# Patient Record
Sex: FEMALE | Race: WHITE | Employment: OTHER | URBAN - METROPOLITAN AREA
[De-identification: names, ages, dates, MRNs, and addresses within clinical notes are randomized per-mention and may not be internally consistent; named-entity substitution may affect disease eponyms.]

---

## 2017-07-25 ENCOUNTER — TRANSFERRED RECORDS (OUTPATIENT)
Dept: HEALTH INFORMATION MANAGEMENT | Facility: CLINIC | Age: 72
End: 2017-07-25

## 2017-11-14 ENCOUNTER — ANESTHESIA EVENT (OUTPATIENT)
Dept: SURGERY | Facility: CLINIC | Age: 72
DRG: 467 | End: 2017-11-14
Payer: MEDICARE

## 2017-11-14 RX ORDER — DORZOLAMIDE HYDROCHLORIDE AND TIMOLOL MALEATE 20; 5 MG/ML; MG/ML
1 SOLUTION/ DROPS OPHTHALMIC 2 TIMES DAILY
COMMUNITY

## 2017-11-14 RX ORDER — MULTIPLE VITAMINS W/ MINERALS TAB 9MG-400MCG
1 TAB ORAL DAILY
COMMUNITY
End: 2020-01-01

## 2017-11-16 ENCOUNTER — HOSPITAL ENCOUNTER (INPATIENT)
Facility: CLINIC | Age: 72
LOS: 3 days | Discharge: HOME HEALTH | DRG: 467 | End: 2017-11-19
Attending: ORTHOPAEDIC SURGERY | Admitting: ORTHOPAEDIC SURGERY
Payer: MEDICARE

## 2017-11-16 ENCOUNTER — ANESTHESIA (OUTPATIENT)
Dept: SURGERY | Facility: CLINIC | Age: 72
DRG: 467 | End: 2017-11-16
Payer: MEDICARE

## 2017-11-16 ENCOUNTER — APPOINTMENT (OUTPATIENT)
Dept: GENERAL RADIOLOGY | Facility: CLINIC | Age: 72
DRG: 467 | End: 2017-11-16
Attending: ORTHOPAEDIC SURGERY
Payer: MEDICARE

## 2017-11-16 DIAGNOSIS — Z96.649 S/P REVISION OF TOTAL HIP: Primary | ICD-10-CM

## 2017-11-16 PROCEDURE — C1776 JOINT DEVICE (IMPLANTABLE): HCPCS | Performed by: ORTHOPAEDIC SURGERY

## 2017-11-16 PROCEDURE — 25000125 ZZHC RX 250: Performed by: NURSE ANESTHETIST, CERTIFIED REGISTERED

## 2017-11-16 PROCEDURE — 25000132 ZZH RX MED GY IP 250 OP 250 PS 637: Mod: GY | Performed by: ORTHOPAEDIC SURGERY

## 2017-11-16 PROCEDURE — 37000009 ZZH ANESTHESIA TECHNICAL FEE, EACH ADDTL 15 MIN: Performed by: ORTHOPAEDIC SURGERY

## 2017-11-16 PROCEDURE — 40000986 XR PELVIS PORT 1/2 VW

## 2017-11-16 PROCEDURE — 0SP90JZ REMOVAL OF SYNTHETIC SUBSTITUTE FROM RIGHT HIP JOINT, OPEN APPROACH: ICD-10-PCS | Performed by: ORTHOPAEDIC SURGERY

## 2017-11-16 PROCEDURE — 37000008 ZZH ANESTHESIA TECHNICAL FEE, 1ST 30 MIN: Performed by: ORTHOPAEDIC SURGERY

## 2017-11-16 PROCEDURE — 36000069 ZZH SURGERY LEVEL 5 EA 15 ADDTL MIN: Performed by: ORTHOPAEDIC SURGERY

## 2017-11-16 PROCEDURE — 25000128 H RX IP 250 OP 636: Performed by: ORTHOPAEDIC SURGERY

## 2017-11-16 PROCEDURE — 25000128 H RX IP 250 OP 636: Performed by: NURSE ANESTHETIST, CERTIFIED REGISTERED

## 2017-11-16 PROCEDURE — 0SR902A REPLACEMENT OF RIGHT HIP JOINT WITH METAL ON POLYETHYLENE SYNTHETIC SUBSTITUTE, UNCEMENTED, OPEN APPROACH: ICD-10-PCS | Performed by: ORTHOPAEDIC SURGERY

## 2017-11-16 PROCEDURE — A9270 NON-COVERED ITEM OR SERVICE: HCPCS | Mod: GY | Performed by: ORTHOPAEDIC SURGERY

## 2017-11-16 PROCEDURE — 27210794 ZZH OR GENERAL SUPPLY STERILE: Performed by: ORTHOPAEDIC SURGERY

## 2017-11-16 PROCEDURE — 71000016 ZZH RECOVERY PHASE 1 LEVEL 3 FIRST HR: Performed by: ORTHOPAEDIC SURGERY

## 2017-11-16 PROCEDURE — 12000007 ZZH R&B INTERMEDIATE

## 2017-11-16 PROCEDURE — 0SUA09Z SUPPLEMENT RIGHT HIP JOINT, ACETABULAR SURFACE WITH LINER, OPEN APPROACH: ICD-10-PCS | Performed by: ORTHOPAEDIC SURGERY

## 2017-11-16 PROCEDURE — 36000067 ZZH SURGERY LEVEL 5 1ST 30 MIN: Performed by: ORTHOPAEDIC SURGERY

## 2017-11-16 PROCEDURE — 25800025 ZZH RX 258: Performed by: ORTHOPAEDIC SURGERY

## 2017-11-16 PROCEDURE — 0SP909Z REMOVAL OF LINER FROM RIGHT HIP JOINT, OPEN APPROACH: ICD-10-PCS | Performed by: ORTHOPAEDIC SURGERY

## 2017-11-16 PROCEDURE — 40000305 ZZH STATISTIC PRE PROC ASSESS I: Performed by: ORTHOPAEDIC SURGERY

## 2017-11-16 PROCEDURE — 25000125 ZZHC RX 250: Performed by: PHYSICIAN ASSISTANT

## 2017-11-16 PROCEDURE — 71000017 ZZH RECOVERY PHASE 1 LEVEL 3 EA ADDTL HR: Performed by: ORTHOPAEDIC SURGERY

## 2017-11-16 PROCEDURE — 25000128 H RX IP 250 OP 636: Performed by: PHYSICIAN ASSISTANT

## 2017-11-16 RX ORDER — BUPIVACAINE HYDROCHLORIDE 7.5 MG/ML
INJECTION, SOLUTION INTRASPINAL PRN
Status: DISCONTINUED | OUTPATIENT
Start: 2017-11-16 | End: 2017-11-16

## 2017-11-16 RX ORDER — HYDROXYZINE HYDROCHLORIDE 25 MG/1
25 TABLET, FILM COATED ORAL
Status: DISCONTINUED | OUTPATIENT
Start: 2017-11-16 | End: 2017-11-16 | Stop reason: HOSPADM

## 2017-11-16 RX ORDER — SODIUM CHLORIDE, SODIUM LACTATE, POTASSIUM CHLORIDE, CALCIUM CHLORIDE 600; 310; 30; 20 MG/100ML; MG/100ML; MG/100ML; MG/100ML
INJECTION, SOLUTION INTRAVENOUS CONTINUOUS
Status: DISCONTINUED | OUTPATIENT
Start: 2017-11-16 | End: 2017-11-17

## 2017-11-16 RX ORDER — DEXAMETHASONE SODIUM PHOSPHATE 4 MG/ML
INJECTION, SOLUTION INTRA-ARTICULAR; INTRALESIONAL; INTRAMUSCULAR; INTRAVENOUS; SOFT TISSUE PRN
Status: DISCONTINUED | OUTPATIENT
Start: 2017-11-16 | End: 2017-11-16

## 2017-11-16 RX ORDER — DORZOLAMIDE HYDROCHLORIDE AND TIMOLOL MALEATE 20; 5 MG/ML; MG/ML
1 SOLUTION/ DROPS OPHTHALMIC 2 TIMES DAILY
Status: DISCONTINUED | OUTPATIENT
Start: 2017-11-16 | End: 2017-11-19 | Stop reason: HOSPADM

## 2017-11-16 RX ORDER — DIMENHYDRINATE 50 MG/ML
25 INJECTION, SOLUTION INTRAMUSCULAR; INTRAVENOUS
Status: DISCONTINUED | OUTPATIENT
Start: 2017-11-16 | End: 2017-11-16 | Stop reason: HOSPADM

## 2017-11-16 RX ORDER — NALOXONE HYDROCHLORIDE 0.4 MG/ML
.1-.4 INJECTION, SOLUTION INTRAMUSCULAR; INTRAVENOUS; SUBCUTANEOUS
Status: DISCONTINUED | OUTPATIENT
Start: 2017-11-16 | End: 2017-11-16

## 2017-11-16 RX ORDER — MEPERIDINE HYDROCHLORIDE 25 MG/ML
12.5 INJECTION INTRAMUSCULAR; INTRAVENOUS; SUBCUTANEOUS EVERY 5 MIN PRN
Status: DISCONTINUED | OUTPATIENT
Start: 2017-11-16 | End: 2017-11-16 | Stop reason: HOSPADM

## 2017-11-16 RX ORDER — KETOROLAC TROMETHAMINE 30 MG/ML
INJECTION, SOLUTION INTRAMUSCULAR; INTRAVENOUS PRN
Status: DISCONTINUED | OUTPATIENT
Start: 2017-11-16 | End: 2017-11-16

## 2017-11-16 RX ORDER — GLYCOPYRROLATE 0.2 MG/ML
INJECTION, SOLUTION INTRAMUSCULAR; INTRAVENOUS PRN
Status: DISCONTINUED | OUTPATIENT
Start: 2017-11-16 | End: 2017-11-16

## 2017-11-16 RX ORDER — AMOXICILLIN 250 MG
1-2 CAPSULE ORAL 2 TIMES DAILY
Status: DISCONTINUED | OUTPATIENT
Start: 2017-11-16 | End: 2017-11-19 | Stop reason: HOSPADM

## 2017-11-16 RX ORDER — CEFAZOLIN SODIUM 1 G/3ML
1 INJECTION, POWDER, FOR SOLUTION INTRAMUSCULAR; INTRAVENOUS SEE ADMIN INSTRUCTIONS
Status: DISCONTINUED | OUTPATIENT
Start: 2017-11-16 | End: 2017-11-16

## 2017-11-16 RX ORDER — FENTANYL CITRATE 50 UG/ML
INJECTION, SOLUTION INTRAMUSCULAR; INTRAVENOUS PRN
Status: DISCONTINUED | OUTPATIENT
Start: 2017-11-16 | End: 2017-11-16

## 2017-11-16 RX ORDER — HYDROMORPHONE HYDROCHLORIDE 1 MG/ML
0.5 INJECTION, SOLUTION INTRAMUSCULAR; INTRAVENOUS; SUBCUTANEOUS
Status: DISCONTINUED | OUTPATIENT
Start: 2017-11-16 | End: 2017-11-19 | Stop reason: HOSPADM

## 2017-11-16 RX ORDER — LIDOCAINE HYDROCHLORIDE 10 MG/ML
INJECTION, SOLUTION INFILTRATION; PERINEURAL PRN
Status: DISCONTINUED | OUTPATIENT
Start: 2017-11-16 | End: 2017-11-16

## 2017-11-16 RX ORDER — PROCHLORPERAZINE MALEATE 5 MG
5 TABLET ORAL EVERY 6 HOURS PRN
Status: DISCONTINUED | OUTPATIENT
Start: 2017-11-16 | End: 2017-11-19 | Stop reason: HOSPADM

## 2017-11-16 RX ORDER — HYDROMORPHONE HYDROCHLORIDE 1 MG/ML
.3-.5 INJECTION, SOLUTION INTRAMUSCULAR; INTRAVENOUS; SUBCUTANEOUS EVERY 5 MIN PRN
Status: DISCONTINUED | OUTPATIENT
Start: 2017-11-16 | End: 2017-11-16 | Stop reason: HOSPADM

## 2017-11-16 RX ORDER — OXYCODONE HYDROCHLORIDE 5 MG/1
5-10 TABLET ORAL EVERY 4 HOURS PRN
Status: DISCONTINUED | OUTPATIENT
Start: 2017-11-16 | End: 2017-11-17

## 2017-11-16 RX ORDER — LIDOCAINE 40 MG/G
CREAM TOPICAL
Status: DISCONTINUED | OUTPATIENT
Start: 2017-11-16 | End: 2017-11-17

## 2017-11-16 RX ORDER — EPINEPHRINE 1 MG/ML
INJECTION, SOLUTION, CONCENTRATE INTRAVENOUS PRN
Status: DISCONTINUED | OUTPATIENT
Start: 2017-11-16 | End: 2017-11-16

## 2017-11-16 RX ORDER — DEXAMETHASONE SODIUM PHOSPHATE 4 MG/ML
10 INJECTION, SOLUTION INTRA-ARTICULAR; INTRALESIONAL; INTRAMUSCULAR; INTRAVENOUS; SOFT TISSUE ONCE
Status: DISCONTINUED | OUTPATIENT
Start: 2017-11-16 | End: 2017-11-16

## 2017-11-16 RX ORDER — TRAMADOL HYDROCHLORIDE 50 MG/1
50 TABLET ORAL EVERY 6 HOURS PRN
Status: DISCONTINUED | OUTPATIENT
Start: 2017-11-16 | End: 2017-11-19 | Stop reason: HOSPADM

## 2017-11-16 RX ORDER — EPHEDRINE SULFATE 50 MG/ML
INJECTION, SOLUTION INTRAVENOUS PRN
Status: DISCONTINUED | OUTPATIENT
Start: 2017-11-16 | End: 2017-11-16

## 2017-11-16 RX ORDER — ONDANSETRON 2 MG/ML
4 INJECTION INTRAMUSCULAR; INTRAVENOUS EVERY 30 MIN PRN
Status: DISCONTINUED | OUTPATIENT
Start: 2017-11-16 | End: 2017-11-16 | Stop reason: HOSPADM

## 2017-11-16 RX ORDER — ONDANSETRON 2 MG/ML
INJECTION INTRAMUSCULAR; INTRAVENOUS PRN
Status: DISCONTINUED | OUTPATIENT
Start: 2017-11-16 | End: 2017-11-16

## 2017-11-16 RX ORDER — FENTANYL CITRATE 50 UG/ML
25-50 INJECTION, SOLUTION INTRAMUSCULAR; INTRAVENOUS
Status: DISCONTINUED | OUTPATIENT
Start: 2017-11-16 | End: 2017-11-16 | Stop reason: HOSPADM

## 2017-11-16 RX ORDER — ONDANSETRON 2 MG/ML
4 INJECTION INTRAMUSCULAR; INTRAVENOUS EVERY 6 HOURS PRN
Status: DISCONTINUED | OUTPATIENT
Start: 2017-11-16 | End: 2017-11-19 | Stop reason: HOSPADM

## 2017-11-16 RX ORDER — KETOROLAC TROMETHAMINE 15 MG/ML
15 INJECTION, SOLUTION INTRAMUSCULAR; INTRAVENOUS
Status: DISCONTINUED | OUTPATIENT
Start: 2017-11-16 | End: 2017-11-16 | Stop reason: HOSPADM

## 2017-11-16 RX ORDER — NALOXONE HYDROCHLORIDE 0.4 MG/ML
.1-.4 INJECTION, SOLUTION INTRAMUSCULAR; INTRAVENOUS; SUBCUTANEOUS
Status: DISCONTINUED | OUTPATIENT
Start: 2017-11-16 | End: 2017-11-19 | Stop reason: HOSPADM

## 2017-11-16 RX ORDER — DEXTROSE MONOHYDRATE, SODIUM CHLORIDE, AND POTASSIUM CHLORIDE 50; 1.49; 4.5 G/1000ML; G/1000ML; G/1000ML
INJECTION, SOLUTION INTRAVENOUS CONTINUOUS
Status: DISCONTINUED | OUTPATIENT
Start: 2017-11-16 | End: 2017-11-18

## 2017-11-16 RX ORDER — PHENYLEPHRINE HCL IN 0.9% NACL 1 MG/10 ML
100 SYRINGE (ML) INTRAVENOUS EVERY 5 MIN PRN
Status: DISCONTINUED | OUTPATIENT
Start: 2017-11-16 | End: 2017-11-17 | Stop reason: CLARIF

## 2017-11-16 RX ORDER — CEFAZOLIN SODIUM 2 G/100ML
2 INJECTION, SOLUTION INTRAVENOUS
Status: COMPLETED | OUTPATIENT
Start: 2017-11-16 | End: 2017-11-16

## 2017-11-16 RX ORDER — ONDANSETRON 4 MG/1
4 TABLET, ORALLY DISINTEGRATING ORAL EVERY 6 HOURS PRN
Status: DISCONTINUED | OUTPATIENT
Start: 2017-11-16 | End: 2017-11-19 | Stop reason: HOSPADM

## 2017-11-16 RX ORDER — ACETAMINOPHEN 325 MG/1
650 TABLET ORAL EVERY 4 HOURS PRN
Status: DISCONTINUED | OUTPATIENT
Start: 2017-11-19 | End: 2017-11-19 | Stop reason: HOSPADM

## 2017-11-16 RX ORDER — PROPOFOL 10 MG/ML
INJECTION, EMULSION INTRAVENOUS CONTINUOUS PRN
Status: DISCONTINUED | OUTPATIENT
Start: 2017-11-16 | End: 2017-11-16

## 2017-11-16 RX ORDER — ONDANSETRON 4 MG/1
4 TABLET, ORALLY DISINTEGRATING ORAL EVERY 30 MIN PRN
Status: DISCONTINUED | OUTPATIENT
Start: 2017-11-16 | End: 2017-11-16 | Stop reason: HOSPADM

## 2017-11-16 RX ORDER — LIDOCAINE 40 MG/G
CREAM TOPICAL
Status: DISCONTINUED | OUTPATIENT
Start: 2017-11-16 | End: 2017-11-19 | Stop reason: HOSPADM

## 2017-11-16 RX ORDER — DEXAMETHASONE SODIUM PHOSPHATE 4 MG/ML
10 INJECTION, SOLUTION INTRA-ARTICULAR; INTRALESIONAL; INTRAMUSCULAR; INTRAVENOUS; SOFT TISSUE ONCE
Status: COMPLETED | OUTPATIENT
Start: 2017-11-17 | End: 2017-11-17

## 2017-11-16 RX ORDER — ACETAMINOPHEN 325 MG/1
975 TABLET ORAL EVERY 8 HOURS
Status: DISCONTINUED | OUTPATIENT
Start: 2017-11-16 | End: 2017-11-17

## 2017-11-16 RX ADMIN — POTASSIUM CHLORIDE, DEXTROSE MONOHYDRATE AND SODIUM CHLORIDE: 150; 5; 450 INJECTION, SOLUTION INTRAVENOUS at 17:33

## 2017-11-16 RX ADMIN — EPHEDRINE SULFATE 7.5 MG: 50 INJECTION, SOLUTION INTRAVENOUS at 12:06

## 2017-11-16 RX ADMIN — PHENYLEPHRINE HYDROCHLORIDE 200 MCG: 10 INJECTION, SOLUTION INTRAMUSCULAR; INTRAVENOUS; SUBCUTANEOUS at 11:25

## 2017-11-16 RX ADMIN — PHENYLEPHRINE HYDROCHLORIDE 100 MCG: 10 INJECTION, SOLUTION INTRAMUSCULAR; INTRAVENOUS; SUBCUTANEOUS at 12:02

## 2017-11-16 RX ADMIN — EPHEDRINE SULFATE 10 MG: 50 INJECTION, SOLUTION INTRAVENOUS at 12:23

## 2017-11-16 RX ADMIN — PHENYLEPHRINE HYDROCHLORIDE 100 MCG: 10 INJECTION, SOLUTION INTRAMUSCULAR; INTRAVENOUS; SUBCUTANEOUS at 11:52

## 2017-11-16 RX ADMIN — SODIUM CHLORIDE, POTASSIUM CHLORIDE, SODIUM LACTATE AND CALCIUM CHLORIDE: 600; 310; 30; 20 INJECTION, SOLUTION INTRAVENOUS at 12:30

## 2017-11-16 RX ADMIN — EPHEDRINE SULFATE 7.5 MG: 50 INJECTION, SOLUTION INTRAVENOUS at 11:39

## 2017-11-16 RX ADMIN — EPINEPHRINE 0.2 MG: 1 INJECTION, SOLUTION INTRAMUSCULAR; SUBCUTANEOUS at 10:51

## 2017-11-16 RX ADMIN — LIDOCAINE HYDROCHLORIDE 1 ML: 10 INJECTION, SOLUTION EPIDURAL; INFILTRATION; INTRACAUDAL; PERINEURAL at 09:25

## 2017-11-16 RX ADMIN — PHENYLEPHRINE HYDROCHLORIDE 100 MCG: 10 INJECTION, SOLUTION INTRAMUSCULAR; INTRAVENOUS; SUBCUTANEOUS at 13:05

## 2017-11-16 RX ADMIN — PHENYLEPHRINE HYDROCHLORIDE 100 MCG: 10 INJECTION, SOLUTION INTRAMUSCULAR; INTRAVENOUS; SUBCUTANEOUS at 12:06

## 2017-11-16 RX ADMIN — PHENYLEPHRINE HYDROCHLORIDE 100 MCG: 10 INJECTION, SOLUTION INTRAMUSCULAR; INTRAVENOUS; SUBCUTANEOUS at 11:05

## 2017-11-16 RX ADMIN — SENNOSIDES AND DOCUSATE SODIUM 1 TABLET: 8.6; 5 TABLET ORAL at 19:31

## 2017-11-16 RX ADMIN — BUPIVACAINE HYDROCHLORIDE IN DEXTROSE 1.8 ML: 7.5 INJECTION, SOLUTION SUBARACHNOID at 10:51

## 2017-11-16 RX ADMIN — SODIUM CHLORIDE, POTASSIUM CHLORIDE, SODIUM LACTATE AND CALCIUM CHLORIDE: 600; 310; 30; 20 INJECTION, SOLUTION INTRAVENOUS at 09:25

## 2017-11-16 RX ADMIN — PHENYLEPHRINE HYDROCHLORIDE 100 MCG: 10 INJECTION, SOLUTION INTRAMUSCULAR; INTRAVENOUS; SUBCUTANEOUS at 12:40

## 2017-11-16 RX ADMIN — PROPOFOL 150 MCG/KG/MIN: 10 INJECTION, EMULSION INTRAVENOUS at 10:53

## 2017-11-16 RX ADMIN — ONDANSETRON 4 MG: 2 INJECTION INTRAMUSCULAR; INTRAVENOUS at 10:38

## 2017-11-16 RX ADMIN — PHENYLEPHRINE HYDROCHLORIDE 150 MCG: 10 INJECTION, SOLUTION INTRAMUSCULAR; INTRAVENOUS; SUBCUTANEOUS at 11:20

## 2017-11-16 RX ADMIN — PHENYLEPHRINE HYDROCHLORIDE 150 MCG: 10 INJECTION, SOLUTION INTRAMUSCULAR; INTRAVENOUS; SUBCUTANEOUS at 11:32

## 2017-11-16 RX ADMIN — EPHEDRINE SULFATE 7.5 MG: 50 INJECTION, SOLUTION INTRAVENOUS at 11:28

## 2017-11-16 RX ADMIN — FENTANYL CITRATE 100 MCG: 50 INJECTION, SOLUTION INTRAMUSCULAR; INTRAVENOUS at 10:42

## 2017-11-16 RX ADMIN — EPHEDRINE SULFATE 7.5 MG: 50 INJECTION, SOLUTION INTRAVENOUS at 11:47

## 2017-11-16 RX ADMIN — KETOROLAC TROMETHAMINE 30 MG: 30 INJECTION, SOLUTION INTRAMUSCULAR at 12:46

## 2017-11-16 RX ADMIN — MIDAZOLAM HYDROCHLORIDE 2 MG: 1 INJECTION, SOLUTION INTRAMUSCULAR; INTRAVENOUS at 10:34

## 2017-11-16 RX ADMIN — PHENYLEPHRINE HYDROCHLORIDE 150 MCG: 10 INJECTION, SOLUTION INTRAMUSCULAR; INTRAVENOUS; SUBCUTANEOUS at 11:13

## 2017-11-16 RX ADMIN — PHENYLEPHRINE HYDROCHLORIDE 100 MCG: 10 INJECTION, SOLUTION INTRAMUSCULAR; INTRAVENOUS; SUBCUTANEOUS at 10:58

## 2017-11-16 RX ADMIN — TRANEXAMIC ACID 1 G: 100 INJECTION, SOLUTION INTRAVENOUS at 10:59

## 2017-11-16 RX ADMIN — LIDOCAINE HYDROCHLORIDE 5 ML: 10 INJECTION, SOLUTION INFILTRATION; PERINEURAL at 10:43

## 2017-11-16 RX ADMIN — SODIUM CHLORIDE, POTASSIUM CHLORIDE, SODIUM LACTATE AND CALCIUM CHLORIDE: 600; 310; 30; 20 INJECTION, SOLUTION INTRAVENOUS at 11:25

## 2017-11-16 RX ADMIN — PHENYLEPHRINE HYDROCHLORIDE 150 MCG: 10 INJECTION, SOLUTION INTRAMUSCULAR; INTRAVENOUS; SUBCUTANEOUS at 12:16

## 2017-11-16 RX ADMIN — CEFAZOLIN SODIUM 2 G: 2 INJECTION, SOLUTION INTRAVENOUS at 10:34

## 2017-11-16 RX ADMIN — BIMATOPROST 1 DROP: 0.1 SOLUTION/ DROPS OPHTHALMIC at 21:51

## 2017-11-16 RX ADMIN — DEXAMETHASONE SODIUM PHOSPHATE 10 MG: 4 INJECTION, SOLUTION INTRA-ARTICULAR; INTRALESIONAL; INTRAMUSCULAR; INTRAVENOUS; SOFT TISSUE at 10:53

## 2017-11-16 RX ADMIN — PHENYLEPHRINE HYDROCHLORIDE 150 MCG: 10 INJECTION, SOLUTION INTRAMUSCULAR; INTRAVENOUS; SUBCUTANEOUS at 11:39

## 2017-11-16 RX ADMIN — PHENYLEPHRINE HYDROCHLORIDE 150 MCG: 10 INJECTION, SOLUTION INTRAMUSCULAR; INTRAVENOUS; SUBCUTANEOUS at 11:47

## 2017-11-16 RX ADMIN — ACETAMINOPHEN 975 MG: 325 TABLET, FILM COATED ORAL at 17:30

## 2017-11-16 RX ADMIN — PHENYLEPHRINE HYDROCHLORIDE 100 MCG: 10 INJECTION, SOLUTION INTRAMUSCULAR; INTRAVENOUS; SUBCUTANEOUS at 12:36

## 2017-11-16 RX ADMIN — PHENYLEPHRINE HYDROCHLORIDE 100 MCG: 10 INJECTION, SOLUTION INTRAMUSCULAR; INTRAVENOUS; SUBCUTANEOUS at 13:42

## 2017-11-16 RX ADMIN — EPHEDRINE SULFATE 10 MG: 50 INJECTION, SOLUTION INTRAVENOUS at 11:32

## 2017-11-16 RX ADMIN — PHENYLEPHRINE HYDROCHLORIDE 100 MCG: 10 INJECTION, SOLUTION INTRAMUSCULAR; INTRAVENOUS; SUBCUTANEOUS at 13:22

## 2017-11-16 RX ADMIN — LIDOCAINE HYDROCHLORIDE 5 ML: 10 INJECTION, SOLUTION INFILTRATION; PERINEURAL at 10:53

## 2017-11-16 RX ADMIN — DORZOLAMIDE HYDROCHLORIDE AND TIMOLOL MALEATE 1 DROP: 20; 5 SOLUTION/ DROPS OPHTHALMIC at 21:50

## 2017-11-16 RX ADMIN — GLYCOPYRROLATE 0.4 MG: 0.2 INJECTION, SOLUTION INTRAMUSCULAR; INTRAVENOUS at 12:29

## 2017-11-16 RX ADMIN — PHENYLEPHRINE HYDROCHLORIDE 100 MCG: 10 INJECTION, SOLUTION INTRAMUSCULAR; INTRAVENOUS; SUBCUTANEOUS at 13:00

## 2017-11-16 RX ADMIN — OXYCODONE HYDROCHLORIDE 5 MG: 5 TABLET ORAL at 19:31

## 2017-11-16 RX ADMIN — CEFAZOLIN SODIUM 1 G: 1 INJECTION, SOLUTION INTRAVENOUS at 17:31

## 2017-11-16 RX ADMIN — HYDROMORPHONE HYDROCHLORIDE 0.5 MG: 1 INJECTION, SOLUTION INTRAMUSCULAR; INTRAVENOUS; SUBCUTANEOUS at 21:51

## 2017-11-16 ASSESSMENT — ACTIVITIES OF DAILY LIVING (ADL)
EATING: 0 - INDEPENDENT
CHANGE_IN_FUNCTIONAL_STATUS_SINCE_ONSET_OF_CURRENT_ILLNESS/INJURY: NO
SWALLOWING: 0 - SWALLOWS FOODS/LIQUIDS WITHOUT DIFFICULTY
TOILETING: 2 - ASSISTIVE PERSON
RETIRED_COMMUNICATION: 0-->UNDERSTANDS/COMMUNICATES WITHOUT DIFFICULTY
DRESS: 0 - INDEPENDENT
TRANSFERRING: 0-->INDEPENDENT
TOILETING: 0-->INDEPENDENT
COGNITION: 0 - NO COGNITION ISSUES REPORTED
AMBULATION: 0-->INDEPENDENT
AMBULATION: 1 - ASSISTIVE EQUIPMENT
SWALLOWING: 0-->SWALLOWS FOODS/LIQUIDS WITHOUT DIFFICULTY
TRANSFERRING: 1 - ASSISTIVE EQUIPMENT
DRESS: 0-->INDEPENDENT
FALL_HISTORY_WITHIN_LAST_SIX_MONTHS: YES
COMMUNICATION: 0 - UNDERSTANDS/COMMUNICATES WITHOUT DIFFICULTY
BATHING: 0-->INDEPENDENT
BATHING: 0 - INDEPENDENT
RETIRED_EATING: 0-->INDEPENDENT

## 2017-11-16 NOTE — ANESTHESIA CARE TRANSFER NOTE
Patient: Marquise Jj    Procedure(s):  Right total hip arthroplasty revision. - Wound Class: I-Clean    Diagnosis: Failed right total hip arthroplasty  Diagnosis Additional Information: No value filed.    Anesthesia Type:   Spinal     Note:  Airway :Face Mask  Patient transferred to:PACU  Handoff Report: Identifed the Patient, Identified the Reponsible Provider, Reviewed the pertinent medical history, Discussed the surgical course, Reviewed Intra-OP anesthesia mangement and issues during anesthesia, Set expectations for post-procedure period and Allowed opportunity for questions and acknowledgement of understanding      Vitals: (Last set prior to Anesthesia Care Transfer)    CRNA VITALS  11/16/2017 1243 - 11/16/2017 1313      11/16/2017             NIBP: 97/65    NIBP Mean: 69                Electronically Signed By: STEPHAN Jimenes CRNA  November 16, 2017  1:13 PM

## 2017-11-16 NOTE — BRIEF OP NOTE
Orthopedic Brief Operative Note  [facility]    Pre-operative diagnosis: Right total hip arthroplasty instability   Post-operative diagnosis: Same   Procedure: Right DIAMOND revision   Surgeon: Jozef Garcia MD     Assistant(s): Ana Babb PAC   Anesthesia: Spinal Anesthesia   Estimated blood loss: 200 ml               Drains: None   Specimens: None       Findings: See full dictated operative note for details   Complications: None                 Implant Name Type Inv. Item Serial No.  Lot No. LRB No. Used   G7 acetabular screw 6.5mm x 20    [employer] [id number] Right 1   G7 acetabular screw 6.5mm x 25    [employer] [id number] Right 1   G7 acetabular scre 6.5mm x 35    [employer] [id number] Right 1   G7 OsseoTi acetabular shell, 62mm H    [employer] [id number] Right 1   Femoral head, 6 degree taper, 28mm diameter, medium plus, 10.5mm neck length    [employer]ORM [id number] Right 1   G7 acetabular system dual mobility acetabular liner, neutral    [employer] [id number] Right 1   Active Articulation hip system Dual Mobility Bearing        [employer] [id number] Right 1         Comments: See dictated operative report for full details     Condition: Stable   Weight bearing status: Weight bearing as tolerated   Activity: Activity as tolerated  Patient may move about with assist as indicated or with supervision   Anticoagulation plan:                 Lovenox inpatient and then  mg daily at discharge  for 42 days  Follow up plan                           Follow up in 10-14 day(s)

## 2017-11-16 NOTE — IP AVS SNAPSHOT
[facility]    [address] [address]    Phone:  [phone]    Fax:  [phone]                                       After Visit Summary   11/16/2017    Marquise Jj    MRN: [id number]           After Visit Summary Signature Page     I have received my discharge instructions, and my questions have been answered. I have discussed any challenges I see with this plan with the nurse or doctor.    ..........................................................................................................................................  Patient/Patient Representative Signature      ..........................................................................................................................................  Patient Representative Print Name and Relationship to Patient    ..................................................               ................................................  Date                                            Time    ..........................................................................................................................................  Reviewed by Signature/Title    ...................................................              ..............................................  Date                                                            Time

## 2017-11-16 NOTE — ANESTHESIA PREPROCEDURE EVALUATION
Anesthesia Evaluation     . Pt has had prior anesthetic. Type: General, MAC and Regional           ROS/MED HX    ENT/Pulmonary:  - neg pulmonary ROS     Neurologic:  - neg neurologic ROS     Cardiovascular:     (+) Dyslipidemia, hypertension----. : . . . :. .       METS/Exercise Tolerance:     Hematologic:         Musculoskeletal:   (+) arthritis, , , other musculoskeletal- Scoliosis/kyphoscoliosis      GI/Hepatic:     (+) GERD       Renal/Genitourinary:     (+) chronic renal disease (CKD stage 3),       Endo:  - neg endo ROS       Psychiatric:  - neg psychiatric ROS       Infectious Disease:  - neg infectious disease ROS       Malignancy:      - no malignancy   Other: Comment: Glaucoma                    Physical Exam  Normal systems: cardiovascular, pulmonary and dental    Airway   Mallampati: II  TM distance: >3 FB  Neck ROM: full    Dental     Cardiovascular       Pulmonary                     Anesthesia Plan      History & Physical Review  History and physical reviewed and following examination; no interval change.    ASA Status:  3 .    NPO Status:  > 6 hours    Plan for Spinal with Intravenous and Propofol induction.   PONV prophylaxis:  Ondansetron (or other 5HT-3) and Dexamethasone or Solumedrol  Additional equipment: Videolaryngoscope Scanned H&P reviewed      Postoperative Care  Postoperative pain management:  IV analgesics, Oral pain medications and Multi-modal analgesia.      Consents  Anesthetic plan, risks, benefits and alternatives discussed with:  Patient..                          .

## 2017-11-16 NOTE — INTERVAL H&P NOTE
I personally examined the patient and reviewed her history and it is all up to date without changes.

## 2017-11-16 NOTE — ANESTHESIA PROCEDURE NOTES
Peripheral nerve/Neuraxial procedure note : intrathecal  Pre-Procedure  Performed by  EUN SNOW   Location: OR      Pre-Anesthestic Checklist: patient identified, IV checked, risks and benefits discussed, informed consent, monitors and equipment checked and pre-op evaluation    Timeout  Correct Patient: Yes   Correct Procedure: Yes   Correct Site: Yes   Correct Laterality: N/A   Correct Position: Yes   Site Marked: N/A   .   Procedure Documentation  ASA 3  .    Procedure:    Intrathecal.  Insertion Site:L4-5  (left paramedian approach)      Patient Prep;mask, sterile gloves, povidone-iodine 7.5% surgical scrub, patient draped.  .  Needle: Cristina tip Spinal Needle (gauge): 22  Spinal/LP Needle Length (inches): 3.5 # of attempts: 2 (Pt has scoliosis/kyphoscoliosis and Lumbar back fusion) and  # of redirects:  3 No introducer used .       Assessment/Narrative  .  .  clear CSF fluid removed while sitting   . Time Injected: 10:51

## 2017-11-16 NOTE — ANESTHESIA POSTPROCEDURE EVALUATION
Patient: Marquise Jj    Procedure(s):  Right total hip arthroplasty revision. - Wound Class: I-Clean    Diagnosis:Failed right total hip arthroplasty  Diagnosis Additional Information: No value filed.    Anesthesia Type:  Spinal    Note:  Anesthesia Post Evaluation    Patient location during evaluation: PACU and Bedside  Patient participation: Able to fully participate in evaluation  Level of consciousness: awake and alert  Pain management: adequate  Airway patency: patent  Cardiovascular status: acceptable and stable  Respiratory status: acceptable and room air  Hydration status: acceptable  PONV: none     Anesthetic complications: None    Comments: Pt's BP low at times, communicating with PACU RN about interventions; pt responding to interventions on recheck.        Last vitals:  Vitals:    11/16/17 1440 11/16/17 1445 11/16/17 1450   BP: 93/56 107/65 101/66   Resp: 12 12 12   Temp:      SpO2: 100% 100% 100%         Electronically Signed By: STEPHAN Jimenes CRNA  November 16, 2017  3:10 PM

## 2017-11-16 NOTE — IP AVS SNAPSHOT
MRN:[id number]                      After Visit Summary   11/16/2017    Marquise Jj    MRN: [id number]           Thank you!     Thank you for choosing [facility] for your care. Our goal is always to provide you with excellent care. Hearing back from our patients is one way we can continue to improve our services. Please take a few minutes to complete the written survey that you may receive in the mail after you visit with us. Thank you!        Patient Information     Date Of Birth          1945        Designated Caregiver       Most Recent Value    Caregiver    Will someone help with your care after discharge? yes    Name of designated caregiver Reyes    Phone number of caregiver [phone]    Caregiver address [place]      About your hospital stay     You were admitted on:  November 16, 2017 You last received care in the:  [facility]    You were discharged on:  November 19, 2017        Reason for your hospital stay       Revision right total hip                  Who to Call     For medical emergencies, please call 911.  For non-urgent questions about your medical care, please call your primary care provider or clinic, [phone]  For questions related to your surgery, please call your surgery clinic        Attending Provider     Provider Specialty    Jozef Garcia MD Orthopedics       Primary Care Provider Office Phone # Fax #    Poonam Cast [phone]      After Care Instructions     Activity       Your activity upon discharge: activity as tolerated            Diet       Follow this diet upon discharge: Orders Placed This Encounter      Advance Diet as Tolerated: Regular Diet Adult            Wound care and dressings       Instructions to care for your wound at home: as directed.                  Follow-up Appointments     Follow-up and recommended labs and tests        With Dr. Garcia in 2 weeks                  Additional Services      HOME CARE NURSING REFERRAL       If you do not hear from Home Care and Hospice, or you would like to call to schedule, please call the referring place of service that your provider has listed below.  ______________________________________________________________________    Your provider has referred you to: [facility]: [facility] [phone]   [email or web address]    Extended Emergency Contact Information  Primary Emergency Contact: Reyes Jj   [facility]  Home Phone: [phone]  Relation: Spouse    Patient Anticipated Discharge Date: 11/18/107   RN, PT, HHA to begin 24 - 48 hours after discharge.  PLEASE EVALUATE AND TREAT (Evaluation timeline is 24 - 48 hrs. Please call if there is need for a variance to this timeline).    REASON FOR REFERRAL: Status post right hip revision, need for ongoing PT    ADDITIONAL SERVICES NEEDED: NA    OTHER PERTINENT INFORMATION: Patient was last seen by provider on 11/18/2017 for status post right hip revision.    Current Outpatient Prescriptions:  HYDROcodone-acetaminophen (NORCO) 5-325 MG per tablet, Take 1-2 tablets by mouth every 4 hours as needed for moderate to severe pain, Disp: 50 tablet, Rfl: 0  hydrOXYzine (ATARAX) 25 MG tablet, Take 1 tablet (25 mg) by mouth every 4 hours as needed for itching or anxiety, Disp: 50 tablet, Rfl: 1  senna-docusate (SENOKOT-S;PERICOLACE) 8.6-50 MG per tablet, Take 1-2 tablets by mouth 2 times daily, Disp: 100 tablet, Rfl:       Patient Active Problem List:     S/P revision of total hip      Documentation of Face to Face and Certification for Home Health Services    I certify that patient, Marquise Jj is under my care and that I, or a Nurse Practitioner or Physician's Assistant working with me, had a face-to-face encounter that meets the physician face-to-face encounter requirements with this patient on: 11/18/2017.    This encounter with the  patient was in whole, or in part, for the following medical condition, which is the primary reason for Home Health Care: ongoing physical therapy.    I certify that, based on my findings, the following services are medically necessary Home Health Services: Physical Therapy    My clinical findings support the need for the above services because: Physical Therapy Services are needed to assess and treat the following functional impairments: decreased mobility related to hip surgery.    Further, I certify that my clinical findings support that this patient is homebound (i.e. absences from home require considerable and taxing effort and are for medical reasons or Oriental orthodox services or infrequently or of short duration when for other reasons) because: Requires assistance of another person or specialized equipment to access medical services because patient:  Had hip surgery on 11/16/2017.    Based on the above findings, I certify that this patient is confined to the home and needs intermittent skilled nursing care, physical therapy and/or speech therapy.  The patient is under my care, and I have initiated the establishment of the plan of care.  This patient will be followed by a physician who will periodically review the plan of care.    Physician/Provider to provide follow up care: Poonam Cast certified Physician at time of discharge: Dr Jozef Garcia    Please be aware that coverage of these services is subject to the terms and limitations of your health insurance plan.  Call member services at your health plan with any benefit or coverage questions.            Home Care OT Referral for Hospital Discharge       OT to eval and treat    Your provider has ordered home care - occupational therapy. If you have not been contacted within 2 days of your discharge please call the department phone number listed on the top of this document.            Home Care PT Referral for Hospital Discharge       PT  "to eval and treat    Your provider has ordered home care - physical therapy. If you have not been contacted within 2 days of your discharge please call the department phone number listed on the top of this document.                  Pending Results     No orders found from 2017 to 2017.            Statement of Approval     Ordered          17 0951  I have reviewed and agree with all the recommendations and orders detailed in this document.  EFFECTIVE NOW     Approved and electronically signed by:  Jozef Garcia MD             Admission Information     Date & Time Provider Department Dept. Phone    2017 Jozef Garcia MD [facility] [phone]      Your Vitals Were     Blood Pressure Pulse Temperature Respirations Height Weight    109/55 (BP Location: Left arm) 74 98  F (36.7  C) (Oral) 16 1.727 m (5' 8\") 76.7 kg (169 lb)    Pulse Oximetry BMI (Body Mass Index)                96% 25.7 kg/m2          MyChart Information     [facility] lets you send messages to your doctor, view your test results, renew your prescriptions, schedule appointments and more. To sign up, go to [email or web address] . Click on \"Log in\" on the left side of the screen, which will take you to the Welcome page. Then click on \"Sign up Now\" on the right side of the page.     You will be asked to enter the access code listed below, as well as some personal information. Please follow the directions to create your username and password.     Your access code is: [email or web address]  Expires: 2018  9:52 AM     Your access code will  in 90 days. If you need help or a new code, please call your [place] clinic or [phone].        Care EveryWhere ID     This is your Care EveryWhere ID. This could be used by other organizations to access your [place] medical records  [id number]        Equal Access to Services     RADHA LLANES AH: mel Bautista qaybta " ke paez mirzafabian cobbgagandeep crenshawaafabian ah. [facility] [phone].    ATENCIÓN: Si romeo escoto, tiene a farr disposición servicios gratuitos de asistencia lingüística. Farzad al [phone].    We comply with applicable federal civil rights laws and [place] laws. We do not discriminate on the basis of race, color, national origin, age, disability, sex, sexual orientation, or gender identity.               Review of your medicines      START taking        Dose / Directions    aspirin  MG EC tablet        Dose:  325 mg   Take 1 tablet (325 mg) by mouth daily   Quantity:  40 tablet   Refills:  0       HYDROcodone-acetaminophen 5-325 MG per tablet   Commonly known as:  NORCO        Dose:  1-2 tablet   Take 1-2 tablets by mouth every 4 hours as needed for moderate to severe pain   Quantity:  50 tablet   Refills:  0       hydrOXYzine 25 MG tablet   Commonly known as:  ATARAX        Dose:  25 mg   Take 1 tablet (25 mg) by mouth every 4 hours as needed for itching or anxiety   Quantity:  50 tablet   Refills:  1       senna-docusate 8.6-50 MG per tablet   Commonly known as:  SENOKOT-S;PERICOLACE        Dose:  1-2 tablet   Take 1-2 tablets by mouth 2 times daily   Quantity:  100 tablet   Refills:  0         CONTINUE these medicines which have NOT CHANGED        Dose / Directions    bimatoprost 0.01 % Soln   Commonly known as:  LUMIGAN        Dose:  1 drop   1 drop At Bedtime   Refills:  0       COSOPT 2-0.5 % ophthalmic solution   Generic drug:  dorzolamide-timolol        Dose:  1 drop   1 drop 2 times daily   Refills:  0       FISH OIL PO        Dose:  1 capsule   Take 1 capsule by mouth daily   Refills:  0       LOSARTAN POTASSIUM PO        Dose:  50 mg   Take 50 mg by mouth daily   Refills:  0       Multi-vitamin Tabs tablet        Dose:  1 tablet   Take 1 tablet by mouth daily   Refills:  0       OMEPRAZOLE PO        Dose:  20 mg   Take 20 mg by mouth daily   Refills:  0       TYLENOL PO         Dose:  500 mg   Take 500 mg by mouth   Refills:  0            Where to get your medicines      These medications were sent to [facility] [place], [place] - [address]  [address] [address]     Phone:  [phone]     aspirin  MG EC tablet    hydrOXYzine 25 MG tablet         Some of these will need a paper prescription and others can be bought over the counter. Ask your nurse if you have questions.     Bring a paper prescription for each of these medications     HYDROcodone-acetaminophen 5-325 MG per tablet       You don't need a prescription for these medications     senna-docusate 8.6-50 MG per tablet                Protect others around you: Learn how to safely use, store and throw away your medicines at www.disposemymeds.org.             Medication List: This is a list of all your medications and when to take them. Check marks below indicate your daily home schedule. Keep this list as a reference.      Medications           Morning Afternoon Evening Bedtime As Needed    aspirin  MG EC tablet   Take 1 tablet (325 mg) by mouth daily   Next Dose Due:  Start Monday 11/20                                   bimatoprost 0.01 % Soln   Commonly known as:  LUMIGAN   1 drop At Bedtime   Last time this was given:  1 drop on 11/18/2017  8:45 PM   Next Dose Due:  tonight                                   COSOPT 2-0.5 % ophthalmic solution   1 drop 2 times daily   Last time this was given:  1 drop on 11/19/2017  8:10 AM   Generic drug:  dorzolamide-timolol   Next Dose Due:  tonight                                      FISH OIL PO   Take 1 capsule by mouth daily   Next Dose Due:  11/20                                   HYDROcodone-acetaminophen 5-325 MG per tablet   Commonly known as:  NORCO   Take 1-2 tablets by mouth every 4 hours as needed for moderate to severe pain   Last time this was given:  1 tablet on 11/19/2017 12:31 PM                                   hydrOXYzine 25 MG  tablet   Commonly known as:  ATARAX   Take 1 tablet (25 mg) by mouth every 4 hours as needed for itching or anxiety   Last time this was given:  25 mg on 11/18/2017  9:56 PM                                   LOSARTAN POTASSIUM PO   Take 50 mg by mouth daily   Next Dose Due:  11/20                                   Multi-vitamin Tabs tablet   Take 1 tablet by mouth daily   Next Dose Due:  11/20                                   OMEPRAZOLE PO   Take 20 mg by mouth daily   Last time this was given:  20 mg on 11/19/2017  8:10 AM   Next Dose Due:  11/20                                   senna-docusate 8.6-50 MG per tablet   Commonly known as:  SENOKOT-S;PERICOLACE   Take 1-2 tablets by mouth 2 times daily   Last time this was given:  2 tablets on 11/19/2017  8:10 AM                                   TYLENOL PO   Take 500 mg by mouth   Last time this was given:  975 mg on 11/17/2017  8:26 AM

## 2017-11-16 NOTE — OR NURSING
PACU discharge criteria has been met to go to Med. Surg. Unit at 1530.  Waiting for a Med. Surg. Room and nurse to be available to take report.

## 2017-11-16 NOTE — PROGRESS NOTES
"[place] [facility] [place]    Patient admitted to room 2300 at approximately 1630 via cart from surgery. Patient was accompanied by transport tech.     Verbal SBAR report received from Susan BAIG prior to patient arrival.     Patient trasferred to bed via air surendra. Patient alert and oriented X 3. The patient is not having any pain.  . Admission vital signs: Blood pressure 104/67, temperature 97.4  F (36.3  C), temperature source Oral, resp. rate 16, height 1.727 m (5' 8\"), weight 76.7 kg (169 lb), SpO2 100 %. Patient was oriented to plan of care, call light, bed controls, tv, telephone, bathroom and visiting hours.     The following safety risks were identified during admission: fall. Yellow risk band applied: YES.     Mikala Johnson    "

## 2017-11-16 NOTE — OR NURSING
Called CONI MORLEY, and notified her of patient's BP trending down and her current BP - see VS flowsheet.  CONI MORLEY, came to the patient's bedside in PACU and gave her a medication to raise the BP at 1338.  Will continue to monitor.

## 2017-11-16 NOTE — OR NURSING
Patient hypotensive on admission to PACU.  CNOI MORLEY, at patient's bedside and medicated the patient for her low BP.  See VS flowsheet.  Will continue to monitor.

## 2017-11-16 NOTE — IP AVS SNAPSHOT
"    [facility] SURGICAL: [phone]                                              INTERAGENCY TRANSFER FORM - PHYSICIAN ORDERS   2017                    Hospital Admission Date: 2017  EDILSON SMITH   : 1945  Sex: Female        Attending Provider: Jozef Garcia MD     Allergies:  Combigan [Brimonidine Tartrate-timolol]    Infection:  None   Service:  SURGERY    Ht:  1.727 m (5' 8\")   Wt:  76.7 kg (169 lb)   Admission Wt:  76.7 kg (169 lb)    BMI:  25.7 kg/m 2   BSA:  1.92 m 2            Patient PCP Information     Provider PCP Type    Poonam Cast General      ED Clinical Impression     Diagnosis Description Comment Added By Time Added    S/P revision of total hip [Z96.649] S/P revision of total hip [Z96.649]  Levi Rios PA-C 2017  9:45 AM      Hospital Problems as of 2017              Priority Class Noted POA    S/P revision of total hip Medium  2017 Yes      Non-Hospital Problems as of 2017     None      Code Status History     Date Active Date Inactive Code Status Order ID Comments User Context    This patient has a current code status but no historical code status.         Medication Review      START taking        Dose / Directions Comments    HYDROcodone-acetaminophen 5-325 MG per tablet   Commonly known as:  NORCO        Dose:  1-2 tablet   Take 1-2 tablets by mouth every 4 hours as needed for moderate to severe pain   Quantity:  50 tablet   Refills:  0        hydrOXYzine 25 MG tablet   Commonly known as:  ATARAX        Dose:  25 mg   Take 1 tablet (25 mg) by mouth every 4 hours as needed for itching or anxiety   Quantity:  50 tablet   Refills:  1        senna-docusate 8.6-50 MG per tablet   Commonly known as:  SENOKOT-S;PERICOLACE        Dose:  1-2 tablet   Take 1-2 tablets by mouth 2 times daily   Quantity:  100 tablet   Refills:  0          CONTINUE these medications which have NOT CHANGED        Dose / Directions Comments    " bimatoprost 0.01 % Soln   Commonly known as:  LUMIGAN        Dose:  1 drop   1 drop At Bedtime   Refills:  0        COSOPT 2-0.5 % ophthalmic solution   Generic drug:  dorzolamide-timolol        Dose:  1 drop   1 drop 2 times daily   Refills:  0        FISH OIL PO        Dose:  1 capsule   Take 1 capsule by mouth daily   Refills:  0        LOSARTAN POTASSIUM PO        Dose:  50 mg   Take 50 mg by mouth daily   Refills:  0        Multi-vitamin Tabs tablet        Dose:  1 tablet   Take 1 tablet by mouth daily   Refills:  0        OMEPRAZOLE PO        Dose:  20 mg   Take 20 mg by mouth daily   Refills:  0        TYLENOL PO        Dose:  500 mg   Take 500 mg by mouth   Refills:  0                Summary of Visit     Reason for your hospital stay       Revision right total hip             After Care     Activity       Your activity upon discharge: activity as tolerated       Diet       Follow this diet upon discharge: Orders Placed This Encounter      Advance Diet as Tolerated: Regular Diet Adult       Wound care and dressings       Instructions to care for your wound at home: as directed.             Referrals     HOME CARE NURSING REFERRAL       If you do not hear from Home Care and Hospice, or you would like to call to schedule, please call the referring place of service that your provider has listed below.  ______________________________________________________________________    Your provider has referred you to: [facility]: [facility] [phone]   [email or web address]    Extended Emergency Contact Information  Primary Emergency Contact: Reyes Jj   [facility] Phone: [phone]  Relation: Spouse    Patient Anticipated Discharge Date: 11/18/107   RN, PT, HHA to begin 24 - 48 hours after discharge.  PLEASE EVALUATE AND TREAT (Evaluation timeline is 24 - 48 hrs. Please call if there is need for a variance to this  timeline).    REASON FOR REFERRAL: Status post right hip revision, need for ongoing PT    ADDITIONAL SERVICES NEEDED: NA    OTHER PERTINENT INFORMATION: Patient was last seen by provider on 11/18/2017 for status post right hip revision.    Current Outpatient Prescriptions:  HYDROcodone-acetaminophen (NORCO) 5-325 MG per tablet, Take 1-2 tablets by mouth every 4 hours as needed for moderate to severe pain, Disp: 50 tablet, Rfl: 0  hydrOXYzine (ATARAX) 25 MG tablet, Take 1 tablet (25 mg) by mouth every 4 hours as needed for itching or anxiety, Disp: 50 tablet, Rfl: 1  senna-docusate (SENOKOT-S;PERICOLACE) 8.6-50 MG per tablet, Take 1-2 tablets by mouth 2 times daily, Disp: 100 tablet, Rfl:       Patient Active Problem List:     S/P revision of total hip      Documentation of Face to Face and Certification for Home Health Services    I certify that patient, Marquise Jj is under my care and that I, or a Nurse Practitioner or Physician's Assistant working with me, had a face-to-face encounter that meets the physician face-to-face encounter requirements with this patient on: 11/18/2017.    This encounter with the patient was in whole, or in part, for the following medical condition, which is the primary reason for Home Health Care: ongoing physical therapy.    I certify that, based on my findings, the following services are medically necessary Home Health Services: Physical Therapy    My clinical findings support the need for the above services because: Physical Therapy Services are needed to assess and treat the following functional impairments: decreased mobility related to hip surgery.    Further, I certify that my clinical findings support that this patient is homebound (i.e. absences from home require considerable and taxing effort and are for medical reasons or [employer] services or infrequently or of short duration when for other reasons) because: Requires assistance of another person or specialized  equipment to access medical services because patient:  Had hip surgery on 11/16/2017.    Based on the above findings, I certify that this patient is confined to the home and needs intermittent skilled nursing care, physical therapy and/or speech therapy.  The patient is under my care, and I have initiated the establishment of the plan of care.  This patient will be followed by a physician who will periodically review the plan of care.    Physician/Provider to provide follow up care: Poonam Cast certified Physician at time of discharge: Dr Jozef Garcia    Please be aware that coverage of these services is subject to the terms and limitations of your health insurance plan.  Call member services at your health plan with any benefit or coverage questions.       Home Care OT Referral for Hospital Discharge       OT to eval and treat    Your provider has ordered home care - occupational therapy. If you have not been contacted within 2 days of your discharge please call the department phone number listed on the top of this document.       Home Care PT Referral for Hospital Discharge       PT to eval and treat    Your provider has ordered home care - physical therapy. If you have not been contacted within 2 days of your discharge please call the department phone number listed on the top of this document.              MD face to face encounter       Documentation of Face to Face and Certification for Home Health Services    I certify that patient: Marquise Jj is under my care and that I, or a nurse practitioner or physician's assistant working with me, had a face-to-face encounter that meets the physician face-to-face encounter requirements with this patient on: 11/18/2017.    This encounter with the patient was in whole, or in part, for the following medical condition, which is the primary reason for home health care: s/p revision right total hip arthroplasty.    I certify that, based on  my findings, the following services are medically necessary home health services: Occupational Therapy and Physical Therapy.    My clinical findings support the need for the above services because: Occupational Therapy Services are needed to assess and treat cognitive ability and address ADL safety due to impairment in ambulation s/p revision right total hip arthroplasty. and Physical Therapy Services are needed to assess and treat the following functional impairments: ambulation s/p right revision total hip arthroplasty.    Further, I certify that my clinical findings support that this patient is homebound (i.e. absences from home require considerable and taxing effort and are for medical reasons or Taoism services or infrequently or of short duration when for other reasons) because: Requires assistance of another person or specialized equipment to access medical services because patient: Requires supervision of another for safe transfer...    Based on the above findings. I certify that this patient is confined to the home and needs intermittent skilled nursing care, physical therapy and/or speech therapy.  The patient is under my care, and I have initiated the establishment of the plan of care.  This patient will be followed by a physician who will periodically review the plan of care.  Physician/Provider to provide follow up care: Poonam Cast    Attending hospital physician (the Medicare certified [place] provider): Jozef Garcia MD  Physician Signature: See electronic signature associated with these discharge orders.  Date: 11/18/2017                  Follow-Up Appointment Instructions     Future Labs/Procedures    Follow-up and recommended labs and tests      Comments:    With Dr. Garcia in 2 weeks      Follow-Up Appointment Instructions     Follow-up and recommended labs and tests        With Dr. Garcia in 2 weeks             Statement of Approval     Ordered          11/18/17 0951  I have reviewed  and agree with all the recommendations and orders detailed in this document.  EFFECTIVE NOW     Approved and electronically signed by:  Jozef Garcia MD

## 2017-11-17 ENCOUNTER — APPOINTMENT (OUTPATIENT)
Dept: PHYSICAL THERAPY | Facility: CLINIC | Age: 72
DRG: 467 | End: 2017-11-17
Attending: ORTHOPAEDIC SURGERY
Payer: MEDICARE

## 2017-11-17 LAB
ANION GAP SERPL CALCULATED.3IONS-SCNC: 9 MMOL/L (ref 3–14)
BUN SERPL-MCNC: 20 MG/DL (ref 7–30)
CALCIUM SERPL-MCNC: 7.8 MG/DL (ref 8.5–10.1)
CHLORIDE SERPL-SCNC: 105 MMOL/L (ref 94–109)
CO2 SERPL-SCNC: 24 MMOL/L (ref 20–32)
CREAT SERPL-MCNC: 1.04 MG/DL (ref 0.52–1.04)
CREAT SERPL-MCNC: 1.04 MG/DL (ref 0.52–1.04)
GFR SERPL CREATININE-BSD FRML MDRD: 52 ML/MIN/1.7M2
GFR SERPL CREATININE-BSD FRML MDRD: 52 ML/MIN/1.7M2
GLUCOSE SERPL-MCNC: 144 MG/DL (ref 70–99)
HGB BLD-MCNC: 9 G/DL (ref 11.7–15.7)
PLATELET # BLD AUTO: 198 10E9/L (ref 150–450)
POTASSIUM SERPL-SCNC: 4.9 MMOL/L (ref 3.4–5.3)
SODIUM SERPL-SCNC: 138 MMOL/L (ref 133–144)

## 2017-11-17 PROCEDURE — 25000128 H RX IP 250 OP 636: Performed by: ORTHOPAEDIC SURGERY

## 2017-11-17 PROCEDURE — 99232 SBSQ HOSP IP/OBS MODERATE 35: CPT | Performed by: PHYSICIAN ASSISTANT

## 2017-11-17 PROCEDURE — 97110 THERAPEUTIC EXERCISES: CPT | Mod: GP | Performed by: PHYSICAL THERAPIST

## 2017-11-17 PROCEDURE — 25800025 ZZH RX 258: Performed by: ORTHOPAEDIC SURGERY

## 2017-11-17 PROCEDURE — 97116 GAIT TRAINING THERAPY: CPT | Mod: GP | Performed by: PHYSICAL THERAPIST

## 2017-11-17 PROCEDURE — 12000000 ZZH R&B MED SURG/OB

## 2017-11-17 PROCEDURE — 80048 BASIC METABOLIC PNL TOTAL CA: CPT | Performed by: PHYSICIAN ASSISTANT

## 2017-11-17 PROCEDURE — 25000132 ZZH RX MED GY IP 250 OP 250 PS 637: Mod: GY | Performed by: ORTHOPAEDIC SURGERY

## 2017-11-17 PROCEDURE — 85049 AUTOMATED PLATELET COUNT: CPT | Performed by: ORTHOPAEDIC SURGERY

## 2017-11-17 PROCEDURE — 85018 HEMOGLOBIN: CPT | Performed by: ORTHOPAEDIC SURGERY

## 2017-11-17 PROCEDURE — 25000132 ZZH RX MED GY IP 250 OP 250 PS 637: Mod: GY | Performed by: PHYSICIAN ASSISTANT

## 2017-11-17 PROCEDURE — 82565 ASSAY OF CREATININE: CPT | Performed by: ORTHOPAEDIC SURGERY

## 2017-11-17 PROCEDURE — A9270 NON-COVERED ITEM OR SERVICE: HCPCS | Mod: GY | Performed by: PHYSICIAN ASSISTANT

## 2017-11-17 PROCEDURE — A9270 NON-COVERED ITEM OR SERVICE: HCPCS | Mod: GY | Performed by: ORTHOPAEDIC SURGERY

## 2017-11-17 PROCEDURE — 36415 COLL VENOUS BLD VENIPUNCTURE: CPT | Performed by: ORTHOPAEDIC SURGERY

## 2017-11-17 PROCEDURE — 40000193 ZZH STATISTIC PT WARD VISIT: Performed by: PHYSICAL THERAPIST

## 2017-11-17 RX ORDER — HYDROCODONE BITARTRATE AND ACETAMINOPHEN 5; 325 MG/1; MG/1
1-2 TABLET ORAL EVERY 4 HOURS PRN
Status: DISCONTINUED | OUTPATIENT
Start: 2017-11-17 | End: 2017-11-19 | Stop reason: HOSPADM

## 2017-11-17 RX ORDER — HYDROXYZINE HYDROCHLORIDE 25 MG/1
25 TABLET, FILM COATED ORAL EVERY 4 HOURS PRN
Status: DISCONTINUED | OUTPATIENT
Start: 2017-11-17 | End: 2017-11-19 | Stop reason: HOSPADM

## 2017-11-17 RX ADMIN — DORZOLAMIDE HYDROCHLORIDE AND TIMOLOL MALEATE 1 DROP: 20; 5 SOLUTION/ DROPS OPHTHALMIC at 22:01

## 2017-11-17 RX ADMIN — HYDROCODONE BITARTRATE AND ACETAMINOPHEN 2 TABLET: 5; 325 TABLET ORAL at 22:02

## 2017-11-17 RX ADMIN — ACETAMINOPHEN 975 MG: 325 TABLET, FILM COATED ORAL at 08:26

## 2017-11-17 RX ADMIN — OXYCODONE HYDROCHLORIDE 10 MG: 5 TABLET ORAL at 08:26

## 2017-11-17 RX ADMIN — SENNOSIDES AND DOCUSATE SODIUM 2 TABLET: 8.6; 5 TABLET ORAL at 08:26

## 2017-11-17 RX ADMIN — HYDROCODONE BITARTRATE AND ACETAMINOPHEN 2 TABLET: 5; 325 TABLET ORAL at 11:57

## 2017-11-17 RX ADMIN — SENNOSIDES AND DOCUSATE SODIUM 2 TABLET: 8.6; 5 TABLET ORAL at 18:08

## 2017-11-17 RX ADMIN — ACETAMINOPHEN 975 MG: 325 TABLET, FILM COATED ORAL at 00:00

## 2017-11-17 RX ADMIN — POTASSIUM CHLORIDE, DEXTROSE MONOHYDRATE AND SODIUM CHLORIDE: 150; 5; 450 INJECTION, SOLUTION INTRAVENOUS at 02:00

## 2017-11-17 RX ADMIN — BIMATOPROST 1 DROP: 0.1 SOLUTION/ DROPS OPHTHALMIC at 22:02

## 2017-11-17 RX ADMIN — ENOXAPARIN SODIUM 40 MG: 40 INJECTION SUBCUTANEOUS at 11:57

## 2017-11-17 RX ADMIN — HYDROXYZINE HYDROCHLORIDE 25 MG: 25 TABLET ORAL at 14:37

## 2017-11-17 RX ADMIN — OMEPRAZOLE 20 MG: 20 CAPSULE, DELAYED RELEASE ORAL at 08:26

## 2017-11-17 RX ADMIN — DORZOLAMIDE HYDROCHLORIDE AND TIMOLOL MALEATE 1 DROP: 20; 5 SOLUTION/ DROPS OPHTHALMIC at 08:36

## 2017-11-17 RX ADMIN — HYDROCODONE BITARTRATE AND ACETAMINOPHEN 1 TABLET: 5; 325 TABLET ORAL at 15:55

## 2017-11-17 RX ADMIN — HYDROMORPHONE HYDROCHLORIDE 0.5 MG: 1 INJECTION, SOLUTION INTRAMUSCULAR; INTRAVENOUS; SUBCUTANEOUS at 01:56

## 2017-11-17 RX ADMIN — CEFAZOLIN SODIUM 1 G: 1 INJECTION, SOLUTION INTRAVENOUS at 00:00

## 2017-11-17 RX ADMIN — HYDROXYZINE HYDROCHLORIDE 25 MG: 25 TABLET ORAL at 22:02

## 2017-11-17 RX ADMIN — OXYCODONE HYDROCHLORIDE 10 MG: 5 TABLET ORAL at 00:00

## 2017-11-17 RX ADMIN — DEXAMETHASONE SODIUM PHOSPHATE 10 MG: 4 INJECTION, SOLUTION INTRAMUSCULAR; INTRAVENOUS at 08:26

## 2017-11-17 NOTE — PLAN OF CARE
Problem: Hip Arthroplasty (Total, Partial) (Adult)  Goal: Signs and Symptoms of Listed Potential Problems Will be Absent, Minimized or Managed (Hip Arthroplasty)  Signs and symptoms of listed potential problems will be absent, minimized or managed by discharge/transition of care (reference Hip Arthroplasty (Total, Partial) (Adult) CPG).   Outcome: Improving  States minimal discomfort, medicated with 5mg Oxycodone. Both feet remain pink to red in color. Patient states that she usually has blue or purple color to both feet. 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 Denies nausea, tolerating clear liquid diet for supper. Now eating a sandwich and hot chocolate. Dangled and attempted to stand to go to the bedside commode. Felt lightheaded when sitting on edge of bed.  When that passed, she attempted to stand with walker. Was unable to bear weight on right leg. States numbness continues enough as to not be able to bear weight. Using bedpan to void.

## 2017-11-17 NOTE — OP NOTE
DATE OF PROCEDURE:  11/16/2017      SURGEON:  Jozef Garcia MD      ASSISTANT:  GUILLERMO DOTSON PA-C   The assistance of a qualified medical profession was required during this case to assist with safe explant of all materials, retraction, leg positioning as well as revision and closure.      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      PROCEDURE:  Right total hip arthroplasty revision acetabulum and femoral head and liner.      INDICATIONS:  Marquise Jj is a 72-year-old female who had a hip replacement done about 17 years ago that overall did well for quite some time, but unfortunately she dislocated last year and then had recurrent dislocations this year and underwent attempts with therapy to try to treat this conservatively.  Ultimately, I discussed intervention with her given her recurrent dislocations and talked about risks and benefits and she elected to proceed.      ANESTHESIA:  Spinal.      ESTIMATED BLOOD LOSS:  200 mL.      SPECIMENS:  None.      RETAINED COMPONENTS:     1.  Biomet G7 62 mm OsseoTi acetabular shell.   2.  Biomet G7 acetabular screws x3.   3.  Biomet G7 28 mm medium +6 degree taper femoral head.   4.  Biomet 50 mm size H dual mobility liner.   5.  Biomet dual mobility bearing 28 inside diameter 50 mm outside diameter, E1 polyethylene.      PROCEDURE IN DETAIL:  The patient was seen and evaluated in the preoperative area where she was deemed an appropriate candidate to undergo the above-stated procedure.  Risks, benefits and alternatives were discussed with the patient who gave her written consent to proceed.  The correct operative site was confirmed and marked.  The patient was taken back to the operating suite by the anesthesia team where a spinal anesthetic was induced.  She was placed in the left lateral decubitus position.  All  pressure points were padded.  The right lower extremity was prepped and draped in the usual sterile fashion.  Appropriate timeout was taken to confirm the correct patient, procedure and operative site.  The operation was begun by marking out her previous posterolateral incision and curving this slightly anterior to reach the lateral femur.  We took this incision down to the tensor fascia and gluteal fascia.  These were split with electrocautery and the Charnley retractor was put in place.  We then internally rotated the hip slightly and she did have some scarred posterior capsule and external rotators which were taken down in 1 full thickness layer and tagged with #5 Ethibond.  After these were taken down, we dislocated the hip and disimpacted the femoral head from the stem without difficulty. We then turned to check the stem and there were no signs of loosening, so this was retained.  I debrided around the acetabulum and then retracted the acetabulum into the anterior superior pocket created around the acetabular component.  I removed the old liner with an osteotome and then removed the screw, we placed a trial liner and used the acetabular explant to cut around carefully and remove the old acetabular shell.  We then sequentially reamed first up to a 59 mm and placed this cup with screw fixation and a dual mobility liner.  Unfortunately, during assembly of the actual dual mobility bearing we were given the wrong head with the wrong taper so this then burned the dual mobility lining so I had to actually go back to the acetabulum, remove the metal liner, remove the screws and the acetabular shell and then ream up slightly to accommodate a 62 mm shell.  This was impacted in place in good inclination and version and 3 screws were placed superiorly.  We inserted then the final dual mobility liner and turned attention back towards trialing.  With the medium plus liner, we restored her excellent stability throughout range  of motion.  She did gain some length to tension her muscles given her frequent dislocations but we then dislocated the trials assembled the dual mobility bearing and impacted the final dual mobility head onto the cleaned and dried taper.  We reduced the hip to its final position and turned our attention towards closure.  We performed a 3-minute dilute Betadine soak.  The scarred posterior capsule and short external rotators were repaired to the greater trochanter through drill holes with #5 Ethibond.  The remaining capsule was repaired to the gluteus medius tendon insertion with a #5 Ethibond in figure-of-eight fashion.  The tensor fascia and gluteal fascia were closed with a #2 Stratafix, subcutaneous tissues were closed with buried 2-0 Monocryl, skin closure was with 3-0 Monocryl in subcuticular fashion, followed by Steri-Strips and an Aquacel AG dressing.  All sponge, needle and instrument counts were correct x2.      COMPLICATIONS:  None.      POSTOPERATIVE PLAN:  She was transferred to the PACU hemodynamically stable.  She will be allowed to weightbear as tolerated.  Will begin Lovenox followed by aspirin for DVT prophylaxis.         YONATHAN ESTEVEZ MD             D: 2017 13:34   T: 2017 22:06   MT: [id number]      Name:     EDILSON SMITH   MRN:      [id number]        Account:        [id number]   :      1945           Procedure Date: 2017      Document: [id number]

## 2017-11-17 NOTE — PLAN OF CARE
Problem: Patient Care Overview  Goal: Plan of Care/Patient Progress Review  OT: Per discussion with physical therapy, appropriate to initiate IP OT evaluation tomorrow 11/18 as pt still having R thigh numbness and quad weakness.

## 2017-11-17 NOTE — PLAN OF CARE
Problem: Patient Care Overview  Goal: Plan of Care/Patient Progress Review  Outcome: Improving  Pt given PRN 10 mg Oxycodone x1 and PRN 0.5 mg IV Dilaudid x1 -- medication provided relief of pain. Pt also using ice pack to right hip. CMS intact, but pt still has area of numbness on on right thigh/ hip area -- pt stated otherwise no numbness or tingling. Dressing is c/d/i. Denies any N/V or SOB. Has been passing flatus, no bowel movement this shift, bowel sounds active x4. Vital signs stable, afebrile. Did dangle on evening shift, requiring assist 1-2 if trying to move out of bed. Abductor pillow in place overnight, pt moves well in bed independently. Pt is able to make her needs be known, uses call light appropriately.

## 2017-11-17 NOTE — PROGRESS NOTES
[facility] Medicine Progress Note  Date of Service: 11/17/2017    Assessment & Plan   Marquise Jj is a 72 year old female who presented on 11/16/2017 for scheduled Procedure(s):  ARTHROPLASTY REVISION HIP by Jozef Garcia MD and is being followed by the hospital medicine service for co-management of acute and/or chronic perioperative medical problems.    Multiple Right Hip Dislocations    S/p Procedure(s):  ARTHROPLASTY REVISION HIP, RIGHT   1 Day Post-Op    Pain tolerable. Hg 9.0, 13.5 prior to surgery - vitals stable and asymptomatic.   - pain control, wound cares, physical therapy, occupational therapy and DVT prophylaxis per orthopedic surgery service  - Hg in AM    Right Anterior Thigh Numbness   Presented in L3-4 dermatome. Ortho discussed with surgeon who advises to monitor it. Possibly due to local anesthetic used intraoperative - should wear off in 24 hours.   -continue to monitor     Mild Hypoxemia   Saturations noted 88% without tachypnea. Transiently placed on oxygen.     States saturations dropped after dose of dilaudid suspect due to hypoventilation  -continue oxygen PRN, maintain saturations >92%, wean as tolerated    Chronic Kidney Disease, Stage III   Cr 1.04, baseline 0.9-1  -avoid nephrotoxins  -I/Os    HTN   Compensated   -continue PTA losartan    GERD  -continue PTA omeprazole    Glaucoma  -continue PTA eye drops if family brings in    HLD   No PTA medications    DVT Prophylaxis: as per orthopedic surgery service - Enoxaparin (Lovenox) SQ as IP followed by ASA 325mg po qd x 42 days.   Code Status: Full Code    Lines: PIV   Rojo catheter: none    Discussion: Medically, the patient appears well, monitoring Hg and thigh numbness. VSS    Disposition: Anticipate discharge 1-2 days pending above and pending PT and pain control per ortho.      Attestation:  I have reviewed today's vital signs, notes, medications, labs and imaging.  Total time: 35  minutes    I have discussed patient with Dr. Garcia. Assessment and plan as above.    Neris Glass PA-C      Interval History   Patient's pain is minimal and dull in the right hip. Pain remains local. Having some numbness of anterior mid/distal thigh.    Eating well  Sleeping on and off    Working well with therapy    No BM but passing flatus and voiding well.    Anticipates going home when ready.    Denies fever, chills, CP, SOB, cough, abdominal pain, N/V/D, numbness or tingling or calf pain in distal lower extremities bilaterally.    Physical Exam   Temp:  [97.1  F (36.2  C)-98  F (36.7  C)] 97.3  F (36.3  C)  Pulse:  [63-84] 76  Heart Rate:  [] 72  Resp:  [12-18] 16  BP: ()/(20-91) 115/43  SpO2:  [88 %-100 %] 97 %    Weights:   Vitals:    11/16/17 0859   Weight: 76.7 kg (169 lb)    Body mass index is 25.7 kg/(m^2).    General: Appears comfortable. Alert and orientated. Pleasant and cooperative. NAD. Non-toxic. Appears stated age.   CV: RRR. Radial pulses are 2+ bilaterally. Distal pulses intact without lower extremity edema.  Respiratory: No accessory muscle usage. CTA bilaterally without wheezes, crackles or rhonchi.   GI: Soft, non-tender, non-distended. Bowel sounds are present.  Skin: Warm, dry, intact. Lower extremities are warm to the touch without skin mottling.   Musculoskeletal: Muscle tone is appropriate.   Neuro: Sensation to light touch of lower extremities is reduced of right anterior thigh - L2-4 dermatome more L3-4. Sensation is intact of right groin and at knee level and distally.     Data     Recent Labs  Lab 11/17/17  0635   HGB 9.0*         POTASSIUM 4.9   CHLORIDE 105   CO2 24   BUN 20   CR 1.04  1.04   ANIONGAP 9   FERNANDO 7.8*   *         Recent Labs  Lab 11/17/17  0635   *        Unresulted Labs Ordered in the Past 30 Days of this Admission     No orders found for last 61 day(s).           Imaging  Recent Results (from the past 24 hour(s))   XR  Pelvis Port 1/2 Views    Narrative    XR PELVIS PORT 1/2 VW 11/16/2017 1:40 PM    COMPARISON: None.    HISTORY: Arthroplasty.      Impression    IMPRESSION: Postoperative changes of bilateral total hip arthroplasty.  Hardware appears intact. No fractures are seen.    CONNIE ROSENBERG MD      I reviewed all new labs and imaging results over the last 24 hours. I personally reviewed no images or EKG's today.    Medications     dextrose 5% and 0.45% NaCl + KCl 20 mEq/L Stopped (11/17/17 1130)       bimatoprost  1 drop Both Eyes At Bedtime     dorzolamide-timolol  1 drop Both Eyes BID     omeprazole (priLOSEC) CR capsule 20 mg  20 mg Oral QAM     sodium chloride (PF)  3 mL Intracatheter Q8H     enoxaparin  40 mg Subcutaneous Q24H     senna-docusate  1-2 tablet Oral BID     I have discussed patient with Dr. Garcia. Assessment and plan as above.     Neris Glass PA-C

## 2017-11-17 NOTE — PROGRESS NOTES
On call [facility] provider called for IV pain medication to be ordered PRN. Left orders for Dilaudid 0.5mg IV q3hrs PRN.

## 2017-11-17 NOTE — PLAN OF CARE
Problem: Patient Care Overview  Goal: Plan of Care/Patient Progress Review  Discharge Planner PT   Patient plan for discharge: Home  Current status: Evaluation completed. Pt reporting  Right anterior thigh weakness and numbness. R quadriceps weakness  noted - strength  Intact ( Fair QS on right) , but no antigravity strength . Pt required min. Assistance of 1-2 to amb a  short distance w/ walker while transferring, difficult due to weakness     Barriers to return to prior living situation: Mobility status   Recommendations for discharge: Home w/ assistance as needed  Rationale for recommendations: Pt active at baseline;       Entered by: Sasha Dow 11/17/2017 11:47 AM

## 2017-11-17 NOTE — PROGRESS NOTES
"[facility] Orthopaedics Progress Note      Post-operative Day: 1 Day Post-Op    Procedure(s):  Right total hip arthroplasty revision. - Wound Class: I-Clean      Subjective:    Pain: minimal  aching to R hip. Patient reports numbness to R anterior thigh (L2/3 dermatome) She slowly regained sensation in her RLE after the spinal wore off yesterday.   denies Chest pain, SOB, O2 required: None  denies nausea/ emesis  denies lightheadedness, dizziness and weakness  BM -, passing gas +. Urinating well, Rojo in place: no.       Objective:  Blood pressure 112/51, pulse 76, temperature 97.5  F (36.4  C), temperature source Oral, resp. rate 16, height 1.727 m (5' 8\"), weight 76.7 kg (169 lb), SpO2 98 %.    Patient Vitals for the past 24 hrs:   BP Temp Temp src Pulse Heart Rate Resp SpO2   11/17/17 0744 112/51 97.5  F (36.4  C) Oral - 70 16 98 %   11/17/17 0651 - - - - - - 97 %   11/17/17 0403 124/60 98  F (36.7  C) - 76 - 18 97 %   11/17/17 0245 - - - - - - 92 %   11/17/17 0244 - - - - - - (!) 88 %   11/16/17 2357 115/54 97.3  F (36.3  C) Oral 71 - 18 98 %   11/16/17 1952 117/66 97.7  F (36.5  C) Oral 63 - 18 99 %   11/16/17 1830 133/62 - - 70 - - -   11/16/17 1815 116/74 - - 70 - - -   11/16/17 1800 130/71 - - 79 - - 98 %   11/16/17 1745 115/69 - - 71 - - -   11/16/17 1730 110/72 - - 84 - - 100 %   11/16/17 1720 - - - - - - 100 %   11/16/17 1715 124/72 - - 83 - - -   11/16/17 1700 118/66 97.6  F (36.4  C) Oral - 73 18 100 %   11/16/17 1645 111/66 97.8  F (36.6  C) Oral 74 - 18 99 %   11/16/17 1605 - - - - - - 100 %   11/16/17 1600 104/67 97.4  F (36.3  C) Oral - 71 16 100 %   11/16/17 1545 106/67 - - - 70 16 100 %   11/16/17 1530 111/70 97.4  F (36.3  C) Oral - 71 16 100 %   11/16/17 1525 103/63 - - - 68 16 100 %   11/16/17 1520 105/65 - - - 77 14 100 %   11/16/17 1515 103/66 - - - 70 14 100 %   11/16/17 1512 99/65 - - - 78 14 100 %   11/16/17 1510 95/65 - - - 75 14 100 %   11/16/17 1505 97/62 - - - 71 14 100 %   11/16/17 " 1500 102/61 - - - 70 14 100 %   11/16/17 1455 102/62 - - - 69 14 100 %   11/16/17 1450 101/66 - - - 68 12 100 %   11/16/17 1445 107/65 - - - 65 12 100 %   11/16/17 1440 93/56 - - - 75 12 100 %   11/16/17 1435 (!) 87/58 - - - 82 12 100 %   11/16/17 1430 (!) 85/59 - - - 84 12 100 %   11/16/17 1425 (!) 87/58 - - - 85 12 100 %   11/16/17 1420 (!) 85/57 - - - 88 12 100 %   11/16/17 1415 (!) 75/48 - - - 93 12 100 %   11/16/17 1410 91/58 - - - 84 12 100 %   11/16/17 1405 (!) 82/60 - - - 84 12 100 %   11/16/17 1400 (!) 83/55 - - - 85 12 100 %   11/16/17 1355 (!) 83/55 - - - 81 12 100 %   11/16/17 1350 (!) 87/58 - - - 81 12 100 %   11/16/17 1345 92/63 - - - 81 12 100 %   11/16/17 1340 (!) 86/68 - - - 90 12 100 %   11/16/17 1338 (!) 76/50 - - - 91 12 100 %   11/16/17 1335 (!) 77/57 - - - 96 12 100 %   11/16/17 1330 94/62 - - - 86 12 100 %   11/16/17 1325 138/89 - - - 77 12 100 %   11/16/17 1321 (!) 140/91 - - - 78 12 100 %   11/16/17 1320 (!) 87/63 - - - 100 12 100 %   11/16/17 1317 (!) 78/52 - - - 105 12 100 %   11/16/17 1315 (!) 68/20 97.1  F (36.2  C) Axillary - 105 12 100 %       Wt Readings from Last 4 Encounters:   11/16/17 76.7 kg (169 lb)     General: Alert and orientated. No apparent distress. Non-labored breathing. Appropriate affect.   MSK: R hip: dressing Clean, dry, and intact. Skin intact, no ecchymosis, no erythema. nontender to palpation to incision site. ROM appropriate for post op. Distal neurovascularly intact. Absent sensation to R L2 and diminished to R L3 dermatomes. Unable to perform straight leg raise, however quad muscle tone is appreciated with quad sets.  Compartments soft and non-tender. No calf pain. Homans negative. PF/DF and EHL intact.       Pertinent Labs   Lab Results: personally reviewed.     Recent Labs   Lab Test  11/17/17   0635   HGB  9.0*   PLT  198   NA  138         Procedure(s):  Right total hip arthroplasty revision. - Wound Class: I-Clean  Plan: Anticoagulation protocol: Lovenox  inpatient and then  mg daily at discharge  x 42  days            Pain medications:  norco and vistaril            Weight bearing status:  WBAT            Dressing Change:  Aquacel dressing to be left intact until follow up.            Disposition:  Home 1-2 days pending PT progression             Follow up: 2 week with JULIETTE            Continue cares and rehabilitation     R L2-3 sensory deficit  - Discussed this with Dr. Garcia. A local anesthetic was used intra op and is thought to be the cause of this sensory deficit. Anticipated symptoms will improve in the next 24 hours. Will continue to monitor for sensation progression.     Report completed by:  Clau Mares PA-C  Date: 11/17/2017  Time: 11:08 AM

## 2017-11-17 NOTE — PLAN OF CARE
Problem: Hip Arthroplasty (Total, Partial) (Adult)  Goal: Signs and Symptoms of Listed Potential Problems Will be Absent, Minimized or Managed (Hip Arthroplasty)  Signs and symptoms of listed potential problems will be absent, minimized or managed by discharge/transition of care (reference Hip Arthroplasty (Total, Partial) (Adult) CPG).   Patient right thigh continues to feel numb. Other cms intact. She states her pain is managed. Able to participate in mobility, needs reminders not to hop, keep right foot on ground.

## 2017-11-17 NOTE — PROGRESS NOTES
11/17/17 0800   Quick Adds   Type of Visit Initial PT Evaluation   Living Environment   Lives With spouse   Living Arrangements house   Home Accessibility stairs to enter home   Number of Stairs to Enter Home 5   Stair Railings at Home outside, present at both sides   Self-Care   Equipment Currently Used at Home walker, rolling   Functional Level Prior   Ambulation 0-->independent   Transferring 0-->independent   Toileting 0-->independent   Bathing 0-->independent   Dressing 0-->independent   Eating 0-->independent   Communication 0-->understands/communicates without difficulty   Swallowing 0-->swallows foods/liquids without difficulty   Cognition 0 - no cognition issues reported   Fall history within last six months yes   Number of times patient has fallen within last six months 1   Prior Functional Level Comment PLOF- Pt indep. with ambulation wihtout an AD. one fall, tripped outside. 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 Pt w/ initial Right hip surgery 17 years ago, had multiple recent dislocations    Precautions/Limitations right hip precautions   Weight-Bearing Status - RLE weight-bearing as tolerated   General Observations Pt  alert, up in the chair- c/o Right LE weakness and numbness in Right thigh area   Cognitive Status Examination   Orientation orientation to person, place and time   Level of Consciousness alert   Follows Commands and Answers Questions able to follow multistep instructions   Personal Safety and Judgment intact   Pain Assessment   Patient Currently in Pain Yes, see Vital Sign flowsheet   Strength   Strength Comments Fair QS on right,   weak quad- no antigravity  "strength . DF intact   MMT: Knee, Rehab Eval   Knee Extension - Right Side (2/5) poor, right   Bed Mobility   Bed Mobility Comments Minimal assistance of 2 w/ sitting> supine; SBA supine> sitting    Transfer Skills   Transfer Comments Reiviewed DIAMOND precautions  w/ pt . Pt familiar w/ precautions- adhering to precautions due ot dislocation hx, also hx of left DIAMOND  . : transferred bed<> BSC w/ walker and Min to SBA of 2    Gait   Gait Comments Pt amb. A short distance w/ walker ( approx 3-4 feet) with  chair> bed transfer with minimal assistance , difficult due to weakness   Balance   Balance Comments fair/ good dynamic standing balance   Sensory Examination   Sensory Perception Comments Numbness in Right anterior thigh   General Therapy Interventions   Planned Therapy Interventions bed mobility training;gait training;strengthening;transfer training;home program guidelines   Clinical Impression   Criteria for Skilled Therapeutic Intervention yes, treatment indicated   PT Diagnosis R DIAMOND revision   Influenced by the following impairments Decreased strength   Functional limitations due to impairments ALtered mobility    Clinical Presentation Stable/Uncomplicated   Clinical Presentation Rationale clinical judgement   Clinical Decision Making (Complexity) Low complexity   Therapy Frequency` 2 times/day   Predicted Duration of Therapy Intervention (days/wks) 2 days   Anticipated Discharge Disposition Home with Assist   Risk & Benefits of therapy have been explained Yes   Patient, Family & other staff in agreement with plan of care Yes   [employer] AM-PAC  \"6 Clicks\" V.2 Basic Mobility Inpatient Short Form   1. Turning from your back to your side while in a flat bed without using bedrails? 3 - A Little   2. Moving from lying on your back to sitting on the side of a flat bed without using bedrails? 3 - A Little   3. Moving to and from a bed to a chair (including a wheelchair)? 3 - A Little   4. Standing up from a " chair using your arms (e.g., wheelchair, or bedside chair)? 3 - A Little   5. To walk in hospital room? 2 - A Lot   6. Climbing 3-5 steps with a railing? 2 - A Lot   Basic Mobility Raw Score (Score out of 24.Lower scores equate to lower levels of function) 16   Total Evaluation Time   Total Evaluation Time (Minutes) 10

## 2017-11-18 ENCOUNTER — APPOINTMENT (OUTPATIENT)
Dept: PHYSICAL THERAPY | Facility: CLINIC | Age: 72
DRG: 467 | End: 2017-11-18
Attending: ORTHOPAEDIC SURGERY
Payer: MEDICARE

## 2017-11-18 LAB — HGB BLD-MCNC: 8 G/DL (ref 11.7–15.7)

## 2017-11-18 PROCEDURE — 25000132 ZZH RX MED GY IP 250 OP 250 PS 637: Mod: GY | Performed by: PHYSICIAN ASSISTANT

## 2017-11-18 PROCEDURE — 97116 GAIT TRAINING THERAPY: CPT | Mod: GP

## 2017-11-18 PROCEDURE — 12000000 ZZH R&B MED SURG/OB

## 2017-11-18 PROCEDURE — 97110 THERAPEUTIC EXERCISES: CPT | Mod: GP

## 2017-11-18 PROCEDURE — 99231 SBSQ HOSP IP/OBS SF/LOW 25: CPT | Performed by: INTERNAL MEDICINE

## 2017-11-18 PROCEDURE — 99207 ZZC MOONLIGHTING INDICATOR: CPT | Performed by: INTERNAL MEDICINE

## 2017-11-18 PROCEDURE — A9270 NON-COVERED ITEM OR SERVICE: HCPCS | Mod: GY | Performed by: ORTHOPAEDIC SURGERY

## 2017-11-18 PROCEDURE — 85018 HEMOGLOBIN: CPT | Performed by: ORTHOPAEDIC SURGERY

## 2017-11-18 PROCEDURE — 40000193 ZZH STATISTIC PT WARD VISIT

## 2017-11-18 PROCEDURE — 36415 COLL VENOUS BLD VENIPUNCTURE: CPT | Performed by: ORTHOPAEDIC SURGERY

## 2017-11-18 PROCEDURE — A9270 NON-COVERED ITEM OR SERVICE: HCPCS | Mod: GY | Performed by: PHYSICIAN ASSISTANT

## 2017-11-18 PROCEDURE — 40000133 ZZH STATISTIC OT WARD VISIT

## 2017-11-18 PROCEDURE — 25000128 H RX IP 250 OP 636: Performed by: ORTHOPAEDIC SURGERY

## 2017-11-18 PROCEDURE — 25000132 ZZH RX MED GY IP 250 OP 250 PS 637: Mod: GY | Performed by: ORTHOPAEDIC SURGERY

## 2017-11-18 RX ORDER — AMOXICILLIN 250 MG
1-2 CAPSULE ORAL 2 TIMES DAILY
Qty: 100 TABLET | Status: ON HOLD
Start: 2017-11-18 | End: 2017-11-20

## 2017-11-18 RX ORDER — HYDROCODONE BITARTRATE AND ACETAMINOPHEN 5; 325 MG/1; MG/1
1-2 TABLET ORAL EVERY 4 HOURS PRN
Qty: 50 TABLET | Refills: 0 | Status: ON HOLD | OUTPATIENT
Start: 2017-11-18 | End: 2017-11-20

## 2017-11-18 RX ORDER — HYDROXYZINE HYDROCHLORIDE 25 MG/1
25 TABLET, FILM COATED ORAL EVERY 4 HOURS PRN
Qty: 50 TABLET | Refills: 1 | Status: ON HOLD | OUTPATIENT
Start: 2017-11-18 | End: 2020-01-01

## 2017-11-18 RX ADMIN — SENNOSIDES AND DOCUSATE SODIUM 2 TABLET: 8.6; 5 TABLET ORAL at 20:42

## 2017-11-18 RX ADMIN — HYDROXYZINE HYDROCHLORIDE 25 MG: 25 TABLET ORAL at 21:56

## 2017-11-18 RX ADMIN — HYDROCODONE BITARTRATE AND ACETAMINOPHEN 2 TABLET: 5; 325 TABLET ORAL at 21:56

## 2017-11-18 RX ADMIN — DORZOLAMIDE HYDROCHLORIDE AND TIMOLOL MALEATE 1 DROP: 20; 5 SOLUTION/ DROPS OPHTHALMIC at 20:43

## 2017-11-18 RX ADMIN — DORZOLAMIDE HYDROCHLORIDE AND TIMOLOL MALEATE 1 DROP: 20; 5 SOLUTION/ DROPS OPHTHALMIC at 08:47

## 2017-11-18 RX ADMIN — HYDROCODONE BITARTRATE AND ACETAMINOPHEN 2 TABLET: 5; 325 TABLET ORAL at 08:45

## 2017-11-18 RX ADMIN — SENNOSIDES AND DOCUSATE SODIUM 1 TABLET: 8.6; 5 TABLET ORAL at 08:45

## 2017-11-18 RX ADMIN — HYDROXYZINE HYDROCHLORIDE 25 MG: 25 TABLET ORAL at 02:26

## 2017-11-18 RX ADMIN — ENOXAPARIN SODIUM 40 MG: 40 INJECTION SUBCUTANEOUS at 11:08

## 2017-11-18 RX ADMIN — OMEPRAZOLE 20 MG: 20 CAPSULE, DELAYED RELEASE ORAL at 08:47

## 2017-11-18 RX ADMIN — HYDROCODONE BITARTRATE AND ACETAMINOPHEN 2 TABLET: 5; 325 TABLET ORAL at 14:00

## 2017-11-18 RX ADMIN — HYDROCODONE BITARTRATE AND ACETAMINOPHEN 2 TABLET: 5; 325 TABLET ORAL at 02:26

## 2017-11-18 RX ADMIN — HYDROCODONE BITARTRATE AND ACETAMINOPHEN 2 TABLET: 5; 325 TABLET ORAL at 18:05

## 2017-11-18 RX ADMIN — BIMATOPROST 1 DROP: 0.1 SOLUTION/ DROPS OPHTHALMIC at 20:45

## 2017-11-18 NOTE — PROGRESS NOTES
OT abril deferred. Spoke with pt. No ADL concerns.  will help with LB dressing PRN and she has equip from previous surgeries. Will be getting a RTS and tub seat from [employer]. Is aware of precautions. No needs identified at this time. Concern seems to be R upper leg numbness and weakness addressed by PT

## 2017-11-18 NOTE — PLAN OF CARE
Problem: Patient Care Overview  Goal: Plan of Care/Patient Progress Review  Discharge Planner PT   Patient plan for discharge: Home with HC  Current status: Pt required CGA sit <> stand, min A sit <> supine.    Amb 160 feet x1 with RW with CGA. Unable to WB on R LE sufficiently to raise L LE onto step. Unable to raise R LE onto step. Unable to navigate stairs. Recommend staying one more day.  Barriers to return to prior living situation: stairs  Recommendations for discharge: Home with HC providing pt will be able to navigate stairs  Rationale for recommendations: Pt would benefit from further PT to strengthening R LE and gait train.       Entered by: Lea Porras 11/18/2017 12:13 PM

## 2017-11-18 NOTE — PROGRESS NOTES
"[facility] Orthopaedics Progress Note      Post-operative Day: 2 Days Post-Op    Procedure(s):  Right total hip arthroplasty revision. - Wound Class: I-Clean      Subjective:    Pain: minimal  Chest pain, SOB:  No      Objective:  Blood pressure 109/44, pulse 71, temperature 98.6  F (37  C), temperature source Oral, resp. rate 16, height 1.727 m (5' 8\"), weight 76.7 kg (169 lb), SpO2 93 %.    Patient Vitals for the past 24 hrs:   BP Temp Temp src Pulse Heart Rate Resp SpO2   11/18/17 0732 109/44 98.6  F (37  C) Oral 71 - 16 93 %   11/18/17 0011 109/47 98.5  F (36.9  C) Oral 74 - 16 96 %   11/17/17 1640 117/58 98.4  F (36.9  C) Oral 74 - 16 95 %   11/17/17 1100 115/43 97.3  F (36.3  C) Oral - 72 16 97 %       Wt Readings from Last 4 Encounters:   11/16/17 76.7 kg (169 lb)         Motor function, sensation, and circulation intact   Numbness anterior thigh, unchanged from yesterday, otherwise intact  Wound status: incisions are clean dry and intact. Yes  Calf tenderness: Bilateral  No    Pertinent Labs   Lab Results: personally reviewed.     Recent Labs   Lab Test  11/18/17   0625  11/17/17   0635   HGB  8.0*  9.0*   PLT   --   198   NA   --   138       Plan: Anticoagulation protocol: Lovenox inpatient and then  mg daily at discharge  x 42  days            Pain medications:  norco and vistaril            Weight bearing status:  WBAT            Disposition:  Home possibly today after PT, tomorrow if not today -- with home care             Continue cares and rehabilitation     Report completed by:  Levi Rios PA-C  Date: 11/18/2017  Time: 9:51 AM  "

## 2017-11-18 NOTE — PLAN OF CARE
Problem: Patient Care Overview  Goal: Plan of Care/Patient Progress Review  Outcome: No Change  Pt continues to have numbness and tingling along right thigh which is unchanged. Pt does report feeling as though she is able to bear more weight the right side w/o feeling like her leg is going to give out. Using the commode at bedside. Pain controlled with 2 tabs Norco and Atarax every 4 hours. Voiding in good amounts. Passing flatus. Dressing CDI. VSS. Saline locked. Up with an assist of one with a walker.

## 2017-11-18 NOTE — PLAN OF CARE
"Problem: Patient Care Overview  Goal: Plan of Care/Patient Progress Review  Outcome: Improving  Patient A&O x4, up with stand by assist and walker/gait belt. Reports minimal pain. Ice on right hip, PRN norco administered for pain management. Dressing is c/d/i. Right thigh continues to be numb to the touch/tingling. +2 pedal pulses. Clear lung sounds, vs stable. Voiding without difficulty. /58  Pulse 74  Temp 98.4  F (36.9  C) (Oral)  Resp 16  Ht 1.727 m (5' 8\")  Wt 76.7 kg (169 lb)  SpO2 95%  BMI 25.7 kg/m2        "

## 2017-11-18 NOTE — PROGRESS NOTES
[facility] Hospitalist Progress Note         Assessment and Plan:       Assessment & Plan:  Marquise Jj is a 72 year old female who presented on 11/16/2017 for scheduled Procedure(s):  ARTHROPLASTY REVISION HIP by Jozef Garcia MD and is being followed by the hospital medicine service for co-management of acute and/or chronic perioperative medical problems.      Multiple Right Hip Dislocations    S/p Procedure(s):  ARTHROPLASTY REVISION HIP, RIGHT   Day 2 Post-Op    Pain tolerable. Hg 9.0, 13.5 prior to surgery - vitals stable and asymptomatic.   - pain control, wound cares, physical therapy, occupational therapy and DVT prophylaxis per orthopedic surgery service    Right Anterior Thigh Numbness   Presented in L3-4 dermatome. Ortho discussed with surgeon who advises to monitor it. Possibly due to local anesthetic used intraoperative - should wear off in 24 hours, but could take several more days.  Mildly improving.   -continue to monitor      Mild Hypoxemia - resolved   Saturations noted 88% without tachypnea. Transiently placed on oxygen.     States saturations dropped after dose of dilaudid suspect due to hypoventilation  -continue oxygen PRN, maintain saturations >92%, wean as tolerated     Chronic Kidney Disease, Stage III   Cr 1.04, baseline 0.9-1  -avoid nephrotoxins  -I/Os     HTN   Compensated   -continue PTA losartan     GERD  -continue PTA omeprazole     Glaucoma  -continue PTA eye drops if family brings in     HLD   No PTA medications     DVT Prophylaxis: as per orthopedic surgery service - Enoxaparin (Lovenox) SQ as IP followed by ASA 325mg po qd x 42 days.   Code Status: Full Code     Lines: PIV   Rojo catheter: none     Discussion: Medically, the patient appears well, monitoring Hg and thigh numbness. VSS     Disposition: Anticipate discharge tomorrow pending above and pending PT and pain control per ortho.             Interval History:    Patient reports strength has improved in right leg, but still feeling significant numbness, perhaps slightly improved from yesterday.  Denies fevers, chills, dizziness, chest pain, shortness of breath.  No bowel movement yet.         Review of Systems:   A comprehensive review of systems was performed and found to be negative except as described in this note          Medications:     Current Facility-Administered Medications   Medication     HYDROcodone-acetaminophen (NORCO) 5-325 MG per tablet 1-2 tablet     hydrOXYzine (ATARAX) tablet 25 mg     bimatoprost (LUMIGAN) 0.01 % ophthalmic drops 1 drop     dorzolamide-timolol (COSOPT) ophthalmic solution 1 drop     omeprazole (priLOSEC) CR capsule 20 mg     lidocaine 1 % 1 mL     lidocaine (LMX4) kit     sodium chloride (PF) 0.9% PF flush 3 mL     sodium chloride (PF) 0.9% PF flush 3 mL     benzocaine-menthol (CEPACOL) 15-3.6 MG lozenge 1-2 lozenge     naloxone (NARCAN) injection 0.1-0.4 mg     enoxaparin (LOVENOX) injection 40 mg     [START ON 11/19/2017] acetaminophen (TYLENOL) tablet 650 mg     ondansetron (ZOFRAN-ODT) ODT tab 4 mg    Or     ondansetron (ZOFRAN) injection 4 mg     prochlorperazine (COMPAZINE) injection 5 mg    Or     prochlorperazine (COMPAZINE) tablet 5 mg     senna-docusate (SENOKOT-S;PERICOLACE) 8.6-50 MG per tablet 1-2 tablet     traMADol (ULTRAM) tablet 50 mg     HYDROmorphone (PF) (DILAUDID) injection 0.5 mg             Physical Exam:   Vitals were reviewed  Patient Vitals for the past 24 hrs:   BP Temp Temp src Pulse Resp SpO2   11/18/17 0732 109/44 98.6  F (37  C) Oral 71 16 93 %   11/18/17 0011 109/47 98.5  F (36.9  C) Oral 74 16 96 %   11/17/17 1640 117/58 98.4  F (36.9  C) Oral 74 16 95 %     Gen: alert, in no distress, well-appearing  Eyes: conjunctiva clear.  Neck: supple, no lymphadenopathy   CV: RRR, S1 and S2 normal. Radial and pedal pulses symmetric and normal  Respiratory: Lungs clear bilaterally  Abd: Soft, non-tender.   Hypoactive bowel sounds.    MSK: decreased right hip flexion and knee extension.  Neuro: decreased sensation right anterior thigh  Skin: no significant rashes.          Data:     Results for orders placed or performed during the hospital encounter of 11/16/17 (from the past 24 hour(s))   Hemoglobin   Result Value Ref Range    Hemoglobin 8.0 (L) 11.7 - 15.7 g/dL       Attestation:    I have personally discussed the patient's care with ortho today    Amount of time performed on this progress note: 15 minutes.    Dr. Kerry Garcia, [facility]

## 2017-11-18 NOTE — PHARMACY - DISCHARGE MEDICATION RECONCILIATION
Discharge medication review for this patient is complete. Pharmacist assisted with medication reconciliation of discharge medications with prior to admission medications.     The following changes were made to the discharge medication list based on pharmacist review:  Added:  Vicodin, senna, and hydroxyzine  Discontinued: NA  Changed: NA      Patient's Discharge Medication List  - medications as listed on After Visit Summary (AVS)     Review of your medicines      START taking       Dose / Directions    HYDROcodone-acetaminophen 5-325 MG per tablet   Commonly known as:  NORCO        Dose:  1-2 tablet   Take 1-2 tablets by mouth every 4 hours as needed for moderate to severe pain   Quantity:  50 tablet   Refills:  0       hydrOXYzine 25 MG tablet   Commonly known as:  ATARAX        Dose:  25 mg   Take 1 tablet (25 mg) by mouth every 4 hours as needed for itching or anxiety   Quantity:  50 tablet   Refills:  1       senna-docusate 8.6-50 MG per tablet   Commonly known as:  SENOKOT-S;PERICOLACE        Dose:  1-2 tablet   Take 1-2 tablets by mouth 2 times daily   Quantity:  100 tablet   Refills:  0         CONTINUE these medicines which have NOT CHANGED       Dose / Directions    bimatoprost 0.01 % Soln   Commonly known as:  LUMIGAN        Dose:  1 drop   1 drop At Bedtime   Refills:  0       COSOPT 2-0.5 % ophthalmic solution   Generic drug:  dorzolamide-timolol        Dose:  1 drop   1 drop 2 times daily   Refills:  0       FISH OIL PO        Dose:  1 capsule   Take 1 capsule by mouth daily   Refills:  0       LOSARTAN POTASSIUM PO        Dose:  50 mg   Take 50 mg by mouth daily   Refills:  0       Multi-vitamin Tabs tablet        Dose:  1 tablet   Take 1 tablet by mouth daily   Refills:  0       OMEPRAZOLE PO        Dose:  20 mg   Take 20 mg by mouth daily   Refills:  0       TYLENOL PO        Dose:  500 mg   Take 500 mg by mouth   Refills:  0            Where to get your medicines      These medications were sent to  [facility] [place], [place] - [address]  [address] [address]     Phone:  [phone]      hydrOXYzine 25 MG tablet         Some of these will need a paper prescription and others can be bought over the counter. Ask your nurse if you have questions.     Bring a paper prescription for each of these medications      HYDROcodone-acetaminophen 5-325 MG per tablet       You don't need a prescription for these medications      senna-docusate 8.6-50 MG per tablet

## 2017-11-18 NOTE — DISCHARGE SUMMARY
[facility] Orthopedics Discharge Summary                                  [facility]     EDILSON SMITH [id number]   Age: 72 year old  PCP: Poonam Cast, [phone] 1945     Date of Admission:  11/16/2017  Date of Discharge::  11/19/2017  Discharge Physician:  Levi Rios    Code status:  Full Code    Admission Information:  Admission Diagnosis:  Failed right total hip arthroplasty  S/P revision of total hip    Post-Operative Day: 2 Days Post-Op     Reason for admission:  The patient was admitted for the following:Procedure(s) (LRB):  ARTHROPLASTY REVISION HIP (Right)    Active Problems:    S/P revision of total hip      Allergies:  Combigan [brimonidine tartrate-timolol]    Following the procedure noted above the patient was transferred to the post-op floor and started on:    Therapy:  physical therapy and occupational therapy  Anticoagulation Plan: Lovenox inpatient and then  mg daily at discharge  for 42 days  Pain Management: norco and vistaril  Weight bearing status: Weight bearing as tolerated     The patient was followed and co-managed by the hospitalist service during the inpatient treatment course  Complications:  None  Consultations:  None     Pertinent Labs   Lab Results: personally reviewed.     Recent Labs   Lab Test  11/18/17   0625  11/17/17   0635   HGB  8.0*  9.0*   PLT   --   198   NA   --   138          Discharge Information:  Condition at discharge: Stable  Discharge destination:  Discharged to home     Medications at discharge:  Current Discharge Medication List      START taking these medications    Details   HYDROcodone-acetaminophen (NORCO) 5-325 MG per tablet Take 1-2 tablets by mouth every 4 hours as needed for moderate to severe pain  Qty: 50 tablet, Refills: 0    Associated Diagnoses: S/P revision of total hip      hydrOXYzine (ATARAX) 25 MG tablet Take 1 tablet (25 mg) by mouth every 4 hours as needed for itching or anxiety  Qty: 50 tablet,  Refills: 1    Associated Diagnoses: S/P revision of total hip      senna-docusate (SENOKOT-S;PERICOLACE) 8.6-50 MG per tablet Take 1-2 tablets by mouth 2 times daily  Qty: 100 tablet    Associated Diagnoses: S/P revision of total hip         CONTINUE these medications which have NOT CHANGED    Details   LOSARTAN POTASSIUM PO Take 50 mg by mouth daily       dorzolamide-timolol (COSOPT) 2-0.5 % ophthalmic solution 1 drop 2 times daily      bimatoprost (LUMIGAN) 0.01 % SOLN 1 drop At Bedtime      OMEPRAZOLE PO Take 20 mg by mouth daily       multivitamin, therapeutic with minerals (MULTI-VITAMIN) TABS tablet Take 1 tablet by mouth daily      Omega-3 Fatty Acids (FISH OIL PO) Take 1 capsule by mouth daily       Acetaminophen (TYLENOL PO) Take 500 mg by mouth                        Follow-Up Care:  Patient should be seen in the office in 10-14 days by the Orthopedic Surgeon/Physician Assistant.  Call [phone] for appointment or question    Aris Otero DO

## 2017-11-18 NOTE — PROGRESS NOTES
CARE TRANSITION SOCIAL WORK INITIAL ASSESSMENT:      Met with: Patient and Family.    DATA  Active Problems:    S/P revision of total hip          Primary Care MD Name: stephen Cast  Contact information and PCP information verified: Yes      ASSESSMENT  Cognitive Status: awake, alert and oriented.             Lives With: spouse  Living Arrangements: house     Description of Support System: Supportive, Involved   Who is your support system?:    Support Assessment: Adequate family and caregiver support, Adequate social supports   Insurance Concerns: No Insurance issues identified        This writer met with pt and pts spouse introduced self and role. Discussed discharge planning and medicare guidelines in regards to home care. Pt is agreeable to home care, home care ordered, pt prefers [facility] ([phone] Fax: [phone]). Pt will dc home today with spouse.     PLAN    Home care    Discharge Planner   Discharge Plans in progress: home care  Barriers to discharge plan: medical stability  Follow up plan: CTS to follow       Entered by: Yuliet Wolff 11/18/2017 10:19 AM             Yuliet Wolff MSW, LICSW, [facility] [phone]

## 2017-11-18 NOTE — PLAN OF CARE
Problem: Patient Care Overview  Goal: Plan of Care/Patient Progress Review  Outcome: Improving  Pt continues to have some numbness and tingling in right thigh, but improving from yesterday.  Dressing CDI, has + pulses.  Ambulating in room and in [place] with SBA and walker, steady when up, but not bearing full weight on RLE.  Pain well controlled on norco, appetite fair, voiding.  No BM since surgery, + flatus.  Discharge cancelled for today as pt was unable to manage stairs with PT.  Pt hoping to discharge tomorrow.

## 2017-11-19 ENCOUNTER — HOSPITAL ENCOUNTER (OUTPATIENT)
Facility: CLINIC | Age: 72
Setting detail: OBSERVATION
Discharge: NURSING, REHAB OR LONG-TERM CARE | End: 2017-11-20
Attending: EMERGENCY MEDICINE | Admitting: FAMILY MEDICINE
Payer: MEDICARE

## 2017-11-19 ENCOUNTER — APPOINTMENT (OUTPATIENT)
Dept: GENERAL RADIOLOGY | Facility: CLINIC | Age: 72
End: 2017-11-19
Attending: EMERGENCY MEDICINE
Payer: MEDICARE

## 2017-11-19 ENCOUNTER — APPOINTMENT (OUTPATIENT)
Dept: PHYSICAL THERAPY | Facility: CLINIC | Age: 72
DRG: 467 | End: 2017-11-19
Attending: ORTHOPAEDIC SURGERY
Payer: MEDICARE

## 2017-11-19 VITALS
HEART RATE: 74 BPM | HEIGHT: 68 IN | RESPIRATION RATE: 16 BRPM | WEIGHT: 169 LBS | OXYGEN SATURATION: 96 % | SYSTOLIC BLOOD PRESSURE: 109 MMHG | DIASTOLIC BLOOD PRESSURE: 55 MMHG | BODY MASS INDEX: 25.61 KG/M2 | TEMPERATURE: 98 F

## 2017-11-19 DIAGNOSIS — G89.18 ACUTE POST-OPERATIVE PAIN: ICD-10-CM

## 2017-11-19 DIAGNOSIS — R29.898 WEAKNESS OF RIGHT LEG: ICD-10-CM

## 2017-11-19 DIAGNOSIS — Z96.649 S/P REVISION OF TOTAL HIP: ICD-10-CM

## 2017-11-19 DIAGNOSIS — R53.1 ASTHENIA: ICD-10-CM

## 2017-11-19 LAB
HGB BLD-MCNC: 7.8 G/DL (ref 11.7–15.7)
PLATELET # BLD AUTO: 173 10E9/L (ref 150–450)

## 2017-11-19 PROCEDURE — 85049 AUTOMATED PLATELET COUNT: CPT | Performed by: ORTHOPAEDIC SURGERY

## 2017-11-19 PROCEDURE — 99231 SBSQ HOSP IP/OBS SF/LOW 25: CPT | Performed by: INTERNAL MEDICINE

## 2017-11-19 PROCEDURE — 85018 HEMOGLOBIN: CPT | Performed by: ORTHOPAEDIC SURGERY

## 2017-11-19 PROCEDURE — 97110 THERAPEUTIC EXERCISES: CPT | Mod: GP

## 2017-11-19 PROCEDURE — A9270 NON-COVERED ITEM OR SERVICE: HCPCS | Mod: GY | Performed by: PHYSICIAN ASSISTANT

## 2017-11-19 PROCEDURE — 36415 COLL VENOUS BLD VENIPUNCTURE: CPT | Performed by: ORTHOPAEDIC SURGERY

## 2017-11-19 PROCEDURE — 97116 GAIT TRAINING THERAPY: CPT | Mod: GP

## 2017-11-19 PROCEDURE — 25000128 H RX IP 250 OP 636: Performed by: EMERGENCY MEDICINE

## 2017-11-19 PROCEDURE — G0378 HOSPITAL OBSERVATION PER HR: HCPCS

## 2017-11-19 PROCEDURE — A9270 NON-COVERED ITEM OR SERVICE: HCPCS | Mod: GY | Performed by: ORTHOPAEDIC SURGERY

## 2017-11-19 PROCEDURE — 25000132 ZZH RX MED GY IP 250 OP 250 PS 637: Mod: GY | Performed by: PHYSICIAN ASSISTANT

## 2017-11-19 PROCEDURE — 99285 EMERGENCY DEPT VISIT HI MDM: CPT | Mod: 25 | Performed by: EMERGENCY MEDICINE

## 2017-11-19 PROCEDURE — 25000128 H RX IP 250 OP 636: Performed by: ORTHOPAEDIC SURGERY

## 2017-11-19 PROCEDURE — 99285 EMERGENCY DEPT VISIT HI MDM: CPT | Mod: 25

## 2017-11-19 PROCEDURE — 73502 X-RAY EXAM HIP UNI 2-3 VIEWS: CPT

## 2017-11-19 PROCEDURE — 96374 THER/PROPH/DIAG INJ IV PUSH: CPT

## 2017-11-19 PROCEDURE — 99207 ZZC MOONLIGHTING INDICATOR: CPT | Performed by: INTERNAL MEDICINE

## 2017-11-19 PROCEDURE — 73552 X-RAY EXAM OF FEMUR 2/>: CPT | Mod: RT

## 2017-11-19 PROCEDURE — 25000132 ZZH RX MED GY IP 250 OP 250 PS 637: Mod: GY | Performed by: ORTHOPAEDIC SURGERY

## 2017-11-19 PROCEDURE — 40000193 ZZH STATISTIC PT WARD VISIT

## 2017-11-19 RX ORDER — HYDROXYZINE HYDROCHLORIDE 25 MG/1
25 TABLET, FILM COATED ORAL EVERY 4 HOURS PRN
Status: DISCONTINUED | OUTPATIENT
Start: 2017-11-19 | End: 2017-11-20 | Stop reason: HOSPADM

## 2017-11-19 RX ORDER — HYDROMORPHONE HYDROCHLORIDE 1 MG/ML
.3-.5 INJECTION, SOLUTION INTRAMUSCULAR; INTRAVENOUS; SUBCUTANEOUS
Status: DISCONTINUED | OUTPATIENT
Start: 2017-11-19 | End: 2017-11-20 | Stop reason: HOSPADM

## 2017-11-19 RX ORDER — LOSARTAN POTASSIUM 50 MG/1
50 TABLET ORAL DAILY
Status: DISCONTINUED | OUTPATIENT
Start: 2017-11-20 | End: 2017-11-20 | Stop reason: HOSPADM

## 2017-11-19 RX ORDER — HYDROCODONE BITARTRATE AND ACETAMINOPHEN 5; 325 MG/1; MG/1
1-2 TABLET ORAL EVERY 4 HOURS PRN
Status: DISCONTINUED | OUTPATIENT
Start: 2017-11-19 | End: 2017-11-20 | Stop reason: HOSPADM

## 2017-11-19 RX ORDER — AMOXICILLIN 250 MG
1-2 CAPSULE ORAL 2 TIMES DAILY
Status: DISCONTINUED | OUTPATIENT
Start: 2017-11-20 | End: 2017-11-20 | Stop reason: HOSPADM

## 2017-11-19 RX ORDER — HYDROMORPHONE HYDROCHLORIDE 1 MG/ML
0.5 INJECTION, SOLUTION INTRAMUSCULAR; INTRAVENOUS; SUBCUTANEOUS
Status: DISCONTINUED | OUTPATIENT
Start: 2017-11-19 | End: 2017-11-19 | Stop reason: DRUGHIGH

## 2017-11-19 RX ORDER — NALOXONE HYDROCHLORIDE 0.4 MG/ML
.1-.4 INJECTION, SOLUTION INTRAMUSCULAR; INTRAVENOUS; SUBCUTANEOUS
Status: DISCONTINUED | OUTPATIENT
Start: 2017-11-19 | End: 2017-11-20

## 2017-11-19 RX ADMIN — SENNOSIDES AND DOCUSATE SODIUM 2 TABLET: 8.6; 5 TABLET ORAL at 08:10

## 2017-11-19 RX ADMIN — HYDROMORPHONE HYDROCHLORIDE 0.5 MG: 1 INJECTION, SOLUTION INTRAMUSCULAR; INTRAVENOUS; SUBCUTANEOUS at 21:59

## 2017-11-19 RX ADMIN — HYDROCODONE BITARTRATE AND ACETAMINOPHEN 1 TABLET: 5; 325 TABLET ORAL at 12:31

## 2017-11-19 RX ADMIN — HYDROCODONE BITARTRATE AND ACETAMINOPHEN 2 TABLET: 5; 325 TABLET ORAL at 03:48

## 2017-11-19 RX ADMIN — ENOXAPARIN SODIUM 40 MG: 40 INJECTION SUBCUTANEOUS at 11:52

## 2017-11-19 RX ADMIN — DORZOLAMIDE HYDROCHLORIDE AND TIMOLOL MALEATE 1 DROP: 20; 5 SOLUTION/ DROPS OPHTHALMIC at 08:10

## 2017-11-19 RX ADMIN — OMEPRAZOLE 20 MG: 20 CAPSULE, DELAYED RELEASE ORAL at 08:10

## 2017-11-19 RX ADMIN — HYDROCODONE BITARTRATE AND ACETAMINOPHEN 2 TABLET: 5; 325 TABLET ORAL at 08:10

## 2017-11-19 ASSESSMENT — ENCOUNTER SYMPTOMS
APPETITE CHANGE: 0
COUGH: 0
WEAKNESS: 0
SHORTNESS OF BREATH: 0
NAUSEA: 0
ABDOMINAL PAIN: 0
NUMBNESS: 1
BACK PAIN: 0
VOMITING: 0
FEVER: 0
NECK PAIN: 0
DIARRHEA: 0
CHEST TIGHTNESS: 0
DYSURIA: 0
FREQUENCY: 0
WOUND: 0
HEADACHES: 0
LIGHT-HEADEDNESS: 0
CHILLS: 0

## 2017-11-19 NOTE — PROGRESS NOTES
"[facility] Orthopaedics Progress Note      Post-operative Day: 3 Days Post-Op    Procedure(s):  Right total hip arthroplasty revision. - Wound Class: I-Clean      Subjective:    Pain: moderate  Chest pain, SOB:  No   Numbness in anterior thigh  Ready to go home    Objective:  Blood pressure 109/55, pulse 74, temperature 98  F (36.7  C), temperature source Oral, resp. rate 16, height 1.727 m (5' 8\"), weight 76.7 kg (169 lb), SpO2 96 %.    Patient Vitals for the past 24 hrs:   BP Temp Temp src Pulse Resp SpO2   11/19/17 0700 109/55 98  F (36.7  C) Oral 74 16 96 %   11/19/17 0345 - - - - 18 -   11/19/17 0019 127/45 98  F (36.7  C) Oral 71 18 97 %   11/18/17 1521 107/51 97.5  F (36.4  C) Oral 71 16 94 %       Wt Readings from Last 4 Encounters:   11/16/17 76.7 kg (169 lb)         Motor function, sensation, and circulation intact   numbness in anterior thigh, quad weakness  Wound status: incisions are clean dry and intact. Yes  Calf tenderness: Bilateral  No    Pertinent Labs   Lab Results: personally reviewed.     Recent Labs   Lab Test  11/19/17   0615  11/18/17   0625  11/17/17   0635   HGB  7.8*  8.0*  9.0*   PLT  173   --   198   NA   --    --   138       Plan: Anticoagulation protocol: Lovenox inpatient and then  mg daily at discharge  x 42  days            Pain medications:  norco and vistaril            Weight bearing status:  WBAT            Disposition:  Home today             Continue cares and rehabilitation     Report completed by:  Aris Otero DO  Date: 11/19/2017  Time: 10:51 AM    "

## 2017-11-19 NOTE — IP AVS SNAPSHOT
` `     [facility] SURGICAL: [phone]            Medication Administration Report for Marquise Jj as of 11/20/17 1421   Legend:    Given Hold Not Given Due Canceled Entry Other Actions    Time Time (Time) Time  Time-Action       Inactive    Active    Linked        Medications 11/14/17 11/15/17 11/16/17 11/17/17 11/18/17 11/19/17 11/20/17    acetaminophen (TYLENOL) tablet 650 mg  Dose: 650 mg Freq: EVERY 4 HOURS PRN Route: PO  PRN Reason: mild pain  Start: 11/20/17 1000   Admin Instructions: Alternate ibuprofen (if ordered) with acetaminophen.  Maximum acetaminophen dose from all sources = 75 mg/kg/day not to exceed 4 grams/day.               aspirin EC EC tablet 325 mg  Dose: 325 mg Freq: DAILY Route: PO  Start: 11/20/17 0800   Admin Instructions: DO NOT CRUSH.           0826 (325 mg)-Given           HYDROcodone-acetaminophen (NORCO) 5-325 MG per tablet 1-2 tablet  Dose: 1-2 tablet Freq: EVERY 4 HOURS PRN Route: PO  PRN Reason: moderate to severe pain  Start: 11/19/17 2337   Admin Instructions: Maximum acetaminophen dose from all sources= 75 mg/kg/day not to exceed 4 grams           0023 (1 tablet)-Given       0424 (2 tablet)-Given       0826 (1 tablet)-Given       1335 (1 tablet)-Given           HYDROmorphone (PF) (DILAUDID) injection 0.3-0.5 mg  Dose: 0.3-0.5 mg Freq: EVERY 2 HOURS PRN Route: IV  PRN Reason: severe pain  Start: 11/19/17 2337   Admin Instructions: Hold while on PCA.  Start at the lowest dose.  May adjust dose by 0.1 mg every 2 hours as needed for pain control or improvement in physical function.  Hold dose for analgesic side effects.  Notify provider to assess for uncontrolled pain or analgesic side effects.  Give IV Push undiluted up to 4 mg. Each 2mg over 2-5 minutes.               hydrOXYzine (ATARAX) tablet 25 mg  Dose: 25 mg Freq: EVERY 4 HOURS PRN Route: PO  PRN Reasons: itching,anxiety  Start: 11/19/17 2337          0023 (25 mg)-Given           losartan (COZAAR)  tablet 50 mg  Dose: 50 mg Freq: DAILY Route: PO  Start: 11/20/17 0800          0826 (50 mg)-Given           naloxone (NARCAN) injection 0.1-0.4 mg  Dose: 0.1-0.4 mg Freq: EVERY 2 MIN PRN Route: IV  PRN Reason: opioid reversal  Start: 11/20/17 1000   Admin Instructions: For respiratory rate LESS than or EQUAL to 8.  Partial reversal dose:  0.1 mg titrated q 2 minutes for Analgesia Side Effects Monitoring Sedation Level of 3 (frequently drowsy, arousable, drifts to sleep during conversation).Full reversal dose:  0.4 mg bolus for Analgesia Side Effects Monitoring Sedation Level of 4 (somnolent, minimal or no response to stimulation).  Give IV Push undiluted up to 2mg. Give each 0.4mg over 15 seconds in emergency situations. For non-emergent situations further dilute in 9mL of NS to facilitate titration of response.               omeprazole (priLOSEC) CR capsule 20 mg  Dose: 20 mg Freq: DAILY Route: PO  Start: 11/20/17 0800          0826 (20 mg)-Given           ondansetron (ZOFRAN-ODT) ODT tab 4 mg  Dose: 4 mg Freq: EVERY 6 HOURS PRN Route: PO  PRN Reasons: nausea,vomiting  Start: 11/20/17 1000   Admin Instructions: This is Step 1 of nausea and vomiting management.  If nausea not resolved in 15 minutes, go to Step 2 prochlorperazine (COMPAZINE). Do not push through foil backing. Peel back foil and gently remove. Place on tongue immediately. Administration with liquid unnecessary              Or  ondansetron (ZOFRAN) injection 4 mg  Dose: 4 mg Freq: EVERY 6 HOURS PRN Route: IV  PRN Reasons: nausea,vomiting  Start: 11/20/17 1000   Admin Instructions: This is Step 1 of nausea and vomiting management.  If nausea not resolved in 15 minutes, go to Step 2 prochlorperazine (COMPAZINE).  Irritant. For ordered doses up to 4 mg, give IV Push undiluted over 2-5 minutes.               polyethylene glycol (MIRALAX/GLYCOLAX) Packet 17 g  Dose: 17 g Freq: DAILY PRN Route: PO  PRN Reason: constipation  Start: 11/20/17 1333   Admin  Instructions: 1 Packet = 17 grams. Mixed prescribed dose in 8 ounces of water. Follow with 8 oz. of water.           1403 (17 g)-Given           senna-docusate (SENOKOT-S;PERICOLACE) 8.6-50 MG per tablet 1-2 tablet  Dose: 1-2 tablet Freq: 2 TIMES DAILY Route: PO  Start: 11/20/17 0800          0826 (2 tablet)-Given       [ ] 2000          Discontinued Medications  Medications 11/14/17 11/15/17 11/16/17 11/17/17 11/18/17 11/19/17 11/20/17         Dose: 0.5 mg Freq: EVERY 15 MIN PRN Route: IV  PRN Reason: moderate to severe pain  Start: 11/19/17 2143   End: 11/19/17 2339   Admin Instructions: Give IV Push undiluted up to 4 mg. Each 2mg over 2-5 minutes.          2159 (0.5 mg)-Given       2339-Med Discontinued          Dose: 0.1-0.4 mg Freq: EVERY 2 MIN PRN Route: IV  PRN Reason: opioid reversal  Start: 11/19/17 2337   End: 11/20/17 1001   Admin Instructions: For respiratory rate LESS than or EQUAL to 8.  Partial reversal dose:  0.1 mg titrated q 2 minutes for Analgesia Side Effects Monitoring Sedation Level of 3 (frequently drowsy, arousable, drifts to sleep during conversation).Full reversal dose:  0.4 mg bolus for Analgesia Side Effects Monitoring Sedation Level of 4 (somnolent, minimal or no response to stimulation).  Give IV Push undiluted up to 2mg. Give each 0.4mg over 15 seconds in emergency situations. For non-emergent situations further dilute in 9mL of NS to facilitate titration of response.           1001-Med Discontinued

## 2017-11-19 NOTE — IP AVS SNAPSHOT
[facility] SURGICAL: [phone]                                              INTERAGENCY TRANSFER FORM - LAB / IMAGING / EKG / EMG RESULTS   2017                    Hospital Admission Date: 2017  EDILSON SMITH   : 1945  Sex: Female        Attending Provider: Jozef Dyer MD     Allergies:  Combigan [Brimonidine Tartrate-timolol]    Infection:  None   Service:  INTERNAL MED    Ht:  --   Wt:  77.1 kg (170 lb)   Admission Wt:  77.1 kg (170 lb)    BMI:  --   BSA:  --            Patient PCP Information     Provider PCP Type    Poonam Cast General         Lab Results - 3 Days      Basic metabolic panel [[id number]] (Abnormal)  Resulted: 17, Result status: Final result    Ordering provider: Callie Valles PA-C  17 Resulting lab: [facility]    Specimen Information    Type Source Collected On   Blood  17 0733          Components       Value Reference Range Flag Lab   Sodium 139 133 - 144 mmol/L  59   Potassium 4.2 3.4 - 5.3 mmol/L  59   Chloride 106 94 - 109 mmol/L  59   Carbon Dioxide 26 20 - 32 mmol/L  59   Anion Gap 7 3 - 14 mmol/L  59   Glucose 88 70 - 99 mg/dL  59   Urea Nitrogen 17 7 - 30 mg/dL  59   Creatinine 0.85 0.52 - 1.04 mg/dL  59   GFR Estimate 65 >60 mL/min/1.7m2  59   Comment:  Non  GFR Calc   GFR Estimate If Black 79 >60 mL/min/1.7m2  59   Comment:  African American GFR Calc   Calcium 8.1 8.5 - 10.1 mg/dL L 59            CBC with platelets differential [[id number]] (Abnormal)  Resulted: 17 0755, Result status: Final result    Ordering provider: Callie Valles PA-C  1731 Resulting lab: [facility]    Specimen Information    Type Source Collected On   Blood  17          Components       Value Reference Range Flag Lab   WBC 6.7 4.0 - 11.0 10e9/L  59   RBC Count 2.64 3.8 - 5.2 10e12/L L 59   Hemoglobin 8.0 11.7 - 15.7 g/dL L 59   Hematocrit  24.9 35.0 - 47.0 % L 59   MCV 94 78 - 100 fl  59   MCH 30.3 26.5 - 33.0 pg  59   MCHC 32.1 31.5 - 36.5 g/dL  59   RDW 12.9 10.0 - 15.0 %  59   Platelet Count 212 150 - 450 10e9/L  59   Diff Method Automated Method   59   % Neutrophils 56.3 %  59   % Lymphocytes 28.8 %  59   % Monocytes 9.6 %  59   % Eosinophils 4.0 %  59   % Basophils 0.3 %  59   % Immature Granulocytes 1.0 %  59   Absolute Neutrophil 3.8 1.6 - 8.3 10e9/L  59   Absolute Lymphocytes 1.9 0.8 - 5.3 10e9/L  59   Absolute Monocytes 0.6 0.0 - 1.3 10e9/L  59   Absolute Eosinophils 0.3 0.0 - 0.7 10e9/L  59   Absolute Basophils 0.0 0.0 - 0.2 10e9/L  59   Abs Immature Granulocytes 0.1 0 - 0.4 10e9/L  59            Testing Performed By     Lab - Abbreviation Name Director Address Valid Date Range    59 - Unknown [facility] Unknown [address] [facility] [address] 12/31/14 1006 - Present            Unresulted Labs     None         Imaging Results - 3 Days      XR Femur Right 2 Views [[id number]]  Resulted: 11/19/17 2232, Result status: Final result    Ordering provider: Ben Borges MD  11/19/17 2110 Resulted by: Compa Gillis MD    Performed: 11/19/17 2115 - 11/19/17 2133 Resulting lab: RADIOLOGY RESULTS    Narrative:       RIGHT HIP TWO-THREE VIEWS, RIGHT FEMUR TWO VIEWS  11/19/2017 9:33 PM     HISTORY: Pain status post hip replacement.    COMPARISON: Pelvis 11/16/2017.    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 Soft tissue  gas again seen on the right, which could simply relate to recent  surgery. Degenerative change of the symphysis pubis and lower lumbar  spine.      Impression:       IMPRESSION: No fracture or dislocation.    COMPA GILLIS MD      XR Hip Right G/E 2 Views [[id number]]  Resulted: 11/19/17 2232, Result status: Final result    Ordering provider: Ben Borges MD  11/19/17 2110 Resulted by: Compa Gillis MD    Performed: 11/19/17 2119 - 11/19/17 2133 Resulting lab: RADIOLOGY RESULTS     Narrative:       RIGHT HIP TWO-THREE VIEWS, RIGHT FEMUR TWO VIEWS  11/19/2017 9:33 PM     HISTORY: Pain status post hip replacement.    COMPARISON: Pelvis 11/16/2017.    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 Soft tissue  gas again seen on the right, which could simply relate to recent  surgery. Degenerative change of the symphysis pubis and lower lumbar  spine.      Impression:       IMPRESSION: No fracture or dislocation.    YAS GILLIS MD      Testing Performed By     Lab - Abbreviation Name Director Address Valid Date Range    104 - Rad Rslts RADIOLOGY RESULTS Unknown Unknown 02/16/05 1553 - Present            Encounter-Level Documents:     There are no encounter-level documents.      Order-Level Documents:     There are no order-level documents.

## 2017-11-19 NOTE — PLAN OF CARE
Problem: Patient Care Overview  Goal: Plan of Care/Patient Progress Review  Outcome: Adequate for Discharge Date Met: 11/19/17  JOSE RAUL PARHAM DISCHARGE NOTE    Patient discharged to home at 2:29 PM via wheel chair. Accompanied by spouse and staff. Discharge instructions reviewed with patient and spouse, opportunity offered to ask questions. Prescriptions filled and sent with patient upon discharge. All belongings sent with patient.    Eulalia Zamarripa

## 2017-11-19 NOTE — PLAN OF CARE
Problem: Patient Care Overview  Goal: Plan of Care/Patient Progress Review  Physical Therapy Discharge Summary    Reason for therapy discharge:    Discharged to home with home therapy.    Progress towards therapy goal(s). See goals on Care Plan in Psychiatric electronic health record for goal details.  Goals partially met.  Barriers to achieving goals:   limited tolerance for therapy.    Therapy recommendation(s):    Continued therapy is recommended.  Rationale/Recommendations:  SILVANO LE strengthening and gait training.     Lea Porras PT

## 2017-11-19 NOTE — IP AVS SNAPSHOT
MRN:[id number]                      After Visit Summary   11/19/2017    Marquise Jj    MRN: [id number]           Thank you!     Thank you for choosing [facility] for your care. Our goal is always to provide you with excellent care. Hearing back from our patients is one way we can continue to improve our services. Please take a few minutes to complete the written survey that you may receive in the mail after you visit with us. Thank you!        Patient Information     Date Of Birth          1945        About your hospital stay     You were admitted on:  November 19, 2017 You last received care in the:  [facility]    You were discharged on:  November 20, 2017        Reason for your hospital stay       Instability s/p hip replacement                  Who to Call     For medical emergencies, please call 911.  For non-urgent questions about your medical care, please call your primary care provider or clinic, [phone]          Attending Provider     Provider Specialty    Borges, Ben Maynard MD Emergency Medicine    [facility], Yao ORO MD Internal Medicine    [facility], Jozef ACOSTA MD [facility]       Primary Care Provider Office Phone # Fax #    Poonam Cast [phone]      After Care Instructions     Activity - Up with assistive device           Advance Diet as Tolerated       Follow this diet upon discharge: Orders Placed This Encounter      Regular Diet Adult            General info for SNF       Length of Stay Estimate: Short Term Care: Estimated # of Days <30  Condition at Discharge: Improving  Level of care:skilled   Rehabilitation Potential: Excellent  Admission H&P remains valid and up-to-date: Yes  Recent Chemotherapy: N/A  Use Nursing Home Standing Orders: Yes            Mantoux instructions       Give two-step Mantoux (PPD) Per Facility Policy Yes                  Additional Services     Occupational Therapy Adult Consult       Evaluate and  "treat as clinically indicated.    Reason:  S/p hip replacement            Physical Therapy Adult Consult       Evaluate and treat as clinically indicated.    Reason:  S/p hip replacement                  Further instructions from your care team       Please ensure that you do NOT take MORE than 4 grams or 4000mg of acetaminophen in a 24 hour period.  Please note that there is 325mg of acetaminophen in every Norco pill.    Please follow up with orthopedics as instructed prior to your discharge on .    Pending Results     No orders found for last 3 day(s).            Statement of Approval     Ordered          17 1207  I have reviewed and agree with all the recommendations and orders detailed in this document.  EFFECTIVE NOW     Approved and electronically signed by:  Callie Valles PA-C             Admission Information     Date & Time Provider Department Dept. Phone    2017 Jozef Dyer MD [facility] Surgical [phone]      Your Vitals Were     Blood Pressure Pulse Temperature Respirations Weight Pulse Oximetry    124/57 (BP Location: Right arm) 77 98.9  F (37.2  C) (Oral) 16 77.1 kg (170 lb) 99%    BMI (Body Mass Index)                   25.85 kg/m2           MyChart Information     [facility] lets you send messages to your doctor, view your test results, renew your prescriptions, schedule appointments and more. To sign up, go to [email or web address] . Click on \"Log in\" on the left side of the screen, which will take you to the Welcome page. Then click on \"Sign up Now\" on the right side of the page.     You will be asked to enter the access code listed below, as well as some personal information. Please follow the directions to create your username and password.     Your access code is: [email or web address]  Expires: 2018  9:52 AM     Your access code will  in 90 days. If you need help or a new code, please call your [facility] or [phone].        Care " EveryWhere ID     This is your Care EveryWhere ID. This could be used by other organizations to access your [place] medical records  [id number]        Equal Access to Services     RADHA LLANES : Mikael Weiner, mel briscoe, randydomo moorejose luissuzie cobbmagdalena, waxjulian carmenin hayaafabian cobbgagandeep smith mandi cruz. So [facility] [phone].    ATENCIÓN: Si habla español, tiene a farr disposición servicios gratuitos de asistencia lingüística. Llame al [phone].    We comply with applicable federal civil rights laws and [place] laws. We do not discriminate on the basis of race, color, national origin, age, disability, sex, sexual orientation, or gender identity.               Review of your medicines      CONTINUE these medicines which have NOT CHANGED        Dose / Directions    aspirin  MG EC tablet        Dose:  325 mg   Take 1 tablet (325 mg) by mouth daily   Quantity:  40 tablet   Refills:  0       bimatoprost 0.01 % Soln   Commonly known as:  LUMIGAN        Dose:  1 drop   Place 1 drop into both eyes At Bedtime   Refills:  0       COSOPT 2-0.5 % ophthalmic solution   Generic drug:  dorzolamide-timolol        Dose:  1 drop   Place 1 drop into both eyes 2 times daily   Refills:  0       FISH OIL PO        Dose:  1200 mg   Take 1,200 mg by mouth daily   Refills:  0       HYDROcodone-acetaminophen 5-325 MG per tablet   Commonly known as:  NORCO   Used for:  S/P revision of total hip        Dose:  1-2 tablet   Take 1-2 tablets by mouth every 4 hours as needed for moderate to severe pain   Quantity:  50 tablet   Refills:  0       hydrOXYzine 25 MG tablet   Commonly known as:  ATARAX   Used for:  S/P revision of total hip        Dose:  25 mg   Take 1 tablet (25 mg) by mouth every 4 hours as needed for itching or anxiety   Quantity:  50 tablet   Refills:  1       LOSARTAN POTASSIUM PO        Dose:  50 mg   Take 50 mg by mouth daily   Refills:  0       Multi-vitamin Tabs tablet        Dose:  1 tablet   Take 1 tablet  by mouth daily   Refills:  0       OMEPRAZOLE PO        Dose:  20 mg   Take 20 mg by mouth daily   Refills:  0       senna-docusate 8.6-50 MG per tablet   Commonly known as:  SENOKOT-S;PERICOLACE   Used for:  S/P revision of total hip        Dose:  1-2 tablet   Take 1-2 tablets by mouth 2 times daily   Quantity:  100 tablet   Refills:  0       TYLENOL ARTHRITIS PAIN PO        Dose:  650-1300 mg   Take 650-1,300 mg by mouth once as needed   Refills:  0            Where to get your medicines      Some of these will need a paper prescription and others can be bought over the counter. Ask your nurse if you have questions.     Bring a paper prescription for each of these medications     HYDROcodone-acetaminophen 5-325 MG per tablet                Protect others around you: Learn how to safely use, store and throw away your medicines at www.disposemymeds.org.             Medication List: This is a list of all your medications and when to take them. Check marks below indicate your daily home schedule. Keep this list as a reference.      Medications           Morning Afternoon Evening Bedtime As Needed    aspirin  MG EC tablet   Take 1 tablet (325 mg) by mouth daily   Last time this was given:  325 mg on 11/20/2017  8:26 AM                                bimatoprost 0.01 % Soln   Commonly known as:  LUMIGAN   Place 1 drop into both eyes At Bedtime                                COSOPT 2-0.5 % ophthalmic solution   Place 1 drop into both eyes 2 times daily   Generic drug:  dorzolamide-timolol                                FISH OIL PO   Take 1,200 mg by mouth daily                                HYDROcodone-acetaminophen 5-325 MG per tablet   Commonly known as:  NORCO   Take 1-2 tablets by mouth every 4 hours as needed for moderate to severe pain   Last time this was given:  1 tablet on 11/20/2017  1:35 PM                                hydrOXYzine 25 MG tablet   Commonly known as:  ATARAX   Take 1 tablet (25 mg) by  mouth every 4 hours as needed for itching or anxiety   Last time this was given:  25 mg on 11/20/2017 12:23 AM                                LOSARTAN POTASSIUM PO   Take 50 mg by mouth daily   Last time this was given:  50 mg on 11/20/2017  8:26 AM                                Multi-vitamin Tabs tablet   Take 1 tablet by mouth daily                                OMEPRAZOLE PO   Take 20 mg by mouth daily   Last time this was given:  20 mg on 11/20/2017  8:26 AM                                senna-docusate 8.6-50 MG per tablet   Commonly known as:  SENOKOT-S;PERICOLACE   Take 1-2 tablets by mouth 2 times daily   Last time this was given:  2 tablets on 11/20/2017  8:26 AM                                TYLENOL ARTHRITIS PAIN PO   Take 650-1,300 mg by mouth once as needed

## 2017-11-19 NOTE — IP AVS SNAPSHOT
` ` Patient Information     Patient Name Sex     Marquise Jj ([id number]) Female 1945       Room Bed    240 240      Patient Demographics     Address Phone    [address] [address] [place] [address] [phone] (Home)      Patient Ethnicity & Race     Ethnic Group Patient Race    American Choose not to answer      Emergency Contact(s)     Name Relation Home Work Mobile    Reyes Jj [phone]        Documents on File        Status Date Received Description       Documents for the Patient    Affiliate Privacy placeholder   phase3    Consent for EHR Access Received 17     Insurance Card Received 17 Medicare/[facility]    External Medication Information Consent       Patient ID       [facility] Specified Other       Consent for Services/Privacy Notice - Hospital/Clinic Received 17     Privacy Notice - [place] Received 17     Care Everywhere Prospective Auth Received 17        Documents for the Encounter    CMS IM for Patient Signature       Observation Notice Received 17       Admission Information     Attending Provider Admitting Provider Admission Type Admission Date/Time    Jozef Dyer MD Kampfe, Kevin L, MD Emergency 17    Discharge Date Hospital Service Auth/Cert Status Service Area     Internal Medicine Incomplete [facility]    Unit Room/Bed Admission Status       [facility] Admission (Confirmed)       Admission     Complaint    Weakness, Weakness      Hospital Account     Name Acct ID Class Status Primary Coverage    Marquise Jj [id number] Observation Open MEDICARE -  MEDICARE             Guarantor Account (for Hospital Account #[id number])     Name Relation to Pt Service Area Active? Acct Type    Marquise Jj Self FCS Yes Personal/Family    Address Phone          [address] [address] [place] [address] [phone](H)              Coverage Information (for Hospital Account #[id number])     1.  MEDICARE/RR MEDICARE      F/O Payor/Plan Precert #    MEDICARE/RR MEDICARE      Subscriber Subscriber #    EngMarquise [id number]    Address Phone    [address] [address]  [place] [id number] [phone]          2. [employer]     F/O Payor/Plan Precert #    [employer]     Subscriber Subscriber #    Eng, Reyes SCHAEFER [id number]    Address Phone    [address] [address]  [place] [address] [phone]

## 2017-11-19 NOTE — IP AVS SNAPSHOT
` `     [facility] SURGICAL: [phone]                 INTERAGENCY TRANSFER FORM - NOTES (H&P, Discharge Summary, Consults, Procedures, Therapies)   2017                    Hospital Admission Date: 2017  MARQUISE SMITH   : 1945  Sex: Female        Patient PCP Information     Provider PCP Type    Poonam Cast General         History & Physicals      H&P by Callie Valles PA-C at 2017  7:29 AM     Author:  Callie Valles PA-C Service:  Internal Medicine Author Type:  Physician Assistant Katherine DONALDSON    Filed:  2017  9:59 AM Date of Service:  2017  7:29 AM Creation Time:  2017  7:23 AM    Status:  Cosign Needed :  Callie Valles PA-C (Physician Assistant - [place])    Cosign Required:  Yes             [facility]    History and Physical  Hospital Medicine       Date of Admission:  2017  Date of Service: 2017     Primary Care Physician   Poonam Cast [phone]    Assessment & Plan   Marquise Smith is a 72 year old female with PMH significant for GERD, CKD stage III, HTN, HLD and recent hip replacement (2017) who now presents with weakness[CH1.1] and distal right thigh pain[CH1.2].      Weakness[CH1.1]    Right Distal Thigh Pain  Upon discharge to home, had acute distal right thigh pain with ambulation.  Now resolved.[CH1.2]  DDx: sequelae of hip replacement versus worsening anemia  - PT/OT/care transition consults placed[CH1.1]  - IP orthopedic consult placed; appreciate recommendations[CH1.2]  - BMP now[CH1.1]    Anterior Right Thigh Numbness  Predominantly L2/L3 nerve root distribution. Slight numbness within the L4 dermatome.  Stable, but not greatly improved from POD #1  - see above orthopedic consult[CH1.2]    Anemia[CH1.1] due to acute blood loss, hemodilution - stable[CH1.2]  Preoperative hemoglobin 13.5. 9.0 POD #1, 8.0 POD #2 and 7.8 POD #3[CH1.1].  Today, 8.0[CH1.2]    Chronic  "kidney disease, stage III (moderate)  - BMP now      Gastroesophageal reflux disease  - continue PTA omeprazole      HTN (hypertension)  - continue PTA losartan      Hyperlipidemia  Not on mediations         F: not indicated  E: BMP now  N: as tolerated  DVT Prophylaxis: full dose aspirin    Code Status: Full Code    Disposition: Anticipate discharge in 1-2 days. Appropriate for observation care.    Case discussed with Dr. Jozef Dyer.  Assessment and plan as written above.    Callie Valles PA-C  [facility]    History is obtained from the patient and review of the EMR.    Past Medical History[CH1.1]  - no pertinent PMH[CH1.2]    Past Surgical History   Past Surgical History:   Procedure Laterality Date     ARTHROPLASTY REVISION HIP Right 11/16/2017    Procedure: ARTHROPLASTY REVISION HIP;  Right total hip arthroplasty revision.;  Surgeon: Jozef Garcia MD;  Location: [place] OR       Family History[CH1.1]  - no pertinent family history[CH1.2]    History of Present Illness   Marquise Jj is a 72 year old female with PMH significant for GERD, CKD stage III, HTN, HLD and recent hip replacement (11/16/2017) who now presents with weakness[CH1.1] and distal right thigh pain[CH1.2].[CH1.1]    The patient was discharge to home yesterday afternoon as she was meeting PT/OT goals and pain was controlled.  Upon returning home, she arose from her couch to use the restroom.  She had onset of acute, right sided distal anterior thigh pain.  She describes the pain as if something was \"exploding\".  Nothing made the pain better or worse as it was incredibly severe.  Pain did not radiate.  She had nothing similar during her optimization post operatively.  As such, she and her  elected to return to [facility] for further evaluation.  Pain resolved sometime overnight and she is now pain free.  However, she is requesting TCU as she has some ongoing subjective \"weakness\".    Continues to pass flatus " and void freely.  Some mild abdominal distention is present as the patient has not yet had a BM.    ROS: Denies fever, chills, nausea, vomiting, myalgias, cold-like symptoms (congestion, pharyngitis, sinus pressure, rhinorrhea), headaches, lightheadedness, dizziness, chest pain, palpitations/flutters, SOB, cough, wheezes, abdominal pain, dysuria, hematuria, joint swelling, leg swelling, and rashes.[CH1.2]          Prior to Admission Medications   Prior to Admission Medications   Prescriptions Last Dose Informant Patient Reported? Taking?   Acetaminophen (TYLENOL PO)   Yes No   Sig: Take 500 mg by mouth   HYDROcodone-acetaminophen (NORCO) 5-325 MG per tablet 2017 at 1700  No Yes   Sig: Take 1-2 tablets by mouth every 4 hours as needed for moderate to severe pain   LOSARTAN POTASSIUM PO   Yes No   Sig: Take 50 mg by mouth daily    OMEPRAZOLE PO   Yes No   Sig: Take 20 mg by mouth daily    Omega-3 Fatty Acids (FISH OIL PO)   Yes No   Sig: Take 1 capsule by mouth daily    aspirin  MG EC tablet   No No   Sig: Take 1 tablet (325 mg) by mouth daily   bimatoprost (LUMIGAN) 0.01 % SOLN   Yes No   Si drop At Bedtime   dorzolamide-timolol (COSOPT) 2-0.5 % ophthalmic solution 2017 at Unknown time  Yes Yes   Si drop 2 times daily   hydrOXYzine (ATARAX) 25 MG tablet   No No   Sig: Take 1 tablet (25 mg) by mouth every 4 hours as needed for itching or anxiety   multivitamin, therapeutic with minerals (MULTI-VITAMIN) TABS tablet   Yes No   Sig: Take 1 tablet by mouth daily   senna-docusate (SENOKOT-S;PERICOLACE) 8.6-50 MG per tablet   No No   Sig: Take 1-2 tablets by mouth 2 times daily      Facility-Administered Medications: None       Allergies   Allergies   Allergen Reactions     Combigan [Brimonidine Tartrate-Timolol]        Social History   Social History     Social History     Marital status:      Spouse name: N/A     Number of children: N/A     Years of education: N/A     Occupational History      Not on file.     Social History Main Topics     Smoking status: Never Smoker     Smokeless tobacco: Never Used     Alcohol use Not on file     Drug use: Not on file     Sexual activity: Not on file     Other Topics Concern     Not on file     Social History Narrative[CH1.1]   Lives with .[CH1.2]    Physical Exam     /58  Temp 97.2  F (36.2  C) (Oral)  Resp 16  Wt 77.1 kg (170 lb)  SpO2 95%  BMI 25.85 kg/m2     Weight: 170 lbs 0 oz Body mass index is 25.85 kg/(m^2).     Constitutional: Alert and oriented x4.  Cooperative.  Appears stated age.  Appears in[CH1.1] no acute[CH1.2] distress.   HEENT: Oropharynx is clear and moist. No evidence of cranial trauma.  Lymph/Hematologic: No occipital, submental, submandibular, anterior or posterior cervical, or supraclavicular lymphadenopathy is appreciated.  Cardiovascular: Regular rate/ rhythm.  S1 and S2 grossly normal.  No appreciable murmur, rub, gallop.[CH1.1]  No[CH1.2] lower extremity edema.  Respiratory: Clear to auscultation bilaterally. Equal chest expansion.  GI: Soft, non-tender, normal bowel sounds, no hepatosplenomegaly.  Genitourinary: Deferred  Musculoskeletal: Normal muscle bulk and tone.[CH1.1] Negative homans bilaterally[CH1.2]  Skin: Warm and dry, no rashes.   Neurologic: Neck supple. Cranial nerves 3-12 are grossly intact.  is symmetric.[CH1.1] Numb to palpation of the skin of the L2/L3 dermatome.[CH1.2]    Data   Data reviewed today:     Recent Labs  Lab 11/19/17  0615 11/18/17  0625 11/17/17  0635   HGB 7.8* 8.0* 9.0*     --  198   NA  --   --  138   POTASSIUM  --   --  4.9   CHLORIDE  --   --  105   CO2  --   --  24   BUN  --   --  20   CR  --   --  1.04  1.04   ANIONGAP  --   --  9   FERNANDO  --   --  7.8*   GLC  --   --  144*       Recent Results (from the past 24 hour(s))   XR Femur Right 2 Views    Narrative    RIGHT HIP TWO-THREE VIEWS, RIGHT FEMUR TWO VIEWS  11/19/2017 9:33 PM     HISTORY: Pain status post hip  replacement.    COMPARISON: Pelvis 11/16/2017.    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 Soft tissue  gas again seen on the right, which could simply relate to recent  surgery. Degenerative change of the symphysis pubis and lower lumbar  spine.      Impression    IMPRESSION: No fracture or dislocation.    YAS GILLIS MD   XR Hip Right G/E 2 Views    Narrative    RIGHT HIP TWO-THREE VIEWS, RIGHT FEMUR TWO VIEWS  11/19/2017 9:33 PM     HISTORY: Pain status post hip replacement.    COMPARISON: Pelvis 11/16/2017.    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 Soft tissue  gas again seen on the right, which could simply relate to recent  surgery. Degenerative change of the symphysis pubis and lower lumbar  spine.      Impression    IMPRESSION: No fracture or dislocation.    YAS GILLIS MD       I personally reviewed[CH1.1] no images or EKG's today[CH1.2].    Callie Valles PA-C  [facility] Medicine[CH1.1]             Revision History        User Key Date/Time User Provider Type Action    > CH1.2 11/20/2017  9:59 AM Callie Valles PA-C Physician Assistant - [place] Sign     CH1.1 11/20/2017  7:23 AM Callie Valles PA-C Physician Assistant - [place]                      Discharge Summaries      Discharge Summaries by Callie Valles PA-C at 11/20/2017 12:03 PM     Author:  Callie Valles PA-C Service:  Internal Medicine Author Type:  Physician Assistant - C    Filed:  11/20/2017 12:04 PM Date of Service:  11/20/2017 12:03 PM Creation Time:  11/20/2017 11:53 AM    Status:  Cosign Needed :  Callie Valles PA-C (Physician Assistant - M[place])    Cosign Required:  Yes             [facility]    Discharge Summary  Hospital Medicine    Date of Admission:  11/19/2017  Date of Discharge:  11/20/2017   Discharging Provider:[CH1.1] Callie Valles[CH1.2]  Date of Service: 11/20/2017     Discharge Instructions:  Please ensure that you do NOT take MORE than 4  grams or 4000mg of acetaminophen in a 24 hour period.  Please note that there is 325mg of acetaminophen in every Norco pill.    Please follow up with orthopedics as instructed prior to your discharge on Sunday.        Primary Care     Poonam Cast  [facility] [address] [place] [address]      Identification and Chief Complaint: Marquise Jj is a 72 year old female who presented on 11/19/2017 with complaint of instability s/p hip replacement and anterior distal right thigh pain[CH1.1]    Discharge Diagnoses[CH1.2]       Weakness - instability s/p hip replacement    Chronic kidney disease, stage III (moderate)    Gastroesophageal reflux disease    HTN (hypertension)    Hyperlipidemia    Anemia[CH1.1]        Discharge Disposition[CH1.2]   Discharged to short-term care facility[CH1.1]    Discharge Orders     General info for SNF   Length of Stay Estimate: Short Term Care: Estimated # of Days <30  Condition at Discharge: Improving  Level of care:skilled   Rehabilitation Potential: Excellent  Admission H&P remains valid and up-to-date: Yes  Recent Chemotherapy: N/A  Use Nursing Home Standing Orders: Yes     Mantoux instructions   Give two-step Mantoux (PPD) Per Facility Policy Yes     Reason for your hospital stay   Instability s/p hip replacement     Activity - Up with assistive device     Full Code     Physical Therapy Adult Consult   Evaluate and treat as clinically indicated.    Reason:  S/p hip replacement     Occupational Therapy Adult Consult   Evaluate and treat as clinically indicated.    Reason:  S/p hip replacement       Discharge Medications   Current Discharge Medication List      CONTINUE these medications which have NOT CHANGED    Details   Acetaminophen (TYLENOL ARTHRITIS PAIN PO) Take 650-1,300 mg by mouth once as needed      HYDROcodone-acetaminophen (NORCO) 5-325 MG per tablet Take 1-2 tablets by mouth every 4 hours as needed for moderate to severe pain  Qty: 50  tablet, Refills: 0    Associated Diagnoses: S/P revision of total hip      LOSARTAN POTASSIUM PO Take 50 mg by mouth daily       dorzolamide-timolol (COSOPT) 2-0.5 % ophthalmic solution Place 1 drop into both eyes 2 times daily       bimatoprost (LUMIGAN) 0.01 % SOLN Place 1 drop into both eyes At Bedtime       OMEPRAZOLE PO Take 20 mg by mouth daily       multivitamin, therapeutic with minerals (MULTI-VITAMIN) TABS tablet Take 1 tablet by mouth daily      Omega-3 Fatty Acids (FISH OIL PO) Take 1,200 mg by mouth daily       aspirin  MG EC tablet Take 1 tablet (325 mg) by mouth daily  Qty: 40 tablet, Refills: 0      hydrOXYzine (ATARAX) 25 MG tablet Take 1 tablet (25 mg) by mouth every 4 hours as needed for itching or anxiety  Qty: 50 tablet, Refills: 1    Associated Diagnoses: S/P revision of total hip      senna-docusate (SENOKOT-S;PERICOLACE) 8.6-50 MG per tablet Take 1-2 tablets by mouth 2 times daily  Qty: 100 tablet    Associated Diagnoses: S/P revision of total hip           Allergies   Allergies   Allergen Reactions     Combigan [Brimonidine Tartrate-Timolol] Swelling     Swollen eyelids[CH1.2]       Consultations This Hospital Stay    CARE TRANSITION RN/SW IP CONSULT  ORTHOPEDIC SURGERY IP CONSULT  PHYSICAL THERAPY ADULT IP CONSULT  OCCUPATIONAL THERAPY ADULT IP CONSULT[CH1.1]    Significant Results and Procedures[CH1.2]   Procedures    None[CH1.1]    Data[CH1.2]   Results for orders placed or performed during the hospital encounter of 11/19/17   XR Femur Right 2 Views    Narrative    RIGHT HIP TWO-THREE VIEWS, RIGHT FEMUR TWO VIEWS  11/19/2017 9:33 PM     HISTORY: Pain status post hip replacement.    COMPARISON: Pelvis 11/16/2017.    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 Soft tissue  gas again seen on the right, which could simply relate to recent  surgery. Degenerative change of the symphysis pubis and lower lumbar  spine.      Impression    IMPRESSION: No fracture or  dislocation.    YAS GILLIS MD   XR Hip Right G/E 2 Views    Narrative    RIGHT HIP TWO-THREE VIEWS, RIGHT FEMUR TWO VIEWS  11/19/2017 9:33 PM     HISTORY: Pain status post hip replacement.    COMPARISON: Pelvis 11/16/2017.    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 Soft tissue  gas again seen on the right, which could simply relate to recent  surgery. Degenerative change of the symphysis pubis and lower lumbar  spine.      Impression    IMPRESSION: No fracture or dislocation.    YAS GILLIS MD[CH1.1]       History of Present Illness[CH1.2]   (From H&P) See H and P.  Admission and discharge are same day.[CH1.1]    Hospital Course[CH1.2]   Marquise Jj was admitted on 11/19/2017.  The following problems were addressed during her hospitalization:    Weakness    Right Distal Thigh Pain  Upon discharge to home yesterday afternoon, the patient had acute distal right thigh pain with ambulation.  As such, she returned to the ED for eval. XR negative for acute fracture or change to arthroplasty.  On admission day 1, pain is now resolved.  She is tolerating both PT and OT, but would prefer to go to TCU given ongoing concern for falls and safety at home.  A bed was obtained at [facility]. Orthopedics to see prior to discharge.  She was discharged on full dose aspirin, and PRN Norco as prescribed by orthopedic service prior to previous discharge.     Anterior Right Thigh Numbness  Predominantly L2/L3 nerve root distribution. Slight numbness within the L4 dermatome.  On admission, this was stable, but not greatly improved from POD #1.  Orthopedics addressed prior to discharge; conveys that this will likely continue to slowly improve. They plan to continue following this on an outpatient basis.     Anemia due to acute blood loss, hemodilution - stable  Preoperative hemoglobin 13.5. 9.0 POD #1, 8.0 POD #2 and 7.8 POD #3.  Today, 8.0 and the patient remains asymptomatic.     Chronic kidney disease, stage III  (moderate)  Stable on discharge.      Gastroesophageal reflux disease  Continue PTA omeprazole on discharge      HTN (hypertension)  Continue PTA losartan on discharge.      Hyperlipidemia  Not on mediations[CH1.1]    Pending Results   Unresulted Labs Ordered in the Past 30 Days of this Admission     No orders found for last 61 day(s).          Physical Exam   Temp:  [97.2  F (36.2  C)-99  F (37.2  C)] 98.9  F (37.2  C)  Pulse:  [77-79] 77  Heart Rate:  [87-92] 87  Resp:  [16-22] 16  BP: (124-171)/(53-86) 124/57  SpO2:  [93 %-99 %] 99 %  Vitals:    11/19/17 2042   Weight: 77.1 kg (170 lb)[CH1.2]       Physical Exam: see H and P.  Admission and discharge are same day.    The discharge plan was discussed with the patient and patient agrees to plan    Total time on this discharge was greater than 30 minutes.    Callie Valles PA-C  [facility] Medicine[CH1.1]             Revision History        User Key Date/Time User Provider Type Action    > CH1.2 11/20/2017 12:04 PM Callie Valles PA-C Physician Assistant - [place] Sign     CH1.1 11/20/2017 11:53 AM Callie Valles PA-C Physician Assistant - C                      Consult Notes      Consults by Melissa Hawkins at 11/20/2017  9:09 AM     Author:  Melissa Hawkins Service:  (none) Author Type:      Filed:  11/20/2017  9:09 AM Date of Service:  11/20/2017  9:09 AM Creation Time:  11/20/2017  9:08 AM    Status:  Signed :  Melissa Hawkins ()     Consult Orders:    1. Care Transition RN/SW IP Consult [[id number]] ordered by Callie Valles PA-C at 11/20/17 0729                CARE TRANSITION SOCIAL WORK INITIAL ASSESSMENT:  Reason For Consult: discharge planning   Met with: Patient.    DATA  Principal Problem:    Weakness  Active Problems:    Chronic kidney disease, stage III (moderate)    Gastroesophageal reflux disease    HTN (hypertension)    Hyperlipidemia    Anemia          Primary Care MD Name: stephen Cast    ASSESSMENT  Cognitive  Status: awake, alert and oriented.             Lives With: spouse  Living Arrangements: house                 Insurance Concerns: No Insurance issues identified        This writer met with pt introduced self and role. Discussed discharge planning and medicare guidelines in regards to home care and SNF benefits. Discussed local TCU options.  Patient would like referrals sent to [facility] (Phone: [phone] Fax: [phone]) and [facility] (Phone: [phone]) Fax: ([phone]).      PLAN    TCU    Discharge Planner   Discharge Plans in progress: TCU  Barriers to discharge plan: None  Follow up plan: TCU    FITO Gonzalez  [facility] [phone]/ [facility] [phone]         Entered by: Melissa Hawkins 11/20/2017 9:08 AM[JK1.1]              Revision History        User Key Date/Time User Provider Type Action    > JK1.1 11/20/2017  9:09 AM Melissa Hawkins  Sign                     Progress Notes - Physician (Notes from 11/17/17 through 11/20/17)      Progress Notes by Melissa Hawkins at 11/20/2017 12:26 PM     Author:  Melissa Hawkins Service:  (none) Author Type:      Filed:  11/20/2017 12:30 PM Date of Service:  11/20/2017 12:26 PM Creation Time:  11/20/2017 12:26 PM    Status:  Signed :  Melissa Hawkins ()         Patient accepted at [facility] (Phone: [phone]) Fax: ([phone]).  Talked with patient and family and they are in agreement.  Family will transport at 1500.    PAS-RR    D: Per DHS regulation, MIQUEL completed and submitted PAS-RR to [place] Board on Aging Direct Connect via the Senior LinkAge Line.  PAS-RR confirmation # is : [id number]    P: Further questions may be directed to Senior LinkAge Line at #[phone], option #4 for PAS-RR staff.    FITO Gonzalez  [facility] [phone]/ [facility] [phone][JK1.1]         Revision History        User Key Date/Time User Provider Type Action    > JK1.1 11/20/2017 12:30 PM  Melissa Hawkins  Sign            Progress Notes by Roberta Umanzor PT at 11/20/2017  9:22 AM     Author:  Roberta Umanzor PT Service:  Physical Medicine and Rehabilitation Author Type:  Physical Therapist    Filed:  11/20/2017  9:22 AM Date of Service:  11/20/2017  9:22 AM Creation Time:  11/20/2017  9:22 AM    Status:  Signed :  Roberta Umanzor PT (Physical Therapist)            11/20/17 0900   Quick Adds   Type of Visit Initial PT Evaluation   Living Environment   Lives With spouse   Living Arrangements house   Home Accessibility stairs to enter home   Number of Stairs to Enter Home 5   Number of Stairs Within Home 0   Stair Railings at Home outside, present on right side   Functional Level Prior   Ambulation 0-->independent   Transferring 0-->independent   Fall history within last six months yes   Number of times patient has fallen within last six months 1   General Information   Onset of Illness/Injury or Date of Surgery - Date 11/19/17   Referring Physician Svsalo   Patient/Family Goals Statement Goal of returning home after TCU stay   Pertinent History of Current Problem (include personal factors and/or comorbidities that impact the POC) pt states she had a total hip revision last Thursday and has been having numbness/ pain into the right thigh since then. pt states she is preparing to go to a TCU. She lives in [place], has 5 steps to enter her home. was unable to perform the steps without assistance frmo her son and , walking up the stairs backwards while wearing a knee immobilizer due to the R hip and quad weakness/ numbness. pt was discharged yesterday but returned due to high increases in terrible sharp pain when she was standing.   Precautions/Limitations right hip precautions   Weight-Bearing Status - RLE weight-bearing as tolerated   Cognitive Status Examination   Orientation orientation to person, place and time   Level of Consciousness alert   Follows Commands and  Answers Questions 100% of the time   Personal Safety and Judgment intact   Memory intact   Pain Assessment   Patient Currently in Pain Yes, see Vital Sign flowsheet   MMT: Hip, Rehab Eval   Hip Flexion - Left Side (5/5) normal,left   Hip Flexion - Right Side (1/5) trace, right   MMT: Knee, Rehab Eval   Knee Extension - Right Side (1/5) trace, right   Knee Flexion - Left Side (5/5) normal,left   Knee Extension - Left Side (5/5) normal,left   Knee Flexion - Right Side (4/5) good, right   Bed Mobility   Bed Mobility Comments not assessed, pt sitting in chair at beginning and end of PT sesion   Transfer Skills   Transfer Comments pt able to perform sit to stand transfer with min assistance to perform with FWW, demonstrated hip precautions   Gait   Gait Comments pt performed step to pattern with decreased swing on the RLE, lacking terminal knee extension. pt demonstrates use of calf muscles in order to advance the RLE forwards.   General Therapy Interventions   Planned Therapy Interventions IADL retraining;ADL retraining;balance training;gait training;motor coordination training;neuromuscular re-education;ROM;strengthening;stretching;transfer training;home program guidelines;progressive activity/exercise   Clinical Impression   Criteria for Skilled Therapeutic Intervention yes, treatment indicated   PT Diagnosis Decreased functional mobility secondary to R total hip   Influenced by the following impairments decreased strength   Functional limitations due to impairments decreased participation with ambulation, decreased activity toelrance   Clinical Presentation Stable/Uncomplicated   Clinical Presentation Rationale pt presents with stable condition.   Clinical Decision Making (Complexity) Low complexity   Therapy Frequency` 2 times/day   Predicted Duration of Therapy Intervention (days/wks) 2 days   Anticipated Equipment Needs at Discharge front wheeled walker   Anticipated Discharge Disposition Transitional Care  "Facility   Risk & Benefits of therapy have been explained Yes   Patient, Family & other staff in agreement with plan of care Yes   [facility] AM-PAC TM \"6 Clicks\"   2016, Trustees of [facility], under license to [employer].  All rights reserved.   6 Clicks Short Forms Basic Mobility Inpatient Short Form   [facility] AM-PAC  \"6 Clicks\" V.2 Basic Mobility Inpatient Short Form   1. Turning from your back to your side while in a flat bed without using bedrails? 3 - A Little   2. Moving from lying on your back to sitting on the side of a flat bed without using bedrails? 3 - A Little   3. Moving to and from a bed to a chair (including a wheelchair)? 3 - A Little   4. Standing up from a chair using your arms (e.g., wheelchair, or bedside chair)? 3 - A Little   5. To walk in hospital room? 3 - A Little   6. Climbing 3-5 steps with a railing? 1 - Total   Basic Mobility Raw Score (Score out of 24.Lower scores equate to lower levels of function) 16   Total Evaluation Time   Total Evaluation Time (Minutes) 5[JA1.1]        Revision History        User Key Date/Time User Provider Type Action    > JA1.1 11/20/2017  9:22 AM Roberta Umanzor PT Physical Therapist Sign            Progress Notes by Rafaela Bourgeois RN at 11/19/2017 11:30 PM     Author:  Rafaela Bourgeois RN Service:  (none) Author Type:  Registered Nurse    Filed:  11/20/2017  1:43 AM Date of Service:  11/19/2017 11:30 PM Creation Time:  11/20/2017  1:38 AM    Status:  Signed :  Rafaela Bourgeois RN (Registered Nurse)         [facility] ADMISSION NOTE    Patient admitted to room 2401 at approximately 2330 via cart from emergency room. Patient was accompanied by transport tech.     Verbal SBAR report received from Idris BAIG prior to patient arrival.     Patient trasferred to bed via air surendra. Patient alert and oriented X 3. The patient is not having any pain. 0-10 Pain Scale: 3. Admission vital signs: Blood pressure 155/83, temperature " 99  F (37.2  C), resp. rate 22, weight 77.1 kg (170 lb), SpO2 93 %. Patient was oriented to plan of care, call light, bed controls, tv, telephone, bathroom and visiting hours.     The following safety risks were identified during admission: fall. Yellow risk band applied: YES.     Rafaela Bourgeois[MW1.1]       Revision History        User Key Date/Time User Provider Type Action    > MW1.1 11/20/2017  1:43 AM Rafaela Bourgeois RN Registered Nurse Sign            ED Notes by Idris Cruz RN at 11/19/2017 11:01 PM     Author:  Idris Cruz RN Service:  Emergency Medicine Author Type:  Registered Nurse    Filed:  11/19/2017 11:16 PM Date of Service:  11/19/2017 11:01 PM Creation Time:  11/19/2017 11:16 PM    Status:  Signed :  Idris Cruz RN (Registered Nurse)         Pt resting comfortably, no further complaints[MM1.1]      Revision History        User Key Date/Time User Provider Type Action    > MM1.1 11/19/2017 11:16 PM Idris Cruz RN Registered Nurse Sign            ED Provider Notes by Ben Borges MD at 11/19/2017  8:37 PM     Author:  Ben Borges MD Service:  Emergency Medicine Author Type:  Physician    Filed:  11/19/2017 10:27 PM Date of Service:  11/19/2017  8:37 PM Creation Time:  11/19/2017  9:03 PM    Status:  Signed :  Ben Borges MD (Physician)           History[AB1.1]     Chief Complaint   Patient presents with     Hip Pain     Post-op Problem     R hip replacement, increased pain.[CW1.1]      HPI  Marquise Jj is a 72 year old female who presents to the Emergency Department[AB1.1] via EMS[AB1.2] for concerns of right leg and hip pain. Patient is s/p revision of total right hip three days ago[AB1.1]. She[AB1.3] was discharged early this afternoon[AB1.4].[AB1.1] Prior to discharge[AB1.4],[AB1.3] she was ambulating well with her walker and tolerating exercise.[AB1.4] Around 1900 today, patient was[AB1.1] at home[AB1.3] walking with her  walker when she developed a sudden, shooting pain in her right[AB1.1] anterior[AB1.3] mid[CW1.2] thigh. This lasted 10-15 minutes and resolved when she rested in bed.[AB1.1] The sharp pain has since resolved.[AB1.3] She rates her[AB1.1] current[CW1.2] discomfort 2-3/10 and describes this as a cramping pain. Patient notes right hip and groin pain since hospital discharge that is achy in nature. She also complains of right[AB1.1] anterior distal[CW1.2] leg numbness since surgery. Patient denies in numbness[AB1.1] or weakness[CW1.2] in her foot. She denies chest pain, abdominal pain or shortness of breath. She reports normal diet and fluid intake[AB1.1] as well as normal urination and bowel movements[CW1.2]. Patient has no other concerns at this time. Patient's last dose of pain medication was 1700.      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     Priority: Medium[CW1.1]        Past Medical History:[AB1.1]    No past medical history on file.[CW1.1]    Past Surgical History:[AB1.1]    Past Surgical History:   Procedure Laterality Date     ARTHROPLASTY REVISION HIP Right 11/16/2017    Procedure: ARTHROPLASTY REVISION HIP;  Right total hip arthroplasty revision.;  Surgeon: Jozef Garcia MD;  Location: [facility] OR[CW1.1]       Family History:[AB1.1]    No family history on file.[CW1.1]    Social History:  Marital Status:   [2][AB1.1]  Social History   Substance Use Topics     Smoking status: Never Smoker     Smokeless tobacco: Never Used     Alcohol use Not on file[CW1.1]        Medications:[AB1.1]      HYDROcodone-acetaminophen (NORCO) 5-325 MG per tablet   dorzolamide-timolol (COSOPT) 2-0.5 % ophthalmic solution   aspirin  MG EC tablet   hydrOXYzine (ATARAX) 25 MG tablet   senna-docusate (SENOKOT-S;PERICOLACE) 8.6-50 MG per tablet   LOSARTAN POTASSIUM PO   bimatoprost (LUMIGAN) 0.01 % SOLN   OMEPRAZOLE PO   multivitamin,  therapeutic with minerals (MULTI-VITAMIN) TABS tablet   Omega-3 Fatty Acids (FISH OIL PO)   Acetaminophen (TYLENOL PO)[CW1.1]         Review of Systems   Constitutional: Negative for appetite change, chills and fever.   HENT: Negative for[AB1.1] congestion[CW1.2].    Respiratory: Negative for cough,[AB1.1] chest tightness[CW1.2] and shortness of breath.    Cardiovascular: Negative for chest pain and leg swelling.   Gastrointestinal: Negative for abdominal pain, diarrhea, nausea and vomiting.   Genitourinary: Negative for dysuria, frequency and urgency.   Musculoskeletal: Positive for[AB1.1] gait problem (pain in right hip and leg with walking)[CW1.2]. Negative for[AB1.1] back pain[CW1.2] and[AB1.1] neck pain[CW1.2].        Right leg and hip pain s/p total hip revision three days ago   Skin: Negative for[AB1.1] rash[CW1.2] and[AB1.1] wound[CW1.2].   Neurological: Positive for[AB1.1] numbness (on top of right leg - since surgery)[CW1.2]. Negative for[AB1.1] weakness[CW1.2],[AB1.1] light-headedness[CW1.2] and[AB1.1] headaches[CW1.2].[AB1.1]   All other systems reviewed and are negative[CW1.2].      Physical Exam[AB1.1]   BP: 171/86  Heart Rate: 92  Temp: 99  F (37.2  C)  Resp: 22  Weight: 77.1 kg (170 lb)  SpO2: 96 %[CW1.1]      Physical Exam   Constitutional: She is[AB1.1] oriented to person, place, and time[CW1.2]. She appears[AB1.1] well-developed[CW1.2] and[AB1.1] well-nourished[CW1.2].[AB1.1] No distress[CW1.2].   HENT:   Head:[AB1.1] Normocephalic[CW1.2] and[AB1.1] atraumatic[CW1.2].   Cardiovascular:[AB1.1] Normal rate[CW1.2] and[AB1.1] intact distal pulses[CW1.2].    Pulmonary/Chest:[AB1.1] Effort normal[CW1.2].   Abdominal:[AB1.1] Soft[CW1.2]. There is[AB1.1] no tenderness[CW1.2].   Musculoskeletal:        Right hip: She exhibits[AB1.1] decreased range of motion[CW1.2] and[AB1.1] tenderness (mild right lateral hip tenderness)[CW1.2]. She exhibits[AB1.1] no bony tenderness[CW1.2],[AB1.1] no  swelling[CW1.2],[AB1.1] no crepitus[CW1.2] and[AB1.1] no deformity[CW1.2].        Legs:[AB1.1][CW1.2]  Neurological: She is[AB1.1] alert[CW1.2] and[AB1.1] oriented to person, place, and time[CW1.2].   Skin: Skin is[AB1.1] warm[CW1.2] and[AB1.1] dry[CW1.2]. She is[AB1.1] not diaphoretic[CW1.2].   Psychiatric: She has a[AB1.1] normal mood and affect[CW1.2].[AB1.1]   Nursing note[CW1.2] and[AB1.1] vitals[CW1.2] reviewed.      ED Course[AB1.1]     ED Course[CW1.1]     Procedures[AB1.1]                   Labs Ordered and Resulted from Time of ED Arrival Up to the Time of Departure from the ED - No data to display    Results for orders placed or performed during the hospital encounter of 11/19/17 (from the past 24 hour(s))   XR Femur Right 2 Views    Narrative    RIGHT HIP TWO-THREE VIEWS, RIGHT FEMUR TWO VIEWS  11/19/2017 9:33 PM     HISTORY: Pain status post hip replacement.    COMPARISON: Pelvis 11/16/2017.    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 Soft tissue  gas again seen on the right, which could simply relate to recent  surgery. Degenerative change of the symphysis pubis and lower lumbar  spine.      Impression    IMPRESSION: No fracture or dislocation.   XR Hip Right G/E 2 Views    Narrative    RIGHT HIP TWO-THREE VIEWS, RIGHT FEMUR TWO VIEWS  11/19/2017 9:33 PM     HISTORY: Pain status post hip replacement.    COMPARISON: Pelvis 11/16/2017.    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 Soft tissue  gas again seen on the right, which could simply relate to recent  surgery. Degenerative change of the symphysis pubis and lower lumbar  spine.      Impression    IMPRESSION: No fracture or dislocation.       Medications   HYDROmorphone (PF) (DILAUDID) injection 0.5 mg (0.5 mg Intravenous Given 11/19/17 2159)[CW1.1]       9:04 PM Patient assessed.[AB1.1]     10:17 PM[CW1.3]: Pt re-assessed. Pain controlled. Pt and  concerned about her weakness and feel she has not been able to get  around adequately at home. Requesting admission and subsequent transitional care placement.[CW1.2]     10:24 PM[CW1.4]: Discussed with Dr Brandon. Accepts for obs admission.[CW1.2]     Assessments & Plan (with Medical Decision Making)[AB1.1]  72-year-old female who 3 days status post right hip revision replacement clinic for evaluation of acute onset of worsening pain tonight.  Patient was walking when she experienced severe pain in her mid femur.  Patient is able to ambulate to the bed and put her leg And pain substernally subsided over approximately 10 minutes.  Patient now with mild aching discomfort in the same area as well as her right lateral hip and groin area.  No new numbness or weakness.  X-ray obtained to evaluate for evidence of periprosthetic fracture, dislocation, or other acute abnormality.  Negative for fracture or other acute abnormality.  Discussed results with the patient.  She is reassured however she feels concerned that she is too weak to manage rehabilitation at home and is requesting admission with the plan for transition to assisted care for short-term rehabilitation.[CW1.2]       I have reviewed the nursing notes.    I have reviewed the findings, diagnosis, plan and need for follow up with the patient.[AB1.1]       New Prescriptions    No medications on file       Final diagnoses:   Acute post-operative pain   Weakness of right leg[CW1.1]     This document serves as a record of the services and decisions personally performed and made by Ben Borges, *. It was created on his behalf by Kathy Chino, a trained medical scribe. The creation of this document is based the provider's statements to the medical scribe.  Kathy Chino 9:22 PM 11/19/2017    Provider:   The information in this document, created by the medical scribe for me, accurately reflects the services I personally performed and the decisions made by me. I have reviewed and approved this document for accuracy  prior to leaving the patient care area.  Jony Ben Maynard,* 9:22 PM 11/19/2017 11/19/2017   [facility] EMERGENCY DEPARTMENT[AB1.1]     Ben Borges MD  11/19/17 2227  [CW1.1]     Revision History        User Key Date/Time User Provider Type Action    > CW1.1 11/19/2017 10:27 PM Ben Borges MD Physician Sign     CW1.4 11/19/2017 10:24 PM Ben Borges MD Physician      CW1.3 11/19/2017 10:17 PM Ben Borges MD Physician      CW1.2 11/19/2017  9:42 PM Ben Borges MD Physician      AB1.2 11/19/2017  9:30 PM Brennhofer, Kathy Scribe Share     AB1.3 11/19/2017  9:27 PM Brennhofer, Kathy Scribe Share     AB1.4 11/19/2017  9:24 PM Brennhofer, Kathy Scribe Share     AB1.1 11/19/2017  9:22 PM Brennhofer, Kathy Scribe Share            ED Notes by Idris Cruz RN at 11/19/2017  9:58 PM     Author:  Idris Cruz RN Service:  Emergency Medicine Author Type:  Registered Nurse    Filed:  11/19/2017 10:19 PM Date of Service:  11/19/2017  9:58 PM Creation Time:  11/19/2017 10:19 PM    Status:  Signed :  Idris Cruz RN (Registered Nurse)         Pt assisted to use bedpan. Pt unable to roll due to pain, but able to lift hips from bed. Pt requesting more pain medication, MD notified.[MM1.1]      Revision History        User Key Date/Time User Provider Type Action    > MM1.1 11/19/2017 10:19 PM Idris Cruz RN Registered Nurse Sign            ED Notes by Idris Cruz RN at 11/19/2017  8:47 PM     Author:  Idris Cruz RN Service:  Emergency Medicine Author Type:  Registered Nurse    Filed:  11/19/2017  9:18 PM Date of Service:  11/19/2017  8:47 PM Creation Time:  11/19/2017  8:55 PM    Status:  Addendum :  Idris Cruz, RN (Registered Nurse)         Pt presents to ED via ambulance. Pt had revision of total Right total hip on Thursday. discharged from hospital earlier this afternoon. Pt reported that she had  significant pain increase, worse than it ever was in hospital. Pain starts in lateral hip and radiates into right groin and into the[MM1.1] r[MM1.2]ight lower exremity. CMS in intact. Pt denies any injury or fall. Both leg equal in length. Pt reports that there has been numbness in right leg since the surgery. Took last dose of hydrocodone at 1700 today[MM1.1]. Incision site is covered with aquacel dressing CDI, no apparent swelling or redness[MM1.3]    Pt received 50 of fentanyl IV by EMS and 150 cc NS[MM1.1]     Revision History        User Key Date/Time User Provider Type Action    > MM1.2 11/19/2017  9:18 PM Idris Cruz RN Registered Nurse Addend     MM1.3 11/19/2017  9:01 PM Idris Cruz RN Registered Nurse Addend     MM1.1 11/19/2017  8:55 PM Idris Cruz RN Registered Nurse Sign            ED Notes by Idris Cruz RN at 11/19/2017  9:03 PM     Author:  Idris Cruz RN Service:  Emergency Medicine Author Type:  Registered Nurse    Filed:  11/19/2017  9:03 PM Date of Service:  11/19/2017  9:03 PM Creation Time:  11/19/2017  9:03 PM    Status:  Signed :  Idris Cruz RN (Registered Nurse)         MD at bedside.[MM1.1]     Revision History        User Key Date/Time User Provider Type Action    > MM1.1 11/19/2017  9:03 PM Idris Cruz RN Registered Nurse Sign            ED Notes by Mouna Miranda RN at 11/19/2017  8:37 PM     Author:  Mouna Miranda RN Service:  (none) Author Type:  Registered Nurse    Filed:  11/19/2017  8:37 PM Date of Service:  11/19/2017  8:37 PM Creation Time:  11/19/2017  8:37 PM    Status:  Signed :  Mouna Miranda RN (Registered Nurse)         Bed: ED07  Expected date:   Expected time:   Means of arrival: Ambulance  Comments:  S/p hip with numbess     Revision History        User Key Date/Time User Provider Type Action    > DR1.1 11/19/2017  8:37 PM Ruth, Mouna, RN Registered Nurse Sign                  Procedure Notes     No notes of this type exist for  this encounter.         Progress Notes - Therapies (Notes from 11/17/17 through 11/20/17)      Progress Notes by Roberta Umanzor PT at 11/20/2017  9:22 AM     Author:  Roberta Umanzor PT Service:  Physical Medicine and Rehabilitation Author Type:  Physical Therapist    Filed:  11/20/2017  9:22 AM Date of Service:  11/20/2017  9:22 AM Creation Time:  11/20/2017  9:22 AM    Status:  Signed :  Roberta Umanzor PT (Physical Therapist)            11/20/17 0900   Quick Adds   Type of Visit Initial PT Evaluation   Living Environment   Lives With spouse   Living Arrangements house   Home Accessibility stairs to enter home   Number of Stairs to Enter Home 5   Number of Stairs Within Home 0   Stair Railings at Home outside, present on right side   Functional Level Prior   Ambulation 0-->independent   Transferring 0-->independent   Fall history within last six months yes   Number of times patient has fallen within last six months 1   General Information   Onset of Illness/Injury or Date of Surgery - Date 11/19/17   Referring Physician [place]   Patient/Family Goals Statement Goal of returning home after TCU stay   Pertinent History of Current Problem (include personal factors and/or comorbidities that impact the POC) pt states she had a total hip revision last Thursday and has been having numbness/ pain into the right thigh since then. pt states she is preparing to go to a TCU. She lives in [place], has 5 steps to enter her home. was unable to perform the steps without assistance frmo her son and , walking up the stairs backwards while wearing a knee immobilizer due to the R hip and quad weakness/ numbness. pt was discharged yesterday but returned due to high increases in terrible sharp pain when she was standing.   Precautions/Limitations right hip precautions   Weight-Bearing Status - RLE weight-bearing as tolerated   Cognitive Status Examination   Orientation orientation to person, place and time    Level of Consciousness alert   Follows Commands and Answers Questions 100% of the time   Personal Safety and Judgment intact   Memory intact   Pain Assessment   Patient Currently in Pain Yes, see Vital Sign flowsheet   MMT: Hip, Rehab Eval   Hip Flexion - Left Side (5/5) normal,left   Hip Flexion - Right Side (1/5) trace, right   MMT: Knee, Rehab Eval   Knee Extension - Right Side (1/5) trace, right   Knee Flexion - Left Side (5/5) normal,left   Knee Extension - Left Side (5/5) normal,left   Knee Flexion - Right Side (4/5) good, right   Bed Mobility   Bed Mobility Comments not assessed, pt sitting in chair at beginning and end of PT sesion   Transfer Skills   Transfer Comments pt able to perform sit to stand transfer with min assistance to perform with FWW, demonstrated hip precautions   Gait   Gait Comments pt performed step to pattern with decreased swing on the RLE, lacking terminal knee extension. pt demonstrates use of calf muscles in order to advance the RLE forwards.   General Therapy Interventions   Planned Therapy Interventions IADL retraining;ADL retraining;balance training;gait training;motor coordination training;neuromuscular re-education;ROM;strengthening;stretching;transfer training;home program guidelines;progressive activity/exercise   Clinical Impression   Criteria for Skilled Therapeutic Intervention yes, treatment indicated   PT Diagnosis Decreased functional mobility secondary to R total hip   Influenced by the following impairments decreased strength   Functional limitations due to impairments decreased participation with ambulation, decreased activity toelrance   Clinical Presentation Stable/Uncomplicated   Clinical Presentation Rationale pt presents with stable condition.   Clinical Decision Making (Complexity) Low complexity   Therapy Frequency` 2 times/day   Predicted Duration of Therapy Intervention (days/wks) 2 days   Anticipated Equipment Needs at Discharge front wheeled walker  "  Anticipated Discharge Disposition Transitional Care Facility   Risk & Benefits of therapy have been explained Yes   Patient, Family & other staff in agreement with plan of care Yes   [facility]-PAC TM \"6 Clicks\"   2016, Trustees of [facility], under license to [employer].  All rights reserved.   6 Clicks Short Forms Basic Mobility Inpatient Short Form   [facility] AM-PAC  \"6 Clicks\" V.2 Basic Mobility Inpatient Short Form   1. Turning from your back to your side while in a flat bed without using bedrails? 3 - A Little   2. Moving from lying on your back to sitting on the side of a flat bed without using bedrails? 3 - A Little   3. Moving to and from a bed to a chair (including a wheelchair)? 3 - A Little   4. Standing up from a chair using your arms (e.g., wheelchair, or bedside chair)? 3 - A Little   5. To walk in hospital room? 3 - A Little   6. Climbing 3-5 steps with a railing? 1 - Total   Basic Mobility Raw Score (Score out of 24.Lower scores equate to lower levels of function) 16   Total Evaluation Time   Total Evaluation Time (Minutes) 5[JA1.1]        Revision History        User Key Date/Time User Provider Type Action    > JA1.1 11/20/2017  9:22 AM Roberta Umanzor, PT Physical Therapist Sign            "

## 2017-11-19 NOTE — PLAN OF CARE
Problem: Patient Care Overview  Goal: Plan of Care/Patient Progress Review  Outcome: Improving  A&Ox4, VSS on RA. CMS intact except persistent numbness to right thigh-improved per patient. Hip dressing CDI, SBA/walker. Voiding adequate amounts, tolerating diet. +gas-no BM yet. Taking norco w/relief. Plan is for hopeful DC to home w/HHC & PT today.

## 2017-11-19 NOTE — IP AVS SNAPSHOT
[facility]    [address] [address]    Phone:  [phone]    Fax:  [phone]                                       After Visit Summary   11/19/2017    Marquise Jj    MRN: [id number]           After Visit Summary Signature Page     I have received my discharge instructions, and my questions have been answered. I have discussed any challenges I see with this plan with the nurse or doctor.    ..........................................................................................................................................  Patient/Patient Representative Signature      ..........................................................................................................................................  Patient Representative Print Name and Relationship to Patient    ..................................................               ................................................  Date                                            Time    ..........................................................................................................................................  Reviewed by Signature/Title    ...................................................              ..............................................  Date                                                            Time

## 2017-11-19 NOTE — IP AVS SNAPSHOT
` `           [facility] SURGICAL: [phone]                                              INTERAGENCY TRANSFER FORM - NURSING   2017                    Hospital Admission Date: 2017  EDILSON SMITH   : 1945  Sex: Female        Attending Provider: Jozef Dyer MD     Allergies:  Combigan [Brimonidine Tartrate-timolol]    Infection:  None   Service:  INTERNAL MED    Ht:  --   Wt:  77.1 kg (170 lb)   Admission Wt:  77.1 kg (170 lb)    BMI:  --   BSA:  --            Patient PCP Information     Provider PCP Type    Poonam Parks      Current Code Status     Date Active Code Status Order ID Comments User Context       Prior      Code Status History     Date Active Date Inactive Code Status Order ID Comments User Context    2017 11:53 AM  Full Code [id number]  Callie Valles PA-C Outpatient    2017 10:01 AM 2017 11:53 AM Full Code [id number]  Callie Valles PA-C Inpatient    2017  4:32 PM 2017  5:37 PM Full Code [id number]  Jozef Garcia MD Inpatient      Advance Directives        Does patient have a scanned Advance Directive/ACP document in EPIC?           No        Hospital Problems as of 2017              Priority Class Noted POA    Chronic kidney disease, stage III (moderate) Medium  2013 Yes    Hyperlipidemia Medium  2013 Yes    HTN (hypertension) Medium  2016 Yes    Gastroesophageal reflux disease Medium  2017 Yes    * (Principal)Weakness Medium  2017 Yes    Anemia Medium  2017 Unknown      Non-Hospital Problems as of 2017              Priority Class Noted    S/P revision of total hip Medium  2017      Immunizations     Name Date      Influenza (High Dose) 3 valent vaccine 10/02/17     Pneumococcal (PCV 13) 06/22/15     Pneumococcal 23 valent 06/02/10     Td/Tdap (adult) Unspecified Formulation 08          END      ASSESSMENT     Discharge Profile Flowsheet      "EXPECTED DISCHARGE     FINAL RESOURCES      Expected Discharge Date  11/20/17 11/20/17 0906   PAS Number  [id number] 11/20/17 1226    DISCHARGE NEEDS ASSESSMENT     SKIN      Patient/family verbalizes understanding of discharge plan recommendations?  Yes 11/20/17 0906   Skin WDL  ex 11/20/17 0942    Medical Team notified of plan?  yes 11/20/17 0906   Skin Integrity  incision(s) 11/20/17 0942    # of Referrals Placed by CTS  Post Acute Facilities 11/20/17 0906   SAFETY      Does Patient Need a Referral for Clinic CC  Yes 11/20/17 1053   Safety WDL  WDL 11/20/17 0942    COMMUNICATION ASSESSMENT     All Alarms  none present 11/20/17 0942    Patient's communication style  spoken language (English or Bilingual) 11/20/17 0148                      Assessment WDL (Within Defined Limits) Definitions           Safety WDL     Effective: 09/28/15    Row Information: <b>WDL Definition:</b> Bed in low position, wheels locked; call light in reach; upper side rails up x 2; ID band on<br> <font color=\"gray\"><i>Item=AS safety wdl>>List=AS safety wdl>>Version=F14</i></font>      Skin WDL     Effective: 09/28/15    Row Information: <b>WDL Definition:</b> Warm; dry; intact; elastic; without discoloration; pressure points without redness<br> <font color=\"gray\"><i>Item=AS skin wdl>>List=AS skin wdl>>Version=F14</i></font>      Vitals     Vital Signs Flowsheet     VITAL SIGNS     Change in Pain  getting better 11/20/17 0922    Temp  98.9  F (37.2  C) 11/20/17 1054   Pain Control  fully effective 11/20/17 0922    Temp src  Oral 11/20/17 1054   HEIGHT AND WEIGHT      Resp  16 11/20/17 1054   Weight  77.1 kg (170 lb) 11/19/17 2046    Pulse  77 11/20/17 1054   POSITIONING      Heart Rate  87 11/20/17 0428   Body Position  independently positioning 11/20/17 1055    Pulse/Heart Rate Source  Monitor 11/20/17 1054   Head of Bed (HOB)  HOB at 20-30 degrees 11/20/17 1055    BP  124/57 11/20/17 1054   DAILY CARE      BP Location  Right arm 11/20/17 " 1054   Activity Management  ambulated to bathroom 11/20/17 1055    OXYGEN THERAPY     Activity Assistance Provided  assistance, 1 person 11/20/17 1055    SpO2  99 % 11/20/17 1054   Assistive Device Utilized  walker 11/20/17 1055    O2 Device  None (Room air) 11/20/17 1054   Additional Documentation  Activity Device Assistance (Row) 11/20/17 0942    PAIN/COMFORT     ANALGESIA SIDE EFFECTS MONITORING      Patient Currently in Pain  denies 11/20/17 1055   Side Effects Monitoring: Respiratory Quality  R 11/20/17 1055    Preferred Pain Scale  CAPA (Clinically Aligned Pain Assessment) ([facility] Adults Only) 11/20/17 0428   Side Effects Monitoring: Respiratory Depth  N 11/20/17 1055    0-10 Pain Scale  4 11/20/17 0425   Side Effects Monitoring: Sedation Level  1 11/20/17 1055    Pain Location  Hip 11/20/17 0523   MEDARDO COMA SCALE      Pain Orientation  Right 11/20/17 0523   Best Eye Response  4-->(E4) spontaneous 11/20/17 1058    Pain Descriptors  Aching 11/20/17 0523   Best Motor Response  6-->(M6) obeys commands 11/20/17 1058    CLINICALLY ALIGNED PAIN ASSESSMENT (CAPA) ([facility] ADULTS ONLY)     Best Verbal Response  5-->(V5) oriented 11/20/17 1058    Comfort  comfortably manageable 11/20/17 0922   [place] Coma Scale Score  15 11/20/17 1058            Patient Lines/Drains/Airways Status    Active LINES/DRAINS/AIRWAYS     Name: Placement date: Placement time: Site: Days: Last dressing change:    Peripheral IV 11/19/17 Right Upper forearm 11/19/17 2055   Upper forearm   less than 1     Wound 11/16/17 Right Thumb Laceration smashed right thumb in door, scab area 11/16/17   1642   Thumb   3     Wound 11/16/17 Left Thumb Abrasion(s);Laceration cut left index tip of finger with knife at home 11/16/17   1644   Thumb   3     Incision/Surgical Site 11/16/17 Right Hip 11/16/17   1305    4             Patient Lines/Drains/Airways Status    Active PICC/CVC     None            Intake/Output  Detail Report     Date Intake   Output Net    Shift P.O. IV Piggyback Total Urine Total       Noc 11/18/17 2300 - 11/19/17 0659 -- -- -- -- -- 0    Day 11/19/17 0700 - 11/19/17 1459 -- -- -- -- -- 0    [place] 11/19/17 1500 - 11/19/17 2259 -- -- -- -- -- 0    Noc 11/19/17 2300 - 11/20/17 0659 -- -- -- -- -- 0    Day 11/20/17 0700 - 11/20/17 1459 520 -- 520 350 350 170      Last Void/BM       Most Recent Value    Urine Occurrence     Stool Occurrence 1 at 11/20/2017 1300      Case Management/Discharge Planning     Case Management/Discharge Planning Flowsheet     REFERRAL INFORMATION     Reaction To Health Status  accepting 11/20/17 0906    Admission Type  observation 11/20/17 0902   Understanding Of Condition And Treatment  adequate understanding of medical condition;adequate understanding of treatment 11/20/17 0906    Arrived From  home or self-care 11/20/17 0902   EXPECTED DISCHARGE      Referral Source  physician 11/20/17 0902   Expected Discharge Date  11/20/17 11/20/17 0906    # of Referrals Placed by CTS  Post Acute Facilities 11/20/17 0906   DISCHARGE PLANNING      Reason For Consult  discharge planning 11/20/17 0902   Patient/family verbalizes understanding of discharge plan recommendations?  Yes 11/20/17 0906    CTS Assigned to Case  Melissa 11/20/17 0902   Medical Team notified of plan?  yes 11/20/17 0906    Primary Care MD Name  stephen Cast 11/20/17 0906   Does Patient Need a Referral for Clinic CC  Yes 11/20/17 1053    LIVING ENVIRONMENT     FINAL RESOURCES      Lives With  spouse 11/20/17 0910   PAS Number  [id number] 11/20/17 1226    Living Arrangements  house 11/20/17 0910   ABUSE RISK SCREEN      Able to Return to Prior Living Arrangements  no 11/20/17 0906   QUESTION TO PATIENT:  Has a member of your family or a partner(now or in the past) intimidated, hurt, manipulated, or controlled you in any way?  no 11/20/17 0148    ASSESSMENT OF FAMILY/SOCIAL SUPPORT     QUESTION TO PATIENT: Do you feel safe going  back to the place where you are living?  yes 11/20/17 0148    Marital Status   11/20/17 0906   OBSERVATION: Is there reason to believe there has been maltreatment of a vulnerable adult (ie. Physical/Sexual/Emotional abuse, self neglect, lack of adequate food, shelter, medical care, or financial exploitation)?  no 11/20/17 0148    COPING/STRESS     (R) MENTAL HEALTH SUICIDE RISK      Major Change/Loss/Stressor  surgery/procedure 11/14/17 1734   Are you depressed or being treated for depression?  No 11/20/17 0148    Sources Of Support  spouse 11/20/17 0906

## 2017-11-19 NOTE — PROGRESS NOTES
[facility] Hospitalist Progress Note         Assessment and Plan:       Assessment & Plan:  Marquise Jj is a 72 year old female who presented on 11/16/2017 for scheduled Procedure(s):  ARTHROPLASTY REVISION HIP by Jozef Garcia MD and is being followed by the hospital medicine service for co-management of acute and/or chronic perioperative medical problems.      Multiple Right Hip Dislocations    S/p Procedure(s):  ARTHROPLASTY REVISION HIP, RIGHT   Day 3 Post-Op    Pain tolerable. Hg 9.0, 13.5 prior to surgery - vitals stable and asymptomatic.   - pain control, wound cares, physical therapy, occupational therapy and DVT prophylaxis per orthopedic surgery service    Acute blood loss anemia: expected, post-surgical.  No signs/ symptoms of bleeding.  Recommend repeat in ~ 2 weeks    Right Anterior Thigh Numbness   Presented in L3-4 dermatome. Ortho discussed with surgeon who advises to monitor it. Possibly due to local anesthetic used intraoperative - should wear off in 24 hours, but could take several more days.  Mildly improving.   -continue to monitor, follow-up as outpatient.     Mild Hypoxemia - resolved   Saturations noted 88% without tachypnea. Transiently placed on oxygen.     States saturations dropped after dose of dilaudid suspect due to hypoventilation  -continue oxygen PRN, maintain saturations >92%, wean as tolerated     Chronic Kidney Disease, Stage III   Cr 1.04, baseline 0.9-1  -avoid nephrotoxins  -I/Os     HTN   Compensated   -continue PTA losartan     GERD  -continue PTA omeprazole     Glaucoma  -continue PTA eye drops if family brings in     HLD   No PTA medications     DVT Prophylaxis: as per orthopedic surgery service - Enoxaparin (Lovenox) SQ as IP followed by ASA 325mg po qd x 42 days.   Code Status: Full Code     Lines: PIV   Rojo catheter: none     Discussion: Medically, the patient appears well, monitoring Hg and thigh numbness.  VSS     Disposition: Anticipate discharge today          Interval History:   Patient reports strength has improved in right leg, but still feeling significant numbness, perhaps slightly improved from yesterday.  Denies fevers, chills, dizziness, chest pain, shortness of breath.  No bowel movement yet, passing gas.  Feels comfortable d/c home today.         Review of Systems:   A comprehensive review of systems was performed and found to be negative except as described in this note          Medications:     Current Facility-Administered Medications   Medication     HYDROcodone-acetaminophen (NORCO) 5-325 MG per tablet 1-2 tablet     hydrOXYzine (ATARAX) tablet 25 mg     bimatoprost (LUMIGAN) 0.01 % ophthalmic drops 1 drop     dorzolamide-timolol (COSOPT) ophthalmic solution 1 drop     omeprazole (priLOSEC) CR capsule 20 mg     lidocaine 1 % 1 mL     lidocaine (LMX4) kit     sodium chloride (PF) 0.9% PF flush 3 mL     benzocaine-menthol (CEPACOL) 15-3.6 MG lozenge 1-2 lozenge     naloxone (NARCAN) injection 0.1-0.4 mg     enoxaparin (LOVENOX) injection 40 mg     acetaminophen (TYLENOL) tablet 650 mg     ondansetron (ZOFRAN-ODT) ODT tab 4 mg    Or     ondansetron (ZOFRAN) injection 4 mg     prochlorperazine (COMPAZINE) injection 5 mg    Or     prochlorperazine (COMPAZINE) tablet 5 mg     senna-docusate (SENOKOT-S;PERICOLACE) 8.6-50 MG per tablet 1-2 tablet     traMADol (ULTRAM) tablet 50 mg     HYDROmorphone (PF) (DILAUDID) injection 0.5 mg             Physical Exam:   Vitals were reviewed  Patient Vitals for the past 24 hrs:   BP Temp Temp src Pulse Resp SpO2   11/19/17 0700 109/55 98  F (36.7  C) Oral 74 16 96 %   11/19/17 0345 - - - - 18 -   11/19/17 0019 127/45 98  F (36.7  C) Oral 71 18 97 %   11/18/17 1521 107/51 97.5  F (36.4  C) Oral 71 16 94 %     Gen: alert, in no distress, well-appearing  Eyes: conjunctiva clear.  Neck: supple, no lymphadenopathy   CV: RRR, S1 and S2 normal. Radial and pedal pulses symmetric  and normal  Respiratory: Lungs clear bilaterally  Abd: Soft, non-tender.  Hypoactive bowel sounds.    MSK: decreased right hip flexion and knee extension.  Neuro: decreased sensation right anterior thigh  Skin: no significant rashes.          Data:     Results for orders placed or performed during the hospital encounter of 11/16/17 (from the past 24 hour(s))   Platelet count   Result Value Ref Range    Platelet Count 173 150 - 450 10e9/L   Hemoglobin   Result Value Ref Range    Hemoglobin 7.8 (L) 11.7 - 15.7 g/dL       Attestation:    I have personally discussed the patient's care with ortho today    Amount of time performed on this progress note: 15 minutes.    Dr. Kerry Garcia, DO  [facility] Internal Medicine

## 2017-11-19 NOTE — LETTER
Transition Communication Hand-off for Care Transitions to Next Level of Care Provider    Name: Marquise Jj  MRN #: [id number]  Primary Care Provider: Poonam Cast  Primary Care MD Name: poonam Cast  Primary Clinic: [facility] [address]  [place] [address]     Reason for Hospitalization:  Acute post-operative pain [G89.18]  Weakness of right leg [R29.898]  Admit Date/Time: 11/19/2017  8:37 PM  Discharge Date: 11/20/17  Payor Source: Payor: MEDICARE / Plan:  MEDICARE  / Product Type: Medicare /     Readmission Assessment Measure (MANNY) Risk Score/category: Low         Reason for Communication Hand-off Referral: Fragility    Discharge Plan: [facility] (Phone: [phone]) Fax: ([phone])     Discharge Needs Assessment:  Needs       Most Recent Value    # of Referrals Placed by [facility] Post Acute [facility]ilities    PAS Number [id number]        Follow-up plan:  No future appointments.    Any outstanding tests or procedures:        Referrals     Future Labs/Procedures    Occupational Therapy Adult Consult     Comments:    Evaluate and treat as clinically indicated.    Reason:  S/p hip replacement    Physical Therapy Adult Consult     Comments:    Evaluate and treat as clinically indicated.    Reason:  S/p hip replacement            FITO Gonzalez  [facility] [phone]/ [place] campus [phone]      AVS/Discharge Summary is the source of truth; this is a helpful guide for improved communication of patient story

## 2017-11-20 ENCOUNTER — APPOINTMENT (OUTPATIENT)
Dept: PHYSICAL THERAPY | Facility: CLINIC | Age: 72
End: 2017-11-20
Payer: MEDICARE

## 2017-11-20 VITALS
WEIGHT: 170 LBS | TEMPERATURE: 98.9 F | RESPIRATION RATE: 16 BRPM | OXYGEN SATURATION: 99 % | HEART RATE: 77 BPM | BODY MASS INDEX: 25.85 KG/M2 | DIASTOLIC BLOOD PRESSURE: 57 MMHG | SYSTOLIC BLOOD PRESSURE: 124 MMHG

## 2017-11-20 PROBLEM — D64.9 ANEMIA: Status: ACTIVE | Noted: 2017-11-20

## 2017-11-20 PROBLEM — K21.9 GASTROESOPHAGEAL REFLUX DISEASE: Status: ACTIVE | Noted: 2017-11-13

## 2017-11-20 PROBLEM — K21.9 GASTROESOPHAGEAL REFLUX DISEASE: Chronic | Status: ACTIVE | Noted: 2017-11-13

## 2017-11-20 LAB
ANION GAP SERPL CALCULATED.3IONS-SCNC: 7 MMOL/L (ref 3–14)
BASOPHILS # BLD AUTO: 0 10E9/L (ref 0–0.2)
BASOPHILS NFR BLD AUTO: 0.3 %
BUN SERPL-MCNC: 17 MG/DL (ref 7–30)
CALCIUM SERPL-MCNC: 8.1 MG/DL (ref 8.5–10.1)
CHLORIDE SERPL-SCNC: 106 MMOL/L (ref 94–109)
CO2 SERPL-SCNC: 26 MMOL/L (ref 20–32)
CREAT SERPL-MCNC: 0.85 MG/DL (ref 0.52–1.04)
DIFFERENTIAL METHOD BLD: ABNORMAL
EOSINOPHIL # BLD AUTO: 0.3 10E9/L (ref 0–0.7)
EOSINOPHIL NFR BLD AUTO: 4 %
ERYTHROCYTE [DISTWIDTH] IN BLOOD BY AUTOMATED COUNT: 12.9 % (ref 10–15)
GFR SERPL CREATININE-BSD FRML MDRD: 65 ML/MIN/1.7M2
GLUCOSE SERPL-MCNC: 88 MG/DL (ref 70–99)
HCT VFR BLD AUTO: 24.9 % (ref 35–47)
HGB BLD-MCNC: 8 G/DL (ref 11.7–15.7)
IMM GRANULOCYTES # BLD: 0.1 10E9/L (ref 0–0.4)
IMM GRANULOCYTES NFR BLD: 1 %
LYMPHOCYTES # BLD AUTO: 1.9 10E9/L (ref 0.8–5.3)
LYMPHOCYTES NFR BLD AUTO: 28.8 %
MCH RBC QN AUTO: 30.3 PG (ref 26.5–33)
MCHC RBC AUTO-ENTMCNC: 32.1 G/DL (ref 31.5–36.5)
MCV RBC AUTO: 94 FL (ref 78–100)
MONOCYTES # BLD AUTO: 0.6 10E9/L (ref 0–1.3)
MONOCYTES NFR BLD AUTO: 9.6 %
NEUTROPHILS # BLD AUTO: 3.8 10E9/L (ref 1.6–8.3)
NEUTROPHILS NFR BLD AUTO: 56.3 %
PLATELET # BLD AUTO: 212 10E9/L (ref 150–450)
POTASSIUM SERPL-SCNC: 4.2 MMOL/L (ref 3.4–5.3)
RBC # BLD AUTO: 2.64 10E12/L (ref 3.8–5.2)
SODIUM SERPL-SCNC: 139 MMOL/L (ref 133–144)
WBC # BLD AUTO: 6.7 10E9/L (ref 4–11)

## 2017-11-20 PROCEDURE — 85025 COMPLETE CBC W/AUTO DIFF WBC: CPT | Performed by: PHYSICIAN ASSISTANT

## 2017-11-20 PROCEDURE — 25000132 ZZH RX MED GY IP 250 OP 250 PS 637: Mod: GY | Performed by: PHYSICIAN ASSISTANT

## 2017-11-20 PROCEDURE — 80048 BASIC METABOLIC PNL TOTAL CA: CPT | Performed by: PHYSICIAN ASSISTANT

## 2017-11-20 PROCEDURE — 99218 ZZC INITIAL OBSERVATION CARE,LEVL I: CPT | Performed by: PHYSICIAN ASSISTANT

## 2017-11-20 PROCEDURE — 97161 PT EVAL LOW COMPLEX 20 MIN: CPT | Mod: GP | Performed by: PHYSICAL THERAPIST

## 2017-11-20 PROCEDURE — A9270 NON-COVERED ITEM OR SERVICE: HCPCS | Mod: GY | Performed by: INTERNAL MEDICINE

## 2017-11-20 PROCEDURE — 36415 COLL VENOUS BLD VENIPUNCTURE: CPT | Performed by: PHYSICIAN ASSISTANT

## 2017-11-20 PROCEDURE — 40000193 ZZH STATISTIC PT WARD VISIT: Performed by: PHYSICAL THERAPIST

## 2017-11-20 PROCEDURE — 99207 ZZC CDG-HISTORY COMP: MEETS EXP. PROB FOCUSED- DOWN CODED LACK OF PFSH: CPT | Performed by: PHYSICIAN ASSISTANT

## 2017-11-20 PROCEDURE — 25000132 ZZH RX MED GY IP 250 OP 250 PS 637: Mod: GY | Performed by: INTERNAL MEDICINE

## 2017-11-20 PROCEDURE — 97116 GAIT TRAINING THERAPY: CPT | Mod: GP | Performed by: PHYSICAL THERAPIST

## 2017-11-20 PROCEDURE — G0378 HOSPITAL OBSERVATION PER HR: HCPCS

## 2017-11-20 PROCEDURE — A9270 NON-COVERED ITEM OR SERVICE: HCPCS | Mod: GY | Performed by: PHYSICIAN ASSISTANT

## 2017-11-20 RX ORDER — POLYETHYLENE GLYCOL 3350 17 G/17G
17 POWDER, FOR SOLUTION ORAL DAILY PRN
Status: DISCONTINUED | OUTPATIENT
Start: 2017-11-20 | End: 2017-11-20 | Stop reason: HOSPADM

## 2017-11-20 RX ORDER — ACETAMINOPHEN 325 MG/1
650 TABLET ORAL EVERY 6 HOURS PRN
Qty: 100 TABLET | Refills: 0 | Status: ON HOLD | OUTPATIENT
Start: 2017-11-20 | End: 2020-01-01

## 2017-11-20 RX ORDER — AMOXICILLIN 250 MG
2 CAPSULE ORAL 2 TIMES DAILY
Qty: 100 TABLET | Refills: 0 | Status: ON HOLD | OUTPATIENT
Start: 2017-11-20 | End: 2020-01-01

## 2017-11-20 RX ORDER — NALOXONE HYDROCHLORIDE 0.4 MG/ML
.1-.4 INJECTION, SOLUTION INTRAMUSCULAR; INTRAVENOUS; SUBCUTANEOUS
Status: DISCONTINUED | OUTPATIENT
Start: 2017-11-20 | End: 2017-11-20 | Stop reason: HOSPADM

## 2017-11-20 RX ORDER — ONDANSETRON 2 MG/ML
4 INJECTION INTRAMUSCULAR; INTRAVENOUS EVERY 6 HOURS PRN
Status: DISCONTINUED | OUTPATIENT
Start: 2017-11-20 | End: 2017-11-20 | Stop reason: HOSPADM

## 2017-11-20 RX ORDER — ACETAMINOPHEN 325 MG/1
650 TABLET ORAL EVERY 4 HOURS PRN
Status: DISCONTINUED | OUTPATIENT
Start: 2017-11-20 | End: 2017-11-20 | Stop reason: HOSPADM

## 2017-11-20 RX ORDER — HYDROCODONE BITARTRATE AND ACETAMINOPHEN 5; 325 MG/1; MG/1
1-2 TABLET ORAL EVERY 4 HOURS PRN
Qty: 50 TABLET | Refills: 0 | Status: SHIPPED | OUTPATIENT
Start: 2017-11-20 | End: 2020-01-01

## 2017-11-20 RX ORDER — ONDANSETRON 4 MG/1
4 TABLET, ORALLY DISINTEGRATING ORAL EVERY 6 HOURS PRN
Status: DISCONTINUED | OUTPATIENT
Start: 2017-11-20 | End: 2017-11-20 | Stop reason: HOSPADM

## 2017-11-20 RX ADMIN — HYDROCODONE BITARTRATE AND ACETAMINOPHEN 1 TABLET: 5; 325 TABLET ORAL at 13:35

## 2017-11-20 RX ADMIN — OMEPRAZOLE 20 MG: 20 CAPSULE, DELAYED RELEASE ORAL at 08:26

## 2017-11-20 RX ADMIN — HYDROCODONE BITARTRATE AND ACETAMINOPHEN 1 TABLET: 5; 325 TABLET ORAL at 00:23

## 2017-11-20 RX ADMIN — HYDROXYZINE HYDROCHLORIDE 25 MG: 25 TABLET ORAL at 00:23

## 2017-11-20 RX ADMIN — HYDROCODONE BITARTRATE AND ACETAMINOPHEN 2 TABLET: 5; 325 TABLET ORAL at 04:24

## 2017-11-20 RX ADMIN — HYDROCODONE BITARTRATE AND ACETAMINOPHEN 1 TABLET: 5; 325 TABLET ORAL at 08:26

## 2017-11-20 RX ADMIN — LOSARTAN POTASSIUM 50 MG: 50 TABLET ORAL at 08:26

## 2017-11-20 RX ADMIN — ASPIRIN 325 MG: 325 TABLET, COATED ORAL at 08:26

## 2017-11-20 RX ADMIN — SENNOSIDES AND DOCUSATE SODIUM 2 TABLET: 8.6; 5 TABLET ORAL at 08:26

## 2017-11-20 RX ADMIN — POLYETHYLENE GLYCOL 3350 17 G: 17 POWDER, FOR SOLUTION ORAL at 14:03

## 2017-11-20 ASSESSMENT — PAIN DESCRIPTION - DESCRIPTORS: DESCRIPTORS: ACHING

## 2017-11-20 NOTE — ED NOTES
Pt assisted to use bedpan. Pt unable to roll due to pain, but able to lift hips from bed. Pt requesting more pain medication, MD notified.

## 2017-11-20 NOTE — PROGRESS NOTES
"[facility] Orthopaedics Progress Note      Post-operative Day:      S/p R DIAMOND on 11/16 by Dr. Garcia    Subjective:   Patient reported to the ED last evening after being d/c she felt a sudden pain to her R thigh while ambulating to the bathroom from her couch. She reports it lasting until she arrived att [facility]. Today her R thigh pain has since resolved. It was described as an \"exploding\" sensation to her R thigh. She did have a post op complication of R thigh L2-3 absent and diminished sensation, which as of today has slightly improved in the L2 dermatome but has become absent in the L3 dermatome.       Objective:  Blood pressure 124/57, pulse 77, temperature 98.9  F (37.2  C), temperature source Oral, resp. rate 16, weight 77.1 kg (170 lb), SpO2 99 %.    Patient Vitals for the past 24 hrs:   BP Temp Temp src Pulse Heart Rate Resp SpO2 Weight   11/20/17 1053 124/57 98.9  F (37.2  C) Oral 77 - 16 99 % -   11/20/17 0736 125/53 98.7  F (37.1  C) Oral 79 - 16 93 % -   11/20/17 0500 - - - - - 16 - -   11/20/17 0424 146/58 97.2  F (36.2  C) Oral - 87 18 95 % -   11/19/17 2300 - - - - - - 93 % -   11/19/17 2245 - - - - - - 95 % -   11/19/17 2215 155/83 98  F (36.7  C) - - - - 98 % -   11/19/17 2204 152/72 - - - - - 98 % -   11/19/17 2042 171/86 99  F (37.2  C) - - 92 22 96 % 77.1 kg (170 lb)       Wt Readings from Last 4 Encounters:   11/19/17 77.1 kg (170 lb)   11/16/17 76.7 kg (169 lb)     General: Alert and orientated. No apparent distress. Non-labored breathing. Appropriate affect.   MSK: R hip: dressing Clean, dry, and intact. Skin intact, no ecchymosis, no erythema. nontender to palpation to incision site. Diminished sensation to the L2 dermatome and absent to L3. Hip flexion while sitting against gravity is absent. Muscle atrophy to the R quad is appreciated. Unable to extend R knee against gravity.     Gait assessed and patient is TTWB with appreciated assistance by her UE. She is swinging through with a " straight leg. She is able to flex at the hip.       Pertinent Labs   Lab Results: personally reviewed.     Recent Labs   Lab Test  11/20/17   0733  11/19/17   0615  11/18/17   0625  11/17/17   0635   HGB  8.0*  7.8*  8.0*  9.0*   HCT  24.9*   --    --    --    MCV  94   --    --    --    PLT  212  173   --   198   NA  139   --    --   138         R DIAMOND on 11/16 by Dr. Garcia with appreciated L2-3 sensory deficits appreciated post op. Minimal improvements since my last exam on 11/17. Did discuss progress with her care team and Ana Babb PA-C. I did consider ordering an MRI of the R knee to r/o a quad tendon rupture, however due to discharge and uncertainty of insurance coverage, it has been deferred to be evaluated upon her post op visit with Dr. Garcias team. Patient may d/c to TCU today and should follow up with Dr. Garcia next week, his office is to contact her after d/c.     Report completed by:  Clau Mares PA-C  Date: 11/20/2017  Time: 1:45 PM

## 2017-11-20 NOTE — DISCHARGE INSTRUCTIONS
Please ensure that you do NOT take MORE than 4 grams or 4000mg of acetaminophen in a 24 hour period.  Please note that there is 325mg of acetaminophen in every Norco pill.    Please follow up with orthopedics as instructed prior to your discharge on Sunday.

## 2017-11-20 NOTE — PROGRESS NOTES
11/20/17 0900   Quick Adds   Type of Visit Initial PT Evaluation   Living Environment   Lives With spouse   Living Arrangements house   Home Accessibility stairs to enter home   Number of Stairs to Enter Home 5   Number of Stairs Within Home 0   Stair Railings at Home outside, present on right side   Functional Level Prior   Ambulation 0-->independent   Transferring 0-->independent   Fall history within last six months yes   Number of times patient has fallen within last six months 1   General Information   Onset of Illness/Injury or Date of Surgery - Date 11/19/17   Referring Physician Roma   Patient/Family Goals Statement Goal of returning home after TCU stay   Pertinent History of Current Problem (include personal factors and/or comorbidities that impact the POC) pt states she had a total hip revision last Thursday and has been having numbness/ pain into the right thigh since then. pt states she is preparing to go to a TCU. She lives in [place], has 5 steps to enter her home. was unable to perform the steps without assistance frmo her son and , walking up the stairs backwards while wearing a knee immobilizer due to the R hip and quad weakness/ numbness. pt was discharged yesterday but returned due to high increases in terrible sharp pain when she was standing.   Precautions/Limitations right hip precautions   Weight-Bearing Status - RLE weight-bearing as tolerated   Cognitive Status Examination   Orientation orientation to person, place and time   Level of Consciousness alert   Follows Commands and Answers Questions 100% of the time   Personal Safety and Judgment intact   Memory intact   Pain Assessment   Patient Currently in Pain Yes, see Vital Sign flowsheet   MMT: Hip, Rehab Eval   Hip Flexion - Left Side (5/5) normal,left   Hip Flexion - Right Side (1/5) trace, right   MMT: Knee, Rehab Eval   Knee Extension - Right Side (1/5) trace, right   Knee Flexion - Left Side (5/5) normal,left   Knee  "Extension - Left Side (5/5) normal,left   Knee Flexion - Right Side (4/5) good, right   Bed Mobility   Bed Mobility Comments not assessed, pt sitting in chair at beginning and end of PT sesion   Transfer Skills   Transfer Comments pt able to perform sit to stand transfer with min assistance to perform with FWW, demonstrated hip precautions   Gait   Gait Comments pt performed step to pattern with decreased swing on the RLE, lacking terminal knee extension. pt demonstrates use of calf muscles in order to advance the RLE forwards.   General Therapy Interventions   Planned Therapy Interventions IADL retraining;ADL retraining;balance training;gait training;motor coordination training;neuromuscular re-education;ROM;strengthening;stretching;transfer training;home program guidelines;progressive activity/exercise   Clinical Impression   Criteria for Skilled Therapeutic Intervention yes, treatment indicated   PT Diagnosis Decreased functional mobility secondary to R total hip   Influenced by the following impairments decreased strength   Functional limitations due to impairments decreased participation with ambulation, decreased activity toelrance   Clinical Presentation Stable/Uncomplicated   Clinical Presentation Rationale pt presents with stable condition.   Clinical Decision Making (Complexity) Low complexity   Therapy Frequency` 2 times/day   Predicted Duration of Therapy Intervention (days/wks) 2 days   Anticipated Equipment Needs at Discharge front wheeled walker   Anticipated Discharge Disposition Transitional Care Facility   Risk & Benefits of therapy have been explained Yes   Patient, Family & other staff in agreement with plan of care Yes   [facility] [employer] \"6 Clicks\"   2016, Trustees of [facility], under license to [employer].  All rights reserved.   6 Clicks Short Forms Basic Mobility Inpatient Short Form   [facility] AM-PAC  \"6 Clicks\" V.2 Basic Mobility Inpatient Short Form   1. " Turning from your back to your side while in a flat bed without using bedrails? 3 - A Little   2. Moving from lying on your back to sitting on the side of a flat bed without using bedrails? 3 - A Little   3. Moving to and from a bed to a chair (including a wheelchair)? 3 - A Little   4. Standing up from a chair using your arms (e.g., wheelchair, or bedside chair)? 3 - A Little   5. To walk in hospital room? 3 - A Little   6. Climbing 3-5 steps with a railing? 1 - Total   Basic Mobility Raw Score (Score out of 24.Lower scores equate to lower levels of function) 16   Total Evaluation Time   Total Evaluation Time (Minutes) 5

## 2017-11-20 NOTE — ED PROVIDER NOTES
History     Chief Complaint   Patient presents with     Hip Pain     Post-op Problem     R hip replacement, increased pain.      HPI  Marquise Jj is a 72 year old female who presents to the Emergency Department via EMS for concerns of right leg and hip pain. Patient is s/p revision of total right hip three days ago. She was discharged early this afternoon. Prior to discharge, she was ambulating well with her walker and tolerating exercise. Around 1900 today, patient was at home walking with her walker when she developed a sudden, shooting pain in her right anterior mid thigh. This lasted 10-15 minutes and resolved when she rested in bed. The sharp pain has since resolved. She rates her current discomfort 2-3/10 and describes this as a cramping pain. Patient notes right hip and groin pain since hospital discharge that is achy in nature. She also complains of right anterior distal leg numbness since surgery. Patient denies in numbness or weakness in her foot. She denies chest pain, abdominal pain or shortness of breath. She reports normal diet and fluid intake as well as normal urination and bowel movements. Patient has no other concerns at this time. Patient's last dose of pain medication was 1700.      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    Past Surgical History:    Past Surgical History:   Procedure Laterality Date     ARTHROPLASTY REVISION HIP Right 11/16/2017    Procedure: ARTHROPLASTY REVISION HIP;  Right total hip arthroplasty revision.;  Surgeon: Jozef Garcia MD;  Location: [facility] OR       Family History:    No family history on file.    Social History:  Marital Status:   [2]  Social History   Substance Use Topics     Smoking status: Never Smoker     Smokeless tobacco: Never Used     Alcohol use Not on file        Medications:       HYDROcodone-acetaminophen (NORCO) 5-325 MG per tablet   dorzolamide-timolol (COSOPT) 2-0.5 % ophthalmic solution   aspirin  MG EC tablet   hydrOXYzine (ATARAX) 25 MG tablet   senna-docusate (SENOKOT-S;PERICOLACE) 8.6-50 MG per tablet   LOSARTAN POTASSIUM PO   bimatoprost (LUMIGAN) 0.01 % SOLN   OMEPRAZOLE PO   multivitamin, therapeutic with minerals (MULTI-VITAMIN) TABS tablet   Omega-3 Fatty Acids (FISH OIL PO)   Acetaminophen (TYLENOL PO)         Review of Systems   Constitutional: Negative for appetite change, chills and fever.   HENT: Negative for congestion.    Respiratory: Negative for cough, chest tightness and shortness of breath.    Cardiovascular: Negative for chest pain and leg swelling.   Gastrointestinal: Negative for abdominal pain, diarrhea, nausea and vomiting.   Genitourinary: Negative for dysuria, frequency and urgency.   Musculoskeletal: Positive for gait problem (pain in right hip and leg with walking). Negative for back pain and neck pain.        Right leg and hip pain s/p total hip revision three days ago   Skin: Negative for rash and wound.   Neurological: Positive for numbness (on top of right leg - since surgery). Negative for weakness, light-headedness and headaches.   All other systems reviewed and are negative.      Physical Exam   BP: 171/86  Heart Rate: 92  Temp: 99  F (37.2  C)  Resp: 22  Weight: 77.1 kg (170 lb)  SpO2: 96 %      Physical Exam   Constitutional: She is oriented to person, place, and time. She appears well-developed and well-nourished. No distress.   HENT:   Head: Normocephalic and atraumatic.   Cardiovascular: Normal rate and intact distal pulses.    Pulmonary/Chest: Effort normal.   Abdominal: Soft. There is no tenderness.   Musculoskeletal:        Right hip: She exhibits decreased range of motion and tenderness (mild right lateral hip tenderness). She exhibits no bony tenderness, no swelling, no crepitus and no deformity.        Legs:  Neurological: She is  alert and oriented to person, place, and time.   Skin: Skin is warm and dry. She is not diaphoretic.   Psychiatric: She has a normal mood and affect.   Nursing note and vitals reviewed.      ED Course     ED Course     Procedures                   Labs Ordered and Resulted from Time of ED Arrival Up to the Time of Departure from the ED - No data to display    Results for orders placed or performed during the hospital encounter of 11/19/17 (from the past 24 hour(s))   XR Femur Right 2 Views    Narrative    RIGHT HIP TWO-THREE VIEWS, RIGHT FEMUR TWO VIEWS  11/19/2017 9:33 PM     HISTORY: Pain status post hip replacement.    COMPARISON: Pelvis 11/16/2017.    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 Soft tissue  gas again seen on the right, which could simply relate to recent  surgery. Degenerative change of the symphysis pubis and lower lumbar  spine.      Impression    IMPRESSION: No fracture or dislocation.   XR Hip Right G/E 2 Views    Narrative    RIGHT HIP TWO-THREE VIEWS, RIGHT FEMUR TWO VIEWS  11/19/2017 9:33 PM     HISTORY: Pain status post hip replacement.    COMPARISON: Pelvis 11/16/2017.    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 Soft tissue  gas again seen on the right, which could simply relate to recent  surgery. Degenerative change of the symphysis pubis and lower lumbar  spine.      Impression    IMPRESSION: No fracture or dislocation.       Medications   HYDROmorphone (PF) (DILAUDID) injection 0.5 mg (0.5 mg Intravenous Given 11/19/17 2159)       9:04 PM Patient assessed.     10:17 PM: Pt re-assessed. Pain controlled. Pt and  concerned about her weakness and feel she has not been able to get around adequately at home. Requesting admission and subsequent transitional care placement.     10:24 PM: Discussed with Dr Brandon. Accepts for obs admission.     Assessments & Plan (with Medical Decision Making)  72-year-old female who 3 days status post right hip revision  replacement clinic for evaluation of acute onset of worsening pain tonight.  Patient was walking when she experienced severe pain in her mid femur.  Patient is able to ambulate to the bed and put her leg And pain substernally subsided over approximately 10 minutes.  Patient now with mild aching discomfort in the same area as well as her right lateral hip and groin area.  No new numbness or weakness.  X-ray obtained to evaluate for evidence of periprosthetic fracture, dislocation, or other acute abnormality.  Negative for fracture or other acute abnormality.  Discussed results with the patient.  She is reassured however she feels concerned that she is too weak to manage rehabilitation at home and is requesting admission with the plan for transition to assisted care for short-term rehabilitation.       I have reviewed the nursing notes.    I have reviewed the findings, diagnosis, plan and need for follow up with the patient.       New Prescriptions    No medications on file       Final diagnoses:   Acute post-operative pain   Weakness of right leg     This document serves as a record of the services and decisions personally performed and made by Ben Borges, *. It was created on his behalf by Kathy Chino, a trained medical scribe. The creation of this document is based the provider's statements to the medical scribe.  Kathy Chino 9:22 PM 11/19/2017    Provider:   The information in this document, created by the medical scribe for me, accurately reflects the services I personally performed and the decisions made by me. I have reviewed and approved this document for accuracy prior to leaving the patient care area.  Ben Borges,* 9:22 PM 11/19/2017 11/19/2017   [facility] EMERGENCY DEPARTMENT     Ben Borges MD  11/19/17 0870

## 2017-11-20 NOTE — ED NOTES
Bed: ED07  Expected date:   Expected time:   Means of arrival: Ambulance  Comments:  S/p hip with numbess

## 2017-11-20 NOTE — ED NOTES
Pt presents to ED via ambulance. Pt had revision of total Right total hip on Thursday. discharged from hospital earlier this afternoon. Pt reported that she had significant pain increase, worse than it ever was in hospital. Pain starts in lateral hip and radiates into right groin and into the right lower exremity. CMS in intact. Pt denies any injury or fall. Both leg equal in length. Pt reports that there has been numbness in right leg since the surgery. Took last dose of hydrocodone at 1700 today. Incision site is covered with aquacel dressing CDI, no apparent swelling or redness    Pt received 50 of fentanyl IV by EMS and 150 cc NS

## 2017-11-20 NOTE — PROGRESS NOTES
[facility] ADMISSION NOTE    Patient admitted to room 2401 at approximately 2330 via cart from emergency room. Patient was accompanied by transport tech.     Verbal SBAR report received from Idris BAIG prior to patient arrival.     Patient trasferred to bed via air surendra. Patient alert and oriented X 3. The patient is not having any pain. 0-10 Pain Scale: 3. Admission vital signs: Blood pressure 155/83, temperature 99  F (37.2  C), resp. rate 22, weight 77.1 kg (170 lb), SpO2 93 %. Patient was oriented to plan of care, call light, bed controls, tv, telephone, bathroom and visiting hours.     The following safety risks were identified during admission: fall. Yellow risk band applied: YES.     Rafaela Bourgeois

## 2017-11-20 NOTE — CONSULTS
CARE TRANSITION SOCIAL WORK INITIAL ASSESSMENT:  Reason For Consult: discharge planning   Met with: Patient.    DATA  Principal Problem:    Weakness  Active Problems:    Chronic kidney disease, stage III (moderate)    Gastroesophageal reflux disease    HTN (hypertension)    Hyperlipidemia    Anemia          Primary Care MD Name: stephen Cast    ASSESSMENT  Cognitive Status: awake, alert and oriented.             Lives With: spouse  Living Arrangements: house                 Insurance Concerns: No Insurance issues identified        This writer met with pt introduced self and role. Discussed discharge planning and medicare guidelines in regards to home care and SNF benefits. Discussed local TCU options.  [facility] (Phone: [phone] Fax: [phone]) and [facility] (Phone: [phone]) Fax: ([phone]).      PLAN    TCU    Discharge Planner   Discharge Plans in progress: TCU  Barriers to discharge plan: None  Follow up plan: TCU    FITO Gonzalez  [facility] [phone]/ [facility] [phone]         Entered by: Melissa Hawkins 11/20/2017 9:08 AM

## 2017-11-20 NOTE — PLAN OF CARE
Problem: Patient Care Overview  Goal: Plan of Care/Patient Progress Review  Outcome: Improving  A&Ox4, VSS on RA. Right hip dressing CDI. Still has ongoing numbness to from right knee to upper thigh. No c/o spasms since arrival to ED. A1/walker, Taking norco for pain w/adequate relief. Here for placement in TCU. PT & SW to see today.

## 2017-11-20 NOTE — PROGRESS NOTES
[facility] DISCHARGE NOTE    Patient discharged to transitional care unit at 15:25 PM via wheel chair. Accompanied by spouse and staff. Discharge instructions reviewed with patient, spouse and other, opportunity offered to ask questions. Prescriptions sent to patients preferred pharmacy. All belongings sent with patient.  Report given to Shirley Montiel admitting nurse prior to discharge.    Loraine Pineda

## 2017-11-20 NOTE — DISCHARGE SUMMARY
[facility]    Discharge Summary  Hospital Medicine    Date of Admission:  11/19/2017  Date of Discharge:  11/20/2017   Discharging Provider: Callie Valles  Date of Service: 11/20/2017     Discharge Instructions:  Please ensure that you do NOT take MORE than 4 grams or 4000mg of acetaminophen in a 24 hour period.  Please note that there is 325mg of acetaminophen in every Norco pill.    Please follow up with orthopedics as instructed prior to your discharge on Sunday.        Primary Care     Poonam Cast  [place] [address]  [place] [address]      Identification and Chief Complaint: Marquise Jj is a 72 year old female who presented on 11/19/2017 with complaint of instability s/p hip replacement and anterior distal right thigh pain    Discharge Diagnoses       Weakness - instability s/p hip replacement    Chronic kidney disease, stage III (moderate)    Gastroesophageal reflux disease    HTN (hypertension)    Hyperlipidemia    Anemia        Discharge Disposition   Discharged to short-term care facility    Discharge Orders     General info for SNF   Length of Stay Estimate: Short Term Care: Estimated # of Days <30  Condition at Discharge: Improving  Level of care:skilled   Rehabilitation Potential: Excellent  Admission H&P remains valid and up-to-date: Yes  Recent Chemotherapy: N/A  Use Nursing Home Standing Orders: Yes     Mantoux instructions   Give two-step Mantoux (PPD) Per Facility Policy Yes     Reason for your hospital stay   Instability s/p hip replacement     Activity - Up with assistive device     Full Code     Physical Therapy Adult Consult   Evaluate and treat as clinically indicated.    Reason:  S/p hip replacement     Occupational Therapy Adult Consult   Evaluate and treat as clinically indicated.    Reason:  S/p hip replacement       Discharge Medications   Current Discharge Medication List      CONTINUE these medications which have  NOT CHANGED    Details   Acetaminophen (TYLENOL ARTHRITIS PAIN PO) Take 650-1,300 mg by mouth once as needed      HYDROcodone-acetaminophen (NORCO) 5-325 MG per tablet Take 1-2 tablets by mouth every 4 hours as needed for moderate to severe pain  Qty: 50 tablet, Refills: 0    Associated Diagnoses: S/P revision of total hip      LOSARTAN POTASSIUM PO Take 50 mg by mouth daily       dorzolamide-timolol (COSOPT) 2-0.5 % ophthalmic solution Place 1 drop into both eyes 2 times daily       bimatoprost (LUMIGAN) 0.01 % SOLN Place 1 drop into both eyes At Bedtime       OMEPRAZOLE PO Take 20 mg by mouth daily       multivitamin, therapeutic with minerals (MULTI-VITAMIN) TABS tablet Take 1 tablet by mouth daily      Omega-3 Fatty Acids (FISH OIL PO) Take 1,200 mg by mouth daily       aspirin  MG EC tablet Take 1 tablet (325 mg) by mouth daily  Qty: 40 tablet, Refills: 0      hydrOXYzine (ATARAX) 25 MG tablet Take 1 tablet (25 mg) by mouth every 4 hours as needed for itching or anxiety  Qty: 50 tablet, Refills: 1    Associated Diagnoses: S/P revision of total hip      senna-docusate (SENOKOT-S;PERICOLACE) 8.6-50 MG per tablet Take 1-2 tablets by mouth 2 times daily  Qty: 100 tablet    Associated Diagnoses: S/P revision of total hip           Allergies   Allergies   Allergen Reactions     Combigan [Brimonidine Tartrate-Timolol] Swelling     Swollen eyelids       Consultations This Hospital Stay    CARE TRANSITION RN/SW IP CONSULT  ORTHOPEDIC SURGERY IP CONSULT  PHYSICAL THERAPY ADULT IP CONSULT  OCCUPATIONAL THERAPY ADULT IP CONSULT    Significant Results and Procedures   Procedures    None    Data   Results for orders placed or performed during the hospital encounter of 11/19/17   XR Femur Right 2 Views    Narrative    RIGHT HIP TWO-THREE VIEWS, RIGHT FEMUR TWO VIEWS  11/19/2017 9:33 PM     HISTORY: Pain status post hip replacement.    COMPARISON: Pelvis 11/16/2017.    FINDINGS: Again there are bilateral hip  arthroplasties. Soft tissue  gas again seen on the right, which could simply relate to recent  surgery. Degenerative change of the symphysis pubis and lower lumbar  spine.      Impression    IMPRESSION: No fracture or dislocation.    YAS GILLIS MD   XR Hip Right G/E 2 Views    Narrative    RIGHT HIP TWO-THREE VIEWS, RIGHT FEMUR TWO VIEWS  11/19/2017 9:33 PM     HISTORY: Pain status post hip replacement.    COMPARISON: Pelvis 11/16/2017.    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 Soft tissue  gas again seen on the right, which could simply relate to recent  surgery. Degenerative change of the symphysis pubis and lower lumbar  spine.      Impression    IMPRESSION: No fracture or dislocation.    YAS GILLIS MD       History of Present Illness   (From H&P) See H and P.  Admission and discharge are same day.    Hospital Course   Marquise Jj was admitted on 11/19/2017.  The following problems were addressed during her hospitalization:    Weakness    Right Distal Thigh Pain  Upon discharge to home yesterday afternoon, the patient had acute distal right thigh pain with ambulation.  As such, she returned to the ED for eval. XR negative for acute fracture or change to arthroplasty.  On admission day 1, pain is now resolved.  She is tolerating both PT and OT, but would prefer to go to TCU given ongoing concern for falls and safety at home.  A bed was obtained at [facility]. Orthopedics to see prior to discharge.  She was discharged on full dose aspirin, and PRN Norco as prescribed by orthopedic service prior to previous discharge.     Anterior Right Thigh Numbness  Predominantly L2/L3 nerve root distribution. Slight numbness within the L4 dermatome.  On admission, this was stable, but not greatly improved from POD #1.  Orthopedics addressed prior to discharge; conveys that this will likely continue to slowly improve. They plan to continue following this on an outpatient basis.     Anemia due to acute  blood loss, hemodilution - stable  Preoperative hemoglobin 13.5. 9.0 POD #1, 8.0 POD #2 and 7.8 POD #3.  Today, 8.0 and the patient remains asymptomatic.     Chronic kidney disease, stage III (moderate)  Stable on discharge.      Gastroesophageal reflux disease  Continue PTA omeprazole on discharge      HTN (hypertension)  Continue PTA losartan on discharge.      Hyperlipidemia  Not on mediations    Pending Results   Unresulted Labs Ordered in the Past 30 Days of this Admission     No orders found for last 61 day(s).          Physical Exam   Temp:  [97.2  F (36.2  C)-99  F (37.2  C)] 98.9  F (37.2  C)  Pulse:  [77-79] 77  Heart Rate:  [87-92] 87  Resp:  [16-22] 16  BP: (124-171)/(53-86) 124/57  SpO2:  [93 %-99 %] 99 %  Vitals:    11/19/17 2042   Weight: 77.1 kg (170 lb)       Physical Exam: see H and P.  Admission and discharge are same day.    The discharge plan was discussed with the patient and patient agrees to plan    Total time on this discharge was greater than 30 minutes.    Callie Valles PA-C  [facility] Medicine

## 2017-11-20 NOTE — PLAN OF CARE
Problem: Patient Care Overview  Goal: Plan of Care/Patient Progress Review  Discharge Planner PT   Patient plan for discharge: TCU  Current status: pt required min assistance to contact guard for sit to stand and ambulation with the FWW. Manual muscle testing revealed trace grades of the R hip flexor and R quad. Pt demonstrates slow step to pattern with decreased weight shifting onto the RLE secondary to weakness. pt amb x40 feet with fatigue. Pt reports that she required assistance form 2 family members to get into her home yesterday due to 5 steps to enter.  Barriers to return to prior living situation: 5 steps, decreased R hip strength  Recommendations for discharge: TCU  Rationale for recommendations: decreased strength, decreased independence with gait.       Entered by: Roberta Umanzor 11/20/2017 9:26 AM

## 2017-11-20 NOTE — H&P
[facility]    History and Physical  Hospital Medicine       Date of Admission:  11/19/2017  Date of Service: 11/20/2017     Primary Care Physician   Poonam Cast [phone]    Assessment & Plan   Marquise Jj is a 72 year old female with PMH significant for GERD, CKD stage III, HTN, HLD and recent hip replacement (11/16/2017) who now presents with weakness and distal right thigh pain.      Weakness    Right Distal Thigh Pain  Upon discharge to home, had acute distal right thigh pain with ambulation.  Now resolved.  DDx: sequelae of hip replacement versus worsening anemia  - PT/OT/care transition consults placed  - IP orthopedic consult placed; appreciate recommendations  - BMP now    Anterior Right Thigh Numbness  Predominantly L2/L3 nerve root distribution. Slight numbness within the L4 dermatome.  Stable, but not greatly improved from POD #1  - see above orthopedic consult    Anemia due to acute blood loss, hemodilution - stable  Preoperative hemoglobin 13.5. 9.0 POD #1, 8.0 POD #2 and 7.8 POD #3.  Today, 8.0    Chronic kidney disease, stage III (moderate)  - BMP now      Gastroesophageal reflux disease  - continue PTA omeprazole      HTN (hypertension)  - continue PTA losartan      Hyperlipidemia  Not on mediations         F: not indicated  E: BMP now  N: as tolerated  DVT Prophylaxis: full dose aspirin    Code Status: Full Code    Disposition: Anticipate discharge in 1-2 days. Appropriate for observation care.    Case discussed with Dr. Jozef Dyer.  Assessment and plan as written above.    Callie Valles PA-C  [facility] Hospitalist Program    History is obtained from the patient and review of the EMR.    Past Medical History  - no pertinent PMH    Past Surgical History   Past Surgical History:   Procedure Laterality Date     ARTHROPLASTY REVISION HIP Right 11/16/2017    Procedure: ARTHROPLASTY REVISION HIP;  Right total hip arthroplasty revision.;  Surgeon:  "Jozef Garcia MD;  Location: [facility] OR       Family History  - no pertinent family history    History of Present Illness   Marquise Jj is a 72 year old female with PMH significant for GERD, CKD stage III, HTN, HLD and recent hip replacement (11/16/2017) who now presents with weakness and distal right thigh pain.    The patient was discharge to home yesterday afternoon as she was meeting PT/OT goals and pain was controlled.  Upon returning home, she arose from her couch to use the restroom.  She had onset of acute, right sided distal anterior thigh pain.  She describes the pain as if something was \"exploding\".  Nothing made the pain better or worse as it was incredibly severe.  Pain did not radiate.  She had nothing similar during her optimization post operatively.  As such, she and her  elected to return to [facility] for further evaluation.  Pain resolved sometime overnight and she is now pain free.  However, she is requesting TCU as she has some ongoing subjective \"weakness\".    Continues to pass flatus and void freely.  Some mild abdominal distention is present as the patient has not yet had a BM.    ROS: Denies fever, chills, nausea, vomiting, myalgias, cold-like symptoms (congestion, pharyngitis, sinus pressure, rhinorrhea), headaches, lightheadedness, dizziness, chest pain, palpitations/flutters, SOB, cough, wheezes, abdominal pain, dysuria, hematuria, joint swelling, leg swelling, and rashes.          Prior to Admission Medications   Prior to Admission Medications   Prescriptions Last Dose Informant Patient Reported? Taking?   Acetaminophen (TYLENOL PO)   Yes No   Sig: Take 500 mg by mouth   HYDROcodone-acetaminophen (NORCO) 5-325 MG per tablet 11/19/2017 at 1700  No Yes   Sig: Take 1-2 tablets by mouth every 4 hours as needed for moderate to severe pain   LOSARTAN POTASSIUM PO   Yes No   Sig: Take 50 mg by mouth daily    OMEPRAZOLE PO   Yes No   Sig: Take 20 mg by mouth daily    Omega-3 Fatty " Acids (FISH OIL PO)   Yes No   Sig: Take 1 capsule by mouth daily    aspirin  MG EC tablet   No No   Sig: Take 1 tablet (325 mg) by mouth daily   bimatoprost (LUMIGAN) 0.01 % SOLN   Yes No   Si drop At Bedtime   dorzolamide-timolol (COSOPT) 2-0.5 % ophthalmic solution 2017 at Unknown time  Yes Yes   Si drop 2 times daily   hydrOXYzine (ATARAX) 25 MG tablet   No No   Sig: Take 1 tablet (25 mg) by mouth every 4 hours as needed for itching or anxiety   multivitamin, therapeutic with minerals (MULTI-VITAMIN) TABS tablet   Yes No   Sig: Take 1 tablet by mouth daily   senna-docusate (SENOKOT-S;PERICOLACE) 8.6-50 MG per tablet   No No   Sig: Take 1-2 tablets by mouth 2 times daily      Facility-Administered Medications: None       Allergies   Allergies   Allergen Reactions     Combigan [Brimonidine Tartrate-Timolol]        Social History   Social History     Social History     Marital status:      Spouse name: N/A     Number of children: N/A     Years of education: N/A     Occupational History     Not on file.     Social History Main Topics     Smoking status: Never Smoker     Smokeless tobacco: Never Used     Alcohol use Not on file     Drug use: Not on file     Sexual activity: Not on file     Other Topics Concern     Not on file     Social History Narrative   Lives with .    Physical Exam     /58  Temp 97.2  F (36.2  C) (Oral)  Resp 16  Wt 77.1 kg (170 lb)  SpO2 95%  BMI 25.85 kg/m2     Weight: 170 lbs 0 oz Body mass index is 25.85 kg/(m^2).     Constitutional: Alert and oriented x4.  Cooperative.  Appears stated age.  Appears in no acute distress.   HEENT: Oropharynx is clear and moist. No evidence of cranial trauma.  Lymph/Hematologic: No occipital, submental, submandibular, anterior or posterior cervical, or supraclavicular lymphadenopathy is appreciated.  Cardiovascular: Regular rate/ rhythm.  S1 and S2 grossly normal.  No appreciable murmur, rub, gallop.  No lower  extremity edema.  Respiratory: Clear to auscultation bilaterally. Equal chest expansion.  GI: Soft, non-tender, normal bowel sounds, no hepatosplenomegaly.  Genitourinary: Deferred  Musculoskeletal: Normal muscle bulk and tone. Negative homans bilaterally  Skin: Warm and dry, no rashes.   Neurologic: Neck supple. Cranial nerves 3-12 are grossly intact.  is symmetric. Numb to palpation of the skin of the L2/L3 dermatome.    Data   Data reviewed today:     Recent Labs  Lab 11/19/17  0615 11/18/17  0625 11/17/17  0635   HGB 7.8* 8.0* 9.0*     --  198   NA  --   --  138   POTASSIUM  --   --  4.9   CHLORIDE  --   --  105   CO2  --   --  24   BUN  --   --  20   CR  --   --  1.04  1.04   ANIONGAP  --   --  9   FERNANDO  --   --  7.8*   GLC  --   --  144*       Recent Results (from the past 24 hour(s))   XR Femur Right 2 Views    Narrative    RIGHT HIP TWO-THREE VIEWS, RIGHT FEMUR TWO VIEWS  11/19/2017 9:33 PM     HISTORY: Pain status post hip replacement.    COMPARISON: Pelvis 11/16/2017.    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 Soft tissue  gas again seen on the right, which could simply relate to recent  surgery. Degenerative change of the symphysis pubis and lower lumbar  spine.      Impression    IMPRESSION: No fracture or dislocation.    YAS GILLIS MD   XR Hip Right G/E 2 Views    Narrative    RIGHT HIP TWO-THREE VIEWS, RIGHT FEMUR TWO VIEWS  11/19/2017 9:33 PM     HISTORY: Pain status post hip replacement.    COMPARISON: Pelvis 11/16/2017.    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 Soft tissue  gas again seen on the right, which could simply relate to recent  surgery. Degenerative change of the symphysis pubis and lower lumbar  spine.      Impression    IMPRESSION: No fracture or dislocation.    YAS GILLIS MD       I personally reviewed no images or EKG's today.    SANTIAGO MeadowsC  [facility]

## 2017-11-20 NOTE — PROGRESS NOTES
Patient accepted at [facility] (Phone: [phone]) Fax: ([phone]).  Talked with patient and family and they are in agreement.  Family will transport at 1500.    PAS-RR    D: Per DHS regulation, MIQUEL completed and submitted PAS-RR to [place] Board on Aging Direct Connect via the Senior LinkAge Line.  PAS-RR confirmation # is : [id number]    P: Further questions may be directed to [facility] LinkAge Line at #[phone], option #4 for PAS-RR staff.    FITO Gonzalez  [facility] [phone]/ [facility] [phone]

## 2017-11-20 NOTE — PHARMACY - DISCHARGE MEDICATION RECONCILIATION
Discharge medication review for this patient is complete.   Patient was not counseled or given any education materials because discharged to TCU facility  See [facility] for allergy information, prior to admission medications, discharge medication list and immunization status.   Pharmacist assisted with medication reconciliation of discharge medications with PTA medications.    MD was contacted with any questions/concerns:None    Additional medication history information:None    Michael PrattD .......12:21 PM November 20, 2017

## 2017-11-21 VITALS
HEART RATE: 79 BPM | TEMPERATURE: 98 F | SYSTOLIC BLOOD PRESSURE: 113 MMHG | DIASTOLIC BLOOD PRESSURE: 62 MMHG | OXYGEN SATURATION: 100 % | RESPIRATION RATE: 18 BRPM | BODY MASS INDEX: 26.3 KG/M2 | WEIGHT: 173 LBS

## 2017-11-21 NOTE — PROGRESS NOTES
[facility]  PRIMARY CARE PROVIDER AND CLINIC:  Poonam Cast [facility] [address] / [place] *  Chief Complaint   Patient presents with     Hospital F/U       HPI:    Marquise Jj is a 72 year old  (1945),admitted to the [facility]  from [facility].  Hospital stay 11/16/17 through 11/19/17.  Admitted to this facility for  rehab, medical management and nursing care.  Current issues are:         Failure of total hip arthroplasty, subsequent encounter  S/P revision of total hip  Weakness  Anemia due to blood loss, acute  Essential hypertension     Patient had previous R DIAMOND about 17 years ago, and had developed weakness in the parts, with frequent dislocations causing distress. She elected revision of R DIAMOND with replacement of worn parts. She originally returned home after this surgery, but then within 24 hours was rehospitalized as she had increased pain in R hip, with some numbness in the anterior thigh following L2-3 dermatome. She was too weak to return home so chose TCU for rehab and to improve strength and ability. She reports she already feels better and having less pain, decreased numbness. She is using norco for pain control. She reports her bowels are moving and she has no other concerns.     CODE STATUS/ADVANCE DIRECTIVES DISCUSSION:   CPR/Full code   Patient's living condition: lives with spouse    ALLERGIES:Combigan [brimonidine tartrate-timolol]  PAST MEDICAL HISTORY:  has no past medical history on file.  PAST SURGICAL HISTORY:  has a past surgical history that includes Arthroplasty revision hip (Right, 11/16/2017).  FAMILY HISTORY: family history is not on file.  SOCIAL HISTORY:  reports that she has never smoked. She has never used smokeless tobacco.    Post Discharge Medication Reconciliation Status: discharge medications reconciled and changed, per note/orders (see AVS).  Current Outpatient  Prescriptions   Medication Sig Dispense Refill     Acetaminophen (TYLENOL ARTHRITIS PAIN PO) Take 650-1,300 mg by mouth once as needed       HYDROcodone-acetaminophen (NORCO) 5-325 MG per tablet Take 1-2 tablets by mouth every 4 hours as needed for moderate to severe pain 50 tablet 0     acetaminophen (TYLENOL) 325 MG tablet Take 2 tablets (650 mg) by mouth every 6 hours as needed for mild pain 100 tablet 0     senna-docusate (SENOKOT-S;PERICOLACE) 8.6-50 MG per tablet Take 2 tablets by mouth 2 times daily 100 tablet 0     aspirin  MG EC tablet Take 1 tablet (325 mg) by mouth daily 40 tablet 0     hydrOXYzine (ATARAX) 25 MG tablet Take 1 tablet (25 mg) by mouth every 4 hours as needed for itching or anxiety 50 tablet 1     LOSARTAN POTASSIUM PO Take 50 mg by mouth daily        dorzolamide-timolol (COSOPT) 2-0.5 % ophthalmic solution Place 1 drop into both eyes 2 times daily        bimatoprost (LUMIGAN) 0.01 % SOLN Place 1 drop into both eyes At Bedtime        OMEPRAZOLE PO Take 20 mg by mouth daily        multivitamin, therapeutic with minerals (MULTI-VITAMIN) TABS tablet Take 1 tablet by mouth daily       Omega-3 Fatty Acids (FISH OIL PO) Take 1,200 mg by mouth daily          ROS:  4 point ROS including Respiratory, CV, GI and , other than that noted in the HPI,  is negative    Exam:  /62  Pulse 79  Temp 98  F (36.7  C)  Resp 18  Wt 173 lb (78.5 kg)  SpO2 100%  BMI 26.3 kg/m2  GENERAL APPEARANCE:  Alert, in no acute distress  HEAD:  Normal, normocephalic, atraumatic  EYE EXAM: normal external eye, conjunctiva, lids, MICHELLE  NECK EXAM: supple, no JVD  CHEST/RESP:  respiratory effort normal, lung sounds CTA , no respiratory distress  CV:  Rate reg, rhythm reg, no murmur, trace peripheral edema   M/S:   extremities normal, gait normal-walking slowly with rolling walker and therapy assist, normal muscle tone, and range of motion normal   SKIN EXAM: incision of R hip is covered with Aquacell with  minimal drainage on dressing which is CDI , no erythema, no warmth, no drainage surrounding occlusive dressing  NEUROLOGIC EXAM: Normal gross motor movement, tone and coordination. No tremor.  Cranial nerves 2-12 are normal tested and grossly at patient's baseline  PSYCH:  Alert and oriented to person-place-time , affect pleasant , judgement appropriate       Lab/Diagnostic data:     CBC RESULTS:   Recent Labs   Lab Test  11/20/17   0733  11/19/17   0615   WBC  6.7   --    RBC  2.64*   --    HGB  8.0*  7.8*   HCT  24.9*   --    MCV  94   --    MCH  30.3   --    MCHC  32.1   --    RDW  12.9   --    PLT  212  173       Last Basic Metabolic Panel:  Recent Labs   Lab Test  11/20/17   0733  11/17/17   0635   NA  139  138   POTASSIUM  4.2  4.9   CHLORIDE  106  105   FERNANDO  8.1*  7.8*   CO2  26  24   BUN  17  20   CR  0.85  1.04  1.04   GLC  88  144*           ASSESSMENT/PLAN:  Failure of total hip arthroplasty, subsequent encounter  S/P revision of total hip  Working with therapy, no symptoms infection and strength is improving. continue therapy and current medical management, progressing .  Will schedule pain medications for better control of pain and reassess next week, may be able to decrease scheduled medications.     Weakness  Improving, decreasing numbness. continue therapy and current medical management, progressing     Anemia due to blood loss, acute  Prior to surgery Hgb was 13, then down as low as 7's rebounded to 8. Will check next week.     Essential hypertension  Generally normotensive on current regimen, goal BP <140/90 to reduce risk of falls, hypotension. The current medical regimen is effective;  continue present plan and medications.         Orders:  1. Norco 5/325 1 tab po QID scheduled and continue 1-2 tabs po q 4 hr prn pain as previously ordered.  2. Max daily dose tylenol 4 gm  3. Lab draw on 11/28/17 - CBC and BMP Dx acute blood loss anemia and CKD    Information reviewed:  Medications, vital signs,  orders, nursing notes, problem list, hospital information. Total time spent with patient visit was 36 minutes including patient visit, review of past records and discussion with spouse. Greater than 50% of total time spent with counseling and coordinating care, regarding goals of care, discharge planning.    Electronically signed by:  Beatris Mcdonald CNP   [facility]  [phone] cell

## 2017-11-21 NOTE — PLAN OF CARE
Problem: Patient Care Overview  Goal: Plan of Care/Patient Progress Review  Physical Therapy Discharge Summary    Reason for therapy discharge:    Discharged to transitional care facility.    Progress towards therapy goal(s). See goals on [facility] Plan in [facility] record for goal details.  Goals not met.  Barriers to achieving goals:   LE weakness limiting gait, amb.    Therapy recommendation(s):    Continued therapy is recommended.  Rationale/Recommendations:  continued PT at TCU to address strength and functional mobility - .     Sasha Dow, PT

## 2017-11-22 ENCOUNTER — NURSING HOME VISIT (OUTPATIENT)
Dept: GERIATRICS | Facility: CLINIC | Age: 72
End: 2017-11-22
Payer: MEDICARE

## 2017-11-22 DIAGNOSIS — T84.018D FAILURE OF TOTAL HIP ARTHROPLASTY, SUBSEQUENT ENCOUNTER: Primary | ICD-10-CM

## 2017-11-22 DIAGNOSIS — R53.1 WEAKNESS: ICD-10-CM

## 2017-11-22 DIAGNOSIS — Z96.649 S/P REVISION OF TOTAL HIP: ICD-10-CM

## 2017-11-22 DIAGNOSIS — Z96.649 FAILURE OF TOTAL HIP ARTHROPLASTY, SUBSEQUENT ENCOUNTER: Primary | ICD-10-CM

## 2017-11-22 DIAGNOSIS — I10 ESSENTIAL HYPERTENSION: ICD-10-CM

## 2017-11-22 DIAGNOSIS — D62 ANEMIA DUE TO BLOOD LOSS, ACUTE: ICD-10-CM

## 2017-11-22 PROCEDURE — 99310 SBSQ NF CARE HIGH MDM 45: CPT | Performed by: NURSE PRACTITIONER

## 2017-11-22 RX ORDER — HYDROCODONE BITARTRATE AND ACETAMINOPHEN 5; 325 MG/1; MG/1
1 TABLET ORAL 4 TIMES DAILY
COMMUNITY
End: 2020-01-01

## 2017-11-22 NOTE — LETTER
11/22/2017        RE: Marquise Jj  [address] [facility] [address]        [facility]  PRIMARY CARE PROVIDER AND CLINIC:  Poonam Cast [facility] [address] / [place] *  Chief Complaint   Patient presents with     Hospital F/U       HPI:    Marquise Jj is a 72 year old  (1945),admitted to the [facility]  from [facility].  Hospital stay 11/16/17 through 11/19/17.  Admitted to this facility for  rehab, medical management and nursing care.  Current issues are:         Failure of total hip arthroplasty, subsequent encounter  S/P revision of total hip  Weakness  Anemia due to blood loss, acute  Essential hypertension     Patient had previous R DIAMOND about 17 years ago, and had developed weakness in the parts, with frequent dislocations causing distress. She elected revision of R DIAMOND with replacement of worn parts. She originally returned home after this surgery, but then within 24 hours was rehospitalized as she had increased pain in R hip, with some numbness in the anterior thigh following L2-3 dermatome. She was too weak to return home so chose TCU for rehab and to improve strength and ability. She reports she already feels better and having less pain, decreased numbness. She is using norco for pain control. She reports her bowels are moving and she has no other concerns.     CODE STATUS/ADVANCE DIRECTIVES DISCUSSION:   CPR/Full code   Patient's living condition: lives with spouse    ALLERGIES:Combigan [brimonidine tartrate-timolol]  PAST MEDICAL HISTORY:  has no past medical history on file.  PAST SURGICAL HISTORY:  has a past surgical history that includes Arthroplasty revision hip (Right, 11/16/2017).  FAMILY HISTORY: family history is not on file.  SOCIAL HISTORY:  reports that she has never smoked. She has never used smokeless tobacco.    Post Discharge Medication Reconciliation Status: discharge  medications reconciled and changed, per note/orders (see AVS).  Current Outpatient Prescriptions   Medication Sig Dispense Refill     Acetaminophen (TYLENOL ARTHRITIS PAIN PO) Take 650-1,300 mg by mouth once as needed       HYDROcodone-acetaminophen (NORCO) 5-325 MG per tablet Take 1-2 tablets by mouth every 4 hours as needed for moderate to severe pain 50 tablet 0     acetaminophen (TYLENOL) 325 MG tablet Take 2 tablets (650 mg) by mouth every 6 hours as needed for mild pain 100 tablet 0     senna-docusate (SENOKOT-S;PERICOLACE) 8.6-50 MG per tablet Take 2 tablets by mouth 2 times daily 100 tablet 0     aspirin  MG EC tablet Take 1 tablet (325 mg) by mouth daily 40 tablet 0     hydrOXYzine (ATARAX) 25 MG tablet Take 1 tablet (25 mg) by mouth every 4 hours as needed for itching or anxiety 50 tablet 1     LOSARTAN POTASSIUM PO Take 50 mg by mouth daily        dorzolamide-timolol (COSOPT) 2-0.5 % ophthalmic solution Place 1 drop into both eyes 2 times daily        bimatoprost (LUMIGAN) 0.01 % SOLN Place 1 drop into both eyes At Bedtime        OMEPRAZOLE PO Take 20 mg by mouth daily        multivitamin, therapeutic with minerals (MULTI-VITAMIN) TABS tablet Take 1 tablet by mouth daily       Omega-3 Fatty Acids (FISH OIL PO) Take 1,200 mg by mouth daily          ROS:  4 point ROS including Respiratory, CV, GI and , other than that noted in the HPI,  is negative    Exam:  /62  Pulse 79  Temp 98  F (36.7  C)  Resp 18  Wt 173 lb (78.5 kg)  SpO2 100%  BMI 26.3 kg/m2  GENERAL APPEARANCE:  Alert, in no acute distress  HEAD:  Normal, normocephalic, atraumatic  EYE EXAM: normal external eye, conjunctiva, lids, MICHELLE  NECK EXAM: supple, no JVD  CHEST/RESP:  respiratory effort normal, lung sounds CTA , no respiratory distress  CV:  Rate reg, rhythm reg, no murmur, trace peripheral edema   M/S:   extremities normal, gait normal-walking slowly with rolling walker and therapy assist, normal muscle tone, and range  of motion normal   SKIN EXAM: incision of R hip is covered with Aquacell with minimal drainage on dressing which is CDI , no erythema, no warmth, no drainage surrounding occlusive dressing  NEUROLOGIC EXAM: Normal gross motor movement, tone and coordination. No tremor.  Cranial nerves 2-12 are normal tested and grossly at patient's baseline  PSYCH:  Alert and oriented to person-place-time , affect pleasant , judgement appropriate       Lab/Diagnostic data:     CBC RESULTS:   Recent Labs   Lab Test  11/20/17   0733  11/19/17   0615   WBC  6.7   --    RBC  2.64*   --    HGB  8.0*  7.8*   HCT  24.9*   --    MCV  94   --    MCH  30.3   --    MCHC  32.1   --    RDW  12.9   --    PLT  212  173       Last Basic Metabolic Panel:  Recent Labs   Lab Test  11/20/17 0733  11/17/17   0635   NA  139  138   POTASSIUM  4.2  4.9   CHLORIDE  106  105   FERNANDO  8.1*  7.8*   CO2  26  24   BUN  17  20   CR  0.85  1.04  1.04   GLC  88  144*           ASSESSMENT/PLAN:  Failure of total hip arthroplasty, subsequent encounter  S/P revision of total hip  Working with therapy, no symptoms infection and strength is improving. continue therapy and current medical management, progressing .  Will schedule pain medications for better control of pain and reassess next week, may be able to decrease scheduled medications.     Weakness  Improving, decreasing numbness. continue therapy and current medical management, progressing     Anemia due to blood loss, acute  Prior to surgery Hgb was 13, then down as low as 7's rebounded to 8. Will check next week.     Essential hypertension  Generally normotensive on current regimen, goal BP <140/90 to reduce risk of falls, hypotension. The current medical regimen is effective;  continue present plan and medications.         Orders:  1. Norco 5/325 1 tab po QID scheduled and continue 1-2 tabs po q 4 hr prn pain as previously ordered.  2. Max daily dose tylenol 4 gm  3. Lab draw on 11/28/17 - CBC and BMP Dx acute  blood loss anemia and CKD    Information reviewed:  Medications, vital signs, orders, nursing notes, problem list, hospital information. Total time spent with patient visit was 36 minutes including patient visit, review of past records and discussion with spouse. Greater than 50% of total time spent with counseling and coordinating care, regarding goals of care, discharge planning.    Electronically signed by:  Beatris Mcdonald CNP   [facility]  [phone] cell                       Sincerely,        STEPHAN Torres CNP

## 2017-11-27 VITALS
SYSTOLIC BLOOD PRESSURE: 121 MMHG | HEART RATE: 73 BPM | BODY MASS INDEX: 25.85 KG/M2 | TEMPERATURE: 98.2 F | OXYGEN SATURATION: 99 % | RESPIRATION RATE: 16 BRPM | WEIGHT: 170 LBS | DIASTOLIC BLOOD PRESSURE: 76 MMHG

## 2017-11-28 ENCOUNTER — HOSPITAL LABORATORY (OUTPATIENT)
Facility: OTHER | Age: 72
End: 2017-11-28

## 2017-11-28 ENCOUNTER — NURSING HOME VISIT (OUTPATIENT)
Dept: GERIATRICS | Facility: CLINIC | Age: 72
End: 2017-11-28
Payer: MEDICARE

## 2017-11-28 DIAGNOSIS — K21.9 GASTROESOPHAGEAL REFLUX DISEASE WITHOUT ESOPHAGITIS: ICD-10-CM

## 2017-11-28 DIAGNOSIS — N18.30 CHRONIC KIDNEY DISEASE, STAGE III (MODERATE) (H): ICD-10-CM

## 2017-11-28 DIAGNOSIS — R53.1 WEAKNESS: ICD-10-CM

## 2017-11-28 DIAGNOSIS — I10 ESSENTIAL HYPERTENSION: ICD-10-CM

## 2017-11-28 DIAGNOSIS — Z96.649 S/P REVISION OF TOTAL HIP: ICD-10-CM

## 2017-11-28 DIAGNOSIS — Z96.649 FAILURE OF TOTAL HIP ARTHROPLASTY, SUBSEQUENT ENCOUNTER: Primary | ICD-10-CM

## 2017-11-28 DIAGNOSIS — D62 ANEMIA DUE TO BLOOD LOSS, ACUTE: ICD-10-CM

## 2017-11-28 DIAGNOSIS — T84.018D FAILURE OF TOTAL HIP ARTHROPLASTY, SUBSEQUENT ENCOUNTER: Primary | ICD-10-CM

## 2017-11-28 LAB
ANION GAP SERPL CALCULATED.3IONS-SCNC: 6 MMOL/L (ref 3–14)
BUN SERPL-MCNC: 20 MG/DL (ref 7–30)
CALCIUM SERPL-MCNC: 8.3 MG/DL (ref 8.5–10.1)
CHLORIDE SERPL-SCNC: 108 MMOL/L (ref 94–109)
CO2 SERPL-SCNC: 27 MMOL/L (ref 20–32)
CREAT SERPL-MCNC: 0.86 MG/DL (ref 0.52–1.04)
ERYTHROCYTE [DISTWIDTH] IN BLOOD BY AUTOMATED COUNT: 13.4 % (ref 10–15)
GFR SERPL CREATININE-BSD FRML MDRD: 65 ML/MIN/1.7M2
GLUCOSE SERPL-MCNC: 79 MG/DL (ref 70–99)
HCT VFR BLD AUTO: 26.4 % (ref 35–47)
HGB BLD-MCNC: 8.2 G/DL (ref 11.7–15.7)
MCH RBC QN AUTO: 29.6 PG (ref 26.5–33)
MCHC RBC AUTO-ENTMCNC: 31.1 G/DL (ref 31.5–36.5)
MCV RBC AUTO: 95 FL (ref 78–100)
PLATELET # BLD AUTO: 363 10E9/L (ref 150–450)
POTASSIUM SERPL-SCNC: 4.4 MMOL/L (ref 3.4–5.3)
RBC # BLD AUTO: 2.77 10E12/L (ref 3.8–5.2)
SODIUM SERPL-SCNC: 141 MMOL/L (ref 133–144)
WBC # BLD AUTO: 6.2 10E9/L (ref 4–11)

## 2017-11-28 PROCEDURE — 99305 1ST NF CARE MODERATE MDM 35: CPT | Performed by: FAMILY MEDICINE

## 2017-11-28 NOTE — PROGRESS NOTES
"[facility]  PRIMARY CARE PROVIDER AND CLINIC:  Poonam Cast [facility] [address] / [place] *  Chief Complaint   Patient presents with     Hospital F/U       HPI:    Marquise Jj is a 72 year old  (1945),admitted to the [facility]  from [facility].  Hospital stay 11/16/17 through 11/19/17.  Admitted to this facility for  rehab, medical management and nursing care.  HPI information obtained from: facility chart records, facility staff, patient report and [facility] chart review.      Interval History:  According to GNP note: \"Patient had previous R DIAMOND about 17 years ago, and had developed weakness in the parts, with frequent dislocations causing distress. She elected revision of R DIAMOND with replacement of worn parts. She originally returned home after this surgery, but then within 24 hours was rehospitalized as she had increased pain in R hip, with some numbness in the anterior thigh following L2-3 dermatome. She was too weak to return home so chose TCU for rehab and to improve strength and ability    Current issues are:      - Started on OT/PT, reports making a progress  - Reports pain is like-pulling muscles, over anterior thigh, 7/10 when severe, aggravated with movements, better with  Rest and meds.   - Reports doing fine with therapy and wants to go home \"now\".  at the bedside reports she has a lot of support at home, and would want her to come for PT on outpatient basis. Has an appointment with Ortho in two days.       CODE STATUS/ADVANCE DIRECTIVES DISCUSSION:   CPR/Full code   Patient's living condition: lives with spouse    ALLERGIES:Combigan [brimonidine tartrate-timolol]  PAST MEDICAL HISTORY:  .HTN, Anemia, CDK, OA  PAST SURGICAL HISTORY:  has a past surgical history that includes Arthroplasty revision hip (Right, 11/16/2017).  FAMILY HISTORY: family history is not on file.non contributary. "   SOCIAL HISTORY:  reports that she has never smoked. She has never used smokeless tobacco.    Post Discharge Medication Reconciliation Status: discharge medications reconciled and changed, per note/orders (see AVS).  Current Outpatient Prescriptions   Medication Sig Dispense Refill     HYDROcodone-acetaminophen (NORCO) 5-325 MG per tablet Take 1 tablet by mouth 4 times daily       HYDROcodone-acetaminophen (NORCO) 5-325 MG per tablet Take 1-2 tablets by mouth every 4 hours as needed for moderate to severe pain 50 tablet 0     acetaminophen (TYLENOL) 325 MG tablet Take 2 tablets (650 mg) by mouth every 6 hours as needed for mild pain 100 tablet 0     senna-docusate (SENOKOT-S;PERICOLACE) 8.6-50 MG per tablet Take 2 tablets by mouth 2 times daily 100 tablet 0     aspirin  MG EC tablet Take 1 tablet (325 mg) by mouth daily 40 tablet 0     hydrOXYzine (ATARAX) 25 MG tablet Take 1 tablet (25 mg) by mouth every 4 hours as needed for itching or anxiety 50 tablet 1     LOSARTAN POTASSIUM PO Take 50 mg by mouth daily        dorzolamide-timolol (COSOPT) 2-0.5 % ophthalmic solution Place 1 drop into both eyes 2 times daily        bimatoprost (LUMIGAN) 0.01 % SOLN Place 1 drop into both eyes At Bedtime        OMEPRAZOLE PO Take 20 mg by mouth daily        multivitamin, therapeutic with minerals (MULTI-VITAMIN) TABS tablet Take 1 tablet by mouth daily       Omega-3 Fatty Acids (FISH OIL PO) Take 1,200 mg by mouth daily          ROS:  10 point ROS of systems including Constitutional, Eyes, Respiratory, Cardiovascular, Gastroenterology, Genitourinary, Integumentary, Muscularskeletal, Psychiatric were all negative except for pertinent positives noted in my HPI.    Exam:  /76  Pulse 73  Temp 98.2  F (36.8  C)  Resp 16  Wt 170 lb (77.1 kg)  SpO2 99%  BMI 25.85 kg/m2  GENERAL APPEARANCE:  in no distress, cooperative  ENT:  Mouth and posterior oropharynx normal, moist mucous membranes  EYES:  EOM, conjunctivae, lids,  pupils and irises normal  NECK:  No adenopathy,masses or thyromegaly  RESP:  respiratory effort and palpation of chest normal, lungs clear to auscultation , no respiratory distress  CV:  Palpation and auscultation of heart done , regular rate and rhythm, no murmur, rub, or gallop, no edema  ABDOMEN:  normal bowel sounds, soft, nontender, no hepatosplenomegaly or other masses  M/S:   Gait and station abnormal uses a walker  SKIN:  surgical site covered. no bruises noted around the joint  NEURO:   Cranial nerves 2-12 are normal tested and grossly at patient's baseline, diminished sensation over L3L4 on the right side.   PSYCH:  oriented X 3, normal insight, judgement and memory    Lab/Diagnostic data:     CBC RESULTS:   Recent Labs   Lab Test  11/20/17   0733  11/19/17   0615   WBC  6.7   --    RBC  2.64*   --    HGB  8.0*  7.8*   HCT  24.9*   --    MCV  94   --    MCH  30.3   --    MCHC  32.1   --    RDW  12.9   --    PLT  212  173       Last Basic Metabolic Panel:  Recent Labs   Lab Test  11/20/17   0733  11/17/17   0635   NA  139  138   POTASSIUM  4.2  4.9   CHLORIDE  106  105   FERNANDO  8.1*  7.8*   CO2  26  24   BUN  17  20   CR  0.85  1.04  1.04   GLC  88  144*       ASSESSMENT/PLAN:  Failure of total hip arthroplasty, subsequent encounter  S/P revision of total hip  Weakness  - Physical function improving with OT/PT,made a good progress, spoke to PT, Edvin socore 19, waling 200 feet with supervision.   - Analgesia optimal. Will stop scheduled norco and continue prn, has enoubth tabs (49)  - Continue DVT Prophylaxis according to Orthopedist's recommendations  - Follow on the surgeon's recommendations      Anemia due to blood loss, acute  - HH trending up but still low, continue to monitor.   - clinically stable.   Will see PCP in one week, PCP to check Hb/Hct.       Essential hypertension  BP Readings from Last 3 Encounters:   11/27/17 121/76   11/21/17 113/62   11/20/17 124/57   - Keep SBP> 130 mmHg and DBP > 65  mmHg (levels below these increase mortality as shown by standard studies and observations).       CKD stage III  - Avoid nephrotoxic drugs  - Renal dose the medications.     Gastroesophageal reflux disease  Continue PTA omeprazole on discharge. PCP consider gradual drug reduction given many AE of omeprzole (pna, osteoporosis, hypomagnesemia, stroke, etc...).       Orders:  - Approved to be discharge home with outpatient OT/PT, on current meds and treatment plan.   - PCP to check HH in one week.       Electronically signed by:  Deborah Francisco MD

## 2019-07-24 ENCOUNTER — TRANSFERRED RECORDS (OUTPATIENT)
Dept: HEALTH INFORMATION MANAGEMENT | Facility: CLINIC | Age: 74
End: 2019-07-24

## 2020-01-01 ENCOUNTER — APPOINTMENT (OUTPATIENT)
Dept: NUCLEAR MEDICINE | Facility: CLINIC | Age: 75
DRG: 246 | End: 2020-01-01
Attending: NURSE PRACTITIONER
Payer: MEDICARE

## 2020-01-01 ENCOUNTER — DOCUMENTATION ONLY (OUTPATIENT)
Dept: CARDIOLOGY | Facility: CLINIC | Age: 75
End: 2020-01-01

## 2020-01-01 ENCOUNTER — TRANSFERRED RECORDS (OUTPATIENT)
Dept: HEALTH INFORMATION MANAGEMENT | Facility: CLINIC | Age: 75
End: 2020-01-01

## 2020-01-01 ENCOUNTER — OFFICE VISIT (OUTPATIENT)
Dept: FAMILY MEDICINE | Facility: CLINIC | Age: 75
End: 2020-01-01
Payer: MEDICARE

## 2020-01-01 ENCOUNTER — APPOINTMENT (OUTPATIENT)
Dept: GENERAL RADIOLOGY | Facility: CLINIC | Age: 75
DRG: 001 | End: 2020-01-01
Attending: NURSE PRACTITIONER
Payer: MEDICARE

## 2020-01-01 ENCOUNTER — APPOINTMENT (OUTPATIENT)
Dept: GENERAL RADIOLOGY | Facility: CLINIC | Age: 75
DRG: 001 | End: 2020-01-01
Attending: INTERNAL MEDICINE
Payer: MEDICARE

## 2020-01-01 ENCOUNTER — APPOINTMENT (OUTPATIENT)
Dept: NEUROLOGY | Facility: CLINIC | Age: 75
DRG: 001 | End: 2020-01-01
Attending: INTERNAL MEDICINE
Payer: MEDICARE

## 2020-01-01 ENCOUNTER — APPOINTMENT (OUTPATIENT)
Dept: CT IMAGING | Facility: CLINIC | Age: 75
DRG: 001 | End: 2020-01-01
Attending: INTERNAL MEDICINE
Payer: MEDICARE

## 2020-01-01 ENCOUNTER — APPOINTMENT (OUTPATIENT)
Dept: OCCUPATIONAL THERAPY | Facility: CLINIC | Age: 75
DRG: 246 | End: 2020-01-01
Attending: INTERNAL MEDICINE
Payer: MEDICARE

## 2020-01-01 ENCOUNTER — VIRTUAL VISIT (OUTPATIENT)
Dept: NEPHROLOGY | Facility: CLINIC | Age: 75
End: 2020-01-01
Attending: INTERNAL MEDICINE
Payer: MEDICARE

## 2020-01-01 ENCOUNTER — APPOINTMENT (OUTPATIENT)
Dept: OCCUPATIONAL THERAPY | Facility: CLINIC | Age: 75
DRG: 246 | End: 2020-01-01
Attending: HOSPITALIST
Payer: MEDICARE

## 2020-01-01 ENCOUNTER — APPOINTMENT (OUTPATIENT)
Dept: NEUROLOGY | Facility: CLINIC | Age: 75
DRG: 001 | End: 2020-01-01
Attending: NURSE PRACTITIONER
Payer: MEDICARE

## 2020-01-01 ENCOUNTER — TELEPHONE (OUTPATIENT)
Dept: CARDIOLOGY | Facility: CLINIC | Age: 75
End: 2020-01-01

## 2020-01-01 ENCOUNTER — ANESTHESIA (OUTPATIENT)
Dept: SURGERY | Facility: CLINIC | Age: 75
DRG: 001 | End: 2020-01-01
Payer: MEDICARE

## 2020-01-01 ENCOUNTER — MEDICAL CORRESPONDENCE (OUTPATIENT)
Dept: HEALTH INFORMATION MANAGEMENT | Facility: CLINIC | Age: 75
End: 2020-01-01

## 2020-01-01 ENCOUNTER — CARE COORDINATION (OUTPATIENT)
Dept: CARDIOLOGY | Facility: CLINIC | Age: 75
End: 2020-01-01

## 2020-01-01 ENCOUNTER — HOSPITAL ENCOUNTER (EMERGENCY)
Facility: CLINIC | Age: 75
Discharge: ANOTHER HOSPITAL | End: 2020-08-07
Attending: FAMILY MEDICINE | Admitting: FAMILY MEDICINE
Payer: MEDICARE

## 2020-01-01 ENCOUNTER — APPOINTMENT (OUTPATIENT)
Dept: GENERAL RADIOLOGY | Facility: CLINIC | Age: 75
DRG: 001 | End: 2020-01-01
Attending: STUDENT IN AN ORGANIZED HEALTH CARE EDUCATION/TRAINING PROGRAM
Payer: MEDICARE

## 2020-01-01 ENCOUNTER — APPOINTMENT (OUTPATIENT)
Dept: GENERAL RADIOLOGY | Facility: CLINIC | Age: 75
DRG: 001 | End: 2020-01-01
Attending: SURGERY
Payer: MEDICARE

## 2020-01-01 ENCOUNTER — APPOINTMENT (OUTPATIENT)
Dept: PHYSICAL THERAPY | Facility: CLINIC | Age: 75
DRG: 001 | End: 2020-01-01
Attending: INTERNAL MEDICINE
Payer: MEDICARE

## 2020-01-01 ENCOUNTER — HOSPITAL ENCOUNTER (OUTPATIENT)
Dept: GENERAL RADIOLOGY | Facility: CLINIC | Age: 75
Discharge: HOME | End: 2020-06-22
Attending: PHYSICIAN ASSISTANT | Admitting: PHYSICIAN ASSISTANT
Payer: MEDICARE

## 2020-01-01 ENCOUNTER — VIRTUAL VISIT (OUTPATIENT)
Dept: CARDIOLOGY | Facility: CLINIC | Age: 75
End: 2020-01-01
Payer: MEDICARE

## 2020-01-01 ENCOUNTER — PRE VISIT (OUTPATIENT)
Dept: NEPHROLOGY | Facility: CLINIC | Age: 75
End: 2020-01-01

## 2020-01-01 ENCOUNTER — ANCILLARY PROCEDURE (OUTPATIENT)
Dept: CARDIOLOGY | Facility: CLINIC | Age: 75
End: 2020-01-01
Attending: INTERNAL MEDICINE
Payer: MEDICARE

## 2020-01-01 ENCOUNTER — DOCUMENTATION ONLY (OUTPATIENT)
Dept: CARE COORDINATION | Facility: CLINIC | Age: 75
End: 2020-01-01

## 2020-01-01 ENCOUNTER — APPOINTMENT (OUTPATIENT)
Dept: CARDIOLOGY | Facility: CLINIC | Age: 75
DRG: 246 | End: 2020-01-01
Attending: INTERNAL MEDICINE
Payer: MEDICARE

## 2020-01-01 ENCOUNTER — APPOINTMENT (OUTPATIENT)
Dept: GENERAL RADIOLOGY | Facility: CLINIC | Age: 75
DRG: 001 | End: 2020-01-01
Attending: PHYSICIAN ASSISTANT
Payer: MEDICARE

## 2020-01-01 ENCOUNTER — TEAM CONFERENCE (OUTPATIENT)
Dept: CARDIOLOGY | Facility: CLINIC | Age: 75
End: 2020-01-01

## 2020-01-01 ENCOUNTER — DOCUMENTATION ONLY (OUTPATIENT)
Dept: EMERGENCY MEDICINE | Facility: CLINIC | Age: 75
End: 2020-01-01

## 2020-01-01 ENCOUNTER — APPOINTMENT (OUTPATIENT)
Dept: ULTRASOUND IMAGING | Facility: CLINIC | Age: 75
DRG: 001 | End: 2020-01-01
Attending: INTERNAL MEDICINE
Payer: MEDICARE

## 2020-01-01 ENCOUNTER — APPOINTMENT (OUTPATIENT)
Dept: PHYSICAL THERAPY | Facility: CLINIC | Age: 75
DRG: 001 | End: 2020-01-01
Attending: PHYSICIAN ASSISTANT
Payer: MEDICARE

## 2020-01-01 ENCOUNTER — APPOINTMENT (OUTPATIENT)
Dept: OCCUPATIONAL THERAPY | Facility: CLINIC | Age: 75
DRG: 001 | End: 2020-01-01
Attending: INTERNAL MEDICINE
Payer: MEDICARE

## 2020-01-01 ENCOUNTER — DOCUMENTATION ONLY (OUTPATIENT)
Dept: OTHER | Facility: CLINIC | Age: 75
End: 2020-01-01

## 2020-01-01 ENCOUNTER — TELEPHONE (OUTPATIENT)
Dept: LAB | Facility: CLINIC | Age: 75
End: 2020-01-01

## 2020-01-01 ENCOUNTER — APPOINTMENT (OUTPATIENT)
Dept: CARDIOLOGY | Facility: CLINIC | Age: 75
DRG: 291 | End: 2020-01-01
Attending: NURSE PRACTITIONER
Payer: MEDICARE

## 2020-01-01 ENCOUNTER — TELEPHONE (OUTPATIENT)
Dept: FAMILY MEDICINE | Facility: CLINIC | Age: 75
End: 2020-01-01

## 2020-01-01 ENCOUNTER — ANCILLARY PROCEDURE (OUTPATIENT)
Dept: CARDIOLOGY | Facility: CLINIC | Age: 75
DRG: 001 | End: 2020-01-01
Attending: INTERNAL MEDICINE
Payer: MEDICARE

## 2020-01-01 ENCOUNTER — HOSPITAL ENCOUNTER (INPATIENT)
Facility: CLINIC | Age: 75
LOS: 16 days | Discharge: HOME HEALTH | DRG: 246 | End: 2020-07-11
Attending: INTERNAL MEDICINE | Admitting: SURGERY
Payer: MEDICARE

## 2020-01-01 ENCOUNTER — PREP FOR PROCEDURE (OUTPATIENT)
Dept: CARDIOLOGY | Facility: CLINIC | Age: 75
End: 2020-01-01

## 2020-01-01 ENCOUNTER — APPOINTMENT (OUTPATIENT)
Dept: ULTRASOUND IMAGING | Facility: CLINIC | Age: 75
DRG: 001 | End: 2020-01-01
Attending: STUDENT IN AN ORGANIZED HEALTH CARE EDUCATION/TRAINING PROGRAM
Payer: MEDICARE

## 2020-01-01 ENCOUNTER — VIRTUAL VISIT (OUTPATIENT)
Dept: FAMILY MEDICINE | Facility: CLINIC | Age: 75
End: 2020-01-01
Payer: MEDICARE

## 2020-01-01 ENCOUNTER — ANCILLARY PROCEDURE (OUTPATIENT)
Dept: CARDIOLOGY | Facility: CLINIC | Age: 75
DRG: 001 | End: 2020-01-01
Attending: PHYSICIAN ASSISTANT
Payer: MEDICARE

## 2020-01-01 ENCOUNTER — APPOINTMENT (OUTPATIENT)
Dept: GENERAL RADIOLOGY | Facility: CLINIC | Age: 75
DRG: 246 | End: 2020-01-01
Attending: INTERNAL MEDICINE
Payer: MEDICARE

## 2020-01-01 ENCOUNTER — HOSPITAL ENCOUNTER (INPATIENT)
Facility: CLINIC | Age: 75
LOS: 40 days | Discharge: HOSPICE | DRG: 001 | End: 2020-09-17
Attending: INTERNAL MEDICINE | Admitting: INTERNAL MEDICINE
Payer: MEDICARE

## 2020-01-01 ENCOUNTER — HOSPITAL ENCOUNTER (EMERGENCY)
Facility: CLINIC | Age: 75
Discharge: HOME | End: 2020-07-16
Attending: EMERGENCY MEDICINE | Admitting: EMERGENCY MEDICINE
Payer: MEDICARE

## 2020-01-01 ENCOUNTER — APPOINTMENT (OUTPATIENT)
Dept: INTERVENTIONAL RADIOLOGY/VASCULAR | Facility: CLINIC | Age: 75
DRG: 001 | End: 2020-01-01
Attending: NURSE PRACTITIONER
Payer: MEDICARE

## 2020-01-01 ENCOUNTER — HOSPITAL ENCOUNTER (EMERGENCY)
Facility: CLINIC | Age: 75
Discharge: ANOTHER HOSPITAL | End: 2020-06-25
Attending: EMERGENCY MEDICINE | Admitting: EMERGENCY MEDICINE
Payer: MEDICARE

## 2020-01-01 ENCOUNTER — APPOINTMENT (OUTPATIENT)
Dept: GENERAL RADIOLOGY | Facility: CLINIC | Age: 75
DRG: 246 | End: 2020-01-01
Attending: NURSE PRACTITIONER
Payer: MEDICARE

## 2020-01-01 ENCOUNTER — HOSPITAL (OUTPATIENT)
Dept: NEUROLOGY | Facility: CLINIC | Age: 75
End: 2020-01-01

## 2020-01-01 ENCOUNTER — ANESTHESIA EVENT (OUTPATIENT)
Dept: SURGERY | Facility: CLINIC | Age: 75
DRG: 001 | End: 2020-01-01
Payer: MEDICARE

## 2020-01-01 ENCOUNTER — HOME INFUSION (PRE-WILLOW HOME INFUSION) (OUTPATIENT)
Dept: PHARMACY | Facility: CLINIC | Age: 75
End: 2020-01-01

## 2020-01-01 ENCOUNTER — APPOINTMENT (OUTPATIENT)
Dept: GENERAL RADIOLOGY | Facility: CLINIC | Age: 75
End: 2020-01-01
Attending: EMERGENCY MEDICINE
Payer: MEDICARE

## 2020-01-01 ENCOUNTER — APPOINTMENT (OUTPATIENT)
Dept: PHYSICAL THERAPY | Facility: CLINIC | Age: 75
DRG: 246 | End: 2020-01-01
Attending: INTERNAL MEDICINE
Payer: MEDICARE

## 2020-01-01 ENCOUNTER — DOCUMENTATION ONLY (OUTPATIENT)
Dept: ANTICOAGULATION | Facility: CLINIC | Age: 75
End: 2020-01-01

## 2020-01-01 ENCOUNTER — ANCILLARY PROCEDURE (OUTPATIENT)
Dept: CARDIOLOGY | Facility: CLINIC | Age: 75
DRG: 001 | End: 2020-01-01
Attending: STUDENT IN AN ORGANIZED HEALTH CARE EDUCATION/TRAINING PROGRAM
Payer: MEDICARE

## 2020-01-01 ENCOUNTER — APPOINTMENT (OUTPATIENT)
Dept: ULTRASOUND IMAGING | Facility: CLINIC | Age: 75
DRG: 246 | End: 2020-01-01
Attending: INTERNAL MEDICINE
Payer: MEDICARE

## 2020-01-01 ENCOUNTER — MYC MEDICAL ADVICE (OUTPATIENT)
Dept: FAMILY MEDICINE | Facility: CLINIC | Age: 75
End: 2020-01-01

## 2020-01-01 ENCOUNTER — APPOINTMENT (OUTPATIENT)
Dept: GENERAL RADIOLOGY | Facility: CLINIC | Age: 75
End: 2020-01-01
Attending: FAMILY MEDICINE
Payer: MEDICARE

## 2020-01-01 ENCOUNTER — HOSPITAL ENCOUNTER (INPATIENT)
Facility: CLINIC | Age: 75
LOS: 1 days | Discharge: ANOTHER HOSPITAL | DRG: 291 | End: 2020-08-08
Attending: STUDENT IN AN ORGANIZED HEALTH CARE EDUCATION/TRAINING PROGRAM | Admitting: INTERNAL MEDICINE
Payer: MEDICARE

## 2020-01-01 ENCOUNTER — HOSPITAL ENCOUNTER (OUTPATIENT)
Dept: CARDIOLOGY | Facility: CLINIC | Age: 75
Discharge: HOME | End: 2020-06-10
Attending: INTERNAL MEDICINE | Admitting: INTERNAL MEDICINE
Payer: MEDICARE

## 2020-01-01 ENCOUNTER — APPOINTMENT (OUTPATIENT)
Dept: OCCUPATIONAL THERAPY | Facility: CLINIC | Age: 75
DRG: 246 | End: 2020-01-01
Attending: SURGERY
Payer: MEDICARE

## 2020-01-01 VITALS
SYSTOLIC BLOOD PRESSURE: 92 MMHG | OXYGEN SATURATION: 99 % | DIASTOLIC BLOOD PRESSURE: 57 MMHG | HEART RATE: 98 BPM | BODY MASS INDEX: 22.88 KG/M2 | TEMPERATURE: 96.4 F | RESPIRATION RATE: 30 BRPM | WEIGHT: 150.5 LBS

## 2020-01-01 VITALS
SYSTOLIC BLOOD PRESSURE: 110 MMHG | WEIGHT: 151.46 LBS | TEMPERATURE: 99.2 F | DIASTOLIC BLOOD PRESSURE: 71 MMHG | RESPIRATION RATE: 27 BRPM | HEART RATE: 98 BPM | BODY MASS INDEX: 23.03 KG/M2 | OXYGEN SATURATION: 92 %

## 2020-01-01 VITALS
RESPIRATION RATE: 27 BRPM | HEIGHT: 68 IN | DIASTOLIC BLOOD PRESSURE: 58 MMHG | SYSTOLIC BLOOD PRESSURE: 99 MMHG | WEIGHT: 158 LBS | HEART RATE: 87 BPM | BODY MASS INDEX: 23.95 KG/M2 | TEMPERATURE: 97.7 F | OXYGEN SATURATION: 90 %

## 2020-01-01 VITALS
WEIGHT: 157.5 LBS | BODY MASS INDEX: 23.95 KG/M2 | OXYGEN SATURATION: 98 % | DIASTOLIC BLOOD PRESSURE: 66 MMHG | SYSTOLIC BLOOD PRESSURE: 99 MMHG | HEART RATE: 105 BPM

## 2020-01-01 VITALS
RESPIRATION RATE: 42 BRPM | HEART RATE: 69 BPM | WEIGHT: 150.1 LBS | DIASTOLIC BLOOD PRESSURE: 68 MMHG | TEMPERATURE: 96.5 F | BODY MASS INDEX: 22.82 KG/M2 | OXYGEN SATURATION: 98 % | SYSTOLIC BLOOD PRESSURE: 89 MMHG

## 2020-01-01 VITALS
BODY MASS INDEX: 22.14 KG/M2 | WEIGHT: 145.6 LBS | SYSTOLIC BLOOD PRESSURE: 88 MMHG | DIASTOLIC BLOOD PRESSURE: 62 MMHG | HEART RATE: 92 BPM

## 2020-01-01 VITALS
HEART RATE: 86 BPM | WEIGHT: 151.5 LBS | BODY MASS INDEX: 23.04 KG/M2 | SYSTOLIC BLOOD PRESSURE: 91 MMHG | OXYGEN SATURATION: 100 % | DIASTOLIC BLOOD PRESSURE: 58 MMHG | TEMPERATURE: 97.3 F | RESPIRATION RATE: 19 BRPM

## 2020-01-01 VITALS
SYSTOLIC BLOOD PRESSURE: 90 MMHG | DIASTOLIC BLOOD PRESSURE: 64 MMHG | BODY MASS INDEX: 23.04 KG/M2 | TEMPERATURE: 98.8 F | HEART RATE: 68 BPM | WEIGHT: 151.5 LBS | RESPIRATION RATE: 20 BRPM | OXYGEN SATURATION: 95 %

## 2020-01-01 VITALS
HEIGHT: 68 IN | SYSTOLIC BLOOD PRESSURE: 102 MMHG | OXYGEN SATURATION: 100 % | TEMPERATURE: 97.4 F | HEART RATE: 84 BPM | DIASTOLIC BLOOD PRESSURE: 54 MMHG | RESPIRATION RATE: 16 BRPM | BODY MASS INDEX: 22.55 KG/M2 | WEIGHT: 148.8 LBS

## 2020-01-01 VITALS
BODY MASS INDEX: 22.81 KG/M2 | WEIGHT: 150.5 LBS | DIASTOLIC BLOOD PRESSURE: 55 MMHG | HEIGHT: 68 IN | SYSTOLIC BLOOD PRESSURE: 86 MMHG | HEART RATE: 87 BPM

## 2020-01-01 VITALS — SYSTOLIC BLOOD PRESSURE: 107 MMHG | DIASTOLIC BLOOD PRESSURE: 72 MMHG | BODY MASS INDEX: 23.7 KG/M2 | WEIGHT: 155.9 LBS

## 2020-01-01 VITALS
HEART RATE: 91 BPM | SYSTOLIC BLOOD PRESSURE: 86 MMHG | HEIGHT: 68 IN | WEIGHT: 146.8 LBS | DIASTOLIC BLOOD PRESSURE: 62 MMHG | BODY MASS INDEX: 22.25 KG/M2

## 2020-01-01 DIAGNOSIS — I15.1 HYPERTENSION SECONDARY TO OTHER RENAL DISORDERS (CODE): ICD-10-CM

## 2020-01-01 DIAGNOSIS — I51.3 LEFT VENTRICULAR THROMBUS: ICD-10-CM

## 2020-01-01 DIAGNOSIS — I50.20 SYSTOLIC CONGESTIVE HEART FAILURE, UNSPECIFIED HF CHRONICITY (H): ICD-10-CM

## 2020-01-01 DIAGNOSIS — I25.10 CORONARY ARTERY DISEASE INVOLVING NATIVE CORONARY ARTERY OF NATIVE HEART WITHOUT ANGINA PECTORIS: ICD-10-CM

## 2020-01-01 DIAGNOSIS — I25.5 ISCHEMIC CARDIOMYOPATHY: ICD-10-CM

## 2020-01-01 DIAGNOSIS — I50.9 HEART FAILURE (H): ICD-10-CM

## 2020-01-01 DIAGNOSIS — R57.0 CARDIOGENIC SHOCK (H): Primary | ICD-10-CM

## 2020-01-01 DIAGNOSIS — E03.9 HYPOTHYROIDISM, UNSPECIFIED TYPE: ICD-10-CM

## 2020-01-01 DIAGNOSIS — I51.3 LV (LEFT VENTRICULAR) MURAL THROMBUS: Primary | ICD-10-CM

## 2020-01-01 DIAGNOSIS — F41.9 ANXIETY: ICD-10-CM

## 2020-01-01 DIAGNOSIS — I25.5 ISCHEMIC CARDIOMYOPATHY: Primary | ICD-10-CM

## 2020-01-01 DIAGNOSIS — Z79.01 CHRONIC ANTICOAGULATION: ICD-10-CM

## 2020-01-01 DIAGNOSIS — R93.1 ABNORMAL ECHOCARDIOGRAM: ICD-10-CM

## 2020-01-01 DIAGNOSIS — E87.6 HYPOKALEMIA: ICD-10-CM

## 2020-01-01 DIAGNOSIS — A41.9 SEPSIS SECONDARY TO UTI (H): ICD-10-CM

## 2020-01-01 DIAGNOSIS — I50.9 ACUTE ON CHRONIC CONGESTIVE HEART FAILURE, UNSPECIFIED HEART FAILURE TYPE (H): ICD-10-CM

## 2020-01-01 DIAGNOSIS — Z98.890 STATUS POST CARDIAC SURGERY: ICD-10-CM

## 2020-01-01 DIAGNOSIS — I50.20 SYSTOLIC CONGESTIVE HEART FAILURE, UNSPECIFIED HF CHRONICITY (H): Primary | ICD-10-CM

## 2020-01-01 DIAGNOSIS — I51.3 LEFT VENTRICULAR THROMBUS: Primary | ICD-10-CM

## 2020-01-01 DIAGNOSIS — I10 ESSENTIAL HYPERTENSION: Chronic | ICD-10-CM

## 2020-01-01 DIAGNOSIS — R57.0 CARDIOGENIC SHOCK (H): ICD-10-CM

## 2020-01-01 DIAGNOSIS — I50.22 CHRONIC SYSTOLIC HEART FAILURE (H): Primary | ICD-10-CM

## 2020-01-01 DIAGNOSIS — Z98.890 STATUS POST CARDIAC SURGERY: Primary | ICD-10-CM

## 2020-01-01 DIAGNOSIS — I50.9 HEART FAILURE (H): Primary | ICD-10-CM

## 2020-01-01 DIAGNOSIS — Z95.811 LVAD (LEFT VENTRICULAR ASSIST DEVICE) PRESENT (H): Primary | ICD-10-CM

## 2020-01-01 DIAGNOSIS — I50.20 HEART FAILURE WITH REDUCED EJECTION FRACTION, NYHA CLASS III (H): ICD-10-CM

## 2020-01-01 DIAGNOSIS — Z95.810 ICD (IMPLANTABLE CARDIOVERTER-DEFIBRILLATOR), SINGLE, IN SITU: Primary | ICD-10-CM

## 2020-01-01 DIAGNOSIS — I51.3 MURAL THROMBUS OF LEFT VENTRICLE: ICD-10-CM

## 2020-01-01 DIAGNOSIS — R79.1 SUPRATHERAPEUTIC INR: ICD-10-CM

## 2020-01-01 DIAGNOSIS — I89.0 SECONDARY LYMPHEDEMA: ICD-10-CM

## 2020-01-01 DIAGNOSIS — E87.6 HYPOKALEMIA: Primary | ICD-10-CM

## 2020-01-01 DIAGNOSIS — N39.0 SEPSIS SECONDARY TO UTI (H): ICD-10-CM

## 2020-01-01 DIAGNOSIS — I51.3 LV (LEFT VENTRICULAR) MURAL THROMBUS: ICD-10-CM

## 2020-01-01 DIAGNOSIS — N17.9 ACUTE RENAL FAILURE, UNSPECIFIED ACUTE RENAL FAILURE TYPE (H): ICD-10-CM

## 2020-01-01 DIAGNOSIS — Z53.9 DIAGNOSIS NOT YET DEFINED: Primary | ICD-10-CM

## 2020-01-01 DIAGNOSIS — I50.23 HEART FAILURE, SYSTOLIC, WITH ACUTE DECOMPENSATION (H): ICD-10-CM

## 2020-01-01 DIAGNOSIS — D64.9 ANEMIA, UNSPECIFIED TYPE: ICD-10-CM

## 2020-01-01 DIAGNOSIS — G40.89 OTHER SEIZURES (H): ICD-10-CM

## 2020-01-01 DIAGNOSIS — I51.3 MURAL THROMBUS OF LEFT VENTRICLE: Primary | ICD-10-CM

## 2020-01-01 DIAGNOSIS — N18.30 CKD (CHRONIC KIDNEY DISEASE) STAGE 3, GFR 30-59 ML/MIN (H): Primary | ICD-10-CM

## 2020-01-01 DIAGNOSIS — I13.0 CARDIORENAL SYNDROME WITH RENAL FAILURE, STAGE 1-4 OR UNSPECIFIED CHRONIC KIDNEY DISEASE, WITH HEART FAILURE (H): Primary | ICD-10-CM

## 2020-01-01 DIAGNOSIS — R04.0 EPISTAXIS: ICD-10-CM

## 2020-01-01 LAB
ABO + RH BLD: NORMAL
ALBUMIN SERPL-MCNC: 1.2 G/DL (ref 3.4–5)
ALBUMIN SERPL-MCNC: 1.3 G/DL (ref 3.4–5)
ALBUMIN SERPL-MCNC: 1.4 G/DL (ref 3.4–5)
ALBUMIN SERPL-MCNC: 1.5 G/DL (ref 3.4–5)
ALBUMIN SERPL-MCNC: 1.6 G/DL (ref 3.4–5)
ALBUMIN SERPL-MCNC: 1.7 G/DL (ref 3.4–5)
ALBUMIN SERPL-MCNC: 1.7 G/DL (ref 3.4–5)
ALBUMIN SERPL-MCNC: 1.8 G/DL (ref 3.4–5)
ALBUMIN SERPL-MCNC: 1.9 G/DL (ref 3.4–5)
ALBUMIN SERPL-MCNC: 2.1 G/DL (ref 3.4–5)
ALBUMIN SERPL-MCNC: 2.1 G/DL (ref 3.4–5)
ALBUMIN SERPL-MCNC: 2.2 G/DL (ref 3.4–5)
ALBUMIN SERPL-MCNC: 2.3 G/DL (ref 3.4–5)
ALBUMIN SERPL-MCNC: 2.4 G/DL (ref 3.4–5)
ALBUMIN SERPL-MCNC: 2.6 G/DL (ref 3.4–5)
ALBUMIN SERPL-MCNC: 2.8 G/DL (ref 3.4–5)
ALBUMIN SERPL-MCNC: 2.8 G/DL (ref 3.4–5)
ALBUMIN SERPL-MCNC: 2.9 G/DL (ref 3.4–5)
ALBUMIN SERPL-MCNC: 2.9 G/DL (ref 3.4–5)
ALBUMIN UR-MCNC: 100 MG/DL
ALBUMIN UR-MCNC: 100 MG/DL
ALBUMIN UR-MCNC: 30 MG/DL
ALBUMIN UR-MCNC: NEGATIVE MG/DL
ALBUMIN UR-MCNC: NEGATIVE MG/DL
ALP SERPL-CCNC: 101 U/L (ref 40–150)
ALP SERPL-CCNC: 112 U/L (ref 40–150)
ALP SERPL-CCNC: 131 U/L (ref 40–150)
ALP SERPL-CCNC: 136 U/L (ref 40–150)
ALP SERPL-CCNC: 141 U/L (ref 40–150)
ALP SERPL-CCNC: 142 U/L (ref 40–150)
ALP SERPL-CCNC: 164 U/L (ref 40–150)
ALP SERPL-CCNC: 168 U/L (ref 40–150)
ALP SERPL-CCNC: 172 U/L (ref 40–150)
ALP SERPL-CCNC: 176 U/L (ref 40–150)
ALP SERPL-CCNC: 179 U/L (ref 40–150)
ALP SERPL-CCNC: 182 U/L (ref 40–150)
ALP SERPL-CCNC: 187 U/L (ref 40–150)
ALP SERPL-CCNC: 188 U/L (ref 40–150)
ALP SERPL-CCNC: 189 U/L (ref 40–150)
ALP SERPL-CCNC: 191 U/L (ref 40–150)
ALP SERPL-CCNC: 199 U/L (ref 40–150)
ALP SERPL-CCNC: 202 U/L (ref 40–150)
ALP SERPL-CCNC: 204 U/L (ref 40–150)
ALP SERPL-CCNC: 208 U/L (ref 40–150)
ALP SERPL-CCNC: 213 U/L (ref 40–150)
ALP SERPL-CCNC: 215 U/L (ref 40–150)
ALP SERPL-CCNC: 216 U/L (ref 40–150)
ALP SERPL-CCNC: 217 U/L (ref 40–150)
ALP SERPL-CCNC: 219 U/L (ref 40–150)
ALP SERPL-CCNC: 222 U/L (ref 40–150)
ALP SERPL-CCNC: 226 U/L (ref 40–150)
ALP SERPL-CCNC: 232 U/L (ref 40–150)
ALP SERPL-CCNC: 233 U/L (ref 40–150)
ALP SERPL-CCNC: 235 U/L (ref 40–150)
ALP SERPL-CCNC: 235 U/L (ref 40–150)
ALP SERPL-CCNC: 242 U/L (ref 40–150)
ALP SERPL-CCNC: 249 U/L (ref 40–150)
ALP SERPL-CCNC: 249 U/L (ref 40–150)
ALP SERPL-CCNC: 250 U/L (ref 40–150)
ALP SERPL-CCNC: 255 U/L (ref 40–150)
ALP SERPL-CCNC: 258 U/L (ref 40–150)
ALP SERPL-CCNC: 262 U/L (ref 40–150)
ALP SERPL-CCNC: 270 U/L (ref 40–150)
ALP SERPL-CCNC: 64 U/L (ref 40–150)
ALP SERPL-CCNC: 65 U/L (ref 40–150)
ALP SERPL-CCNC: 69 U/L (ref 40–150)
ALP SERPL-CCNC: 95 U/L (ref 40–150)
ALT SERPL W P-5'-P-CCNC: 13 U/L (ref 0–50)
ALT SERPL W P-5'-P-CCNC: 14 U/L (ref 0–50)
ALT SERPL W P-5'-P-CCNC: 14 U/L (ref 0–50)
ALT SERPL W P-5'-P-CCNC: 16 U/L (ref 0–50)
ALT SERPL W P-5'-P-CCNC: 17 U/L (ref 0–50)
ALT SERPL W P-5'-P-CCNC: 18 U/L (ref 0–50)
ALT SERPL W P-5'-P-CCNC: 18 U/L (ref 0–50)
ALT SERPL W P-5'-P-CCNC: 19 U/L (ref 0–50)
ALT SERPL W P-5'-P-CCNC: 19 U/L (ref 0–50)
ALT SERPL W P-5'-P-CCNC: 21 U/L (ref 0–50)
ALT SERPL W P-5'-P-CCNC: 22 U/L (ref 0–50)
ALT SERPL W P-5'-P-CCNC: 22 U/L (ref 0–50)
ALT SERPL W P-5'-P-CCNC: 23 U/L (ref 0–50)
ALT SERPL W P-5'-P-CCNC: 24 U/L (ref 0–50)
ALT SERPL W P-5'-P-CCNC: 26 U/L (ref 0–50)
ALT SERPL W P-5'-P-CCNC: 26 U/L (ref 0–50)
ALT SERPL W P-5'-P-CCNC: 27 U/L (ref 0–50)
ALT SERPL W P-5'-P-CCNC: 27 U/L (ref 0–50)
ALT SERPL W P-5'-P-CCNC: 28 U/L (ref 0–50)
ALT SERPL W P-5'-P-CCNC: 30 U/L (ref 0–50)
ALT SERPL W P-5'-P-CCNC: 33 U/L (ref 0–50)
ALT SERPL W P-5'-P-CCNC: 33 U/L (ref 0–50)
ALT SERPL W P-5'-P-CCNC: 36 U/L (ref 0–50)
ALT SERPL W P-5'-P-CCNC: 37 U/L (ref 0–50)
ALT SERPL W P-5'-P-CCNC: 37 U/L (ref 0–50)
ALT SERPL W P-5'-P-CCNC: 40 U/L (ref 0–50)
ALT SERPL W P-5'-P-CCNC: 41 U/L (ref 0–50)
ALT SERPL W P-5'-P-CCNC: 41 U/L (ref 0–50)
ALT SERPL W P-5'-P-CCNC: 42 U/L (ref 0–50)
ALT SERPL W P-5'-P-CCNC: 42 U/L (ref 0–50)
AMPHETAMINES UR QL SCN: NEGATIVE
AMPHETAMINES UR QL SCN: NEGATIVE
AMPHETAMINES UR QL: NORMAL NG/ML
ANGLE RATE OF CLOT GROWTH: 55.2 DEG (ref 59–74)
ANGLE RATE OF CLOT GROWTH: 55.5 DEG (ref 59–74)
ANGLE RATE OF CLOT GROWTH: 68.9 DEG (ref 59–74)
ANGLE RATE OF CLOT GROWTH: 68.9 DEG (ref 59–74)
ANGLE RATE OF CLOT GROWTH: 69.4 DEG (ref 59–74)
ANGLE RATE OF CLOT GROWTH: 72 DEG (ref 59–74)
ANGLE RATE OF CLOT GROWTH: NORMAL DEG (ref 59–74)
ANGLE RATE OF CLOT STRENGTH: 59.3 DEGREES (ref 53–72)
ANGLE RATE OF CLOT STRENGTH: 62.3 DEGREES (ref 53–72)
ANGLE RATE OF CLOT STRENGTH: 64.7 DEGREES (ref 53–72)
ANGLE RATE OF CLOT STRENGTH: 78.9 DEGREES (ref 53–72)
ANION GAP SERPL CALCULATED.3IONS-SCNC: 10 MMOL/L (ref 3–14)
ANION GAP SERPL CALCULATED.3IONS-SCNC: 11 MMOL/L (ref 3–14)
ANION GAP SERPL CALCULATED.3IONS-SCNC: 12 MMOL/L (ref 3–14)
ANION GAP SERPL CALCULATED.3IONS-SCNC: 13 MMOL/L (ref 3–14)
ANION GAP SERPL CALCULATED.3IONS-SCNC: 3 MMOL/L (ref 3–14)
ANION GAP SERPL CALCULATED.3IONS-SCNC: 3 MMOL/L (ref 3–14)
ANION GAP SERPL CALCULATED.3IONS-SCNC: 4 MMOL/L (ref 3–14)
ANION GAP SERPL CALCULATED.3IONS-SCNC: 5 MMOL/L (ref 3–14)
ANION GAP SERPL CALCULATED.3IONS-SCNC: 6 MMOL/L (ref 3–14)
ANION GAP SERPL CALCULATED.3IONS-SCNC: 7 MMOL/L (ref 3–14)
ANION GAP SERPL CALCULATED.3IONS-SCNC: 8 MMOL/L (ref 3–14)
ANION GAP SERPL CALCULATED.3IONS-SCNC: 9 MMOL/L (ref 3–14)
APPEARANCE FLD: NORMAL
APPEARANCE UR: ABNORMAL
APPEARANCE UR: ABNORMAL
APPEARANCE UR: CLEAR
APTT PPP: 106 SEC (ref 22–37)
APTT PPP: 122 SEC (ref 22–37)
APTT PPP: 175 SEC (ref 22–37)
APTT PPP: 180 SEC (ref 22–37)
APTT PPP: 216 SEC (ref 22–37)
APTT PPP: 224 SEC (ref 22–37)
APTT PPP: 34 SEC (ref 22–37)
APTT PPP: 36 SEC (ref 22–37)
APTT PPP: 40 SEC (ref 22–37)
APTT PPP: 40 SEC (ref 22–37)
APTT PPP: 41 SEC (ref 22–37)
APTT PPP: 43 SEC (ref 22–37)
APTT PPP: 43 SEC (ref 22–37)
APTT PPP: 44 SEC (ref 22–37)
APTT PPP: 48 SEC (ref 22–37)
APTT PPP: 63 SEC (ref 22–37)
APTT PPP: 63 SEC (ref 22–37)
APTT PPP: 74 SEC (ref 22–37)
APTT PPP: 85 SEC (ref 22–37)
APTT PPP: 85 SEC (ref 22–37)
APTT PPP: 88 SEC (ref 22–37)
APTT PPP: 89 SEC (ref 22–37)
APTT PPP: >240 SEC (ref 22–37)
AST SERPL W P-5'-P-CCNC: 18 U/L (ref 0–45)
AST SERPL W P-5'-P-CCNC: 19 U/L (ref 0–45)
AST SERPL W P-5'-P-CCNC: 19 U/L (ref 0–45)
AST SERPL W P-5'-P-CCNC: 20 U/L (ref 0–45)
AST SERPL W P-5'-P-CCNC: 23 U/L (ref 0–45)
AST SERPL W P-5'-P-CCNC: 24 U/L (ref 0–45)
AST SERPL W P-5'-P-CCNC: 25 U/L (ref 0–45)
AST SERPL W P-5'-P-CCNC: 26 U/L (ref 0–45)
AST SERPL W P-5'-P-CCNC: 28 U/L (ref 0–45)
AST SERPL W P-5'-P-CCNC: 28 U/L (ref 0–45)
AST SERPL W P-5'-P-CCNC: 29 U/L (ref 0–45)
AST SERPL W P-5'-P-CCNC: 29 U/L (ref 0–45)
AST SERPL W P-5'-P-CCNC: 34 U/L (ref 0–45)
AST SERPL W P-5'-P-CCNC: 36 U/L (ref 0–45)
AST SERPL W P-5'-P-CCNC: 37 U/L (ref 0–45)
AST SERPL W P-5'-P-CCNC: 38 U/L (ref 0–45)
AST SERPL W P-5'-P-CCNC: 39 U/L (ref 0–45)
AST SERPL W P-5'-P-CCNC: 39 U/L (ref 0–45)
AST SERPL W P-5'-P-CCNC: 40 U/L (ref 0–45)
AST SERPL W P-5'-P-CCNC: 41 U/L (ref 0–45)
AST SERPL W P-5'-P-CCNC: 41 U/L (ref 0–45)
AST SERPL W P-5'-P-CCNC: 43 U/L (ref 0–45)
AST SERPL W P-5'-P-CCNC: 44 U/L (ref 0–45)
AST SERPL W P-5'-P-CCNC: 45 U/L (ref 0–45)
AST SERPL W P-5'-P-CCNC: 47 U/L (ref 0–45)
AST SERPL W P-5'-P-CCNC: 49 U/L (ref 0–45)
AST SERPL W P-5'-P-CCNC: 50 U/L (ref 0–45)
AST SERPL W P-5'-P-CCNC: 50 U/L (ref 0–45)
AST SERPL W P-5'-P-CCNC: 51 U/L (ref 0–45)
AST SERPL W P-5'-P-CCNC: 52 U/L (ref 0–45)
AST SERPL W P-5'-P-CCNC: 60 U/L (ref 0–45)
AST SERPL W P-5'-P-CCNC: 63 U/L (ref 0–45)
AST SERPL W P-5'-P-CCNC: 66 U/L (ref 0–45)
AST SERPL W P-5'-P-CCNC: 67 U/L (ref 0–45)
BACTERIA SPEC CULT: ABNORMAL
BACTERIA SPEC CULT: NO GROWTH
BACTERIA SPEC CULT: NORMAL
BARBITURATES UR QL SCN: NORMAL NG/ML
BARBITURATES UR QL: NEGATIVE
BARBITURATES UR QL: NEGATIVE
BASE DEFICIT BLDA-SCNC: 0.1 MMOL/L
BASE DEFICIT BLDA-SCNC: 0.1 MMOL/L
BASE DEFICIT BLDA-SCNC: 0.2 MMOL/L
BASE DEFICIT BLDA-SCNC: 0.3 MMOL/L
BASE DEFICIT BLDA-SCNC: 0.4 MMOL/L
BASE DEFICIT BLDA-SCNC: 0.6 MMOL/L
BASE DEFICIT BLDA-SCNC: 0.7 MMOL/L
BASE DEFICIT BLDA-SCNC: 0.9 MMOL/L
BASE DEFICIT BLDA-SCNC: 0.9 MMOL/L
BASE DEFICIT BLDA-SCNC: 1 MMOL/L
BASE DEFICIT BLDA-SCNC: 1.1 MMOL/L
BASE DEFICIT BLDA-SCNC: 1.4 MMOL/L
BASE DEFICIT BLDA-SCNC: 1.6 MMOL/L
BASE DEFICIT BLDA-SCNC: 1.7 MMOL/L
BASE DEFICIT BLDA-SCNC: 2.2 MMOL/L
BASE DEFICIT BLDA-SCNC: 2.3 MMOL/L
BASE DEFICIT BLDA-SCNC: 2.4 MMOL/L
BASE DEFICIT BLDA-SCNC: 3 MMOL/L
BASE DEFICIT BLDA-SCNC: 3.2 MMOL/L
BASE DEFICIT BLDA-SCNC: 3.3 MMOL/L
BASE DEFICIT BLDA-SCNC: 4.2 MMOL/L
BASE DEFICIT BLDA-SCNC: 4.7 MMOL/L
BASE DEFICIT BLDA-SCNC: 6 MMOL/L
BASE DEFICIT BLDA-SCNC: 6.6 MMOL/L
BASE DEFICIT BLDA-SCNC: 6.8 MMOL/L
BASE DEFICIT BLDA-SCNC: 8.6 MMOL/L
BASE DEFICIT BLDA-SCNC: 8.7 MMOL/L
BASE DEFICIT BLDA-SCNC: 8.9 MMOL/L
BASE DEFICIT BLDA-SCNC: 9.8 MMOL/L
BASE DEFICIT BLDA-SCNC: 9.9 MMOL/L
BASE DEFICIT BLDV-SCNC: 0.1 MMOL/L
BASE DEFICIT BLDV-SCNC: 0.1 MMOL/L
BASE DEFICIT BLDV-SCNC: 0.2 MMOL/L
BASE DEFICIT BLDV-SCNC: 0.2 MMOL/L
BASE DEFICIT BLDV-SCNC: 0.3 MMOL/L
BASE DEFICIT BLDV-SCNC: 0.5 MMOL/L
BASE DEFICIT BLDV-SCNC: 0.6 MMOL/L
BASE DEFICIT BLDV-SCNC: 0.6 MMOL/L
BASE DEFICIT BLDV-SCNC: 0.7 MMOL/L
BASE DEFICIT BLDV-SCNC: 0.7 MMOL/L
BASE DEFICIT BLDV-SCNC: 0.9 MMOL/L
BASE DEFICIT BLDV-SCNC: 1.1 MMOL/L
BASE DEFICIT BLDV-SCNC: 1.2 MMOL/L
BASE DEFICIT BLDV-SCNC: 1.2 MMOL/L
BASE DEFICIT BLDV-SCNC: 1.4 MMOL/L
BASE DEFICIT BLDV-SCNC: 1.7 MMOL/L
BASE DEFICIT BLDV-SCNC: 1.8 MMOL/L
BASE DEFICIT BLDV-SCNC: 1.9 MMOL/L
BASE DEFICIT BLDV-SCNC: 10 MMOL/L
BASE DEFICIT BLDV-SCNC: 10.3 MMOL/L
BASE DEFICIT BLDV-SCNC: 10.7 MMOL/L
BASE DEFICIT BLDV-SCNC: 2 MMOL/L
BASE DEFICIT BLDV-SCNC: 2.1 MMOL/L
BASE DEFICIT BLDV-SCNC: 2.2 MMOL/L
BASE DEFICIT BLDV-SCNC: 2.2 MMOL/L
BASE DEFICIT BLDV-SCNC: 2.4 MMOL/L
BASE DEFICIT BLDV-SCNC: 2.6 MMOL/L
BASE DEFICIT BLDV-SCNC: 2.6 MMOL/L
BASE DEFICIT BLDV-SCNC: 2.7 MMOL/L
BASE DEFICIT BLDV-SCNC: 2.8 MMOL/L
BASE DEFICIT BLDV-SCNC: 3.1 MMOL/L
BASE DEFICIT BLDV-SCNC: 3.4 MMOL/L
BASE DEFICIT BLDV-SCNC: 3.5 MMOL/L
BASE DEFICIT BLDV-SCNC: 3.8 MMOL/L
BASE DEFICIT BLDV-SCNC: 3.8 MMOL/L
BASE DEFICIT BLDV-SCNC: 4.2 MMOL/L
BASE DEFICIT BLDV-SCNC: 4.5 MMOL/L
BASE DEFICIT BLDV-SCNC: 4.6 MMOL/L
BASE DEFICIT BLDV-SCNC: 4.6 MMOL/L
BASE DEFICIT BLDV-SCNC: 4.7 MMOL/L
BASE DEFICIT BLDV-SCNC: 4.8 MMOL/L
BASE DEFICIT BLDV-SCNC: 4.9 MMOL/L
BASE DEFICIT BLDV-SCNC: 5 MMOL/L
BASE DEFICIT BLDV-SCNC: 5.3 MMOL/L
BASE DEFICIT BLDV-SCNC: 5.3 MMOL/L
BASE DEFICIT BLDV-SCNC: 5.5 MMOL/L
BASE DEFICIT BLDV-SCNC: 5.6 MMOL/L
BASE DEFICIT BLDV-SCNC: 5.6 MMOL/L
BASE DEFICIT BLDV-SCNC: 5.8 MMOL/L
BASE DEFICIT BLDV-SCNC: 6.4 MMOL/L
BASE DEFICIT BLDV-SCNC: 6.6 MMOL/L
BASE DEFICIT BLDV-SCNC: 6.7 MMOL/L
BASE DEFICIT BLDV-SCNC: 7 MMOL/L
BASE DEFICIT BLDV-SCNC: 7.1 MMOL/L
BASE DEFICIT BLDV-SCNC: 7.1 MMOL/L
BASE DEFICIT BLDV-SCNC: 7.4 MMOL/L
BASE DEFICIT BLDV-SCNC: 8.1 MMOL/L
BASE DEFICIT BLDV-SCNC: 8.2 MMOL/L
BASE DEFICIT BLDV-SCNC: 8.6 MMOL/L
BASE DEFICIT BLDV-SCNC: 9 MMOL/L
BASE DEFICIT BLDV-SCNC: 9.1 MMOL/L
BASE DEFICIT BLDV-SCNC: 9.4 MMOL/L
BASE DEFICIT BLDV-SCNC: NORMAL MMOL/L
BASE EXCESS BLDA CALC-SCNC: 0 MMOL/L
BASE EXCESS BLDA CALC-SCNC: 0.1 MMOL/L
BASE EXCESS BLDA CALC-SCNC: 0.3 MMOL/L
BASE EXCESS BLDA CALC-SCNC: 0.5 MMOL/L
BASE EXCESS BLDA CALC-SCNC: 0.6 MMOL/L
BASE EXCESS BLDA CALC-SCNC: 0.8 MMOL/L
BASE EXCESS BLDA CALC-SCNC: 0.8 MMOL/L
BASE EXCESS BLDA CALC-SCNC: 1 MMOL/L
BASE EXCESS BLDA CALC-SCNC: 1.3 MMOL/L
BASE EXCESS BLDA CALC-SCNC: 1.9 MMOL/L
BASE EXCESS BLDA CALC-SCNC: 1.9 MMOL/L
BASE EXCESS BLDA CALC-SCNC: 2.3 MMOL/L
BASE EXCESS BLDA CALC-SCNC: 2.5 MMOL/L
BASE EXCESS BLDA CALC-SCNC: 4.7 MMOL/L
BASE EXCESS BLDV CALC-SCNC: 0.1 MMOL/L
BASE EXCESS BLDV CALC-SCNC: 0.2 MMOL/L
BASE EXCESS BLDV CALC-SCNC: 0.4 MMOL/L
BASE EXCESS BLDV CALC-SCNC: 0.4 MMOL/L
BASE EXCESS BLDV CALC-SCNC: 0.5 MMOL/L
BASE EXCESS BLDV CALC-SCNC: 0.6 MMOL/L
BASE EXCESS BLDV CALC-SCNC: 0.9 MMOL/L
BASE EXCESS BLDV CALC-SCNC: 1 MMOL/L
BASE EXCESS BLDV CALC-SCNC: 1.8 MMOL/L
BASE EXCESS BLDV CALC-SCNC: 2 MMOL/L
BASE EXCESS BLDV CALC-SCNC: 2.2 MMOL/L
BASE EXCESS BLDV CALC-SCNC: 2.2 MMOL/L
BASE EXCESS BLDV CALC-SCNC: 2.4 MMOL/L
BASE EXCESS BLDV CALC-SCNC: 2.5 MMOL/L
BASE EXCESS BLDV CALC-SCNC: 2.5 MMOL/L
BASE EXCESS BLDV CALC-SCNC: 2.7 MMOL/L
BASE EXCESS BLDV CALC-SCNC: 2.9 MMOL/L
BASE EXCESS BLDV CALC-SCNC: NORMAL MMOL/L
BASOPHILS # BLD AUTO: 0 10E9/L (ref 0–0.2)
BASOPHILS # BLD AUTO: 0.1 10E9/L (ref 0–0.2)
BASOPHILS NFR BLD AUTO: 0.1 %
BASOPHILS NFR BLD AUTO: 0.2 %
BASOPHILS NFR BLD AUTO: 0.3 %
BASOPHILS NFR BLD AUTO: 0.4 %
BASOPHILS NFR BLD AUTO: 0.5 %
BASOPHILS NFR BLD AUTO: 0.6 %
BASOPHILS NFR BLD AUTO: 0.7 %
BASOPHILS NFR BLD AUTO: 0.8 %
BASOPHILS NFR BLD AUTO: 0.9 %
BASOPHILS NFR BLD AUTO: 1.1 %
BENZODIAZ UR QL SCN: NORMAL NG/ML
BENZODIAZ UR QL: NEGATIVE
BENZODIAZ UR QL: NEGATIVE
BILIRUB DIRECT SERPL-MCNC: 0.2 MG/DL (ref 0–0.2)
BILIRUB DIRECT SERPL-MCNC: 0.3 MG/DL (ref 0–0.2)
BILIRUB DIRECT SERPL-MCNC: 0.4 MG/DL (ref 0–0.2)
BILIRUB DIRECT SERPL-MCNC: 0.5 MG/DL (ref 0–0.2)
BILIRUB DIRECT SERPL-MCNC: 0.6 MG/DL (ref 0–0.2)
BILIRUB SERPL-MCNC: 0.3 MG/DL (ref 0.2–1.3)
BILIRUB SERPL-MCNC: 0.4 MG/DL (ref 0.2–1.3)
BILIRUB SERPL-MCNC: 0.4 MG/DL (ref 0.2–1.3)
BILIRUB SERPL-MCNC: 0.5 MG/DL (ref 0.2–1.3)
BILIRUB SERPL-MCNC: 0.5 MG/DL (ref 0.2–1.3)
BILIRUB SERPL-MCNC: 0.7 MG/DL (ref 0.2–1.3)
BILIRUB SERPL-MCNC: 0.8 MG/DL (ref 0.2–1.3)
BILIRUB SERPL-MCNC: 0.9 MG/DL (ref 0.2–1.3)
BILIRUB SERPL-MCNC: 1 MG/DL (ref 0.2–1.3)
BILIRUB SERPL-MCNC: 1.1 MG/DL (ref 0.2–1.3)
BILIRUB SERPL-MCNC: 1.2 MG/DL (ref 0.2–1.3)
BILIRUB SERPL-MCNC: 1.3 MG/DL (ref 0.2–1.3)
BILIRUB SERPL-MCNC: 1.4 MG/DL (ref 0.2–1.3)
BILIRUB SERPL-MCNC: 1.5 MG/DL (ref 0.2–1.3)
BILIRUB SERPL-MCNC: 1.7 MG/DL (ref 0.2–1.3)
BILIRUB SERPL-MCNC: 2.2 MG/DL (ref 0.2–1.3)
BILIRUB SERPL-MCNC: 2.8 MG/DL (ref 0.2–1.3)
BILIRUB SERPL-MCNC: 4.2 MG/DL (ref 0.2–1.3)
BILIRUB SERPL-MCNC: 4.7 MG/DL (ref 0.2–1.3)
BILIRUB SERPL-MCNC: 4.8 MG/DL (ref 0.2–1.3)
BILIRUB SERPL-MCNC: 4.9 MG/DL (ref 0.2–1.3)
BILIRUB SERPL-MCNC: 5.1 MG/DL (ref 0.2–1.3)
BILIRUB UR QL STRIP: NEGATIVE
BLD GP AB SCN SERPL QL: NORMAL
BLD PROD TYP BPU: NORMAL
BLD UNIT ID BPU: 0
BLOOD BANK CMNT PATIENT-IMP: NORMAL
BLOOD PRODUCT CODE: NORMAL
BPU ID: NORMAL
BUN SERPL-MCNC: 102 MG/DL (ref 7–30)
BUN SERPL-MCNC: 104 MG/DL (ref 7–30)
BUN SERPL-MCNC: 13 MG/DL (ref 7–30)
BUN SERPL-MCNC: 17 MG/DL (ref 7–30)
BUN SERPL-MCNC: 18 MG/DL (ref 7–30)
BUN SERPL-MCNC: 18 MG/DL (ref 7–30)
BUN SERPL-MCNC: 20 MG/DL (ref 7–30)
BUN SERPL-MCNC: 21 MG/DL (ref 7–30)
BUN SERPL-MCNC: 22 MG/DL (ref 7–30)
BUN SERPL-MCNC: 22 MG/DL (ref 7–30)
BUN SERPL-MCNC: 23 MG/DL (ref 7–30)
BUN SERPL-MCNC: 24 MG/DL (ref 7–30)
BUN SERPL-MCNC: 25 MG/DL (ref 7–30)
BUN SERPL-MCNC: 26 MG/DL (ref 7–30)
BUN SERPL-MCNC: 27 MG/DL (ref 7–30)
BUN SERPL-MCNC: 28 MG/DL (ref 7–30)
BUN SERPL-MCNC: 29 MG/DL (ref 7–30)
BUN SERPL-MCNC: 29 MG/DL (ref 7–30)
BUN SERPL-MCNC: 30 MG/DL (ref 7–30)
BUN SERPL-MCNC: 31 MG/DL (ref 7–30)
BUN SERPL-MCNC: 32 MG/DL (ref 7–30)
BUN SERPL-MCNC: 33 MG/DL (ref 7–30)
BUN SERPL-MCNC: 34 MG/DL (ref 7–30)
BUN SERPL-MCNC: 35 MG/DL (ref 7–30)
BUN SERPL-MCNC: 36 MG/DL (ref 7–30)
BUN SERPL-MCNC: 37 MG/DL (ref 7–30)
BUN SERPL-MCNC: 38 MG/DL (ref 7–30)
BUN SERPL-MCNC: 39 MG/DL (ref 7–30)
BUN SERPL-MCNC: 39 MG/DL (ref 7–30)
BUN SERPL-MCNC: 40 MG/DL (ref 7–30)
BUN SERPL-MCNC: 43 MG/DL (ref 7–30)
BUN SERPL-MCNC: 45 MG/DL (ref 7–30)
BUN SERPL-MCNC: 45 MG/DL (ref 7–30)
BUN SERPL-MCNC: 46 MG/DL (ref 7–30)
BUN SERPL-MCNC: 48 MG/DL (ref 7–30)
BUN SERPL-MCNC: 50 MG/DL (ref 7–30)
BUN SERPL-MCNC: 51 MG/DL (ref 7–30)
BUN SERPL-MCNC: 52 MG/DL (ref 7–30)
BUN SERPL-MCNC: 55 MG/DL (ref 7–30)
BUN SERPL-MCNC: 56 MG/DL (ref 7–30)
BUN SERPL-MCNC: 56 MG/DL (ref 7–30)
BUN SERPL-MCNC: 58 MG/DL (ref 7–30)
BUN SERPL-MCNC: 59 MG/DL (ref 7–30)
BUN SERPL-MCNC: 59 MG/DL (ref 7–30)
BUN SERPL-MCNC: 66 MG/DL (ref 7–30)
BUN SERPL-MCNC: 67 MG/DL (ref 7–30)
BUN SERPL-MCNC: 74 MG/DL (ref 7–30)
BUN SERPL-MCNC: 76 MG/DL (ref 7–30)
BUN SERPL-MCNC: 76 MG/DL (ref 7–30)
BUN SERPL-MCNC: 78 MG/DL (ref 7–30)
BUN SERPL-MCNC: 80 MG/DL (ref 7–30)
BUN SERPL-MCNC: 81 MG/DL (ref 7–30)
BUN SERPL-MCNC: 83 MG/DL (ref 7–30)
BUN SERPL-MCNC: 89 MG/DL (ref 7–30)
BUN SERPL-MCNC: 92 MG/DL (ref 7–30)
BUN SERPL-MCNC: 97 MG/DL (ref 7–30)
BUPRENORPHINE UR QL: NORMAL NG/ML
C DIFF TOX B STL QL: NEGATIVE
C DIFF TOX B STL QL: NEGATIVE
CA-I BLD-MCNC: 3.8 MG/DL (ref 4.4–5.2)
CA-I BLD-MCNC: 3.9 MG/DL (ref 4.4–5.2)
CA-I BLD-MCNC: 3.9 MG/DL (ref 4.4–5.2)
CA-I BLD-MCNC: 4 MG/DL (ref 4.4–5.2)
CA-I BLD-MCNC: 4.1 MG/DL (ref 4.4–5.2)
CA-I BLD-MCNC: 4.3 MG/DL (ref 4.4–5.2)
CA-I BLD-MCNC: 4.4 MG/DL (ref 4.4–5.2)
CA-I BLD-MCNC: 4.5 MG/DL (ref 4.4–5.2)
CA-I BLD-MCNC: 4.6 MG/DL (ref 4.4–5.2)
CA-I BLD-MCNC: 4.7 MG/DL (ref 4.4–5.2)
CA-I BLD-MCNC: 4.8 MG/DL (ref 4.4–5.2)
CA-I BLD-MCNC: 4.9 MG/DL (ref 4.4–5.2)
CA-I BLD-MCNC: 5 MG/DL (ref 4.4–5.2)
CA-I BLD-MCNC: 5 MG/DL (ref 4.4–5.2)
CA-I SERPL ISE-MCNC: 3.9 MG/DL (ref 4.4–5.2)
CA-I SERPL ISE-MCNC: 4.2 MG/DL (ref 4.4–5.2)
CA-I SERPL ISE-MCNC: 4.3 MG/DL (ref 4.4–5.2)
CA-I SERPL ISE-MCNC: 4.4 MG/DL (ref 4.4–5.2)
CA-I SERPL ISE-MCNC: 4.5 MG/DL (ref 4.4–5.2)
CA-I SERPL ISE-MCNC: 4.6 MG/DL (ref 4.4–5.2)
CA-I SERPL ISE-MCNC: 5.2 MG/DL (ref 4.4–5.2)
CA-I SERPL ISE-MCNC: 5.5 MG/DL (ref 4.4–5.2)
CALCIUM SERPL-MCNC: 7.2 MG/DL (ref 8.5–10.1)
CALCIUM SERPL-MCNC: 7.3 MG/DL (ref 8.5–10.1)
CALCIUM SERPL-MCNC: 7.4 MG/DL (ref 8.5–10.1)
CALCIUM SERPL-MCNC: 7.5 MG/DL (ref 8.5–10.1)
CALCIUM SERPL-MCNC: 7.6 MG/DL (ref 8.5–10.1)
CALCIUM SERPL-MCNC: 7.7 MG/DL (ref 8.5–10.1)
CALCIUM SERPL-MCNC: 7.8 MG/DL (ref 8.5–10.1)
CALCIUM SERPL-MCNC: 7.9 MG/DL (ref 8.5–10.1)
CALCIUM SERPL-MCNC: 8 MG/DL (ref 8.5–10.1)
CALCIUM SERPL-MCNC: 8.1 MG/DL (ref 8.5–10.1)
CALCIUM SERPL-MCNC: 8.2 MG/DL (ref 8.5–10.1)
CALCIUM SERPL-MCNC: 8.4 MG/DL (ref 8.5–10.1)
CALCIUM SERPL-MCNC: 8.5 MG/DL (ref 8.5–10.1)
CALCIUM SERPL-MCNC: 8.6 MG/DL (ref 8.5–10.1)
CALCIUM SERPL-MCNC: 8.6 MG/DL (ref 8.5–10.1)
CALCIUM SERPL-MCNC: 8.7 MG/DL (ref 8.5–10.1)
CALCIUM SERPL-MCNC: 8.8 MG/DL (ref 8.5–10.1)
CALCIUM SERPL-MCNC: 8.9 MG/DL (ref 8.5–10.1)
CALCIUM SERPL-MCNC: 9 MG/DL (ref 8.5–10.1)
CALCIUM SERPL-MCNC: 9.1 MG/DL (ref 8.5–10.1)
CALCIUM SERPL-MCNC: 9.3 MG/DL (ref 8.5–10.1)
CALCIUM SERPL-MCNC: 9.4 MG/DL (ref 8.5–10.1)
CANNABINOIDS UR QL SCN: NEGATIVE
CANNABINOIDS UR QL SCN: NEGATIVE
CANNABINOIDS UR QL: NORMAL NG/ML
CHLORIDE SERPL-SCNC: 100 MMOL/L (ref 94–109)
CHLORIDE SERPL-SCNC: 100 MMOL/L (ref 94–109)
CHLORIDE SERPL-SCNC: 101 MMOL/L (ref 94–109)
CHLORIDE SERPL-SCNC: 102 MMOL/L (ref 94–109)
CHLORIDE SERPL-SCNC: 103 MMOL/L (ref 94–109)
CHLORIDE SERPL-SCNC: 104 MMOL/L (ref 94–109)
CHLORIDE SERPL-SCNC: 105 MMOL/L (ref 94–109)
CHLORIDE SERPL-SCNC: 106 MMOL/L (ref 94–109)
CHLORIDE SERPL-SCNC: 107 MMOL/L (ref 94–109)
CHLORIDE SERPL-SCNC: 108 MMOL/L (ref 94–109)
CHLORIDE SERPL-SCNC: 109 MMOL/L (ref 94–109)
CHLORIDE SERPL-SCNC: 110 MMOL/L (ref 94–109)
CHLORIDE SERPL-SCNC: 111 MMOL/L (ref 94–109)
CHLORIDE SERPL-SCNC: 112 MMOL/L (ref 94–109)
CHLORIDE SERPL-SCNC: 113 MMOL/L (ref 94–109)
CHLORIDE SERPL-SCNC: 114 MMOL/L (ref 94–109)
CHLORIDE SERPL-SCNC: 115 MMOL/L (ref 94–109)
CHLORIDE SERPL-SCNC: 116 MMOL/L (ref 94–109)
CHLORIDE SERPL-SCNC: 117 MMOL/L (ref 94–109)
CHLORIDE SERPL-SCNC: 96 MMOL/L (ref 94–109)
CHLORIDE SERPL-SCNC: 97 MMOL/L (ref 94–109)
CHLORIDE SERPL-SCNC: 98 MMOL/L (ref 94–109)
CHLORIDE SERPL-SCNC: 99 MMOL/L (ref 94–109)
CHOLEST SERPL-MCNC: 57 MG/DL
CHOLEST SERPL-MCNC: <50 MG/DL
CI HYPERCOAGULATION INDEX: 0.5 RATIO (ref 0–3)
CI HYPERCOAGULATION INDEX: 0.7 RATIO (ref 0–3)
CI HYPERCOAGULATION INDEX: 1.4 RATIO (ref 0–3)
CI HYPERCOAGULATION INDEX: 1.4 RATIO (ref 0–3)
CI HYPERCOAGULATION INDEX: 2.1 RATIO (ref 0–3)
CI HYPERCOAGULATION INDEX: 3.6 RATIO (ref 0–3)
CI HYPERCOAGULATION INDEX: NORMAL RATIO (ref 0–3)
CI HYPOCOAGULATION INDEX: 2.1 RATIO (ref 0–3)
CI HYPOCOAGULATION INDEX: 4 RATIO (ref 0–3)
CI HYPOCOAGULATION INDEX: 4.5 RATIO (ref 0–3)
CI HYPOCOAGULATION INDEX: 4.9 RATIO (ref 0–3)
CI HYPOCOAGULATION INDEX: NORMAL RATIO (ref 0–3)
CK SERPL-CCNC: 67 U/L (ref 30–225)
CLOT LYSIS 30M P MA LENFR BLD TEG: 0 % (ref 0–8)
CLOT LYSIS 30M P MA LENFR BLD TEG: 5 % (ref 0–8)
CLOT LYSIS 30M P MA LENFR BLD TEG: NORMAL % (ref 0–8)
CLOT STRENGTH BLD TEG: 12.5 KD/SC (ref 5.3–13.2)
CLOT STRENGTH BLD TEG: 5.6 KD/SC (ref 5.3–13.2)
CLOT STRENGTH BLD TEG: 5.9 KD/SC (ref 5.3–13.2)
CLOT STRENGTH BLD TEG: 8.2 KD/SC (ref 5.3–13.2)
CLOT STRENGTH BLD TEG: 8.6 KD/SC (ref 5.3–13.2)
CLOT STRENGTH BLD TEG: 8.9 KD/SC (ref 5.3–13.2)
CLOT STRENGTH BLD TEG: NORMAL KD/SC (ref 5.3–13.2)
CO2 BLD-SCNC: 27 MMOL/L (ref 21–28)
CO2 BLDCOV-SCNC: 28 MMOL/L (ref 21–28)
CO2 SERPL-SCNC: 16 MMOL/L (ref 20–32)
CO2 SERPL-SCNC: 17 MMOL/L (ref 20–32)
CO2 SERPL-SCNC: 17 MMOL/L (ref 20–32)
CO2 SERPL-SCNC: 18 MMOL/L (ref 20–32)
CO2 SERPL-SCNC: 18 MMOL/L (ref 20–32)
CO2 SERPL-SCNC: 19 MMOL/L (ref 20–32)
CO2 SERPL-SCNC: 19 MMOL/L (ref 20–32)
CO2 SERPL-SCNC: 20 MMOL/L (ref 20–32)
CO2 SERPL-SCNC: 21 MMOL/L (ref 20–32)
CO2 SERPL-SCNC: 22 MMOL/L (ref 20–32)
CO2 SERPL-SCNC: 23 MMOL/L (ref 20–32)
CO2 SERPL-SCNC: 24 MMOL/L (ref 20–32)
CO2 SERPL-SCNC: 25 MMOL/L (ref 20–32)
CO2 SERPL-SCNC: 26 MMOL/L (ref 20–32)
CO2 SERPL-SCNC: 27 MMOL/L (ref 20–32)
CO2 SERPL-SCNC: 28 MMOL/L (ref 20–32)
CO2 SERPL-SCNC: 28 MMOL/L (ref 20–32)
CO2 SERPL-SCNC: 29 MMOL/L (ref 20–32)
CO2 SERPL-SCNC: 29 MMOL/L (ref 20–32)
COCAINE UR QL SCN: NORMAL NG/ML
COCAINE UR QL: NEGATIVE
COCAINE UR QL: NEGATIVE
COLOR FLD: NORMAL
COLOR UR AUTO: ABNORMAL
COLOR UR AUTO: YELLOW
COLOR UR AUTO: YELLOW
COPATH REPORT: NORMAL
COPATH REPORT: NORMAL
CORTIS SERPL-MCNC: 25.3 UG/DL (ref 4–22)
CREAT SERPL-MCNC: 0.5 MG/DL (ref 0.52–1.04)
CREAT SERPL-MCNC: 0.52 MG/DL (ref 0.52–1.04)
CREAT SERPL-MCNC: 0.68 MG/DL (ref 0.52–1.04)
CREAT SERPL-MCNC: 0.73 MG/DL (ref 0.52–1.04)
CREAT SERPL-MCNC: 0.73 MG/DL (ref 0.52–1.04)
CREAT SERPL-MCNC: 0.74 MG/DL (ref 0.52–1.04)
CREAT SERPL-MCNC: 0.74 MG/DL (ref 0.52–1.04)
CREAT SERPL-MCNC: 0.75 MG/DL (ref 0.52–1.04)
CREAT SERPL-MCNC: 0.76 MG/DL (ref 0.52–1.04)
CREAT SERPL-MCNC: 0.77 MG/DL (ref 0.52–1.04)
CREAT SERPL-MCNC: 0.77 MG/DL (ref 0.52–1.04)
CREAT SERPL-MCNC: 0.78 MG/DL (ref 0.52–1.04)
CREAT SERPL-MCNC: 0.79 MG/DL (ref 0.52–1.04)
CREAT SERPL-MCNC: 0.8 MG/DL (ref 0.52–1.04)
CREAT SERPL-MCNC: 0.8 MG/DL (ref 0.52–1.04)
CREAT SERPL-MCNC: 0.82 MG/DL (ref 0.52–1.04)
CREAT SERPL-MCNC: 0.83 MG/DL (ref 0.52–1.04)
CREAT SERPL-MCNC: 0.84 MG/DL (ref 0.52–1.04)
CREAT SERPL-MCNC: 0.85 MG/DL (ref 0.52–1.04)
CREAT SERPL-MCNC: 0.85 MG/DL (ref 0.52–1.04)
CREAT SERPL-MCNC: 0.86 MG/DL (ref 0.52–1.04)
CREAT SERPL-MCNC: 0.87 MG/DL (ref 0.52–1.04)
CREAT SERPL-MCNC: 0.88 MG/DL (ref 0.52–1.04)
CREAT SERPL-MCNC: 0.88 MG/DL (ref 0.52–1.04)
CREAT SERPL-MCNC: 0.89 MG/DL (ref 0.52–1.04)
CREAT SERPL-MCNC: 0.91 MG/DL (ref 0.52–1.04)
CREAT SERPL-MCNC: 0.92 MG/DL (ref 0.52–1.04)
CREAT SERPL-MCNC: 0.95 MG/DL (ref 0.52–1.04)
CREAT SERPL-MCNC: 1 MG/DL (ref 0.52–1.04)
CREAT SERPL-MCNC: 1.05 MG/DL (ref 0.52–1.04)
CREAT SERPL-MCNC: 1.05 MG/DL (ref 0.52–1.04)
CREAT SERPL-MCNC: 1.06 MG/DL (ref 0.52–1.04)
CREAT SERPL-MCNC: 1.11 MG/DL (ref 0.52–1.04)
CREAT SERPL-MCNC: 1.11 MG/DL (ref 0.52–1.04)
CREAT SERPL-MCNC: 1.12 MG/DL (ref 0.52–1.04)
CREAT SERPL-MCNC: 1.12 MG/DL (ref 0.52–1.04)
CREAT SERPL-MCNC: 1.13 MG/DL (ref 0.52–1.04)
CREAT SERPL-MCNC: 1.13 MG/DL (ref 0.52–1.04)
CREAT SERPL-MCNC: 1.14 MG/DL (ref 0.52–1.04)
CREAT SERPL-MCNC: 1.14 MG/DL (ref 0.52–1.04)
CREAT SERPL-MCNC: 1.14 MG/DL (ref 0.57–1.11)
CREAT SERPL-MCNC: 1.15 MG/DL (ref 0.52–1.04)
CREAT SERPL-MCNC: 1.16 MG/DL (ref 0.52–1.04)
CREAT SERPL-MCNC: 1.18 MG/DL (ref 0.52–1.04)
CREAT SERPL-MCNC: 1.19 MG/DL (ref 0.52–1.04)
CREAT SERPL-MCNC: 1.23 MG/DL (ref 0.52–1.04)
CREAT SERPL-MCNC: 1.24 MG/DL (ref 0.52–1.04)
CREAT SERPL-MCNC: 1.26 MG/DL (ref 0.52–1.04)
CREAT SERPL-MCNC: 1.29 MG/DL (ref 0.52–1.04)
CREAT SERPL-MCNC: 1.3 MG/DL (ref 0.52–1.04)
CREAT SERPL-MCNC: 1.33 MG/DL (ref 0.52–1.04)
CREAT SERPL-MCNC: 1.34 MG/DL (ref 0.52–1.04)
CREAT SERPL-MCNC: 1.35 MG/DL (ref 0.52–1.04)
CREAT SERPL-MCNC: 1.37 MG/DL (ref 0.52–1.04)
CREAT SERPL-MCNC: 1.4 MG/DL (ref 0.52–1.04)
CREAT SERPL-MCNC: 1.41 MG/DL (ref 0.52–1.04)
CREAT SERPL-MCNC: 1.44 MG/DL (ref 0.52–1.04)
CREAT SERPL-MCNC: 1.45 MG/DL (ref 0.52–1.04)
CREAT SERPL-MCNC: 1.45 MG/DL (ref 0.52–1.04)
CREAT SERPL-MCNC: 1.47 MG/DL (ref 0.52–1.04)
CREAT SERPL-MCNC: 1.48 MG/DL (ref 0.52–1.04)
CREAT SERPL-MCNC: 1.5 MG/DL (ref 0.52–1.04)
CREAT SERPL-MCNC: 1.52 MG/DL (ref 0.52–1.04)
CREAT SERPL-MCNC: 1.54 MG/DL (ref 0.52–1.04)
CREAT SERPL-MCNC: 1.55 MG/DL (ref 0.52–1.04)
CREAT SERPL-MCNC: 1.56 MG/DL (ref 0.52–1.04)
CREAT SERPL-MCNC: 1.58 MG/DL (ref 0.52–1.04)
CREAT SERPL-MCNC: 1.59 MG/DL (ref 0.52–1.04)
CREAT SERPL-MCNC: 1.6 MG/DL (ref 0.52–1.04)
CREAT SERPL-MCNC: 1.61 MG/DL (ref 0.52–1.04)
CREAT SERPL-MCNC: 1.62 MG/DL (ref 0.52–1.04)
CREAT SERPL-MCNC: 1.65 MG/DL (ref 0.52–1.04)
CREAT SERPL-MCNC: 1.75 MG/DL (ref 0.52–1.04)
CREAT SERPL-MCNC: 1.78 MG/DL (ref 0.52–1.04)
CREAT SERPL-MCNC: 1.78 MG/DL (ref 0.52–1.04)
CREAT SERPL-MCNC: 1.86 MG/DL (ref 0.52–1.04)
CREAT SERPL-MCNC: 1.91 MG/DL (ref 0.52–1.04)
CREAT SERPL-MCNC: 1.92 MG/DL (ref 0.52–1.04)
CREAT SERPL-MCNC: 1.92 MG/DL (ref 0.52–1.04)
CREAT SERPL-MCNC: 2 MG/DL (ref 0.52–1.04)
CREAT SERPL-MCNC: 2.03 MG/DL (ref 0.52–1.04)
CREAT SERPL-MCNC: 2.04 MG/DL (ref 0.52–1.04)
CREAT SERPL-MCNC: 2.07 MG/DL (ref 0.52–1.04)
CREAT SERPL-MCNC: 2.16 MG/DL (ref 0.52–1.04)
CREAT SERPL-MCNC: 2.16 MG/DL (ref 0.52–1.04)
CREAT SERPL-MCNC: 2.34 MG/DL (ref 0.52–1.04)
CREAT SERPL-MCNC: 2.39 MG/DL (ref 0.52–1.04)
CREAT SERPL-MCNC: 2.56 MG/DL (ref 0.52–1.04)
CREAT SERPL-MCNC: 2.63 MG/DL (ref 0.52–1.04)
CREAT SERPL-MCNC: 2.63 MG/DL (ref 0.52–1.04)
CREAT SERPL-MCNC: 2.79 MG/DL (ref 0.52–1.04)
CREAT SERPL-MCNC: 2.92 MG/DL (ref 0.52–1.04)
CREAT SERPL-MCNC: 3.1 MG/DL (ref 0.52–1.04)
CREAT SERPL-MCNC: 3.33 MG/DL (ref 0.52–1.04)
CREAT SERPL-MCNC: 3.56 MG/DL (ref 0.52–1.04)
D-METHAMPHET UR QL: NORMAL NG/ML
DIFFERENTIAL METHOD BLD: ABNORMAL
DIGOXIN SERPL-MCNC: 2.5 UG/L (ref 0.5–2)
EJECTION FRACTION: 20 %
EOSINOPHIL # BLD AUTO: 0 10E9/L (ref 0–0.7)
EOSINOPHIL # BLD AUTO: 0.1 10E9/L (ref 0–0.7)
EOSINOPHIL # BLD AUTO: 0.2 10E9/L (ref 0–0.7)
EOSINOPHIL # BLD AUTO: 0.3 10E9/L (ref 0–0.7)
EOSINOPHIL # BLD AUTO: 0.3 10E9/L (ref 0–0.7)
EOSINOPHIL # BLD AUTO: 0.4 10E9/L (ref 0–0.7)
EOSINOPHIL # BLD AUTO: 0.4 10E9/L (ref 0–0.7)
EOSINOPHIL # BLD AUTO: 0.5 10E9/L (ref 0–0.7)
EOSINOPHIL NFR BLD AUTO: 0.1 %
EOSINOPHIL NFR BLD AUTO: 0.1 %
EOSINOPHIL NFR BLD AUTO: 0.5 %
EOSINOPHIL NFR BLD AUTO: 0.5 %
EOSINOPHIL NFR BLD AUTO: 1.3 %
EOSINOPHIL NFR BLD AUTO: 2.2 %
EOSINOPHIL NFR BLD AUTO: 2.3 %
EOSINOPHIL NFR BLD AUTO: 2.9 %
EOSINOPHIL NFR BLD AUTO: 3.1 %
EOSINOPHIL NFR BLD AUTO: 3.3 %
EOSINOPHIL NFR BLD AUTO: 4.1 %
EOSINOPHIL NFR BLD AUTO: 4.3 %
EOSINOPHIL NFR BLD AUTO: 5 %
EOSINOPHIL NFR BLD AUTO: 5 %
EOSINOPHIL NFR FLD MANUAL: 1 %
ERYTHROCYTE [DISTWIDTH] IN BLOOD BY AUTOMATED COUNT: 15.4 % (ref 10–15)
ERYTHROCYTE [DISTWIDTH] IN BLOOD BY AUTOMATED COUNT: 15.6 % (ref 10–15)
ERYTHROCYTE [DISTWIDTH] IN BLOOD BY AUTOMATED COUNT: 15.7 % (ref 10–15)
ERYTHROCYTE [DISTWIDTH] IN BLOOD BY AUTOMATED COUNT: 15.8 % (ref 10–15)
ERYTHROCYTE [DISTWIDTH] IN BLOOD BY AUTOMATED COUNT: 15.9 % (ref 10–15)
ERYTHROCYTE [DISTWIDTH] IN BLOOD BY AUTOMATED COUNT: 16 % (ref 10–15)
ERYTHROCYTE [DISTWIDTH] IN BLOOD BY AUTOMATED COUNT: 16.1 % (ref 10–15)
ERYTHROCYTE [DISTWIDTH] IN BLOOD BY AUTOMATED COUNT: 16.2 % (ref 10–15)
ERYTHROCYTE [DISTWIDTH] IN BLOOD BY AUTOMATED COUNT: 16.3 % (ref 10–15)
ERYTHROCYTE [DISTWIDTH] IN BLOOD BY AUTOMATED COUNT: 16.3 % (ref 10–15)
ERYTHROCYTE [DISTWIDTH] IN BLOOD BY AUTOMATED COUNT: 16.4 % (ref 10–15)
ERYTHROCYTE [DISTWIDTH] IN BLOOD BY AUTOMATED COUNT: 16.5 % (ref 10–15)
ERYTHROCYTE [DISTWIDTH] IN BLOOD BY AUTOMATED COUNT: 16.6 % (ref 10–15)
ERYTHROCYTE [DISTWIDTH] IN BLOOD BY AUTOMATED COUNT: 16.8 % (ref 10–15)
ERYTHROCYTE [DISTWIDTH] IN BLOOD BY AUTOMATED COUNT: 17 % (ref 10–15)
ERYTHROCYTE [DISTWIDTH] IN BLOOD BY AUTOMATED COUNT: 17 % (ref 10–15)
ERYTHROCYTE [DISTWIDTH] IN BLOOD BY AUTOMATED COUNT: 17.2 % (ref 10–15)
ERYTHROCYTE [DISTWIDTH] IN BLOOD BY AUTOMATED COUNT: 17.3 % (ref 10–15)
ERYTHROCYTE [DISTWIDTH] IN BLOOD BY AUTOMATED COUNT: 17.6 % (ref 10–15)
ERYTHROCYTE [DISTWIDTH] IN BLOOD BY AUTOMATED COUNT: 18.1 % (ref 10–15)
ERYTHROCYTE [DISTWIDTH] IN BLOOD BY AUTOMATED COUNT: 18.5 % (ref 10–15)
ERYTHROCYTE [DISTWIDTH] IN BLOOD BY AUTOMATED COUNT: 18.6 % (ref 10–15)
ERYTHROCYTE [DISTWIDTH] IN BLOOD BY AUTOMATED COUNT: 18.7 % (ref 10–15)
ERYTHROCYTE [DISTWIDTH] IN BLOOD BY AUTOMATED COUNT: 18.8 % (ref 10–15)
ERYTHROCYTE [DISTWIDTH] IN BLOOD BY AUTOMATED COUNT: 18.8 % (ref 10–15)
ERYTHROCYTE [DISTWIDTH] IN BLOOD BY AUTOMATED COUNT: 19 % (ref 10–15)
ERYTHROCYTE [DISTWIDTH] IN BLOOD BY AUTOMATED COUNT: 19 % (ref 10–15)
ERYTHROCYTE [DISTWIDTH] IN BLOOD BY AUTOMATED COUNT: 19.1 % (ref 10–15)
ERYTHROCYTE [DISTWIDTH] IN BLOOD BY AUTOMATED COUNT: 19.1 % (ref 10–15)
ERYTHROCYTE [DISTWIDTH] IN BLOOD BY AUTOMATED COUNT: 19.2 % (ref 10–15)
ERYTHROCYTE [DISTWIDTH] IN BLOOD BY AUTOMATED COUNT: 19.2 % (ref 10–15)
ERYTHROCYTE [DISTWIDTH] IN BLOOD BY AUTOMATED COUNT: 19.3 % (ref 10–15)
ERYTHROCYTE [DISTWIDTH] IN BLOOD BY AUTOMATED COUNT: 19.4 % (ref 10–15)
ERYTHROCYTE [DISTWIDTH] IN BLOOD BY AUTOMATED COUNT: 19.5 % (ref 10–15)
ERYTHROCYTE [DISTWIDTH] IN BLOOD BY AUTOMATED COUNT: 19.6 % (ref 10–15)
ERYTHROCYTE [DISTWIDTH] IN BLOOD BY AUTOMATED COUNT: 20.2 % (ref 10–15)
ERYTHROCYTE [DISTWIDTH] IN BLOOD BY AUTOMATED COUNT: 20.3 % (ref 10–15)
ERYTHROCYTE [DISTWIDTH] IN BLOOD BY AUTOMATED COUNT: 20.4 % (ref 10–15)
ERYTHROCYTE [DISTWIDTH] IN BLOOD BY AUTOMATED COUNT: 20.4 % (ref 10–15)
ERYTHROCYTE [DISTWIDTH] IN BLOOD BY AUTOMATED COUNT: 20.5 % (ref 10–15)
ERYTHROCYTE [DISTWIDTH] IN BLOOD BY AUTOMATED COUNT: 20.6 % (ref 10–15)
ERYTHROCYTE [DISTWIDTH] IN BLOOD BY AUTOMATED COUNT: 20.7 % (ref 10–15)
ERYTHROCYTE [DISTWIDTH] IN BLOOD BY AUTOMATED COUNT: 20.8 % (ref 10–15)
ERYTHROCYTE [DISTWIDTH] IN BLOOD BY AUTOMATED COUNT: 21.1 % (ref 10–15)
ERYTHROCYTE [DISTWIDTH] IN BLOOD BY AUTOMATED COUNT: 21.8 % (ref 10–15)
ERYTHROCYTE [DISTWIDTH] IN BLOOD BY AUTOMATED COUNT: 21.8 % (ref 10–15)
ERYTHROCYTE [DISTWIDTH] IN BLOOD BY AUTOMATED COUNT: 22 % (ref 10–15)
ERYTHROCYTE [DISTWIDTH] IN BLOOD BY AUTOMATED COUNT: 22 % (ref 10–15)
ERYTHROCYTE [DISTWIDTH] IN BLOOD BY AUTOMATED COUNT: 22.1 % (ref 10–15)
ERYTHROCYTE [DISTWIDTH] IN BLOOD BY AUTOMATED COUNT: 22.3 % (ref 10–15)
ERYTHROCYTE [DISTWIDTH] IN BLOOD BY AUTOMATED COUNT: 22.3 % (ref 10–15)
ERYTHROCYTE [DISTWIDTH] IN BLOOD BY AUTOMATED COUNT: 22.5 % (ref 10–15)
ERYTHROCYTE [DISTWIDTH] IN BLOOD BY AUTOMATED COUNT: 22.5 % (ref 10–15)
ERYTHROCYTE [DISTWIDTH] IN BLOOD BY AUTOMATED COUNT: 22.6 % (ref 10–15)
ERYTHROCYTE [DISTWIDTH] IN BLOOD BY AUTOMATED COUNT: 22.6 % (ref 10–15)
ERYTHROCYTE [DISTWIDTH] IN BLOOD BY AUTOMATED COUNT: 22.7 % (ref 10–15)
ERYTHROCYTE [DISTWIDTH] IN BLOOD BY AUTOMATED COUNT: 22.8 % (ref 10–15)
ERYTHROCYTE [DISTWIDTH] IN BLOOD BY AUTOMATED COUNT: 22.8 % (ref 10–15)
ERYTHROCYTE [DISTWIDTH] IN BLOOD BY AUTOMATED COUNT: 22.9 % (ref 10–15)
ERYTHROCYTE [DISTWIDTH] IN BLOOD BY AUTOMATED COUNT: 22.9 % (ref 10–15)
ERYTHROCYTE [DISTWIDTH] IN BLOOD BY AUTOMATED COUNT: 23.1 % (ref 10–15)
ERYTHROCYTE [DISTWIDTH] IN BLOOD BY AUTOMATED COUNT: 23.4 % (ref 10–15)
ERYTHROCYTE [DISTWIDTH] IN BLOOD BY AUTOMATED COUNT: 23.6 % (ref 10–15)
ERYTHROCYTE [DISTWIDTH] IN BLOOD BY AUTOMATED COUNT: 25.4 % (ref 10–15)
ERYTHROCYTE [DISTWIDTH] IN BLOOD BY AUTOMATED COUNT: 26.3 % (ref 10–15)
ETHANOL UR QL SCN: NEGATIVE
ETHANOL UR QL SCN: NEGATIVE
FERRITIN SERPL-MCNC: 165 NG/ML (ref 8–252)
FIBRINOGEN PPP-MCNC: 209 MG/DL (ref 200–420)
FIBRINOGEN PPP-MCNC: 215 MG/DL (ref 200–420)
FIBRINOGEN PPP-MCNC: 217 MG/DL (ref 200–420)
FIBRINOGEN PPP-MCNC: 220 MG/DL (ref 200–420)
FIBRINOGEN PPP-MCNC: 263 MG/DL (ref 200–420)
FIBRINOGEN PPP-MCNC: 282 MG/DL (ref 200–420)
FIBRINOGEN PPP-MCNC: 300 MG/DL (ref 200–420)
FIBRINOGEN PPP-MCNC: 363 MG/DL (ref 200–420)
FIBRINOGEN PPP-MCNC: 366 MG/DL (ref 200–420)
FIBRINOGEN PPP-MCNC: 386 MG/DL (ref 200–420)
FIBRINOGEN PPP-MCNC: 405 MG/DL (ref 200–420)
FIBRINOGEN PPP-MCNC: 415 MG/DL (ref 200–420)
FIBRINOGEN PPP-MCNC: 529 MG/DL (ref 200–420)
G ACTUAL CLOT STRENGTH: 6.6 KD/SC (ref 4.5–11)
G ACTUAL CLOT STRENGTH: 7.8 KD/SC (ref 4.5–11)
G ACTUAL CLOT STRENGTH: 8.8 KD/SC (ref 4.5–11)
G ACTUAL CLOT STRENGTH: 9.3 KD/SC (ref 4.5–11)
GFR SERPL CREATININE-BSD FRML MDRD: 12 ML/MIN/{1.73_M2}
GFR SERPL CREATININE-BSD FRML MDRD: 13 ML/MIN/{1.73_M2}
GFR SERPL CREATININE-BSD FRML MDRD: 14 ML/MIN/{1.73_M2}
GFR SERPL CREATININE-BSD FRML MDRD: 15 ML/MIN/{1.73_M2}
GFR SERPL CREATININE-BSD FRML MDRD: 16 ML/MIN/{1.73_M2}
GFR SERPL CREATININE-BSD FRML MDRD: 17 ML/MIN/{1.73_M2}
GFR SERPL CREATININE-BSD FRML MDRD: 17 ML/MIN/{1.73_M2}
GFR SERPL CREATININE-BSD FRML MDRD: 18 ML/MIN/{1.73_M2}
GFR SERPL CREATININE-BSD FRML MDRD: 19 ML/MIN/{1.73_M2}
GFR SERPL CREATININE-BSD FRML MDRD: 20 ML/MIN/{1.73_M2}
GFR SERPL CREATININE-BSD FRML MDRD: 22 ML/MIN/{1.73_M2}
GFR SERPL CREATININE-BSD FRML MDRD: 22 ML/MIN/{1.73_M2}
GFR SERPL CREATININE-BSD FRML MDRD: 23 ML/MIN/{1.73_M2}
GFR SERPL CREATININE-BSD FRML MDRD: 24 ML/MIN/{1.73_M2}
GFR SERPL CREATININE-BSD FRML MDRD: 25 ML/MIN/{1.73_M2}
GFR SERPL CREATININE-BSD FRML MDRD: 26 ML/MIN/{1.73_M2}
GFR SERPL CREATININE-BSD FRML MDRD: 27 ML/MIN/{1.73_M2}
GFR SERPL CREATININE-BSD FRML MDRD: 27 ML/MIN/{1.73_M2}
GFR SERPL CREATININE-BSD FRML MDRD: 28 ML/MIN/{1.73_M2}
GFR SERPL CREATININE-BSD FRML MDRD: 30 ML/MIN/{1.73_M2}
GFR SERPL CREATININE-BSD FRML MDRD: 31 ML/MIN/{1.73_M2}
GFR SERPL CREATININE-BSD FRML MDRD: 32 ML/MIN/{1.73_M2}
GFR SERPL CREATININE-BSD FRML MDRD: 33 ML/MIN/{1.73_M2}
GFR SERPL CREATININE-BSD FRML MDRD: 33 ML/MIN/{1.73_M2}
GFR SERPL CREATININE-BSD FRML MDRD: 34 ML/MIN/{1.73_M2}
GFR SERPL CREATININE-BSD FRML MDRD: 34 ML/MIN/{1.73_M2}
GFR SERPL CREATININE-BSD FRML MDRD: 35 ML/MIN/{1.73_M2}
GFR SERPL CREATININE-BSD FRML MDRD: 36 ML/MIN/{1.73_M2}
GFR SERPL CREATININE-BSD FRML MDRD: 37 ML/MIN/{1.73_M2}
GFR SERPL CREATININE-BSD FRML MDRD: 38 ML/MIN/{1.73_M2}
GFR SERPL CREATININE-BSD FRML MDRD: 38 ML/MIN/{1.73_M2}
GFR SERPL CREATININE-BSD FRML MDRD: 39 ML/MIN/{1.73_M2}
GFR SERPL CREATININE-BSD FRML MDRD: 39 ML/MIN/{1.73_M2}
GFR SERPL CREATININE-BSD FRML MDRD: 40 ML/MIN/{1.73_M2}
GFR SERPL CREATININE-BSD FRML MDRD: 40 ML/MIN/{1.73_M2}
GFR SERPL CREATININE-BSD FRML MDRD: 42 ML/MIN/{1.73_M2}
GFR SERPL CREATININE-BSD FRML MDRD: 42 ML/MIN/{1.73_M2}
GFR SERPL CREATININE-BSD FRML MDRD: 43 ML/MIN/{1.73_M2}
GFR SERPL CREATININE-BSD FRML MDRD: 45 ML/MIN/{1.73_M2}
GFR SERPL CREATININE-BSD FRML MDRD: 45 ML/MIN/{1.73_M2}
GFR SERPL CREATININE-BSD FRML MDRD: 46 ML/MIN/1.73M2
GFR SERPL CREATININE-BSD FRML MDRD: 46 ML/MIN/{1.73_M2}
GFR SERPL CREATININE-BSD FRML MDRD: 47 ML/MIN/{1.73_M2}
GFR SERPL CREATININE-BSD FRML MDRD: 48 ML/MIN/{1.73_M2}
GFR SERPL CREATININE-BSD FRML MDRD: 51 ML/MIN/{1.73_M2}
GFR SERPL CREATININE-BSD FRML MDRD: 52 ML/MIN/{1.73_M2}
GFR SERPL CREATININE-BSD FRML MDRD: 52 ML/MIN/{1.73_M2}
GFR SERPL CREATININE-BSD FRML MDRD: 55 ML/MIN/{1.73_M2}
GFR SERPL CREATININE-BSD FRML MDRD: 58 ML/MIN/{1.73_M2}
GFR SERPL CREATININE-BSD FRML MDRD: 61 ML/MIN/{1.73_M2}
GFR SERPL CREATININE-BSD FRML MDRD: 62 ML/MIN/{1.73_M2}
GFR SERPL CREATININE-BSD FRML MDRD: 63 ML/MIN/{1.73_M2}
GFR SERPL CREATININE-BSD FRML MDRD: 64 ML/MIN/{1.73_M2}
GFR SERPL CREATININE-BSD FRML MDRD: 64 ML/MIN/{1.73_M2}
GFR SERPL CREATININE-BSD FRML MDRD: 65 ML/MIN/{1.73_M2}
GFR SERPL CREATININE-BSD FRML MDRD: 66 ML/MIN/{1.73_M2}
GFR SERPL CREATININE-BSD FRML MDRD: 67 ML/MIN/{1.73_M2}
GFR SERPL CREATININE-BSD FRML MDRD: 67 ML/MIN/{1.73_M2}
GFR SERPL CREATININE-BSD FRML MDRD: 68 ML/MIN/{1.73_M2}
GFR SERPL CREATININE-BSD FRML MDRD: 69 ML/MIN/{1.73_M2}
GFR SERPL CREATININE-BSD FRML MDRD: 70 ML/MIN/{1.73_M2}
GFR SERPL CREATININE-BSD FRML MDRD: 72 ML/MIN/{1.73_M2}
GFR SERPL CREATININE-BSD FRML MDRD: 72 ML/MIN/{1.73_M2}
GFR SERPL CREATININE-BSD FRML MDRD: 73 ML/MIN/{1.73_M2}
GFR SERPL CREATININE-BSD FRML MDRD: 75 ML/MIN/{1.73_M2}
GFR SERPL CREATININE-BSD FRML MDRD: 75 ML/MIN/{1.73_M2}
GFR SERPL CREATININE-BSD FRML MDRD: 76 ML/MIN/{1.73_M2}
GFR SERPL CREATININE-BSD FRML MDRD: 77 ML/MIN/{1.73_M2}
GFR SERPL CREATININE-BSD FRML MDRD: 78 ML/MIN/{1.73_M2}
GFR SERPL CREATININE-BSD FRML MDRD: 79 ML/MIN/{1.73_M2}
GFR SERPL CREATININE-BSD FRML MDRD: 80 ML/MIN/{1.73_M2}
GFR SERPL CREATININE-BSD FRML MDRD: 80 ML/MIN/{1.73_M2}
GFR SERPL CREATININE-BSD FRML MDRD: 81 ML/MIN/{1.73_M2}
GFR SERPL CREATININE-BSD FRML MDRD: 85 ML/MIN/{1.73_M2}
GFR SERPL CREATININE-BSD FRML MDRD: >90 ML/MIN/{1.73_M2}
GFR SERPL CREATININE-BSD FRML MDRD: >90 ML/MIN/{1.73_M2}
GLUCOSE BLD-MCNC: 111 MG/DL (ref 70–99)
GLUCOSE BLD-MCNC: 114 MG/DL (ref 70–99)
GLUCOSE BLD-MCNC: 117 MG/DL (ref 70–99)
GLUCOSE BLD-MCNC: 119 MG/DL (ref 70–99)
GLUCOSE BLD-MCNC: 125 MG/DL (ref 70–99)
GLUCOSE BLD-MCNC: 127 MG/DL (ref 70–99)
GLUCOSE BLD-MCNC: 134 MG/DL (ref 70–99)
GLUCOSE BLD-MCNC: 140 MG/DL (ref 70–99)
GLUCOSE BLD-MCNC: 144 MG/DL (ref 70–99)
GLUCOSE BLD-MCNC: 147 MG/DL (ref 70–99)
GLUCOSE BLD-MCNC: 150 MG/DL (ref 70–99)
GLUCOSE BLD-MCNC: 168 MG/DL (ref 70–99)
GLUCOSE BLD-MCNC: 173 MG/DL (ref 70–99)
GLUCOSE BLD-MCNC: 174 MG/DL (ref 70–99)
GLUCOSE BLD-MCNC: 181 MG/DL (ref 70–99)
GLUCOSE BLD-MCNC: 181 MG/DL (ref 70–99)
GLUCOSE BLD-MCNC: 204 MG/DL (ref 70–99)
GLUCOSE BLD-MCNC: 224 MG/DL (ref 70–99)
GLUCOSE BLDC GLUCOMTR-MCNC: 100 MG/DL (ref 70–99)
GLUCOSE BLDC GLUCOMTR-MCNC: 100 MG/DL (ref 70–99)
GLUCOSE BLDC GLUCOMTR-MCNC: 101 MG/DL (ref 70–99)
GLUCOSE BLDC GLUCOMTR-MCNC: 102 MG/DL (ref 70–99)
GLUCOSE BLDC GLUCOMTR-MCNC: 104 MG/DL (ref 70–99)
GLUCOSE BLDC GLUCOMTR-MCNC: 105 MG/DL (ref 70–99)
GLUCOSE BLDC GLUCOMTR-MCNC: 106 MG/DL (ref 70–99)
GLUCOSE BLDC GLUCOMTR-MCNC: 107 MG/DL (ref 70–99)
GLUCOSE BLDC GLUCOMTR-MCNC: 108 MG/DL (ref 70–99)
GLUCOSE BLDC GLUCOMTR-MCNC: 109 MG/DL (ref 70–99)
GLUCOSE BLDC GLUCOMTR-MCNC: 110 MG/DL (ref 70–99)
GLUCOSE BLDC GLUCOMTR-MCNC: 111 MG/DL (ref 70–99)
GLUCOSE BLDC GLUCOMTR-MCNC: 111 MG/DL (ref 70–99)
GLUCOSE BLDC GLUCOMTR-MCNC: 112 MG/DL (ref 70–99)
GLUCOSE BLDC GLUCOMTR-MCNC: 113 MG/DL (ref 70–99)
GLUCOSE BLDC GLUCOMTR-MCNC: 113 MG/DL (ref 70–99)
GLUCOSE BLDC GLUCOMTR-MCNC: 114 MG/DL (ref 70–99)
GLUCOSE BLDC GLUCOMTR-MCNC: 115 MG/DL (ref 70–99)
GLUCOSE BLDC GLUCOMTR-MCNC: 116 MG/DL (ref 70–99)
GLUCOSE BLDC GLUCOMTR-MCNC: 116 MG/DL (ref 70–99)
GLUCOSE BLDC GLUCOMTR-MCNC: 118 MG/DL (ref 70–99)
GLUCOSE BLDC GLUCOMTR-MCNC: 118 MG/DL (ref 70–99)
GLUCOSE BLDC GLUCOMTR-MCNC: 119 MG/DL (ref 70–99)
GLUCOSE BLDC GLUCOMTR-MCNC: 120 MG/DL (ref 70–99)
GLUCOSE BLDC GLUCOMTR-MCNC: 121 MG/DL (ref 70–99)
GLUCOSE BLDC GLUCOMTR-MCNC: 122 MG/DL (ref 70–99)
GLUCOSE BLDC GLUCOMTR-MCNC: 122 MG/DL (ref 70–99)
GLUCOSE BLDC GLUCOMTR-MCNC: 123 MG/DL (ref 70–99)
GLUCOSE BLDC GLUCOMTR-MCNC: 123 MG/DL (ref 70–99)
GLUCOSE BLDC GLUCOMTR-MCNC: 124 MG/DL (ref 70–99)
GLUCOSE BLDC GLUCOMTR-MCNC: 125 MG/DL (ref 70–99)
GLUCOSE BLDC GLUCOMTR-MCNC: 126 MG/DL (ref 70–99)
GLUCOSE BLDC GLUCOMTR-MCNC: 126 MG/DL (ref 70–99)
GLUCOSE BLDC GLUCOMTR-MCNC: 127 MG/DL (ref 70–99)
GLUCOSE BLDC GLUCOMTR-MCNC: 128 MG/DL (ref 70–99)
GLUCOSE BLDC GLUCOMTR-MCNC: 129 MG/DL (ref 70–99)
GLUCOSE BLDC GLUCOMTR-MCNC: 129 MG/DL (ref 70–99)
GLUCOSE BLDC GLUCOMTR-MCNC: 131 MG/DL (ref 70–99)
GLUCOSE BLDC GLUCOMTR-MCNC: 134 MG/DL (ref 70–99)
GLUCOSE BLDC GLUCOMTR-MCNC: 136 MG/DL (ref 70–99)
GLUCOSE BLDC GLUCOMTR-MCNC: 136 MG/DL (ref 70–99)
GLUCOSE BLDC GLUCOMTR-MCNC: 137 MG/DL (ref 70–99)
GLUCOSE BLDC GLUCOMTR-MCNC: 139 MG/DL (ref 70–99)
GLUCOSE BLDC GLUCOMTR-MCNC: 140 MG/DL (ref 70–99)
GLUCOSE BLDC GLUCOMTR-MCNC: 141 MG/DL (ref 70–99)
GLUCOSE BLDC GLUCOMTR-MCNC: 142 MG/DL (ref 70–99)
GLUCOSE BLDC GLUCOMTR-MCNC: 142 MG/DL (ref 70–99)
GLUCOSE BLDC GLUCOMTR-MCNC: 143 MG/DL (ref 70–99)
GLUCOSE BLDC GLUCOMTR-MCNC: 146 MG/DL (ref 70–99)
GLUCOSE BLDC GLUCOMTR-MCNC: 147 MG/DL (ref 70–99)
GLUCOSE BLDC GLUCOMTR-MCNC: 157 MG/DL (ref 70–99)
GLUCOSE BLDC GLUCOMTR-MCNC: 182 MG/DL (ref 70–99)
GLUCOSE BLDC GLUCOMTR-MCNC: 189 MG/DL (ref 70–99)
GLUCOSE BLDC GLUCOMTR-MCNC: 56 MG/DL (ref 70–99)
GLUCOSE BLDC GLUCOMTR-MCNC: 65 MG/DL (ref 70–99)
GLUCOSE BLDC GLUCOMTR-MCNC: 66 MG/DL (ref 70–99)
GLUCOSE BLDC GLUCOMTR-MCNC: 67 MG/DL (ref 70–99)
GLUCOSE BLDC GLUCOMTR-MCNC: 68 MG/DL (ref 70–99)
GLUCOSE BLDC GLUCOMTR-MCNC: 69 MG/DL (ref 70–99)
GLUCOSE BLDC GLUCOMTR-MCNC: 73 MG/DL (ref 70–99)
GLUCOSE BLDC GLUCOMTR-MCNC: 75 MG/DL (ref 70–99)
GLUCOSE BLDC GLUCOMTR-MCNC: 75 MG/DL (ref 70–99)
GLUCOSE BLDC GLUCOMTR-MCNC: 76 MG/DL (ref 70–99)
GLUCOSE BLDC GLUCOMTR-MCNC: 76 MG/DL (ref 70–99)
GLUCOSE BLDC GLUCOMTR-MCNC: 78 MG/DL (ref 70–99)
GLUCOSE BLDC GLUCOMTR-MCNC: 79 MG/DL (ref 70–99)
GLUCOSE BLDC GLUCOMTR-MCNC: 79 MG/DL (ref 70–99)
GLUCOSE BLDC GLUCOMTR-MCNC: 81 MG/DL (ref 70–99)
GLUCOSE BLDC GLUCOMTR-MCNC: 81 MG/DL (ref 70–99)
GLUCOSE BLDC GLUCOMTR-MCNC: 82 MG/DL (ref 70–99)
GLUCOSE BLDC GLUCOMTR-MCNC: 83 MG/DL (ref 70–99)
GLUCOSE BLDC GLUCOMTR-MCNC: 84 MG/DL (ref 70–99)
GLUCOSE BLDC GLUCOMTR-MCNC: 84 MG/DL (ref 70–99)
GLUCOSE BLDC GLUCOMTR-MCNC: 86 MG/DL (ref 70–99)
GLUCOSE BLDC GLUCOMTR-MCNC: 87 MG/DL (ref 70–99)
GLUCOSE BLDC GLUCOMTR-MCNC: 88 MG/DL (ref 70–99)
GLUCOSE BLDC GLUCOMTR-MCNC: 89 MG/DL (ref 70–99)
GLUCOSE BLDC GLUCOMTR-MCNC: 90 MG/DL (ref 70–99)
GLUCOSE BLDC GLUCOMTR-MCNC: 91 MG/DL (ref 70–99)
GLUCOSE BLDC GLUCOMTR-MCNC: 91 MG/DL (ref 70–99)
GLUCOSE BLDC GLUCOMTR-MCNC: 92 MG/DL (ref 70–99)
GLUCOSE BLDC GLUCOMTR-MCNC: 93 MG/DL (ref 70–99)
GLUCOSE BLDC GLUCOMTR-MCNC: 94 MG/DL (ref 70–99)
GLUCOSE BLDC GLUCOMTR-MCNC: 95 MG/DL (ref 70–99)
GLUCOSE BLDC GLUCOMTR-MCNC: 96 MG/DL (ref 70–99)
GLUCOSE BLDC GLUCOMTR-MCNC: 96 MG/DL (ref 70–99)
GLUCOSE BLDC GLUCOMTR-MCNC: 97 MG/DL (ref 70–99)
GLUCOSE BLDC GLUCOMTR-MCNC: 98 MG/DL (ref 70–99)
GLUCOSE BLDC GLUCOMTR-MCNC: 99 MG/DL (ref 70–99)
GLUCOSE BLDC GLUCOMTR-MCNC: 99 MG/DL (ref 70–99)
GLUCOSE SERPL-MCNC: 100 MG/DL (ref 70–99)
GLUCOSE SERPL-MCNC: 100 MG/DL (ref 70–99)
GLUCOSE SERPL-MCNC: 101 MG/DL (ref 70–99)
GLUCOSE SERPL-MCNC: 101 MG/DL (ref 70–99)
GLUCOSE SERPL-MCNC: 102 MG/DL (ref 70–99)
GLUCOSE SERPL-MCNC: 103 MG/DL (ref 70–99)
GLUCOSE SERPL-MCNC: 104 MG/DL (ref 70–99)
GLUCOSE SERPL-MCNC: 105 MG/DL (ref 70–99)
GLUCOSE SERPL-MCNC: 106 MG/DL (ref 70–99)
GLUCOSE SERPL-MCNC: 107 MG/DL (ref 70–99)
GLUCOSE SERPL-MCNC: 108 MG/DL (ref 70–99)
GLUCOSE SERPL-MCNC: 108 MG/DL (ref 70–99)
GLUCOSE SERPL-MCNC: 110 MG/DL (ref 70–99)
GLUCOSE SERPL-MCNC: 111 MG/DL (ref 70–99)
GLUCOSE SERPL-MCNC: 111 MG/DL (ref 70–99)
GLUCOSE SERPL-MCNC: 112 MG/DL (ref 70–99)
GLUCOSE SERPL-MCNC: 113 MG/DL (ref 70–99)
GLUCOSE SERPL-MCNC: 114 MG/DL (ref 70–99)
GLUCOSE SERPL-MCNC: 116 MG/DL (ref 70–99)
GLUCOSE SERPL-MCNC: 117 MG/DL (ref 70–99)
GLUCOSE SERPL-MCNC: 118 MG/DL (ref 70–95)
GLUCOSE SERPL-MCNC: 118 MG/DL (ref 70–99)
GLUCOSE SERPL-MCNC: 119 MG/DL (ref 70–99)
GLUCOSE SERPL-MCNC: 120 MG/DL (ref 70–99)
GLUCOSE SERPL-MCNC: 121 MG/DL (ref 70–99)
GLUCOSE SERPL-MCNC: 121 MG/DL (ref 70–99)
GLUCOSE SERPL-MCNC: 123 MG/DL (ref 70–99)
GLUCOSE SERPL-MCNC: 125 MG/DL (ref 70–99)
GLUCOSE SERPL-MCNC: 126 MG/DL (ref 70–99)
GLUCOSE SERPL-MCNC: 128 MG/DL (ref 70–99)
GLUCOSE SERPL-MCNC: 132 MG/DL (ref 70–99)
GLUCOSE SERPL-MCNC: 132 MG/DL (ref 70–99)
GLUCOSE SERPL-MCNC: 137 MG/DL (ref 70–99)
GLUCOSE SERPL-MCNC: 138 MG/DL (ref 70–99)
GLUCOSE SERPL-MCNC: 138 MG/DL (ref 70–99)
GLUCOSE SERPL-MCNC: 140 MG/DL (ref 70–99)
GLUCOSE SERPL-MCNC: 141 MG/DL (ref 70–99)
GLUCOSE SERPL-MCNC: 144 MG/DL (ref 70–99)
GLUCOSE SERPL-MCNC: 148 MG/DL (ref 70–99)
GLUCOSE SERPL-MCNC: 149 MG/DL (ref 70–99)
GLUCOSE SERPL-MCNC: 153 MG/DL (ref 70–99)
GLUCOSE SERPL-MCNC: 163 MG/DL (ref 70–99)
GLUCOSE SERPL-MCNC: 167 MG/DL (ref 70–99)
GLUCOSE SERPL-MCNC: 170 MG/DL (ref 70–99)
GLUCOSE SERPL-MCNC: 172 MG/DL (ref 70–99)
GLUCOSE SERPL-MCNC: 486 MG/DL (ref 70–99)
GLUCOSE SERPL-MCNC: 66 MG/DL (ref 70–99)
GLUCOSE SERPL-MCNC: 68 MG/DL (ref 70–99)
GLUCOSE SERPL-MCNC: 72 MG/DL (ref 70–99)
GLUCOSE SERPL-MCNC: 72 MG/DL (ref 70–99)
GLUCOSE SERPL-MCNC: 74 MG/DL (ref 70–99)
GLUCOSE SERPL-MCNC: 76 MG/DL (ref 70–99)
GLUCOSE SERPL-MCNC: 79 MG/DL (ref 70–99)
GLUCOSE SERPL-MCNC: 79 MG/DL (ref 70–99)
GLUCOSE SERPL-MCNC: 82 MG/DL (ref 70–99)
GLUCOSE SERPL-MCNC: 84 MG/DL (ref 70–99)
GLUCOSE SERPL-MCNC: 86 MG/DL (ref 70–99)
GLUCOSE SERPL-MCNC: 87 MG/DL (ref 70–99)
GLUCOSE SERPL-MCNC: 89 MG/DL (ref 70–99)
GLUCOSE SERPL-MCNC: 90 MG/DL (ref 70–99)
GLUCOSE SERPL-MCNC: 91 MG/DL (ref 70–99)
GLUCOSE SERPL-MCNC: 91 MG/DL (ref 70–99)
GLUCOSE SERPL-MCNC: 93 MG/DL (ref 70–99)
GLUCOSE SERPL-MCNC: 93 MG/DL (ref 70–99)
GLUCOSE SERPL-MCNC: 94 MG/DL (ref 70–99)
GLUCOSE SERPL-MCNC: 96 MG/DL (ref 70–99)
GLUCOSE SERPL-MCNC: 97 MG/DL (ref 70–99)
GLUCOSE SERPL-MCNC: 97 MG/DL (ref 70–99)
GLUCOSE SERPL-MCNC: 98 MG/DL (ref 70–99)
GLUCOSE SERPL-MCNC: 99 MG/DL (ref 70–99)
GLUCOSE UR STRIP-MCNC: 30 MG/DL
GLUCOSE UR STRIP-MCNC: NEGATIVE MG/DL
GRAM STN SPEC: NORMAL
GRAN CASTS #/AREA URNS LPF: 4 /LPF
HAPTOGLOB SERPL-MCNC: 206 MG/DL (ref 32–197)
HAPTOGLOB SERPL-MCNC: 213 MG/DL (ref 32–197)
HBA1C MFR BLD: 5.5 % (ref 0–5.6)
HBA1C MFR BLD: 6.2 % (ref 0–5.6)
HBV SURFACE AB SERPL IA-ACNC: 234.53 M[IU]/ML
HBV SURFACE AG SERPL QL IA: NONREACTIVE
HCO3 BLD-SCNC: 13 MMOL/L (ref 21–28)
HCO3 BLD-SCNC: 16 MMOL/L (ref 21–28)
HCO3 BLD-SCNC: 17 MMOL/L (ref 21–28)
HCO3 BLD-SCNC: 18 MMOL/L (ref 21–28)
HCO3 BLD-SCNC: 20 MMOL/L (ref 21–28)
HCO3 BLD-SCNC: 20 MMOL/L (ref 21–28)
HCO3 BLD-SCNC: 21 MMOL/L (ref 21–28)
HCO3 BLD-SCNC: 22 MMOL/L (ref 21–28)
HCO3 BLD-SCNC: 23 MMOL/L (ref 21–28)
HCO3 BLD-SCNC: 24 MMOL/L (ref 21–28)
HCO3 BLD-SCNC: 25 MMOL/L (ref 21–28)
HCO3 BLD-SCNC: 26 MMOL/L (ref 21–28)
HCO3 BLD-SCNC: 26 MMOL/L (ref 21–28)
HCO3 BLD-SCNC: 27 MMOL/L (ref 21–28)
HCO3 BLD-SCNC: 28 MMOL/L (ref 21–28)
HCO3 BLDV-SCNC: 14 MMOL/L (ref 21–28)
HCO3 BLDV-SCNC: 15 MMOL/L (ref 21–28)
HCO3 BLDV-SCNC: 15 MMOL/L (ref 21–28)
HCO3 BLDV-SCNC: 16 MMOL/L (ref 21–28)
HCO3 BLDV-SCNC: 16 MMOL/L (ref 21–28)
HCO3 BLDV-SCNC: 17 MMOL/L (ref 21–28)
HCO3 BLDV-SCNC: 18 MMOL/L (ref 21–28)
HCO3 BLDV-SCNC: 19 MMOL/L (ref 21–28)
HCO3 BLDV-SCNC: 20 MMOL/L (ref 21–28)
HCO3 BLDV-SCNC: 21 MMOL/L (ref 21–28)
HCO3 BLDV-SCNC: 22 MMOL/L (ref 21–28)
HCO3 BLDV-SCNC: 23 MMOL/L (ref 21–28)
HCO3 BLDV-SCNC: 24 MMOL/L (ref 21–28)
HCO3 BLDV-SCNC: 25 MMOL/L (ref 21–28)
HCO3 BLDV-SCNC: 26 MMOL/L (ref 21–28)
HCO3 BLDV-SCNC: 27 MMOL/L (ref 21–28)
HCO3 BLDV-SCNC: 28 MMOL/L (ref 21–28)
HCO3 BLDV-SCNC: NORMAL MMOL/L (ref 21–28)
HCT VFR BLD AUTO: 18.5 % (ref 35–47)
HCT VFR BLD AUTO: 19.9 % (ref 35–47)
HCT VFR BLD AUTO: 20.2 % (ref 35–47)
HCT VFR BLD AUTO: 20.2 % (ref 35–47)
HCT VFR BLD AUTO: 20.4 % (ref 35–47)
HCT VFR BLD AUTO: 20.8 % (ref 35–47)
HCT VFR BLD AUTO: 21 % (ref 35–47)
HCT VFR BLD AUTO: 21.7 % (ref 35–47)
HCT VFR BLD AUTO: 21.8 % (ref 35–47)
HCT VFR BLD AUTO: 21.8 % (ref 35–47)
HCT VFR BLD AUTO: 22.1 % (ref 35–47)
HCT VFR BLD AUTO: 22.4 % (ref 35–47)
HCT VFR BLD AUTO: 22.6 % (ref 35–47)
HCT VFR BLD AUTO: 22.9 % (ref 35–47)
HCT VFR BLD AUTO: 23.2 % (ref 35–47)
HCT VFR BLD AUTO: 23.3 % (ref 35–47)
HCT VFR BLD AUTO: 23.5 % (ref 35–47)
HCT VFR BLD AUTO: 23.5 % (ref 35–47)
HCT VFR BLD AUTO: 23.8 % (ref 35–47)
HCT VFR BLD AUTO: 23.8 % (ref 35–47)
HCT VFR BLD AUTO: 24 % (ref 35–47)
HCT VFR BLD AUTO: 24.2 % (ref 35–47)
HCT VFR BLD AUTO: 24.3 % (ref 35–47)
HCT VFR BLD AUTO: 24.3 % (ref 35–47)
HCT VFR BLD AUTO: 24.8 % (ref 35–47)
HCT VFR BLD AUTO: 25 % (ref 35–47)
HCT VFR BLD AUTO: 25.3 % (ref 35–47)
HCT VFR BLD AUTO: 25.3 % (ref 35–47)
HCT VFR BLD AUTO: 25.4 % (ref 35–47)
HCT VFR BLD AUTO: 25.7 % (ref 35–47)
HCT VFR BLD AUTO: 26 % (ref 35–47)
HCT VFR BLD AUTO: 26 % (ref 35–47)
HCT VFR BLD AUTO: 26.1 % (ref 35–47)
HCT VFR BLD AUTO: 26.1 % (ref 35–47)
HCT VFR BLD AUTO: 26.4 % (ref 35–47)
HCT VFR BLD AUTO: 26.4 % (ref 35–47)
HCT VFR BLD AUTO: 26.5 % (ref 35–47)
HCT VFR BLD AUTO: 26.8 % (ref 35–47)
HCT VFR BLD AUTO: 27.2 % (ref 35–47)
HCT VFR BLD AUTO: 27.4 % (ref 35–47)
HCT VFR BLD AUTO: 27.5 % (ref 35–47)
HCT VFR BLD AUTO: 28.3 % (ref 35–47)
HCT VFR BLD AUTO: 28.6 % (ref 35–47)
HCT VFR BLD AUTO: 28.7 % (ref 35–47)
HCT VFR BLD AUTO: 29.9 % (ref 35–47)
HCT VFR BLD AUTO: 30.1 % (ref 35–47)
HCT VFR BLD AUTO: 30.2 % (ref 35–47)
HCT VFR BLD AUTO: 30.6 % (ref 35–47)
HCT VFR BLD AUTO: 30.8 % (ref 35–47)
HCT VFR BLD AUTO: 31.1 % (ref 35–47)
HCT VFR BLD AUTO: 31.4 % (ref 35–47)
HCT VFR BLD AUTO: 32.2 % (ref 35–47)
HCT VFR BLD AUTO: 32.3 % (ref 35–47)
HCT VFR BLD AUTO: 32.3 % (ref 35–47)
HCT VFR BLD AUTO: 32.5 % (ref 35–47)
HCT VFR BLD AUTO: 33.3 % (ref 35–47)
HCT VFR BLD AUTO: 33.6 % (ref 35–47)
HCT VFR BLD AUTO: 33.7 % (ref 35–47)
HCT VFR BLD AUTO: 34.4 % (ref 35–47)
HCT VFR BLD AUTO: 34.4 % (ref 35–47)
HCT VFR BLD AUTO: 34.9 % (ref 35–47)
HCT VFR BLD AUTO: 35.2 % (ref 35–47)
HCT VFR BLD AUTO: 35.3 % (ref 35–47)
HCT VFR BLD AUTO: 35.6 % (ref 35–47)
HCT VFR BLD AUTO: 36.1 % (ref 35–47)
HCT VFR BLD AUTO: 36.8 % (ref 35–47)
HCT VFR BLD AUTO: 37 % (ref 35–47)
HCT VFR BLD AUTO: 37 % (ref 35–47)
HCT VFR BLD AUTO: 37.3 % (ref 35–47)
HCT VFR BLD AUTO: 37.3 % (ref 35–47)
HCT VFR BLD AUTO: 37.5 % (ref 35–47)
HCT VFR BLD AUTO: 38.5 % (ref 35–47)
HCT VFR BLD AUTO: 38.7 % (ref 35–47)
HCT VFR BLD AUTO: 40.7 % (ref 35–47)
HDLC SERPL-MCNC: 25 MG/DL
HDLC SERPL-MCNC: 26 MG/DL
HGB BLD-MCNC: 10 G/DL (ref 11.7–15.7)
HGB BLD-MCNC: 10 G/DL (ref 11.7–15.7)
HGB BLD-MCNC: 10.1 G/DL (ref 11.7–15.7)
HGB BLD-MCNC: 10.2 G/DL (ref 11.7–15.7)
HGB BLD-MCNC: 10.5 G/DL (ref 11.7–15.7)
HGB BLD-MCNC: 10.5 G/DL (ref 11.7–15.7)
HGB BLD-MCNC: 10.6 G/DL (ref 11.7–15.7)
HGB BLD-MCNC: 10.8 G/DL (ref 11.7–15.7)
HGB BLD-MCNC: 10.9 G/DL (ref 11.7–15.7)
HGB BLD-MCNC: 10.9 G/DL (ref 11.7–15.7)
HGB BLD-MCNC: 11 G/DL (ref 11.7–15.7)
HGB BLD-MCNC: 11.1 G/DL (ref 11.7–15.7)
HGB BLD-MCNC: 11.2 G/DL (ref 11.7–15.7)
HGB BLD-MCNC: 11.2 G/DL (ref 11.7–15.7)
HGB BLD-MCNC: 11.3 G/DL (ref 11.7–15.7)
HGB BLD-MCNC: 11.3 G/DL (ref 11.7–15.7)
HGB BLD-MCNC: 11.7 G/DL (ref 11.7–15.7)
HGB BLD-MCNC: 12 G/DL (ref 11.7–15.7)
HGB BLD-MCNC: 12.5 G/DL (ref 11.7–15.7)
HGB BLD-MCNC: 5.7 G/DL (ref 11.7–15.7)
HGB BLD-MCNC: 6.2 G/DL (ref 11.7–15.7)
HGB BLD-MCNC: 6.2 G/DL (ref 11.7–15.7)
HGB BLD-MCNC: 6.5 G/DL (ref 11.7–15.7)
HGB BLD-MCNC: 6.5 G/DL (ref 11.7–15.7)
HGB BLD-MCNC: 6.6 G/DL (ref 11.7–15.7)
HGB BLD-MCNC: 6.6 G/DL (ref 11.7–15.7)
HGB BLD-MCNC: 6.8 G/DL (ref 11.7–15.7)
HGB BLD-MCNC: 6.9 G/DL (ref 11.7–15.7)
HGB BLD-MCNC: 7 G/DL (ref 11.7–15.7)
HGB BLD-MCNC: 7.1 G/DL (ref 11.7–15.7)
HGB BLD-MCNC: 7.2 G/DL (ref 11.7–15.7)
HGB BLD-MCNC: 7.2 G/DL (ref 11.7–15.7)
HGB BLD-MCNC: 7.4 G/DL (ref 11.7–15.7)
HGB BLD-MCNC: 7.5 G/DL (ref 11.7–15.7)
HGB BLD-MCNC: 7.6 G/DL (ref 11.7–15.7)
HGB BLD-MCNC: 7.7 G/DL (ref 11.7–15.7)
HGB BLD-MCNC: 7.8 G/DL (ref 11.7–15.7)
HGB BLD-MCNC: 7.9 G/DL (ref 11.7–15.7)
HGB BLD-MCNC: 8 G/DL (ref 11.7–15.7)
HGB BLD-MCNC: 8.1 G/DL (ref 11.7–15.7)
HGB BLD-MCNC: 8.2 G/DL (ref 11.7–15.7)
HGB BLD-MCNC: 8.3 G/DL (ref 11.7–15.7)
HGB BLD-MCNC: 8.4 G/DL (ref 11.7–15.7)
HGB BLD-MCNC: 8.5 G/DL (ref 11.7–15.7)
HGB BLD-MCNC: 8.6 G/DL (ref 11.7–15.7)
HGB BLD-MCNC: 8.7 G/DL (ref 11.7–15.7)
HGB BLD-MCNC: 8.8 G/DL (ref 11.7–15.7)
HGB BLD-MCNC: 8.8 G/DL (ref 11.7–15.7)
HGB BLD-MCNC: 8.9 G/DL (ref 11.7–15.7)
HGB BLD-MCNC: 9 G/DL (ref 11.7–15.7)
HGB BLD-MCNC: 9 G/DL (ref 11.7–15.7)
HGB BLD-MCNC: 9.1 G/DL (ref 11.7–15.7)
HGB BLD-MCNC: 9.2 G/DL (ref 11.7–15.7)
HGB BLD-MCNC: 9.2 G/DL (ref 11.7–15.7)
HGB BLD-MCNC: 9.4 G/DL (ref 11.7–15.7)
HGB BLD-MCNC: 9.5 G/DL (ref 11.7–15.7)
HGB BLD-MCNC: 9.5 G/DL (ref 11.7–15.7)
HGB BLD-MCNC: 9.6 G/DL (ref 11.7–15.7)
HGB BLD-MCNC: 9.7 G/DL (ref 11.7–15.7)
HGB BLD-MCNC: 9.8 G/DL (ref 11.7–15.7)
HGB BLD-MCNC: 9.9 G/DL (ref 11.7–15.7)
HGB FREE PLAS-MCNC: 30 MG/DL
HGB UR QL STRIP: ABNORMAL
HGB UR QL STRIP: NEGATIVE
HGB UR QL STRIP: NEGATIVE
HYALINE CASTS #/AREA URNS LPF: 11 /LPF (ref 0–2)
HYALINE CASTS #/AREA URNS LPF: 94 /LPF (ref 0–2)
IMM GRANULOCYTES # BLD: 0 10E9/L (ref 0–0.4)
IMM GRANULOCYTES # BLD: 0.1 10E9/L (ref 0–0.4)
IMM GRANULOCYTES # BLD: 0.1 10E9/L (ref 0–0.4)
IMM GRANULOCYTES # BLD: 0.3 10E9/L (ref 0–0.4)
IMM GRANULOCYTES NFR BLD: 0.2 %
IMM GRANULOCYTES NFR BLD: 0.4 %
IMM GRANULOCYTES NFR BLD: 0.5 %
IMM GRANULOCYTES NFR BLD: 0.6 %
IMM GRANULOCYTES NFR BLD: 2.6 %
INR PPP: 1.11 (ref 0.86–1.14)
INR PPP: 1.14 (ref 0.86–1.14)
INR PPP: 1.15 (ref 0.86–1.14)
INR PPP: 1.17 (ref 0.86–1.14)
INR PPP: 1.18 (ref 0.86–1.14)
INR PPP: 1.18 (ref 0.86–1.14)
INR PPP: 1.23 (ref 0.86–1.14)
INR PPP: 1.25 (ref 0.86–1.14)
INR PPP: 1.25 (ref 0.86–1.14)
INR PPP: 1.26 (ref 0.86–1.14)
INR PPP: 1.26 (ref 0.86–1.14)
INR PPP: 1.27 (ref 0.86–1.14)
INR PPP: 1.27 (ref 0.86–1.14)
INR PPP: 1.29 (ref 0.86–1.14)
INR PPP: 1.31 (ref 0.86–1.14)
INR PPP: 1.34 (ref 0.86–1.14)
INR PPP: 1.35 (ref 0.86–1.14)
INR PPP: 1.35 (ref 0.86–1.14)
INR PPP: 1.36 (ref 0.86–1.14)
INR PPP: 1.37 (ref 0.86–1.14)
INR PPP: 1.37 (ref 0.86–1.14)
INR PPP: 1.4 (ref 0.86–1.14)
INR PPP: 1.4 (ref 0.86–1.14)
INR PPP: 1.41 (ref 0.86–1.14)
INR PPP: 1.43 (ref 0.86–1.14)
INR PPP: 1.44 (ref 0.86–1.14)
INR PPP: 1.44 (ref 0.86–1.14)
INR PPP: 1.46 (ref 0.86–1.14)
INR PPP: 1.48 (ref 0.86–1.14)
INR PPP: 1.49 (ref 0.86–1.14)
INR PPP: 1.52 (ref 0.86–1.14)
INR PPP: 1.53 (ref 0.86–1.14)
INR PPP: 1.57 (ref 0.86–1.14)
INR PPP: 1.59 (ref 0.86–1.14)
INR PPP: 1.6 (ref 0.86–1.14)
INR PPP: 1.63 (ref 0.86–1.14)
INR PPP: 1.69 (ref 0.86–1.14)
INR PPP: 1.72 (ref 0.86–1.14)
INR PPP: 1.73 (ref 0.86–1.14)
INR PPP: 1.74 (ref 0.86–1.14)
INR PPP: 1.75 (ref 0.86–1.14)
INR PPP: 1.76 (ref 0.86–1.14)
INR PPP: 1.9 (ref 0.86–1.14)
INR PPP: 1.99 (ref 0.86–1.14)
INR PPP: 2.05 (ref 0.86–1.14)
INR PPP: 2.05 (ref 0.86–1.14)
INR PPP: 2.11 (ref 0.86–1.14)
INR PPP: 2.12 (ref 0.86–1.14)
INR PPP: 2.15 (ref 0.86–1.14)
INR PPP: 2.21 (ref 0.86–1.14)
INR PPP: 2.22 (ref 0.86–1.14)
INR PPP: 2.26 (ref 0.86–1.14)
INR PPP: 2.26 (ref 0.86–1.14)
INR PPP: 2.31 (ref 0.86–1.14)
INR PPP: 2.44 (ref 0.86–1.14)
INR PPP: 2.45 (ref 0.86–1.14)
INR PPP: 2.59 (ref 0.86–1.14)
INR PPP: 2.6 (ref 0.86–1.14)
INR PPP: 2.6 (ref 1.9–3.1)
INR PPP: 2.75 (ref 0.86–1.14)
INR PPP: 2.91 (ref 0.86–1.14)
INR PPP: 2.91 (ref 0.86–1.14)
INR PPP: 3.8 (ref 1.9–3.1)
INR PPP: 3.84 (ref 0.86–1.14)
INR PPP: 3.88 (ref 0.86–1.14)
INR PPP: 4.1 (ref 0.9–1.1)
INR PPP: 5.04 (ref 0.86–1.14)
INR PPP: 5.27 (ref 0.86–1.14)
INR PPP: 6.08 (ref 0.86–1.14)
INR PPP: 8.3 (ref 0.9–1.1)
INTERPRETATION ECG - MUSE: NORMAL
IRON SATN MFR SERPL: 15 % (ref 15–46)
IRON SATN MFR SERPL: 17 % (ref 15–46)
IRON SERPL-MCNC: 48 UG/DL (ref 35–180)
IRON SERPL-MCNC: 65 UG/DL (ref 35–180)
K TIME TO SPEC CLOT STRENGTH: 0.8 MINUTE (ref 1–3)
K TIME TO SPEC CLOT STRENGTH: 1.2 MIN (ref 1–3)
K TIME TO SPEC CLOT STRENGTH: 1.4 MIN (ref 1–3)
K TIME TO SPEC CLOT STRENGTH: 1.5 MIN (ref 1–3)
K TIME TO SPEC CLOT STRENGTH: 1.5 MIN (ref 1–3)
K TIME TO SPEC CLOT STRENGTH: 1.9 MINUTE (ref 1–3)
K TIME TO SPEC CLOT STRENGTH: 2 MINUTE (ref 1–3)
K TIME TO SPEC CLOT STRENGTH: 2.2 MINUTE (ref 1–3)
K TIME TO SPEC CLOT STRENGTH: 2.7 MIN (ref 1–3)
K TIME TO SPEC CLOT STRENGTH: 2.8 MIN (ref 1–3)
K TIME TO SPEC CLOT STRENGTH: NORMAL MIN (ref 1–3)
KCT BLD-ACNC: 179 SEC (ref 75–150)
KCT BLD-ACNC: 226 SEC (ref 75–150)
KCT BLD-ACNC: 297 SEC (ref 75–150)
KCT BLD-ACNC: 416 SEC (ref 75–150)
KETONES UR STRIP-MCNC: 5 MG/DL
KETONES UR STRIP-MCNC: NEGATIVE MG/DL
LA PPP-IMP: ABNORMAL
LABORATORY COMMENT REPORT: NORMAL
LABORATORY COMMENT REPORT: NORMAL
LACTATE BLD-SCNC: 0.2 MMOL/L (ref 0.7–2)
LACTATE BLD-SCNC: 0.4 MMOL/L (ref 0.7–2)
LACTATE BLD-SCNC: 0.5 MMOL/L (ref 0.7–2)
LACTATE BLD-SCNC: 0.6 MMOL/L (ref 0.7–2)
LACTATE BLD-SCNC: 0.7 MMOL/L (ref 0.7–2)
LACTATE BLD-SCNC: 0.8 MMOL/L (ref 0.7–2)
LACTATE BLD-SCNC: 0.9 MMOL/L (ref 0.7–2)
LACTATE BLD-SCNC: 1 MMOL/L (ref 0.7–2)
LACTATE BLD-SCNC: 1.1 MMOL/L (ref 0.7–2)
LACTATE BLD-SCNC: 1.2 MMOL/L (ref 0.7–2)
LACTATE BLD-SCNC: 1.3 MMOL/L (ref 0.7–2)
LACTATE BLD-SCNC: 1.4 MMOL/L (ref 0.7–2)
LACTATE BLD-SCNC: 1.5 MMOL/L (ref 0.7–2)
LACTATE BLD-SCNC: 1.6 MMOL/L (ref 0.7–2)
LACTATE BLD-SCNC: 1.7 MMOL/L (ref 0.7–2)
LACTATE BLD-SCNC: 1.7 MMOL/L (ref 0.7–2)
LACTATE BLD-SCNC: 1.8 MMOL/L (ref 0.7–2)
LACTATE BLD-SCNC: 2 MMOL/L (ref 0.7–2)
LACTATE BLD-SCNC: 2 MMOL/L (ref 0.7–2)
LACTATE BLD-SCNC: 2.1 MMOL/L (ref 0.7–2)
LACTATE BLD-SCNC: 2.1 MMOL/L (ref 0.7–2)
LACTATE BLD-SCNC: 2.3 MMOL/L (ref 0.7–2)
LACTATE BLD-SCNC: 2.3 MMOL/L (ref 0.7–2)
LACTATE BLD-SCNC: 2.4 MMOL/L (ref 0.7–2)
LACTATE BLD-SCNC: 2.4 MMOL/L (ref 0.7–2)
LACTATE BLD-SCNC: 2.7 MMOL/L (ref 0.7–2)
LACTATE BLD-SCNC: 2.9 MMOL/L (ref 0.7–2)
LACTATE BLD-SCNC: 3.1 MMOL/L (ref 0.7–2)
LACTATE BLD-SCNC: 3.2 MMOL/L (ref 0.7–2)
LACTATE BLD-SCNC: 3.7 MMOL/L (ref 0.7–2)
LACTATE BLD-SCNC: 3.8 MMOL/L (ref 0.7–2)
LACTATE BLD-SCNC: 4.1 MMOL/L (ref 0.7–2)
LDH FLD L TO P-CCNC: 278 U/L
LDH SERPL L TO P-CCNC: 266 U/L (ref 81–234)
LDH SERPL L TO P-CCNC: 278 U/L (ref 81–234)
LDH SERPL L TO P-CCNC: 281 U/L (ref 81–234)
LDH SERPL L TO P-CCNC: 301 U/L (ref 81–234)
LDH SERPL L TO P-CCNC: 307 U/L (ref 81–234)
LDH SERPL L TO P-CCNC: 316 U/L (ref 81–234)
LDH SERPL L TO P-CCNC: 325 U/L (ref 81–234)
LDH SERPL L TO P-CCNC: 510 U/L (ref 81–234)
LDLC SERPL CALC-MCNC: 18 MG/DL
LDLC SERPL CALC-MCNC: ABNORMAL MG/DL
LEUKOCYTE ESTERASE UR QL STRIP: ABNORMAL
LEUKOCYTE ESTERASE UR QL STRIP: NEGATIVE
LMWH PPP CHRO-ACNC: 0.1 IU/ML
LMWH PPP CHRO-ACNC: 0.12 IU/ML
LMWH PPP CHRO-ACNC: 0.16 IU/ML
LMWH PPP CHRO-ACNC: 0.23 IU/ML
LMWH PPP CHRO-ACNC: 0.23 IU/ML
LMWH PPP CHRO-ACNC: 0.24 IU/ML
LMWH PPP CHRO-ACNC: 0.28 IU/ML
LMWH PPP CHRO-ACNC: 0.29 IU/ML
LMWH PPP CHRO-ACNC: 0.29 IU/ML
LMWH PPP CHRO-ACNC: 0.31 IU/ML
LMWH PPP CHRO-ACNC: 0.32 IU/ML
LMWH PPP CHRO-ACNC: 0.32 IU/ML
LMWH PPP CHRO-ACNC: 0.33 IU/ML
LMWH PPP CHRO-ACNC: 0.34 IU/ML
LMWH PPP CHRO-ACNC: 0.36 IU/ML
LMWH PPP CHRO-ACNC: 0.37 IU/ML
LMWH PPP CHRO-ACNC: 0.38 IU/ML
LMWH PPP CHRO-ACNC: 0.4 IU/ML
LMWH PPP CHRO-ACNC: 0.45 IU/ML
LMWH PPP CHRO-ACNC: 0.55 IU/ML
LMWH PPP CHRO-ACNC: 0.57 IU/ML
LMWH PPP CHRO-ACNC: 0.58 IU/ML
LMWH PPP CHRO-ACNC: 0.63 IU/ML
LMWH PPP CHRO-ACNC: 0.64 IU/ML
LMWH PPP CHRO-ACNC: 0.65 IU/ML
LMWH PPP CHRO-ACNC: 0.68 IU/ML
LMWH PPP CHRO-ACNC: 0.72 IU/ML
LMWH PPP CHRO-ACNC: 0.77 IU/ML
LMWH PPP CHRO-ACNC: 0.78 IU/ML
LMWH PPP CHRO-ACNC: 0.81 IU/ML
LMWH PPP CHRO-ACNC: 0.87 IU/ML
LMWH PPP CHRO-ACNC: 0.94 IU/ML
LMWH PPP CHRO-ACNC: 0.96 IU/ML
LMWH PPP CHRO-ACNC: 1.18 IU/ML
LMWH PPP CHRO-ACNC: 1.2 IU/ML
LMWH PPP CHRO-ACNC: 1.23 IU/ML
LMWH PPP CHRO-ACNC: 1.26 IU/ML
LMWH PPP CHRO-ACNC: 1.57 IU/ML
LMWH PPP CHRO-ACNC: 1.6 IU/ML
LMWH PPP CHRO-ACNC: <0.1 IU/ML
LMWH PPP CHRO-ACNC: >2 IU/ML
LMWH PPP CHRO-ACNC: >2 IU/ML
LMWH PPP CHRO-ACNC: NORMAL IU/ML
LMWH PPP CHRO-ACNC: NORMAL IU/ML
LY30 LYSIS AT 30 MINUTES: 0 % (ref 0–8)
LY30 LYSIS AT 30 MINUTES: 0 % (ref 0–8)
LY30 LYSIS AT 30 MINUTES: 0.2 % (ref 0–8)
LY30 LYSIS AT 30 MINUTES: 0.3 % (ref 0–8)
LY60 LYSIS AT 60 MINUTES: 0 % (ref 0–15)
LY60 LYSIS AT 60 MINUTES: 0 % (ref 0–15)
LY60 LYSIS AT 60 MINUTES: 1.4 % (ref 0–15)
LY60 LYSIS AT 60 MINUTES: 1.5 % (ref 0–15)
LY60 LYSIS AT 60 MINUTES: 2.4 % (ref 0–15)
LY60 LYSIS AT 60 MINUTES: 2.4 % (ref 0–15)
LY60 LYSIS AT 60 MINUTES: 2.7 % (ref 0–15)
LY60 LYSIS AT 60 MINUTES: 9.2 % (ref 0–15)
LY60 LYSIS AT 60 MINUTES: ABNORMAL % (ref 0–15)
LY60 LYSIS AT 60 MINUTES: ABNORMAL % (ref 0–15)
LY60 LYSIS AT 60 MINUTES: NORMAL % (ref 0–15)
LYMPHOCYTES # BLD AUTO: 0.9 10E9/L (ref 0.8–5.3)
LYMPHOCYTES # BLD AUTO: 1.1 10E9/L (ref 0.8–5.3)
LYMPHOCYTES # BLD AUTO: 1.2 10E9/L (ref 0.8–5.3)
LYMPHOCYTES # BLD AUTO: 1.3 10E9/L (ref 0.8–5.3)
LYMPHOCYTES # BLD AUTO: 1.5 10E9/L (ref 0.8–5.3)
LYMPHOCYTES # BLD AUTO: 1.6 10E9/L (ref 0.8–5.3)
LYMPHOCYTES # BLD AUTO: 1.9 10E9/L (ref 0.8–5.3)
LYMPHOCYTES # BLD AUTO: 1.9 10E9/L (ref 0.8–5.3)
LYMPHOCYTES # BLD AUTO: 2.4 10E9/L (ref 0.8–5.3)
LYMPHOCYTES # BLD AUTO: 2.9 10E9/L (ref 0.8–5.3)
LYMPHOCYTES NFR BLD AUTO: 10 %
LYMPHOCYTES NFR BLD AUTO: 10.9 %
LYMPHOCYTES NFR BLD AUTO: 12.3 %
LYMPHOCYTES NFR BLD AUTO: 14.7 %
LYMPHOCYTES NFR BLD AUTO: 15.2 %
LYMPHOCYTES NFR BLD AUTO: 15.8 %
LYMPHOCYTES NFR BLD AUTO: 17.9 %
LYMPHOCYTES NFR BLD AUTO: 18.9 %
LYMPHOCYTES NFR BLD AUTO: 19.4 %
LYMPHOCYTES NFR BLD AUTO: 19.6 %
LYMPHOCYTES NFR BLD AUTO: 20.2 %
LYMPHOCYTES NFR BLD AUTO: 27.8 %
LYMPHOCYTES NFR BLD AUTO: 30.3 %
LYMPHOCYTES NFR BLD AUTO: 34.5 %
LYMPHOCYTES NFR FLD MANUAL: 6 %
Lab: ABNORMAL
Lab: NORMAL
MA MAXIMUM CLOT STRENGTH: 52.9 MM (ref 55–74)
MA MAXIMUM CLOT STRENGTH: 54.3 MM (ref 55–74)
MA MAXIMUM CLOT STRENGTH: 56.8 MM (ref 50–70)
MA MAXIMUM CLOT STRENGTH: 61 MM (ref 50–70)
MA MAXIMUM CLOT STRENGTH: 62.3 MM (ref 55–74)
MA MAXIMUM CLOT STRENGTH: 63.3 MM (ref 55–74)
MA MAXIMUM CLOT STRENGTH: 63.9 MM (ref 50–70)
MA MAXIMUM CLOT STRENGTH: 64.1 MM (ref 55–74)
MA MAXIMUM CLOT STRENGTH: 65.1 MM (ref 50–70)
MA MAXIMUM CLOT STRENGTH: 71.5 MM (ref 55–74)
MA MAXIMUM CLOT STRENGTH: NORMAL MM (ref 55–74)
MAGNESIUM SERPL-MCNC: 1.7 MG/DL (ref 1.6–2.3)
MAGNESIUM SERPL-MCNC: 1.8 MG/DL (ref 1.6–2.3)
MAGNESIUM SERPL-MCNC: 1.8 MG/DL (ref 1.6–2.3)
MAGNESIUM SERPL-MCNC: 1.9 MG/DL (ref 1.6–2.3)
MAGNESIUM SERPL-MCNC: 2 MG/DL (ref 1.6–2.3)
MAGNESIUM SERPL-MCNC: 2 MG/DL (ref 1.6–2.3)
MAGNESIUM SERPL-MCNC: 2.1 MG/DL (ref 1.6–2.3)
MAGNESIUM SERPL-MCNC: 2.2 MG/DL (ref 1.6–2.3)
MAGNESIUM SERPL-MCNC: 2.3 MG/DL (ref 1.6–2.3)
MAGNESIUM SERPL-MCNC: 2.4 MG/DL (ref 1.6–2.3)
MAGNESIUM SERPL-MCNC: 2.5 MG/DL (ref 1.6–2.3)
MAGNESIUM SERPL-MCNC: 2.6 MG/DL (ref 1.6–2.3)
MAGNESIUM SERPL-MCNC: 2.7 MG/DL (ref 1.6–2.3)
MAGNESIUM SERPL-MCNC: 2.8 MG/DL (ref 1.6–2.3)
MAGNESIUM SERPL-MCNC: 2.9 MG/DL (ref 1.6–2.3)
MAGNESIUM SERPL-MCNC: 3 MG/DL (ref 1.6–2.3)
MCH RBC QN AUTO: 23.8 PG (ref 26.5–33)
MCH RBC QN AUTO: 23.9 PG (ref 26.5–33)
MCH RBC QN AUTO: 24.1 PG (ref 26.5–33)
MCH RBC QN AUTO: 24.2 PG (ref 26.5–33)
MCH RBC QN AUTO: 24.3 PG (ref 26.5–33)
MCH RBC QN AUTO: 24.4 PG (ref 26.5–33)
MCH RBC QN AUTO: 24.6 PG (ref 26.5–33)
MCH RBC QN AUTO: 25.8 PG (ref 26.5–33)
MCH RBC QN AUTO: 25.8 PG (ref 26.5–33)
MCH RBC QN AUTO: 25.9 PG (ref 26.5–33)
MCH RBC QN AUTO: 26 PG (ref 26.5–33)
MCH RBC QN AUTO: 26.1 PG (ref 26.5–33)
MCH RBC QN AUTO: 26.1 PG (ref 26.5–33)
MCH RBC QN AUTO: 26.4 PG (ref 26.5–33)
MCH RBC QN AUTO: 26.5 PG (ref 26.5–33)
MCH RBC QN AUTO: 26.6 PG (ref 26.5–33)
MCH RBC QN AUTO: 26.7 PG (ref 26.5–33)
MCH RBC QN AUTO: 26.8 PG (ref 26.5–33)
MCH RBC QN AUTO: 26.8 PG (ref 26.5–33)
MCH RBC QN AUTO: 27 PG (ref 26.5–33)
MCH RBC QN AUTO: 27.1 PG (ref 26.5–33)
MCH RBC QN AUTO: 27.4 PG (ref 26.5–33)
MCH RBC QN AUTO: 27.6 PG (ref 26.5–33)
MCH RBC QN AUTO: 27.7 PG (ref 26.5–33)
MCH RBC QN AUTO: 27.9 PG (ref 26.5–33)
MCH RBC QN AUTO: 28 PG (ref 26.5–33)
MCH RBC QN AUTO: 28.2 PG (ref 26.5–33)
MCH RBC QN AUTO: 28.2 PG (ref 26.5–33)
MCH RBC QN AUTO: 28.3 PG (ref 26.5–33)
MCH RBC QN AUTO: 28.4 PG (ref 26.5–33)
MCH RBC QN AUTO: 28.4 PG (ref 26.5–33)
MCH RBC QN AUTO: 28.5 PG (ref 26.5–33)
MCH RBC QN AUTO: 28.7 PG (ref 26.5–33)
MCH RBC QN AUTO: 29 PG (ref 26.5–33)
MCH RBC QN AUTO: 29.1 PG (ref 26.5–33)
MCH RBC QN AUTO: 29.1 PG (ref 26.5–33)
MCH RBC QN AUTO: 29.3 PG (ref 26.5–33)
MCH RBC QN AUTO: 29.3 PG (ref 26.5–33)
MCH RBC QN AUTO: 29.4 PG (ref 26.5–33)
MCH RBC QN AUTO: 29.5 PG (ref 26.5–33)
MCH RBC QN AUTO: 29.5 PG (ref 26.5–33)
MCH RBC QN AUTO: 29.6 PG (ref 26.5–33)
MCH RBC QN AUTO: 29.7 PG (ref 26.5–33)
MCH RBC QN AUTO: 29.8 PG (ref 26.5–33)
MCH RBC QN AUTO: 29.9 PG (ref 26.5–33)
MCH RBC QN AUTO: 30 PG (ref 26.5–33)
MCH RBC QN AUTO: 30 PG (ref 26.5–33)
MCH RBC QN AUTO: 30.1 PG (ref 26.5–33)
MCH RBC QN AUTO: 30.2 PG (ref 26.5–33)
MCH RBC QN AUTO: 30.2 PG (ref 26.5–33)
MCH RBC QN AUTO: 30.3 PG (ref 26.5–33)
MCH RBC QN AUTO: 30.6 PG (ref 26.5–33)
MCHC RBC AUTO-ENTMCNC: 28.9 G/DL (ref 31.5–36.5)
MCHC RBC AUTO-ENTMCNC: 29.2 G/DL (ref 31.5–36.5)
MCHC RBC AUTO-ENTMCNC: 29.6 G/DL (ref 31.5–36.5)
MCHC RBC AUTO-ENTMCNC: 29.7 G/DL (ref 31.5–36.5)
MCHC RBC AUTO-ENTMCNC: 29.8 G/DL (ref 31.5–36.5)
MCHC RBC AUTO-ENTMCNC: 29.9 G/DL (ref 31.5–36.5)
MCHC RBC AUTO-ENTMCNC: 30 G/DL (ref 31.5–36.5)
MCHC RBC AUTO-ENTMCNC: 30 G/DL (ref 31.5–36.5)
MCHC RBC AUTO-ENTMCNC: 30.1 G/DL (ref 31.5–36.5)
MCHC RBC AUTO-ENTMCNC: 30.1 G/DL (ref 31.5–36.5)
MCHC RBC AUTO-ENTMCNC: 30.2 G/DL (ref 31.5–36.5)
MCHC RBC AUTO-ENTMCNC: 30.3 G/DL (ref 31.5–36.5)
MCHC RBC AUTO-ENTMCNC: 30.4 G/DL (ref 31.5–36.5)
MCHC RBC AUTO-ENTMCNC: 30.5 G/DL (ref 31.5–36.5)
MCHC RBC AUTO-ENTMCNC: 30.6 G/DL (ref 31.5–36.5)
MCHC RBC AUTO-ENTMCNC: 30.7 G/DL (ref 31.5–36.5)
MCHC RBC AUTO-ENTMCNC: 30.8 G/DL (ref 31.5–36.5)
MCHC RBC AUTO-ENTMCNC: 30.9 G/DL (ref 31.5–36.5)
MCHC RBC AUTO-ENTMCNC: 31 G/DL (ref 31.5–36.5)
MCHC RBC AUTO-ENTMCNC: 31.2 G/DL (ref 31.5–36.5)
MCHC RBC AUTO-ENTMCNC: 31.3 G/DL (ref 31.5–36.5)
MCHC RBC AUTO-ENTMCNC: 31.3 G/DL (ref 31.5–36.5)
MCHC RBC AUTO-ENTMCNC: 31.5 G/DL (ref 31.5–36.5)
MCHC RBC AUTO-ENTMCNC: 31.6 G/DL (ref 31.5–36.5)
MCHC RBC AUTO-ENTMCNC: 31.7 G/DL (ref 31.5–36.5)
MCHC RBC AUTO-ENTMCNC: 31.8 G/DL (ref 31.5–36.5)
MCHC RBC AUTO-ENTMCNC: 31.9 G/DL (ref 31.5–36.5)
MCHC RBC AUTO-ENTMCNC: 31.9 G/DL (ref 31.5–36.5)
MCHC RBC AUTO-ENTMCNC: 32 G/DL (ref 31.5–36.5)
MCHC RBC AUTO-ENTMCNC: 32 G/DL (ref 31.5–36.5)
MCHC RBC AUTO-ENTMCNC: 32.1 G/DL (ref 31.5–36.5)
MCHC RBC AUTO-ENTMCNC: 32.1 G/DL (ref 31.5–36.5)
MCHC RBC AUTO-ENTMCNC: 32.2 G/DL (ref 31.5–36.5)
MCHC RBC AUTO-ENTMCNC: 32.2 G/DL (ref 31.5–36.5)
MCHC RBC AUTO-ENTMCNC: 32.3 G/DL (ref 31.5–36.5)
MCHC RBC AUTO-ENTMCNC: 32.4 G/DL (ref 31.5–36.5)
MCHC RBC AUTO-ENTMCNC: 32.6 G/DL (ref 31.5–36.5)
MCHC RBC AUTO-ENTMCNC: 32.6 G/DL (ref 31.5–36.5)
MCHC RBC AUTO-ENTMCNC: 32.7 G/DL (ref 31.5–36.5)
MCHC RBC AUTO-ENTMCNC: 32.8 G/DL (ref 31.5–36.5)
MCHC RBC AUTO-ENTMCNC: 32.8 G/DL (ref 31.5–36.5)
MCHC RBC AUTO-ENTMCNC: 32.9 G/DL (ref 31.5–36.5)
MCHC RBC AUTO-ENTMCNC: 33 G/DL (ref 31.5–36.5)
MCHC RBC AUTO-ENTMCNC: 33.1 G/DL (ref 31.5–36.5)
MCHC RBC AUTO-ENTMCNC: 33.1 G/DL (ref 31.5–36.5)
MCHC RBC AUTO-ENTMCNC: 33.2 G/DL (ref 31.5–36.5)
MCHC RBC AUTO-ENTMCNC: 33.6 G/DL (ref 31.5–36.5)
MCHC RBC AUTO-ENTMCNC: 33.8 G/DL (ref 31.5–36.5)
MCV RBC AUTO: 78 FL (ref 78–100)
MCV RBC AUTO: 79 FL (ref 78–100)
MCV RBC AUTO: 79 FL (ref 78–100)
MCV RBC AUTO: 82 FL (ref 78–100)
MCV RBC AUTO: 85 FL (ref 78–100)
MCV RBC AUTO: 86 FL (ref 78–100)
MCV RBC AUTO: 87 FL (ref 78–100)
MCV RBC AUTO: 88 FL (ref 78–100)
MCV RBC AUTO: 89 FL (ref 78–100)
MCV RBC AUTO: 90 FL (ref 78–100)
MCV RBC AUTO: 91 FL (ref 78–100)
MCV RBC AUTO: 92 FL (ref 78–100)
MCV RBC AUTO: 93 FL (ref 78–100)
MCV RBC AUTO: 94 FL (ref 78–100)
MCV RBC AUTO: 95 FL (ref 78–100)
MCV RBC AUTO: 95 FL (ref 78–100)
MCV RBC AUTO: 97 FL (ref 78–100)
MDC_IDC_EPISODE_DTM: NORMAL
MDC_IDC_EPISODE_DURATION: 0 S
MDC_IDC_EPISODE_DURATION: 1 S
MDC_IDC_EPISODE_DURATION: 1080 S
MDC_IDC_EPISODE_DURATION: 1200 S
MDC_IDC_EPISODE_DURATION: 1200 S
MDC_IDC_EPISODE_DURATION: 1249 S
MDC_IDC_EPISODE_DURATION: 1440 S
MDC_IDC_EPISODE_DURATION: 1800 S
MDC_IDC_EPISODE_DURATION: 2 S
MDC_IDC_EPISODE_DURATION: 2400 S
MDC_IDC_EPISODE_DURATION: 3 S
MDC_IDC_EPISODE_DURATION: 360 S
MDC_IDC_EPISODE_DURATION: 480 S
MDC_IDC_EPISODE_DURATION: 5071 S
MDC_IDC_EPISODE_DURATION: 600 S
MDC_IDC_EPISODE_DURATION: 720 S
MDC_IDC_EPISODE_DURATION: 840 S
MDC_IDC_EPISODE_DURATION: 866 S
MDC_IDC_EPISODE_DURATION: 960 S
MDC_IDC_EPISODE_ID: 333
MDC_IDC_EPISODE_ID: 334
MDC_IDC_EPISODE_ID: 335
MDC_IDC_EPISODE_ID: 336
MDC_IDC_EPISODE_ID: 337
MDC_IDC_EPISODE_ID: 338
MDC_IDC_EPISODE_ID: 339
MDC_IDC_EPISODE_ID: 340
MDC_IDC_EPISODE_ID: 341
MDC_IDC_EPISODE_ID: 342
MDC_IDC_EPISODE_ID: 343
MDC_IDC_EPISODE_ID: 344
MDC_IDC_EPISODE_ID: 345
MDC_IDC_EPISODE_ID: 346
MDC_IDC_EPISODE_ID: 347
MDC_IDC_EPISODE_ID: 348
MDC_IDC_EPISODE_ID: 349
MDC_IDC_EPISODE_ID: 350
MDC_IDC_EPISODE_ID: 351
MDC_IDC_EPISODE_ID: 352
MDC_IDC_EPISODE_ID: 353
MDC_IDC_EPISODE_ID: 354
MDC_IDC_EPISODE_ID: 355
MDC_IDC_EPISODE_ID: 356
MDC_IDC_EPISODE_ID: 357
MDC_IDC_EPISODE_ID: 358
MDC_IDC_EPISODE_ID: 359
MDC_IDC_EPISODE_ID: 360
MDC_IDC_EPISODE_ID: 361
MDC_IDC_EPISODE_ID: 362
MDC_IDC_EPISODE_ID: 363
MDC_IDC_EPISODE_ID: 364
MDC_IDC_EPISODE_ID: 365
MDC_IDC_EPISODE_ID: 366
MDC_IDC_EPISODE_ID: 367
MDC_IDC_EPISODE_ID: 368
MDC_IDC_EPISODE_ID: 369
MDC_IDC_EPISODE_ID: 370
MDC_IDC_EPISODE_ID: 40
MDC_IDC_EPISODE_ID: 41
MDC_IDC_EPISODE_ID: 42
MDC_IDC_EPISODE_ID: 43
MDC_IDC_EPISODE_ID: 44
MDC_IDC_EPISODE_ID: 45
MDC_IDC_EPISODE_ID: 46
MDC_IDC_EPISODE_ID: 47
MDC_IDC_EPISODE_ID: 48
MDC_IDC_EPISODE_ID: 49
MDC_IDC_EPISODE_ID: 50
MDC_IDC_EPISODE_ID: 51
MDC_IDC_EPISODE_ID: 52
MDC_IDC_EPISODE_ID: 53
MDC_IDC_EPISODE_ID: 54
MDC_IDC_EPISODE_ID: 71
MDC_IDC_EPISODE_ID: 76
MDC_IDC_EPISODE_ID: 77
MDC_IDC_EPISODE_ID: 78
MDC_IDC_EPISODE_ID: 79
MDC_IDC_EPISODE_ID: 80
MDC_IDC_EPISODE_ID: 81
MDC_IDC_EPISODE_ID: 82
MDC_IDC_EPISODE_ID: 83
MDC_IDC_EPISODE_ID: 84
MDC_IDC_EPISODE_ID: 85
MDC_IDC_EPISODE_ID: 86
MDC_IDC_EPISODE_ID: 87
MDC_IDC_EPISODE_TYPE: NORMAL
MDC_IDC_LEAD_IMPLANT_DT: NORMAL
MDC_IDC_LEAD_LOCATION: NORMAL
MDC_IDC_LEAD_LOCATION_DETAIL_1: NORMAL
MDC_IDC_LEAD_MFG: NORMAL
MDC_IDC_LEAD_MODEL: NORMAL
MDC_IDC_LEAD_POLARITY_TYPE: NORMAL
MDC_IDC_LEAD_SERIAL: NORMAL
MDC_IDC_MSMT_BATTERY_DTM: NORMAL
MDC_IDC_MSMT_BATTERY_REMAINING_LONGEVITY: 135 MO
MDC_IDC_MSMT_BATTERY_REMAINING_LONGEVITY: 136 MO
MDC_IDC_MSMT_BATTERY_RRT_TRIGGER: 2.73
MDC_IDC_MSMT_BATTERY_STATUS: NORMAL
MDC_IDC_MSMT_BATTERY_VOLTAGE: 3.07 V
MDC_IDC_MSMT_BATTERY_VOLTAGE: 3.07 V
MDC_IDC_MSMT_BATTERY_VOLTAGE: 3.08 V
MDC_IDC_MSMT_BATTERY_VOLTAGE: 3.09 V
MDC_IDC_MSMT_BATTERY_VOLTAGE: 3.14 V
MDC_IDC_MSMT_CAP_CHARGE_DTM: NORMAL
MDC_IDC_MSMT_CAP_CHARGE_ENERGY: 18 J
MDC_IDC_MSMT_CAP_CHARGE_ENERGY: 20 J
MDC_IDC_MSMT_CAP_CHARGE_TIME: 3.89
MDC_IDC_MSMT_CAP_CHARGE_TIME: 4.89
MDC_IDC_MSMT_CAP_CHARGE_TYPE: NORMAL
MDC_IDC_MSMT_LEADCHNL_RV_IMPEDANCE_VALUE: 304 OHM
MDC_IDC_MSMT_LEADCHNL_RV_IMPEDANCE_VALUE: 342 OHM
MDC_IDC_MSMT_LEADCHNL_RV_IMPEDANCE_VALUE: 361 OHM
MDC_IDC_MSMT_LEADCHNL_RV_IMPEDANCE_VALUE: 361 OHM
MDC_IDC_MSMT_LEADCHNL_RV_IMPEDANCE_VALUE: 399 OHM
MDC_IDC_MSMT_LEADCHNL_RV_IMPEDANCE_VALUE: 418 OHM
MDC_IDC_MSMT_LEADCHNL_RV_IMPEDANCE_VALUE: 456 OHM
MDC_IDC_MSMT_LEADCHNL_RV_IMPEDANCE_VALUE: 475 OHM
MDC_IDC_MSMT_LEADCHNL_RV_PACING_THRESHOLD_AMPLITUDE: 0.5 V
MDC_IDC_MSMT_LEADCHNL_RV_PACING_THRESHOLD_AMPLITUDE: 0.75 V
MDC_IDC_MSMT_LEADCHNL_RV_PACING_THRESHOLD_PULSEWIDTH: 0.4 MS
MDC_IDC_MSMT_LEADCHNL_RV_SENSING_INTR_AMPL: 10.8 MV
MDC_IDC_MSMT_LEADCHNL_RV_SENSING_INTR_AMPL: 5.75 MV
MDC_IDC_MSMT_LEADCHNL_RV_SENSING_INTR_AMPL: 6 MV
MDC_IDC_MSMT_LEADCHNL_RV_SENSING_INTR_AMPL: 6.38 MV
MDC_IDC_MSMT_LEADCHNL_RV_SENSING_INTR_AMPL: 6.38 MV
MDC_IDC_MSMT_LEADCHNL_RV_SENSING_INTR_AMPL: 7.12 MV
MDC_IDC_MSMT_LEADCHNL_RV_SENSING_INTR_AMPL: 7.62 MV
MDC_IDC_MSMT_LEADCHNL_RV_SENSING_INTR_AMPL: 8 MV
MDC_IDC_PG_IMPLANT_DTM: NORMAL
MDC_IDC_PG_MFG: NORMAL
MDC_IDC_PG_MODEL: NORMAL
MDC_IDC_PG_SERIAL: NORMAL
MDC_IDC_PG_TYPE: NORMAL
MDC_IDC_SESS_CLINIC_NAME: NORMAL
MDC_IDC_SESS_DTM: NORMAL
MDC_IDC_SESS_TYPE: NORMAL
MDC_IDC_SET_BRADY_HYSTRATE: 40 {BEATS}/MIN
MDC_IDC_SET_BRADY_LOWRATE: 50 {BEATS}/MIN
MDC_IDC_SET_BRADY_MODE: NORMAL
MDC_IDC_SET_LEADCHNL_RV_PACING_AMPLITUDE: 2 V
MDC_IDC_SET_LEADCHNL_RV_PACING_AMPLITUDE: 3.5 V
MDC_IDC_SET_LEADCHNL_RV_PACING_ANODE_ELECTRODE_1: NORMAL
MDC_IDC_SET_LEADCHNL_RV_PACING_ANODE_LOCATION_1: NORMAL
MDC_IDC_SET_LEADCHNL_RV_PACING_CAPTURE_MODE: NORMAL
MDC_IDC_SET_LEADCHNL_RV_PACING_CATHODE_ELECTRODE_1: NORMAL
MDC_IDC_SET_LEADCHNL_RV_PACING_CATHODE_LOCATION_1: NORMAL
MDC_IDC_SET_LEADCHNL_RV_PACING_POLARITY: NORMAL
MDC_IDC_SET_LEADCHNL_RV_PACING_PULSEWIDTH: 0.4 MS
MDC_IDC_SET_LEADCHNL_RV_SENSING_ANODE_ELECTRODE_1: NORMAL
MDC_IDC_SET_LEADCHNL_RV_SENSING_ANODE_LOCATION_1: NORMAL
MDC_IDC_SET_LEADCHNL_RV_SENSING_CATHODE_ELECTRODE_1: NORMAL
MDC_IDC_SET_LEADCHNL_RV_SENSING_CATHODE_LOCATION_1: NORMAL
MDC_IDC_SET_LEADCHNL_RV_SENSING_POLARITY: NORMAL
MDC_IDC_SET_LEADCHNL_RV_SENSING_SENSITIVITY: 0.3 MV
MDC_IDC_SET_ZONE_DETECTION_BEATS_DENOMINATOR: 40 {BEATS}
MDC_IDC_SET_ZONE_DETECTION_BEATS_NUMERATOR: 30 {BEATS}
MDC_IDC_SET_ZONE_DETECTION_INTERVAL: 270 MS
MDC_IDC_SET_ZONE_DETECTION_INTERVAL: 300 MS
MDC_IDC_SET_ZONE_DETECTION_INTERVAL: 390 MS
MDC_IDC_SET_ZONE_DETECTION_INTERVAL: 450 MS
MDC_IDC_SET_ZONE_TYPE: NORMAL
MDC_IDC_STAT_AT_BURDEN_PERCENT: 0 %
MDC_IDC_STAT_AT_BURDEN_PERCENT: 0 %
MDC_IDC_STAT_AT_BURDEN_PERCENT: 0.2 %
MDC_IDC_STAT_AT_BURDEN_PERCENT: 0.6 %
MDC_IDC_STAT_AT_BURDEN_PERCENT: 1.4 %
MDC_IDC_STAT_AT_DTM_END: NORMAL
MDC_IDC_STAT_AT_DTM_START: NORMAL
MDC_IDC_STAT_BRADY_DTM_END: NORMAL
MDC_IDC_STAT_BRADY_DTM_START: NORMAL
MDC_IDC_STAT_BRADY_RV_PERCENT_PACED: 0 %
MDC_IDC_STAT_BRADY_RV_PERCENT_PACED: 0.01 %
MDC_IDC_STAT_BRADY_RV_PERCENT_PACED: 0.01 %
MDC_IDC_STAT_BRADY_RV_PERCENT_PACED: 0.17 %
MDC_IDC_STAT_BRADY_RV_PERCENT_PACED: 6.61 %
MDC_IDC_STAT_EPISODE_RECENT_COUNT: 0
MDC_IDC_STAT_EPISODE_RECENT_COUNT: 1
MDC_IDC_STAT_EPISODE_RECENT_COUNT: 10
MDC_IDC_STAT_EPISODE_RECENT_COUNT: 12
MDC_IDC_STAT_EPISODE_RECENT_COUNT: 15
MDC_IDC_STAT_EPISODE_RECENT_COUNT: 2
MDC_IDC_STAT_EPISODE_RECENT_COUNT: 21
MDC_IDC_STAT_EPISODE_RECENT_COUNT: 315
MDC_IDC_STAT_EPISODE_RECENT_COUNT: 9
MDC_IDC_STAT_EPISODE_RECENT_COUNT_DTM_END: NORMAL
MDC_IDC_STAT_EPISODE_RECENT_COUNT_DTM_START: NORMAL
MDC_IDC_STAT_EPISODE_TOTAL_COUNT: 0
MDC_IDC_STAT_EPISODE_TOTAL_COUNT: 10
MDC_IDC_STAT_EPISODE_TOTAL_COUNT: 11
MDC_IDC_STAT_EPISODE_TOTAL_COUNT: 21
MDC_IDC_STAT_EPISODE_TOTAL_COUNT: 315
MDC_IDC_STAT_EPISODE_TOTAL_COUNT: 33
MDC_IDC_STAT_EPISODE_TOTAL_COUNT: 34
MDC_IDC_STAT_EPISODE_TOTAL_COUNT: 34
MDC_IDC_STAT_EPISODE_TOTAL_COUNT: 36
MDC_IDC_STAT_EPISODE_TOTAL_COUNT: 8
MDC_IDC_STAT_EPISODE_TOTAL_COUNT: 9
MDC_IDC_STAT_EPISODE_TOTAL_COUNT_DTM_END: NORMAL
MDC_IDC_STAT_EPISODE_TOTAL_COUNT_DTM_START: NORMAL
MDC_IDC_STAT_EPISODE_TYPE: NORMAL
MDC_IDC_STAT_TACHYTHERAPY_ATP_DELIVERED_RECENT: 0
MDC_IDC_STAT_TACHYTHERAPY_ATP_DELIVERED_RECENT: 10
MDC_IDC_STAT_TACHYTHERAPY_ATP_DELIVERED_TOTAL: 10
MDC_IDC_STAT_TACHYTHERAPY_RECENT_DTM_END: NORMAL
MDC_IDC_STAT_TACHYTHERAPY_RECENT_DTM_START: NORMAL
MDC_IDC_STAT_TACHYTHERAPY_SHOCKS_ABORTED_RECENT: 0
MDC_IDC_STAT_TACHYTHERAPY_SHOCKS_ABORTED_TOTAL: 0
MDC_IDC_STAT_TACHYTHERAPY_SHOCKS_DELIVERED_RECENT: 0
MDC_IDC_STAT_TACHYTHERAPY_SHOCKS_DELIVERED_TOTAL: 0
MDC_IDC_STAT_TACHYTHERAPY_TOTAL_DTM_END: NORMAL
MDC_IDC_STAT_TACHYTHERAPY_TOTAL_DTM_START: NORMAL
METHADONE UR QL SCN: NORMAL NG/ML
MONOCYTES # BLD AUTO: 0.5 10E9/L (ref 0–1.3)
MONOCYTES # BLD AUTO: 0.5 10E9/L (ref 0–1.3)
MONOCYTES # BLD AUTO: 0.6 10E9/L (ref 0–1.3)
MONOCYTES # BLD AUTO: 0.8 10E9/L (ref 0–1.3)
MONOCYTES # BLD AUTO: 1 10E9/L (ref 0–1.3)
MONOCYTES # BLD AUTO: 1.3 10E9/L (ref 0–1.3)
MONOCYTES # BLD AUTO: 1.6 10E9/L (ref 0–1.3)
MONOCYTES NFR BLD AUTO: 10.1 %
MONOCYTES NFR BLD AUTO: 10.4 %
MONOCYTES NFR BLD AUTO: 10.8 %
MONOCYTES NFR BLD AUTO: 11.8 %
MONOCYTES NFR BLD AUTO: 5.3 %
MONOCYTES NFR BLD AUTO: 6 %
MONOCYTES NFR BLD AUTO: 7.1 %
MONOCYTES NFR BLD AUTO: 7.9 %
MONOCYTES NFR BLD AUTO: 8.3 %
MONOCYTES NFR BLD AUTO: 8.6 %
MONOCYTES NFR BLD AUTO: 9.3 %
MONOCYTES NFR BLD AUTO: 9.4 %
MONOCYTES NFR BLD AUTO: 9.5 %
MONOCYTES NFR BLD AUTO: 9.8 %
MUCOUS THREADS #/AREA URNS LPF: PRESENT /LPF
NEUTROPHILS # BLD AUTO: 10.5 10E9/L (ref 1.6–8.3)
NEUTROPHILS # BLD AUTO: 10.8 10E9/L (ref 1.6–8.3)
NEUTROPHILS # BLD AUTO: 2.9 10E9/L (ref 1.6–8.3)
NEUTROPHILS # BLD AUTO: 2.9 10E9/L (ref 1.6–8.3)
NEUTROPHILS # BLD AUTO: 3.9 10E9/L (ref 1.6–8.3)
NEUTROPHILS # BLD AUTO: 4.4 10E9/L (ref 1.6–8.3)
NEUTROPHILS # BLD AUTO: 5.3 10E9/L (ref 1.6–8.3)
NEUTROPHILS # BLD AUTO: 5.3 10E9/L (ref 1.6–8.3)
NEUTROPHILS # BLD AUTO: 5.6 10E9/L (ref 1.6–8.3)
NEUTROPHILS # BLD AUTO: 5.9 10E9/L (ref 1.6–8.3)
NEUTROPHILS # BLD AUTO: 6.3 10E9/L (ref 1.6–8.3)
NEUTROPHILS # BLD AUTO: 6.3 10E9/L (ref 1.6–8.3)
NEUTROPHILS # BLD AUTO: 6.6 10E9/L (ref 1.6–8.3)
NEUTROPHILS # BLD AUTO: 8.8 10E9/L (ref 1.6–8.3)
NEUTROPHILS NFR BLD AUTO: 51.9 %
NEUTROPHILS NFR BLD AUTO: 55.3 %
NEUTROPHILS NFR BLD AUTO: 56 %
NEUTROPHILS NFR BLD AUTO: 62.7 %
NEUTROPHILS NFR BLD AUTO: 65.7 %
NEUTROPHILS NFR BLD AUTO: 69.2 %
NEUTROPHILS NFR BLD AUTO: 71 %
NEUTROPHILS NFR BLD AUTO: 71.1 %
NEUTROPHILS NFR BLD AUTO: 71.4 %
NEUTROPHILS NFR BLD AUTO: 72.4 %
NEUTROPHILS NFR BLD AUTO: 74.3 %
NEUTROPHILS NFR BLD AUTO: 77.6 %
NEUTROPHILS NFR BLD AUTO: 78.7 %
NEUTROPHILS NFR BLD AUTO: 79.5 %
NEUTS BAND NFR FLD MANUAL: 57 %
NITRATE UR QL: NEGATIVE
NONHDLC SERPL-MCNC: 31 MG/DL
NONHDLC SERPL-MCNC: ABNORMAL MG/DL
NRBC # BLD AUTO: 0 10*3/UL
NRBC # BLD AUTO: 0.1 10*3/UL
NRBC # BLD AUTO: 0.1 10*3/UL
NRBC BLD AUTO-RTO: 0 /100
NRBC BLD AUTO-RTO: 1 /100
NRBC BLD AUTO-RTO: 2 /100
NT-PROBNP SERPL-MCNC: ABNORMAL PG/ML (ref 0–1800)
NT-PROBNP SERPL-MCNC: ABNORMAL PG/ML (ref 0–450)
NUM BPU REQUESTED: 2
NUM BPU REQUESTED: 4
NUM BPU REQUESTED: 4
NUM BPU REQUESTED: 5
NUM BPU REQUESTED: 5
NUM BPU REQUESTED: 6
O2/TOTAL GAS SETTING VFR VENT: 100 %
O2/TOTAL GAS SETTING VFR VENT: 2 %
O2/TOTAL GAS SETTING VFR VENT: 21 %
O2/TOTAL GAS SETTING VFR VENT: 30 %
O2/TOTAL GAS SETTING VFR VENT: 35 %
O2/TOTAL GAS SETTING VFR VENT: 35 %
O2/TOTAL GAS SETTING VFR VENT: 40 %
O2/TOTAL GAS SETTING VFR VENT: 410 %
O2/TOTAL GAS SETTING VFR VENT: 50 %
O2/TOTAL GAS SETTING VFR VENT: 60 %
O2/TOTAL GAS SETTING VFR VENT: 70 %
O2/TOTAL GAS SETTING VFR VENT: 80 %
O2/TOTAL GAS SETTING VFR VENT: ABNORMAL %
O2/TOTAL GAS SETTING VFR VENT: NORMAL %
OPIATES UR QL SCN: NEGATIVE
OPIATES UR QL SCN: NEGATIVE
OPIATES UR QL SCN: NORMAL NG/ML
OTHER CELLS FLD MANUAL: 36 %
OXYCODONE UR QL SCN: NORMAL NG/ML
OXYHGB MFR BLD: 52 % (ref 92–100)
OXYHGB MFR BLD: 94 % (ref 92–100)
OXYHGB MFR BLD: 95 % (ref 92–100)
OXYHGB MFR BLD: 96 % (ref 92–100)
OXYHGB MFR BLD: 96 % (ref 92–100)
OXYHGB MFR BLD: 97 % (ref 92–100)
OXYHGB MFR BLD: 98 % (ref 92–100)
OXYHGB MFR BLD: 99 % (ref 92–100)
OXYHGB MFR BLDV: 35 %
OXYHGB MFR BLDV: 36 %
OXYHGB MFR BLDV: 38 %
OXYHGB MFR BLDV: 39 %
OXYHGB MFR BLDV: 40 %
OXYHGB MFR BLDV: 40 %
OXYHGB MFR BLDV: 43 %
OXYHGB MFR BLDV: 44 %
OXYHGB MFR BLDV: 45 %
OXYHGB MFR BLDV: 46 %
OXYHGB MFR BLDV: 47 %
OXYHGB MFR BLDV: 48 %
OXYHGB MFR BLDV: 49 %
OXYHGB MFR BLDV: 50 %
OXYHGB MFR BLDV: 50 %
OXYHGB MFR BLDV: 52 %
OXYHGB MFR BLDV: 53 %
OXYHGB MFR BLDV: 54 %
OXYHGB MFR BLDV: 54 %
OXYHGB MFR BLDV: 56 %
OXYHGB MFR BLDV: 57 %
OXYHGB MFR BLDV: 58 %
OXYHGB MFR BLDV: 59 %
OXYHGB MFR BLDV: 60 %
OXYHGB MFR BLDV: 61 %
OXYHGB MFR BLDV: 62 %
OXYHGB MFR BLDV: 63 %
OXYHGB MFR BLDV: 64 %
OXYHGB MFR BLDV: 65 %
OXYHGB MFR BLDV: 66 %
OXYHGB MFR BLDV: 67 %
OXYHGB MFR BLDV: 68 %
OXYHGB MFR BLDV: 69 %
OXYHGB MFR BLDV: 70 %
OXYHGB MFR BLDV: 71 %
OXYHGB MFR BLDV: 72 %
OXYHGB MFR BLDV: 73 %
OXYHGB MFR BLDV: 74 %
OXYHGB MFR BLDV: 74 %
OXYHGB MFR BLDV: 75 %
OXYHGB MFR BLDV: 76 %
OXYHGB MFR BLDV: 76 %
OXYHGB MFR BLDV: 77 %
OXYHGB MFR BLDV: 78 %
OXYHGB MFR BLDV: NORMAL %
PCO2 BLD: 21 MM HG (ref 35–45)
PCO2 BLD: 26 MM HG (ref 35–45)
PCO2 BLD: 26 MM HG (ref 35–45)
PCO2 BLD: 28 MM HG (ref 35–45)
PCO2 BLD: 29 MM HG (ref 35–45)
PCO2 BLD: 29 MM HG (ref 35–45)
PCO2 BLD: 30 MM HG (ref 35–45)
PCO2 BLD: 31 MM HG (ref 35–45)
PCO2 BLD: 32 MM HG (ref 35–45)
PCO2 BLD: 33 MM HG (ref 35–45)
PCO2 BLD: 34 MM HG (ref 35–45)
PCO2 BLD: 35 MM HG (ref 35–45)
PCO2 BLD: 36 MM HG (ref 35–45)
PCO2 BLD: 39 MM HG (ref 35–45)
PCO2 BLD: 41 MM HG (ref 35–45)
PCO2 BLD: 44 MM HG (ref 35–45)
PCO2 BLD: 45 MM HG (ref 35–45)
PCO2 BLD: 45 MM HG (ref 35–45)
PCO2 BLD: 48 MM HG (ref 35–45)
PCO2 BLDV: 25 MM HG (ref 40–50)
PCO2 BLDV: 26 MM HG (ref 40–50)
PCO2 BLDV: 29 MM HG (ref 40–50)
PCO2 BLDV: 30 MM HG (ref 40–50)
PCO2 BLDV: 31 MM HG (ref 40–50)
PCO2 BLDV: 32 MM HG (ref 40–50)
PCO2 BLDV: 33 MM HG (ref 40–50)
PCO2 BLDV: 34 MM HG (ref 40–50)
PCO2 BLDV: 35 MM HG (ref 40–50)
PCO2 BLDV: 36 MM HG (ref 40–50)
PCO2 BLDV: 37 MM HG (ref 40–50)
PCO2 BLDV: 38 MM HG (ref 40–50)
PCO2 BLDV: 39 MM HG (ref 40–50)
PCO2 BLDV: 40 MM HG (ref 40–50)
PCO2 BLDV: 41 MM HG (ref 40–50)
PCO2 BLDV: 41 MM HG (ref 40–50)
PCO2 BLDV: 42 MM HG (ref 40–50)
PCO2 BLDV: 43 MM HG (ref 40–50)
PCO2 BLDV: 43 MM HG (ref 40–50)
PCO2 BLDV: 44 MM HG (ref 40–50)
PCO2 BLDV: 45 MM HG (ref 40–50)
PCO2 BLDV: 46 MM HG (ref 40–50)
PCO2 BLDV: 46 MM HG (ref 40–50)
PCO2 BLDV: 47 MM HG (ref 40–50)
PCO2 BLDV: NORMAL MM HG (ref 40–50)
PCP UR QL SCN: NEGATIVE
PCP UR QL SCN: NEGATIVE
PCP UR QL SCN: NORMAL NG/ML
PETH BLD-MCNC: NEGATIVE NG/ML
PH BLD: 7.29 PH (ref 7.35–7.45)
PH BLD: 7.31 PH (ref 7.35–7.45)
PH BLD: 7.32 PH (ref 7.35–7.45)
PH BLD: 7.32 PH (ref 7.35–7.45)
PH BLD: 7.33 PH (ref 7.35–7.45)
PH BLD: 7.35 PH (ref 7.35–7.45)
PH BLD: 7.37 PH (ref 7.35–7.45)
PH BLD: 7.38 PH (ref 7.35–7.45)
PH BLD: 7.39 PH (ref 7.35–7.45)
PH BLD: 7.4 PH (ref 7.35–7.45)
PH BLD: 7.41 PH (ref 7.35–7.45)
PH BLD: 7.42 PH (ref 7.35–7.45)
PH BLD: 7.42 PH (ref 7.35–7.45)
PH BLD: 7.43 PH (ref 7.35–7.45)
PH BLD: 7.43 PH (ref 7.35–7.45)
PH BLD: 7.44 PH (ref 7.35–7.45)
PH BLD: 7.44 PH (ref 7.35–7.45)
PH BLD: 7.45 PH (ref 7.35–7.45)
PH BLD: 7.46 PH (ref 7.35–7.45)
PH BLD: 7.47 PH (ref 7.35–7.45)
PH BLD: 7.47 PH (ref 7.35–7.45)
PH BLD: 7.48 PH (ref 7.35–7.45)
PH BLD: 7.48 PH (ref 7.35–7.45)
PH BLD: 7.5 PH (ref 7.35–7.45)
PH BLD: 7.51 PH (ref 7.35–7.45)
PH BLD: 7.52 PH (ref 7.35–7.45)
PH BLD: 7.53 PH (ref 7.35–7.45)
PH BLD: 7.54 PH (ref 7.35–7.45)
PH BLDV: 7.26 PH (ref 7.32–7.43)
PH BLDV: 7.27 PH (ref 7.32–7.43)
PH BLDV: 7.28 PH (ref 7.32–7.43)
PH BLDV: 7.29 PH (ref 7.32–7.43)
PH BLDV: 7.3 PH (ref 7.32–7.43)
PH BLDV: 7.3 PH (ref 7.32–7.43)
PH BLDV: 7.31 PH (ref 7.32–7.43)
PH BLDV: 7.31 PH (ref 7.32–7.43)
PH BLDV: 7.32 PH (ref 7.32–7.43)
PH BLDV: 7.33 PH (ref 7.32–7.43)
PH BLDV: 7.34 PH (ref 7.32–7.43)
PH BLDV: 7.35 PH (ref 7.32–7.43)
PH BLDV: 7.36 PH (ref 7.32–7.43)
PH BLDV: 7.37 PH (ref 7.32–7.43)
PH BLDV: 7.38 PH (ref 7.32–7.43)
PH BLDV: 7.39 PH (ref 7.32–7.43)
PH BLDV: 7.4 PH (ref 7.32–7.43)
PH BLDV: 7.41 PH (ref 7.32–7.43)
PH BLDV: 7.42 PH (ref 7.32–7.43)
PH BLDV: 7.43 PH (ref 7.32–7.43)
PH BLDV: 7.44 PH (ref 7.32–7.43)
PH BLDV: 7.45 PH (ref 7.32–7.43)
PH BLDV: 7.45 PH (ref 7.32–7.43)
PH BLDV: 7.46 PH (ref 7.32–7.43)
PH BLDV: 7.46 PH (ref 7.32–7.43)
PH BLDV: 7.49 PH (ref 7.32–7.43)
PH BLDV: 7.51 PH (ref 7.32–7.43)
PH BLDV: NORMAL PH (ref 7.32–7.43)
PH UR STRIP: 5 PH (ref 5–7)
PH UR STRIP: 5 PH (ref 5–7)
PH UR STRIP: 6 PH (ref 5–7)
PH UR STRIP: 6 PH (ref 5–7)
PH UR STRIP: 8 PH (ref 5–7)
PHOSPHATE SERPL-MCNC: 2.5 MG/DL (ref 2.5–4.5)
PHOSPHATE SERPL-MCNC: 2.8 MG/DL (ref 2.5–4.5)
PHOSPHATE SERPL-MCNC: 2.9 MG/DL (ref 2.5–4.5)
PHOSPHATE SERPL-MCNC: 2.9 MG/DL (ref 2.5–4.5)
PHOSPHATE SERPL-MCNC: 3 MG/DL (ref 2.5–4.5)
PHOSPHATE SERPL-MCNC: 3 MG/DL (ref 2.5–4.5)
PHOSPHATE SERPL-MCNC: 3.1 MG/DL (ref 2.5–4.5)
PHOSPHATE SERPL-MCNC: 3.1 MG/DL (ref 2.5–4.5)
PHOSPHATE SERPL-MCNC: 3.2 MG/DL (ref 2.5–4.5)
PHOSPHATE SERPL-MCNC: 3.3 MG/DL (ref 2.5–4.5)
PHOSPHATE SERPL-MCNC: 3.4 MG/DL (ref 2.5–4.5)
PHOSPHATE SERPL-MCNC: 3.4 MG/DL (ref 2.5–4.5)
PHOSPHATE SERPL-MCNC: 3.6 MG/DL (ref 2.5–4.5)
PHOSPHATE SERPL-MCNC: 3.7 MG/DL (ref 2.5–4.5)
PHOSPHATE SERPL-MCNC: 3.8 MG/DL (ref 2.5–4.5)
PHOSPHATE SERPL-MCNC: 3.9 MG/DL (ref 2.5–4.5)
PHOSPHATE SERPL-MCNC: 4 MG/DL (ref 2.5–4.5)
PHOSPHATE SERPL-MCNC: 4.1 MG/DL (ref 2.5–4.5)
PHOSPHATE SERPL-MCNC: 4.4 MG/DL (ref 2.5–4.5)
PHOSPHATE SERPL-MCNC: 4.5 MG/DL (ref 2.5–4.5)
PHOSPHATE SERPL-MCNC: 4.6 MG/DL (ref 2.5–4.5)
PHOSPHATE SERPL-MCNC: 4.6 MG/DL (ref 2.5–4.5)
PHOSPHATE SERPL-MCNC: 4.7 MG/DL (ref 2.5–4.5)
PHOSPHATE SERPL-MCNC: 5.1 MG/DL (ref 2.5–4.5)
PHOSPHATE SERPL-MCNC: 5.1 MG/DL (ref 2.5–4.5)
PHOSPHATE SERPL-MCNC: 5.2 MG/DL (ref 2.5–4.5)
PHOSPHATE SERPL-MCNC: 5.4 MG/DL (ref 2.5–4.5)
PHOSPHATE SERPL-MCNC: 6.2 MG/DL (ref 2.5–4.5)
PLATELET # BLD AUTO: 101 10E9/L (ref 150–450)
PLATELET # BLD AUTO: 102 10E9/L (ref 150–450)
PLATELET # BLD AUTO: 103 10E9/L (ref 150–450)
PLATELET # BLD AUTO: 103 10E9/L (ref 150–450)
PLATELET # BLD AUTO: 105 10E9/L (ref 150–450)
PLATELET # BLD AUTO: 112 10E9/L (ref 150–450)
PLATELET # BLD AUTO: 112 10E9/L (ref 150–450)
PLATELET # BLD AUTO: 113 10E9/L (ref 150–450)
PLATELET # BLD AUTO: 116 10E9/L (ref 150–450)
PLATELET # BLD AUTO: 119 10E9/L (ref 150–450)
PLATELET # BLD AUTO: 128 10E9/L (ref 150–450)
PLATELET # BLD AUTO: 131 10E9/L (ref 150–450)
PLATELET # BLD AUTO: 133 10E9/L (ref 150–450)
PLATELET # BLD AUTO: 133 10E9/L (ref 150–450)
PLATELET # BLD AUTO: 134 10E9/L (ref 150–450)
PLATELET # BLD AUTO: 134 10E9/L (ref 150–450)
PLATELET # BLD AUTO: 135 10E9/L (ref 150–450)
PLATELET # BLD AUTO: 135 10E9/L (ref 150–450)
PLATELET # BLD AUTO: 138 10E9/L (ref 150–450)
PLATELET # BLD AUTO: 139 10E9/L (ref 150–450)
PLATELET # BLD AUTO: 142 10E9/L (ref 150–450)
PLATELET # BLD AUTO: 146 10E9/L (ref 150–450)
PLATELET # BLD AUTO: 160 10E9/L (ref 150–450)
PLATELET # BLD AUTO: 161 10E9/L (ref 150–450)
PLATELET # BLD AUTO: 165 10E9/L (ref 150–450)
PLATELET # BLD AUTO: 166 10E9/L (ref 150–450)
PLATELET # BLD AUTO: 166 10E9/L (ref 150–450)
PLATELET # BLD AUTO: 170 10E9/L (ref 150–450)
PLATELET # BLD AUTO: 171 10E9/L (ref 150–450)
PLATELET # BLD AUTO: 183 10E9/L (ref 150–450)
PLATELET # BLD AUTO: 191 10E9/L (ref 150–450)
PLATELET # BLD AUTO: 191 10E9/L (ref 150–450)
PLATELET # BLD AUTO: 194 10E9/L (ref 150–450)
PLATELET # BLD AUTO: 195 10E9/L (ref 150–450)
PLATELET # BLD AUTO: 197 10E9/L (ref 150–450)
PLATELET # BLD AUTO: 199 10E9/L (ref 150–450)
PLATELET # BLD AUTO: 201 10E9/L (ref 150–450)
PLATELET # BLD AUTO: 205 10E9/L (ref 150–450)
PLATELET # BLD AUTO: 211 10E9/L (ref 150–450)
PLATELET # BLD AUTO: 214 10E9/L (ref 150–450)
PLATELET # BLD AUTO: 215 10E9/L (ref 150–450)
PLATELET # BLD AUTO: 217 10E9/L (ref 150–450)
PLATELET # BLD AUTO: 225 10E9/L (ref 150–450)
PLATELET # BLD AUTO: 225 10E9/L (ref 150–450)
PLATELET # BLD AUTO: 230 10E9/L (ref 150–450)
PLATELET # BLD AUTO: 231 10E9/L (ref 150–450)
PLATELET # BLD AUTO: 234 10E9/L (ref 150–450)
PLATELET # BLD AUTO: 236 10E9/L (ref 150–450)
PLATELET # BLD AUTO: 236 10E9/L (ref 150–450)
PLATELET # BLD AUTO: 240 10E9/L (ref 150–450)
PLATELET # BLD AUTO: 241 10E9/L (ref 150–450)
PLATELET # BLD AUTO: 246 10E9/L (ref 150–450)
PLATELET # BLD AUTO: 246 10E9/L (ref 150–450)
PLATELET # BLD AUTO: 249 10E9/L (ref 150–450)
PLATELET # BLD AUTO: 253 10E9/L (ref 150–450)
PLATELET # BLD AUTO: 255 10E9/L (ref 150–450)
PLATELET # BLD AUTO: 255 10E9/L (ref 150–450)
PLATELET # BLD AUTO: 263 10E9/L (ref 150–450)
PLATELET # BLD AUTO: 265 10E9/L (ref 150–450)
PLATELET # BLD AUTO: 267 10E9/L (ref 150–450)
PLATELET # BLD AUTO: 272 10E9/L (ref 150–450)
PLATELET # BLD AUTO: 282 10E9/L (ref 150–450)
PLATELET # BLD AUTO: 284 10E9/L (ref 150–450)
PLATELET # BLD AUTO: 287 10E9/L (ref 150–450)
PLATELET # BLD AUTO: 292 10E9/L (ref 150–450)
PLATELET # BLD AUTO: 294 10E9/L (ref 150–450)
PLATELET # BLD AUTO: 304 10E9/L (ref 150–450)
PLATELET # BLD AUTO: 340 10E9/L (ref 150–450)
PLATELET # BLD AUTO: 347 10E9/L (ref 150–450)
PLATELET # BLD AUTO: 351 10E9/L (ref 150–450)
PLATELET # BLD AUTO: 352 10E9/L (ref 150–450)
PLATELET # BLD AUTO: 367 10E9/L (ref 150–450)
PLATELET # BLD AUTO: 46 10E9/L (ref 150–450)
PLATELET # BLD AUTO: 75 10E9/L (ref 150–450)
PLATELET # BLD AUTO: 86 10E9/L (ref 150–450)
PLATELET # BLD AUTO: 89 10E9/L (ref 150–450)
PLATELET # BLD AUTO: 89 10E9/L (ref 150–450)
PLATELET # BLD AUTO: 90 10E9/L (ref 150–450)
PLATELET # BLD AUTO: 91 10E9/L (ref 150–450)
PLATELET # BLD AUTO: 96 10E9/L (ref 150–450)
PLATELET # BLD AUTO: 97 10E9/L (ref 150–450)
PLATELET INHIBITION WITH AA: 89 %
PLATELET INHIBITION WITH ADP: 52 %
PO2 BLD: 101 MM HG (ref 80–105)
PO2 BLD: 101 MM HG (ref 80–105)
PO2 BLD: 105 MM HG (ref 80–105)
PO2 BLD: 111 MM HG (ref 80–105)
PO2 BLD: 114 MM HG (ref 80–105)
PO2 BLD: 114 MM HG (ref 80–105)
PO2 BLD: 115 MM HG (ref 80–105)
PO2 BLD: 121 MM HG (ref 80–105)
PO2 BLD: 126 MM HG (ref 80–105)
PO2 BLD: 126 MM HG (ref 80–105)
PO2 BLD: 129 MM HG (ref 80–105)
PO2 BLD: 129 MM HG (ref 80–105)
PO2 BLD: 131 MM HG (ref 80–105)
PO2 BLD: 132 MM HG (ref 80–105)
PO2 BLD: 132 MM HG (ref 80–105)
PO2 BLD: 135 MM HG (ref 80–105)
PO2 BLD: 141 MM HG (ref 80–105)
PO2 BLD: 144 MM HG (ref 80–105)
PO2 BLD: 144 MM HG (ref 80–105)
PO2 BLD: 148 MM HG (ref 80–105)
PO2 BLD: 156 MM HG (ref 80–105)
PO2 BLD: 158 MM HG (ref 80–105)
PO2 BLD: 160 MM HG (ref 80–105)
PO2 BLD: 163 MM HG (ref 80–105)
PO2 BLD: 165 MM HG (ref 80–105)
PO2 BLD: 170 MM HG (ref 80–105)
PO2 BLD: 171 MM HG (ref 80–105)
PO2 BLD: 173 MM HG (ref 80–105)
PO2 BLD: 177 MM HG (ref 80–105)
PO2 BLD: 186 MM HG (ref 80–105)
PO2 BLD: 29 MM HG (ref 80–105)
PO2 BLD: 367 MM HG (ref 80–105)
PO2 BLD: 377 MM HG (ref 80–105)
PO2 BLD: 383 MM HG (ref 80–105)
PO2 BLD: 394 MM HG (ref 80–105)
PO2 BLD: 40 MM HG (ref 80–105)
PO2 BLD: 43 MM HG (ref 80–105)
PO2 BLD: 49 MM HG (ref 80–105)
PO2 BLD: 70 MM HG (ref 80–105)
PO2 BLD: 82 MM HG (ref 80–105)
PO2 BLD: 84 MM HG (ref 80–105)
PO2 BLD: 91 MM HG (ref 80–105)
PO2 BLD: 95 MM HG (ref 80–105)
PO2 BLD: 95 MM HG (ref 80–105)
PO2 BLD: 99 MM HG (ref 80–105)
PO2 BLD: 99 MM HG (ref 80–105)
PO2 BLDV: 21 MM HG (ref 25–47)
PO2 BLDV: 22 MM HG (ref 25–47)
PO2 BLDV: 22 MM HG (ref 25–47)
PO2 BLDV: 24 MM HG (ref 25–47)
PO2 BLDV: 24 MM HG (ref 25–47)
PO2 BLDV: 25 MM HG (ref 25–47)
PO2 BLDV: 26 MM HG (ref 25–47)
PO2 BLDV: 27 MM HG (ref 25–47)
PO2 BLDV: 28 MM HG (ref 25–47)
PO2 BLDV: 29 MM HG (ref 25–47)
PO2 BLDV: 30 MM HG (ref 25–47)
PO2 BLDV: 31 MM HG (ref 25–47)
PO2 BLDV: 32 MM HG (ref 25–47)
PO2 BLDV: 33 MM HG (ref 25–47)
PO2 BLDV: 34 MM HG (ref 25–47)
PO2 BLDV: 35 MM HG (ref 25–47)
PO2 BLDV: 36 MM HG (ref 25–47)
PO2 BLDV: 37 MM HG (ref 25–47)
PO2 BLDV: 38 MM HG (ref 25–47)
PO2 BLDV: 39 MM HG (ref 25–47)
PO2 BLDV: 40 MM HG (ref 25–47)
PO2 BLDV: 41 MM HG (ref 25–47)
PO2 BLDV: 42 MM HG (ref 25–47)
PO2 BLDV: 42 MM HG (ref 25–47)
PO2 BLDV: 43 MM HG (ref 25–47)
PO2 BLDV: 46 MM HG (ref 25–47)
PO2 BLDV: NORMAL MM HG (ref 25–47)
POTASSIUM BLD-SCNC: 2.3 MMOL/L (ref 3.4–5.3)
POTASSIUM BLD-SCNC: 3.2 MMOL/L (ref 3.4–5.3)
POTASSIUM BLD-SCNC: 3.5 MMOL/L (ref 3.4–5.3)
POTASSIUM BLD-SCNC: 3.6 MMOL/L (ref 3.4–5.3)
POTASSIUM BLD-SCNC: 3.7 MMOL/L (ref 3.4–5.3)
POTASSIUM BLD-SCNC: 3.8 MMOL/L (ref 3.4–5.3)
POTASSIUM BLD-SCNC: 4 MMOL/L (ref 3.4–5.3)
POTASSIUM BLD-SCNC: 4.1 MMOL/L (ref 3.4–5.3)
POTASSIUM BLD-SCNC: 4.3 MMOL/L (ref 3.4–5.3)
POTASSIUM BLD-SCNC: 4.7 MMOL/L (ref 3.4–5.3)
POTASSIUM BLD-SCNC: 4.8 MMOL/L (ref 3.4–5.3)
POTASSIUM BLD-SCNC: 4.8 MMOL/L (ref 3.4–5.3)
POTASSIUM SERPL-SCNC: 2.6 MMOL/L (ref 3.4–5.3)
POTASSIUM SERPL-SCNC: 3 MMOL/L (ref 3.4–5.3)
POTASSIUM SERPL-SCNC: 3 MMOL/L (ref 3.4–5.3)
POTASSIUM SERPL-SCNC: 3.3 MMOL/L (ref 3.4–5.3)
POTASSIUM SERPL-SCNC: 3.3 MMOL/L (ref 3.4–5.3)
POTASSIUM SERPL-SCNC: 3.4 MMOL/L (ref 3.4–5.3)
POTASSIUM SERPL-SCNC: 3.5 MMOL/L (ref 3.4–5.3)
POTASSIUM SERPL-SCNC: 3.6 MMOL/L (ref 3.4–5.3)
POTASSIUM SERPL-SCNC: 3.7 MMOL/L (ref 3.4–5.3)
POTASSIUM SERPL-SCNC: 3.8 MMOL/L (ref 3.4–5.3)
POTASSIUM SERPL-SCNC: 3.9 MMOL/L (ref 3.4–5.3)
POTASSIUM SERPL-SCNC: 4 MMOL/L (ref 3.4–5.3)
POTASSIUM SERPL-SCNC: 4.1 MMOL/L (ref 3.4–5.3)
POTASSIUM SERPL-SCNC: 4.2 MMOL/L (ref 3.4–5.3)
POTASSIUM SERPL-SCNC: 4.2 MMOL/L (ref 3.5–5.1)
POTASSIUM SERPL-SCNC: 4.3 MMOL/L (ref 3.4–5.3)
POTASSIUM SERPL-SCNC: 4.4 MMOL/L (ref 3.4–5.3)
POTASSIUM SERPL-SCNC: 4.5 MMOL/L (ref 3.4–5.3)
POTASSIUM SERPL-SCNC: 4.5 MMOL/L (ref 3.4–5.3)
POTASSIUM SERPL-SCNC: 4.6 MMOL/L (ref 3.4–5.3)
POTASSIUM SERPL-SCNC: 4.7 MMOL/L (ref 3.4–5.3)
POTASSIUM SERPL-SCNC: 4.8 MMOL/L (ref 3.4–5.3)
POTASSIUM SERPL-SCNC: 4.9 MMOL/L (ref 3.4–5.3)
POTASSIUM SERPL-SCNC: 5.1 MMOL/L (ref 3.4–5.3)
POTASSIUM SERPL-SCNC: 5.3 MMOL/L (ref 3.4–5.3)
POTASSIUM SERPL-SCNC: 6.2 MMOL/L (ref 3.4–5.3)
POTASSIUM SERPL-SCNC: 6.8 MMOL/L (ref 3.4–5.3)
POTASSIUM SERPL-SCNC: 7.4 MMOL/L (ref 3.4–5.3)
PREALB SERPL IA-MCNC: 12 MG/DL (ref 15–45)
PREALB SERPL IA-MCNC: 14 MG/DL (ref 15–45)
PREALB SERPL IA-MCNC: 18 MG/DL (ref 15–45)
PREALB SERPL IA-MCNC: 9 MG/DL (ref 15–45)
PROCALCITONIN SERPL-MCNC: 0.06 NG/ML
PROPOXYPH UR QL: NORMAL NG/ML
PROT FLD-MCNC: 1.6 G/DL
PROT SERPL-MCNC: 3.5 G/DL (ref 6.8–8.8)
PROT SERPL-MCNC: 4 G/DL (ref 6.8–8.8)
PROT SERPL-MCNC: 4 G/DL (ref 6.8–8.8)
PROT SERPL-MCNC: 4.2 G/DL (ref 6.8–8.8)
PROT SERPL-MCNC: 4.2 G/DL (ref 6.8–8.8)
PROT SERPL-MCNC: 4.3 G/DL (ref 6.8–8.8)
PROT SERPL-MCNC: 4.3 G/DL (ref 6.8–8.8)
PROT SERPL-MCNC: 4.4 G/DL (ref 6.8–8.8)
PROT SERPL-MCNC: 4.5 G/DL (ref 6.8–8.8)
PROT SERPL-MCNC: 4.5 G/DL (ref 6.8–8.8)
PROT SERPL-MCNC: 4.6 G/DL (ref 6.8–8.8)
PROT SERPL-MCNC: 4.6 G/DL (ref 6.8–8.8)
PROT SERPL-MCNC: 4.7 G/DL (ref 6.8–8.8)
PROT SERPL-MCNC: 4.7 G/DL (ref 6.8–8.8)
PROT SERPL-MCNC: 4.8 G/DL (ref 6.8–8.8)
PROT SERPL-MCNC: 5 G/DL (ref 6.8–8.8)
PROT SERPL-MCNC: 5.1 G/DL (ref 6.8–8.8)
PROT SERPL-MCNC: 5.2 G/DL (ref 6.8–8.8)
PROT SERPL-MCNC: 5.6 G/DL (ref 6.8–8.8)
PROT SERPL-MCNC: 5.7 G/DL (ref 6.8–8.8)
PROT SERPL-MCNC: 5.7 G/DL (ref 6.8–8.8)
PROT SERPL-MCNC: 6.2 G/DL (ref 6.8–8.8)
PROT SERPL-MCNC: 6.2 G/DL (ref 6.8–8.8)
PROT SERPL-MCNC: 6.3 G/DL (ref 6.8–8.8)
PROT SERPL-MCNC: 6.4 G/DL (ref 6.8–8.8)
PROT SERPL-MCNC: 6.5 G/DL (ref 6.8–8.8)
PROT SERPL-MCNC: 6.5 G/DL (ref 6.8–8.8)
PROT SERPL-MCNC: 6.6 G/DL (ref 6.8–8.8)
PROT SERPL-MCNC: 7 G/DL (ref 6.8–8.8)
PROT SERPL-MCNC: 7.2 G/DL (ref 6.8–8.8)
PROT SERPL-MCNC: 7.2 G/DL (ref 6.8–8.8)
R TIME UNTIL CLOT FORMS: 10 MIN (ref 4–9)
R TIME UNTIL CLOT FORMS: 10.3 MIN (ref 4–9)
R TIME UNTIL CLOT FORMS: 10.4 MINUTE (ref 5–10)
R TIME UNTIL CLOT FORMS: 3.4 MINUTE (ref 5–10)
R TIME UNTIL CLOT FORMS: 5 MIN (ref 4–9)
R TIME UNTIL CLOT FORMS: 5.1 MIN (ref 4–9)
R TIME UNTIL CLOT FORMS: 5.1 MINUTE (ref 5–10)
R TIME UNTIL CLOT FORMS: 5.4 MIN (ref 4–9)
R TIME UNTIL CLOT FORMS: 6.4 MIN (ref 4–9)
R TIME UNTIL CLOT FORMS: 9.3 MINUTE (ref 5–10)
R TIME UNTIL CLOT FORMS: NORMAL MIN (ref 4–9)
RADIOLOGIST FLAGS: ABNORMAL
RBC # BLD AUTO: 1.91 10E12/L (ref 3.8–5.2)
RBC # BLD AUTO: 2.17 10E12/L (ref 3.8–5.2)
RBC # BLD AUTO: 2.19 10E12/L (ref 3.8–5.2)
RBC # BLD AUTO: 2.23 10E12/L (ref 3.8–5.2)
RBC # BLD AUTO: 2.29 10E12/L (ref 3.8–5.2)
RBC # BLD AUTO: 2.29 10E12/L (ref 3.8–5.2)
RBC # BLD AUTO: 2.32 10E12/L (ref 3.8–5.2)
RBC # BLD AUTO: 2.44 10E12/L (ref 3.8–5.2)
RBC # BLD AUTO: 2.45 10E12/L (ref 3.8–5.2)
RBC # BLD AUTO: 2.47 10E12/L (ref 3.8–5.2)
RBC # BLD AUTO: 2.48 10E12/L (ref 3.8–5.2)
RBC # BLD AUTO: 2.51 10E12/L (ref 3.8–5.2)
RBC # BLD AUTO: 2.52 10E12/L (ref 3.8–5.2)
RBC # BLD AUTO: 2.54 10E12/L (ref 3.8–5.2)
RBC # BLD AUTO: 2.61 10E12/L (ref 3.8–5.2)
RBC # BLD AUTO: 2.63 10E12/L (ref 3.8–5.2)
RBC # BLD AUTO: 2.64 10E12/L (ref 3.8–5.2)
RBC # BLD AUTO: 2.64 10E12/L (ref 3.8–5.2)
RBC # BLD AUTO: 2.65 10E12/L (ref 3.8–5.2)
RBC # BLD AUTO: 2.66 10E12/L (ref 3.8–5.2)
RBC # BLD AUTO: 2.68 10E12/L (ref 3.8–5.2)
RBC # BLD AUTO: 2.69 10E12/L (ref 3.8–5.2)
RBC # BLD AUTO: 2.7 10E12/L (ref 3.8–5.2)
RBC # BLD AUTO: 2.72 10E12/L (ref 3.8–5.2)
RBC # BLD AUTO: 2.75 10E12/L (ref 3.8–5.2)
RBC # BLD AUTO: 2.77 10E12/L (ref 3.8–5.2)
RBC # BLD AUTO: 2.78 10E12/L (ref 3.8–5.2)
RBC # BLD AUTO: 2.79 10E12/L (ref 3.8–5.2)
RBC # BLD AUTO: 2.81 10E12/L (ref 3.8–5.2)
RBC # BLD AUTO: 2.82 10E12/L (ref 3.8–5.2)
RBC # BLD AUTO: 2.84 10E12/L (ref 3.8–5.2)
RBC # BLD AUTO: 2.85 10E12/L (ref 3.8–5.2)
RBC # BLD AUTO: 2.86 10E12/L (ref 3.8–5.2)
RBC # BLD AUTO: 2.86 10E12/L (ref 3.8–5.2)
RBC # BLD AUTO: 2.87 10E12/L (ref 3.8–5.2)
RBC # BLD AUTO: 2.89 10E12/L (ref 3.8–5.2)
RBC # BLD AUTO: 2.92 10E12/L (ref 3.8–5.2)
RBC # BLD AUTO: 2.96 10E12/L (ref 3.8–5.2)
RBC # BLD AUTO: 2.97 10E12/L (ref 3.8–5.2)
RBC # BLD AUTO: 2.97 10E12/L (ref 3.8–5.2)
RBC # BLD AUTO: 3.01 10E12/L (ref 3.8–5.2)
RBC # BLD AUTO: 3.02 10E12/L (ref 3.8–5.2)
RBC # BLD AUTO: 3.07 10E12/L (ref 3.8–5.2)
RBC # BLD AUTO: 3.1 10E12/L (ref 3.8–5.2)
RBC # BLD AUTO: 3.1 10E12/L (ref 3.8–5.2)
RBC # BLD AUTO: 3.16 10E12/L (ref 3.8–5.2)
RBC # BLD AUTO: 3.21 10E12/L (ref 3.8–5.2)
RBC # BLD AUTO: 3.21 10E12/L (ref 3.8–5.2)
RBC # BLD AUTO: 3.23 10E12/L (ref 3.8–5.2)
RBC # BLD AUTO: 3.36 10E12/L (ref 3.8–5.2)
RBC # BLD AUTO: 3.37 10E12/L (ref 3.8–5.2)
RBC # BLD AUTO: 3.37 10E12/L (ref 3.8–5.2)
RBC # BLD AUTO: 3.48 10E12/L (ref 3.8–5.2)
RBC # BLD AUTO: 3.49 10E12/L (ref 3.8–5.2)
RBC # BLD AUTO: 3.57 10E12/L (ref 3.8–5.2)
RBC # BLD AUTO: 3.58 10E12/L (ref 3.8–5.2)
RBC # BLD AUTO: 3.64 10E12/L (ref 3.8–5.2)
RBC # BLD AUTO: 3.66 10E12/L (ref 3.8–5.2)
RBC # BLD AUTO: 3.74 10E12/L (ref 3.8–5.2)
RBC # BLD AUTO: 3.83 10E12/L (ref 3.8–5.2)
RBC # BLD AUTO: 3.87 10E12/L (ref 3.8–5.2)
RBC # BLD AUTO: 3.95 10E12/L (ref 3.8–5.2)
RBC # BLD AUTO: 3.96 10E12/L (ref 3.8–5.2)
RBC # BLD AUTO: 3.99 10E12/L (ref 3.8–5.2)
RBC # BLD AUTO: 4.05 10E12/L (ref 3.8–5.2)
RBC # BLD AUTO: 4.06 10E12/L (ref 3.8–5.2)
RBC # BLD AUTO: 4.12 10E12/L (ref 3.8–5.2)
RBC # BLD AUTO: 4.14 10E12/L (ref 3.8–5.2)
RBC # BLD AUTO: 4.15 10E12/L (ref 3.8–5.2)
RBC # BLD AUTO: 4.22 10E12/L (ref 3.8–5.2)
RBC # BLD AUTO: 4.25 10E12/L (ref 3.8–5.2)
RBC # BLD AUTO: 4.26 10E12/L (ref 3.8–5.2)
RBC # BLD AUTO: 4.28 10E12/L (ref 3.8–5.2)
RBC # BLD AUTO: 4.3 10E12/L (ref 3.8–5.2)
RBC # BLD AUTO: 4.41 10E12/L (ref 3.8–5.2)
RBC # BLD AUTO: 4.43 10E12/L (ref 3.8–5.2)
RBC # BLD AUTO: 4.47 10E12/L (ref 3.8–5.2)
RBC # BLD AUTO: 4.5 10E12/L (ref 3.8–5.2)
RBC # BLD AUTO: 4.54 10E12/L (ref 3.8–5.2)
RBC # BLD AUTO: 4.61 10E12/L (ref 3.8–5.2)
RBC # BLD AUTO: 4.74 10E12/L (ref 3.8–5.2)
RBC #/AREA URNS AUTO: 1 /HPF (ref 0–2)
RBC #/AREA URNS AUTO: 3 /HPF (ref 0–2)
RBC #/AREA URNS AUTO: 36 /HPF (ref 0–2)
RBC #/AREA URNS AUTO: 8 /HPF (ref 0–2)
RBC #/AREA URNS AUTO: <1 /HPF (ref 0–2)
RETICS # AUTO: 42.1 10E9/L (ref 25–95)
RETICS/RBC NFR AUTO: 1.9 % (ref 0.5–2)
SAO2 % BLDA FROM PO2: 86 % (ref 92–100)
SAO2 % BLDV FROM PO2: 43 %
SARS-COV-2 PCR COMMENT: NORMAL
SARS-COV-2 RNA SPEC QL NAA+PROBE: NEGATIVE
SARS-COV-2 RNA SPEC QL NAA+PROBE: NORMAL
SODIUM BLD-SCNC: 135 MMOL/L (ref 133–144)
SODIUM BLD-SCNC: 135 MMOL/L (ref 133–144)
SODIUM BLD-SCNC: 137 MMOL/L (ref 133–144)
SODIUM BLD-SCNC: 138 MMOL/L (ref 133–144)
SODIUM SERPL-SCNC: 130 MMOL/L (ref 133–144)
SODIUM SERPL-SCNC: 131 MMOL/L (ref 133–144)
SODIUM SERPL-SCNC: 132 MMOL/L (ref 133–144)
SODIUM SERPL-SCNC: 133 MMOL/L (ref 133–144)
SODIUM SERPL-SCNC: 134 MMOL/L (ref 133–144)
SODIUM SERPL-SCNC: 135 MMOL/L (ref 133–144)
SODIUM SERPL-SCNC: 136 MMOL/L (ref 133–144)
SODIUM SERPL-SCNC: 137 MMOL/L (ref 133–144)
SODIUM SERPL-SCNC: 138 MMOL/L (ref 133–144)
SODIUM SERPL-SCNC: 139 MMOL/L (ref 133–144)
SODIUM SERPL-SCNC: 140 MMOL/L (ref 133–144)
SODIUM SERPL-SCNC: 141 MMOL/L (ref 133–144)
SODIUM SERPL-SCNC: 142 MMOL/L (ref 133–144)
SODIUM SERPL-SCNC: 143 MMOL/L (ref 133–144)
SODIUM SERPL-SCNC: 144 MMOL/L (ref 133–144)
SODIUM SERPL-SCNC: 145 MMOL/L (ref 133–144)
SODIUM SERPL-SCNC: 146 MMOL/L (ref 133–144)
SODIUM SERPL-SCNC: 147 MMOL/L (ref 133–144)
SODIUM SERPL-SCNC: 147 MMOL/L (ref 133–144)
SODIUM SERPL-SCNC: 148 MMOL/L (ref 133–144)
SODIUM SERPL-SCNC: 148 MMOL/L (ref 133–144)
SOURCE: ABNORMAL
SP GR UR STRIP: 1.01 (ref 1–1.03)
SP GR UR STRIP: 1.02 (ref 1–1.03)
SP GR UR STRIP: 1.02 (ref 1–1.03)
SPECIMEN EXP DATE BLD: NORMAL
SPECIMEN SOURCE FLD: NORMAL
SPECIMEN SOURCE: ABNORMAL
SPECIMEN SOURCE: NORMAL
SQUAMOUS #/AREA URNS AUTO: 4 /HPF (ref 0–1)
SQUAMOUS #/AREA URNS AUTO: <1 /HPF (ref 0–1)
T4 FREE SERPL-MCNC: 0.8 NG/DL (ref 0.76–1.46)
T4 FREE SERPL-MCNC: 1.15 NG/DL (ref 0.76–1.46)
T4 FREE SERPL-MCNC: 1.26 NG/DL (ref 0.76–1.46)
T4 FREE SERPL-MCNC: 1.39 NG/DL (ref 0.76–1.46)
T4 FREE SERPL-MCNC: 1.44 NG/DL (ref 0.76–1.46)
TIBC SERPL-MCNC: 333 UG/DL (ref 240–430)
TIBC SERPL-MCNC: 385 UG/DL (ref 240–430)
TRANS CELLS #/AREA URNS HPF: 1 /HPF (ref 0–1)
TRANS CELLS #/AREA URNS HPF: 1 /HPF (ref 0–1)
TRANS CELLS #/AREA URNS HPF: 3 /HPF (ref 0–1)
TRANS CELLS #/AREA URNS HPF: <1 /HPF (ref 0–1)
TRANSFERRIN SERPL-MCNC: 250 MG/DL (ref 210–360)
TRANSFUSION STATUS PATIENT QL: NORMAL
TRICYCLICS UR QL SCN: NORMAL NG/ML
TRIGL SERPL-MCNC: 216 MG/DL
TRIGL SERPL-MCNC: 56 MG/DL
TRIGL SERPL-MCNC: 66 MG/DL
TRIGL SERPL-MCNC: 87 MG/DL
TRIGL SERPL-MCNC: 98 MG/DL
TROPONIN I SERPL-MCNC: 0.02 UG/L (ref 0–0.04)
TROPONIN I SERPL-MCNC: 0.03 UG/L (ref 0–0.04)
TROPONIN I SERPL-MCNC: 0.03 UG/L (ref 0–0.04)
TROPONIN I SERPL-MCNC: 0.15 UG/L (ref 0–0.04)
TROPONIN I SERPL-MCNC: 0.17 UG/L (ref 0–0.04)
TROPONIN I SERPL-MCNC: 0.18 UG/L (ref 0–0.04)
TROPONIN I SERPL-MCNC: 0.2 UG/L (ref 0–0.04)
TSH SERPL DL<=0.005 MIU/L-ACNC: 10.51 MU/L (ref 0.4–4)
TSH SERPL DL<=0.005 MIU/L-ACNC: 10.68 MU/L (ref 0.4–4)
TSH SERPL DL<=0.005 MIU/L-ACNC: 11.25 MU/L (ref 0.4–4)
TSH SERPL DL<=0.005 MIU/L-ACNC: 12.63 MU/L (ref 0.4–4)
TSH SERPL DL<=0.005 MIU/L-ACNC: 15 MU/L (ref 0.4–4)
TSH SERPL DL<=0.005 MIU/L-ACNC: 7.55 MU/L (ref 0.4–4)
TSH SERPL-ACNC: 14.56 UIU/ML (ref 0.35–4.94)
UPPER GI ENDOSCOPY: NORMAL
UROBILINOGEN UR STRIP-MCNC: 2 MG/DL (ref 0–2)
UROBILINOGEN UR STRIP-MCNC: NORMAL MG/DL (ref 0–2)
VANCOMYCIN SERPL-MCNC: 20.6 MG/L
WBC # BLD AUTO: 10.1 10E9/L (ref 4–11)
WBC # BLD AUTO: 10.4 10E9/L (ref 4–11)
WBC # BLD AUTO: 10.5 10E9/L (ref 4–11)
WBC # BLD AUTO: 10.6 10E9/L (ref 4–11)
WBC # BLD AUTO: 10.8 10E9/L (ref 4–11)
WBC # BLD AUTO: 11.1 10E9/L (ref 4–11)
WBC # BLD AUTO: 11.3 10E9/L (ref 4–11)
WBC # BLD AUTO: 11.4 10E9/L (ref 4–11)
WBC # BLD AUTO: 11.4 10E9/L (ref 4–11)
WBC # BLD AUTO: 11.7 10E9/L (ref 4–11)
WBC # BLD AUTO: 11.9 10E9/L (ref 4–11)
WBC # BLD AUTO: 12.8 10E9/L (ref 4–11)
WBC # BLD AUTO: 13.1 10E9/L (ref 4–11)
WBC # BLD AUTO: 13.2 10E9/L (ref 4–11)
WBC # BLD AUTO: 13.2 10E9/L (ref 4–11)
WBC # BLD AUTO: 13.3 10E9/L (ref 4–11)
WBC # BLD AUTO: 13.4 10E9/L (ref 4–11)
WBC # BLD AUTO: 13.4 10E9/L (ref 4–11)
WBC # BLD AUTO: 13.5 10E9/L (ref 4–11)
WBC # BLD AUTO: 13.5 10E9/L (ref 4–11)
WBC # BLD AUTO: 13.6 10E9/L (ref 4–11)
WBC # BLD AUTO: 13.9 10E9/L (ref 4–11)
WBC # BLD AUTO: 14.1 10E9/L (ref 4–11)
WBC # BLD AUTO: 14.2 10E9/L (ref 4–11)
WBC # BLD AUTO: 14.7 10E9/L (ref 4–11)
WBC # BLD AUTO: 14.8 10E9/L (ref 4–11)
WBC # BLD AUTO: 15 10E9/L (ref 4–11)
WBC # BLD AUTO: 15 10E9/L (ref 4–11)
WBC # BLD AUTO: 15.1 10E9/L (ref 4–11)
WBC # BLD AUTO: 15.7 10E9/L (ref 4–11)
WBC # BLD AUTO: 16.2 10E9/L (ref 4–11)
WBC # BLD AUTO: 16.2 10E9/L (ref 4–11)
WBC # BLD AUTO: 16.8 10E9/L (ref 4–11)
WBC # BLD AUTO: 17 10E9/L (ref 4–11)
WBC # BLD AUTO: 17 10E9/L (ref 4–11)
WBC # BLD AUTO: 17.2 10E9/L (ref 4–11)
WBC # BLD AUTO: 17.3 10E9/L (ref 4–11)
WBC # BLD AUTO: 23.7 10E9/L (ref 4–11)
WBC # BLD AUTO: 27 10E9/L (ref 4–11)
WBC # BLD AUTO: 27 10E9/L (ref 4–11)
WBC # BLD AUTO: 5.1 10E9/L (ref 4–11)
WBC # BLD AUTO: 5.3 10E9/L (ref 4–11)
WBC # BLD AUTO: 5.5 10E9/L (ref 4–11)
WBC # BLD AUTO: 5.6 10E9/L (ref 4–11)
WBC # BLD AUTO: 6.1 10E9/L (ref 4–11)
WBC # BLD AUTO: 6.3 10E9/L (ref 4–11)
WBC # BLD AUTO: 6.4 10E9/L (ref 4–11)
WBC # BLD AUTO: 6.5 10E9/L (ref 4–11)
WBC # BLD AUTO: 6.5 10E9/L (ref 4–11)
WBC # BLD AUTO: 6.6 10E9/L (ref 4–11)
WBC # BLD AUTO: 6.7 10E9/L (ref 4–11)
WBC # BLD AUTO: 6.7 10E9/L (ref 4–11)
WBC # BLD AUTO: 6.8 10E9/L (ref 4–11)
WBC # BLD AUTO: 7 10E9/L (ref 4–11)
WBC # BLD AUTO: 7 10E9/L (ref 4–11)
WBC # BLD AUTO: 7.5 10E9/L (ref 4–11)
WBC # BLD AUTO: 7.6 10E9/L (ref 4–11)
WBC # BLD AUTO: 7.8 10E9/L (ref 4–11)
WBC # BLD AUTO: 8 10E9/L (ref 4–11)
WBC # BLD AUTO: 8 10E9/L (ref 4–11)
WBC # BLD AUTO: 8.1 10E9/L (ref 4–11)
WBC # BLD AUTO: 8.1 10E9/L (ref 4–11)
WBC # BLD AUTO: 8.2 10E9/L (ref 4–11)
WBC # BLD AUTO: 8.4 10E9/L (ref 4–11)
WBC # BLD AUTO: 8.6 10E9/L (ref 4–11)
WBC # BLD AUTO: 8.9 10E9/L (ref 4–11)
WBC # BLD AUTO: 8.9 10E9/L (ref 4–11)
WBC # BLD AUTO: 9 10E9/L (ref 4–11)
WBC # BLD AUTO: 9.1 10E9/L (ref 4–11)
WBC # BLD AUTO: 9.1 10E9/L (ref 4–11)
WBC # BLD AUTO: 9.2 10E9/L (ref 4–11)
WBC # BLD AUTO: 9.3 10E9/L (ref 4–11)
WBC # BLD AUTO: 9.9 10E9/L (ref 4–11)
WBC # FLD AUTO: 551 /UL
WBC #/AREA URNS AUTO: 11 /HPF (ref 0–5)
WBC #/AREA URNS AUTO: 143 /HPF (ref 0–5)
WBC #/AREA URNS AUTO: 1494 /HPF (ref 0–5)
WBC #/AREA URNS AUTO: 4 /HPF (ref 0–5)
WBC #/AREA URNS AUTO: 7 /HPF (ref 0–5)
WBC CLUMPS #/AREA URNS HPF: PRESENT /HPF
WBC CLUMPS #/AREA URNS HPF: PRESENT /HPF

## 2020-01-01 PROCEDURE — 40000275 ZZH STATISTIC RCP TIME EA 10 MIN

## 2020-01-01 PROCEDURE — 36415 COLL VENOUS BLD VENIPUNCTURE: CPT | Performed by: NURSE PRACTITIONER

## 2020-01-01 PROCEDURE — 83735 ASSAY OF MAGNESIUM: CPT | Performed by: INTERNAL MEDICINE

## 2020-01-01 PROCEDURE — 93010 ELECTROCARDIOGRAM REPORT: CPT | Performed by: INTERNAL MEDICINE

## 2020-01-01 PROCEDURE — 85018 HEMOGLOBIN: CPT | Performed by: STUDENT IN AN ORGANIZED HEALTH CARE EDUCATION/TRAINING PROGRAM

## 2020-01-01 PROCEDURE — 25800030 ZZH RX IP 258 OP 636: Performed by: SURGERY

## 2020-01-01 PROCEDURE — 25800030 ZZH RX IP 258 OP 636: Performed by: STUDENT IN AN ORGANIZED HEALTH CARE EDUCATION/TRAINING PROGRAM

## 2020-01-01 PROCEDURE — 85027 COMPLETE CBC AUTOMATED: CPT | Performed by: PHYSICIAN ASSISTANT

## 2020-01-01 PROCEDURE — 20000003 ZZH R&B ICU

## 2020-01-01 PROCEDURE — 85384 FIBRINOGEN ACTIVITY: CPT | Performed by: PHYSICIAN ASSISTANT

## 2020-01-01 PROCEDURE — 20000004 ZZH R&B ICU UMMC

## 2020-01-01 PROCEDURE — 36415 COLL VENOUS BLD VENIPUNCTURE: CPT | Performed by: SURGERY

## 2020-01-01 PROCEDURE — 85520 HEPARIN ASSAY: CPT | Performed by: INTERNAL MEDICINE

## 2020-01-01 PROCEDURE — 84100 ASSAY OF PHOSPHORUS: CPT | Performed by: STUDENT IN AN ORGANIZED HEALTH CARE EDUCATION/TRAINING PROGRAM

## 2020-01-01 PROCEDURE — 40000196 ZZH STATISTIC RAPCV CVP MONITORING

## 2020-01-01 PROCEDURE — 32557 INSERT CATH PLEURA W/ IMAGE: CPT | Mod: RT

## 2020-01-01 PROCEDURE — 25000125 ZZHC RX 250: Performed by: INTERNAL MEDICINE

## 2020-01-01 PROCEDURE — 80048 BASIC METABOLIC PNL TOTAL CA: CPT | Performed by: INTERNAL MEDICINE

## 2020-01-01 PROCEDURE — 25000132 ZZH RX MED GY IP 250 OP 250 PS 637: Mod: GY | Performed by: INTERNAL MEDICINE

## 2020-01-01 PROCEDURE — 99233 SBSQ HOSP IP/OBS HIGH 50: CPT | Mod: GC | Performed by: INTERNAL MEDICINE

## 2020-01-01 PROCEDURE — 99291 CRITICAL CARE FIRST HOUR: CPT | Performed by: INTERNAL MEDICINE

## 2020-01-01 PROCEDURE — 90947 DIALYSIS REPEATED EVAL: CPT

## 2020-01-01 PROCEDURE — 25800030 ZZH RX IP 258 OP 636: Performed by: PHYSICIAN ASSISTANT

## 2020-01-01 PROCEDURE — C9113 INJ PANTOPRAZOLE SODIUM, VIA: HCPCS | Performed by: PHYSICIAN ASSISTANT

## 2020-01-01 PROCEDURE — 82947 ASSAY GLUCOSE BLOOD QUANT: CPT | Performed by: INTERNAL MEDICINE

## 2020-01-01 PROCEDURE — 25000128 H RX IP 250 OP 636: Performed by: SURGERY

## 2020-01-01 PROCEDURE — 90937 HEMODIALYSIS REPEATED EVAL: CPT

## 2020-01-01 PROCEDURE — 82805 BLOOD GASES W/O2 SATURATION: CPT | Performed by: STUDENT IN AN ORGANIZED HEALTH CARE EDUCATION/TRAINING PROGRAM

## 2020-01-01 PROCEDURE — 25000128 H RX IP 250 OP 636: Performed by: HOSPITALIST

## 2020-01-01 PROCEDURE — 85027 COMPLETE CBC AUTOMATED: CPT | Performed by: STUDENT IN AN ORGANIZED HEALTH CARE EDUCATION/TRAINING PROGRAM

## 2020-01-01 PROCEDURE — 25000128 H RX IP 250 OP 636: Performed by: STUDENT IN AN ORGANIZED HEALTH CARE EDUCATION/TRAINING PROGRAM

## 2020-01-01 PROCEDURE — 83735 ASSAY OF MAGNESIUM: CPT | Performed by: STUDENT IN AN ORGANIZED HEALTH CARE EDUCATION/TRAINING PROGRAM

## 2020-01-01 PROCEDURE — 25000128 H RX IP 250 OP 636: Performed by: INTERNAL MEDICINE

## 2020-01-01 PROCEDURE — 82330 ASSAY OF CALCIUM: CPT | Performed by: INTERNAL MEDICINE

## 2020-01-01 PROCEDURE — 97116 GAIT TRAINING THERAPY: CPT | Mod: GP

## 2020-01-01 PROCEDURE — P9016 RBC LEUKOCYTES REDUCED: HCPCS | Performed by: SURGERY

## 2020-01-01 PROCEDURE — 71045 X-RAY EXAM CHEST 1 VIEW: CPT

## 2020-01-01 PROCEDURE — 25800030 ZZH RX IP 258 OP 636: Performed by: INTERNAL MEDICINE

## 2020-01-01 PROCEDURE — 25000128 H RX IP 250 OP 636: Performed by: NURSE PRACTITIONER

## 2020-01-01 PROCEDURE — 85018 HEMOGLOBIN: CPT | Performed by: INTERNAL MEDICINE

## 2020-01-01 PROCEDURE — 85025 COMPLETE CBC W/AUTO DIFF WBC: CPT | Performed by: NURSE PRACTITIONER

## 2020-01-01 PROCEDURE — 99291 CRITICAL CARE FIRST HOUR: CPT | Performed by: ANESTHESIOLOGY

## 2020-01-01 PROCEDURE — 84132 ASSAY OF SERUM POTASSIUM: CPT

## 2020-01-01 PROCEDURE — 93325 DOPPLER ECHO COLOR FLOW MAPG: CPT | Mod: 26 | Performed by: INTERNAL MEDICINE

## 2020-01-01 PROCEDURE — 85610 PROTHROMBIN TIME: CPT | Performed by: INTERNAL MEDICINE

## 2020-01-01 PROCEDURE — 99232 SBSQ HOSP IP/OBS MODERATE 35: CPT | Mod: GC | Performed by: INTERNAL MEDICINE

## 2020-01-01 PROCEDURE — 82330 ASSAY OF CALCIUM: CPT | Performed by: STUDENT IN AN ORGANIZED HEALTH CARE EDUCATION/TRAINING PROGRAM

## 2020-01-01 PROCEDURE — 80048 BASIC METABOLIC PNL TOTAL CA: CPT | Performed by: STUDENT IN AN ORGANIZED HEALTH CARE EDUCATION/TRAINING PROGRAM

## 2020-01-01 PROCEDURE — 78452 HT MUSCLE IMAGE SPECT MULT: CPT

## 2020-01-01 PROCEDURE — 00000146 ZZHCL STATISTIC GLUCOSE BY METER IP

## 2020-01-01 PROCEDURE — 99222 1ST HOSP IP/OBS MODERATE 55: CPT | Mod: 95 | Performed by: PSYCHIATRY & NEUROLOGY

## 2020-01-01 PROCEDURE — 21000000 ZZH R&B IMCU HEART CARE

## 2020-01-01 PROCEDURE — 82805 BLOOD GASES W/O2 SATURATION: CPT | Performed by: PHYSICIAN ASSISTANT

## 2020-01-01 PROCEDURE — 3E043XZ INTRODUCTION OF VASOPRESSOR INTO CENTRAL VEIN, PERCUTANEOUS APPROACH: ICD-10-PCS | Performed by: SURGERY

## 2020-01-01 PROCEDURE — 85520 HEPARIN ASSAY: CPT | Performed by: PHYSICIAN ASSISTANT

## 2020-01-01 PROCEDURE — 40000239 ZZH STATISTIC VAT ROUNDS

## 2020-01-01 PROCEDURE — 84100 ASSAY OF PHOSPHORUS: CPT | Performed by: INTERNAL MEDICINE

## 2020-01-01 PROCEDURE — 25000132 ZZH RX MED GY IP 250 OP 250 PS 637: Mod: GY | Performed by: STUDENT IN AN ORGANIZED HEALTH CARE EDUCATION/TRAINING PROGRAM

## 2020-01-01 PROCEDURE — 40000014 ZZH STATISTIC ARTERIAL MONITORING DAILY

## 2020-01-01 PROCEDURE — 84100 ASSAY OF PHOSPHORUS: CPT | Performed by: PHYSICIAN ASSISTANT

## 2020-01-01 PROCEDURE — 85018 HEMOGLOBIN: CPT | Performed by: NURSE PRACTITIONER

## 2020-01-01 PROCEDURE — 83605 ASSAY OF LACTIC ACID: CPT | Performed by: STUDENT IN AN ORGANIZED HEALTH CARE EDUCATION/TRAINING PROGRAM

## 2020-01-01 PROCEDURE — P9016 RBC LEUKOCYTES REDUCED: HCPCS | Performed by: STUDENT IN AN ORGANIZED HEALTH CARE EDUCATION/TRAINING PROGRAM

## 2020-01-01 PROCEDURE — C9113 INJ PANTOPRAZOLE SODIUM, VIA: HCPCS | Performed by: STUDENT IN AN ORGANIZED HEALTH CARE EDUCATION/TRAINING PROGRAM

## 2020-01-01 PROCEDURE — C9113 INJ PANTOPRAZOLE SODIUM, VIA: HCPCS | Performed by: SURGERY

## 2020-01-01 PROCEDURE — 25000132 ZZH RX MED GY IP 250 OP 250 PS 637: Mod: GY | Performed by: SURGERY

## 2020-01-01 PROCEDURE — 99441 ZZC PHYSICIAN TELEPHONE EVALUATION 5-10 MIN: CPT | Performed by: FAMILY MEDICINE

## 2020-01-01 PROCEDURE — 85610 PROTHROMBIN TIME: CPT | Performed by: PHYSICIAN ASSISTANT

## 2020-01-01 PROCEDURE — 85027 COMPLETE CBC AUTOMATED: CPT | Performed by: INTERNAL MEDICINE

## 2020-01-01 PROCEDURE — 25000125 ZZHC RX 250: Performed by: SURGERY

## 2020-01-01 PROCEDURE — 80053 COMPREHEN METABOLIC PANEL: CPT | Performed by: PHYSICIAN ASSISTANT

## 2020-01-01 PROCEDURE — 83051 HEMOGLOBIN PLASMA: CPT | Performed by: PHYSICIAN ASSISTANT

## 2020-01-01 PROCEDURE — 25000132 ZZH RX MED GY IP 250 OP 250 PS 637: Mod: GY | Performed by: HOSPITALIST

## 2020-01-01 PROCEDURE — 80048 BASIC METABOLIC PNL TOTAL CA: CPT | Performed by: SURGERY

## 2020-01-01 PROCEDURE — 99233 SBSQ HOSP IP/OBS HIGH 50: CPT | Performed by: INTERNAL MEDICINE

## 2020-01-01 PROCEDURE — 80307 DRUG TEST PRSMV CHEM ANLYZR: CPT | Performed by: INTERNAL MEDICINE

## 2020-01-01 PROCEDURE — 82947 ASSAY GLUCOSE BLOOD QUANT: CPT

## 2020-01-01 PROCEDURE — 25800030 ZZH RX IP 258 OP 636: Performed by: NURSE PRACTITIONER

## 2020-01-01 PROCEDURE — 99214 OFFICE O/P EST MOD 30 MIN: CPT | Performed by: FAMILY MEDICINE

## 2020-01-01 PROCEDURE — 85730 THROMBOPLASTIN TIME PARTIAL: CPT | Performed by: INTERNAL MEDICINE

## 2020-01-01 PROCEDURE — 99233 SBSQ HOSP IP/OBS HIGH 50: CPT | Mod: 25 | Performed by: INTERNAL MEDICINE

## 2020-01-01 PROCEDURE — 80053 COMPREHEN METABOLIC PANEL: CPT | Performed by: STUDENT IN AN ORGANIZED HEALTH CARE EDUCATION/TRAINING PROGRAM

## 2020-01-01 PROCEDURE — C9254 INJECTION, LACOSAMIDE: HCPCS | Performed by: STUDENT IN AN ORGANIZED HEALTH CARE EDUCATION/TRAINING PROGRAM

## 2020-01-01 PROCEDURE — 27210437 ZZH NUTRITION PRODUCT SEMIELEM INTERMED LITER

## 2020-01-01 PROCEDURE — 25000125 ZZHC RX 250: Performed by: OPTOMETRIST

## 2020-01-01 PROCEDURE — 83615 LACTATE (LD) (LDH) ENZYME: CPT | Performed by: STUDENT IN AN ORGANIZED HEALTH CARE EDUCATION/TRAINING PROGRAM

## 2020-01-01 PROCEDURE — 80061 LIPID PANEL: CPT | Performed by: INTERNAL MEDICINE

## 2020-01-01 PROCEDURE — 85610 PROTHROMBIN TIME: CPT | Performed by: EMERGENCY MEDICINE

## 2020-01-01 PROCEDURE — 87186 SC STD MICRODIL/AGAR DIL: CPT

## 2020-01-01 PROCEDURE — 99291 CRITICAL CARE FIRST HOUR: CPT | Mod: 25 | Performed by: INTERNAL MEDICINE

## 2020-01-01 PROCEDURE — 40000986 XR CHEST PORT 1 VW

## 2020-01-01 PROCEDURE — 97161 PT EVAL LOW COMPLEX 20 MIN: CPT | Mod: GP

## 2020-01-01 PROCEDURE — 36415 COLL VENOUS BLD VENIPUNCTURE: CPT | Performed by: INTERNAL MEDICINE

## 2020-01-01 PROCEDURE — 82330 ASSAY OF CALCIUM: CPT | Performed by: PHYSICIAN ASSISTANT

## 2020-01-01 PROCEDURE — 80321 ALCOHOLS BIOMARKERS 1OR 2: CPT | Performed by: INTERNAL MEDICINE

## 2020-01-01 PROCEDURE — 93451 RIGHT HEART CATH: CPT | Performed by: INTERNAL MEDICINE

## 2020-01-01 PROCEDURE — 74340 X-RAY GUIDE FOR GI TUBE: CPT | Mod: TC

## 2020-01-01 PROCEDURE — 27810169 ZZH OR IMPLANT GENERAL: Performed by: SURGERY

## 2020-01-01 PROCEDURE — 80076 HEPATIC FUNCTION PANEL: CPT | Performed by: NURSE PRACTITIONER

## 2020-01-01 PROCEDURE — 30901 CONTROL OF NOSEBLEED: CPT | Performed by: EMERGENCY MEDICINE

## 2020-01-01 PROCEDURE — 87040 BLOOD CULTURE FOR BACTERIA: CPT | Performed by: SURGERY

## 2020-01-01 PROCEDURE — 25000125 ZZHC RX 250: Performed by: RADIOLOGY

## 2020-01-01 PROCEDURE — 36415 COLL VENOUS BLD VENIPUNCTURE: CPT | Performed by: STUDENT IN AN ORGANIZED HEALTH CARE EDUCATION/TRAINING PROGRAM

## 2020-01-01 PROCEDURE — 99152 MOD SED SAME PHYS/QHP 5/>YRS: CPT

## 2020-01-01 PROCEDURE — 36000074 ZZH SURGERY LEVEL 6 1ST 30 MIN - UMMC: Performed by: SURGERY

## 2020-01-01 PROCEDURE — 83735 ASSAY OF MAGNESIUM: CPT | Performed by: SURGERY

## 2020-01-01 PROCEDURE — 25000128 H RX IP 250 OP 636: Performed by: PHYSICIAN ASSISTANT

## 2020-01-01 PROCEDURE — 85396 CLOTTING ASSAY WHOLE BLOOD: CPT | Performed by: STUDENT IN AN ORGANIZED HEALTH CARE EDUCATION/TRAINING PROGRAM

## 2020-01-01 PROCEDURE — 93461 R&L HRT ART/VENTRICLE ANGIO: CPT | Performed by: INTERNAL MEDICINE

## 2020-01-01 PROCEDURE — 97110 THERAPEUTIC EXERCISES: CPT | Mod: GO

## 2020-01-01 PROCEDURE — 21000001 ZZH R&B HEART CARE

## 2020-01-01 PROCEDURE — C1713 ANCHOR/SCREW BN/BN,TIS/BN: HCPCS | Performed by: SURGERY

## 2020-01-01 PROCEDURE — 83735 ASSAY OF MAGNESIUM: CPT | Performed by: PHYSICIAN ASSISTANT

## 2020-01-01 PROCEDURE — 00000167 ZZHCL STATISTIC INR NC: Performed by: STUDENT IN AN ORGANIZED HEALTH CARE EDUCATION/TRAINING PROGRAM

## 2020-01-01 PROCEDURE — 27210305 ZZH CATH BALLOON IABP

## 2020-01-01 PROCEDURE — 99292 CRITICAL CARE ADDL 30 MIN: CPT | Mod: 25 | Performed by: INTERNAL MEDICINE

## 2020-01-01 PROCEDURE — 40000048 ZZH STATISTIC DAILY SWAN MONITORING

## 2020-01-01 PROCEDURE — C1769 GUIDE WIRE: HCPCS

## 2020-01-01 PROCEDURE — 71046 X-RAY EXAM CHEST 2 VIEWS: CPT

## 2020-01-01 PROCEDURE — 82803 BLOOD GASES ANY COMBINATION: CPT

## 2020-01-01 PROCEDURE — 85025 COMPLETE CBC W/AUTO DIFF WBC: CPT | Performed by: INTERNAL MEDICINE

## 2020-01-01 PROCEDURE — 83880 ASSAY OF NATRIURETIC PEPTIDE: CPT | Performed by: NURSE PRACTITIONER

## 2020-01-01 PROCEDURE — 27210136 ZZH KIT CATH ARTERIAL EXT SUPPLY

## 2020-01-01 PROCEDURE — C1894 INTRO/SHEATH, NON-LASER: HCPCS | Performed by: INTERNAL MEDICINE

## 2020-01-01 PROCEDURE — 99152 MOD SED SAME PHYS/QHP 5/>YRS: CPT | Performed by: INTERNAL MEDICINE

## 2020-01-01 PROCEDURE — 25000132 ZZH RX MED GY IP 250 OP 250 PS 637: Mod: GY | Performed by: PHYSICIAN ASSISTANT

## 2020-01-01 PROCEDURE — 80048 BASIC METABOLIC PNL TOTAL CA: CPT | Performed by: HOSPITALIST

## 2020-01-01 PROCEDURE — 25800025 ZZH RX 258: Performed by: PHYSICIAN ASSISTANT

## 2020-01-01 PROCEDURE — 36415 COLL VENOUS BLD VENIPUNCTURE: CPT | Performed by: FAMILY MEDICINE

## 2020-01-01 PROCEDURE — 86901 BLOOD TYPING SEROLOGIC RH(D): CPT | Performed by: OPTOMETRIST

## 2020-01-01 PROCEDURE — 25000128 H RX IP 250 OP 636

## 2020-01-01 PROCEDURE — 85025 COMPLETE CBC W/AUTO DIFF WBC: CPT | Performed by: EMERGENCY MEDICINE

## 2020-01-01 PROCEDURE — 82805 BLOOD GASES W/O2 SATURATION: CPT | Performed by: INTERNAL MEDICINE

## 2020-01-01 PROCEDURE — 87106 FUNGI IDENTIFICATION YEAST: CPT | Performed by: SURGERY

## 2020-01-01 PROCEDURE — 40000141 ZZH STATISTIC PERIPHERAL IV START W/O US GUIDANCE

## 2020-01-01 PROCEDURE — P9047 ALBUMIN (HUMAN), 25%, 50ML: HCPCS | Performed by: STUDENT IN AN ORGANIZED HEALTH CARE EDUCATION/TRAINING PROGRAM

## 2020-01-01 PROCEDURE — 83605 ASSAY OF LACTIC ACID: CPT | Performed by: PHYSICIAN ASSISTANT

## 2020-01-01 PROCEDURE — 85027 COMPLETE CBC AUTOMATED: CPT | Performed by: SURGERY

## 2020-01-01 PROCEDURE — 82803 BLOOD GASES ANY COMBINATION: CPT | Performed by: STUDENT IN AN ORGANIZED HEALTH CARE EDUCATION/TRAINING PROGRAM

## 2020-01-01 PROCEDURE — 40000281 ZZH STATISTIC TRANSPORT TIME EA 15 MIN

## 2020-01-01 PROCEDURE — 80053 COMPREHEN METABOLIC PANEL: CPT | Performed by: INTERNAL MEDICINE

## 2020-01-01 PROCEDURE — 99232 SBSQ HOSP IP/OBS MODERATE 35: CPT | Performed by: HOSPITALIST

## 2020-01-01 PROCEDURE — 95714 VEEG EA 12-26 HR UNMNTR: CPT

## 2020-01-01 PROCEDURE — 25000125 ZZHC RX 250: Performed by: STUDENT IN AN ORGANIZED HEALTH CARE EDUCATION/TRAINING PROGRAM

## 2020-01-01 PROCEDURE — 25000132 ZZH RX MED GY IP 250 OP 250 PS 637: Mod: GY | Performed by: NURSE PRACTITIONER

## 2020-01-01 PROCEDURE — 40000076 ZZH STATISTIC IABP MONITORING

## 2020-01-01 PROCEDURE — 80320 DRUG SCREEN QUANTALCOHOLS: CPT | Performed by: INTERNAL MEDICINE

## 2020-01-01 PROCEDURE — 99232 SBSQ HOSP IP/OBS MODERATE 35: CPT | Performed by: INTERNAL MEDICINE

## 2020-01-01 PROCEDURE — 87077 CULTURE AEROBIC IDENTIFY: CPT | Performed by: SURGERY

## 2020-01-01 PROCEDURE — 85045 AUTOMATED RETICULOCYTE COUNT: CPT | Performed by: INTERNAL MEDICINE

## 2020-01-01 PROCEDURE — 85610 PROTHROMBIN TIME: CPT | Performed by: STUDENT IN AN ORGANIZED HEALTH CARE EDUCATION/TRAINING PROGRAM

## 2020-01-01 PROCEDURE — 86901 BLOOD TYPING SEROLOGIC RH(D): CPT | Performed by: INTERNAL MEDICINE

## 2020-01-01 PROCEDURE — 97535 SELF CARE MNGMENT TRAINING: CPT | Mod: GO | Performed by: OCCUPATIONAL THERAPIST

## 2020-01-01 PROCEDURE — P9016 RBC LEUKOCYTES REDUCED: HCPCS | Performed by: INTERNAL MEDICINE

## 2020-01-01 PROCEDURE — A7035 POS AIRWAY PRESS HEADGEAR: HCPCS

## 2020-01-01 PROCEDURE — 86900 BLOOD TYPING SEROLOGIC ABO: CPT | Performed by: SURGERY

## 2020-01-01 PROCEDURE — 27210432 ZZH NUTRITION PRODUCT RENAL BASIC LITER

## 2020-01-01 PROCEDURE — 99292 CRITICAL CARE ADDL 30 MIN: CPT | Mod: 25 | Performed by: EMERGENCY MEDICINE

## 2020-01-01 PROCEDURE — 74018 RADEX ABDOMEN 1 VIEW: CPT

## 2020-01-01 PROCEDURE — 87186 SC STD MICRODIL/AGAR DIL: CPT | Performed by: SURGERY

## 2020-01-01 PROCEDURE — 27210794 ZZH OR GENERAL SUPPLY STERILE: Performed by: SURGERY

## 2020-01-01 PROCEDURE — 86901 BLOOD TYPING SEROLOGIC RH(D): CPT | Performed by: SURGERY

## 2020-01-01 PROCEDURE — 93306 TTE W/DOPPLER COMPLETE: CPT | Mod: 26 | Performed by: STUDENT IN AN ORGANIZED HEALTH CARE EDUCATION/TRAINING PROGRAM

## 2020-01-01 PROCEDURE — 96365 THER/PROPH/DIAG IV INF INIT: CPT | Performed by: EMERGENCY MEDICINE

## 2020-01-01 PROCEDURE — 84484 ASSAY OF TROPONIN QUANT: CPT | Performed by: FAMILY MEDICINE

## 2020-01-01 PROCEDURE — 97530 THERAPEUTIC ACTIVITIES: CPT | Mod: GP

## 2020-01-01 PROCEDURE — 25000132 ZZH RX MED GY IP 250 OP 250 PS 637: Mod: GY

## 2020-01-01 PROCEDURE — 25000125 ZZHC RX 250: Performed by: PHYSICIAN ASSISTANT

## 2020-01-01 PROCEDURE — 84132 ASSAY OF SERUM POTASSIUM: CPT | Performed by: PHYSICIAN ASSISTANT

## 2020-01-01 PROCEDURE — 93306 TTE W/DOPPLER COMPLETE: CPT

## 2020-01-01 PROCEDURE — 85027 COMPLETE CBC AUTOMATED: CPT | Performed by: NURSE PRACTITIONER

## 2020-01-01 PROCEDURE — 36569 INSJ PICC 5 YR+ W/O IMAGING: CPT

## 2020-01-01 PROCEDURE — 36000057 ZZH SURGERY LEVEL 3 1ST 30 MIN - UMMC: Performed by: THORACIC SURGERY (CARDIOTHORACIC VASCULAR SURGERY)

## 2020-01-01 PROCEDURE — 83605 ASSAY OF LACTIC ACID: CPT | Performed by: INTERNAL MEDICINE

## 2020-01-01 PROCEDURE — 93571 IV DOP VEL&/PRESS C FLO 1ST: CPT | Mod: 52 | Performed by: INTERNAL MEDICINE

## 2020-01-01 PROCEDURE — P9041 ALBUMIN (HUMAN),5%, 50ML: HCPCS | Performed by: PHYSICIAN ASSISTANT

## 2020-01-01 PROCEDURE — 44500 INTRO GASTROINTESTINAL TUBE: CPT | Mod: TC

## 2020-01-01 PROCEDURE — 85520 HEPARIN ASSAY: CPT | Performed by: STUDENT IN AN ORGANIZED HEALTH CARE EDUCATION/TRAINING PROGRAM

## 2020-01-01 PROCEDURE — 25000125 ZZHC RX 250: Performed by: EMERGENCY MEDICINE

## 2020-01-01 PROCEDURE — 99291 CRITICAL CARE FIRST HOUR: CPT | Mod: GC | Performed by: ANESTHESIOLOGY

## 2020-01-01 PROCEDURE — 93005 ELECTROCARDIOGRAM TRACING: CPT

## 2020-01-01 PROCEDURE — 93750 INTERROGATION VAD IN PERSON: CPT | Performed by: INTERNAL MEDICINE

## 2020-01-01 PROCEDURE — 25000125 ZZHC RX 250

## 2020-01-01 PROCEDURE — 97535 SELF CARE MNGMENT TRAINING: CPT | Mod: GO | Performed by: OCCUPATIONAL THERAPY ASSISTANT

## 2020-01-01 PROCEDURE — 84484 ASSAY OF TROPONIN QUANT: CPT | Performed by: STUDENT IN AN ORGANIZED HEALTH CARE EDUCATION/TRAINING PROGRAM

## 2020-01-01 PROCEDURE — 82947 ASSAY GLUCOSE BLOOD QUANT: CPT | Performed by: PHYSICIAN ASSISTANT

## 2020-01-01 PROCEDURE — 86850 RBC ANTIBODY SCREEN: CPT | Performed by: INTERNAL MEDICINE

## 2020-01-01 PROCEDURE — 86923 COMPATIBILITY TEST ELECTRIC: CPT | Performed by: SURGERY

## 2020-01-01 PROCEDURE — 27210794 ZZH OR GENERAL SUPPLY STERILE: Performed by: INTERNAL MEDICINE

## 2020-01-01 PROCEDURE — 83880 ASSAY OF NATRIURETIC PEPTIDE: CPT | Performed by: EMERGENCY MEDICINE

## 2020-01-01 PROCEDURE — 25800025 ZZH RX 258: Performed by: STUDENT IN AN ORGANIZED HEALTH CARE EDUCATION/TRAINING PROGRAM

## 2020-01-01 PROCEDURE — 82810 BLOOD GASES O2 SAT ONLY: CPT | Performed by: STUDENT IN AN ORGANIZED HEALTH CARE EDUCATION/TRAINING PROGRAM

## 2020-01-01 PROCEDURE — 94002 VENT MGMT INPAT INIT DAY: CPT

## 2020-01-01 PROCEDURE — 00000401 ZZHCL STATISTIC THROMBIN TIME NC: Performed by: STUDENT IN AN ORGANIZED HEALTH CARE EDUCATION/TRAINING PROGRAM

## 2020-01-01 PROCEDURE — 27410241 ZZH DEVICE HM II 14-V LITHIUM BATTERY, INITIAL ONLY, EACH

## 2020-01-01 PROCEDURE — 99222 1ST HOSP IP/OBS MODERATE 55: CPT | Mod: 25 | Performed by: INTERNAL MEDICINE

## 2020-01-01 PROCEDURE — 25000128 H RX IP 250 OP 636: Performed by: CLINICAL NURSE SPECIALIST

## 2020-01-01 PROCEDURE — 0W9930Z DRAINAGE OF RIGHT PLEURAL CAVITY WITH DRAINAGE DEVICE, PERCUTANEOUS APPROACH: ICD-10-PCS | Performed by: RADIOLOGY

## 2020-01-01 PROCEDURE — 0DC68ZZ EXTIRPATION OF MATTER FROM STOMACH, VIA NATURAL OR ARTIFICIAL OPENING ENDOSCOPIC: ICD-10-PCS | Performed by: INTERNAL MEDICINE

## 2020-01-01 PROCEDURE — G0179 MD RECERTIFICATION HHA PT: HCPCS | Performed by: FAMILY MEDICINE

## 2020-01-01 PROCEDURE — 93503 INSERT/PLACE HEART CATHETER: CPT | Mod: GC | Performed by: INTERNAL MEDICINE

## 2020-01-01 PROCEDURE — 93308 TTE F-UP OR LMTD: CPT | Mod: 26 | Performed by: INTERNAL MEDICINE

## 2020-01-01 PROCEDURE — 93010 ELECTROCARDIOGRAM REPORT: CPT | Mod: 76 | Performed by: INTERNAL MEDICINE

## 2020-01-01 PROCEDURE — 82728 ASSAY OF FERRITIN: CPT | Performed by: STUDENT IN AN ORGANIZED HEALTH CARE EDUCATION/TRAINING PROGRAM

## 2020-01-01 PROCEDURE — 85018 HEMOGLOBIN: CPT | Performed by: PHYSICIAN ASSISTANT

## 2020-01-01 PROCEDURE — 84132 ASSAY OF SERUM POTASSIUM: CPT | Performed by: INTERNAL MEDICINE

## 2020-01-01 PROCEDURE — 89051 BODY FLUID CELL COUNT: CPT | Performed by: STUDENT IN AN ORGANIZED HEALTH CARE EDUCATION/TRAINING PROGRAM

## 2020-01-01 PROCEDURE — 86900 BLOOD TYPING SEROLOGIC ABO: CPT | Performed by: STUDENT IN AN ORGANIZED HEALTH CARE EDUCATION/TRAINING PROGRAM

## 2020-01-01 PROCEDURE — 93451 RIGHT HEART CATH: CPT | Mod: 26 | Performed by: INTERNAL MEDICINE

## 2020-01-01 PROCEDURE — 86850 RBC ANTIBODY SCREEN: CPT | Performed by: STUDENT IN AN ORGANIZED HEALTH CARE EDUCATION/TRAINING PROGRAM

## 2020-01-01 PROCEDURE — 94003 VENT MGMT INPAT SUBQ DAY: CPT

## 2020-01-01 PROCEDURE — 99291 CRITICAL CARE FIRST HOUR: CPT | Mod: GC | Performed by: INTERNAL MEDICINE

## 2020-01-01 PROCEDURE — 83880 ASSAY OF NATRIURETIC PEPTIDE: CPT | Performed by: INTERNAL MEDICINE

## 2020-01-01 PROCEDURE — 25000565 ZZH ISOFLURANE, EA 15 MIN: Performed by: THORACIC SURGERY (CARDIOTHORACIC VASCULAR SURGERY)

## 2020-01-01 PROCEDURE — 43235 EGD DIAGNOSTIC BRUSH WASH: CPT | Performed by: INTERNAL MEDICINE

## 2020-01-01 PROCEDURE — C1729 CATH, DRAINAGE: HCPCS

## 2020-01-01 PROCEDURE — 40000986 XR ABDOMEN PORT 1 VW

## 2020-01-01 PROCEDURE — 99231 SBSQ HOSP IP/OBS SF/LOW 25: CPT | Mod: GC | Performed by: ANESTHESIOLOGY

## 2020-01-01 PROCEDURE — 96366 THER/PROPH/DIAG IV INF ADDON: CPT | Performed by: EMERGENCY MEDICINE

## 2020-01-01 PROCEDURE — 71045 X-RAY EXAM CHEST 1 VIEW: CPT | Mod: 77

## 2020-01-01 PROCEDURE — P9041 ALBUMIN (HUMAN),5%, 50ML: HCPCS

## 2020-01-01 PROCEDURE — 97530 THERAPEUTIC ACTIVITIES: CPT | Mod: GO | Performed by: OCCUPATIONAL THERAPY ASSISTANT

## 2020-01-01 PROCEDURE — 99215 OFFICE O/P EST HI 40 MIN: CPT | Mod: 95 | Performed by: NURSE PRACTITIONER

## 2020-01-01 PROCEDURE — 87040 BLOOD CULTURE FOR BACTERIA: CPT | Performed by: EMERGENCY MEDICINE

## 2020-01-01 PROCEDURE — P9059 PLASMA, FRZ BETWEEN 8-24HOUR: HCPCS | Performed by: INTERNAL MEDICINE

## 2020-01-01 PROCEDURE — 82803 BLOOD GASES ANY COMBINATION: CPT | Mod: 91 | Performed by: EMERGENCY MEDICINE

## 2020-01-01 PROCEDURE — 37000008 ZZH ANESTHESIA TECHNICAL FEE, 1ST 30 MIN: Performed by: THORACIC SURGERY (CARDIOTHORACIC VASCULAR SURGERY)

## 2020-01-01 PROCEDURE — 25000125 ZZHC RX 250: Performed by: NURSE PRACTITIONER

## 2020-01-01 PROCEDURE — 95711 VEEG 2-12 HR UNMONITORED: CPT

## 2020-01-01 PROCEDURE — 25000131 ZZH RX MED GY IP 250 OP 636 PS 637: Mod: GY | Performed by: STUDENT IN AN ORGANIZED HEALTH CARE EDUCATION/TRAINING PROGRAM

## 2020-01-01 PROCEDURE — 27410246 ZZH DEVICE HM II POCKET SHOWER BAG, INITIAL ONLY, EACH

## 2020-01-01 PROCEDURE — 33967 INSERT I-AORT PERCUT DEVICE: CPT | Mod: 58 | Performed by: INTERNAL MEDICINE

## 2020-01-01 PROCEDURE — 97110 THERAPEUTIC EXERCISES: CPT | Mod: GP

## 2020-01-01 PROCEDURE — 86850 RBC ANTIBODY SCREEN: CPT | Performed by: SURGERY

## 2020-01-01 PROCEDURE — P9041 ALBUMIN (HUMAN),5%, 50ML: HCPCS | Performed by: INTERNAL MEDICINE

## 2020-01-01 PROCEDURE — 97535 SELF CARE MNGMENT TRAINING: CPT | Mod: GO

## 2020-01-01 PROCEDURE — 40000257 ZZH STATISTIC CONSULT NO CHARGE VASC ACCESS

## 2020-01-01 PROCEDURE — 83550 IRON BINDING TEST: CPT | Performed by: INTERNAL MEDICINE

## 2020-01-01 PROCEDURE — 36000059 ZZH SURGERY LEVEL 3 EA 15 ADDTL MIN UMMC: Performed by: THORACIC SURGERY (CARDIOTHORACIC VASCULAR SURGERY)

## 2020-01-01 PROCEDURE — 33968 REMOVE AORTIC ASSIST DEVICE: CPT

## 2020-01-01 PROCEDURE — 80307 DRUG TEST PRSMV CHEM ANLYZR: CPT | Performed by: STUDENT IN AN ORGANIZED HEALTH CARE EDUCATION/TRAINING PROGRAM

## 2020-01-01 PROCEDURE — 85014 HEMATOCRIT: CPT | Performed by: PHYSICIAN ASSISTANT

## 2020-01-01 PROCEDURE — 85384 FIBRINOGEN ACTIVITY: CPT | Performed by: STUDENT IN AN ORGANIZED HEALTH CARE EDUCATION/TRAINING PROGRAM

## 2020-01-01 PROCEDURE — 94799 UNLISTED PULMONARY SVC/PX: CPT

## 2020-01-01 PROCEDURE — 97110 THERAPEUTIC EXERCISES: CPT | Mod: GP | Performed by: PHYSICAL THERAPIST

## 2020-01-01 PROCEDURE — 93321 DOPPLER ECHO F-UP/LMTD STD: CPT | Mod: 26 | Performed by: INTERNAL MEDICINE

## 2020-01-01 PROCEDURE — G0499 HEPB SCREEN HIGH RISK INDIV: HCPCS | Performed by: INTERNAL MEDICINE

## 2020-01-01 PROCEDURE — 85049 AUTOMATED PLATELET COUNT: CPT | Performed by: INTERNAL MEDICINE

## 2020-01-01 PROCEDURE — 84484 ASSAY OF TROPONIN QUANT: CPT | Performed by: EMERGENCY MEDICINE

## 2020-01-01 PROCEDURE — 84484 ASSAY OF TROPONIN QUANT: CPT | Performed by: INTERNAL MEDICINE

## 2020-01-01 PROCEDURE — 82330 ASSAY OF CALCIUM: CPT | Performed by: SURGERY

## 2020-01-01 PROCEDURE — 33967 INSERT I-AORT PERCUT DEVICE: CPT | Performed by: INTERNAL MEDICINE

## 2020-01-01 PROCEDURE — 36600 WITHDRAWAL OF ARTERIAL BLOOD: CPT

## 2020-01-01 PROCEDURE — 99285 EMERGENCY DEPT VISIT HI MDM: CPT | Mod: 25 | Performed by: FAMILY MEDICINE

## 2020-01-01 PROCEDURE — 02UJ08Z SUPPLEMENT TRICUSPID VALVE WITH ZOOPLASTIC TISSUE, OPEN APPROACH: ICD-10-PCS | Performed by: SURGERY

## 2020-01-01 PROCEDURE — 93005 ELECTROCARDIOGRAM TRACING: CPT | Performed by: FAMILY MEDICINE

## 2020-01-01 PROCEDURE — 82330 ASSAY OF CALCIUM: CPT

## 2020-01-01 PROCEDURE — 83880 ASSAY OF NATRIURETIC PEPTIDE: CPT | Performed by: STUDENT IN AN ORGANIZED HEALTH CARE EDUCATION/TRAINING PROGRAM

## 2020-01-01 PROCEDURE — 84466 ASSAY OF TRANSFERRIN: CPT | Performed by: STUDENT IN AN ORGANIZED HEALTH CARE EDUCATION/TRAINING PROGRAM

## 2020-01-01 PROCEDURE — 82805 BLOOD GASES W/O2 SATURATION: CPT

## 2020-01-01 PROCEDURE — 84134 ASSAY OF PREALBUMIN: CPT | Performed by: STUDENT IN AN ORGANIZED HEALTH CARE EDUCATION/TRAINING PROGRAM

## 2020-01-01 PROCEDURE — 41000018 ZZH PER-PERFUSION 1ST 30 MIN: Performed by: SURGERY

## 2020-01-01 PROCEDURE — 36415 COLL VENOUS BLD VENIPUNCTURE: CPT | Performed by: HOSPITALIST

## 2020-01-01 PROCEDURE — 93010 ELECTROCARDIOGRAM REPORT: CPT | Mod: Z6 | Performed by: FAMILY MEDICINE

## 2020-01-01 PROCEDURE — 86850 RBC ANTIBODY SCREEN: CPT | Performed by: OPTOMETRIST

## 2020-01-01 PROCEDURE — 86850 RBC ANTIBODY SCREEN: CPT | Performed by: EMERGENCY MEDICINE

## 2020-01-01 PROCEDURE — 85384 FIBRINOGEN ACTIVITY: CPT | Performed by: SURGERY

## 2020-01-01 PROCEDURE — 87088 URINE BACTERIA CULTURE: CPT | Performed by: INTERNAL MEDICINE

## 2020-01-01 PROCEDURE — 87070 CULTURE OTHR SPECIMN AEROBIC: CPT | Performed by: STUDENT IN AN ORGANIZED HEALTH CARE EDUCATION/TRAINING PROGRAM

## 2020-01-01 PROCEDURE — P9041 ALBUMIN (HUMAN),5%, 50ML: HCPCS | Performed by: STUDENT IN AN ORGANIZED HEALTH CARE EDUCATION/TRAINING PROGRAM

## 2020-01-01 PROCEDURE — 86923 COMPATIBILITY TEST ELECTRIC: CPT | Performed by: INTERNAL MEDICINE

## 2020-01-01 PROCEDURE — 0W3P8ZZ CONTROL BLEEDING IN GASTROINTESTINAL TRACT, VIA NATURAL OR ARTIFICIAL OPENING ENDOSCOPIC: ICD-10-PCS | Performed by: INTERNAL MEDICINE

## 2020-01-01 PROCEDURE — 83605 ASSAY OF LACTIC ACID: CPT

## 2020-01-01 PROCEDURE — 84478 ASSAY OF TRIGLYCERIDES: CPT | Performed by: STUDENT IN AN ORGANIZED HEALTH CARE EDUCATION/TRAINING PROGRAM

## 2020-01-01 PROCEDURE — 25000132 ZZH RX MED GY IP 250 OP 250 PS 637: Mod: GY | Performed by: OTOLARYNGOLOGY

## 2020-01-01 PROCEDURE — 27211337 ZZ H CLIP, INSTINCT COOK, ADDITIONAL: Performed by: INTERNAL MEDICINE

## 2020-01-01 PROCEDURE — 99231 SBSQ HOSP IP/OBS SF/LOW 25: CPT | Mod: GC | Performed by: INTERNAL MEDICINE

## 2020-01-01 PROCEDURE — 80048 BASIC METABOLIC PNL TOTAL CA: CPT | Performed by: NURSE PRACTITIONER

## 2020-01-01 PROCEDURE — 27210886 ZZH ACCESSORY CR5

## 2020-01-01 PROCEDURE — 80320 DRUG SCREEN QUANTALCOHOLS: CPT | Performed by: STUDENT IN AN ORGANIZED HEALTH CARE EDUCATION/TRAINING PROGRAM

## 2020-01-01 PROCEDURE — 25000128 H RX IP 250 OP 636: Performed by: COUNSELOR

## 2020-01-01 PROCEDURE — 25800030 ZZH RX IP 258 OP 636: Performed by: EMERGENCY MEDICINE

## 2020-01-01 PROCEDURE — 5A1D90Z PERFORMANCE OF URINARY FILTRATION, CONTINUOUS, GREATER THAN 18 HOURS PER DAY: ICD-10-PCS | Performed by: INTERNAL MEDICINE

## 2020-01-01 PROCEDURE — 70450 CT HEAD/BRAIN W/O DYE: CPT

## 2020-01-01 PROCEDURE — 25800030 ZZH RX IP 258 OP 636

## 2020-01-01 PROCEDURE — 5A1221Z PERFORMANCE OF CARDIAC OUTPUT, CONTINUOUS: ICD-10-PCS | Performed by: SURGERY

## 2020-01-01 PROCEDURE — 27210447 ZZH PACK CELL SAVER CSP: Performed by: SURGERY

## 2020-01-01 PROCEDURE — 83735 ASSAY OF MAGNESIUM: CPT | Performed by: NURSE PRACTITIONER

## 2020-01-01 PROCEDURE — 5A1D70Z PERFORMANCE OF URINARY FILTRATION, INTERMITTENT, LESS THAN 6 HOURS PER DAY: ICD-10-PCS | Performed by: INTERNAL MEDICINE

## 2020-01-01 PROCEDURE — 93010 ELECTROCARDIOGRAM REPORT: CPT | Mod: 59 | Performed by: INTERNAL MEDICINE

## 2020-01-01 PROCEDURE — 87086 URINE CULTURE/COLONY COUNT: CPT | Performed by: INTERNAL MEDICINE

## 2020-01-01 PROCEDURE — 09JK8ZZ INSPECTION OF NASAL MUCOSA AND SOFT TISSUE, VIA NATURAL OR ARTIFICIAL OPENING ENDOSCOPIC: ICD-10-PCS | Performed by: OTOLARYNGOLOGY

## 2020-01-01 PROCEDURE — 86900 BLOOD TYPING SEROLOGIC ABO: CPT | Performed by: INTERNAL MEDICINE

## 2020-01-01 PROCEDURE — P9037 PLATE PHERES LEUKOREDU IRRAD: HCPCS | Performed by: INTERNAL MEDICINE

## 2020-01-01 PROCEDURE — 3E033XZ INTRODUCTION OF VASOPRESSOR INTO PERIPHERAL VEIN, PERCUTANEOUS APPROACH: ICD-10-PCS | Performed by: STUDENT IN AN ORGANIZED HEALTH CARE EDUCATION/TRAINING PROGRAM

## 2020-01-01 PROCEDURE — 97110 THERAPEUTIC EXERCISES: CPT | Mod: GO | Performed by: OCCUPATIONAL THERAPIST

## 2020-01-01 PROCEDURE — 37000009 ZZH ANESTHESIA TECHNICAL FEE, EACH ADDTL 15 MIN: Performed by: SURGERY

## 2020-01-01 PROCEDURE — 36415 COLL VENOUS BLD VENIPUNCTURE: CPT

## 2020-01-01 PROCEDURE — 44500 INTRO GASTROINTESTINAL TUBE: CPT

## 2020-01-01 PROCEDURE — 27410245 ZZH DEVICE HM II POCKET BATTERY HOLSTER, INITIAL ONLY

## 2020-01-01 PROCEDURE — 85610 PROTHROMBIN TIME: CPT | Performed by: SURGERY

## 2020-01-01 PROCEDURE — 82947 ASSAY GLUCOSE BLOOD QUANT: CPT | Performed by: STUDENT IN AN ORGANIZED HEALTH CARE EDUCATION/TRAINING PROGRAM

## 2020-01-01 PROCEDURE — 85730 THROMBOPLASTIN TIME PARTIAL: CPT | Performed by: STUDENT IN AN ORGANIZED HEALTH CARE EDUCATION/TRAINING PROGRAM

## 2020-01-01 PROCEDURE — 71250 CT THORAX DX C-: CPT

## 2020-01-01 PROCEDURE — 06HM33Z INSERTION OF INFUSION DEVICE INTO RIGHT FEMORAL VEIN, PERCUTANEOUS APPROACH: ICD-10-PCS | Performed by: INTERNAL MEDICINE

## 2020-01-01 PROCEDURE — 85396 CLOTTING ASSAY WHOLE BLOOD: CPT | Performed by: INTERNAL MEDICINE

## 2020-01-01 PROCEDURE — P9016 RBC LEUKOCYTES REDUCED: HCPCS | Performed by: OPTOMETRIST

## 2020-01-01 PROCEDURE — 40000344 ZZHCL STATISTIC THAWING COMPONENT: Performed by: INTERNAL MEDICINE

## 2020-01-01 PROCEDURE — 27410275 ZZH DEVICE HM III CONTROLLER, INITIAL ONLY, EACH

## 2020-01-01 PROCEDURE — 36415 COLL VENOUS BLD VENIPUNCTURE: CPT | Performed by: PHYSICIAN ASSISTANT

## 2020-01-01 PROCEDURE — 84443 ASSAY THYROID STIM HORMONE: CPT | Performed by: INTERNAL MEDICINE

## 2020-01-01 PROCEDURE — 96367 TX/PROPH/DG ADDL SEQ IV INF: CPT | Performed by: EMERGENCY MEDICINE

## 2020-01-01 PROCEDURE — B2111ZZ FLUOROSCOPY OF MULTIPLE CORONARY ARTERIES USING LOW OSMOLAR CONTRAST: ICD-10-PCS | Performed by: INTERNAL MEDICINE

## 2020-01-01 PROCEDURE — 27210732 ZZH ACCESSORY CR1

## 2020-01-01 PROCEDURE — 36415 COLL VENOUS BLD VENIPUNCTURE: CPT | Performed by: EMERGENCY MEDICINE

## 2020-01-01 PROCEDURE — 99214 OFFICE O/P EST MOD 30 MIN: CPT | Mod: 95 | Performed by: PHYSICIAN ASSISTANT

## 2020-01-01 PROCEDURE — 84443 ASSAY THYROID STIM HORMONE: CPT | Performed by: FAMILY MEDICINE

## 2020-01-01 PROCEDURE — 80076 HEPATIC FUNCTION PANEL: CPT | Performed by: STUDENT IN AN ORGANIZED HEALTH CARE EDUCATION/TRAINING PROGRAM

## 2020-01-01 PROCEDURE — 43255 EGD CONTROL BLEEDING ANY: CPT | Performed by: INTERNAL MEDICINE

## 2020-01-01 PROCEDURE — 86900 BLOOD TYPING SEROLOGIC ABO: CPT | Performed by: EMERGENCY MEDICINE

## 2020-01-01 PROCEDURE — 99283 EMERGENCY DEPT VISIT LOW MDM: CPT | Performed by: EMERGENCY MEDICINE

## 2020-01-01 PROCEDURE — 86901 BLOOD TYPING SEROLOGIC RH(D): CPT | Performed by: EMERGENCY MEDICINE

## 2020-01-01 PROCEDURE — 27211039 ZZH NEEDLE CR2

## 2020-01-01 PROCEDURE — 84439 ASSAY OF FREE THYROXINE: CPT | Performed by: INTERNAL MEDICINE

## 2020-01-01 PROCEDURE — 87800 DETECT AGNT MULT DNA DIREC: CPT | Performed by: SURGERY

## 2020-01-01 PROCEDURE — 34300033 ZZH RX 343: Performed by: INTERNAL MEDICINE

## 2020-01-01 PROCEDURE — 99291 CRITICAL CARE FIRST HOUR: CPT | Mod: Z6 | Performed by: EMERGENCY MEDICINE

## 2020-01-01 PROCEDURE — 84157 ASSAY OF PROTEIN OTHER: CPT | Performed by: STUDENT IN AN ORGANIZED HEALTH CARE EDUCATION/TRAINING PROGRAM

## 2020-01-01 PROCEDURE — 5A02216 ASSISTANCE WITH CARDIAC OUTPUT USING OTHER PUMP, CONTINUOUS: ICD-10-PCS | Performed by: SURGERY

## 2020-01-01 PROCEDURE — 27410276 ZZH DEVICE HM III IMPLANT KIT

## 2020-01-01 PROCEDURE — 85610 PROTHROMBIN TIME: CPT | Performed by: NURSE PRACTITIONER

## 2020-01-01 PROCEDURE — 93282 PRGRMG EVAL IMPLANTABLE DFB: CPT

## 2020-01-01 PROCEDURE — 80048 BASIC METABOLIC PNL TOTAL CA: CPT | Performed by: PHYSICIAN ASSISTANT

## 2020-01-01 PROCEDURE — 88305 TISSUE EXAM BY PATHOLOGIST: CPT | Performed by: SURGERY

## 2020-01-01 PROCEDURE — 0WUC0JZ SUPPLEMENT MEDIASTINUM WITH SYNTHETIC SUBSTITUTE, OPEN APPROACH: ICD-10-PCS | Performed by: THORACIC SURGERY (CARDIOTHORACIC VASCULAR SURGERY)

## 2020-01-01 PROCEDURE — 25800030 ZZH RX IP 258 OP 636: Performed by: CLINICAL NURSE SPECIALIST

## 2020-01-01 PROCEDURE — C9803 HOPD COVID-19 SPEC COLLECT: HCPCS | Performed by: EMERGENCY MEDICINE

## 2020-01-01 PROCEDURE — 87086 URINE CULTURE/COLONY COUNT: CPT | Performed by: EMERGENCY MEDICINE

## 2020-01-01 PROCEDURE — 85384 FIBRINOGEN ACTIVITY: CPT | Performed by: INTERNAL MEDICINE

## 2020-01-01 PROCEDURE — 25000125 ZZHC RX 250: Performed by: OTOLARYNGOLOGY

## 2020-01-01 PROCEDURE — 97165 OT EVAL LOW COMPLEX 30 MIN: CPT | Mod: GO

## 2020-01-01 PROCEDURE — 99207 ZZC MOONLIGHTING INDICATOR: CPT | Performed by: INTERNAL MEDICINE

## 2020-01-01 PROCEDURE — 83735 ASSAY OF MAGNESIUM: CPT | Performed by: HOSPITALIST

## 2020-01-01 PROCEDURE — 85025 COMPLETE CBC W/AUTO DIFF WBC: CPT | Performed by: FAMILY MEDICINE

## 2020-01-01 PROCEDURE — C9600 PERC DRUG-EL COR STENT SING: HCPCS | Performed by: INTERNAL MEDICINE

## 2020-01-01 PROCEDURE — 27211441 ZZ H KIT SHRLOCK 5FR POWER PICC TRIPLE LUMEN

## 2020-01-01 PROCEDURE — 27210995 ZZH RX 272: Performed by: SURGERY

## 2020-01-01 PROCEDURE — 80306 DRUG TEST PRSMV INSTRMNT: CPT | Performed by: STUDENT IN AN ORGANIZED HEALTH CARE EDUCATION/TRAINING PROGRAM

## 2020-01-01 PROCEDURE — 99239 HOSP IP/OBS DSCHRG MGMT >30: CPT | Performed by: STUDENT IN AN ORGANIZED HEALTH CARE EDUCATION/TRAINING PROGRAM

## 2020-01-01 PROCEDURE — C1769 GUIDE WIRE: HCPCS | Performed by: INTERNAL MEDICINE

## 2020-01-01 PROCEDURE — 86923 COMPATIBILITY TEST ELECTRIC: CPT | Performed by: STUDENT IN AN ORGANIZED HEALTH CARE EDUCATION/TRAINING PROGRAM

## 2020-01-01 PROCEDURE — 99233 SBSQ HOSP IP/OBS HIGH 50: CPT | Mod: GC | Performed by: ANESTHESIOLOGY

## 2020-01-01 PROCEDURE — 85730 THROMBOPLASTIN TIME PARTIAL: CPT | Performed by: PHYSICIAN ASSISTANT

## 2020-01-01 PROCEDURE — 99291 CRITICAL CARE FIRST HOUR: CPT | Mod: 25 | Performed by: EMERGENCY MEDICINE

## 2020-01-01 PROCEDURE — 4A023N8 MEASUREMENT OF CARDIAC SAMPLING AND PRESSURE, BILATERAL, PERCUTANEOUS APPROACH: ICD-10-PCS | Performed by: INTERNAL MEDICINE

## 2020-01-01 PROCEDURE — 86901 BLOOD TYPING SEROLOGIC RH(D): CPT | Performed by: STUDENT IN AN ORGANIZED HEALTH CARE EDUCATION/TRAINING PROGRAM

## 2020-01-01 PROCEDURE — 82248 BILIRUBIN DIRECT: CPT | Performed by: PHYSICIAN ASSISTANT

## 2020-01-01 PROCEDURE — U0003 INFECTIOUS AGENT DETECTION BY NUCLEIC ACID (DNA OR RNA); SEVERE ACUTE RESPIRATORY SYNDROME CORONAVIRUS 2 (SARS-COV-2) (CORONAVIRUS DISEASE [COVID-19]), AMPLIFIED PROBE TECHNIQUE, MAKING USE OF HIGH THROUGHPUT TECHNOLOGIES AS DESCRIBED BY CMS-2020-01-R: HCPCS | Performed by: FAMILY MEDICINE

## 2020-01-01 PROCEDURE — 83036 HEMOGLOBIN GLYCOSYLATED A1C: CPT | Performed by: SURGERY

## 2020-01-01 PROCEDURE — 80076 HEPATIC FUNCTION PANEL: CPT | Performed by: INTERNAL MEDICINE

## 2020-01-01 PROCEDURE — 27210794 ZZH OR GENERAL SUPPLY STERILE: Performed by: THORACIC SURGERY (CARDIOTHORACIC VASCULAR SURGERY)

## 2020-01-01 PROCEDURE — 85018 HEMOGLOBIN: CPT | Performed by: SURGERY

## 2020-01-01 PROCEDURE — C1874 STENT, COATED/COV W/DEL SYS: HCPCS | Performed by: INTERNAL MEDICINE

## 2020-01-01 PROCEDURE — 83540 ASSAY OF IRON: CPT | Performed by: STUDENT IN AN ORGANIZED HEALTH CARE EDUCATION/TRAINING PROGRAM

## 2020-01-01 PROCEDURE — 27210140 ZZH KIT CATH SWAN VIP SUPPLY

## 2020-01-01 PROCEDURE — 87205 SMEAR GRAM STAIN: CPT

## 2020-01-01 PROCEDURE — 87493 C DIFF AMPLIFIED PROBE: CPT

## 2020-01-01 PROCEDURE — 80162 ASSAY OF DIGOXIN TOTAL: CPT | Performed by: INTERNAL MEDICINE

## 2020-01-01 PROCEDURE — 84478 ASSAY OF TRIGLYCERIDES: CPT | Performed by: PHYSICIAN ASSISTANT

## 2020-01-01 PROCEDURE — 87040 BLOOD CULTURE FOR BACTERIA: CPT

## 2020-01-01 PROCEDURE — 78452 HT MUSCLE IMAGE SPECT MULT: CPT | Mod: 26 | Performed by: INTERNAL MEDICINE

## 2020-01-01 PROCEDURE — 40000611 ZZHCL STATISTIC MORPHOLOGY W/INTERP HEMEPATH TC 85060: Performed by: STUDENT IN AN ORGANIZED HEALTH CARE EDUCATION/TRAINING PROGRAM

## 2020-01-01 PROCEDURE — 93282 PRGRMG EVAL IMPLANTABLE DFB: CPT | Mod: 26 | Performed by: INTERNAL MEDICINE

## 2020-01-01 PROCEDURE — 85025 COMPLETE CBC W/AUTO DIFF WBC: CPT | Performed by: STUDENT IN AN ORGANIZED HEALTH CARE EDUCATION/TRAINING PROGRAM

## 2020-01-01 PROCEDURE — 40000081 ZZH STATISTIC INSERT IABP

## 2020-01-01 PROCEDURE — 99207 ZZC CDG-MDM COMPONENT: MEETS LOW - DOWN CODED: CPT | Performed by: INTERNAL MEDICINE

## 2020-01-01 PROCEDURE — A9505 TL201 THALLIUM: HCPCS | Performed by: INTERNAL MEDICINE

## 2020-01-01 PROCEDURE — 99153 MOD SED SAME PHYS/QHP EA: CPT | Performed by: INTERNAL MEDICINE

## 2020-01-01 PROCEDURE — 93287 PERI-PX DEVICE EVAL & PRGR: CPT | Mod: 26 | Performed by: INTERNAL MEDICINE

## 2020-01-01 PROCEDURE — 85027 COMPLETE CBC AUTOMATED: CPT | Performed by: HOSPITALIST

## 2020-01-01 PROCEDURE — 82805 BLOOD GASES W/O2 SATURATION: CPT | Performed by: SURGERY

## 2020-01-01 PROCEDURE — 88304 TISSUE EXAM BY PATHOLOGIST: CPT | Performed by: SURGERY

## 2020-01-01 PROCEDURE — 37000008 ZZH ANESTHESIA TECHNICAL FEE, 1ST 30 MIN: Performed by: SURGERY

## 2020-01-01 PROCEDURE — 87070 CULTURE OTHR SPECIMN AEROBIC: CPT

## 2020-01-01 PROCEDURE — 82550 ASSAY OF CK (CPK): CPT | Performed by: SURGERY

## 2020-01-01 PROCEDURE — 27210429 ZZH NUTRITION PRODUCT INTERMEDIATE LITER

## 2020-01-01 PROCEDURE — 99239 HOSP IP/OBS DSCHRG MGMT >30: CPT | Performed by: HOSPITALIST

## 2020-01-01 PROCEDURE — C1887 CATHETER, GUIDING: HCPCS | Performed by: INTERNAL MEDICINE

## 2020-01-01 PROCEDURE — P9041 ALBUMIN (HUMAN),5%, 50ML: HCPCS | Performed by: SURGERY

## 2020-01-01 PROCEDURE — 84439 ASSAY OF FREE THYROXINE: CPT | Performed by: FAMILY MEDICINE

## 2020-01-01 PROCEDURE — 3E033XZ INTRODUCTION OF VASOPRESSOR INTO PERIPHERAL VEIN, PERCUTANEOUS APPROACH: ICD-10-PCS | Performed by: HOSPITALIST

## 2020-01-01 PROCEDURE — 25000128 H RX IP 250 OP 636: Performed by: EMERGENCY MEDICINE

## 2020-01-01 PROCEDURE — 25000131 ZZH RX MED GY IP 250 OP 636 PS 637: Mod: GY | Performed by: SURGERY

## 2020-01-01 PROCEDURE — 43270 EGD LESION ABLATION: CPT | Performed by: INTERNAL MEDICINE

## 2020-01-01 PROCEDURE — C1725 CATH, TRANSLUMIN NON-LASER: HCPCS | Performed by: INTERNAL MEDICINE

## 2020-01-01 PROCEDURE — 85520 HEPARIN ASSAY: CPT | Performed by: NURSE PRACTITIONER

## 2020-01-01 PROCEDURE — 02HA0QZ INSERTION OF IMPLANTABLE HEART ASSIST SYSTEM INTO HEART, OPEN APPROACH: ICD-10-PCS | Performed by: SURGERY

## 2020-01-01 PROCEDURE — 83036 HEMOGLOBIN GLYCOSYLATED A1C: CPT | Performed by: PHYSICIAN ASSISTANT

## 2020-01-01 PROCEDURE — 80048 BASIC METABOLIC PNL TOTAL CA: CPT | Performed by: EMERGENCY MEDICINE

## 2020-01-01 PROCEDURE — 93287 PERI-PX DEVICE EVAL & PRGR: CPT

## 2020-01-01 PROCEDURE — 80053 COMPREHEN METABOLIC PANEL: CPT | Performed by: EMERGENCY MEDICINE

## 2020-01-01 PROCEDURE — 02HP32Z INSERTION OF MONITORING DEVICE INTO PULMONARY TRUNK, PERCUTANEOUS APPROACH: ICD-10-PCS | Performed by: INTERNAL MEDICINE

## 2020-01-01 PROCEDURE — 80076 HEPATIC FUNCTION PANEL: CPT | Performed by: FAMILY MEDICINE

## 2020-01-01 PROCEDURE — 85610 PROTHROMBIN TIME: CPT | Mod: 91 | Performed by: EMERGENCY MEDICINE

## 2020-01-01 PROCEDURE — 83010 ASSAY OF HAPTOGLOBIN QUANT: CPT | Performed by: STUDENT IN AN ORGANIZED HEALTH CARE EDUCATION/TRAINING PROGRAM

## 2020-01-01 PROCEDURE — 25000132 ZZH RX MED GY IP 250 OP 250 PS 637: Mod: GY | Performed by: FAMILY MEDICINE

## 2020-01-01 PROCEDURE — 25800025 ZZH RX 258: Performed by: INTERNAL MEDICINE

## 2020-01-01 PROCEDURE — 25000125 ZZHC RX 250: Performed by: HOSPITALIST

## 2020-01-01 PROCEDURE — 37000009 ZZH ANESTHESIA TECHNICAL FEE, EACH ADDTL 15 MIN: Performed by: THORACIC SURGERY (CARDIOTHORACIC VASCULAR SURGERY)

## 2020-01-01 PROCEDURE — 80076 HEPATIC FUNCTION PANEL: CPT | Performed by: SURGERY

## 2020-01-01 PROCEDURE — P9047 ALBUMIN (HUMAN), 25%, 50ML: HCPCS | Performed by: CLINICAL NURSE SPECIALIST

## 2020-01-01 PROCEDURE — 36620 INSERTION CATHETER ARTERY: CPT | Mod: GC | Performed by: INTERNAL MEDICINE

## 2020-01-01 PROCEDURE — C1768 GRAFT, VASCULAR: HCPCS | Performed by: THORACIC SURGERY (CARDIOTHORACIC VASCULAR SURGERY)

## 2020-01-01 PROCEDURE — 83615 LACTATE (LD) (LDH) ENZYME: CPT | Performed by: PHYSICIAN ASSISTANT

## 2020-01-01 PROCEDURE — 87186 SC STD MICRODIL/AGAR DIL: CPT | Performed by: INTERNAL MEDICINE

## 2020-01-01 PROCEDURE — 99214 OFFICE O/P EST MOD 30 MIN: CPT | Mod: 95 | Performed by: NURSE PRACTITIONER

## 2020-01-01 PROCEDURE — 85520 HEPARIN ASSAY: CPT | Performed by: SURGERY

## 2020-01-01 PROCEDURE — 99203 OFFICE O/P NEW LOW 30 MIN: CPT | Mod: 95 | Performed by: INTERNAL MEDICINE

## 2020-01-01 PROCEDURE — 85613 RUSSELL VIPER VENOM DILUTED: CPT | Performed by: STUDENT IN AN ORGANIZED HEALTH CARE EDUCATION/TRAINING PROGRAM

## 2020-01-01 PROCEDURE — 84134 ASSAY OF PREALBUMIN: CPT | Performed by: INTERNAL MEDICINE

## 2020-01-01 PROCEDURE — 84443 ASSAY THYROID STIM HORMONE: CPT | Performed by: PHYSICIAN ASSISTANT

## 2020-01-01 PROCEDURE — 027034Z DILATION OF CORONARY ARTERY, ONE ARTERY WITH DRUG-ELUTING INTRALUMINAL DEVICE, PERCUTANEOUS APPROACH: ICD-10-PCS | Performed by: INTERNAL MEDICINE

## 2020-01-01 PROCEDURE — C9803 HOPD COVID-19 SPEC COLLECT: HCPCS | Performed by: FAMILY MEDICINE

## 2020-01-01 PROCEDURE — 83615 LACTATE (LD) (LDH) ENZYME: CPT | Performed by: INTERNAL MEDICINE

## 2020-01-01 PROCEDURE — 93295 DEV INTERROG REMOTE 1/2/MLT: CPT | Performed by: INTERNAL MEDICINE

## 2020-01-01 PROCEDURE — 94640 AIRWAY INHALATION TREATMENT: CPT

## 2020-01-01 PROCEDURE — 93296 REM INTERROG EVL PM/IDS: CPT | Performed by: INTERNAL MEDICINE

## 2020-01-01 PROCEDURE — 99292 CRITICAL CARE ADDL 30 MIN: CPT | Mod: Z6 | Performed by: EMERGENCY MEDICINE

## 2020-01-01 PROCEDURE — 99207 ZZC CDG-MDM COMPONENT: MEETS LOW - DOWN CODED: CPT | Performed by: HOSPITALIST

## 2020-01-01 PROCEDURE — 84439 ASSAY OF FREE THYROXINE: CPT | Performed by: PHYSICIAN ASSISTANT

## 2020-01-01 PROCEDURE — 83605 ASSAY OF LACTIC ACID: CPT | Performed by: NURSE PRACTITIONER

## 2020-01-01 PROCEDURE — 76770 US EXAM ABDO BACK WALL COMP: CPT

## 2020-01-01 PROCEDURE — 87205 SMEAR GRAM STAIN: CPT | Performed by: STUDENT IN AN ORGANIZED HEALTH CARE EDUCATION/TRAINING PROGRAM

## 2020-01-01 PROCEDURE — 93922 UPR/L XTREMITY ART 2 LEVELS: CPT

## 2020-01-01 PROCEDURE — 83880 ASSAY OF NATRIURETIC PEPTIDE: CPT | Performed by: PHYSICIAN ASSISTANT

## 2020-01-01 PROCEDURE — 25000565 ZZH ISOFLURANE, EA 15 MIN: Performed by: SURGERY

## 2020-01-01 PROCEDURE — 86923 COMPATIBILITY TEST ELECTRIC: CPT | Performed by: OPTOMETRIST

## 2020-01-01 PROCEDURE — 82805 BLOOD GASES W/O2 SATURATION: CPT | Performed by: NURSE PRACTITIONER

## 2020-01-01 PROCEDURE — 85730 THROMBOPLASTIN TIME PARTIAL: CPT | Performed by: SURGERY

## 2020-01-01 PROCEDURE — 87493 C DIFF AMPLIFIED PROBE: CPT | Performed by: NURSE PRACTITIONER

## 2020-01-01 PROCEDURE — 02CL0ZZ EXTIRPATION OF MATTER FROM LEFT VENTRICLE, OPEN APPROACH: ICD-10-PCS | Performed by: SURGERY

## 2020-01-01 PROCEDURE — 87106 FUNGI IDENTIFICATION YEAST: CPT

## 2020-01-01 PROCEDURE — 81001 URINALYSIS AUTO W/SCOPE: CPT | Performed by: STUDENT IN AN ORGANIZED HEALTH CARE EDUCATION/TRAINING PROGRAM

## 2020-01-01 PROCEDURE — 27210432 ZZH NUTRITION PRODUCT RENAL BASIC LITER: Performed by: DIETITIAN, REGISTERED

## 2020-01-01 PROCEDURE — 84439 ASSAY OF FREE THYROXINE: CPT | Performed by: HOSPITALIST

## 2020-01-01 PROCEDURE — 97530 THERAPEUTIC ACTIVITIES: CPT | Mod: GP | Performed by: PHYSICAL THERAPIST

## 2020-01-01 PROCEDURE — 27210460 ZZH PUMP APP ADULT PERFUSION: Performed by: SURGERY

## 2020-01-01 PROCEDURE — 84100 ASSAY OF PHOSPHORUS: CPT | Performed by: HOSPITALIST

## 2020-01-01 PROCEDURE — 36000076 ZZH SURGERY LEVEL 6 EA 15 ADDTL MIN - UMMC: Performed by: SURGERY

## 2020-01-01 PROCEDURE — 27210291 ZZH BASKET ADDITIONAL: Performed by: INTERNAL MEDICINE

## 2020-01-01 PROCEDURE — 85520 HEPARIN ASSAY: CPT | Performed by: HOSPITALIST

## 2020-01-01 PROCEDURE — 83550 IRON BINDING TEST: CPT | Performed by: STUDENT IN AN ORGANIZED HEALTH CARE EDUCATION/TRAINING PROGRAM

## 2020-01-01 PROCEDURE — 80202 ASSAY OF VANCOMYCIN: CPT | Performed by: SURGERY

## 2020-01-01 PROCEDURE — 02HV33Z INSERTION OF INFUSION DEVICE INTO SUPERIOR VENA CAVA, PERCUTANEOUS APPROACH: ICD-10-PCS | Performed by: INTERNAL MEDICINE

## 2020-01-01 PROCEDURE — 99223 1ST HOSP IP/OBS HIGH 75: CPT | Performed by: INTERNAL MEDICINE

## 2020-01-01 PROCEDURE — U0003 INFECTIOUS AGENT DETECTION BY NUCLEIC ACID (DNA OR RNA); SEVERE ACUTE RESPIRATORY SYNDROME CORONAVIRUS 2 (SARS-COV-2) (CORONAVIRUS DISEASE [COVID-19]), AMPLIFIED PROBE TECHNIQUE, MAKING USE OF HIGH THROUGHPUT TECHNOLOGIES AS DESCRIBED BY CMS-2020-01-R: HCPCS | Performed by: PHYSICIAN ASSISTANT

## 2020-01-01 PROCEDURE — 84134 ASSAY OF PREALBUMIN: CPT | Performed by: PHYSICIAN ASSISTANT

## 2020-01-01 PROCEDURE — 86706 HEP B SURFACE ANTIBODY: CPT | Performed by: INTERNAL MEDICINE

## 2020-01-01 PROCEDURE — 83540 ASSAY OF IRON: CPT | Performed by: INTERNAL MEDICINE

## 2020-01-01 PROCEDURE — B2131ZZ FLUOROSCOPY OF MULTIPLE CORONARY ARTERY BYPASS GRAFTS USING LOW OSMOLAR CONTRAST: ICD-10-PCS | Performed by: INTERNAL MEDICINE

## 2020-01-01 PROCEDURE — 84132 ASSAY OF SERUM POTASSIUM: CPT | Performed by: SURGERY

## 2020-01-01 PROCEDURE — 93925 LOWER EXTREMITY STUDY: CPT

## 2020-01-01 PROCEDURE — 84100 ASSAY OF PHOSPHORUS: CPT | Performed by: SURGERY

## 2020-01-01 PROCEDURE — 84295 ASSAY OF SERUM SODIUM: CPT | Performed by: STUDENT IN AN ORGANIZED HEALTH CARE EDUCATION/TRAINING PROGRAM

## 2020-01-01 PROCEDURE — 25000128 H RX IP 250 OP 636: Performed by: RADIOLOGY

## 2020-01-01 PROCEDURE — 41000019 ZZH PERA-PERFUSION EACH ADDTL 15 MIN: Performed by: SURGERY

## 2020-01-01 PROCEDURE — 84295 ASSAY OF SERUM SODIUM: CPT

## 2020-01-01 PROCEDURE — 82533 TOTAL CORTISOL: CPT | Performed by: INTERNAL MEDICINE

## 2020-01-01 PROCEDURE — 27410254 ZZH DEVICE HM II UNIVERSAL BATTERY CHARGER, INITIAL ONLY, EACH

## 2020-01-01 PROCEDURE — 4A0335C MEASUREMENT OF ARTERIAL FLOW, CORONARY, PERCUTANEOUS APPROACH: ICD-10-PCS | Performed by: INTERNAL MEDICINE

## 2020-01-01 PROCEDURE — 99223 1ST HOSP IP/OBS HIGH 75: CPT | Mod: AI | Performed by: INTERNAL MEDICINE

## 2020-01-01 PROCEDURE — 97530 THERAPEUTIC ACTIVITIES: CPT | Mod: GO

## 2020-01-01 PROCEDURE — 40000556 ZZH STATISTIC PERIPHERAL IV START W US GUIDANCE

## 2020-01-01 PROCEDURE — 84132 ASSAY OF SERUM POTASSIUM: CPT | Performed by: STUDENT IN AN ORGANIZED HEALTH CARE EDUCATION/TRAINING PROGRAM

## 2020-01-01 PROCEDURE — 36556 INSERT NON-TUNNEL CV CATH: CPT | Mod: GC | Performed by: INTERNAL MEDICINE

## 2020-01-01 PROCEDURE — 25000131 ZZH RX MED GY IP 250 OP 636 PS 637: Mod: GY | Performed by: INTERNAL MEDICINE

## 2020-01-01 PROCEDURE — 99284 EMERGENCY DEPT VISIT MOD MDM: CPT | Mod: 25 | Performed by: EMERGENCY MEDICINE

## 2020-01-01 PROCEDURE — 3E0436Z INTRODUCTION OF NUTRITIONAL SUBSTANCE INTO CENTRAL VEIN, PERCUTANEOUS APPROACH: ICD-10-PCS | Performed by: INTERNAL MEDICINE

## 2020-01-01 PROCEDURE — 3E0G8GC INTRODUCTION OF OTHER THERAPEUTIC SUBSTANCE INTO UPPER GI, VIA NATURAL OR ARTIFICIAL OPENING ENDOSCOPIC: ICD-10-PCS | Performed by: INTERNAL MEDICINE

## 2020-01-01 PROCEDURE — 93308 TTE F-UP OR LMTD: CPT

## 2020-01-01 PROCEDURE — 85014 HEMATOCRIT: CPT | Performed by: SURGERY

## 2020-01-01 PROCEDURE — 99233 SBSQ HOSP IP/OBS HIGH 50: CPT | Performed by: HOSPITALIST

## 2020-01-01 PROCEDURE — U0003 INFECTIOUS AGENT DETECTION BY NUCLEIC ACID (DNA OR RNA); SEVERE ACUTE RESPIRATORY SYNDROME CORONAVIRUS 2 (SARS-COV-2) (CORONAVIRUS DISEASE [COVID-19]), AMPLIFIED PROBE TECHNIQUE, MAKING USE OF HIGH THROUGHPUT TECHNOLOGIES AS DESCRIBED BY CMS-2020-01-R: HCPCS | Performed by: EMERGENCY MEDICINE

## 2020-01-01 PROCEDURE — 81001 URINALYSIS AUTO W/SCOPE: CPT

## 2020-01-01 PROCEDURE — 84443 ASSAY THYROID STIM HORMONE: CPT | Performed by: HOSPITALIST

## 2020-01-01 PROCEDURE — 99291 CRITICAL CARE FIRST HOUR: CPT | Performed by: NURSE PRACTITIONER

## 2020-01-01 PROCEDURE — 84443 ASSAY THYROID STIM HORMONE: CPT | Performed by: STUDENT IN AN ORGANIZED HEALTH CARE EDUCATION/TRAINING PROGRAM

## 2020-01-01 PROCEDURE — 83605 ASSAY OF LACTIC ACID: CPT | Performed by: EMERGENCY MEDICINE

## 2020-01-01 PROCEDURE — 83735 ASSAY OF MAGNESIUM: CPT | Performed by: EMERGENCY MEDICINE

## 2020-01-01 PROCEDURE — 81001 URINALYSIS AUTO W/SCOPE: CPT | Performed by: PHYSICIAN ASSISTANT

## 2020-01-01 PROCEDURE — 93321 DOPPLER ECHO F-UP/LMTD STD: CPT

## 2020-01-01 PROCEDURE — 85347 COAGULATION TIME ACTIVATED: CPT

## 2020-01-01 PROCEDURE — 84145 PROCALCITONIN (PCT): CPT | Performed by: INTERNAL MEDICINE

## 2020-01-01 PROCEDURE — 93306 TTE W/DOPPLER COMPLETE: CPT | Mod: 26 | Performed by: INTERNAL MEDICINE

## 2020-01-01 PROCEDURE — 99238 HOSP IP/OBS DSCHRG MGMT 30/<: CPT | Mod: GC | Performed by: ANESTHESIOLOGY

## 2020-01-01 PROCEDURE — 84100 ASSAY OF PHOSPHORUS: CPT | Performed by: NURSE PRACTITIONER

## 2020-01-01 PROCEDURE — 96360 HYDRATION IV INFUSION INIT: CPT | Mod: 59 | Performed by: EMERGENCY MEDICINE

## 2020-01-01 PROCEDURE — 84132 ASSAY OF SERUM POTASSIUM: CPT | Performed by: NURSE PRACTITIONER

## 2020-01-01 PROCEDURE — 40000985 XR CHEST PORT 1 VW

## 2020-01-01 PROCEDURE — 93975 VASCULAR STUDY: CPT | Mod: TC

## 2020-01-01 PROCEDURE — 86900 BLOOD TYPING SEROLOGIC ABO: CPT | Performed by: OPTOMETRIST

## 2020-01-01 PROCEDURE — 27410239 ZZH DEVICE HM II 14-V LITH BATTERY CLIP BOX, INITIAL ONLY: Mod: 59

## 2020-01-01 PROCEDURE — 25000132 ZZH RX MED GY IP 250 OP 250 PS 637: Mod: GY | Performed by: PSYCHIATRY & NEUROLOGY

## 2020-01-01 PROCEDURE — 81001 URINALYSIS AUTO W/SCOPE: CPT | Performed by: INTERNAL MEDICINE

## 2020-01-01 PROCEDURE — 99231 SBSQ HOSP IP/OBS SF/LOW 25: CPT | Performed by: INTERNAL MEDICINE

## 2020-01-01 PROCEDURE — 83880 ASSAY OF NATRIURETIC PEPTIDE: CPT | Performed by: FAMILY MEDICINE

## 2020-01-01 PROCEDURE — 81001 URINALYSIS AUTO W/SCOPE: CPT | Performed by: EMERGENCY MEDICINE

## 2020-01-01 PROCEDURE — 27410274 ZZH DEVICE HM III POWER UNIT MOBILE W/AC PWR CABLE, INITIAL ONLY, EACH

## 2020-01-01 PROCEDURE — 5A02210 ASSISTANCE WITH CARDIAC OUTPUT USING BALLOON PUMP, CONTINUOUS: ICD-10-PCS | Performed by: INTERNAL MEDICINE

## 2020-01-01 DEVICE — SU DEVICE COR-KNOT MINI 4X14MM 031350: Type: IMPLANTABLE DEVICE | Site: HEART | Status: FUNCTIONAL

## 2020-01-01 DEVICE — GRAFT GORTEX 18MMX40CM STRETCH SA1804: Type: IMPLANTABLE DEVICE | Site: CHEST | Status: FUNCTIONAL

## 2020-01-01 DEVICE — IMPLANTABLE DEVICE: Type: IMPLANTABLE DEVICE | Site: HEART | Status: FUNCTIONAL

## 2020-01-01 DEVICE — STENT SYNERGY DRUG ELUTING 2.75X24MM  H7493926024270: Type: IMPLANTABLE DEVICE | Status: FUNCTIONAL

## 2020-01-01 DEVICE — ANNULOPLASTY RING MC3 TRICUSPID 30MM 4900T30: Type: IMPLANTABLE DEVICE | Site: HEART | Status: FUNCTIONAL

## 2020-01-01 RX ORDER — MAGNESIUM SULFATE HEPTAHYDRATE 40 MG/ML
2 INJECTION, SOLUTION INTRAVENOUS EVERY 6 HOURS PRN
Status: DISCONTINUED | OUTPATIENT
Start: 2020-01-01 | End: 2020-01-01

## 2020-01-01 RX ORDER — VANCOMYCIN HYDROCHLORIDE 1 G/200ML
1000 INJECTION, SOLUTION INTRAVENOUS SEE ADMIN INSTRUCTIONS
Status: DISCONTINUED | OUTPATIENT
Start: 2020-01-01 | End: 2020-01-01 | Stop reason: HOSPADM

## 2020-01-01 RX ORDER — POLYETHYLENE GLYCOL 3350 17 G/17G
17 POWDER, FOR SOLUTION ORAL DAILY
Status: DISCONTINUED | OUTPATIENT
Start: 2020-01-01 | End: 2020-01-01

## 2020-01-01 RX ORDER — HALOPERIDOL 2 MG/ML
1-2 SOLUTION ORAL EVERY 6 HOURS PRN
Qty: 50 ML | Refills: 0 | Status: SHIPPED | OUTPATIENT
Start: 2020-01-01

## 2020-01-01 RX ORDER — SACUBITRIL AND VALSARTAN 24; 26 MG/1; MG/1
1 TABLET, FILM COATED ORAL 2 TIMES DAILY
COMMUNITY
Start: 2020-01-01 | End: 2020-01-01

## 2020-01-01 RX ORDER — ACETAMINOPHEN 325 MG/1
650 TABLET ORAL EVERY 4 HOURS PRN
Status: DISCONTINUED | OUTPATIENT
Start: 2020-01-01 | End: 2020-01-01

## 2020-01-01 RX ORDER — MUPIROCIN 20 MG/G
OINTMENT TOPICAL 2 TIMES DAILY
Status: DISCONTINUED | OUTPATIENT
Start: 2020-01-01 | End: 2020-01-01 | Stop reason: HOSPADM

## 2020-01-01 RX ORDER — DEXTROSE MONOHYDRATE 25 G/50ML
25-50 INJECTION, SOLUTION INTRAVENOUS
Status: DISCONTINUED | OUTPATIENT
Start: 2020-01-01 | End: 2020-01-01

## 2020-01-01 RX ORDER — HYDROMORPHONE HYDROCHLORIDE 1 MG/ML
.3-.5 INJECTION, SOLUTION INTRAMUSCULAR; INTRAVENOUS; SUBCUTANEOUS
Status: DISCONTINUED | OUTPATIENT
Start: 2020-01-01 | End: 2020-01-01

## 2020-01-01 RX ORDER — HYDROXYZINE HYDROCHLORIDE 10 MG/1
10 TABLET, FILM COATED ORAL ONCE
Status: COMPLETED | OUTPATIENT
Start: 2020-01-01 | End: 2020-01-01

## 2020-01-01 RX ORDER — NITROGLYCERIN 0.4 MG/1
0.4 TABLET SUBLINGUAL EVERY 5 MIN PRN
Status: DISCONTINUED | OUTPATIENT
Start: 2020-01-01 | End: 2020-01-01 | Stop reason: HOSPADM

## 2020-01-01 RX ORDER — FLUCONAZOLE 2 MG/ML
200 INJECTION, SOLUTION INTRAVENOUS
Status: DISCONTINUED | OUTPATIENT
Start: 2020-01-01 | End: 2020-01-01 | Stop reason: HOSPADM

## 2020-01-01 RX ORDER — POTASSIUM CHLORIDE 1.5 G/1.58G
20-40 POWDER, FOR SOLUTION ORAL
Status: DISCONTINUED | OUTPATIENT
Start: 2020-01-01 | End: 2020-01-01 | Stop reason: HOSPADM

## 2020-01-01 RX ORDER — LOPERAMIDE HCL 2 MG
2 CAPSULE ORAL 4 TIMES DAILY PRN
Status: DISCONTINUED | OUTPATIENT
Start: 2020-01-01 | End: 2020-01-01

## 2020-01-01 RX ORDER — LIDOCAINE 40 MG/G
CREAM TOPICAL
Status: DISCONTINUED | OUTPATIENT
Start: 2020-01-01 | End: 2020-01-01

## 2020-01-01 RX ORDER — IOPAMIDOL 755 MG/ML
INJECTION, SOLUTION INTRAVASCULAR
Status: DISCONTINUED | OUTPATIENT
Start: 2020-01-01 | End: 2020-01-01 | Stop reason: HOSPADM

## 2020-01-01 RX ORDER — ACETAMINOPHEN 325 MG/1
650 TABLET ORAL EVERY 4 HOURS PRN
Status: DISCONTINUED | OUTPATIENT
Start: 2020-01-01 | End: 2020-01-01 | Stop reason: HOSPADM

## 2020-01-01 RX ORDER — DIGOXIN 125 MCG
500 TABLET ORAL ONCE
Status: COMPLETED | OUTPATIENT
Start: 2020-01-01 | End: 2020-01-01

## 2020-01-01 RX ORDER — ONDANSETRON 4 MG/1
4 TABLET, ORALLY DISINTEGRATING ORAL EVERY 6 HOURS PRN
Status: DISCONTINUED | OUTPATIENT
Start: 2020-01-01 | End: 2020-01-01 | Stop reason: HOSPADM

## 2020-01-01 RX ORDER — DOCUSATE SODIUM 100 MG/1
100 CAPSULE, LIQUID FILLED ORAL 2 TIMES DAILY
Status: DISCONTINUED | OUTPATIENT
Start: 2020-01-01 | End: 2020-01-01 | Stop reason: HOSPADM

## 2020-01-01 RX ORDER — ISOSORBIDE DINITRATE 10 MG/1
10 TABLET ORAL 3 TIMES DAILY
Status: DISCONTINUED | OUTPATIENT
Start: 2020-01-01 | End: 2020-01-01

## 2020-01-01 RX ORDER — CLONAZEPAM 0.5 MG/1
0.5 TABLET ORAL
Status: ON HOLD | COMMUNITY
Start: 2020-01-01 | End: 2020-01-01

## 2020-01-01 RX ORDER — HEPARIN SODIUM 10000 [USP'U]/100ML
0-3500 INJECTION, SOLUTION INTRAVENOUS CONTINUOUS
Status: DISCONTINUED | OUTPATIENT
Start: 2020-01-01 | End: 2020-01-01

## 2020-01-01 RX ORDER — ALBUMIN (HUMAN) 12.5 G/50ML
12.5 SOLUTION INTRAVENOUS ONCE
Status: DISCONTINUED | OUTPATIENT
Start: 2020-01-01 | End: 2020-01-01 | Stop reason: CLARIF

## 2020-01-01 RX ORDER — MAGNESIUM SULFATE HEPTAHYDRATE 40 MG/ML
4 INJECTION, SOLUTION INTRAVENOUS EVERY 4 HOURS PRN
Status: DISCONTINUED | OUTPATIENT
Start: 2020-01-01 | End: 2020-01-01 | Stop reason: ALTCHOICE

## 2020-01-01 RX ORDER — QUETIAPINE FUMARATE 25 MG/1
50 TABLET, FILM COATED ORAL AT BEDTIME
Status: ON HOLD | COMMUNITY
End: 2020-01-01

## 2020-01-01 RX ORDER — AMIODARONE HYDROCHLORIDE 200 MG/1
400 TABLET ORAL 2 TIMES DAILY
Status: DISCONTINUED | OUTPATIENT
Start: 2020-01-01 | End: 2020-01-01

## 2020-01-01 RX ORDER — NALOXONE HYDROCHLORIDE 0.4 MG/ML
.2-.4 INJECTION, SOLUTION INTRAMUSCULAR; INTRAVENOUS; SUBCUTANEOUS
Status: ACTIVE | OUTPATIENT
Start: 2020-01-01 | End: 2020-01-01

## 2020-01-01 RX ORDER — EPINEPHRINE IN SOD CHLOR,ISO 1 MG/10 ML
SYRINGE (ML) INTRAVENOUS PRN
Status: DISCONTINUED | OUTPATIENT
Start: 2020-01-01 | End: 2020-01-01

## 2020-01-01 RX ORDER — DEXTROSE MONOHYDRATE 25 G/50ML
25-50 INJECTION, SOLUTION INTRAVENOUS
Status: DISCONTINUED | OUTPATIENT
Start: 2020-01-01 | End: 2020-01-01 | Stop reason: HOSPADM

## 2020-01-01 RX ORDER — CALCIUM CHLORIDE 100 MG/ML
2 INJECTION INTRAVENOUS; INTRAVENTRICULAR ONCE
Status: DISCONTINUED | OUTPATIENT
Start: 2020-01-01 | End: 2020-01-01

## 2020-01-01 RX ORDER — CLOPIDOGREL BISULFATE 75 MG/1
75 TABLET ORAL DAILY
Qty: 30 TABLET | Refills: 11 | Status: ON HOLD | OUTPATIENT
Start: 2020-01-01 | End: 2020-01-01

## 2020-01-01 RX ORDER — NICOTINE POLACRILEX 4 MG
15-30 LOZENGE BUCCAL
Status: DISCONTINUED | OUTPATIENT
Start: 2020-01-01 | End: 2020-01-01

## 2020-01-01 RX ORDER — QUETIAPINE FUMARATE 25 MG/1
12.5 TABLET, FILM COATED ORAL AT BEDTIME
Qty: 15 TABLET | Refills: 0 | Status: SHIPPED | OUTPATIENT
Start: 2020-01-01 | End: 2020-01-01

## 2020-01-01 RX ORDER — BUMETANIDE 0.25 MG/ML
5 INJECTION INTRAMUSCULAR; INTRAVENOUS ONCE
Status: COMPLETED | OUTPATIENT
Start: 2020-01-01 | End: 2020-01-01

## 2020-01-01 RX ORDER — LEVOTHYROXINE SODIUM 50 UG/1
50 TABLET ORAL
Status: DISCONTINUED | OUTPATIENT
Start: 2020-01-01 | End: 2020-01-01

## 2020-01-01 RX ORDER — PIPERACILLIN SODIUM, TAZOBACTAM SODIUM 4; .5 G/20ML; G/20ML
4.5 INJECTION, POWDER, LYOPHILIZED, FOR SOLUTION INTRAVENOUS EVERY 6 HOURS
Status: DISCONTINUED | OUTPATIENT
Start: 2020-01-01 | End: 2020-01-01

## 2020-01-01 RX ORDER — WARFARIN SODIUM 2.5 MG/1
2.5 TABLET ORAL
Status: DISCONTINUED | OUTPATIENT
Start: 2020-01-01 | End: 2020-01-01 | Stop reason: HOSPADM

## 2020-01-01 RX ORDER — FLUORIDE TOOTHPASTE
5-10 TOOTHPASTE DENTAL 4 TIMES DAILY
Status: DISCONTINUED | OUTPATIENT
Start: 2020-01-01 | End: 2020-01-01

## 2020-01-01 RX ORDER — BUMETANIDE 0.25 MG/ML
4 INJECTION INTRAMUSCULAR; INTRAVENOUS ONCE
Status: COMPLETED | OUTPATIENT
Start: 2020-01-01 | End: 2020-01-01

## 2020-01-01 RX ORDER — SALIVA STIMULANT COMB. NO.3
2 SPRAY, NON-AEROSOL (ML) MUCOUS MEMBRANE 4 TIMES DAILY PRN
Qty: 44.3 ML | Refills: 1 | Status: SHIPPED | OUTPATIENT
Start: 2020-01-01

## 2020-01-01 RX ORDER — ROSUVASTATIN CALCIUM 20 MG/1
20 TABLET, COATED ORAL DAILY
Status: ON HOLD | COMMUNITY
Start: 2020-01-01 | End: 2020-01-01

## 2020-01-01 RX ORDER — POTASSIUM CHLORIDE 1500 MG/1
20-40 TABLET, EXTENDED RELEASE ORAL
Status: DISCONTINUED | OUTPATIENT
Start: 2020-01-01 | End: 2020-01-01

## 2020-01-01 RX ORDER — LIDOCAINE HYDROCHLORIDE 20 MG/ML
5 SOLUTION OROPHARYNGEAL ONCE
Status: COMPLETED | OUTPATIENT
Start: 2020-01-01 | End: 2020-01-01

## 2020-01-01 RX ORDER — MIDAZOLAM (PF) 1 MG/ML IN 0.9 % SODIUM CHLORIDE INTRAVENOUS SOLUTION
1-4 CONTINUOUS
Status: DISCONTINUED | OUTPATIENT
Start: 2020-01-01 | End: 2020-01-01

## 2020-01-01 RX ORDER — ACETAMINOPHEN 325 MG/1
650 TABLET ORAL EVERY 6 HOURS PRN
Status: DISCONTINUED | OUTPATIENT
Start: 2020-01-01 | End: 2020-01-01

## 2020-01-01 RX ORDER — WARFARIN SODIUM 1 MG/1
TABLET ORAL
Qty: 60 TABLET | Refills: 0 | Status: SHIPPED | OUTPATIENT
Start: 2020-01-01 | End: 2020-01-01 | Stop reason: ALTCHOICE

## 2020-01-01 RX ORDER — TORSEMIDE 20 MG/1
20 TABLET ORAL DAILY
Status: DISCONTINUED | OUTPATIENT
Start: 2020-01-01 | End: 2020-01-01 | Stop reason: HOSPADM

## 2020-01-01 RX ORDER — LORAZEPAM 0.5 MG/1
0.5 TABLET ORAL
Status: DISCONTINUED | OUTPATIENT
Start: 2020-01-01 | End: 2020-01-01 | Stop reason: HOSPADM

## 2020-01-01 RX ORDER — IPRATROPIUM BROMIDE AND ALBUTEROL SULFATE 2.5; .5 MG/3ML; MG/3ML
3 SOLUTION RESPIRATORY (INHALATION) 4 TIMES DAILY PRN
Status: DISCONTINUED | OUTPATIENT
Start: 2020-01-01 | End: 2020-01-01 | Stop reason: HOSPADM

## 2020-01-01 RX ORDER — ONDANSETRON 2 MG/ML
4 INJECTION INTRAMUSCULAR; INTRAVENOUS EVERY 6 HOURS PRN
Status: DISCONTINUED | OUTPATIENT
Start: 2020-01-01 | End: 2020-01-01 | Stop reason: HOSPADM

## 2020-01-01 RX ORDER — ALBUMIN (HUMAN) 12.5 G/50ML
SOLUTION INTRAVENOUS
Status: DISPENSED
Start: 2020-01-01 | End: 2020-01-01

## 2020-01-01 RX ORDER — BUSPIRONE HYDROCHLORIDE 10 MG/1
10 TABLET ORAL 3 TIMES DAILY
Status: DISCONTINUED | OUTPATIENT
Start: 2020-01-01 | End: 2020-01-01 | Stop reason: HOSPADM

## 2020-01-01 RX ORDER — ROSUVASTATIN CALCIUM 20 MG/1
20 TABLET, COATED ORAL DAILY
Qty: 90 TABLET | Refills: 0 | Status: SHIPPED | OUTPATIENT
Start: 2020-01-01

## 2020-01-01 RX ORDER — NALOXONE HYDROCHLORIDE 0.4 MG/ML
.1-.4 INJECTION, SOLUTION INTRAMUSCULAR; INTRAVENOUS; SUBCUTANEOUS
Status: DISCONTINUED | OUTPATIENT
Start: 2020-01-01 | End: 2020-01-01

## 2020-01-01 RX ORDER — LOPERAMIDE HCL 2 MG
2 CAPSULE ORAL 4 TIMES DAILY PRN
Qty: 30 CAPSULE | Refills: 0 | Status: SHIPPED | OUTPATIENT
Start: 2020-01-01

## 2020-01-01 RX ORDER — POLYETHYLENE GLYCOL 3350 17 G/17G
17 POWDER, FOR SOLUTION ORAL 2 TIMES DAILY PRN
Status: DISCONTINUED | OUTPATIENT
Start: 2020-01-01 | End: 2020-01-01 | Stop reason: HOSPADM

## 2020-01-01 RX ORDER — LORAZEPAM 2 MG/ML
0.5 INJECTION INTRAMUSCULAR EVERY 4 HOURS PRN
Status: DISCONTINUED | OUTPATIENT
Start: 2020-01-01 | End: 2020-01-01 | Stop reason: HOSPADM

## 2020-01-01 RX ORDER — WARFARIN SODIUM 2 MG/1
2 TABLET ORAL
Status: DISCONTINUED | OUTPATIENT
Start: 2020-01-01 | End: 2020-01-01

## 2020-01-01 RX ORDER — MIRTAZAPINE 15 MG/1
15 TABLET, FILM COATED ORAL AT BEDTIME
Status: ON HOLD | COMMUNITY
Start: 2020-01-01 | End: 2020-01-01

## 2020-01-01 RX ORDER — MIDODRINE HYDROCHLORIDE 5 MG/1
10 TABLET ORAL DAILY PRN
Status: DISCONTINUED | OUTPATIENT
Start: 2020-01-01 | End: 2020-01-01 | Stop reason: HOSPADM

## 2020-01-01 RX ORDER — SODIUM CHLORIDE 9 MG/ML
INJECTION, SOLUTION INTRAVENOUS CONTINUOUS
Status: DISCONTINUED | OUTPATIENT
Start: 2020-01-01 | End: 2020-01-01 | Stop reason: HOSPADM

## 2020-01-01 RX ORDER — ROSUVASTATIN CALCIUM 10 MG/1
20 TABLET, COATED ORAL AT BEDTIME
Status: DISCONTINUED | OUTPATIENT
Start: 2020-01-01 | End: 2020-01-01 | Stop reason: HOSPADM

## 2020-01-01 RX ORDER — POLYETHYLENE GLYCOL 3350 17 G/17G
17 POWDER, FOR SOLUTION ORAL DAILY PRN
Status: DISCONTINUED | OUTPATIENT
Start: 2020-01-01 | End: 2020-01-01 | Stop reason: HOSPADM

## 2020-01-01 RX ORDER — LORAZEPAM 2 MG/ML
0.5 INJECTION INTRAMUSCULAR
Status: DISCONTINUED | OUTPATIENT
Start: 2020-01-01 | End: 2020-01-01 | Stop reason: HOSPADM

## 2020-01-01 RX ORDER — MIDAZOLAM (PF) 1 MG/ML IN 0.9 % SODIUM CHLORIDE INTRAVENOUS SOLUTION
1-6 CONTINUOUS
Status: DISCONTINUED | OUTPATIENT
Start: 2020-01-01 | End: 2020-01-01

## 2020-01-01 RX ORDER — ALBUMIN, HUMAN INJ 5% 5 %
12.5 SOLUTION INTRAVENOUS ONCE
Status: COMPLETED | OUTPATIENT
Start: 2020-01-01 | End: 2020-01-01

## 2020-01-01 RX ORDER — FENTANYL CITRATE 50 UG/ML
25 INJECTION, SOLUTION INTRAMUSCULAR; INTRAVENOUS EVERY 5 MIN PRN
Status: ACTIVE | OUTPATIENT
Start: 2020-01-01 | End: 2020-01-01

## 2020-01-01 RX ORDER — LORAZEPAM 2 MG/ML
1 CONCENTRATE ORAL EVERY 6 HOURS
Qty: 30 ML | Refills: 1 | Status: SHIPPED | OUTPATIENT
Start: 2020-01-01

## 2020-01-01 RX ORDER — ACETAMINOPHEN 325 MG/1
650 TABLET ORAL ONCE
Status: DISCONTINUED | OUTPATIENT
Start: 2020-01-01 | End: 2020-01-01

## 2020-01-01 RX ORDER — VIT C/E/ZN/COPPR/LUTEIN/ZEAXAN 60 MG-6 MG
1 CAPSULE ORAL 2 TIMES DAILY
Status: DISCONTINUED | OUTPATIENT
Start: 2020-01-01 | End: 2020-01-01 | Stop reason: HOSPADM

## 2020-01-01 RX ORDER — WARFARIN SODIUM 2.5 MG/1
2.5 TABLET ORAL
Status: COMPLETED | OUTPATIENT
Start: 2020-01-01 | End: 2020-01-01

## 2020-01-01 RX ORDER — QUETIAPINE FUMARATE 25 MG/1
50 TABLET, FILM COATED ORAL AT BEDTIME
Status: DISCONTINUED | OUTPATIENT
Start: 2020-01-01 | End: 2020-01-01

## 2020-01-01 RX ORDER — BUMETANIDE 0.25 MG/ML
4 INJECTION INTRAMUSCULAR; INTRAVENOUS ONCE
Status: DISCONTINUED | OUTPATIENT
Start: 2020-01-01 | End: 2020-01-01

## 2020-01-01 RX ORDER — ROSUVASTATIN CALCIUM 20 MG/1
20 TABLET, COATED ORAL DAILY
Status: DISCONTINUED | OUTPATIENT
Start: 2020-01-01 | End: 2020-01-01 | Stop reason: HOSPADM

## 2020-01-01 RX ORDER — LACOSAMIDE 50 MG/1
150 TABLET ORAL 2 TIMES DAILY
Status: DISCONTINUED | OUTPATIENT
Start: 2020-01-01 | End: 2020-01-01

## 2020-01-01 RX ORDER — LIDOCAINE 4 G/G
1 PATCH TOPICAL
Status: DISCONTINUED | OUTPATIENT
Start: 2020-01-01 | End: 2020-01-01

## 2020-01-01 RX ORDER — LANOLIN ALCOHOL/MO/W.PET/CERES
100 CREAM (GRAM) TOPICAL DAILY
Status: DISCONTINUED | OUTPATIENT
Start: 2020-01-01 | End: 2020-01-01

## 2020-01-01 RX ORDER — ALBUMIN, HUMAN INJ 5% 5 %
25 SOLUTION INTRAVENOUS ONCE
Status: COMPLETED | OUTPATIENT
Start: 2020-01-01 | End: 2020-01-01

## 2020-01-01 RX ORDER — NALOXONE HYDROCHLORIDE 0.4 MG/ML
.1-.4 INJECTION, SOLUTION INTRAMUSCULAR; INTRAVENOUS; SUBCUTANEOUS
Status: ACTIVE | OUTPATIENT
Start: 2020-01-01 | End: 2020-01-01

## 2020-01-01 RX ORDER — NOREPINEPHRINE BITARTRATE 0.06 MG/ML
0.03-0.4 INJECTION, SOLUTION INTRAVENOUS CONTINUOUS
Status: DISCONTINUED | OUTPATIENT
Start: 2020-01-01 | End: 2020-01-01 | Stop reason: HOSPADM

## 2020-01-01 RX ORDER — POTASSIUM CHLORIDE 750 MG/1
20-40 TABLET, EXTENDED RELEASE ORAL
Status: DISCONTINUED | OUTPATIENT
Start: 2020-01-01 | End: 2020-01-01 | Stop reason: ALTCHOICE

## 2020-01-01 RX ORDER — MUPIROCIN 20 MG/G
1 OINTMENT TOPICAL 2 TIMES DAILY
Status: DISPENSED | OUTPATIENT
Start: 2020-01-01 | End: 2020-01-01

## 2020-01-01 RX ORDER — NICOTINE POLACRILEX 4 MG
15-30 LOZENGE BUCCAL
Status: DISCONTINUED | OUTPATIENT
Start: 2020-01-01 | End: 2020-01-01 | Stop reason: HOSPADM

## 2020-01-01 RX ORDER — NOREPINEPHRINE BITARTRATE 0.06 MG/ML
0.03-0.4 INJECTION, SOLUTION INTRAVENOUS CONTINUOUS
Status: ON HOLD | DISCHARGE
Start: 2020-01-01 | End: 2020-01-01

## 2020-01-01 RX ORDER — NITROGLYCERIN 0.4 MG/1
0.4 TABLET SUBLINGUAL EVERY 5 MIN PRN
Status: DISCONTINUED | OUTPATIENT
Start: 2020-01-01 | End: 2020-01-01

## 2020-01-01 RX ORDER — BUSPIRONE HYDROCHLORIDE 10 MG/1
10 TABLET ORAL 3 TIMES DAILY
Status: DISCONTINUED | OUTPATIENT
Start: 2020-01-01 | End: 2020-01-01 | Stop reason: CLARIF

## 2020-01-01 RX ORDER — AMOXICILLIN 250 MG
2 CAPSULE ORAL 2 TIMES DAILY PRN
Status: DISCONTINUED | OUTPATIENT
Start: 2020-01-01 | End: 2020-01-01 | Stop reason: HOSPADM

## 2020-01-01 RX ORDER — POTASSIUM CHLORIDE 7.45 MG/ML
10 INJECTION INTRAVENOUS
Status: DISCONTINUED | OUTPATIENT
Start: 2020-01-01 | End: 2020-01-01 | Stop reason: HOSPADM

## 2020-01-01 RX ORDER — DEXTROSE MONOHYDRATE 100 MG/ML
INJECTION, SOLUTION INTRAVENOUS CONTINUOUS PRN
Status: DISCONTINUED | OUTPATIENT
Start: 2020-01-01 | End: 2020-01-01

## 2020-01-01 RX ORDER — HEPARIN SODIUM 10000 [USP'U]/100ML
800 INJECTION, SOLUTION INTRAVENOUS CONTINUOUS
Status: ACTIVE | OUTPATIENT
Start: 2020-01-01 | End: 2020-01-01

## 2020-01-01 RX ORDER — BUSPIRONE HYDROCHLORIDE 10 MG/1
10 TABLET ORAL 3 TIMES DAILY
Qty: 90 TABLET | Refills: 0 | Status: ON HOLD | OUTPATIENT
Start: 2020-01-01 | End: 2020-01-01

## 2020-01-01 RX ORDER — WARFARIN SODIUM 2 MG/1
2 TABLET ORAL
Status: COMPLETED | OUTPATIENT
Start: 2020-01-01 | End: 2020-01-01

## 2020-01-01 RX ORDER — WARFARIN SODIUM 1 MG/1
1 TABLET ORAL
Status: COMPLETED | OUTPATIENT
Start: 2020-01-01 | End: 2020-01-01

## 2020-01-01 RX ORDER — POTASSIUM CHLORIDE 1.5 G/1.58G
20-40 POWDER, FOR SOLUTION ORAL
Status: DISCONTINUED | OUTPATIENT
Start: 2020-01-01 | End: 2020-01-01

## 2020-01-01 RX ORDER — HYDRALAZINE HYDROCHLORIDE 25 MG/1
25 TABLET, FILM COATED ORAL EVERY 8 HOURS SCHEDULED
Status: DISCONTINUED | OUTPATIENT
Start: 2020-01-01 | End: 2020-01-01

## 2020-01-01 RX ORDER — AMOXICILLIN 250 MG
1 CAPSULE ORAL 2 TIMES DAILY
Status: DISCONTINUED | OUTPATIENT
Start: 2020-01-01 | End: 2020-01-01

## 2020-01-01 RX ORDER — HEPARIN SODIUM,PORCINE 10 UNIT/ML
2-5 VIAL (ML) INTRAVENOUS
Status: ACTIVE | OUTPATIENT
Start: 2020-01-01 | End: 2020-01-01

## 2020-01-01 RX ORDER — MAGNESIUM SULFATE HEPTAHYDRATE 40 MG/ML
2 INJECTION, SOLUTION INTRAVENOUS DAILY PRN
Status: DISCONTINUED | OUTPATIENT
Start: 2020-01-01 | End: 2020-01-01 | Stop reason: ALTCHOICE

## 2020-01-01 RX ORDER — GINSENG 100 MG
CAPSULE ORAL 2 TIMES DAILY
Status: DISPENSED | OUTPATIENT
Start: 2020-01-01 | End: 2020-01-01

## 2020-01-01 RX ORDER — LIDOCAINE 40 MG/G
CREAM TOPICAL
Status: ACTIVE | OUTPATIENT
Start: 2020-01-01 | End: 2020-01-01

## 2020-01-01 RX ORDER — DEXTROSE MONOHYDRATE 100 MG/ML
INJECTION, SOLUTION INTRAVENOUS CONTINUOUS
Status: DISCONTINUED | OUTPATIENT
Start: 2020-01-01 | End: 2020-01-01

## 2020-01-01 RX ORDER — SACUBITRIL AND VALSARTAN 24; 26 MG/1; MG/1
1 TABLET, FILM COATED ORAL 2 TIMES DAILY
Qty: 60 TABLET | Refills: 1 | Status: SHIPPED | OUTPATIENT
Start: 2020-01-01 | End: 2020-01-01

## 2020-01-01 RX ORDER — FLUMAZENIL 0.1 MG/ML
0.2 INJECTION, SOLUTION INTRAVENOUS
Status: ACTIVE | OUTPATIENT
Start: 2020-01-01 | End: 2020-01-01

## 2020-01-01 RX ORDER — POTASSIUM CL/LIDO/0.9 % NACL 10MEQ/0.1L
10 INTRAVENOUS SOLUTION, PIGGYBACK (ML) INTRAVENOUS
Status: DISCONTINUED | OUTPATIENT
Start: 2020-01-01 | End: 2020-01-01

## 2020-01-01 RX ORDER — LEVOFLOXACIN 5 MG/ML
500 INJECTION, SOLUTION INTRAVENOUS
Status: COMPLETED | OUTPATIENT
Start: 2020-01-01 | End: 2020-01-01

## 2020-01-01 RX ORDER — BUSPIRONE HYDROCHLORIDE 10 MG/1
15 TABLET ORAL 3 TIMES DAILY
Status: ON HOLD | COMMUNITY
Start: 2020-01-01 | End: 2020-01-01

## 2020-01-01 RX ORDER — ASPIRIN 81 MG/1
81 TABLET, CHEWABLE ORAL DAILY
Status: DISCONTINUED | OUTPATIENT
Start: 2020-01-01 | End: 2020-01-01

## 2020-01-01 RX ORDER — DEXAMETHASONE SODIUM PHOSPHATE 4 MG/ML
4 INJECTION, SOLUTION INTRA-ARTICULAR; INTRALESIONAL; INTRAMUSCULAR; INTRAVENOUS; SOFT TISSUE ONCE
Status: COMPLETED | OUTPATIENT
Start: 2020-01-01 | End: 2020-01-01

## 2020-01-01 RX ORDER — METHOCARBAMOL 500 MG/1
500 TABLET, FILM COATED ORAL 4 TIMES DAILY PRN
Qty: 40 TABLET | Refills: 0 | Status: SHIPPED | OUTPATIENT
Start: 2020-01-01

## 2020-01-01 RX ORDER — SALIVA STIMULANT COMB. NO.3
2 SPRAY, NON-AEROSOL (ML) MUCOUS MEMBRANE 4 TIMES DAILY PRN
Status: DISCONTINUED | OUTPATIENT
Start: 2020-01-01 | End: 2020-01-01 | Stop reason: HOSPADM

## 2020-01-01 RX ORDER — CEFTRIAXONE SODIUM 1 G/50ML
1 INJECTION, SOLUTION INTRAVENOUS EVERY 24 HOURS
Status: DISCONTINUED | OUTPATIENT
Start: 2020-01-01 | End: 2020-01-01 | Stop reason: HOSPADM

## 2020-01-01 RX ORDER — DIAZEPAM 20 MG/4G
20 GEL RECTAL
Qty: 10 EACH | Refills: 0 | Status: SHIPPED | OUTPATIENT
Start: 2020-01-01

## 2020-01-01 RX ORDER — AMOXICILLIN 250 MG
1 CAPSULE ORAL 2 TIMES DAILY PRN
Status: DISCONTINUED | OUTPATIENT
Start: 2020-01-01 | End: 2020-01-01 | Stop reason: HOSPADM

## 2020-01-01 RX ORDER — METOCLOPRAMIDE HYDROCHLORIDE 5 MG/ML
10 INJECTION INTRAMUSCULAR; INTRAVENOUS ONCE
Status: COMPLETED | OUTPATIENT
Start: 2020-01-01 | End: 2020-01-01

## 2020-01-01 RX ORDER — LORAZEPAM 2 MG/ML
.5-1 CONCENTRATE ORAL EVERY 4 HOURS PRN
Qty: 30 ML | Refills: 1 | Status: SHIPPED | OUTPATIENT
Start: 2020-01-01

## 2020-01-01 RX ORDER — LEVETIRACETAM 1000 MG/1
2000 TABLET ORAL DAILY
Qty: 60 TABLET | Refills: 0 | Status: SHIPPED | OUTPATIENT
Start: 2020-01-01

## 2020-01-01 RX ORDER — DOBUTAMINE HYDROCHLORIDE 200 MG/100ML
2.5-2 INJECTION INTRAVENOUS CONTINUOUS
Status: CANCELLED | OUTPATIENT
Start: 2020-01-01

## 2020-01-01 RX ORDER — NALOXONE HYDROCHLORIDE 0.4 MG/ML
.1-.4 INJECTION, SOLUTION INTRAMUSCULAR; INTRAVENOUS; SUBCUTANEOUS
Status: DISCONTINUED | OUTPATIENT
Start: 2020-01-01 | End: 2020-01-01 | Stop reason: HOSPADM

## 2020-01-01 RX ORDER — QUETIAPINE FUMARATE 25 MG/1
25 TABLET, FILM COATED ORAL DAILY PRN
Status: DISCONTINUED | OUTPATIENT
Start: 2020-01-01 | End: 2020-01-01

## 2020-01-01 RX ORDER — ISOSORBIDE DINITRATE 10 MG/1
10 TABLET ORAL EVERY 6 HOURS
Status: DISCONTINUED | OUTPATIENT
Start: 2020-01-01 | End: 2020-01-01

## 2020-01-01 RX ORDER — POTASSIUM CHLORIDE 29.8 MG/ML
20 INJECTION INTRAVENOUS
Status: DISCONTINUED | OUTPATIENT
Start: 2020-01-01 | End: 2020-01-01

## 2020-01-01 RX ORDER — FERROUS SULFATE 325(65) MG
325 TABLET ORAL
Status: ON HOLD | COMMUNITY
End: 2020-01-01

## 2020-01-01 RX ORDER — METHOCARBAMOL 500 MG/1
500 TABLET, FILM COATED ORAL 4 TIMES DAILY PRN
Status: DISCONTINUED | OUTPATIENT
Start: 2020-01-01 | End: 2020-01-01 | Stop reason: HOSPADM

## 2020-01-01 RX ORDER — FUROSEMIDE 10 MG/ML
40 INJECTION INTRAMUSCULAR; INTRAVENOUS ONCE
Status: COMPLETED | OUTPATIENT
Start: 2020-01-01 | End: 2020-01-01

## 2020-01-01 RX ORDER — ALBUMIN, HUMAN INJ 5% 5 %
SOLUTION INTRAVENOUS
Status: COMPLETED
Start: 2020-01-01 | End: 2020-01-01

## 2020-01-01 RX ORDER — ATROPINE SULFATE 0.1 MG/ML
0.5 INJECTION INTRAVENOUS EVERY 5 MIN PRN
Status: DISPENSED | OUTPATIENT
Start: 2020-01-01 | End: 2020-01-01

## 2020-01-01 RX ORDER — LEVOTHYROXINE SODIUM 50 UG/1
50 TABLET ORAL DAILY
Status: DISCONTINUED | OUTPATIENT
Start: 2020-01-01 | End: 2020-01-01

## 2020-01-01 RX ORDER — POTASSIUM CL/LIDO/0.9 % NACL 10MEQ/0.1L
10 INTRAVENOUS SOLUTION, PIGGYBACK (ML) INTRAVENOUS
Status: DISCONTINUED | OUTPATIENT
Start: 2020-01-01 | End: 2020-01-01 | Stop reason: ALTCHOICE

## 2020-01-01 RX ORDER — POTASSIUM CHLORIDE 1500 MG/1
TABLET, EXTENDED RELEASE ORAL
COMMUNITY
Start: 2020-01-01 | End: 2020-01-01

## 2020-01-01 RX ORDER — ALBUMIN, HUMAN INJ 5% 5 %
250 SOLUTION INTRAVENOUS
Status: DISCONTINUED | OUTPATIENT
Start: 2020-01-01 | End: 2020-01-01

## 2020-01-01 RX ORDER — AMIODARONE HYDROCHLORIDE 200 MG/1
200 TABLET ORAL DAILY
Qty: 30 TABLET | Refills: 0 | Status: SHIPPED | OUTPATIENT
Start: 2020-01-01

## 2020-01-01 RX ORDER — POTASSIUM CHLORIDE 7.45 MG/ML
10 INJECTION INTRAVENOUS
Status: DISCONTINUED | OUTPATIENT
Start: 2020-01-01 | End: 2020-01-01

## 2020-01-01 RX ORDER — POTASSIUM CHLORIDE 1500 MG/1
20 TABLET, EXTENDED RELEASE ORAL 2 TIMES DAILY
Status: DISCONTINUED | OUTPATIENT
Start: 2020-01-01 | End: 2020-01-01

## 2020-01-01 RX ORDER — BUMETANIDE 0.25 MG/ML
1 INJECTION INTRAMUSCULAR; INTRAVENOUS 2 TIMES DAILY
Status: COMPLETED | OUTPATIENT
Start: 2020-01-01 | End: 2020-01-01

## 2020-01-01 RX ORDER — ALBUMIN (HUMAN) 12.5 G/50ML
25 SOLUTION INTRAVENOUS ONCE
Status: COMPLETED | OUTPATIENT
Start: 2020-01-01 | End: 2020-01-01

## 2020-01-01 RX ORDER — AMINO AC/PROTEIN HYDR/WHEY PRO 10G-100/30
1 LIQUID (ML) ORAL 3 TIMES DAILY
Status: DISCONTINUED | OUTPATIENT
Start: 2020-01-01 | End: 2020-01-01

## 2020-01-01 RX ORDER — FERROUS SULFATE 325(65) MG
325 TABLET ORAL 2 TIMES DAILY
Status: DISCONTINUED | OUTPATIENT
Start: 2020-01-01 | End: 2020-01-01

## 2020-01-01 RX ORDER — DOPAMINE HYDROCHLORIDE 160 MG/100ML
2-20 INJECTION, SOLUTION INTRAVENOUS CONTINUOUS
Status: DISCONTINUED | OUTPATIENT
Start: 2020-01-01 | End: 2020-01-01 | Stop reason: HOSPADM

## 2020-01-01 RX ORDER — LEVOFLOXACIN 5 MG/ML
500 INJECTION, SOLUTION INTRAVENOUS SEE ADMIN INSTRUCTIONS
Status: DISCONTINUED | OUTPATIENT
Start: 2020-01-01 | End: 2020-01-01 | Stop reason: HOSPADM

## 2020-01-01 RX ORDER — DORZOLAMIDE HYDROCHLORIDE AND TIMOLOL MALEATE 20; 5 MG/ML; MG/ML
1 SOLUTION/ DROPS OPHTHALMIC 2 TIMES DAILY
Status: DISCONTINUED | OUTPATIENT
Start: 2020-01-01 | End: 2020-01-01 | Stop reason: HOSPADM

## 2020-01-01 RX ORDER — MEPERIDINE HYDROCHLORIDE 25 MG/ML
12.5-25 INJECTION INTRAMUSCULAR; INTRAVENOUS; SUBCUTANEOUS
Status: DISCONTINUED | OUTPATIENT
Start: 2020-01-01 | End: 2020-01-01

## 2020-01-01 RX ORDER — SODIUM CHLORIDE 9 MG/ML
INJECTION, SOLUTION INTRAVENOUS CONTINUOUS
Status: DISCONTINUED | OUTPATIENT
Start: 2020-01-01 | End: 2020-01-01

## 2020-01-01 RX ORDER — FENTANYL CITRATE 50 UG/ML
INJECTION, SOLUTION INTRAMUSCULAR; INTRAVENOUS PRN
Status: DISCONTINUED | OUTPATIENT
Start: 2020-01-01 | End: 2020-01-01

## 2020-01-01 RX ORDER — LEVOFLOXACIN 5 MG/ML
500 INJECTION, SOLUTION INTRAVENOUS EVERY 24 HOURS
Status: DISPENSED | OUTPATIENT
Start: 2020-01-01 | End: 2020-01-01

## 2020-01-01 RX ORDER — PROCHLORPERAZINE MALEATE 5 MG
5 TABLET ORAL EVERY 6 HOURS PRN
Status: DISCONTINUED | OUTPATIENT
Start: 2020-01-01 | End: 2020-01-01 | Stop reason: HOSPADM

## 2020-01-01 RX ORDER — CEFTRIAXONE 1 G/1
1 INJECTION, POWDER, FOR SOLUTION INTRAMUSCULAR; INTRAVENOUS EVERY 24 HOURS
Status: COMPLETED | OUTPATIENT
Start: 2020-01-01 | End: 2020-01-01

## 2020-01-01 RX ORDER — ACETAMINOPHEN 650 MG/1
650 SUPPOSITORY RECTAL EVERY 4 HOURS PRN
Status: DISCONTINUED | OUTPATIENT
Start: 2020-01-01 | End: 2020-01-01 | Stop reason: HOSPADM

## 2020-01-01 RX ORDER — B COMPLEX C NO.10/FOLIC ACID 900MCG/5ML
5 LIQUID (ML) ORAL DAILY
Qty: 237 ML | Refills: 1 | Status: SHIPPED | OUTPATIENT
Start: 2020-01-01

## 2020-01-01 RX ORDER — VANCOMYCIN HYDROCHLORIDE 1 G/200ML
1000 INJECTION, SOLUTION INTRAVENOUS EVERY 24 HOURS
Status: DISCONTINUED | OUTPATIENT
Start: 2020-01-01 | End: 2020-01-01

## 2020-01-01 RX ORDER — LEVOTHYROXINE SODIUM 125 UG/1
62.5 TABLET ORAL
Qty: 90 TABLET | Refills: 0 | Status: SHIPPED | OUTPATIENT
Start: 2020-01-01

## 2020-01-01 RX ORDER — LIDOCAINE 40 MG/G
CREAM TOPICAL
Status: DISCONTINUED | OUTPATIENT
Start: 2020-01-01 | End: 2020-01-01 | Stop reason: HOSPADM

## 2020-01-01 RX ORDER — FLUMAZENIL 0.1 MG/ML
0.2 INJECTION, SOLUTION INTRAVENOUS
Status: DISPENSED | OUTPATIENT
Start: 2020-01-01 | End: 2020-01-01

## 2020-01-01 RX ORDER — LIDOCAINE HYDROCHLORIDE 10 MG/ML
INJECTION, SOLUTION EPIDURAL; INFILTRATION; INTRACAUDAL; PERINEURAL
Status: DISCONTINUED
Start: 2020-01-01 | End: 2020-01-01 | Stop reason: HOSPADM

## 2020-01-01 RX ORDER — OXYCODONE HYDROCHLORIDE 5 MG/1
5 TABLET ORAL ONCE
Status: COMPLETED | OUTPATIENT
Start: 2020-01-01 | End: 2020-01-01

## 2020-01-01 RX ORDER — TORSEMIDE 10 MG/1
10 TABLET ORAL DAILY
Status: DISCONTINUED | OUTPATIENT
Start: 2020-01-01 | End: 2020-01-01

## 2020-01-01 RX ORDER — DOBUTAMINE HYDROCHLORIDE 200 MG/100ML
INJECTION INTRAVENOUS CONTINUOUS PRN
Status: DISCONTINUED | OUTPATIENT
Start: 2020-01-01 | End: 2020-01-01

## 2020-01-01 RX ORDER — CLOPIDOGREL BISULFATE 75 MG/1
75 TABLET ORAL DAILY
Status: DISCONTINUED | OUTPATIENT
Start: 2020-01-01 | End: 2020-01-01

## 2020-01-01 RX ORDER — DIGOXIN 125 MCG
125 TABLET ORAL DAILY
Status: DISCONTINUED | OUTPATIENT
Start: 2020-01-01 | End: 2020-01-01

## 2020-01-01 RX ORDER — NITROGLYCERIN 10 MG/100ML
INJECTION INTRAVENOUS PRN
Status: DISCONTINUED | OUTPATIENT
Start: 2020-01-01 | End: 2020-01-01

## 2020-01-01 RX ORDER — POTASSIUM CHLORIDE 1500 MG/1
20 TABLET, EXTENDED RELEASE ORAL
Status: DISCONTINUED | OUTPATIENT
Start: 2020-01-01 | End: 2020-01-01 | Stop reason: HOSPADM

## 2020-01-01 RX ORDER — NOREPINEPHRINE BITARTRATE 0.06 MG/ML
0.03-0.4 INJECTION, SOLUTION INTRAVENOUS CONTINUOUS
Status: DISCONTINUED | OUTPATIENT
Start: 2020-01-01 | End: 2020-01-01

## 2020-01-01 RX ORDER — FENTANYL CITRATE 50 UG/ML
25-50 INJECTION, SOLUTION INTRAMUSCULAR; INTRAVENOUS
Status: ACTIVE | OUTPATIENT
Start: 2020-01-01 | End: 2020-01-01

## 2020-01-01 RX ORDER — PROPOFOL 10 MG/ML
5-75 INJECTION, EMULSION INTRAVENOUS CONTINUOUS
Status: DISCONTINUED | OUTPATIENT
Start: 2020-01-01 | End: 2020-01-01

## 2020-01-01 RX ORDER — FERROUS SULFATE 325(65) MG
325 TABLET ORAL 2 TIMES DAILY
Status: DISCONTINUED | OUTPATIENT
Start: 2020-01-01 | End: 2020-01-01 | Stop reason: HOSPADM

## 2020-01-01 RX ORDER — ALBUMIN, HUMAN INJ 5% 5 %
SOLUTION INTRAVENOUS
Status: DISCONTINUED
Start: 2020-01-01 | End: 2020-01-01 | Stop reason: HOSPADM

## 2020-01-01 RX ORDER — DEXTROSE MONOHYDRATE 100 MG/ML
INJECTION, SOLUTION INTRAVENOUS CONTINUOUS PRN
Status: DISCONTINUED | OUTPATIENT
Start: 2020-01-01 | End: 2020-01-01 | Stop reason: HOSPADM

## 2020-01-01 RX ORDER — ETOMIDATE 2 MG/ML
INJECTION INTRAVENOUS PRN
Status: DISCONTINUED | OUTPATIENT
Start: 2020-01-01 | End: 2020-01-01

## 2020-01-01 RX ORDER — CEPHALEXIN 500 MG/1
CAPSULE ORAL
COMMUNITY
Start: 2019-01-01 | End: 2020-01-01

## 2020-01-01 RX ORDER — BUMETANIDE 0.25 MG/ML
1 INJECTION INTRAMUSCULAR; INTRAVENOUS ONCE
Status: COMPLETED | OUTPATIENT
Start: 2020-01-01 | End: 2020-01-01

## 2020-01-01 RX ORDER — DOBUTAMINE HCL IN DEXTROSE 5 % 500MG/250
5 INTRAVENOUS SOLUTION INTRAVENOUS CONTINUOUS
Status: DISCONTINUED | OUTPATIENT
Start: 2020-01-01 | End: 2020-01-01

## 2020-01-01 RX ORDER — ASPIRIN 81 MG/1
81 TABLET ORAL DAILY
Status: DISCONTINUED | OUTPATIENT
Start: 2020-01-01 | End: 2020-01-01

## 2020-01-01 RX ORDER — ACETAMINOPHEN 500 MG
1000 TABLET ORAL ONCE
Status: COMPLETED | OUTPATIENT
Start: 2020-01-01 | End: 2020-01-01

## 2020-01-01 RX ORDER — BUMETANIDE 0.25 MG/ML
4 INJECTION INTRAMUSCULAR; INTRAVENOUS EVERY 8 HOURS
Status: DISCONTINUED | OUTPATIENT
Start: 2020-01-01 | End: 2020-01-01 | Stop reason: ALTCHOICE

## 2020-01-01 RX ORDER — FENTANYL CITRATE 50 UG/ML
50 INJECTION, SOLUTION INTRAMUSCULAR; INTRAVENOUS
Status: DISCONTINUED | OUTPATIENT
Start: 2020-01-01 | End: 2020-01-01

## 2020-01-01 RX ORDER — LATANOPROST 50 UG/ML
1 SOLUTION/ DROPS OPHTHALMIC DAILY
COMMUNITY

## 2020-01-01 RX ORDER — FUROSEMIDE 10 MG/ML
20 INJECTION INTRAMUSCULAR; INTRAVENOUS ONCE
Status: COMPLETED | OUTPATIENT
Start: 2020-01-01 | End: 2020-01-01

## 2020-01-01 RX ORDER — FENTANYL CITRATE 50 UG/ML
25-50 INJECTION, SOLUTION INTRAMUSCULAR; INTRAVENOUS EVERY 5 MIN PRN
Status: DISCONTINUED | OUTPATIENT
Start: 2020-01-01 | End: 2020-01-01

## 2020-01-01 RX ORDER — POTASSIUM CL/LIDO/0.9 % NACL 10MEQ/0.1L
10 INTRAVENOUS SOLUTION, PIGGYBACK (ML) INTRAVENOUS
Status: DISCONTINUED | OUTPATIENT
Start: 2020-01-01 | End: 2020-01-01 | Stop reason: HOSPADM

## 2020-01-01 RX ORDER — QUETIAPINE FUMARATE 25 MG/1
75 TABLET, FILM COATED ORAL AT BEDTIME
Status: DISCONTINUED | OUTPATIENT
Start: 2020-01-01 | End: 2020-01-01

## 2020-01-01 RX ORDER — VANCOMYCIN HYDROCHLORIDE 1 G/200ML
1000 INJECTION, SOLUTION INTRAVENOUS EVERY 12 HOURS
Status: DISCONTINUED | OUTPATIENT
Start: 2020-01-01 | End: 2020-01-01

## 2020-01-01 RX ORDER — MAGNESIUM SULFATE HEPTAHYDRATE 40 MG/ML
2 INJECTION, SOLUTION INTRAVENOUS DAILY PRN
Status: DISCONTINUED | OUTPATIENT
Start: 2020-01-01 | End: 2020-01-01 | Stop reason: HOSPADM

## 2020-01-01 RX ORDER — ALBUTEROL SULFATE 5 MG/ML
10 SOLUTION, NON-ORAL INHALATION CONTINUOUS
Status: DISCONTINUED | OUTPATIENT
Start: 2020-01-01 | End: 2020-01-01

## 2020-01-01 RX ORDER — DEXMEDETOMIDINE HYDROCHLORIDE 4 UG/ML
0.2-1.2 INJECTION, SOLUTION INTRAVENOUS CONTINUOUS
Status: DISCONTINUED | OUTPATIENT
Start: 2020-01-01 | End: 2020-01-01 | Stop reason: HOSPADM

## 2020-01-01 RX ORDER — HYDRALAZINE HYDROCHLORIDE 20 MG/ML
10 INJECTION INTRAMUSCULAR; INTRAVENOUS EVERY 30 MIN PRN
Status: DISCONTINUED | OUTPATIENT
Start: 2020-01-01 | End: 2020-01-01 | Stop reason: HOSPADM

## 2020-01-01 RX ORDER — GUAR GUM
1 PACKET (EA) ORAL 3 TIMES DAILY
Status: DISCONTINUED | OUTPATIENT
Start: 2020-01-01 | End: 2020-01-01 | Stop reason: HOSPADM

## 2020-01-01 RX ORDER — FUROSEMIDE 10 MG/ML
100 INJECTION INTRAMUSCULAR; INTRAVENOUS ONCE
Status: COMPLETED | OUTPATIENT
Start: 2020-01-01 | End: 2020-01-01

## 2020-01-01 RX ORDER — ACETAMINOPHEN 650 MG/1
650 SUPPOSITORY RECTAL EVERY 4 HOURS PRN
Qty: 100 SUPPOSITORY | Refills: 4 | Status: SHIPPED | OUTPATIENT
Start: 2020-01-01

## 2020-01-01 RX ORDER — BUSPIRONE HYDROCHLORIDE 10 MG/1
10 TABLET ORAL 3 TIMES DAILY
Qty: 90 TABLET | Refills: 0 | Status: CANCELLED | OUTPATIENT
Start: 2020-01-01

## 2020-01-01 RX ORDER — LATANOPROST 50 UG/ML
1 SOLUTION/ DROPS OPHTHALMIC DAILY
Status: DISCONTINUED | OUTPATIENT
Start: 2020-01-01 | End: 2020-01-01 | Stop reason: HOSPADM

## 2020-01-01 RX ORDER — THALLOUS CHLORIDE TL-201 1 MCI/ML
3.5 INJECTION, POWDER, LYOPHILIZED, FOR SOLUTION INTRAVENOUS ONCE
Status: COMPLETED | OUTPATIENT
Start: 2020-01-01 | End: 2020-01-01

## 2020-01-01 RX ORDER — TRAZODONE HYDROCHLORIDE 150 MG/1
150 TABLET ORAL AT BEDTIME
Status: ON HOLD | COMMUNITY
End: 2020-01-01

## 2020-01-01 RX ORDER — MAGNESIUM SULFATE HEPTAHYDRATE 40 MG/ML
2 INJECTION, SOLUTION INTRAVENOUS DAILY PRN
Status: DISCONTINUED | OUTPATIENT
Start: 2020-01-01 | End: 2020-01-01

## 2020-01-01 RX ORDER — LOPERAMIDE HCL 2 MG
2 CAPSULE ORAL 4 TIMES DAILY PRN
Status: DISCONTINUED | OUTPATIENT
Start: 2020-01-01 | End: 2020-01-01 | Stop reason: HOSPADM

## 2020-01-01 RX ORDER — MAGNESIUM SULFATE HEPTAHYDRATE 40 MG/ML
4 INJECTION, SOLUTION INTRAVENOUS EVERY 4 HOURS PRN
Status: DISCONTINUED | OUTPATIENT
Start: 2020-01-01 | End: 2020-01-01

## 2020-01-01 RX ORDER — DORZOLAMIDE HYDROCHLORIDE AND TIMOLOL MALEATE 20; 5 MG/ML; MG/ML
1 SOLUTION/ DROPS OPHTHALMIC 2 TIMES DAILY
Status: DISCONTINUED | OUTPATIENT
Start: 2020-01-01 | End: 2020-01-01

## 2020-01-01 RX ORDER — DEXTROSE MONOHYDRATE 25 G/50ML
50 INJECTION, SOLUTION INTRAVENOUS ONCE
Status: DISCONTINUED | OUTPATIENT
Start: 2020-01-01 | End: 2020-01-01

## 2020-01-01 RX ORDER — POTASSIUM CHLORIDE 29.8 MG/ML
20 INJECTION INTRAVENOUS
Status: DISCONTINUED | OUTPATIENT
Start: 2020-01-01 | End: 2020-01-01 | Stop reason: ALTCHOICE

## 2020-01-01 RX ORDER — FUROSEMIDE 10 MG/ML
INJECTION INTRAMUSCULAR; INTRAVENOUS
Status: DISPENSED
Start: 2020-01-01 | End: 2020-01-01

## 2020-01-01 RX ORDER — HEPARIN SODIUM 10000 [USP'U]/100ML
700 INJECTION, SOLUTION INTRAVENOUS CONTINUOUS
Status: DISCONTINUED | OUTPATIENT
Start: 2020-01-01 | End: 2020-01-01

## 2020-01-01 RX ORDER — LIDOCAINE HYDROCHLORIDE 20 MG/ML
INJECTION, SOLUTION INFILTRATION; PERINEURAL PRN
Status: DISCONTINUED | OUTPATIENT
Start: 2020-01-01 | End: 2020-01-01

## 2020-01-01 RX ORDER — LACOSAMIDE 200 MG/1
200 TABLET ORAL 2 TIMES DAILY
Status: DISCONTINUED | OUTPATIENT
Start: 2020-01-01 | End: 2020-01-01

## 2020-01-01 RX ORDER — POTASSIUM CHLORIDE 1500 MG/1
20 TABLET, EXTENDED RELEASE ORAL DAILY
Qty: 90 TABLET | Refills: 3 | Status: SHIPPED | OUTPATIENT
Start: 2020-01-01 | End: 2020-01-01

## 2020-01-01 RX ORDER — OXYCODONE HYDROCHLORIDE 5 MG/1
5 TABLET ORAL EVERY 6 HOURS PRN
Status: COMPLETED | OUTPATIENT
Start: 2020-01-01 | End: 2020-01-01

## 2020-01-01 RX ORDER — ALBUMIN, HUMAN INJ 5% 5 %
SOLUTION INTRAVENOUS
Status: DISPENSED
Start: 2020-01-01 | End: 2020-01-01

## 2020-01-01 RX ORDER — DOBUTAMINE HYDROCHLORIDE 200 MG/100ML
5 INJECTION INTRAVENOUS CONTINUOUS
Status: DISCONTINUED | OUTPATIENT
Start: 2020-01-01 | End: 2020-01-01

## 2020-01-01 RX ORDER — FUROSEMIDE 10 MG/ML
120 INJECTION INTRAMUSCULAR; INTRAVENOUS ONCE
Status: COMPLETED | OUTPATIENT
Start: 2020-01-01 | End: 2020-01-01

## 2020-01-01 RX ORDER — POTASSIUM CHLORIDE 1500 MG/1
20-40 TABLET, EXTENDED RELEASE ORAL
Status: DISCONTINUED | OUTPATIENT
Start: 2020-01-01 | End: 2020-01-01 | Stop reason: HOSPADM

## 2020-01-01 RX ORDER — HYDROMORPHONE HYDROCHLORIDE 1 MG/ML
2-4 SOLUTION ORAL
Qty: 30 ML | Refills: 0 | Status: SHIPPED | OUTPATIENT
Start: 2020-01-01

## 2020-01-01 RX ORDER — WARFARIN SODIUM 1 MG/1
1 TABLET ORAL
Status: DISCONTINUED | OUTPATIENT
Start: 2020-01-01 | End: 2020-01-01

## 2020-01-01 RX ORDER — DEXTROSE MONOHYDRATE 25 G/50ML
50 INJECTION, SOLUTION INTRAVENOUS ONCE
Status: DISCONTINUED | OUTPATIENT
Start: 2020-01-01 | End: 2020-01-01 | Stop reason: HOSPADM

## 2020-01-01 RX ORDER — FIBRINOGEN (HUMAN) 700-1300MG
2 KIT INTRAVENOUS ONCE
Status: DISCONTINUED | OUTPATIENT
Start: 2020-01-01 | End: 2020-01-01

## 2020-01-01 RX ORDER — CLOPIDOGREL BISULFATE 75 MG/1
75 TABLET ORAL EVERY EVENING
Status: DISCONTINUED | OUTPATIENT
Start: 2020-01-01 | End: 2020-01-01 | Stop reason: HOSPADM

## 2020-01-01 RX ORDER — FENTANYL CITRATE 50 UG/ML
50 INJECTION, SOLUTION INTRAMUSCULAR; INTRAVENOUS
Status: COMPLETED | OUTPATIENT
Start: 2020-01-01 | End: 2020-01-01

## 2020-01-01 RX ORDER — LATANOPROST 50 UG/ML
1 SOLUTION/ DROPS OPHTHALMIC DAILY
Qty: 7.5 ML | Refills: 0 | Status: SHIPPED | OUTPATIENT
Start: 2020-01-01

## 2020-01-01 RX ORDER — POTASSIUM CHLORIDE 1500 MG/1
20 TABLET, EXTENDED RELEASE ORAL 2 TIMES DAILY
Qty: 180 TABLET | Refills: 3 | Status: ON HOLD | OUTPATIENT
Start: 2020-01-01 | End: 2020-01-01

## 2020-01-01 RX ORDER — QUETIAPINE FUMARATE 25 MG/1
25 TABLET, FILM COATED ORAL AT BEDTIME
Status: DISCONTINUED | OUTPATIENT
Start: 2020-01-01 | End: 2020-01-01 | Stop reason: HOSPADM

## 2020-01-01 RX ORDER — SACUBITRIL AND VALSARTAN 24; 26 MG/1; MG/1
1 TABLET, FILM COATED ORAL 2 TIMES DAILY
Status: CANCELLED | OUTPATIENT
Start: 2020-01-01

## 2020-01-01 RX ORDER — AMOXICILLIN 250 MG
2 CAPSULE ORAL 2 TIMES DAILY PRN
COMMUNITY
End: 2020-01-01

## 2020-01-01 RX ORDER — POLYETHYLENE GLYCOL 3350 17 G/17G
17 POWDER, FOR SOLUTION ORAL 2 TIMES DAILY PRN
Status: DISCONTINUED | OUTPATIENT
Start: 2020-01-01 | End: 2020-01-01

## 2020-01-01 RX ORDER — HYDROXYZINE HYDROCHLORIDE 25 MG/1
25 TABLET, FILM COATED ORAL ONCE
Status: COMPLETED | OUTPATIENT
Start: 2020-01-01 | End: 2020-01-01

## 2020-01-01 RX ORDER — LIDOCAINE HYDROCHLORIDE 20 MG/ML
5 SOLUTION OROPHARYNGEAL ONCE
Status: DISCONTINUED | OUTPATIENT
Start: 2020-01-01 | End: 2020-01-01

## 2020-01-01 RX ORDER — ATROPINE SULFATE 10 MG/ML
1-2 SOLUTION/ DROPS OPHTHALMIC
Qty: 15 ML | Refills: 1 | Status: SHIPPED | OUTPATIENT
Start: 2020-01-01

## 2020-01-01 RX ORDER — ACETAMINOPHEN 325 MG/1
650 TABLET ORAL ONCE
Status: COMPLETED | OUTPATIENT
Start: 2020-01-01 | End: 2020-01-01

## 2020-01-01 RX ORDER — ALBUMIN (HUMAN) 12.5 G/50ML
12.5 SOLUTION INTRAVENOUS ONCE
Status: COMPLETED | OUTPATIENT
Start: 2020-01-01 | End: 2020-01-01

## 2020-01-01 RX ORDER — PROPOFOL 10 MG/ML
INJECTION, EMULSION INTRAVENOUS
Status: DISCONTINUED
Start: 2020-01-01 | End: 2020-01-01 | Stop reason: HOSPADM

## 2020-01-01 RX ORDER — DEXTROSE MONOHYDRATE 25 G/50ML
INJECTION, SOLUTION INTRAVENOUS
Status: DISCONTINUED
Start: 2020-01-01 | End: 2020-01-01 | Stop reason: HOSPADM

## 2020-01-01 RX ORDER — QUETIAPINE FUMARATE 25 MG/1
25 TABLET, FILM COATED ORAL AT BEDTIME
Qty: 90 TABLET | Refills: 1 | Status: ON HOLD | OUTPATIENT
Start: 2020-01-01 | End: 2020-01-01

## 2020-01-01 RX ORDER — PHENYLEPHRINE HCL IN 0.9% NACL 1 MG/10 ML
SYRINGE (ML) INTRAVENOUS
Status: DISCONTINUED | OUTPATIENT
Start: 2020-01-01 | End: 2020-01-01 | Stop reason: HOSPADM

## 2020-01-01 RX ORDER — B COMPLEX C NO.10/FOLIC ACID 900MCG/5ML
5 LIQUID (ML) ORAL DAILY
Status: DISCONTINUED | OUTPATIENT
Start: 2020-01-01 | End: 2020-01-01 | Stop reason: HOSPADM

## 2020-01-01 RX ORDER — B COMPLEX C NO.10/FOLIC ACID 900MCG/5ML
5 LIQUID (ML) ORAL DAILY
Status: DISCONTINUED | OUTPATIENT
Start: 2020-01-01 | End: 2020-01-01

## 2020-01-01 RX ORDER — LATANOPROST 50 UG/ML
1 SOLUTION/ DROPS OPHTHALMIC AT BEDTIME
Status: DISCONTINUED | OUTPATIENT
Start: 2020-01-01 | End: 2020-01-01

## 2020-01-01 RX ORDER — LATANOPROST 50 UG/ML
1 SOLUTION/ DROPS OPHTHALMIC EVERY MORNING
Status: DISCONTINUED | OUTPATIENT
Start: 2020-01-01 | End: 2020-01-01 | Stop reason: HOSPADM

## 2020-01-01 RX ORDER — ACETAMINOPHEN 325 MG/1
975 TABLET ORAL EVERY 8 HOURS
Status: DISPENSED | OUTPATIENT
Start: 2020-01-01 | End: 2020-01-01

## 2020-01-01 RX ORDER — OXYCODONE HYDROCHLORIDE 5 MG/1
5-10 TABLET ORAL EVERY 4 HOURS PRN
Status: DISCONTINUED | OUTPATIENT
Start: 2020-01-01 | End: 2020-01-01

## 2020-01-01 RX ORDER — ALBUMIN, HUMAN INJ 5% 5 %
500-1000 SOLUTION INTRAVENOUS
Status: COMPLETED | OUTPATIENT
Start: 2020-01-01 | End: 2020-01-01

## 2020-01-01 RX ORDER — FENTANYL CITRATE 50 UG/ML
INJECTION, SOLUTION INTRAMUSCULAR; INTRAVENOUS
Status: DISCONTINUED | OUTPATIENT
Start: 2020-01-01 | End: 2020-01-01 | Stop reason: HOSPADM

## 2020-01-01 RX ORDER — BUSPIRONE HYDROCHLORIDE 10 MG/1
10 TABLET ORAL 3 TIMES DAILY
Status: DISCONTINUED | OUTPATIENT
Start: 2020-01-01 | End: 2020-01-01

## 2020-01-01 RX ORDER — TORSEMIDE 20 MG/1
40 TABLET ORAL
Status: DISCONTINUED | OUTPATIENT
Start: 2020-01-01 | End: 2020-01-01

## 2020-01-01 RX ORDER — FERROUS SULFATE 325(65) MG
325 TABLET ORAL 2 TIMES DAILY
Qty: 60 TABLET | Refills: 0 | Status: ON HOLD | OUTPATIENT
Start: 2020-01-01 | End: 2020-01-01

## 2020-01-01 RX ORDER — NITROFURANTOIN 25 MG/5ML
100 SUSPENSION ORAL 2 TIMES DAILY
Status: ON HOLD | COMMUNITY
End: 2020-01-01

## 2020-01-01 RX ORDER — BUMETANIDE 0.25 MG/ML
2 INJECTION INTRAMUSCULAR; INTRAVENOUS ONCE
Status: DISCONTINUED | OUTPATIENT
Start: 2020-01-01 | End: 2020-01-01

## 2020-01-01 RX ORDER — ALBUTEROL SULFATE 5 MG/ML
10 SOLUTION RESPIRATORY (INHALATION) ONCE
Status: COMPLETED | OUTPATIENT
Start: 2020-01-01 | End: 2020-01-01

## 2020-01-01 RX ORDER — PROPOFOL 10 MG/ML
5-75 INJECTION, EMULSION INTRAVENOUS CONTINUOUS
Status: DISCONTINUED | OUTPATIENT
Start: 2020-01-01 | End: 2020-01-01 | Stop reason: CLARIF

## 2020-01-01 RX ORDER — CLOPIDOGREL BISULFATE 75 MG/1
75 TABLET ORAL DAILY
Status: DISCONTINUED | OUTPATIENT
Start: 2020-01-01 | End: 2020-01-01 | Stop reason: HOSPADM

## 2020-01-01 RX ORDER — HEPARIN SODIUM 5000 [USP'U]/.5ML
5000 INJECTION, SOLUTION INTRAVENOUS; SUBCUTANEOUS EVERY 12 HOURS
Status: DISCONTINUED | OUTPATIENT
Start: 2020-01-01 | End: 2020-01-01

## 2020-01-01 RX ORDER — DEXMEDETOMIDINE HYDROCHLORIDE 4 UG/ML
.2-.7 INJECTION, SOLUTION INTRAVENOUS CONTINUOUS
Status: DISCONTINUED | OUTPATIENT
Start: 2020-01-01 | End: 2020-01-01 | Stop reason: CLARIF

## 2020-01-01 RX ORDER — NITROGLYCERIN 0.4 MG/1
TABLET SUBLINGUAL
Qty: 20 TABLET | Refills: 0 | Status: SHIPPED | OUTPATIENT
Start: 2020-01-01

## 2020-01-01 RX ORDER — NOREPINEPHRINE BITARTRATE 0.06 MG/ML
.03-.4 INJECTION, SOLUTION INTRAVENOUS CONTINUOUS
Status: DISCONTINUED | OUTPATIENT
Start: 2020-01-01 | End: 2020-01-01

## 2020-01-01 RX ORDER — FENTANYL CITRATE 50 UG/ML
INJECTION, SOLUTION INTRAMUSCULAR; INTRAVENOUS
Status: COMPLETED
Start: 2020-01-01 | End: 2020-01-01

## 2020-01-01 RX ORDER — HEPARIN SODIUM 1000 [USP'U]/ML
INJECTION, SOLUTION INTRAVENOUS; SUBCUTANEOUS
Status: DISCONTINUED | OUTPATIENT
Start: 2020-01-01 | End: 2020-01-01 | Stop reason: HOSPADM

## 2020-01-01 RX ORDER — DEXTROSE MONOHYDRATE, SODIUM CHLORIDE, AND POTASSIUM CHLORIDE 50; 1.49; 4.5 G/1000ML; G/1000ML; G/1000ML
INJECTION, SOLUTION INTRAVENOUS CONTINUOUS
Status: DISCONTINUED | OUTPATIENT
Start: 2020-01-01 | End: 2020-01-01

## 2020-01-01 RX ORDER — POTASSIUM CHLORIDE 750 MG/1
20-40 TABLET, EXTENDED RELEASE ORAL
Status: DISCONTINUED | OUTPATIENT
Start: 2020-01-01 | End: 2020-01-01

## 2020-01-01 RX ORDER — ROSUVASTATIN CALCIUM 20 MG/1
20 TABLET, COATED ORAL DAILY
Status: DISCONTINUED | OUTPATIENT
Start: 2020-01-01 | End: 2020-01-01

## 2020-01-01 RX ORDER — WARFARIN SODIUM 5 MG/1
5 TABLET ORAL DAILY
Status: ON HOLD
Start: 2020-01-01 | End: 2020-01-01

## 2020-01-01 RX ORDER — POTASSIUM CHLORIDE 1500 MG/1
20 TABLET, EXTENDED RELEASE ORAL DAILY
Status: DISCONTINUED | OUTPATIENT
Start: 2020-01-01 | End: 2020-01-01

## 2020-01-01 RX ORDER — CEFEPIME HYDROCHLORIDE 1 G/1
1 INJECTION, POWDER, FOR SOLUTION INTRAMUSCULAR; INTRAVENOUS EVERY 24 HOURS
Status: DISCONTINUED | OUTPATIENT
Start: 2020-01-01 | End: 2020-01-01 | Stop reason: HOSPADM

## 2020-01-01 RX ORDER — POTASSIUM CHLORIDE 1.5 G/1.58G
20-40 POWDER, FOR SOLUTION ORAL
Status: DISCONTINUED | OUTPATIENT
Start: 2020-01-01 | End: 2020-01-01 | Stop reason: ALTCHOICE

## 2020-01-01 RX ORDER — AMIODARONE HYDROCHLORIDE 200 MG/1
200 TABLET ORAL DAILY
Status: DISCONTINUED | OUTPATIENT
Start: 2020-01-01 | End: 2020-01-01 | Stop reason: HOSPADM

## 2020-01-01 RX ORDER — DOBUTAMINE HYDROCHLORIDE 200 MG/100ML
2.5 INJECTION INTRAVENOUS CONTINUOUS
Status: DISCONTINUED | OUTPATIENT
Start: 2020-01-01 | End: 2020-01-01 | Stop reason: HOSPADM

## 2020-01-01 RX ORDER — DEXTROSE MONOHYDRATE 25 G/50ML
25 INJECTION, SOLUTION INTRAVENOUS ONCE
Status: COMPLETED | OUTPATIENT
Start: 2020-01-01 | End: 2020-01-01

## 2020-01-01 RX ORDER — TORSEMIDE 20 MG/1
TABLET ORAL
Qty: 30 TABLET | Refills: 0 | Status: ON HOLD | COMMUNITY
Start: 2020-01-01 | End: 2020-01-01

## 2020-01-01 RX ORDER — SIMETHICONE
LIQUID (ML) MISCELLANEOUS PRN
Status: DISCONTINUED | OUTPATIENT
Start: 2020-01-01 | End: 2020-01-01 | Stop reason: HOSPADM

## 2020-01-01 RX ORDER — FERROUS SULFATE 325(65) MG
325 TABLET ORAL
Status: DISCONTINUED | OUTPATIENT
Start: 2020-01-01 | End: 2020-01-01

## 2020-01-01 RX ORDER — FLUCONAZOLE 2 MG/ML
200 INJECTION, SOLUTION INTRAVENOUS EVERY 24 HOURS
Status: COMPLETED | OUTPATIENT
Start: 2020-01-01 | End: 2020-01-01

## 2020-01-01 RX ORDER — TORSEMIDE 20 MG/1
TABLET ORAL
Qty: 90 TABLET | Refills: 3 | Status: ON HOLD | OUTPATIENT
Start: 2020-01-01 | End: 2020-01-01

## 2020-01-01 RX ORDER — ASPIRIN 81 MG/1
81 TABLET, CHEWABLE ORAL DAILY
Status: DISCONTINUED | OUTPATIENT
Start: 2020-01-01 | End: 2020-01-01 | Stop reason: HOSPADM

## 2020-01-01 RX ORDER — MULTIPLE VITAMINS W/ MINERALS TAB 9MG-400MCG
1 TAB ORAL DAILY
Status: DISCONTINUED | OUTPATIENT
Start: 2020-01-01 | End: 2020-01-01 | Stop reason: HOSPADM

## 2020-01-01 RX ORDER — AMOXICILLIN 250 MG
2 CAPSULE ORAL 2 TIMES DAILY
Status: DISCONTINUED | OUTPATIENT
Start: 2020-01-01 | End: 2020-01-01

## 2020-01-01 RX ORDER — BUMETANIDE 0.25 MG/ML
1 INJECTION INTRAMUSCULAR; INTRAVENOUS EVERY 12 HOURS
Status: DISCONTINUED | OUTPATIENT
Start: 2020-01-01 | End: 2020-01-01 | Stop reason: HOSPADM

## 2020-01-01 RX ORDER — SODIUM CHLORIDE, SODIUM GLUCONATE, SODIUM ACETATE, POTASSIUM CHLORIDE AND MAGNESIUM CHLORIDE 526; 502; 368; 37; 30 MG/100ML; MG/100ML; MG/100ML; MG/100ML; MG/100ML
INJECTION, SOLUTION INTRAVENOUS CONTINUOUS PRN
Status: DISCONTINUED | OUTPATIENT
Start: 2020-01-01 | End: 2020-01-01

## 2020-01-01 RX ORDER — ALUMINA, MAGNESIA, AND SIMETHICONE 2400; 2400; 240 MG/30ML; MG/30ML; MG/30ML
30 SUSPENSION ORAL EVERY 4 HOURS PRN
Status: DISCONTINUED | OUTPATIENT
Start: 2020-01-01 | End: 2020-01-01 | Stop reason: HOSPADM

## 2020-01-01 RX ORDER — ONDANSETRON 4 MG/1
4 TABLET, ORALLY DISINTEGRATING ORAL EVERY 8 HOURS PRN
Status: ON HOLD | COMMUNITY
Start: 2020-01-01 | End: 2020-01-01

## 2020-01-01 RX ORDER — FLUMAZENIL 0.1 MG/ML
0.2 INJECTION, SOLUTION INTRAVENOUS
Status: DISCONTINUED | OUTPATIENT
Start: 2020-01-01 | End: 2020-01-01

## 2020-01-01 RX ORDER — POTASSIUM CHLORIDE 29.8 MG/ML
20 INJECTION INTRAVENOUS EVERY 6 HOURS PRN
Status: DISCONTINUED | OUTPATIENT
Start: 2020-01-01 | End: 2020-01-01

## 2020-01-01 RX ORDER — LANOLIN ALCOHOL/MO/W.PET/CERES
100 CREAM (GRAM) TOPICAL DAILY
Status: DISCONTINUED | OUTPATIENT
Start: 2020-01-01 | End: 2020-01-01 | Stop reason: HOSPADM

## 2020-01-01 RX ORDER — LANOLIN ALCOHOL/MO/W.PET/CERES
3 CREAM (GRAM) TOPICAL
Status: DISCONTINUED | OUTPATIENT
Start: 2020-01-01 | End: 2020-01-01 | Stop reason: HOSPADM

## 2020-01-01 RX ORDER — LEVETIRACETAM 500 MG/1
1000 TABLET ORAL 2 TIMES DAILY
Status: DISCONTINUED | OUTPATIENT
Start: 2020-01-01 | End: 2020-01-01

## 2020-01-01 RX ORDER — MAGNESIUM SULFATE HEPTAHYDRATE 40 MG/ML
4 INJECTION, SOLUTION INTRAVENOUS EVERY 4 HOURS PRN
Status: DISCONTINUED | OUTPATIENT
Start: 2020-01-01 | End: 2020-01-01 | Stop reason: HOSPADM

## 2020-01-01 RX ORDER — POTASSIUM CHLORIDE 7.45 MG/ML
10 INJECTION INTRAVENOUS
Status: DISCONTINUED | OUTPATIENT
Start: 2020-01-01 | End: 2020-01-01 | Stop reason: ALTCHOICE

## 2020-01-01 RX ORDER — LIDOCAINE 4 G/G
1 PATCH TOPICAL
Status: ACTIVE | OUTPATIENT
Start: 2020-01-01 | End: 2020-01-01

## 2020-01-01 RX ORDER — HEPARIN SODIUM 10000 [USP'U]/100ML
800 INJECTION, SOLUTION INTRAVENOUS CONTINUOUS
Status: DISCONTINUED | OUTPATIENT
Start: 2020-01-01 | End: 2020-01-01

## 2020-01-01 RX ORDER — CALCIUM CHLORIDE 100 MG/ML
INJECTION INTRAVENOUS; INTRAVENTRICULAR PRN
Status: DISCONTINUED | OUTPATIENT
Start: 2020-01-01 | End: 2020-01-01

## 2020-01-01 RX ORDER — SODIUM CHLORIDE, SODIUM LACTATE, POTASSIUM CHLORIDE, CALCIUM CHLORIDE 600; 310; 30; 20 MG/100ML; MG/100ML; MG/100ML; MG/100ML
INJECTION, SOLUTION INTRAVENOUS CONTINUOUS PRN
Status: DISCONTINUED | OUTPATIENT
Start: 2020-01-01 | End: 2020-01-01

## 2020-01-01 RX ORDER — LEVOTHYROXINE SODIUM 50 UG/1
50 TABLET ORAL DAILY
Status: ON HOLD | COMMUNITY
End: 2020-01-01

## 2020-01-01 RX ORDER — SACUBITRIL AND VALSARTAN 24; 26 MG/1; MG/1
TABLET, FILM COATED ORAL
Qty: 60 TABLET | Refills: 1 | Status: ON HOLD | COMMUNITY
Start: 2020-01-01 | End: 2020-01-01

## 2020-01-01 RX ORDER — CLOPIDOGREL BISULFATE 75 MG/1
TABLET ORAL
Status: DISCONTINUED | OUTPATIENT
Start: 2020-01-01 | End: 2020-01-01 | Stop reason: HOSPADM

## 2020-01-01 RX ORDER — FENTANYL CITRATE 50 UG/ML
25-50 INJECTION, SOLUTION INTRAMUSCULAR; INTRAVENOUS
Status: DISPENSED | OUTPATIENT
Start: 2020-01-01 | End: 2020-01-01

## 2020-01-01 RX ORDER — ONDANSETRON 4 MG/1
4 TABLET, ORALLY DISINTEGRATING ORAL EVERY 8 HOURS PRN
Status: DISCONTINUED | OUTPATIENT
Start: 2020-01-01 | End: 2020-01-01

## 2020-01-01 RX ORDER — OLANZAPINE 2.5 MG/1
2.5 TABLET, FILM COATED ORAL 2 TIMES DAILY
Status: DISCONTINUED | OUTPATIENT
Start: 2020-01-01 | End: 2020-01-01

## 2020-01-01 RX ORDER — FENTANYL CITRATE 50 UG/ML
25 INJECTION, SOLUTION INTRAMUSCULAR; INTRAVENOUS
Status: ACTIVE | OUTPATIENT
Start: 2020-01-01 | End: 2020-01-01

## 2020-01-01 RX ORDER — FUROSEMIDE 40 MG
40 TABLET ORAL DAILY
COMMUNITY
End: 2020-01-01

## 2020-01-01 RX ORDER — VANCOMYCIN HYDROCHLORIDE 1 G/200ML
1000 INJECTION, SOLUTION INTRAVENOUS EVERY 24 HOURS
Status: COMPLETED | OUTPATIENT
Start: 2020-01-01 | End: 2020-01-01

## 2020-01-01 RX ORDER — PROPOFOL 10 MG/ML
10-20 INJECTION, EMULSION INTRAVENOUS EVERY 30 MIN PRN
Status: DISCONTINUED | OUTPATIENT
Start: 2020-01-01 | End: 2020-01-01 | Stop reason: CLARIF

## 2020-01-01 RX ORDER — POTASSIUM CHLORIDE 29.8 MG/ML
20 INJECTION INTRAVENOUS
Status: DISCONTINUED | OUTPATIENT
Start: 2020-01-01 | End: 2020-01-01 | Stop reason: HOSPADM

## 2020-01-01 RX ORDER — GLYCOPYRROLATE 0.2 MG/ML
INJECTION, SOLUTION INTRAMUSCULAR; INTRAVENOUS PRN
Status: DISCONTINUED | OUTPATIENT
Start: 2020-01-01 | End: 2020-01-01

## 2020-01-01 RX ORDER — HEPARIN SODIUM,PORCINE 10 UNIT/ML
5 VIAL (ML) INTRAVENOUS
Status: DISCONTINUED | OUTPATIENT
Start: 2020-01-01 | End: 2020-01-01 | Stop reason: HOSPADM

## 2020-01-01 RX ORDER — PIPERACILLIN SODIUM, TAZOBACTAM SODIUM 3; .375 G/15ML; G/15ML
3.38 INJECTION, POWDER, LYOPHILIZED, FOR SOLUTION INTRAVENOUS EVERY 6 HOURS
Status: DISCONTINUED | OUTPATIENT
Start: 2020-01-01 | End: 2020-01-01

## 2020-01-01 RX ORDER — LEVOTHYROXINE SODIUM 125 UG/1
62.5 TABLET ORAL
Qty: 15 TABLET | Refills: 0 | Status: ON HOLD | OUTPATIENT
Start: 2020-01-01 | End: 2020-01-01

## 2020-01-01 RX ORDER — WARFARIN SODIUM 1 MG/1
1 TABLET ORAL DAILY
Status: ON HOLD | COMMUNITY
End: 2020-01-01

## 2020-01-01 RX ORDER — HEPARIN SODIUM,PORCINE 10 UNIT/ML
5-15 VIAL (ML) INTRAVENOUS EVERY 24 HOURS
Status: DISCONTINUED | OUTPATIENT
Start: 2020-01-01 | End: 2020-01-01 | Stop reason: HOSPADM

## 2020-01-01 RX ORDER — CEFTRIAXONE 1 G/1
1 INJECTION, POWDER, FOR SOLUTION INTRAMUSCULAR; INTRAVENOUS EVERY 24 HOURS
Status: DISCONTINUED | OUTPATIENT
Start: 2020-01-01 | End: 2020-01-01

## 2020-01-01 RX ORDER — BISACODYL 10 MG
10 SUPPOSITORY, RECTAL RECTAL DAILY PRN
Status: DISCONTINUED | OUTPATIENT
Start: 2020-01-01 | End: 2020-01-01 | Stop reason: HOSPADM

## 2020-01-01 RX ORDER — VANCOMYCIN HYDROCHLORIDE 1 G/200ML
1000 INJECTION, SOLUTION INTRAVENOUS
Status: COMPLETED | OUTPATIENT
Start: 2020-01-01 | End: 2020-01-01

## 2020-01-01 RX ORDER — UBIDECARENONE 100 MG
100 CAPSULE ORAL 2 TIMES DAILY
COMMUNITY

## 2020-01-01 RX ORDER — ANTIOX #8/OM3/DHA/EPA/LUT/ZEAX 250-2.5 MG
1 CAPSULE ORAL 2 TIMES DAILY
Status: DISCONTINUED | OUTPATIENT
Start: 2020-01-01 | End: 2020-01-01

## 2020-01-01 RX ORDER — ACETAMINOPHEN 325 MG/1
TABLET ORAL
Status: DISCONTINUED
Start: 2020-01-01 | End: 2020-01-01 | Stop reason: HOSPADM

## 2020-01-01 RX ORDER — AMIODARONE HYDROCHLORIDE 200 MG/1
200 TABLET ORAL DAILY
Status: DISCONTINUED | OUTPATIENT
Start: 2020-01-01 | End: 2020-01-01

## 2020-01-01 RX ORDER — TORSEMIDE 20 MG/1
20 TABLET ORAL DAILY
Qty: 30 TABLET | Refills: 0 | Status: SHIPPED | OUTPATIENT
Start: 2020-01-01 | End: 2020-01-01

## 2020-01-01 RX ORDER — FIBRINOGEN (HUMAN) 700-1300MG
2100 KIT INTRAVENOUS ONCE
Status: DISCONTINUED | OUTPATIENT
Start: 2020-01-01 | End: 2020-01-01 | Stop reason: CLARIF

## 2020-01-01 RX ORDER — PROTAMINE SULFATE 10 MG/ML
INJECTION, SOLUTION INTRAVENOUS PRN
Status: DISCONTINUED | OUTPATIENT
Start: 2020-01-01 | End: 2020-01-01

## 2020-01-01 RX ORDER — DOPAMINE HYDROCHLORIDE 160 MG/100ML
3.5 INJECTION, SOLUTION INTRAVENOUS CONTINUOUS
Status: DISCONTINUED | OUTPATIENT
Start: 2020-01-01 | End: 2020-01-01

## 2020-01-01 RX ORDER — SODIUM CHLORIDE 9 MG/ML
INJECTION, SOLUTION INTRAVENOUS CONTINUOUS
Status: ACTIVE | OUTPATIENT
Start: 2020-01-01 | End: 2020-01-01

## 2020-01-01 RX ADMIN — LEVETIRACETAM 2000 MG: 100 INJECTION, SOLUTION INTRAVENOUS at 03:50

## 2020-01-01 RX ADMIN — TORSEMIDE 40 MG: 20 TABLET ORAL at 10:27

## 2020-01-01 RX ADMIN — CALCIUM CHLORIDE, MAGNESIUM CHLORIDE, SODIUM CHLORIDE, SODIUM BICARBONATE, POTASSIUM CHLORIDE AND SODIUM PHOSPHATE DIBASIC DIHYDRATE 12.5 ML/KG/HR: 3.68; 3.05; 6.34; 3.09; .314; .187 INJECTION INTRAVENOUS at 09:33

## 2020-01-01 RX ADMIN — PANTOPRAZOLE SODIUM 40 MG: 40 INJECTION, POWDER, FOR SOLUTION INTRAVENOUS at 07:55

## 2020-01-01 RX ADMIN — ACETAMINOPHEN 650 MG: 325 TABLET, FILM COATED ORAL at 20:24

## 2020-01-01 RX ADMIN — CALCIUM CHLORIDE, MAGNESIUM CHLORIDE, SODIUM CHLORIDE, SODIUM BICARBONATE, POTASSIUM CHLORIDE AND SODIUM PHOSPHATE DIBASIC DIHYDRATE 12.5 ML/KG/HR: 3.68; 3.05; 6.34; 3.09; .314; .187 INJECTION INTRAVENOUS at 18:48

## 2020-01-01 RX ADMIN — ROSUVASTATIN CALCIUM 20 MG: 20 TABLET, FILM COATED ORAL at 08:44

## 2020-01-01 RX ADMIN — METRONIDAZOLE 500 MG: 500 INJECTION, SOLUTION INTRAVENOUS at 03:38

## 2020-01-01 RX ADMIN — ACETAMINOPHEN 650 MG: 325 TABLET, FILM COATED ORAL at 08:57

## 2020-01-01 RX ADMIN — Medication 1 PACKET: at 13:28

## 2020-01-01 RX ADMIN — Medication 1 PACKET: at 19:34

## 2020-01-01 RX ADMIN — Medication 62.5 MCG: at 09:22

## 2020-01-01 RX ADMIN — DORZOLAMIDE HYDROCHLORIDE AND TIMOLOL MALEATE 1 DROP: 20; 5 SOLUTION/ DROPS OPHTHALMIC at 22:11

## 2020-01-01 RX ADMIN — MICONAZOLE NITRATE: 20 POWDER TOPICAL at 00:00

## 2020-01-01 RX ADMIN — POTASSIUM CHLORIDE 20 MEQ: 1500 TABLET, EXTENDED RELEASE ORAL at 10:27

## 2020-01-01 RX ADMIN — LEVETIRACETAM 2000 MG: 100 INJECTION, SOLUTION INTRAVENOUS at 03:15

## 2020-01-01 RX ADMIN — Medication 62.5 MCG: at 08:31

## 2020-01-01 RX ADMIN — PANTOPRAZOLE SODIUM 8 MG/HR: 40 INJECTION, POWDER, FOR SOLUTION INTRAVENOUS at 19:38

## 2020-01-01 RX ADMIN — Medication: at 21:04

## 2020-01-01 RX ADMIN — OMEPRAZOLE 20 MG: 20 CAPSULE, DELAYED RELEASE ORAL at 10:07

## 2020-01-01 RX ADMIN — Medication 2 SPRAY: at 08:52

## 2020-01-01 RX ADMIN — BACITRACIN: 500 OINTMENT TOPICAL at 10:13

## 2020-01-01 RX ADMIN — Medication 62.5 MCG: at 08:16

## 2020-01-01 RX ADMIN — QUETIAPINE FUMARATE 25 MG: 25 TABLET ORAL at 12:47

## 2020-01-01 RX ADMIN — MIDAZOLAM (PF) 1 MG/ML IN 0.9 % SODIUM CHLORIDE INTRAVENOUS SOLUTION 8 MG/HR: at 14:15

## 2020-01-01 RX ADMIN — BIMATOPROST 1 DROP: 0.1 SOLUTION/ DROPS OPHTHALMIC at 22:00

## 2020-01-01 RX ADMIN — FERROUS SULFATE TAB 325 MG (65 MG ELEMENTAL FE) 325 MG: 325 (65 FE) TAB at 12:20

## 2020-01-01 RX ADMIN — BIMATOPROST 1 DROP: 0.1 SOLUTION/ DROPS OPHTHALMIC at 21:15

## 2020-01-01 RX ADMIN — Medication 5 MG: at 17:22

## 2020-01-01 RX ADMIN — SODIUM CHLORIDE 200 MG: 9 INJECTION, SOLUTION INTRAVENOUS at 09:37

## 2020-01-01 RX ADMIN — SODIUM CHLORIDE 300 ML: 9 INJECTION, SOLUTION INTRAVENOUS at 11:50

## 2020-01-01 RX ADMIN — FERROUS SULFATE TAB 325 MG (65 MG ELEMENTAL FE) 325 MG: 325 (65 FE) TAB at 19:35

## 2020-01-01 RX ADMIN — SODIUM CHLORIDE 200 MG: 9 INJECTION, SOLUTION INTRAVENOUS at 08:55

## 2020-01-01 RX ADMIN — ONDANSETRON 4 MG: 4 TABLET, ORALLY DISINTEGRATING ORAL at 21:39

## 2020-01-01 RX ADMIN — POTASSIUM CHLORIDE 10 MEQ: 7.46 INJECTION, SOLUTION INTRAVENOUS at 10:09

## 2020-01-01 RX ADMIN — Medication 12.5 MG: at 06:57

## 2020-01-01 RX ADMIN — LEVETIRACETAM 2000 MG: 750 TABLET, FILM COATED ORAL at 07:48

## 2020-01-01 RX ADMIN — Medication 62.5 MCG: at 10:34

## 2020-01-01 RX ADMIN — BACITRACIN: 500 OINTMENT TOPICAL at 09:10

## 2020-01-01 RX ADMIN — Medication 5 ML: at 07:54

## 2020-01-01 RX ADMIN — BUSPIRONE HYDROCHLORIDE 10 MG: 10 TABLET ORAL at 10:08

## 2020-01-01 RX ADMIN — SODIUM CHLORIDE, POTASSIUM CHLORIDE, SODIUM LACTATE AND CALCIUM CHLORIDE: 600; 310; 30; 20 INJECTION, SOLUTION INTRAVENOUS at 07:40

## 2020-01-01 RX ADMIN — ACETAMINOPHEN 650 MG: 325 TABLET, FILM COATED ORAL at 15:47

## 2020-01-01 RX ADMIN — LEVETIRACETAM 2000 MG: 100 INJECTION, SOLUTION INTRAVENOUS at 13:22

## 2020-01-01 RX ADMIN — MICAFUNGIN SODIUM 100 MG: 10 INJECTION, POWDER, LYOPHILIZED, FOR SOLUTION INTRAVENOUS at 06:49

## 2020-01-01 RX ADMIN — CALCIUM CHLORIDE, MAGNESIUM CHLORIDE, SODIUM CHLORIDE, SODIUM BICARBONATE, POTASSIUM CHLORIDE AND SODIUM PHOSPHATE DIBASIC DIHYDRATE 12.5 ML/KG/HR: 3.68; 3.05; 6.34; 3.09; .314; .187 INJECTION INTRAVENOUS at 21:21

## 2020-01-01 RX ADMIN — Medication 62.5 MCG: at 08:34

## 2020-01-01 RX ADMIN — Medication: at 18:18

## 2020-01-01 RX ADMIN — CALCIUM CHLORIDE, MAGNESIUM CHLORIDE, SODIUM CHLORIDE, SODIUM BICARBONATE, POTASSIUM CHLORIDE AND SODIUM PHOSPHATE DIBASIC DIHYDRATE 12.5 ML/KG/HR: 3.68; 3.05; 6.34; 3.09; .314; .187 INJECTION INTRAVENOUS at 16:48

## 2020-01-01 RX ADMIN — BIMATOPROST 1 DROP: 0.1 SOLUTION/ DROPS OPHTHALMIC at 22:21

## 2020-01-01 RX ADMIN — LATANOPROST 1 DROP: 50 SOLUTION OPHTHALMIC at 08:01

## 2020-01-01 RX ADMIN — CALCIUM CHLORIDE, MAGNESIUM CHLORIDE, SODIUM CHLORIDE, SODIUM BICARBONATE, POTASSIUM CHLORIDE AND SODIUM PHOSPHATE DIBASIC DIHYDRATE 12.5 ML/KG/HR: 3.68; 3.05; 6.34; 3.09; .314; .187 INJECTION INTRAVENOUS at 01:14

## 2020-01-01 RX ADMIN — CALCIUM CHLORIDE, MAGNESIUM CHLORIDE, SODIUM CHLORIDE, SODIUM BICARBONATE, POTASSIUM CHLORIDE AND SODIUM PHOSPHATE DIBASIC DIHYDRATE 12.5 ML/KG/HR: 3.68; 3.05; 6.34; 3.09; .314; .187 INJECTION INTRAVENOUS at 15:47

## 2020-01-01 RX ADMIN — QUETIAPINE 12.5 MG: 25 TABLET, FILM COATED ORAL at 22:20

## 2020-01-01 RX ADMIN — ROSUVASTATIN CALCIUM 20 MG: 20 TABLET, FILM COATED ORAL at 07:52

## 2020-01-01 RX ADMIN — Medication 62.5 MCG: at 07:41

## 2020-01-01 RX ADMIN — HUMAN INSULIN 1 UNITS/HR: 100 INJECTION, SOLUTION SUBCUTANEOUS at 12:22

## 2020-01-01 RX ADMIN — BIMATOPROST 1 DROP: 0.1 SOLUTION/ DROPS OPHTHALMIC at 22:26

## 2020-01-01 RX ADMIN — DORZOLAMIDE HYDROCHLORIDE AND TIMOLOL MALEATE 1 DROP: 20; 5 SOLUTION/ DROPS OPHTHALMIC at 20:16

## 2020-01-01 RX ADMIN — QUETIAPINE 50 MG: 25 TABLET ORAL at 02:20

## 2020-01-01 RX ADMIN — LIDOCAINE 1 PATCH: 560 PATCH PERCUTANEOUS; TOPICAL; TRANSDERMAL at 12:23

## 2020-01-01 RX ADMIN — Medication 62.5 MCG: at 07:44

## 2020-01-01 RX ADMIN — ACETAMINOPHEN 650 MG: 325 TABLET, FILM COATED ORAL at 08:24

## 2020-01-01 RX ADMIN — FERROUS SULFATE TAB 325 MG (65 MG ELEMENTAL FE) 325 MG: 325 (65 FE) TAB at 10:28

## 2020-01-01 RX ADMIN — Medication: at 18:24

## 2020-01-01 RX ADMIN — CALCIUM CHLORIDE, MAGNESIUM CHLORIDE, SODIUM CHLORIDE, SODIUM BICARBONATE, POTASSIUM CHLORIDE AND SODIUM PHOSPHATE DIBASIC DIHYDRATE 12.5 ML/KG/HR: 3.68; 3.05; 6.34; 3.09; .314; .187 INJECTION INTRAVENOUS at 11:07

## 2020-01-01 RX ADMIN — LIDOCAINE HYDROCHLORIDE 80 MG: 20 INJECTION, SOLUTION INFILTRATION; PERINEURAL at 09:35

## 2020-01-01 RX ADMIN — MICAFUNGIN SODIUM 100 MG: 10 INJECTION, POWDER, LYOPHILIZED, FOR SOLUTION INTRAVENOUS at 07:16

## 2020-01-01 RX ADMIN — LEVETIRACETAM 2000 MG: 750 TABLET, FILM COATED ORAL at 19:30

## 2020-01-01 RX ADMIN — LATANOPROST 1 DROP: 50 SOLUTION OPHTHALMIC at 07:38

## 2020-01-01 RX ADMIN — DOCUSATE SODIUM AND SENNOSIDES 2 TABLET: 8.6; 5 TABLET ORAL at 20:15

## 2020-01-01 RX ADMIN — IPRATROPIUM BROMIDE AND ALBUTEROL SULFATE 3 ML: .5; 3 SOLUTION RESPIRATORY (INHALATION) at 12:05

## 2020-01-01 RX ADMIN — FENTANYL CITRATE 50 MCG: 50 INJECTION, SOLUTION INTRAMUSCULAR; INTRAVENOUS at 11:41

## 2020-01-01 RX ADMIN — LEVETIRACETAM 2000 MG: 100 INJECTION, SOLUTION INTRAVENOUS at 16:24

## 2020-01-01 RX ADMIN — MELATONIN TAB 3 MG 3 MG: 3 TAB at 22:08

## 2020-01-01 RX ADMIN — BIMATOPROST 1 DROP: 0.1 SOLUTION/ DROPS OPHTHALMIC at 21:26

## 2020-01-01 RX ADMIN — CLOPIDOGREL BISULFATE 75 MG: 75 TABLET ORAL at 08:43

## 2020-01-01 RX ADMIN — FERROUS SULFATE TAB 325 MG (65 MG ELEMENTAL FE) 325 MG: 325 (65 FE) TAB at 20:28

## 2020-01-01 RX ADMIN — DOCUSATE SODIUM AND SENNOSIDES 2 TABLET: 8.6; 5 TABLET ORAL at 19:45

## 2020-01-01 RX ADMIN — FERROUS SULFATE TAB 325 MG (65 MG ELEMENTAL FE) 325 MG: 325 (65 FE) TAB at 20:12

## 2020-01-01 RX ADMIN — PANTOPRAZOLE SODIUM 40 MG: 40 TABLET, DELAYED RELEASE ORAL at 07:38

## 2020-01-01 RX ADMIN — OXYCODONE HYDROCHLORIDE 5 MG: 5 TABLET ORAL at 04:47

## 2020-01-01 RX ADMIN — CALCIUM CHLORIDE, MAGNESIUM CHLORIDE, SODIUM CHLORIDE, SODIUM BICARBONATE, POTASSIUM CHLORIDE AND SODIUM PHOSPHATE DIBASIC DIHYDRATE 12.5 ML/KG/HR: 3.68; 3.05; 6.34; 3.09; .314; .187 INJECTION INTRAVENOUS at 10:25

## 2020-01-01 RX ADMIN — CALCIUM CHLORIDE, MAGNESIUM CHLORIDE, SODIUM CHLORIDE, SODIUM BICARBONATE, POTASSIUM CHLORIDE AND SODIUM PHOSPHATE DIBASIC DIHYDRATE 12.5 ML/KG/HR: 3.68; 3.05; 6.34; 3.09; .314; .187 INJECTION INTRAVENOUS at 10:04

## 2020-01-01 RX ADMIN — CALCIUM CHLORIDE, MAGNESIUM CHLORIDE, SODIUM CHLORIDE, SODIUM BICARBONATE, POTASSIUM CHLORIDE AND SODIUM PHOSPHATE DIBASIC DIHYDRATE 12.5 ML/KG/HR: 3.68; 3.05; 6.34; 3.09; .314; .187 INJECTION INTRAVENOUS at 11:40

## 2020-01-01 RX ADMIN — PANTOPRAZOLE SODIUM 40 MG: 40 TABLET, DELAYED RELEASE ORAL at 16:00

## 2020-01-01 RX ADMIN — ROSUVASTATIN CALCIUM 20 MG: 20 TABLET, FILM COATED ORAL at 09:06

## 2020-01-01 RX ADMIN — ASPIRIN 81 MG CHEWABLE TABLET 81 MG: 81 TABLET CHEWABLE at 07:36

## 2020-01-01 RX ADMIN — SODIUM CHLORIDE 200 MG: 9 INJECTION, SOLUTION INTRAVENOUS at 20:55

## 2020-01-01 RX ADMIN — POTASSIUM CHLORIDE 20 MEQ: 1500 TABLET, EXTENDED RELEASE ORAL at 08:37

## 2020-01-01 RX ADMIN — DOCUSATE SODIUM 100 MG: 100 CAPSULE, LIQUID FILLED ORAL at 08:35

## 2020-01-01 RX ADMIN — MUPIROCIN: 20 OINTMENT TOPICAL at 21:58

## 2020-01-01 RX ADMIN — BUSPIRONE HYDROCHLORIDE 10 MG: 10 TABLET ORAL at 16:06

## 2020-01-01 RX ADMIN — APIXABAN 5 MG: 5 TABLET, FILM COATED ORAL at 02:19

## 2020-01-01 RX ADMIN — SODIUM CHLORIDE 200 MG: 9 INJECTION, SOLUTION INTRAVENOUS at 20:23

## 2020-01-01 RX ADMIN — HYDRALAZINE HYDROCHLORIDE 25 MG: 25 TABLET, FILM COATED ORAL at 21:55

## 2020-01-01 RX ADMIN — FENTANYL CITRATE 100 MCG: 50 INJECTION, SOLUTION INTRAMUSCULAR; INTRAVENOUS at 15:42

## 2020-01-01 RX ADMIN — FERROUS SULFATE TAB 325 MG (65 MG ELEMENTAL FE) 325 MG: 325 (65 FE) TAB at 08:32

## 2020-01-01 RX ADMIN — LEVETIRACETAM 2000 MG: 100 INJECTION, SOLUTION INTRAVENOUS at 23:53

## 2020-01-01 RX ADMIN — AMIODARONE HYDROCHLORIDE 200 MG: 200 TABLET ORAL at 07:43

## 2020-01-01 RX ADMIN — BUSPIRONE HYDROCHLORIDE 10 MG: 10 TABLET ORAL at 08:56

## 2020-01-01 RX ADMIN — AMIODARONE HYDROCHLORIDE 1 MG/MIN: 50 INJECTION, SOLUTION INTRAVENOUS at 18:17

## 2020-01-01 RX ADMIN — LATANOPROST 1 DROP: 50 SOLUTION/ DROPS OPHTHALMIC at 09:02

## 2020-01-01 RX ADMIN — AMIODARONE HYDROCHLORIDE 150 MG: 1.5 INJECTION, SOLUTION INTRAVENOUS at 16:50

## 2020-01-01 RX ADMIN — Medication 2.5 MG: at 23:17

## 2020-01-01 RX ADMIN — MICAFUNGIN SODIUM 150 MG: 10 INJECTION, POWDER, LYOPHILIZED, FOR SOLUTION INTRAVENOUS at 12:33

## 2020-01-01 RX ADMIN — LEVETIRACETAM 2000 MG: 100 INJECTION, SOLUTION INTRAVENOUS at 03:05

## 2020-01-01 RX ADMIN — NITROGLYCERIN 50 MCG: 10 INJECTION INTRAVENOUS at 14:23

## 2020-01-01 RX ADMIN — BACITRACIN: 500 OINTMENT TOPICAL at 09:22

## 2020-01-01 RX ADMIN — DORZOLAMIDE HYDROCHLORIDE AND TIMOLOL MALEATE 1 DROP: 20; 5 SOLUTION/ DROPS OPHTHALMIC at 07:45

## 2020-01-01 RX ADMIN — VANCOMYCIN HYDROCHLORIDE 1000 MG: 1 INJECTION, SOLUTION INTRAVENOUS at 22:55

## 2020-01-01 RX ADMIN — LATANOPROST 1 DROP: 50 SOLUTION OPHTHALMIC at 08:44

## 2020-01-01 RX ADMIN — BIMATOPROST 1 DROP: 0.1 SOLUTION/ DROPS OPHTHALMIC at 22:31

## 2020-01-01 RX ADMIN — Medication 5 MG: at 09:18

## 2020-01-01 RX ADMIN — FLUMAZENIL 0.2 MG: 0.1 INJECTION, SOLUTION INTRAVENOUS at 22:07

## 2020-01-01 RX ADMIN — CALCIUM CHLORIDE, MAGNESIUM CHLORIDE, SODIUM CHLORIDE, SODIUM BICARBONATE, POTASSIUM CHLORIDE AND SODIUM PHOSPHATE DIBASIC DIHYDRATE 12.5 ML/KG/HR: 3.68; 3.05; 6.34; 3.09; .314; .187 INJECTION INTRAVENOUS at 21:18

## 2020-01-01 RX ADMIN — Medication 1 PACKET: at 19:37

## 2020-01-01 RX ADMIN — EPINEPHRINE 0.03 MCG/KG/MIN: 1 INJECTION PARENTERAL at 14:54

## 2020-01-01 RX ADMIN — DEXTROSE MONOHYDRATE 25 ML: 500 INJECTION PARENTERAL at 00:32

## 2020-01-01 RX ADMIN — BIMATOPROST 1 DROP: 0.1 SOLUTION/ DROPS OPHTHALMIC at 21:45

## 2020-01-01 RX ADMIN — LEVETIRACETAM 2000 MG: 750 TABLET, FILM COATED ORAL at 20:15

## 2020-01-01 RX ADMIN — QUETIAPINE 50 MG: 25 TABLET ORAL at 22:32

## 2020-01-01 RX ADMIN — Medication 62.5 MCG: at 08:56

## 2020-01-01 RX ADMIN — Medication 12.5 MG: at 19:46

## 2020-01-01 RX ADMIN — LACOSAMIDE 200 MG: 200 TABLET, FILM COATED ORAL at 20:17

## 2020-01-01 RX ADMIN — DORZOLAMIDE HYDROCHLORIDE AND TIMOLOL MALEATE 1 DROP: 20; 5 SOLUTION/ DROPS OPHTHALMIC at 07:41

## 2020-01-01 RX ADMIN — BUMETANIDE 1 MG/HR: 0.25 INJECTION INTRAMUSCULAR; INTRAVENOUS at 22:12

## 2020-01-01 RX ADMIN — FERROUS SULFATE TAB 325 MG (65 MG ELEMENTAL FE) 325 MG: 325 (65 FE) TAB at 12:15

## 2020-01-01 RX ADMIN — DOBUTAMINE 3 MCG/KG/MIN: 12.5 INJECTION, SOLUTION INTRAVENOUS at 17:10

## 2020-01-01 RX ADMIN — Medication: at 07:45

## 2020-01-01 RX ADMIN — CALCIUM CHLORIDE, MAGNESIUM CHLORIDE, SODIUM CHLORIDE, SODIUM BICARBONATE, POTASSIUM CHLORIDE AND SODIUM PHOSPHATE DIBASIC DIHYDRATE 12.5 ML/KG/HR: 3.68; 3.05; 6.34; 3.09; .314; .187 INJECTION INTRAVENOUS at 16:35

## 2020-01-01 RX ADMIN — Medication: at 12:52

## 2020-01-01 RX ADMIN — THIAMINE HCL TAB 100 MG 100 MG: 100 TAB at 13:39

## 2020-01-01 RX ADMIN — BIMATOPROST 1 DROP: 0.1 SOLUTION/ DROPS OPHTHALMIC at 21:32

## 2020-01-01 RX ADMIN — DOCUSATE SODIUM AND SENNOSIDES 2 TABLET: 8.6; 5 TABLET ORAL at 19:53

## 2020-01-01 RX ADMIN — BUSPIRONE HYDROCHLORIDE 10 MG: 10 TABLET ORAL at 07:43

## 2020-01-01 RX ADMIN — BUSPIRONE HYDROCHLORIDE 10 MG: 10 TABLET ORAL at 16:25

## 2020-01-01 RX ADMIN — ALBUMIN HUMAN 12.5 G: 0.05 INJECTION, SOLUTION INTRAVENOUS at 05:31

## 2020-01-01 RX ADMIN — Medication 2 SPRAY: at 15:42

## 2020-01-01 RX ADMIN — Medication 2 SPRAY: at 08:38

## 2020-01-01 RX ADMIN — CALCIUM CHLORIDE, MAGNESIUM CHLORIDE, SODIUM CHLORIDE, SODIUM BICARBONATE, POTASSIUM CHLORIDE AND SODIUM PHOSPHATE DIBASIC DIHYDRATE 12.5 ML/KG/HR: 3.68; 3.05; 6.34; 3.09; .314; .187 INJECTION INTRAVENOUS at 11:45

## 2020-01-01 RX ADMIN — LATANOPROST 1 DROP: 50 SOLUTION OPHTHALMIC at 07:26

## 2020-01-01 RX ADMIN — LACOSAMIDE 150 MG: 50 TABLET, FILM COATED ORAL at 19:54

## 2020-01-01 RX ADMIN — DORZOLAMIDE HYDROCHLORIDE AND TIMOLOL MALEATE 1 DROP: 20; 5 SOLUTION/ DROPS OPHTHALMIC at 21:16

## 2020-01-01 RX ADMIN — SODIUM CHLORIDE 200 MG: 9 INJECTION, SOLUTION INTRAVENOUS at 20:58

## 2020-01-01 RX ADMIN — ACETAMINOPHEN ORAL SOLUTION 1000 MG: 325 SOLUTION ORAL at 07:46

## 2020-01-01 RX ADMIN — HYDROXYZINE HYDROCHLORIDE 10 MG: 10 TABLET ORAL at 09:00

## 2020-01-01 RX ADMIN — LATANOPROST 1 DROP: 50 SOLUTION OPHTHALMIC at 08:08

## 2020-01-01 RX ADMIN — DORZOLAMIDE HYDROCHLORIDE AND TIMOLOL MALEATE 1 DROP: 20; 5 SOLUTION/ DROPS OPHTHALMIC at 20:12

## 2020-01-01 RX ADMIN — SODIUM CHLORIDE 250 ML: 9 INJECTION, SOLUTION INTRAVENOUS at 17:30

## 2020-01-01 RX ADMIN — CLOPIDOGREL BISULFATE 75 MG: 75 TABLET, FILM COATED ORAL at 08:38

## 2020-01-01 RX ADMIN — ACETAMINOPHEN 975 MG: 325 TABLET, FILM COATED ORAL at 01:21

## 2020-01-01 RX ADMIN — PIPERACILLIN AND TAZOBACTAM 4.5 G: 4; .5 INJECTION, POWDER, FOR SOLUTION INTRAVENOUS at 00:09

## 2020-01-01 RX ADMIN — Medication: at 22:07

## 2020-01-01 RX ADMIN — ACETAMINOPHEN ORAL SOLUTION 1000 MG: 325 SOLUTION ORAL at 09:44

## 2020-01-01 RX ADMIN — DORZOLAMIDE HYDROCHLORIDE AND TIMOLOL MALEATE 1 DROP: 20; 5 SOLUTION/ DROPS OPHTHALMIC at 09:34

## 2020-01-01 RX ADMIN — LEVOTHYROXINE SODIUM 62.5 MCG: 125 TABLET ORAL at 08:55

## 2020-01-01 RX ADMIN — ACETAMINOPHEN 650 MG: 325 TABLET, FILM COATED ORAL at 14:38

## 2020-01-01 RX ADMIN — BIMATOPROST 1 DROP: 0.1 SOLUTION/ DROPS OPHTHALMIC at 02:18

## 2020-01-01 RX ADMIN — MELATONIN TAB 3 MG 3 MG: 3 TAB at 21:11

## 2020-01-01 RX ADMIN — CALCIUM CHLORIDE, MAGNESIUM CHLORIDE, SODIUM CHLORIDE, SODIUM BICARBONATE, POTASSIUM CHLORIDE AND SODIUM PHOSPHATE DIBASIC DIHYDRATE 12.5 ML/KG/HR: 3.68; 3.05; 6.34; 3.09; .314; .187 INJECTION INTRAVENOUS at 05:02

## 2020-01-01 RX ADMIN — BUSPIRONE HYDROCHLORIDE 10 MG: 10 TABLET ORAL at 08:43

## 2020-01-01 RX ADMIN — AMIODARONE HYDROCHLORIDE 200 MG: 200 TABLET ORAL at 07:48

## 2020-01-01 RX ADMIN — ROSUVASTATIN CALCIUM 20 MG: 20 TABLET, FILM COATED ORAL at 20:12

## 2020-01-01 RX ADMIN — LATANOPROST 1 DROP: 50 SOLUTION OPHTHALMIC at 07:41

## 2020-01-01 RX ADMIN — BUSPIRONE HYDROCHLORIDE 10 MG: 10 TABLET ORAL at 17:47

## 2020-01-01 RX ADMIN — Medication: at 18:10

## 2020-01-01 RX ADMIN — LEVETIRACETAM 2000 MG: 750 TABLET, FILM COATED ORAL at 07:36

## 2020-01-01 RX ADMIN — ASPIRIN 81 MG CHEWABLE TABLET 81 MG: 81 TABLET CHEWABLE at 08:32

## 2020-01-01 RX ADMIN — Medication: at 12:00

## 2020-01-01 RX ADMIN — WARFARIN SODIUM 1 MG: 1 TABLET ORAL at 18:45

## 2020-01-01 RX ADMIN — CALCIUM GLUCONATE 2 G: 98 INJECTION, SOLUTION INTRAVENOUS at 14:50

## 2020-01-01 RX ADMIN — BIMATOPROST 1 DROP: 0.1 SOLUTION/ DROPS OPHTHALMIC at 21:07

## 2020-01-01 RX ADMIN — DORZOLAMIDE HYDROCHLORIDE AND TIMOLOL MALEATE 1 DROP: 20; 5 SOLUTION/ DROPS OPHTHALMIC at 19:53

## 2020-01-01 RX ADMIN — BUSPIRONE HYDROCHLORIDE 10 MG: 10 TABLET ORAL at 21:26

## 2020-01-01 RX ADMIN — HEPARIN SODIUM 700 UNITS/HR: 10000 INJECTION, SOLUTION INTRAVENOUS at 20:04

## 2020-01-01 RX ADMIN — VANCOMYCIN HYDROCHLORIDE 1 G: 1 INJECTION, SOLUTION INTRAVENOUS at 10:46

## 2020-01-01 RX ADMIN — DOCUSATE SODIUM 50 MG AND SENNOSIDES 8.6 MG 1 TABLET: 8.6; 5 TABLET, FILM COATED ORAL at 19:46

## 2020-01-01 RX ADMIN — FERROUS SULFATE TAB 325 MG (65 MG ELEMENTAL FE) 325 MG: 325 (65 FE) TAB at 09:23

## 2020-01-01 RX ADMIN — LOPERAMIDE HYDROCHLORIDE 2 MG: 2 CAPSULE ORAL at 19:48

## 2020-01-01 RX ADMIN — WARFARIN SODIUM 2 MG: 2 TABLET ORAL at 17:24

## 2020-01-01 RX ADMIN — AMIODARONE HYDROCHLORIDE 200 MG: 200 TABLET ORAL at 07:40

## 2020-01-01 RX ADMIN — CALCIUM CHLORIDE, MAGNESIUM CHLORIDE, SODIUM CHLORIDE, SODIUM BICARBONATE, POTASSIUM CHLORIDE AND SODIUM PHOSPHATE DIBASIC DIHYDRATE 12.5 ML/KG/HR: 3.68; 3.05; 6.34; 3.09; .314; .187 INJECTION INTRAVENOUS at 21:53

## 2020-01-01 RX ADMIN — Medication: at 14:07

## 2020-01-01 RX ADMIN — LEVETIRACETAM 2000 MG: 750 TABLET, FILM COATED ORAL at 08:06

## 2020-01-01 RX ADMIN — CALCIUM CHLORIDE, MAGNESIUM CHLORIDE, SODIUM CHLORIDE, SODIUM BICARBONATE, POTASSIUM CHLORIDE AND SODIUM PHOSPHATE DIBASIC DIHYDRATE 12.5 ML/KG/HR: 3.68; 3.05; 6.34; 3.09; .314; .187 INJECTION INTRAVENOUS at 07:40

## 2020-01-01 RX ADMIN — BACITRACIN: 500 OINTMENT TOPICAL at 22:13

## 2020-01-01 RX ADMIN — POTASSIUM CHLORIDE 20 MEQ: 750 TABLET, EXTENDED RELEASE ORAL at 17:34

## 2020-01-01 RX ADMIN — Medication 0.5 UNITS/HR: at 11:16

## 2020-01-01 RX ADMIN — LEVETIRACETAM 2000 MG: 100 INJECTION, SOLUTION INTRAVENOUS at 03:45

## 2020-01-01 RX ADMIN — THIAMINE HCL TAB 100 MG 100 MG: 100 TAB at 09:08

## 2020-01-01 RX ADMIN — CALCIUM CHLORIDE, MAGNESIUM CHLORIDE, SODIUM CHLORIDE, SODIUM BICARBONATE, POTASSIUM CHLORIDE AND SODIUM PHOSPHATE DIBASIC DIHYDRATE 12.5 ML/KG/HR: 3.68; 3.05; 6.34; 3.09; .314; .187 INJECTION INTRAVENOUS at 02:05

## 2020-01-01 RX ADMIN — AMIODARONE HYDROCHLORIDE 200 MG: 200 TABLET ORAL at 07:58

## 2020-01-01 RX ADMIN — RIFAMPIN 600 MG: 600 INJECTION, POWDER, LYOPHILIZED, FOR SOLUTION INTRAVENOUS at 17:49

## 2020-01-01 RX ADMIN — AMIODARONE HYDROCHLORIDE 200 MG: 200 TABLET ORAL at 07:52

## 2020-01-01 RX ADMIN — HEPARIN SODIUM 700 UNITS/HR: 10000 INJECTION, SOLUTION INTRAVENOUS at 00:20

## 2020-01-01 RX ADMIN — PIPERACILLIN AND TAZOBACTAM 4.5 G: 4; .5 INJECTION, POWDER, FOR SOLUTION INTRAVENOUS at 05:53

## 2020-01-01 RX ADMIN — ROSUVASTATIN CALCIUM 20 MG: 20 TABLET, FILM COATED ORAL at 08:43

## 2020-01-01 RX ADMIN — ACETAMINOPHEN 975 MG: 325 TABLET, FILM COATED ORAL at 15:30

## 2020-01-01 RX ADMIN — BACITRACIN: 500 OINTMENT TOPICAL at 20:29

## 2020-01-01 RX ADMIN — CALCIUM CHLORIDE, MAGNESIUM CHLORIDE, SODIUM CHLORIDE, SODIUM BICARBONATE, POTASSIUM CHLORIDE AND SODIUM PHOSPHATE DIBASIC DIHYDRATE 12.5 ML/KG/HR: 3.68; 3.05; 6.34; 3.09; .314; .187 INJECTION INTRAVENOUS at 19:32

## 2020-01-01 RX ADMIN — ASPIRIN 81 MG: 81 TABLET, COATED ORAL at 07:43

## 2020-01-01 RX ADMIN — BUSPIRONE HYDROCHLORIDE 10 MG: 10 TABLET ORAL at 09:06

## 2020-01-01 RX ADMIN — BIMATOPROST 1 DROP: 0.1 SOLUTION/ DROPS OPHTHALMIC at 23:55

## 2020-01-01 RX ADMIN — PANTOPRAZOLE SODIUM 40 MG: 40 INJECTION, POWDER, FOR SOLUTION INTRAVENOUS at 19:28

## 2020-01-01 RX ADMIN — DORZOLAMIDE HYDROCHLORIDE AND TIMOLOL MALEATE 1 DROP: 20; 5 SOLUTION/ DROPS OPHTHALMIC at 19:42

## 2020-01-01 RX ADMIN — PANTOPRAZOLE SODIUM 8 MG/HR: 40 INJECTION, POWDER, FOR SOLUTION INTRAVENOUS at 23:31

## 2020-01-01 RX ADMIN — EPINEPHRINE 0.1 MCG/KG/MIN: 1 INJECTION PARENTERAL at 15:01

## 2020-01-01 RX ADMIN — PIPERACILLIN SODIUM AND TAZOBACTAM SODIUM 4.5 G: 4; .5 INJECTION, POWDER, LYOPHILIZED, FOR SOLUTION INTRAVENOUS at 02:09

## 2020-01-01 RX ADMIN — Medication 1 PACKET: at 19:58

## 2020-01-01 RX ADMIN — ASPIRIN 81 MG CHEWABLE TABLET 81 MG: 81 TABLET CHEWABLE at 07:42

## 2020-01-01 RX ADMIN — AMIODARONE HYDROCHLORIDE 200 MG: 200 TABLET ORAL at 07:44

## 2020-01-01 RX ADMIN — PIPERACILLIN AND TAZOBACTAM 4.5 G: 4; .5 INJECTION, POWDER, FOR SOLUTION INTRAVENOUS at 12:20

## 2020-01-01 RX ADMIN — PANTOPRAZOLE SODIUM 40 MG: 40 INJECTION, POWDER, FOR SOLUTION INTRAVENOUS at 07:48

## 2020-01-01 RX ADMIN — DIGOXIN 125 MCG: 0.06 TABLET ORAL at 07:42

## 2020-01-01 RX ADMIN — AMIODARONE HYDROCHLORIDE 200 MG: 200 TABLET ORAL at 08:32

## 2020-01-01 RX ADMIN — SODIUM THIOSULFATE 1.25 MCG/KG/MIN: 250 INJECTION, SOLUTION INTRAVENOUS at 03:11

## 2020-01-01 RX ADMIN — LATANOPROST 1 DROP: 50 SOLUTION OPHTHALMIC at 07:55

## 2020-01-01 RX ADMIN — PIPERACILLIN SODIUM AND TAZOBACTAM SODIUM 4.5 G: 4; .5 INJECTION, POWDER, LYOPHILIZED, FOR SOLUTION INTRAVENOUS at 07:32

## 2020-01-01 RX ADMIN — BUSPIRONE HYDROCHLORIDE 10 MG: 10 TABLET ORAL at 08:30

## 2020-01-01 RX ADMIN — LACOSAMIDE 200 MG: 200 TABLET, FILM COATED ORAL at 07:46

## 2020-01-01 RX ADMIN — BIMATOPROST 1 DROP: 0.1 SOLUTION/ DROPS OPHTHALMIC at 21:12

## 2020-01-01 RX ADMIN — TRAZODONE HYDROCHLORIDE 150 MG: 100 TABLET ORAL at 22:46

## 2020-01-01 RX ADMIN — PANTOPRAZOLE SODIUM 40 MG: 40 INJECTION, POWDER, FOR SOLUTION INTRAVENOUS at 08:33

## 2020-01-01 RX ADMIN — SACUBITRIL AND VALSARTAN 1 TABLET: 24; 26 TABLET, FILM COATED ORAL at 20:14

## 2020-01-01 RX ADMIN — DORZOLAMIDE HYDROCHLORIDE AND TIMOLOL MALEATE 1 DROP: 20; 5 SOLUTION/ DROPS OPHTHALMIC at 21:29

## 2020-01-01 RX ADMIN — FENTANYL CITRATE 50 MCG: 50 INJECTION, SOLUTION INTRAMUSCULAR; INTRAVENOUS at 11:58

## 2020-01-01 RX ADMIN — PANTOPRAZOLE SODIUM 40 MG: 40 INJECTION, POWDER, FOR SOLUTION INTRAVENOUS at 07:40

## 2020-01-01 RX ADMIN — DOCUSATE SODIUM 50 MG AND SENNOSIDES 8.6 MG 1 TABLET: 8.6; 5 TABLET, FILM COATED ORAL at 07:52

## 2020-01-01 RX ADMIN — Medication 5 ML: at 07:45

## 2020-01-01 RX ADMIN — AMIODARONE HYDROCHLORIDE 200 MG: 200 TABLET ORAL at 07:32

## 2020-01-01 RX ADMIN — ALBUMIN HUMAN 12.5 G: 0.05 INJECTION, SOLUTION INTRAVENOUS at 21:48

## 2020-01-01 RX ADMIN — Medication 1 PACKET: at 08:07

## 2020-01-01 RX ADMIN — Medication: at 11:43

## 2020-01-01 RX ADMIN — DORZOLAMIDE HYDROCHLORIDE AND TIMOLOL MALEATE 1 DROP: 20; 5 SOLUTION/ DROPS OPHTHALMIC at 08:22

## 2020-01-01 RX ADMIN — PIPERACILLIN AND TAZOBACTAM 4.5 G: 4; .5 INJECTION, POWDER, FOR SOLUTION INTRAVENOUS at 23:10

## 2020-01-01 RX ADMIN — BUSPIRONE HYDROCHLORIDE 10 MG: 10 TABLET ORAL at 21:13

## 2020-01-01 RX ADMIN — CALCIUM CHLORIDE, MAGNESIUM CHLORIDE, SODIUM CHLORIDE, SODIUM BICARBONATE, POTASSIUM CHLORIDE AND SODIUM PHOSPHATE DIBASIC DIHYDRATE 12.5 ML/KG/HR: 3.68; 3.05; 6.34; 3.09; .314; .187 INJECTION INTRAVENOUS at 14:40

## 2020-01-01 RX ADMIN — BUSPIRONE HYDROCHLORIDE 10 MG: 10 TABLET ORAL at 19:46

## 2020-01-01 RX ADMIN — CLOPIDOGREL BISULFATE 75 MG: 75 TABLET, FILM COATED ORAL at 08:36

## 2020-01-01 RX ADMIN — SACUBITRIL AND VALSARTAN 1 TABLET: 24; 26 TABLET, FILM COATED ORAL at 15:27

## 2020-01-01 RX ADMIN — ROSUVASTATIN CALCIUM 20 MG: 20 TABLET, FILM COATED ORAL at 08:05

## 2020-01-01 RX ADMIN — VANCOMYCIN HYDROCHLORIDE 1750 MG: 10 INJECTION, POWDER, LYOPHILIZED, FOR SOLUTION INTRAVENOUS at 06:49

## 2020-01-01 RX ADMIN — LATANOPROST 1 DROP: 50 SOLUTION OPHTHALMIC at 07:59

## 2020-01-01 RX ADMIN — DEXMEDETOMIDINE 0.5 MCG/KG/HR: 100 INJECTION, SOLUTION, CONCENTRATE INTRAVENOUS at 18:18

## 2020-01-01 RX ADMIN — SODIUM CHLORIDE, POTASSIUM CHLORIDE, SODIUM LACTATE AND CALCIUM CHLORIDE 500 ML: 600; 310; 30; 20 INJECTION, SOLUTION INTRAVENOUS at 02:40

## 2020-01-01 RX ADMIN — MUPIROCIN 1 G: 20 OINTMENT TOPICAL at 07:56

## 2020-01-01 RX ADMIN — BIMATOPROST 1 DROP: 0.1 SOLUTION/ DROPS OPHTHALMIC at 22:12

## 2020-01-01 RX ADMIN — ACETAMINOPHEN 650 MG: 325 TABLET, FILM COATED ORAL at 10:27

## 2020-01-01 RX ADMIN — LEVOFLOXACIN 500 MG: 5 INJECTION, SOLUTION INTRAVENOUS at 10:40

## 2020-01-01 RX ADMIN — LEVETIRACETAM 2000 MG: 100 INJECTION, SOLUTION INTRAVENOUS at 23:58

## 2020-01-01 RX ADMIN — DOBUTAMINE 6 MCG/KG/MIN: 12.5 INJECTION, SOLUTION INTRAVENOUS at 03:18

## 2020-01-01 RX ADMIN — CALCIUM CHLORIDE, MAGNESIUM CHLORIDE, SODIUM CHLORIDE, SODIUM BICARBONATE, POTASSIUM CHLORIDE AND SODIUM PHOSPHATE DIBASIC DIHYDRATE 12.5 ML/KG/HR: 3.68; 3.05; 6.34; 3.09; .314; .187 INJECTION INTRAVENOUS at 13:10

## 2020-01-01 RX ADMIN — TRAZODONE HYDROCHLORIDE 150 MG: 100 TABLET ORAL at 22:08

## 2020-01-01 RX ADMIN — CLOPIDOGREL BISULFATE 75 MG: 75 TABLET, FILM COATED ORAL at 08:34

## 2020-01-01 RX ADMIN — SODIUM CHLORIDE, POTASSIUM CHLORIDE, SODIUM LACTATE AND CALCIUM CHLORIDE 250 ML: 600; 310; 30; 20 INJECTION, SOLUTION INTRAVENOUS at 21:45

## 2020-01-01 RX ADMIN — DORZOLAMIDE HYDROCHLORIDE AND TIMOLOL MALEATE 1 DROP: 20; 5 SOLUTION/ DROPS OPHTHALMIC at 08:32

## 2020-01-01 RX ADMIN — MULTIPLE VITAMINS W/ MINERALS TAB 1 TABLET: TAB at 18:08

## 2020-01-01 RX ADMIN — FERROUS SULFATE TAB 325 MG (65 MG ELEMENTAL FE) 325 MG: 325 (65 FE) TAB at 22:10

## 2020-01-01 RX ADMIN — ACETAMINOPHEN 1000 MG: 500 TABLET, FILM COATED ORAL at 19:37

## 2020-01-01 RX ADMIN — LACOSAMIDE 200 MG: 200 TABLET, FILM COATED ORAL at 08:05

## 2020-01-01 RX ADMIN — POLYETHYLENE GLYCOL 400 AND PROPYLENE GLYCOL 1 DROP: 4; 3 SOLUTION/ DROPS OPHTHALMIC at 08:47

## 2020-01-01 RX ADMIN — Medication: at 11:10

## 2020-01-01 RX ADMIN — SODIUM CHLORIDE, POTASSIUM CHLORIDE, SODIUM LACTATE AND CALCIUM CHLORIDE 250 ML: 600; 310; 30; 20 INJECTION, SOLUTION INTRAVENOUS at 11:39

## 2020-01-01 RX ADMIN — DEXMEDETOMIDINE 0.5 MCG/KG/HR: 100 INJECTION, SOLUTION, CONCENTRATE INTRAVENOUS at 21:26

## 2020-01-01 RX ADMIN — BUSPIRONE HYDROCHLORIDE 10 MG: 10 TABLET ORAL at 09:08

## 2020-01-01 RX ADMIN — EPINEPHRINE 0.1 MCG/KG/MIN: 1 INJECTION PARENTERAL at 06:39

## 2020-01-01 RX ADMIN — LATANOPROST 1 DROP: 50 SOLUTION OPHTHALMIC at 08:22

## 2020-01-01 RX ADMIN — HYDROXYZINE HYDROCHLORIDE 10 MG: 10 TABLET, FILM COATED ORAL at 10:16

## 2020-01-01 RX ADMIN — ROSUVASTATIN CALCIUM 20 MG: 20 TABLET, FILM COATED ORAL at 09:03

## 2020-01-01 RX ADMIN — LEVETIRACETAM 2000 MG: 750 TABLET, FILM COATED ORAL at 19:52

## 2020-01-01 RX ADMIN — QUETIAPINE 25 MG: 25 TABLET ORAL at 02:18

## 2020-01-01 RX ADMIN — Medication 1 UNITS: at 09:44

## 2020-01-01 RX ADMIN — Medication 5 ML: at 19:46

## 2020-01-01 RX ADMIN — SODIUM CHLORIDE 100 ML: 9 INJECTION, SOLUTION INTRAVENOUS at 11:20

## 2020-01-01 RX ADMIN — Medication 62.5 MCG: at 07:47

## 2020-01-01 RX ADMIN — OMEPRAZOLE 20 MG: 20 CAPSULE, DELAYED RELEASE ORAL at 08:35

## 2020-01-01 RX ADMIN — DORZOLAMIDE HYDROCHLORIDE AND TIMOLOL MALEATE 1 DROP: 20; 5 SOLUTION/ DROPS OPHTHALMIC at 23:50

## 2020-01-01 RX ADMIN — ACETAMINOPHEN 650 MG: 325 TABLET, FILM COATED ORAL at 10:25

## 2020-01-01 RX ADMIN — Medication 2 UNITS/HR: at 17:19

## 2020-01-01 RX ADMIN — BUSPIRONE HYDROCHLORIDE 10 MG: 10 TABLET ORAL at 07:46

## 2020-01-01 RX ADMIN — FERROUS SULFATE TAB 325 MG (65 MG ELEMENTAL FE) 325 MG: 325 (65 FE) TAB at 12:50

## 2020-01-01 RX ADMIN — DIGOXIN 125 MCG: 0.06 TABLET ORAL at 08:43

## 2020-01-01 RX ADMIN — CALCIUM CHLORIDE, MAGNESIUM CHLORIDE, SODIUM CHLORIDE, SODIUM BICARBONATE, POTASSIUM CHLORIDE AND SODIUM PHOSPHATE DIBASIC DIHYDRATE 12.5 ML/KG/HR: 3.68; 3.05; 6.34; 3.09; .314; .187 INJECTION INTRAVENOUS at 18:41

## 2020-01-01 RX ADMIN — LEVOTHYROXINE SODIUM 62.5 MCG: 125 TABLET ORAL at 08:48

## 2020-01-01 RX ADMIN — FERROUS SULFATE TAB 325 MG (65 MG ELEMENTAL FE) 325 MG: 325 (65 FE) TAB at 08:29

## 2020-01-01 RX ADMIN — CLOPIDOGREL BISULFATE 75 MG: 75 TABLET, FILM COATED ORAL at 09:08

## 2020-01-01 RX ADMIN — Medication: at 11:19

## 2020-01-01 RX ADMIN — Medication: at 15:36

## 2020-01-01 RX ADMIN — THIAMINE HCL TAB 100 MG 100 MG: 100 TAB at 08:24

## 2020-01-01 RX ADMIN — Medication: at 07:37

## 2020-01-01 RX ADMIN — CALCIUM CHLORIDE, MAGNESIUM CHLORIDE, SODIUM CHLORIDE, SODIUM BICARBONATE, POTASSIUM CHLORIDE AND SODIUM PHOSPHATE DIBASIC DIHYDRATE 12.5 ML/KG/HR: 3.68; 3.05; 6.34; 3.09; .314; .187 INJECTION INTRAVENOUS at 03:06

## 2020-01-01 RX ADMIN — QUETIAPINE 50 MG: 25 TABLET ORAL at 21:59

## 2020-01-01 RX ADMIN — HYDROXYZINE HYDROCHLORIDE 10 MG: 10 TABLET ORAL at 21:55

## 2020-01-01 RX ADMIN — SODIUM CHLORIDE 250 ML: 9 INJECTION, SOLUTION INTRAVENOUS at 13:39

## 2020-01-01 RX ADMIN — LACOSAMIDE 200 MG: 200 TABLET, FILM COATED ORAL at 07:42

## 2020-01-01 RX ADMIN — ASPIRIN 81 MG CHEWABLE TABLET 81 MG: 81 TABLET CHEWABLE at 07:57

## 2020-01-01 RX ADMIN — LEVOTHYROXINE SODIUM 62.5 MCG: 125 TABLET ORAL at 08:37

## 2020-01-01 RX ADMIN — SODIUM CHLORIDE, POTASSIUM CHLORIDE, SODIUM LACTATE AND CALCIUM CHLORIDE 500 ML: 600; 310; 30; 20 INJECTION, SOLUTION INTRAVENOUS at 05:52

## 2020-01-01 RX ADMIN — FENTANYL CITRATE 200 MCG: 50 INJECTION, SOLUTION INTRAMUSCULAR; INTRAVENOUS at 09:35

## 2020-01-01 RX ADMIN — QUETIAPINE 50 MG: 25 TABLET ORAL at 22:54

## 2020-01-01 RX ADMIN — VANCOMYCIN HYDROCHLORIDE 1000 MG: 1 INJECTION, SOLUTION INTRAVENOUS at 11:16

## 2020-01-01 RX ADMIN — ASPIRIN 81 MG: 81 TABLET ORAL at 08:36

## 2020-01-01 RX ADMIN — POLYETHYLENE GLYCOL 400 AND PROPYLENE GLYCOL 1 DROP: 4; 3 SOLUTION/ DROPS OPHTHALMIC at 07:56

## 2020-01-01 RX ADMIN — QUETIAPINE 12.5 MG: 25 TABLET, FILM COATED ORAL at 22:12

## 2020-01-01 RX ADMIN — ACETAMINOPHEN 975 MG: 325 TABLET, FILM COATED ORAL at 17:20

## 2020-01-01 RX ADMIN — CALCIUM CHLORIDE, MAGNESIUM CHLORIDE, SODIUM CHLORIDE, SODIUM BICARBONATE, POTASSIUM CHLORIDE AND SODIUM PHOSPHATE DIBASIC DIHYDRATE 12.5 ML/KG/HR: 3.68; 3.05; 6.34; 3.09; .314; .187 INJECTION INTRAVENOUS at 01:15

## 2020-01-01 RX ADMIN — FERROUS SULFATE TAB 325 MG (65 MG ELEMENTAL FE) 325 MG: 325 (65 FE) TAB at 20:37

## 2020-01-01 RX ADMIN — APIXABAN 5 MG: 5 TABLET, FILM COATED ORAL at 08:34

## 2020-01-01 RX ADMIN — ACETAMINOPHEN 650 MG: 325 TABLET, FILM COATED ORAL at 22:17

## 2020-01-01 RX ADMIN — DORZOLAMIDE HYDROCHLORIDE AND TIMOLOL MALEATE 1 DROP: 20; 5 SOLUTION/ DROPS OPHTHALMIC at 20:29

## 2020-01-01 RX ADMIN — Medication: at 07:52

## 2020-01-01 RX ADMIN — Medication 62.5 MCG: at 08:33

## 2020-01-01 RX ADMIN — VANCOMYCIN HYDROCHLORIDE 1750 MG: 10 INJECTION, POWDER, LYOPHILIZED, FOR SOLUTION INTRAVENOUS at 10:40

## 2020-01-01 RX ADMIN — SODIUM BICARBONATE 50 MEQ: 84 INJECTION, SOLUTION INTRAVENOUS at 18:00

## 2020-01-01 RX ADMIN — Medication 2.5 MG: at 05:25

## 2020-01-01 RX ADMIN — VANCOMYCIN HYDROCHLORIDE 1250 MG: 10 INJECTION, POWDER, LYOPHILIZED, FOR SOLUTION INTRAVENOUS at 14:52

## 2020-01-01 RX ADMIN — ASPIRIN 81 MG CHEWABLE TABLET 81 MG: 81 TABLET CHEWABLE at 08:43

## 2020-01-01 RX ADMIN — NOREPINEPHRINE BITARTRATE 0.03 MCG/KG/MIN: 1 INJECTION, SOLUTION, CONCENTRATE INTRAVENOUS at 13:58

## 2020-01-01 RX ADMIN — FENTANYL CITRATE 50 MCG: 50 INJECTION, SOLUTION INTRAMUSCULAR; INTRAVENOUS at 15:14

## 2020-01-01 RX ADMIN — Medication 2 SPRAY: at 15:10

## 2020-01-01 RX ADMIN — QUETIAPINE FUMARATE 50 MG: 50 TABLET ORAL at 22:12

## 2020-01-01 RX ADMIN — ALBUMIN HUMAN 12.5 G: 50 SOLUTION INTRAVENOUS at 03:24

## 2020-01-01 RX ADMIN — CALCIUM GLUCONATE 4 G: 98 INJECTION, SOLUTION INTRAVENOUS at 15:39

## 2020-01-01 RX ADMIN — DOCUSATE SODIUM 50 MG AND SENNOSIDES 8.6 MG 2 TABLET: 8.6; 5 TABLET, FILM COATED ORAL at 20:26

## 2020-01-01 RX ADMIN — MULTIPLE VITAMINS W/ MINERALS TAB 1 TABLET: TAB at 17:24

## 2020-01-01 RX ADMIN — BUSPIRONE HYDROCHLORIDE 10 MG: 10 TABLET ORAL at 08:35

## 2020-01-01 RX ADMIN — BUSPIRONE HYDROCHLORIDE 10 MG: 10 TABLET ORAL at 08:37

## 2020-01-01 RX ADMIN — MICAFUNGIN SODIUM 100 MG: 10 INJECTION, POWDER, LYOPHILIZED, FOR SOLUTION INTRAVENOUS at 06:07

## 2020-01-01 RX ADMIN — PANTOPRAZOLE SODIUM 40 MG: 40 TABLET, DELAYED RELEASE ORAL at 16:13

## 2020-01-01 RX ADMIN — Medication 12.5 MG: at 01:31

## 2020-01-01 RX ADMIN — ACETAMINOPHEN ORAL SOLUTION 1000 MG: 325 SOLUTION ORAL at 00:20

## 2020-01-01 RX ADMIN — SODIUM CHLORIDE 200 MG: 9 INJECTION, SOLUTION INTRAVENOUS at 20:08

## 2020-01-01 RX ADMIN — SODIUM CHLORIDE 250 ML: 9 INJECTION, SOLUTION INTRAVENOUS at 11:19

## 2020-01-01 RX ADMIN — QUETIAPINE FUMARATE 50 MG: 25 TABLET ORAL at 21:34

## 2020-01-01 RX ADMIN — BIMATOPROST 1 DROP: 0.1 SOLUTION/ DROPS OPHTHALMIC at 22:20

## 2020-01-01 RX ADMIN — OMEPRAZOLE 20 MG: 20 CAPSULE, DELAYED RELEASE ORAL at 06:58

## 2020-01-01 RX ADMIN — LEVOTHYROXINE SODIUM 50 MCG: 50 TABLET ORAL at 08:30

## 2020-01-01 RX ADMIN — Medication 5 MG: at 02:33

## 2020-01-01 RX ADMIN — ACETAMINOPHEN ORAL SOLUTION 1000 MG: 325 SOLUTION ORAL at 22:04

## 2020-01-01 RX ADMIN — CALCIUM CHLORIDE 1 G: 100 INJECTION, SOLUTION INTRAVENOUS at 22:40

## 2020-01-01 RX ADMIN — AMIODARONE HYDROCHLORIDE 400 MG: 200 TABLET ORAL at 19:35

## 2020-01-01 RX ADMIN — Medication 62.5 MCG: at 07:56

## 2020-01-01 RX ADMIN — THIAMINE HCL TAB 100 MG 100 MG: 100 TAB at 08:36

## 2020-01-01 RX ADMIN — FERROUS SULFATE TAB 325 MG (65 MG ELEMENTAL FE) 325 MG: 325 (65 FE) TAB at 20:29

## 2020-01-01 RX ADMIN — PANTOPRAZOLE SODIUM 40 MG: 40 TABLET, DELAYED RELEASE ORAL at 07:56

## 2020-01-01 RX ADMIN — HEPARIN SODIUM 650 UNITS/HR: 10000 INJECTION, SOLUTION INTRAVENOUS at 01:00

## 2020-01-01 RX ADMIN — CALCIUM CHLORIDE, MAGNESIUM CHLORIDE, SODIUM CHLORIDE, SODIUM BICARBONATE, POTASSIUM CHLORIDE AND SODIUM PHOSPHATE DIBASIC DIHYDRATE 12.5 ML/KG/HR: 3.68; 3.05; 6.34; 3.09; .314; .187 INJECTION INTRAVENOUS at 00:36

## 2020-01-01 RX ADMIN — Medication 1 UNITS/HR: at 14:27

## 2020-01-01 RX ADMIN — Medication: at 18:17

## 2020-01-01 RX ADMIN — NOREPINEPHRINE BITARTRATE 0.3 MCG/KG/MIN: 1 INJECTION, SOLUTION, CONCENTRATE INTRAVENOUS at 01:57

## 2020-01-01 RX ADMIN — SODIUM CHLORIDE 300 ML: 9 INJECTION, SOLUTION INTRAVENOUS at 13:39

## 2020-01-01 RX ADMIN — DOBUTAMINE 5 MCG/KG/MIN: 12.5 INJECTION, SOLUTION INTRAVENOUS at 09:05

## 2020-01-01 RX ADMIN — LACOSAMIDE 200 MG: 200 TABLET, FILM COATED ORAL at 07:53

## 2020-01-01 RX ADMIN — VASOPRESSIN 2.4 UNITS/HR: 20 INJECTION INTRAVENOUS at 09:44

## 2020-01-01 RX ADMIN — BUSPIRONE HYDROCHLORIDE 10 MG: 10 TABLET ORAL at 08:01

## 2020-01-01 RX ADMIN — MIDAZOLAM 2 MG: 1 INJECTION INTRAMUSCULAR; INTRAVENOUS at 16:46

## 2020-01-01 RX ADMIN — LATANOPROST 1 DROP: 50 SOLUTION OPHTHALMIC at 07:57

## 2020-01-01 RX ADMIN — MUPIROCIN 1 G: 20 OINTMENT TOPICAL at 19:47

## 2020-01-01 RX ADMIN — CALCIUM CHLORIDE, MAGNESIUM CHLORIDE, SODIUM CHLORIDE, SODIUM BICARBONATE, POTASSIUM CHLORIDE AND SODIUM PHOSPHATE DIBASIC DIHYDRATE 12.5 ML/KG/HR: 3.68; 3.05; 6.34; 3.09; .314; .187 INJECTION INTRAVENOUS at 03:57

## 2020-01-01 RX ADMIN — FERROUS SULFATE TAB 325 MG (65 MG ELEMENTAL FE) 325 MG: 325 (65 FE) TAB at 08:35

## 2020-01-01 RX ADMIN — BUSPIRONE HYDROCHLORIDE 10 MG: 10 TABLET ORAL at 22:04

## 2020-01-01 RX ADMIN — ROSUVASTATIN CALCIUM 20 MG: 20 TABLET, FILM COATED ORAL at 07:53

## 2020-01-01 RX ADMIN — Medication 5 ML: at 07:52

## 2020-01-01 RX ADMIN — OMEPRAZOLE 20 MG: 20 CAPSULE, DELAYED RELEASE ORAL at 07:42

## 2020-01-01 RX ADMIN — Medication 1 PACKET: at 16:08

## 2020-01-01 RX ADMIN — SODIUM CHLORIDE 200 MG: 9 INJECTION, SOLUTION INTRAVENOUS at 20:02

## 2020-01-01 RX ADMIN — HEPARIN SODIUM 1250 UNITS/HR: 10000 INJECTION, SOLUTION INTRAVENOUS at 21:36

## 2020-01-01 RX ADMIN — HEPARIN SODIUM 1200 UNITS/HR: 10000 INJECTION, SOLUTION INTRAVENOUS at 09:33

## 2020-01-01 RX ADMIN — ROSUVASTATIN CALCIUM 20 MG: 20 TABLET, FILM COATED ORAL at 08:26

## 2020-01-01 RX ADMIN — Medication 62.5 MCG: at 09:55

## 2020-01-01 RX ADMIN — BUSPIRONE HYDROCHLORIDE 10 MG: 10 TABLET ORAL at 13:39

## 2020-01-01 RX ADMIN — CALCIUM CHLORIDE, MAGNESIUM CHLORIDE, SODIUM CHLORIDE, SODIUM BICARBONATE, POTASSIUM CHLORIDE AND SODIUM PHOSPHATE DIBASIC DIHYDRATE 12.5 ML/KG/HR: 3.68; 3.05; 6.34; 3.09; .314; .187 INJECTION INTRAVENOUS at 23:08

## 2020-01-01 RX ADMIN — Medication 2.5 MG: at 15:58

## 2020-01-01 RX ADMIN — ASPIRIN 81 MG CHEWABLE TABLET 81 MG: 81 TABLET CHEWABLE at 07:52

## 2020-01-01 RX ADMIN — LEVOTHYROXINE SODIUM 62.5 MCG: 125 TABLET ORAL at 09:08

## 2020-01-01 RX ADMIN — ROSUVASTATIN CALCIUM 20 MG: 20 TABLET, FILM COATED ORAL at 08:16

## 2020-01-01 RX ADMIN — Medication 62.5 MCG: at 07:36

## 2020-01-01 RX ADMIN — CALCIUM CHLORIDE, MAGNESIUM CHLORIDE, SODIUM CHLORIDE, SODIUM BICARBONATE, POTASSIUM CHLORIDE AND SODIUM PHOSPHATE DIBASIC DIHYDRATE 12.5 ML/KG/HR: 3.68; 3.05; 6.34; 3.09; .314; .187 INJECTION INTRAVENOUS at 06:05

## 2020-01-01 RX ADMIN — CALCIUM CHLORIDE, MAGNESIUM CHLORIDE, SODIUM CHLORIDE, SODIUM BICARBONATE, POTASSIUM CHLORIDE AND SODIUM PHOSPHATE DIBASIC DIHYDRATE 12.5 ML/KG/HR: 3.68; 3.05; 6.34; 3.09; .314; .187 INJECTION INTRAVENOUS at 17:39

## 2020-01-01 RX ADMIN — CALCIUM CHLORIDE, MAGNESIUM CHLORIDE, SODIUM CHLORIDE, SODIUM BICARBONATE, POTASSIUM CHLORIDE AND SODIUM PHOSPHATE DIBASIC DIHYDRATE 2.91 ML/KG/HR: 3.68; 3.05; 6.34; 3.09; .314; .187 INJECTION INTRAVENOUS at 13:25

## 2020-01-01 RX ADMIN — ACETAMINOPHEN 650 MG: 325 TABLET, FILM COATED ORAL at 19:35

## 2020-01-01 RX ADMIN — AMIODARONE HYDROCHLORIDE 200 MG: 200 TABLET ORAL at 07:54

## 2020-01-01 RX ADMIN — BACITRACIN: 500 OINTMENT TOPICAL at 21:28

## 2020-01-01 RX ADMIN — TORSEMIDE 40 MG: 20 TABLET ORAL at 16:35

## 2020-01-01 RX ADMIN — FERROUS SULFATE TAB 325 MG (65 MG ELEMENTAL FE) 325 MG: 325 (65 FE) TAB at 19:45

## 2020-01-01 RX ADMIN — POLYETHYLENE GLYCOL 400 AND PROPYLENE GLYCOL 1 DROP: 4; 3 SOLUTION/ DROPS OPHTHALMIC at 07:57

## 2020-01-01 RX ADMIN — POTASSIUM CHLORIDE 40 MEQ: 750 TABLET, EXTENDED RELEASE ORAL at 18:41

## 2020-01-01 RX ADMIN — PHYTONADIONE 5 MG: 10 INJECTION, EMULSION INTRAMUSCULAR; INTRAVENOUS; SUBCUTANEOUS at 13:49

## 2020-01-01 RX ADMIN — Medication 5 ML: at 07:42

## 2020-01-01 RX ADMIN — FERROUS SULFATE TAB 325 MG (65 MG ELEMENTAL FE) 325 MG: 325 (65 FE) TAB at 22:54

## 2020-01-01 RX ADMIN — POTASSIUM CHLORIDE 20 MEQ: 29.8 INJECTION, SOLUTION INTRAVENOUS at 07:31

## 2020-01-01 RX ADMIN — ACETAMINOPHEN 650 MG: 325 TABLET, FILM COATED ORAL at 09:06

## 2020-01-01 RX ADMIN — DEXTROSE MONOHYDRATE 1000 ML: 100 INJECTION, SOLUTION INTRAVENOUS at 10:20

## 2020-01-01 RX ADMIN — BIMATOPROST 1 DROP: 0.1 SOLUTION/ DROPS OPHTHALMIC at 22:14

## 2020-01-01 RX ADMIN — ROSUVASTATIN CALCIUM 20 MG: 20 TABLET, FILM COATED ORAL at 07:31

## 2020-01-01 RX ADMIN — ACETAMINOPHEN ORAL SOLUTION 1000 MG: 325 SOLUTION ORAL at 19:29

## 2020-01-01 RX ADMIN — VANCOMYCIN HYDROCHLORIDE 1750 MG: 10 INJECTION, POWDER, LYOPHILIZED, FOR SOLUTION INTRAVENOUS at 08:00

## 2020-01-01 RX ADMIN — DOBUTAMINE 5 MCG/KG/MIN: 12.5 INJECTION, SOLUTION INTRAVENOUS at 08:12

## 2020-01-01 RX ADMIN — BUMETANIDE 4 MG: 0.25 INJECTION INTRAMUSCULAR; INTRAVENOUS at 11:30

## 2020-01-01 RX ADMIN — CALCIUM CHLORIDE, MAGNESIUM CHLORIDE, SODIUM CHLORIDE, SODIUM BICARBONATE, POTASSIUM CHLORIDE AND SODIUM PHOSPHATE DIBASIC DIHYDRATE 12.5 ML/KG/HR: 3.68; 3.05; 6.34; 3.09; .314; .187 INJECTION INTRAVENOUS at 10:06

## 2020-01-01 RX ADMIN — MICAFUNGIN SODIUM 150 MG: 10 INJECTION, POWDER, LYOPHILIZED, FOR SOLUTION INTRAVENOUS at 10:08

## 2020-01-01 RX ADMIN — LATANOPROST 1 DROP: 50 SOLUTION/ DROPS OPHTHALMIC at 08:28

## 2020-01-01 RX ADMIN — SODIUM CHLORIDE, POTASSIUM CHLORIDE, SODIUM LACTATE AND CALCIUM CHLORIDE 500 ML: 600; 310; 30; 20 INJECTION, SOLUTION INTRAVENOUS at 02:02

## 2020-01-01 RX ADMIN — THIAMINE HCL TAB 100 MG 100 MG: 100 TAB at 10:05

## 2020-01-01 RX ADMIN — SODIUM CHLORIDE 200 MG: 9 INJECTION, SOLUTION INTRAVENOUS at 19:56

## 2020-01-01 RX ADMIN — BUSPIRONE HYDROCHLORIDE 10 MG: 10 TABLET ORAL at 21:31

## 2020-01-01 RX ADMIN — LATANOPROST 1 DROP: 50 SOLUTION OPHTHALMIC at 07:39

## 2020-01-01 RX ADMIN — PIPERACILLIN AND TAZOBACTAM 4.5 G: 4; .5 INJECTION, POWDER, FOR SOLUTION INTRAVENOUS at 06:22

## 2020-01-01 RX ADMIN — AMIODARONE HYDROCHLORIDE 200 MG: 200 TABLET ORAL at 07:46

## 2020-01-01 RX ADMIN — SODIUM CHLORIDE 200 MG: 9 INJECTION, SOLUTION INTRAVENOUS at 09:13

## 2020-01-01 RX ADMIN — BACITRACIN: 500 OINTMENT TOPICAL at 08:52

## 2020-01-01 RX ADMIN — FUROSEMIDE 40 MG: 10 INJECTION, SOLUTION INTRAVENOUS at 12:12

## 2020-01-01 RX ADMIN — BIMATOPROST 1 DROP: 0.1 SOLUTION/ DROPS OPHTHALMIC at 21:19

## 2020-01-01 RX ADMIN — CALCIUM CHLORIDE, MAGNESIUM CHLORIDE, SODIUM CHLORIDE, SODIUM BICARBONATE, POTASSIUM CHLORIDE AND SODIUM PHOSPHATE DIBASIC DIHYDRATE 12.5 ML/KG/HR: 3.68; 3.05; 6.34; 3.09; .314; .187 INJECTION INTRAVENOUS at 16:04

## 2020-01-01 RX ADMIN — OXYCODONE HYDROCHLORIDE 5 MG: 5 TABLET ORAL at 21:11

## 2020-01-01 RX ADMIN — Medication: at 18:16

## 2020-01-01 RX ADMIN — LACOSAMIDE 200 MG: 200 TABLET, FILM COATED ORAL at 09:03

## 2020-01-01 RX ADMIN — ASPIRIN 81 MG CHEWABLE TABLET 81 MG: 81 TABLET CHEWABLE at 07:53

## 2020-01-01 RX ADMIN — CALCIUM CHLORIDE, MAGNESIUM CHLORIDE, SODIUM CHLORIDE, SODIUM BICARBONATE, POTASSIUM CHLORIDE AND SODIUM PHOSPHATE DIBASIC DIHYDRATE 12.5 ML/KG/HR: 3.68; 3.05; 6.34; 3.09; .314; .187 INJECTION INTRAVENOUS at 09:26

## 2020-01-01 RX ADMIN — MELATONIN TAB 3 MG 3 MG: 3 TAB at 00:21

## 2020-01-01 RX ADMIN — LACOSAMIDE 200 MG: 200 TABLET, FILM COATED ORAL at 07:31

## 2020-01-01 RX ADMIN — MICONAZOLE NITRATE: 20 POWDER TOPICAL at 19:55

## 2020-01-01 RX ADMIN — AMIODARONE HYDROCHLORIDE 400 MG: 200 TABLET ORAL at 08:16

## 2020-01-01 RX ADMIN — PIPERACILLIN AND TAZOBACTAM 4.5 G: 4; .5 INJECTION, POWDER, FOR SOLUTION INTRAVENOUS at 00:16

## 2020-01-01 RX ADMIN — ACETAMINOPHEN 650 MG: 325 TABLET, FILM COATED ORAL at 22:12

## 2020-01-01 RX ADMIN — SODIUM CHLORIDE 10 UNITS: 9 INJECTION, SOLUTION INTRAVENOUS at 17:37

## 2020-01-01 RX ADMIN — BUSPIRONE HYDROCHLORIDE 10 MG: 10 TABLET ORAL at 22:06

## 2020-01-01 RX ADMIN — SODIUM CHLORIDE: 9 INJECTION, SOLUTION INTRAVENOUS at 08:06

## 2020-01-01 RX ADMIN — POTASSIUM CHLORIDE 20 MEQ: 1500 TABLET, EXTENDED RELEASE ORAL at 09:24

## 2020-01-01 RX ADMIN — BUSPIRONE HYDROCHLORIDE 10 MG: 10 TABLET ORAL at 17:04

## 2020-01-01 RX ADMIN — Medication 1 PACKET: at 19:54

## 2020-01-01 RX ADMIN — LIDOCAINE 1 PATCH: 560 PATCH PERCUTANEOUS; TOPICAL; TRANSDERMAL at 09:07

## 2020-01-01 RX ADMIN — ROSUVASTATIN CALCIUM 20 MG: 20 TABLET, FILM COATED ORAL at 07:45

## 2020-01-01 RX ADMIN — SODIUM CHLORIDE, POTASSIUM CHLORIDE, SODIUM LACTATE AND CALCIUM CHLORIDE 500 ML: 600; 310; 30; 20 INJECTION, SOLUTION INTRAVENOUS at 23:30

## 2020-01-01 RX ADMIN — Medication 2.5 MG: at 18:09

## 2020-01-01 RX ADMIN — CALCIUM CHLORIDE, MAGNESIUM CHLORIDE, SODIUM CHLORIDE, SODIUM BICARBONATE, POTASSIUM CHLORIDE AND SODIUM PHOSPHATE DIBASIC DIHYDRATE 12.5 ML/KG/HR: 3.68; 3.05; 6.34; 3.09; .314; .187 INJECTION INTRAVENOUS at 11:39

## 2020-01-01 RX ADMIN — DORZOLAMIDE HYDROCHLORIDE AND TIMOLOL MALEATE 1 DROP: 20; 5 SOLUTION/ DROPS OPHTHALMIC at 08:05

## 2020-01-01 RX ADMIN — BUSPIRONE HYDROCHLORIDE 10 MG: 10 TABLET ORAL at 19:45

## 2020-01-01 RX ADMIN — PROCHLORPERAZINE EDISYLATE 5 MG: 5 INJECTION INTRAMUSCULAR; INTRAVENOUS at 08:31

## 2020-01-01 RX ADMIN — SODIUM CHLORIDE 100 ML: 9 INJECTION, SOLUTION INTRAVENOUS at 11:10

## 2020-01-01 RX ADMIN — FERROUS SULFATE TAB 325 MG (65 MG ELEMENTAL FE) 325 MG: 325 (65 FE) TAB at 08:43

## 2020-01-01 RX ADMIN — CALCIUM GLUCONATE: 98 INJECTION, SOLUTION INTRAVENOUS at 19:49

## 2020-01-01 RX ADMIN — CALCIUM CHLORIDE, MAGNESIUM CHLORIDE, SODIUM CHLORIDE, SODIUM BICARBONATE, POTASSIUM CHLORIDE AND SODIUM PHOSPHATE DIBASIC DIHYDRATE 12.5 ML/KG/HR: 3.68; 3.05; 6.34; 3.09; .314; .187 INJECTION INTRAVENOUS at 19:33

## 2020-01-01 RX ADMIN — LIDOCAINE HYDROCHLORIDE 10 ML: 10 INJECTION, SOLUTION EPIDURAL; INFILTRATION; INTRACAUDAL; PERINEURAL at 10:52

## 2020-01-01 RX ADMIN — AMIODARONE HYDROCHLORIDE 200 MG: 200 TABLET ORAL at 08:04

## 2020-01-01 RX ADMIN — BUMETANIDE 1 MG: 0.25 INJECTION INTRAMUSCULAR; INTRAVENOUS at 15:00

## 2020-01-01 RX ADMIN — MICAFUNGIN SODIUM 100 MG: 10 INJECTION, POWDER, LYOPHILIZED, FOR SOLUTION INTRAVENOUS at 06:56

## 2020-01-01 RX ADMIN — DOCUSATE SODIUM 50 MG AND SENNOSIDES 8.6 MG 1 TABLET: 8.6; 5 TABLET, FILM COATED ORAL at 20:28

## 2020-01-01 RX ADMIN — CALCIUM CHLORIDE, MAGNESIUM CHLORIDE, SODIUM CHLORIDE, SODIUM BICARBONATE, POTASSIUM CHLORIDE AND SODIUM PHOSPHATE DIBASIC DIHYDRATE 2.91 ML/KG/HR: 3.68; 3.05; 6.34; 3.09; .314; .187 INJECTION INTRAVENOUS at 14:40

## 2020-01-01 RX ADMIN — LATANOPROST 1 DROP: 50 SOLUTION OPHTHALMIC at 08:30

## 2020-01-01 RX ADMIN — FERROUS SULFATE TAB 325 MG (65 MG ELEMENTAL FE) 325 MG: 325 (65 FE) TAB at 20:15

## 2020-01-01 RX ADMIN — LEVETIRACETAM 2000 MG: 100 INJECTION, SOLUTION INTRAVENOUS at 13:48

## 2020-01-01 RX ADMIN — BIMATOPROST 1 DROP: 0.1 SOLUTION/ DROPS OPHTHALMIC at 22:13

## 2020-01-01 RX ADMIN — I.V. FAT EMULSION 250 ML: 20 EMULSION INTRAVENOUS at 19:49

## 2020-01-01 RX ADMIN — HEPARIN SODIUM 550 UNITS/HR: 10000 INJECTION, SOLUTION INTRAVENOUS at 00:56

## 2020-01-01 RX ADMIN — Medication: at 07:39

## 2020-01-01 RX ADMIN — Medication 1 PACKET: at 19:22

## 2020-01-01 RX ADMIN — Medication 5 ML: at 07:53

## 2020-01-01 RX ADMIN — METRONIDAZOLE 500 MG: 500 INJECTION, SOLUTION INTRAVENOUS at 03:41

## 2020-01-01 RX ADMIN — PIPERACILLIN SODIUM AND TAZOBACTAM SODIUM 4.5 G: 4; .5 INJECTION, POWDER, LYOPHILIZED, FOR SOLUTION INTRAVENOUS at 19:50

## 2020-01-01 RX ADMIN — DORZOLAMIDE HYDROCHLORIDE AND TIMOLOL MALEATE 1 DROP: 20; 5 SOLUTION/ DROPS OPHTHALMIC at 22:14

## 2020-01-01 RX ADMIN — LEVETIRACETAM 2000 MG: 750 TABLET, FILM COATED ORAL at 19:36

## 2020-01-01 RX ADMIN — Medication 5 MG: at 16:12

## 2020-01-01 RX ADMIN — QUETIAPINE FUMARATE 50 MG: 50 TABLET ORAL at 21:58

## 2020-01-01 RX ADMIN — LIDOCAINE HYDROCHLORIDE 10 ML: 20 SOLUTION ORAL; TOPICAL at 15:07

## 2020-01-01 RX ADMIN — METHOCARBAMOL 500 MG: 500 TABLET, FILM COATED ORAL at 05:25

## 2020-01-01 RX ADMIN — Medication 1 UNITS: at 09:50

## 2020-01-01 RX ADMIN — Medication 5 ML: at 13:28

## 2020-01-01 RX ADMIN — DEXTROSE MONOHYDRATE 1000 ML: 100 INJECTION, SOLUTION INTRAVENOUS at 20:02

## 2020-01-01 RX ADMIN — POLYETHYLENE GLYCOL 400 AND PROPYLENE GLYCOL 1 DROP: 4; 3 SOLUTION/ DROPS OPHTHALMIC at 21:04

## 2020-01-01 RX ADMIN — DOPAMINE HYDROCHLORIDE 2 MCG/KG/MIN: 160 INJECTION, SOLUTION INTRAVENOUS at 15:03

## 2020-01-01 RX ADMIN — Medication 1 PACKET: at 13:57

## 2020-01-01 RX ADMIN — SODIUM CHLORIDE 250 ML: 9 INJECTION, SOLUTION INTRAVENOUS at 10:16

## 2020-01-01 RX ADMIN — DORZOLAMIDE HYDROCHLORIDE AND TIMOLOL MALEATE 1 DROP: 20; 5 SOLUTION/ DROPS OPHTHALMIC at 08:27

## 2020-01-01 RX ADMIN — PANTOPRAZOLE SODIUM 40 MG: 40 TABLET, DELAYED RELEASE ORAL at 07:37

## 2020-01-01 RX ADMIN — OXYCODONE HYDROCHLORIDE 5 MG: 5 TABLET ORAL at 16:13

## 2020-01-01 RX ADMIN — CALCIUM GLUCONATE: 98 INJECTION, SOLUTION INTRAVENOUS at 19:54

## 2020-01-01 RX ADMIN — ASPIRIN 81 MG: 81 TABLET ORAL at 08:49

## 2020-01-01 RX ADMIN — Medication 5 ML: at 07:57

## 2020-01-01 RX ADMIN — CALCIUM CHLORIDE, MAGNESIUM CHLORIDE, SODIUM CHLORIDE, SODIUM BICARBONATE, POTASSIUM CHLORIDE AND SODIUM PHOSPHATE DIBASIC DIHYDRATE 12.5 ML/KG/HR: 3.68; 3.05; 6.34; 3.09; .314; .187 INJECTION INTRAVENOUS at 05:39

## 2020-01-01 RX ADMIN — ROSUVASTATIN CALCIUM 20 MG: 20 TABLET, FILM COATED ORAL at 02:18

## 2020-01-01 RX ADMIN — ROSUVASTATIN CALCIUM 20 MG: 20 TABLET, FILM COATED ORAL at 07:43

## 2020-01-01 RX ADMIN — DORZOLAMIDE HYDROCHLORIDE AND TIMOLOL MALEATE 1 DROP: 20; 5 SOLUTION/ DROPS OPHTHALMIC at 09:10

## 2020-01-01 RX ADMIN — LEVOTHYROXINE SODIUM 50 MCG: 50 TABLET ORAL at 06:33

## 2020-01-01 RX ADMIN — ROSUVASTATIN CALCIUM 20 MG: 20 TABLET, FILM COATED ORAL at 08:32

## 2020-01-01 RX ADMIN — LACOSAMIDE 200 MG: 200 TABLET, FILM COATED ORAL at 20:06

## 2020-01-01 RX ADMIN — BIMATOPROST 1 DROP: 0.1 SOLUTION/ DROPS OPHTHALMIC at 00:45

## 2020-01-01 RX ADMIN — ROSUVASTATIN CALCIUM 20 MG: 20 TABLET, FILM COATED ORAL at 20:37

## 2020-01-01 RX ADMIN — PIPERACILLIN AND TAZOBACTAM 4.5 G: 4; .5 INJECTION, POWDER, FOR SOLUTION INTRAVENOUS at 18:23

## 2020-01-01 RX ADMIN — CALCIUM CHLORIDE, MAGNESIUM CHLORIDE, SODIUM CHLORIDE, SODIUM BICARBONATE, POTASSIUM CHLORIDE AND SODIUM PHOSPHATE DIBASIC DIHYDRATE 12.5 ML/KG/HR: 3.68; 3.05; 6.34; 3.09; .314; .187 INJECTION INTRAVENOUS at 22:27

## 2020-01-01 RX ADMIN — CALCIUM CHLORIDE 1 G: 100 INJECTION, SOLUTION INTRAVENOUS at 13:31

## 2020-01-01 RX ADMIN — ROSUVASTATIN CALCIUM 20 MG: 20 TABLET, FILM COATED ORAL at 21:24

## 2020-01-01 RX ADMIN — MIDAZOLAM 1 MG: 1 INJECTION INTRAMUSCULAR; INTRAVENOUS at 11:41

## 2020-01-01 RX ADMIN — Medication 62.5 MCG: at 09:05

## 2020-01-01 RX ADMIN — WARFARIN SODIUM 1 MG: 1 TABLET ORAL at 18:17

## 2020-01-01 RX ADMIN — CALCIUM CHLORIDE, MAGNESIUM CHLORIDE, SODIUM CHLORIDE, SODIUM BICARBONATE, POTASSIUM CHLORIDE AND SODIUM PHOSPHATE DIBASIC DIHYDRATE 12.5 ML/KG/HR: 3.68; 3.05; 6.34; 3.09; .314; .187 INJECTION INTRAVENOUS at 01:29

## 2020-01-01 RX ADMIN — BUSPIRONE HYDROCHLORIDE 10 MG: 10 TABLET ORAL at 15:46

## 2020-01-01 RX ADMIN — DIGOXIN 125 MCG: 0.06 TABLET ORAL at 08:26

## 2020-01-01 RX ADMIN — BUSPIRONE HYDROCHLORIDE 10 MG: 10 TABLET ORAL at 15:27

## 2020-01-01 RX ADMIN — Medication 1 PACKET: at 14:07

## 2020-01-01 RX ADMIN — Medication: at 07:55

## 2020-01-01 RX ADMIN — FERROUS SULFATE TAB 325 MG (65 MG ELEMENTAL FE) 325 MG: 325 (65 FE) TAB at 19:53

## 2020-01-01 RX ADMIN — PANTOPRAZOLE SODIUM 8 MG/HR: 40 INJECTION, POWDER, FOR SOLUTION INTRAVENOUS at 17:59

## 2020-01-01 RX ADMIN — ACETAMINOPHEN ORAL SOLUTION 1000 MG: 325 SOLUTION ORAL at 03:27

## 2020-01-01 RX ADMIN — SODIUM CHLORIDE 200 MG: 9 INJECTION, SOLUTION INTRAVENOUS at 16:00

## 2020-01-01 RX ADMIN — FERROUS SULFATE TAB 325 MG (65 MG ELEMENTAL FE) 325 MG: 325 (65 FE) TAB at 08:24

## 2020-01-01 RX ADMIN — PIPERACILLIN SODIUM AND TAZOBACTAM SODIUM 4.5 G: 4; .5 INJECTION, POWDER, LYOPHILIZED, FOR SOLUTION INTRAVENOUS at 13:48

## 2020-01-01 RX ADMIN — BUMETANIDE 1 MG/HR: 0.25 INJECTION INTRAMUSCULAR; INTRAVENOUS at 23:57

## 2020-01-01 RX ADMIN — ACETAMINOPHEN 975 MG: 325 TABLET, FILM COATED ORAL at 00:09

## 2020-01-01 RX ADMIN — AMIODARONE HYDROCHLORIDE 1 MG/MIN: 50 INJECTION, SOLUTION INTRAVENOUS at 20:46

## 2020-01-01 RX ADMIN — ROSUVASTATIN CALCIUM 20 MG: 20 TABLET, FILM COATED ORAL at 07:57

## 2020-01-01 RX ADMIN — Medication: at 22:18

## 2020-01-01 RX ADMIN — PANTOPRAZOLE SODIUM 40 MG: 40 INJECTION, POWDER, FOR SOLUTION INTRAVENOUS at 07:39

## 2020-01-01 RX ADMIN — LACOSAMIDE 200 MG: 200 TABLET, FILM COATED ORAL at 20:15

## 2020-01-01 RX ADMIN — ROSUVASTATIN CALCIUM 20 MG: 20 TABLET, FILM COATED ORAL at 07:40

## 2020-01-01 RX ADMIN — BUSPIRONE HYDROCHLORIDE 10 MG: 10 TABLET ORAL at 22:13

## 2020-01-01 RX ADMIN — CALCIUM CHLORIDE, MAGNESIUM CHLORIDE, SODIUM CHLORIDE, SODIUM BICARBONATE, POTASSIUM CHLORIDE AND SODIUM PHOSPHATE DIBASIC DIHYDRATE 12.5 ML/KG/HR: 3.68; 3.05; 6.34; 3.09; .314; .187 INJECTION INTRAVENOUS at 08:03

## 2020-01-01 RX ADMIN — FERROUS SULFATE TAB 325 MG (65 MG ELEMENTAL FE) 325 MG: 325 (65 FE) TAB at 20:23

## 2020-01-01 RX ADMIN — ACETAMINOPHEN 650 MG: 325 TABLET, FILM COATED ORAL at 21:39

## 2020-01-01 RX ADMIN — Medication 1 PACKET: at 13:43

## 2020-01-01 RX ADMIN — AMIODARONE HYDROCHLORIDE 200 MG: 200 TABLET ORAL at 08:29

## 2020-01-01 RX ADMIN — DEXTROSE MONOHYDRATE 25 ML: 500 INJECTION PARENTERAL at 23:07

## 2020-01-01 RX ADMIN — PANTOPRAZOLE SODIUM 40 MG: 40 INJECTION, POWDER, FOR SOLUTION INTRAVENOUS at 17:05

## 2020-01-01 RX ADMIN — BUSPIRONE HYDROCHLORIDE 10 MG: 10 TABLET ORAL at 14:35

## 2020-01-01 RX ADMIN — CALCIUM CHLORIDE, MAGNESIUM CHLORIDE, SODIUM CHLORIDE, SODIUM BICARBONATE, POTASSIUM CHLORIDE AND SODIUM PHOSPHATE DIBASIC DIHYDRATE 12.5 ML/KG/HR: 3.68; 3.05; 6.34; 3.09; .314; .187 INJECTION INTRAVENOUS at 12:11

## 2020-01-01 RX ADMIN — CALCIUM CHLORIDE, MAGNESIUM CHLORIDE, SODIUM CHLORIDE, SODIUM BICARBONATE, POTASSIUM CHLORIDE AND SODIUM PHOSPHATE DIBASIC DIHYDRATE 12.5 ML/KG/HR: 3.68; 3.05; 6.34; 3.09; .314; .187 INJECTION INTRAVENOUS at 09:34

## 2020-01-01 RX ADMIN — LEVETIRACETAM 2000 MG: 100 INJECTION, SOLUTION INTRAVENOUS at 14:42

## 2020-01-01 RX ADMIN — LEVOTHYROXINE SODIUM 62.5 MCG: 125 TABLET ORAL at 07:45

## 2020-01-01 RX ADMIN — ACETAMINOPHEN 650 MG: 325 TABLET, FILM COATED ORAL at 17:52

## 2020-01-01 RX ADMIN — ASPIRIN 81 MG: 81 TABLET, COATED ORAL at 08:16

## 2020-01-01 RX ADMIN — LATANOPROST 1 DROP: 50 SOLUTION OPHTHALMIC at 07:49

## 2020-01-01 RX ADMIN — ALBUMIN HUMAN 12.5 G: 0.05 INJECTION, SOLUTION INTRAVENOUS at 04:30

## 2020-01-01 RX ADMIN — BUMETANIDE 1 MG: 0.25 INJECTION INTRAMUSCULAR; INTRAVENOUS at 16:14

## 2020-01-01 RX ADMIN — LACOSAMIDE 200 MG: 200 TABLET, FILM COATED ORAL at 08:48

## 2020-01-01 RX ADMIN — Medication: at 02:31

## 2020-01-01 RX ADMIN — PIPERACILLIN AND TAZOBACTAM 4.5 G: 4; .5 INJECTION, POWDER, FOR SOLUTION INTRAVENOUS at 06:25

## 2020-01-01 RX ADMIN — CALCIUM CHLORIDE, MAGNESIUM CHLORIDE, SODIUM CHLORIDE, SODIUM BICARBONATE, POTASSIUM CHLORIDE AND SODIUM PHOSPHATE DIBASIC DIHYDRATE 12.5 ML/KG/HR: 3.68; 3.05; 6.34; 3.09; .314; .187 INJECTION INTRAVENOUS at 02:07

## 2020-01-01 RX ADMIN — LEVETIRACETAM 2000 MG: 750 TABLET, FILM COATED ORAL at 09:59

## 2020-01-01 RX ADMIN — QUETIAPINE 12.5 MG: 25 TABLET, FILM COATED ORAL at 12:17

## 2020-01-01 RX ADMIN — DORZOLAMIDE HYDROCHLORIDE AND TIMOLOL MALEATE 1 DROP: 20; 5 SOLUTION/ DROPS OPHTHALMIC at 22:20

## 2020-01-01 RX ADMIN — CALCIUM CHLORIDE, MAGNESIUM CHLORIDE, SODIUM CHLORIDE, SODIUM BICARBONATE, POTASSIUM CHLORIDE AND SODIUM PHOSPHATE DIBASIC DIHYDRATE 12.5 ML/KG/HR: 3.68; 3.05; 6.34; 3.09; .314; .187 INJECTION INTRAVENOUS at 16:58

## 2020-01-01 RX ADMIN — BUSPIRONE HYDROCHLORIDE 10 MG: 10 TABLET ORAL at 17:33

## 2020-01-01 RX ADMIN — MIDAZOLAM 2 MG: 1 INJECTION INTRAMUSCULAR; INTRAVENOUS at 18:13

## 2020-01-01 RX ADMIN — BUSPIRONE HYDROCHLORIDE 10 MG: 10 TABLET ORAL at 08:05

## 2020-01-01 RX ADMIN — ACETAMINOPHEN 650 MG: 325 TABLET, FILM COATED ORAL at 21:30

## 2020-01-01 RX ADMIN — AMIODARONE HYDROCHLORIDE 400 MG: 200 TABLET ORAL at 12:14

## 2020-01-01 RX ADMIN — Medication: at 10:10

## 2020-01-01 RX ADMIN — ACETAMINOPHEN 650 MG: 325 TABLET, FILM COATED ORAL at 10:21

## 2020-01-01 RX ADMIN — Medication 1 UNITS/HR: at 06:50

## 2020-01-01 RX ADMIN — Medication 62.5 MCG: at 08:26

## 2020-01-01 RX ADMIN — TORSEMIDE 40 MG: 20 TABLET ORAL at 17:50

## 2020-01-01 RX ADMIN — CALCIUM CHLORIDE, MAGNESIUM CHLORIDE, SODIUM CHLORIDE, SODIUM BICARBONATE, POTASSIUM CHLORIDE AND SODIUM PHOSPHATE DIBASIC DIHYDRATE 12.5 ML/KG/HR: 3.68; 3.05; 6.34; 3.09; .314; .187 INJECTION INTRAVENOUS at 21:30

## 2020-01-01 RX ADMIN — LOPERAMIDE HYDROCHLORIDE 2 MG: 2 CAPSULE ORAL at 07:46

## 2020-01-01 RX ADMIN — BIMATOPROST 1 DROP: 0.1 SOLUTION/ DROPS OPHTHALMIC at 21:24

## 2020-01-01 RX ADMIN — Medication: at 13:57

## 2020-01-01 RX ADMIN — CALCIUM CHLORIDE, MAGNESIUM CHLORIDE, SODIUM CHLORIDE, SODIUM BICARBONATE, POTASSIUM CHLORIDE AND SODIUM PHOSPHATE DIBASIC DIHYDRATE 12.5 ML/KG/HR: 3.68; 3.05; 6.34; 3.09; .314; .187 INJECTION INTRAVENOUS at 09:45

## 2020-01-01 RX ADMIN — OMEPRAZOLE 20 MG: 20 CAPSULE, DELAYED RELEASE ORAL at 18:02

## 2020-01-01 RX ADMIN — SODIUM CHLORIDE 100 ML: 9 INJECTION, SOLUTION INTRAVENOUS at 10:33

## 2020-01-01 RX ADMIN — CALCIUM CHLORIDE, MAGNESIUM CHLORIDE, SODIUM CHLORIDE, SODIUM BICARBONATE, POTASSIUM CHLORIDE AND SODIUM PHOSPHATE DIBASIC DIHYDRATE 12.5 ML/KG/HR: 3.68; 3.05; 6.34; 3.09; .314; .187 INJECTION INTRAVENOUS at 03:15

## 2020-01-01 RX ADMIN — FERROUS SULFATE TAB 325 MG (65 MG ELEMENTAL FE) 325 MG: 325 (65 FE) TAB at 21:21

## 2020-01-01 RX ADMIN — LATANOPROST 1 DROP: 50 SOLUTION/ DROPS OPHTHALMIC at 11:55

## 2020-01-01 RX ADMIN — LEVETIRACETAM 2000 MG: 750 TABLET, FILM COATED ORAL at 20:17

## 2020-01-01 RX ADMIN — MICAFUNGIN SODIUM 150 MG: 10 INJECTION, POWDER, LYOPHILIZED, FOR SOLUTION INTRAVENOUS at 10:22

## 2020-01-01 RX ADMIN — ROSUVASTATIN CALCIUM 20 MG: 20 TABLET, FILM COATED ORAL at 21:21

## 2020-01-01 RX ADMIN — LACOSAMIDE 200 MG: 200 TABLET, FILM COATED ORAL at 19:34

## 2020-01-01 RX ADMIN — MULTIPLE VITAMINS W/ MINERALS TAB 1 TABLET: TAB at 17:15

## 2020-01-01 RX ADMIN — CALCIUM CHLORIDE, MAGNESIUM CHLORIDE, SODIUM CHLORIDE, SODIUM BICARBONATE, POTASSIUM CHLORIDE AND SODIUM PHOSPHATE DIBASIC DIHYDRATE 2.91 ML/KG/HR: 3.68; 3.05; 6.34; 3.09; .314; .187 INJECTION INTRAVENOUS at 11:41

## 2020-01-01 RX ADMIN — PIPERACILLIN SODIUM AND TAZOBACTAM SODIUM 4.5 G: 4; .5 INJECTION, POWDER, LYOPHILIZED, FOR SOLUTION INTRAVENOUS at 14:59

## 2020-01-01 RX ADMIN — OMEPRAZOLE 20 MG: 20 CAPSULE, DELAYED RELEASE ORAL at 09:05

## 2020-01-01 RX ADMIN — ROSUVASTATIN CALCIUM 20 MG: 20 TABLET, FILM COATED ORAL at 20:15

## 2020-01-01 RX ADMIN — PANTOPRAZOLE SODIUM 8 MG/HR: 40 INJECTION, POWDER, FOR SOLUTION INTRAVENOUS at 02:01

## 2020-01-01 RX ADMIN — PROTAMINE SULFATE 250 MG: 10 INJECTION, SOLUTION INTRAVENOUS at 14:23

## 2020-01-01 RX ADMIN — ACETAMINOPHEN ORAL SOLUTION 1000 MG: 325 SOLUTION ORAL at 20:06

## 2020-01-01 RX ADMIN — ALTEPLASE 2 MG: 2.2 INJECTION, POWDER, LYOPHILIZED, FOR SOLUTION INTRAVENOUS at 21:31

## 2020-01-01 RX ADMIN — I.V. FAT EMULSION 250 ML: 20 EMULSION INTRAVENOUS at 20:09

## 2020-01-01 RX ADMIN — PANTOPRAZOLE SODIUM 40 MG: 40 INJECTION, POWDER, FOR SOLUTION INTRAVENOUS at 07:53

## 2020-01-01 RX ADMIN — CEFTRIAXONE 1 G: 1 INJECTION, POWDER, FOR SOLUTION INTRAMUSCULAR; INTRAVENOUS at 14:35

## 2020-01-01 RX ADMIN — ASPIRIN 81 MG: 81 TABLET, COATED ORAL at 08:19

## 2020-01-01 RX ADMIN — OXYCODONE HYDROCHLORIDE 5 MG: 5 TABLET ORAL at 22:33

## 2020-01-01 RX ADMIN — DORZOLAMIDE HYDROCHLORIDE AND TIMOLOL MALEATE 1 DROP: 20; 5 SOLUTION/ DROPS OPHTHALMIC at 08:28

## 2020-01-01 RX ADMIN — BACITRACIN: 500 OINTMENT TOPICAL at 20:37

## 2020-01-01 RX ADMIN — MICAFUNGIN SODIUM 150 MG: 10 INJECTION, POWDER, LYOPHILIZED, FOR SOLUTION INTRAVENOUS at 09:18

## 2020-01-01 RX ADMIN — CALCIUM CHLORIDE, MAGNESIUM CHLORIDE, SODIUM CHLORIDE, SODIUM BICARBONATE, POTASSIUM CHLORIDE AND SODIUM PHOSPHATE DIBASIC DIHYDRATE 12.5 ML/KG/HR: 3.68; 3.05; 6.34; 3.09; .314; .187 INJECTION INTRAVENOUS at 00:31

## 2020-01-01 RX ADMIN — Medication 2.5 MG: at 15:20

## 2020-01-01 RX ADMIN — CEFTRIAXONE SODIUM 1 G: 1 INJECTION, POWDER, FOR SOLUTION INTRAMUSCULAR; INTRAVENOUS at 17:03

## 2020-01-01 RX ADMIN — PANTOPRAZOLE SODIUM 40 MG: 40 INJECTION, POWDER, FOR SOLUTION INTRAVENOUS at 19:49

## 2020-01-01 RX ADMIN — PANTOPRAZOLE SODIUM 40 MG: 40 INJECTION, POWDER, FOR SOLUTION INTRAVENOUS at 21:14

## 2020-01-01 RX ADMIN — FERROUS SULFATE TAB 325 MG (65 MG ELEMENTAL FE) 325 MG: 325 (65 FE) TAB at 21:13

## 2020-01-01 RX ADMIN — OMEPRAZOLE 20 MG: 20 CAPSULE, DELAYED RELEASE ORAL at 08:26

## 2020-01-01 RX ADMIN — HEPARIN SODIUM 1200 UNITS/HR: 10000 INJECTION, SOLUTION INTRAVENOUS at 05:21

## 2020-01-01 RX ADMIN — Medication 1 PACKET: at 15:35

## 2020-01-01 RX ADMIN — QUETIAPINE FUMARATE 50 MG: 25 TABLET ORAL at 21:19

## 2020-01-01 RX ADMIN — ALBUMIN HUMAN 12.5 G: 0.05 INJECTION, SOLUTION INTRAVENOUS at 03:24

## 2020-01-01 RX ADMIN — CALCIUM CHLORIDE, MAGNESIUM CHLORIDE, SODIUM CHLORIDE, SODIUM BICARBONATE, POTASSIUM CHLORIDE AND SODIUM PHOSPHATE DIBASIC DIHYDRATE 12.5 ML/KG/HR: 3.68; 3.05; 6.34; 3.09; .314; .187 INJECTION INTRAVENOUS at 03:11

## 2020-01-01 RX ADMIN — DEXTROSE MONOHYDRATE 1000 ML: 100 INJECTION, SOLUTION INTRAVENOUS at 23:05

## 2020-01-01 RX ADMIN — METRONIDAZOLE 500 MG: 500 INJECTION, SOLUTION INTRAVENOUS at 16:37

## 2020-01-01 RX ADMIN — BUSPIRONE HYDROCHLORIDE 10 MG: 10 TABLET ORAL at 07:52

## 2020-01-01 RX ADMIN — BUMETANIDE 1 MG/HR: 0.25 INJECTION INTRAMUSCULAR; INTRAVENOUS at 14:34

## 2020-01-01 RX ADMIN — Medication: at 11:44

## 2020-01-01 RX ADMIN — ACETAMINOPHEN ORAL SOLUTION 1000 MG: 325 SOLUTION ORAL at 04:27

## 2020-01-01 RX ADMIN — ROSUVASTATIN CALCIUM 20 MG: 20 TABLET, FILM COATED ORAL at 07:48

## 2020-01-01 RX ADMIN — LATANOPROST 1 DROP: 50 SOLUTION OPHTHALMIC at 08:35

## 2020-01-01 RX ADMIN — BUSPIRONE HYDROCHLORIDE 10 MG: 10 TABLET ORAL at 14:32

## 2020-01-01 RX ADMIN — CALCIUM CHLORIDE, MAGNESIUM CHLORIDE, SODIUM CHLORIDE, SODIUM BICARBONATE, POTASSIUM CHLORIDE AND SODIUM PHOSPHATE DIBASIC DIHYDRATE 12.5 ML/KG/HR: 3.68; 3.05; 6.34; 3.09; .314; .187 INJECTION INTRAVENOUS at 08:57

## 2020-01-01 RX ADMIN — LATANOPROST 1 DROP: 50 SOLUTION/ DROPS OPHTHALMIC at 07:42

## 2020-01-01 RX ADMIN — BUSPIRONE HYDROCHLORIDE 10 MG: 10 TABLET ORAL at 07:42

## 2020-01-01 RX ADMIN — Medication 1 PACKET: at 07:57

## 2020-01-01 RX ADMIN — Medication 0.05 MCG/KG/MIN: at 21:28

## 2020-01-01 RX ADMIN — ROSUVASTATIN CALCIUM 20 MG: 20 TABLET, FILM COATED ORAL at 01:35

## 2020-01-01 RX ADMIN — POTASSIUM CHLORIDE 20 MEQ: 750 TABLET, EXTENDED RELEASE ORAL at 21:45

## 2020-01-01 RX ADMIN — MICONAZOLE NITRATE: 20 POWDER TOPICAL at 19:58

## 2020-01-01 RX ADMIN — BUSPIRONE HYDROCHLORIDE 10 MG: 10 TABLET ORAL at 22:54

## 2020-01-01 RX ADMIN — METHOCARBAMOL 500 MG: 500 TABLET, FILM COATED ORAL at 16:01

## 2020-01-01 RX ADMIN — DEXAMETHASONE SODIUM PHOSPHATE 4 MG: 4 INJECTION, SOLUTION INTRAMUSCULAR; INTRAVENOUS at 09:34

## 2020-01-01 RX ADMIN — DIGOXIN 500 MCG: 125 TABLET ORAL at 11:07

## 2020-01-01 RX ADMIN — PANTOPRAZOLE SODIUM 40 MG: 40 TABLET, DELAYED RELEASE ORAL at 08:04

## 2020-01-01 RX ADMIN — LIDOCAINE 1 PATCH: 560 PATCH PERCUTANEOUS; TOPICAL; TRANSDERMAL at 08:02

## 2020-01-01 RX ADMIN — LATANOPROST 1 DROP: 50 SOLUTION/ DROPS OPHTHALMIC at 09:44

## 2020-01-01 RX ADMIN — THIAMINE HCL TAB 100 MG 100 MG: 100 TAB at 08:30

## 2020-01-01 RX ADMIN — Medication: at 17:48

## 2020-01-01 RX ADMIN — METHOCARBAMOL 500 MG: 500 TABLET, FILM COATED ORAL at 20:16

## 2020-01-01 RX ADMIN — ROSUVASTATIN CALCIUM 20 MG: 20 TABLET, FILM COATED ORAL at 21:20

## 2020-01-01 RX ADMIN — Medication 62.5 MCG: at 07:37

## 2020-01-01 RX ADMIN — SACUBITRIL AND VALSARTAN 1 TABLET: 24; 26 TABLET, FILM COATED ORAL at 08:24

## 2020-01-01 RX ADMIN — GLYCOPYRROLATE 0.2 MG: 0.2 INJECTION, SOLUTION INTRAMUSCULAR; INTRAVENOUS at 14:00

## 2020-01-01 RX ADMIN — BUSPIRONE HYDROCHLORIDE 10 MG: 10 TABLET ORAL at 22:30

## 2020-01-01 RX ADMIN — POTASSIUM CHLORIDE 20 MEQ: 29.8 INJECTION, SOLUTION INTRAVENOUS at 06:29

## 2020-01-01 RX ADMIN — Medication: at 15:00

## 2020-01-01 RX ADMIN — ACETAMINOPHEN ORAL SOLUTION 1000 MG: 325 SOLUTION ORAL at 09:35

## 2020-01-01 RX ADMIN — METRONIDAZOLE 500 MG: 500 INJECTION, SOLUTION INTRAVENOUS at 11:28

## 2020-01-01 RX ADMIN — LORAZEPAM 0.5 MG: 2 INJECTION INTRAMUSCULAR; INTRAVENOUS at 20:26

## 2020-01-01 RX ADMIN — LACOSAMIDE 200 MG: 200 TABLET, FILM COATED ORAL at 19:36

## 2020-01-01 RX ADMIN — CALCIUM CHLORIDE, MAGNESIUM CHLORIDE, SODIUM CHLORIDE, SODIUM BICARBONATE, POTASSIUM CHLORIDE AND SODIUM PHOSPHATE DIBASIC DIHYDRATE 2.91 ML/KG/HR: 3.68; 3.05; 6.34; 3.09; .314; .187 INJECTION INTRAVENOUS at 07:59

## 2020-01-01 RX ADMIN — Medication 62.5 MCG: at 11:07

## 2020-01-01 RX ADMIN — BUSPIRONE HYDROCHLORIDE 10 MG: 10 TABLET ORAL at 15:20

## 2020-01-01 RX ADMIN — PANTOPRAZOLE SODIUM 8 MG/HR: 40 INJECTION, POWDER, FOR SOLUTION INTRAVENOUS at 13:39

## 2020-01-01 RX ADMIN — CALCIUM CHLORIDE, MAGNESIUM CHLORIDE, SODIUM CHLORIDE, SODIUM BICARBONATE, POTASSIUM CHLORIDE AND SODIUM PHOSPHATE DIBASIC DIHYDRATE 12.5 ML/KG/HR: 3.68; 3.05; 6.34; 3.09; .314; .187 INJECTION INTRAVENOUS at 22:33

## 2020-01-01 RX ADMIN — Medication 62.5 MCG: at 08:04

## 2020-01-01 RX ADMIN — PIPERACILLIN AND TAZOBACTAM 4.5 G: 4; .5 INJECTION, POWDER, FOR SOLUTION INTRAVENOUS at 11:06

## 2020-01-01 RX ADMIN — Medication: at 22:03

## 2020-01-01 RX ADMIN — BIMATOPROST 1 DROP: 0.1 SOLUTION/ DROPS OPHTHALMIC at 22:29

## 2020-01-01 RX ADMIN — PIPERACILLIN AND TAZOBACTAM 4.5 G: 4; .5 INJECTION, POWDER, FOR SOLUTION INTRAVENOUS at 19:46

## 2020-01-01 RX ADMIN — AMIODARONE HYDROCHLORIDE 200 MG: 200 TABLET ORAL at 08:14

## 2020-01-01 RX ADMIN — Medication 2 SPRAY: at 15:33

## 2020-01-01 RX ADMIN — BUSPIRONE HYDROCHLORIDE 10 MG: 10 TABLET ORAL at 16:07

## 2020-01-01 RX ADMIN — CALCIUM CHLORIDE 1 G: 100 INJECTION, SOLUTION INTRAVENOUS at 12:26

## 2020-01-01 RX ADMIN — BUSPIRONE HYDROCHLORIDE 10 MG: 10 TABLET ORAL at 07:45

## 2020-01-01 RX ADMIN — Medication 2.5 MG: at 22:04

## 2020-01-01 RX ADMIN — Medication 62.5 MCG: at 09:00

## 2020-01-01 RX ADMIN — LATANOPROST 1 DROP: 50 SOLUTION/ DROPS OPHTHALMIC at 08:39

## 2020-01-01 RX ADMIN — SODIUM CHLORIDE 1 UNITS/HR: 9 INJECTION, SOLUTION INTRAVENOUS at 04:12

## 2020-01-01 RX ADMIN — FLUCONAZOLE 200 MG: 2 INJECTION, SOLUTION INTRAVENOUS at 20:26

## 2020-01-01 RX ADMIN — CALCIUM CHLORIDE, MAGNESIUM CHLORIDE, SODIUM CHLORIDE, SODIUM BICARBONATE, POTASSIUM CHLORIDE AND SODIUM PHOSPHATE DIBASIC DIHYDRATE 12.5 ML/KG/HR: 3.68; 3.05; 6.34; 3.09; .314; .187 INJECTION INTRAVENOUS at 14:02

## 2020-01-01 RX ADMIN — PANTOPRAZOLE SODIUM 40 MG: 40 TABLET, DELAYED RELEASE ORAL at 07:41

## 2020-01-01 RX ADMIN — HEPARIN SODIUM 1000 UNITS/HR: 10000 INJECTION, SOLUTION INTRAVENOUS at 07:27

## 2020-01-01 RX ADMIN — EPINEPHRINE 0.07 MCG/KG/MIN: 1 INJECTION PARENTERAL at 11:18

## 2020-01-01 RX ADMIN — Medication: at 18:41

## 2020-01-01 RX ADMIN — PANTOPRAZOLE SODIUM 40 MG: 40 TABLET, DELAYED RELEASE ORAL at 07:47

## 2020-01-01 RX ADMIN — BIMATOPROST 1 DROP: 0.1 SOLUTION/ DROPS OPHTHALMIC at 20:06

## 2020-01-01 RX ADMIN — Medication 1 CAPSULE: at 10:51

## 2020-01-01 RX ADMIN — MICAFUNGIN SODIUM 150 MG: 10 INJECTION, POWDER, LYOPHILIZED, FOR SOLUTION INTRAVENOUS at 10:00

## 2020-01-01 RX ADMIN — SODIUM CHLORIDE 200 MG: 9 INJECTION, SOLUTION INTRAVENOUS at 09:28

## 2020-01-01 RX ADMIN — BIMATOPROST 1 DROP: 0.1 SOLUTION/ DROPS OPHTHALMIC at 21:30

## 2020-01-01 RX ADMIN — LEVETIRACETAM 2000 MG: 750 TABLET, FILM COATED ORAL at 09:03

## 2020-01-01 RX ADMIN — CALCIUM CHLORIDE, MAGNESIUM CHLORIDE, SODIUM CHLORIDE, SODIUM BICARBONATE, POTASSIUM CHLORIDE AND SODIUM PHOSPHATE DIBASIC DIHYDRATE 12.5 ML/KG/HR: 3.68; 3.05; 6.34; 3.09; .314; .187 INJECTION INTRAVENOUS at 20:37

## 2020-01-01 RX ADMIN — BUSPIRONE HYDROCHLORIDE 10 MG: 10 TABLET ORAL at 07:48

## 2020-01-01 RX ADMIN — SODIUM CHLORIDE, SODIUM GLUCONATE, SODIUM ACETATE, POTASSIUM CHLORIDE AND MAGNESIUM CHLORIDE: 526; 502; 368; 37; 30 INJECTION, SOLUTION INTRAVENOUS at 09:06

## 2020-01-01 RX ADMIN — LEVETIRACETAM 2000 MG: 100 INJECTION, SOLUTION INTRAVENOUS at 00:23

## 2020-01-01 RX ADMIN — BUSPIRONE HYDROCHLORIDE 10 MG: 10 TABLET ORAL at 21:18

## 2020-01-01 RX ADMIN — SODIUM CHLORIDE 200 MG: 9 INJECTION, SOLUTION INTRAVENOUS at 09:17

## 2020-01-01 RX ADMIN — BUSPIRONE HYDROCHLORIDE 10 MG: 10 TABLET ORAL at 15:48

## 2020-01-01 RX ADMIN — BUMETANIDE 0.25 MG/HR: 0.25 INJECTION INTRAMUSCULAR; INTRAVENOUS at 15:00

## 2020-01-01 RX ADMIN — FLUCONAZOLE 200 MG: 2 INJECTION, SOLUTION INTRAVENOUS at 21:18

## 2020-01-01 RX ADMIN — ACETAMINOPHEN 650 MG: 325 TABLET, FILM COATED ORAL at 10:03

## 2020-01-01 RX ADMIN — LEVETIRACETAM 2000 MG: 750 TABLET, FILM COATED ORAL at 19:22

## 2020-01-01 RX ADMIN — SODIUM CHLORIDE, POTASSIUM CHLORIDE, SODIUM LACTATE AND CALCIUM CHLORIDE 250 ML: 600; 310; 30; 20 INJECTION, SOLUTION INTRAVENOUS at 23:37

## 2020-01-01 RX ADMIN — ASPIRIN 81 MG CHEWABLE TABLET 81 MG: 81 TABLET CHEWABLE at 07:45

## 2020-01-01 RX ADMIN — QUETIAPINE 12.5 MG: 25 TABLET, FILM COATED ORAL at 21:03

## 2020-01-01 RX ADMIN — EPINEPHRINE 0.13 MCG/KG/MIN: 1 INJECTION PARENTERAL at 22:20

## 2020-01-01 RX ADMIN — AMIODARONE HYDROCHLORIDE 200 MG: 200 TABLET ORAL at 07:36

## 2020-01-01 RX ADMIN — FUROSEMIDE 40 MG: 10 INJECTION, SOLUTION INTRAMUSCULAR; INTRAVENOUS at 01:32

## 2020-01-01 RX ADMIN — BUSPIRONE HYDROCHLORIDE 10 MG: 10 TABLET ORAL at 08:26

## 2020-01-01 RX ADMIN — FERROUS SULFATE TAB 325 MG (65 MG ELEMENTAL FE) 325 MG: 325 (65 FE) TAB at 08:56

## 2020-01-01 RX ADMIN — LIDOCAINE HYDROCHLORIDE 5 ML: 20 SOLUTION ORAL; TOPICAL at 13:08

## 2020-01-01 RX ADMIN — BUMETANIDE 1 MG: 0.25 INJECTION INTRAMUSCULAR; INTRAVENOUS at 09:23

## 2020-01-01 RX ADMIN — LATANOPROST 1 DROP: 50 SOLUTION OPHTHALMIC at 07:51

## 2020-01-01 RX ADMIN — PIPERACILLIN SODIUM AND TAZOBACTAM SODIUM 4.5 G: 4; .5 INJECTION, POWDER, LYOPHILIZED, FOR SOLUTION INTRAVENOUS at 19:38

## 2020-01-01 RX ADMIN — ACETAMINOPHEN 650 MG: 325 TABLET, FILM COATED ORAL at 10:54

## 2020-01-01 RX ADMIN — POTASSIUM CHLORIDE 20 MEQ: 750 TABLET, EXTENDED RELEASE ORAL at 05:52

## 2020-01-01 RX ADMIN — LEVETIRACETAM 2000 MG: 100 INJECTION, SOLUTION INTRAVENOUS at 02:02

## 2020-01-01 RX ADMIN — Medication 62.5 MCG: at 08:30

## 2020-01-01 RX ADMIN — BUSPIRONE HYDROCHLORIDE 10 MG: 10 TABLET ORAL at 15:58

## 2020-01-01 RX ADMIN — ROSUVASTATIN CALCIUM 20 MG: 20 TABLET, FILM COATED ORAL at 07:46

## 2020-01-01 RX ADMIN — FERROUS SULFATE TAB 325 MG (65 MG ELEMENTAL FE) 325 MG: 325 (65 FE) TAB at 12:48

## 2020-01-01 RX ADMIN — BUSPIRONE HYDROCHLORIDE 10 MG: 10 TABLET ORAL at 14:02

## 2020-01-01 RX ADMIN — POLYETHYLENE GLYCOL 3350 17 G: 17 POWDER, FOR SOLUTION ORAL at 09:06

## 2020-01-01 RX ADMIN — METHOCARBAMOL 500 MG: 500 TABLET, FILM COATED ORAL at 22:14

## 2020-01-01 RX ADMIN — ACETAMINOPHEN ORAL SOLUTION 1000 MG: 325 SOLUTION ORAL at 03:38

## 2020-01-01 RX ADMIN — POTASSIUM CHLORIDE 20 MEQ: 1.5 POWDER, FOR SOLUTION ORAL at 21:53

## 2020-01-01 RX ADMIN — ASPIRIN 325 MG: 325 TABLET, COATED ORAL at 08:56

## 2020-01-01 RX ADMIN — SODIUM THIOSULFATE 0.25 MCG/KG/MIN: 250 INJECTION, SOLUTION INTRAVENOUS at 12:20

## 2020-01-01 RX ADMIN — Medication: at 22:30

## 2020-01-01 RX ADMIN — DORZOLAMIDE HYDROCHLORIDE AND TIMOLOL MALEATE 1 DROP: 20; 5 SOLUTION/ DROPS OPHTHALMIC at 21:25

## 2020-01-01 RX ADMIN — PANTOPRAZOLE SODIUM 40 MG: 40 INJECTION, POWDER, FOR SOLUTION INTRAVENOUS at 21:11

## 2020-01-01 RX ADMIN — BACITRACIN: 500 OINTMENT TOPICAL at 20:17

## 2020-01-01 RX ADMIN — Medication 2 UNITS/HR: at 19:55

## 2020-01-01 RX ADMIN — ROCURONIUM BROMIDE 50 MG: 10 INJECTION INTRAVENOUS at 11:41

## 2020-01-01 RX ADMIN — PANTOPRAZOLE SODIUM 40 MG: 40 INJECTION, POWDER, FOR SOLUTION INTRAVENOUS at 19:57

## 2020-01-01 RX ADMIN — LACOSAMIDE 200 MG: 200 TABLET, FILM COATED ORAL at 13:36

## 2020-01-01 RX ADMIN — Medication: at 21:13

## 2020-01-01 RX ADMIN — CALCIUM CHLORIDE, MAGNESIUM CHLORIDE, SODIUM CHLORIDE, SODIUM BICARBONATE, POTASSIUM CHLORIDE AND SODIUM PHOSPHATE DIBASIC DIHYDRATE 12.5 ML/KG/HR: 3.68; 3.05; 6.34; 3.09; .314; .187 INJECTION INTRAVENOUS at 09:46

## 2020-01-01 RX ADMIN — FUROSEMIDE 5 MG/HR: 10 INJECTION, SOLUTION INTRAVENOUS at 01:38

## 2020-01-01 RX ADMIN — MIDODRINE HYDROCHLORIDE 10 MG: 5 TABLET ORAL at 09:23

## 2020-01-01 RX ADMIN — POTASSIUM CHLORIDE 20 MEQ: 1.5 POWDER, FOR SOLUTION ORAL at 18:18

## 2020-01-01 RX ADMIN — QUETIAPINE 50 MG: 25 TABLET ORAL at 21:31

## 2020-01-01 RX ADMIN — LATANOPROST 1 DROP: 50 SOLUTION OPHTHALMIC at 08:33

## 2020-01-01 RX ADMIN — PIPERACILLIN SODIUM AND TAZOBACTAM SODIUM 4.5 G: 4; .5 INJECTION, POWDER, LYOPHILIZED, FOR SOLUTION INTRAVENOUS at 08:22

## 2020-01-01 RX ADMIN — LATANOPROST 1 DROP: 50 SOLUTION/ DROPS OPHTHALMIC at 08:33

## 2020-01-01 RX ADMIN — Medication 12.5 MG: at 00:40

## 2020-01-01 RX ADMIN — AMIODARONE HYDROCHLORIDE 0.5 MG/MIN: 50 INJECTION, SOLUTION INTRAVENOUS at 13:03

## 2020-01-01 RX ADMIN — Medication: at 18:43

## 2020-01-01 RX ADMIN — CALCIUM CHLORIDE, MAGNESIUM CHLORIDE, SODIUM CHLORIDE, SODIUM BICARBONATE, POTASSIUM CHLORIDE AND SODIUM PHOSPHATE DIBASIC DIHYDRATE 12.5 ML/KG/HR: 3.68; 3.05; 6.34; 3.09; .314; .187 INJECTION INTRAVENOUS at 21:52

## 2020-01-01 RX ADMIN — ROCURONIUM BROMIDE 50 MG: 10 INJECTION INTRAVENOUS at 10:33

## 2020-01-01 RX ADMIN — ALBUMIN HUMAN 25 G: 0.25 SOLUTION INTRAVENOUS at 11:49

## 2020-01-01 RX ADMIN — Medication: at 23:46

## 2020-01-01 RX ADMIN — Medication 12.5 MG: at 08:29

## 2020-01-01 RX ADMIN — WARFARIN SODIUM 2 MG: 2 TABLET ORAL at 18:41

## 2020-01-01 RX ADMIN — ASPIRIN 81 MG: 81 TABLET, COATED ORAL at 08:43

## 2020-01-01 RX ADMIN — Medication: at 18:44

## 2020-01-01 RX ADMIN — QUETIAPINE 50 MG: 25 TABLET ORAL at 22:16

## 2020-01-01 RX ADMIN — SODIUM CHLORIDE, POTASSIUM CHLORIDE, SODIUM LACTATE AND CALCIUM CHLORIDE 500 ML: 600; 310; 30; 20 INJECTION, SOLUTION INTRAVENOUS at 09:56

## 2020-01-01 RX ADMIN — BIMATOPROST 1 DROP: 0.1 SOLUTION/ DROPS OPHTHALMIC at 21:58

## 2020-01-01 RX ADMIN — BUSPIRONE HYDROCHLORIDE 10 MG: 10 TABLET ORAL at 07:44

## 2020-01-01 RX ADMIN — DORZOLAMIDE HYDROCHLORIDE AND TIMOLOL MALEATE 1 DROP: 20; 5 SOLUTION/ DROPS OPHTHALMIC at 09:02

## 2020-01-01 RX ADMIN — QUETIAPINE FUMARATE 50 MG: 50 TABLET ORAL at 22:33

## 2020-01-01 RX ADMIN — DOCUSATE SODIUM 50 MG AND SENNOSIDES 8.6 MG 1 TABLET: 8.6; 5 TABLET, FILM COATED ORAL at 08:03

## 2020-01-01 RX ADMIN — METHOCARBAMOL 500 MG: 500 TABLET, FILM COATED ORAL at 02:06

## 2020-01-01 RX ADMIN — CALCIUM CHLORIDE 1 G: 100 INJECTION, SOLUTION INTRAVENOUS at 12:56

## 2020-01-01 RX ADMIN — LEVETIRACETAM 2000 MG: 750 TABLET, FILM COATED ORAL at 19:54

## 2020-01-01 RX ADMIN — Medication 1.5 MG: at 18:18

## 2020-01-01 RX ADMIN — POLYETHYLENE GLYCOL 400 AND PROPYLENE GLYCOL 1 DROP: 4; 3 SOLUTION/ DROPS OPHTHALMIC at 18:12

## 2020-01-01 RX ADMIN — LEVETIRACETAM 2000 MG: 750 TABLET, FILM COATED ORAL at 07:31

## 2020-01-01 RX ADMIN — MULTIPLE VITAMINS W/ MINERALS TAB 1 TABLET: TAB at 18:22

## 2020-01-01 RX ADMIN — ASPIRIN 81 MG CHEWABLE TABLET 81 MG: 81 TABLET CHEWABLE at 07:46

## 2020-01-01 RX ADMIN — LIDOCAINE 1 PATCH: 560 PATCH PERCUTANEOUS; TOPICAL; TRANSDERMAL at 07:52

## 2020-01-01 RX ADMIN — BUSPIRONE HYDROCHLORIDE 10 MG: 10 TABLET ORAL at 02:20

## 2020-01-01 RX ADMIN — ROCURONIUM BROMIDE 100 MG: 10 INJECTION INTRAVENOUS at 09:35

## 2020-01-01 RX ADMIN — VASOPRESSIN 1 UNITS/HR: 20 INJECTION INTRAVENOUS at 11:16

## 2020-01-01 RX ADMIN — BIMATOPROST 1 DROP: 0.1 SOLUTION/ DROPS OPHTHALMIC at 22:19

## 2020-01-01 RX ADMIN — Medication: at 11:51

## 2020-01-01 RX ADMIN — DOCUSATE SODIUM AND SENNOSIDES 2 TABLET: 8.6; 5 TABLET ORAL at 08:18

## 2020-01-01 RX ADMIN — CALCIUM CHLORIDE, MAGNESIUM CHLORIDE, SODIUM CHLORIDE, SODIUM BICARBONATE, POTASSIUM CHLORIDE AND SODIUM PHOSPHATE DIBASIC DIHYDRATE 12.5 ML/KG/HR: 3.68; 3.05; 6.34; 3.09; .314; .187 INJECTION INTRAVENOUS at 01:30

## 2020-01-01 RX ADMIN — ISOSORBIDE DINITRATE 10 MG: 10 TABLET ORAL at 19:41

## 2020-01-01 RX ADMIN — Medication 1 PACKET: at 07:39

## 2020-01-01 RX ADMIN — DOBUTAMINE 5 MCG/KG/MIN: 12.5 INJECTION, SOLUTION INTRAVENOUS at 18:21

## 2020-01-01 RX ADMIN — BUSPIRONE HYDROCHLORIDE 10 MG: 10 TABLET ORAL at 23:54

## 2020-01-01 RX ADMIN — THIAMINE HCL TAB 100 MG 100 MG: 100 TAB at 15:43

## 2020-01-01 RX ADMIN — ROCURONIUM BROMIDE 20 MG: 10 INJECTION INTRAVENOUS at 08:35

## 2020-01-01 RX ADMIN — BUSPIRONE HYDROCHLORIDE 10 MG: 10 TABLET ORAL at 20:34

## 2020-01-01 RX ADMIN — METRONIDAZOLE 500 MG: 500 INJECTION, SOLUTION INTRAVENOUS at 21:52

## 2020-01-01 RX ADMIN — ASPIRIN 81 MG CHEWABLE TABLET 81 MG: 81 TABLET CHEWABLE at 08:36

## 2020-01-01 RX ADMIN — DORZOLAMIDE HYDROCHLORIDE AND TIMOLOL MALEATE 1 DROP: 20; 5 SOLUTION/ DROPS OPHTHALMIC at 19:30

## 2020-01-01 RX ADMIN — LATANOPROST 1 DROP: 50 SOLUTION OPHTHALMIC at 07:44

## 2020-01-01 RX ADMIN — Medication: at 21:16

## 2020-01-01 RX ADMIN — Medication 5 ML: at 08:05

## 2020-01-01 RX ADMIN — CALCIUM CHLORIDE, MAGNESIUM CHLORIDE, SODIUM CHLORIDE, SODIUM BICARBONATE, POTASSIUM CHLORIDE AND SODIUM PHOSPHATE DIBASIC DIHYDRATE 12.5 ML/KG/HR: 3.68; 3.05; 6.34; 3.09; .314; .187 INJECTION INTRAVENOUS at 07:39

## 2020-01-01 RX ADMIN — BUSPIRONE HYDROCHLORIDE 10 MG: 10 TABLET ORAL at 16:19

## 2020-01-01 RX ADMIN — MULTIPLE VITAMINS W/ MINERALS TAB 1 TABLET: TAB at 17:05

## 2020-01-01 RX ADMIN — METHOCARBAMOL 500 MG: 500 TABLET, FILM COATED ORAL at 09:45

## 2020-01-01 RX ADMIN — BUSPIRONE HYDROCHLORIDE 10 MG: 10 TABLET ORAL at 22:02

## 2020-01-01 RX ADMIN — FERROUS SULFATE TAB 325 MG (65 MG ELEMENTAL FE) 325 MG: 325 (65 FE) TAB at 07:32

## 2020-01-01 RX ADMIN — BIMATOPROST 1 DROP: 0.1 SOLUTION/ DROPS OPHTHALMIC at 22:06

## 2020-01-01 RX ADMIN — LATANOPROST 1 DROP: 50 SOLUTION/ DROPS OPHTHALMIC at 13:51

## 2020-01-01 RX ADMIN — PANTOPRAZOLE SODIUM 40 MG: 40 INJECTION, POWDER, FOR SOLUTION INTRAVENOUS at 07:52

## 2020-01-01 RX ADMIN — ROSUVASTATIN CALCIUM 20 MG: 20 TABLET, FILM COATED ORAL at 07:54

## 2020-01-01 RX ADMIN — FERROUS SULFATE TAB 325 MG (65 MG ELEMENTAL FE) 325 MG: 325 (65 FE) TAB at 13:44

## 2020-01-01 RX ADMIN — BUMETANIDE 0.25 MG/HR: 0.25 INJECTION INTRAMUSCULAR; INTRAVENOUS at 21:34

## 2020-01-01 RX ADMIN — Medication 62.5 MCG: at 07:58

## 2020-01-01 RX ADMIN — PROCHLORPERAZINE EDISYLATE 5 MG: 5 INJECTION INTRAMUSCULAR; INTRAVENOUS at 18:42

## 2020-01-01 RX ADMIN — BIMATOPROST 1 DROP: 0.1 SOLUTION/ DROPS OPHTHALMIC at 23:51

## 2020-01-01 RX ADMIN — EPINEPHRINE 10 MCG: 1 INJECTION PARENTERAL at 09:35

## 2020-01-01 RX ADMIN — SACUBITRIL AND VALSARTAN 1 TABLET: 24; 26 TABLET, FILM COATED ORAL at 22:47

## 2020-01-01 RX ADMIN — VANCOMYCIN HYDROCHLORIDE 1000 MG: 1 INJECTION, SOLUTION INTRAVENOUS at 22:39

## 2020-01-01 RX ADMIN — Medication 50 MCG/HR: at 18:39

## 2020-01-01 RX ADMIN — CALCIUM CHLORIDE, MAGNESIUM CHLORIDE, SODIUM CHLORIDE, SODIUM BICARBONATE, POTASSIUM CHLORIDE AND SODIUM PHOSPHATE DIBASIC DIHYDRATE 12.5 ML/KG/HR: 3.68; 3.05; 6.34; 3.09; .314; .187 INJECTION INTRAVENOUS at 16:16

## 2020-01-01 RX ADMIN — DOBUTAMINE 2 MCG/KG/MIN: 12.5 INJECTION, SOLUTION INTRAVENOUS at 12:46

## 2020-01-01 RX ADMIN — PANTOPRAZOLE SODIUM 40 MG: 40 INJECTION, POWDER, FOR SOLUTION INTRAVENOUS at 19:52

## 2020-01-01 RX ADMIN — PIPERACILLIN SODIUM AND TAZOBACTAM SODIUM 4.5 G: 4; .5 INJECTION, POWDER, LYOPHILIZED, FOR SOLUTION INTRAVENOUS at 01:10

## 2020-01-01 RX ADMIN — DORZOLAMIDE HYDROCHLORIDE AND TIMOLOL MALEATE 1 DROP: 20; 5 SOLUTION/ DROPS OPHTHALMIC at 21:20

## 2020-01-01 RX ADMIN — BUSPIRONE HYDROCHLORIDE 10 MG: 10 TABLET ORAL at 22:01

## 2020-01-01 RX ADMIN — DOCUSATE SODIUM AND SENNOSIDES 2 TABLET: 8.6; 5 TABLET ORAL at 09:06

## 2020-01-01 RX ADMIN — THALLOUS CHLORIDE TL-201 3.6 MILLICURIE: 1 INJECTION, POWDER, LYOPHILIZED, FOR SOLUTION INTRAVENOUS at 08:05

## 2020-01-01 RX ADMIN — OXYCODONE HYDROCHLORIDE 5 MG: 5 TABLET ORAL at 00:44

## 2020-01-01 RX ADMIN — Medication: at 11:46

## 2020-01-01 RX ADMIN — LEVETIRACETAM 2000 MG: 100 INJECTION, SOLUTION INTRAVENOUS at 02:54

## 2020-01-01 RX ADMIN — PANTOPRAZOLE SODIUM 40 MG: 40 TABLET, DELAYED RELEASE ORAL at 15:54

## 2020-01-01 RX ADMIN — ACETAMINOPHEN ORAL SOLUTION 1000 MG: 325 SOLUTION ORAL at 23:45

## 2020-01-01 RX ADMIN — SODIUM CHLORIDE 250 ML: 9 INJECTION, SOLUTION INTRAVENOUS at 11:51

## 2020-01-01 RX ADMIN — BUSPIRONE HYDROCHLORIDE 10 MG: 10 TABLET ORAL at 15:54

## 2020-01-01 RX ADMIN — SODIUM CHLORIDE 200 MG: 9 INJECTION, SOLUTION INTRAVENOUS at 10:14

## 2020-01-01 RX ADMIN — ACETAMINOPHEN 650 MG: 325 TABLET, FILM COATED ORAL at 21:03

## 2020-01-01 RX ADMIN — SODIUM CHLORIDE, POTASSIUM CHLORIDE, SODIUM LACTATE AND CALCIUM CHLORIDE 250 ML: 600; 310; 30; 20 INJECTION, SOLUTION INTRAVENOUS at 21:04

## 2020-01-01 RX ADMIN — CALCIUM CHLORIDE, MAGNESIUM CHLORIDE, SODIUM CHLORIDE, SODIUM BICARBONATE, POTASSIUM CHLORIDE AND SODIUM PHOSPHATE DIBASIC DIHYDRATE 12.5 ML/KG/HR: 3.68; 3.05; 6.34; 3.09; .314; .187 INJECTION INTRAVENOUS at 21:36

## 2020-01-01 RX ADMIN — ACETAMINOPHEN ORAL SOLUTION 1000 MG: 325 SOLUTION ORAL at 02:06

## 2020-01-01 RX ADMIN — SODIUM CHLORIDE, POTASSIUM CHLORIDE, SODIUM LACTATE AND CALCIUM CHLORIDE 250 ML: 600; 310; 30; 20 INJECTION, SOLUTION INTRAVENOUS at 13:02

## 2020-01-01 RX ADMIN — LATANOPROST 1 DROP: 50 SOLUTION OPHTHALMIC at 07:56

## 2020-01-01 RX ADMIN — ACETAMINOPHEN 650 MG: 325 TABLET, FILM COATED ORAL at 01:35

## 2020-01-01 RX ADMIN — POLYETHYLENE GLYCOL 400 AND PROPYLENE GLYCOL 1 DROP: 4; 3 SOLUTION/ DROPS OPHTHALMIC at 22:02

## 2020-01-01 RX ADMIN — Medication: at 18:45

## 2020-01-01 RX ADMIN — BUSPIRONE HYDROCHLORIDE 10 MG: 10 TABLET ORAL at 21:11

## 2020-01-01 RX ADMIN — LATANOPROST 1 DROP: 50 SOLUTION OPHTHALMIC at 08:31

## 2020-01-01 RX ADMIN — PROPOFOL 15 MCG/KG/MIN: 10 INJECTION, EMULSION INTRAVENOUS at 17:59

## 2020-01-01 RX ADMIN — ASPIRIN 81 MG CHEWABLE TABLET 81 MG: 81 TABLET CHEWABLE at 07:40

## 2020-01-01 RX ADMIN — Medication 5 ML: at 09:23

## 2020-01-01 RX ADMIN — CALCIUM CHLORIDE, MAGNESIUM CHLORIDE, SODIUM CHLORIDE, SODIUM BICARBONATE, POTASSIUM CHLORIDE AND SODIUM PHOSPHATE DIBASIC DIHYDRATE 12.5 ML/KG/HR: 3.68; 3.05; 6.34; 3.09; .314; .187 INJECTION INTRAVENOUS at 16:12

## 2020-01-01 RX ADMIN — LATANOPROST 1 DROP: 50 SOLUTION OPHTHALMIC at 09:07

## 2020-01-01 RX ADMIN — AMIODARONE HYDROCHLORIDE 200 MG: 200 TABLET ORAL at 08:44

## 2020-01-01 RX ADMIN — Medication 10 ML: at 07:17

## 2020-01-01 RX ADMIN — DEXTROSE MONOHYDRATE: 100 INJECTION, SOLUTION INTRAVENOUS at 23:33

## 2020-01-01 RX ADMIN — ASPIRIN 81 MG: 81 TABLET ORAL at 08:26

## 2020-01-01 RX ADMIN — PIPERACILLIN AND TAZOBACTAM 4.5 G: 4; .5 INJECTION, POWDER, FOR SOLUTION INTRAVENOUS at 12:26

## 2020-01-01 RX ADMIN — BUSPIRONE HYDROCHLORIDE 10 MG: 10 TABLET ORAL at 08:16

## 2020-01-01 RX ADMIN — BUSPIRONE HYDROCHLORIDE 10 MG: 10 TABLET ORAL at 22:32

## 2020-01-01 RX ADMIN — LEVOTHYROXINE SODIUM 62.5 MCG: 125 TABLET ORAL at 08:36

## 2020-01-01 RX ADMIN — LATANOPROST 1 DROP: 50 SOLUTION/ DROPS OPHTHALMIC at 08:05

## 2020-01-01 RX ADMIN — OMEPRAZOLE 20 MG: 20 CAPSULE, DELAYED RELEASE ORAL at 08:36

## 2020-01-01 RX ADMIN — Medication: at 18:42

## 2020-01-01 RX ADMIN — FERROUS SULFATE TAB 325 MG (65 MG ELEMENTAL FE) 325 MG: 325 (65 FE) TAB at 22:34

## 2020-01-01 RX ADMIN — OXYCODONE HYDROCHLORIDE 5 MG: 5 TABLET ORAL at 20:38

## 2020-01-01 RX ADMIN — LACOSAMIDE 200 MG: 200 TABLET, FILM COATED ORAL at 07:35

## 2020-01-01 RX ADMIN — ROSUVASTATIN CALCIUM 20 MG: 20 TABLET, FILM COATED ORAL at 07:35

## 2020-01-01 RX ADMIN — BUSPIRONE HYDROCHLORIDE 10 MG: 10 TABLET ORAL at 16:35

## 2020-01-01 RX ADMIN — OMEPRAZOLE 20 MG: 20 CAPSULE, DELAYED RELEASE ORAL at 08:34

## 2020-01-01 RX ADMIN — ACETAMINOPHEN 975 MG: 325 TABLET, FILM COATED ORAL at 23:42

## 2020-01-01 RX ADMIN — LEVETIRACETAM 1000 MG: 500 TABLET ORAL at 19:46

## 2020-01-01 RX ADMIN — EPINEPHRINE 0.08 MCG/KG/MIN: 1 INJECTION PARENTERAL at 16:15

## 2020-01-01 RX ADMIN — ACETAMINOPHEN 650 MG: 325 TABLET, FILM COATED ORAL at 09:57

## 2020-01-01 RX ADMIN — SODIUM CHLORIDE 300 ML: 9 INJECTION, SOLUTION INTRAVENOUS at 11:18

## 2020-01-01 RX ADMIN — ROSUVASTATIN CALCIUM 20 MG: 20 TABLET, FILM COATED ORAL at 08:14

## 2020-01-01 RX ADMIN — HYDRALAZINE HYDROCHLORIDE 25 MG: 25 TABLET, FILM COATED ORAL at 05:57

## 2020-01-01 RX ADMIN — LIDOCAINE HYDROCHLORIDE ANHYDROUS 2 ML: 10 INJECTION, SOLUTION INFILTRATION at 14:30

## 2020-01-01 RX ADMIN — ALBUMIN HUMAN 12.5 G: 0.25 SOLUTION INTRAVENOUS at 21:20

## 2020-01-01 RX ADMIN — PROPOFOL 35 MCG/KG/MIN: 10 INJECTION, EMULSION INTRAVENOUS at 02:02

## 2020-01-01 RX ADMIN — ASPIRIN 81 MG CHEWABLE TABLET 81 MG: 81 TABLET CHEWABLE at 10:56

## 2020-01-01 RX ADMIN — BUSPIRONE HYDROCHLORIDE 10 MG: 10 TABLET ORAL at 16:00

## 2020-01-01 RX ADMIN — FERROUS SULFATE TAB 325 MG (65 MG ELEMENTAL FE) 325 MG: 325 (65 FE) TAB at 20:16

## 2020-01-01 RX ADMIN — Medication 2.5 MG: at 01:44

## 2020-01-01 RX ADMIN — DORZOLAMIDE HYDROCHLORIDE AND TIMOLOL MALEATE 1 DROP: 20; 5 SOLUTION/ DROPS OPHTHALMIC at 09:07

## 2020-01-01 RX ADMIN — CALCIUM CHLORIDE, MAGNESIUM CHLORIDE, SODIUM CHLORIDE, SODIUM BICARBONATE, POTASSIUM CHLORIDE AND SODIUM PHOSPHATE DIBASIC DIHYDRATE 2.91 ML/KG/HR: 3.68; 3.05; 6.34; 3.09; .314; .187 INJECTION INTRAVENOUS at 01:48

## 2020-01-01 RX ADMIN — CALCIUM CHLORIDE, MAGNESIUM CHLORIDE, SODIUM CHLORIDE, SODIUM BICARBONATE, POTASSIUM CHLORIDE AND SODIUM PHOSPHATE DIBASIC DIHYDRATE 2.91 ML/KG/HR: 3.68; 3.05; 6.34; 3.09; .314; .187 INJECTION INTRAVENOUS at 03:57

## 2020-01-01 RX ADMIN — BUSPIRONE HYDROCHLORIDE 10 MG: 10 TABLET ORAL at 18:09

## 2020-01-01 RX ADMIN — ACETAMINOPHEN 650 MG: 325 TABLET, FILM COATED ORAL at 23:17

## 2020-01-01 RX ADMIN — PANTOPRAZOLE SODIUM 40 MG: 40 INJECTION, POWDER, FOR SOLUTION INTRAVENOUS at 09:24

## 2020-01-01 RX ADMIN — Medication 2 UNITS/HR: at 18:20

## 2020-01-01 RX ADMIN — AMIODARONE HYDROCHLORIDE 400 MG: 200 TABLET ORAL at 19:47

## 2020-01-01 RX ADMIN — Medication: at 14:25

## 2020-01-01 RX ADMIN — ACETAMINOPHEN 650 MG: 325 TABLET, FILM COATED ORAL at 20:37

## 2020-01-01 RX ADMIN — CALCIUM CHLORIDE, MAGNESIUM CHLORIDE, SODIUM CHLORIDE, SODIUM BICARBONATE, POTASSIUM CHLORIDE AND SODIUM PHOSPHATE DIBASIC DIHYDRATE 2.91 ML/KG/HR: 3.68; 3.05; 6.34; 3.09; .314; .187 INJECTION INTRAVENOUS at 18:42

## 2020-01-01 RX ADMIN — Medication 10 ML: at 20:29

## 2020-01-01 RX ADMIN — LEVOFLOXACIN 500 MG: 5 INJECTION, SOLUTION INTRAVENOUS at 10:01

## 2020-01-01 RX ADMIN — LEVETIRACETAM 2000 MG: 100 INJECTION, SOLUTION INTRAVENOUS at 14:57

## 2020-01-01 RX ADMIN — BUSPIRONE HYDROCHLORIDE 10 MG: 10 TABLET ORAL at 19:53

## 2020-01-01 RX ADMIN — MICONAZOLE NITRATE: 20 POWDER TOPICAL at 19:22

## 2020-01-01 RX ADMIN — CALCIUM CHLORIDE, MAGNESIUM CHLORIDE, SODIUM CHLORIDE, SODIUM BICARBONATE, POTASSIUM CHLORIDE AND SODIUM PHOSPHATE DIBASIC DIHYDRATE 12.5 ML/KG/HR: 3.68; 3.05; 6.34; 3.09; .314; .187 INJECTION INTRAVENOUS at 15:50

## 2020-01-01 RX ADMIN — CLOPIDOGREL BISULFATE 75 MG: 75 TABLET ORAL at 08:26

## 2020-01-01 RX ADMIN — ACETAMINOPHEN ORAL SOLUTION 1000 MG: 325 SOLUTION ORAL at 11:10

## 2020-01-01 RX ADMIN — ACETAMINOPHEN ORAL SOLUTION 1000 MG: 325 SOLUTION ORAL at 00:41

## 2020-01-01 RX ADMIN — CLOPIDOGREL BISULFATE 75 MG: 75 TABLET, FILM COATED ORAL at 08:37

## 2020-01-01 RX ADMIN — ALBUMIN HUMAN 12.5 G: 0.05 INJECTION, SOLUTION INTRAVENOUS at 20:50

## 2020-01-01 RX ADMIN — ACETAMINOPHEN 650 MG: 325 TABLET, FILM COATED ORAL at 21:34

## 2020-01-01 RX ADMIN — BUSPIRONE HYDROCHLORIDE 10 MG: 10 TABLET ORAL at 08:47

## 2020-01-01 RX ADMIN — ALBUMIN HUMAN 25 G: 0.05 INJECTION, SOLUTION INTRAVENOUS at 02:21

## 2020-01-01 RX ADMIN — BUSPIRONE HYDROCHLORIDE 10 MG: 10 TABLET ORAL at 07:32

## 2020-01-01 RX ADMIN — LOPERAMIDE HYDROCHLORIDE 2 MG: 2 CAPSULE ORAL at 16:12

## 2020-01-01 RX ADMIN — BUSPIRONE HYDROCHLORIDE 10 MG: 10 TABLET ORAL at 07:57

## 2020-01-01 RX ADMIN — Medication 1 MG: at 22:25

## 2020-01-01 RX ADMIN — LEVETIRACETAM 2000 MG: 100 INJECTION, SOLUTION INTRAVENOUS at 04:09

## 2020-01-01 RX ADMIN — MICONAZOLE NITRATE: 20 POWDER TOPICAL at 08:00

## 2020-01-01 RX ADMIN — FUROSEMIDE 100 MG: 10 INJECTION, SOLUTION INTRAVENOUS at 15:31

## 2020-01-01 RX ADMIN — LIDOCAINE 1 PATCH: 560 PATCH PERCUTANEOUS; TOPICAL; TRANSDERMAL at 07:31

## 2020-01-01 RX ADMIN — BUSPIRONE HYDROCHLORIDE 10 MG: 10 TABLET ORAL at 21:02

## 2020-01-01 RX ADMIN — BUMETANIDE 5 MG: 0.25 INJECTION, SOLUTION INTRAMUSCULAR; INTRAVENOUS at 16:00

## 2020-01-01 RX ADMIN — OMEPRAZOLE 20 MG: 20 CAPSULE, DELAYED RELEASE ORAL at 08:30

## 2020-01-01 RX ADMIN — DEXTROSE MONOHYDRATE 25 G: 500 INJECTION PARENTERAL at 23:34

## 2020-01-01 RX ADMIN — EPINEPHRINE 10 MCG: 1 INJECTION PARENTERAL at 09:54

## 2020-01-01 RX ADMIN — THIAMINE HCL TAB 100 MG 100 MG: 100 TAB at 08:26

## 2020-01-01 RX ADMIN — Medication: at 07:56

## 2020-01-01 RX ADMIN — LEVETIRACETAM 2000 MG: 100 INJECTION, SOLUTION INTRAVENOUS at 14:50

## 2020-01-01 RX ADMIN — FERROUS SULFATE TAB 325 MG (65 MG ELEMENTAL FE) 325 MG: 325 (65 FE) TAB at 20:34

## 2020-01-01 RX ADMIN — POTASSIUM CHLORIDE 10 MEQ: 7.46 INJECTION, SOLUTION INTRAVENOUS at 12:15

## 2020-01-01 RX ADMIN — HEPARIN SODIUM 1100 UNITS/HR: 10000 INJECTION, SOLUTION INTRAVENOUS at 14:31

## 2020-01-01 RX ADMIN — METRONIDAZOLE 500 MG: 500 INJECTION, SOLUTION INTRAVENOUS at 16:55

## 2020-01-01 RX ADMIN — HEPARIN SODIUM 800 UNITS/HR: 10000 INJECTION, SOLUTION INTRAVENOUS at 12:03

## 2020-01-01 RX ADMIN — ACETAMINOPHEN 650 MG: 325 TABLET, FILM COATED ORAL at 21:25

## 2020-01-01 RX ADMIN — ACETAMINOPHEN 650 MG: 325 TABLET, FILM COATED ORAL at 21:55

## 2020-01-01 RX ADMIN — Medication: at 09:05

## 2020-01-01 RX ADMIN — CALCIUM CHLORIDE 1 G: 100 INJECTION, SOLUTION INTRAVENOUS at 01:24

## 2020-01-01 RX ADMIN — CLOPIDOGREL BISULFATE 75 MG: 75 TABLET, FILM COATED ORAL at 10:08

## 2020-01-01 RX ADMIN — ALBUMIN HUMAN 12.5 G: 0.05 INJECTION, SOLUTION INTRAVENOUS at 00:04

## 2020-01-01 RX ADMIN — ROSUVASTATIN CALCIUM 20 MG: 20 TABLET, FILM COATED ORAL at 08:03

## 2020-01-01 RX ADMIN — ONDANSETRON 4 MG: 2 INJECTION INTRAMUSCULAR; INTRAVENOUS at 17:53

## 2020-01-01 RX ADMIN — Medication 5 MG: at 23:19

## 2020-01-01 RX ADMIN — BIMATOPROST 1 DROP: 0.1 SOLUTION/ DROPS OPHTHALMIC at 22:05

## 2020-01-01 RX ADMIN — LEVETIRACETAM 2000 MG: 100 INJECTION, SOLUTION INTRAVENOUS at 13:39

## 2020-01-01 RX ADMIN — BUSPIRONE HYDROCHLORIDE 10 MG: 10 TABLET ORAL at 17:51

## 2020-01-01 RX ADMIN — BUSPIRONE HYDROCHLORIDE 10 MG: 10 TABLET ORAL at 15:35

## 2020-01-01 RX ADMIN — CALCIUM CHLORIDE, MAGNESIUM CHLORIDE, SODIUM CHLORIDE, SODIUM BICARBONATE, POTASSIUM CHLORIDE AND SODIUM PHOSPHATE DIBASIC DIHYDRATE 12.5 ML/KG/HR: 3.68; 3.05; 6.34; 3.09; .314; .187 INJECTION INTRAVENOUS at 06:10

## 2020-01-01 RX ADMIN — POTASSIUM CHLORIDE 20 MEQ: 1500 TABLET, EXTENDED RELEASE ORAL at 08:56

## 2020-01-01 RX ADMIN — PROCHLORPERAZINE EDISYLATE 5 MG: 5 INJECTION INTRAMUSCULAR; INTRAVENOUS at 08:28

## 2020-01-01 RX ADMIN — FERROUS SULFATE TAB 325 MG (65 MG ELEMENTAL FE) 325 MG: 325 (65 FE) TAB at 12:26

## 2020-01-01 RX ADMIN — BUSPIRONE HYDROCHLORIDE 10 MG: 10 TABLET ORAL at 23:45

## 2020-01-01 RX ADMIN — POTASSIUM CHLORIDE 20 MEQ: 29.8 INJECTION, SOLUTION INTRAVENOUS at 15:51

## 2020-01-01 RX ADMIN — EPINEPHRINE 10 MCG: 1 INJECTION PARENTERAL at 09:51

## 2020-01-01 RX ADMIN — POTASSIUM CHLORIDE 40 MEQ: 1500 TABLET, EXTENDED RELEASE ORAL at 15:47

## 2020-01-01 RX ADMIN — Medication: at 22:13

## 2020-01-01 RX ADMIN — Medication 2 SPRAY: at 16:56

## 2020-01-01 RX ADMIN — Medication: at 22:29

## 2020-01-01 RX ADMIN — HYDROXYZINE HYDROCHLORIDE 25 MG: 25 TABLET, FILM COATED ORAL at 10:30

## 2020-01-01 RX ADMIN — BUSPIRONE HYDROCHLORIDE 10 MG: 10 TABLET ORAL at 07:53

## 2020-01-01 RX ADMIN — BUSPIRONE HYDROCHLORIDE 10 MG: 10 TABLET ORAL at 22:14

## 2020-01-01 RX ADMIN — ISOSORBIDE DINITRATE 10 MG: 10 TABLET ORAL at 08:29

## 2020-01-01 RX ADMIN — Medication 1 PACKET: at 14:06

## 2020-01-01 RX ADMIN — BACITRACIN: 500 OINTMENT TOPICAL at 22:19

## 2020-01-01 RX ADMIN — Medication 0.05 MCG/KG/MIN: at 17:26

## 2020-01-01 RX ADMIN — QUETIAPINE 50 MG: 25 TABLET ORAL at 23:46

## 2020-01-01 RX ADMIN — SODIUM CHLORIDE 300 ML: 9 INJECTION, SOLUTION INTRAVENOUS at 10:15

## 2020-01-01 RX ADMIN — Medication: at 08:02

## 2020-01-01 RX ADMIN — BUSPIRONE HYDROCHLORIDE 10 MG: 10 TABLET ORAL at 15:09

## 2020-01-01 RX ADMIN — Medication 5 ML: at 14:04

## 2020-01-01 RX ADMIN — HEPARIN SODIUM 1250 UNITS/HR: 10000 INJECTION, SOLUTION INTRAVENOUS at 22:00

## 2020-01-01 RX ADMIN — DORZOLAMIDE HYDROCHLORIDE AND TIMOLOL MALEATE 1 DROP: 20; 5 SOLUTION/ DROPS OPHTHALMIC at 09:44

## 2020-01-01 RX ADMIN — BUSPIRONE HYDROCHLORIDE 10 MG: 10 TABLET ORAL at 16:14

## 2020-01-01 RX ADMIN — CALCIUM CHLORIDE, MAGNESIUM CHLORIDE, SODIUM CHLORIDE, SODIUM BICARBONATE, POTASSIUM CHLORIDE AND SODIUM PHOSPHATE DIBASIC DIHYDRATE 12.5 ML/KG/HR: 3.68; 3.05; 6.34; 3.09; .314; .187 INJECTION INTRAVENOUS at 14:30

## 2020-01-01 RX ADMIN — POLYETHYLENE GLYCOL 3350 17 G: 17 POWDER, FOR SOLUTION ORAL at 21:53

## 2020-01-01 RX ADMIN — Medication 12.5 MG: at 14:02

## 2020-01-01 RX ADMIN — PIPERACILLIN AND TAZOBACTAM 4.5 G: 4; .5 INJECTION, POWDER, FOR SOLUTION INTRAVENOUS at 18:01

## 2020-01-01 RX ADMIN — BUSPIRONE HYDROCHLORIDE 10 MG: 10 TABLET ORAL at 08:36

## 2020-01-01 RX ADMIN — CALCIUM CHLORIDE, MAGNESIUM CHLORIDE, SODIUM CHLORIDE, SODIUM BICARBONATE, POTASSIUM CHLORIDE AND SODIUM PHOSPHATE DIBASIC DIHYDRATE 2.91 ML/KG/HR: 3.68; 3.05; 6.34; 3.09; .314; .187 INJECTION INTRAVENOUS at 02:06

## 2020-01-01 RX ADMIN — QUETIAPINE 50 MG: 25 TABLET ORAL at 22:05

## 2020-01-01 RX ADMIN — CALCIUM CHLORIDE, MAGNESIUM CHLORIDE, SODIUM CHLORIDE, SODIUM BICARBONATE, POTASSIUM CHLORIDE AND SODIUM PHOSPHATE DIBASIC DIHYDRATE 12.5 ML/KG/HR: 3.68; 3.05; 6.34; 3.09; .314; .187 INJECTION INTRAVENOUS at 21:19

## 2020-01-01 RX ADMIN — LEVETIRACETAM 2000 MG: 100 INJECTION, SOLUTION INTRAVENOUS at 01:10

## 2020-01-01 RX ADMIN — BUSPIRONE HYDROCHLORIDE 10 MG: 10 TABLET ORAL at 20:23

## 2020-01-01 RX ADMIN — PIPERACILLIN AND TAZOBACTAM 4.5 G: 4; .5 INJECTION, POWDER, FOR SOLUTION INTRAVENOUS at 01:22

## 2020-01-01 RX ADMIN — Medication: at 08:09

## 2020-01-01 RX ADMIN — LEVOTHYROXINE SODIUM 62.5 MCG: 125 TABLET ORAL at 08:24

## 2020-01-01 RX ADMIN — Medication: at 23:38

## 2020-01-01 RX ADMIN — CALCIUM CHLORIDE, MAGNESIUM CHLORIDE, SODIUM CHLORIDE, SODIUM BICARBONATE, POTASSIUM CHLORIDE AND SODIUM PHOSPHATE DIBASIC DIHYDRATE 12.5 ML/KG/HR: 3.68; 3.05; 6.34; 3.09; .314; .187 INJECTION INTRAVENOUS at 08:11

## 2020-01-01 RX ADMIN — Medication 2 SPRAY: at 22:13

## 2020-01-01 RX ADMIN — BUSPIRONE HYDROCHLORIDE 10 MG: 10 TABLET ORAL at 06:33

## 2020-01-01 RX ADMIN — Medication: at 21:57

## 2020-01-01 RX ADMIN — LATANOPROST 1 DROP: 50 SOLUTION OPHTHALMIC at 08:23

## 2020-01-01 RX ADMIN — Medication 62.5 MCG: at 08:48

## 2020-01-01 RX ADMIN — PANTOPRAZOLE SODIUM 40 MG: 40 INJECTION, POWDER, FOR SOLUTION INTRAVENOUS at 19:22

## 2020-01-01 RX ADMIN — CALCIUM CHLORIDE 1 G: 100 INJECTION, SOLUTION INTRAVENOUS at 17:37

## 2020-01-01 RX ADMIN — MIDAZOLAM 1 MG: 1 INJECTION INTRAMUSCULAR; INTRAVENOUS at 10:52

## 2020-01-01 RX ADMIN — QUETIAPINE 12.5 MG: 25 TABLET, FILM COATED ORAL at 22:14

## 2020-01-01 RX ADMIN — LATANOPROST 1 DROP: 50 SOLUTION OPHTHALMIC at 09:02

## 2020-01-01 RX ADMIN — CLOPIDOGREL BISULFATE 75 MG: 75 TABLET, FILM COATED ORAL at 08:50

## 2020-01-01 RX ADMIN — CLOPIDOGREL BISULFATE 75 MG: 75 TABLET, FILM COATED ORAL at 08:24

## 2020-01-01 RX ADMIN — DORZOLAMIDE HYDROCHLORIDE AND TIMOLOL MALEATE 1 DROP: 20; 5 SOLUTION/ DROPS OPHTHALMIC at 19:47

## 2020-01-01 RX ADMIN — BUMETANIDE 4 MG: 0.25 INJECTION, SOLUTION INTRAMUSCULAR; INTRAVENOUS at 23:47

## 2020-01-01 RX ADMIN — SODIUM CHLORIDE 200 MG: 9 INJECTION, SOLUTION INTRAVENOUS at 08:17

## 2020-01-01 RX ADMIN — HEPARIN SODIUM 1250 UNITS/HR: 10000 INJECTION, SOLUTION INTRAVENOUS at 16:35

## 2020-01-01 RX ADMIN — BUSPIRONE HYDROCHLORIDE 10 MG: 10 TABLET ORAL at 20:55

## 2020-01-01 RX ADMIN — SODIUM CHLORIDE 200 MG: 9 INJECTION, SOLUTION INTRAVENOUS at 19:59

## 2020-01-01 RX ADMIN — FENTANYL CITRATE 150 MCG: 50 INJECTION, SOLUTION INTRAMUSCULAR; INTRAVENOUS at 16:33

## 2020-01-01 RX ADMIN — CLOPIDOGREL BISULFATE 75 MG: 75 TABLET ORAL at 07:42

## 2020-01-01 RX ADMIN — Medication: at 21:53

## 2020-01-01 RX ADMIN — LEVOTHYROXINE SODIUM 50 MCG: 50 TABLET ORAL at 06:58

## 2020-01-01 RX ADMIN — SODIUM BICARBONATE 25 MEQ: 84 INJECTION, SOLUTION INTRAVENOUS at 14:39

## 2020-01-01 RX ADMIN — BIMATOPROST 1 DROP: 0.1 SOLUTION/ DROPS OPHTHALMIC at 21:54

## 2020-01-01 RX ADMIN — BUSPIRONE HYDROCHLORIDE 10 MG: 10 TABLET ORAL at 15:43

## 2020-01-01 RX ADMIN — PIPERACILLIN AND TAZOBACTAM 4.5 G: 4; .5 INJECTION, POWDER, FOR SOLUTION INTRAVENOUS at 23:42

## 2020-01-01 RX ADMIN — AMINOCAPROIC ACID 1.25 G/HR: 250 INJECTION, SOLUTION INTRAVENOUS at 11:25

## 2020-01-01 RX ADMIN — ACETAMINOPHEN 650 MG: 325 TABLET, FILM COATED ORAL at 21:21

## 2020-01-01 RX ADMIN — ACETAMINOPHEN ORAL SOLUTION 1000 MG: 325 SOLUTION ORAL at 16:11

## 2020-01-01 RX ADMIN — DORZOLAMIDE HYDROCHLORIDE AND TIMOLOL MALEATE 1 DROP: 20; 5 SOLUTION/ DROPS OPHTHALMIC at 20:05

## 2020-01-01 RX ADMIN — CEFTRIAXONE SODIUM 1 G: 1 INJECTION, POWDER, FOR SOLUTION INTRAMUSCULAR; INTRAVENOUS at 16:06

## 2020-01-01 RX ADMIN — ROSUVASTATIN CALCIUM 20 MG: 20 TABLET, FILM COATED ORAL at 08:19

## 2020-01-01 RX ADMIN — Medication: at 09:18

## 2020-01-01 RX ADMIN — POTASSIUM CHLORIDE 20 MEQ: 29.8 INJECTION, SOLUTION INTRAVENOUS at 04:49

## 2020-01-01 RX ADMIN — VANCOMYCIN HYDROCHLORIDE 1750 MG: 10 INJECTION, POWDER, LYOPHILIZED, FOR SOLUTION INTRAVENOUS at 08:17

## 2020-01-01 RX ADMIN — NOREPINEPHRINE BITARTRATE 0.03 MCG/KG/MIN: 1 INJECTION, SOLUTION, CONCENTRATE INTRAVENOUS at 14:39

## 2020-01-01 RX ADMIN — ACETAMINOPHEN 975 MG: 325 TABLET, FILM COATED ORAL at 07:52

## 2020-01-01 RX ADMIN — LATANOPROST 1 DROP: 50 SOLUTION OPHTHALMIC at 09:05

## 2020-01-01 RX ADMIN — PIPERACILLIN AND TAZOBACTAM 4.5 G: 4; .5 INJECTION, POWDER, FOR SOLUTION INTRAVENOUS at 12:08

## 2020-01-01 RX ADMIN — SODIUM CHLORIDE 200 MG: 9 INJECTION, SOLUTION INTRAVENOUS at 08:09

## 2020-01-01 RX ADMIN — ALBUMIN HUMAN 12.5 G: 0.05 INJECTION, SOLUTION INTRAVENOUS at 01:19

## 2020-01-01 RX ADMIN — DORZOLAMIDE HYDROCHLORIDE AND TIMOLOL MALEATE 1 DROP: 20; 5 SOLUTION/ DROPS OPHTHALMIC at 08:38

## 2020-01-01 RX ADMIN — ISOSORBIDE DINITRATE 10 MG: 10 TABLET ORAL at 13:05

## 2020-01-01 RX ADMIN — THIAMINE HCL TAB 100 MG 100 MG: 100 TAB at 08:50

## 2020-01-01 RX ADMIN — HEPARIN SODIUM 800 UNITS/HR: 10000 INJECTION, SOLUTION INTRAVENOUS at 11:04

## 2020-01-01 RX ADMIN — THIAMINE HCL TAB 100 MG 100 MG: 100 TAB at 08:35

## 2020-01-01 RX ADMIN — Medication: at 18:06

## 2020-01-01 RX ADMIN — DORZOLAMIDE HYDROCHLORIDE AND TIMOLOL MALEATE 1 DROP: 20; 5 SOLUTION/ DROPS OPHTHALMIC at 09:42

## 2020-01-01 RX ADMIN — QUETIAPINE FUMARATE 50 MG: 25 TABLET ORAL at 21:25

## 2020-01-01 RX ADMIN — LATANOPROST 1 DROP: 50 SOLUTION OPHTHALMIC at 07:45

## 2020-01-01 RX ADMIN — ROSUVASTATIN CALCIUM 20 MG: 20 TABLET, FILM COATED ORAL at 07:56

## 2020-01-01 RX ADMIN — EPINEPHRINE 0.09 MCG/KG/MIN: 1 INJECTION PARENTERAL at 10:41

## 2020-01-01 RX ADMIN — BUSPIRONE HYDROCHLORIDE 10 MG: 10 TABLET ORAL at 20:26

## 2020-01-01 RX ADMIN — VANCOMYCIN HYDROCHLORIDE 1750 MG: 10 INJECTION, POWDER, LYOPHILIZED, FOR SOLUTION INTRAVENOUS at 07:57

## 2020-01-01 RX ADMIN — LACOSAMIDE 150 MG: 50 TABLET, FILM COATED ORAL at 07:36

## 2020-01-01 RX ADMIN — CEFEPIME HYDROCHLORIDE 1 G: 1 INJECTION, POWDER, FOR SOLUTION INTRAMUSCULAR; INTRAVENOUS at 13:57

## 2020-01-01 RX ADMIN — ACETAMINOPHEN 650 MG: 325 TABLET, FILM COATED ORAL at 20:27

## 2020-01-01 RX ADMIN — Medication: at 17:38

## 2020-01-01 RX ADMIN — LEVETIRACETAM 2000 MG: 750 TABLET, FILM COATED ORAL at 08:43

## 2020-01-01 RX ADMIN — BUSPIRONE HYDROCHLORIDE 10 MG: 10 TABLET ORAL at 22:20

## 2020-01-01 RX ADMIN — Medication 5 ML: at 07:40

## 2020-01-01 RX ADMIN — CLOPIDOGREL BISULFATE 75 MG: 75 TABLET ORAL at 08:57

## 2020-01-01 RX ADMIN — ROSUVASTATIN CALCIUM 20 MG: 20 TABLET, FILM COATED ORAL at 16:07

## 2020-01-01 RX ADMIN — SODIUM BICARBONATE 37.5 MEQ/HR: 84 INJECTION, SOLUTION INTRAVENOUS at 17:56

## 2020-01-01 RX ADMIN — DOCUSATE SODIUM AND SENNOSIDES 2 TABLET: 8.6; 5 TABLET ORAL at 19:41

## 2020-01-01 RX ADMIN — FERROUS SULFATE TAB 325 MG (65 MG ELEMENTAL FE) 325 MG: 325 (65 FE) TAB at 20:24

## 2020-01-01 RX ADMIN — DEXTROSE MONOHYDRATE 1000 ML: 100 INJECTION, SOLUTION INTRAVENOUS at 20:53

## 2020-01-01 RX ADMIN — LEVETIRACETAM 2000 MG: 100 INJECTION, SOLUTION INTRAVENOUS at 14:22

## 2020-01-01 RX ADMIN — POTASSIUM CHLORIDE 20 MEQ: 1500 TABLET, EXTENDED RELEASE ORAL at 12:18

## 2020-01-01 RX ADMIN — MIDAZOLAM (PF) 1 MG/ML IN 0.9 % SODIUM CHLORIDE INTRAVENOUS SOLUTION 8 MG/HR: at 02:53

## 2020-01-01 RX ADMIN — OMEPRAZOLE 20 MG: 20 CAPSULE, DELAYED RELEASE ORAL at 08:20

## 2020-01-01 RX ADMIN — ASPIRIN 81 MG CHEWABLE TABLET 81 MG: 81 TABLET CHEWABLE at 07:48

## 2020-01-01 RX ADMIN — CLOPIDOGREL BISULFATE 75 MG: 75 TABLET, FILM COATED ORAL at 08:26

## 2020-01-01 RX ADMIN — PANTOPRAZOLE SODIUM 40 MG: 40 INJECTION, POWDER, FOR SOLUTION INTRAVENOUS at 13:48

## 2020-01-01 RX ADMIN — Medication: at 22:06

## 2020-01-01 RX ADMIN — METRONIDAZOLE 500 MG: 500 INJECTION, SOLUTION INTRAVENOUS at 10:08

## 2020-01-01 RX ADMIN — BIMATOPROST 1 DROP: 0.1 SOLUTION/ DROPS OPHTHALMIC at 21:36

## 2020-01-01 RX ADMIN — TORSEMIDE 20 MG: 20 TABLET ORAL at 11:54

## 2020-01-01 RX ADMIN — ALTEPLASE 2 MG: 2.2 INJECTION, POWDER, LYOPHILIZED, FOR SOLUTION INTRAVENOUS at 12:29

## 2020-01-01 RX ADMIN — SODIUM CHLORIDE 250 ML: 9 INJECTION, SOLUTION INTRAVENOUS at 18:02

## 2020-01-01 RX ADMIN — PANTOPRAZOLE SODIUM 8 MG/HR: 40 INJECTION, POWDER, FOR SOLUTION INTRAVENOUS at 07:44

## 2020-01-01 RX ADMIN — MICONAZOLE NITRATE: 20 POWDER TOPICAL at 07:39

## 2020-01-01 RX ADMIN — BUSPIRONE HYDROCHLORIDE 10 MG: 10 TABLET ORAL at 21:27

## 2020-01-01 RX ADMIN — LACOSAMIDE 200 MG: 200 TABLET, FILM COATED ORAL at 19:29

## 2020-01-01 RX ADMIN — CALCIUM CHLORIDE, MAGNESIUM CHLORIDE, SODIUM CHLORIDE, SODIUM BICARBONATE, POTASSIUM CHLORIDE AND SODIUM PHOSPHATE DIBASIC DIHYDRATE 12.5 ML/KG/HR: 3.68; 3.05; 6.34; 3.09; .314; .187 INJECTION INTRAVENOUS at 01:48

## 2020-01-01 RX ADMIN — BUMETANIDE 1 MG: 0.25 INJECTION INTRAMUSCULAR; INTRAVENOUS at 09:49

## 2020-01-01 RX ADMIN — DORZOLAMIDE HYDROCHLORIDE AND TIMOLOL MALEATE 1 DROP: 20; 5 SOLUTION/ DROPS OPHTHALMIC at 20:34

## 2020-01-01 RX ADMIN — DORZOLAMIDE HYDROCHLORIDE AND TIMOLOL MALEATE 1 DROP: 20; 5 SOLUTION/ DROPS OPHTHALMIC at 20:24

## 2020-01-01 RX ADMIN — ROSUVASTATIN CALCIUM 20 MG: 20 TABLET, FILM COATED ORAL at 08:57

## 2020-01-01 RX ADMIN — QUETIAPINE 50 MG: 25 TABLET ORAL at 21:27

## 2020-01-01 RX ADMIN — LACOSAMIDE 200 MG: 200 TABLET, FILM COATED ORAL at 07:36

## 2020-01-01 RX ADMIN — BIMATOPROST 1 DROP: 0.1 SOLUTION/ DROPS OPHTHALMIC at 22:02

## 2020-01-01 RX ADMIN — BUSPIRONE HYDROCHLORIDE 10 MG: 10 TABLET ORAL at 20:15

## 2020-01-01 RX ADMIN — LATANOPROST 1 DROP: 50 SOLUTION OPHTHALMIC at 08:27

## 2020-01-01 RX ADMIN — PANTOPRAZOLE SODIUM 40 MG: 40 INJECTION, POWDER, FOR SOLUTION INTRAVENOUS at 07:57

## 2020-01-01 RX ADMIN — FERROUS SULFATE TAB 325 MG (65 MG ELEMENTAL FE) 325 MG: 325 (65 FE) TAB at 09:06

## 2020-01-01 RX ADMIN — FENTANYL CITRATE 100 MCG: 50 INJECTION, SOLUTION INTRAMUSCULAR; INTRAVENOUS at 09:58

## 2020-01-01 RX ADMIN — ROSUVASTATIN CALCIUM 20 MG: 20 TABLET, FILM COATED ORAL at 21:13

## 2020-01-01 RX ADMIN — CALCIUM CHLORIDE, MAGNESIUM CHLORIDE, SODIUM CHLORIDE, SODIUM BICARBONATE, POTASSIUM CHLORIDE AND SODIUM PHOSPHATE DIBASIC DIHYDRATE 12.5 ML/KG/HR: 3.68; 3.05; 6.34; 3.09; .314; .187 INJECTION INTRAVENOUS at 14:01

## 2020-01-01 RX ADMIN — DORZOLAMIDE HYDROCHLORIDE AND TIMOLOL MALEATE 1 DROP: 20; 5 SOLUTION/ DROPS OPHTHALMIC at 19:45

## 2020-01-01 RX ADMIN — ISOSORBIDE DINITRATE 10 MG: 10 TABLET ORAL at 20:16

## 2020-01-01 RX ADMIN — CALCIUM CHLORIDE, MAGNESIUM CHLORIDE, SODIUM CHLORIDE, SODIUM BICARBONATE, POTASSIUM CHLORIDE AND SODIUM PHOSPHATE DIBASIC DIHYDRATE 2.91 ML/KG/HR: 3.68; 3.05; 6.34; 3.09; .314; .187 INJECTION INTRAVENOUS at 17:47

## 2020-01-01 RX ADMIN — LEVETIRACETAM 2000 MG: 100 INJECTION, SOLUTION INTRAVENOUS at 04:00

## 2020-01-01 RX ADMIN — POTASSIUM CHLORIDE 20 MEQ: 1500 TABLET, EXTENDED RELEASE ORAL at 08:34

## 2020-01-01 RX ADMIN — QUETIAPINE FUMARATE 50 MG: 50 TABLET ORAL at 21:53

## 2020-01-01 RX ADMIN — SACUBITRIL AND VALSARTAN 1 TABLET: 24; 26 TABLET, FILM COATED ORAL at 22:10

## 2020-01-01 RX ADMIN — CALCIUM CHLORIDE, MAGNESIUM CHLORIDE, SODIUM CHLORIDE, SODIUM BICARBONATE, POTASSIUM CHLORIDE AND SODIUM PHOSPHATE DIBASIC DIHYDRATE 2.91 ML/KG/HR: 3.68; 3.05; 6.34; 3.09; .314; .187 INJECTION INTRAVENOUS at 06:05

## 2020-01-01 RX ADMIN — WARFARIN SODIUM 1 MG: 1 TABLET ORAL at 17:26

## 2020-01-01 RX ADMIN — BACITRACIN: 500 OINTMENT TOPICAL at 08:35

## 2020-01-01 RX ADMIN — BUSPIRONE HYDROCHLORIDE 10 MG: 10 TABLET ORAL at 07:35

## 2020-01-01 RX ADMIN — LIDOCAINE HYDROCHLORIDE 15 ML: 20 SOLUTION ORAL; TOPICAL at 14:33

## 2020-01-01 RX ADMIN — WARFARIN SODIUM 2 MG: 2 TABLET ORAL at 17:33

## 2020-01-01 RX ADMIN — Medication 1 PACKET: at 19:52

## 2020-01-01 RX ADMIN — DORZOLAMIDE HYDROCHLORIDE AND TIMOLOL MALEATE 1 DROP: 20; 5 SOLUTION/ DROPS OPHTHALMIC at 09:01

## 2020-01-01 RX ADMIN — CALCIUM CHLORIDE, MAGNESIUM CHLORIDE, SODIUM CHLORIDE, SODIUM BICARBONATE, POTASSIUM CHLORIDE AND SODIUM PHOSPHATE DIBASIC DIHYDRATE 12.5 ML/KG/HR: 3.68; 3.05; 6.34; 3.09; .314; .187 INJECTION INTRAVENOUS at 18:42

## 2020-01-01 RX ADMIN — SODIUM CHLORIDE, POTASSIUM CHLORIDE, SODIUM LACTATE AND CALCIUM CHLORIDE 500 ML: 600; 310; 30; 20 INJECTION, SOLUTION INTRAVENOUS at 06:35

## 2020-01-01 RX ADMIN — BIMATOPROST 1 DROP: 0.1 SOLUTION/ DROPS OPHTHALMIC at 21:04

## 2020-01-01 RX ADMIN — LATANOPROST 1 DROP: 50 SOLUTION/ DROPS OPHTHALMIC at 10:00

## 2020-01-01 RX ADMIN — MICAFUNGIN SODIUM 150 MG: 10 INJECTION, POWDER, LYOPHILIZED, FOR SOLUTION INTRAVENOUS at 11:09

## 2020-01-01 RX ADMIN — POTASSIUM CHLORIDE 20 MEQ: 1500 TABLET, EXTENDED RELEASE ORAL at 16:14

## 2020-01-01 RX ADMIN — PANTOPRAZOLE SODIUM 40 MG: 40 INJECTION, POWDER, FOR SOLUTION INTRAVENOUS at 20:25

## 2020-01-01 RX ADMIN — Medication 2 SPRAY: at 10:12

## 2020-01-01 RX ADMIN — ALBUTEROL SULFATE 10 MG: 2.5 SOLUTION RESPIRATORY (INHALATION) at 17:02

## 2020-01-01 RX ADMIN — POTASSIUM CHLORIDE 20 MEQ: 29.8 INJECTION, SOLUTION INTRAVENOUS at 06:25

## 2020-01-01 RX ADMIN — QUETIAPINE 50 MG: 25 TABLET ORAL at 22:04

## 2020-01-01 RX ADMIN — CALCIUM CHLORIDE, MAGNESIUM CHLORIDE, SODIUM CHLORIDE, SODIUM BICARBONATE, POTASSIUM CHLORIDE AND SODIUM PHOSPHATE DIBASIC DIHYDRATE 12.5 ML/KG/HR: 3.68; 3.05; 6.34; 3.09; .314; .187 INJECTION INTRAVENOUS at 04:04

## 2020-01-01 RX ADMIN — PANTOPRAZOLE SODIUM 40 MG: 40 INJECTION, POWDER, FOR SOLUTION INTRAVENOUS at 19:59

## 2020-01-01 RX ADMIN — CALCIUM CHLORIDE 1 G: 100 INJECTION, SOLUTION INTRAVENOUS at 11:40

## 2020-01-01 RX ADMIN — METOCLOPRAMIDE HYDROCHLORIDE 10 MG: 5 INJECTION INTRAMUSCULAR; INTRAVENOUS at 10:27

## 2020-01-01 RX ADMIN — Medication 62.5 MCG: at 07:54

## 2020-01-01 RX ADMIN — BIMATOPROST 1 DROP: 0.1 SOLUTION/ DROPS OPHTHALMIC at 22:35

## 2020-01-01 RX ADMIN — OMEPRAZOLE 20 MG: 20 CAPSULE, DELAYED RELEASE ORAL at 08:16

## 2020-01-01 RX ADMIN — ACETAMINOPHEN ORAL SOLUTION 1000 MG: 325 SOLUTION ORAL at 01:20

## 2020-01-01 RX ADMIN — FERROUS SULFATE TAB 325 MG (65 MG ELEMENTAL FE) 325 MG: 325 (65 FE) TAB at 21:20

## 2020-01-01 RX ADMIN — BUSPIRONE HYDROCHLORIDE 10 MG: 10 TABLET ORAL at 14:10

## 2020-01-01 RX ADMIN — TORSEMIDE 10 MG: 10 TABLET ORAL at 14:48

## 2020-01-01 RX ADMIN — Medication 2.5 MG: at 04:28

## 2020-01-01 RX ADMIN — DOCUSATE SODIUM AND SENNOSIDES 2 TABLET: 8.6; 5 TABLET ORAL at 08:16

## 2020-01-01 RX ADMIN — FERROUS SULFATE TAB 325 MG (65 MG ELEMENTAL FE) 325 MG: 325 (65 FE) TAB at 09:08

## 2020-01-01 RX ADMIN — Medication: at 13:39

## 2020-01-01 RX ADMIN — BIMATOPROST 1 DROP: 0.1 SOLUTION/ DROPS OPHTHALMIC at 22:36

## 2020-01-01 RX ADMIN — Medication 0.5 MG: at 18:09

## 2020-01-01 RX ADMIN — CALCIUM CHLORIDE, MAGNESIUM CHLORIDE, SODIUM CHLORIDE, SODIUM BICARBONATE, POTASSIUM CHLORIDE AND SODIUM PHOSPHATE DIBASIC DIHYDRATE 12.5 ML/KG/HR: 3.68; 3.05; 6.34; 3.09; .314; .187 INJECTION INTRAVENOUS at 01:31

## 2020-01-01 RX ADMIN — DOCUSATE SODIUM AND SENNOSIDES 2 TABLET: 8.6; 5 TABLET ORAL at 19:35

## 2020-01-01 RX ADMIN — DOCUSATE SODIUM AND SENNOSIDES 2 TABLET: 8.6; 5 TABLET ORAL at 08:32

## 2020-01-01 RX ADMIN — DOCUSATE SODIUM AND SENNOSIDES 2 TABLET: 8.6; 5 TABLET ORAL at 07:31

## 2020-01-01 RX ADMIN — BUSPIRONE HYDROCHLORIDE 10 MG: 10 TABLET ORAL at 22:12

## 2020-01-01 RX ADMIN — HEPARIN SODIUM 350 UNITS/HR: 10000 INJECTION, SOLUTION INTRAVENOUS at 00:18

## 2020-01-01 RX ADMIN — OMEPRAZOLE 20 MG: 20 CAPSULE, DELAYED RELEASE ORAL at 09:08

## 2020-01-01 RX ADMIN — THIAMINE HCL TAB 100 MG 100 MG: 100 TAB at 08:32

## 2020-01-01 RX ADMIN — Medication 1.5 MG: at 18:06

## 2020-01-01 RX ADMIN — Medication 10 ML: at 20:16

## 2020-01-01 RX ADMIN — HEPARIN SODIUM 700 UNITS/HR: 10000 INJECTION, SOLUTION INTRAVENOUS at 12:12

## 2020-01-01 RX ADMIN — SODIUM CHLORIDE, POTASSIUM CHLORIDE, SODIUM LACTATE AND CALCIUM CHLORIDE 500 ML: 600; 310; 30; 20 INJECTION, SOLUTION INTRAVENOUS at 22:30

## 2020-01-01 RX ADMIN — FERROUS SULFATE TAB 325 MG (65 MG ELEMENTAL FE) 325 MG: 325 (65 FE) TAB at 21:25

## 2020-01-01 RX ADMIN — BUSPIRONE HYDROCHLORIDE 10 MG: 10 TABLET ORAL at 14:49

## 2020-01-01 RX ADMIN — PANTOPRAZOLE SODIUM 8 MG/HR: 40 INJECTION, POWDER, FOR SOLUTION INTRAVENOUS at 11:00

## 2020-01-01 RX ADMIN — LEVETIRACETAM 2000 MG: 750 TABLET, FILM COATED ORAL at 19:33

## 2020-01-01 RX ADMIN — FERROUS SULFATE TAB 325 MG (65 MG ELEMENTAL FE) 325 MG: 325 (65 FE) TAB at 09:24

## 2020-01-01 RX ADMIN — CALCIUM CHLORIDE, MAGNESIUM CHLORIDE, SODIUM CHLORIDE, SODIUM BICARBONATE, POTASSIUM CHLORIDE AND SODIUM PHOSPHATE DIBASIC DIHYDRATE 2.91 ML/KG/HR: 3.68; 3.05; 6.34; 3.09; .314; .187 INJECTION INTRAVENOUS at 16:35

## 2020-01-01 RX ADMIN — ASPIRIN 81 MG: 81 TABLET ORAL at 08:37

## 2020-01-01 RX ADMIN — DORZOLAMIDE HYDROCHLORIDE AND TIMOLOL MALEATE 1 DROP: 20; 5 SOLUTION/ DROPS OPHTHALMIC at 13:50

## 2020-01-01 RX ADMIN — CALCIUM CHLORIDE, MAGNESIUM CHLORIDE, SODIUM CHLORIDE, SODIUM BICARBONATE, POTASSIUM CHLORIDE AND SODIUM PHOSPHATE DIBASIC DIHYDRATE 12.5 ML/KG/HR: 3.68; 3.05; 6.34; 3.09; .314; .187 INJECTION INTRAVENOUS at 23:10

## 2020-01-01 RX ADMIN — LEVOTHYROXINE SODIUM 50 MCG: 50 TABLET ORAL at 08:05

## 2020-01-01 RX ADMIN — FLUCONAZOLE 200 MG: 2 INJECTION, SOLUTION INTRAVENOUS at 20:05

## 2020-01-01 RX ADMIN — EPINEPHRINE 0.03 MCG/KG/MIN: 1 INJECTION PARENTERAL at 03:08

## 2020-01-01 RX ADMIN — Medication 5 MG: at 09:35

## 2020-01-01 RX ADMIN — ACETAMINOPHEN 650 MG: 325 TABLET, FILM COATED ORAL at 02:15

## 2020-01-01 RX ADMIN — CALCIUM CHLORIDE, MAGNESIUM CHLORIDE, SODIUM CHLORIDE, SODIUM BICARBONATE, POTASSIUM CHLORIDE AND SODIUM PHOSPHATE DIBASIC DIHYDRATE 12.5 ML/KG/HR: 3.68; 3.05; 6.34; 3.09; .314; .187 INJECTION INTRAVENOUS at 04:05

## 2020-01-01 RX ADMIN — SODIUM CHLORIDE 200 MG: 9 INJECTION, SOLUTION INTRAVENOUS at 21:23

## 2020-01-01 RX ADMIN — MIDAZOLAM 2 MG: 1 INJECTION INTRAMUSCULAR; INTRAVENOUS at 16:34

## 2020-01-01 RX ADMIN — BUSPIRONE HYDROCHLORIDE 10 MG: 10 TABLET ORAL at 08:04

## 2020-01-01 RX ADMIN — ASPIRIN 81 MG CHEWABLE TABLET 81 MG: 81 TABLET CHEWABLE at 07:43

## 2020-01-01 RX ADMIN — BACITRACIN: 500 OINTMENT TOPICAL at 08:38

## 2020-01-01 RX ADMIN — CALCIUM CHLORIDE, MAGNESIUM CHLORIDE, SODIUM CHLORIDE, SODIUM BICARBONATE, POTASSIUM CHLORIDE AND SODIUM PHOSPHATE DIBASIC DIHYDRATE 12.5 ML/KG/HR: 3.68; 3.05; 6.34; 3.09; .314; .187 INJECTION INTRAVENOUS at 15:12

## 2020-01-01 RX ADMIN — CALCIUM CHLORIDE, MAGNESIUM CHLORIDE, SODIUM CHLORIDE, SODIUM BICARBONATE, POTASSIUM CHLORIDE AND SODIUM PHOSPHATE DIBASIC DIHYDRATE 12.5 ML/KG/HR: 3.68; 3.05; 6.34; 3.09; .314; .187 INJECTION INTRAVENOUS at 10:31

## 2020-01-01 RX ADMIN — TORSEMIDE 20 MG: 20 TABLET ORAL at 08:36

## 2020-01-01 RX ADMIN — FERROUS SULFATE TAB 325 MG (65 MG ELEMENTAL FE) 325 MG: 325 (65 FE) TAB at 08:26

## 2020-01-01 RX ADMIN — DORZOLAMIDE HYDROCHLORIDE AND TIMOLOL MALEATE 1 DROP: 20; 5 SOLUTION/ DROPS OPHTHALMIC at 19:41

## 2020-01-01 RX ADMIN — SODIUM CHLORIDE 200 MG: 9 INJECTION, SOLUTION INTRAVENOUS at 20:32

## 2020-01-01 RX ADMIN — Medication 2 SPRAY: at 16:24

## 2020-01-01 RX ADMIN — ASPIRIN 81 MG: 81 TABLET ORAL at 09:08

## 2020-01-01 RX ADMIN — BUSPIRONE HYDROCHLORIDE 10 MG: 10 TABLET ORAL at 13:17

## 2020-01-01 RX ADMIN — I.V. FAT EMULSION 250 ML: 20 EMULSION INTRAVENOUS at 19:55

## 2020-01-01 RX ADMIN — BUSPIRONE HYDROCHLORIDE 10 MG: 10 TABLET ORAL at 15:03

## 2020-01-01 RX ADMIN — BUSPIRONE HYDROCHLORIDE 10 MG: 10 TABLET ORAL at 07:31

## 2020-01-01 RX ADMIN — CALCIUM CHLORIDE, MAGNESIUM CHLORIDE, SODIUM CHLORIDE, SODIUM BICARBONATE, POTASSIUM CHLORIDE AND SODIUM PHOSPHATE DIBASIC DIHYDRATE 2.91 ML/KG/HR: 3.68; 3.05; 6.34; 3.09; .314; .187 INJECTION INTRAVENOUS at 11:07

## 2020-01-01 RX ADMIN — OMEPRAZOLE 20 MG: 20 CAPSULE, DELAYED RELEASE ORAL at 08:32

## 2020-01-01 RX ADMIN — FERROUS SULFATE TAB 325 MG (65 MG ELEMENTAL FE) 325 MG: 325 (65 FE) TAB at 21:02

## 2020-01-01 RX ADMIN — TRAZODONE HYDROCHLORIDE 150 MG: 100 TABLET ORAL at 21:37

## 2020-01-01 RX ADMIN — POTASSIUM CHLORIDE 20 MEQ: 29.8 INJECTION, SOLUTION INTRAVENOUS at 12:57

## 2020-01-01 RX ADMIN — LEVETIRACETAM 2000 MG: 100 INJECTION, SOLUTION INTRAVENOUS at 14:35

## 2020-01-01 RX ADMIN — FENTANYL CITRATE 100 MCG: 50 INJECTION, SOLUTION INTRAMUSCULAR; INTRAVENOUS at 08:37

## 2020-01-01 RX ADMIN — CALCIUM CHLORIDE, MAGNESIUM CHLORIDE, SODIUM CHLORIDE, SODIUM BICARBONATE, POTASSIUM CHLORIDE AND SODIUM PHOSPHATE DIBASIC DIHYDRATE 12.5 ML/KG/HR: 3.68; 3.05; 6.34; 3.09; .314; .187 INJECTION INTRAVENOUS at 10:39

## 2020-01-01 RX ADMIN — AMIODARONE HYDROCHLORIDE 200 MG: 200 TABLET ORAL at 08:05

## 2020-01-01 RX ADMIN — VANCOMYCIN HYDROCHLORIDE 1500 MG: 10 INJECTION, POWDER, LYOPHILIZED, FOR SOLUTION INTRAVENOUS at 07:00

## 2020-01-01 RX ADMIN — ACETAMINOPHEN 650 MG: 325 TABLET, FILM COATED ORAL at 18:14

## 2020-01-01 RX ADMIN — ACETAMINOPHEN 650 MG: 325 TABLET, FILM COATED ORAL at 03:44

## 2020-01-01 RX ADMIN — PANTOPRAZOLE SODIUM 40 MG: 40 TABLET, DELAYED RELEASE ORAL at 08:50

## 2020-01-01 RX ADMIN — DEXTROSE MONOHYDRATE 1000 ML: 100 INJECTION, SOLUTION INTRAVENOUS at 08:20

## 2020-01-01 RX ADMIN — Medication 2.5 MG: at 17:15

## 2020-01-01 RX ADMIN — LEVOTHYROXINE SODIUM 62.5 MCG: 125 TABLET ORAL at 08:26

## 2020-01-01 RX ADMIN — ACETAMINOPHEN ORAL SOLUTION 1000 MG: 325 SOLUTION ORAL at 18:44

## 2020-01-01 RX ADMIN — MULTIPLE VITAMINS W/ MINERALS TAB 1 TABLET: TAB at 17:31

## 2020-01-01 RX ADMIN — ACETAMINOPHEN 975 MG: 325 TABLET, FILM COATED ORAL at 20:27

## 2020-01-01 RX ADMIN — AMIODARONE HYDROCHLORIDE 200 MG: 200 TABLET ORAL at 07:56

## 2020-01-01 RX ADMIN — FENTANYL CITRATE 150 MCG: 50 INJECTION, SOLUTION INTRAMUSCULAR; INTRAVENOUS at 10:23

## 2020-01-01 RX ADMIN — METHOCARBAMOL 500 MG: 500 TABLET, FILM COATED ORAL at 19:30

## 2020-01-01 RX ADMIN — CALCIUM CHLORIDE 1 G: 100 INJECTION, SOLUTION INTRAVENOUS at 15:00

## 2020-01-01 RX ADMIN — BUMETANIDE 1 MG/HR: 0.25 INJECTION INTRAMUSCULAR; INTRAVENOUS at 16:31

## 2020-01-01 RX ADMIN — Medication: at 17:45

## 2020-01-01 RX ADMIN — CALCIUM CHLORIDE 2 G: 100 INJECTION, SOLUTION INTRAVENOUS at 18:13

## 2020-01-01 RX ADMIN — LEVOTHYROXINE SODIUM 62.5 MCG: 125 TABLET ORAL at 08:38

## 2020-01-01 RX ADMIN — LATANOPROST 1 DROP: 50 SOLUTION OPHTHALMIC at 08:48

## 2020-01-01 RX ADMIN — BUSPIRONE HYDROCHLORIDE 10 MG: 10 TABLET ORAL at 07:40

## 2020-01-01 RX ADMIN — BUSPIRONE HYDROCHLORIDE 10 MG: 10 TABLET ORAL at 08:57

## 2020-01-01 RX ADMIN — METHOCARBAMOL 500 MG: 500 TABLET, FILM COATED ORAL at 07:51

## 2020-01-01 RX ADMIN — BIMATOPROST 1 DROP: 0.1 SOLUTION/ DROPS OPHTHALMIC at 21:57

## 2020-01-01 RX ADMIN — CALCIUM CHLORIDE, MAGNESIUM CHLORIDE, SODIUM CHLORIDE, SODIUM BICARBONATE, POTASSIUM CHLORIDE AND SODIUM PHOSPHATE DIBASIC DIHYDRATE 12.5 ML/KG/HR: 3.68; 3.05; 6.34; 3.09; .314; .187 INJECTION INTRAVENOUS at 15:11

## 2020-01-01 RX ADMIN — CHLOROTHIAZIDE SODIUM 1000 MG: 500 INJECTION, POWDER, LYOPHILIZED, FOR SOLUTION INTRAVENOUS at 16:16

## 2020-01-01 RX ADMIN — SODIUM THIOSULFATE 1.5 MCG/KG/MIN: 250 INJECTION, SOLUTION INTRAVENOUS at 10:33

## 2020-01-01 RX ADMIN — MIDAZOLAM (PF) 1 MG/ML IN 0.9 % SODIUM CHLORIDE INTRAVENOUS SOLUTION 8 MG/HR: at 02:41

## 2020-01-01 RX ADMIN — BUSPIRONE HYDROCHLORIDE 10 MG: 10 TABLET ORAL at 13:21

## 2020-01-01 RX ADMIN — FERROUS SULFATE TAB 325 MG (65 MG ELEMENTAL FE) 325 MG: 325 (65 FE) TAB at 08:16

## 2020-01-01 RX ADMIN — CALCIUM CHLORIDE, MAGNESIUM CHLORIDE, SODIUM CHLORIDE, SODIUM BICARBONATE, POTASSIUM CHLORIDE AND SODIUM PHOSPHATE DIBASIC DIHYDRATE 2.91 ML/KG/HR: 3.68; 3.05; 6.34; 3.09; .314; .187 INJECTION INTRAVENOUS at 09:45

## 2020-01-01 RX ADMIN — DOCUSATE SODIUM AND SENNOSIDES 1 TABLET: 8.6; 5 TABLET, FILM COATED ORAL at 12:01

## 2020-01-01 RX ADMIN — CALCIUM CHLORIDE, MAGNESIUM CHLORIDE, SODIUM CHLORIDE, SODIUM BICARBONATE, POTASSIUM CHLORIDE AND SODIUM PHOSPHATE DIBASIC DIHYDRATE 12.5 ML/KG/HR: 3.68; 3.05; 6.34; 3.09; .314; .187 INJECTION INTRAVENOUS at 23:07

## 2020-01-01 RX ADMIN — ETOMIDATE 4 MG: 2 INJECTION INTRAVENOUS at 09:35

## 2020-01-01 RX ADMIN — ROSUVASTATIN CALCIUM 20 MG: 20 TABLET, FILM COATED ORAL at 08:30

## 2020-01-01 RX ADMIN — Medication: at 22:31

## 2020-01-01 RX ADMIN — QUETIAPINE FUMARATE 50 MG: 25 TABLET ORAL at 21:55

## 2020-01-01 RX ADMIN — Medication 1 PACKET: at 07:53

## 2020-01-01 RX ADMIN — DORZOLAMIDE HYDROCHLORIDE AND TIMOLOL MALEATE 1 DROP: 20; 5 SOLUTION/ DROPS OPHTHALMIC at 08:44

## 2020-01-01 RX ADMIN — LEVOTHYROXINE SODIUM 50 MCG: 50 TABLET ORAL at 09:34

## 2020-01-01 RX ADMIN — METHOCARBAMOL 500 MG: 500 TABLET, FILM COATED ORAL at 07:46

## 2020-01-01 RX ADMIN — PROCHLORPERAZINE EDISYLATE 5 MG: 5 INJECTION INTRAMUSCULAR; INTRAVENOUS at 20:30

## 2020-01-01 RX ADMIN — POTASSIUM CHLORIDE 20 MEQ: 1500 TABLET, EXTENDED RELEASE ORAL at 09:23

## 2020-01-01 RX ADMIN — METHOCARBAMOL 500 MG: 500 TABLET, FILM COATED ORAL at 11:45

## 2020-01-01 RX ADMIN — HEPARIN SODIUM 350 UNITS/HR: 10000 INJECTION, SOLUTION INTRAVENOUS at 12:44

## 2020-01-01 RX ADMIN — Medication: at 14:06

## 2020-01-01 RX ADMIN — BUSPIRONE HYDROCHLORIDE 10 MG: 10 TABLET ORAL at 22:08

## 2020-01-01 RX ADMIN — CALCIUM CHLORIDE 1 G: 100 INJECTION, SOLUTION INTRAVENOUS at 23:45

## 2020-01-01 RX ADMIN — AMIODARONE HYDROCHLORIDE 400 MG: 200 TABLET ORAL at 08:19

## 2020-01-01 RX ADMIN — MIDAZOLAM (PF) 1 MG/ML IN 0.9 % SODIUM CHLORIDE INTRAVENOUS SOLUTION 8 MG/HR: at 03:50

## 2020-01-01 RX ADMIN — Medication 1 UNITS/HR: at 11:27

## 2020-01-01 RX ADMIN — CEFTRIAXONE SODIUM 1 G: 1 INJECTION, POWDER, FOR SOLUTION INTRAMUSCULAR; INTRAVENOUS at 16:23

## 2020-01-01 RX ADMIN — LEVOTHYROXINE SODIUM 62.5 MCG: 125 TABLET ORAL at 10:05

## 2020-01-01 RX ADMIN — DORZOLAMIDE HYDROCHLORIDE AND TIMOLOL MALEATE 1 DROP: 20; 5 SOLUTION/ DROPS OPHTHALMIC at 11:55

## 2020-01-01 RX ADMIN — BUSPIRONE HYDROCHLORIDE 10 MG: 10 TABLET ORAL at 08:21

## 2020-01-01 RX ADMIN — CALCIUM CHLORIDE, MAGNESIUM CHLORIDE, SODIUM CHLORIDE, SODIUM BICARBONATE, POTASSIUM CHLORIDE AND SODIUM PHOSPHATE DIBASIC DIHYDRATE 12.5 ML/KG/HR: 3.68; 3.05; 6.34; 3.09; .314; .187 INJECTION INTRAVENOUS at 16:36

## 2020-01-01 RX ADMIN — PROPOFOL 30 MCG/KG/MIN: 10 INJECTION, EMULSION INTRAVENOUS at 07:23

## 2020-01-01 RX ADMIN — VANCOMYCIN HYDROCHLORIDE 1750 MG: 10 INJECTION, POWDER, LYOPHILIZED, FOR SOLUTION INTRAVENOUS at 07:16

## 2020-01-01 RX ADMIN — CALCIUM CHLORIDE, MAGNESIUM CHLORIDE, SODIUM CHLORIDE, SODIUM BICARBONATE, POTASSIUM CHLORIDE AND SODIUM PHOSPHATE DIBASIC DIHYDRATE 12.5 ML/KG/HR: 3.68; 3.05; 6.34; 3.09; .314; .187 INJECTION INTRAVENOUS at 17:47

## 2020-01-01 RX ADMIN — CALCIUM CHLORIDE, MAGNESIUM CHLORIDE, SODIUM CHLORIDE, SODIUM BICARBONATE, POTASSIUM CHLORIDE AND SODIUM PHOSPHATE DIBASIC DIHYDRATE 12.5 ML/KG/HR: 3.68; 3.05; 6.34; 3.09; .314; .187 INJECTION INTRAVENOUS at 10:37

## 2020-01-01 RX ADMIN — SODIUM THIOSULFATE 0.75 MCG/KG/MIN: 250 INJECTION, SOLUTION INTRAVENOUS at 20:40

## 2020-01-01 RX ADMIN — LACOSAMIDE 200 MG: 200 TABLET, FILM COATED ORAL at 19:57

## 2020-01-01 RX ADMIN — Medication 2 SPRAY: at 21:28

## 2020-01-01 RX ADMIN — BUSPIRONE HYDROCHLORIDE 10 MG: 10 TABLET ORAL at 16:12

## 2020-01-01 RX ADMIN — OMEPRAZOLE 20 MG: 20 CAPSULE, DELAYED RELEASE ORAL at 08:05

## 2020-01-01 RX ADMIN — BIMATOPROST 1 DROP: 0.1 SOLUTION/ DROPS OPHTHALMIC at 22:55

## 2020-01-01 RX ADMIN — PIPERACILLIN SODIUM AND TAZOBACTAM SODIUM 4.5 G: 4; .5 INJECTION, POWDER, LYOPHILIZED, FOR SOLUTION INTRAVENOUS at 19:36

## 2020-01-01 RX ADMIN — BUSPIRONE HYDROCHLORIDE 10 MG: 10 TABLET ORAL at 22:25

## 2020-01-01 RX ADMIN — PIPERACILLIN AND TAZOBACTAM 4.5 G: 4; .5 INJECTION, POWDER, FOR SOLUTION INTRAVENOUS at 18:39

## 2020-01-01 RX ADMIN — MAGNESIUM SULFATE IN WATER 2 G: 40 INJECTION, SOLUTION INTRAVENOUS at 05:53

## 2020-01-01 RX ADMIN — Medication 5 ML: at 07:44

## 2020-01-01 RX ADMIN — Medication: at 18:57

## 2020-01-01 RX ADMIN — ASPIRIN 81 MG: 81 TABLET, COATED ORAL at 09:06

## 2020-01-01 RX ADMIN — FENTANYL CITRATE 50 MCG: 50 INJECTION, SOLUTION INTRAMUSCULAR; INTRAVENOUS at 10:52

## 2020-01-01 RX ADMIN — Medication 5 ML: at 08:36

## 2020-01-01 RX ADMIN — PIPERACILLIN SODIUM AND TAZOBACTAM SODIUM 4.5 G: 4; .5 INJECTION, POWDER, LYOPHILIZED, FOR SOLUTION INTRAVENOUS at 07:59

## 2020-01-01 RX ADMIN — TORSEMIDE 40 MG: 20 TABLET ORAL at 16:33

## 2020-01-01 RX ADMIN — INSULIN ASPART 1 UNITS: 100 INJECTION, SOLUTION INTRAVENOUS; SUBCUTANEOUS at 21:20

## 2020-01-01 RX ADMIN — DOCUSATE SODIUM 50 MG AND SENNOSIDES 8.6 MG 2 TABLET: 8.6; 5 TABLET, FILM COATED ORAL at 19:46

## 2020-01-01 RX ADMIN — FERROUS SULFATE TAB 325 MG (65 MG ELEMENTAL FE) 325 MG: 325 (65 FE) TAB at 08:34

## 2020-01-01 RX ADMIN — BUSPIRONE HYDROCHLORIDE 10 MG: 10 TABLET ORAL at 22:16

## 2020-01-01 RX ADMIN — Medication 5 ML: at 19:29

## 2020-01-01 RX ADMIN — DOBUTAMINE HYDROCHLORIDE 5.2 MCG/KG/MIN: 200 INJECTION INTRAVENOUS at 09:06

## 2020-01-01 RX ADMIN — LEVETIRACETAM 2000 MG: 750 TABLET, FILM COATED ORAL at 13:36

## 2020-01-01 RX ADMIN — CALCIUM CHLORIDE, MAGNESIUM CHLORIDE, SODIUM CHLORIDE, SODIUM BICARBONATE, POTASSIUM CHLORIDE AND SODIUM PHOSPHATE DIBASIC DIHYDRATE 12.5 ML/KG/HR: 3.68; 3.05; 6.34; 3.09; .314; .187 INJECTION INTRAVENOUS at 21:35

## 2020-01-01 RX ADMIN — POLYETHYLENE GLYCOL 400 AND PROPYLENE GLYCOL 1 DROP: 4; 3 SOLUTION/ DROPS OPHTHALMIC at 14:03

## 2020-01-01 RX ADMIN — HEPARIN SODIUM 800 UNITS/HR: 10000 INJECTION, SOLUTION INTRAVENOUS at 23:33

## 2020-01-01 RX ADMIN — ASPIRIN 81 MG CHEWABLE TABLET 81 MG: 81 TABLET CHEWABLE at 07:56

## 2020-01-01 RX ADMIN — BUSPIRONE HYDROCHLORIDE 10 MG: 10 TABLET ORAL at 21:01

## 2020-01-01 RX ADMIN — OMEPRAZOLE 20 MG: 20 CAPSULE, DELAYED RELEASE ORAL at 08:48

## 2020-01-01 RX ADMIN — WARFARIN SODIUM 2.5 MG: 2.5 TABLET ORAL at 18:22

## 2020-01-01 RX ADMIN — ASPIRIN 81 MG: 81 TABLET, COATED ORAL at 08:26

## 2020-01-01 RX ADMIN — Medication: at 07:43

## 2020-01-01 RX ADMIN — Medication 5 ML: at 07:47

## 2020-01-01 RX ADMIN — ASPIRIN 81 MG CHEWABLE TABLET 81 MG: 81 TABLET CHEWABLE at 08:03

## 2020-01-01 RX ADMIN — DORZOLAMIDE HYDROCHLORIDE AND TIMOLOL MALEATE 1 DROP: 20; 5 SOLUTION/ DROPS OPHTHALMIC at 20:45

## 2020-01-01 RX ADMIN — ROSUVASTATIN CALCIUM 20 MG: 20 TABLET, FILM COATED ORAL at 07:36

## 2020-01-01 RX ADMIN — Medication 5 ML: at 07:36

## 2020-01-01 RX ADMIN — POTASSIUM CHLORIDE 20 MEQ: 29.8 INJECTION, SOLUTION INTRAVENOUS at 12:50

## 2020-01-01 RX ADMIN — FERROUS SULFATE TAB 325 MG (65 MG ELEMENTAL FE) 325 MG: 325 (65 FE) TAB at 07:42

## 2020-01-01 RX ADMIN — LEVETIRACETAM 2000 MG: 750 TABLET, FILM COATED ORAL at 08:14

## 2020-01-01 RX ADMIN — FUROSEMIDE 40 MG: 10 INJECTION, SOLUTION INTRAMUSCULAR; INTRAVENOUS at 00:40

## 2020-01-01 RX ADMIN — Medication: at 22:28

## 2020-01-01 RX ADMIN — Medication: at 17:33

## 2020-01-01 RX ADMIN — AMINOCAPROIC ACID 7.5 G: 250 INJECTION, SOLUTION INTRAVENOUS at 10:23

## 2020-01-01 RX ADMIN — Medication: at 07:57

## 2020-01-01 RX ADMIN — VANCOMYCIN HYDROCHLORIDE 1000 MG: 1 INJECTION, SOLUTION INTRAVENOUS at 23:40

## 2020-01-01 RX ADMIN — Medication 62.5 MCG: at 07:45

## 2020-01-01 RX ADMIN — HEPARIN SODIUM 1250 UNITS/HR: 10000 INJECTION, SOLUTION INTRAVENOUS at 08:41

## 2020-01-01 RX ADMIN — Medication 1 PACKET: at 08:47

## 2020-01-01 RX ADMIN — LEVETIRACETAM 2000 MG: 100 INJECTION, SOLUTION INTRAVENOUS at 13:59

## 2020-01-01 RX ADMIN — TORSEMIDE 40 MG: 20 TABLET ORAL at 08:56

## 2020-01-01 RX ADMIN — Medication 2 SPRAY: at 22:19

## 2020-01-01 RX ADMIN — Medication 1.5 MG: at 18:09

## 2020-01-01 RX ADMIN — Medication 5 MG: at 12:39

## 2020-01-01 RX ADMIN — Medication 50 MCG/HR: at 08:13

## 2020-01-01 RX ADMIN — NOREPINEPHRINE BITARTRATE 3.2 MCG: 1 INJECTION, SOLUTION, CONCENTRATE INTRAVENOUS at 08:00

## 2020-01-01 RX ADMIN — LEVETIRACETAM 2000 MG: 750 TABLET, FILM COATED ORAL at 21:15

## 2020-01-01 RX ADMIN — LIDOCAINE HYDROCHLORIDE 2 ML: 10 INJECTION, SOLUTION EPIDURAL; INFILTRATION; INTRACAUDAL; PERINEURAL at 19:27

## 2020-01-01 RX ADMIN — ASPIRIN 81 MG: 81 TABLET, COATED ORAL at 08:57

## 2020-01-01 RX ADMIN — ROSUVASTATIN CALCIUM 20 MG: 20 TABLET, FILM COATED ORAL at 07:32

## 2020-01-01 RX ADMIN — LEVETIRACETAM 2000 MG: 100 INJECTION, SOLUTION INTRAVENOUS at 14:13

## 2020-01-01 RX ADMIN — METHOCARBAMOL 500 MG: 500 TABLET, FILM COATED ORAL at 16:27

## 2020-01-01 RX ADMIN — BUSPIRONE HYDROCHLORIDE 10 MG: 10 TABLET ORAL at 09:23

## 2020-01-01 RX ADMIN — SODIUM CHLORIDE, POTASSIUM CHLORIDE, SODIUM LACTATE AND CALCIUM CHLORIDE 500 ML: 600; 310; 30; 20 INJECTION, SOLUTION INTRAVENOUS at 02:45

## 2020-01-01 RX ADMIN — METHOCARBAMOL 500 MG: 500 TABLET, FILM COATED ORAL at 07:36

## 2020-01-01 RX ADMIN — BIMATOPROST 1 DROP: 0.1 SOLUTION/ DROPS OPHTHALMIC at 21:16

## 2020-01-01 RX ADMIN — ISOSORBIDE DINITRATE 10 MG: 10 TABLET ORAL at 14:03

## 2020-01-01 RX ADMIN — CALCIUM CHLORIDE 1 G: 100 INJECTION, SOLUTION INTRAVENOUS at 11:26

## 2020-01-01 RX ADMIN — Medication 2 SPRAY: at 09:22

## 2020-01-01 RX ADMIN — BUMETANIDE 4 MG: 0.25 INJECTION INTRAMUSCULAR; INTRAVENOUS at 10:20

## 2020-01-01 RX ADMIN — VANCOMYCIN HYDROCHLORIDE 1500 MG: 10 INJECTION, POWDER, LYOPHILIZED, FOR SOLUTION INTRAVENOUS at 08:44

## 2020-01-01 RX ADMIN — OXYCODONE HYDROCHLORIDE 5 MG: 5 TABLET ORAL at 21:19

## 2020-01-01 RX ADMIN — BUSPIRONE HYDROCHLORIDE 10 MG: 10 TABLET ORAL at 08:49

## 2020-01-01 RX ADMIN — MICAFUNGIN SODIUM 150 MG: 10 INJECTION, POWDER, LYOPHILIZED, FOR SOLUTION INTRAVENOUS at 11:51

## 2020-01-01 RX ADMIN — MICAFUNGIN SODIUM 150 MG: 10 INJECTION, POWDER, LYOPHILIZED, FOR SOLUTION INTRAVENOUS at 13:26

## 2020-01-01 RX ADMIN — ALBUMIN HUMAN 25 G: 50 SOLUTION INTRAVENOUS at 02:21

## 2020-01-01 RX ADMIN — PANTOPRAZOLE SODIUM 8 MG/HR: 40 INJECTION, POWDER, FOR SOLUTION INTRAVENOUS at 19:44

## 2020-01-01 RX ADMIN — PIPERACILLIN SODIUM AND TAZOBACTAM SODIUM 4.5 G: 4; .5 INJECTION, POWDER, LYOPHILIZED, FOR SOLUTION INTRAVENOUS at 15:23

## 2020-01-01 RX ADMIN — LATANOPROST 1 DROP: 50 SOLUTION/ DROPS OPHTHALMIC at 09:42

## 2020-01-01 RX ADMIN — MICAFUNGIN SODIUM 150 MG: 10 INJECTION, POWDER, LYOPHILIZED, FOR SOLUTION INTRAVENOUS at 09:00

## 2020-01-01 RX ADMIN — OMEPRAZOLE 20 MG: 20 CAPSULE, DELAYED RELEASE ORAL at 08:56

## 2020-01-01 RX ADMIN — SODIUM THIOSULFATE 0.25 MCG/KG/MIN: 250 INJECTION, SOLUTION INTRAVENOUS at 13:58

## 2020-01-01 RX ADMIN — LACOSAMIDE 200 MG: 200 TABLET, FILM COATED ORAL at 21:15

## 2020-01-01 RX ADMIN — Medication 62.5 MCG: at 16:15

## 2020-01-01 RX ADMIN — OMEPRAZOLE 20 MG: 20 CAPSULE, DELAYED RELEASE ORAL at 08:38

## 2020-01-01 RX ADMIN — Medication 2.5 MG: at 06:18

## 2020-01-01 RX ADMIN — LACOSAMIDE 200 MG: 200 TABLET, FILM COATED ORAL at 07:54

## 2020-01-01 RX ADMIN — BUSPIRONE HYDROCHLORIDE 10 MG: 10 TABLET ORAL at 15:30

## 2020-01-01 RX ADMIN — PANTOPRAZOLE SODIUM 40 MG: 40 INJECTION, POWDER, FOR SOLUTION INTRAVENOUS at 08:14

## 2020-01-01 RX ADMIN — PROPOFOL 50 MCG/KG/MIN: 10 INJECTION, EMULSION INTRAVENOUS at 02:05

## 2020-01-01 RX ADMIN — PANTOPRAZOLE SODIUM 40 MG: 40 INJECTION, POWDER, FOR SOLUTION INTRAVENOUS at 07:54

## 2020-01-01 RX ADMIN — BIMATOPROST 1 DROP: 0.1 SOLUTION/ DROPS OPHTHALMIC at 22:04

## 2020-01-01 RX ADMIN — Medication 62.5 MCG: at 07:52

## 2020-01-01 RX ADMIN — PANTOPRAZOLE SODIUM 40 MG: 40 INJECTION, POWDER, FOR SOLUTION INTRAVENOUS at 20:28

## 2020-01-01 RX ADMIN — ALBUMIN HUMAN 12.5 G: 0.25 SOLUTION INTRAVENOUS at 15:34

## 2020-01-01 RX ADMIN — METHOCARBAMOL 500 MG: 500 TABLET, FILM COATED ORAL at 12:57

## 2020-01-01 RX ADMIN — BIMATOPROST 1 DROP: 0.1 SOLUTION/ DROPS OPHTHALMIC at 23:45

## 2020-01-01 RX ADMIN — OMEPRAZOLE 20 MG: 20 CAPSULE, DELAYED RELEASE ORAL at 08:37

## 2020-01-01 RX ADMIN — POLYETHYLENE GLYCOL 3350 17 G: 17 POWDER, FOR SOLUTION ORAL at 18:31

## 2020-01-01 RX ADMIN — Medication 5 ML: at 13:08

## 2020-01-01 RX ADMIN — Medication 62.5 MCG: at 08:57

## 2020-01-01 RX ADMIN — Medication: at 11:07

## 2020-01-01 RX ADMIN — LEVETIRACETAM 2000 MG: 100 INJECTION, SOLUTION INTRAVENOUS at 00:19

## 2020-01-01 RX ADMIN — QUETIAPINE 12.5 MG: 25 TABLET, FILM COATED ORAL at 21:33

## 2020-01-01 RX ADMIN — PANTOPRAZOLE SODIUM 40 MG: 40 INJECTION, POWDER, FOR SOLUTION INTRAVENOUS at 20:17

## 2020-01-01 RX ADMIN — LATANOPROST 1 DROP: 50 SOLUTION OPHTHALMIC at 07:32

## 2020-01-01 RX ADMIN — BUSPIRONE HYDROCHLORIDE 10 MG: 10 TABLET ORAL at 19:35

## 2020-01-01 RX ADMIN — Medication 2.5 MG: at 08:06

## 2020-01-01 RX ADMIN — BIMATOPROST 1 DROP: 0.1 SOLUTION/ DROPS OPHTHALMIC at 21:52

## 2020-01-01 RX ADMIN — FUROSEMIDE 20 MG: 10 INJECTION, SOLUTION INTRAVENOUS at 10:38

## 2020-01-01 RX ADMIN — FERROUS SULFATE TAB 325 MG (65 MG ELEMENTAL FE) 325 MG: 325 (65 FE) TAB at 19:41

## 2020-01-01 RX ADMIN — AMIODARONE HYDROCHLORIDE 400 MG: 200 TABLET ORAL at 19:29

## 2020-01-01 RX ADMIN — BIMATOPROST 1 DROP: 0.1 SOLUTION/ DROPS OPHTHALMIC at 20:45

## 2020-01-01 RX ADMIN — FERROUS SULFATE TAB 325 MG (65 MG ELEMENTAL FE) 325 MG: 325 (65 FE) TAB at 22:05

## 2020-01-01 RX ADMIN — FERROUS SULFATE TAB 325 MG (65 MG ELEMENTAL FE) 325 MG: 325 (65 FE) TAB at 08:20

## 2020-01-01 RX ADMIN — VANCOMYCIN HYDROCHLORIDE 1500 MG: 10 INJECTION, POWDER, LYOPHILIZED, FOR SOLUTION INTRAVENOUS at 06:49

## 2020-01-01 RX ADMIN — FERROUS SULFATE TAB 325 MG (65 MG ELEMENTAL FE) 325 MG: 325 (65 FE) TAB at 22:04

## 2020-01-01 RX ADMIN — SODIUM CHLORIDE, POTASSIUM CHLORIDE, SODIUM LACTATE AND CALCIUM CHLORIDE 250 ML: 600; 310; 30; 20 INJECTION, SOLUTION INTRAVENOUS at 00:34

## 2020-01-01 RX ADMIN — ACETAMINOPHEN 650 MG: 325 TABLET, FILM COATED ORAL at 20:23

## 2020-01-01 RX ADMIN — ACETAMINOPHEN ORAL SOLUTION 1000 MG: 325 SOLUTION ORAL at 15:20

## 2020-01-01 RX ADMIN — FERROUS SULFATE TAB 325 MG (65 MG ELEMENTAL FE) 325 MG: 325 (65 FE) TAB at 19:29

## 2020-01-01 RX ADMIN — BUSPIRONE HYDROCHLORIDE 10 MG: 10 TABLET ORAL at 18:00

## 2020-01-01 RX ADMIN — BUSPIRONE HYDROCHLORIDE 10 MG: 10 TABLET ORAL at 08:31

## 2020-01-01 RX ADMIN — LATANOPROST 1 DROP: 50 SOLUTION/ DROPS OPHTHALMIC at 09:22

## 2020-01-01 RX ADMIN — ROCURONIUM BROMIDE 50 MG: 10 INJECTION INTRAVENOUS at 07:50

## 2020-01-01 RX ADMIN — ENOXAPARIN SODIUM 70 MG: 80 INJECTION SUBCUTANEOUS at 10:25

## 2020-01-01 RX ADMIN — ISOSORBIDE DINITRATE 10 MG: 10 TABLET ORAL at 08:32

## 2020-01-01 RX ADMIN — Medication: at 10:59

## 2020-01-01 RX ADMIN — LATANOPROST 1 DROP: 50 SOLUTION OPHTHALMIC at 07:58

## 2020-01-01 RX ADMIN — PANTOPRAZOLE SODIUM 40 MG: 40 TABLET, DELAYED RELEASE ORAL at 07:42

## 2020-01-01 RX ADMIN — ASPIRIN 81 MG: 81 TABLET ORAL at 10:08

## 2020-01-01 RX ADMIN — DORZOLAMIDE HYDROCHLORIDE AND TIMOLOL MALEATE 1 DROP: 20; 5 SOLUTION/ DROPS OPHTHALMIC at 08:31

## 2020-01-01 RX ADMIN — BUSPIRONE HYDROCHLORIDE 10 MG: 10 TABLET ORAL at 20:12

## 2020-01-01 RX ADMIN — FERROUS SULFATE TAB 325 MG (65 MG ELEMENTAL FE) 325 MG: 325 (65 FE) TAB at 08:36

## 2020-01-01 RX ADMIN — CALCIUM CHLORIDE, MAGNESIUM CHLORIDE, SODIUM CHLORIDE, SODIUM BICARBONATE, POTASSIUM CHLORIDE AND SODIUM PHOSPHATE DIBASIC DIHYDRATE 12.5 ML/KG/HR: 3.68; 3.05; 6.34; 3.09; .314; .187 INJECTION INTRAVENOUS at 19:30

## 2020-01-01 RX ADMIN — SODIUM CHLORIDE, POTASSIUM CHLORIDE, SODIUM LACTATE AND CALCIUM CHLORIDE 500 ML: 600; 310; 30; 20 INJECTION, SOLUTION INTRAVENOUS at 05:58

## 2020-01-01 RX ADMIN — BUSPIRONE HYDROCHLORIDE 10 MG: 10 TABLET ORAL at 14:11

## 2020-01-01 RX ADMIN — Medication: at 11:56

## 2020-01-01 RX ADMIN — ASPIRIN 81 MG: 81 TABLET, COATED ORAL at 07:32

## 2020-01-01 RX ADMIN — POLYETHYLENE GLYCOL 400 AND PROPYLENE GLYCOL 1 DROP: 4; 3 SOLUTION/ DROPS OPHTHALMIC at 22:30

## 2020-01-01 RX ADMIN — DORZOLAMIDE HYDROCHLORIDE AND TIMOLOL MALEATE 1 DROP: 20; 5 SOLUTION/ DROPS OPHTHALMIC at 08:34

## 2020-01-01 RX ADMIN — CEFTRIAXONE 1 G: 1 INJECTION, POWDER, FOR SOLUTION INTRAMUSCULAR; INTRAVENOUS at 13:05

## 2020-01-01 RX ADMIN — Medication 5 ML: at 19:34

## 2020-01-01 RX ADMIN — ACETAMINOPHEN 650 MG: 325 TABLET, FILM COATED ORAL at 11:05

## 2020-01-01 RX ADMIN — SODIUM CHLORIDE 200 MG: 9 INJECTION, SOLUTION INTRAVENOUS at 21:19

## 2020-01-01 RX ADMIN — QUETIAPINE FUMARATE 50 MG: 25 TABLET ORAL at 21:45

## 2020-01-01 RX ADMIN — PROCHLORPERAZINE EDISYLATE 5 MG: 5 INJECTION INTRAMUSCULAR; INTRAVENOUS at 07:31

## 2020-01-01 RX ADMIN — SODIUM CHLORIDE 200 MG: 9 INJECTION, SOLUTION INTRAVENOUS at 08:18

## 2020-01-01 RX ADMIN — Medication 0.1 MCG/KG/MIN: at 18:16

## 2020-01-01 RX ADMIN — CEFTRIAXONE SODIUM 1 G: 1 INJECTION, SOLUTION INTRAVENOUS at 17:09

## 2020-01-01 RX ADMIN — ROSUVASTATIN CALCIUM 20 MG: 20 TABLET, FILM COATED ORAL at 21:29

## 2020-01-01 RX ADMIN — Medication: at 22:04

## 2020-01-01 RX ADMIN — MIDAZOLAM (PF) 1 MG/ML IN 0.9 % SODIUM CHLORIDE INTRAVENOUS SOLUTION 8 MG/HR: at 14:33

## 2020-01-01 RX ADMIN — LEVOTHYROXINE SODIUM 62.5 MCG: 125 TABLET ORAL at 08:35

## 2020-01-01 RX ADMIN — OMEPRAZOLE 20 MG: 20 CAPSULE, DELAYED RELEASE ORAL at 06:33

## 2020-01-01 RX ADMIN — SODIUM CHLORIDE 200 MG: 9 INJECTION, SOLUTION INTRAVENOUS at 07:59

## 2020-01-01 RX ADMIN — POTASSIUM CHLORIDE 20 MEQ: 29.8 INJECTION, SOLUTION INTRAVENOUS at 23:38

## 2020-01-01 RX ADMIN — POTASSIUM CHLORIDE 20 MEQ: 29.8 INJECTION, SOLUTION INTRAVENOUS at 22:41

## 2020-01-01 RX ADMIN — CEFEPIME HYDROCHLORIDE 1 G: 1 INJECTION, POWDER, FOR SOLUTION INTRAMUSCULAR; INTRAVENOUS at 14:05

## 2020-01-01 RX ADMIN — SODIUM CHLORIDE 200 MG: 9 INJECTION, SOLUTION INTRAVENOUS at 20:43

## 2020-01-01 RX ADMIN — BUMETANIDE 4 MG: 0.25 INJECTION INTRAMUSCULAR; INTRAVENOUS at 05:37

## 2020-01-01 RX ADMIN — HEPARIN SODIUM 800 UNITS/HR: 10000 INJECTION, SOLUTION INTRAVENOUS at 14:03

## 2020-01-01 RX ADMIN — LIDOCAINE 1 PATCH: 560 PATCH PERCUTANEOUS; TOPICAL; TRANSDERMAL at 07:43

## 2020-01-01 RX ADMIN — PROCHLORPERAZINE EDISYLATE 5 MG: 5 INJECTION INTRAMUSCULAR; INTRAVENOUS at 19:41

## 2020-01-01 RX ADMIN — LEVETIRACETAM 2000 MG: 750 TABLET, FILM COATED ORAL at 07:54

## 2020-01-01 RX ADMIN — LIDOCAINE 1 PATCH: 560 PATCH PERCUTANEOUS; TOPICAL; TRANSDERMAL at 08:33

## 2020-01-01 RX ADMIN — CALCIUM CHLORIDE, MAGNESIUM CHLORIDE, SODIUM CHLORIDE, SODIUM BICARBONATE, POTASSIUM CHLORIDE AND SODIUM PHOSPHATE DIBASIC DIHYDRATE 2.91 ML/KG/HR: 3.68; 3.05; 6.34; 3.09; .314; .187 INJECTION INTRAVENOUS at 16:59

## 2020-01-01 RX ADMIN — PANTOPRAZOLE SODIUM 40 MG: 40 INJECTION, POWDER, FOR SOLUTION INTRAVENOUS at 07:36

## 2020-01-01 RX ADMIN — SODIUM CHLORIDE 200 MG: 9 INJECTION, SOLUTION INTRAVENOUS at 20:31

## 2020-01-01 RX ADMIN — Medication 2.4 UNITS/HR: at 19:47

## 2020-01-01 RX ADMIN — ASPIRIN 81 MG CHEWABLE TABLET 81 MG: 81 TABLET CHEWABLE at 07:32

## 2020-01-01 RX ADMIN — Medication: at 11:31

## 2020-01-01 RX ADMIN — PANTOPRAZOLE SODIUM 40 MG: 40 TABLET, DELAYED RELEASE ORAL at 16:14

## 2020-01-01 RX ADMIN — QUETIAPINE 12.5 MG: 25 TABLET, FILM COATED ORAL at 22:10

## 2020-01-01 RX ADMIN — Medication: at 12:01

## 2020-01-01 RX ADMIN — Medication 1 G: at 22:03

## 2020-01-01 RX ADMIN — Medication 100 MG: at 08:32

## 2020-01-01 RX ADMIN — SACUBITRIL AND VALSARTAN 1 TABLET: 24; 26 TABLET, FILM COATED ORAL at 09:08

## 2020-01-01 RX ADMIN — ROSUVASTATIN CALCIUM 20 MG: 20 TABLET, FILM COATED ORAL at 20:29

## 2020-01-01 RX ADMIN — ETOMIDATE 4 MG: 2 INJECTION INTRAVENOUS at 09:36

## 2020-01-01 RX ADMIN — Medication: at 22:26

## 2020-01-01 RX ADMIN — ASPIRIN 81 MG CHEWABLE TABLET 81 MG: 81 TABLET CHEWABLE at 07:35

## 2020-01-01 RX ADMIN — BUSPIRONE HYDROCHLORIDE 10 MG: 10 TABLET ORAL at 08:32

## 2020-01-01 RX ADMIN — BUSPIRONE HYDROCHLORIDE 10 MG: 10 TABLET ORAL at 21:20

## 2020-01-01 RX ADMIN — BUSPIRONE HYDROCHLORIDE 10 MG: 10 TABLET ORAL at 08:24

## 2020-01-01 RX ADMIN — PANTOPRAZOLE SODIUM 8 MG/HR: 40 INJECTION, POWDER, FOR SOLUTION INTRAVENOUS at 05:36

## 2020-01-01 RX ADMIN — ACETAMINOPHEN ORAL SOLUTION 1000 MG: 325 SOLUTION ORAL at 16:01

## 2020-01-01 RX ADMIN — LIDOCAINE 1 PATCH: 560 PATCH PERCUTANEOUS; TOPICAL; TRANSDERMAL at 08:43

## 2020-01-01 RX ADMIN — DORZOLAMIDE HYDROCHLORIDE AND TIMOLOL MALEATE 1 DROP: 20; 5 SOLUTION/ DROPS OPHTHALMIC at 08:30

## 2020-01-01 RX ADMIN — QUETIAPINE 12.5 MG: 25 TABLET, FILM COATED ORAL at 22:17

## 2020-01-01 RX ADMIN — Medication 5 ML: at 08:08

## 2020-01-01 RX ADMIN — Medication: at 13:27

## 2020-01-01 RX ADMIN — SODIUM CHLORIDE 200 MG: 9 INJECTION, SOLUTION INTRAVENOUS at 11:12

## 2020-01-01 RX ADMIN — OMEPRAZOLE 20 MG: 20 CAPSULE, DELAYED RELEASE ORAL at 08:57

## 2020-01-01 RX ADMIN — POTASSIUM CHLORIDE 20 MEQ: 1500 TABLET, EXTENDED RELEASE ORAL at 07:33

## 2020-01-01 RX ADMIN — Medication 12.5 MG: at 20:15

## 2020-01-01 RX ADMIN — BUSPIRONE HYDROCHLORIDE 10 MG: 10 TABLET ORAL at 07:54

## 2020-01-01 RX ADMIN — Medication: at 09:06

## 2020-01-01 RX ADMIN — PANTOPRAZOLE SODIUM 40 MG: 40 INJECTION, POWDER, FOR SOLUTION INTRAVENOUS at 19:56

## 2020-01-01 RX ADMIN — Medication 5 MG: at 20:15

## 2020-01-01 RX ADMIN — ACETAMINOPHEN ORAL SOLUTION 1000 MG: 325 SOLUTION ORAL at 16:12

## 2020-01-01 RX ADMIN — BIMATOPROST 1 DROP: 0.1 SOLUTION/ DROPS OPHTHALMIC at 21:20

## 2020-01-01 RX ADMIN — FERROUS SULFATE TAB 325 MG (65 MG ELEMENTAL FE) 325 MG: 325 (65 FE) TAB at 21:59

## 2020-01-01 RX ADMIN — LEVETIRACETAM 2000 MG: 750 TABLET, FILM COATED ORAL at 19:57

## 2020-01-01 RX ADMIN — ACETAMINOPHEN 650 MG: 325 TABLET, FILM COATED ORAL at 14:19

## 2020-01-01 RX ADMIN — LATANOPROST 1 DROP: 50 SOLUTION/ DROPS OPHTHALMIC at 08:42

## 2020-01-01 RX ADMIN — FUROSEMIDE 120 MG: 10 INJECTION, SOLUTION INTRAMUSCULAR; INTRAVENOUS at 11:34

## 2020-01-01 RX ADMIN — ROSUVASTATIN CALCIUM 20 MG: 20 TABLET, FILM COATED ORAL at 07:37

## 2020-01-01 RX ADMIN — EPINEPHRINE 0.01 MCG/KG/MIN: 1 INJECTION PARENTERAL at 14:32

## 2020-01-01 RX ADMIN — DORZOLAMIDE HYDROCHLORIDE AND TIMOLOL MALEATE 1 DROP: 20; 5 SOLUTION/ DROPS OPHTHALMIC at 20:36

## 2020-01-01 RX ADMIN — MIDAZOLAM (PF) 1 MG/ML IN 0.9 % SODIUM CHLORIDE INTRAVENOUS SOLUTION 2 MG/HR: at 18:40

## 2020-01-01 RX ADMIN — ASPIRIN 81 MG CHEWABLE TABLET 81 MG: 81 TABLET CHEWABLE at 07:54

## 2020-01-01 RX ADMIN — Medication 2.5 MG: at 22:06

## 2020-01-01 RX ADMIN — BUSPIRONE HYDROCHLORIDE 10 MG: 10 TABLET ORAL at 14:56

## 2020-01-01 RX ADMIN — DOCUSATE SODIUM AND SENNOSIDES 1 TABLET: 8.6; 5 TABLET, FILM COATED ORAL at 21:30

## 2020-01-01 RX ADMIN — OMEPRAZOLE 20 MG: 20 CAPSULE, DELAYED RELEASE ORAL at 08:24

## 2020-01-01 RX ADMIN — EPINEPHRINE 5 MCG: 1 INJECTION PARENTERAL at 10:19

## 2020-01-01 RX ADMIN — SODIUM CHLORIDE, POTASSIUM CHLORIDE, SODIUM LACTATE AND CALCIUM CHLORIDE 250 ML: 600; 310; 30; 20 INJECTION, SOLUTION INTRAVENOUS at 05:21

## 2020-01-01 RX ADMIN — ACETAMINOPHEN ORAL SOLUTION 1000 MG: 325 SOLUTION ORAL at 14:28

## 2020-01-01 RX ADMIN — DOCUSATE SODIUM 50 MG AND SENNOSIDES 8.6 MG 2 TABLET: 8.6; 5 TABLET, FILM COATED ORAL at 08:32

## 2020-01-01 RX ADMIN — Medication 5 MG: at 00:42

## 2020-01-01 RX ADMIN — ACETAMINOPHEN 650 MG: 325 TABLET, FILM COATED ORAL at 21:20

## 2020-01-01 RX ADMIN — PIPERACILLIN AND TAZOBACTAM 4.5 G: 4; .5 INJECTION, POWDER, FOR SOLUTION INTRAVENOUS at 20:17

## 2020-01-01 RX ADMIN — CALCIUM CHLORIDE, MAGNESIUM CHLORIDE, SODIUM CHLORIDE, SODIUM BICARBONATE, POTASSIUM CHLORIDE AND SODIUM PHOSPHATE DIBASIC DIHYDRATE 12.5 ML/KG/HR: 3.68; 3.05; 6.34; 3.09; .314; .187 INJECTION INTRAVENOUS at 22:26

## 2020-01-01 RX ADMIN — LEVETIRACETAM 2000 MG: 750 TABLET, FILM COATED ORAL at 21:11

## 2020-01-01 RX ADMIN — MUPIROCIN 1 G: 20 OINTMENT TOPICAL at 20:29

## 2020-01-01 RX ADMIN — BUSPIRONE HYDROCHLORIDE 10 MG: 10 TABLET ORAL at 19:41

## 2020-01-01 RX ADMIN — ASPIRIN 81 MG CHEWABLE TABLET 81 MG: 81 TABLET CHEWABLE at 08:06

## 2020-01-01 RX ADMIN — CALCIUM CHLORIDE, MAGNESIUM CHLORIDE, SODIUM CHLORIDE, SODIUM BICARBONATE, POTASSIUM CHLORIDE AND SODIUM PHOSPHATE DIBASIC DIHYDRATE 2.91 ML/KG/HR: 3.68; 3.05; 6.34; 3.09; .314; .187 INJECTION INTRAVENOUS at 20:36

## 2020-01-01 RX ADMIN — LEVETIRACETAM 2000 MG: 750 TABLET, FILM COATED ORAL at 07:42

## 2020-01-01 RX ADMIN — BUSPIRONE HYDROCHLORIDE 10 MG: 10 TABLET ORAL at 21:21

## 2020-01-01 RX ADMIN — CALCIUM CHLORIDE, MAGNESIUM CHLORIDE, SODIUM CHLORIDE, SODIUM BICARBONATE, POTASSIUM CHLORIDE AND SODIUM PHOSPHATE DIBASIC DIHYDRATE 12.5 ML/KG/HR: 3.68; 3.05; 6.34; 3.09; .314; .187 INJECTION INTRAVENOUS at 20:38

## 2020-01-01 RX ADMIN — MICAFUNGIN SODIUM 150 MG: 10 INJECTION, POWDER, LYOPHILIZED, FOR SOLUTION INTRAVENOUS at 12:51

## 2020-01-01 RX ADMIN — LEVETIRACETAM 2000 MG: 100 INJECTION, SOLUTION INTRAVENOUS at 16:18

## 2020-01-01 RX ADMIN — LEVETIRACETAM 2000 MG: 750 TABLET, FILM COATED ORAL at 20:06

## 2020-01-01 RX ADMIN — BUSPIRONE HYDROCHLORIDE 10 MG: 10 TABLET ORAL at 20:24

## 2020-01-01 RX ADMIN — PIPERACILLIN SODIUM AND TAZOBACTAM SODIUM 4.5 G: 4; .5 INJECTION, POWDER, LYOPHILIZED, FOR SOLUTION INTRAVENOUS at 01:43

## 2020-01-01 RX ADMIN — PROPOFOL 50 MCG/KG/MIN: 10 INJECTION, EMULSION INTRAVENOUS at 22:28

## 2020-01-01 RX ADMIN — ASPIRIN 81 MG CHEWABLE TABLET 81 MG: 81 TABLET CHEWABLE at 09:03

## 2020-01-01 RX ADMIN — AMIODARONE HYDROCHLORIDE 200 MG: 200 TABLET ORAL at 07:53

## 2020-01-01 RX ADMIN — DORZOLAMIDE HYDROCHLORIDE AND TIMOLOL MALEATE 1 DROP: 20; 5 SOLUTION/ DROPS OPHTHALMIC at 10:04

## 2020-01-01 RX ADMIN — METHOCARBAMOL 500 MG: 500 TABLET, FILM COATED ORAL at 16:12

## 2020-01-01 RX ADMIN — MUPIROCIN 1 G: 20 OINTMENT TOPICAL at 08:34

## 2020-01-01 RX ADMIN — LATANOPROST 1 DROP: 50 SOLUTION/ DROPS OPHTHALMIC at 09:12

## 2020-01-01 RX ADMIN — FERROUS SULFATE TAB 325 MG (65 MG ELEMENTAL FE) 325 MG: 325 (65 FE) TAB at 08:50

## 2020-01-01 RX ADMIN — OMEPRAZOLE 20 MG: 20 CAPSULE, DELAYED RELEASE ORAL at 08:43

## 2020-01-01 RX ADMIN — MUPIROCIN 1 G: 20 OINTMENT TOPICAL at 20:30

## 2020-01-01 RX ADMIN — HUMAN INSULIN 1 UNITS/HR: 100 INJECTION, SOLUTION SUBCUTANEOUS at 18:20

## 2020-01-01 RX ADMIN — SODIUM CHLORIDE 200 MG: 9 INJECTION, SOLUTION INTRAVENOUS at 09:22

## 2020-01-01 RX ADMIN — DORZOLAMIDE HYDROCHLORIDE AND TIMOLOL MALEATE 1 DROP: 20; 5 SOLUTION/ DROPS OPHTHALMIC at 02:18

## 2020-01-01 RX ADMIN — Medication 12.5 MG: at 14:04

## 2020-01-01 RX ADMIN — PANTOPRAZOLE SODIUM 40 MG: 40 TABLET, DELAYED RELEASE ORAL at 15:58

## 2020-01-01 RX ADMIN — LOPERAMIDE HYDROCHLORIDE 2 MG: 2 CAPSULE ORAL at 11:12

## 2020-01-01 RX ADMIN — DOCUSATE SODIUM AND SENNOSIDES 2 TABLET: 8.6; 5 TABLET ORAL at 08:26

## 2020-01-01 RX ADMIN — RIFAMPIN 600 MG: 600 INJECTION, POWDER, LYOPHILIZED, FOR SOLUTION INTRAVENOUS at 18:59

## 2020-01-01 RX ADMIN — CALCIUM CHLORIDE 1 G: 100 INJECTION, SOLUTION INTRAVENOUS at 23:49

## 2020-01-01 RX ADMIN — HEPARIN SODIUM 700 UNITS/HR: 10000 INJECTION, SOLUTION INTRAVENOUS at 16:31

## 2020-01-01 RX ADMIN — Medication 2 UNITS/HR: at 06:12

## 2020-01-01 RX ADMIN — HEPARIN SODIUM 650 UNITS/HR: 10000 INJECTION, SOLUTION INTRAVENOUS at 05:02

## 2020-01-01 RX ADMIN — LATANOPROST 1 DROP: 50 SOLUTION/ DROPS OPHTHALMIC at 08:43

## 2020-01-01 RX ADMIN — DORZOLAMIDE HYDROCHLORIDE AND TIMOLOL MALEATE 1 DROP: 20; 5 SOLUTION/ DROPS OPHTHALMIC at 07:34

## 2020-01-01 RX ADMIN — MIDAZOLAM 4 MG: 1 INJECTION INTRAMUSCULAR; INTRAVENOUS at 09:57

## 2020-01-01 RX ADMIN — SODIUM CHLORIDE 250 ML: 9 INJECTION, SOLUTION INTRAVENOUS at 17:00

## 2020-01-01 RX ADMIN — Medication 1 PACKET: at 19:29

## 2020-01-01 RX ADMIN — CALCIUM CHLORIDE, MAGNESIUM CHLORIDE, SODIUM CHLORIDE, SODIUM BICARBONATE, POTASSIUM CHLORIDE AND SODIUM PHOSPHATE DIBASIC DIHYDRATE 12.5 ML/KG/HR: 3.68; 3.05; 6.34; 3.09; .314; .187 INJECTION INTRAVENOUS at 22:31

## 2020-01-01 RX ADMIN — CALCIUM CHLORIDE, MAGNESIUM CHLORIDE, SODIUM CHLORIDE, SODIUM BICARBONATE, POTASSIUM CHLORIDE AND SODIUM PHOSPHATE DIBASIC DIHYDRATE 12.5 ML/KG/HR: 3.68; 3.05; 6.34; 3.09; .314; .187 INJECTION INTRAVENOUS at 03:56

## 2020-01-01 RX ADMIN — DORZOLAMIDE HYDROCHLORIDE AND TIMOLOL MALEATE 1 DROP: 20; 5 SOLUTION/ DROPS OPHTHALMIC at 20:59

## 2020-01-01 RX ADMIN — BUSPIRONE HYDROCHLORIDE 10 MG: 10 TABLET ORAL at 09:03

## 2020-01-01 RX ADMIN — PANTOPRAZOLE SODIUM 40 MG: 40 TABLET, DELAYED RELEASE ORAL at 16:05

## 2020-01-01 RX ADMIN — CLOPIDOGREL BISULFATE 75 MG: 75 TABLET ORAL at 09:06

## 2020-01-01 RX ADMIN — DEXTROSE MONOHYDRATE 25 ML: 500 INJECTION PARENTERAL at 21:28

## 2020-01-01 RX ADMIN — DORZOLAMIDE HYDROCHLORIDE AND TIMOLOL MALEATE 1 DROP: 20; 5 SOLUTION/ DROPS OPHTHALMIC at 08:39

## 2020-01-01 RX ADMIN — Medication 62.5 MCG: at 08:08

## 2020-01-01 RX ADMIN — AMIODARONE HYDROCHLORIDE 200 MG: 200 TABLET ORAL at 07:31

## 2020-01-01 RX ADMIN — PIPERACILLIN AND TAZOBACTAM 4.5 G: 4; .5 INJECTION, POWDER, FOR SOLUTION INTRAVENOUS at 06:23

## 2020-01-01 RX ADMIN — ACETAMINOPHEN 650 MG: 325 TABLET, FILM COATED ORAL at 21:45

## 2020-01-01 RX ADMIN — Medication 2 SPRAY: at 08:32

## 2020-01-01 RX ADMIN — CEFTRIAXONE SODIUM 1 G: 1 INJECTION, POWDER, FOR SOLUTION INTRAMUSCULAR; INTRAVENOUS at 16:05

## 2020-01-01 RX ADMIN — IRON SUCROSE 100 MG: 20 INJECTION, SOLUTION INTRAVENOUS at 11:19

## 2020-01-01 RX ADMIN — ACETAMINOPHEN ORAL SOLUTION 1000 MG: 325 SOLUTION ORAL at 15:58

## 2020-01-01 RX ADMIN — LATANOPROST 1 DROP: 50 SOLUTION OPHTHALMIC at 07:50

## 2020-01-01 RX ADMIN — DOBUTAMINE 5 MCG/KG/MIN: 12.5 INJECTION, SOLUTION INTRAVENOUS at 19:52

## 2020-01-01 RX ADMIN — Medication 2 UNITS/HR: at 03:45

## 2020-01-01 RX ADMIN — BUSPIRONE HYDROCHLORIDE 10 MG: 10 TABLET ORAL at 01:35

## 2020-01-01 RX ADMIN — DOBUTAMINE 5 MCG/KG/MIN: 12.5 INJECTION, SOLUTION INTRAVENOUS at 23:42

## 2020-01-01 RX ADMIN — PANTOPRAZOLE SODIUM 40 MG: 40 TABLET, DELAYED RELEASE ORAL at 16:27

## 2020-01-01 RX ADMIN — ACETAMINOPHEN 650 MG: 325 TABLET, FILM COATED ORAL at 08:30

## 2020-01-01 RX ADMIN — LATANOPROST 1 DROP: 50 SOLUTION OPHTHALMIC at 07:34

## 2020-01-01 RX ADMIN — ROSUVASTATIN CALCIUM 20 MG: 20 TABLET, FILM COATED ORAL at 22:10

## 2020-01-01 RX ADMIN — BUSPIRONE HYDROCHLORIDE 10 MG: 10 TABLET ORAL at 08:34

## 2020-01-01 RX ADMIN — Medication 62.5 MCG: at 09:03

## 2020-01-01 RX ADMIN — DORZOLAMIDE HYDROCHLORIDE AND TIMOLOL MALEATE 1 DROP: 20; 5 SOLUTION/ DROPS OPHTHALMIC at 08:54

## 2020-01-01 RX ADMIN — Medication: at 08:07

## 2020-01-01 RX ADMIN — HUMAN INSULIN 0.5 UNITS/HR: 100 INJECTION, SOLUTION SUBCUTANEOUS at 11:37

## 2020-01-01 RX ADMIN — FERROUS SULFATE TAB 325 MG (65 MG ELEMENTAL FE) 325 MG: 325 (65 FE) TAB at 08:37

## 2020-01-01 RX ADMIN — HUMAN INSULIN 7 UNITS: 100 INJECTION, SOLUTION SUBCUTANEOUS at 23:33

## 2020-01-01 RX ADMIN — SODIUM CHLORIDE 200 MG: 9 INJECTION, SOLUTION INTRAVENOUS at 07:51

## 2020-01-01 RX ADMIN — TORSEMIDE 40 MG: 20 TABLET ORAL at 09:24

## 2020-01-01 RX ADMIN — ALBUMIN HUMAN 25 G: 0.25 SOLUTION INTRAVENOUS at 17:50

## 2020-01-01 RX ADMIN — Medication 5 MG: at 23:28

## 2020-01-01 RX ADMIN — BUSPIRONE HYDROCHLORIDE 10 MG: 10 TABLET ORAL at 16:01

## 2020-01-01 RX ADMIN — QUETIAPINE 12.5 MG: 25 TABLET, FILM COATED ORAL at 21:13

## 2020-01-01 RX ADMIN — ALBUMIN HUMAN 25 G: 0.25 SOLUTION INTRAVENOUS at 10:52

## 2020-01-01 RX ADMIN — LATANOPROST 1 DROP: 50 SOLUTION/ DROPS OPHTHALMIC at 10:13

## 2020-01-01 RX ADMIN — POTASSIUM CHLORIDE 20 MEQ: 29.8 INJECTION, SOLUTION INTRAVENOUS at 05:39

## 2020-01-01 RX ADMIN — MIDAZOLAM 1 MG: 1 INJECTION INTRAMUSCULAR; INTRAVENOUS at 11:58

## 2020-01-01 RX ADMIN — METRONIDAZOLE 500 MG: 500 INJECTION, SOLUTION INTRAVENOUS at 21:58

## 2020-01-01 RX ADMIN — Medication: at 10:15

## 2020-01-01 RX ADMIN — ACETAMINOPHEN ORAL SOLUTION 1000 MG: 325 SOLUTION ORAL at 02:48

## 2020-01-01 RX ADMIN — VANCOMYCIN HYDROCHLORIDE 1500 MG: 10 INJECTION, POWDER, LYOPHILIZED, FOR SOLUTION INTRAVENOUS at 15:47

## 2020-01-01 RX ADMIN — CALCIUM CHLORIDE, MAGNESIUM CHLORIDE, SODIUM CHLORIDE, SODIUM BICARBONATE, POTASSIUM CHLORIDE AND SODIUM PHOSPHATE DIBASIC DIHYDRATE 12.5 ML/KG/HR: 3.68; 3.05; 6.34; 3.09; .314; .187 INJECTION INTRAVENOUS at 15:51

## 2020-01-01 RX ADMIN — VANCOMYCIN HYDROCHLORIDE 1750 MG: 10 INJECTION, POWDER, LYOPHILIZED, FOR SOLUTION INTRAVENOUS at 10:32

## 2020-01-01 RX ADMIN — LEVETIRACETAM 2000 MG: 750 TABLET, FILM COATED ORAL at 07:46

## 2020-01-01 RX ADMIN — MULTIPLE VITAMINS W/ MINERALS TAB 1 TABLET: TAB at 22:11

## 2020-01-01 RX ADMIN — BUSPIRONE HYDROCHLORIDE 10 MG: 10 TABLET ORAL at 13:05

## 2020-01-01 RX ADMIN — BIMATOPROST 1 DROP: 0.1 SOLUTION/ DROPS OPHTHALMIC at 21:22

## 2020-01-01 RX ADMIN — PIPERACILLIN SODIUM AND TAZOBACTAM SODIUM 4.5 G: 4; .5 INJECTION, POWDER, LYOPHILIZED, FOR SOLUTION INTRAVENOUS at 10:00

## 2020-01-01 RX ADMIN — RIFAMPIN 600 MG: 600 INJECTION, POWDER, LYOPHILIZED, FOR SOLUTION INTRAVENOUS at 17:10

## 2020-01-01 RX ADMIN — FERROUS SULFATE TAB 325 MG (65 MG ELEMENTAL FE) 325 MG: 325 (65 FE) TAB at 10:08

## 2020-01-01 RX ADMIN — PANTOPRAZOLE SODIUM 40 MG: 40 TABLET, DELAYED RELEASE ORAL at 07:26

## 2020-01-01 RX ADMIN — BUSPIRONE HYDROCHLORIDE 10 MG: 10 TABLET ORAL at 19:47

## 2020-01-01 RX ADMIN — FERROUS SULFATE TAB 325 MG (65 MG ELEMENTAL FE) 325 MG: 325 (65 FE) TAB at 22:16

## 2020-01-01 RX ADMIN — BUSPIRONE HYDROCHLORIDE 10 MG: 10 TABLET ORAL at 07:36

## 2020-01-01 RX ADMIN — LEVETIRACETAM 2000 MG: 100 INJECTION, SOLUTION INTRAVENOUS at 17:03

## 2020-01-01 RX ADMIN — PANTOPRAZOLE SODIUM 40 MG: 40 INJECTION, POWDER, FOR SOLUTION INTRAVENOUS at 07:32

## 2020-01-01 RX ADMIN — MICONAZOLE NITRATE: 20 POWDER TOPICAL at 07:55

## 2020-01-01 RX ADMIN — DORZOLAMIDE HYDROCHLORIDE AND TIMOLOL MALEATE 1 DROP: 20; 5 SOLUTION/ DROPS OPHTHALMIC at 20:25

## 2020-01-01 RX ADMIN — Medication 2 UNITS/HR: at 14:12

## 2020-01-01 RX ADMIN — LACOSAMIDE 200 MG: 200 TABLET, FILM COATED ORAL at 19:52

## 2020-01-01 RX ADMIN — BIMATOPROST 1 DROP: 0.1 SOLUTION/ DROPS OPHTHALMIC at 21:31

## 2020-01-01 RX ADMIN — CALCIUM CHLORIDE 1 G: 100 INJECTION, SOLUTION INTRAVENOUS at 14:57

## 2020-01-01 RX ADMIN — ALBUMIN HUMAN 500 ML: 0.05 INJECTION, SOLUTION INTRAVENOUS at 18:54

## 2020-01-01 RX ADMIN — MICAFUNGIN SODIUM 150 MG: 10 INJECTION, POWDER, LYOPHILIZED, FOR SOLUTION INTRAVENOUS at 09:24

## 2020-01-01 RX ADMIN — BUSPIRONE HYDROCHLORIDE 10 MG: 10 TABLET ORAL at 16:27

## 2020-01-01 RX ADMIN — BUSPIRONE HYDROCHLORIDE 10 MG: 10 TABLET ORAL at 19:29

## 2020-01-01 RX ADMIN — BUSPIRONE HYDROCHLORIDE 10 MG: 10 TABLET ORAL at 21:15

## 2020-01-01 RX ADMIN — Medication 0.03 MCG/KG/MIN: at 04:41

## 2020-01-01 RX ADMIN — LEVETIRACETAM 2000 MG: 100 INJECTION, SOLUTION INTRAVENOUS at 12:31

## 2020-01-01 RX ADMIN — CEFTRIAXONE 1 G: 1 INJECTION, POWDER, FOR SOLUTION INTRAMUSCULAR; INTRAVENOUS at 13:18

## 2020-01-01 RX ADMIN — CALCIUM CHLORIDE, MAGNESIUM CHLORIDE, SODIUM CHLORIDE, SODIUM BICARBONATE, POTASSIUM CHLORIDE AND SODIUM PHOSPHATE DIBASIC DIHYDRATE 12.5 ML/KG/HR: 3.68; 3.05; 6.34; 3.09; .314; .187 INJECTION INTRAVENOUS at 03:16

## 2020-01-01 RX ADMIN — Medication 2.5 MG: at 11:11

## 2020-01-01 RX ADMIN — BUSPIRONE HYDROCHLORIDE 10 MG: 10 TABLET ORAL at 16:24

## 2020-01-01 RX ADMIN — CALCIUM CHLORIDE, MAGNESIUM CHLORIDE, SODIUM CHLORIDE, SODIUM BICARBONATE, POTASSIUM CHLORIDE AND SODIUM PHOSPHATE DIBASIC DIHYDRATE 2.91 ML/KG/HR: 3.68; 3.05; 6.34; 3.09; .314; .187 INJECTION INTRAVENOUS at 18:51

## 2020-01-01 RX ADMIN — CEFTRIAXONE SODIUM 1 G: 1 INJECTION, POWDER, FOR SOLUTION INTRAMUSCULAR; INTRAVENOUS at 16:44

## 2020-01-01 RX ADMIN — ACETAMINOPHEN ORAL SOLUTION 1000 MG: 325 SOLUTION ORAL at 18:58

## 2020-01-01 RX ADMIN — OMEPRAZOLE 20 MG: 20 CAPSULE, DELAYED RELEASE ORAL at 07:45

## 2020-01-01 RX ADMIN — POTASSIUM CHLORIDE, DEXTROSE MONOHYDRATE AND SODIUM CHLORIDE 10 ML/HR: 150; 5; 450 INJECTION, SOLUTION INTRAVENOUS at 19:47

## 2020-01-01 RX ADMIN — ACETAMINOPHEN 650 MG: 325 TABLET, FILM COATED ORAL at 13:20

## 2020-01-01 RX ADMIN — ROSUVASTATIN CALCIUM 20 MG: 20 TABLET, FILM COATED ORAL at 07:42

## 2020-01-01 RX ADMIN — PANTOPRAZOLE SODIUM 40 MG: 40 INJECTION, POWDER, FOR SOLUTION INTRAVENOUS at 19:54

## 2020-01-01 RX ADMIN — DIGOXIN 125 MCG: 0.06 TABLET ORAL at 08:19

## 2020-01-01 RX ADMIN — ONDANSETRON 4 MG: 2 INJECTION INTRAMUSCULAR; INTRAVENOUS at 18:26

## 2020-01-01 RX ADMIN — CEFTRIAXONE 1 G: 1 INJECTION, POWDER, FOR SOLUTION INTRAMUSCULAR; INTRAVENOUS at 16:33

## 2020-01-01 RX ADMIN — PIPERACILLIN SODIUM AND TAZOBACTAM SODIUM 4.5 G: 4; .5 INJECTION, POWDER, LYOPHILIZED, FOR SOLUTION INTRAVENOUS at 16:36

## 2020-01-01 RX ADMIN — SODIUM CHLORIDE 250 ML: 9 INJECTION, SOLUTION INTRAVENOUS at 09:23

## 2020-01-01 RX ADMIN — ROSUVASTATIN CALCIUM 20 MG: 20 TABLET, FILM COATED ORAL at 21:00

## 2020-01-01 RX ADMIN — BUSPIRONE HYDROCHLORIDE 10 MG: 10 TABLET ORAL at 22:10

## 2020-01-01 RX ADMIN — MIDAZOLAM (PF) 1 MG/ML IN 0.9 % SODIUM CHLORIDE INTRAVENOUS SOLUTION 4 MG/HR: at 15:45

## 2020-01-01 RX ADMIN — DIGOXIN 125 MCG: 0.06 TABLET ORAL at 09:06

## 2020-01-01 RX ADMIN — QUETIAPINE FUMARATE 50 MG: 50 TABLET ORAL at 21:55

## 2020-01-01 RX ADMIN — WARFARIN SODIUM 1 MG: 1 TABLET ORAL at 18:08

## 2020-01-01 RX ADMIN — BIMATOPROST 1 DROP: 0.1 SOLUTION/ DROPS OPHTHALMIC at 22:39

## 2020-01-01 RX ADMIN — ACETAMINOPHEN 975 MG: 325 TABLET, FILM COATED ORAL at 08:31

## 2020-01-01 RX ADMIN — AMIODARONE HYDROCHLORIDE 200 MG: 200 TABLET ORAL at 08:31

## 2020-01-01 RX ADMIN — ALBUMIN HUMAN 12.5 G: 0.05 INJECTION, SOLUTION INTRAVENOUS at 22:58

## 2020-01-01 RX ADMIN — CALCIUM CHLORIDE, MAGNESIUM CHLORIDE, SODIUM CHLORIDE, SODIUM BICARBONATE, POTASSIUM CHLORIDE AND SODIUM PHOSPHATE DIBASIC DIHYDRATE 12.5 ML/KG/HR: 3.68; 3.05; 6.34; 3.09; .314; .187 INJECTION INTRAVENOUS at 23:06

## 2020-01-01 RX ADMIN — BUSPIRONE HYDROCHLORIDE 10 MG: 10 TABLET ORAL at 20:29

## 2020-01-01 RX ADMIN — LACOSAMIDE 200 MG: 200 TABLET, FILM COATED ORAL at 21:11

## 2020-01-01 RX ADMIN — DEXTROSE MONOHYDRATE 1000 ML: 100 INJECTION, SOLUTION INTRAVENOUS at 00:33

## 2020-01-01 RX ADMIN — ACETAMINOPHEN ORAL SOLUTION 1000 MG: 325 SOLUTION ORAL at 16:14

## 2020-01-01 RX ADMIN — CEFTRIAXONE 1 G: 1 INJECTION, POWDER, FOR SOLUTION INTRAMUSCULAR; INTRAVENOUS at 14:10

## 2020-01-01 RX ADMIN — THIAMINE HCL TAB 100 MG 100 MG: 100 TAB at 08:38

## 2020-01-01 RX ADMIN — LACOSAMIDE 200 MG: 200 TABLET, FILM COATED ORAL at 07:48

## 2020-01-01 RX ADMIN — Medication 5 MG: at 05:02

## 2020-01-01 RX ADMIN — EPINEPHRINE 0.01 MCG/KG/MIN: 1 INJECTION PARENTERAL at 10:58

## 2020-01-01 RX ADMIN — BUSPIRONE HYDROCHLORIDE 10 MG: 10 TABLET ORAL at 15:49

## 2020-01-01 RX ADMIN — EPINEPHRINE 0.01 MCG/KG/MIN: 1 INJECTION PARENTERAL at 17:16

## 2020-01-01 RX ADMIN — ACETAMINOPHEN 650 MG: 325 TABLET, FILM COATED ORAL at 21:13

## 2020-01-01 RX ADMIN — BIMATOPROST 1 DROP: 0.1 SOLUTION/ DROPS OPHTHALMIC at 21:25

## 2020-01-01 RX ADMIN — PROPOFOL 30 MCG/KG/MIN: 10 INJECTION, EMULSION INTRAVENOUS at 22:21

## 2020-01-01 RX ADMIN — OMEPRAZOLE 20 MG: 20 CAPSULE, DELAYED RELEASE ORAL at 07:32

## 2020-01-01 RX ADMIN — ALBUMIN HUMAN 12.5 G: 0.05 INJECTION, SOLUTION INTRAVENOUS at 03:48

## 2020-01-01 RX ADMIN — OXYCODONE HYDROCHLORIDE 5 MG: 5 TABLET ORAL at 08:12

## 2020-01-01 RX ADMIN — POTASSIUM CHLORIDE 20 MEQ: 1.5 POWDER, FOR SOLUTION ORAL at 06:23

## 2020-01-01 RX ADMIN — METHOCARBAMOL 500 MG: 500 TABLET, FILM COATED ORAL at 00:20

## 2020-01-01 RX ADMIN — ACETAMINOPHEN ORAL SOLUTION 1000 MG: 325 SOLUTION ORAL at 08:30

## 2020-01-01 RX ADMIN — PIPERACILLIN AND TAZOBACTAM 4.5 G: 4; .5 INJECTION, POWDER, FOR SOLUTION INTRAVENOUS at 06:10

## 2020-01-01 ASSESSMENT — ACTIVITIES OF DAILY LIVING (ADL)
ADLS_ACUITY_SCORE: 20
ADLS_ACUITY_SCORE: 26
ADLS_ACUITY_SCORE: 30
ADLS_ACUITY_SCORE: 26
ADLS_ACUITY_SCORE: 23
ADLS_ACUITY_SCORE: 26
ADLS_ACUITY_SCORE: 28
ADLS_ACUITY_SCORE: 26
ADLS_ACUITY_SCORE: 28
ADLS_ACUITY_SCORE: 26
AMBULATION: 1-->ASSISTIVE EQUIPMENT
ADLS_ACUITY_SCORE: 28
ADLS_ACUITY_SCORE: 23
ADLS_ACUITY_SCORE: 29
ADLS_ACUITY_SCORE: 26
TRANSFERRING: 1-->ASSISTIVE EQUIPMENT
ADLS_ACUITY_SCORE: 26
ADLS_ACUITY_SCORE: 20
ADLS_ACUITY_SCORE: 26
ADLS_ACUITY_SCORE: 28
ADLS_ACUITY_SCORE: 20
ADLS_ACUITY_SCORE: 28
ADLS_ACUITY_SCORE: 22
ADLS_ACUITY_SCORE: 27
ADLS_ACUITY_SCORE: 20
ADLS_ACUITY_SCORE: 26
SWALLOWING: 0-->SWALLOWS FOODS/LIQUIDS WITHOUT DIFFICULTY
ADLS_ACUITY_SCORE: 25
ADLS_ACUITY_SCORE: 28
ADLS_ACUITY_SCORE: 17
ADLS_ACUITY_SCORE: 29
ADLS_ACUITY_SCORE: 28
ADLS_ACUITY_SCORE: 23
ADLS_ACUITY_SCORE: 28
ADLS_ACUITY_SCORE: 20
ADLS_ACUITY_SCORE: 28
ADLS_ACUITY_SCORE: 28
ADLS_ACUITY_SCORE: 25
ADLS_ACUITY_SCORE: 27
ADLS_ACUITY_SCORE: 21
ADLS_ACUITY_SCORE: 27
ADLS_ACUITY_SCORE: 26
BATHING: 2-->ASSISTIVE PERSON
ADLS_ACUITY_SCORE: 25
ADLS_ACUITY_SCORE: 26
ADLS_ACUITY_SCORE: 20
ADLS_ACUITY_SCORE: 26
ADLS_ACUITY_SCORE: 26
ADLS_ACUITY_SCORE: 28
ADLS_ACUITY_SCORE: 25
ADLS_ACUITY_SCORE: 27
TRANSFERRING: 3-->ASSISTIVE EQUIPMENT AND PERSON
ADLS_ACUITY_SCORE: 26
SWALLOWING: 0-->SWALLOWS FOODS/LIQUIDS WITHOUT DIFFICULTY
ADLS_ACUITY_SCORE: 25
ADLS_ACUITY_SCORE: 28
ADLS_ACUITY_SCORE: 26
ADLS_ACUITY_SCORE: 28
ADLS_ACUITY_SCORE: 28
ADLS_ACUITY_SCORE: 27
ADLS_ACUITY_SCORE: 26
ADLS_ACUITY_SCORE: 26
ADLS_ACUITY_SCORE: 28
ADLS_ACUITY_SCORE: 28
ADLS_ACUITY_SCORE: 23
ADLS_ACUITY_SCORE: 23
ADLS_ACUITY_SCORE: 28
RETIRED_COMMUNICATION: 0-->UNDERSTANDS/COMMUNICATES WITHOUT DIFFICULTY
ADLS_ACUITY_SCORE: 26
ADLS_ACUITY_SCORE: 23
ADLS_ACUITY_SCORE: 26
ADLS_ACUITY_SCORE: 25
ADLS_ACUITY_SCORE: 27
ADLS_ACUITY_SCORE: 26
ADLS_ACUITY_SCORE: 20
ADLS_ACUITY_SCORE: 26
ADLS_ACUITY_SCORE: 28
ADLS_ACUITY_SCORE: 26
BATHING: 2-->ASSISTIVE PERSON
ADLS_ACUITY_SCORE: 28
ADLS_ACUITY_SCORE: 23
ADLS_ACUITY_SCORE: 26
FALL_HISTORY_WITHIN_LAST_SIX_MONTHS: NO
ADLS_ACUITY_SCORE: 26
ADLS_ACUITY_SCORE: 26
RETIRED_EATING: 0-->INDEPENDENT
TOILETING: 3-->ASSISTIVE EQUIPMENT AND PERSON
ADLS_ACUITY_SCORE: 26
RETIRED_EATING: 0-->INDEPENDENT
ADLS_ACUITY_SCORE: 24
ADLS_ACUITY_SCORE: 28
ADLS_ACUITY_SCORE: 20
ADLS_ACUITY_SCORE: 28
ADLS_ACUITY_SCORE: 26
ADLS_ACUITY_SCORE: 26
ADLS_ACUITY_SCORE: 21
ADLS_ACUITY_SCORE: 24
ADLS_ACUITY_SCORE: 27
ADLS_ACUITY_SCORE: 27
ADLS_ACUITY_SCORE: 26
ADLS_ACUITY_SCORE: 28
ADLS_ACUITY_SCORE: 28
ADLS_ACUITY_SCORE: 23
ADLS_ACUITY_SCORE: 28
ADLS_ACUITY_SCORE: 27
ADLS_ACUITY_SCORE: 28
ADLS_ACUITY_SCORE: 26
ADLS_ACUITY_SCORE: 30
ADLS_ACUITY_SCORE: 25
ADLS_ACUITY_SCORE: 25
ADLS_ACUITY_SCORE: 26
ADLS_ACUITY_SCORE: 26
ADLS_ACUITY_SCORE: 22
ADLS_ACUITY_SCORE: 23
ADLS_ACUITY_SCORE: 26
ADLS_ACUITY_SCORE: 25
ADLS_ACUITY_SCORE: 25
ADLS_ACUITY_SCORE: 23
ADLS_ACUITY_SCORE: 26
ADLS_ACUITY_SCORE: 28
ADLS_ACUITY_SCORE: 26
FALL_HISTORY_WITHIN_LAST_SIX_MONTHS: NO
ADLS_ACUITY_SCORE: 28
ADLS_ACUITY_SCORE: 26
ADLS_ACUITY_SCORE: 28
RETIRED_COMMUNICATION: 0-->UNDERSTANDS/COMMUNICATES WITHOUT DIFFICULTY
ADLS_ACUITY_SCORE: 28
ADLS_ACUITY_SCORE: 24
ADLS_ACUITY_SCORE: 22
ADLS_ACUITY_SCORE: 28
ADLS_ACUITY_SCORE: 20
ADLS_ACUITY_SCORE: 24
ADLS_ACUITY_SCORE: 28
ADLS_ACUITY_SCORE: 26
ADLS_ACUITY_SCORE: 24
ADLS_ACUITY_SCORE: 28
ADLS_ACUITY_SCORE: 22
ADLS_ACUITY_SCORE: 25
ADLS_ACUITY_SCORE: 26
ADLS_ACUITY_SCORE: 23
DRESS: 1-->ASSISTIVE EQUIPMENT
ADLS_ACUITY_SCORE: 25
ADLS_ACUITY_SCORE: 17
ADLS_ACUITY_SCORE: 25
ADLS_ACUITY_SCORE: 28
ADLS_ACUITY_SCORE: 28
ADLS_ACUITY_SCORE: 26
ADLS_ACUITY_SCORE: 20
ADLS_ACUITY_SCORE: 23
ADLS_ACUITY_SCORE: 30
ADLS_ACUITY_SCORE: 23
ADLS_ACUITY_SCORE: 25
ADLS_ACUITY_SCORE: 27
ADLS_ACUITY_SCORE: 28
ADLS_ACUITY_SCORE: 18
ADLS_ACUITY_SCORE: 27
ADLS_ACUITY_SCORE: 27
ADLS_ACUITY_SCORE: 28
ADLS_ACUITY_SCORE: 27
ADLS_ACUITY_SCORE: 23
ADLS_ACUITY_SCORE: 28
ADLS_ACUITY_SCORE: 26
ADLS_ACUITY_SCORE: 23
ADLS_ACUITY_SCORE: 25
ADLS_ACUITY_SCORE: 26
ADLS_ACUITY_SCORE: 23
ADLS_ACUITY_SCORE: 28
ADLS_ACUITY_SCORE: 26
ADLS_ACUITY_SCORE: 27
ADLS_ACUITY_SCORE: 28
ADLS_ACUITY_SCORE: 29
ADLS_ACUITY_SCORE: 28
ADLS_ACUITY_SCORE: 30
ADLS_ACUITY_SCORE: 26
ADLS_ACUITY_SCORE: 26
ADLS_ACUITY_SCORE: 28
ADLS_ACUITY_SCORE: 24
ADLS_ACUITY_SCORE: 25
ADLS_ACUITY_SCORE: 26
ADLS_ACUITY_SCORE: 26
ADLS_ACUITY_SCORE: 27
ADLS_ACUITY_SCORE: 28
ADLS_ACUITY_SCORE: 27
PREVIOUS_RESPONSIBILITIES: MEDICATION MANAGEMENT;FINANCES
ADLS_ACUITY_SCORE: 26
ADLS_ACUITY_SCORE: 27
ADLS_ACUITY_SCORE: 27
ADLS_ACUITY_SCORE: 25
ADLS_ACUITY_SCORE: 27
ADLS_ACUITY_SCORE: 25
ADLS_ACUITY_SCORE: 26
ADLS_ACUITY_SCORE: 25
ADLS_ACUITY_SCORE: 26
ADLS_ACUITY_SCORE: 23
ADLS_ACUITY_SCORE: 26
DRESS: 1-->ASSISTIVE EQUIPMENT
ADLS_ACUITY_SCORE: 26
ADLS_ACUITY_SCORE: 27
ADLS_ACUITY_SCORE: 30
ADLS_ACUITY_SCORE: 26
ADLS_ACUITY_SCORE: 20
ADLS_ACUITY_SCORE: 26
ADLS_ACUITY_SCORE: 28
ADLS_ACUITY_SCORE: 20
ADLS_ACUITY_SCORE: 28
ADLS_ACUITY_SCORE: 20
ADLS_ACUITY_SCORE: 26
ADLS_ACUITY_SCORE: 23
ADLS_ACUITY_SCORE: 26
ADLS_ACUITY_SCORE: 26
ADLS_ACUITY_SCORE: 30
ADLS_ACUITY_SCORE: 27
ADLS_ACUITY_SCORE: 27
ADLS_ACUITY_SCORE: 24
ADLS_ACUITY_SCORE: 30
ADLS_ACUITY_SCORE: 26
ADLS_ACUITY_SCORE: 28
ADLS_ACUITY_SCORE: 25
ADLS_ACUITY_SCORE: 25
ADLS_ACUITY_SCORE: 23
ADLS_ACUITY_SCORE: 23
ADLS_ACUITY_SCORE: 26
ADLS_ACUITY_SCORE: 28
ADLS_ACUITY_SCORE: 27
ADLS_ACUITY_SCORE: 27
ADLS_ACUITY_SCORE: 26
ADLS_ACUITY_SCORE: 22
ADLS_ACUITY_SCORE: 20
ADLS_ACUITY_SCORE: 26
ADLS_ACUITY_SCORE: 22
ADLS_ACUITY_SCORE: 23
ADLS_ACUITY_SCORE: 26
AMBULATION: 1-->ASSISTIVE EQUIPMENT
COGNITION: 0 - NO COGNITION ISSUES REPORTED
ADLS_ACUITY_SCORE: 27
ADLS_ACUITY_SCORE: 17
ADLS_ACUITY_SCORE: 30
ADLS_ACUITY_SCORE: 21
ADLS_ACUITY_SCORE: 25
ADLS_ACUITY_SCORE: 23
ADLS_ACUITY_SCORE: 30
ADLS_ACUITY_SCORE: 26
ADLS_ACUITY_SCORE: 26
ADLS_ACUITY_SCORE: 20
ADLS_ACUITY_SCORE: 28
ADLS_ACUITY_SCORE: 27
ADLS_ACUITY_SCORE: 28
ADLS_ACUITY_SCORE: 26
WHICH_OF_THE_ABOVE_FUNCTIONAL_RISKS_HAD_A_RECENT_ONSET_OR_CHANGE?: AMBULATION;TRANSFERRING;TOILETING
ADLS_ACUITY_SCORE: 28
ADLS_ACUITY_SCORE: 28
ADLS_ACUITY_SCORE: 26
ADLS_ACUITY_SCORE: 26
COGNITION: 0 - NO COGNITION ISSUES REPORTED
ADLS_ACUITY_SCORE: 25
TOILETING: 1-->ASSISTIVE EQUIPMENT

## 2020-01-01 ASSESSMENT — PAIN DESCRIPTION - DESCRIPTORS
DESCRIPTORS: ACHING;DISCOMFORT
DESCRIPTORS: ACHING;DISCOMFORT
DESCRIPTORS: ACHING
DESCRIPTORS: SORE
DESCRIPTORS: ACHING;DISCOMFORT

## 2020-01-01 ASSESSMENT — ANXIETY QUESTIONNAIRES
3. WORRYING TOO MUCH ABOUT DIFFERENT THINGS: NOT AT ALL
6. BECOMING EASILY ANNOYED OR IRRITABLE: NOT AT ALL
IF YOU CHECKED OFF ANY PROBLEMS ON THIS QUESTIONNAIRE, HOW DIFFICULT HAVE THESE PROBLEMS MADE IT FOR YOU TO DO YOUR WORK, TAKE CARE OF THINGS AT HOME, OR GET ALONG WITH OTHER PEOPLE: SOMEWHAT DIFFICULT
GAD7 TOTAL SCORE: 3
5. BEING SO RESTLESS THAT IT IS HARD TO SIT STILL: NOT AT ALL
1. FEELING NERVOUS, ANXIOUS, OR ON EDGE: SEVERAL DAYS
2. NOT BEING ABLE TO STOP OR CONTROL WORRYING: NOT AT ALL
GAD7 TOTAL SCORE: 3
7. FEELING AFRAID AS IF SOMETHING AWFUL MIGHT HAPPEN: SEVERAL DAYS

## 2020-01-01 ASSESSMENT — MIFFLIN-ST. JEOR
SCORE: 1260.18
SCORE: 1218.45
SCORE: 1209.38
SCORE: 1226.16

## 2020-01-01 ASSESSMENT — ENCOUNTER SYMPTOMS
ENDOCRINE NEGATIVE: 1
PSYCHIATRIC NEGATIVE: 1
HEMATOLOGIC/LYMPHATIC NEGATIVE: 1
CARDIOVASCULAR NEGATIVE: 1
MUSCULOSKELETAL NEGATIVE: 1
NEUROLOGICAL NEGATIVE: 1
FREQUENCY: 1
ALLERGIC/IMMUNOLOGIC NEGATIVE: 1
COUGH: 1
EYES NEGATIVE: 1
DYSRHYTHMIAS: 1
DYSRHYTHMIAS: 1
GASTROINTESTINAL NEGATIVE: 1
FATIGUE: 1

## 2020-01-01 ASSESSMENT — NEW YORK HEART ASSOCIATION (NYHA) CLASSIFICATION
NYHA FUNCTIONAL CLASS: IV

## 2020-01-01 ASSESSMENT — PAIN SCALES - GENERAL: PAINLEVEL: NO PAIN (0)

## 2020-01-01 ASSESSMENT — PATIENT HEALTH QUESTIONNAIRE - PHQ9
5. POOR APPETITE OR OVEREATING: SEVERAL DAYS
SUM OF ALL RESPONSES TO PHQ QUESTIONS 1-9: 8

## 2020-01-01 ASSESSMENT — PULMONARY FUNCTION TESTS: FEV1 (%PREDICTED): 2

## 2020-05-28 NOTE — PROGRESS NOTES
"Marquise Jj is a 75 year old female who is being evaluated via a billable video visit.      The patient has been notified of following:     \"This video visit will be conducted via a call between you and your physician/provider. We have found that certain health care needs can be provided without the need for an in-person physical exam.  This service lets us provide the care you need with a video conversation.  If a prescription is necessary we can send it directly to your pharmacy.  If lab work is needed we can place an order for that and you can then stop by our lab to have the test done at a later time.    Video visits are billed at different rates depending on your insurance coverage.  Please reach out to your insurance provider with any questions.    If during the course of the call the physician/provider feels a video visit is not appropriate, you will not be charged for this service.\"    Patient has given verbal consent for Video visit? Yes    How would you like to obtain your AVS? Mail a copy    Patient would like the video invitation sent by: Text to cell phone: [phone]    Will anyone else be joining your video visit? No      Yasmine Landeros MA 5/28/2020 8:44 AM    Cardiology Consultation     Assessment & Plan     1.  Presentation of non-STEMI with multivessel coronary artery disease diagnosed in [place], Large anterior lateral MI March 2020  2.  Two-vessel CABG with LIMA to Diagonal (per operative report the LAD was dissected and the graft was placed on the diagonal vessel), KURT-RCA, no suitable target was noted in the circumflex per operative report  3.  Ischemic cardiomyopathy, most recent echocardiogram from April 2020 with ejection fraction of 20% with moderate MR, Mod TR  4.  Status post ICD placement in [place], [employer]  5.  Chronic kidney disease  6.  Hyperlipidemia    Recommendations    1.  Coronary artery disease with non-STEMI status post CABG. symptoms appear to be stable.  She " states that her shortness of breath which were her preceding and presenting symptom has mainly resolved although her energy level is low.  Let us continue with her aspirin, it appears she is not on a beta-blocker due to low blood pressure readings from her daughter.  We can reassess this on her next visit.  Would benefit from addition of beta-blocker from a coronary artery disease as well as ischemic cardiomyopathy standpoint.  Continue with aspirin and statin at this point.    2.  Ischemic cardiomyopathy with valvular heart disease.  Per diagnostic cardiac catheter ejection fraction estimated to be 30 to 35% with wall motion normalities including areas of dyskinesia as well as akinesis.  Follow-up echocardiogram in April 2020 demonstrates significantly reduced LV systolic function of 20% with moderate MR mild pulmonary hypertension and moderate tricuspid regurgitation.  She was switched over to Entresto which we will continue.  She is only will to tolerate once a day dosing due to relative hypotension we will keep a close eye on this.  She is following a 2 g low-sodium diet and her lower extremity edema is present however improved from where it was 2 months ago.  I have asked her to restart her Lasix at this point.  She would benefit from addition of beta-blocker as her hemodynamics allow which can be evaluated on her next visit.    3.  I will have her establish with the core clinic at [facility] as well as ordering a echocardiogram to assess her LV systolic function of valvular heart disease which was described on outside echocardiograms.    4.  We will have her establish with the pacemaker ICD clinic for remote checking    5.  Patient will return to clinic in 6 to 8 weeks with core to establish.      Thank you kindly for consult      Michelle Fong MD      History of present illness    Patient is a pleasant 75-year-old female who I am seeing via a video consult for new patient.  She was vacationing with her  family in March 2020 and went on a cruise to [place].  During the course of the cruise she felt unwell.  She eventually sought medical attention in [facility].  At that point she had been reporting a progressive weakness, shortness of breath and dyspnea on exertion.  Testing suggested anterior lateral MI with troponin elevation.  She was medically stabilized and underwent coronary angiogram which demonstrated multivessel coronary artery disease with 's noted.  Please see Cath Lab report from outside facility for full details.  Given the complexity of her disease in addition to cardiomyopathy she was referred for urgent cardiovascular surgery.  Her targets were small however a LIMA to diagonal and a KURT to RCA bypass was performed.  Post procedure she had a difficult recovery with recurrent pleural effusions which required therapeutic thoracentesis and a prolonged stay in the hospital.  Follow-up echocardiogram demonstrated LV systolic function was reduced at 20% with moderate MR and tricuspid regurgitation.  She was initially placed on a LifeVest however ultimately underwent ICD placement from a preventative standpoint.  She has now since come back to [place] and is seeking to establish local cardiac care.  She appears to be on appropriate guideline directed medical therapy.  She does have some lower extremity edema she stopped her maintenance Lasix which was 40 mg daily on her own.  She does have prescriptions regarding this as well as tablets at home.  She is on Entresto as well given her heart failure.  Her blood pressure is high 90s and low 100s.  She is not unable to tolerate the twice daily dosing and is taking it daily.  Outside records were reviewed with patient and family.  Her daughter is very involved in her care and takes her blood pressure daily in addition to this her weights are also recorded.  She states that her weights rarely fluctuate between 1 and 2 pounds over the course of the  last month.  She has religiously followed a low-salt diet which was recommended to her during her hospital stay in [place].  She is referred to establish local cardiac care.      Primary Care Physician   Poonam Cast      Patient Active Problem List   Diagnosis     S/P revision of total hip     Weakness     Chronic kidney disease, stage III (moderate) (H)     Gastroesophageal reflux disease     HTN (hypertension)     Hyperlipidemia     Anemia     Anemia due to blood loss, acute     Failure of total hip arthroplasty, subsequent encounter       Past Medical History   I have reviewed this patient's medical history and updated it with pertinent information if needed.   No past medical history on file.    Past Surgical History   I have reviewed this patient's surgical history and updated it with pertinent information if needed.  Past Surgical History:   Procedure Laterality Date     ARTHROPLASTY REVISION HIP Right 11/16/2017    Procedure: ARTHROPLASTY REVISION HIP;  Right total hip arthroplasty revision.;  Surgeon: Jozef Garcia MD;  Location: [facility] OR       Prior to Admission Medications   Cannot display prior to admission medications because the patient has not been admitted in this contact.     [unfilled]  [unfilled]  Allergies   Allergies   Allergen Reactions     Combigan [Brimonidine Tartrate-Timolol] Swelling     Swollen eyelids       Social History    reports that she has never smoked. She has never used smokeless tobacco.    Family History   No family history on file.    Review of Systems   The comprehensive 10 point Review of Systems is negative other than noted in the HPI or here.     Physical Exam   Vital Signs with Ranges  Pulse:  [105] 105  BP: (99)/(66) 99/66  SpO2:  [98 %] 98 %  Wt Readings from Last 4 Encounters:   05/28/20 71.4 kg (157 lb 8 oz)   11/27/17 77.1 kg (170 lb)   11/21/17 78.5 kg (173 lb)   11/19/17 77.1 kg (170 lb)     [unfilled]      Vitals: BP 99/66   Pulse 105   Wt 71.4 kg  (157 lb 8 oz)   SpO2 98%   BMI 23.95 kg/m      GENERAL: Healthy, alert and no distress  EYES: Eyes grossly normal to inspection.  No discharge or erythema, or obvious scleral/conjunctival abnormalities.  RESP: No audible wheeze, cough, or visible cyanosis.  No visible retractions or increased work of breathing.    SKIN: Visible skin clear. No significant rash, abnormal pigmentation or lesions.  NEURO: Cranial nerves grossly intact.  Mentation and speech appropriate for age.  PSYCH: Mentation appears normal, affect normal/bright, judgement and insight intact, normal speech and appearance well-groomed.    @LABRCNTIPR(tropi:5,troponinies:5)@    @LABRCNTIPR(wbc:3,hgb:3,mcv:3,plt:3,inr:3,na:3,potassium:3,chloride:3,co2:3,bun:3,cr:3,gfrestimated:3,gfrestblack:3,aniongap:3,donald:3,glc:3,albumin:2,prottotal:2,bilitotal:2,alkphos:2,alt:2,ast:2,lipase:2,tropi:3)@  No results for input(s): CHOL, HDL, LDL, TRIG, CHOLHDLRATIO in the last 92776 hours.  @LABRCNTIP(wbc:3,hgb:3,hct:3,mcv:3,plt:3,iron:3,ironsat:3,reticabsct:3,retp:3,feb:3,flor:3,b12:3,folic:3,epoe:3,morph:3)@  @LABRCNTIP(PH:3,PHV:3,PO2:3,PO2V:3,sat:3,PCO2:3,PCO2V:3,HCO3:3,HCO3V:3)@  @LABRCNTIP(NTBNPI:3,NTBNP:3)@  @LABRCNTIP(DD:1)@  @LABRCNTIP(sed:3,crp:3)@  @LABRCNTIP(PLT:3)@  @LABRCNTIP(TSH:3)@  @LABRCNTIP(color:1,appearance:1,urineg,urinebili:1,urineketone:1,s,ubld:1,urineph:1,protein:1,urobilinogen:1,nitrite:1,leukest:1,rbcu:1,wbcu:1)@    Imaging:  No results found for this or any previous visit (from the past 48 hour(s)).    Echo:  No results found for this or any previous visit (from the past 4320 hour(s)).                 Video-Visit Details    Type of service:  Video Visit    Video Start Time: 10:20 AM  Video End Time: 10:40 AM    Originating Location (pt. Location): Home    Distant Location (provider location):  [facility]     Platform used for Video Visit: Ninfa Fong MD

## 2020-05-28 NOTE — LETTER
5/28/2020    Poonam Cast  [facility] [address] [facility] [address]    RE: Marquise Laboy Анна       Dear Colleague,    I had the pleasure of seeing Marquise Jj in the [facility].    Marquise Jj is a 75 year old female who is being evaluated via a billable video visit.      Cardiology Consultation     Assessment & Plan     1.  Presentation of non-STEMI with multivessel coronary artery disease diagnosed in [place], Large anterior lateral MI March 2020  2.  Two-vessel CABG with LIMA to Diagonal (per operative report the LAD was dissected and the graft was placed on the diagonal vessel), KURT-RCA, no suitable target was noted in the circumflex per operative report  3.  Ischemic cardiomyopathy, most recent echocardiogram from April 2020 with ejection fraction of 20% with moderate MR, Mod TR  4.  Status post ICD placement in [place], [employer]  5.  Chronic kidney disease  6.  Hyperlipidemia    Recommendations    1.  Coronary artery disease with non-STEMI status post CABG. symptoms appear to be stable.  She states that her shortness of breath which were her preceding and presenting symptom has mainly resolved although her energy level is low.  Let us continue with her aspirin, it appears she is not on a beta-blocker due to low blood pressure readings from her daughter.  We can reassess this on her next visit.  Would benefit from addition of beta-blocker from a coronary artery disease as well as ischemic cardiomyopathy standpoint.  Continue with aspirin and statin at this point.    2.  Ischemic cardiomyopathy with valvular heart disease.  Per diagnostic cardiac catheter ejection fraction estimated to be 30 to 35% with wall motion normalities including areas of dyskinesia as well as akinesis.  Follow-up echocardiogram in April 2020 demonstrates significantly reduced LV systolic function of 20% with moderate MR mild pulmonary hypertension and moderate  tricuspid regurgitation.  She was switched over to Entresto which we will continue.  She is only will to tolerate once a day dosing due to relative hypotension we will keep a close eye on this.  She is following a 2 g low-sodium diet and her lower extremity edema is present however improved from where it was 2 months ago.  I have asked her to restart her Lasix at this point.  She would benefit from addition of beta-blocker as her hemodynamics allow which can be evaluated on her next visit.    3.  I will have her establish with the core clinic at [facility] as well as ordering a echocardiogram to assess her LV systolic function of valvular heart disease which was described on outside echocardiograms.    4.  We will have her establish with the pacemaker ICD clinic for remote checking    5.  Patient will return to clinic in 6 to 8 weeks with core to establish.      Thank you kindly for consult      Michelle Fong MD      History of present illness    Patient is a pleasant 75-year-old female who I am seeing via a video consult for new patient.  She was vacationing with her family in March 2020 and went on a cruise to [place].  During the course of the cruise she felt unwell.  She eventually sought medical attention in [facility].  At that point she had been reporting a progressive weakness, shortness of breath and dyspnea on exertion.  Testing suggested anterior lateral MI with troponin elevation.  She was medically stabilized and underwent coronary angiogram which demonstrated multivessel coronary artery disease with 's noted.  Please see Cath Lab report from outside facility for full details.  Given the complexity of her disease in addition to cardiomyopathy she was referred for urgent cardiovascular surgery.  Her targets were small however a LIMA to diagonal and a KURT to RCA bypass was performed.  Post procedure she had a difficult recovery with recurrent pleural effusions which required therapeutic  thoracentesis and a prolonged stay in the hospital.  Follow-up echocardiogram demonstrated LV systolic function was reduced at 20% with moderate MR and tricuspid regurgitation.  She was initially placed on a LifeVest however ultimately underwent ICD placement from a preventative standpoint.  She has now since come back to [place] and is seeking to establish local cardiac care.  She appears to be on appropriate guideline directed medical therapy.  She does have some lower extremity edema she stopped her maintenance Lasix which was 40 mg daily on her own.  She does have prescriptions regarding this as well as tablets at home.  She is on Entresto as well given her heart failure.  Her blood pressure is high 90s and low 100s.  She is not unable to tolerate the twice daily dosing and is taking it daily.  Outside records were reviewed with patient and family.  Her daughter is very involved in her care and takes her blood pressure daily in addition to this her weights are also recorded.  She states that her weights rarely fluctuate between 1 and 2 pounds over the course of the last month.  She has religiously followed a low-salt diet which was recommended to her during her hospital stay in [place].  She is referred to establish local cardiac care.      Primary Care Physician   Poonam Cast      Patient Active Problem List   Diagnosis     S/P revision of total hip     Weakness     Chronic kidney disease, stage III (moderate) (H)     Gastroesophageal reflux disease     HTN (hypertension)     Hyperlipidemia     Anemia     Anemia due to blood loss, acute     Failure of total hip arthroplasty, subsequent encounter       Past Medical History   I have reviewed this patient's medical history and updated it with pertinent information if needed.   No past medical history on file.    Past Surgical History   I have reviewed this patient's surgical history and updated it with pertinent information if needed.  Past Surgical History:    Procedure Laterality Date     ARTHROPLASTY REVISION HIP Right 11/16/2017    Procedure: ARTHROPLASTY REVISION HIP;  Right total hip arthroplasty revision.;  Surgeon: Jozef Garcia MD;  Location: [facility] OR       Prior to Admission Medications   Cannot display prior to admission medications because the patient has not been admitted in this contact.     [unfilled]  [unfilled]  Allergies   Allergies   Allergen Reactions     Combigan [Brimonidine Tartrate-Timolol] Swelling     Swollen eyelids       Social History    reports that she has never smoked. She has never used smokeless tobacco.    Family History   No family history on file.    Review of Systems   The comprehensive 10 point Review of Systems is negative other than noted in the HPI or here.     Physical Exam   Vital Signs with Ranges  Pulse:  [105] 105  BP: (99)/(66) 99/66  SpO2:  [98 %] 98 %  Wt Readings from Last 4 Encounters:   05/28/20 71.4 kg (157 lb 8 oz)   11/27/17 77.1 kg (170 lb)   11/21/17 78.5 kg (173 lb)   11/19/17 77.1 kg (170 lb)     [unfilled]      Vitals: BP 99/66   Pulse 105   Wt 71.4 kg (157 lb 8 oz)   SpO2 98%   BMI 23.95 kg/m      GENERAL: Healthy, alert and no distress  EYES: Eyes grossly normal to inspection.  No discharge or erythema, or obvious scleral/conjunctival abnormalities.  RESP: No audible wheeze, cough, or visible cyanosis.  No visible retractions or increased work of breathing.    SKIN: Visible skin clear. No significant rash, abnormal pigmentation or lesions.  NEURO: Cranial nerves grossly intact.  Mentation and speech appropriate for age.  PSYCH: Mentation appears normal, affect normal/bright, judgement and insight intact, normal speech and appearance  well-groomed.    @LABRCNTIPR(tropi:5,troponinies:5)@    @LABRCNTIPR(wbc:3,hgb:3,mcv:3,plt:3,inr:3,na:3,potassium:3,chloride:3,co2:3,bun:3,cr:3,gfrestimated:3,gfrestblack:3,aniongap:3,donald:3,glc:3,albumin:2,prottotal:2,bilitotal:2,alkphos:2,alt:2,ast:2,lipase:2,tropi:3)@  No results for input(s): CHOL, HDL, LDL, TRIG, CHOLHDLRATIO in the last 48472 hours.  @LABRCNTIP(wbc:3,hgb:3,hct:3,mcv:3,plt:3,iron:3,ironsat:3,reticabsct:3,retp:3,feb:3,flor:3,b12:3,folic:3,epoe:3,morph:3)@  @LABRCNTIP(PH:3,PHV:3,PO2:3,PO2V:3,sat:3,PCO2:3,PCO2V:3,HCO3:3,HCO3V:3)@  @LABRCNTIP(NTBNPI:3,NTBNP:3)@  @LABRCNTIP(DD:1)@  @LABRCNTIP(sed:3,crp:3)@  @LABRCNTIP(PLT:3)@  @LABRCNTIP(TSH:3)@  @LABRCNTIP(color:1,appearance:1,urineg,urinebili:1,urineketone:1,s,ubld:1,urineph:1,protein:1,urobilinogen:1,nitrite:1,leukest:1,rbcu:1,wbcu:1)@    Imaging:  No results found for this or any previous visit (from the past 48 hour(s)).    Echo:  No results found for this or any previous visit (from the past 4320 hour(s)).        Thank you for allowing me to participate in the care of your patient.    Sincerely,     Michelle Fong MD     [facility]

## 2020-06-03 NOTE — LETTER
[facility]  [address] [address]  Phone: [phone]  Fax: [phone]       Called pharmacy to obtain Pharmacy Benefit Insurance information as requested:      [employer]  BIN: [id number]    PCN: [id number]          ID: [id number]         Group: [id number]          Phone: [phone]

## 2020-06-03 NOTE — TELEPHONE ENCOUNTER
Pt's daughter called asking about paperwork for Entresto.  [place] - she used the free card once and then was given paperwork to fill out for reduced cost.  Her previous provider did not fill it out.  Advised to bring paperworlkto clinic tomorrow and we would complete and fax them.  Also will check out other options in the meantime.    Pt has < 2 week worth of med left.  TERRANCE WANG RN on 6/3/2020 at 3:48 PM    ADDENDUM:  Faxed to [facility] for Dr Hetal becker for second time.  TERRANCE WANG RN on 6/19/2020 at 8:12 AM    ADDENDUM:  Faxed signed paperwork to [employer] and Entresto.  Mailed originals back to pt.  TERRANCE WANG RN on 6/19/2020 at 3:03 PM    ADDENDUM:  Received fax from  [facility].  They need:   Copy of insurance card  Pharmacy insurance ID#  Pharmacy Insurance telephone    Will route to PA nurse.  TERRANCE WANG RN on 6/22/2020 at 10:06 AM

## 2020-06-04 PROBLEM — I25.5 ISCHEMIC CARDIOMYOPATHY: Status: ACTIVE | Noted: 2020-01-01

## 2020-06-10 PROBLEM — I51.3 MURAL THROMBUS OF LEFT VENTRICLE: Status: ACTIVE | Noted: 2020-01-01

## 2020-06-10 NOTE — PROGRESS NOTES
06/10/20  Patient is with [facility]. Will discharge from [facility].    Mine Sotomayor RN, BSN, PHN  Anticoagulation Clinic   [phone]

## 2020-06-10 NOTE — RESULT ENCOUNTER NOTE
Results noted: EF <20%; large layered apical thrombus; RV mildly dilated; moderately decreased RV systolic function; moderate MR, moderate to mod severe TR; mild to mod pHTN. See TE for initiation of Lovenox and warfarin per Dr Esteves. Pt notified of results and recommendations. To be discussed at [facility] with Jenise ELIZABETH on 6/15/20.

## 2020-06-10 NOTE — TELEPHONE ENCOUNTER
Received message from Dr Esteves re: LV mural thrombus on echo and rec orders for Lovenox and warfarin. Disc results and recommendations with patient. 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 Has never been on warfarin though. Lissett RN from cardiac services, gave pt Lovenox teaching box for her to take home. Pt is with daughter and after done with call, they will go to the pharmacy to  prescriptions. Called in scripts for warfarin 5 mg every day x 30 days and lovenox 70 mg subcutaneous BID x 7 days. Called [facility] and spoke with Lisset BAIG in ACC. Report given, orders given as per Dr Esteves. Phone number for pt's daughter Norman given. Lisset BAIG will call Norman's cell phone and set up a time to meet with patient and go over warfarin teaching. Both patient and Lisset BAIG given [facility] Nurse Line number for any questions. Rose Martínez RN Cardiology Sofia 10, 2020, 11:39 AM

## 2020-06-12 NOTE — PROGRESS NOTES
Marquise Jj is a pleasant 75-year-old female who recently established care with Dr. Fong.  She was vacationing with her family in March 2020 on a cruise in [place].  During the cruise she felt unwell and eventually sought medical attention in [facility].  She was having symptoms of progressive weakness and dyspnea on exertion.  She was medically stabilized and underwent coronary angiography which showed severe multivessel coronary disease with  was noted.  She was also found to have significant LV dysfunction with an EF of 20%.  Given the complexity of her disease and her cardiomyopathy she was referred for urgent cardiovascular surgery.  Her targets were small but a LIMA to the diagonal and KURT to the RCA were performed.  Per the operative report, the LAD was dissected and the graft was placed to the diagonal vessel. No suitable targets that were noted in the circumflex per op report. She had a difficult postoperative course with recurrent pleural effusions requiring thoracenteses and a prolonged hospital stay.    A follow-up echocardiogram again showed an EF of 20% with moderate MR and tricuspid regurgitation.  She initially had a LifeVest placed however ultimately underwent ICD implantation.  She was started on appropriate medical therapy as well as Lasix.    She had a virtual visit with Dr. Fong on 5/28/2020.  She was not on a beta-blocker reportedly due to low blood pressure readings and he recommended considering this in follow-up.  She is on Entresto but once daily dosing do pressures as well.  She has been off of her Lasix and he asked her to resume this.  She was having symptoms of peripheral edema although this was getting better.  He recommended a follow-up echocardiogram and establishing in the core clinic as well as the device clinic for ongoing management of her ICD.    She had an echocardiogram on 6/10/2020.  This showed an LVEF less than 20% severe global hypokinesis to akinesis of the  "LV wall.  A large layered apical thrombus was noted.  Her RV was mildly dilated with moderately decreased RV systolic function.  There was 2+ MR and 2-3+ TR. given her LV thrombus, Dr. Esteves recommended initiating Lovenox warfarin.    packed yesterday.     still off lovenox    2. then 4.1. Wednesday     since visit with Dr. Fong   June 1st.   2.5 L off left lung. doing better since then. some shortness of breath but \"not bad.\" If gets up short of breath walking around makes her short of breath. was short of breath at reset priorto thoracentesis. Still has ongoing orthopnea. sitting up in chair. bilateral legs swollen.     155 - 157 lbs.       still taking lasix 40 mg in the morning     sgarted 9 minutes before 12:32    blood pressures high 80s, 101. high 80s to 90. no dizziness or lightheadedness. no chest pain.     Institute clinic.     "

## 2020-06-15 NOTE — LETTER
6/15/2020    Poonam Cast  [facility] [address] [facility] [address]    RE: Marquise Narda Eng       Dear Colleague,    I had the pleasure of seeing Marquise Jj in the [facility].      Provider notes:  Marquise Jj is a pleasant 75-year-old female who recently established care with Dr. Fong.  She was vacationing with her family in March 2020 on a cruise in [place].  During the cruise she felt unwell and eventually sought medical attention in [facility].  She was having symptoms of progressive weakness and dyspnea on exertion.  She was medically stabilized and underwent coronary angiography which showed severe multivessel coronary disease with  was noted.  She was also found to have significant LV dysfunction with an EF of 20%.  Given the complexity of her disease and her cardiomyopathy she was referred for urgent cardiovascular surgery.  Her targets were small but a LIMA to the diagonal and KURT to the RCA were performed.  Per the operative report, the LAD was dissected and the graft was placed to the diagonal vessel. No suitable targets that were noted in the circumflex per op report. She had a difficult postoperative course with recurrent pleural effusions requiring thoracenteses and a prolonged hospital stay.    A follow-up echocardiogram again showed an EF of 20% with moderate MR and tricuspid regurgitation.  She initially had a LifeVest placed however ultimately underwent ICD implantation.  She was started on appropriate medical therapy as well as Lasix.    She had a virtual visit with Dr. Fong on 5/28/2020.  She was not on a beta-blocker reportedly due to low blood pressure readings and he recommended considering this in follow-up.  She is on Entresto but once daily dosing do pressures as well.  She has been off of her Lasix and he asked her to resume this.  She was having symptoms of peripheral edema although this was getting  better.  He recommended a follow-up echocardiogram and establishing in the core clinic as well as the device clinic for ongoing management of her ICD.    She had an echocardiogram on 6/10/2020.  This showed an LVEF less than 20% severe global hypokinesis to akinesis of the LV wall.  A large layered apical thrombus was noted.  Her RV was mildly dilated with moderately decreased RV systolic function.  There was 2+ MR and 2-3+ TR. given her LV thrombus, Dr. Esteves recommended initiating Lovenox warfarin.    I spoke with Marquise and her daughter today. She had progressive shortness of breath after her visit with Dr. Fong and ultimately had a thoracentesis on, I believe, 6/1 with 2.5 L removed. She is feeling better since that time. Prior to that, she was short of breath at rest. She continues to have shortness of breath with exertion, even just walking around the house. She also endorses ongoing orthopnea and is sleeping propped up. Her legs continue to be swollen as well. Her weight is stable between 155 - 157. Prior to her thoracentesis, her weight was typically between 152 - 155 lbs. Her energy level has been low. She denies dizziness, presyncope or syncope.     Of note, she was seen in the ED yesterday with a nosebleed requiring packing. She has follow up on Wednesday. Her INR was supra therapeutic at 4.1 and her warfarin is being adjusted. She has a follow up INR tomorrow.    PE:  General:  no apparent distress, normal body habitus, sitting upright.  ENT/Mouth:  membranes moist, no nasal discharge.  Normal head shape, no apparent injury or laceration.  Eyes: normal conjunctivae.  No observed jaundice.  Chest/Lungs:  No breathing difficulty while speaking.  No audible wheezing.  No cough during conversation.  Skin:  no xanthelasma.  No facial lacerations.  Neurologic:  Normal arm motion bilateral, no tremors.    Psychiatric:  Alert and oriented x3, calm demeanor    Assessment/plan:  Marquise Jj is a pleasant 75 year old  female with a history of a severe ischemic cardiomyopathy with a LVEF of < 20% by recent echocardiogram, severe multivessel coronary disease as noted above s/p CABG x2 in March, chronic anemia, hypertension, and hyperlipidemia.     She had a recent echocardiogram that showed severely reduced LV function with a LVEF of < 20%. There was a large layered apical thrombus noted for which she was recently initiated on warfarin/lovenox. As noted, she recently was in the ED with a supratherapeutic INR and epistaxis. She has follow up for further warfarin dosing which I will defer to the anticoagulation clinic where she is being followed at [place]. She has also been taking a 81 mg aspirin. Her echo also showed moderate RV dysfunction, moderate to moderately severe TR, and moderate MR.     In regards to her medical therapy, she is currently taking Entresto 24/26 mg twice daily. I'm not sure who advised her to increase to twice daily as when she saw Dr. Fong she was only taking this once daily. Regards, she notes her blood pressures are stable despite taking this higher dose thus will continue for now. She is not on a beta blocker at this point, but we will consider this in the future. She would also benefit from the addition of spironolactone. At this point, she has ongoing NYHA class III symptoms. Her recent labs showed relatively stable renal function. Her NTproBNP remains markedly elevated at > 13,000. Given her ongoing symptoms, I suggested increasing her lasix to 40 mg twice daily for the next three days. I will have the CORE nurses check in for a symptom and weight update later this week and we can decide further dosing from there. She may benefit from changing to Bumex or Torsemide.    I made no changes to her medical therapy for now. I note it does not appear she has had a recent hemoglobin check, despite her recent ED visit. I have ordered this and a BMP to be done prior to her next visit with me in about two  weeks which we will arrange. I did not discuss with her today but ultimately will discuss referral to the [place]. I asked her and her daughter to contact us with any questions or concerns prior her next scheduled appointment.     Video-Visit Details    Originating Location (pt. Location): Home    Distant Location (provider location):  home office    Platform used for Video Visit: Doximity    Thank you for allowing me to participate in the care of your patient.      Sincerely,     Mana Parrish PA-C     [facility]

## 2020-06-15 NOTE — PATIENT INSTRUCTIONS
Call CORE nurse for any questions or concerns Mon-Fri 8am-4pm:                                                 #[phone]                                       For concerns after hours:                                               #[phone], option 2     1: Medication changes: INCREASE lasix to 40 mg twice daily for the next three days  2: Plan from today: **  3: Lab results: see attached; **

## 2020-06-15 NOTE — PROGRESS NOTES
"Marquise Jj is a 75 year old female who is being evaluated via a billable video visit.      The patient has been notified of following:     \"This video visit will be conducted via a call between you and your physician/provider. We have found that certain health care needs can be provided without the need for an in-person physical exam.  This service lets us provide the care you need with a video conversation.  If a prescription is necessary we can send it directly to your pharmacy.  If lab work is needed we can place an order for that and you can then stop by our lab to have the test done at a later time.    Video visits are billed at different rates depending on your insurance coverage.  Please reach out to your insurance provider with any questions.    If during the course of the call the physician/provider feels a video visit is not appropriate, you will not be charged for this service.\"    Patient has given verbal consent for Video visit? Yes    How would you like to obtain your AVS? Mail a copy  Patient would like the video invitation sent by: Text to cell phone: [phone]    Will anyone else be joining your video visit? No       Provider notes:  Marquise Jj is a pleasant 75-year-old female who recently established care with Dr. Fong.  She was vacationing with her family in March 2020 on a cruise in [place].  During the cruise she felt unwell and eventually sought medical attention in [facility].  She was having symptoms of progressive weakness and dyspnea on exertion.  She was medically stabilized and underwent coronary angiography which showed severe multivessel coronary disease with  was noted.  She was also found to have significant LV dysfunction with an EF of 20%.  Given the complexity of her disease and her cardiomyopathy she was referred for urgent cardiovascular surgery.  Her targets were small but a LIMA to the diagonal and KURT to the RCA were performed.  Per the operative " report, the LAD was dissected and the graft was placed to the diagonal vessel. No suitable targets that were noted in the circumflex per op report. She had a difficult postoperative course with recurrent pleural effusions requiring thoracenteses and a prolonged hospital stay.    A follow-up echocardiogram again showed an EF of 20% with moderate MR and tricuspid regurgitation.  She initially had a LifeVest placed however ultimately underwent ICD implantation.  She was started on appropriate medical therapy as well as Lasix.    She had a virtual visit with Dr. Fong on 5/28/2020.  She was not on a beta-blocker reportedly due to low blood pressure readings and he recommended considering this in follow-up.  She is on Entresto but once daily dosing do pressures as well.  She has been off of her Lasix and he asked her to resume this.  She was having symptoms of peripheral edema although this was getting better.  He recommended a follow-up echocardiogram and establishing in the core clinic as well as the device clinic for ongoing management of her ICD.    She had an echocardiogram on 6/10/2020.  This showed an LVEF less than 20% severe global hypokinesis to akinesis of the LV wall.  A large layered apical thrombus was noted.  Her RV was mildly dilated with moderately decreased RV systolic function.  There was 2+ MR and 2-3+ TR. given her LV thrombus, Dr. Esteves recommended initiating Lovenox warfarin.    I spoke with Marquise and her daughter today. She had progressive shortness of breath after her visit with Dr. Fong and ultimately had a thoracentesis on, I believe, 6/1 with 2.5 L removed. She is feeling better since that time. Prior to that, she was short of breath at rest. She continues to have shortness of breath with exertion, even just walking around the house. She also endorses ongoing orthopnea and is sleeping propped up. Her legs continue to be swollen as well. Her weight is stable between 155 - 157. Prior to her  thoracentesis, her weight was typically between 152 - 155 lbs. Her energy level has been low. She denies dizziness, presyncope or syncope.     Of note, she was seen in the ED yesterday with a nosebleed requiring packing. She has follow up on Wednesday. Her INR was supra therapeutic at 4.1 and her warfarin is being adjusted. She has a follow up INR tomorrow.    PE:  General:  no apparent distress, normal body habitus, sitting upright.  ENT/Mouth:  membranes moist, no nasal discharge.  Normal head shape, no apparent injury or laceration.  Eyes: normal conjunctivae.  No observed jaundice.  Chest/Lungs:  No breathing difficulty while speaking.  No audible wheezing.  No cough during conversation.  Skin:  no xanthelasma.  No facial lacerations.  Neurologic:  Normal arm motion bilateral, no tremors.    Psychiatric:  Alert and oriented x3, calm demeanor    Assessment/plan:  Marquise Jj is a pleasant 75 year old female with a history of a severe ischemic cardiomyopathy with a LVEF of < 20% by recent echocardiogram, severe multivessel coronary disease as noted above s/p CABG x2 in March, chronic anemia, hypertension, and hyperlipidemia.     She had a recent echocardiogram that showed severely reduced LV function with a LVEF of < 20%. There was a large layered apical thrombus noted for which she was recently initiated on warfarin/lovenox. As noted, she recently was in the ED with a supratherapeutic INR and epistaxis. She has follow up for further warfarin dosing which I will defer to the anticoagulation clinic where she is being followed at [place]. She has also been taking a 81 mg aspirin. Her echo also showed moderate RV dysfunction, moderate to moderately severe TR, and moderate MR.     In regards to her medical therapy, she is currently taking Entresto 24/26 mg twice daily. I'm not sure who advised her to increase to twice daily as when she saw Dr. Fong she was only taking this once daily. Regards, she notes her blood  pressures are stable despite taking this higher dose thus will continue for now. She is not on a beta blocker at this point, but we will consider this in the future. She would also benefit from the addition of spironolactone. At this point, she has ongoing NYHA class III symptoms. Her recent labs showed relatively stable renal function. Her NTproBNP remains markedly elevated at > 13,000. Given her ongoing symptoms, I suggested increasing her lasix to 40 mg twice daily for the next three days. I will have the CORE nurses check in for a symptom and weight update later this week and we can decide further dosing from there. She may benefit from changing to Bumex or Torsemide.    I made no changes to her medical therapy for now. I note it does not appear she has had a recent hemoglobin check, despite her recent ED visit. I have ordered this and a BMP to be done prior to her next visit with me in about two weeks which we will arrange. I did not discuss with her today but ultimately will discuss referral to the [facility]. I asked her and her daughter to contact us with any questions or concerns prior her next scheduled appointment.     Video-Visit Details    Type of service:  Video Visit    Video Start Time: 12:32 PM  Video End Time: 1:08 PM    Originating Location (pt. Location): Home    Distant Location (provider location):  home office    Platform used for Video Visit: Ninfa Parrish PA-C

## 2020-06-22 NOTE — TELEPHONE ENCOUNTER
Called daughter Norman, reviewed CXR and lab results, as well as GLENN Mcrae's recommendations. Reviewed to continue plan for switch to Torsemide tomorrow. Also reviewed need to start KCL 20meq daily. Asked Norman to call us on Thursday with an update. Norman wrote down instructions and read them back and stated understanding. Rx for KCL sent to [facility] as requested.     Also reviewed INR from today. Daughter to call INR nurse and will notify her results in Care Everywhere. Told daughter to have INR nurse call if she cannot see results. Daughter stated understanding.     Jennifer Szymanski, MINEN, RN, CHFN  06/22/20 at 3:40 PM

## 2020-06-22 NOTE — TELEPHONE ENCOUNTER
Thanks for the update. Her effusions are small so nothing to tap at this point. Her BNP is still quite elevated so hopefully she ll improve with the torsemide. Let s add 20 meq of potassium daily for her. Can we check in with her later this week and see if she is feeling any better? Thank you! Whitney

## 2020-06-22 NOTE — TELEPHONE ENCOUNTER
[facility]  Reviewed chart, pt had virtual visit with GLENN Mcrae last week on 6/15/20. Pt had recent thoracentesis on 6/1 with 2.5L removed. Weights were stable at 155-157# since thoracentesis, but pt continued to have DASH and Orthopnea, legs continued to be swollen as well. Lasix was increased to 40mg BID x 3 days. Pt to have BMP and another virtual visit in 2 weeks. Pt is scheduled for 6/29 virtual visit with GLENN Mcrae.     Diuretic: Lasix 40mg daily- increased to 40mg BID x 3 days on 6/15    Called pt's daughter Norman. Weight had gotten up to 158# last week, they have trended down to 155-156#. They misunderstood instructions for Lasix and have continued 40mg BID. Pt reports when she takes deep breaths in lower left lung, she feels a fluttering. Denies chest pain. Pt started with cough again. She is more SOB with exertion. She was able to take 2 laps before getting fatigued and SOB, and now only 1 lap. Daughter wanting to be proactive, as if she needs thoracentesis again, she is on Coumadin and needs at least few days notice to hold that. Daughter requesting CXR today. Pt also scheduled for INR and [place] [facility] today, she is wondering if they can get that drawn at [place] if they are able to get CXR today. Also told daughter will probably recommend getting BMP/BNP today as well. Told daughter I would discuss with GLENN Mcrae and call her back.      Messaged to GLENN Mcrae for review.     Future Appointments   Date Time Provider Department Center   6/29/2020 12:40 PM Mana Parrish PA-C [facility]   9/3/2020 12:00 AM [facility]     MINE BillyN, RN, CHFN  06/22/20 at 9:18 AM

## 2020-06-22 NOTE — TELEPHONE ENCOUNTER
"Pt's daughter Norman called and LM stating pt having SOB with \"fluttering in lungs\".  She is asking for CXR but does not want to go to ED.  Please call Norman [phone]    Will route to [facility]/Jenise WANG RN on 6/22/2020 at 8:35 AM    "

## 2020-06-22 NOTE — TELEPHONE ENCOUNTER
Orders placed for CXR, BMP, BNP, and INR to be done today if possible. Called [facility] [place] and scheduled pt for CXR for 12:45pm check in, and lab at 1:20pm. Asked if needed to complete travel and wellness screen. CXR schedule said, no they will do upon check in. Lab schedule asked if pt been exposed or having the symptoms. Reviewed pt has SOB and cough, but felt HF related. Lab  stated understanding. They again review them again with pt on check in.  asked pt be dropped off, no visitors, and must wear mask.     Called [facility] [place], pt already took Lasix today. Reviewed starting tomorrow, stop Lasix and start Torsemide 40mg in am and 20mg in pm. Pt is not currently on KCL, last K+ 3.8 and previously between 4.2-4.4. Pt will continue off KCL, told daughter if K+ low on labs this afternoon, may need to add KCL. Daughter stated understanding. Rx for Torsemide to [facility] in [place] per request. Reviewed instructions above and date/time/location of CXR and labs with daughter. Told daughter will call back later this afternoon once results are available. She stated understanding.     Jennifer Szymanski, BSN, RN, CHFN  06/22/20 at 10:21 AM

## 2020-06-22 NOTE — PROGRESS NOTES
Wellness Screening Tool    Symptom Screening:    Do you have one of the following NEW symptoms:      Fever (subjective or >100.0)?  No    New cough? Yes    Shortness of breath? Yes    Chills? No    New loss of taste or smell? No    Generalized body aches? No    New persistent headache? No    New sore throat? No    Nausea, vomiting or diarrhea? No    Within the past 3 weeks, have you been exposed to someone with a known positive illness below?      COVID - 19 (known or suspected) No    Chicken pox?  No    Measles? No    Pertussis? No          Patient notified of visitor restriction: Yes  Patient informed to wear a mask: Yes    Patient's appointment status: Patient will be seen in clinic as scheduled on 6/22/20.    SOB and cough, increased from previous, but not new. Symptoms HF related.

## 2020-06-22 NOTE — TELEPHONE ENCOUNTER
CXR preliminary report and BMP/BNP noted. See below. Messaged to GLENN Mcrae for review.        CHEST TWO VIEWS   6/22/2020 12:50 PM      HISTORY: Increasing shortness of breath. Systolic congestive heart  failure, unspecified HF chronicity (H).     COMPARISON: None.                                                  IMPRESSION: Small bibasilar pleural fluid. Bibasilar atelectasis.  Borderline enlarged heart. Sternotomy and left chest pacemaker.  Bibasilar airspace disease difficult to completely exclude on this  Exam.    Component      Latest Ref Rng & Units 6/10/2020 6/22/2020   Sodium      133 - 144 mmol/L 140 138   Potassium      3.4 - 5.3 mmol/L 3.8 3.5   Chloride      94 - 109 mmol/L 104 102   Carbon Dioxide      20 - 32 mmol/L 28 28   Anion Gap      3 - 14 mmol/L 8 8   Glucose      70 - 99 mg/dL 84 113 (H)   Urea Nitrogen      7 - 30 mg/dL 25 25   Creatinine      0.52 - 1.04 mg/dL 1.06 (H) 1.12 (H)   GFR Estimate      >60 mL/min/1.73:m2 51 (L) 48 (L)   GFR Estimate If Black      >60 mL/min/1.73:m2 59 (L) 56 (L)   Calcium      8.5 - 10.1 mg/dL 8.5 8.7   N-Terminal Pro Bnp      0 - 450 pg/mL 13,630 (H) 16,515 (H)       Jennifer Szymanski, MINEN, RN, CHFN  06/22/20 at 2:38 PM

## 2020-06-22 NOTE — TELEPHONE ENCOUNTER
CXR and labs are fine today. Let's have her stop lasix and start Torsemide. If she's continued to take lasix 40 BID then let's do 40 mg of Torsemide in the morning and 20 in the afternoon to start. She should continue her potassium. Thanks. Jenise

## 2020-06-23 NOTE — TELEPHONE ENCOUNTER
Called pharmacy to obtain Pharmacy Benefit Insurance information as requested:  [employer]  BIN: [id number] PCN: CLAIMCR ID: [id number] Group: [id number] Phone: [phone]      ADDENDUM:  Faxed info to [employer] [facility].  TERRANCE WANG RN on 6/23/2020 at 2:03 PM

## 2020-06-25 PROBLEM — R65.21 SEPTIC SHOCK (H): Status: ACTIVE | Noted: 2020-01-01

## 2020-06-25 PROBLEM — A41.9 SEPTIC SHOCK (H): Status: ACTIVE | Noted: 2020-01-01

## 2020-06-25 NOTE — Clinical Note
The first balloon was inserted into the right coronary artery and distal right coronary artery.Max pressure = 12 ashlyn. Total duration = 13 seconds.

## 2020-06-25 NOTE — TELEPHONE ENCOUNTER
Pt's daughter called stating pt wt is up 4 pounds in last 2 days since starting torsemide and she is having very little urine output.   Please call Norman [phone]    Will route to PRITESH WANG RN on 6/25/2020 at 10:04 AM

## 2020-06-25 NOTE — TELEPHONE ENCOUNTER
Will you please follow up with Norman - if she is feeling poorly and having significant shortness of breath she should be evaluated in the ED. Thanks. Jenise

## 2020-06-25 NOTE — PHARMACY-ANTICOAGULATION SERVICE
Consult received for possible vitamin K dosing. Discussed with Dr. Grover that with an INR of 5.04 and no bleeding that reversal with vitamin K is not necessary according to protocol. Additionally do not recommend reversal due to patient high suspicion for COVID-19 so high risk for coagulopathy.   Roberta Bowden, Pharm.D.

## 2020-06-25 NOTE — TELEPHONE ENCOUNTER
"[facility]    Spoke w/patients daughter to review recommendations per Jenise Parrish:   1. Will reduce torsemide to 40mg/d, but patient can take extra 20mg over the weekend if having new/worsening sx.    2.  Patient last COVID tested early May and was negative. Daughter stating this cough is not new. She added, \"she has had it since her surgery. It gets better after she has the lung tap, but then progressively gets worse.\"  Daughter also stated that she talked to patients PCP today re: decreased UOP -> patient being evaluated for UTI. However, daughter will have patient seen if the cough worsens and or if her temp increases.    3.  Patient will get BMP, Hgb on Monday am(orders entered)  She has video appt w/Jenise that afternoon. If patient is seen in the ED over the weekend, labs will be done at that time.     Will forward this tpo Jenise as YOLI.  Brianne Ayers RN on 6/25/2020 at 3:27 PM    "

## 2020-06-25 NOTE — ED NOTES
..DATE:  6/25/2020   TIME OF RECEIPT FROM LAB:  9706  LAB TEST:  trop  LAB VALUE:  0.197  RESULTS GIVEN WITH READ-BACK TO (PROVIDER): jeronimo TUTTLE LAB VALUE REPORTED TO PROVIDER:   [id number]

## 2020-06-25 NOTE — Clinical Note
Stent deployed in the distal right coronary artery. Max pressure = 10 ashlyn. Total duration = 25 seconds.

## 2020-06-25 NOTE — TELEPHONE ENCOUNTER
"[facility] CORE [facility]    Return call to patients daughter. She reports that patient is experiencing severe cough which has increased since Monday. Patient c/o mild SOB last night but denies any SOB today. She slept throughout the night last night w/o PND/orthopnea. However Tuesday night she slept in a chair - she continues unable to sleep flat. Temp last night 99.4 and this am 99.3.     Patient feeling dizzy this am - no syncope/near syncope.     Daughter reports decreased LE swelling stating that patients feet/ankles \"skinny this am.\"        Current diuretic regimen:   torsemide 40mg am, 20mg pm   (+KCL 20meq/d)  Patient was switched from lasix to above torsemide dose on 6/23. Daughter is concerned and reports that patient urinating less than usual. She only urinated X2 yesterday and did not get up to use the BR last night.     Current weights:   6/25 158.2   6/24 156.1   6/23 154.3   6/22 154.8   6/21 154.9   6/20 155.8  We discussed Jenise's recommendation to have pt evaluated in the ED, however patient not SOB. However, her weight is increasing despite diuretic change to torsemide. Will review w/Jenise.  Brianne Ayers RN on 6/25/2020 at 10:55 AM      "

## 2020-06-25 NOTE — ED PROVIDER NOTES
History     Chief Complaint   Patient presents with     Hypotension     high INR, L ventricular thrombus. 4lb weight gain. recent paracentesis     HPI  Marquise Jj is a 75 year old female who presents to the department concern about hypotension.  Patient has multiple medical diagnoses including history of chronic kidney disease stage III, GERD, hypertension, history of anemia and ischemic cardiomyopathy. She was diagnosed with a large layered apical thrombus with EF of less than 20% and dilated RV and moderately decreased RV systolic function with moderate MR moderate severe tricuspid regurgitation and mild to moderate pulmonary hypertension on Sofia 10.  Patient was started on both enoxaparin and Coumadin due to mural thrombus.  She is currently only on Coumadin in discussion with her daughter who is at the bedside.  Patient arrived by car from home in [place] where she lives with her  with her daughter (Norman Vila- who reports she is POA).  Over the last 3 days patient has been more sleepy, and fatigue.  Patient developed a cough.  Her blood pressures also has been lower than baseline.  Esperanza reports that her systolic blood pressure typically ranges between 85-95.  She was in clinic to have an INR rechecked today and her fingerstick INR was greater than 4 and she was referred to the emergency department for further care.  Patient has no known close sick contacts or household contacts. Patient had urinalysis completed prior to arrival in clinic today that showed cloudy appearing urine, trace ketones, moderate leukocyte esterase, and moderate epithelial cells and packed white blood cells, and moderate epithelial cells    ============================================================================  Chart Review  Echocardiogram Sofia 10, 2020  [facility]  Echocardiography Laboratory  [address] [place] [place] [address]        Name: MARQUISE JJ  MRN: [id number]  :  1945  Study Date: 06/10/2020 09:51 AM  Age: 75 yrs  Gender: Female  Patient Location: [facility]  Reason For Study: Systolic congestive heart failure, unspecified HF chronicity  (H)  Ordering Physician: TEODORO MERA  Referring Physician: Maryam Lambert  Performed By: Clau Jay RDCS     BSA: 1.8 m2  Height: 68 in  Weight: 157 lb  HR: 91  BP: 90/62 mmHg  _____________________________________________________________________________  __        Procedure  Complete Echo Adult.  _____________________________________________________________________________  __        Interpretation Summary     The left ventricle is normal in size. Proximal septal thickening is noted.  Left ventricular systolic function is severely reduced. The visual ejection  fraction is estimated at <20%. Grade II or moderate diastolic dysfunction.  Diastolic Doppler findings (E/E' ratio and/or other parameters) suggest left  ventricular filling pressures are increased. There is severe global  hypokinesis to akinesis of the left ventriclular wall segments with relative  preservation of systolic function at the base. A large layered apical thrombus  is noted.  The right ventricle is mildly dilated. There is a catheter/pacemaker lead seen  in the right ventricle. Moderately decreased right ventricular systolic  function.  Moderate biatrial enlargement.  Moderate mitral regurgitation.  Moderate to moderately severe tricuspid regurgitation with mild to moderate  pulmonary hypertension.  No pericardial effusion.  No previous study for comparison.  Findings discussed with Dr Mera and nursing staff, immediate initiation of  anticoagulation recommended.      Urinalysis from outside facility dated 6/25/20              ======================================================================================      Allergies:  Allergies   Allergen Reactions     Combigan [Brimonidine Tartrate-Timolol] Swelling     Swollen eyelids       Problem List:     Patient Active Problem List    Diagnosis Date Noted     Mural thrombus of left ventricle 06/10/2020     Priority: Medium     Ischemic cardiomyopathy 06/04/2020     Priority: Medium     Anemia due to blood loss, acute 11/22/2017     Priority: Medium     Failure of total hip arthroplasty, 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     Priority: Medium     Anemia 11/20/2017     Priority: Medium     Weakness 11/19/2017     Priority: Medium     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     Priority: Medium     Gastroesophageal reflux disease 11/13/2017     Priority: Medium     HTN (hypertension) 08/05/2016     Priority: Medium     Overview:   Diagnosed 8/2016       Chronic kidney disease, stage III (moderate) (H) 08/05/2013     Priority: Medium     Hyperlipidemia 08/05/2013     Priority: Medium        Past Medical History:    No past medical history on file.    Past Surgical History:    Past Surgical History:   Procedure Laterality Date     ARTHROPLASTY REVISION HIP Right 11/16/2017    Procedure: ARTHROPLASTY REVISION HIP;  Right total hip arthroplasty revision.;  Surgeon: Jozef Garcia MD;  Location: [facility] OR       Family History:    No family history on file.    Social History:  Marital Status:   [2]  Social History     Tobacco Use     Smoking status: Never Smoker     Smokeless tobacco: Never Used   Substance Use Topics     Alcohol use: Not on file     Drug use: Not on file        Medications:    acetaminophen (TYLENOL) 325 MG tablet  acetaminophen-codeine (TYLENOL #4) 300-60 MG per tablet  aspirin (ASA) 81 MG EC tablet  bimatoprost (LUMIGAN) 0.01 % SOLN  busPIRone (BUSPAR) 10 MG tablet  clonazePAM (KLONOPIN) 0.5 MG tablet  dorzolamide-timolol (COSOPT) 2-0.5 % ophthalmic solution  enoxaparin ANTICOAGULANT (LOVENOX ANTICOAGULANT) 80 MG/0.8ML syringe  hydrOXYzine (ATARAX) 25 MG tablet  levothyroxine (SYNTHROID/LEVOTHROID) 50 MCG tablet  mirtazapine (REMERON) 15 MG tablet  Omega-3 Fatty Acids (FISH OIL PO)  OMEPRAZOLE  "PO  ondansetron (ZOFRAN-ODT) 4 MG ODT tab  potassium chloride ER (KLOR-CON M) 20 MEQ CR tablet  rosuvastatin (CRESTOR) 20 MG tablet  sacubitril-valsartan (ENTRESTO) 24-26 MG per tablet  senna-docusate (SENOKOT-S;PERICOLACE) 8.6-50 MG per tablet  torsemide (DEMADEX) 20 MG tablet  warfarin ANTICOAGULANT (COUMADIN) 5 MG tablet          Review of Systems   Constitutional: Positive for fatigue (sleepy).   HENT: Negative.    Eyes: Negative.    Respiratory: Positive for cough.    Cardiovascular: Negative.    Gastrointestinal: Negative.    Endocrine: Negative.    Genitourinary: Negative.    Musculoskeletal: Negative.    Skin: Negative.    Allergic/Immunologic: Negative.    Neurological: Negative.    Hematological: Negative.    Psychiatric/Behavioral: Negative.    All other systems reviewed and are negative.      Physical Exam   BP: (!) 74/46  Pulse: 92  Heart Rate: 87  Temp: 97.7  F (36.5  C)  Resp: (!) 33  Height: 172.7 cm (5' 8\")  Weight: 71.7 kg (158 lb)  SpO2: (!) 89 %      Physical Exam  Constitutional:       Appearance: She is ill-appearing.   HENT:      Head: Normocephalic and atraumatic.      Nose: No congestion or rhinorrhea.   Eyes:      Extraocular Movements: Extraocular movements intact.      Pupils: Pupils are equal, round, and reactive to light.   Cardiovascular:      Rate and Rhythm: Tachycardia present.   Pulmonary:      Breath sounds: Rhonchi present.   Skin:     General: Skin is cool.      Capillary Refill: Capillary refill takes less than 2 seconds.      Coloration: Skin is mottled and pale.   Neurological:      General: No focal deficit present.      Mental Status: She is alert and oriented to person, place, and time.         ED Course        Procedures            Critical Care time:  was 90 minutes for this patient excluding procedures.               ED medications:  Medications   cefTRIAXone in d5w (ROCEPHIN) intermittent infusion 1 g (0 g Intravenous Stopped 6/25/20 1735)   norepinephrine (LEVOPHED) " 16 mg in  mL infusion (0.1 mcg/kg/min × 71.7 kg Intravenous Rate/Dose Change 6/25/20 1941)   0.9% sodium chloride BOLUS (has no administration in time range)   0.9% sodium chloride BOLUS (0 mLs Intravenous Stopped 6/25/20 1735)   0.9% sodium chloride BOLUS (250 mLs Intravenous New Bag 6/25/20 1802)       ED Vitals:  Vitals:    06/25/20 2020 06/25/20 2025 06/25/20 2030 06/25/20 2035   BP: 100/55 100/75 100/80 99/58   Pulse: 89 88 86 87   Resp: 17 19 18 27   Temp:       TempSrc:       SpO2:       Weight:       Height:          Vitals:    06/25/20 2020 06/25/20 2025 06/25/20 2030 06/25/20 2035   BP: 100/55 100/75 100/80 99/58   Pulse: 89 88 86 87   Resp: 17 19 18 27   Temp:       TempSrc:       SpO2:       Weight:       Height:         Vitals:    06/25/20 2020 06/25/20 2025 06/25/20 2030 06/25/20 2035   BP: 100/55 100/75 100/80 99/58   Pulse: 89 88 86 87   Resp: 17 19 18 27   Temp:       TempSrc:       SpO2:       Weight:       Height:           ED labs and imaging:  Results for orders placed or performed during the hospital encounter of 06/25/20   XR Chest Port 1 View     Status: None    Narrative    XR PORTABLE CHEST ONE VIEW   6/25/2020 5:09 PM     HISTORY: Hypotension, hypoxia. Recent diagnosis of decompensated  systolic HF (EF <20%), with mural thrombus. Evaluate for acute  process.    COMPARISON: Chest x-ray 6/22/2020.      Impression    IMPRESSION: Portable chest. Lungs are clear. Heart is normal in size.  No pneumothorax. Small bilateral pleural effusions are present with  the left larger than the right, minimally changed since prior exam.  Implantable cardiac device left upper chest with single lead overlying  the right ventricle is again noted and unchanged.    MARIANNA LOMAS MD   CBC with platelets differential     Status: Abnormal   Result Value Ref Range    WBC 15.1 (H) 4.0 - 11.0 10e9/L    RBC Count 4.12 3.8 - 5.2 10e12/L    Hemoglobin 10.0 (L) 11.7 - 15.7 g/dL    Hematocrit 33.7 (L) 35.0 - 47.0 %     MCV 82 78 - 100 fl    MCH 24.3 (L) 26.5 - 33.0 pg    MCHC 29.7 (L) 31.5 - 36.5 g/dL    RDW 21.1 (H) 10.0 - 15.0 %    Platelet Count 255 150 - 450 10e9/L    Diff Method Automated Method     % Neutrophils 71.4 %    % Lymphocytes 15.8 %    % Monocytes 10.4 %    % Eosinophils 1.3 %    % Basophils 0.5 %    % Immature Granulocytes 0.6 %    Nucleated RBCs 0 0 /100    Absolute Neutrophil 10.8 (H) 1.6 - 8.3 10e9/L    Absolute Lymphocytes 2.4 0.8 - 5.3 10e9/L    Absolute Monocytes 1.6 (H) 0.0 - 1.3 10e9/L    Absolute Eosinophils 0.2 0.0 - 0.7 10e9/L    Absolute Basophils 0.1 0.0 - 0.2 10e9/L    Abs Immature Granulocytes 0.1 0 - 0.4 10e9/L    Absolute Nucleated RBC 0.0    INR     Status: Abnormal   Result Value Ref Range    INR 5.04 (HH) 0.86 - 1.14   Basic metabolic panel     Status: Abnormal   Result Value Ref Range    Sodium 133 133 - 144 mmol/L    Potassium 4.6 3.4 - 5.3 mmol/L    Chloride 99 94 - 109 mmol/L    Carbon Dioxide 25 20 - 32 mmol/L    Anion Gap 9 3 - 14 mmol/L    Glucose 96 70 - 99 mg/dL    Urea Nitrogen 37 (H) 7 - 30 mg/dL    Creatinine 1.92 (H) 0.52 - 1.04 mg/dL    GFR Estimate 25 (L) >60 mL/min/[1.73_m2]    GFR Estimate If Black 29 (L) >60 mL/min/[1.73_m2]    Calcium 8.4 (L) 8.5 - 10.1 mg/dL   Blood gas venous     Status: Abnormal   Result Value Ref Range    Ph Venous 7.37 7.32 - 7.43 pH    PCO2 Venous 45 40 - 50 mm Hg    PO2 Venous 22 (L) 25 - 47 mm Hg    Bicarbonate Venous 26 21 - 28 mmol/L    Base Deficit Venous 0.1 mmol/L    FIO2 HIDE    Symptomatic COVID-19 Virus (Coronavirus) by PCR     Status: None    Specimen: Nasopharyngeal   Result Value Ref Range    COVID-19 Virus PCR to [facility] - Source Nasopharyngeal     COVID-19 Virus PCR to [facility] - Result       Test received-See reflex to IDDL test SARS CoV2 (COVID-19) Virus RT-PCR   Lactic acid whole blood     Status: None   Result Value Ref Range    Lactic Acid 1.8 0.7 - 2.0 mmol/L   UA reflex to Microscopic     Status: Abnormal   Result Value Ref Range     Color Urine Janine     Appearance Urine Cloudy     Glucose Urine Negative NEG^Negative mg/dL    Bilirubin Urine Negative NEG^Negative    Ketones Urine Negative NEG^Negative mg/dL    Specific Gravity Urine 1.018 1.003 - 1.035    Blood Urine Negative NEG^Negative    pH Urine 5.0 5.0 - 7.0 pH    Protein Albumin Urine 100 (A) NEG^Negative mg/dL    Urobilinogen mg/dL 2.0 0.0 - 2.0 mg/dL    Nitrite Urine Negative NEG^Negative    Leukocyte Esterase Urine Moderate (A) NEG^Negative    Source Unspecified Urine     RBC Urine 8 (H) 0 - 2 /HPF    WBC Urine 143 (H) 0 - 5 /HPF    WBC Clumps Present (A) NEG^Negative /HPF    Squamous Epithelial /HPF Urine 4 (H) 0 - 1 /HPF    Transitional Epi 3 (H) 0 - 1 /HPF    Mucous Urine Present (A) NEG^Negative /LPF    Hyaline Casts 94 (H) 0 - 2 /LPF   Troponin I     Status: Abnormal   Result Value Ref Range    Troponin I ES 0.197 (HH) 0.000 - 0.045 ug/L   Nt probnp inpatient     Status: Abnormal   Result Value Ref Range    N-Terminal Pro BNP Inpatient 23,376 (H) 0 - 1,800 pg/mL   Magnesium     Status: None   Result Value Ref Range    Magnesium 2.3 1.6 - 2.3 mg/dL   Lactic acid whole blood     Status: None   Result Value Ref Range    Lactic Acid 1.3 0.7 - 2.0 mmol/L   INR     Status: Abnormal   Result Value Ref Range    INR 5.27 (HH) 0.86 - 1.14   Basic metabolic panel     Status: Abnormal   Result Value Ref Range    Sodium 133 133 - 144 mmol/L    Potassium 4.8 3.4 - 5.3 mmol/L    Chloride 99 94 - 109 mmol/L    Carbon Dioxide 25 20 - 32 mmol/L    Anion Gap 9 3 - 14 mmol/L    Glucose 94 70 - 99 mg/dL    Urea Nitrogen 36 (H) 7 - 30 mg/dL    Creatinine 1.91 (H) 0.52 - 1.04 mg/dL    GFR Estimate 25 (L) >60 mL/min/[1.73_m2]    GFR Estimate If Black 29 (L) >60 mL/min/[1.73_m2]    Calcium 8.4 (L) 8.5 - 10.1 mg/dL   Blood gas venous     Status: Abnormal   Result Value Ref Range    Ph Venous 7.37 7.32 - 7.43 pH    PCO2 Venous 44 40 - 50 mm Hg    PO2 Venous 21 (L) 25 - 47 mm Hg    Bicarbonate Venous 25 21  - 28 mmol/L    Base Deficit Venous 0.3 mmol/L    FIO2 Hide    SARS-CoV-2 COVID-19 Virus (Coronavirus) RT-PCR Nasopharyngeal     Status: None    Specimen: Nasopharyngeal   Result Value Ref Range    SARS-CoV-2 Virus Specimen Source Nasopharyngeal     SARS-CoV-2 PCR Result NEGATIVE     SARS-CoV-2 PCR Comment       Testing was performed using the Xpert Xpress SARS-CoV-2 Assay on the Cepheid Gene-Xpert   Instrument Systems. Additional information about this Emergency Use Authorization (EUA)   assay can be found via the Lab Guide.     ABO/Rh type and screen     Status: None   Result Value Ref Range    ABO O     RH(D) Pos     Antibody Screen Neg     Test Valid Only At [facility]        Specimen Expires 06/28/2020    Blood culture     Status: None (Preliminary result)    Specimen: Blood    Left Arm   Result Value Ref Range    Specimen Description Blood Left Arm     Culture Micro No growth after 2 hours    Blood culture     Status: None (Preliminary result)    Specimen: Blood    Left Arm   Result Value Ref Range    Specimen Description Blood Left Arm     Culture Micro No growth after 1 hour            Assessments & Plan (with Medical Decision Making)   Clinical impression: 75-year-old female with multiple medical diagnoses including a history of severe ischemic cardiomyopathy with LVEF of less than 20%, a large apical mural thrombus by recent echocardiogram- (Sofia 10, 2020), severe multivessel coronary disease status post CABG x2 in March 2020, chronic anemia  who is currently on warfarin who arrived from home with her daughter with cough, lethargy, hypotension and concern about supratherapeutic INR.  Her symptoms are suspicious for progressive decompensated heart failure, with lethargy possible related to sepsis due to UTI- (pyuria with urine culture pending). Monitor for COVID-19 pneumonia   Patient developed symptoms last 3 days.  She arrived by car because she was noted to have an elevated INR after recheck  "today and recent home blood pressure readings in the low 80s. Recent supratherapeutic INR and treatment for epistaxis.  On my exam she was cool to touch, 100% on 2L NC,  awake and able to answer questions but appeared sleepy and fatigued and ill appearance.  She had coarse rhonchi in both bases with intermittent coughing episodes.  Patient arrived hypotensive she received gentle fluids initially due to underlying systolic heart failure with clinical suspicion for COVID-19 pneumonia was started on vasopressor support for labile blood pressures concerning for hypotension. Her blood pressure improved after resuscitation . Patient  transferred to the ICU at [facility] for further evaluation and care.    ED Course and Plan:  Reviewed the medical record.  Recently evaluated for epistaxis and supratherapeutic INR.  Most recent INR on June 22 was 3.84. Followed by Cardiology- Dr Fong- Virtual visit on Sofia 15, 2020. Normantad Vila- (daughter who states she is POA)-reports patient recently had medication changes with starting therapy with torsemide and discontinuing furosemide.  Norman also confirms patient typically is cold to touch- \"we have a hard time getting her pulse ox\" On arrival patient was notably hypoxic and hypotensive.  With clinical concern for COVID-19 pneumonia COVID-19 test obtained.  Chest x-ray obtained, telemetry with frequent vital signs. Gentle fluids were given due to known systolic heart failure with mural thrombus, and clinical suspicion for COVID-19 pneumonia (NS-250ml x 2 over an hour).  With report of cough, empiric antibiotics were started after blood cultures were ordered.  Patient is not acidotic on arrival.  Her lactate is 1.8.  White count on arrival is 15.1. Norman Vila reports patient is FULL CODE.  Due to persistent hypotension in lieu of gentle fluids, patient was started on norepinephrine infusion.  Patient has acute kidney injury with a creatinine 1.92.  Last creatinine was 1.12 on June " 22, 2020.  Patient's BNP is 23, 3376, troponin- 0.197. XR chest imaging today revealed bilateral pleural effusion left greater than right and no significant change from comparison x-ray imaging from June 22, 2020.  Urinalysis today with pyuria with 143 white cells and white blood cell clumps, leukocyte esterase. (urine culture pending).  INR remained supratherapeutic today at 5.04. With known mural thrombus.  No emergent indication for INR reversal given no active bleeding. I spoke with Dr. Donovan Isabel-intensivist at 6:10 PM at [facility] who agreed to assume care upon transfer.  We agreed to vasopressor support through peripheral IV access with consideration for central access as needed (may use PIV for up to 48hrs).  Empiric antibiotics with IV Rocephin for  UTI.        Disclaimer: This note consists of symbols derived from keyboarding, dictation and/or voice recognition software. As a result, there may be errors in the script that have gone undetected. Please consider this when interpreting information found in this chart.  I have reviewed the nursing notes.    I have reviewed the findings, diagnosis, plan and need for follow up with the patient.     Critical Care Addendum    My initial assessment, based on my review of nursing observations, review of vital signs, focused history, physical exam, discussion with POA, Intensivist,  and interpretation of Lab results, XR imaging, frequent vital signs , established that Marquise Jj has respiratory insufficiency, suspicion for sepsis and need for evaluation and early goal-directed therapy and and at risk of shock with decompensated heart failure, which requires immediate intervention, and therefore she is critically ill.     After the initial assessment, the care team initiated multiple lab tests, initiated IV fluid administration, initiated medication therapy with Norepinephrine gtt and initiated rapid rewarming measures to provide stabilization care. Due  to the critical nature of this patient, I reassessed nursing observations, vital signs, physical exam, review of cardiac rhythm monitor, neurologic status, respiratory status and and Serial observations  multiple times prior to her disposition.     Time also spent performing documentation, discussion with family to obtain medical information for decision making, reviewing test results, discussion with consultants and coordination of care.     Critical care time (excluding teaching time and procedures): 90 minutes.     New Prescriptions    No medications on file       Final diagnoses:   Heart failure, systolic, with acute decompensation (H) - Echo on Sofia 10- LVEF<20%   Supratherapeutic INR - INR today is 5.04   Abnormal echocardiogram - on Sofia 10- showing large late apical thrombus, LVEF<20%, with moderate diastolic dysfunction   Acute renal failure, unspecified acute renal failure type (H)   Sepsis secondary to UTI (H)       6/25/2020   [facility] EMERGENCY DEPARTMENT     Jackson Grover MD  06/26/20 0006

## 2020-06-25 NOTE — ED NOTES
UTI, generalized weakness, hypotension, elevated INR, cough.  Patient was recently switched to torsemide from lasix.  Patient has gained 4 pounds in the last 2 days.  Patient denies shortness of breath.

## 2020-06-25 NOTE — Clinical Note
The first balloon was inserted into the right coronary artery and distal right coronary artery.Max pressure = 6 ashlyn. Total duration = 20 seconds.     Max pressure = 8 ashlyn. Total duration = 16 seconds.    Balloon reinflated a second time: Max pressure = 8 ashlyn. Total duration = 16 seconds.

## 2020-06-25 NOTE — TELEPHONE ENCOUNTER
It's difficult to know what to make of everything without being able to evaluate her in person. It seems her SOB and leg swelling is better with the Torsemide. I'm not sure what to make of her weights. With the dizziness and improvement in her symptoms I'd be inclined to cut her Torsemide back to 40 mg daily. She could take an extra 20 mg over the weekend if needed for worsening symptoms.  I'd recommend her cough be evaluated if it's significant and new. Maybe she could get in to see her primary? Does she have any labs scheduled soon? We should check another BMP and hemoglobin. Thanks. Jenise

## 2020-06-25 NOTE — TELEPHONE ENCOUNTER
[facility]    Call back to patients daughter w/recommendations per Jenise Murphy LM for call back.  Brianne Ayers, RN on 6/25/2020 at 2:10 PM

## 2020-06-26 NOTE — PROGRESS NOTES
SPIRITUAL HEALTH SERVICES Progress Note  [facility] ICU    Initiated visit due to HCD consult.  Pt was alone in room.  Explained form to pt and answered questions.  SH remains available for further help or support as needed.      Drew Stanton  Chaplain Resident

## 2020-06-26 NOTE — H&P
ICU H&P    Marquise Jj is a 75-year-old female is admitted in transfer from the ED at [facility] with weakness and hypotension.    She has multiple medical diagnoses including: severe ischemic cardiomyopathy with LVEF of less than 20%, a large apical mural thrombus by recent echocardiogram- (Sofia 10, 2020), severe multivessel coronary disease status post CABG x2 in March 2020, chronic anemia, and chronic kidney disease.    She takes warfarin for the mural thrombus. .  She also endorses a chronic cough.  She was noted to have a high INR and was sent to the ED for eval and treatment.  She was noted to be hypotensive, to have pyuria.  COVID PCR is negative.    Plan:  Hypotension: likely due to sepsis from urinary source. Has been hydrated.  Feels symptomatically better.  Wean norepinephrine based on symptoms.  Reportedly has low blood pressure.    UTI: based on leukocyte eseterase, and white blood cells in urine. Antibiotics    Cardiomyopathy with heart failure: hold anticoagulation and diuretic.  Restart when off vasopressors and INR normalized.    Chronic kidney disease: hydrate, follow, avoid nephrotoxic drugs.    PMH as above    PSH CABG x3, defibrillator       Allergies   Allergen Reactions     Combigan [Brimonidine Tartrate-Timolol] Swelling     Swollen eyelids     No family history on file.  Relationships   Social connections     Talks on phone: Not on file     Gets together: Not on file     Attends Jain service: Not on file     Active member of club or organization: Not on file     Attends meetings of clubs or organizations: Not on file     Relationship status: Not on file     Exam  Vital signs:  Temp: 99.6  F (37.6  C) Temp src: Axillary BP: (!) 83/62 Pulse: 96 Heart Rate: 98 Resp: 16 SpO2: 97 % O2 Device: Nasal cannula Oxygen Delivery: 2 LPM   Weight: 71.2 kg (156 lb 15.5 oz)  Estimated body mass index is 23.87 kg/m  as calculated from the following:    Height as of an earlier encounter on 6/25/20:  "1.727 m (5' 8\").    Weight as of this encounter: 71.2 kg (156 lb 15.5 oz).      Neuro: awake, alert, oriented, moves all four extremities  Eyes anicteric  HEENT normal  Heart: rrr, systolic murmur, sternotomy scar, left upper thorax scar  Lungs: clear anteriorly  Abd: soft, nontender  Ext 2+ edema in feet, purple toes, diminished radial and pedal pulses  Skin no rashes or petechiae    Most Recent 3 CBC's:  Recent Labs   Lab Test 06/25/20 1617 11/28/17  0635 11/20/17  0733   WBC 15.1* 6.2 6.7   HGB 10.0* 8.2* 8.0*   MCV 82 95 94    363 212     Most Recent 3 BMP's:  Recent Labs   Lab Test 06/25/20  1853 06/25/20  1617 06/22/20  1244    133 138   POTASSIUM 4.8 4.6 3.5   CHLORIDE 99 99 102   CO2 25 25 28   BUN 36* 37* 25   CR 1.91* 1.92* 1.12*   ANIONGAP 9 9 8   FERNANDO 8.4* 8.4* 8.7   GLC 94 96 113*     Most Recent 3 INR's:  Recent Labs   Lab Test 06/25/20  1853 06/25/20  1617 06/22/20  1244   INR 5.27* 5.04* 3.84*           "

## 2020-06-26 NOTE — PLAN OF CARE
Discharge Planner PT   Patient plan for discharge: Home  Current status: Orders received, chart reviewed, spoke with OT following pt during hospital stay. Pt appears to be near baseline level of function, mobility needs being met by OT during hospital stay. Will complete PT orders at this time.  Barriers to return to prior living situation: Defer to OT  Recommendations for discharge: Defer to OT  Rationale for recommendations: Pt appears to be near baseline, mobility needs being met by OT at this time.       Entered by: Nargis Novoa 06/26/2020 9:55 AM

## 2020-06-26 NOTE — PHARMACY-ADMISSION MEDICATION HISTORY
Pharmacy Medication History  Admission medication history interview status for the 6/25/2020  admission is complete. See EPIC admission navigator for prior to admission medications      Medication history sources: Patient and Sure scripts   Medication history source reliability: Moderate  Adherence assessment: Moderate    Significant changes made to the medication list:  Discontinue: APAP-codeine, clonazepam, lovenox, hydroxyzine, mirtazepine,fish oil  New: latanoprost  Coenzyme Q100mg, ferrous sulfate, trazodone  Changed:       Additional medication history information:   Patient states she takes 3 eye drops a day ( cosopt twice a day, bimatoprost at HS and latanoprost in am )     Medication reconciliation completed by provider prior to medication history? Yes    Time spent in this activity: 30min       Prior to Admission medications    Medication Sig Last Dose Taking? Auth Provider   acetaminophen (TYLENOL) 325 MG tablet Take 2 tablets (650 mg) by mouth every 6 hours as needed for mild pain Past Week at Unknown time Yes Jozef Dyer MD   aspirin (ASA) 81 MG EC tablet Take 1 tablet (81 mg) by mouth daily 6/25/2020 at Unknown time Yes Mana Parrish PA-C   bimatoprost (LUMIGAN) 0.01 % SOLN Place 1 drop into both eyes At Bedtime  6/25/2020 at Unknown time Yes Reported, Patient   busPIRone (BUSPAR) 10 MG tablet Take 15 mg by mouth 3 times daily  6/25/2020 at Unknown time Yes Reported, Patient   co-enzyme Q-10 100 MG CAPS capsule Take 100 mg by mouth daily  Yes Unknown, Entered By History   dorzolamide-timolol (COSOPT) 2-0.5 % ophthalmic solution Place 1 drop into both eyes 2 times daily  6/25/2020 at Unknown time Yes Reported, Patient   ferrous sulfate (FEROSUL) 325 (65 Fe) MG tablet Take 325 mg by mouth daily (with breakfast) 6/25/2020 at Unknown time Yes Unknown, Entered By History   latanoprost (XALATAN) 0.005 % ophthalmic solution Place 1 drop into both eyes daily 6/25/2020 at Unknown time Yes  Unknown, Entered By History   levothyroxine (SYNTHROID/LEVOTHROID) 50 MCG tablet Take 50 mcg by mouth daily 6/25/2020 at Unknown time Yes Reported, Patient   OMEPRAZOLE PO Take 20 mg by mouth daily  6/25/2020 at Unknown time Yes Reported, Patient   potassium chloride ER (KLOR-CON M) 20 MEQ CR tablet Take 1 tablet (20 mEq) by mouth daily 6/25/2020 at Unknown time Yes Mana Parrish PA-C   rosuvastatin (CRESTOR) 20 MG tablet Take 20 mg by mouth daily 6/25/2020 at Unknown time Yes Reported, Patient   sacubitril-valsartan (ENTRESTO) 24-26 MG per tablet Take 1 tablet by mouth 2 times daily 6/25/2020 at Unknown time Yes Michelle Fong MD   senna-docusate (SENOKOT-S/PERICOLACE) 8.6-50 MG tablet Take 2 tablets by mouth 2 times daily as needed for constipation Past Month at Unknown time Yes Unknown, Entered By History   torsemide (DEMADEX) 20 MG tablet Take 40mg (2 tablets) in the morning and 20mg (1 tablet) in the afternoon 6/25/2020 at co Yes Mana Parrish PA-C   traZODone (DESYREL) 150 MG tablet Take 150 mg by mouth At Bedtime 6/25/2020 at Unknown time Yes Unknown, Entered By History   warfarin ANTICOAGULANT (COUMADIN) 1 MG tablet Take 1 mg by mouth daily Per inr 6/24/2020 Yes Unknown, Entered By History   ondansetron (ZOFRAN-ODT) 4 MG ODT tab Take 4 mg by mouth every 8 hours as needed  Unknown at Unknown time  Reported, Patient

## 2020-06-26 NOTE — PROGRESS NOTES
06/26/20 0906   Quick Adds   Type of Visit Initial Occupational Therapy Evaluation   Living Environment   Lives With spouse   Living [place]   Home Accessibility stairs to enter home  (main level living)   Number of Stairs, Main Entrance 5   Stair Railings, Main Entrance none   Transportation Anticipated family or friend will provide   Living Environment Comment daughter next door  (reports she will not be home alone)   Self-Care   Usual Activity Tolerance moderate   Current Activity Tolerance fair   Regular Exercise No   Equipment Currently Used at Home walker, rolling;commode;shower chair;grab bar, tub/shower  (reacher)   Activity/Exercise/Self-Care Comment tub/shower   Functional Level   Ambulation 1-->assistive equipment   Transferring 1-->assistive equipment   Toileting 1-->assistive equipment   Bathing 1-->assistive equipment   Dressing 2-->assistive person  (assist with LB dressing)   Fall history within last six months no   Prior Functional Level Comment Was recevicing home PT twice a week   General Information   Onset of Illness/Injury or Date of Surgery - Date 06/25/20   Referring Physician Benjamin Mitchell MD    Patient/Family Goals Statement improve strength   Additional Occupational Profile Info/Pertinent History of Current Problem Admitted in transfer from the ED at [facility] with weakness and hypotension. Noted to have high INR and have pyuria. PMH including: severe ischemic cardiomyopathy with LVEF of less than 20%, a large apical mural thrombus by recent echocardiogram- (Sofia 10, 2020), severe multivessel coronary disease status post CABG x2 in March 2020, chronic anemia, and chronic kidney disease.     Precautions/Limitations fall precautions   Heart Disease Risk Factors Age;Medical history   Cognitive Status Examination   Orientation orientation to person, place and time   Level of Consciousness alert   Follows Commands (Cognition) follows two step commands   Memory intact    Attention No deficits were identified   Visual Perception   Visual Perception Wears glasses  (for reading. has glaucoma)   Sensory Examination   Sensory Comments baseline numbness in right leg   Pain Assessment   Patient Currently in Pain No   Strength   Strength Comments generalized weakness in bilateral shoulders   Coordination   Upper Extremity Coordination No deficits were identified   Transfer Skill: Sit to Stand   Level of [place]: Sit/Stand stand-by assist   Transfer Skill: Toilet Transfer   Level of [place]: Toilet stand-by assist   Lower Body Dressing   Level of [place]: Dress Lower Body moderate assist (50% patients effort)   Grooming   Level of [place]: Grooming stand-by assist   Instrumental Activities of Daily Living (IADL)   Previous Responsibilities medication management;finances   Activities of Daily Living Analysis   Impairments Contributing to Impaired Activities of Daily Living   (decreased activity tolerance)   General Therapy Interventions   Planned Therapy Interventions strengthening;home program guidelines;progressive activity/exercise;risk factor education;bed mobility training   Clinical Impression   Criteria for Skilled Therapeutic Interventions Met yes, treatment indicated   OT Diagnosis decline function   Influenced by the following impairments decreased activity tolerance   Assessment of Occupational Performance 1-3 Performance Deficits   Identified Performance Deficits functional mobility   Clinical Decision Making (Complexity) Low complexity   Therapy Frequency Daily   Predicted Duration of Therapy Intervention (days/wks) 5 days   Anticipated Equipment Needs at Discharge   (no additional equipment recommended)   Anticipated Discharge Disposition Home with Home Therapy  (resumption of home PT. Continued family assist with LB dress)   Risks and Benefits of Treatment have been explained. Yes   Patient, Family & other staff in agreement with plan of care Yes  "  Clinical Impression Comments Pt functioning below baseline and will benefit from continued skilled OT to maximize safety and independence   [facility] AM-PAC TM \"6 Clicks\"   2016, Trustees of [facility], under license to [employer].  All rights reserved.   6 Clicks Short Forms Daily Activity Inpatient Short Form   [facility] AM-PAC  \"6 Clicks\" Daily Activity Inpatient Short Form   1. Putting on and taking off regular lower body clothing? 3 - A Little   2. Bathing (including washing, rinsing, drying)? 3 - A Little   3. Toileting, which includes using toilet, bedpan or urinal? 3 - A Little   4. Putting on and taking off regular upper body clothing? 4 - None   5. Taking care of personal grooming such as brushing teeth? 4 - None   6. Eating meals? 4 - None   Daily Activity Raw Score (Score out of 24.Lower scores equate to lower levels of function) 21   Total Evaluation Time   Total Evaluation Time (Minutes) 10     "

## 2020-06-26 NOTE — PROVIDER NOTIFICATION
Levophed currently at 0.15 mcg kg min BP 65/42 (49).  Updated BP's called to Dr. Mitchell.  Pt continues to mentate well but is sleepy/awakens easily.  Pt voided once overnight, 120ml.  New one time order for  500ml LR bolus now to be given wide open.

## 2020-06-26 NOTE — PLAN OF CARE
At baseline pt lives in a farmhouse with 5 stairs to enter with her spouse. Her daughter lives next door and pt reports she can have 24 hour supervision at discharge if needed. At baseline pt is independent in ambulation using a 4WW, toileting, grooming, UB dressing, bathing, med management using pillboxes, financial management. Receives assist with LB dressing and max assist with cooking, cleaning, laundry. Does not drive. Reports she has been receiving home PT since a heart attack in February 2020. Denies falls in the last year. Has tub/shower with shower chair, grab bars, hand held shower head, over the toilet commode, reacher, 4WW.    Discharge Planner OT   Patient plan for discharge: Home with family and resumption of home PT  Current status: Pt in ICU. Semi-supine to sit with SBA. Sit to stand with SBA. Ambulated 3 feet to bedside chair with SBA. Engaged in seated BLE exercises with good tolerance. RN present to assist with monitoring vital signs. Pt very pleasant and motivated to participate. Pt below baseline in activity tolerance.  Barriers to return to prior living situation: none anticipated  Recommendations for discharge: Home. Supervision with self-cares and mobility. Misti for stairs.  Rationale for recommendations:  Pt functioning below baseline and will benefit from continued skilled OT to maximize safety and independence. Pt is below baseline level of functioning, however pending progress with function and medical management, anticipate pt will progress for safe return home. Recommend home PT services as pt requires assistance and considerable and taxing effort to leave the home.          Entered by: Narda Cintron 06/26/2020 9:36 AM

## 2020-06-26 NOTE — PROGRESS NOTES
[facility]  Patient is currently open to home care services with [facility]. The patient is currently receiving RN/PT/OT services. [facility]  and team have been notified of patient admission. [facility] liaison will continue to follow patient during stay. If appropriate provide orders to resume home care at time of discharge.

## 2020-06-26 NOTE — PROVIDER NOTIFICATION
Attempts made to titrate Levophed down met with persistent hypotension with MAP's less than 55 and SBP in the 60's.  Titration of Levophed now upwards.  Increased heart rate into low 100's and greater ectopy noted.   Discussed with Dr. Mitchell, new order for 500ml LR bolus wide open off pump.  Will continue to closely monitor pt and keep Dr. Mitchell updated.  Dr. Mitchell at bedside.  Pt does not report any lightheadedness and is sleepy but mentation is intact.  New goal for Levophed is MAP >50 as long as she continues to mentate and does not report any lightheadedness.

## 2020-06-26 NOTE — PROVIDER NOTIFICATION
INR 6.08 and levo running peripherally. Dr. Isabel notified. Continue to monitor on INR while warfarin on hold. He will take a look at line option for levo.

## 2020-06-26 NOTE — PLAN OF CARE
Pt A&O upon arrival to ICU.  Hypotensive baseline and remains hypotensive.  Levophed currently at 0.15 mcg to keep MAP>50.  A total of 1 L LR given for hypotension.  Pt able to mentate with lower pressures.  Pt has permanent pacer/defib, underlying rhythm delgado with ectopy pacer captures.  Spouse Nav here at arrival to ICU then home for night.  Plan to update daughter this a.m.

## 2020-06-26 NOTE — ED NOTES
DATE:  6/25/2020   TIME OF RECEIPT FROM LAB:  1928  LAB TEST:  INR 5.27  LAB VALUE:  5.27  RESULTS GIVEN WITH READ-BACK TO (PROVIDER):  Dr Grover  TIME LAB VALUE REPORTED TO PROVIDER:   1929  Mary Chacon RN

## 2020-06-26 NOTE — PROGRESS NOTES
"BRIEF NUTRITION ASSESSMENT      REASON FOR ASSESSMENT:  Marquise Jj is a 75 year old female seen by Registered Dietitian for Admission Nutrition Risk Screen for unintentional loss of 10# or more in the past two months    NUTRITION HISTORY:  Unable to obtain nutritional history from pt. RN noted in screen pt wt was 170# in March and now is 156#.  Unable to locate wt data from March 2020, as [place] and [place] don't note weight data.     CURRENT DIET AND INTAKE:  Diet:  1 gm Na diet              Intake 50% of 1 meal ~ Omelet, fruit cup, OJ and skim milk.    ANTHROPOMETRICS:  Height: 5'8\"  Weight:  156 lbs 15.48 oz  Body mass index is 23.87 kg/m .   Weight Status: Normal BMI  IBW:  140#  %IBW: 111%  Weight History: Pt wt stable since 5/28/2020.  Unable to confirm wt data from 3/2020.   Wt Readings from Last 10 Encounters:   06/26/20 71.2 kg (156 lb 15.5 oz)   06/25/20 71.7 kg (158 lb)   06/15/20 70.7 kg (155 lb 14.4 oz)   05/28/20 71.4 kg (157 lb 8 oz)   11/27/17 77.1 kg (170 lb)   11/21/17 78.5 kg (173 lb)   11/19/17 77.1 kg (170 lb)   11/16/17 76.7 kg (169 lb)     LABS:  Na 132 (L)  BUN 39 (H)  Cr 1.86 (H)  Phos    MALNUTRITION:  Visual Nutrition Focused Physical Assessment (NFPA) not completed due to restrictions on face-to-face patient care during COVID-19 Pandemic. Do not suspect muscle/fat losses.  Patient does not meet two of the following criteria necessary for diagnosing malnutrition.     % Weight Loss:  Unable to determine  % Intake:  Unable to assess without pt input  Subcutaneous Fat Loss:  Unable to assess   Muscle Loss:  Unable to assess  Fluid Retention:  Mild 2+    NUTRITION INTERVENTION:  Nutrition Diagnosis:  No nutrition diagnosis at this time.    Implementation:  Nutrition Education:  Per Provider order if indicated    FOLLOW UP/MONITORING:   Will re-evaluate in 7 - 10 days, or sooner, if re-consulted.    Roberta Sterling RDN, LD    "

## 2020-06-26 NOTE — PROGRESS NOTES
[facility] Intensive Care Progress Note    Date of Service (when I saw the patient): 06/26/2020     Assessment & Plan   Marquise Jj is a 75-year-old female is admitted in transfer from the ED at [facility] with weakness and hypotension.     She has multiple medical diagnoses including: severe ischemic cardiomyopathy with LVEF of less than 20%, a large apical mural thrombus by recent echocardiogram- (Sofia 10, 2020), severe multivessel coronary disease status post CABG x2 in March 2020, chronic anemia, and chronic kidney disease.    She takes warfarin for the mural thrombus. .  She also endorses a chronic cough.  She was noted to have a high INR and was sent to the ED for eval and treatment.  She was noted to be hypotensive, to have pyuria.  COVID PCR is negative.       Neurology:  No issues: alert, oriented    Cardiovascular:  History of ischemic cardiomyopathy with ejection fraction less than 20%: Repeat echo done this morning basically unchanged from that of 2 weeks ago.  Has large organized LV thrombus, and severe tricuspid regurgitation in addition to LV dysfunction. Has AICD/pacemaker  -Holding outpatient medicines as patient is still requiring norepinephrine to maintain adequate perfusing pressure    Pulmonary:        No issues: No signs of pulmonary edema  -  Resp: 16      Renal  LORI in addition to mild baseline renal dysfunction: Likely due to sepsis and hypotension.  UA with signs of urinary tract infection.  Creatinine improving  -Avoid nephrotoxic agents.  Renal ultrasound to ensure no obstruction     ID: Urosepsis: On ceftriaxone  -    GI  No issues: Prescribed home dose of omeprazole  -    Nutrition  No issues: Tolerating regular diet  -    Endocrine:  No issues: Glycemic control per unit protocol  -    Heme/Onc:  No issues  -    DVT Prophylaxis: Low Risk/Ambulatory with no VTE prophylaxis indicated  GI Prophylaxis: PPI  (takes at home)  Restraints: Restraints for  medical healing needed: NO    Family update by me today: No    Donovan Isabel         Time Spent on this Encounter   Billing:  I spent 35 minutes bedside and on the inpatient unit today managing the critical care of Marquise Laboy Анна in relation to the issues listed in this note.    Main Plans for Today   Check random cortisol  1 dose dexamethasone  Start scheduled hydrocortisone if cortisol level low  Renal ultrasound  Wean norepinephrine    Interval History   Feeling better this morning    Physical Exam   Temp: 98  F (36.7  C) Temp src: Oral Temp  Min: 97.7  F (36.5  C)  Max: 99.6  F (37.6  C) BP: (!) 73/47 Pulse: 84 Heart Rate: 72 Resp: 16 SpO2: 100 % O2 Device: None (Room air) Oxygen Delivery: 2 LPM  Vitals:    06/25/20 2210 06/26/20 0000   Weight: 71.2 kg (156 lb 15.5 oz) 71.2 kg (156 lb 15.5 oz)     I/O last 3 completed shifts:  In: 874.73 [I.V.:374.73; IV Piggyback:500]  Out: 120 [Urine:120]    Neurologic: Alert, oriented  Cardiovascular: Paced rhythm, no murmur  Respiratory: lungs clear, no rales  GI: soft, nontender, nondistended  Skin/Extremities: warm, no livedoreticularis  Lines: No erythema or discharge at entry site for No invasive lines  Current lines are required for patient management    Medications     dextrose       insulin (regular) Stopped (06/26/20 0900)     norepinephrine 0.03 mcg/kg/min (06/26/20 0945)     sodium chloride 50 mL/hr at 06/25/20 2325       bimatoprost  1 drop Both Eyes At Bedtime     cefTRIAXone  1 g Intravenous Q24H     dorzolamide-timolol  1 drop Both Eyes BID     levothyroxine  50 mcg Oral Daily       Data   Recent Labs   Lab 06/26/20  0558 06/25/20  1853 06/25/20  1617   WBC 13.9*  --  15.1*   HGB 9.6*  --  10.0*   MCV 79  --  82     --  255   INR 6.08* 5.27* 5.04*   * 133 133   POTASSIUM 4.7 4.8 4.6   CHLORIDE 101 99 99   CO2 23 25 25   BUN 39* 36* 37*   CR 1.86* 1.91* 1.92*   ANIONGAP 8 9 9   FERNANDO 8.1* 8.4* 8.4*   * 94 96   TROPI  --   --   0.197*     Recent Results (from the past 24 hour(s))   XR Chest Port 1 View    Narrative    XR PORTABLE CHEST ONE VIEW   2020 5:09 PM     HISTORY: Hypotension, hypoxia. Recent diagnosis of decompensated  systolic HF (EF <20%), with mural thrombus. Evaluate for acute  process.    COMPARISON: Chest x-ray 2020.      Impression    IMPRESSION: Portable chest. Lungs are clear. Heart is normal in size.  No pneumothorax. Small bilateral pleural effusions are present with  the left larger than the right, minimally changed since prior exam.  Implantable cardiac device left upper chest with single lead overlying  the right ventricle is again noted and unchanged.    MARIANNA LOMAS MD   Echocardiogram Limited    Narrative    [id number]  [id number]  [id number]  [id number]^PANTERA^JAZ^[facility]  Echocardiography Laboratory  [address] [place] [address]        Name: EDILSON SMITH  MRN: [id number]  : 1945  Study Date: 2020 07:51 AM  Age: 75 yrs  Gender: Female  Patient Location: [facility]  Reason For Study: Shock  Ordering Physician: JAZ MOTT  Referring Physician: JOSH HUMPHREY  Performed By: Emerson Shields RDCS     BSA: 1.8 m2  Height: 68 in  Weight: 157 lb  BP: 97/63 mmHg  _____________________________________________________________________________  __        Procedure  Limited Portable Echo Adult.  _____________________________________________________________________________  __        Interpretation Summary     Limited echo.  Pleural effusion, correlate with alternative imaging.  The left ventricle is moderately dilated (no volumes available). The visual  ejection fraction is estimated at <20%. Basal segments, although severely  hypokinetic, have relatively better contractility than the mid-apical  segments.  Large organized LV apical mass/thrombus is noted.  There is severe (4+) tricuspid regurgitation. Mechanism appears unclear but  likely functional  and CIED lead related.  Dilation of the inferior vena cava is present with abnormal respiratory  variation in diameter. Likely moderate PHTN.  The right ventricle is mild to moderately dilated. Mildly decreased right  ventricular systolic function.  Mild tenting and PML restriction of the mitral valve leaflets noted due to LV  dilatation. There is mild-moderate functional MR.     No significant change compared to echo 06/10/2020  _____________________________________________________________________________  __        Left Ventricle  The left ventricle is moderately dilated. Severely decreased left ventricular  systolic function. The visual ejection fraction is estimated at <20%.     Right Ventricle  The right ventricle is mild to moderately dilated. Mildly decreased right  ventricular systolic function. There is a pacemaker lead in the right  ventricle.     Mitral Valve  Mild tenting and PML restriction of the mitral valve leaflets noted due to LV  dilatation. There is mild-moderate functional MR.     Tricuspid Valve  There is severe (4+) tricuspid regurgitation. The right ventricular systolic  pressure is elevated at 38.6 mmHg. Pulmonary hypertension.        Aortic Valve  The aortic valve is trileaflet with aortic valve sclerosis. No aortic  regurgitation is present. No aortic stenosis is present.     Pulmonic Valve  The pulmonic valve is not well visualized. There is trace pulmonic valvular  regurgitation.     Vessels  The aortic root is normal size. Dilation of the inferior vena cava is present  with abnormal respiratory variation in diameter.     Pericardium  Trivial pericardial effusion.     _____________________________________________________________________________  __        Doppler Measurements & Calculations  MV E max gary: 76.2 cm/sec  MV dec time: 0.15 sec  TR max gary: 310.8 cm/sec  TR max P.6 mmHg              _____________________________________________________________________________  __            Report approved by: Imani Figueredo 06/26/2020 11:54 AM

## 2020-06-26 NOTE — PROGRESS NOTES
Neuro: A/Ox4  Pulmonary: Diminished LS. Dry cough when flat. DASH. Unlabored at rest. On RA.  CV: SR w/ PACs. Hypotensive, but remain MAP 50 and above  : Voiding.   GI: BS present. On restricted Na diet.   Extremities: SBA, GB and walker for mobility  Skin: Grossly intact, except a scab on mid back.   Lines: PIVx2  Family:  updated at bedside.   Plan: Continue to monitor and transfer out of ICU tomorrow if remain stable.     7p-11p No change.

## 2020-06-27 NOTE — PHARMACY-ANTICOAGULATION SERVICE
Clinical Pharmacy - Warfarin Dosing Consult     Pharmacy has been consulted to manage this patient s warfarin therapy.  Indication: Other - specify in comments  Therapy Goal: INR 2-2.5  Provider/Team: Isauro Vargas MD  Warfarin Prior to Admission: Yes  Warfarin PTA Regimen: 1mg daily  Recent documented change in oral intake/nutrition: No  Dose Comments: Indication: LV thrombus    INR   Date Value Ref Range Status   06/27/2020 2.91 (H) 0.86 - 1.14 Final   06/26/2020 6.08 (HH) 0.86 - 1.14 Final     Comment:     Critical Value called to and read back by  LEIGHA OSORIO IN ICU AT 0617 BY II         No dose today since INR=2.91.  Pharmacy will monitor Marquise Narda Анна daily and order warfarin doses to achieve specified goal.      Please contact pharmacy as soon as possible if the warfarin needs to be held for a procedure or if the warfarin goals change.

## 2020-06-27 NOTE — PROGRESS NOTES
[facility] Intensive Care Progress Note    Date of Service (when I saw the patient): 06/27/2020     Assessment & Plan   Marquise Jj is a 75-year-old female is admitted in transfer from the ED at [facility] with weakness and hypotension concerning for septic shock. Also with acute on chronic heart failure requiring diuresis.      She has multiple medical diagnoses including: severe ischemic cardiomyopathy with LVEF of less than 20%, a large apical mural thrombus by recent echocardiogram- (Sofia 10, 2020), severe multivessel coronary disease status post CABG x2 in March 2020, chronic anemia, and chronic kidney disease.      Interval History:   - vasopressors off > 24 hours  - feeling well, up with physical therapy, reports baseline SBP 90's  - cardiology consult for ICM, mural thrombus, recs appreciated  - diuresis today  - transfer to cardiac care unit; signed out to hospitalist        Neurology:  No issues: alert, oriented    Cardiovascular:  History of ischemic cardiomyopathy with ejection fraction less than 20%: Repeat echo done 6/26 basically unchanged from that of 2 weeks ago.  Has large organized LV thrombus, and severe tricuspid regurgitation in addition to LV dysfunction. Has AICD/pacemaker.   Large mural thrombus, chronically on coumadin   -Holding outpatient medicines but will restart diuresis given elevated NT-BNP above baseline, increased weight, increased LE edema  - cardiology consulted, recs appreciated   - diuresis today and monitor electrolytes   - holding coumadin given elevated INR; will monitor daily in the setting of now good PO intake     Pulmonary:        No issues: Some SOB above baseline likely due to decompensated heart failure. See above.     Renal:   LORI in addition to mild baseline renal dysfunction: Likely due to sepsis and hypotension in addition to cardiac failure.  UA with signs of urinary tract infection.  Creatinine improving.   Hyponatremia:  likely due to hypervolemia and will continue to monitor with diuresis as above.   -Avoid nephrotoxic agents.  Renal ultrasound to ensure no obstruction was negative.   - daily I/O monitoring  - diuresis as above cautiously     ID: Possible urosepsis d/t dirty UA. Blood & urine cultures negative. Would complete a 7-10 day course of ceftriaxone.       GI  No issues: Prescribed home dose of omeprazole. Started bowel regimen today per patient request as no BM in 4-5 days.     Nutrition  No issues: Tolerating regular diet, 1 g Na limit.   -    Endocrine:  No issues: Glycemic control per unit protocol.    Heme/Onc:   No issues: chronic anemia, near her baseline.       DVT Prophylaxis: Elevated INR   GI Prophylaxis: PPI  (takes at home)  Restraints: Restraints for medical healing needed: NO    Family update by me today: No    Critical Care Time: 35 min.  I spent this time (excluding procedures) personally providing and directing critical care services at the bedside and on the critical care unit.       Date of Service (when I saw the patient): June 27, 2020    Mikala Jones MD  Pulmonary and Critical Care Medicine  Pager: [phone]    Mikala Jones         Physical Exam   Temp: 96.3  F (35.7  C) Temp src: Axillary Temp  Min: 96.3  F (35.7  C)  Max: 97.9  F (36.6  C) BP: 91/61 Pulse: 82 Heart Rate: 75 Resp: 25 SpO2: 98 % O2 Device: None (Room air)    Vitals:    06/26/20 0000 06/27/20 0400 06/27/20 1243   Weight: 71.2 kg (156 lb 15.5 oz) 74.6 kg (164 lb 7.4 oz) 75.5 kg (166 lb 8 oz)     I/O last 3 completed shifts:  In: 1919.17 [P.O.:780; I.V.:1139.17]  Out: 675 [Urine:675]  General:  Pleasant female, in no acute distress  Neurologic: Alert, oriented, moving all extremities appropriately, walking with walker without issue   Cardiovascular: Paced rhythm, no murmur  Respiratory: lungs clear, no rales  GI: soft, nontender, nondistended  Skin/Extremities: warm, no livedo reticularis, 2+ pitting edema in b/l legs to  mid-calf   Lines: No erythema or discharge at entry site for No invasive lines  Current lines are required for patient management    Medications     bumetanide 0.25 mg/hr (06/27/20 1500)     dextrose       Warfarin Therapy Reminder         bimatoprost  1 drop Both Eyes At Bedtime     busPIRone  10 mg Oral TID     cefTRIAXone  1 g Intravenous Q24H     dorzolamide-timolol  1 drop Both Eyes BID     latanoprost  1 drop Both Eyes Daily     levothyroxine  50 mcg Oral Daily     omeprazole  20 mg Oral QAM AC     polyethylene glycol  17 g Oral Daily     senna-docusate  1 tablet Oral BID     traZODone  150 mg Oral At Bedtime     warfarin-No DOSE today  1 each Does not apply no dose today (warfarin)       Data   Recent Labs   Lab 06/27/20  1311 06/27/20  0859 06/26/20  0558 06/25/20  1853 06/25/20  1617   WBC  --  8.0 13.9*  --  15.1*   HGB  --  9.9* 9.6*  --  10.0*   MCV  --  78 79  --  82   PLT  --  292 282  --  255   INR 2.91*  --  6.08* 5.27* 5.04*   NA  --  130* 132* 133 133   POTASSIUM  --  4.7 4.7 4.8 4.6   CHLORIDE  --  101 101 99 99   CO2  --  21 23 25 25   BUN  --  45* 39* 36* 37*   CR  --  1.65* 1.86* 1.91* 1.92*   ANIONGAP  --  8 8 9 9   FERNANDO  --  8.4* 8.1* 8.4* 8.4*   * 140* 137* 94 96   ALBUMIN  --  2.2*  --   --   --    PROTTOTAL  --  6.5*  --   --   --    BILITOTAL  --  0.7  --   --   --    ALKPHOS  --  189*  --   --   --    ALT  --  22  --   --   --    AST  --  24  --   --   --    TROPI  --   --   --   --  0.197*     Recent Results (from the past 24 hour(s))   US Renal Complete    Narrative     RENAL COMPLETE   6/26/2020 5:44 PM     HISTORY: Septic shock with renal failure. Rule out obstruction.    COMPARISON: None.    FINDINGS:  Right Kidney: Measures 10.9 x 4.2 x 4.2 cm, cortex thickness of 0.9  cm. No hydronephrosis.    Left Kidney: Measures 8.1 x 4.1 x 4.6 cm. Cortex thickness of 0.7 cm.  No hydronephrosis.    Bladder: Unremarkable.    Incidental note made of bilateral pleural fluid.       Impression    IMPRESSION:   1. No evidence for hydronephrosis.  2. Bilateral renal cortical thinning and asymmetric renal lengths.  3. Incidental finding of bilateral pleural effusions.    DEVIN ISRAEL MD

## 2020-06-27 NOTE — PLAN OF CARE
Pt A&O.  Levophed remains off, BP normalizing.  Mentation clear.  OOB to Toilet with walker and SBA.  Denies pain/discomfort.  RA sats 95%-99%.

## 2020-06-27 NOTE — PROGRESS NOTES
Neuro: A/Ox4  Pulm: Diminished LS. On RA. DASH.   CV: SR w/ PACs and BBB. Soft BP at her baseline. MAP is above her goal  : Voiding   GI: Passing flatus. On low Na diet.   Extremities: SBA, GB and walker for mobility  Skin: Grossly intact, except some bruises and scab on mid back.   Lines: PIVx2  Family: Pt updates her family herself  Plan: Transferred to Heart center at 1230 via w/c. Her belongings (cell, ipad, chargerx2, glasses w/ case, clothing, and shoes) sent with patient.

## 2020-06-27 NOTE — PLAN OF CARE
VSS-BP soft but baseline per pt report. Up to bathroom with walker, SBA. Room air. SR. Family involved and supportive. UTI, on rocephin. Bumex gtt infusing. 1 gm NA diet. LORI improving. Coumadin restarted but no dose today for INR of 2.9. Plan is for EP consult Monday to consider BiV upgrade of her ICD for CRT.

## 2020-06-27 NOTE — PROGRESS NOTES
[facility]    Hospitalist Progress Note    Assessment & Plan   Marquise Jj is a 75 year old female with pmh significant for severe ischemic cardiomyopathy with left ventricular ejection fraction of less than 20%, large mural thrombus on recent echocardiogram from June 2020, severe multivessel coronary disease status post bypass in March 2020, chronic anemia, and chronic CKD who was admitted on 6/25/2020 for hypotension and weakness.     #Hypotension  -In the ICU thought secondary to sepsis from UTI source.  Currently on IV antibiotics ceftriaxone.  Of note, patient is also on some medications at home for heart failure which lower blood pressure significantly including sacubitril/valsartan and torsemide.  These medications are being held for now.  Cardiology has been consulted to make further recommendations regarding dosage adjustment. Patient was likely hypovolemic as well, her creatinine is improving significantly with holding diuretics and giving back some volume.    #Severe ischemic cardiomyopathy with left ventricular ejection fraction of less than 20%  #Severe multivessel coronary disease status post bypass in March 2020  #AICD  -Patient is on some heart failure drugs including torsemide and sacubitril/valsartan.  She came extremely hypotensive and likely hypovolemic.  Her diuretics are being held at this time.  Cardiology consult has been placed for further management of medication dosage adjustments.  She will need close outpatient follow-up.    #Large mural thrombus on recent echocardiogram from June 2020  -Patient's INR still elevated to above 6.  Her INR drops below 2, she will likely need a heparin drip as a transitional resumption of her warfarin.  Cardiology is following.    #LORI  -Patient came in with an elevated creatinine.  This is likely secondary to sepsis and hypotension.  UA with clear signs of a urinary tract infection.  Patient was probably over diuresed with her home  torsemide.  Her diuretics are being held.  Avoiding nephrotoxic agents.  Renal ultrasound was normal.  Her creatinine is improving.    #UTI  -UA with clear signs of urinary tract infection.  Continue ceftriaxone that was started.    #COVID testing  -Patient's COVID was checked.  She was PCR negative.  No need to recheck at this time.    # Pain Assessment:  Current Pain Score 6/27/2020   Patient currently in pain? denies   Pain score (0-10) -   Pain location -   Pain descriptors -   Marquise felix pain level was assessed and she currently denies pain.      DVT Prophylaxis: Pneumatic Compression Devices  Code Status: Full Code    Disposition: Expected discharge in 2-3 days once cleared by cardiology, BP stable.    Megan Chambers MD   Text Page (7am to 6pm)    Interval History   Transferred out of the ICU and hospitalist service assumed care. Cardiology consult pending. Patient seen and examined.  No acute events over night.  No fevers or chills. No chest pain or SOB. Answered patient questions.    -Data reviewed today: I reviewed all new labs and imaging results over the last 24 hours. I personally reviewed the EKG tracing showing NSR.    Physical Exam   Temp: 97.2  F (36.2  C) Temp src: Oral BP: (!) 88/69 Pulse: 79 Heart Rate: 95 Resp: 26 SpO2: 100 % O2 Device: None (Room air)    Vitals:    06/25/20 2210 06/26/20 0000 06/27/20 0400   Weight: 71.2 kg (156 lb 15.5 oz) 71.2 kg (156 lb 15.5 oz) 74.6 kg (164 lb 7.4 oz)     Vital Signs with Ranges  Temp:  [97.2  F (36.2  C)-97.9  F (36.6  C)] 97.2  F (36.2  C)  Pulse:  [66-98] 79  Heart Rate:  [71-98] 95  Resp:  [11-30] 26  BP: (74-97)/(48-77) 88/69  SpO2:  [89 %-100 %] 100 %  I/O last 3 completed shifts:  In: 1917.55 [P.O.:570; I.V.:1347.55]  Out: 875 [Urine:875]    GEN: NAD, pleasant  HEENT: no icterus  CV: RRR, normal s1/s2, no murmurs/rubs/s3/s4, no heave. JVP normal.  CHEST: CTAB  ABD: soft, NT/ND, NABS  : no flank/suprapubic tenderness  NEURO: AA&Ox3, fluent/appropriate,  motor grossly nonfocal  PSYCH: cooperative, affect appropriate    Medications     dextrose       norepinephrine Stopped (06/26/20 1100)       bimatoprost  1 drop Both Eyes At Bedtime     busPIRone  10 mg Oral TID     cefTRIAXone  1 g Intravenous Q24H     dorzolamide-timolol  1 drop Both Eyes BID     insulin aspart  1-7 Units Subcutaneous TID AC     insulin aspart  1-5 Units Subcutaneous At Bedtime     latanoprost  1 drop Both Eyes Daily     levothyroxine  50 mcg Oral Daily     omeprazole  20 mg Oral QAM AC     polyethylene glycol  17 g Oral Daily     senna-docusate  1 tablet Oral BID     traZODone  150 mg Oral At Bedtime       Data   Recent Labs   Lab 06/27/20  0859 06/26/20  0558 06/25/20  1853 06/25/20  1617   WBC 8.0 13.9*  --  15.1*   HGB 9.9* 9.6*  --  10.0*   MCV 78 79  --  82    282  --  255   INR  --  6.08* 5.27* 5.04*   * 132* 133 133   POTASSIUM 4.7 4.7 4.8 4.6   CHLORIDE 101 101 99 99   CO2 21 23 25 25   BUN 45* 39* 36* 37*   CR 1.65* 1.86* 1.91* 1.92*   ANIONGAP 8 8 9 9   FERNANDO 8.4* 8.1* 8.4* 8.4*   * 137* 94 96   ALBUMIN 2.2*  --   --   --    PROTTOTAL 6.5*  --   --   --    BILITOTAL 0.7  --   --   --    ALKPHOS 189*  --   --   --    ALT 22  --   --   --    AST 24  --   --   --    TROPI  --   --   --  0.197*       Imaging:   Recent Results (from the past 24 hour(s))   US Renal Complete    Narrative    US RENAL COMPLETE   6/26/2020 5:44 PM     HISTORY: Septic shock with renal failure. Rule out obstruction.    COMPARISON: None.    FINDINGS:  Right Kidney: Measures 10.9 x 4.2 x 4.2 cm, cortex thickness of 0.9  cm. No hydronephrosis.    Left Kidney: Measures 8.1 x 4.1 x 4.6 cm. Cortex thickness of 0.7 cm.  No hydronephrosis.    Bladder: Unremarkable.    Incidental note made of bilateral pleural fluid.      Impression    IMPRESSION:   1. No evidence for hydronephrosis.  2. Bilateral renal cortical thinning and asymmetric renal lengths.  3. Incidental finding of bilateral pleural  effusions.    DEVIN ISRAEL MD

## 2020-06-27 NOTE — CONSULTS
"    INPATIENT CARDIOLOGY CONSULTATION    [facility].    Date of admission:  06/25/2020.   Consult date:   06/27/2020.      REFERRAL SOURCE:  Mikala Jones MD, Critical Care Medicine.      REASON FOR CONSULTATION:    1. Acute decompensated left ventricular systolic heart failure secondary to ischemic cardiomyopathy      PRIMARY CARDIOLOGY TEAM:  Dr. Jefe Fong MD; Mana Parrish PA-C ([facility]).      HISTORY OF PRESENT ILLNESS:    It was my pleasure to visit with Marquise Jj today for an inpatient Cardiology consultation.      She is a 75-year-old, non-obese,  lady who has had a tumultuous few months with several Cardiology issues.  She has been hospitalized with progressive dyspnea, a 10-pound fluid weight gain, and hypotension of 74/40 mmHg with a known diagnosis of ischemic cardiomyopathy with LVEF less than 20% and recent CABG in April/2020 (in [place]).  She [place] with [place]  [place] close to [place], never tobacco user, not known to have hypertension, diabetes or family history of cardiac disease.      The patient went on a cruise to [place] in 03/2020 with her family.  One night after eating out, she developed recurrent vomiting and diarrhea.  When the cruise ship docked in [place], she went to the local emergency room and workup revealed that she had had \"a massive heart attack.\"  Coronary angiogram showed multivessel CAD with chronic total occlusions noted.  LVEF was also markedly diminished at 20% with moderate mitral valve regurgitation.  Due to complexity of disease, she underwent CABG in April 2020 (at a hospital in [place], [place]).  Per the operative note, the LAD was dissected and the targets in the vessels were less than satisfactory, so she underwent a LIMA graft to a diagonal and a KURT to right coronary artery.  Her postoperative recovery was difficult with recurrent pleural effusions requiring thoracentesis, prolonged hospital stay and physical " deconditioning.  Due to her LVEF remaining low at 20%, she underwent primary prevention ICD placement (but no CRT).      When she returned to [place] in May, she established care with Dr. Fong and Tiffany Parrish at Cardiology in [facility].  She was put on Entresto 1 tablet 2 times a day (although there have been issues with low blood pressure).  Recent echocardiogram showed left ventricular mural thrombus, so she was advised to go on warfarin anticoagulation. However, they have not been able to achieve a stable INR supratherapeutic INRs and epistaxis requiring nasal packing.      About 10 days ago, [facility] increased her Lasix from 40 mg daily to 40 mg b.i.d. due to increasing edema and weight.  X-ray did not show worsening effusions but BNP remained elevated, so 5 days later, her diuretic regimen was significantly uptitrated from furosemide 40 mg 2 times daily to torsemide 100 mg (80 mg a.m./20 mg   P.m.) while continuing her Entresto 1 tablet 2 times daily.      Within a day of making this change, she started feeling progressively dizzy and fatigued, her urine output decreased, dyspnea worsened, INR increased to 6.0, and when she presented to the emergency room, her weight was up by 10 pounds at 166.  Chest x-ray showed no pulmonary edema and small bilateral pleural effusions (I suspect that the left-sided pleural effusion is probably loculated and chronic).  She had a renal ultrasound which was unremarkable.  Her INR was supratherapeutic at 6.0, creatinine had gone up to 1.9 with a BUN of 36, sodium 133, potassium 4.8, NT-proBNP was much higher than usual at 23,000, borderline troponin of 0.17, hemoglobin 9.9, platelets 292.  She has not had an ECG yet.  Her recent device interrogation shows that she is less than 1% ventricularly paced, which is set at VVI (single-chamber ICD) with 15 episodes of nonsustained VT documented.  No shocks or ATP therapy.      She was transferred from [facility] and monitored in the ICU overnight where her stay has been unremarkable.  Sepsis ruled out, COVID negative.      PHYSICAL EXAMINATION:   GENERAL:  Currently she is awake, alert, appears tired but is able to give a coherent and detailed history.  No clubbing. Has mild pallor, no icterus.  Cyanosis.    CARDIOVASCULAR:  Jugular venous pressure is up 7 cm in the neck.  She has bilateral 2+ lower extremity edema up to her knees, a few bilateral rales, and a small left-sided pleural effusion, pacemaker site satisfactory, midline well-healed sternotomy scar.  Heart sounds are regular.  Soft systolic murmur of mitral valve regurgitation.   LUNGS:   Few bibasilar rales with evidence of bilateral pleural effusions, no wheeze.    ABDOMEN:  Soft and nontender.   SKIN:  Normal.   MUSCULOSKELETAL:  She has poor muscle mass and there is evidence of physical deconditioning.   PSYCHIATRY:  Alert and oriented, flat affect, appears tired.     DIAGNOSES:   1.  Acute decompensated left ventricular systolic heart failure:  Left ventricular ejection fraction less than 20%, secondary to ischemic cardiomyopathy in the setting of recent outpatient diuretic adjustment.   2.  Hypotension secondary to significant up-titration of diuretic dosage.   3.  Coronary artery disease, status post delayed presentation of ST elevation myocardial infarction leading to coronary artery bypass grafting (diagonal after left anterior descending dissected, right internal mammary artery to right coronary artery at [place], [place] om 04/2020).   4.  Left ventricular mural thrombus:  Issues with supratherapeutic INR and epistaxis with warfarin.   5.  Status post primary prevention implantable cardioverter defibrillator in 04/2020 in [place].     6.  Stage III chronic kidney disease.      ASSESSMENT:    The patient presents with severe symptomatic hypotension after up-titration of diuretic dosage (from 40 mg b.i.d. of furosemide to torsemide 100 mg daily)  and decompensated heart failure.  Due to her heart failure, it is not surprising that INRs have been supratherapeutic.  Her LV function is profoundly decreased along with wall motion abnormalities from delayed presentation of STEMI.  Her entire apex is akinetic with a large layered mural thrombus.  She does need anticoagulation.  She also has at least moderate ischemic MR (due to severely hypokinetic posterior wall and retracted posterior septum, and moderately severe tricuspid valve regurgitation (in the presence of an ICD lead).  I suspect her left-sided pleural effusion, which is moderate, is loculated and, therefore, unresponsive to escalating doses of diuretic therapy.      PLAN:     1.  Please diurese her with IV bumetanide 1 mg followed by infusion at 0.25 mg per hour.  Although her creatinine has gone up, I suspect this is due to low perfusion from heart failure and once we diurese her, it will improve her filling pressures and improve renal perfusion and creatinine.   2.  Hold Entresto until adequate diuresis is achieved.  Her BP currently is 88/71.  I do not think she is going to tolerate her his third toe dose of 1 tablet 2 times daily.  Perhaps attempt half a tablet twice daily as an outpatient.  3.  She has a very large LV apical mural thrombus.  Current INR is supratherapeutic.  Also, issues with epistaxis.  Please consult ENT to see if she requires cautery, etc.    4.  Restart warfarin when INR is 2.0-2.5.   5.  For now, we will hold beta blocker as well due to low blood pressure.   6.  We will get an ECG today to assess her QRS width.  Prior to discharge, this patient should be considered for biventricular CRT upgrade with an Electrophysiology consult on Monday.  And that the hypotension will limit maximizing guideline directed medical therapy, device therapy should be explored.     CRITICAL CARE TIME:  Fifty minutes of critical care time spent in the care of this patient, greater than 50% of the  time spent in counseling and coordination of care.         JUHI MALONE MD             D: 2020   T: 2020   MT:       Name:     EDILSON SMITH   MRN:      [id number]        Account:       [id number]   :      1945           Consult Date:  2020      Document: [id number]

## 2020-06-27 NOTE — PLAN OF CARE
Discharge Planner OT   Patient plan for discharge: Home with resumption of home care therapies  Current status: Pt was educated on CHF education topics, such as energy conservation techniques, effort scale, symptoms of activity tolerance, importance of aerobic exercise, daily weights, low sodium diet, monitoring swelling and breathing. Dispensed handouts; pt verbalized understanding. Reports was working with home care therapies and was working up to being able to attend OP cardiac rehab in the future. Pt completed toilet task with CGA, grab bars, low toilet.  Pt 80's/60's, asymptomatic, OK to see per RN. Pt reports BP normally low. She transfers sit<>stand with SBA, ambulated approx 75' with SBA and WW with fatigue and mild SOB. BP 78/60 after activity, still denies symptoms. Notified RN. Pt reclined in recliner chair, compression socks on.  Barriers to return to prior living situation: impaired activity tolerance, medical status  Recommendations for discharge: Home with spouse and daughter, resume home PT/OT  Rationale for recommendations: Pt will have assist of spouse and daughter for IADL and should resume home care as pt would have a considerable taxing effort to leave the home and also requires the use of AD. She will benefit from home PT/OT to maximize (I), safety and activity tolerance to eventually tolerate OP CR (had MI and CABG, now EF below 20%).       Entered by: Mikala Oscar 06/27/2020 10:52 AM

## 2020-06-27 NOTE — CONSULTS
Inpatient Cardiology Consultation   [facility]  Date of Admission:  6/25/2020  Date of Consult: 6/27/2020.    Inpatient cardiology consultation note has been dictated. Dictation number [id number]        Isauro Vargas MD [facility]  Cardiology              REVIEW OF SYSTEMS:  A comprehensive 10 point review of systems was completed and the pertinent positives are documented in history of present illness.    MEDICATIONS:  Prior to Admission Medications   Prescriptions Last Dose Informant Patient Reported? Taking?   OMEPRAZOLE PO 6/25/2020 at Unknown time Self Yes Yes   Sig: Take 20 mg by mouth daily    acetaminophen (TYLENOL) 325 MG tablet Past Week at Unknown time Self No Yes   Sig: Take 2 tablets (650 mg) by mouth every 6 hours as needed for mild pain   aspirin (ASA) 81 MG EC tablet 6/25/2020 at Unknown time Self Yes Yes   Sig: Take 1 tablet (81 mg) by mouth daily   bimatoprost (LUMIGAN) 0.01 % SOLN 6/25/2020 at Unknown time Self Yes Yes   Sig: Place 1 drop into both eyes At Bedtime    busPIRone (BUSPAR) 10 MG tablet 6/25/2020 at Unknown time Self Yes Yes   Sig: Take 15 mg by mouth 3 times daily    co-enzyme Q-10 100 MG CAPS capsule  Self Yes Yes   Sig: Take 100 mg by mouth daily   dorzolamide-timolol (COSOPT) 2-0.5 % ophthalmic solution 6/25/2020 at Unknown time Self Yes Yes   Sig: Place 1 drop into both eyes 2 times daily    ferrous sulfate (FEROSUL) 325 (65 Fe) MG tablet 6/25/2020 at Unknown time Self Yes Yes   Sig: Take 325 mg by mouth daily (with breakfast)   latanoprost (XALATAN) 0.005 % ophthalmic solution 6/25/2020 at Unknown time Self Yes Yes   Sig: Place 1 drop into both eyes daily   levothyroxine (SYNTHROID/LEVOTHROID) 50 MCG tablet 6/25/2020 at Unknown time Self Yes Yes   Sig: Take 50 mcg by mouth daily   ondansetron (ZOFRAN-ODT) 4 MG ODT tab Unknown at Unknown time Self Yes No   Sig: Take 4 mg by mouth every 8 hours as needed    potassium chloride ER (KLOR-CON M) 20 MEQ CR tablet 6/25/2020  at Unknown time Self No Yes   Sig: Take 1 tablet (20 mEq) by mouth daily   rosuvastatin (CRESTOR) 20 MG tablet 6/25/2020 at Unknown time Self Yes Yes   Sig: Take 20 mg by mouth daily   sacubitril-valsartan (ENTRESTO) 24-26 MG per tablet 6/25/2020 at Unknown time Self No Yes   Sig: Take 1 tablet by mouth 2 times daily   senna-docusate (SENOKOT-S/PERICOLACE) 8.6-50 MG tablet Past Month at Unknown time Self Yes Yes   Sig: Take 2 tablets by mouth 2 times daily as needed for constipation   torsemide (DEMADEX) 20 MG tablet 6/25/2020 at co Self No Yes   Sig: Take 40mg (2 tablets) in the morning and 20mg (1 tablet) in the afternoon   traZODone (DESYREL) 150 MG tablet 6/25/2020 at Unknown time Self Yes Yes   Sig: Take 150 mg by mouth At Bedtime   warfarin ANTICOAGULANT (COUMADIN) 1 MG tablet 6/24/2020 Self Yes Yes   Sig: Take 1 mg by mouth daily Per inr      Facility-Administered Medications: None       ALLERGIES:  Allergies   Allergen Reactions     Combigan [Brimonidine Tartrate-Timolol] Swelling     Swollen eyelids       PAST MEDICAL HISTORY:  Past Medical History:   Diagnosis Date     CAD (coronary artery disease)      ICD (implantable cardioverter-defibrillator) in place     Primary prevention AICD placed in [place] in May 2020.     Ischemic cardiomyopathy     LVEF less than 20%.     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     Status post coronary artery bypass grafting     Done in [facility] in April 2020.  LIMA to diagonal (as LAD dissected) and KURT to the right coronary artery.       PAST SURGICAL HISTORY:  Past Surgical History:   Procedure Laterality Date     ARTHROPLASTY REVISION HIP Right 11/16/2017    Procedure: ARTHROPLASTY REVISION HIP;  Right total hip arthroplasty revision.;  Surgeon: Jozef Garcia MD;  Location: [facility] OR     cabg  04/2020       SOCIAL HISTORY:   Marquise Jj  reports that she has never smoked. She  has never used smokeless tobacco. She reports previous alcohol use. She reports that she does not use drugs.    FAMILY HISTORY:  Family History   Problem Relation Age of Onset     Coronary Artery Disease No family hx of      Heart Failure No family hx of        PHYSICAL EXAMINATION:  Temp: 97.2  F (36.2  C) Temp src: Oral BP: (!) 88/71 Pulse: 82 Heart Rate: 95 Resp: 25 SpO2: 99 % O2 Device: None (Room air)    06/22 0700 - 06/27 0659  In: 2792.28 [P.O.:570; I.V.:1722.28]  Out: 995 [Urine:995]  Net: 1797.28  Vitals:    06/25/20 2210 06/26/20 0000 06/27/20 0400 06/27/20 1243   Weight: 71.2 kg (156 lb 15.5 oz) 71.2 kg (156 lb 15.5 oz) 74.6 kg (164 lb 7.4 oz) 75.5 kg (166 lb 8 oz)         Isauro Vargas MD

## 2020-06-28 NOTE — PROVIDER NOTIFICATION
MD Notification    Notified Person: MD    Notified Person Name: On call hosp.    Notification Date/Time: 2135 6/27    Notification Interaction:    Purpose of Notification: patient room [address] can patient have melatonin to assist with sleep? did not sleep well last night.        Orders Received: 3mg melatonin ordered    Comments:

## 2020-06-28 NOTE — PLAN OF CARE
Discharge Planner PT   Patient plan for discharge: Home  Current status: Pt is a 75 year old female admitted with weakness and hypotension. At baseline, pt lives with her  in a home with ramp access and all needs met on main floor. Pt reports she was receiving home health services prior to admit. Pt uses a 4ww for ambulation.  This date, pt requires SBA for transfers. Pt ambulates 75' x 2 with 4ww and SBA.   Barriers to return to prior living situation: None  Recommendations for discharge: Home with resumption of HHPT, family providing SBA  Rationale for recommendations: Anticipate with further medical management and therapy, patient will be safe to discharge home with family assist as needed for SBA with mobility. Pt will require assist of one, assistive device, and considerable effort to come and go from house, therefore, home health PT is recommended.        Entered by: Lisset Jarquin 06/28/2020 12:25 PM

## 2020-06-28 NOTE — PROGRESS NOTES
06/28/20 1200   Quick Adds   Type of Visit Initial PT Evaluation   Living Environment   Lives With spouse   Living Arrangements house   Home Accessibility no concerns  (Has ramp)   Transportation Anticipated family or friend will provide   Living Environment Comment Pt reports she has a ramp to access her house   Self-Care   Usual Activity Tolerance moderate   Current Activity Tolerance fair   Regular Exercise No   Equipment Currently Used at Home walker, rolling   Functional Level Prior   Ambulation 1-->assistive equipment   Transferring 1-->assistive equipment   Fall history within last six months no   Which of the above functional risks had a recent onset or change? ambulation;transferring   Prior Functional Level Comment Pt reports poor endurance during the past few months. Has home health PT, RN and OT.   General Information   Onset of Illness/Injury or Date of Surgery - Date 06/25/20   Referring Physician Dr. Isabel   Patient/Family Goals Statement To go home   Pertinent History of Current Problem (include personal factors and/or comorbidities that impact the POC) Pt is a 75 year old female with significant cardiac history admitted for heart failure.    Cognitive Status Examination   Orientation orientation to person, place and time   Level of Consciousness alert   Follows Commands and Answers Questions 100% of the time   Pain Assessment   Patient Currently in Pain No   Range of Motion (ROM)   ROM Quick Adds No deficits were identified   Strength   Strength Comments Gross LE strehemalatha 4/5 bilaterally   Bed Mobility   Bed Mobility Comments NT, up in chair   Transfer Skills   Transfer Comments Sit to/from stand CGA   Gait   Gait Comments 15' 4ww CGA, decreased richard and step length   Balance   Balance Comments Good in sitting, fair in standing   General Therapy Interventions   Planned Therapy Interventions balance training;bed mobility training;strengthening;transfer training;gait training;progressive  "activity/exercise;home program guidelines   Clinical Impression   Criteria for Skilled Therapeutic Intervention yes, treatment indicated   PT Diagnosis Impaired ambulation   Influenced by the following impairments Decreased strength, decreased endurance, decreased balance   Functional limitations due to impairments Difficulty with bed mobility, transfers, ambulation   Clinical Presentation Stable/Uncomplicated   Clinical Presentation Rationale VSS, pain controlled   Clinical Decision Making (Complexity) Low complexity   Therapy Frequency Daily   Predicted Duration of Therapy Intervention (days/wks) 3 days   Anticipated Equipment Needs at Discharge walker   Anticipated Discharge Disposition Home with Home Therapy   Risk & Benefits of therapy have been explained Yes   Patient, Family & other staff in agreement with plan of care Yes   [facility] TM \"6 Clicks\"   2016, Trustees of [facility], under license to [employer].  All rights reserved.   6 Clicks Short Forms Basic Mobility Inpatient Short Form   [facility]  \"6 Clicks\" V.2 Basic Mobility Inpatient Short Form   1. Turning from your back to your side while in a flat bed without using bedrails? 3 - A Little   2. Moving from lying on your back to sitting on the side of a flat bed without using bedrails? 3 - A Little   3. Moving to and from a bed to a chair (including a wheelchair)? 3 - A Little   4. Standing up from a chair using your arms (e.g., wheelchair, or bedside chair)? 3 - A Little   5. To walk in hospital room? 3 - A Little   6. Climbing 3-5 steps with a railing? 3 - A Little   Basic Mobility Raw Score (Score out of 24.Lower scores equate to lower levels of function) 18   Total Evaluation Time   Total Evaluation Time (Minutes) 10     "

## 2020-06-28 NOTE — PROGRESS NOTES
[facility]  Hospitalist Progress Note        Bandar Stanley MD   06/28/2020        Interval History:      - transferred out of ICU 6/27; started on Bumex drip; reports feeling better with SOB         Assessment and Plan:        Marquise Jj is a 75 year old female with PMH recent complicated cardiac history significant for severe ischemic cardiomyopathy with left ventricular ejection fraction of less than 20%, large mural thrombus on recent echocardiogram from June 2020, severe multivessel coronary disease status post bypass in March 2020, chronic anemia, and chronic CKD who was admitted on 6/25/2020 for hypotension and weakness (Transfer from [facility] ED).      # Hypotension  - initially thought to be sec to urinary source and started empirically on Rocephin but blood and urine culture from 6/25 with no growth so far; remains afebrile; leuukocytosis improved, wbc 15--->8  - less likely to be septic shock and likely related to severe ischemic Cardiomyopathy (see below)  - off pressors and transferred out of ICU on 6/27 ; BP on softer side low 90s--100s which is probably her baseline owing to the very low EF  - on empiric Rocephin since 6/25, will discontinue on 6/29     #Severe ischemic cardiomyopathy with left ventricular ejection fraction of less than 20%  # Acute decompensated systolic heart failure sec to above  # h/o Severe multivessel coronary disease status post bypass in March 2020 in [place]  # s/p AICD 4/2020 in [place]  # Severe TR, mild-mod MR  - cardiology following, started on Bumex drip 6/27, holding PTA Torsemide and Entresto  - per cards, will need EP eval for consideration of biventricular CRT upgrade  - repeat ECHO 6/26 noted with no significant change-- EF<20%, akinetic apex with large mural thrombus, severe TR, mild-moderate functional MR  - monitor volume status closely, daily weight     #Large mural thrombus on recent echocardiogram from June 2020  # supratherapeutic  INR  # h/o recurrent epistaxis  - INR supratherapeutic on admission to 6--trended down to 2.75  - it seems she has been having difficulty regulating her INR to therapeutic level and hoping for alternative anticoagulation  - d/w cardiology Dr Vargas, given her issues with epistaxis, cards rec ENT evaluation (consulted) prior to consideration of alternative anticoagulation  - for now pharmacy to dose coumadin     #LORI  - Baseline cr around 1-1.2  - acute dysfunction likely sec to renal hypoperfusion due to heart failure; CR 1.91-->1.59  - US 6/26 UR, no obstruction or hydronephrosis  - PTA Torsemide and Sacubitril/valsartan (Entresto) on hold but started on Bumex drip  - monitor BMP closely    Chronic anemia  - unclear recent baseline, prior to recent cardiac events it seems her Hb was around 12-13  - Hb stable around 9-10; monitor      # Ruled out COVID       DVT Prophylaxis: anticoagulation as above  Code Status: Full Code     Disposition: Expected discharge in 2-3 days once cleared by cardiology, BP stable.                   Physical Exam:      Blood pressure 105/81, pulse 88, temperature 97.3  F (36.3  C), temperature source Oral, resp. rate 16, weight 75.5 kg (166 lb 8 oz), SpO2 100 %.  Vitals:    06/26/20 0000 06/27/20 0400 06/27/20 1243   Weight: 71.2 kg (156 lb 15.5 oz) 74.6 kg (164 lb 7.4 oz) 75.5 kg (166 lb 8 oz)     Vital Signs with Ranges  Temp:  [96.3  F (35.7  C)-97.3  F (36.3  C)] 97.3  F (36.3  C)  Pulse:  [66-94] 88  Heart Rate:  [75-97] 75  Resp:  [14-26] 16  BP: ()/(60-81) 105/81  SpO2:  [97 %-100 %] 100 %  I/O's Last 24 hours  I/O last 3 completed shifts:  In: 839.17 [P.O.:600; I.V.:239.17]  Out: 1275 [Urine:1275]    Constitutional: Alert, awake and oriented X 3; lying comfortably in bed in no apparent distress   HEENT: Pupils equal and reactive to light and accomodation, EOMI intact; neck supple no raised JVD or rigidity    Oral cavity: Moist mucosa   Cardiovascular: Normal s1 s2, regular rate  and rhythm, no murmur   Lungs: B/l clear to auscultation, no wheezes or crepitations   Abdomen: Soft, nt, nd, no guarding, rigidity or rebound; BS +   LE : B/l edema ++, compression stockings in place   Musculoskeletal: Power 5/5 in all extremities   Neuro: No focal neurological deficits noted, CN II to XII grossly intact   Psychiatry: normal mood and affect                Medications:          bimatoprost  1 drop Both Eyes At Bedtime     busPIRone  10 mg Oral TID     cefTRIAXone  1 g Intravenous Q24H     dorzolamide-timolol  1 drop Both Eyes BID     latanoprost  1 drop Both Eyes Daily     levothyroxine  50 mcg Oral Daily     omeprazole  20 mg Oral QAM AC     polyethylene glycol  17 g Oral Daily     senna-docusate  1 tablet Oral BID     traZODone  150 mg Oral At Bedtime     PRN Meds: dextrose, glucose **OR** dextrose **OR** glucagon, magnesium sulfate, magnesium sulfate, melatonin, naloxone, potassium chloride, potassium chloride with lidocaine, potassium chloride, potassium chloride, potassium chloride, potassium phosphate (KPHOS) in D5W IV, potassium phosphate (KPHOS) in D5W IV, potassium phosphate (KPHOS) in D5W IV, potassium phosphate (KPHOS) in D5W IV, Warfarin Therapy Reminder         Data:      All new lab and imaging data was reviewed.   Recent Labs   Lab Test 06/28/20  0609 06/27/20  1311 06/27/20  0859 06/26/20  0558   WBC 8.1  --  8.0 13.9*   HGB 9.5*  --  9.9* 9.6*   MCV 78  --  78 79     --  292 282   INR 2.75* 2.91*  --  6.08*      Recent Labs   Lab Test 06/28/20  0609 06/27/20  1311 06/27/20  0859 06/26/20  0558     --  130* 132*   POTASSIUM 4.1  --  4.7 4.7   CHLORIDE 103  --  101 101   CO2 21  --  21 23   BUN 46*  --  45* 39*   CR 1.59*  --  1.65* 1.86*   ANIONGAP 9  --  8 8   FERNANDO 8.2*  --  8.4* 8.1*   * 149* 140* 137*     Recent Labs   Lab Test 06/25/20  1617   TROPI 0.197*

## 2020-06-28 NOTE — PLAN OF CARE
Discharge Planner OT   Patient plan for discharge: Home with resumption of home care therapies  Current status:  pt SBA sit to stand, amb within room and to/from bathroom with FWW and SBA, toilet transfer with grab bars SBA on and Lori off, independent clothing management, SBA standing at sink for ADL task. Pt BP 85/57, pt fatigue and declined further OT at this time.   Barriers to return to prior living situation: impaired activity tolerance, medical status  Recommendations for discharge: Home with spouse and daughter, resume home OT per plan established by the Occupational Therapist  Rationale for recommendations: Pt will have assist of spouse and daughter for IADL and should resume home care as pt would have a considerable taxing effort to leave the home and also requires the use of AD. She will benefit from home OT to maximize (I), safety and activity tolerance to eventually tolerate OP CR          Entered by: Berta Farias 06/28/2020 1:50 PM

## 2020-06-28 NOTE — PLAN OF CARE
VSS-BP soft but baseline per pt report. Up to bathroom with walker, SBA. Started PT. Room air. SR. Family involved and supportive. UTI, on rocephin. Bumex gtt infusing with addition bolus doses given as ordered. 1 gm NA diet. LORI improving. Coumadin restarted but no dose today incase EP wants to do a procedure.  May switch to eliquis depending on insurance coverage and ENT consult. ENT needs to approve switch to eliquis as pt has had recent nosebleeds requiring interruptions in her coumadin. Plan is for EP consult Monday to consider BiV upgrade of her ICD.

## 2020-06-28 NOTE — PROGRESS NOTES
[facility].  Inpatient Cardiology Progress Note.  Date of Service:  6/28/2020.       INTERVAL HISTORY:  Good diuresis with the IV bumetanide infusion.  Dyspnea and lower extremity edema improved.  Creatinine is improved from 1.6 to 1.5, BUN 46, potassium 4.1, sodium is improved from 130 to 133.  Is down to 2.7 today.    On exam - blood pressure is low at 94/69 (largely asymptomatic), pulse 78 bpm, sats 100% on room air.  Jugular venous pressure improved to 4 cm, lungs are clear, pacemaker site satisfactory, heart sounds are regular, lower extremity edema improved.    DIAGNOSES:  1.  Acute decompensated left ventricular systolic heart failure:  Left ventricular ejection fraction less than 20%, secondary to ischemic cardiomyopathy in the setting of recent outpatient diuretic adjustment.   2.  Hypotension secondary to significant up-titration of diuretic dosage.   3.  Coronary artery disease, status post delayed presentation of ST elevation myocardial infarction leading to coronary artery bypass grafting (diagonal after left anterior descending dissected, right internal mammary artery to right coronary artery at [place], [place] in 04/2020).   4.  Left ventricular mural thrombus:  Issues with supratherapeutic INR and epistaxis with warfarin.   5.  Status post primary prevention implantable cardioverter defibrillator in 04/2020 in [place].     6.  Stage III chronic kidney disease.     ASSESSMENT/PLAN:  1.  Continue IV Bumex infusion at 0.25 mg/h.  Give additional bolus of 1 mg twice daily.  2.  ENT referral.  I personally spoke to the ENT consultant.  Can be seen tomorrow.  Question is if she needs cautery of her nose etc. to address her recent epistaxis that required packing as she will need ongoing anticoagulation for her significant left ventricular apical mural thrombus.  3.  Patient finds fluctuating INRs and warfarin very problematic, which is understandable.  I have placed a pharmacy liaison  consult to see if she can be transitioned to apixaban.  4.  Inpatient physical therapy ordered.  5.  Electrophysiology consult prior to discharge to assess candidacy for CRT-D upgrade.  Rationale - hypotension limits maximizes GDMT and patient has NYHA class III symptoms.  Note, her QRS width is normal.    Isauro Vargas MD [facility]  Cardiology.        VITAL SIGNS:  Temp: 97.3  F (36.3  C) Temp src: Oral BP: 94/69 Pulse: 78 Heart Rate: 75 Resp: 16 SpO2: 100 % O2 Device: None (Room air)    06/23 0700 - 06/28 0659  In: 3631.45 [P.O.:1170; I.V.:1961.45]  Out: 2270 [Urine:2270]  Net: 1361.45  Vitals:    06/25/20 2210 06/26/20 0000 06/27/20 0400 06/27/20 1243   Weight: 71.2 kg (156 lb 15.5 oz) 71.2 kg (156 lb 15.5 oz) 74.6 kg (164 lb 7.4 oz) 75.5 kg (166 lb 8 oz)

## 2020-06-28 NOTE — PLAN OF CARE
Neuro- A/O x4 can be forgetful  Most Recent Vitals- Temp: 97.3  F (36.3  C) Temp src: Oral BP: 105/81 Pulse: 88 Heart Rate: 75 Resp: 16 SpO2: 100 % O2 Device: None (Room air)    Tele/Cardiac- SR w/ PVC's   Resp- room air   Activity- 1 assist , GB and walker   Pain- denies pain   Drips- Bumex gtt infusing @ 1ml/hr   Skin- nathan, blanchable redness, small purpleish closed wound to lower posterior back/spine, covered w/ foam dressing.   GI/- voiding per BSC at night, but walks to BR during day   Aggression Color- Green  Plan- Plan to continue diuresing, holding BB, EP consult on Monday.  Misc- will continue to monitor    FRANCISCO CRUZ RN

## 2020-06-29 NOTE — PROGRESS NOTES
[facility]    Cardiology Progress Note    Date of Service: 06/29/2020   pt seen and examined by me  All observations and decisions/plan are  Britney bowman  35 min and discussed with dr westbrook  Assessment & Plan   Marquise Jj is a 75 year old female with past medical history of ischemic CM with EF less than 20%, mural thrombus, Multivessel CAD with CABG April 2020 in [facility], CKD.  This patient was transferred from [facility] 6/25/2020 with symptoms of hypotension/weakness  COVID NEGATIVE      1 Acute decompensated systolic heart failure  EF less 20%-ischemic  N term pro BNP 23.376  Left pleural effusion may need TAP  Bumex drip 0.25mg per hour (PTA torsemide 40mg am; 20mg pm)  Weight down 9 pounds  I/O 2 liters out yesterday  Net -898  BP soft   Sat 91% on room air  Home Eentresto on hold  Echocardiogram  4+ TR with reduced RV function  2+ MR  Moderate pulmonary HTN    PLAN:  Continue bumex drip  [facility]  Consider pleural tap if she is bridged for [id number] cxr needed    2 Hypotension-systolic 91    3. CAD  -delayed presentation in [place] with STEMI-had symptoms on cruise ship in [place] and sent ot [place]  S/p CABG in [facility] (April 2020)  Left main 30-40%; LAD occluded; diagonals occluded  RCA 70%; circumflex 60-70% with small targets  Diagonal grafted after LAD dissected intraop  KURT to RCA  Trop 0.197    PLAN:  Resting nuclear scan to assess viability  bridging with repeat heart cath pending the above-by [place] report complication at time of cab and they could not bypass all vessels so need viability before cath to see if any role for pci with severe IHD  -see below      4 LV mural thrombus  Epistaxis on Warfarin  ENT consult placed yesterday  hemglobin 10.5 (9.5)  INR 2.75--has not had warfarin for several days  INR was 6 on admit  Likely passive congestion and vitamin K deficient    PLAN  Vitamin K 5mg  today  When INR under 2--start Heparin    cath needed and consider pleural tap (L)  Repeat CXR    5. ICD inserted [place]  EP consult prior to discharge to upgrade to CRT-D  Dr. Hwang saw and no benefit to CRT-D    6. Stage III CKD  Baseline creatinine 1.12-peak 1.9  Today down 1.5 CO2 26 (26 yesterday)    7. Hypothyroidism  Check tsh    Follows in [facility]    [facility]    Interval History   Weak with ongoing edema    Review of Systems:  The Review of Systems is negative other than noted in the HPI    Physical Exam   Temp: 97.3  F (36.3  C) Temp src: Oral BP: 91/71 Pulse: 87 Heart Rate: 87 Resp: 15 SpO2: 91 % O2 Device: None (Room air)    Vitals:    06/27/20 1243 06/28/20 0928 06/29/20 0541   Weight: 75.5 kg (166 lb 8 oz) 73.9 kg (163 lb) 71.3 kg (157 lb 1.6 oz)     Vital Signs with Ranges  Temp:  [96  F (35.6  C)-97.3  F (36.3  C)] 97.3  F (36.3  C)  Pulse:  [52-89] 87  Heart Rate:  [87] 87  Resp:  [15-18] 15  BP: ()/(57-74) 91/71  SpO2:  [91 %-100 %] 91 %  I/O last 3 completed shifts:  In: 890 [P.O.:790; I.V.:100]  Out: 3150 [Urine:3150]    Constitutional     alert and oriented, in no acute distress.     Skin     warm and dry to touch    ENT     Mild pallor or cyanosis    Neck    Supple, JVP 9 in chair at 90 degrees no carotid bruit    Chest     no tenderness to palpation     Lungs  Dullness 1/2 up right; base on left    Cardiac  regular rhythm, S1 normal, S2 normal, No S3 or S4, II/VI systolic murmur RSB; II/Vi systolic murmur apex to axilla    Abdomen     abdomen soft, bowel sounds normoactive, no hepatosplenomegaly    Extremities and Back     no clubbing, cyanosis. 2+ edema pitting bilaterally      Neurological     no gross motor deficits noted, affect appropriate, oriented to time, person and place.        Medications     bumetanide 0.25 mg/hr (06/28/20 2134)     dextrose       Warfarin Therapy Reminder         bimatoprost  1 drop Both Eyes At Bedtime     bumetanide  1 mg Intravenous BID     busPIRone  10 mg Oral  TID     cefTRIAXone  1 g Intravenous Q24H     dorzolamide-timolol  1 drop Both Eyes BID     ferrous sulfate  325 mg Oral Daily with breakfast     latanoprost  1 drop Both Eyes Daily     levothyroxine  50 mcg Oral Daily     omeprazole  20 mg Oral QAM AC     polyethylene glycol  17 g Oral Daily     potassium chloride ER  20 mEq Oral Daily     rosuvastatin  20 mg Oral Daily     senna-docusate  1 tablet Oral BID     traZODone  150 mg Oral At Bedtime     warfarin ANTICOAGULANT  0.5 mg Oral ONCE at 18:00          Data:     ROUTINE IP LABS (Last four results)  BMP  Recent Labs   Lab 06/29/20  0537 06/28/20  0609 06/27/20  1311 06/27/20  0859 06/26/20  0558    133  --  130* 132*   POTASSIUM 3.6 4.1  --  4.7 4.7   CHLORIDE 102 103  --  101 101   FERNANDO 8.7 8.2*  --  8.4* 8.1*   CO2 26 21  --  21 23   BUN 43* 46*  --  45* 39*   CR 1.50* 1.59*  --  1.65* 1.86*   GLC 93 103* 149* 140* 137*     CHOLESTEROL/HEPATIC  Recent Labs   Lab 06/28/20  0609 06/27/20  0859   ALT 24 22   AST 18 24     CBC  Recent Labs   Lab 06/29/20  0537 06/28/20  0609 06/27/20  0859 06/26/20  0558   WBC 6.7 8.1 8.0 13.9*   RBC 4.41 3.95 4.14 3.96   HGB 10.5* 9.5* 9.9* 9.6*   HCT 34.4* 30.6* 32.3* 31.4*   MCV 78 78 78 79   MCH 23.8* 24.1* 23.9* 24.2*   MCHC 30.5* 31.0* 30.7* 30.6*   RDW 20.4* 20.2* 20.6* 20.5*    294 292 282     TROP:   Recent Labs   Lab 06/25/20  1617   TROPI 0.197*      BNP:    Recent Labs   Lab 06/25/20  1617   NTBNPI 23,376*     INR  Recent Labs   Lab 06/28/20  0609 06/27/20  1311 06/26/20  0558 06/25/20  1853   INR 2.75* 2.91* 6.08* 5.27*     No results found for: TSH    EKG results:  QRS 78 mSec    Imaging:  No results found for this or any previous visit (from the past 24 hour(s)).       Telemetry:  Sinus with PVC's

## 2020-06-29 NOTE — CONSULTS
[facility] Consultation by [facility]    Marquise Jj MRN# [id number]   Age: 75 year old YOB: 1945     Date of Admission:  6/25/2020  Date of Consult:    06/29/20    Reason for consult: Epistaxis       Requesting physician: Benjamin Mitchell MD                           Chief Complaint:   Epistaxis            HPI:      Marquise Jj is a 75-year-old female is admitted in transfer from the ED at [facility] and the [facility] with weakness and hypotension.     She has multiple medical diagnoses including: severe ischemic cardiomyopathy with LVEF of less than 20%, a large apical mural thrombus by recent echocardiogram- (Sofia 10, 2020), severe multivessel coronary disease status post CABG x2 in March 2020, chronic anemia, and chronic kidney disease.    She takes warfarin for the mural thrombus. .  She also endorses a chronic cough.  She was noted to have a high INR and was sent to the ED for eval and treatment.  She was noted to be hypotensive, to have pyuria.  COVID PCR is negative.    She had developed a left-sided epistaxis approximately 2 weeks ago and was seen locally and [facility] where the left naris was cauterized.  She had recurrent bleeding requiring placement of a balloon pack.  This was removed 5 days ago and she did have some bleeding the next day.  She was reevaluated and found that INR was 6.08.  No additional packing was placed and she was admitted.  She was transferred to [facility] for additional cardiac care given extensive history of ischemic cardiomyopathy.  Her INR has trended down as the Coumadin has been held.  She is currently taking low-dose aspirin.  She has not had further bleeding.  Bleeding was isolated to the left side.  She has been using Vaseline in the nose since admission.  She has no significant history of nasal trauma or prior epistaxis.  She denies a tendency for bleeding and bruising.  She generally is breathing comfortably from the  "nose.    Previous tests and diagnostic procedures: see \"Tests and Procedures\" and \"PFSH\".               Past Medical History:     Past Medical History:   Diagnosis Date     CAD (coronary artery disease)      ICD (implantable cardioverter-defibrillator) in place     Primary prevention AICD placed in [place] in May 2020.     Ischemic cardiomyopathy     LVEF less than 20%.     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     Status post coronary artery bypass grafting     Done in [facility] in April 2020.  LIMA to diagonal (as LAD dissected) and KURT to the right coronary artery.               Past Surgical History:     Past Surgical History:   Procedure Laterality Date     ARTHROPLASTY REVISION HIP Right 11/16/2017    Procedure: ARTHROPLASTY REVISION HIP;  Right total hip arthroplasty revision.;  Surgeon: Jozef Garcia MD;  Location: [place] OR     cabg  04/2020               Social History:     Social History     Socioeconomic History     Marital status:      Spouse name: Not on file     Number of children: Not on file     Years of education: Not on file     Highest education level: Not on file   Occupational History     Occupation: [employer].   Social Needs     Financial resource strain: Not on file     Food insecurity     Worry: Not on file     Inability: Not on file     Transportation needs     Medical: Not on file     Non-medical: Not on file   Tobacco Use     Smoking status: Never Smoker     Smokeless tobacco: Never Used   Substance and Sexual Activity     Alcohol use: Not Currently     Drug use: Never     Sexual activity: Not on file   Lifestyle     Physical activity     Days per week: Not on file     Minutes per session: Not on file     Stress: Not on file   Relationships     Social connections     Talks on phone: Not on file     Gets together: Not on file     Attends Yazdanism service: Not on file     Active member of club or " organization: Not on file     Attends meetings of clubs or organizations: Not on file     Relationship status: Not on file     Intimate partner violence     Fear of current or ex partner: Not on file     Emotionally abused: Not on file     Physically abused: Not on file     Forced sexual activity: Not on file   Other Topics Concern     Not on file   Social History Narrative     Not on file               Family History:     Family History   Problem Relation Age of Onset     Coronary Artery Disease No family hx of      Heart Failure No family hx of                Immunizations:     Immunization History   Administered Date(s) Administered     Influenza (High Dose) 3 valent vaccine 10/02/2017     Pneumo Conj 13-V (2010&after) 06/22/2015     Pneumococcal 23 valent 06/02/2010     Tdap (Adult) Unspecified Formulation 12/30/2008               Allergies:   Combigan [brimonidine tartrate-timolol]          Medications:     Current Facility-Administered Medications:      acetaminophen (TYLENOL) tablet 650 mg, 650 mg, Oral, Q4H PRN, 650 mg at 06/28/20 1320 **OR** acetaminophen (TYLENOL) Suppository 650 mg, 650 mg, Rectal, Q4H PRN, Bandar Stanley MD     bimatoprost (LUMIGAN) 0.01 % ophthalmic drops 1 drop, 1 drop, Both Eyes, At Bedtime, Megan Chambers MD, 1 drop at 06/28/20 2006     bumetanide (BUMEX) 0.25 mg/mL infusion, 0.25 mg/hr, Intravenous, Continuous, Isauro Vargas MD, Last Rate: 1 mL/hr at 06/28/20 2134, 0.25 mg/hr at 06/28/20 2134     bumetanide (BUMEX) injection 1 mg, 1 mg, Intravenous, BID, Isauro Vargas MD, 1 mg at 06/28/20 1614     busPIRone (BUSPAR) tablet 10 mg, 10 mg, Oral, TID, Megan Chambers MD, 10 mg at 06/29/20 0923     cefTRIAXone (ROCEPHIN) 1 g vial to attach to  mL bag for ADULTS or NS 50 mL bag for PEDS, 1 g, Intravenous, Q24H, Bandar Stanley MD, 1 g at 06/28/20 1606     dextrose 10% infusion, , Intravenous, Continuous PRN, Megan Chambers MD     glucose gel 15-30 g, 15-30 g,  Oral, Q15 Min PRN **OR** dextrose 50 % injection 25-50 mL, 25-50 mL, Intravenous, Q15 Min PRN **OR** glucagon injection 1 mg, 1 mg, Subcutaneous, Q15 Min PRN, Megan Chambers MD     dorzolamide-timolol (COSOPT) ophthalmic solution 1 drop, 1 drop, Both Eyes, BID, Megan Chambers MD, 1 drop at 06/28/20 2005     ferrous sulfate (FEROSUL) tablet 325 mg, 325 mg, Oral, Daily with breakfast, Bandar Stanley MD, 325 mg at 06/29/20 0923     latanoprost (XALATAN) 0.005 % ophthalmic solution 1 drop, 1 drop, Both Eyes, Daily, Megan Chambers MD, 1 drop at 06/28/20 0944     levothyroxine (SYNTHROID/LEVOTHROID) tablet 50 mcg, 50 mcg, Oral, Daily, Megan Chambers MD, 50 mcg at 06/29/20 0658     magnesium sulfate 2 g in water intermittent infusion, 2 g, Intravenous, Daily PRN, Megan Chambers MD     magnesium sulfate 4 g in 100 mL sterile water (premade), 4 g, Intravenous, Q4H PRN, Megan Chambers MD     melatonin tablet 3 mg, 3 mg, Oral, At Bedtime PRN, Linda Juarez MD, 3 mg at 06/28/20 2111     naloxone (NARCAN) injection 0.1-0.4 mg, 0.1-0.4 mg, Intravenous, Q2 Min PRN, Megan Chambers MD     omeprazole (priLOSEC) CR capsule 20 mg, 20 mg, Oral, QAM AC, Megan Chambers MD, 20 mg at 06/29/20 0658     phytonadione (MEPHYTON) tablet 5 mg, 5 mg, Oral, Once, Brittany Kemp, APRN CNP     polyethylene glycol (MIRALAX) Packet 17 g, 17 g, Oral, Daily, eMgan Chambers MD     potassium chloride (KLOR-CON) Packet 20-40 mEq, 20-40 mEq, Oral or Feeding Tube, Q2H PRN, Megan Chambers MD     potassium chloride 10 mEq in 100 mL intermittent infusion with 10 mg lidocaine, 10 mEq, Intravenous, Q1H PRN, Megan Chambers MD     potassium chloride 10 mEq in 100 mL sterile water intermittent infusion (premix), 10 mEq, Intravenous, Q1H PRN, Megan Chambers MD     potassium chloride 20 mEq in 50 mL intermittent infusion, 20 mEq, Intravenous, Q1H PRN, Megan Chambers MD     potassium chloride ER (KLOR-CON M) CR tablet 20 mEq, 20 mEq, Oral, Daily, Bandar Stanley MD, 20 mEq  at 06/29/20 0923     potassium chloride ER (KLOR-CON M) CR tablet 20-40 mEq, 20-40 mEq, Oral, Q2H PRN, Megan Chambers MD, 20 mEq at 06/29/20 0733     potassium phosphate 15 mmol in D5W 250 mL intermittent infusion, 15 mmol, Intravenous, Daily PRN, Megan Chambers MD     potassium phosphate 20 mmol in D5W 250 mL intermittent infusion, 20 mmol, Intravenous, Q6H PRN, Megan Chambers MD     potassium phosphate 20 mmol in D5W 500 mL intermittent infusion, 20 mmol, Intravenous, Q6H PRN, Megan Chambers MD     potassium phosphate 25 mmol in D5W 500 mL intermittent infusion, 25 mmol, Intravenous, Q8H PRN, Megan Chambres MD     rosuvastatin (CRESTOR) tablet 20 mg, 20 mg, Oral, Daily, Bandar Stanley MD, 20 mg at 06/28/20 1607     senna-docusate (SENOKOT-S/PERICOLACE) 8.6-50 MG per tablet 1 tablet, 1 tablet, Oral, BID, Megan Chambers MD, 1 tablet at 06/27/20 1201     traZODone (DESYREL) tablet 150 mg, 150 mg, Oral, At Bedtime, Megan Chambers MD, 150 mg at 06/28/20 2137     warfarin ANTICOAGULANT (COUMADIN) half-tab 0.5 mg, 0.5 mg, Oral, ONCE at 18:00, Donovan Isabel MD, Stopped at 06/28/20 1800     Warfarin Therapy Reminder (Check START DATE - warfarin may be starting in the FUTURE), 1 each, Does not apply, Continuous PRN, Donovan Isabel MD          Review of Systems:   Review Of Systems  Skin: negative  Eyes: negative  Ears/Nose/Throat: epistaxis  Respiratory: No shortness of breath, dyspnea on exertion, cough, or hemoptysis  Cardiovascular: as above  Gastrointestinal: negative  Genitourinary: negative  Musculoskeletal: negative  Neurologic: negative  Psychiatric: negative  Hematologic/Lymphatic/Immunologic: bleeding disorder  Endocrine: negative          Physical exam:   CONSTITUTION:    BP 91/71 (BP Location: Right arm)   Pulse 87   Temp 97.3  F (36.3  C) (Oral)   Resp 15   Wt 71.3 kg (157 lb 1.6 oz)   SpO2 91%   BMI 23.89 kg/m       General appearance:Well developed, well nourished and groomed. No  apparent acute or chronic distress.    Ability to communicate: normal.       HEAD, FACE, SALIVARY GLANDS AND TMJ:    Inspection of Head and Face: Normal contour and symmetry. No masses, lesions, or significant scars observed.    Palpation/Percussion of the Face: No tenderness, deformity or instability.    Palpation of Parotid and Submaxillary glands: Normal.    Facial Mobility: Normal.       EYES: Ocular Motility: gaze appears conjugate in all positions; no evident nystagmus.       EAR, NOSE, MOUTH AND THROAT:    Pinnas and External Nose: Normal.    Otoscopic exam: Normal canals and tympanic membranes, bilaterally.    Hearing: Conversational speech rarely or never misunderstands words.    Nasal Interior:    Septum - Midline.  Bilateral septal mucosal crusting  Turbinates and middle meatus - Normal.    Rhinorrhea - watery.    Vestibular skin - Normal bilaterally.    Mucosa - Normal.    Lips, Teeth and Gums: Normal.    Oral Cavity and Oropharynx: Normal.    Base of tongue, Pharyngeal walls, Vallecula and Pyriform Sinuses: Unable to complete mirror examination because of hyperactive gag.    Larynx: Unable to visualize with mirror because of hyperactive gag.    Nasopharynx examination: Could not visualize with the mirror due to gag.       NECK AND THYROID:    Neck: normal symmetry; trachea midline; normal laryngeal crepitation; no adenopathy; no neck masses; no skin lesions; no scars.    Thyroid: normal size; no masses or tenderness.       RESPIRATORY: Chest Wall expands normally and no deformities noted. Respiratory Effort normal. Auscultation: normal breath sounds.       CARDIOVASCULAR:    Auscultation: normal S1, S2, no murmurs or abnormal sounds.    Peripheral Vascular System: normal pulses with no peripheral vascular swelling or varicosities, tenderness or edema. Skin warm and dry.       LYMPH NODES: Neck Nodes: normal, no adenopathy.       NEUROLOGIC:    Level of orientation: normal to time, place, person and  situation.    Cranial nerves: Cranial nerves II-XII grossly intact and symmetrical.       PSYCHIATRIC:    Level of consciousness: awake and alert.    Judgment and insight: normal.    Mood and affect: normal and appropriate to the situation.             Nasal Endoscopy Description of Procedure  ANESTHESIA:  Topical Lidocaine and Phenylephrine  FINDINGS:    Nasal septum - normal with crusting mid-septal mucosa  Right inferior turbinate - normal  Right middle turbinate - normal  Right middle meatus - normal  Right sphenoethmoid recess - normal  Right posterior choanae - normal  Left inferior turbinate - normal  Left middle turbinate - normal  Left middle meatus - normal  Left sphenoethmoid recess  -normal  Left posterior choanae - normal  Adenoid mass - not obstructive  PROCEDURE:  After vasoconstriction and topical anesthesia was established, the endoscope was introduced into the right nasal airway and structures from the nares to the posterior choanae were evaluated.  The scope was then removed and the same procedure repeated on the opposite side.  The findings are noted above.  TOLERANCE:  Good  ESTIMATED BLOOD LOSS:  nil            Data:     Results for orders placed or performed during the hospital encounter of 06/25/20   US Renal Complete     Status: None    Narrative    US RENAL COMPLETE   6/26/2020 5:44 PM     HISTORY: Septic shock with renal failure. Rule out obstruction.    COMPARISON: None.    FINDINGS:  Right Kidney: Measures 10.9 x 4.2 x 4.2 cm, cortex thickness of 0.9  cm. No hydronephrosis.    Left Kidney: Measures 8.1 x 4.1 x 4.6 cm. Cortex thickness of 0.7 cm.  No hydronephrosis.    Bladder: Unremarkable.    Incidental note made of bilateral pleural fluid.      Impression    IMPRESSION:   1. No evidence for hydronephrosis.  2. Bilateral renal cortical thinning and asymmetric renal lengths.  3. Incidental finding of bilateral pleural effusions.    DEVIN ISRAEL MD   Glucose by meter     Status: None    Result Value Ref Range    Glucose 75 70 - 99 mg/dL   CBC (AM Draw)     Status: Abnormal   Result Value Ref Range    WBC 13.9 (H) 4.0 - 11.0 10e9/L    RBC Count 3.96 3.8 - 5.2 10e12/L    Hemoglobin 9.6 (L) 11.7 - 15.7 g/dL    Hematocrit 31.4 (L) 35.0 - 47.0 %    MCV 79 78 - 100 fl    MCH 24.2 (L) 26.5 - 33.0 pg    MCHC 30.6 (L) 31.5 - 36.5 g/dL    RDW 20.5 (H) 10.0 - 15.0 %    Platelet Count 282 150 - 450 10e9/L   Basic metabolic panel     Status: Abnormal   Result Value Ref Range    Sodium 132 (L) 133 - 144 mmol/L    Potassium 4.7 3.4 - 5.3 mmol/L    Chloride 101 94 - 109 mmol/L    Carbon Dioxide 23 20 - 32 mmol/L    Anion Gap 8 3 - 14 mmol/L    Glucose 137 (H) 70 - 99 mg/dL    Urea Nitrogen 39 (H) 7 - 30 mg/dL    Creatinine 1.86 (H) 0.52 - 1.04 mg/dL    GFR Estimate 26 (L) >60 mL/min/[1.73_m2]    GFR Estimate If Black 30 (L) >60 mL/min/[1.73_m2]    Calcium 8.1 (L) 8.5 - 10.1 mg/dL   INR     Status: Abnormal   Result Value Ref Range    INR 6.08 (HH) 0.86 - 1.14   Hemoglobin A1c     Status: Abnormal   Result Value Ref Range    Hemoglobin A1C 6.2 (H) 0 - 5.6 %   Glucose by meter     Status: Abnormal   Result Value Ref Range    Glucose 157 (H) 70 - 99 mg/dL   Glucose by meter     Status: Abnormal   Result Value Ref Range    Glucose 104 (H) 70 - 99 mg/dL   Cortisol     Status: Abnormal   Result Value Ref Range    Cortisol Serum 25.3 (H) 4 - 22 ug/dL   Glucose by meter     Status: Abnormal   Result Value Ref Range    Glucose 113 (H) 70 - 99 mg/dL   Glucose by meter     Status: Abnormal   Result Value Ref Range    Glucose 125 (H) 70 - 99 mg/dL   Magnesium     Status: None   Result Value Ref Range    Magnesium 2.3 1.6 - 2.3 mg/dL   Phosphorus     Status: Abnormal   Result Value Ref Range    Phosphorus 5.4 (H) 2.5 - 4.5 mg/dL   Glucose by meter     Status: Abnormal   Result Value Ref Range    Glucose 124 (H) 70 - 99 mg/dL   Glucose by meter     Status: Abnormal   Result Value Ref Range    Glucose 189 (H) 70 - 99 mg/dL    CBC Differential (AM Draw)     Status: Abnormal   Result Value Ref Range    WBC 8.0 4.0 - 11.0 10e9/L    RBC Count 4.14 3.8 - 5.2 10e12/L    Hemoglobin 9.9 (L) 11.7 - 15.7 g/dL    Hematocrit 32.3 (L) 35.0 - 47.0 %    MCV 78 78 - 100 fl    MCH 23.9 (L) 26.5 - 33.0 pg    MCHC 30.7 (L) 31.5 - 36.5 g/dL    RDW 20.6 (H) 10.0 - 15.0 %    Platelet Count 292 150 - 450 10e9/L    Diff Method Automated Method     % Neutrophils 78.7 %    % Lymphocytes 14.7 %    % Monocytes 6.0 %    % Eosinophils 0.1 %    % Basophils 0.1 %    % Immature Granulocytes 0.4 %    Nucleated RBCs 0 0 /100    Absolute Neutrophil 6.3 1.6 - 8.3 10e9/L    Absolute Lymphocytes 1.2 0.8 - 5.3 10e9/L    Absolute Monocytes 0.5 0.0 - 1.3 10e9/L    Absolute Eosinophils 0.0 0.0 - 0.7 10e9/L    Absolute Basophils 0.0 0.0 - 0.2 10e9/L    Abs Immature Granulocytes 0.0 0 - 0.4 10e9/L    Absolute Nucleated RBC 0.0    Comprehensive metabolic panel (AM Draw)     Status: Abnormal   Result Value Ref Range    Sodium 130 (L) 133 - 144 mmol/L    Potassium 4.7 3.4 - 5.3 mmol/L    Chloride 101 94 - 109 mmol/L    Carbon Dioxide 21 20 - 32 mmol/L    Anion Gap 8 3 - 14 mmol/L    Glucose 140 (H) 70 - 99 mg/dL    Urea Nitrogen 45 (H) 7 - 30 mg/dL    Creatinine 1.65 (H) 0.52 - 1.04 mg/dL    GFR Estimate 30 (L) >60 mL/min/[1.73_m2]    GFR Estimate If Black 35 (L) >60 mL/min/[1.73_m2]    Calcium 8.4 (L) 8.5 - 10.1 mg/dL    Bilirubin Total 0.7 0.2 - 1.3 mg/dL    Albumin 2.2 (L) 3.4 - 5.0 g/dL    Protein Total 6.5 (L) 6.8 - 8.8 g/dL    Alkaline Phosphatase 189 (H) 40 - 150 U/L    ALT 22 0 - 50 U/L    AST 24 0 - 45 U/L   Glucose by meter     Status: Abnormal   Result Value Ref Range    Glucose 134 (H) 70 - 99 mg/dL   INR     Status: Abnormal   Result Value Ref Range    INR 2.91 (H) 0.86 - 1.14   Glucose     Status: Abnormal   Result Value Ref Range    Glucose 149 (H) 70 - 99 mg/dL   Lactic acid level STAT     Status: Abnormal   Result Value Ref Range    Lactate for Sepsis Protocol 2.1  (H) 0.7 - 2.0 mmol/L   Comprehensive metabolic panel     Status: Abnormal   Result Value Ref Range    Sodium 133 133 - 144 mmol/L    Potassium 4.1 3.4 - 5.3 mmol/L    Chloride 103 94 - 109 mmol/L    Carbon Dioxide 21 20 - 32 mmol/L    Anion Gap 9 3 - 14 mmol/L    Glucose 103 (H) 70 - 99 mg/dL    Urea Nitrogen 46 (H) 7 - 30 mg/dL    Creatinine 1.59 (H) 0.52 - 1.04 mg/dL    GFR Estimate 31 (L) >60 mL/min/[1.73_m2]    GFR Estimate If Black 36 (L) >60 mL/min/[1.73_m2]    Calcium 8.2 (L) 8.5 - 10.1 mg/dL    Bilirubin Total 0.7 0.2 - 1.3 mg/dL    Albumin 2.2 (L) 3.4 - 5.0 g/dL    Protein Total 6.3 (L) 6.8 - 8.8 g/dL    Alkaline Phosphatase 168 (H) 40 - 150 U/L    ALT 24 0 - 50 U/L    AST 18 0 - 45 U/L   INR     Status: Abnormal   Result Value Ref Range    INR 2.75 (H) 0.86 - 1.14   CBC with platelets differential     Status: Abnormal   Result Value Ref Range    WBC 8.1 4.0 - 11.0 10e9/L    RBC Count 3.95 3.8 - 5.2 10e12/L    Hemoglobin 9.5 (L) 11.7 - 15.7 g/dL    Hematocrit 30.6 (L) 35.0 - 47.0 %    MCV 78 78 - 100 fl    MCH 24.1 (L) 26.5 - 33.0 pg    MCHC 31.0 (L) 31.5 - 36.5 g/dL    RDW 20.2 (H) 10.0 - 15.0 %    Platelet Count 294 150 - 450 10e9/L    Diff Method Automated Method     % Neutrophils 72.4 %    % Lymphocytes 19.4 %    % Monocytes 7.1 %    % Eosinophils 0.5 %    % Basophils 0.2 %    % Immature Granulocytes 0.4 %    Nucleated RBCs 0 0 /100    Absolute Neutrophil 5.9 1.6 - 8.3 10e9/L    Absolute Lymphocytes 1.6 0.8 - 5.3 10e9/L    Absolute Monocytes 0.6 0.0 - 1.3 10e9/L    Absolute Eosinophils 0.0 0.0 - 0.7 10e9/L    Absolute Basophils 0.0 0.0 - 0.2 10e9/L    Abs Immature Granulocytes 0.0 0 - 0.4 10e9/L    Absolute Nucleated RBC 0.0    Lactic acid whole blood     Status: None   Result Value Ref Range    Lactic Acid 1.5 0.7 - 2.0 mmol/L   Basic metabolic panel     Status: Abnormal   Result Value Ref Range    Sodium 136 133 - 144 mmol/L    Potassium 3.6 3.4 - 5.3 mmol/L    Chloride 102 94 - 109 mmol/L    Carbon  Dioxide 26 20 - 32 mmol/L    Anion Gap 8 3 - 14 mmol/L    Glucose 93 70 - 99 mg/dL    Urea Nitrogen 43 (H) 7 - 30 mg/dL    Creatinine 1.50 (H) 0.52 - 1.04 mg/dL    GFR Estimate 34 (L) >60 mL/min/[1.73_m2]    GFR Estimate If Black 39 (L) >60 mL/min/[1.73_m2]    Calcium 8.7 8.5 - 10.1 mg/dL   CBC with platelets     Status: Abnormal   Result Value Ref Range    WBC 6.7 4.0 - 11.0 10e9/L    RBC Count 4.41 3.8 - 5.2 10e12/L    Hemoglobin 10.5 (L) 11.7 - 15.7 g/dL    Hematocrit 34.4 (L) 35.0 - 47.0 %    MCV 78 78 - 100 fl    MCH 23.8 (L) 26.5 - 33.0 pg    MCHC 30.5 (L) 31.5 - 36.5 g/dL    RDW 20.4 (H) 10.0 - 15.0 %    Platelet Count 347 150 - 450 10e9/L   Magnesium (AM Draw)     Status: Abnormal   Result Value Ref Range    Magnesium 2.4 (H) 1.6 - 2.3 mg/dL   Phosphorus (AM Draw)     Status: None   Result Value Ref Range    Phosphorus 4.5 2.5 - 4.5 mg/dL   EKG 12-lead, tracing only     Status: None (Preliminary result)   Result Value Ref Range    Interpretation ECG Click View Image link to view waveform and result    Cardiology IP Consult: Patient to be seen: Routine - within 24 hours; Ischemic cardiomyopathy, AICD, large mural thrombus on coumadin; transferring out of ICU today; Consultant may enter orders: Yes; Requesting provider? Attending physician     Status: None ()    Isauro Akins MD     6/27/2020  1:25 PM  Inpatient Cardiology Consultation   [facility]  Date of Admission:  6/25/2020  Date of Consult: 6/27/2020.    Inpatient cardiology consultation note has been dictated.   Dictation number [id number]        Isauro Vargas MD [facility]  Cardiology              REVIEW OF SYSTEMS:  A comprehensive 10 point review of systems was completed and the   pertinent positives are documented in history of present illness.    MEDICATIONS:  Prior to Admission Medications   Prescriptions Last Dose Informant Patient Reported? Taking?   OMEPRAZOLE PO 6/25/2020 at Unknown time Self Yes Yes   Sig:  Take 20 mg by mouth daily    acetaminophen (TYLENOL) 325 MG tablet Past Week at Unknown time   Self No Yes   Sig: Take 2 tablets (650 mg) by mouth every 6 hours as needed for   mild pain   aspirin (ASA) 81 MG EC tablet 6/25/2020 at Unknown time Self Yes   Yes   Sig: Take 1 tablet (81 mg) by mouth daily   bimatoprost (LUMIGAN) 0.01 % SOLN 6/25/2020 at Unknown time Self   Yes Yes   Sig: Place 1 drop into both eyes At Bedtime    busPIRone (BUSPAR) 10 MG tablet 6/25/2020 at Unknown time Self   Yes Yes   Sig: Take 15 mg by mouth 3 times daily    co-enzyme Q-10 100 MG CAPS capsule  Self Yes Yes   Sig: Take 100 mg by mouth daily   dorzolamide-timolol (COSOPT) 2-0.5 % ophthalmic solution   6/25/2020 at Unknown time Self Yes Yes   Sig: Place 1 drop into both eyes 2 times daily    ferrous sulfate (FEROSUL) 325 (65 Fe) MG tablet 6/25/2020 at   Unknown time Self Yes Yes   Sig: Take 325 mg by mouth daily (with breakfast)   latanoprost (XALATAN) 0.005 % ophthalmic solution 6/25/2020 at   Unknown time Self Yes Yes   Sig: Place 1 drop into both eyes daily   levothyroxine (SYNTHROID/LEVOTHROID) 50 MCG tablet 6/25/2020 at   Unknown time Self Yes Yes   Sig: Take 50 mcg by mouth daily   ondansetron (ZOFRAN-ODT) 4 MG ODT tab Unknown at Unknown time   Self Yes No   Sig: Take 4 mg by mouth every 8 hours as needed    potassium chloride ER (KLOR-CON M) 20 MEQ CR tablet 6/25/2020 at   Unknown time Self No Yes   Sig: Take 1 tablet (20 mEq) by mouth daily   rosuvastatin (CRESTOR) 20 MG tablet 6/25/2020 at Unknown time   Self Yes Yes   Sig: Take 20 mg by mouth daily   sacubitril-valsartan (ENTRESTO) 24-26 MG per tablet 6/25/2020 at   Unknown time Self No Yes   Sig: Take 1 tablet by mouth 2 times daily   senna-docusate (SENOKOT-S/PERICOLACE) 8.6-50 MG tablet Past Month   at Unknown time Self Yes Yes   Sig: Take 2 tablets by mouth 2 times daily as needed for   constipation   torsemide (DEMADEX) 20 MG tablet 6/25/2020 at co Self No Yes   Sig: Take  40mg (2 tablets) in the morning and 20mg (1 tablet) in   the afternoon   traZODone (DESYREL) 150 MG tablet 6/25/2020 at Unknown time Self   Yes Yes   Sig: Take 150 mg by mouth At Bedtime   warfarin ANTICOAGULANT (COUMADIN) 1 MG tablet 6/24/2020 Self Yes   Yes   Sig: Take 1 mg by mouth daily Per inr      Facility-Administered Medications: None       ALLERGIES:  Allergies   Allergen Reactions     Combigan [Brimonidine Tartrate-Timolol] Swelling     Swollen eyelids       PAST MEDICAL HISTORY:  Past Medical History:   Diagnosis Date     CAD (coronary artery disease)      ICD (implantable cardioverter-defibrillator) in place     Primary prevention AICD placed in [place] in May 2020.     Ischemic cardiomyopathy     LVEF less than 20%.     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     Status post coronary artery bypass grafting     Done in [facility] in April 2020.  LIMA to diagonal   (as LAD dissected) and KURT to the right coronary artery.       PAST SURGICAL HISTORY:  Past Surgical History:   Procedure Laterality Date     ARTHROPLASTY REVISION HIP Right 11/16/2017    Procedure: ARTHROPLASTY REVISION HIP;  Right total hip   arthroplasty revision.;  Surgeon: Jozef Garcia MD;    Location: [facility] OR     cabg  04/2020       SOCIAL HISTORY:   Marquise Jj  reports that she has never smoked. She has   never used smokeless tobacco. She reports previous alcohol use.   She reports that she does not use drugs.    FAMILY HISTORY:  Family History   Problem Relation Age of Onset     Coronary Artery Disease No family hx of      Heart Failure No family hx of        PHYSICAL EXAMINATION:  Temp: 97.2  F (36.2  C) Temp src: Oral BP: (!) 88/71 Pulse: 82   Heart Rate: 95 Resp: 25 SpO2: 99 % O2 Device: None (Room air)    06/22 0700 - 06/27 0659  In: 2792.28 [P.O.:570; I.V.:1722.28]  Out: 995 [Urine:995]  Net: 1797.28  Vitals:    06/25/20 2210  20 0000 20 0400 20 1243   Weight: 71.2 kg (156 lb 15.5 oz) 71.2 kg (156 lb 15.5 oz) 74.6 kg   (164 lb 7.4 oz) 75.5 kg (166 lb 8 oz)         Isauro Vargas MD     Echocardiogram Limited     Status: None    Narrative    [id number]  [id number]  [id number]  [id number]^PANTERA^JAZ^[facility]  Echocardiography Laboratory  [address] [place] [id number]        Name: EDILSON SMITH  MRN: [id number]  : 1945  Study Date: 2020 07:51 AM  Age: 75 yrs  Gender: Female  Patient [facility]  Reason For Study: Shock  Ordering Physician: JAZ MOTT  Referring Physician: JOSH HUMPHREY  Performed By: Emerson Shields RDCS     BSA: 1.8 m2  Height: 68 in  Weight: 157 lb  BP: 97/63 mmHg  _____________________________________________________________________________  __        Procedure  Limited Portable Echo Adult.  _____________________________________________________________________________  __        Interpretation Summary     Limited echo.  Pleural effusion, correlate with alternative imaging.  The left ventricle is moderately dilated (no volumes available). The visual  ejection fraction is estimated at <20%. Basal segments, although severely  hypokinetic, have relatively better contractility than the mid-apical  segments.  Large organized LV apical mass/thrombus is noted.  There is severe (4+) tricuspid regurgitation. Mechanism appears unclear but  likely functional and CIED lead related.  Dilation of the inferior vena cava is present with abnormal respiratory  variation in diameter. Likely moderate PHTN.  The right ventricle is mild to moderately dilated. Mildly decreased right  ventricular systolic function.  Mild tenting and PML restriction of the mitral valve leaflets noted due to LV  dilatation. There is mild-moderate functional MR.     No significant change compared to echo  06/10/2020  _____________________________________________________________________________  __        Left Ventricle  The left ventricle is moderately dilated. Severely decreased left ventricular  systolic function. The visual ejection fraction is estimated at <20%.     Right Ventricle  The right ventricle is mild to moderately dilated. Mildly decreased right  ventricular systolic function. There is a pacemaker lead in the right  ventricle.     Mitral Valve  Mild tenting and PML restriction of the mitral valve leaflets noted due to LV  dilatation. There is mild-moderate functional MR.     Tricuspid Valve  There is severe (4+) tricuspid regurgitation. The right ventricular systolic  pressure is elevated at 38.6 mmHg. Pulmonary hypertension.        Aortic Valve  The aortic valve is trileaflet with aortic valve sclerosis. No aortic  regurgitation is present. No aortic stenosis is present.     Pulmonic Valve  The pulmonic valve is not well visualized. There is trace pulmonic valvular  regurgitation.     Vessels  The aortic root is normal size. Dilation of the inferior vena cava is present  with abnormal respiratory variation in diameter.     Pericardium  Trivial pericardial effusion.     _____________________________________________________________________________  __        Doppler Measurements & Calculations  MV E max gary: 76.2 cm/sec  MV dec time: 0.15 sec  TR max gary: 310.8 cm/sec  TR max P.6 mmHg              _____________________________________________________________________________  __           Report approved by: Imani Figueredo 2020 11:54 AM           Assessment and Plan:   Mrs. Jj was admitted for hypotension and fatigue.  She has a significant history of ischemic cardiomyopathy.  She had developed severe left-sided epistaxis which required placement of a balloon pack.  She had some subsequent bleeding after its removal.  INR was noted to be 6.08.  This has trended down and Was 2.75 yesterday.   Hemoglobin is 10.5.  INR is pending for today.  Patient may restart anticoagulation as needed and recommend that she remain in the lower end of the recommended therapeutic range.  Recommend continuation of Vaseline twice a day to the nose on an ongoing basis.  Plan to follow-up as needed.  Please call with any questions or concerns.    Attestation:  I have reviewed today's vital signs, notes, medications, medication allergies, and PFSH/ROSlabs and imaging.    Tobias Horne MD

## 2020-06-29 NOTE — PROGRESS NOTES
[facility]    Medicine Progress Note - Hospitalist Service       Date of Admission:  6/25/2020  Assessment & Plan   Marquise Jj is a 75 year old female with PMH recent complicated cardiac history significant for severe ischemic cardiomyopathy with left ventricular ejection fraction of less than 20%, large mural thrombus on recent echocardiogram from June 2020, severe multivessel coronary disease status post bypass in March 2020, chronic anemia, and chronic CKD who was admitted on 6/25/2020 for hypotension and weakness (Transfer from [facility] ED).        Cardiogenic shock   #Severe ischemic cardiomyopathy with left ventricular ejection fraction of less than 20%  # Acute decompensated systolic heart failure sec to above  # h/o Severe multivessel coronary disease status post bypass in March 2020 in [place]  # s/p AICD 4/2020 in [place]  # Severe TR, mild-mod MR  -Presented with hypotension needing pressors and ICU admission, initially thought to be sec to urinary source and started empirically on Rocephin but blood and urine culture from 6/25 with no growth so far; remains afebrile; leukocytosis improved, will discontinue Rocephin after 5 days (start date 6/25)  - off pressors and transferred out of ICU on 6/27 ; BP on softer side low 90s--100s which is probably her baseline owing to the very low EF  - cardiology following, started on Bumex drip 6/27, holding PTA Torsemide and Entresto  -2.2 L over the last 24 hours, -898 cc since admission , 158 pounds on admission, went up to 166.5 pounds, 157 pounds this morning  -EP consulted for consideration of biventricular CRT upgrade, patient is not a candidate for CRT upgrade and so EP signed off  - repeat ECHO 6/26 noted with no significant change-- EF<20%, akinetic apex with large mural thrombus, severe TR, mild-moderate functional MR  - monitor volume status closely, daily weight  -Plan for resting nuclear scan to assess viability.  -Also plan to  bridge for heart cath and possible left thoracentesis(see below)     #Large mural thrombus on recent echocardiogram from June 2020  # supratherapeutic INR  # h/o recurrent epistaxis  - INR supratherapeutic on admission to 6--trended down to 2.75  - it seems she has been having difficulty regulating her INR to therapeutic level and hoping for alternative anticoagulation  -ENT was consulted for recommendation regarding alternative anticoagulant and ENT okayed for alternative anticoagulation.  -INR yesterday was 2.75, 5 mg of vitamin K given in anticipation of coronary angiogram and left thoracentesis  -Plan to start heparin bridge once INR less than 2     #LORI  - Baseline cr around 1-1.2  - acute dysfunction likely sec to renal hypoperfusion due to heart failure; presented with creatinine of 1.92  - US 6/26 UR, no obstruction or hydronephrosis  - PTA Torsemide and Sacubitril/valsartan (Entresto) on hold but started on Bumex drip.  Renal function stable on Bumex drip and is 1.5 today  - monitor BMP closely     Chronic anemia  - unclear recent baseline, prior to recent cardiac events it seems her Hb was around 12-13  - Hb stable around 9-10; monitor      # Ruled out COVID         Diet: 1 Gram Sodium Diet    DVT Prophylaxis: Warfarin  Rojo Catheter: not present  Code Status: Full Code           Disposition Plan   Expected discharge: 2+ days, recommended to transitional care unit once Adequately diuresed, all cardiac work-up completed and cleared by cardiology.  Entered: Colleen Sweeney MD 06/29/2020, 7:41 AM       The patient's care was discussed with the Bedside Nurse and Patient.    Colleen Sweeney MD  Hospitalist Service  [facility]    ______________________________________________________________________    Interval History   Patient reports feeling better gradually.  Denies any new complaints.  No new nursing concerns.    Data reviewed today: I reviewed all medications, new labs and imaging results  over the last 24 hours. I personally reviewed the chest x-ray image(s) showing Bibasilar effusion has not changed significantly.    Physical Exam   Vital Signs: Temp: 97.3  F (36.3  C) Temp src: Oral BP: 101/73 Pulse: 85 Heart Rate: 87 Resp: 16 SpO2: 100 % O2 Device: None (Room air)    Weight: 157 lbs 1.6 oz  Exam:  Constitutional: Awake, alert and no distress. Appears comfortable  Head: Normocephalic. No masses, lesions, tenderness or abnormalities  ENT: ENT exam normal, no neck nodes or sinus tenderness  Cardiovascular: RRR.  2+ murmurs, no rubs or JVD  Respiratory:normal WOB,b/l decreased air entry at bases with occasional crackles  Gastrointestinal: Abdomen soft, non-tender. BS normal. No masses, organomegaly  : Deferred  Extremities : 2+ bilateral edema , no clubbing or cyanosis      Data   Recent Labs   Lab 06/29/20  0537 06/28/20  0609 06/27/20  1311 06/27/20  0859 06/26/20  0558  06/25/20  1617   WBC 6.7 8.1  --  8.0 13.9*  --  15.1*   HGB 10.5* 9.5*  --  9.9* 9.6*  --  10.0*   MCV 78 78  --  78 79  --  82    294  --  292 282  --  255   INR  --  2.75* 2.91*  --  6.08*   < > 5.04*    133  --  130* 132*   < > 133   POTASSIUM 3.6 4.1  --  4.7 4.7   < > 4.6   CHLORIDE 102 103  --  101 101   < > 99   CO2 26 21  --  21 23   < > 25   BUN 43* 46*  --  45* 39*   < > 37*   CR 1.50* 1.59*  --  1.65* 1.86*   < > 1.92*   ANIONGAP 8 9  --  8 8   < > 9   FERNANDO 8.7 8.2*  --  8.4* 8.1*   < > 8.4*   GLC 93 103* 149* 140* 137*   < > 96   ALBUMIN  --  2.2*  --  2.2*  --   --   --    PROTTOTAL  --  6.3*  --  6.5*  --   --   --    BILITOTAL  --  0.7  --  0.7  --   --   --    ALKPHOS  --  168*  --  189*  --   --   --    ALT  --  24  --  22  --   --   --    AST  --  18  --  24  --   --   --    TROPI  --   --   --   --   --   --  0.197*    < > = values in this interval not displayed.     Recent Results (from the past 24 hour(s))   XR Chest 2 Views    Narrative    CHEST TWO VIEWS 6/29/2020 2:07 PM     HISTORY: assess  pleural effusions    COMPARISON: 6/25/2020       Impression    IMPRESSION: Bibasilar opacities represent pleural fluid and  atelectasis and have not significantly changed from the prior study.  No pneumothorax. No air space disease in the aerated portions of the  lungs. No interstitial edema. The cardiomediastinal silhouette is not  well assessed. Sternotomy suture wires are intact. There is a left  chest wall implantable cardiac defibrillator. There is exaggerated  curvature of the included lumbar spine.    LESTER J FAHRNER, MD     Medications     bumetanide 0.25 mg/hr (06/28/20 2134)     dextrose         bimatoprost  1 drop Both Eyes At Bedtime     busPIRone  10 mg Oral TID     cefTRIAXone  1 g Intravenous Q24H     dorzolamide-timolol  1 drop Both Eyes BID     ferrous sulfate  325 mg Oral Daily with breakfast     latanoprost  1 drop Both Eyes Daily     levothyroxine  50 mcg Oral Daily     omeprazole  20 mg Oral QAM AC     polyethylene glycol  17 g Oral Daily     potassium chloride ER  20 mEq Oral Daily     rosuvastatin  20 mg Oral Daily     senna-docusate  1 tablet Oral BID     traZODone  150 mg Oral At Bedtime

## 2020-06-29 NOTE — PHARMACY-RX INSURANCE COVERAGE
Anticoagulant coverage check.  Patient did not elect Medicare D and as such has NO PRESCRIPTION COVERAGE.      Xarelto/Eliquis  Upon receipt of RX, Discharge Pharmacy can provide one month free.  Subsequent fills would be $480/mo (Xarelto) or $500/mo (Eliquis).      Income-based patient assistance programs are offered by the manufacturers of these medications:   Eliquis: /www.Hybrid Paytechpaf.org  Xarelto: http://www.Oxxy.SensorCath/         Enoxaparin 80mg x 10 syringes (1mg/kg BID): $244  Enoxaparin 150mg x 5 syringes (1.5mg/kg QD): $179     Jantoven (warfarin)  $16     Discharge Pharmacy ([facility]) can fill any of the above and all other discharge meds at no upfront cost.  Patient will receive a bill in the mail for the full cost of the meds.  Pt can call the number on the bill to set up a payment plan if pt wishes.     -JOY Chakraborty, Pharmacy Technician/Liaison, Discharge Pharmacy, [phone]

## 2020-06-29 NOTE — PLAN OF CARE
Patient alert SBA to BR. Up frequently during the night due to Bumex drip. 1100 out since 2300. 6lb weight loss past 24 hour. VSS, SR. LS clear diminished. Denies SOB, 1-2+ LE edema. ENT consult for hx of nose bleeds-to to start eliquis for LV thrombus. Review POC with patient.

## 2020-06-29 NOTE — PLAN OF CARE
Discharge Planner PT   Patient plan for discharge: Home   Current status: Patient completing sit<>stand with FWW and SBA. Patient noting some dizziness in standing, no drop in BP noted from sitting>standing, denies further symptoms during remainder of session. Gait for 75 feet x 2 with FWW and SBA, slow ambulation with cues for upright posture; seated rest break taken between bouts of gait due to fatigue. Patient in supine at end of session, all needs in reach.   Barriers to return to prior living situation: None  Recommendations for discharge: Home with resumption of  PT and family providing SBA  Rationale for recommendations: Patient continues to mobilize well with FWW and SBA, demonstrates decreased tolerance to activity. Patient safe to discharge home, once medically able, with family providing SBA for mobility. Patient will require assist of one, assistive device, and considerable effort to come and go from house, therefore, home health PT is recommended.       Entered by: Adelina Worley 06/29/2020 11:33 AM

## 2020-06-29 NOTE — PROGRESS NOTES
Cardiology Progress Note          Assessment and Plan:   75 yr old white female, presented for hypotension, found to have LV thrombus with EF 20%.  Ischemic cardiomyopathy with previous ICD.  Current QRS 78 ms.  Pt is not a candidate for CRT upgrade.  Agree for medical therapy of CHF.  EP sign off  Mary Hwang MD  Cardiology   [phone]                  Physical Exam:   Blood pressure 91/71, pulse 87, temperature 97.3  F (36.3  C), temperature source Oral, resp. rate 15, weight 71.3 kg (157 lb 1.6 oz), SpO2 91 %.  Wt Readings from Last 4 Encounters:   06/29/20 71.3 kg (157 lb 1.6 oz)   06/25/20 71.7 kg (158 lb)   06/15/20 70.7 kg (155 lb 14.4 oz)   05/28/20 71.4 kg (157 lb 8 oz)      I/O last 3 completed shifts:  In: 890 [P.O.:790; I.V.:100]  Out: 3150 [Urine:3150]    Constitutional: Alert, no apparent distress,      Lungs: No crackles or wheezing,    Cardiovascular: Regular rate and rhythm, normal S1 and S2, and no murmur,    Lymphm node  Neck  ENT  Neurologic  Abdomen: No enlargement  No jugular vein extension or carotid bruit  No apparent abnormality  No focal deficit  Normal bowel sounds, soft, no distension, no tender   Skin: No rashes, no cyanosis   Extremity: 1+ bilateral leg edema          Medications:     Current Facility-Administered Medications:      acetaminophen (TYLENOL) tablet 650 mg, 650 mg, Oral, Q4H PRN, 650 mg at 06/28/20 1320 **OR** acetaminophen (TYLENOL) Suppository 650 mg, 650 mg, Rectal, Q4H PRN, Bandar Stanley MD     bimatoprost (LUMIGAN) 0.01 % ophthalmic drops 1 drop, 1 drop, Both Eyes, At Bedtime, Megan Chambers MD, 1 drop at 06/28/20 2006     bumetanide (BUMEX) 0.25 mg/mL infusion, 0.25 mg/hr, Intravenous, Continuous, Isauro Vargas MD, Last Rate: 1 mL/hr at 06/28/20 2134, 0.25 mg/hr at 06/28/20 2134     bumetanide (BUMEX) injection 1 mg, 1 mg, Intravenous, BID, Isauro Vargas MD, 1 mg at 06/28/20 1614     busPIRone (BUSPAR) tablet 10 mg, 10 mg, Oral, TID, Trish  MD Megan, 10 mg at 06/28/20 2111     cefTRIAXone (ROCEPHIN) 1 g vial to attach to  mL bag for ADULTS or NS 50 mL bag for PEDS, 1 g, Intravenous, Q24H, Bandar Stanley MD, 1 g at 06/28/20 1606     dextrose 10% infusion, , Intravenous, Continuous PRN, Megan Chambers MD     glucose gel 15-30 g, 15-30 g, Oral, Q15 Min PRN **OR** dextrose 50 % injection 25-50 mL, 25-50 mL, Intravenous, Q15 Min PRN **OR** glucagon injection 1 mg, 1 mg, Subcutaneous, Q15 Min PRN, Megan Chambers MD     dorzolamide-timolol (COSOPT) ophthalmic solution 1 drop, 1 drop, Both Eyes, BID, Megan Chambers MD, 1 drop at 06/28/20 2005     ferrous sulfate (FEROSUL) tablet 325 mg, 325 mg, Oral, Daily with breakfast, Bandar Stanley MD     latanoprost (XALATAN) 0.005 % ophthalmic solution 1 drop, 1 drop, Both Eyes, Daily, Megan Chambers MD, 1 drop at 06/28/20 0944     levothyroxine (SYNTHROID/LEVOTHROID) tablet 50 mcg, 50 mcg, Oral, Daily, Megan Chambers MD, 50 mcg at 06/29/20 0658     magnesium sulfate 2 g in water intermittent infusion, 2 g, Intravenous, Daily PRN, Megan Chambers MD     magnesium sulfate 4 g in 100 mL sterile water (premade), 4 g, Intravenous, Q4H PRN, Megan Chambers MD     melatonin tablet 3 mg, 3 mg, Oral, At Bedtime PRN, Linda Juarez MD, 3 mg at 06/28/20 2111     naloxone (NARCAN) injection 0.1-0.4 mg, 0.1-0.4 mg, Intravenous, Q2 Min PRN, Megan Chambers MD     omeprazole (priLOSEC) CR capsule 20 mg, 20 mg, Oral, QAM AC, Megan Chambers MD, 20 mg at 06/29/20 0658     polyethylene glycol (MIRALAX) Packet 17 g, 17 g, Oral, Daily, Megan Chambers MD     potassium chloride (KLOR-CON) Packet 20-40 mEq, 20-40 mEq, Oral or Feeding Tube, Q2H PRN, Megan Chambers MD     potassium chloride 10 mEq in 100 mL intermittent infusion with 10 mg lidocaine, 10 mEq, Intravenous, Q1H PRN, Megan Chambers MD     potassium chloride 10 mEq in 100 mL sterile water intermittent infusion (premix), 10 mEq, Intravenous, Q1H PRN, Megan Chambers MD     potassium chloride  20 mEq in 50 mL intermittent infusion, 20 mEq, Intravenous, Q1H PRN, Megan Chambers MD     potassium chloride ER (KLOR-CON M) CR tablet 20 mEq, 20 mEq, Oral, Daily, Bandar Stanley MD, 20 mEq at 06/28/20 1614     potassium chloride ER (KLOR-CON M) CR tablet 20-40 mEq, 20-40 mEq, Oral, Q2H PRN, Megan Chambers MD, 20 mEq at 06/29/20 0733     potassium phosphate 15 mmol in D5W 250 mL intermittent infusion, 15 mmol, Intravenous, Daily PRN, Megan Chambers MD     potassium phosphate 20 mmol in D5W 250 mL intermittent infusion, 20 mmol, Intravenous, Q6H PRN, Megan Chambers MD     potassium phosphate 20 mmol in D5W 500 mL intermittent infusion, 20 mmol, Intravenous, Q6H PRN, Megan Chambers MD     potassium phosphate 25 mmol in D5W 500 mL intermittent infusion, 25 mmol, Intravenous, Q8H PRN, Megan Chambers MD     rosuvastatin (CRESTOR) tablet 20 mg, 20 mg, Oral, Daily, Bandar Stanley MD, 20 mg at 06/28/20 1607     senna-docusate (SENOKOT-S/PERICOLACE) 8.6-50 MG per tablet 1 tablet, 1 tablet, Oral, BID, Megan Chambers MD, 1 tablet at 06/27/20 1201     traZODone (DESYREL) tablet 150 mg, 150 mg, Oral, At Bedtime, Megan Chambers MD, 150 mg at 06/28/20 2137     warfarin ANTICOAGULANT (COUMADIN) half-tab 0.5 mg, 0.5 mg, Oral, ONCE at 18:00, Donovan Isabel MD, Stopped at 06/28/20 1800     Warfarin Therapy Reminder (Check START DATE - warfarin may be starting in the FUTURE), 1 each, Does not apply, Continuous PRN, Donovan Isabel MD           Lab results:        Recent Labs   Lab Test 06/29/20  0537 06/28/20  0609 06/27/20  1311 06/27/20  0859    133  --  130*   POTASSIUM 3.6 4.1  --  4.7   CHLORIDE 102 103  --  101   FERNANDO 8.7 8.2*  --  8.4*   CO2 26 21  --  21   BUN 43* 46*  --  45*   CR 1.50* 1.59*  --  1.65*   GLC 93 103* 149* 140*     Recent Labs   Lab Test 06/29/20  0537 06/28/20  0609 06/27/20  0859   HGB 10.5* 9.5* 9.9*   WBC 6.7 8.1 8.0    294 292     Recent Labs   Lab Test 06/28/20  0609  06/27/20  1311 06/26/20  0558   INR 2.75* 2.91* 6.08*     Recent Labs   Lab Test 06/25/20  1617   TROPI 0.197*

## 2020-06-29 NOTE — PLAN OF CARE
Pt is A&O. Denies CP and SOB. Tele shows SR with PACs. DASH noted but LS clear. Occasional dry cough. IV Bumex infusing. Pt is diuresing well. 3+ edema present in LEs. TEDs on in the AM. BPs remain soft. Pt denies dizziness. Pt up in room with Ax1 and walker. Updated NP about delay in viability study. No discharge plans at this time.

## 2020-06-29 NOTE — PROGRESS NOTES
"SPIRITUAL HEALTH SERVICES Progress Note  [facility]    Visit with pt, per request from nurse.  Pt acknowledged receiving health care directive form, and said, \"I still need to fill it out.\"  She said she may wish to complete it and get it notarized during her current hospital stay.  Her  seemed to think it wasn't necessary for to complete the form (\"because we know what you want, and I'll be with you,\") but pt said she would like to have it in her electronic record.  Pt wasn't interested in further conversation or assistance with the directive at present, so I encouraged her to request further assistance if/when she desires.  SH team is available per request.                                                                                                                                                 Kassidy Mcginnis M.A.  Staff   Pager [phone]  Phone [phone]      "

## 2020-06-29 NOTE — PLAN OF CARE
Discharge Planner OT   Patient plan for discharge: Home with spouse assist   Current status: Pt completed clothing item retrieval with SBA and walker. Pt completed LE dressing with SBA. Pt completed toilet transfer with SBA.   Barriers to return to prior living situation: Decreased activity tolerance for I/ADLs  Recommendations for discharge: Home with assist as needed for I/ADLs and home OT   Rationale for recommendations: Home OT to maximize (I), safety and activity tolerance for I/ADLs. Home care appropriate as pt requires taxing effort to leave the home        Entered by: Juan Franco 06/29/2020 1:32 PM

## 2020-06-30 NOTE — PLAN OF CARE
Discharge Planner PT   Patient plan for discharge: Home with family  Current status: Patient completing sit<>stand with SBA, CGA needed for transfers from lower surfaces; gait x 75 feet then 100 feet with FWW and SBA, CGA needed intermittently for balance support with turns. Patient needing seated rest break between bouts of gait, rating fatigue at 4-5/10. Patient in chair at end of session, all needs in reach.   Barriers to return to prior living situation: None  Recommendations for discharge: Home with family providing SBA, resumption of HH PT  Rationale for recommendations: Patient continues to mobilize well with FWW and SBA, demonstrates decreased tolerance to activity. Patient safe to discharge home, once medically able, with family providing SBA for mobility. Patient will require assist of one, assistive device, and considerable effort to come and go from house, therefore, home health PT is recommended.       Entered by: Adelina Worley 06/30/2020 9:20 AM

## 2020-06-30 NOTE — PLAN OF CARE
Patient alert, calls for SBA. Up frequently to BR due to Bumex drip, good output, 4lb weight loss past 24H. LS clear, 1-2+LE edema continues.BP 90's to 100 systolic, asymptomatic. Tele SR Mid 90's on RA. Pt now NPO for resting nuc med scan today to assess viability. INR 2.31 yesterday- note to start Heparin bridging when INR below 2.Plan to eventually repeat heart cath. POC reviewed with patient.

## 2020-06-30 NOTE — PROGRESS NOTES
[facility]    Medicine Progress Note - Hospitalist Service       Date of Admission:  6/25/2020  Assessment & Plan   Marquise Jj is a 75 year old female with PMH recent complicated cardiac history significant for severe ischemic cardiomyopathy with left ventricular ejection fraction of less than 20%, large mural thrombus on recent echocardiogram from June 2020, severe multivessel coronary disease status post bypass in March 2020, chronic anemia, and chronic CKD who was admitted on 6/25/2020 for hypotension and weakness (Transfer from [facility] ED).        Cardiogenic shock   #Severe ischemic cardiomyopathy with left ventricular ejection fraction of less than 20%  # Acute decompensated systolic heart failure sec to above  # h/o Severe multivessel coronary disease status post bypass in March 2020 in [place]  # s/p AICD 4/2020 in [place]  # Severe TR, mild-mod MR  -Presented with hypotension needing pressors and ICU admission, initially thought to be sec to urinary source and started empirically on Rocephin but blood and urine culture from 6/25 with no growth so far; remains afebrile; leukocytosis improved, will discontinue Rocephin after today's dose  - off pressors and transferred out of ICU on 6/27 ; BP on softer side low 90s--100s which is probably her baseline owing to the very low EF  - cardiology following, started on Bumex drip 6/27.  Bumex drip discontinued and started on torsemide 6/30/2020  -2.8L cc since admission , 158 pounds on admission, went up to 166.5 pounds, 152 pounds this morning  -EP consulted for consideration of biventricular CRT upgrade, patient is not a candidate for CRT upgrade and so EP signed off  -holding PTA Entresto  - repeat ECHO 6/26 noted with no significant change-- EF<20%, akinetic apex with large mural thrombus, severe TR, mild-moderate functional MR  - monitor volume status closely, daily weight  -Plan for resting nuclear scan to assess viability tomorrow and  possibly heart cath and possible left thoracentesis(see below)     #Large mural thrombus on recent echocardiogram from June 2020  # supratherapeutic INR  # h/o recurrent epistaxis  - INR supratherapeutic on admission to 6--trended down   - it seems she has been having difficulty regulating her INR to therapeutic level and hoping for alternative anticoagulation  -ENT was consulted for recommendation regarding alternative anticoagulant and ENT okayed for alternative anticoagulation.  -5 mg of vitamin K given on 6/29/2020 in anticipation of coronary angiogram and left thoracentesis  -Continue with high intensity heparin bridge.  -Pharmacy liason consulted  for anticoagulation coverage, Eliquis Copay 500$/mo as patient doesn't have pharmacy insurance     #LORI  - Baseline cr around 1-1.2  - acute dysfunction likely sec to renal hypoperfusion due to heart failure; presented with creatinine of 1.92  - US 6/26 UR, no obstruction or hydronephrosis  - PTA Sacubitril/valsartan (Entresto) on hold but tolerated Bumex drip.  -Started back on torsemide today.  Creatinine this morning is 1.33  - monitor BMP closely     Hypothyroidism   PTA on 50mcg synthroid   TSH during hospitalization high but FT4 normal ,could be euthyroid sick syndrome   Cardiology recommeding adjusting dose of synthroid in light of low EF   Increase synthroid to 62.5 mcg , OP recheck thyroid function in 6-8 wks    Chronic anemia  - unclear recent baseline, prior to recent cardiac events it seems her Hb was around 12-13  - Hb stable around 9-10; monitor      # Ruled out COVID         Diet: NPO per Anesthesia Guidelines for Procedure/Surgery Except for: Meds  1 Gram Sodium Diet    DVT Prophylaxis: Warfarin  Rojo Catheter: not present  Code Status: Full Code           Disposition Plan   Expected discharge: 2+ days, recommended to transitional care unit once Adequately diuresed, all cardiac work-up completed and cleared by cardiology.  Entered: Colleen Sweeney MD  06/30/2020, 3:13 PM       The patient's care was discussed with the Bedside Nurse and Patient.    Colleen Sweeney MD  Hospitalist Service  [facility]    ______________________________________________________________________    Interval History   Patient without any new complaints.  No new nursing concerns.    Data reviewed today: I reviewed all medications, new labs and imaging results over the last 24 hours. I personally reviewed the chest x-ray image(s) showing Bibasilar effusion has not changed significantly.    Physical Exam   Vital Signs: Temp: 97  F (36.1  C) Temp src: Axillary BP: 103/67 Pulse: 91 Heart Rate: 102 Resp: 16 SpO2: 94 % O2 Device: None (Room air)    Weight: 152 lbs 14.4 oz  Exam:  Constitutional: Awake, alert and no distress. Appears comfortable  Head: Normocephalic. No masses, lesions, tenderness or abnormalities  ENT: ENT exam normal, no neck nodes or sinus tenderness  Cardiovascular: RRR.  2+ murmurs, no rubs or JVD  Respiratory:normal WOB,b/l decreased air entry at bases with occasional crackles  Gastrointestinal: Abdomen soft, non-tender. BS normal. No masses, organomegaly  : Deferred  Extremities : 2+ bilateral edema , no clubbing or cyanosis      Data   Recent Labs   Lab 06/30/20  0531 06/29/20  1923 06/29/20  0537 06/28/20  0609  06/27/20  0859  06/25/20  1617   WBC 6.7  --  6.7 8.1  --  8.0   < > 15.1*   HGB 11.3*  --  10.5* 9.5*  --  9.9*   < > 10.0*   MCV 78  --  78 78  --  78   < > 82     --  347 294  --  292   < > 255   INR 1.74* 2.31*  --  2.75*   < >  --    < > 5.04*     --  136 133  --  130*   < > 133   POTASSIUM 3.4  --  3.6 4.1  --  4.7   < > 4.6   CHLORIDE 100  --  102 103  --  101   < > 99   CO2 26  --  26 21  --  21   < > 25   BUN 35*  --  43* 46*  --  45*   < > 37*   CR 1.33*  --  1.50* 1.59*  --  1.65*   < > 1.92*   ANIONGAP 10  --  8 9  --  8   < > 9   FERNANDO 9.0  --  8.7 8.2*  --  8.4*   < > 8.4*   GLC 86  --  93 103*   < > 140*   < > 96    ALBUMIN  --   --   --  2.2*  --  2.2*  --   --    PROTTOTAL  --   --   --  6.3*  --  6.5*  --   --    BILITOTAL  --   --   --  0.7  --  0.7  --   --    ALKPHOS  --   --   --  168*  --  189*  --   --    ALT  --   --   --  24  --  22  --   --    AST  --   --   --  18  --  24  --   --    TROPI  --   --   --   --   --   --   --  0.197*    < > = values in this interval not displayed.     No results found for this or any previous visit (from the past 24 hour(s)).  Medications     dextrose       HEParin 1,200 Units/hr (06/30/20 0933)       bimatoprost  1 drop Both Eyes At Bedtime     busPIRone  10 mg Oral TID     cefTRIAXone  1 g Intravenous Q24H     dorzolamide-timolol  1 drop Both Eyes BID     ferrous sulfate  325 mg Oral BID     latanoprost  1 drop Both Eyes Daily     [START ON 7/1/2020] levothyroxine  62.5 mcg Oral QAM AC     omeprazole  20 mg Oral QAM AC     polyethylene glycol  17 g Oral Daily     potassium chloride ER  20 mEq Oral Daily     rosuvastatin  20 mg Oral Daily     senna-docusate  1 tablet Oral BID     sodium chloride (PF)  10 mL Intravenous Once     torsemide  40 mg Oral BID     traZODone  150 mg Oral At Bedtime

## 2020-06-30 NOTE — PLAN OF CARE
"Discharge Planner OT   Patient plan for discharge: home  Current status: pt at first declined to walk, with encouragement agreed. pt independent with bed mobility and ambulated approx 4 mins with SBA and FWW with occasional rest break standing, asked pt if she wanted to sit but declined and SOB with fatigue, cues for PLB. Vitals appear stable. Pt declined review of CHF, reports, \"I'm a nurse.\" pt encouraged to walk again with nurses today. Pt reports, \"one person tells me not  to walk and then you tell me to walk.\" pt educated in the importance of exercise with CHF.   Barriers to return to prior living situation: none with family A with ADL/IADL's as needed  Recommendations for discharge: home with family A with ADL/IADL's as needed ie SBA for mobility, A with bathing as needed and home OT for ADl's and home safety.   Rationale for recommendations: pt limited due to activity tolerance and SOB with activity. Home OT recommended to maximize (I), safety and activity tolerance for I/ADLs. Home care appropriate as pt requires taxing effort to leave the home.       Entered by: Roberta Hare 06/30/2020 12:12 PM      "

## 2020-06-30 NOTE — PLAN OF CARE
VSS, off bumex drip today and switch to PO torsemide. IV heparin as coumadin is on hold for potential angiogram. XA due at 1530. Nuclear viability study tomorrow, NPO after midnight. Also considering thoracentesis in the near future. Up with SBA. RA. SR. BP remains somewhat low at times-asymptomatic.

## 2020-06-30 NOTE — PLAN OF CARE
Pt is A&O. Denies CP/SOB. Monitor shows SR. DASH noted. IS at bedside and encouraged pt to DBC. 3+ edema noted in LEs. TEDs in place. Pt sitting in chair with legs elevated. Heparin infusing per orders. Plan is for pt to have nuc viability study beginning tomorrow and possibly thoracentesis.

## 2020-06-30 NOTE — CONSULTS
Care Transition Initial Assessment -      Met with: Patient    Active Problems:    Septic shock (H)       DATA  Lives With: spouse   Living Arrangements: house  Quality of Family Relationships: involved, supportive  Description of Support System: Supportive, Involved  Who is your support system?: , Children  Support Assessment: Adequate family and caregiver support  Identified issues/concerns regarding health management:   Quality of Family Relationships: involved, supportive  Transportation Anticipated: family or friend will provide    Per social work consult for discharge planning.  Patient was admitted on 6-25-20 with weakness and hypotension.  The tentative date of discharge is yet to be determined.  Patient was transferred from [facility].  Reviewed chart.  Patient lives with her  in a farmhouse with 5 steps to enter.  Patient's daughter lives next door and assists as needed.  Patient is never left alone.  Patient uses a rolling walker at baseline.  Patient has a commode, shower chair, and grab bars in the bathroom.  Patient is independent with toileting, grooming, upper extremity dressing, med management, financial management, and bathing.  Patient receives assistance with lower extremity dressing, cooking, cleaning, and laundry.  Patient is open to [facility] for RN/PT/OT.  Patient is planning on returning home upon discharge with a resumption of homecare services.    ASSESSMENT  Cognitive Status:  awake  Concerns to be addressed: discharge planning, homecare on discharge.     PLAN  Financial costs for the patient includes N/A.  Patient given options and choices for discharge N/A.  Patient/family is agreeable to the plan?  Yes  Transportation/person available to transport on day of discharge  is TBD and have they been notified/set up TBD  Patient Goals and Preferences: return home with a resumption of homecare.  Patient anticipates discharging to:  Home.    Will continue to follow  and assist with a safe discharge plan.      SIMA Black, [facility] Hospital[facility]  Lead   [phone]  [facility]

## 2020-07-01 NOTE — PROVIDER NOTIFICATION
MD Notification    Notified Person: pharmacist    Notified Person Name: Radha    Notification Date/Time: 7/1  1500    Notification Interaction: telephone call    Purpose of Notification: critical Xa 1.26    Orders Received: Hold heparin gtt for 60 minutes. Awaiting new rate dosing.    Comments:

## 2020-07-01 NOTE — PROGRESS NOTES
[facility]    Cardiology Progress Note    Date of Service: 07/01/2020   Pt seen and examined by me today all observations/plans are mine heifetz     Assessment & Plan   Marquise Jj is a 75 year old female with past medical history of ischemic CM with EF less than 20%, mural thrombus, Multivessel CAD with CABG April 2020 in [facility], CKD.  This patient was transferred from [facility] 6/25/2020 with symptoms of hypotension/weakness  COVID NEGATIVE      1 Acute decompensated systolic heart failure  EF less 20%-ischemic  N term pro BNP 23,376  cxr still shows L>R pleural effusion 6/29 prob no change so will hold on tap for now  Bumex drip 0.25mg per hour (PTA torsemide 40mg am; 20mg pm)-creat better today  Weight 148--down 18 pounds  I/O net -4379  Home Eentresto on hold-bp soft  Echocardiogram  4+ TR with reduced RV function  2+ MR  Moderate pulmonary HTN  Creatinine stable at 1.33  K 3.4 CO2 26  IMPORTANT-PT WITH BILAT LE EDEMA-SHE SAID SHE DIDN'T HAVE THAT BEFORE MI BUT SHE'S HAD BILAT VEIN STRIPPING SO BE CAREFUL NOT TO OVER DIURESE BASED ON LEGS ALONE (LUNGS CLEAR TODAY, WEIGHT DOWN 18# THIS ADMIT)  WILL PROBABLY DO RHC AT TIME OF LHC TO ASSESS FLUID STATUS    PLAN:   demadex 40mg bid   watch weight and creat  Hold of on entresto and assess restarting when more stable  Consider referral to [facility] advanced heart failure team  Bmp/ bnp in am    2 Hypotension-systolic 91- 103   -stable    3. CAD  -delayed presentation in [place] with STEMI-had symptoms on cruise ship in [place] and sent ot [place]  S/p CABG in [facility] (April 2020)  Left main 30-40%; LAD occluded; diagonals occluded  RCA 70%; circumflex 60-70% with small targets  Diagonal grafted after LAD dissected intraop  KURT to RCA  Trop 0.197    PLAN:  Resting nuclear scan to assess viability   bridging with possible heart cath pending the above viability study  -by [place] report complication at time of cab and they  could not bypass all vessels so need viability before cath to see if any role for pci with severe IHD  INR ordered daily      4 LV mural thrombus  Epistaxis on Warfarin  ENT consult said ok for blood thinners  Will bridge with iv heparin for heart cath this week  INR was very high with very low dose coumadin-Likely passive congestion and vitamin K deficient-we did give her one dose of Vit K 6/29     PLAN  Bridge with iv heparin for possible heart cath pending her viability(and poss pleural tap if needed)  INR 1.63 today      5. ICD inserted [place]  EP consult prior to discharge to upgrade to CRT-D  Dr. Hwang saw and no benefit to CRT-D    6. Stage III CKD  Baseline creatinine 1.12-peak 1.9  Now 1.35      7. Hypothyroidism  Compensated hypothyroid  -IM adjusted dose synthroid due to low EF    8. Microcytic anemia  Iron level normal at 65 iron binding normal iron saturation normal  likely epistaxis and recent Cab  Fe tab to bid yesterday  Hemoglobin 11.3    Interval History    Feeling much much better    Review of Systems:  The Review of Systems is negative other than noted in the HPI    Physical Exam   Temp: 97.2  F (36.2  C) Temp src: Oral BP: 110/72 Pulse: 86 Heart Rate: 91 Resp: 16 SpO2: 97 % O2 Device: None (Room air)    Vitals:    06/29/20 0541 06/30/20 0514 07/01/20 0521   Weight: 71.3 kg (157 lb 1.6 oz) 69.4 kg (152 lb 14.4 oz) 67.3 kg (148 lb 4.8 oz)     Vital Signs with Ranges  Temp:  [97  F (36.1  C)-97.8  F (36.6  C)] 97.2  F (36.2  C)  Pulse:  [86-91] 86  Heart Rate:  [] 91  Resp:  [16] 16  BP: ()/(54-73) 110/72  SpO2:  [91 %-100 %] 97 %  I/O last 3 completed shifts:  In: 1064 [P.O.:870; I.V.:194]  Out: 3275 [Urine:3275]    Constitutional     alert and oriented, in no acute distress.     Skin     warm and dry to touch    ENT     Mild pallor or cyanosis    Neck    Supple, JVP 8 in chair at 90 degrees no carotid bruit    Chest     no tenderness to palpation     Lungs  Clear; improved air exchange at  bases    Cardiac  regular rhythm, S1 normal, S2 normal, No S3 or S4, II/VI systolic murmur RSB; II/Vi systolic murmur apex to axilla    Abdomen     abdomen soft, bowel sounds normoactive, no hepatosplenomegaly    Extremities and Back     no clubbing, cyanosis. 1+ edema pitting bilaterally  All toes very red      Neurological     no gross motor deficits noted, affect appropriate, oriented to time, person and place.        Medications     dextrose       HEParin 1,000 Units/hr (07/01/20 0746)       bimatoprost  1 drop Both Eyes At Bedtime     busPIRone  10 mg Oral TID     dorzolamide-timolol  1 drop Both Eyes BID     ferrous sulfate  325 mg Oral BID     latanoprost  1 drop Both Eyes Daily     levothyroxine  62.5 mcg Oral QAM AC     omeprazole  20 mg Oral QAM AC     polyethylene glycol  17 g Oral Daily     potassium chloride ER  20 mEq Oral Daily     rosuvastatin  20 mg Oral Daily     senna-docusate  1 tablet Oral BID     sodium chloride (PF)  10 mL Intravenous Once     thallous chloride Tl-201  3.5 millicurie Intravenous Once     torsemide  40 mg Oral BID     traZODone  150 mg Oral At Bedtime          Data:     ROUTINE IP LABS (Last four results)  BMP  Recent Labs   Lab 07/01/20  0530 06/30/20  0531 06/29/20  0537 06/28/20  0609    136 136 133   POTASSIUM 3.4 3.4 3.6 4.1   CHLORIDE 97 100 102 103   FERNANDO 9.3 9.0 8.7 8.2*   CO2 29 26 26 21   BUN 30 35* 43* 46*   CR 1.35* 1.33* 1.50* 1.59*   GLC 94 86 93 103*     CHOLESTEROL/HEPATIC  Recent Labs   Lab 06/28/20  0609 06/27/20  0859   ALT 24 22   AST 18 24     CBC  Recent Labs   Lab 06/30/20  0531 06/29/20  0537 06/28/20  0609 06/27/20  0859   WBC 6.7 6.7 8.1 8.0   RBC 4.74 4.41 3.95 4.14   HGB 11.3* 10.5* 9.5* 9.9*   HCT 37.0 34.4* 30.6* 32.3*   MCV 78 78 78 78   MCH 23.8* 23.8* 24.1* 23.9*   MCHC 30.5* 30.5* 31.0* 30.7*   RDW 20.4* 20.4* 20.2* 20.6*    347 294 292     TROP:   Recent Labs   Lab 06/25/20  1617   TROPI 0.197*      BNP:    Recent Labs   Lab  06/25/20  1617   NTBNPI 23,376*     INR  Recent Labs   Lab 06/30/20  0531 06/29/20  1923 06/28/20  0609 06/27/20  1311   INR 1.74* 2.31* 2.75* 2.91*     TSH   Date Value Ref Range Status   06/29/2020 11.25 (H) 0.40 - 4.00 mU/L Final       EKG results:  QRS 78 mSec    Imaging:  No results found for this or any previous visit (from the past 24 hour(s)).       Telemetry:  Sinus with PVC's

## 2020-07-01 NOTE — PLAN OF CARE
Pt is A&O. Denies CP/SOB. Tele shows SR. DASH noted but LS clear. TEDS in place bilaterally. 2-3+ edema present in LEs. Pt up in chair with legs elevated. Heparin infusing per orders. Plan is for final imaging of viability scan before cath lab tomorrow.

## 2020-07-01 NOTE — PLAN OF CARE
PT:  Per chart review, PT and OT currently following patient during hospital stay. Patient currently mobilizing with mod I to supervision with 4WW. Patient is more appropriate to be followed by single discipline for remainder of hopsital stay as needs currently met with OT. Will discharge from acute PT. Discussed with patient at bedside this PM, patient in agreement.    Physical Therapy Discharge Summary    Reason for therapy discharge:    Patient more appropriate to be followed by single discipline during remainder of hospital stay, current needs met with OT.    Progress towards therapy goal(s). See goals on Care Plan in [facility] electronic health record for goal details.  Goals partially met. Patient more appropriate to be followed by single discipline during remainder of hospital stay, current needs met with OT.    Therapy recommendation(s):    Recommend ongoing skilled OT intervention during hospital stay; patient would benefit from resumption of HH PT/OT upon discharge home to improve tolerance to activity and independennce with mobility/ambulation.

## 2020-07-01 NOTE — PLAN OF CARE
Discharge Planner OT   Patient plan for discharge: Home with spouse and resumption of home care  Current status: Pt transferred sit<>stand Mod (I). Ambulated x 4.5 min with supervision, using WW, at good pace. Reports walking was moderately difficult. At home uses a walker with a seat to take rest breaks. After walk, /72, , O2 100% on RA.   Barriers to return to prior living situation: None anticipated  Recommendations for discharge: Home with family assist for IADL, assist with bathing, resumption of Home PT/OT  Rationale for recommendations: PT was receiving PT/OT prior to hospitalization and would benefit from continuation of services at time of discharge to maximize activity tolerance, safety and (I) with ADL, IADL and mobility. Pt unable to leave the home without assist of another person and AD.       Entered by: Mikala Oscar 07/01/2020 3:27 PM

## 2020-07-01 NOTE — PLAN OF CARE
Neuro- A&O  Most Recent Vitals- Temp: 97.3  F (36.3  C) Temp src: Oral BP: 110/72 Pulse: 86 Heart Rate: 91 Resp: 16 SpO2: 97 % O2 Device: None (Room air)    Tele - SR  Cardiac- No chest pain reported   Resp- LS diminished in right base  O2- RA  Activity- IND to bedside commode   Pain- Tylenol fort back pain   Drips- Heparin   Devices-PPM/ICD  Aggression Color- Green  Plan- Nuc med viability study   Misc- NPO since MN    Nargis Najera, RN

## 2020-07-01 NOTE — PROGRESS NOTES
[facility]    Medicine Progress Note - Hospitalist Service       Date of Admission:  6/25/2020  Assessment & Plan   Marquise Jj is a 75 year old female with PMH recent complicated cardiac history significant for severe ischemic cardiomyopathy with left ventricular ejection fraction of less than 20%, large mural thrombus on recent echocardiogram from June 2020, severe multivessel coronary disease status post bypass in March 2020, chronic anemia, and chronic CKD who was admitted on 6/25/2020 for hypotension and weakness (Transfer from [facility] ED).        Cardiogenic shock   #Severe ischemic cardiomyopathy with left ventricular ejection fraction of less than 20%  # Acute decompensated systolic heart failure sec to above  # h/o Severe multivessel coronary disease status post bypass in March 2020 in [place]  # s/p AICD 4/2020 in [place]  # Severe TR, mild-mod MR  -Presented with hypotension needing pressors and ICU admission, initially thought to be sec to urinary source and started empirically on Rocephin but blood and urine culture from 6/25 with no growth so far; remains afebrile; leukocytosis improved, will discontinue Rocephin after today's dose  - off pressors and transferred out of ICU on 6/27 ; BP on softer side low 90s--100s which is probably her baseline owing to the very low EF  - cardiology following, started on Bumex drip 6/27.  Bumex drip discontinued and started on torsemide 6/30/2020  -4.4 L cc since admission , 158 pounds on admission, went up to 166.5 pounds, 148 pounds this morning  -EP consulted for consideration of biventricular CRT upgrade, patient is not a candidate for CRT upgrade and so EP signed off  -holding PTA Entresto  - repeat ECHO 6/26 noted with no significant change-- EF<20%, akinetic apex with large mural thrombus, severe TR, mild-moderate functional MR  - monitor volume status closely, daily weight  -Plan for resting nuclear scan to assess viability today and  possibly heart cath and possible left thoracentesis(see below)     #Large mural thrombus on recent echocardiogram from June 2020  # supratherapeutic INR  # h/o recurrent epistaxis  - INR supratherapeutic on admission to 6--trended down   - it seems she has been having difficulty regulating her INR to therapeutic level and hoping for alternative anticoagulation  -ENT was consulted for recommendation regarding alternative anticoagulant and ENT okayed for alternative anticoagulation.  -5 mg of vitamin K given on 6/29/2020 in anticipation of coronary angiogram and left thoracentesis  -Continue with high intensity heparin bridge.  -Pharmacy liason consulted  for anticoagulation coverage, patient willing for paying for Eliquis as she mentions she spoke with the insurance and it is actually $200 per month    #LORI  - Baseline cr around 1-1.2  - acute dysfunction likely sec to renal hypoperfusion due to heart failure; presented with creatinine of 1.92  - US 6/26 UR, no obstruction or hydronephrosis  - PTA Sacubitril/valsartan (Entresto) on hold but tolerated Bumex drip.  -Currently on oral torsemide.  Creatinine this morning is 1.35  - monitor BMP closely, avoid nephrotoxins     Hypothyroidism   PTA on 50mcg synthroid   TSH during hospitalization high but FT4 normal ,could be euthyroid sick syndrome   Cardiology recommeding adjusting dose of synthroid in light of low EF   Increase synthroid to 62.5 mcg , OP recheck thyroid function in 6-8 wks    Chronic anemia  - unclear recent baseline, prior to recent cardiac events it seems her Hb was around 12-13  - Hb stable around 9-10; monitor      # Ruled out COVID         Diet: 1 Gram Sodium Diet    DVT Prophylaxis: Warfarin  Rojo Catheter: not present  Code Status: Full Code           Disposition Plan   Expected discharge: 2+ days, recommended to transitional care unit once Adequately diuresed, all cardiac work-up completed and cleared by cardiology.  Entered: Colleen Sweeney MD  07/01/2020, 12:56 PM       The patient's care was discussed with the Bedside Nurse and Patient.    Colleen Sweeney MD  Hospitalist Service  [facility]    ______________________________________________________________________    Interval History   Patient without any new complaints.  No new nursing concerns.    Data reviewed today: I reviewed all medications, new labs and imaging results over the last 24 hours. I personally reviewed the chest x-ray image(s) showing Bibasilar effusion has not changed significantly.    Physical Exam   Vital Signs: Temp: 97.3  F (36.3  C) Temp src: Oral BP: 101/65 Pulse: 94 Heart Rate: 91 Resp: 16 SpO2: 98 % O2 Device: None (Room air)    Weight: 148 lbs 4.8 oz  Exam:  Constitutional: Awake, alert and no distress. Appears comfortable  Head: Normocephalic. No masses, lesions, tenderness or abnormalities  ENT: ENT exam normal, no neck nodes or sinus tenderness  Cardiovascular: RRR.  2+ murmurs, no rubs or JVD  Respiratory:normal WOB,b/l decreased air entry at bases with occasional crackles  Gastrointestinal: Abdomen soft, non-tender. BS normal. No masses, organomegaly  : Deferred  Extremities : 1+ bilateral edema , no clubbing or cyanosis      Data   Recent Labs   Lab 07/01/20  0624 07/01/20  0530 06/30/20  0531 06/29/20  1923 06/29/20  0537 06/28/20  0609  06/27/20  0859  06/25/20  1617   WBC  --   --  6.7  --  6.7 8.1  --  8.0   < > 15.1*   HGB  --   --  11.3*  --  10.5* 9.5*  --  9.9*   < > 10.0*   MCV  --   --  78  --  78 78  --  78   < > 82   PLT  --   --  367  --  347 294  --  292   < > 255   INR 1.63*  --  1.74* 2.31*  --  2.75*   < >  --    < > 5.04*   NA  --  134 136  --  136 133  --  130*   < > 133   POTASSIUM  --  3.4 3.4  --  3.6 4.1  --  4.7   < > 4.6   CHLORIDE  --  97 100  --  102 103  --  101   < > 99   CO2  --  29 26  --  26 21  --  21   < > 25   BUN  --  30 35*  --  43* 46*  --  45*   < > 37*   CR  --  1.35* 1.33*  --  1.50* 1.59*  --  1.65*   < > 1.92*    ANIONGAP  --  8 10  --  8 9  --  8   < > 9   FERNANDO  --  9.3 9.0  --  8.7 8.2*  --  8.4*   < > 8.4*   GLC  --  94 86  --  93 103*   < > 140*   < > 96   ALBUMIN  --   --   --   --   --  2.2*  --  2.2*  --   --    PROTTOTAL  --   --   --   --   --  6.3*  --  6.5*  --   --    BILITOTAL  --   --   --   --   --  0.7  --  0.7  --   --    ALKPHOS  --   --   --   --   --  168*  --  189*  --   --    ALT  --   --   --   --   --  24  --  22  --   --    AST  --   --   --   --   --  18  --  24  --   --    TROPI  --   --   --   --   --   --   --   --   --  0.197*    < > = values in this interval not displayed.     No results found for this or any previous visit (from the past 24 hour(s)).  Medications     dextrose       HEParin 1,000 Units/hr (07/01/20 0746)       bimatoprost  1 drop Both Eyes At Bedtime     busPIRone  10 mg Oral TID     dorzolamide-timolol  1 drop Both Eyes BID     ferrous sulfate  325 mg Oral BID     latanoprost  1 drop Both Eyes Daily     levothyroxine  62.5 mcg Oral QAM AC     omeprazole  20 mg Oral QAM AC     polyethylene glycol  17 g Oral Daily     potassium chloride ER  20 mEq Oral Daily     rosuvastatin  20 mg Oral Daily     senna-docusate  1 tablet Oral BID     sodium chloride (PF)  10 mL Intravenous Once     torsemide  40 mg Oral BID     traZODone  150 mg Oral At Bedtime

## 2020-07-02 NOTE — PLAN OF CARE
Cath lab today for R/L heart cath. Intervention with 1 stent to the MRCA. Right groin approach with 6 Fr Arterial line and 7 Fr still in place as ACT high at 226 at 6pm. Recheck at 7 PM. Continues on bedrest

## 2020-07-02 NOTE — PLAN OF CARE
Neuro- A&O  Most Recent Vitals- Temp: 97.4  F (36.3  C) Temp src: Oral BP: 103/70 Pulse: 101 Heart Rate: 80 Resp: 16 SpO2: 95 % O2 Device: None (Room air)    Tele - SR with occ PVCs  Cardiac- No chest pain reported   Resp- LS dim in right base, DASH  O2- RA  Activity- IND to BSC  Pain- Tylenol for back pain  Drips- Heparin   Drains/Tubes- PIV  Aggression Color- Green  Plan- Viability images before heart cath today   [facility]-     Nargis Najera RN

## 2020-07-02 NOTE — PRE-PROCEDURE
GENERAL PRE-PROCEDURE:   Procedure:  Coronary angiogram, possible intervention, graft angiography, right heart catheterization  Date/Time:  7/2/2020 1:00 PM    Written consent obtained?: Yes    Risks and benefits: Risks, benefits and alternatives were discussed    Consent given by:  Patient  Patient states understanding of procedure being performed: Yes    Patient's understanding of procedure matches consent: Yes    Procedure consent matches procedure scheduled: Yes    Expected level of sedation:  Moderate  Appropriately NPO:  Yes  ASA Class:  Class 3- Severe systemic disease, definite functional limitations  Mallampati  :  Grade 2- soft palate, base of uvula, tonsillar pillars, and portion of posterior pharyngeal wall visible  Lungs:  Lungs clear with good breath sounds bilaterally  Heart:  Normal heart sounds and rate  History & Physical reviewed:  History and physical reviewed and no updates needed  Statement of review:  I have reviewed the lab findings, diagnostic data, medications, and the plan for sedation

## 2020-07-02 NOTE — PLAN OF CARE
OT: patient just returned from cath lab and has 2 hrs bedrest ordered. Will reschedule for tomorrow- 07/03/2020.

## 2020-07-02 NOTE — PROGRESS NOTES
[facility]    Cardiology Progress Note    Date of Service: 07/02/2020   Pt seen and examined by me-all observations and plans are mine  Visit 35min  Discussed with dr cardozo who will read nuc viability before planned cath  Pt looks better but bnp much worse  Will cath today to see if anything to offer her  May ultimately need LVAD (likely too old for transplant) but she has RV dysfxn and severe TR also  Hopefully with extensive diuresis the RV will improve (shink in size) and TV will coapt better     Assessment & Plan   Marquise Jj is a 75 year old female with past medical history of ischemic CM with EF less than 20%, mural thrombus, Multivessel CAD with CABG April 2020 in [facility], CKD.  This patient was transferred from [facility] 6/25/2020 with symptoms of hypotension/weakness  COVID NEGATIVE      1 Acute decompensated systolic heart failure  EF less 20%-ischemic  N term pro BNP 23,376-->now 33,119  cxr still shows L>R pleural effusion 6/29 prob no change so will hold on tap for now  Bumex drip 0.25mg per hour stopped  On torsemide 40mg bid  Weight 146--down 20 pounds  I/O net -5999  Home Eentresto on hold-bp soft  Echocardiogram  4+ TR with reduced RV function  2+ MR  Moderate pulmonary HTN  Creatinine stable at 1.29  K 3.0 CO2 29  IMPORTANT-PT WITH BILAT LE EDEMA-SHE SAID SHE DIDN'T HAVE THAT BEFORE MI BUT SHE'S HAD BILAT VEIN STRIPPING SO BE CAREFUL NOT TO OVER DIURESE BASED ON LEGS ALONE  JVP minimal; no hepatomegaly  Decreased breath sounds at both bases    PLAN:   demadex 40mg bid   watch weight and creat  Hold of on entresto and assess restarting when more stable  Consider referral to [facility] advanced heart failure team  ??LVAD if no improvement in EF  Bmp/ bnp in am  Replete potassium per K protocol    2 Hypotension-systolic 103  -stable    3. CAD  -delayed presentation in [place] with STEMI-had symptoms on cruise ship in [place] and sent ot [facility]  S/p CABG in [facility]  [facility] (April 2020)  Left main 30-40%; LAD occluded; diagonals occluded  RCA 70%; circumflex 60-70% with small targets  Diagonal grafted after LAD dissected intraop  KURT to RCA  Trop 0.197    PLAN:  Resting nuclear scan to assess viability   bridging -we called Dr. Marin to read early   right and left heart cath today; possibly leave swan in depending on the results  -by [place] report complication at time of cab and they could not bypass all vessels   -see above    4 LV mural thrombus  Epistaxis on Warfarin  ENT consult said ok for blood thinners  - iv heparin for heart cath   INR was very high with very low dose coumadin-Likely passive congestion and vitamin K deficient-we did give her one dose of Vit K 6/29     PLAN  Bridge with iv heparin for heart cath   Effusions on last CXR were relatively small prior to diuresis of 20 pounds  INR 1.46 today      5. ICD inserted [place]  EP consult re upgrade to CRT-D  Dr. Hwang saw and no benefit to CRT-D    6. Stage III CKD  Baseline creatinine 1.12-peak 1.9  Now 1.29      7. Hypothyroidism  Compensated hypothyroid  -IM adjusted dose synthroid due to low EF    8. Microcytic anemia  Iron level normal at 65 iron binding normal iron saturation normal  likely epistaxis and recent Cab  Fe tab to bid yesterday  Hemoglobin 11.3    Interval History    Feeling much much better    Review of Systems:  The Review of Systems is negative other than noted in the HPI    Physical Exam   Temp: 97.4  F (36.3  C) Temp src: Oral BP: 103/70 Pulse: 101 Heart Rate: 80 Resp: 16 SpO2: 95 % O2 Device: None (Room air)    Vitals:    06/30/20 0514 07/01/20 0521 07/02/20 0624   Weight: 69.4 kg (152 lb 14.4 oz) 67.3 kg (148 lb 4.8 oz) 66.3 kg (146 lb 3.2 oz)     Vital Signs with Ranges  Temp:  [97.2  F (36.2  C)-97.4  F (36.3  C)] 97.4  F (36.3  C)  Pulse:  [101] 101  Heart Rate:  [] 80  Resp:  [16] 16  BP: (101-108)/(70-78) 103/70  SpO2:  [95 %-100 %] 95 %  I/O last 3 completed shifts:  In: 480  [P.O.:480]  Out: 2350 [Urine:2350]    Constitutional     alert and oriented, in no acute distress.     Skin     warm and dry to touch    ENT     Mild pallor or cyanosis    Neck    Supple, JVP 8 in chair at 90 degrees no carotid bruit    Chest     no tenderness to palpation     Lungs  Clear; decreased breath sounds at bases    Cardiac  regular rhythm, S1 normal, S2 normal, No S3 or S4, II/VI systolic murmur RSB; II/Vi systolic murmur apex to axilla    Abdomen     abdomen soft, bowel sounds normoactive, no hepatosplenomegaly    Extremities and Back     no clubbing, cyanosis. 1+ edema pitting bilaterally  All toes very red      Neurological     no gross motor deficits noted, affect appropriate, oriented to time, person and place.        Medications     dextrose       HEParin 700 Units/hr (07/01/20 2033)     - MEDICATION INSTRUCTIONS -       sodium chloride         aspirin  325 mg Oral Daily     bimatoprost  1 drop Both Eyes At Bedtime     busPIRone  10 mg Oral TID     dorzolamide-timolol  1 drop Both Eyes BID     ferrous sulfate  325 mg Oral BID     latanoprost  1 drop Both Eyes Daily     levothyroxine  62.5 mcg Oral QAM AC     omeprazole  20 mg Oral QAM AC     potassium chloride ER  20 mEq Oral Daily     rosuvastatin  20 mg Oral Daily     sodium chloride (PF)  10 mL Intravenous Once     sodium chloride (PF)  3 mL Intracatheter Q8H     torsemide  40 mg Oral BID     traZODone  150 mg Oral At Bedtime          Data:     ROUTINE IP LABS (Last four results)  BMP  Recent Labs   Lab 07/02/20  0528 07/01/20  0530 06/30/20  0531 06/29/20  0537    134 136 136   POTASSIUM 3.0* 3.4 3.4 3.6   CHLORIDE 99 97 100 102   FERNANDO 9.0 9.3 9.0 8.7   CO2 29 29 26 26   BUN 26 30 35* 43*   CR 1.29* 1.35* 1.33* 1.50*   GLC 89 94 86 93     CHOLESTEROL/HEPATIC  Recent Labs   Lab 06/28/20  0609 06/27/20  0859   ALT 24 22   AST 18 24     CBC  Recent Labs   Lab 06/30/20  0531 06/29/20  0537 06/28/20  0609 06/27/20  0859   WBC 6.7 6.7 8.1 8.0    RBC 4.74 4.41 3.95 4.14   HGB 11.3* 10.5* 9.5* 9.9*   HCT 37.0 34.4* 30.6* 32.3*   MCV 78 78 78 78   MCH 23.8* 23.8* 24.1* 23.9*   MCHC 30.5* 30.5* 31.0* 30.7*   RDW 20.4* 20.4* 20.2* 20.6*    347 294 292     TROP:   Recent Labs   Lab 06/25/20  1617   TROPI 0.197*      BNP:    Recent Labs   Lab 07/01/20  2219 06/25/20  1617   NTBNPI 33,199* 23,376*     INR  Recent Labs   Lab 07/02/20  0528 07/01/20  0624 06/30/20  0531 06/29/20  1923   INR 1.46* 1.63* 1.74* 2.31*     TSH   Date Value Ref Range Status   06/29/2020 11.25 (H) 0.40 - 4.00 mU/L Final       EKG results:  QRS 78 mSec    Imaging:  No results found for this or any previous visit (from the past 24 hour(s)).       Telemetry:  Sinus with PVC's

## 2020-07-02 NOTE — PROGRESS NOTES
[facility]    Medicine Progress Note - Hospitalist Service       Date of Admission:  6/25/2020  Assessment & Plan   Marquise Jj is a 75 year old female with PMH recent complicated cardiac history significant for severe ischemic cardiomyopathy with left ventricular ejection fraction of less than 20%, large mural thrombus on recent echocardiogram from June 2020, severe multivessel coronary disease status post bypass in March 2020, chronic anemia, and chronic CKD who was admitted on 6/25/2020 for hypotension and weakness (Transfer from [facility] ED).        Cardiogenic shock   #Severe ischemic cardiomyopathy with left ventricular ejection fraction of less than 20%  # Acute decompensated systolic heart failure sec to above  # h/o Severe multivessel coronary disease status post bypass in March 2020 in [place]  # s/p AICD 4/2020 in [place]  # Severe TR, mild-mod MR  -Presented with hypotension needing pressors and ICU admission, initially thought to be sec to urinary source and started empirically on Rocephin but blood and urine culture from 6/25 with no growth so far; remains afebrile; leukocytosis improved, will discontinue Rocephin after today's dose  - off pressors and transferred out of ICU on 6/27 ; BP on softer side low 90s--100s which is probably her baseline owing to the very low EF  - cardiology following, started on Bumex drip 6/27.  Bumex drip discontinued and started on torsemide 6/30/2020  -6.1 L cc since admission , 158 pounds on admission, went up to 166.5 pounds, 146 pounds this morning  -EP consulted for consideration of biventricular CRT upgrade, patient is not a candidate for CRT upgrade and so EP signed off  -holding PTA Entresto  - repeat ECHO 6/26 noted with no significant change-- EF<20%, akinetic apex with large mural thrombus, severe TR, mild-moderate functional MR  - monitor volume status closely, daily weight  -Viability study 7/1 shows abnormal perfusion with absent to  minimal uptake in LAD territory.  On 4-hour and 24-hour redistribution there is minimal improvement suggesting severe nontransmural or transmural LAD infarct.  Circumflex territory has mildly decreased perfusion suggesting mostly viable myocardium and RCA territory appears 100% viable  -Plan for coronary angiogram today      #Large mural thrombus on recent echocardiogram from June 2020  # supratherapeutic INR  # h/o recurrent epistaxis  - INR supratherapeutic on admission to 6--trended down   - it seems she has been having difficulty regulating her INR to therapeutic level and hoping for alternative anticoagulation  -ENT was consulted for recommendation regarding alternative anticoagulant and ENT okayed for alternative anticoagulation.  -5 mg of vitamin K given on 6/29/2020 in anticipation of coronary angiogram and left thoracentesis  -Continue with high intensity heparin bridge.  -Pharmacy liason consulted  for anticoagulation coverage, patient willing for paying for Eliquis as she mentions she spoke with the insurance and it is actually $200 per month    #LORI  - Baseline cr around 1-1.2  - acute dysfunction likely sec to renal hypoperfusion due to heart failure; presented with creatinine of 1.92  - US 6/26 UR, no obstruction or hydronephrosis  - PTA Sacubitril/valsartan (Entresto) on hold but tolerated Bumex drip.  -Currently on oral torsemide.  Creatinine this morning is 1.29  - monitor BMP closely, avoid nephrotoxins     Hypothyroidism   PTA on 50mcg synthroid   TSH during hospitalization high but FT4 normal ,could be euthyroid sick syndrome   Cardiology recommeding adjusting dose of synthroid in light of low EF   Increase synthroid to 62.5 mcg , OP recheck thyroid function in 6-8 wks    Chronic anemia  - unclear recent baseline, prior to recent cardiac events it seems her Hb was around 12-13  - Hb stable around 9-10; monitor      # Ruled out COVID         Diet: 1 Gram Sodium Diet  NPO for Medical/Clinical  Reasons Except for: Meds    DVT Prophylaxis: Warfarin  Rojo Catheter: not present  Code Status: Full Code           Disposition Plan   Expected discharge: 2+ days, recommended to transitional care unit once Adequately diuresed, all cardiac work-up completed and cleared by cardiology.  Entered: Colleen Sweeney MD 07/02/2020, 10:35 AM       The patient's care was discussed with the Bedside Nurse and Patient.    Colleen Sweeney MD  Hospitalist Service  [facility]    ______________________________________________________________________    Interval History   Patient without any new complaints.  No new nursing concerns.  Awaiting for coronary angiogram later today.    Data reviewed today: I reviewed all medications, new labs and imaging results over the last 24 hours. I personally reviewed the chest x-ray image(s) showing Bibasilar effusion has not changed significantly.    Physical Exam   Vital Signs: Temp: 97.7  F (36.5  C) Temp src: Temporal BP: 96/58 Pulse: 101 Heart Rate: 81 Resp: 16 SpO2: 97 % O2 Device: None (Room air)    Weight: 146 lbs 3.2 oz  Exam:  Constitutional: Awake, alert and no distress. Appears comfortable  Head: Normocephalic. No masses, lesions, tenderness or abnormalities  ENT: ENT exam normal, no neck nodes or sinus tenderness  Cardiovascular: RRR.  2+ murmurs, no rubs or JVD  Respiratory:normal WOB,b/l decreased air entry at bases with occasional crackles  Gastrointestinal: Abdomen soft, non-tender. BS normal. No masses, organomegaly  : Deferred  Extremities : 1+ bilateral edema , no clubbing or cyanosis      Data   Recent Labs   Lab 07/02/20  0817 07/02/20  0528 07/01/20  0624 07/01/20  0530 06/30/20  0531  06/29/20  0537 06/28/20  0609  06/27/20  0859  06/25/20  1617   WBC 8.2  --   --   --  6.7  --  6.7 8.1  --  8.0   < > 15.1*   HGB 10.8*  --   --   --  11.3*  --  10.5* 9.5*  --  9.9*   < > 10.0*   MCV 79  --   --   --  78  --  78 78  --  78   < > 82     --   --   --  367   --  347 294  --  292   < > 255   INR  --  1.46* 1.63*  --  1.74*   < >  --  2.75*   < >  --    < > 5.04*   NA  --  136  --  134 136  --  136 133  --  130*   < > 133   POTASSIUM  --  3.0*  --  3.4 3.4  --  3.6 4.1  --  4.7   < > 4.6   CHLORIDE  --  99  --  97 100  --  102 103  --  101   < > 99   CO2  --  29  --  29 26  --  26 21  --  21   < > 25   BUN  --  26  --  30 35*  --  43* 46*  --  45*   < > 37*   CR  --  1.29*  --  1.35* 1.33*  --  1.50* 1.59*  --  1.65*   < > 1.92*   ANIONGAP  --  8  --  8 10  --  8 9  --  8   < > 9   FERNANDO  --  9.0  --  9.3 9.0  --  8.7 8.2*  --  8.4*   < > 8.4*   GLC  --  89  --  94 86  --  93 103*   < > 140*   < > 96   ALBUMIN  --   --   --   --   --   --   --  2.2*  --  2.2*  --   --    PROTTOTAL  --   --   --   --   --   --   --  6.3*  --  6.5*  --   --    BILITOTAL  --   --   --   --   --   --   --  0.7  --  0.7  --   --    ALKPHOS  --   --   --   --   --   --   --  168*  --  189*  --   --    ALT  --   --   --   --   --   --   --  24  --  22  --   --    AST  --   --   --   --   --   --   --  18  --  24  --   --    TROPI  --   --   --   --   --   --   --   --   --   --   --  0.197*    < > = values in this interval not displayed.     Recent Results (from the past 24 hour(s))   NM Viability (nuc card)    Narrative       Abnormal perfusion.  Resting scan has absent to minimal uptake in the   LAD territory.  On 4-hour and 24-hour redistribution there is minimal   improvement, suggesting severe nontransmural or transmural LAD infarct.    Circumflex territory has mildly decreased perfusion, suggesting mostly   viable myocardium.  RCA territory appears 100% viable.     There is no prior study for comparison.       Medications     dextrose       HEParin 700 Units/hr (07/01/20 6611)     - MEDICATION INSTRUCTIONS -       sodium chloride         aspirin  325 mg Oral Daily     bimatoprost  1 drop Both Eyes At Bedtime     busPIRone  10 mg Oral TID     dorzolamide-timolol  1 drop Both Eyes BID      ferrous sulfate  325 mg Oral BID     latanoprost  1 drop Both Eyes Daily     levothyroxine  62.5 mcg Oral QAM AC     omeprazole  20 mg Oral QAM AC     potassium chloride ER  20 mEq Oral Daily     rosuvastatin  20 mg Oral Daily     sodium chloride (PF)  10 mL Intravenous Once     sodium chloride (PF)  3 mL Intracatheter Q8H     torsemide  40 mg Oral BID     traZODone  150 mg Oral At Bedtime

## 2020-07-03 NOTE — PROGRESS NOTES
ent consult dictated. Full nasal endoscopy reveals recent bleed from excoriation right nasal vestibule/septum from nasal cannula prong., no other bleeding sites noted, will use local cares

## 2020-07-03 NOTE — PROGRESS NOTES
PRN ativan given for severe anxiety, patient then became difficult to arouse and apneic. Oxygen placed on patient. Rojo was placed for strict bedrest related to femoral lines in place and plan to start lasix drip.       2208: Flumazenil given for change in respiratory status-patient is more awake but still having cheynes-renteria respirations. MD paged for ABG-awaiting orders at this time-RT called for STAT ABG  2230: Respiratory status improved-oxygen placed for comfort-no current complaints of shortness of breath  0000: Sitter placed bedside for safety-patient not following directions to stay in bed and keep right leg straight for the duration of bedrest-bedrest done at 0400.

## 2020-07-03 NOTE — PROGRESS NOTES
"CLINICAL NUTRITION SERVICES - REASSESSMENT NOTE    Recommendations Ordered by Registered Dietitian (RD):   - Boost plus TID w/ meals (chocolate)   - Thiamine 100 mg daily for EF <30%  - Thera vit M daily     Malnutrition: (7/3)   % Weight Loss:  > 5% in 1 month (severe malnutrition)  % Intake:  Decreased intake does not meet criteria for malnutrition - per 's report  Subcutaneous Fat Loss:  Orbital region moderate depletion and Upper arm region moderate depletion  Muscle Loss:  Temporal region moderate depletion, Clavicle bone region moderate depletion and Dorsal hand region moderate depletion  Fluid Retention:  Mild 2+    Malnutrition Diagnosis: Severe malnutrition  In Context of:  Acute illness or injury  Chronic illness or disease     EVALUATION OF PROGRESS TOWARD GOALS   Diet: 1g Na    Intake/Tolerance:  Intakes of % entered into flowsheet.   Patients  reports that she has not eaten in the past two days. Appears she did get 50% of eggs and hash browns ordered this morning.     Nutrition History:   - Obtained from patient's  at bedside (he is very [place], patient trying to rest at this time, they requested after a few minutes to be left to rest).   - Pt has been \"eating normally given the circumstances\" at home.   - Eats no salt per .   Breakfast: cereal  Lunch: sandwich  Dinner: onion and liver  Drinks a couple boost throughout the day. Likes chocolate.     ANTHROPOMETRICS  Height: 5' 8\"  Weight: 146 lbs 3.2 oz (66.3 kg)   Body mass index is 22.23 kg/m .  Weight Status:  Normal BMI  IBW: 63.6 kg   % IBW: 104%  Weight History: 11# loss indicated in just over 1 month (7%).   Wt Readings from Last 10 Encounters:   07/02/20 66.3 kg (146 lb 3.2 oz)   06/25/20 71.7 kg (158 lb)   06/15/20 70.7 kg (155 lb 14.4 oz)   05/28/20 71.4 kg (157 lb 8 oz)   11/27/17 77.1 kg (170 lb)   11/21/17 78.5 kg (173 lb)   11/19/17 77.1 kg (170 lb)   11/16/17 76.7 kg (169 lb)       ASSESSED NUTRITION NEEDS " PER APPROVED PRACTICE GUIDELINES:  Dosing Weight: 66.3 kg   Estimated Energy Needs: 2960-2063 kcals (25-30 Kcal/Kg)  Justification: repletion  Estimated Protein Needs: 80-99 grams protein (1.2-1.5 g pro/Kg)  Justification: Repletion and preservation of lean body mass  Estimated Fluid Needs: 1 mL/kcal  Justification: maintenance    NEW FINDINGS:   7/3 - Coronary Angiogram done today  2+ Mild edema   Ethan - Nutrition 3; Total 18    Previous Nutrition Diagnosis:   No nutrition diagnosis at this time.  Evaluation: Completed - see update below.     MALNUTRITION  % Weight Loss:  > 5% in 1 month (severe malnutrition)  % Intake:  Decreased intake does not meet criteria for malnutrition - per 's report  Subcutaneous Fat Loss:  Orbital region moderate depletion and Upper arm region moderate depletion  Muscle Loss:  Temporal region moderate depletion, Clavicle bone region moderate depletion and Dorsal hand region moderate depletion  Fluid Retention:  Mild 2+    Malnutrition Diagnosis: Severe malnutrition  In Context of:  Acute illness or injury  Chronic illness or disease    CURRENT NUTRITION DIAGNOSIS  Malnutrition related to increased needs, suspected reduced intakes with acute on chronic illness as evidenced by weight loss of 11# in just over 1 month, e/o fat and muscle wasting.     INTERVENTIONS  Recommendations / Nutrition Prescription  1g Na diet per MD  - Boost TID w/ meals  - Thiamine 100 mg daily   - Thera vit M daily     Implementation  Medical Food Supplement and Multivitamin/Mineral:as above     Goals  Intake of at least 50% meals BID-TID.       MONITORING AND EVALUATION:  Progress towards goals will be monitored and evaluated per protocol and Practice Guidelines    Reina Sloan RD, LD  Pager: [phone]

## 2020-07-03 NOTE — PROVIDER NOTIFICATION
MD Notification    Notified Person Name: Dr. Sweeney    Notification Date/Time: July 3, 2020 @0921    Notification Interaction: text page    Purpose of Notification: Pt dry heaving, order for PRN Zofran? Thanks    Orders Received: PRN Zofran ordered

## 2020-07-03 NOTE — CONSULTS
Consult Date:  07/03/2020      EARS, NOSE, THROAT CONSULTATION       HISTORY OF PRESENT ILLNESS:  The patient is a 75-year-old female who is admitted from [facility] with a history of weakness and hypotension.  She has severe ischemic cardiomyopathy with a left ventricular ejection fraction of less than 20%, a large apical mural thrombus, and severe multivessel coronary artery disease, status post coronary artery bypass graft in 03/2020.  She takes warfarin for mural thrombus.  She also has a history of chronic cough.  She developed left-sided epistaxis a little over 2 weeks ago and had cauterization performed, and she had recurrent bleeding requiring a balloon pack.  She did have this removed and then had further bleeding and was noted to have a markedly elevated INR of over 6.  She was transferred to [facility] for additional cardiac care.  She was seen on 06/29 by Dr. Horne and noted to have a small eschar in the left septum without active bleeding.  She was noted to have some further bleeding today and consideration was given for repeat ENT consult because of the need for continued anticoagulation.  She has required oxygen and she had nasal cannula in place for oxygen.      The remainder of the past medical history, social history, family history and review of systems is as noted on her electronic medical record.        ALLERGIES:   COMBIGAN.        She does have no shortness of breath, has a significant cardiac history, and is on anticoagulation.      PHYSICAL EXAMINATION:  Examination today revealed both ears to be clear.  The nose showed some dried blood on the right anteriorly.  The oral cavity did not reveal any bleeding in the oropharynx.  The neck was without significant adenopathy.      We discussed the potential for fiberoptic exam of the entire nose because of the recurrent epistaxis and she wished to have this done.  The flexible scope was passed.  The left shows continued healing eschar in the mid septum  without active bleeding.  On the right, there had been evidence of a recent bleed and this is from an excoriation from the nasal cannula at the level of the anterior nasal vestibule and septum on the right side.  No other active bleeding sites were noted.      IMPRESSION:  Epistaxis.      PLAN:  At this point, we will institute local cares.  We will have them start Bacitracin ointment on the area of the small excoriation.  It is not actively bleeding at this time.  We will also add a saline spray to help keep things moist, help prevent further eschar from falling off and restarting bleeding.  If this is successful, I see no contraindications to her resuming her anticoagulation status.  If she has further bleeding, she may need packing on the right side, but this may make it more difficult in terms of oxygen delivery.  She, at this point, is not actively bleeding, so we will try and institute this conservative approach.  If she has further bleeding though, we would be happy to see her and place a pack.        Again, thank you for allowing us to participate in Ms. Smith's care.         EMANUEL PANDYA MD             D: 2020   T: 2020   MT:       Name:     EDILSON SMITH   MRN:      [id number]        Account:       [id number]   :      1945           Consult Date:  2020      Document: [id number]       cc: Benjamin Cast MD

## 2020-07-03 NOTE — PROGRESS NOTES
Spoke with Dr. Sweeney on the telephone. Per Dr. Brett lee to start Heparin infusion without bolus.

## 2020-07-03 NOTE — PROGRESS NOTES
"SPIRITUAL HEALTH SERVICES  SPIRITUAL ASSESSMENT Progress Note  [facility]     REFERRAL SOURCE: follow up from prior  visit    Pt's , Reyes, declined a visit saying, \"she had a bad night and needs to sleep.\"     PLAN: [facility] remains available for support during her hospitalization.    Anastasiia Quintanilla  Associate   Pager [phone]  Phone [phone]    "

## 2020-07-03 NOTE — PROVIDER NOTIFICATION
MD Notification    Notified Person Name:  Patel    Notification Date/Time: July 3, 2020 @1001    Notification Interaction: text page    Purpose of Notification: Pt called with SOB, SpO2 89-90% on RA. Improved with 2L. States she feels anxious.    Orders Received: CXR and labs ordered

## 2020-07-03 NOTE — PROGRESS NOTES
[facility]    Medicine Progress Note - Hospitalist Service       Date of Admission:  6/25/2020  Assessment & Plan   Marquise Jj is a 75 year old female with PMH recent complicated cardiac history significant for severe ischemic cardiomyopathy with left ventricular ejection fraction of less than 20%, large mural thrombus on recent echocardiogram from June 2020, severe multivessel coronary disease status post bypass in March 2020, chronic anemia, and chronic CKD who was admitted on 6/25/2020 for hypotension and weakness (Transfer from [facility] ED).      Dyspnea  Hypoxia  Anxiety  -Post coronary angiogram 7/2 patient had become anxious so Ativan was given.  Patient became difficult to arouse and apneic patient had to be given flumazenil which improved patient's level of consciousness.  ABG done had shown hypoxia with respiratory alkalosis.  However this morning 7/3 again patient complaining of anxiety and dyspnea.  Denies any chest pain or palpitation or dizziness.  -Patient became hypoxic needing oxygen again this morning  -Stat chest x-ray, ABG, will also do CBC for the morning  -Continue patient on oxygen, breathing treatments, continue Lasix drip  -Monitor closely and CICU  Addendum:  -Chest x-ray and ABG reviewed.  ABG suggest respiratory alkalosis with possible hyperventilation.  Patient will not tolerate benzodiazepines so we will give her a low-dose Seroquel PRN and psychiatry consultation for management of anxiety  -Also nursing noted that patient had epistaxis now so will humidify her oxygen.  Will hold off on oral anticoagulation and start on heparin drip for her mural thrombus without boluses.  Discussed with cardiology    Addendum : Patient had another episode epistaxis even before starting heparin drip.  ENT reconsult    Cardiogenic shock   #Severe ischemic cardiomyopathy with left ventricular ejection fraction of less than 20%  # Acute decompensated systolic heart failure sec to  above  # h/o Severe multivessel coronary disease status post bypass in March 2020 in [place]  # s/p AICD 4/2020 in [place]  # Severe TR, mild-mod MR  -Presented with hypotension needing pressors and ICU admission, initially thought to be sec to urinary source and started empirically on Rocephin but blood and urine culture from 6/25 with no growth so far; remains afebrile; leukocytosis improved, status post completion of Rocephin  - off pressors and transferred out of ICU on 6/27 ; BP on softer side low 90s--100s which is probably her baseline owing to the very low EF  - cardiology following, started on Bumex drip 6/27.  Bumex drip discontinued and started on torsemide 6/30/2020, however post coronary angiogram started back on Lasix drip and currently on Lasix drip at 5 mg/h  -6.1 L cc since admission , 158 pounds on admission, went up to 166.5 pounds, 146 pounds this morning  - repeat ECHO 6/26 noted with no significant change-- EF<20%, akinetic apex with large mural thrombus, severe TR, mild-moderate functional MR  - monitor volume status closely, daily weight  -Viability study 7/1 shows abnormal perfusion with absent to minimal uptake in LAD territory.  On 4-hour and 24-hour redistribution there is minimal improvement suggesting severe nontransmural or transmural LAD infarct.  Circumflex territory has mildly decreased perfusion suggesting mostly viable myocardium and RCA territory appears 100% viable  -Status post coronary angiogram 7/2 which showed significant mid to distal RCA stenosis status post PCI with single SEMAJ no complication and hemodynamically significant lesion in proximal mid LCx.  Plan for staged procedure   -Continue aspirin, Plavix started after loading dose post coronary angiogram.  -holding PTA Entresto to allow room for diuresis, would await for stabilization before starting patient on beta-blocker  -EP was initially consulted for consideration of biventricular CRT upgrade, patient is not a  candidate for CRT upgrade and so EP signed off    #Large mural thrombus on recent echocardiogram from June 2020  # supratherapeutic INR  # h/o recurrent epistaxis  - INR supratherapeutic on admission to 6--trended down   - it seems she has been having difficulty regulating her INR to therapeutic level and hoping for alternative anticoagulation  -ENT was consulted for recommendation regarding alternative anticoagulant and ENT okayed for alternative anticoagulation.  -5 mg of vitamin K given on 6/29/2020 in anticipation of coronary angiogram and left thoracentesis  -Was on high intensity heparin bridge, however patient not on any anticoagulation since angiogram, currently on aspirin and Plavix  -Pharmacy liason consulted  for anticoagulation coverage, patient willing for paying for Eliquis as she mentions she spoke with the insurance and it is actually $200 per month  -Will discuss with cardiology about appropriate anticoagulation/antiplatelets    Leukocytosis  -WBC count 14 this morning.  Patient has been afebrile overnight however her shortness of breath has worsened.  Leukocytosis might be reactive however she may have aspirated when somnolent from Ativan last evening.  If evidence of consolidation on chest x-ray or elevated procalcitonin will start empirically on Zosyn.    #LORI  - Baseline cr around 1-1.2  - acute dysfunction likely sec to renal hypoperfusion due to heart failure; presented with creatinine of 1.92  - US 6/26 UR, no obstruction or hydronephrosis  - PTA Sacubitril/valsartan (Entresto) on hold but tolerated Bumex drip.  -Currently on Lasix drip.  Creatinine this morning is 1. 4 4  - monitor BMP closely while on IV diuresis, avoid nephrotoxins     Hypothyroidism   PTA on 50mcg synthroid   TSH during hospitalization high but FT4 normal ,could be euthyroid sick syndrome   Cardiology recommeding adjusting dose of synthroid in light of low EF   Increase synthroid to 62.5 mcg , OP recheck thyroid function  in 6-8 wks    Chronic anemia  - unclear recent baseline, prior to recent cardiac events it seems her Hb was around 12-13  - Hb stable around 9-11; monitor      # Ruled out COVID         Diet: 1 Gram Sodium Diet    DVT Prophylaxis: Warfarin  Rojo Catheter: in place, indication: Other (Comment)(strict bedrest post procedure and starting lasix gtt)  Code Status: Full Code           Disposition Plan   Expected discharge: 2+ days, recommended to transitional care unit once Adequately diuresed, all cardiac work-up completed and cleared by cardiology.  Entered: Colleen Sweeney MD 07/03/2020, 10:23 AM       The patient's care was discussed with the Bedside Nurse and Patient.    Colleen Sweeney MD  Hospitalist Service  [facility]    ______________________________________________________________________    Interval History   Events last evening reviewed.Post coronary angiogram patient had become anxious so Ativan was given.  Patient became difficult to arouse and apneic that patient had to be given flumazenil which improved patient's level of consciousness.  ABG done had shown hypoxia with respiratory alkalosis.  However this morning 7/3 again patient complaining of anxiety and dyspnea.  Denies any chest pain or palpitation or dizziness.    Data reviewed today: I reviewed all medications, new labs and imaging results over the last 24 hours. I personally reviewed the chest x-ray image(s) showing Bibasilar effusion has not changed significantly.    Physical Exam   Vital Signs: Temp: 96.9  F (36.1  C) Temp src: Axillary BP: 95/70 Pulse: 86 Heart Rate: 82 Resp: (!) 34 SpO2: 93 % O2 Device: Nasal cannula Oxygen Delivery: 2 LPM  Weight: 146 lbs 3.2 oz  Exam:  Constitutional: Awake, alert and no distress. Appears comfortable  Head: Normocephalic. No masses, lesions, tenderness or abnormalities  ENT: ENT exam normal, no neck nodes or sinus tenderness  Cardiovascular: RRR.  2+ murmurs, no rubs or JVD  Respiratory:  Tachypneic,b/l decreased air entry at bases with occasional crackles  Gastrointestinal: Abdomen soft, non-tender. BS normal. No masses, organomegaly  : Deferred  Extremities : Minimal   bilateral edema , no clubbing or cyanosis      Data   Recent Labs   Lab 07/03/20  0548 07/02/20  1750 07/02/20  0817 07/02/20  0528 07/01/20  0624 07/01/20  0530 06/30/20  0531  06/29/20  0537 06/28/20  0609  06/27/20  0859   WBC  --   --  8.2  --   --   --  6.7  --  6.7 8.1  --  8.0   HGB  --   --  10.8*  --   --   --  11.3*  --  10.5* 9.5*  --  9.9*   MCV  --   --  79  --   --   --  78  --  78 78  --  78   PLT  --   --  352  --   --   --  367  --  347 294  --  292   INR  --   --   --  1.46* 1.63*  --  1.74*   < >  --  2.75*   < >  --      --   --  136  --  134 136  --  136 133  --  130*   POTASSIUM 4.2 3.7  --  3.0*  --  3.4 3.4  --  3.6 4.1  --  4.7   CHLORIDE 100  --   --  99  --  97 100  --  102 103  --  101   CO2 26  --   --  29  --  29 26  --  26 21  --  21   BUN 31*  --   --  26  --  30 35*  --  43* 46*  --  45*   CR 1.44*  --   --  1.29*  --  1.35* 1.33*  --  1.50* 1.59*  --  1.65*   ANIONGAP 10  --   --  8  --  8 10  --  8 9  --  8   FERNANDO 9.1  --   --  9.0  --  9.3 9.0  --  8.7 8.2*  --  8.4*   *  --   --  89  --  94 86  --  93 103*   < > 140*   ALBUMIN  --   --   --   --   --   --   --   --   --  2.2*  --  2.2*   PROTTOTAL  --   --   --   --   --   --   --   --   --  6.3*  --  6.5*   BILITOTAL  --   --   --   --   --   --   --   --   --  0.7  --  0.7   ALKPHOS  --   --   --   --   --   --   --   --   --  168*  --  189*   ALT  --   --   --   --   --   --   --   --   --  24  --  22   AST  --   --   --   --   --   --   --   --   --  18  --  24    < > = values in this interval not displayed.     Recent Results (from the past 24 hour(s))   Cardiac Catheterization    Narrative      Right sided filling pressures are mildly elevated.    Left sided filling pressures are moderately elevated.    Moderately elevated  pulmonary artery hypertension.    Reduced cardiac output level.    Atretic KURT graft.    Patent LIMA supplying small vascular territory of a diagonal branch.    Significant mid to distal RCA stenosis s/p PCI with single SEMAJ, no   complications.    Hemodynamically significant lesion of the proximal-mid LCx (iFR 0.82).   Plan for staged procedure given contrast risk.        Medications     - MEDICATION INSTRUCTIONS -       dextrose       furosemide 5 mg/hr (07/03/20 0844)     - MEDICATION INSTRUCTIONS -       ACE/ARB/ARNI NOT PRESCRIBED       BETA BLOCKER NOT PRESCRIBED         aspirin  81 mg Oral Daily     bimatoprost  1 drop Both Eyes At Bedtime     busPIRone  10 mg Oral TID     clopidogrel  75 mg Oral Daily     dorzolamide-timolol  1 drop Both Eyes BID     ferrous sulfate  325 mg Oral BID     latanoprost  1 drop Both Eyes Daily     levothyroxine  62.5 mcg Oral QAM AC     omeprazole  20 mg Oral QAM AC     potassium chloride ER  20 mEq Oral Daily     rosuvastatin  20 mg Oral Daily     sodium chloride (PF)  3 mL Intracatheter Q8H     traZODone  150 mg Oral At Bedtime

## 2020-07-03 NOTE — PLAN OF CARE
Patient is alert and oriented, assist of 1 with walker. Denies chest pain, some shortness of breath reported while on strict bedrest. Angio 7/2, right groin venous and arterial site-thrombix in place. One stent to RCA, continues on plavix.

## 2020-07-04 NOTE — PROGRESS NOTES
[facility]    Medicine Progress Note - Hospitalist Service       Date of Admission:  6/25/2020  Assessment & Plan   Marquise Jj is a 75 year old female with PMH recent complicated cardiac history significant for severe ischemic cardiomyopathy with left ventricular ejection fraction of less than 20%, large mural thrombus on recent echocardiogram from June 2020, severe multivessel coronary disease status post bypass in March 2020, chronic anemia, and chronic CKD who was admitted on 6/25/2020 for hypotension and weakness (Transfer from [facility] ED).      Cardiogenic shock   Acute decompensated HFrEF. LVEF of less than 20%  Ischemic cardiomyopathy with late presentation STEMI s/p CABG (LIMA to diagonal due to LAD dissection, KURT to RCA, [place] 4/2020),   S/p coronary angiography 7/2/2020 with SEMAJ to RCA, plan for staged procedure to LCx  S/p AICD 4/2020 in [place]  Severe TR, mild-mod MR    Presented with hypotension needing pressors and ICU admission, initially thought to be sec to urinary source and started empirically on Rocephin but blood and urine culture from 6/25 with no growth so far; remains afebrile; leukocytosis improved, status post completion of Rocephin. Transferred out of ICU on 6/27 and SBP remains soft in the 90s.   * patient is not a candidate for CRT upgrade and so EP signed off.    - cardiology following, started on Bumex drip 6/27. Changed to torsemide 6/30/2020. Started on lasix drip at 5 mg/h after angiogram on 7/2. Diuretics held 7/4 due to rising Cr and give  ml bolus  - on 07/03/20 anxious with SOB, CXR shows persistent bilateral effusion without new interstitial edema, ABG c/w respiratory alkalosis   - on 07/04/20, BP in the 80s and Cr up to 1.7. Give 250 ml bolus of NS and continue to hold diuretics.   - continue ASA, plavix, rosuvastatin  - holding PTA Entresto and awaiting stabilization before starting patient on beta-blocker  - WT on admission 71.2, max wt 75.5  > wt today 07/04/20 65.7.       Suspected Anxiety - leading to hyperventilation. Does not tolerate benzos, so started on low dose Seroquel PRN.     Large mural thrombus on recent echocardiogram from June 2020 - High risk for stroke.   Supratherapeutic INR on admission (6.08). RESOLVED.   H/o recurrent epistaxis - ENT on 7/3 shows recent bleed from excoriation from nasal canula prong, no active bleeding or other site of bleeding.     Recent Labs   Lab 07/02/20  0528 07/01/20  0624 06/30/20  0531 06/29/20  1923 06/28/20  0609 06/27/20  1311   INR 1.46* 1.63* 1.74* 2.31* 2.75* 2.91*       - ENT recommended conservative measures with bacitracin and nasal spray.    - Stared high intensity heparin gtt without bolus and appreciate cardiology's input regarding ultimate plan for anticoagulation. ASA/plavix + AC at discharge.   - Pharmacy liason consulted  for anticoagulation coverage, patient willing for paying for Eliquis as she mentions she spoke with the insurance and it is actually $200 per month      Leukocytosis, RESOLVING - WBC up to 14 on 7/3 without evidence of infection by signs/sx. CXR clear and procalcitonin 0.06. Suspect reactive.   Recent Labs   Lab 07/04/20  0557 07/03/20  1049 07/02/20  0817 06/30/20  0531 06/29/20  0537 06/28/20  0609   WBC 11.3* 14.8* 8.2 6.7 6.7 8.1        LORI on CKD, stage III Baseline cr around 1-1.2. Cr 1.92 on admission, improved with diuresis and then worsened likely from over diuresis.   *  US 6/26 UR, no obstruction or hydronephrosis    - IVF as above and follow.   - PTA Sacubitril/valsartan (Entresto) on hold     Hypothyroidism - TSH during hospitalization high but FT4 normal. TSH 11.25, FT4 1.39 on 6/28.   PTA on 50mcg synthroid. Cardiology recommended increase in synthroid given low EF.     - Increased PTA synthroid to 62.5 mcg , OP recheck thyroid function in 6-8 wks     Chronic anemia - unclear recent baseline, prior to recent cardiac events it seems her Hb was around 12-13  -  Hb stable around 9-11; monitor     Recent Labs   Lab 07/04/20  0557 07/03/20  1049 07/02/20  0817 06/30/20  0531 06/29/20  0537 06/28/20  0609   HGB 10.1* 11.0* 10.8* 11.3* 10.5* 9.5*        Asymptomatic COVID, negative on 6/25       Diet: 1 Gram Sodium Diet  Snacks/Supplements Adult: Boost Plus; With Meals    DVT Prophylaxis: On heparin  Rojo Catheter: not present  Code Status: Full Code           Disposition Plan   Expected discharge: Monday at the earliest , recommended to prior living arrangement once craetinine stabelized and cardiology has outpatient plan in place. .  Entered: Sadie Christian MD 07/04/2020, 8:00 AM       The patient's care was discussed with the Bedside Nurse and Patient.    Sadie Christian MD  Hospitalist Service  [facility]    ______________________________________________________________________    Interval History   Patient feels much better today, not SOB. No CP, abdominal pain, fevers. BP lower.     Data reviewed today: I reviewed all medications, new labs and imaging results over the last 24 hours. I personally reviewed the chest x-ray image(s) showing as above. .    Physical Exam   Vital Signs: Temp: 97.4  F (36.3  C) Temp src: Axillary BP: (!) 87/64 Pulse: 103 Heart Rate: 88 Resp: 28 SpO2: 100 % O2 Device: None (Room air) Oxygen Delivery: 1 LPM  Weight: 144 lbs 12.8 oz  Constitutional: NAD,   Neuropsyche:  alert and oriented, answers questions appropriately.   Respiratory:  Breathing comfortably, decreased air exchange at bases, otherwise no wheezes, no crackles.   Cardiovascular:  Regular rate and rhythm, 1+ edema.  GI:  soft, NT/ND, BS normal  Skin/Integumen:  No acute rash, bruising or sign of bleeding.         Data   Recent Labs   Lab 07/04/20  0557 07/03/20  2142 07/03/20  1203 07/03/20  1049 07/03/20  0548  07/02/20  0817 07/02/20  0528 07/01/20  0624  06/30/20  0531  06/28/20  0609   WBC 11.3*  --   --  14.8*  --   --  8.2  --   --   --  6.7   < > 8.1   HGB  10.1*  --   --  11.0*  --   --  10.8*  --   --   --  11.3*   < > 9.5*   MCV 78  --   --  78  --   --  79  --   --   --  78   < > 78     --   --  340  --   --  352  --   --   --  367   < > 294   INR  --   --   --   --   --   --   --  1.46* 1.63*  --  1.74*   < > 2.75*   *  --   --   --  136  --   --  136  --    < > 136   < > 133   POTASSIUM 4.7  --  4.1  --  4.2   < >  --  3.0*  --    < > 3.4   < > 4.1   CHLORIDE 97  --   --   --  100  --   --  99  --    < > 100   < > 103   CO2 24  --   --   --  26  --   --  29  --    < > 26   < > 21   BUN 36*  --   --   --  31*  --   --  26  --    < > 35*   < > 46*   CR 1.78*  --   --   --  1.44*  --   --  1.29*  --    < > 1.33*   < > 1.59*   ANIONGAP 10  --   --   --  10  --   --  8  --    < > 10   < > 9   FERNANDO 8.9  --   --   --  9.1  --   --  9.0  --    < > 9.0   < > 8.2*   *  --   --   --  110*  --   --  89  --    < > 86   < > 103*   ALBUMIN  --  2.9*  --   --   --   --   --   --   --   --   --   --  2.2*   PROTTOTAL  --  7.2  --   --   --   --   --   --   --   --   --   --  6.3*   BILITOTAL  --  0.8  --   --   --   --   --   --   --   --   --   --  0.7   ALKPHOS  --  141  --   --   --   --   --   --   --   --   --   --  168*   ALT  --  23  --   --   --   --   --   --   --   --   --   --  24   AST  --  20  --   --   --   --   --   --   --   --   --   --  18    < > = values in this interval not displayed.     Recent Results (from the past 24 hour(s))   XR Chest 2 Views    Narrative    XR CHEST 2 VW 7/3/2020 10:28 AM    HISTORY: hypoxia    COMPARISON: 6/29/2020      Impression    IMPRESSION: Similar appearance of mild bibasilar pleural effusions  with associated infiltrate or atelectasis not excluded. No  pneumothorax. Unchanged cardiac size. Median sternotomy. Left-sided  implantable cardiac defibrillator/pacer.    MENDEL FLORES MD     Medications     - MEDICATION INSTRUCTIONS -       dextrose       HEParin 1,000 Units/hr (07/04/20 0734)     - MEDICATION  INSTRUCTIONS -       ACE/ARB/ARNI NOT PRESCRIBED       BETA BLOCKER NOT PRESCRIBED         sodium chloride 0.9%  250 mL Intravenous Once     aspirin  81 mg Oral Daily     bacitracin   Topical BID     bimatoprost  1 drop Both Eyes At Bedtime     busPIRone  10 mg Oral TID     clopidogrel  75 mg Oral Daily     dorzolamide-timolol  1 drop Both Eyes BID     ferrous sulfate  325 mg Oral BID     latanoprost  1 drop Both Eyes Daily     levothyroxine  62.5 mcg Oral QAM AC     multivitamin w/minerals  1 tablet Oral Daily     omeprazole  20 mg Oral QAM AC     rosuvastatin  20 mg Oral Daily     sodium chloride  2 spray Both Nostrils TID     sodium chloride (PF)  3 mL Intracatheter Q8H     traZODone  150 mg Oral At Bedtime     vitamin B1  100 mg Oral Daily

## 2020-07-04 NOTE — PROGRESS NOTES
Inpatient Cardiology Consultation Progress Note:    Marquise Jj MRN#: [id number]   YOB: 1945 Age: 75 year old     Date of Admission: 6/25/2020  Consult indication: HFrEF         Assessment and Plan:     # Acute on chronic HFrEF 2/2 ICM, LVEF <20%, s/p ICD 4/2020; initially required vasoactive agents for cardiogenic shock, however now hemodynamically improved  # Hypoxic respiratory failure   # LV mural thrombus   # CAD with late presentation STEMI s/p CABG (LIMA to diagonal due to LAD dissection, KURT to RCA, [place] 4/2020), s/p coronary angiography 7/2/2020 with SEMAJ to RCA, plan for staged procedure to LCx  # CKD III    - creatinine worse today 1.44 (1.12 on 6/22/2020), lactate elevated yesterday 2.9  - hold diuretics today, dry on exam, biochemical evidence of possible over-diuresis, will give back 250cc IVF due to concern for possible worsening of Cr 2/2 CHITO from cath on 7/2/2020  - continue ASA 81mg daily  - continue clopidogrel 75mg daily  - continue rosuvastatin 20mg daily  - had issues with epistaxis, and so recommend starting heparin gtt rather than DOAC for mural thrombus since we can more readily stop it if bleeding continues to be a problem  - possible staged PCI to LCx next week (Thallium viability study 7/1/2020 showing mostly viable LCx territory)    Thank you for allowing our team to participate in the care of Marquise Jj.  Please do not hesitate to page me with any questions or concerns.     Puneet West MD, [facility]  Cardiology  Pager:  [phone]  Text Page   July 3, 2020    I spent a total of 35 minutes (excluding procedure time) personally providing and directing critical care services at the bedside and on the critical care unit for this patient.          Interval Events:     - no acute events overnight  - Pt denies any chest pain, chest discomfort  - no dizziness/lightheadedness  - interval labs and studies reviewed   - interval progress notes reviewed  - Pt updated  on clinical course, plan for the day         Past Medical History:   I have reviewed this patient's past medical history  Past Medical History:   Diagnosis Date     CAD (coronary artery disease)      ICD (implantable cardioverter-defibrillator) in place     Primary prevention AICD placed in [place] in May 2020.     Ischemic cardiomyopathy     LVEF less than 20%.     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     Status post coronary artery bypass grafting     Done in [facility] in April 2020.  LIMA to diagonal (as LAD dissected) and KURT to the right coronary artery.             Medications reviewed:   Prior to admission medications:  Prior to Admission medications    Medication Sig Start Date End Date Taking? Authorizing Provider   acetaminophen (TYLENOL) 325 MG tablet Take 2 tablets (650 mg) by mouth every 6 hours as needed for mild pain 11/20/17  Yes Jozef Dyer MD   aspirin (ASA) 81 MG EC tablet Take 1 tablet (81 mg) by mouth daily 6/19/20  Yes Mana Parrish PA-C   bimatoprost (LUMIGAN) 0.01 % SOLN Place 1 drop into both eyes At Bedtime    Yes Reported, Patient   busPIRone (BUSPAR) 10 MG tablet Take 15 mg by mouth 3 times daily  5/12/20  Yes Reported, Patient   co-enzyme Q-10 100 MG CAPS capsule Take 100 mg by mouth daily   Yes Unknown, Entered By History   dorzolamide-timolol (COSOPT) 2-0.5 % ophthalmic solution Place 1 drop into both eyes 2 times daily    Yes Reported, Patient   ferrous sulfate (FEROSUL) 325 (65 Fe) MG tablet Take 325 mg by mouth daily (with breakfast)   Yes Unknown, Entered By History   latanoprost (XALATAN) 0.005 % ophthalmic solution Place 1 drop into both eyes daily   Yes Unknown, Entered By History   levothyroxine (SYNTHROID/LEVOTHROID) 50 MCG tablet Take 50 mcg by mouth daily   Yes Reported, Patient   OMEPRAZOLE PO Take 20 mg by mouth daily    Yes Reported, Patient   potassium chloride ER  (KLOR-CON M) 20 MEQ CR tablet Take 1 tablet (20 mEq) by mouth daily 6/22/20  Yes Mana Parrish PA-C   rosuvastatin (CRESTOR) 20 MG tablet Take 20 mg by mouth daily 4/21/20  Yes Reported, Patient   sacubitril-valsartan (ENTRESTO) 24-26 MG per tablet Take 1 tablet by mouth 2 times daily 6/18/20  Yes Michelle Fong MD   senna-docusate (SENOKOT-S/PERICOLACE) 8.6-50 MG tablet Take 2 tablets by mouth 2 times daily as needed for constipation   Yes Unknown, Entered By History   torsemide (DEMADEX) 20 MG tablet Take 40mg (2 tablets) in the morning and 20mg (1 tablet) in the afternoon 6/22/20  Yes Mana Parrish PA-C   traZODone (DESYREL) 150 MG tablet Take 150 mg by mouth At Bedtime   Yes Unknown, Entered By History   warfarin ANTICOAGULANT (COUMADIN) 1 MG tablet Take 1 mg by mouth daily Per inr   Yes Unknown, Entered By History   ondansetron (ZOFRAN-ODT) 4 MG ODT tab Take 4 mg by mouth every 8 hours as needed  3/11/20   Reported, Patient        Current medications:  Current Facility-Administered Medications Ordered in Epic   Medication Dose Route Frequency Last Rate Last Dose     0.9% sodium chloride BOLUS  250 mL Intravenous Once 125 mL/hr at 07/03/20 1730 250 mL at 07/03/20 1730     acetaminophen (TYLENOL) Suppository 650 mg  650 mg Rectal Q4H PRN         acetaminophen (TYLENOL) tablet 650 mg  650 mg Oral Q4H PRN   650 mg at 07/03/20 0135     aspirin EC tablet 81 mg  81 mg Oral Daily   81 mg at 07/03/20 0837     bacitracin ointment   Topical BID         bimatoprost (LUMIGAN) 0.01 % ophthalmic drops 1 drop  1 drop Both Eyes At Bedtime   1 drop at 07/01/20 2120     busPIRone (BUSPAR) tablet 10 mg  10 mg Oral TID   10 mg at 07/03/20 1543     clopidogrel (PLAVIX) tablet 75 mg  75 mg Oral Daily   75 mg at 07/03/20 0838     Continuing statin from home medication list OR statin order already placed during this visit   Does not apply DOES NOT GO TO MAR         dextrose 10% infusion   Intravenous  Continuous PRN         glucose gel 15-30 g  15-30 g Oral Q15 Min PRN        Or     dextrose 50 % injection 25-50 mL  25-50 mL Intravenous Q15 Min PRN        Or     glucagon injection 1 mg  1 mg Subcutaneous Q15 Min PRN         dorzolamide-timolol (COSOPT) ophthalmic solution 1 drop  1 drop Both Eyes BID   1 drop at 07/03/20 0844     ferrous sulfate (FEROSUL) tablet 325 mg  325 mg Oral BID   325 mg at 07/03/20 0837     heparin 25,000 units in 0.45% NaCl 250 mL ANTICOAGULANT  infusion  1,100 Units/hr Intravenous Continuous   Stopped at 07/03/20 1850     ipratropium - albuterol 0.5 mg/2.5 mg/3 mL (DUONEB) neb solution 3 mL  3 mL Nebulization 4x Daily PRN   3 mL at 07/03/20 1205     latanoprost (XALATAN) 0.005 % ophthalmic solution 1 drop  1 drop Both Eyes Daily   1 drop at 07/03/20 0843     levothyroxine (SYNTHROID/LEVOTHROID) half-tab 62.5 mcg  62.5 mcg Oral QAM AC   62.5 mcg at 07/03/20 0838     lidocaine (LMX4) cream   Topical Q1H PRN         lidocaine 1 % 0.1-1 mL  0.1-1 mL Other Q1H PRN         LORazepam (ATIVAN) injection 0.5 mg  0.5 mg Intravenous Q4H PRN   0.5 mg at 07/02/20 2026     magnesium sulfate 2 g in water intermittent infusion  2 g Intravenous Daily PRN         magnesium sulfate 4 g in 100 mL sterile water (premade)  4 g Intravenous Q4H PRN         melatonin tablet 3 mg  3 mg Oral At Bedtime PRN   3 mg at 07/02/20 0021     multivitamin w/minerals (THERA-VIT-M) tablet 1 tablet  1 tablet Oral Daily   1 tablet at 07/03/20 1731     naloxone (NARCAN) injection 0.1-0.4 mg  0.1-0.4 mg Intravenous Q2 Min PRN         nitroGLYcerin (NITROSTAT) sublingual tablet 0.4 mg  0.4 mg Sublingual Q5 Min PRN         omeprazole (priLOSEC) CR capsule 20 mg  20 mg Oral QAM AC   20 mg at 07/03/20 0838     ondansetron (ZOFRAN) injection 4 mg  4 mg Intravenous Q6H PRN        Or     ondansetron (ZOFRAN-ODT) ODT tab 4 mg  4 mg Oral Q6H PRN         Patient is NOT allergic to contrast dye OR was given pre-procedure oral steroids for  treatment   Does not apply DOES NOT GO TO MAR         polyethylene glycol (MIRALAX) Packet 17 g  17 g Oral Daily PRN         potassium chloride (KLOR-CON) Packet 20-40 mEq  20-40 mEq Oral or Feeding Tube Q2H PRN         potassium chloride 10 mEq in 100 mL intermittent infusion with 10 mg lidocaine  10 mEq Intravenous Q1H PRN         potassium chloride 10 mEq in 100 mL sterile water intermittent infusion (premix)  10 mEq Intravenous Q1H PRN         potassium chloride 20 mEq in 50 mL intermittent infusion  20 mEq Intravenous Q1H PRN         potassium chloride ER (KLOR-CON M) CR tablet 20 mEq  20 mEq Oral Once PRN         potassium chloride ER (KLOR-CON M) CR tablet 20 mEq  20 mEq Oral Daily   20 mEq at 07/03/20 0837     potassium chloride ER (KLOR-CON M) CR tablet 20-40 mEq  20-40 mEq Oral Q2H PRN   40 mEq at 07/02/20 1547     potassium phosphate 15 mmol in D5W 250 mL intermittent infusion  15 mmol Intravenous Daily PRN         potassium phosphate 20 mmol in D5W 250 mL intermittent infusion  20 mmol Intravenous Q6H PRN         potassium phosphate 20 mmol in D5W 500 mL intermittent infusion  20 mmol Intravenous Q6H PRN         potassium phosphate 25 mmol in D5W 500 mL intermittent infusion  25 mmol Intravenous Q8H PRN         QUEtiapine (SEROquel) half-tab 12.5 mg  12.5 mg Oral BID PRN   12.5 mg at 07/03/20 1217     Reason ACE/ARB/ARNI order not selected   Other DOES NOT GO TO MAR         Reason beta blocker not prescribed   Does not apply DOES NOT GO TO MAR         rosuvastatin (CRESTOR) tablet 20 mg  20 mg Oral Daily   20 mg at 07/03/20 0135     senna-docusate (SENOKOT-S/PERICOLACE) 8.6-50 MG per tablet 1 tablet  1 tablet Oral BID PRN         sodium chloride (OCEAN) 0.65 % nasal spray 1 spray  1 spray Nasal Q1H PRN         sodium chloride (OCEAN) 0.65 % nasal spray 2 spray  2 spray Both Nostrils TID   2 spray at 07/03/20 1542     sodium chloride (PF) 0.9% PF flush 3 mL  3 mL Intracatheter q1 min prn         sodium  chloride (PF) 0.9% PF flush 3 mL  3 mL Intracatheter Q8H         traZODone (DESYREL) tablet 150 mg  150 mg Oral At Bedtime   150 mg at 20 2137     vitamin B1 (THIAMINE) tablet 100 mg  100 mg Oral Daily   100 mg at 20 1543     No current [facility]-ordered outpatient medications on file.             Physical Exam:   Vital signs were reviewed:  Temperatures:  Current - Temp: 97.6  F (36.4  C); Max - Temp  Av.3  F (36.3  C)  Min: 96.9  F (36.1  C)  Max: 97.6  F (36.4  C)  Respiration range: Resp  Av.1  Min: 11  Max: 34  Pulse range: Pulse  Av.6  Min: 66  Max: 107  Blood pressure range: Systolic (24hrs), Av , Min:85 , Max:117   ; Diastolic (24hrs), Av, Min:56, Max:83    Pulse oximetry range: SpO2  Av.1 %  Min: 89 %  Max: 100 %    Intake/Output Summary (Last 24 hours) at 7/3/2020 1912  Last data filed at 7/3/2020 1831  Gross per 24 hour   Intake 347.7 ml   Output 650 ml   Net -302.3 ml     146 lbs 3.2 oz  Body mass index is 22.23 kg/m .   Body surface area is 1.78 meters squared.     Constitutional: appears stated age, in no apparent distress, appears to be well nourished  Pulmonary: clear to auscultation bilaterally  Cardiovascular: JVP normal, regular rate, regular rhythm  Gastrointestinal: abdominal exam benign, non-tender, no rigidity, no guarding  Neurologic: awake, alert, face symmetrical, moves all extremities         Selected laboratory tests:   Laboratory test results personally reviewed:   CMP  Recent Labs   Lab 20  0548 20  1750 20  0528 20  0530 20  0531 20  0537 20  0609  20  0859     --  136 134 136 136 133  --  130*   POTASSIUM 4.2 3.7 3.0* 3.4 3.4 3.6 4.1  --  4.7   CHLORIDE 100  --  99 97 100 102 103  --  101   CO2 26  --  29 29 26 26 21  --  21   ANIONGAP 10  --  8 8 10 8 9  --  8   *  --  89 94 86 93 103*   < > 140*   BUN 31*  --  26 30 35* 43* 46*  --  45*   CR 1.44*  --  1.29* 1.35* 1.33* 1.50* 1.59*  --   1.65*   GFRESTIMATED 35*  --  40* 38* 39* 34* 31*  --  30*   GFRESTBLACK 41*  --  47* 44* 45* 39* 36*  --  35*   FERNANDO 9.1  --  9.0 9.3 9.0 8.7 8.2*  --  8.4*   MAG 2.7*  --   --   --   --  2.4*  --   --   --    PHOS  --   --   --   --   --  4.5  --   --   --    PROTTOTAL  --   --   --   --   --   --  6.3*  --  6.5*   ALBUMIN  --   --   --   --   --   --  2.2*  --  2.2*   BILITOTAL  --   --   --   --   --   --  0.7  --  0.7   ALKPHOS  --   --   --   --   --   --  168*  --  189*   AST  --   --   --   --   --   --  18  --  24   ALT  --   --   --   --   --   --  24  --  22    < > = values in this interval not displayed.     CBC  Recent Labs   Lab 20  1049 20  0817 20  0531 20  0537   WBC 14.8* 8.2 6.7 6.7   RBC 4.54 4.47 4.74 4.41   HGB 11.0* 10.8* 11.3* 10.5*   HCT 35.6 35.2 37.0 34.4*   MCV 78 79 78 78   MCH 24.2* 24.2* 23.8* 23.8*   MCHC 30.9* 30.7* 30.5* 30.5*   RDW 20.8* 20.3* 20.4* 20.4*    352 367 347     INR  Recent Labs   Lab 20  0528 20  0624 20  0531 20  1923   INR 1.46* 1.63* 1.74* 2.31*     Lab Results   Component Value Date    TROPI 0.197 (HH) 2020     No results for input(s): CHOL, HDL, LDL, TRIG, CHOLHDLRATIO in the last 62430 hours.  Lab Results   Component Value Date    A1C 6.2 2020     TSH   Date Value Ref Range Status   2020 11.25 (H) 0.40 - 4.00 mU/L Final            Selected Imaging and Additional Data:   Additional data personally reviewed:  Recent Results (from the past 4320 hour(s))   Echocardiogram Limited    Narrative    [id number]  [id number]  [id number]  [id number]^PANTERA^JAZ^[facility]  Echocardiography Laboratory  [address] [place] [address]        Name: EDILSON SMITH  MRN: [id number]  : 1945  Study Date: 2020 07:51 AM  Age: 75 yrs  Gender: Female  Patient Location: [facility]  Reason For Study: Shock  Ordering Physician: JAZ MOTT  Referring Physician:  JOSH HUMPHREY  Performed By: Emerson Shields RDCS     BSA: 1.8 m2  Height: 68 in  Weight: 157 lb  BP: 97/63 mmHg  _____________________________________________________________________________  __        Procedure  Limited Portable Echo Adult.  _____________________________________________________________________________  __        Interpretation Summary     Limited echo.  Pleural effusion, correlate with alternative imaging.  The left ventricle is moderately dilated (no volumes available). The visual  ejection fraction is estimated at <20%. Basal segments, although severely  hypokinetic, have relatively better contractility than the mid-apical  segments.  Large organized LV apical mass/thrombus is noted.  There is severe (4+) tricuspid regurgitation. Mechanism appears unclear but  likely functional and CIED lead related.  Dilation of the inferior vena cava is present with abnormal respiratory  variation in diameter. Likely moderate PHTN.  The right ventricle is mild to moderately dilated. Mildly decreased right  ventricular systolic function.  Mild tenting and PML restriction of the mitral valve leaflets noted due to LV  dilatation. There is mild-moderate functional MR.     No significant change compared to echo 06/10/2020  _____________________________________________________________________________  __        Left Ventricle  The left ventricle is moderately dilated. Severely decreased left ventricular  systolic function. The visual ejection fraction is estimated at <20%.     Right Ventricle  The right ventricle is mild to moderately dilated. Mildly decreased right  ventricular systolic function. There is a pacemaker lead in the right  ventricle.     Mitral Valve  Mild tenting and PML restriction of the mitral valve leaflets noted due to LV  dilatation. There is mild-moderate functional MR.     Tricuspid Valve  There is severe (4+) tricuspid regurgitation. The right ventricular systolic  pressure is elevated at  38.6 mmHg. Pulmonary hypertension.        Aortic Valve  The aortic valve is trileaflet with aortic valve sclerosis. No aortic  regurgitation is present. No aortic stenosis is present.     Pulmonic Valve  The pulmonic valve is not well visualized. There is trace pulmonic valvular  regurgitation.     Vessels  The aortic root is normal size. Dilation of the inferior vena cava is present  with abnormal respiratory variation in diameter.     Pericardium  Trivial pericardial effusion.     _____________________________________________________________________________  __        Doppler Measurements & Calculations  MV E max gary: 76.2 cm/sec  MV dec time: 0.15 sec  TR max gary: 310.8 cm/sec  TR max P.6 mmHg              _____________________________________________________________________________  __           Report approved by: Imani Figueredo 2020 11:54 AM      Echocardiogram Complete    Narrative    [id number]  [id number]  [id number]  [id number]^SAMARIA^TEODORO^MORENITA           [facility]  Echocardiography Laboratory  [facility] [place] [address]        Name: EDILSON SMITH  MRN: [id number]  : 1945  Study Date: 06/10/2020 09:51 AM  Age: 75 yrs  Gender: Female  Patient Location: [facility]  Reason For Study: Systolic congestive heart failure, unspecified HF chronicity  (H)  Ordering Physician: TEODORO MERA  Referring Physician: Maryam Lambert  Performed By: Clau Jay RDCS     BSA: 1.8 m2  Height: 68 in  Weight: 157 lb  HR: 91  BP: 90/62 mmHg  _____________________________________________________________________________  __        Procedure  Complete Echo Adult.  _____________________________________________________________________________  __        Interpretation Summary     The left ventricle is normal in size. Proximal septal thickening is noted.  Left ventricular systolic function is severely reduced. The visual ejection  fraction is estimated at <20%. Grade II or  moderate diastolic dysfunction.  Diastolic Doppler findings (E/E' ratio and/or other parameters) suggest left  ventricular filling pressures are increased. There is severe global  hypokinesis to akinesis of the left ventriclular wall segments with relative  preservation of systolic function at the base. A large layered apical thrombus  is noted.  The right ventricle is mildly dilated. There is a catheter/pacemaker lead seen  in the right ventricle. Moderately decreased right ventricular systolic  function.  Moderate biatrial enlargement.  Moderate mitral regurgitation.  Moderate to moderately severe tricuspid regurgitation with mild to moderate  pulmonary hypertension.  No pericardial effusion.  No previous study for comparison.  Findings discussed with Dr Fong and nursing staff, immediate initiation of  anticoagulation recommended.  _____________________________________________________________________________  __        Left Ventricle  The left ventricle is normal in size. Proximal septal thickening is noted.  Left ventricular systolic function is severely reduced. The visual ejection  fraction is estimated at <20%. Grade II or moderate diastolic dysfunction.  Diastolic Doppler findings (E/E' ratio and/or other parameters) suggest left  ventricular filling pressures are increased. There is severe global  hypokinesis to akinesis of the left ventriclular wall segments with relative  preservation of systolic function at the base. A large layered apical thrombus  is noted.     Right Ventricle  The right ventricle is mildly dilated. There is a catheter/pacemaker lead seen  in the right ventricle. Moderately decreased right ventricular systolic  function.     Atria  There is moderate biatrial enlargement.     Mitral Valve  There is moderate (2+) mitral regurgitation.        Tricuspid Valve  There is moderate to mod-severe (2-3+) tricuspid regurgitation. The right  ventricular systolic pressure is approximated at 30.1  mmHg plus the right  atrial pressure. IVC diameter >2.1 cm collapsing <50% with sniff suggests a  high RA pressure estimated at 15 mmHg or greater. Right ventricular systolic  pressure is elevated, consistent with mild to moderate pulmonary hypertension.     Aortic Valve  The aortic valve is trileaflet. No aortic regurgitation is present.     Pulmonic Valve  There is mild (1+) pulmonic valvular regurgitation. There is no pulmonic  valvular stenosis.     Vessels  The aortic root is normal size. Normal size ascending aorta. Dilation of the  inferior vena cava is present with abnormal respiratory variation in diameter.     Pericardium  There is no pericardial effusion. Moderate left pleural effusion.        Rhythm  Sinus rhythm was noted.  _____________________________________________________________________________  __  MMode/2D Measurements & Calculations  IVSd: 1.3 cm     LVIDd: 5.4 cm  LVIDs: 5.1 cm  LVPWd: 0.98 cm  FS: 5.9 %  LV mass(C)d: 251.8 grams  LV mass(C)dI: 136.5 grams/m2  Ao root diam: 3.1 cm  LA dimension: 4.3 cm  asc Aorta Diam: 3.4 cm  LA/Ao: 1.4  LA Volume (BP): 64.0 ml  LA Volume Index (BP): 34.8 ml/m2  RWT: 0.36           Doppler Measurements & Calculations  MV E max gary: 75.8 cm/sec  MV A max gary: 32.9 cm/sec  MV E/A: 2.3  MV dec time: 0.16 sec  MR ERO: 0.41 cm2  MR volume: 43.0 ml  PA acc time: 0.06 sec  TR max gary: 274.4 cm/sec  TR max P.1 mmHg  E/E' av.2  Lateral E/e': 7.5  Medial E/e': 24.9           _____________________________________________________________________________  __           Report approved by: Imani Blakely 06/10/2020 11:15 AM

## 2020-07-04 NOTE — PROGRESS NOTES
[id number]: pt alert and oriented, anxious at times. Tele ST. Gets tachypneic when she is anxious. Applied 2L oxymask for comfort. R groin, unchanged, numbness in leg at baseline. RRT called for a lactic of 4.1. no new orders. Heparin infusing at 1100 units/hr. 10A recheck in am. Will continue to monitor.

## 2020-07-04 NOTE — PLAN OF CARE
A/O, neuros intact. Pt reported feeling extremely fatigued and weak throughout the day also anxious. Breathing improved with neb treatment and oxygen weaned off. Seroquel given with improvement to anxiety and pt able to rest. VSS, SBP 90's. Tele: SR. Denies pain. Right groin stie ecchymotic and tender, but soft, CMS intact. Pt with baseline right leg numbness, pulses present with Doppler. Lasix gtt stopped and NS bolus given. Rojo removed. X3 episodes of epistaxis prior to initiation of Heparin. ENT consulted. Heparin gtt started without a bolus- no further episodes. Nonblanchable redness on spine- Mepilex applied and repositioning encouraged.

## 2020-07-04 NOTE — PROGRESS NOTES
[facility]    Cardiology Progress Note     Assessment & Plan     # Acute on chronic HFrEF 2/2 ICM, LVEF <20%, s/p ICD 4/2020; initially required vasoactive agents for cardiogenic shock, however now hemodynamically improved  # Hypoxic respiratory failure   # LV mural thrombus   # CAD with late presentation STEMI s/p CABG (LIMA to diagonal due to LAD dissection, KURT to RCA, [place] 4/2020), s/p coronary angiography 7/2/2020 with SEMAJ to RCA, plan for staged procedure to LCx  # CKD III     -Creatinine worse today despite IV fluids and holding diuretics  -Complaining of abdominal discomfort and lactate down trending but elevated  -Continue ASA, plavix, rosuvastatin  -Continue heparin GTT due to history of epistaxis  -Possible staged PCI to LCx next week (Thallium viability study 7/1/2020 showing mostly viable LCx territory)   -If renal dysfunction worsens again tomorrow I will discuss having patient transferred to [facility] to consider advanced heart failure therapies (age may complication transplant eligibility and LV thrombus may complicate LVAD eligibility), patient may be candidate for inotropic support       Thank you for allowing our team to participate in the care of Marquise jJ.  Please do not hesitate to page me with any questions or concerns.          Caesar Varela MD    Interval History   No acute events overnight.  Patient complains of abdominal cramping this morning     Physical Exam   Temp: 97.8  F (36.6  C) Temp src: Tympanic BP: 96/65 Pulse: 86 Heart Rate: 87 Resp: 28 SpO2: 100 % O2 Device: None (Room air) Oxygen Delivery: 1 LPM  Vitals:    07/01/20 0521 07/02/20 0624 07/04/20 0250   Weight: 67.3 kg (148 lb 4.8 oz) 66.3 kg (146 lb 3.2 oz) 65.7 kg (144 lb 12.8 oz)     Vital Signs with Ranges  Temp:  [96.5  F (35.8  C)-97.8  F (36.6  C)] 97.8  F (36.6  C)  Pulse:  [] 86  Heart Rate:  [87-91] 87  Resp:  [18-28] 28  BP: ()/(50-75) 96/65  SpO2:  [86 %-100 %] 100 %  I/O  last 3 completed shifts:  In: 832.86 [P.O.:400; I.V.:182.86; IV Piggyback:250]  Out: 725 [Urine:725]    GENERAL APPEARANCE: Alert and oriented x3. No acute distress.  SKIN: Inspection of the skin reveals no rashes, ulcerations, warm, dry  NECK: Supple and symmetric.  tricuspid valve regurgitation complicates assessment of JVP but it appears normal   LUNGS: Clear breath sounds throughout bilaterally, no wheezes, no rhonchi  CARDIOVASCULAR: S1, S2, regular rate and rhythm with 2/6 systolic murmur RLSB. The carotid pulses were normal and 2+ bilaterally without bruits. Peripheral pulses were 2+ and symmetric.  ABDOMEN: Soft, non-tender, non-distended with normal bowel sounds.  No ascites noted.  EXTREMITIES: no edema.  NEUROLOGIC:  Appears depressed.  Sensation to touch was normal.    Medications     - MEDICATION INSTRUCTIONS -       dextrose       HEParin Stopped (07/04/20 1510)     - MEDICATION INSTRUCTIONS -       ACE/ARB/ARNI NOT PRESCRIBED       BETA BLOCKER NOT PRESCRIBED         aspirin  81 mg Oral Daily     bacitracin   Topical BID     bimatoprost  1 drop Both Eyes At Bedtime     busPIRone  10 mg Oral TID     clopidogrel  75 mg Oral Daily     dorzolamide-timolol  1 drop Both Eyes BID     ferrous sulfate  325 mg Oral BID     latanoprost  1 drop Both Eyes Daily     levothyroxine  62.5 mcg Oral QAM AC     multivitamin w/minerals  1 tablet Oral Daily     omeprazole  20 mg Oral QAM AC     QUEtiapine  12.5 mg Oral BID     rosuvastatin  20 mg Oral Daily     sodium chloride  2 spray Both Nostrils TID     sodium chloride (PF)  3 mL Intracatheter Q8H     vitamin B1  100 mg Oral Daily       Data   Results for orders placed or performed during the hospital encounter of 06/25/20 (from the past 24 hour(s))   Heparin 10a Level   Result Value Ref Range    Heparin 10A Level 0.64 IU/mL   Hepatic panel   Result Value Ref Range    Bilirubin Direct 0.3 (H) 0.0 - 0.2 mg/dL    Bilirubin Total 0.8 0.2 - 1.3 mg/dL    Albumin 2.9 (L) 3.4  - 5.0 g/dL    Protein Total 7.2 6.8 - 8.8 g/dL    Alkaline Phosphatase 141 40 - 150 U/L    ALT 23 0 - 50 U/L    AST 20 0 - 45 U/L   Lactic acid level STAT   Result Value Ref Range    Lactate for Sepsis Protocol 4.1 (HH) 0.7 - 2.0 mmol/L   EKG 12-lead, tracing only   Result Value Ref Range    Interpretation ECG Click View Image link to view waveform and result    Basic metabolic panel   Result Value Ref Range    Sodium 131 (L) 133 - 144 mmol/L    Potassium 4.7 3.4 - 5.3 mmol/L    Chloride 97 94 - 109 mmol/L    Carbon Dioxide 24 20 - 32 mmol/L    Anion Gap 10 3 - 14 mmol/L    Glucose 120 (H) 70 - 99 mg/dL    Urea Nitrogen 36 (H) 7 - 30 mg/dL    Creatinine 1.78 (H) 0.52 - 1.04 mg/dL    GFR Estimate 27 (L) >60 mL/min/[1.73_m2]    GFR Estimate If Black 32 (L) >60 mL/min/[1.73_m2]    Calcium 8.9 8.5 - 10.1 mg/dL   CBC with platelets   Result Value Ref Range    WBC 11.3 (H) 4.0 - 11.0 10e9/L    RBC Count 4.25 3.8 - 5.2 10e12/L    Hemoglobin 10.1 (L) 11.7 - 15.7 g/dL    Hematocrit 33.3 (L) 35.0 - 47.0 %    MCV 78 78 - 100 fl    MCH 23.8 (L) 26.5 - 33.0 pg    MCHC 30.3 (L) 31.5 - 36.5 g/dL    RDW 20.7 (H) 10.0 - 15.0 %    Platelet Count 304 150 - 450 10e9/L   Lactic acid whole blood   Result Value Ref Range    Lactic Acid 3.2 (H) 0.7 - 2.0 mmol/L   Phosphorus   Result Value Ref Range    Phosphorus 5.1 (H) 2.5 - 4.5 mg/dL   Magnesium   Result Value Ref Range    Magnesium 2.7 (H) 1.6 - 2.3 mg/dL   Heparin Xa level (AM Draw)   Result Value Ref Range    Heparin 10A Level 1.20 (HH) IU/mL   Heparin 10a Level   Result Value Ref Range    Heparin 10A Level 1.23 (HH) IU/mL   Hemoglobin   Result Value Ref Range    Hemoglobin 9.4 (L) 11.7 - 15.7 g/dL

## 2020-07-04 NOTE — PLAN OF CARE
Pt a/o, soft bp's (80's-90's/50's). Sats mid to upper 90's , weaned to RA this AM. Denies pain. Tele SR. Up w/ assist of 1 & walker. Murmur noted. Lung sounds diminished in bilateral bases. +2 BLE edema, baseline BLE numbness. Voiding without difficulty, BM this AM. Heparin gtt infusing @ 1100, Xa this AM. No s/sx of bleeding noted.     Heart Failure Care Pathway  GOALS TO BE MET BEFORE DISCHARGE:    1. Decrease congestion and/or edema with diuretic therapy to achieve near      optimal volume status.            Dyspnea improved:  yes            Edema improved:     No, please explain: +2 BLE edema        Net I/O and Weights since admission:          06/04 0700 - 07/04 0659  In: 9048.95 [P.O.:6240; I.V.:2308.95]  Out: 89976 [Urine:16842]  Net: -6171.05            Vitals:    06/25/20 2210 06/26/20 0000 06/27/20 0400 06/27/20 1243   Weight: 71.2 kg (156 lb 15.5 oz) 71.2 kg (156 lb 15.5 oz) 74.6 kg (164 lb 7.4 oz) 75.5 kg (166 lb 8 oz)    06/28/20 0928 06/29/20 0541 06/30/20 0514 07/01/20 0521   Weight: 73.9 kg (163 lb) 71.3 kg (157 lb 1.6 oz) 69.4 kg (152 lb 14.4 oz) 67.3 kg (148 lb 4.8 oz)    07/02/20 0624 07/04/20 0250   Weight: 66.3 kg (146 lb 3.2 oz) 65.7 kg (144 lb 12.8 oz)       2.  O2 sats > 92% on RA or at prior home O2 therapy level.          Current oxygenation status:       SpO2: (!) 87 %         O2 Device: Oxymask,  Oxygen Delivery: 1 LPM         Able to wean O2 this shift to keep sats > 92%:  Yes       Does patient use Home O2? No    3.  Tolerates ambulation and mobility near baseline: No, please explain: generalized weakness, up with assist of 1 & walker.        How many times did the patient ambulate with nursing staff this shift? x2 to bathroom    Please review the Heart Failure Care Pathway for additional HF goal outcomes.    Loraine Lima RN RN  7/4/2020

## 2020-07-04 NOTE — CONSULTS
"[facility]    Psychiatry Consult     DATE OF ADMISSION:  6/25/2020  REQUESTING PROVIDER: Donovan Isabel MD    HPI:  Marquise Jj is a 75 year old female currently convalescing on the CCU for mgt of cardiogenic shock in the setting of known severe ischemic cardiomyopathy and acute decompensated HF with LVEF 20%. Our service was consulted to assist with anxiety which the primary service has suspected is leading to periods of hyperventilation. They've trialed benzodiazepine though this was poorly tolerated and thus transitioned to low dose Seroquel. She maintains on PTA buspirone at current dosing of 10 mg tid and PTA trazodone has been replaced with melatonin.    On interview today the patient denies any history of mental health issues to include anxiety until having a heart attack in February 2020. She notes this conferred anxiety and led to the initiation of buspirone, which she found effective in mitigating anxiety, until this recent hospitalization. She notes during this stay anxiety in the context of her cardiac pathology grew more prominent. She reports the initial trial of Ativan was poorly tolerated, in that it made her \"loopy,\" however upon transitioning to Seroquel she's found much better control of anxiety and better sleep as well. She tells me she's been refusing the trazodone as she never really found good effect with it, but with the Seroquel \"I slept like a log.\" She doesn't describe the anxiety she's experienced as being pervasive, \"it comes and goes\" but thus far she feels the Seroquel is proving noticeably beneficial. She denies any symptoms consistent with delirium. She describes a euthymic mood in general - reports good family support with a  who visits every afternoon. She's optimistic.    As reviewed with patient we'll discharge trazodone and transition the low dose of Seroquel to scheduled rather than PRN dosing so we aren't being reactive to anxiety and instead " being proactive. She is tolerating it well and not feeling overly sedated during the day. As explained to her there's a lot of room to adjust the Seroquel dosing and timing pending evolution of symptoms. She was supportive of this plan.       PAST MEDICAL HISTORY:  Past Medical History:   Diagnosis Date     CAD (coronary artery disease)      ICD (implantable cardioverter-defibrillator) in place     Primary prevention AICD placed in [place] in May 2020.     Ischemic cardiomyopathy     LVEF less than 20%.     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     Status post coronary artery bypass grafting     Done in [facility] in April 2020.  LIMA to diagonal (as LAD dissected) and KURT to the right coronary artery.       FAMILY HISTORY:  Family History   Problem Relation Age of Onset     Coronary Artery Disease No family hx of      Heart Failure No family hx of        SOCIAL HISTORY:  Social History     Socioeconomic History     Marital status:      Spouse name: Not on file     Number of children: Not on file     Years of education: Not on file     Highest education level: Not on file   Occupational History     Occupation: PharmAssistant.   Social Needs     Financial resource strain: Not on file     Food insecurity     Worry: Not on file     Inability: Not on file     Transportation needs     Medical: Not on file     Non-medical: Not on file   Tobacco Use     Smoking status: Never Smoker     Smokeless tobacco: Never Used   Substance and Sexual Activity     Alcohol use: Not Currently     Drug use: Never     Sexual activity: Not on file   Lifestyle     Physical activity     Days per week: Not on file     Minutes per session: Not on file     Stress: Not on file   Relationships     Social connections     Talks on phone: Not on file     Gets together: Not on file     Attends Mu-ism service: Not on file     Active member of club or organization: Not  on file     Attends meetings of clubs or organizations: Not on file     Relationship status: Not on file     Intimate partner violence     Fear of current or ex partner: Not on file     Emotionally abused: Not on file     Physically abused: Not on file     Forced sexual activity: Not on file   Other Topics Concern     Not on file   Social History Narrative     Not on file       REVIEW OF SYSTEMS:  10 point review of systems completed and negative else reported in the HPI.    ALLERGIES:  Allergies   Allergen Reactions     Combigan [Brimonidine Tartrate-Timolol] Swelling     Swollen eyelids       PRIOR TO ADMISSION MEDICATIONS:  Prior to Admission Medications   Prescriptions Last Dose Informant Patient Reported? Taking?   OMEPRAZOLE PO 6/25/2020 at Unknown time Self Yes Yes   Sig: Take 20 mg by mouth daily    acetaminophen (TYLENOL) 325 MG tablet Past Week at Unknown time Self No Yes   Sig: Take 2 tablets (650 mg) by mouth every 6 hours as needed for mild pain   aspirin (ASA) 81 MG EC tablet 6/25/2020 at Unknown time Self Yes Yes   Sig: Take 1 tablet (81 mg) by mouth daily   bimatoprost (LUMIGAN) 0.01 % SOLN 6/25/2020 at Unknown time Self Yes Yes   Sig: Place 1 drop into both eyes At Bedtime    busPIRone (BUSPAR) 10 MG tablet 6/25/2020 at Unknown time Self Yes Yes   Sig: Take 15 mg by mouth 3 times daily    co-enzyme Q-10 100 MG CAPS capsule  Self Yes Yes   Sig: Take 100 mg by mouth daily   dorzolamide-timolol (COSOPT) 2-0.5 % ophthalmic solution 6/25/2020 at Unknown time Self Yes Yes   Sig: Place 1 drop into both eyes 2 times daily    ferrous sulfate (FEROSUL) 325 (65 Fe) MG tablet 6/25/2020 at Unknown time Self Yes Yes   Sig: Take 325 mg by mouth daily (with breakfast)   latanoprost (XALATAN) 0.005 % ophthalmic solution 6/25/2020 at Unknown time Self Yes Yes   Sig: Place 1 drop into both eyes daily   levothyroxine (SYNTHROID/LEVOTHROID) 50 MCG tablet 6/25/2020 at Unknown time Self Yes Yes   Sig: Take 50 mcg by mouth  daily   ondansetron (ZOFRAN-ODT) 4 MG ODT tab Unknown at Unknown time Self Yes No   Sig: Take 4 mg by mouth every 8 hours as needed    potassium chloride ER (KLOR-CON M) 20 MEQ CR tablet 6/25/2020 at Unknown time Self No Yes   Sig: Take 1 tablet (20 mEq) by mouth daily   rosuvastatin (CRESTOR) 20 MG tablet 6/25/2020 at Unknown time Self Yes Yes   Sig: Take 20 mg by mouth daily   sacubitril-valsartan (ENTRESTO) 24-26 MG per tablet 6/25/2020 at Unknown time Self No Yes   Sig: Take 1 tablet by mouth 2 times daily   senna-docusate (SENOKOT-S/PERICOLACE) 8.6-50 MG tablet Past Month at Unknown time Self Yes Yes   Sig: Take 2 tablets by mouth 2 times daily as needed for constipation   torsemide (DEMADEX) 20 MG tablet 6/25/2020 at co Self No Yes   Sig: Take 40mg (2 tablets) in the morning and 20mg (1 tablet) in the afternoon   traZODone (DESYREL) 150 MG tablet 6/25/2020 at Unknown time Self Yes Yes   Sig: Take 150 mg by mouth At Bedtime   warfarin ANTICOAGULANT (COUMADIN) 1 MG tablet 6/24/2020 Self Yes Yes   Sig: Take 1 mg by mouth daily Per inr      Facility-Administered Medications: None       LABS/VITALS:  Recent Results (from the past 24 hour(s))   Procalcitonin    Collection Time: 07/03/20 12:03 PM   Result Value Ref Range    Procalcitonin 0.06 ng/ml   Potassium    Collection Time: 07/03/20 12:03 PM   Result Value Ref Range    Potassium 4.1 3.4 - 5.3 mmol/L   Magnesium    Collection Time: 07/03/20 12:03 PM   Result Value Ref Range    Magnesium 2.6 (H) 1.6 - 2.3 mg/dL   Heparin 10a Level    Collection Time: 07/03/20  8:40 PM   Result Value Ref Range    Heparin 10A Level 0.64 IU/mL   Hepatic panel    Collection Time: 07/03/20  9:42 PM   Result Value Ref Range    Bilirubin Direct 0.3 (H) 0.0 - 0.2 mg/dL    Bilirubin Total 0.8 0.2 - 1.3 mg/dL    Albumin 2.9 (L) 3.4 - 5.0 g/dL    Protein Total 7.2 6.8 - 8.8 g/dL    Alkaline Phosphatase 141 40 - 150 U/L    ALT 23 0 - 50 U/L    AST 20 0 - 45 U/L   Lactic acid level STAT     Collection Time: 07/03/20  9:43 PM   Result Value Ref Range    Lactate for Sepsis Protocol 4.1 (HH) 0.7 - 2.0 mmol/L   EKG 12-lead, tracing only    Collection Time: 07/03/20 10:24 PM   Result Value Ref Range    Interpretation ECG Click View Image link to view waveform and result    Basic metabolic panel    Collection Time: 07/04/20  5:57 AM   Result Value Ref Range    Sodium 131 (L) 133 - 144 mmol/L    Potassium 4.7 3.4 - 5.3 mmol/L    Chloride 97 94 - 109 mmol/L    Carbon Dioxide 24 20 - 32 mmol/L    Anion Gap 10 3 - 14 mmol/L    Glucose 120 (H) 70 - 99 mg/dL    Urea Nitrogen 36 (H) 7 - 30 mg/dL    Creatinine 1.78 (H) 0.52 - 1.04 mg/dL    GFR Estimate 27 (L) >60 mL/min/[1.73_m2]    GFR Estimate If Black 32 (L) >60 mL/min/[1.73_m2]    Calcium 8.9 8.5 - 10.1 mg/dL   CBC with platelets    Collection Time: 07/04/20  5:57 AM   Result Value Ref Range    WBC 11.3 (H) 4.0 - 11.0 10e9/L    RBC Count 4.25 3.8 - 5.2 10e12/L    Hemoglobin 10.1 (L) 11.7 - 15.7 g/dL    Hematocrit 33.3 (L) 35.0 - 47.0 %    MCV 78 78 - 100 fl    MCH 23.8 (L) 26.5 - 33.0 pg    MCHC 30.3 (L) 31.5 - 36.5 g/dL    RDW 20.7 (H) 10.0 - 15.0 %    Platelet Count 304 150 - 450 10e9/L   Lactic acid whole blood    Collection Time: 07/04/20  5:57 AM   Result Value Ref Range    Lactic Acid 3.2 (H) 0.7 - 2.0 mmol/L   Phosphorus    Collection Time: 07/04/20  5:57 AM   Result Value Ref Range    Phosphorus 5.1 (H) 2.5 - 4.5 mg/dL   Magnesium    Collection Time: 07/04/20  5:57 AM   Result Value Ref Range    Magnesium 2.7 (H) 1.6 - 2.3 mg/dL   Heparin Xa level (AM Draw)    Collection Time: 07/04/20  5:57 AM   Result Value Ref Range    Heparin 10A Level 1.20 (HH) IU/mL     B/P: 88/55, T: 97.4, P: 92, R: 28    MENTAL STATUS EXAM:  Appearance:  awake, alert  Eye Contact:  good  Speech:  clear, coherent  Language:Normal  Psychomotor Behavior:  no evidence of tardive dyskinesia, dystonia, or tics  Mood:  Anxiety improved  Affect:  appropriate and in normal range  Thought  "Process:  logical, linear and goal oriented no loose associations  Thought Content:  no evidence of suicidal ideation or homicidal ideation and no evidence of psychotic thought  Oriented to:  time, person, and place  Attention Span and Concentration:  intact  Recent and Remote Memory:  intact  Fund of Knowledge: appropriate  Insight:  good  Judgment:  intact       DIAGNOSES:  1. 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 Multiple cardiac comobridities    PLAN:  1. Discontinue trazodone  2. Transition Seroquel 12.5 mg bid from PRN to scheduled  3. Continue buspirone and melatonin without modification  4. Avoid benzodiazepines (make her \"loopy\")  5. Reconsult psychiatry as needed      Attestation:  Patient has been seen and evaluated by me,  Obi Chung,      Visit/Communication Style   VIRTUAL (VIDEO) communication was used to evaluate Marquise due to the COVID pandemic.    Marquise consented to the use of video communication: yes  Video START time: 12:04 pm, 7/4/2020  Video STOP time: 12:18 pm, 7/4/2020   Patient's location: [facility]   Provider's location during the visit: Home           "

## 2020-07-04 NOTE — PROGRESS NOTES
Sepsis Evaluation Progress Note    I was called to see Marquise Jj due to abnormal vital signs triggering the Sepsis SIRS screening alert. She is not known to have an infection.     Physical Exam   Vital Signs:  Temp: 96.5  F (35.8  C) Temp src: Axillary BP: 96/75 Pulse: 108 Heart Rate: 91 Resp: 22 SpO2: 100 % O2 Device: Oxymask Oxygen Delivery: 2 LPM    Lab:  Lactic Acid   Date Value Ref Range Status   07/02/2020 2.3 (H) 0.7 - 2.0 mmol/L Final     Lactate for Sepsis Protocol   Date Value Ref Range Status   07/03/2020 4.1 (HH) 0.7 - 2.0 mmol/L Final     Comment:     Critical Value called to and read back by  GIOVANNY BARTON IN CCU AT 2205 MS         The patient is at baseline mental status; however, with notable anxiety     The rest of their physical exam is significant for:    Gen: Pt sitting up in bed, eyes half open, in no apparent distress; however, with noted intermittent tachypnea  Neuro: Awake, alert, clear speech, no focal neuro deficits noted  Resp: In no apparent respiratory distress, intermittent tachypnea, clear to auscultation bilaterally, no crackles or wheezes noted  CV: Mildly tachycardic, regular rate and rhythm, normal S1S2, no murmur, rub or gallop noted  GI: Soft, nondistended, nontender to palpation, active bowel sounds   Skin: Warm, dry, pink, no mottling noted  Ext: Trace pitting pedal edema noted    Assessment & Plan   NO EVIDENCE OF SEPSIS at this time.  Vital sign, physical exam, and lab findings are likely due to hypoperfusion in setting of severe ischemic cardiomyopathy, acute decompensated HFrEF, EF 20%.  Respiratory alkalosis can also contribute to lactic acidosis.      SIRS: WBC, RR, HR    QSOFA: BP, RR    Noted pt with abnormal UTI on admission, treated with rocephin, UC negative; completed treatment.  Noted procalcitonin negative today.  Pt had been receiving lasix infusion until today when noted to be likely overdiuresed and was administered 250 ml NS bolus.  Pt's oral mucosa  dry; notes diarrhea.  Pt's SBP remains soft, 90s, HR low 100s-120s.  Given complex cardiac status and hemodynamics stable (unchanged throughout the day), will defer additional IVF at this time.  Encourage PO intake.  Noted Hgb stable this morning in setting of epistaxis.  Of note, pt states tachypnea 2/2 anxiety.  Pt notes overall improvement since admission to hospital.        - Will check Mg and K now as pt had been on lasix infusion until today.    - Will add on hepatic panel to evaluate for congestive hepatopathy  - Will repeat BMP, CBC, lactic acid in AM       Disposition: The patient will remain on the current unit. We will continue to monitor this patient closely.     STEPHAN Singh CNP   [facility]    Sepsis Criteria   Sepsis: 2+ SIRS criteria due to infection  Severe Sepsis: Sepsis AND 1+ new sign of acute organ dysfunction (Note: lactate >2 is organ dysfunction)  Septic Shock: Sepsis AND hypotension despite volume resuscitation with 30 ml/kg crystalloid

## 2020-07-05 NOTE — PLAN OF CARE
No change in Pt's condition over night.  Pt denies pain or SOB.  BP continues to be soft in the 80's, Pt is asymptomatic.  Pt is in SR with HR's 70-80's.  Heparin gtt infusing without problems, no bleeding noted.  Will continue to monitor.

## 2020-07-05 NOTE — PROGRESS NOTES
[facility]    Medicine Progress Note - Hospitalist Service       Date of Admission:  6/25/2020  Assessment & Plan   Marquise Jj is a 75 year old female with PMH recent complicated cardiac history significant for severe ischemic cardiomyopathy with left ventricular ejection fraction of less than 20%, large mural thrombus on recent echocardiogram from June 2020, severe multivessel coronary disease status post bypass in March 2020, chronic anemia, and chronic CKD who was admitted on 6/25/2020 for hypotension and weakness (Transfer from [facility] ED).      Cardiogenic shock   Acute decompensated HFrEF. LVEF of less than 20%  Ischemic cardiomyopathy with late presentation STEMI s/p CABG (LIMA to diagonal due to LAD dissection, KURT to RCA, [place] 4/2020),   S/p coronary angiography 7/2/2020 with SEMAJ to RCA, plan for staged procedure to LCx.(Thallium viability study 7/1/2020 showing mostly viable LCx territory)   S/p AICD 4/2020 in [place]  Severe TR, mild-mod MR    Presented with hypotension needing pressors and ICU admission, initially thought to be sec to urinary source and started empirically on Rocephin but blood and urine culture from 6/25 with no growth so far; remains afebrile; leukocytosis improved, status post completion of Rocephin. Transferred out of ICU on 6/27 and SBP remains soft in the 90s.   * patient is not a candidate for CRT upgrade and so EP signed off.  * Cardiology following, started on Bumex drip 6/27. Changed to torsemide 6/30/2020. Started on lasix drip at 5 mg/h after angiogram on 7/2. Diuretics held 7/4 due to rising Cr and give  ml bolus    - on 07/05/20 continues to have SBP in the 80's and Cr remains up at 1.75, BUN 39 despite IVF, sodium low 132, lactate remains unchanged ~ 3.   - WT on admission 71.2, max wt 75.5 > wt lynn 65.7 on 07/04/20 > wt 66.6 on 07/05/20.    - PICC placed on 07/05/20 and started on dopamine per cardiology.   - Possible staged PCI to LCx  next week  - If no improvement in short-term, consideration of transfer to [facility] for advanced HF therapies.   - continue ASA, plavix, rosuvastatin  - holding PTA Entresto and awaiting stabilization before starting patient on beta-blocker      Suspected Anxiety - leading to hyperventilation. Does not tolerate benzos, so started on low dose Seroquel.  - change to at bedtime on 07/05/20 from BID.      Large mural thrombus on recent echocardiogram from June 2020 - High risk for stroke.   Supratherapeutic INR on admission (6.08). RESOLVED.   H/o recurrent epistaxis - ENT on 7/3 shows recent bleed from excoriation from nasal canula prong, no active bleeding or other site of bleeding.     Recent Labs   Lab 07/02/20  0528 07/01/20  0624 06/30/20  0531 06/29/20  1923   INR 1.46* 1.63* 1.74* 2.31*       - ENT recommended conservative measures with bacitracin and nasal spray.    - Stared high intensity heparin gtt without bolus and appreciate cardiology's input regarding ultimate plan for anticoagulation. ASA/plavix + AC at discharge.   - Pharmacy liason consulted  for anticoagulation coverage, patient willing for paying for Eliquis as she mentions she spoke with the insurance and it is actually $200 per month      Leukocytosis, RESOLVING - WBC up to 14 on 7/3 without evidence of infection by signs/sx. CXR clear and procalcitonin 0.06. Suspect reactive.   Recent Labs   Lab 07/04/20  0557 07/03/20  1049 07/02/20  0817 06/30/20  0531 06/29/20  0537   WBC 11.3* 14.8* 8.2 6.7 6.7        LORI on CKD, stage III Baseline cr around 1-1.2. Cr 1.92 on admission, improved with diuresis and then worsened likely from over diuresis.   *  US 6/26 UR, no obstruction or hydronephrosis    - IVF as above, dopamine.    - PTA Sacubitril/valsartan (Entresto) on hold     Hypothyroidism - TSH during hospitalization high but FT4 normal. TSH 11.25, FT4 1.39 on 6/28.   PTA on 50mcg synthroid. Cardiology recommended increase in synthroid given low EF.      - Increased PTA synthroid to 62.5 mcg , OP recheck thyroid function in 6-8 wks     Chronic anemia - unclear recent baseline, prior to recent cardiac events it seems her Hb was around 12-13  - Hb stable around 9-11; monitor     Recent Labs   Lab 07/05/20  0537 07/04/20  1405 07/04/20  0557 07/03/20  1049 07/02/20  0817 06/30/20  0531   HGB 9.1* 9.4* 10.1* 11.0* 10.8* 11.3*     Lines: PICC Line placed on R on 07/05/20.      Asymptomatic COVID, negative on 6/25       Diet: 1 Gram Sodium Diet  Snacks/Supplements Adult: Boost Plus; With Meals    DVT Prophylaxis: On heparin  Rojo Catheter: not present  Code Status: Full Code           Disposition Plan   Expected discharge: Monday at the earliest , recommended to prior living arrangement once craetinine stabelized and cardiology has outpatient plan in place. .  Entered: Sadie Christian MD 07/05/2020, 5:20 PM       The patient's care was discussed with the Bedside Nurse and Patient.    Sadie Christian MD  Hospitalist Service  [facility]    ______________________________________________________________________    Interval History   Patient feels more down today, and weak. She denies SOB or increased swelling. Just feels weak all over. Depressed about failure to improve. Decreased appetite. Denies abdominal pain, N/V, CP.     Data reviewed today: I reviewed all medications, new labs and imaging results over the last 24 hours. I personally reviewed the chest x-ray image(s) showing as above. .    Physical Exam   Vital Signs: Temp: 97.4  F (36.3  C) Temp src: Oral BP: 94/72 Pulse: 123 Heart Rate: 79 Resp: 18 SpO2: 96 % O2 Device: None (Room air)    Weight: 146 lbs 14.4 oz  Constitutional: NAD,   Neuropsyche:  alert and oriented, answers questions appropriately.   Respiratory:  Breathing comfortably, decreased air exchange at bases, otherwise no wheezes, no crackles.   Cardiovascular:  Regular rate and rhythm, 1+ edema.  GI:  soft, NT/ND, BS  normal  Skin/Integumen:  No acute rash, bruising or sign of bleeding.         Data   Recent Labs   Lab 07/05/20  0537 07/04/20  1405 07/04/20  0557 07/03/20  2142 07/03/20  1203 07/03/20  1049 07/03/20  0548  07/02/20  0817 07/02/20  0528 07/01/20  0624  06/30/20  0531   WBC  --   --  11.3*  --   --  14.8*  --   --  8.2  --   --   --  6.7   HGB 9.1* 9.4* 10.1*  --   --  11.0*  --   --  10.8*  --   --   --  11.3*   MCV  --   --  78  --   --  78  --   --  79  --   --   --  78   PLT  --   --  304  --   --  340  --   --  352  --   --   --  367   INR  --   --   --   --   --   --   --   --   --  1.46* 1.63*  --  1.74*   *  --  131*  --   --   --  136  --   --  136  --    < > 136   POTASSIUM 4.5  --  4.7  --  4.1  --  4.2   < >  --  3.0*  --    < > 3.4   CHLORIDE 98  --  97  --   --   --  100  --   --  99  --    < > 100   CO2 27  --  24  --   --   --  26  --   --  29  --    < > 26   BUN 39*  --  36*  --   --   --  31*  --   --  26  --    < > 35*   CR 1.75*  --  1.78*  --   --   --  1.44*  --   --  1.29*  --    < > 1.33*   ANIONGAP 7  --  10  --   --   --  10  --   --  8  --    < > 10   FERNANDO 8.9  --  8.9  --   --   --  9.1  --   --  9.0  --    < > 9.0   GLC 94  --  120*  --   --   --  110*  --   --  89  --    < > 86   ALBUMIN  --   --   --  2.9*  --   --   --   --   --   --   --   --   --    PROTTOTAL  --   --   --  7.2  --   --   --   --   --   --   --   --   --    BILITOTAL  --   --   --  0.8  --   --   --   --   --   --   --   --   --    ALKPHOS  --   --   --  141  --   --   --   --   --   --   --   --   --    ALT  --   --   --  23  --   --   --   --   --   --   --   --   --    AST  --   --   --  20  --   --   --   --   --   --   --   --   --     < > = values in this interval not displayed.     No results found for this or any previous visit (from the past 24 hour(s)).  Medications     - MEDICATION INSTRUCTIONS -       dextrose       DOPamine 4 mcg/kg/min (07/05/20 9630)     HEParin 600 Units/hr (07/05/20 0505)      - MEDICATION INSTRUCTIONS -       ACE/ARB/ARNI NOT PRESCRIBED       BETA BLOCKER NOT PRESCRIBED         aspirin  81 mg Oral Daily     bacitracin   Topical BID     bimatoprost  1 drop Both Eyes At Bedtime     busPIRone  10 mg Oral TID     clopidogrel  75 mg Oral Daily     dorzolamide-timolol  1 drop Both Eyes BID     ferrous sulfate  325 mg Oral BID     latanoprost  1 drop Both Eyes Daily     levothyroxine  62.5 mcg Oral QAM AC     multivitamin w/minerals  1 tablet Oral Daily     omeprazole  20 mg Oral QAM AC     QUEtiapine  12.5 mg Oral BID     rosuvastatin  20 mg Oral Daily     sodium chloride  2 spray Both Nostrils TID     sodium chloride (PF)  3 mL Intracatheter Q8H     vitamin B1  100 mg Oral Daily

## 2020-07-05 NOTE — PROGRESS NOTES
[facility]    RRT Note  7/5/2020   Time Called: 16L39    RRT called for: Transient unresponsiveness    Assessment & Plan   IMPRESSION & PLAN:    Nurse reports when patient changed position from laying to sitting she had glazed appearance, brief unresponsiveness.  Within seconds patient was able to respond to questions, SBP 95.  Pulse 101, on dopamine drip.  Reports recent concerns of hypotension, Cardiology has patient on dopamine drip.  Concern of past volume overload, recent decrease in diuresis.  No reported chest pain, dyspnea.  No O2 desaturation.  Nurse and patient reports no neurologic focality.  Nondiabetic, no recent change in medications.    VS: SBP 95, pulse 101 on Dopamin gtt].  O2 sat 96%, RA.  Afebrile.  Up in sitting, NAD, alert, calm, cooperative, conversive, appropriate.  Lungs, air exchange all lung fields, respirations nonlabored.  Cor.  Regular rhythm, 2/6 CLARKE, [noted on prior exams.]  Neuro: Alert, oriented.  CN and gross motor strength testing intact, nonfocal.  .    Impression and Plan:  Transient, self remitting nonresponsiveness associated with position change, suspect orthostatic.  Exam without residual.  After revisit with patient up and sitting in chair, alert, responsive.  Continue current treatment plan, BP supported on dopamine drip.  Close monitor fluid status.  Discussed with Unit RN, Charge RN, RT, Pt and Pt's   Follow-up with primary Rounding Team. Dr Christian notified.  In the meantime page if any acute, changed or new S/S      At the end of the RRT:  patient up and sitting in chair, alert, responsive. VSS    Discussed with and defer further cares to Primary Rounding team.    Code Status: Full Code  Time Spent on this Encounter   I spent 35 minutes on the unit/floor managing the care of Marquise Jj. Over 50% of my time was spent counseling the patient and/or coordinating care regarding services listed in this note.

## 2020-07-05 NOTE — PLAN OF CARE
NSR with BBB. Patient is lethargic and sleepy. Tachypnic. LS diminished. Took 2 walks from the chair to right outside her door. Patient states she was light headed. Zofran given for nausea with minimal relief. Poor PO intake. Hospitalist answering service paged to ask if they want another lactic acid drawn and informing them of soft blood pressures.

## 2020-07-05 NOTE — PLAN OF CARE
Up A1 GB&W. A/ox4, lethargic. Tachypneic at times, DASH. LS dim in lowers. R groin site w/dried drainage and bruising, no change.  Mepilex to lower back for wound. BLEs toes nathan, pulses weak, cool to touch. Tele: NSR. Continue to monitor. Possible PCI next week at [facility].

## 2020-07-05 NOTE — PROGRESS NOTES
Triple lumen PICC placed R arm basilic with 2 attempts to access vessel. Line placement verified with ECG and PICC is good to use. Site dressed with thrombix patch and gauze due to bleeding at site. Dressing due to be changed tomorrow. Pt is on heparin gtt. Pt tolerated procedure well and is resting comfortably at this time. See IV flowsheet for further information.

## 2020-07-05 NOTE — PLAN OF CARE
Discharge Planner OT   Patient plan for discharge: Home with family assist   Current status: Pt completed timed ambulation in the hallway for 2 minutes with walker and SBA. Pt reported increase in lightheadedness, timed ambulation discontinued as pt symptomatic; vitals monitored, see vitals flow sheet for details.   Barriers to return to prior living situation: none if home with family assist   Recommendations for discharge: Home with family assist for IADL, assist with bathing, resumption of Home PT/OT  Rationale for recommendations: Home OT to maximize (I), safety and activity tolerance for I/ADLs. Home care appropriate as pt requires taxing effort to leave the home        Entered by: Juan Franco 07/05/2020 9:29 AM

## 2020-07-05 NOTE — PROGRESS NOTES
Brief Hospitalist Cross cover note:    Temp: 97.3  F (36.3  C) Temp src: Tympanic BP: 100/63 Pulse: 101 Heart Rate: 87 Resp: 25 SpO2: 93 % O2 Device: None (Room air) Oxygen Delivery: 1 LPM     Paged regarding soft BP 80s/60s and elevated lactic acid of 3.1. Lactate is actually improved from previous. Pt received 250cc bolus earlier today. Per RN now, repeat BP was 100/63 and pt is stable but tachypneic. EF is < 20%. I will hold on any further fluids as long as vitals remain stable.    Patient has poor cardiac output, not sepsis. Will not repeat lactate. Discussed with RN that pt is very tenuous and next step would likely be txfer to ICU for dopamine or similar. RN was told to have low threshold to re-page for any further hemodynamic instability or respiratory decompensation.    Dian Britt MD  8:03 PM

## 2020-07-05 NOTE — PROGRESS NOTES
[facility]    Cardiology Progress Note     Assessment & Plan     # Acute on chronic HFrEF 2/2 ICM, LVEF <20%, s/p ICD 4/2020; initially required vasoactive agents for cardiogenic shock, however now hemodynamically improved  # Hypoxic respiratory failure   # LV mural thrombus   # CAD with late presentation STEMI s/p CABG (LIMA to diagonal due to LAD dissection, KURT to RCA, [place] 4/2020), s/p coronary angiography 7/2/2020 with SEMAJ to RCA, plan for staged procedure to LCx  # CKD III     -Creatinine not improving despite IV fluids and holding diuresis  -Lactate still high but stable  -I will place a PICC and start dopamine both for hypotension and for some inotropic support  -Continue ASA, plavix, rosuvastatin  -Continue heparin GTT due to history of epistaxis  -Possible staged PCI to LCx next week (Thallium viability study 7/1/2020 showing mostly viable LCx territory)   -If no improvement in the short term consider placing swan for inotropic support on discharge or transfer [facility] to consider advanced heart failure therapies (age may complication transplant eligibility and LV thrombus may complicate LVAD eligibility)       Thank you for allowing our team to participate in the care of Marquise Jj.  Please do not hesitate to page me with any questions or concerns.          Caesar Varela MD    Interval History   No acute events overnight.  Patient complains of abdominal cramping this morning     Physical Exam   Temp: 97.4  F (36.3  C) Temp src: Oral BP: (!) 89/61 Pulse: 78 Heart Rate: 88 Resp: 20 SpO2: 95 % O2 Device: None (Room air)    Vitals:    07/02/20 0624 07/04/20 0250 07/05/20 0605   Weight: 66.3 kg (146 lb 3.2 oz) 65.7 kg (144 lb 12.8 oz) 66.6 kg (146 lb 14.4 oz)     Vital Signs with Ranges  Temp:  [97.3  F (36.3  C)-97.8  F (36.6  C)] 97.4  F (36.3  C)  Pulse:  [] 78  Heart Rate:  [84-98] 88  Resp:  [20-25] 20  BP: ()/(56-67) 89/61  SpO2:  [93 %-100 %] 95 %  I/O last 3  completed shifts:  In: 848.98 [P.O.:480; I.V.:118.98; IV Piggyback:250]  Out: 950 [Urine:950]    GENERAL APPEARANCE: Alert and oriented x3. No acute distress.  SKIN: Inspection of the skin reveals no rashes, ulcerations, warm, dry  NECK: Supple and symmetric.  tricuspid valve regurgitation complicates assessment of JVP but it appears normal   LUNGS: Clear breath sounds throughout bilaterally, no wheezes, no rhonchi  CARDIOVASCULAR: S1, S2, regular rate and rhythm with 2/6 systolic murmur RLSB. The carotid pulses were normal and 2+ bilaterally without bruits. Peripheral pulses were 2+ and symmetric.  ABDOMEN: Soft, non-tender, non-distended with normal bowel sounds.  No ascites noted.  EXTREMITIES: trace edema.  NEUROLOGIC:  Appears depressed.  Sensation to touch was normal.    Medications     - MEDICATION INSTRUCTIONS -       dextrose       HEParin 600 Units/hr (07/05/20 0855)     - MEDICATION INSTRUCTIONS -       ACE/ARB/ARNI NOT PRESCRIBED       BETA BLOCKER NOT PRESCRIBED         aspirin  81 mg Oral Daily     bacitracin   Topical BID     bimatoprost  1 drop Both Eyes At Bedtime     busPIRone  10 mg Oral TID     clopidogrel  75 mg Oral Daily     dorzolamide-timolol  1 drop Both Eyes BID     ferrous sulfate  325 mg Oral BID     latanoprost  1 drop Both Eyes Daily     levothyroxine  62.5 mcg Oral QAM AC     multivitamin w/minerals  1 tablet Oral Daily     omeprazole  20 mg Oral QAM AC     QUEtiapine  12.5 mg Oral BID     rosuvastatin  20 mg Oral Daily     sodium chloride  2 spray Both Nostrils TID     sodium chloride (PF)  3 mL Intracatheter Q8H     vitamin B1  100 mg Oral Daily       Data   Results for orders placed or performed during the hospital encounter of 06/25/20 (from the past 24 hour(s))   Heparin 10a Level   Result Value Ref Range    Heparin 10A Level 1.23 (HH) IU/mL   Hemoglobin   Result Value Ref Range    Hemoglobin 9.4 (L) 11.7 - 15.7 g/dL   Heparin Xa level (AM Draw)   Result Value Ref Range     Heparin 10A Level 0.63 IU/mL   Heparin 10a Level   Result Value Ref Range    Heparin 10A Level Canceled, Test credited IU/mL   Basic metabolic panel   Result Value Ref Range    Sodium 132 (L) 133 - 144 mmol/L    Potassium 4.5 3.4 - 5.3 mmol/L    Chloride 98 94 - 109 mmol/L    Carbon Dioxide 27 20 - 32 mmol/L    Anion Gap 7 3 - 14 mmol/L    Glucose 94 70 - 99 mg/dL    Urea Nitrogen 39 (H) 7 - 30 mg/dL    Creatinine 1.75 (H) 0.52 - 1.04 mg/dL    GFR Estimate 28 (L) >60 mL/min/[1.73_m2]    GFR Estimate If Black 32 (L) >60 mL/min/[1.73_m2]    Calcium 8.9 8.5 - 10.1 mg/dL   Hemoglobin   Result Value Ref Range    Hemoglobin 9.1 (L) 11.7 - 15.7 g/dL   Heparin Xa level (AM Draw)   Result Value Ref Range    Heparin 10A Level 0.72 IU/mL

## 2020-07-06 NOTE — PROGRESS NOTES
Cardiology Progress Note          Assessment and Plan:     Ischemic cardiomyopathy    Cardiogenic/septic shock  Mural LV thrombus  Acute on chronic SHF  CAD  S/p ICD    Much improved clinically after intiation of IV dopamine.  Recommend continue 24 hours then reinstitution of Entresto and observe.  Patient anxious to return home.  Lives independently with .  [facility] follow up needed.                    Interval History:   Weak.  But up in chair eating breakfast.  Denies SOB, dizziness.               Review of Systems:   As per subjective, otherwise 5 systems reviewed and negative.           Physical Exam:   Blood pressure 90/67, pulse 90, temperature 97.3  F (36.3  C), temperature source Oral, resp. rate 16, weight 67.4 kg (148 lb 9.6 oz), SpO2 100 %.      Vital Sign Ranges  Temperature Temp  Av.3  F (36.3  C)  Min: 97.2  F (36.2  C)  Max: 97.4  F (36.3  C)   Blood pressure Systolic (24hrs), Av , Min:85 , Max:107        Diastolic (24hrs), Av, Min:53, Max:76      Pulse Pulse  Av.9  Min: 78  Max: 137   Respirations Resp  Av.6  Min: 16  Max: 20   Pulse oximetry SpO2  Av.3 %  Min: 91 %  Max: 100 %         Intake/Output Summary (Last 24 hours) at 2020 0921  Last data filed at 2020 0845  Gross per 24 hour   Intake 260 ml   Output 700 ml   Net -440 ml       Constitutional:   NAD   Skin:   Warm and dry   Head:   Nontraumatic   Neck:   Supple, symmetrical, trachea midline, no adenopathy, thyroid symmetric, not enlarged and no tenderness, skin normal   Lungs:   normal   Cardiovascular:    regular rate and rhythm and murmurs include systolic murmur II/VI located at left upper sternal border without radiation   Abdomen:   Benign   Extremities and Back:   1+ edema   Neurological:   Grossly nonfocal            Medications:     No current outpatient medications on file.                Data:     Results for orders placed or performed during the hospital encounter of 20 (from the past 24  hour(s))   Heparin 10a Level   Result Value Ref Range    Heparin 10A Level 0.31 IU/mL   Basic metabolic panel   Result Value Ref Range    Sodium 132 (L) 133 - 144 mmol/L    Potassium 3.7 3.4 - 5.3 mmol/L    Chloride 97 94 - 109 mmol/L    Carbon Dioxide 27 20 - 32 mmol/L    Anion Gap 8 3 - 14 mmol/L    Glucose 137 (H) 70 - 99 mg/dL    Urea Nitrogen 38 (H) 7 - 30 mg/dL    Creatinine 1.55 (H) 0.52 - 1.04 mg/dL    GFR Estimate 32 (L) >60 mL/min/[1.73_m2]    GFR Estimate If Black 38 (L) >60 mL/min/[1.73_m2]    Calcium 8.5 8.5 - 10.1 mg/dL   Lactic acid whole blood   Result Value Ref Range    Lactic Acid 2.0 0.7 - 2.0 mmol/L   Heparin Xa level (AM Draw)   Result Value Ref Range    Heparin 10A Level 0.16 IU/mL   Hemoglobin   Result Value Ref Range    Hemoglobin 8.1 (L) 11.7 - 15.7 g/dL

## 2020-07-06 NOTE — PLAN OF CARE
Discharge Planner OT   Patient plan for discharge: Home  Current status: Pt completed timed ambulation in the hallway for 2 minutes with walker and SBA. Pt easily fatigued with activity. Pt completed 2 grooming and hygiene tasks while standing at the sink with SBA and set up. Pt requiring 1 seated rest break.   Barriers to return to prior living situation: none if home with family assist   Recommendations for discharge: Home with family assist for IADL, assist with bathing, resumption of Home PT/OT  Rationale for recommendations: Home OT to maximize (I), safety and activity tolerance for I/ADLs. Home care appropriate as pt requires taxing effort to leave the home        Entered by: Juan Franco 07/06/2020 9:39 AM

## 2020-07-06 NOTE — PROGRESS NOTES
[facility]    Medicine Progress Note - Hospitalist Service       Date of Admission:  6/25/2020  Assessment & Plan   Marquise Jj is a 75 year old female with PMH recent complicated cardiac history significant for severe ischemic cardiomyopathy with left ventricular ejection fraction of less than 20%, large mural thrombus on recent echocardiogram from June 2020, severe multivessel coronary disease status post bypass in March 2020, chronic anemia, and chronic CKD who was admitted on 6/25/2020 for hypotension and weakness (Transfer from [facility] ED).      Cardiogenic shock   Acute decompensated HFrEF. LVEF of less than 20%  Ischemic cardiomyopathy with late presentation STEMI s/p CABG (LIMA to diagonal due to LAD dissection, KURT to RCA, [place] 4/2020),   S/p coronary angiography 7/2/2020 with SEMAJ to RCA, plan for staged procedure to LCx.(Thallium viability study 7/1/2020 showing mostly viable LCx territory)   S/p AICD 4/2020 in [place]  Severe TR, mild-mod MR    Presented with hypotension needing pressors and ICU admission, initially thought to be sec to urinary source and started empirically on Rocephin but blood and urine culture from 6/25 with no growth so far; remains afebrile; leukocytosis improved, status post completion of Rocephin. Transferred out of ICU on 6/27 and SBP remains soft in the 90s.   * patient is not a candidate for CRT upgrade and so EP signed off.  * Cardiology following, started on Bumex drip 6/27. Changed to torsemide 6/30/2020. Started on lasix drip at 5 mg/h after angiogram on 7/2. Diuretics held 7/4 due to rising Cr and gave  ml bolus  * PICC placed on 07/05/20 and started on dopamine per cardiology.    - On 07/06/20, feels much improved and Cr normalized post dopamine gtt.   - On 07/06/20  wt 67.4, up from wt lynn 65.7 on 07/04/20 (Admission wt 71.2, max wt 75.5) , notes a little more swelling in abdomen.   - Staged PCI to LCx per cardiology   - Per cardiology  plan for dopamine x 24. Defer diuresis to cardiology.   - continue ASA, plavix, rosuvastatin  - holding PTA Entresto and awaiting stabilization before starting patient on beta-blocker    Suspected Anxiety - leading to hyperventilation. Does not tolerate benzos, so started on low dose Seroquel.  - change to at bedtime on 07/05/20 from BID.      Large mural thrombus on recent echocardiogram from June 2020 - High risk for stroke.   Supratherapeutic INR on admission (6.08). RESOLVED.   H/o recurrent epistaxis - ENT on 7/3 shows recent bleed from excoriation from nasal canula prong, no active bleeding or other site of bleeding.     Recent Labs   Lab 07/02/20  0528 07/01/20  0624 06/30/20  0531 06/29/20  1923   INR 1.46* 1.63* 1.74* 2.31*       - ENT recommended conservative measures with bacitracin and nasal spray.    - Stared high intensity heparin gtt without bolus and appreciate cardiology's input regarding ultimate plan for anticoagulation. ASA/plavix + AC at discharge.    - Pharmacy liason, patient can afford Eliquis with financial assistance.      Leukocytosis, RESOLVING - WBC up to 14 on 7/3 without evidence of infection by signs/sx. CXR clear and procalcitonin 0.06. Suspect reactive.   Recent Labs   Lab 07/04/20  0557 07/03/20  1049 07/02/20  0817 06/30/20  0531   WBC 11.3* 14.8* 8.2 6.7        LORI, resolved on CKD, stage III Baseline cr around 1-1.2. Cr 1.92 on admission, improved with diuresis and then worsened likely from over diuresis.   *  US 6/26 UR, no obstruction or hydronephrosis    - IVF as above, dopamine.    - PTA Sacubitril/valsartan (Entresto) on hold     Hypothyroidism - TSH during hospitalization high but FT4 normal. TSH 11.25, FT4 1.39 on 6/28.   PTA on 50mcg synthroid. Cardiology recommended increase in synthroid given low EF.     - Increased PTA synthroid to 62.5 mcg , OP recheck thyroid function in 6-8 wks     Chronic anemia - unclear recent baseline, prior to recent cardiac events it seems  her Hb was around 12-13  - Hb stable around 9-11; monitor     Recent Labs   Lab 07/06/20  0900 07/05/20  0537 07/04/20  1405 07/04/20  0557 07/03/20  1049 07/02/20  0817   HGB 8.1* 9.1* 9.4* 10.1* 11.0* 10.8*     Lines: PICC Line placed on R on 07/05/20.      Asymptomatic COVID, negative on 6/25       Diet: 1 Gram Sodium Diet  Snacks/Supplements Adult: Boost Plus; With Meals    DVT Prophylaxis: On heparin  Rojo Catheter: not present  Code Status: Full Code           Disposition Plan   Expected discharge: 2 - 3 days, recommended to prior living arrangement once back on oral medication. .  Entered: Sadie Christian MD 07/06/2020, 5:25 PM       The patient's care was discussed with the Bedside Nurse and Patient.    Sadie Christian MD  Hospitalist Service  [facility]    ______________________________________________________________________    Interval History   She feels much better today. No SOB, more energy, up and walked with therapies. No CP, N/V, abdominal pain. Appetite a little better today. Has noted some abdominal distension and wt up a bit.     Data reviewed today: I reviewed all medications, new labs and imaging results over the last 24 hours. I personally reviewed the chest x-ray image(s) showing as above. .    Physical Exam   Vital Signs: Temp: 97.8  F (36.6  C) Temp src: Oral BP: (!) 88/60 Pulse: 75 Heart Rate: 95 Resp: 16 SpO2: 100 % O2 Device: None (Room air)    Weight: 148 lbs 9.6 oz  Constitutional: NAD,   Neuropsyche:  alert and oriented, answers questions appropriately.   Respiratory:  Breathing comfortably, decreased air exchange at bases, otherwise no wheezes, no crackles.   Cardiovascular:  Regular rate and rhythm, 1-2+ edema.  GI:  soft, NT/ND, BS normal  Skin/Integumen:  No acute rash, bruising or sign of bleeding.         Data   Recent Labs   Lab 07/06/20  0900 07/05/20  2245 07/05/20  0537 07/04/20  1405 07/04/20  0557 07/03/20  2142  07/03/20  1049  07/02/20  0817  07/02/20  0528 07/01/20 0624  06/30/20  0531   WBC  --   --   --   --  11.3*  --   --  14.8*  --  8.2  --   --   --  6.7   HGB 8.1*  --  9.1* 9.4* 10.1*  --   --  11.0*  --  10.8*  --   --   --  11.3*   MCV  --   --   --   --  78  --   --  78  --  79  --   --   --  78   PLT  --   --   --   --  304  --   --  340  --  352  --   --   --  367   INR  --   --   --   --   --   --   --   --   --   --  1.46* 1.63*  --  1.74*   * 132* 132*  --  131*  --   --   --    < >  --  136  --    < > 136   POTASSIUM 3.8 3.7 4.5  --  4.7  --    < >  --    < >  --  3.0*  --    < > 3.4   CHLORIDE 96 97 98  --  97  --   --   --    < >  --  99  --    < > 100   CO2 26 27 27  --  24  --   --   --    < >  --  29  --    < > 26   BUN 35* 38* 39*  --  36*  --   --   --    < >  --  26  --    < > 35*   CR 0.52 1.55* 1.75*  --  1.78*  --   --   --    < >  --  1.29*  --    < > 1.33*   ANIONGAP 8 8 7  --  10  --   --   --    < >  --  8  --    < > 10   FERNANDO 8.5 8.5 8.9  --  8.9  --   --   --    < >  --  9.0  --    < > 9.0   * 137* 94  --  120*  --   --   --    < >  --  89  --    < > 86   ALBUMIN  --   --   --   --   --  2.9*  --   --   --   --   --   --   --   --    PROTTOTAL  --   --   --   --   --  7.2  --   --   --   --   --   --   --   --    BILITOTAL  --   --   --   --   --  0.8  --   --   --   --   --   --   --   --    ALKPHOS  --   --   --   --   --  141  --   --   --   --   --   --   --   --    ALT  --   --   --   --   --  23  --   --   --   --   --   --   --   --    AST  --   --   --   --   --  20  --   --   --   --   --   --   --   --     < > = values in this interval not displayed.     No results found for this or any previous visit (from the past 24 hour(s)).  Medications     - MEDICATION INSTRUCTIONS -       dextrose       DOPamine 3.5 mcg/kg/min (07/06/20 5857)     HEParin 700 Units/hr (07/06/20 1211)     - MEDICATION INSTRUCTIONS -       ACE/ARB/ARNI NOT PRESCRIBED       BETA BLOCKER NOT PRESCRIBED         aspirin  81 mg Oral  Daily     bacitracin   Topical BID     bimatoprost  1 drop Both Eyes At Bedtime     busPIRone  10 mg Oral TID     clopidogrel  75 mg Oral Daily     dorzolamide-timolol  1 drop Both Eyes BID     ferrous sulfate  325 mg Oral BID     latanoprost  1 drop Both Eyes Daily     levothyroxine  62.5 mcg Oral QAM AC     multivitamin w/minerals  1 tablet Oral Daily     omeprazole  20 mg Oral QAM AC     QUEtiapine  12.5 mg Oral At Bedtime     rosuvastatin  20 mg Oral Daily     sodium chloride  2 spray Both Nostrils TID     sodium chloride (PF)  10 mL Intracatheter Q7 Days     vitamin B1  100 mg Oral Daily

## 2020-07-06 NOTE — PLAN OF CARE
IMC status. Alert and oriented x4. VSS on RA. Up with assist of 1, GB to commode. Denies any pain/sob. Tele SR. R picc in place, pcu collect. Heparin infusing at 600 units/hr. On dopamine drip - pressures in the 80s-90s systolic. HRs 80s-100s. Will continue to monitor.

## 2020-07-06 NOTE — PLAN OF CARE
BPs continue to be soft. Pt denies dizziness/lightheadedness. Dopamine gtt infusing. Heparin gtt infusing, 10A in AM. Up SBA +walker. Tele- SR. Right PICC in place. Continue to monitor.

## 2020-07-07 NOTE — PLAN OF CARE
Discharge Planner OT   Patient plan for discharge: home  Current status: pt agreed to ambulate, pt ambulated with FWW with SBA/CGA for safety, 75 ft and then needed seated rest break due to fatigue and then ambulated 75 ft back. After rest from walk, Pt able to stand at sink for oral facial hygiene approx 3 mins with SBA and fatigued easily. Vitals stable throughout.   Barriers to return to prior living situation: with family A with ADL./IADL's as needed none.  Recommendations for discharge:  Home with family assist for IADL, assist with bathing, resumption of Home PT/OT  Rationale for recommendations: pt with decreased activity tolerance and strength. Pt requires Home OT to maximize (I), safety and activity tolerance for I/ADLs. Home care appropriate as pt requires taxing effort to leave the home        Entered by: Roberta Hare 07/07/2020 10:44 AM

## 2020-07-07 NOTE — PROGRESS NOTES
[facility]  Cardiology Progress Note    Date of Service (when I saw the patient): 07/07/2020       Assessment & Plan   Marquise Jj is a 75 year old female who was admitted on 6/25/2020.     1.  : Acute on Chronic HFrEF/ Ischemic cardiomyopathy/ ICD/ Severe TR  - BNP 33, 199 ( 7/1/20)  - Echo demonstrates moderately dilated LV, EF < 20%, severely hypokinetic, severe tricuspid regurgitation, mild to moderately dilated RV with mildly decreased RV systolic function, mild/moderate MR.   - On dopamine 3.5 mcg/kg/min, continue for an additional day,   - Will restart Entresto when blood pressure allows.  - Sodium 131, potassium 3.9, creatinine 1.23, BUN 31, GFR 43  - net neg 800cc overnight, net neg 3.8 L since admission.     2.  : Large LV apical mural thrombus  - On Heparin gtt, Start coumadin for anticoagulation. INR 2-3    3.  : CAD/ CABG ( LIMA/ diagonal due to LAD dissection, KURT/ RCA, in [place] 4/2020)  - angiogram on 7/2/20 demonstrated patient LIMA to diagonal, atretic KURT graft, significant mid to distal RCA stenosis s/p 2.75 x 24 mm SEMAJ. Will need staged PCI to circumflex, likely outpatient.    - Plavix, aspirin, and statin.     STEPHAN Quijano CNP  [employer]  (M-F, 7:30 am - 4:00 pm)    ATTESTATION:  Ms. Jj was seen and examined. Agree with note and plan of care. DA off tomorrow and hopefully BP will allow entresto to be restarted. Coumadin to start tonight.   JOY Recinos MD     Interval History   Up in chair with family at bedside. Fatigued, orthopnea, denies chest pain, shortness of breath unchanged from yesterday, denies palpitation, presyncope,  Syncope, or LE edema. Soft blood pressures on dopamine, will continue for 1 additional day. Patient hesitant to start coumadin due to difficulty with INR's in the past. Agreeable to start as data supports Coumadin in the setting of LV thrombus vs DOAC.      Physical Exam   Temp: 97.8  F (36.6  C) Temp src: Oral BP: 95/60 Pulse: 97  Heart Rate: 87 Resp: 16 SpO2: 100 % O2 Device: None (Room air)    Vitals:    07/05/20 0605 07/06/20 0234 07/07/20 0608   Weight: 66.6 kg (146 lb 14.4 oz) 67.4 kg (148 lb 9.6 oz) 68.3 kg (150 lb 9.6 oz)     Vital Signs with Ranges  Temp:  [97.8  F (36.6  C)] 97.8  F (36.6  C)  Pulse:  [] 97  Heart Rate:  [87] 87  Resp:  [16] 16  BP: ()/(34-81) 95/60  SpO2:  [99 %-100 %] 100 %  I/O last 3 completed shifts:  In: -   Out: 875 [Urine:875]    GEN:  In general, fatigued, in no acute distress.  Patient ambulatory.  HEENT:  Pupils equal, round. Sclerae nonicteric. Clear oropharynx. Mucous membranes moist.  NECK: Supple, no masses appreciated. Trachea midline. Mildly elevated JVD   C/V:  Regular rate and rhythm, systolic murmur, no rub or gallop. No S3 or RV heave.   RESP: Respirations are unlabored. No use of accessory muscles. Clear to auscultation bilaterally without wheezing, rales, or rhonchi.  GI: Abdomen soft, nontender, nondistended. No HSM appreciated.   EXTREM: no LE edema. No cyanosis or clubbing.   NEURO: Alert and oriented, cooperative.  No obvious focal deficits.   PSYCH: Normal affect.  SKIN: Warm and dry. No rashes or petechiae appreciated.       Medications     - MEDICATION INSTRUCTIONS -       dextrose       DOPamine 3.5 mcg/kg/min (07/07/20 0616)     HEParin 700 Units/hr (07/07/20 0616)     - MEDICATION INSTRUCTIONS -       ACE/ARB/ARNI NOT PRESCRIBED       BETA BLOCKER NOT PRESCRIBED         aspirin  81 mg Oral Daily     bacitracin   Topical BID     bimatoprost  1 drop Both Eyes At Bedtime     busPIRone  10 mg Oral TID     clopidogrel  75 mg Oral Daily     dorzolamide-timolol  1 drop Both Eyes BID     ferrous sulfate  325 mg Oral BID     latanoprost  1 drop Both Eyes Daily     levothyroxine  62.5 mcg Oral QAM AC     multivitamin w/minerals  1 tablet Oral Daily     omeprazole  20 mg Oral QAM AC     QUEtiapine  12.5 mg Oral At Bedtime     rosuvastatin  20 mg Oral Daily     sodium chloride  2  spray Both Nostrils TID     sodium chloride (PF)  10 mL Intracatheter Q7 Days     vitamin B1  100 mg Oral Daily       Data   Reviewed       Lea Hull, STEPHAN CNP 7/7/2020

## 2020-07-07 NOTE — PLAN OF CARE
VSS on RA. Tele- SR. Heparin gtt, 10A in AM. Dopamine gtt at 3.5mcg/kg/mn. Up SBA +walker, calls appropriately. Plan to start bridging with Warfarin this evening. Pt nervous about Coumadin d/t INR being sensitive (easily supratherapeutic). Flat affect. Daughter at bedside. Walked in hallway, DASH. Continue to monitor.

## 2020-07-07 NOTE — PLAN OF CARE
Pt stable over night, VSS, no c/o Chest pain or sob.  Pt on Dopamine and Heparin gtt with out problems.  Pt up to the BR with SBA.  Pt has been in SR.  No new issues noted, will continue to monitor.

## 2020-07-07 NOTE — PROGRESS NOTES
[facility]    Medicine Progress Note - Hospitalist Service       Date of Admission:  6/25/2020  Assessment & Plan   Marquise Jj is a 75 year old female with PMH recent complicated cardiac history significant for severe ischemic cardiomyopathy with left ventricular ejection fraction of less than 20%, large mural thrombus on recent echocardiogram from June 2020, severe multivessel coronary disease status post bypass in March 2020, chronic anemia, and chronic CKD who was admitted on 6/25/2020 for hypotension and weakness (Transfer from [facility] ED).      Cardiogenic shock   Acute decompensated HFrEF. LVEF of less than 20%  Ischemic cardiomyopathy with late presentation STEMI s/p CABG (LIMA to diagonal due to LAD dissection, KURT to RCA, [place] 4/2020),   S/p coronary angiography 7/2/2020 with SEMAJ to RCA, plan for staged procedure to LCx.(Thallium viability study 7/1/2020 showing mostly viable LCx territory)   S/p AICD 4/2020 in [place]  Severe TR, mild-mod MR    Presented with hypotension needing pressors and ICU admission, initially thought to be sec to urinary source and started empirically on Rocephin but blood and urine culture from 6/25 with no growth so far; remains afebrile; leukocytosis improved, status post completion of Rocephin. Transferred out of ICU on 6/27 and SBP remains soft in the 90s.   * patient is not a candidate for CRT upgrade and so EP signed off.  * Cardiology following, started on Bumex drip 6/27. Changed to torsemide 6/30/2020. Started on lasix drip at 5 mg/h after angiogram on 7/2. Diuretics held 7/4 due to rising Cr and gave  ml bolus  * PICC placed on 07/05/20 and started on dopamine per cardiology.    - On 07/07/20, feels a bit more fatigued and Cr at baseline. Wt up as well. Denies increased swelling or SOB.   - On 07/07/20 wt up further to 68.4 from lynn wt of 65.7 on 07/04/20 (Admission wt 71.2, max wt 75.5), Na 131, K 3.9, Cr 1.2 which is her baseline (? If  0.52 was correct). UOP only 975 over last 24 hours.   - Resume PTA Entresto once BP allows. Holding on beta-blocker.   - Staged PCI to LCx per cardiology   - Per cardiology plan for dopamine x 24 hour more hours.    - continue ASA, plavix, rosuvastatin    Suspected Anxiety - leading to hyperventilation. Does not tolerate benzos, so started on low dose Seroquel.  - change to at bedtime on 07/05/20 from BID.    - continue PTA buspirone TID    Large mural thrombus on recent echocardiogram from June 2020 - High risk for stroke.   Supratherapeutic INR on admission (6.08). RESOLVED.   H/o recurrent epistaxis - ENT on 7/3 shows recent bleed from excoriation from nasal canula prong, no active bleeding or other site of bleeding.     - ENT recommended conservative measures with bacitracin and nasal spray.    - Stared high intensity heparin gtt without bolus and appreciate cardiology's input regarding ultimate plan for anticoagulation. ASA/plavix + AC at discharge.    - Pharmacy liason, patient apply for financial assistance Eliquis.     Leukocytosis, RESOLVING - WBC up to 14 on 7/3 without evidence of infection by signs/sx. CXR clear and procalcitonin 0.06. Suspect reactive.   Recent Labs   Lab 07/04/20  0557 07/03/20  1049 07/02/20  0817   WBC 11.3* 14.8* 8.2        LORI, resolved on CKD, stage III Baseline cr around 1-1.2. Cr 1.92 on admission, improved with diuresis and then worsened likely from hear failure. No improvement with IVF. Improved after dopamine gtt.   *  US 6/26 UR, no obstruction or hydronephrosis    Recent Labs   Lab 07/07/20  0600 07/06/20  0900 07/05/20  2245 07/05/20  0537 07/04/20  0557 07/03/20  0548   CR 1.23* 0.52 1.55* 1.75* 1.78* 1.44*       Hypothyroidism - TSH during hospitalization high but FT4 normal. TSH 11.25, FT4 1.39 on 6/28.   PTA on 50mcg synthroid. Cardiology recommended increase in synthroid given low EF.     - Increased PTA synthroid to 62.5 mcg , OP recheck thyroid function in 6-8  wks     Chronic anemia - unclear recent baseline, prior to recent cardiac events it seems her Hb was around 12-13  - Hb stable around 9-11; monitor     Recent Labs   Lab 07/07/20  0600 07/06/20  0900 07/05/20  0537 07/04/20  1405 07/04/20  0557 07/03/20  1049   HGB 8.4* 8.1* 9.1* 9.4* 10.1* 11.0*     Probable GERD  - continue on PTA PPI    Lines: PICC Line placed on R on 07/05/20.      Asymptomatic COVID, negative on 6/25       Diet: 1 Gram Sodium Diet  Snacks/Supplements Adult: Boost Plus; With Meals    DVT Prophylaxis: On heparin  Rojo Catheter: not present  Code Status: Full Code           Disposition Plan   Expected discharge: 2 - 3 days, recommended to prior living arrangement once back on oral medication. .  Entered: Sadie Christian MD 07/07/2020, 7:17 AM       The patient's care was discussed with the Bedside Nurse and Patient. And daughter at bedside.     Sadie Christian MD  Hospitalist Service  [facility]    ______________________________________________________________________    Interval History   Events over last 24 hours as outlined above. Patient denies any SOB, CP, abdominal pain, N/V/D. Just more fatigued. Still anxious about getting home.      Data reviewed today: I reviewed all medications, new labs and imaging results over the last 24 hours. I personally reviewed the chest x-ray image(s) showing as above. .    Physical Exam   Vital Signs: Temp: 97.8  F (36.6  C) Temp src: Oral BP: 94/67 Pulse: 92 Heart Rate: 95 Resp: 16 SpO2: 100 % O2 Device: None (Room air)    Weight: 150 lbs 9.6 oz      Constitutional: NAD,   Neuropsyche:  alert and oriented, answers questions appropriately.   Respiratory:  Breathing comfortably, decreased air exchange at bases, otherwise no wheezes, no crackles.   Cardiovascular:  Regular rate and rhythm, 1-2+ edema.  GI:  soft, NT/ND, BS normal  Skin/Integumen:  No acute rash, bruising or sign of bleeding.         Data   Recent Labs   Lab 07/07/20  0600  07/06/20  0900 07/05/20  2245 07/05/20  0537  07/04/20  0557 07/03/20  2142  07/03/20  1049  07/02/20  0817  07/02/20  0528 07/01/20  0624   WBC  --   --   --   --   --  11.3*  --   --  14.8*  --  8.2  --   --   --    HGB 8.4* 8.1*  --  9.1*   < > 10.1*  --   --  11.0*  --  10.8*   < >  --   --    MCV  --   --   --   --   --  78  --   --  78  --  79  --   --   --    PLT  --   --   --   --   --  304  --   --  340  --  352  --   --   --    INR  --   --   --   --   --   --   --   --   --   --   --   --  1.46* 1.63*   * 130* 132* 132*  --  131*  --   --   --    < >  --   --  136  --    POTASSIUM 3.9 3.8 3.7 4.5  --  4.7  --    < >  --    < >  --   --  3.0*  --    CHLORIDE 97 96 97 98  --  97  --   --   --    < >  --   --  99  --    CO2 27 26 27 27  --  24  --   --   --    < >  --   --  29  --    BUN 31* 35* 38* 39*  --  36*  --   --   --    < >  --   --  26  --    CR 1.23* 0.52 1.55* 1.75*  --  1.78*  --   --   --    < >  --   --  1.29*  --    ANIONGAP 7 8 8 7  --  10  --   --   --    < >  --   --  8  --    FERNANDO 8.9 8.5 8.5 8.9  --  8.9  --   --   --    < >  --   --  9.0  --    * 163* 137* 94  --  120*  --   --   --    < >  --   --  89  --    ALBUMIN  --   --   --   --   --   --  2.9*  --   --   --   --   --   --   --    PROTTOTAL  --   --   --   --   --   --  7.2  --   --   --   --   --   --   --    BILITOTAL  --   --   --   --   --   --  0.8  --   --   --   --   --   --   --    ALKPHOS  --   --   --   --   --   --  141  --   --   --   --   --   --   --    ALT  --   --   --   --   --   --  23  --   --   --   --   --   --   --    AST  --   --   --   --   --   --  20  --   --   --   --   --   --   --     < > = values in this interval not displayed.     No results found for this or any previous visit (from the past 24 hour(s)).  Medications     - MEDICATION INSTRUCTIONS -       dextrose       DOPamine 3.5 mcg/kg/min (07/07/20 0616)     HEParin 700 Units/hr (07/07/20 0616)     - MEDICATION INSTRUCTIONS -        ACE/ARB/ARNI NOT PRESCRIBED       BETA BLOCKER NOT PRESCRIBED         aspirin  81 mg Oral Daily     bacitracin   Topical BID     bimatoprost  1 drop Both Eyes At Bedtime     busPIRone  10 mg Oral TID     clopidogrel  75 mg Oral Daily     dorzolamide-timolol  1 drop Both Eyes BID     ferrous sulfate  325 mg Oral BID     latanoprost  1 drop Both Eyes Daily     levothyroxine  62.5 mcg Oral QAM AC     multivitamin w/minerals  1 tablet Oral Daily     omeprazole  20 mg Oral QAM AC     QUEtiapine  12.5 mg Oral At Bedtime     rosuvastatin  20 mg Oral Daily     sodium chloride  2 spray Both Nostrils TID     sodium chloride (PF)  10 mL Intracatheter Q7 Days     vitamin B1  100 mg Oral Daily

## 2020-07-08 PROBLEM — N17.8 OTHER ACUTE KIDNEY FAILURE (H): Status: ACTIVE | Noted: 2020-01-01

## 2020-07-08 NOTE — PROGRESS NOTES
CLINICAL NUTRITION SERVICES - REASSESSMENT NOTE    Recommendations Ordered by Registered Dietitian (RD):   Continue the following -->   - Boost plus TID w/ meals (chocolate)   - Thiamine 100 mg daily for EF <30%  - Thera vit M daily     Malnutrition: (7/3)  % Weight Loss:  > 5% in 1 month (severe malnutrition)  % Intake:  Decreased intake does not meet criteria for malnutrition - per 's report  Subcutaneous Fat Loss:  Orbital region moderate depletion and Upper arm region moderate depletion  Muscle Loss:  Temporal region moderate depletion, Clavicle bone region moderate depletion and Dorsal hand region moderate depletion  Fluid Retention:  Mild 2+     Malnutrition Diagnosis: Severe malnutrition  In Context of:  Acute illness or injury  Chronic illness or disease     EVALUATION OF PROGRESS TOWARD GOALS   Diet:  1g Na diet   Boost Plus w/ meals     Intake/Tolerance:   - Intakes fluctuate. Yesterday patient consumed 75% of at least one meal (timing of documentation lines up with brownie + vanilla ice cream ordered in the evening).  - Assisted patient in ordering breakfast this morning --> scrambled eggs, hash browns, OJ and 4oz 1% milk.   - Patient recalls drinking all of the boost she receives.   - Per RN, pt's appetite is fair but the restrictive 1g Na diet is her biggest barrier to eating adequately it seems. Daughter has been good about encouraging intakes.   - intakes since last assessment dated 7/3 range from 0-75%.     ASSESSED NUTRITION NEEDS:  Dosing Weight: 66.3 kg   Estimated Energy Needs: 6544-9462 kcals (25-30 Kcal/Kg)  Justification: repletion  Estimated Protein Needs: 80-99 grams protein (1.2-1.5 g pro/Kg)  Justification: Repletion and preservation of lean body mass  Estimated Fluid Needs: 1 mL/kcal  Justification: maintenance    NEW FINDINGS:   - Possible discharge as soon as tomorrow.     Previous Goals:   Intake of at least 50% meals BID-TID.   Evaluation: Unable to fully evaluate.     Previous  Nutrition Diagnosis:   Malnutrition related to increased needs, suspected reduced intakes with acute on chronic illness as evidenced by weight loss of 11# in just over 1 month, e/o fat and muscle wasting.  Evaluation: No change - updated below     CURRENT NUTRITION DIAGNOSIS  Malnutrition related to increased needs, reduced intakes with acute on chronic illness and restrictive diet as evidenced by weight loss of 11# in just over 1 month, e/o fat and muscle wasting, intakes ranging 0-75% of small meals.     INTERVENTIONS  Recommendations / Nutrition Prescription  Reg diet    Continue the following -->   - Boost plus TID w/ meals (chocolate)   - Thiamine 100 mg daily for EF <30%  - Thera vit M daily      Implementation  None new today    Goals  Intake of at least 50% of meals consistently.       MONITORING AND EVALUATION:  Progress towards goals will be monitored and evaluated per protocol and Practice Guidelines    Reina Sloan RD, LD  Pager: [phone]

## 2020-07-08 NOTE — PLAN OF CARE
BPs remain soft -asymptomatic. Dopamine gtt stopped. Heparin gtt infusing, bridging with Warfarin. One time dose of IV Lasix d/t SOB, breathing improved. Still has DASH. Tele- SR. Lactic 2.4, MDs aware. Per cards possible discharge tomorrow on Lovenox until INR therapeutic. SBA +walker. Continue to monitor.

## 2020-07-08 NOTE — PLAN OF CARE
Neuro- A&O  Most Recent Vitals- Temp: 98.1  F (36.7  C) Temp src: Oral BP: 98/64 Pulse: 87 Heart Rate: 102 Resp: 16 SpO2: 100 % O2 Device: None (Room air)    Tele - SR w/ PAC's  Cardiac- +2-3 BLE edema, DASH  Resp- LS dim  O2- none  Activity- SBA w/ walker and GB  Pain- Denies  Drips- Hep 700, Dopamine 5 mcg/kg/min  Drains/Tubes- Picc R  Aggression Color- Green  Plan- Attempt to wean from dopamine and transition to entresto if pt tolerates. If unable to wean possible LVAD  [employer]. Bridging heparin to coumadin    Aris Horner RN

## 2020-07-08 NOTE — PROVIDER NOTIFICATION
Notified MD at 1020 AM regarding change in condition.      Spoke with: Dr. Recinos     Orders were obtained.    Comments: Dopamine gtt stopped earlier. Now BP 89/59, which is not uncommon for patient. But pt is feeling dizzy/lightheaded, also breathing appears more labored. Spoke with Dr. Recinos plan for 20mg IV lasix and to continue to monitor.

## 2020-07-08 NOTE — PROGRESS NOTES
Sepsis Evaluation Progress Note    I was called to see Marquise Jj due to abnormal vital signs triggering the Sepsis SIRS screening alert. She is not known to have an infection.     Physical Exam   Vital Signs:  Temp: 97.6  F (36.4  C) Temp src: Oral BP: (!) 88/59 Pulse: 83   Resp: 24 SpO2: 100 % O2 Device: None (Room air)      Lab:  Lactic Acid   Date Value Ref Range Status   07/05/2020 2.0 0.7 - 2.0 mmol/L Final     Lactate for Sepsis Protocol   Date Value Ref Range Status   07/08/2020 2.4 (H) 0.7 - 2.0 mmol/L Final     Comment:     Significant value called to and read back by  JOSE HERNANDEZ IN [facility] AT [id number].[id number]         The patient is at baseline mental status.     The rest of their physical exam is significant for patient vitals are otherwise stable and c/w heart failure.     Assessment & Plan   NO EVIDENCE OF SEPSIS at this time.  Vital sign, physical exam, and lab findings are likely due to heart failure.    Disposition: The patient will remain on the current unit. We will continue to monitor this patient closely..  Sadie Christian MD    Sepsis Criteria   Sepsis: 2+ SIRS criteria due to infection  Severe Sepsis: Sepsis AND 1+ new sign of acute organ dysfunction (Note: lactate >2 is organ dysfunction)  Septic Shock: Sepsis AND hypotension despite volume resuscitation with 30 ml/kg crystalloid

## 2020-07-08 NOTE — PROGRESS NOTES
[facility]    Medicine Progress Note - Hospitalist Service       Date of Admission:  6/25/2020  Assessment & Plan   Marquise Jj is a 75 year old female with PMH recent complicated cardiac history significant for severe ischemic cardiomyopathy with left ventricular ejection fraction of less than 20%, large mural thrombus on recent echocardiogram from June 2020, severe multivessel coronary disease status post bypass in March 2020, chronic anemia, and chronic CKD who was admitted on 6/25/2020 for hypotension and weakness (Transfer from [facility] ED).      Cardiogenic shock   Acute decompensated HFrEF. LVEF of less than 20%  Ischemic cardiomyopathy with late presentation STEMI s/p CABG (LIMA to diagonal due to LAD dissection, KURT to RCA, [place] 4/2020),   S/p coronary angiography 7/2/2020 with SEMAJ to RCA, plan for staged procedure to LCx.(Thallium viability study 7/1/2020 showing mostly viable LCx territory)   S/p AICD 4/2020 in [place]  Severe TR, mild-mod MR    Presented with hypotension needing pressors and ICU admission, initially thought to be sec to urinary source and started empirically on Rocephin but blood and urine culture from 6/25 with no growth so far; remains afebrile; leukocytosis improved, status post completion of Rocephin. Transferred out of ICU on 6/27 and SBP remains soft in the 90s.   * patient is not a candidate for CRT upgrade and so EP signed off.  * Cardiology following, started on Bumex drip 6/27. Changed to torsemide 6/30/2020. Started on lasix drip at 5 mg/h after angiogram on 7/2. Diuretics held 7/4 due to rising Cr and gave  ml bolus  * PICC placed on 07/05/20 and started on dopamine per cardiology.    - On 07/08/20, feels better, breathing stable, no increased swelling Cr at stable at 1.16, Na 133, K 3.8.  Wt up further to 68.2 from lynn wt of 65.7 on 07/04/20 (Admission wt 71.2, max wt 75.5),   - Dopamine gtt 7/6-7/8, stopped.   - Resume PTA Entresto once BP  allows. Hold for SBP < 90.    - Staged PCI to LCx per cardiology, likely as outpatient  - continue ASA, plavix, rosuvastatin  - Elevated lactic acid secondary to heart failure: 4.1 on admission > 3.2 > 2.4 on 07/08/20.     Large mural thrombus on recent echocardiogram from June 2020 - High risk for stroke.   Supratherapeutic INR on admission (6.08). RESOLVED.   H/o recurrent epistaxis - ENT on 7/3 shows recent bleed from excoriation from nasal canula prong, no active bleeding or other site of bleeding.     - ENT recommended conservative measures with bacitracin and nasal spray.    - On high intensity heparin gtt without bolus   - Coumadin started on 07/07/20. Likely home on coumadin + lovenox.   Recent Labs   Lab 07/08/20  0600 07/07/20  0600 07/02/20  0528   INR 1.15* 1.11 1.46*        Leukocytosis, RESOLVING - WBC up to 14 on 7/3 without evidence of infection by signs/sx. CXR clear and procalcitonin 0.06. Suspect reactive.   Recent Labs   Lab 07/04/20  0557 07/03/20  1049 07/02/20  0817   WBC 11.3* 14.8* 8.2        LORI, resolved on CKD, stage III Baseline cr around 1-1.2. Cr 1.92 on admission, improved with diuresis and then worsened likely from heart failure. No improvement with IVF. Improved after dopamine gtt.   *  US 6/26 UR, no obstruction or hydronephrosis  *  Isolated Cr of 0.52 after initiation of dopamine is outlier, baseline ~ 1.2    Recent Labs   Lab 07/08/20  0600 07/07/20  0600 07/06/20  0900 07/05/20  2245 07/05/20  0537 07/04/20  0557   CR 1.16* 1.23* 0.52 1.55* 1.75* 1.78*       Hypothyroidism - TSH during hospitalization high but FT4 normal. TSH 11.25, FT4 1.39 on 6/28.   PTA on 50mcg synthroid. Cardiology recommended increase in synthroid given low EF.     - Increased PTA synthroid to 62.5 mcg , OP recheck thyroid function in 6-8 wks    Suspected Anxiety - leading to hyperventilation. Does not tolerate benzos, so started on low dose Seroquel.  - change to at bedtime on 07/05/20 from BID.    -  continue PTA buspirone TID     Chronic anemia - unclear recent baseline, prior to recent cardiac events it seems her Hb was around 12-13  - Hb stable around 9-11; monitor     Recent Labs   Lab 07/07/20  0600 07/06/20  0900 07/05/20  0537 07/04/20  1405 07/04/20  0557 07/03/20  1049   HGB 8.4* 8.1* 9.1* 9.4* 10.1* 11.0*     Probable GERD  - continue on PTA PPI    Lines: PICC Line placed on R on 07/05/20.      Asymptomatic COVID, negative on 6/25       Diet: 1 Gram Sodium Diet  Snacks/Supplements Adult: Boost Plus; With Meals    DVT Prophylaxis: On heparin  Rojo Catheter: not present  Code Status: Full Code           Disposition Plan   Expected discharge: 2 - 3 days, recommended to prior living arrangement once back on oral medication. .  Entered: Sadie Christian MD 07/08/2020, 3:47 PM       The patient's care was discussed with the Bedside Nurse and Patient. And daughter at bedside.     Sadie Christian MD  Hospitalist Service  [facility]    ______________________________________________________________________    Interval History   Events over last 24 hours as outlined above. Patient denies any SOB, CP, abdominal pain, N/V/D. Feeling better today. Less fatigued. Hopeful to get home soon. No new SOB or swelling.     Data reviewed today: I reviewed all medications, new labs and imaging results over the last 24 hours. I personally reviewed the chest x-ray image(s) showing as above. .    Physical Exam   Vital Signs: Temp: 97.6  F (36.4  C) Temp src: Oral BP: 97/61 Pulse: 89   Resp: 20 SpO2: 97 % O2 Device: None (Room air)    Weight: 150 lbs 4.8 oz      Constitutional: NAD,   Neuropsyche:  alert and oriented, answers questions appropriately.   Respiratory:  Breathing comfortably, decreased air exchange at bases, otherwise no wheezes, no crackles.   Cardiovascular:  Regular rate and rhythm, 1-2+ edema.  GI:  soft, NT/ND, BS normal  Skin/Integumen:  No acute rash, bruising or sign of bleeding.          Data   Recent Labs   Lab 07/08/20  0600 07/07/20  0600 07/06/20  0900  07/05/20  0537  07/04/20  0557 07/03/20  2142  07/03/20  1049  07/02/20  0817  07/02/20  0528   WBC  --   --   --   --   --   --  11.3*  --   --  14.8*  --  8.2  --   --    HGB  --  8.4* 8.1*  --  9.1*   < > 10.1*  --   --  11.0*  --  10.8*   < >  --    MCV  --   --   --   --   --   --  78  --   --  78  --  79  --   --    PLT  --  236  --   --   --   --  304  --   --  340  --  352   < >  --    INR 1.15* 1.11  --   --   --   --   --   --   --   --   --   --   --  1.46*    131* 130*   < > 132*  --  131*  --   --   --    < >  --   --  136   POTASSIUM 3.8 3.9 3.8   < > 4.5  --  4.7  --    < >  --    < >  --   --  3.0*   CHLORIDE 98 97 96   < > 98  --  97  --   --   --    < >  --   --  99   CO2 27 27 26   < > 27  --  24  --   --   --    < >  --   --  29   BUN 26 31* 35*   < > 39*  --  36*  --   --   --    < >  --   --  26   CR 1.16* 1.23* 0.52   < > 1.75*  --  1.78*  --   --   --    < >  --   --  1.29*   ANIONGAP 8 7 8   < > 7  --  10  --   --   --    < >  --   --  8   FERNANDO 8.8 8.9 8.5   < > 8.9  --  8.9  --   --   --    < >  --   --  9.0   GLC 93 110* 163*   < > 94  --  120*  --   --   --    < >  --   --  89   ALBUMIN  --   --   --   --   --   --   --  2.9*  --   --   --   --   --   --    PROTTOTAL  --   --   --   --   --   --   --  7.2  --   --   --   --   --   --    BILITOTAL  --   --   --   --   --   --   --  0.8  --   --   --   --   --   --    ALKPHOS  --   --   --   --   --   --   --  141  --   --   --   --   --   --    ALT  --   --   --   --   --   --   --  23  --   --   --   --   --   --    AST  --   --   --   --   --   --   --  20  --   --   --   --   --   --     < > = values in this interval not displayed.     No results found for this or any previous visit (from the past 24 hour(s)).  Medications     - MEDICATION INSTRUCTIONS -       dextrose       HEParin 700 Units/hr (07/08/20 0020)     - MEDICATION INSTRUCTIONS -        ACE/ARB/ARNI NOT PRESCRIBED       BETA BLOCKER NOT PRESCRIBED       Warfarin Therapy Reminder         aspirin  81 mg Oral Daily     bacitracin   Topical BID     bimatoprost  1 drop Both Eyes At Bedtime     busPIRone  10 mg Oral TID     clopidogrel  75 mg Oral Daily     dorzolamide-timolol  1 drop Both Eyes BID     ferrous sulfate  325 mg Oral BID     latanoprost  1 drop Both Eyes Daily     levothyroxine  62.5 mcg Oral QAM AC     multivitamin w/minerals  1 tablet Oral Daily     omeprazole  20 mg Oral QAM AC     QUEtiapine  12.5 mg Oral At Bedtime     rosuvastatin  20 mg Oral Daily     sacubitril-valsartan  1 tablet Oral BID     sodium chloride  2 spray Both Nostrils TID     sodium chloride (PF)  10 mL Intracatheter Q7 Days     vitamin B1  100 mg Oral Daily     warfarin ANTICOAGULANT  2 mg Oral ONCE at 18:00

## 2020-07-08 NOTE — PROGRESS NOTES
[facility]  Cardiology Progress Note    Date of Service (when I saw the patient): 07/08/2020     Assessment & Plan   Marquise Jj is a 75 year old female who was admitted on 6/25/2020.     1.  : Acute on Chronic HFrEF/ Ischemic cardiomyopathy/ ICD/ Severe TR  - BNP 33, 199 ( 7/1/20)  - Echo demonstrates moderately dilated LV, EF < 20%, severely hypokinetic, severe tricuspid regurgitation, mild to moderately dilated RV with mildly decreased RV systolic function, mild/moderate MR.   - BP soft ranging  systolic  - Discontinue dopamine and monitor closely  - Restart PTA Entresto this afternoon if BP allows, hold if < 90 systolic   - Sodium 133, potassium 3.8, creatinine 1.16, BUN 26, GFR 46  - net neg ~600cc overnight, net neg 2.5 L since admission.      2.  : Large LV apical mural thrombus  - On Heparin gtt, Started coumadin for anticoagulation. INR goal 2-3  - INR 1.15  -  Consider Lovenox for patient to home with.      3.  : CAD/ CABG ( LIMA/ diagonal due to LAD dissection, KURT/ RCA, in [place] 4/2020)  - angiogram on 7/2/20 demonstrated patient LIMA to diagonal, atretic KURT graft, significant mid to distal RCA stenosis s/p 2.75 x 24 mm SEMAJ. Will need staged PCI to circumflex, likely outpatient.    - EKG demonstrates NSR with rate of 90  -  Continue Plavix, aspirin, and statin.     4. Hyperlipidemia  - On statin        STEPHAN Quijano CNP  Text Page  (M-F, 7:30 am - 4:00 pm)      ATTESTATION:  Ms. Jj was seen and examined. Agree with note and plan of care. Stopped DA today. Plan to restart entresto this afternoon. One dose of lasix 20mg IV this AM. May discharge tomorrow on Lovenox till inr therapeutic if doing well clinically.   JOY Recinos MD     Interval History   Up in chair with complaints of fatigue and exertional shortness of breath. Denies chest pain, palpitations, PND, orthopnea, presyncope, syncope or LE edema. BP stable, stop dopamine. Restart Entresto today if BP  allows. INR 1.15 on Coumadin and heparin.  Consider Lovenox for patient to discharge possibly tomorrow.     Physical Exam   Temp: 97.6  F (36.4  C) Temp src: Oral BP: 100/76 Pulse: 97 Heart Rate: 102 Resp: 16 SpO2: 100 % O2 Device: None (Room air)    Vitals:    07/06/20 0234 07/07/20 0608 07/08/20 0346   Weight: 67.4 kg (148 lb 9.6 oz) 68.3 kg (150 lb 9.6 oz) 68.2 kg (150 lb 4.8 oz)     Vital Signs with Ranges  Temp:  [97.4  F (36.3  C)-98.1  F (36.7  C)] 97.6  F (36.4  C)  Pulse:  [] 97  Heart Rate:  [] 102  Resp:  [16] 16  BP: ()/(48-78) 100/76  SpO2:  [100 %] 100 %  I/O last 3 completed shifts:  In: 240 [P.O.:240]  Out: 925 [Urine:925]    GEN:  In general, this is a well nourished femlae in no acute distress. fatigued   HEENT:  Pupils equal, round. Sclerae nonicteric. Clear oropharynx. Mucous membranes moist.  NECK: Supple, no masses appreciated. Trachea midline. No JVD with patient.   C/V:  Regular rate and rhythm, systolic murmur, no rub or gallop. No S3 or RV heave.   RESP: Respirations are unlabored. Decreased RLL   GI: Abdomen soft, nontender, nondistended. No HSM appreciated.   EXTREM: Trace bilateral LE edema. No cyanosis or clubbing.  NEURO: Alert and oriented, cooperative. Gait steady. No obvious focal deficits.   PSYCH: Normal affect.  SKIN: Warm and dry. No rashes or petechiae appreciated.       Medications     - MEDICATION INSTRUCTIONS -       dextrose       DOPamine 3.5 mcg/kg/min (07/07/20 0616)     HEParin 700 Units/hr (07/08/20 0020)     - MEDICATION INSTRUCTIONS -       ACE/ARB/ARNI NOT PRESCRIBED       BETA BLOCKER NOT PRESCRIBED       Warfarin Therapy Reminder         aspirin  81 mg Oral Daily     bacitracin   Topical BID     bimatoprost  1 drop Both Eyes At Bedtime     busPIRone  10 mg Oral TID     clopidogrel  75 mg Oral Daily     dorzolamide-timolol  1 drop Both Eyes BID     ferrous sulfate  325 mg Oral BID     latanoprost  1 drop Both Eyes Daily     levothyroxine  62.5 mcg  Oral QAM AC     multivitamin w/minerals  1 tablet Oral Daily     omeprazole  20 mg Oral QAM AC     QUEtiapine  12.5 mg Oral At Bedtime     rosuvastatin  20 mg Oral Daily     sodium chloride  2 spray Both Nostrils TID     sodium chloride (PF)  10 mL Intracatheter Q7 Days     vitamin B1  100 mg Oral Daily       Data   Reviewed       Lea Hull, APRN CNP 7/8/2020

## 2020-07-08 NOTE — PROVIDER NOTIFICATION
Notified MD at 1150 AM regarding critical results read back.      Spoke with: Dr. Christian    Orders were obtained.    Comments: Lactic acid 2.4

## 2020-07-08 NOTE — PROVIDER NOTIFICATION
MD Notification    Notified Person: MD    Notified Person Name: Dr. Pressley    Notification Date/Time: 7/8/2020    Notification Interaction: Paged/phone    Purpose of Notification:  Can we add a BMP as we are monitoring pt's creat and currently holding diuretics.     Orders Received: BMP added    Comments:

## 2020-07-09 NOTE — PROVIDER NOTIFICATION
MD Notification    Notified Person: MD    Notified Person Name:  Rominabrittneylynsey    Notification Date/Time: 7/9/2020 0010    Notification Interaction: Paged    Purpose of Notification: Dopamine gtt stopped today.  SBP's 60-80's with MAP 41-66.  Asymptomatic.    Comments: Pressures improved when lying on her back, but she cannot tolerate lying on her back for long.

## 2020-07-09 NOTE — PLAN OF CARE
Neuro: alert and oriented x4, withdrawn and sad    Cardiac: SR, no chest pain reported    Vitals: Temp: 97.4  F (36.3  C) Temp src: Oral BP: (!) 80/66 Pulse: 90 Heart Rate: 110 Resp: 16 SpO2: 97 % O2 Device: None (Room air)       Respiratory: clear lungs, room air    GI/: 1 gram sodium diet    Skin: on mid back/spine - scabbing/blanchable erythema.            Right groin site - bruising on pubis    Activity: stand by assist, GB and walker    PIV/Drips: PICC, heparin @ 700, recheck in the morning    Discharge: Friday or Saturday with home health    Updates/Plan:   -1 mg warfarin given tonight  -INR 1.17

## 2020-07-09 NOTE — PLAN OF CARE
Pt c/o pain in back- chronic- improved w/ tylenol. Vitals stable on RA. Up w/ st. By assist, GB and walker. Heparin gtt running- bridging, foillowing INR 1.17 today, no noted bleeding this shift. PICC line CDI.  Pt has blanchable area on middle spin w. Scab at the center -refused dressing, off set w/ pillow to prevent pressure.  Continues with 2+ LE edema and dusky LE- TEDS on.  Tele SR.  Planing discharge possible Friday or Saturday w/ home health.

## 2020-07-09 NOTE — PLAN OF CARE
DATE & TIME: 7/8/2020 1900 - 7/9/2020 0700    COGNITION/BEHAVIOR: A&Ox4  Vital signs:  Temp: 97.4  F (36.3  C) Temp src: Oral BP: (!) 85/59 Pulse: 78   Resp: 18 SpO2: 100 % via RA  Note low BP's following dopamine discontinuation 7/8/2020 with resumption of Entresto.  TELEMETRY RHYTHM: SR with a PAC  MOBILITY: SBA with a walker  PAIN: Chronic back pain, improved with PRN Tylenol at HS  DIET: 1gm sodium  RESP: LS clear, denies SOB  CV/PV: HRRR, denies chest pain  GI/: Voiding adequately  SKIN: Intact with exception of intact wound to spine, open to air per patient preference  LINES: RUE PICC infusing heparin gtt  LAB/BG: Heparin 10a within goal range  OTHER: IMC status

## 2020-07-09 NOTE — PROGRESS NOTES
[facility]  Cardiology Progress Note    Date of Service (when I saw the patient): 07/09/2020     Assessment & Plan   Marquise Babb is a 75 year old female who was admitted on 6/25/2020.      1.  : Acute on Chronic HFrEF/ Ischemic cardiomyopathy/ ICD/ Severe TR  - BNP 33, 199 ( 7/1/20)  - Echo demonstrates moderately dilated LV, EF < 20%, severely hypokinetic, severe tricuspid regurgitation, mild to moderately dilated RV with mildly decreased RV systolic function, mild/moderate MR.   - BP soft, baseline 80-90's systolic, asymptomatic  - PTA Entresto restarted 7/8/20  - Sodium 131, potassium 3.9, creatinine 1.15, BUN 30, GFR 46  - net neg ~550cc overnight, net neg 2.0 L since admission.   - Weights stable     2.  : Large LV apical mural thrombus  - On Heparin gtt.  -  Started coumadin 7/7/20,  INR goal 2-3  - INR 1.17  - Plan to monitor INR closely due to recent supra-therapeutic INR.  -  Consider Lovenox for patient to home with.      3.  : CAD/ CABG ( LIMA/ diagonal due to LAD dissection, KURT/ RCA, in [place] 4/2020)  - angiogram on 7/2/20 demonstrated patient LIMA to diagonal, atretic KURT graft, significant mid to distal RCA stenosis s/p 2.75 x 24 mm SEMAJ. Will need staged PCI to circumflex, likely outpatient.    - EKG demonstrates NSR with rate of 90  -  Continue Plavix, and statin.   - Stop Aspirin, due to quadruple therapy ( Plavix, coumadin, and Heparin).      4. Hyperlipidemia  - On statin        STEPHAN Quijano CNP  Text Page  (M-F, 7:30 am - 4:00 pm)    ATTESTATION:  Ms babb was seen and examined. Agree with note and plan of care. Tolerating re-addition of Entresto.  Likely home tomorrow   JOY Recinos MD     Interval History   Up in chair, feeling better today. Continues with mild exertional shortness of breath, fatigue and bilateral LE edema.  Denies chest pain, palpitations, PND, orthopnea, presyncope or syncope. Baseline hypotension after starting Entresto. Labs stable.      Physical Exam   Temp: 97.4  F (36.3  C) Temp src: Oral BP: 102/53(post ambulation) Pulse: 88 Heart Rate: 110 Resp: 16 SpO2: 100 % O2 Device: None (Room air)    Vitals:    07/07/20 0608 07/08/20 0346 07/09/20 0552   Weight: 68.3 kg (150 lb 9.6 oz) 68.2 kg (150 lb 4.8 oz) 68.4 kg (150 lb 12.8 oz)     Vital Signs with Ranges  Temp:  [97.4  F (36.3  C)-98  F (36.7  C)] 97.4  F (36.3  C)  Pulse:  [] 88  Heart Rate:  [] 110  Resp:  [16-24] 16  BP: ()/(35-72) 102/53  SpO2:  [89 %-100 %] 100 %  I/O last 3 completed shifts:  In: 140 [P.O.:120; I.V.:20]  Out: 700 [Urine:700]    GEN:  In general, this is a thin female in no acute distress. Patient ambulatory.  HEENT:  Pupils equal, round. Sclerae nonicteric. Clear oropharynx. Mucous membranes moist.  NECK: Supple, no masses appreciated. Trachea midline. Mildly elevated JVD   C/V:  Regular rate and rhythm, systolic murmur, no rub or gallop. No S3 or RV heave.   RESP: Respirations are unlabored. No use of accessory muscles. Clear to auscultation bilaterally without wheezing, rales, or rhonchi.  GI: Abdomen soft, nontender, nondistended. No HSM appreciated.   EXTREM: 1-2+ bilateral LE edema. No cyanosis or clubbing.  NEURO: Alert and oriented, cooperative. No obvious focal deficits.   PSYCH: Normal affect.  SKIN: Warm and dry. No rashes or petechiae appreciated.       Medications     - MEDICATION INSTRUCTIONS -       dextrose       HEParin 700 Units/hr (07/09/20 0918)     - MEDICATION INSTRUCTIONS -       ACE/ARB/ARNI NOT PRESCRIBED       BETA BLOCKER NOT PRESCRIBED       Warfarin Therapy Reminder         aspirin  81 mg Oral Daily     bacitracin   Topical BID     bimatoprost  1 drop Both Eyes At Bedtime     busPIRone  10 mg Oral TID     clopidogrel  75 mg Oral Daily     dorzolamide-timolol  1 drop Both Eyes BID     ferrous sulfate  325 mg Oral BID     latanoprost  1 drop Both Eyes Daily     levothyroxine  62.5 mcg Oral QAM AC     multivitamin w/minerals  1  tablet Oral Daily     omeprazole  20 mg Oral QAM AC     QUEtiapine  12.5 mg Oral At Bedtime     rosuvastatin  20 mg Oral Daily     sacubitril-valsartan  1 tablet Oral BID     sodium chloride  2 spray Both Nostrils TID     sodium chloride (PF)  10 mL Intracatheter Q7 Days     vitamin B1  100 mg Oral Daily     warfarin ANTICOAGULANT  1 mg Oral ONCE at 18:00       Data   Reviewed       STEPHAN Quijano CNP 7/9/2020

## 2020-07-09 NOTE — PROGRESS NOTES
[facility]    Medicine Progress Note - Hospitalist Service       Date of Admission:  6/25/2020  Assessment & Plan   Marquise Jj is a 75 year old female with PMH recent complicated cardiac history significant for severe ischemic cardiomyopathy with left ventricular ejection fraction of less than 20%, large mural thrombus on recent echocardiogram from June 2020, severe multivessel coronary disease status post bypass in March 2020, chronic anemia, and chronic CKD who was admitted on 6/25/2020 for hypotension and weakness (Transfer from [facility] ED).      Cardiogenic shock   Acute decompensated HFrEF. LVEF of less than 20%  Ischemic cardiomyopathy with late presentation STEMI s/p CABG (LIMA to diagonal due to LAD dissection, KURT to RCA, [place] 4/2020),   S/p coronary angiography 7/2/2020 with SEMAJ to RCA, plan for staged procedure to LCx.(Thallium viability study 7/1/2020 showing mostly viable LCx territory)   S/p AICD 4/2020 in [place]  Severe TR, mild-mod MR    Presented with hypotension needing pressors and ICU admission, initially thought to be sec to urinary source and started empirically on Rocephin but blood and urine culture from 6/25 with no growth so far; remains afebrile; leukocytosis improved, status post completion of Rocephin. Transferred out of ICU on 6/27 and SBP remains soft in the 90s.   * patient is not a candidate for CRT upgrade and so EP signed off.  * Cardiology following, started on Bumex drip 6/27. Changed to torsemide 6/30/2020. Started on lasix drip at 5 mg/h after angiogram on 7/2. Diuretics held 7/4 due to rising Cr and gave  ml bolus  * PICC placed on 07/05/20 and started on dopamine per cardiology.  * Dopamine gtt 7/6-7/8 without diuresis .     - Resumed PTA Entresto on 07/08/20. Hold for SBP < 90.    - Lasix 20 mg IV x 1 given on 07/09/20.   - On continues to feel better, breathing stable, no increased swelling Cr at stable at 1.15, Na 131, K 3.9.  Wt stable at  68.4 from lynn wt of 65.7 on 07/04/20 (Admission wt 71.2, max wt 75.5),   - Staged PCI to LCx per cardiology, likely as outpatient  - continue ASA, plavix, rosuvastatin  - Elevated lactic acid secondary to heart failure: 4.1 on admission > 3.2 > 2.4 on 07/08/20.     Large mural thrombus on recent echocardiogram from June 2020 - High risk for stroke.   Supratherapeutic INR on admission (6.08). RESOLVED.   H/o recurrent epistaxis - ENT on 7/3 shows recent bleed from excoriation from nasal canula prong, no active bleeding or other site of bleeding.     - ENT recommended conservative measures with bacitracin and nasal spray.    - On high intensity heparin gtt without bolus   - Coumadin started on 07/07/20. Likely home on coumadin + lovenox.   - She came in with elevated INR of 6 on coumadin 1 mg daily and pharmacy has dosed 1 mg, 2 mg started 7/7. Consulted pharmacy to hold dosing at 1 mg as we do not want to discharge her on a weekend with the potential for supra therapeutic INR's on coumadin + lovenox, espeically in context recent h/o bleeding.     Recent Labs   Lab 07/09/20  0600 07/08/20  0600 07/07/20  0600   INR 1.17* 1.15* 1.11        Leukocytosis, RESOLVING - WBC up to 14 on 7/3 without evidence of infection by signs/sx. CXR clear and procalcitonin 0.06. Suspect reactive.   Recent Labs   Lab 07/04/20  0557 07/03/20  1049   WBC 11.3* 14.8*        LORI, resolved on CKD, stage III Baseline cr around 1-1.2. Cr 1.92 on admission, improved with diuresis and then worsened likely from heart failure. No improvement with IVF. Improved after dopamine gtt.   *  US 6/26 UR, no obstruction or hydronephrosis  *  Isolated Cr of 0.52 after initiation of dopamine is outlier, baseline ~ 1.2    Recent Labs   Lab 07/09/20  0600 07/08/20  0600 07/07/20  0600 07/06/20  0900 07/05/20  2245 07/05/20  0537   CR 1.15* 1.16* 1.23* 0.52 1.55* 1.75*       Hypothyroidism - TSH during hospitalization high but FT4 normal. TSH 11.25, FT4 1.39 on  6/28.   PTA on 50mcg synthroid. Cardiology recommended increase in synthroid given low EF.     - Increased PTA synthroid to 62.5 mcg , OP recheck thyroid function in 6-8 wks    Suspected Anxiety - leading to hyperventilation. Does not tolerate benzos, so started on low dose Seroquel.  - change to at bedtime on 07/05/20 from BID.    - continue PTA buspirone TID     Chronic anemia - unclear recent baseline, prior to recent cardiac events it seems her Hb was around 12-13  - Hb stable around 9-11; monitor     Recent Labs   Lab 07/07/20  0600 07/06/20  0900 07/05/20  0537 07/04/20  1405 07/04/20  0557 07/03/20  1049   HGB 8.4* 8.1* 9.1* 9.4* 10.1* 11.0*     Probable GERD  - continue on PTA PPI    Lines: PICC Line placed on R on 07/05/20.      Asymptomatic COVID, negative on 6/25       Diet: 1 Gram Sodium Diet  Snacks/Supplements Adult: Boost Plus; With Meals    DVT Prophylaxis: On heparin  Rojo Catheter: not present  Code Status: Full Code           Disposition Plan   Expected discharge: 2 - 3 days, recommended to prior living arrangement once back on oral medication. .  Entered: Sadie Christian MD 07/09/2020, 9:25 AM       The patient's care was discussed with the Bedside Nurse and Patient. And daughter at bedside.     Sadie Christian MD  Hospitalist Service  [facility]    ______________________________________________________________________    Interval History   Events over last 24 hours as outlined above. Patient denies any SOB, CP, abdominal pain, N/V/D. Continues to feel better and hopeful for discharge home soon.     Data reviewed today: I reviewed all medications, new labs and imaging results over the last 24 hours. I personally reviewed the chest x-ray image(s) showing as above. .    Physical Exam   Vital Signs: Temp: 97.4  F (36.3  C) Temp src: Oral BP: 102/53(post ambulation) Pulse: 88 Heart Rate: 110 Resp: 16 SpO2: 100 % O2 Device: None (Room air)    Weight: 150 lbs 12.8 oz       Constitutional: NAD,   Neuropsyche:  alert and oriented, answers questions appropriately.   Respiratory:  Breathing comfortably, decreased air exchange at bases, otherwise no wheezes, no crackles.   Cardiovascular:  Regular rate and rhythm, 1-2+ edema.  GI:  soft, NT/ND, BS normal  Skin/Integumen:  No acute rash, bruising or sign of bleeding.         Data   Recent Labs   Lab 07/09/20  0600 07/08/20  0600 07/07/20  0600 07/06/20  0900  07/05/20  0537  07/04/20  0557 07/03/20  2142  07/03/20  1049   WBC  --   --   --   --   --   --   --  11.3*  --   --  14.8*   HGB  --   --  8.4* 8.1*  --  9.1*   < > 10.1*  --   --  11.0*   MCV  --   --   --   --   --   --   --  78  --   --  78   PLT  --   --  236  --   --   --   --  304  --   --  340   INR 1.17* 1.15* 1.11  --   --   --   --   --   --   --   --    * 133 131* 130*   < > 132*  --  131*  --   --   --    POTASSIUM 3.9 3.8 3.9 3.8   < > 4.5  --  4.7  --    < >  --    CHLORIDE 99 98 97 96   < > 98  --  97  --   --   --    CO2 27 27 27 26   < > 27  --  24  --   --   --    BUN 30 26 31* 35*   < > 39*  --  36*  --   --   --    CR 1.15* 1.16* 1.23* 0.52   < > 1.75*  --  1.78*  --   --   --    ANIONGAP 5 8 7 8   < > 7  --  10  --   --   --    FERNANDO 8.6 8.8 8.9 8.5   < > 8.9  --  8.9  --   --   --    GLC 84 93 110* 163*   < > 94  --  120*  --   --   --    ALBUMIN  --   --   --   --   --   --   --   --  2.9*  --   --    PROTTOTAL  --   --   --   --   --   --   --   --  7.2  --   --    BILITOTAL  --   --   --   --   --   --   --   --  0.8  --   --    ALKPHOS  --   --   --   --   --   --   --   --  141  --   --    ALT  --   --   --   --   --   --   --   --  23  --   --    AST  --   --   --   --   --   --   --   --  20  --   --     < > = values in this interval not displayed.     No results found for this or any previous visit (from the past 24 hour(s)).  Medications     - MEDICATION INSTRUCTIONS -       dextrose       HEParin 700 Units/hr (07/09/20 0918)     - MEDICATION  INSTRUCTIONS -       ACE/ARB/ARNI NOT PRESCRIBED       BETA BLOCKER NOT PRESCRIBED       Warfarin Therapy Reminder         aspirin  81 mg Oral Daily     bacitracin   Topical BID     bimatoprost  1 drop Both Eyes At Bedtime     busPIRone  10 mg Oral TID     clopidogrel  75 mg Oral Daily     dorzolamide-timolol  1 drop Both Eyes BID     ferrous sulfate  325 mg Oral BID     latanoprost  1 drop Both Eyes Daily     levothyroxine  62.5 mcg Oral QAM AC     multivitamin w/minerals  1 tablet Oral Daily     omeprazole  20 mg Oral QAM AC     QUEtiapine  12.5 mg Oral At Bedtime     rosuvastatin  20 mg Oral Daily     sacubitril-valsartan  1 tablet Oral BID     sodium chloride  2 spray Both Nostrils TID     sodium chloride (PF)  10 mL Intracatheter Q7 Days     vitamin B1  100 mg Oral Daily     warfarin ANTICOAGULANT  2 mg Oral ONCE at 18:00

## 2020-07-10 NOTE — PLAN OF CARE
Discharge Planner OT   Patient plan for discharge: Home tomorrow with family assist and home cares  Current status: Pt completes bed mobility, sit<>stand transfers (I)ly. Ambulated approx 100' with WW and close SBA. Pt reports 5/10 SOB at rest and 8/10 SOB with activity. O2 % on RA. BP 87/64 at rest but asymptomatic and tends to have soft BPs. SBP increased to 104 with activity. Wanted to rest in bed at end of session. Daughter present who is with her most days at home and also assists with bathing, meals. Pt already has a rolling walker with seat and tub transfer bench.  Barriers to return to prior living situation: impaired activity tolerance  Recommendations for discharge: Home with assist of daughter for bathing and all IADL, home PT/OT  Rationale for recommendations: Pt has very low activity tolerance and will need assist with taxing ADL/IADL. She would be unable to leave the home without assist of another person and AD, therefor home care indicated to maximize level of (I) and activity tolerance.       Entered by: Mikala Oscar 07/10/2020 3:34 PM

## 2020-07-10 NOTE — PROGRESS NOTES
[facility]    Medicine Progress Note - Hospitalist Service       Date of Admission:  6/25/2020  Assessment & Plan   Marquise Jj is a 75 year old female with PMH recent complicated cardiac history significant for severe ischemic cardiomyopathy with left ventricular ejection fraction of less than 20%, large mural thrombus on recent echocardiogram from June 2020, severe multivessel coronary disease status post bypass in March 2020, chronic anemia, and chronic CKD who was admitted on 6/25/2020 for hypotension and weakness (Transfer from [facility] ED).      Cardiogenic shock   Acute decompensated HFrEF. LVEF of less than 20%  Ischemic cardiomyopathy with late presentation STEMI s/p CABG (LIMA to diagonal due to LAD dissection, KURT to RCA, [place] 4/2020),   S/p coronary angiography 7/2/2020 with SEMAJ to RCA, plan for staged procedure to LCx. (Thallium viability study 7/1/2020 showing mostly viable LCx territory)   S/p AICD 4/2020 in [place]  Severe TR, mild-mod MR   Presented with hypotension needing pressors and ICU admission, initially thought to be sec to urinary source and started empirically on Rocephin but blood and urine culture from 6/25 with no growth so far; remains afebrile; leukocytosis improved, status post completion of Rocephin. Transferred out of ICU on 6/27 and SBP remains soft in the 90s. Elevated lactic acid secondary to heart failure: 4.1 on admission > 3.2 > 2.4 on 07/08/20.   Cardiology following, started on Bumex drip 6/27. Changed to torsemide 6/30/2020. Started on lasix drip at 5 mg/h after angiogram on 7/2. Diuretics held 7/4 due to rising Cr and gave  ml bolus. PICC placed on 07/05/20 and started on dopamine per cardiology. Dopamine gtt 7/6-7/8 without diuresis.  - EP was consulted but patient is not a candidate for CRT upgrade and so EP signed off.  - Resumed PTA Entresto on 07/08/20. Hold for SBP < 90.    - Lasix 20 mg IV x 1 given on 07/09/20.  - Started torsemide  20 mg PO daily on 7/10/20.  - On continues to feel better, breathing stable, no increased swelling Cr at stable at 1.15, Na 131, K 3.9.  Wt stable at 68.4 from lynn wt of 65.7 on 07/04/20 (Admission wt 71.2, max wt 75.5),   - Staged PCI to LCx per cardiology, likely as outpatient.  - Continue ASA, plavix, rosuvastatin.  - Possible discharge home tomorrow if tolerates starting oral torsemide and renal function stable.     Large mural thrombus on recent echocardiogram from June 2020 - High risk for stroke.   Supratherapeutic INR on admission (6.08) - RESOLVED.   H/o recurrent epistaxis - ENT on 7/3 shows recent bleed from excoriation from nasal canula prong, no active bleeding or other site of bleeding.   - ENT recommended conservative measures with bacitracin and nasal spray.    - On high intensity heparin gtt without bolus.  - Coumadin restarted on 07/07/20. Likely home on coumadin + lovenox.   - Currently bridging with IV heparin. Discussed with cardiology today. Suspect she will be able to switch to subcutaneous lovenox tomorrow and finish bridging at home.  - She came in with elevated INR of 6 on coumadin 1 mg daily and pharmacy has dosed 1 mg, 2 mg started 7/7. Pharmacy dosing coumadin. Will need close monitoring as an outpatient to monitor INR especially since she came in with a supratherapeutic INR. She will be going home with home health and should have an INR checked on Monday 7/13/20.     Leukocytosis, RESOLVING - WBC up to 14 on 7/3 without evidence of infection by signs/sx. CXR clear and procalcitonin 0.06. Suspect reactive.   - Resolved.     LORI, resolved on CKD, stage III Baseline cr around 1-1.2. Cr 1.92 on admission, improved with diuresis and then worsened likely from heart failure. No improvement with IVF. Improved after dopamine gtt.   *  US 6/26 UR, no obstruction or hydronephrosis  *  Isolated Cr of 0.52 after initiation of dopamine is outlier, baseline ~ 1.2  - Creatinine stable at 1.26 on  7/10/20.  - Recheck in AM. If GFR still OK after restarting torsemide, should be able to transition to lovenox as noted above.     Hypothyroidism - TSH during hospitalization high but FT4 normal. TSH 11.25, FT4 1.39 on 6/28.   PTA on 50mcg synthroid. Cardiology recommended increase in synthroid given low EF.   - Increased PTA synthroid to 62.5 mcg, OP recheck thyroid function in 6-8 weeks.     Suspected Anxiety - leading to hyperventilation. Does not tolerate benzos, so started on low dose Seroquel.  - Seroquel decreased from BID to nightly on 07/05/20.    - Continue PTA buspirone TID.     Chronic anemia - unclear recent baseline, prior to recent cardiac events it seems her Hb was around 12-13  - Hemoglobin stable at 8.2 on 7/10/20, recheck in AM.    Probable GERD  - Continue on PTA PPI.     Lines: PICC Line placed on R on 07/05/20  - Placed to administer dopamine for CHF as noted above.  - Plan to remove PICC prior to discharge.     COVID-19 PCR negative on 6/25  - Asymptomatic COVID-19 testing was negative at the time of admission.     Diet: 1 Gram Sodium Diet  Snacks/Supplements Adult: Boost Plus; With Meals    DVT Prophylaxis: IV heparin, warfarin  Rojo Catheter: not present  Code Status: Full Code           Disposition Plan   Expected discharge: Tomorrow, recommended to prior living arrangement with home health care if renal function stable.  Entered: Jairo Barker MD 07/10/2020, 10:53 AM       The patient's care was discussed with the Bedside Nurse, Care Coordinator/, Patient, Patient's Family and Cardiology Consultant.    Jairo Barker MD  Hospitalist Service  [facility]    ______________________________________________________________________    Interval History   Marquise Laboy Анна feels better today. Denies fevers, chest pain, shortness of breath at rest, nausea, abdominal pain, diarrhea, urinary symptoms. Still gets short of breath with activity, improving.  Daughter was in the room this morning, discussed plan of care.    Data reviewed today: I reviewed all medications, new labs and imaging results over the last 24 hours. I personally reviewed no images or EKG's today.    Physical Exam   Vital Signs: Temp: 98  F (36.7  C) Temp src: Oral BP: 101/64 Pulse: 92   Resp: 18 SpO2: 98 % O2 Device: None (Room air)    Weight: 151 lbs 12.8 oz  Constitutional: awake, alert, cooperative, no apparent distress  Respiratory: decreased at the bases  Cardiovascular: regular rate and rhythm, normal S1 and S2, no murmur noted  GI: normal bowel sounds, soft, non-distended, non-tender  Skin: warm, dry  Musculoskeletal: 1+ lower extremity pitting edema present  Neurologic: awake, alert, oriented to name, place and time    Data   Recent Labs   Lab 07/10/20  1205 07/10/20  0556 07/09/20  0600 07/08/20  0600 07/07/20  0600  07/06/20  0900  07/04/20  0557 07/03/20  2142   WBC 9.0  --   --   --   --   --   --   --  11.3*  --    HGB 8.2*  --   --   --  8.4*  --  8.1*   < > 10.1*  --    MCV 82  --   --   --   --   --   --   --  78  --      --   --   --  236  --   --   --  304  --    INR  --  1.14 1.17* 1.15* 1.11   < >  --   --   --   --    NA  --  134 131* 133 131*  --  130*   < > 131*  --    POTASSIUM  --  4.2 3.9 3.8 3.9  --  3.8   < > 4.7  --    CHLORIDE  --  101 99 98 97  --  96   < > 97  --    CO2  --  26 27 27 27  --  26   < > 24  --    BUN  --  28 30 26 31*  --  35*   < > 36*  --    CR  --  1.26* 1.15* 1.16* 1.23*  --  0.52   < > 1.78*  --    ANIONGAP  --  7 5 8 7  --  8   < > 10  --    FERNANDO  --  8.9 8.6 8.8 8.9  --  8.5   < > 8.9  --    GLC  --  99 84 93 110*  --  163*   < > 120*  --    ALBUMIN  --   --   --   --   --   --   --   --   --  2.9*   PROTTOTAL  --   --   --   --   --   --   --   --   --  7.2   BILITOTAL  --   --   --   --   --   --   --   --   --  0.8   ALKPHOS  --   --   --   --   --   --   --   --   --  141   ALT  --   --   --   --   --   --   --   --   --  23   AST  --    --   --   --   --   --   --   --   --  20    < > = values in this interval not displayed.     Medications     - MEDICATION INSTRUCTIONS -       dextrose       HEParin 800 Units/hr (07/10/20 0827)     - MEDICATION INSTRUCTIONS -       ACE/ARB/ARNI NOT PRESCRIBED       BETA BLOCKER NOT PRESCRIBED       Warfarin Therapy Reminder         bacitracin   Topical BID     bimatoprost  1 drop Both Eyes At Bedtime     busPIRone  10 mg Oral TID     clopidogrel  75 mg Oral Daily     dorzolamide-timolol  1 drop Both Eyes BID     ferrous sulfate  325 mg Oral BID     latanoprost  1 drop Both Eyes Daily     levothyroxine  62.5 mcg Oral QAM AC     multivitamin w/minerals  1 tablet Oral Daily     omeprazole  20 mg Oral QAM AC     QUEtiapine  12.5 mg Oral At Bedtime     rosuvastatin  20 mg Oral Daily     sacubitril-valsartan  1 tablet Oral BID     sodium chloride  2 spray Both Nostrils TID     sodium chloride (PF)  10 mL Intracatheter Q7 Days     torsemide  20 mg Oral Daily     vitamin B1  100 mg Oral Daily     warfarin ANTICOAGULANT  2.5 mg Oral ONCE at 18:00

## 2020-07-10 NOTE — PROGRESS NOTES
[facility]  Cardiology Progress Note    Date of Service (when I saw the patient): 07/10/2020     Assessment & Plan   Marquise Jj is a 75 year old female who was admitted on 6/25/2020.     1.  : Acute on Chronic HFrEF/ Ischemic cardiomyopathy/ ICD/ Severe TR  - BNP 33, 199 ( 7/1/20)  - Echo demonstrates moderately dilated LV, EF < 20%, severely hypokinetic, severe tricuspid regurgitation, mild to moderately dilated RV with mildly decreased RV systolic function, mild/moderate MR.   - BP soft ranging  systolic, baseline  - Tolerating Entresto  - restart Torsemide 20mg daily  - Sodium 134, potassium 4.2, creatinine 1.26, BUN 28, GFR 42  - net neg ~200cc,  net neg 1.9 L since admission.   - BMP, CBC in am  - anticipate discharge tomorrow  - Follow up with cardiology as scheduled, CBC and BMP prior.      2.  : Large LV apical mural thrombus  - On Heparin gtt, Started coumadin 7/7/20  INR goal 2-3  - No movement in INR w/1mg, reviewed with hospitalist service and pharmacy, recommend increased dose coumadin tonight,. If creatinine stable in am, likely home with Lovenox.   - CBC this am, results pending      3.  : CAD/ CABG ( LIMA/ diagonal due to LAD dissection, KURT/ RCA, in [place] 4/2020)  - angiogram on 7/2/20 demonstrated patient LIMA to diagonal, atretic KURT graft, significant mid to distal RCA stenosis s/p 2.75 x 24 mm SEMAJ. Will need staged PCI to circumflex, likely outpatient.    - EKG demonstrates NSR with rate of 90  -  Continue Plavix and statin.      4. Hyperlipidemia  - On statin         STEPHAN Quijano CNP  Text Page  (M-F, 7:30 am - 4:00 pm)    ATTESTATION:  Ms. Jj was seen and examined. Agree with note and plan of care. Likely dismiss tomorrow. Follow up arranged.   Cardiology will sign off. Please call with questions.   JOY Recinos MD     Interval History   Up in chair with family at bedside. Denies chest pain, palpitations, PND, orthopnea, or presyncope. Exertional  shortness of breath, 1+ pitting LE edema. Restart low dose Torsemide. INR unchanged w/ 1mg Coumadin. Reviewed with pharmacy./hospitalist, plan for higher dose coumadin tonight. If creatinine stable, home with Lovenox in am.     Physical Exam   Temp: 98  F (36.7  C) Temp src: Oral BP: 101/64 Pulse: 92   Resp: 18 SpO2: 98 % O2 Device: None (Room air)    Vitals:    07/08/20 0346 07/09/20 0552 07/10/20 0315   Weight: 68.2 kg (150 lb 4.8 oz) 68.4 kg (150 lb 12.8 oz) 68.9 kg (151 lb 12.8 oz)     Vital Signs with Ranges  Temp:  [97.4  F (36.3  C)-98  F (36.7  C)] 98  F (36.7  C)  Pulse:  [70-95] 92  Resp:  [16-20] 18  BP: ()/(50-75) 101/64  SpO2:  [97 %-98 %] 98 %  I/O last 3 completed shifts:  In: 1010 [P.O.:240; I.V.:770]  Out: 225 [Urine:225]    GEN:  In general, this is a well nourished female in no acute distress.   HEENT:  Pupils equal, round. Sclerae nonicteric. Clear oropharynx. Mucous membranes moist.  NECK: Supple, no masses appreciated. Trachea midline. No JVD  C/V:  Regular rate and rhythm, no murmur, rub or gallop. No S3 or RV heave.   RESP: Respirations are unlabored. No use of accessory muscles. Clear to auscultation bilaterally without wheezing, rales, or rhonchi.  GI: Abdomen soft, nontender, nondistended. No HSM appreciated.   EXTREM: 1+ pitting bilateral LE edema. No cyanosis or clubbing.  NEURO: Alert and oriented, cooperative.  No obvious focal deficits.   PSYCH: Normal affect. Depressed  SKIN: Warm and dry. No rashes or petechiae appreciated.       Medications     - MEDICATION INSTRUCTIONS -       dextrose       HEParin 800 Units/hr (07/10/20 0827)     - MEDICATION INSTRUCTIONS -       ACE/ARB/ARNI NOT PRESCRIBED       BETA BLOCKER NOT PRESCRIBED       Warfarin Therapy Reminder         bacitracin   Topical BID     bimatoprost  1 drop Both Eyes At Bedtime     busPIRone  10 mg Oral TID     clopidogrel  75 mg Oral Daily     dorzolamide-timolol  1 drop Both Eyes BID     ferrous sulfate  325 mg Oral  BID     latanoprost  1 drop Both Eyes Daily     levothyroxine  62.5 mcg Oral QAM AC     multivitamin w/minerals  1 tablet Oral Daily     omeprazole  20 mg Oral QAM AC     QUEtiapine  12.5 mg Oral At Bedtime     rosuvastatin  20 mg Oral Daily     sacubitril-valsartan  1 tablet Oral BID     sodium chloride  2 spray Both Nostrils TID     sodium chloride (PF)  10 mL Intracatheter Q7 Days     vitamin B1  100 mg Oral Daily       Data   Reviewed       Lea Hull, TSEPHAN CNP 7/10/2020

## 2020-07-10 NOTE — PLAN OF CARE
DATE & TIME: 7/9/2020 1900 - 7/10/2020 0700    COGNITION/BEHAVIOR: A&Ox4  Vital signs:  Temp: 97.4  F (36.3  C) Temp src: Oral BP: 101/64 Pulse: 92 Heart Rate: 110 Resp: 18 SpO2: 98 % via RA  Ongoing lower blood pressures, asymptomatic.  SBP did eventually reach 90 at bedtime, allowing for HS Entresto to be given.  BP's actually improved throughout the night.  TELEMETRY RHYTHM: NSR  MOBILITY: SBA with walker, GB  PAIN: Chronic back pain improved with PRN Tylenol at HS  DIET: 1gm sodium diet  RESP: LS clear, denies SOB at rest.  DASH.  CV/PV: HRRR, denies chest pain.  Ongoing +2 pitting edema to BLE's.  Weak pedal pulses.  GI/: Voiding adequately.  SKIN: Various bruises to both arms, right hip/groin.  Intact wound to middle back on spine surrounded by blanchable redness.  Foam donut applied to relieve pressure when in chair.  Pale.  Feet are red, nathan, and blanchable.  LINES: RUE PICC infusing heparin gtt  LAB/BG: INR down to 1.14 this morning.  Heparin 10a slightly below goal range, rate increased, recheck 10a at 1245.  [place] [facility] [place]

## 2020-07-11 PROBLEM — R04.0 EPISTAXIS: Status: ACTIVE | Noted: 2020-01-01

## 2020-07-11 NOTE — PROGRESS NOTES
Met with patient regarding no insurance coverage for medications and patient is fully aware of that and has been paying out of pocket for meds prior to this hospitalization.  See pharmacy liaison note from 6/29/2020.  Reviewed Lovenox cost and that length of use will be determined by her PCP when INR therapeutic.  Patient expressed understanding.    12:06 PM  Added SW to homecare orders as patient could benefit from services to help identify community resources and lack of insurance coverage for drugs.    Mayi Mckenna RN  Care Coordinator  [facility]  [phone]

## 2020-07-11 NOTE — PLAN OF CARE
Heart Failure Care Pathway  GOALS TO BE MET BEFORE DISCHARGE:    1. Decrease congestion and/or edema with diuretic therapy to achieve near      optimal volume status.            Dyspnea improved:  No, please explain: Pt reports feeling SOB with minimal activity            Edema improved:     No, please explain: Pt's edema is stable        Net I/O and Weights since admission:          06/10 2300 - 07/10 2259  In: 90952.91 [P.O.:8280; I.V.:3571.91]  Out: 20245 [Urine:20245]  Net: -7643.09            Vitals:    06/25/20 2210 06/26/20 0000 06/27/20 0400 06/27/20 1243   Weight: 71.2 kg (156 lb 15.5 oz) 71.2 kg (156 lb 15.5 oz) 74.6 kg (164 lb 7.4 oz) 75.5 kg (166 lb 8 oz)    06/28/20 0928 06/29/20 0541 06/30/20 0514 07/01/20 0521   Weight: 73.9 kg (163 lb) 71.3 kg (157 lb 1.6 oz) 69.4 kg (152 lb 14.4 oz) 67.3 kg (148 lb 4.8 oz)    07/02/20 0624 07/04/20 0250 07/05/20 0605 07/06/20 0234   Weight: 66.3 kg (146 lb 3.2 oz) 65.7 kg (144 lb 12.8 oz) 66.6 kg (146 lb 14.4 oz) 67.4 kg (148 lb 9.6 oz)    07/07/20 0608 07/08/20 0346 07/09/20 0552 07/10/20 0315   Weight: 68.3 kg (150 lb 9.6 oz) 68.2 kg (150 lb 4.8 oz) 68.4 kg (150 lb 12.8 oz) 68.9 kg (151 lb 12.8 oz)       2.  O2 sats > 92% on RA or at prior home O2 therapy level.          Current oxygenation status:       SpO2: 100 %         O2 Device: None (Room air),            Able to wean O2 this shift to keep sats > 92%:  Yes       Does patient use Home O2? No    3.  Tolerates ambulation and mobility near baseline: No, please explain: Pt is very deconditioned        How many times did the patient ambulate with nursing staff this shift? 1    Please review the Heart Failure Care Pathway for additional HF goal outcomes.    Pt is A&O. Uses Tylenol PRN for back pain. Denies CP. Pt is dyspneic, even at rest. Monitor shows SR. LS clear but diminished in bases. TEDs in place on LEs. 3+ edema noted. This is pt's baseline. Heparin infusing at 650 units/hr. Up with Ax1. Pt is hopeful to  discharge tomorrow pending INR and respiratory status.    Jacquelin Frazier, RN RN  7/10/2020

## 2020-07-11 NOTE — DISCHARGE SUMMARY
[facility]  Hospitalist Discharge Summary      Date of Admission:  6/25/2020  Date of Discharge:  7/11/2020  Discharging Provider: Glen Tipton MD      Discharge Diagnoses   Cardiogenic shock  Acute on chronic left systolic CHF  CAD s/p PCI to RCA  Hx ICD placement  Severe TR  Mild-mod MR  LV mural thrombus  Supratherapeutic INR  Hx recurrent epistaxis  LORI on CKD stage III  Hypothyroidism  Anxiety  Chronic anemia  GERD    Follow-ups Needed After Discharge   Follow-up Appointments     Follow-up and recommended labs and tests       Follow up with primary care provider, Poonam Cast, within 7 days for   hospital follow- up.  The following labs/tests are recommended: routine   INR monitoring.             Discharge Disposition   Discharged to home  Condition at discharge: Stable    Hospital Course   Marquise Jj is a 75 year old female with PMH recent complicated cardiac history significant for severe ischemic cardiomyopathy with left ventricular ejection fraction of less than 20%, large mural thrombus on recent echocardiogram from June 2020, severe multivessel coronary disease status post bypass in March 2020, chronic anemia, and chronic CKD who was admitted on 6/25/2020 for hypotension and weakness (Transfer from [facility] ED).      Cardiogenic shock   Acute on chronic left systolic CHF (LVEF of less than 20%)  Ischemic cardiomyopathy (with late presentation STEMI s/p CABG; LIMA to diagonal due to LAD dissection, KURT to RCA, [place] 4/2020)   S/p coronary angiography 7/2/2020 with SEMAJ to RCA (plan for staged procedure to LCx. Thallium viability study 7/1/2020 showing mostly viable LCx territory)   S/p AICD 4/2020 in [place]  Severe TR, mild-mod MR   Presented with hypotension needing pressors, initially thought to be secondary to urinary source and started empirically on Rocephin but blood and urine cultures negative.  Completed 6 day course ceftriaxone 6/30.  Cardiology consulted, had higher  suspicion for cardiogenic shock.  Underwent angiogram with stenting and plan for staged procedure as noted above. She was diuresed with bumex drip and transiently required dopamine drip as well.  Pressures stabilized and she tolerated resumption of Entresto and oral diuretic.  Continue plavix, rosuvastatin.  Plan to resume aspirin as outpatient once INR therapeutic (Cardiology recommended holding due to quadruple therapy - Plavix, coumadin, lovenox).  Follow up with Cardiology outpatient.     Large mural thrombus on recent echocardiogram from June 2020  Supratherapeutic INR, resolved  H/o recurrent epistaxis   ENT on 7/3 shows recent bleed from excoriation from nasal canula prong, no active bleeding or other site of bleeding.  ENT recommended conservative measures with bacitracin and nasal spray.  INR >6 at admission.  Improved by time of discharge and will require lovenox bridge with close INR monitoring.     LORI, resolved on CKD, stage III   Baseline cr around 1-1.2. Cr 1.92 on admission, improved with diuresis and then worsened likely from heart failure.  Improved after dopamine gtt.  US 6/26 without obstruction or hydronephrosis.  Cr stable at 1.1-1.2 at time of discharge.     Hypothyroidism   TSH during hospitalization high but FT4 normal. TSH 11.25, FT4 1.39 on 6/28.  Levothyroxine increased to 62.5 mg, OP recheck thyroid function in 6-8 weeks.     Anxiety  Psychiatry consulted, recommended scheduled Seroquel which will be continued nightly at discharge.  Continue PTA buspirone.     Chronic anemia   Unclear recent baseline, prior to recent cardiac events it seems her Hb was around 12-13.  Hgb stable 8-9 g/dL at discharge.      Probable GERD  Continue PTA PPI.      Consultations This Hospital Stay   PHYSICAL THERAPY ADULT IP CONSULT  OCCUPATIONAL THERAPY ADULT IP CONSULT  ADVANCE DIRECTIVE IP CONSULT  CARDIOLOGY IP CONSULT  PHARMACY TO DOSE MEDICATION  PHARMACY TO DOSE MEDICATION  PHYSICAL THERAPY ADULT IP  CONSULT  ENT IP CONSULT  PHARMACY LIAISON FOR MEDICATION COVERAGE CONSULT  SOCIAL WORK IP CONSULT  PHARMACY IP CONSULT  ADVANCE DIRECTIVE IP CONSULT  PHARMACY TO DOSE HEPARIN  PHARMACY TO DOSE HEPARIN  CARDIAC REHAB IP CONSULT  PHARMACY IP CONSULT  PHARMACY IP CONSULT  PSYCHIATRY IP CONSULT  PHARMACY TO DOSE HEPARIN  ENT IP CONSULT  VASCULAR ACCESS ADULT IP CONSULT  PHARMACY TO DOSE WARFARIN  PHARMACY IP CONSULT  PHARMACY LIAISON FOR MEDICATION COVERAGE CONSULT    Code Status   Full Code    Time Spent on this Encounter   I, Glen Tipton MD, personally saw the patient today and spent greater than 30 minutes discharging this patient.       Glen Tipton MD  [facility]  ______________________________________________________________________    Physical Exam   Vital Signs: Temp: 97.3  F (36.3  C) Temp src: Oral BP: 90/55 Pulse: 87 Heart Rate: 79 Resp: 19 SpO2: 100 % O2 Device: None (Room air)    Weight: 151 lbs 8 oz  General Appearance: Thin female in NAD  Respiratory: lungs CTAB without wheezes or crackles, no tachypnea  Cardiovascular: RRR, normal s1/s2 with grade 2/6 systolic murmur  GI: abdomen soft, normal bowel sounds  Lymph: 2-3+ pitting lower extremity edema  Other: Alert and appropriate, cranial nerves grossly intact        Primary Care Physician   Poonam Cast    Discharge Orders      Basic metabolic panel     CBC with platelets    Last Lab Result: Hemoglobin (g/dL)       Date                     Value                 07/07/2020               8.4 (L)          ----------     Discharge Order: F/U with Cardiac  LUIS ANGEL      Discharge Order: F/U with Cardiac  LUIS ANGEL      Discharge Order: F/U with Cardiac  LUIS ANGEL      Follow-Up with Cardiologist      Home care nursing referral      Home Care PT Referral for Hospital Discharge      Home Care OT Referral for Hospital Discharge      Reason for your hospital stay    You were admitted for congestive heart failure exacerbation and urinary tract infection.      Follow-up and recommended labs and tests     Follow up with primary care provider, Poonam Cast, within 7 days for hospital follow- up.  The following labs/tests are recommended: routine INR monitoring.     Activity    Your activity upon discharge: activity as tolerated     MD face to face encounter    Documentation of Face to Face and Certification for Home Health Services    I certify that patient: Marquise Jj is under my care and that I, or a nurse practitioner or physician's assistant working with me, had a face-to-face encounter that meets the physician face-to-face encounter requirements with this patient on: 7/11/2020.    This encounter with the patient was in whole, or in part, for the following medical condition, which is the primary reason for home health care: acute on chronic left systolic congestive heart failure.    I certify that, based on my findings, the following services are medically necessary home health services: Nursing, Occupational Therapy and Physical Therapy.    My clinical findings support the need for the above services because: Nurse is needed: To assess cardiorespiratory status after changes in medications or other medical regimen.., Occupational Therapy Services are needed to assess and treat cognitive ability and address ADL safety due to impairment in endurance, mobility. and Physical Therapy Services are needed to assess and treat the following functional impairments: endurance, mobility.    Further, I certify that my clinical findings support that this patient is homebound (i.e. absences from home require considerable and taxing effort and are for medical reasons or [employer] services or infrequently or of short duration when for other reasons) because: Requires assistance of another person or specialized equipment to access medical services because patient: Requires supervision of another for safe transfer...    Based on the above findings. I certify that this  patient is confined to the home and needs intermittent skilled nursing care, physical therapy and/or speech therapy.  The patient is under my care, and I have initiated the establishment of the plan of care.  This patient will be followed by a physician who will periodically review the plan of care.  Physician/Provider to provide follow up care: Poonam Cast    Attending hospital physician (the Medicare certified [place] provider): Glen Tipton MD  Physician Signature: See electronic signature associated with these discharge orders.  Date: 7/11/2020     Full Code     Diet    Follow this diet upon discharge:       Snacks/Supplements Adult: Boost Plus; With Meals      1 Gram Sodium Diet       Significant Results and Procedures   Most Recent 3 CBC's:  Recent Labs   Lab Test 07/11/20  0631 07/10/20  1205 07/07/20  0600  07/04/20  0557   WBC 8.9 9.0  --   --  11.3*   HGB 8.3* 8.2* 8.4*   < > 10.1*   MCV 82 82  --   --  78    236 236  --  304    < > = values in this interval not displayed.     Most Recent 3 BMP's:  Recent Labs   Lab Test 07/11/20  0631 07/10/20  0556 07/09/20  0600    134 131*   POTASSIUM 3.7 4.2 3.9   CHLORIDE 101 101 99   CO2 25 26 27   BUN 30 28 30   CR 1.14* 1.26* 1.15*   ANIONGAP 8 7 5   FERNANDO 8.5 8.9 8.6   GLC 86 99 84     Most Recent 3 Troponin's:  Recent Labs   Lab Test 06/25/20  1617   TROPI 0.197*     Most Recent 3 BNP's:  Recent Labs   Lab Test 07/01/20  2219 06/25/20  1617 06/22/20  1244 06/10/20  1139   NTBNPI 33,199* 23,376*  --   --    NTBNP  --   --  16,515* 13,630*   ,   Results for orders placed or performed during the hospital encounter of 06/25/20   US Renal Complete    Narrative    US RENAL COMPLETE   6/26/2020 5:44 PM     HISTORY: Septic shock with renal failure. Rule out obstruction.    COMPARISON: None.    FINDINGS:  Right Kidney: Measures 10.9 x 4.2 x 4.2 cm, cortex thickness of 0.9  cm. No hydronephrosis.    Left Kidney: Measures 8.1 x 4.1 x 4.6 cm. Cortex thickness  of 0.7 cm.  No hydronephrosis.    Bladder: Unremarkable.    Incidental note made of bilateral pleural fluid.      Impression    IMPRESSION:   1. No evidence for hydronephrosis.  2. Bilateral renal cortical thinning and asymmetric renal lengths.  3. Incidental finding of bilateral pleural effusions.    DEVIN ISRAEL MD   XR Chest 2 Views    Narrative    CHEST TWO VIEWS 2020 2:07 PM     HISTORY: assess pleural effusions    COMPARISON: 2020       Impression    IMPRESSION: Bibasilar opacities represent pleural fluid and  atelectasis and have not significantly changed from the prior study.  No pneumothorax. No air space disease in the aerated portions of the  lungs. No interstitial edema. The cardiomediastinal silhouette is not  well assessed. Sternotomy suture wires are intact. There is a left  chest wall implantable cardiac defibrillator. There is exaggerated  curvature of the included lumbar spine.    LESTER J FAHRNER, MD   XR Chest 2 Views    Narrative    XR CHEST 2 VW 7/3/2020 10:28 AM    HISTORY: hypoxia    COMPARISON: 2020      Impression    IMPRESSION: Similar appearance of mild bibasilar pleural effusions  with associated infiltrate or atelectasis not excluded. No  pneumothorax. Unchanged cardiac size. Median sternotomy. Left-sided  implantable cardiac defibrillator/pacer.    MENDEL FLORES MD   Echocardiogram Limited    Narrative    [id number]  [id number]  [id number]  [id number]^PANTERA^JAZ^[facility]  Echocardiography Laboratory  [address] [place] [address]        Name: EDILSON SMITH  MRN: [id number]  : 1945  Study Date: 2020 07:51 AM  Age: 75 yrs  Gender: Female  Patient Location: [facility]  Reason For Study: Shock  Ordering Physician: JAZ MOTT  Referring Physician: JOSH HUMPHREY  Performed By: Emerson Shields RDCS     BSA: 1.8 m2  Height: 68 in  Weight: 157 lb  BP: 97/63  mmHg  _____________________________________________________________________________  __        Procedure  Limited Portable Echo Adult.  _____________________________________________________________________________  __        Interpretation Summary     Limited echo.  Pleural effusion, correlate with alternative imaging.  The left ventricle is moderately dilated (no volumes available). The visual  ejection fraction is estimated at <20%. Basal segments, although severely  hypokinetic, have relatively better contractility than the mid-apical  segments.  Large organized LV apical mass/thrombus is noted.  There is severe (4+) tricuspid regurgitation. Mechanism appears unclear but  likely functional and CIED lead related.  Dilation of the inferior vena cava is present with abnormal respiratory  variation in diameter. Likely moderate PHTN.  The right ventricle is mild to moderately dilated. Mildly decreased right  ventricular systolic function.  Mild tenting and PML restriction of the mitral valve leaflets noted due to LV  dilatation. There is mild-moderate functional MR.     No significant change compared to echo 06/10/2020  _____________________________________________________________________________  __        Left Ventricle  The left ventricle is moderately dilated. Severely decreased left ventricular  systolic function. The visual ejection fraction is estimated at <20%.     Right Ventricle  The right ventricle is mild to moderately dilated. Mildly decreased right  ventricular systolic function. There is a pacemaker lead in the right  ventricle.     Mitral Valve  Mild tenting and PML restriction of the mitral valve leaflets noted due to LV  dilatation. There is mild-moderate functional MR.     Tricuspid Valve  There is severe (4+) tricuspid regurgitation. The right ventricular systolic  pressure is elevated at 38.6 mmHg. Pulmonary hypertension.        Aortic Valve  The aortic valve is trileaflet with aortic valve  sclerosis. No aortic  regurgitation is present. No aortic stenosis is present.     Pulmonic Valve  The pulmonic valve is not well visualized. There is trace pulmonic valvular  regurgitation.     Vessels  The aortic root is normal size. Dilation of the inferior vena cava is present  with abnormal respiratory variation in diameter.     Pericardium  Trivial pericardial effusion.     _____________________________________________________________________________  __        Doppler Measurements & Calculations  MV E max gary: 76.2 cm/sec  MV dec time: 0.15 sec  TR max gary: 310.8 cm/sec  TR max P.6 mmHg              _____________________________________________________________________________  __           Report approved by: Imani Figueredo 2020 11:54 AM      Cardiac Catheterization    Narrative      Right sided filling pressures are mildly elevated.    Left sided filling pressures are moderately elevated.    Moderately elevated pulmonary artery hypertension.    Reduced cardiac output level.    Atretic KURT graft.    Patent LIMA supplying small vascular territory of a diagonal branch.    Significant mid to distal RCA stenosis s/p PCI with single SEMAJ, no   complications.    Hemodynamically significant lesion of the proximal-mid LCx (iFR 0.82).   Plan for staged procedure given contrast risk.      NM Viability (nuc card)    Narrative       Abnormal perfusion.  Resting scan has absent to minimal uptake in the   LAD territory.  On 4-hour and 24-hour redistribution there is minimal   improvement, suggesting severe nontransmural or transmural LAD infarct.    Circumflex territory has mildly decreased perfusion, suggesting mostly   viable myocardium.  RCA territory appears 100% viable.     There is no prior study for comparison.           Discharge Medications   Current Discharge Medication List      START taking these medications    Details   clopidogrel (PLAVIX) 75 MG tablet Take 1 tablet (75 mg) by mouth daily  Qty:  30 tablet, Refills: 11    Comments: Future refills by PCP Dr. Poonam Cast with phone number [phone].  Associated Diagnoses: Ischemic cardiomyopathy      enoxaparin ANTICOAGULANT (LOVENOX) 80 MG/0.8ML syringe Inject 0.7 mLs (70 mg) Subcutaneous every 12 hours  Qty: 10 Syringe, Refills: 1    Comments: Future refills by PCP Dr. Poonam Cast with phone number [phone].  Associated Diagnoses: Mural thrombus of left ventricle      nitroGLYcerin (NITROSTAT) 0.4 MG sublingual tablet For chest pain place 1 tablet under the tongue every 5 minutes for 3 doses. If symptoms persist 5 minutes after 1st dose call 911.  Qty: 20 tablet, Refills: 0    Comments: Future refills by PCP Dr. Poonam Cast with phone number [phone].  Associated Diagnoses: Ischemic cardiomyopathy      QUEtiapine (SEROQUEL) 25 MG tablet Take 0.5 tablets (12.5 mg) by mouth At Bedtime  Qty: 15 tablet, Refills: 0    Comments: Future refills by PCP Dr. Poonam Cast with phone number [phone].  Associated Diagnoses: Anxiety      sodium chloride (OCEAN) 0.65 % nasal spray Spray 2 sprays into both nostrils 3 times daily  Qty: 15 mL, Refills: 0    Comments: Future refills by PCP Dr. Poonam Cast with phone number [phone].  Associated Diagnoses: Epistaxis         CONTINUE these medications which have CHANGED    Details   busPIRone (BUSPAR) 10 MG tablet Take 1 tablet (10 mg) by mouth 3 times daily  Qty: 90 tablet, Refills: 0    Comments: Future refills by PCP Dr. Poonam Cast with phone number [phone].  Associated Diagnoses: Anxiety      ferrous sulfate (FEROSUL) 325 (65 Fe) MG tablet Take 1 tablet (325 mg) by mouth 2 times daily  Qty: 60 tablet, Refills: 0    Comments: Future refills by PCP Dr. Poonam Cast with phone number [phone].  Associated Diagnoses: Anemia, unspecified type      levothyroxine (SYNTHROID/LEVOTHROID) 125 MCG tablet Take 0.5 tablets (62.5 mcg) by mouth every morning (before breakfast)  Qty: 15  tablet, Refills: 0    Comments: Future refills by PCP Dr. Poonam Cast with phone number [phone].  Associated Diagnoses: Hypothyroidism, unspecified type      torsemide (DEMADEX) 20 MG tablet Take 1 tablet (20 mg) by mouth daily  Qty: 30 tablet, Refills: 0    Comments: Future refills by PCP Dr. Poonam Cast with phone number [phone].  Associated Diagnoses: Systolic congestive heart failure, unspecified HF chronicity (H)      warfarin ANTICOAGULANT (COUMADIN) 1 MG tablet Take 2.5 tablet (2.5 mg) by mouth evening of 7/11 and 2 tablet (2 mg) evening of 7/12.  Further dosing to be determined by INR check on 7/13/20.  Qty: 60 tablet, Refills: 0    Comments: Future refills by PCP Dr. Poonam Cast with phone number [phone].  Associated Diagnoses: Mural thrombus of left ventricle         CONTINUE these medications which have NOT CHANGED    Details   acetaminophen (TYLENOL) 325 MG tablet Take 2 tablets (650 mg) by mouth every 6 hours as needed for mild pain  Qty: 100 tablet, Refills: 0      bimatoprost (LUMIGAN) 0.01 % SOLN Place 1 drop into both eyes At Bedtime       co-enzyme Q-10 100 MG CAPS capsule Take 100 mg by mouth daily      dorzolamide-timolol (COSOPT) 2-0.5 % ophthalmic solution Place 1 drop into both eyes 2 times daily       latanoprost (XALATAN) 0.005 % ophthalmic solution Place 1 drop into both eyes daily      OMEPRAZOLE PO Take 20 mg by mouth daily       potassium chloride ER (KLOR-CON M) 20 MEQ CR tablet Take 1 tablet (20 mEq) by mouth daily  Qty: 90 tablet, Refills: 3    Associated Diagnoses: Systolic congestive heart failure, unspecified HF chronicity (H)      rosuvastatin (CRESTOR) 20 MG tablet Take 20 mg by mouth daily      sacubitril-valsartan (ENTRESTO) 24-26 MG per tablet Take 1 tablet by mouth 2 times daily  Qty: 60 tablet, Refills: 1    Comments: Pt may elect to get 1 week at a time while waiting for Pt assistance med  Associated Diagnoses: Ischemic cardiomyopathy       senna-docusate (SENOKOT-S/PERICOLACE) 8.6-50 MG tablet Take 2 tablets by mouth 2 times daily as needed for constipation      ondansetron (ZOFRAN-ODT) 4 MG ODT tab Take 4 mg by mouth every 8 hours as needed          STOP taking these medications       aspirin (ASA) 81 MG EC tablet Comments:   Reason for Stopping:         traZODone (DESYREL) 150 MG tablet Comments:   Reason for Stopping:             Allergies   Allergies   Allergen Reactions     Combigan [Brimonidine Tartrate-Timolol] Swelling     Swollen eyelids     Ativan [Lorazepam] Anxiety and Other (See Comments)     Increased confusion

## 2020-07-11 NOTE — PLAN OF CARE
Heart Failure Care Pathway  GOALS TO BE MET BEFORE DISCHARGE:    1. Decrease congestion and/or edema with diuretic therapy to achieve near      optimal volume status.            Dyspnea improved:  unchanged            Edema improved:     unchanged        Net I/O and Weights since admission:          06/11 0700 - 07/11 0659  In: 05859.91 [P.O.:8280; I.V.:3571.91]  Out: 51448 [Urine:50648]  Net: -7843.09            Vitals:    06/25/20 2210 06/26/20 0000 06/27/20 0400 06/27/20 1243   Weight: 71.2 kg (156 lb 15.5 oz) 71.2 kg (156 lb 15.5 oz) 74.6 kg (164 lb 7.4 oz) 75.5 kg (166 lb 8 oz)    06/28/20 0928 06/29/20 0541 06/30/20 0514 07/01/20 0521   Weight: 73.9 kg (163 lb) 71.3 kg (157 lb 1.6 oz) 69.4 kg (152 lb 14.4 oz) 67.3 kg (148 lb 4.8 oz)    07/02/20 0624 07/04/20 0250 07/05/20 0605 07/06/20 0234   Weight: 66.3 kg (146 lb 3.2 oz) 65.7 kg (144 lb 12.8 oz) 66.6 kg (146 lb 14.4 oz) 67.4 kg (148 lb 9.6 oz)    07/07/20 0608 07/08/20 0346 07/09/20 0552 07/10/20 0315   Weight: 68.3 kg (150 lb 9.6 oz) 68.2 kg (150 lb 4.8 oz) 68.4 kg (150 lb 12.8 oz) 68.9 kg (151 lb 12.8 oz)    07/11/20 0326   Weight: 68.7 kg (151 lb 8 oz)       2.  O2 sats > 92% on RA or at prior home O2 therapy level.          Current oxygenation status:       SpO2: 100 %         O2 Device: None (Room air),            Able to wean O2 this shift to keep sats > 92%:  Yes       Does patient use Home O2? No    3.  Tolerates ambulation and mobility near baseline: Yes        How many times did the patient ambulate with nursing staff this shift? Ambulated to bathroom, did not ambulate in hallways.    Please review the Heart Failure Care Pathway for additional HF goal outcomes.    Taryn Polo RN   7/11/2020    BPs mid 80s-90s systolic. Pt denies symptoms with low BPs. Other VS stable on RA. SOB at rest, worse with exertion. Tele: SR. Heparin infusing per order instructions. Tylenol given for chronic back pain. Pt is hopeful to discharge today; plan to  discharge with homecare and help from family.

## 2020-07-11 NOTE — PLAN OF CARE
Pt is A&O. Tylenol given x1 for chronic back pain. Denies CP. Reports SOB that is stable. Tele shows SR. BPs remain soft which is pt's baseline. LS clear. TEDs in place bilaterally. Pt is up with min Ax1 and walker. Received discharge orders. PICC removed and tele discontinued. Reviewed AVS with pt and . Pt performed correct demo for Lovenox administration. All questions answered. All pt's belongings accounted for. Pt left the unit via w/c with medications and all belongings.  to provide transport home.

## 2020-07-11 NOTE — PROVIDER NOTIFICATION
MD Notification    Notified Person: MD    Notified Person Name: Deanne Deal    Notification Date/Time: 07/10/20 9:33 PM     Notification Interaction:    Purpose of Notification: BPs mid-high 80s/50s; last MAP was 67. Asymptomatic. Will hold entresto tonight per order parameters; any other orders at this time?    Orders Received: No new orders at this time, notify if patient develops symptoms or MAP < 60.    Comments: Addendum: BP improved to 94 systolic, entresto given.

## 2020-07-15 NOTE — PROGRESS NOTES
Received call from pt's daughter, Norman, requesting help with getting pt signed up for [employer]. Sent email to [email or web address] as requested for MyChart set up.    Also reviewed with Norman that pt was supposed to have CBC and BMP labs drawn after discharge; however, only INR was drawn yesterday. Awaiting return call from [facility] with when they will be out to see pt again.  Norman states current plan is for [facility] to be back out on 7/17/20.    Will confirm with [facility] that CBC and BMP can be drawn on 7/17/20 or if needed sooner.     SAFIA [place] 1:58 PM 7/15/2020

## 2020-07-15 NOTE — PROGRESS NOTES
Called [facility] and spoke with pt's RN Case Manager, Staci, who is covering while the usual RN is on vacation. Reviewed that pt has a video visit scheduled for 7/16/20 with cardiology CNP and requested to know if pt's nurse had any concerns that CNP should be aware of, and to see when next visit is scheduled, as BMP and CBC are needed.  Staci states she will look through the nursing notes and callback later today.    SAFIA Toledo 10:21 AM 7/15/2020

## 2020-07-16 NOTE — ED AVS SNAPSHOT
[facility] Emergency Department  [address] [address]  Phone:  [phone]  Fax:  [phone]                                    Marquise Jj   MRN: [id number]    Department:  [facility] Emergency Department   Date of Visit:  7/16/2020           After Visit Summary Signature Page    I have received my discharge instructions, and my questions have been answered. I have discussed any challenges I see with this plan with the nurse or doctor.    ..........................................................................................................................................  Patient/Patient Representative Signature      ..........................................................................................................................................  Patient Representative Print Name and Relationship to Patient    ..................................................               ................................................  Date                                   Time    ..........................................................................................................................................  Reviewed by Signature/Title    ...................................................              ..............................................  Date                                               Time          22EPIC Rev 08/18

## 2020-07-16 NOTE — PATIENT INSTRUCTIONS
Reduce entresto to 1/2 pill daily in pm    I have talked to Dr Perla and we would like to get you off of warfarin and onto eliquis for your blood thinnner; it is easier to start and stop this without lengthy need for lovenox    If your bloody nose continues, go to ED at [facility]  See me next week  We will plan on doing your vessel in August after the wedding  Call Erendira with any questions  SAFIA Krishna  [phone]

## 2020-07-16 NOTE — PROGRESS NOTES
"Marquise Jj is a 75 year old female who is being evaluated via a billable video visit.      The patient has been notified of following:     \"This video visit will be conducted via a call between you and your physician/provider. We have found that certain health care needs can be provided without the need for an in-person physical exam.  This service lets us provide the care you need with a video conversation.  If a prescription is necessary we can send it directly to your pharmacy.  If lab work is needed we can place an order for that and you can then stop by our lab to have the test done at a later time.    Video visits are billed at different rates depending on your insurance coverage.  Please reach out to your insurance provider with any questions.    If during the course of the call the physician/provider feels a video visit is not appropriate, you will not be charged for this service.\"    Patient has given verbal consent for Video visit? Yes  How would you like to obtain your AVS? Mail a copy  If you are dropped from the video visit, the video invite should be resent to: Text to cell phone: [phone]  Will anyone else be joining your video visit? No     .  Review Of Systems  Skin: nose bleed this morning and two spots on both calfs that are bleeding  Eyes:Ears/Nose/Throat: wears reading glasses, hearing loss  Respiratory: NEGATIVE  Cardiovascular:edema, fatigue, lightheadedness and off baance  Gastrointestinal: treated heartburn  Genitourinary:NEGATIVE  Musculoskeletal: osteoarthritis  Neurologic: NEGATIVE  Psychiatric: treated anxiety  Hematologic/Lymphatic/Immunologic: maybe seasonal allergies as has had nasal drip  Endocrine:  Thyroid disorder  Vitals - Patient Reported  Systolic (Patient Reported): 86  Diastolic (Patient Reported): 55  Weight (Patient Reported): 68.3 kg (150 lb 8 oz)  Height (Patient Reported): 172.7 cm (5' 8\")  BMI (Based on Pt Reported Ht/Wt): 22.88  SpO2 (Patient Reported): " 92  Pulse (Patient Reported): 87     Patient has been taking Entresto only once a day since BP has been so low.    Would like to know how much longer will need to take Lovonox,   Questions about the STENT and when next STENT surgery will be.  Has appointment with Dr. Perla in August.    Sera Conner TREVOR    Telephone number of patient: [phone]      Video-Visit Details      History of Present Illness:    Marquise Jj is a 75 year old female who I am having a video visit for a complex hospital admission 6-25 to 7-    Marquise has a history of ischemic CM with EF less than 20%, mural thrombus, Multivessel CAD with CABG April 2020 in [facility].  She was on a cruise this past spring with her daughter and developed severe nausea and vomiting after a buffet meal. Symptoms persisted for days including fatigue so after 5-7 days she sought medical attention in [place] when she got off of the ship.Angiogram showed   Left main 30-40%; LAD occluded; diagonals occluded  RCA 70%; circumflex 60-70% with small targets  Diagonal grafted after LAD dissected intraop  KURT to RCA  She went for an angiogram during this admission after a viability study showed absent to minimal uptake in the LAD territory. Circumflex has mildly decreased perfusion, suggesting mostly viable myocardium. RCA appeared 100% viable.    She then went to the cath lab for possible revascularization. The KURT was atretic; Elias supplied a small vascular territory of the diagonal; she has significant stenosis in the mid to distal RCA and a 2.5 X 24mm Synergy SEMAJ was placed. The circumflex proximal to mid had a 70-80% stenosis with iFR of 0.82. She will need staged PCI to the circumflex. She is on Plavix.    She was evaluated by EP to see if she would qualify for upgrade of her ICD to Bi-V and based on narrow QRS and lack of dyssynchrony she does not quailfy  RHC: 53/ 27 mean 37; PCWP 30 LVEDP 22 TPG 7 at weight of 146.    She was diuresed  from a weight of 156 to 146 with IV lasix; at one point her creatinine miroslava to 1.55 (3 days post contrast as well).   She was placed on IV Dopamine briefly with improved diuresis again and ability to restart Entresto. Creatinine improved on baseline to 1.14. She was sent out on Torsemide 20mg daily    Due to her mural thrombus, she as placed on IV Heparin and changed to warfarin with Lovenox for bridging.    Baseline hemoglobin as around 10, down to 8.3 on discharge. I asked for a recheck today but [facility] did not see the orders.     At some point, she may need to be referred to the [facility] for advanced heart failure team however age and LV thrombus may be a limiting factor for LVAD or transplant.    Echo: EF less than 20%, severe TR, mild to moderately dilated RV with mildly decreased RV systolic function, mid to moderate MR    Her daughter Norman is present today for our visit. I note that she is having active epistaxis out of her right nare. She has a history of epistaxis previously requiring a rhinorocket. With icing this episode stopped by the end of our visit.     Since being home she feels weak but reports breathing is much better. She has 1+ foot edema with scattered bruises on her lower extremities. She and he daughter state the swelling has improved. Her weight on arrival at home as 151.7, down to 150. Her BP is in the mid to high 80;s so she has only been taking Entresto once per day in the PM.    She denies chest pain, orthopnea or PND.    Her grandson is getting  on 8-22 and she would prefer to wait for her staged circumflex intervention until after that. She has a face to face visit with Dr. Perla on 8-19 and he is fine waiting if she remains stable. Dr. Franco did her PCI and is in the cath lab the week of 8-25.    She is following a low sodium diet and is trying to wear support hose as often as she can. Home care is still coming in.    General Appearance:    no distress, normal body habitus,  upright. weak    ENT/Mouth:    membranes moist, no nasal discharge or bleeding gums. Normal head shape, no evidence of injury or laceration.    EYES:    no scleral icterus, normal conjunctivae    Neck:    no evidence of thyromegaly. No obvious JVD    Chest/Lungs:    No audible wheezing equal chest wall expansion. Non labored breathing. No cough.    Cardiovascular:    No evidence of elevated jugular venous pressure. No evidence of pitting edema bilaterally    Abdomen:    no evidence of abdominal distention. No observed jaundice.    Extremities:    no cyanosis or clubbing noted. 1+ pitting edema of feet with scattered areas on ecchymosis    Skin:    no xanthelasma, normal skin collar. No evidence of facial lacerations.    Neurologic:    Normal arm motion bilateral, no tremors. No evidence of focal defect.    Psychiatric:    alert and oriented x3, calm    Impression/Plan:    1. Ischemic CM with EF less than 20%  Cardiogenic shock   Severe TR; mild to moderate MR  Heart failure  Weight is stable  Continue torsemide 20mg daily  Bmp due tomorrow  Reduce entresto to 1/2 of her 24/26mg tablet at pm only due to hypotension  Follow up with me in one week  Once revascularized, we will plan on repeating an echocardiogram      2. Coronary artery disease  S/p CABG in [place]  LIMA to diagonal serves a small territory  KURT atretic  LAD nonviable  S/p stent to RCA  Will need staged PCI of circumflex-likely week of 8-25 with Dr. Franco    3. LORI in hospital likely multifactorial  Low BP  Contrast and CHF  See above, await labs    4. LV thrombus  -currently on warfarin, plavix an lovenox with epistaxis  -reviewed with Dr. Fine  -await INR today  -If INR under 2 start Eliquis 5mg twice daily and stop warfarin  -if over two we will wait 24-48 hours to start Eliquis  -this should limit duration/need for bridging in the future  -if epistaxis persists, she was told to go to ED at [facility]        5. Dyslipidemia  On crestor 20mg  daily  Will need lipid panel at some point    6. Anxiety  Became unresponsive on benzos  Placed on Seroquel      7. Chronic edema  Has undergone bilateral vein stripping in the past    8. Hypothyroidism  tsh 11.24  Free t4 1.39  Synthroid increased  Will need tsh in 6-8 weeks at PMD  Her daughter inadvertently gave her a full pill daily for 4 days  -return to half tablet daily  -check labs as per new pmd who she sees next week    9.. Anemia  Before cardiac events 12-13   Now 8.3  Iron level normal  tibc normal   I will see her back next week; greater than 50% of this 50 minute visit was spent in counseling an collaboration.    CURRENT MEDICATIONS:  Current Outpatient Medications   Medication Sig Dispense Refill     acetaminophen (TYLENOL) 325 MG tablet Take 2 tablets (650 mg) by mouth every 6 hours as needed for mild pain 100 tablet 0     bimatoprost (LUMIGAN) 0.01 % SOLN Place 1 drop into both eyes At Bedtime        busPIRone (BUSPAR) 10 MG tablet Take 1 tablet (10 mg) by mouth 3 times daily 90 tablet 0     clopidogrel (PLAVIX) 75 MG tablet Take 1 tablet (75 mg) by mouth daily 30 tablet 11     co-enzyme Q-10 100 MG CAPS capsule Take 100 mg by mouth daily       dorzolamide-timolol (COSOPT) 2-0.5 % ophthalmic solution Place 1 drop into both eyes 2 times daily        enoxaparin ANTICOAGULANT (LOVENOX) 80 MG/0.8ML syringe Inject 0.7 mLs (70 mg) Subcutaneous every 12 hours 10 Syringe 1     ferrous sulfate (FEROSUL) 325 (65 Fe) MG tablet Take 1 tablet (325 mg) by mouth 2 times daily 60 tablet 0     latanoprost (XALATAN) 0.005 % ophthalmic solution Place 1 drop into both eyes daily       levothyroxine (SYNTHROID/LEVOTHROID) 125 MCG tablet Take 0.5 tablets (62.5 mcg) by mouth every morning (before breakfast) 15 tablet 0     nitroGLYcerin (NITROSTAT) 0.4 MG sublingual tablet For chest pain place 1 tablet under the tongue every 5 minutes for 3 doses. If symptoms persist 5 minutes after 1st dose call 911. 20 tablet 0      OMEPRAZOLE PO Take 20 mg by mouth daily        ondansetron (ZOFRAN-ODT) 4 MG ODT tab Take 4 mg by mouth every 8 hours as needed        potassium chloride ER (KLOR-CON M) 20 MEQ CR tablet Take 1 tablet (20 mEq) by mouth daily 90 tablet 3     QUEtiapine (SEROQUEL) 25 MG tablet Take 0.5 tablets (12.5 mg) by mouth At Bedtime 15 tablet 0     rosuvastatin (CRESTOR) 20 MG tablet Take 20 mg by mouth daily       sacubitril-valsartan (ENTRESTO) 24-26 MG per tablet Take 1 tablet by mouth 2 times daily 60 tablet 1     senna-docusate (SENOKOT-S/PERICOLACE) 8.6-50 MG tablet Take 2 tablets by mouth 2 times daily as needed for constipation       sodium chloride (OCEAN) 0.65 % nasal spray Spray 2 sprays into both nostrils 3 times daily 15 mL 0     torsemide (DEMADEX) 20 MG tablet Take 1 tablet (20 mg) by mouth daily 30 tablet 0     warfarin ANTICOAGULANT (COUMADIN) 1 MG tablet Take 2.5 tablet (2.5 mg) by mouth evening of 7/11 and 2 tablet (2 mg) evening of 7/12.  Further dosing to be determined by INR check on 7/13/20. 60 tablet 0       ALLERGIES     Allergies   Allergen Reactions     Combigan [Brimonidine Tartrate-Timolol] Swelling     Swollen eyelids     Ativan [Lorazepam] Anxiety and Other (See Comments)     Increased confusion       PAST MEDICAL HISTORY:  Past Medical History:   Diagnosis Date     CAD (coronary artery disease)      ICD (implantable cardioverter-defibrillator) in place     Primary prevention AICD placed in [place] in May 2020.     Ischemic cardiomyopathy     LVEF less than 20%.     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     Status post coronary artery bypass grafting     Done in [facility] in April 2020.  LIMA to diagonal (as LAD dissected) and KURT to the right coronary artery.     Type of service:  Video Visit  DOX    Video Start Time: 1055am  Video End Time: 1140am    Originating Location (pt. Location): Home    Distant  Location (provider location):  home office  Platform used for Video Visit: STEPHAN Sandoval CNP

## 2020-07-16 NOTE — DISCHARGE INSTRUCTIONS
Hold warfarin, recheck INR tomorrow, begin Eliquis per cardiology recommendation    Bacitracin or Neosporin to nasal septum bilaterally 2 times daily to keep mucosa moist    Use nasal clamp for bleeding, recheck here if profuse persistent bleeding

## 2020-07-16 NOTE — PROGRESS NOTES
Received call from [facility] nurse care coordinator, Audrey, (ph: [phone]) who confirms that orders faxed for BMP and CBC tomorrow were received and will be done.    Will plan to watch if pt has CBC and BMP done in ED today will call Audrey and cancel orders for tomorrow.     SAFIA Toledo 2:40 PM 7/16/2020

## 2020-07-16 NOTE — PROGRESS NOTES
I have reviewed her blood thinners with Colby today    She is going [facility] clinic today to have an INR  If that INR today is less than 2  colby wants to change to elquis 5mg bid    If INR over 2 we will stop warfarin in 24-48 hours and start eliquis then    Can you see how much this costs  Is sent rx to [facility]

## 2020-07-16 NOTE — ED PROVIDER NOTES
History     Chief Complaint   Patient presents with     Epistaxis      dizzy hgb low after discharge from hospital     HPI  Marquise Jj is a 75 year old female who who was evaluated today virtually with cardiology.  On 7/1 she had MI with stent placement and is placed on clopidogrel.  She is found to have a left ventricular mural thrombus with cardiomyopathy and ejection fraction of 24% percent, she is maintained currently on warfarin and Lovenox with prior subtherapeutic INR, she was on heparin warfarin until 7/7 at discharge from hospital at which time she was switched to Lovenox and warfarin.  Ideally cardiology would like to get her started on apixaban.  Per their note will not start same until INR less than 2.  Presents here for evaluation of right epistaxis waxing and waning today, responsive to nose clip.  Also presents for INR and hemoglobin check.  Denies chest pain denies shortness of air denies fever.  She is not swallowing any blood at all, only blood is coming from right nare.  She has had a couple areas in her lower legs that have bled bilaterally over the past 24 hours, her daughter is wrapped them up with little Covan and there is no active bleeding.    Allergies:  Allergies   Allergen Reactions     Combigan [Brimonidine Tartrate-Timolol] Swelling     Swollen eyelids     Ativan [Lorazepam] Anxiety and Other (See Comments)     Increased confusion       Problem List:    Patient Active Problem List    Diagnosis Date Noted     Epistaxis 07/11/2020     Priority: Medium     chronic kidney disease 07/08/2020     Priority: Medium     Septic shock (H) 06/25/2020     Priority: Medium     Mural thrombus of left ventricle 06/10/2020     Priority: Medium     Ischemic cardiomyopathy 06/04/2020     Priority: Medium     Anemia due to blood loss, acute 11/22/2017     Priority: Medium     Failure of total hip arthroplasty, 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     Priority: Medium     Anemia 11/20/2017      Priority: Medium     Weakness 11/19/2017     Priority: Medium     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     Priority: Medium     Gastroesophageal reflux disease 11/13/2017     Priority: Medium     HTN (hypertension) 08/05/2016     Priority: Medium     Overview:   Diagnosed 8/2016       Chronic kidney disease, stage III (moderate) (H) 08/05/2013     Priority: Medium     Hyperlipidemia 08/05/2013     Priority: Medium        Past Medical History:    Past Medical History:   Diagnosis Date     CAD (coronary artery disease)      ICD (implantable cardioverter-defibrillator) in place      Ischemic cardiomyopathy      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)      Status post coronary artery bypass grafting        Past Surgical History:    Past Surgical History:   Procedure Laterality Date     ARTHROPLASTY REVISION HIP Right 11/16/2017    Procedure: ARTHROPLASTY REVISION HIP;  Right total hip arthroplasty revision.;  Surgeon: Jozef Garcia MD;  Location: [facility] OR     cabg  04/2020     CV HEART CATHETERIZATION WITH POSSIBLE INTERVENTION N/A 7/2/2020    Procedure: Heart Catheterization with Possible Intervention;  Surgeon: Kaden Franco MD;  Location:  HEART CARDIAC CATH LAB     CV RIGHT HEART CATH N/A 7/2/2020    Procedure: Right Heart Cath;  Surgeon: Kaden Franco MD;  Location:  HEART CARDIAC CATH LAB       Family History:    Family History   Problem Relation Age of Onset     Coronary Artery Disease No family hx of      Heart Failure No family hx of        Social History:  Marital Status:   [2]  Social History     Tobacco Use     Smoking status: Never Smoker     Smokeless tobacco: Never Used   Substance Use Topics     Alcohol use: Not Currently     Drug use: Never        Medications:    acetaminophen (TYLENOL) 325 MG tablet  apixaban ANTICOAGULANT (ELIQUIS) 5 MG tablet  bimatoprost (LUMIGAN) 0.01 % SOLN  busPIRone (BUSPAR) 10 MG tablet  clopidogrel (PLAVIX) 75 MG  tablet  co-enzyme Q-10 100 MG CAPS capsule  dorzolamide-timolol (COSOPT) 2-0.5 % ophthalmic solution  ferrous sulfate (FEROSUL) 325 (65 Fe) MG tablet  latanoprost (XALATAN) 0.005 % ophthalmic solution  levothyroxine (SYNTHROID/LEVOTHROID) 125 MCG tablet  nitroGLYcerin (NITROSTAT) 0.4 MG sublingual tablet  OMEPRAZOLE PO  ondansetron (ZOFRAN-ODT) 4 MG ODT tab  QUEtiapine (SEROQUEL) 25 MG tablet  rosuvastatin (CRESTOR) 20 MG tablet  sacubitril-valsartan (ENTRESTO) 24-26 MG per tablet  senna-docusate (SENOKOT-S/PERICOLACE) 8.6-50 MG tablet  sodium chloride (OCEAN) 0.65 % nasal spray  torsemide (DEMADEX) 20 MG tablet          Review of Systems  All other systems reviewed and are negative.    Physical Exam   BP: (!) 84/62  Pulse: 89  Heart Rate: 86  Temp: 96.4  F (35.8  C)  Resp: 14  Weight: 68.3 kg (150 lb 8 oz)  SpO2: 99 %      Physical Exam  Nontoxic-appearing no respiratory distress alert and oriented  Head atraumatic normocephalic  Skin pale warm and dry  Left nare is clear right shows small clot anterior septum no active bleeding  Oropharynx moist without any blood  Lungs clear  Heart regular no murmur  Lower extremities 1+ edema knees distally, legs are unwrapped, couple of varicosities that have bled recently but are not currently actively bleeding  ED Course        Procedures               Critical Care time:  none               Results for orders placed or performed during the hospital encounter of 07/16/20 (from the past 24 hour(s))   CBC with platelets differential   Result Value Ref Range    WBC 5.5 4.0 - 11.0 10e9/L    RBC Count 3.57 (L) 3.8 - 5.2 10e12/L    Hemoglobin 9.2 (L) 11.7 - 15.7 g/dL    Hematocrit 30.8 (L) 35.0 - 47.0 %    MCV 86 78 - 100 fl    MCH 25.8 (L) 26.5 - 33.0 pg    MCHC 29.9 (L) 31.5 - 36.5 g/dL    RDW 26.3 (H) 10.0 - 15.0 %    Platelet Count 225 150 - 450 10e9/L    Diff Method Automated Method     % Neutrophils 51.9 %    % Lymphocytes 34.5 %    % Monocytes 10.1 %    % Eosinophils 2.2 %     % Basophils 0.9 %    % Immature Granulocytes 0.4 %    Nucleated RBCs 2 (H) 0 /100    Absolute Neutrophil 2.9 1.6 - 8.3 10e9/L    Absolute Lymphocytes 1.9 0.8 - 5.3 10e9/L    Absolute Monocytes 0.6 0.0 - 1.3 10e9/L    Absolute Eosinophils 0.1 0.0 - 0.7 10e9/L    Absolute Basophils 0.1 0.0 - 0.2 10e9/L    Abs Immature Granulocytes 0.0 0 - 0.4 10e9/L    Absolute Nucleated RBC 0.1    Comprehensive metabolic panel   Result Value Ref Range    Sodium 136 133 - 144 mmol/L    Potassium 3.7 3.4 - 5.3 mmol/L    Chloride 102 94 - 109 mmol/L    Carbon Dioxide 25 20 - 32 mmol/L    Anion Gap 9 3 - 14 mmol/L    Glucose 94 70 - 99 mg/dL    Urea Nitrogen 38 (H) 7 - 30 mg/dL    Creatinine 1.62 (H) 0.52 - 1.04 mg/dL    GFR Estimate 31 (L) >60 mL/min/[1.73_m2]    GFR Estimate If Black 36 (L) >60 mL/min/[1.73_m2]    Calcium 8.8 8.5 - 10.1 mg/dL    Bilirubin Total 1.1 0.2 - 1.3 mg/dL    Albumin 2.9 (L) 3.4 - 5.0 g/dL    Protein Total 7.0 6.8 - 8.8 g/dL    Alkaline Phosphatase 131 40 - 150 U/L    ALT 17 0 - 50 U/L    AST 19 0 - 45 U/L   INR   Result Value Ref Range    INR 2.60 (H) 0.86 - 1.14       Medications   0.9% sodium chloride BOLUS (500 mLs Intravenous Not Given 7/16/20 1705)       Assessments & Plan (with Medical Decision Making)  Epistaxis anterior, patient is on clopidogrel as well as warfarin and Lovenox.  They want to discontinue the warfarin and Lovenox and start apixaban once INR drops below 2.  Her anterior epistaxis is easily controlled with direct pressure, recommend placing some bacitracin over septal nasal mucosa couple times a day.  Return here for uncontrolled bleeding or any other concern.  Hemoglobin is up to 9.2 today she is feeling reasonably well, moving air well no rales on exam piecing well continues on furosemide.  Follow-up cardiology as scheduled.  Return criteria reviewed     I have reviewed the nursing notes.    I have reviewed the findings, diagnosis, plan and need for follow up with the patient.           Discharge Medication List as of 7/16/2020  5:14 PM      START taking these medications    Details   apixaban ANTICOAGULANT (ELIQUIS) 5 MG tablet Take 1 tablet (5 mg) by mouth 2 times daily, Disp-60 tablet,R-0, E-Prescribe             Final diagnoses:   Epistaxis   Chronic anticoagulation       7/16/2020   [facility] EMERGENCY DEPARTMENT     Jorge Porras MD  07/16/20 4334

## 2020-07-16 NOTE — PROGRESS NOTES
Reviewed with DOTTY Sunshine that pt is currently at [facility] ED for nosebleed and INR, CBC and CMP have been ordered.  DOTTY Sunshine confirms that BMP and CBC orders for tomorrow that were faxed to [facility] can be cancelled.  Called SAFIA Ventura care coordinator with [facility] and confirmed that lab orders for tomorrow can be cancelled.  Audrey states she will watch to see if pt is discharged home or admitted to the hospital and will plan to still keep visit with pt tomorrow if she is discharged home.        Reviewed pt's INR, CMP and CBC results from today with DOTTY Sunshine who also reviewed with Dr. Fine and plan is: stop warfarin and lovenox, start Eliquis 5mg BID with first dose on 7/19/20 PM, decrease torsemide dose to 10mg (1/2 of 20mg tab) daily, recheck BMP on 7/20/20.    Called [facility] pharmacy and discontinued Eliquis Rx sent there chang today.  Sent 30-day Rx to [facility] pharmacy and called pharmacy tech and provided info for free 30-day trial offer, which was approved.     Called and spoke with pt's daughter, Norman.  Reviewed all above information and she verbalized understanding and agrees with this plan.  She will call if any questions/concerns arise.     SAFIA Toledo 5:20 PM 7/16/2020

## 2020-07-16 NOTE — ED NOTES
Unable to get lab drawn from peripheral stick, lab was called to draw blood. Nose bleed had started up again when MD was in room. Bacitracin put in R nare and nose clip put back on. Bacitracin and bandaids applied to small scabbed over areas on legs from earlier bleeding.

## 2020-07-16 NOTE — PROGRESS NOTES
Received call from pt's daughter who reports that she is bringing pt into [facility] ED because nosebleed won't stop and will request that labs and INR be done there.     SAFIA Toledo 2:36 PM 7/16/2020

## 2020-07-16 NOTE — ED NOTES
Pt had nose bleed this am that stopped, started up again at noon and bled until 1500. Pt did feel a little lightheaded on the ride to the hospital but not since. Pt BP is in the 80's, per pt and daughter her BP is always low, and she restarted BP meds after discharge from [facility] Saturday  after cardiac stents. Pt had bypass in March after MI.

## 2020-07-16 NOTE — PROGRESS NOTES
Faxed orders for CBC and BMP to be drawn on 7/17/20 to  [facility] (fx: [phone]). Requested return call to confirm order received and will be done tomorrow.    SAFIA Toledo 10:45 AM 7/16/2020

## 2020-07-16 NOTE — LETTER
7/16/2020    Poonam Cast  [facility] [address] [place] [facility] [id number]    RE: Marquise Jj       Dear Colleague,    I had the pleasure of seeing Marquise Jj in the [facility].    Marquise Jj is a 75 year old female who is being evaluated via a billable video visit.      History of Present Illness:    Marquise jJ is a 75 year old female who I am having a video visit for a complex hospital admission 6-25 to 7-    Marquise has a history of ischemic CM with EF less than 20%, mural thrombus, Multivessel CAD with CABG April 2020 in [facility] [place] [facility].  She was on a cruise this past spring with her daughter and developed severe nausea and vomiting after a buffet meal. Symptoms persisted for days including fatigue so after 5-7 days she sought medical attention in [place] when she got off of the ship.Angiogram showed   Left main 30-40%; LAD occluded; diagonals occluded  RCA 70%; circumflex 60-70% with small targets  Diagonal grafted after LAD dissected intraop  KURT to RCA  She went for an angiogram during this admission after a viability study showed absent to minimal uptake in the LAD territory. Circumflex has mildly decreased perfusion, suggesting mostly viable myocardium. RCA appeared 100% viable.    She then went to the cath lab for possible revascularization. The KURT was atretic; Elias supplied a small vascular territory of the diagonal; she has significant stenosis in the mid to distal RCA and a 2.5 X 24mm Synergy SEMAJ was placed. The circumflex proximal to mid had a 70-80% stenosis with iFR of 0.82. She will need staged PCI to the circumflex. She is on Plavix.    She was evaluated by EP to see if she would qualify for upgrade of her ICD to Bi-V and based on narrow QRS and lack of dyssynchrony she does not quailfy  RHC: 53/ 27 mean 37; PCWP 30 LVEDP 22 TPG 7 at weight of 146.    She was diuresed from a weight of 156 to 146 with IV  lasix; at one point her creatinine miroslava to 1.55 (3 days post contrast as well).   She was placed on IV Dopamine briefly with improved diuresis again and ability to restart Entresto. Creatinine improved on baseline to 1.14. She was sent out on Torsemide 20mg daily    Due to her mural thrombus, she as placed on IV Heparin and changed to warfarin with Lovenox for bridging.    Baseline hemoglobin as around 10, down to 8.3 on discharge. I asked for a recheck today but [facility] did not see the orders.     At some point, she may need to be referred to the [facility] for advanced heart failure team however age and LV thrombus may be a limiting factor for LVAD or transplant.    Echo: EF less than 20%, severe TR, mild to moderately dilated RV with mildly decreased RV systolic function, mid to moderate MR    Her daughter Norman is present today for our visit. I note that she is having active epistaxis out of her right nare. She has a history of epistaxis previously requiring a rhinorocket. With icing this episode stopped by the end of our visit.     Since being home she feels weak but reports breathing is much better. She has 1+ foot edema with scattered bruises on her lower extremities. She and he daughter state the swelling has improved. Her weight on arrival at home as 151.7, down to 150. Her BP is in the mid to high 80;s so she has only been taking Entresto once per day in the PM.    She denies chest pain, orthopnea or PND.    Her grandson is getting  on 8-22 and she would prefer to wait for her staged circumflex intervention until after that. She has a face to face visit with Dr. Perla on 8-19 and he is fine waiting if she remains stable. Dr. Franco did her PCI and is in the cath lab the week of 8-25.    She is following a low sodium diet and is trying to wear support hose as often as she can. Home care is still coming in.    General Appearance:    no distress, normal body habitus, upright.  weak    ENT/Mouth:    membranes moist, no nasal discharge or bleeding gums. Normal head shape, no evidence of injury or laceration.    EYES:    no scleral icterus, normal conjunctivae    Neck:    no evidence of thyromegaly. No obvious JVD    Chest/Lungs:    No audible wheezing equal chest wall expansion. Non labored breathing. No cough.    Cardiovascular:    No evidence of elevated jugular venous pressure. No evidence of pitting edema bilaterally    Abdomen:    no evidence of abdominal distention. No observed jaundice.    Extremities:    no cyanosis or clubbing noted. 1+ pitting edema of feet with scattered areas on ecchymosis    Skin:    no xanthelasma, normal skin collar. No evidence of facial lacerations.    Neurologic:    Normal arm motion bilateral, no tremors. No evidence of focal defect.    Psychiatric:    alert and oriented x3, calm    Impression/Plan:    1. Ischemic CM with EF less than 20%  Cardiogenic shock   Severe TR; mild to moderate MR  Heart failure  Weight is stable  Continue torsemide 20mg daily  Bmp due tomorrow  Reduce entresto to 1/2 of her 24/26mg tablet at pm only due to hypotension  Follow up with me in one week  Once revascularized, we will plan on repeating an echocardiogram      2. Coronary artery disease  S/p CABG in [place]  LIMA to diagonal serves a small territory  KURT atretic  LAD nonviable  S/p stent to RCA  Will need staged PCI of circumflex-likely week of 8-25 with Dr. Franco    3. LORI in hospital likely multifactorial  Low BP  Contrast and CHF  See above, await labs    4. LV thrombus  -currently on warfarin, plavix an lovenox with epistaxis  -reviewed with Dr. Fine  -await INR today  -If INR under 2 start Eliquis 5mg twice daily and stop warfarin  -if over two we will wait 24-48 hours to start Eliquis  -this should limit duration/need for bridging in the future  -if epistaxis persists, she was told to go to ED at [facility]        5. Dyslipidemia  On crestor 20mg daily  Will need  lipid panel at some point    6. Anxiety  Became unresponsive on benzos  Placed on Seroquel      7. Chronic edema  Has undergone bilateral vein stripping in the past    8. Hypothyroidism  tsh 11.24  Free t4 1.39  Synthroid increased  Will need tsh in 6-8 weeks at PMD  Her daughter inadvertently gave her a full pill daily for 4 days  -return to half tablet daily  -check labs as per new pmd who she sees next week    9.. Anemia  Before cardiac events 12-13   Now 8.3  Iron level normal  tibc normal   I will see her back next week; greater than 50% of this 50 minute visit was spent in counseling an collaboration.    CURRENT MEDICATIONS:  Current Outpatient Medications   Medication Sig Dispense Refill     acetaminophen (TYLENOL) 325 MG tablet Take 2 tablets (650 mg) by mouth every 6 hours as needed for mild pain 100 tablet 0     bimatoprost (LUMIGAN) 0.01 % SOLN Place 1 drop into both eyes At Bedtime        busPIRone (BUSPAR) 10 MG tablet Take 1 tablet (10 mg) by mouth 3 times daily 90 tablet 0     clopidogrel (PLAVIX) 75 MG tablet Take 1 tablet (75 mg) by mouth daily 30 tablet 11     co-enzyme Q-10 100 MG CAPS capsule Take 100 mg by mouth daily       dorzolamide-timolol (COSOPT) 2-0.5 % ophthalmic solution Place 1 drop into both eyes 2 times daily        enoxaparin ANTICOAGULANT (LOVENOX) 80 MG/0.8ML syringe Inject 0.7 mLs (70 mg) Subcutaneous every 12 hours 10 Syringe 1     ferrous sulfate (FEROSUL) 325 (65 Fe) MG tablet Take 1 tablet (325 mg) by mouth 2 times daily 60 tablet 0     latanoprost (XALATAN) 0.005 % ophthalmic solution Place 1 drop into both eyes daily       levothyroxine (SYNTHROID/LEVOTHROID) 125 MCG tablet Take 0.5 tablets (62.5 mcg) by mouth every morning (before breakfast) 15 tablet 0     nitroGLYcerin (NITROSTAT) 0.4 MG sublingual tablet For chest pain place 1 tablet under the tongue every 5 minutes for 3 doses. If symptoms persist 5 minutes after 1st dose call 911. 20 tablet 0     OMEPRAZOLE PO Take 20  mg by mouth daily        ondansetron (ZOFRAN-ODT) 4 MG ODT tab Take 4 mg by mouth every 8 hours as needed        potassium chloride ER (KLOR-CON M) 20 MEQ CR tablet Take 1 tablet (20 mEq) by mouth daily 90 tablet 3     QUEtiapine (SEROQUEL) 25 MG tablet Take 0.5 tablets (12.5 mg) by mouth At Bedtime 15 tablet 0     rosuvastatin (CRESTOR) 20 MG tablet Take 20 mg by mouth daily       sacubitril-valsartan (ENTRESTO) 24-26 MG per tablet Take 1 tablet by mouth 2 times daily 60 tablet 1     senna-docusate (SENOKOT-S/PERICOLACE) 8.6-50 MG tablet Take 2 tablets by mouth 2 times daily as needed for constipation       sodium chloride (OCEAN) 0.65 % nasal spray Spray 2 sprays into both nostrils 3 times daily 15 mL 0     torsemide (DEMADEX) 20 MG tablet Take 1 tablet (20 mg) by mouth daily 30 tablet 0     warfarin ANTICOAGULANT (COUMADIN) 1 MG tablet Take 2.5 tablet (2.5 mg) by mouth evening of 7/11 and 2 tablet (2 mg) evening of 7/12.  Further dosing to be determined by INR check on 7/13/20. 60 tablet 0       ALLERGIES     Allergies   Allergen Reactions     Combigan [Brimonidine Tartrate-Timolol] Swelling     Swollen eyelids     Ativan [Lorazepam] Anxiety and Other (See Comments)     Increased confusion       PAST MEDICAL HISTORY:  Past Medical History:   Diagnosis Date     CAD (coronary artery disease)      ICD (implantable cardioverter-defibrillator) in place     Primary prevention AICD placed in [place] in May 2020.     Ischemic cardiomyopathy     LVEF less than 20%.     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     Status post coronary artery bypass grafting     Done in [facility] in April 2020.  LIMA to diagonal (as LAD dissected) and KURT to the right coronary artery.     Thank you for allowing me to participate in the care of your patient.    Sincerely,     STEPHAN Ramachandran [facility]

## 2020-07-17 NOTE — PROGRESS NOTES
Patient's daughter called today and advised RN that ED provider recommended patient have home INR lab drawn today. Patient's daughter wondering if LUIS ANGEL Brittany ok to order as patient will have [facility] home care RN evaluate her today. RN will send to LUIS ANGEL Brittany for review and recommendation.

## 2020-07-17 NOTE — PROGRESS NOTES
RN called patient's daughter and updated her that no INR needed. Patient's daughter verbalized understanding and has no further questions at this time.     There is really no need to do this since we stopped warfarin  It will not change your plan     Plan is to start Eliquis Sunday PM so I do not think an INR adds anything      LUIS ANGEL Brittany

## 2020-07-17 NOTE — TELEPHONE ENCOUNTER
There is really no need to do this since we stopped warfarin  It will not change your plan    Plan is to start Eliquis Sunday PM so I do not think an INR adds anything

## 2020-07-20 NOTE — RESULT ENCOUNTER NOTE
TSH is improved, about at the range where it's acceptable for age.  T4 is also on the high end of normal, so we'll keep the levothyroxine at the current dose for now. Recheck in 3-6 months.    Siri Aguilar M.D.

## 2020-07-20 NOTE — TELEPHONE ENCOUNTER
[facility] now requests orders and shares plan of care/discharge summaries for some patients through [employer].  Please REPLY TO THIS MESSAGE OR ROUTE BACK TO THE AUTHOR in order to give authorization for orders when needed.  This is considered a verbal order, you will still receive a faxed copy of orders for signature.  Thank you for your assistance in improving collaboration for our patients.    ORDER  Per clients Daughter Lymphedema was suggested by you for clients legs today at her initial visit with you.. Should I put a home care order in for lymphedema eval?

## 2020-07-20 NOTE — Clinical Note
Please abstract the following data from this visit with this patient into the appropriate field in Epic: Results through care everywhere.    Mammogram done on this date: 7/24/2019 (approximately), by this group: [facility], results were Negative.  and Dexa Scan was done on 7/25/2017 from [facility] and results were negative.    Dolores Guerrero MA

## 2020-07-20 NOTE — PROGRESS NOTES
Subjective     Marquise Jj is a 75 year old female who presents to clinic today for the following health issues:    HPI     Establish care/ new patient. Patient coming from [facility]. Patient's daughter Norman states that [facility] will be sending over medical chart. Can see this through care everywhere.    Anxiety Follow-Up    How are you doing with your anxiety since your last visit? Stable    Are you having other symptoms that might be associated with anxiety? No    Have you had a significant life event? OTHER: stent was put in this last month.     Are you feeling depressed? No    Do you have any concerns with your use of alcohol or other drugs? No    Social History     Tobacco Use     Smoking status: Never Smoker     Smokeless tobacco: Never Used   Substance Use Topics     Alcohol use: Not Currently     Drug use: Never     No flowsheet data found.  No flowsheet data found.        How many servings of fruits and vegetables do you eat daily?  2-3    On average, how many sweetened beverages do you drink each day (Examples: soda, juice, sweet tea, etc.  Do NOT count diet or artificially sweetened beverages)?   1    How many days per week do you exercise enough to make your heart beat faster? 3 or less    How many minutes a day do you exercise enough to make your heart beat faster? 10 - 19    How many days per week do you miss taking your medication? 0      Hospital Course 6/25-7/11     Marquise Jj is a 75 year old female with PMH recent complicated cardiac history significant for severe ischemic cardiomyopathy with left ventricular ejection fraction of less than 20%, large mural thrombus on recent echocardiogram from June 2020, severe multivessel coronary disease status post bypass in March 2020, chronic anemia, and chronic CKD who was admitted on 6/25/2020 for hypotension and weakness (Transfer from [facility] ED).      Cardiogenic shock   Acute on chronic left  systolic CHF (LVEF of less than 20%)  Ischemic cardiomyopathy (with late presentation STEMI s/p CABG; LIMA to diagonal due to LAD dissection, KURT to RCA, [place] 4/2020)   S/p coronary angiography 7/2/2020 with SEMAJ to RCA (plan for staged procedure to LCx. Thallium viability study 7/1/2020 showing mostly viable LCx territory)   S/p AICD 4/2020 in [place]  Severe TR, mild-mod MR   Presented with hypotension needing pressors, initially thought to be secondary to urinary source and started empirically on Rocephin but blood and urine cultures negative.  Completed 6 day course ceftriaxone 6/30.  Cardiology consulted, had higher suspicion for cardiogenic shock.  Underwent angiogram with stenting and plan for staged procedure as noted above. She was diuresed with bumex drip and transiently required dopamine drip as well.  Pressures stabilized and she tolerated resumption of Entresto and oral diuretic.  Continue plavix, rosuvastatin.  Plan to resume aspirin as outpatient once INR therapeutic (Cardiology recommended holding due to quadruple therapy - Plavix, coumadin, lovenox).  Follow up with Cardiology outpatient.     Large mural thrombus on recent echocardiogram from June 2020  Supratherapeutic INR, resolved  H/o recurrent epistaxis   ENT on 7/3 shows recent bleed from excoriation from nasal canula prong, no active bleeding or other site of bleeding.  ENT recommended conservative measures with bacitracin and nasal spray.  INR >6 at admission.  Improved by time of discharge and will require lovenox bridge with close INR monitoring.     LORI, resolved on CKD, stage III   Baseline cr around 1-1.2. Cr 1.92 on admission, improved with diuresis and then worsened likely from heart failure.  Improved after dopamine gtt.  US 6/26 without obstruction or hydronephrosis.  Cr stable at 1.1-1.2 at time of discharge.     Hypothyroidism   TSH during hospitalization high but FT4 normal. TSH 11.25, FT4 1.39 on 6/28.  Levothyroxine increased to  62.5 mg, OP recheck thyroid function in 6-8 weeks.      Anxiety  Psychiatry consulted, recommended scheduled Seroquel which will be continued nightly at discharge.  Continue PTA buspirone.     Chronic anemia   Unclear recent baseline, prior to recent cardiac events it seems her Hb was around 12-13.  Hgb stable 8-9 g/dL at discharge.      Probable GERD  Continue PTA PPI.       Patient was told she could recheck TSH after 7/19 or so.      Renal function:   !RENAL/LYTES Latest Ref Rng & Units 7/5/2020 7/6/2020   GLUCOSE 70 - 99 mg/dL 137 (H) 163 (H)    - 144 mmol/L 132 (L) 130 (L)   POTASSIUM 3.4 - 5.3 mmol/L 3.7 3.8   CHLORIDE 94 - 109 mmol/L 97 96   CARBON DIOXIDE 20 - 32 mmol/L 27 26   CREATININE 0.52 - 1.04 mg/dL 1.55 (H) 0.52   BUN 7 - 30 mg/dL 38 (H) 35 (H)   GFR ESTIMATE, IF BLACK >60 mL/min/1.73:m2 38 (L) >90   GFR ESTIMATE >60 mL/min/1.73:m2 32 (L) >90     !RENAL/LYTES Latest Ref Rng & Units 7/7/2020 7/8/2020   GLUCOSE 70 - 99 mg/dL 110 (H) 93    - 144 mmol/L 131 (L) 133   POTASSIUM 3.4 - 5.3 mmol/L 3.9 3.8   CHLORIDE 94 - 109 mmol/L 97 98   CARBON DIOXIDE 20 - 32 mmol/L 27 27   CREATININE 0.52 - 1.04 mg/dL 1.23 (H) 1.16 (H)   BUN 7 - 30 mg/dL 31 (H) 26   GFR ESTIMATE, IF BLACK >60 mL/min/1.73:m2 50 (L) 53 (L)   GFR ESTIMATE >60 mL/min/1.73:m2 43 (L) 46 (L)     !RENAL/LYTES Latest Ref Rng & Units 7/9/2020 7/10/2020   GLUCOSE 70 - 99 mg/dL 84 99    - 144 mmol/L 131 (L) 134   POTASSIUM 3.4 - 5.3 mmol/L 3.9 4.2   CHLORIDE 94 - 109 mmol/L 99 101   CARBON DIOXIDE 20 - 32 mmol/L 27 26   CREATININE 0.52 - 1.04 mg/dL 1.15 (H) 1.26 (H)   BUN 7 - 30 mg/dL 30 28   GFR ESTIMATE, IF BLACK >60 mL/min/1.73:m2 54 (L) 48 (L)   GFR ESTIMATE >60 mL/min/1.73:m2 46 (L) 42 (L)     !RENAL/LYTES Latest Ref Rng & Units 7/11/2020 7/16/2020   GLUCOSE 70 - 99 mg/dL 86 94    - 144 mmol/L 134 136   POTASSIUM 3.4 - 5.3 mmol/L 3.7 3.7   CHLORIDE 94 - 109 mmol/L 101 102   CARBON DIOXIDE 20 - 32 mmol/L 25 25  "  CREATININE 0.52 - 1.04 mg/dL 1.14 (H) 1.62 (H)   BUN 7 - 30 mg/dL 30 38 (H)   GFR ESTIMATE, IF BLACK >60 mL/min/1.73:m2 54 (L) 36 (L)   GFR ESTIMATE >60 mL/min/1.73:m2 47 (L) 31 (L)     Cr not back to baseline.  Cardiology decreased her bumex from 20 mg to 10 mg recently, there has been increased swelling and some blistering/weeping from her lower extremities.     I think it would benefit patient to see both lymphedema for compression as well as seeing nephrology for suspected cardiorenal syndrome.         Reviewed and updated as needed this visit by Provider         Review of Systems   Constitutional, HEENT, cardiovascular, pulmonary, gi and gu systems are negative, except as otherwise noted.      Generally fatigued but gradually improving.      Objective    /54   Temp 97.4  F (36.3  C) (Tympanic)   Resp 16   Ht 1.727 m (5' 8\")   Wt 67.5 kg (148 lb 12.8 oz)   BMI 22.62 kg/m    Body mass index is 22.62 kg/m .  Physical Exam   GENERAL: healthy, alert and no distress  NECK: no adenopathy, no asymmetry, masses, or scars and thyroid normal to palpation  RESP: lungs clear to auscultation - no rales, rhonchi or wheezes  CV: regular rates and rhythm, normal S1 S2, no S3 or S4 and no murmur, click or rub  ABDOMEN: soft, nontender, no hepatosplenomegaly, no masses and bowel sounds normal  MS: edema is 2-3+ to mid tibia.  There is bullae/weeping.  Skin: petechiae/bruising of the posterior calf.     Diagnostic Test Results:  Labs reviewed in Epic  Results for orders placed or performed in visit on 07/20/20 (from the past 24 hour(s))   TSH with free T4 reflex   Result Value Ref Range    TSH 7.55 (H) 0.40 - 4.00 mU/L   T4 free   Result Value Ref Range    T4 Free 1.44 0.76 - 1.46 ng/dL           Assessment & Plan     1. Cardiorenal syndrome with renal failure, stage 1-4 or unspecified chronic kidney disease, with heart failure (H)  Recommend nephrology evaluation given the delicate fluid balance we're trying to " achieve here  - NEPHROLOGY ADULT REFERRAL  - T4 free    2. Anxiety  Improved on the buspar.  seroquel recently increased to 25 mg daily  Recheck in 1 month, may need further adjustment.   - QUEtiapine (SEROQUEL) 25 MG tablet; Take 1 tablet (25 mg) by mouth At Bedtime  Dispense: 90 tablet; Refill: 1    3. Hypothyroidism, unspecified type   recent adjustment.   TSH is about at goal for age  T4 is normal  Continue to monitor  - TSH with free T4 reflex    4. Secondary lymphedema     - PHYSICAL THERAPY REFERRAL; Future    5. Essential hypertension  stable    6. Ischemic cardiomyopathy  Following with cardiology    7. Mural thrombus of left ventricle  On eliquis and plavix             No follow-ups on file.    Siri Aguilar MD  [facility]

## 2020-07-21 NOTE — TELEPHONE ENCOUNTER
Reason for call:  Patient reporting a symptom    Symptom or request: Pt saw Dr. Aguilar for the first yesterday.  Today, pt is SOB and has burning with urination.  Transferred call to Clinic RN as high priority    Duration (how long have symptoms been present): Ongoing    Have you been treated for this before? Yes    Additional comments:     Phone Number patient can be reached at:  Home number on file [phone] (home)    Best Time:  any    Can we leave a detailed message on this number:  YES    Call taken on 7/21/2020 at 1:57 PM by Narda Brennan

## 2020-07-21 NOTE — TELEPHONE ENCOUNTER
Pt was seen yesterday to establish care with .  Ambulated ok yesterday with some SOB.  Today, SOB is worse and is SOB with sitting.  Also frequency and burning with urination.  Recommended ER for SOB and her Cardiac history.  Pt agreed and will have /daughter drive to [facility].  Marylu

## 2020-07-22 NOTE — TELEPHONE ENCOUNTER
RECORDS RECEIVED FROM: Internal - Cardiorenal syndrome with renal failure, stage 1-4 or unspecified chronic kidney disease, with heart failure (H)    DATE RECEIVED: 08.03.2020   NOTES STATUS DETAILS   OFFICE NOTE from referring provider Internal 07.20.2020 Siri Aguilar MD [facility]   OFFICE NOTE from other specialist  N/A    *Only VASCULITIS or LUPUS gather office notes for the following N/A    *PULMONARY   N/A    *ENT N/A    *DERMATOLOGY N/A    *RHEUMATOLOGY N/A    DISCHARGE SUMMARY from hospital N/A    DISCHARGE REPORT from the ER N/A    MEDICATION LIST Internal / CE    IMAGING  (NEED IMAGES AND REPORTS)     KIDNEY CT SCAN N/A    KIDNEY ULTRASOUND Internal 06.26.2020   MR ABDOMEN N/A    NUCLEAR MEDICINE RENAL N/A    LABS     CBC Internal 07.16.2020   CMP Internal 07.16.2020   BMP Internal 07.16.2020   UA Internal 06.25.2020   URINE PROTEIN Internal 06.25.2020   RENAL PANEL N/A    BIOPSY     KIDNEY BIOPSY  N/A

## 2020-07-23 NOTE — TELEPHONE ENCOUNTER
[facility] now requests orders and shares plan of care/discharge summaries for some patients through [employer].  Please REPLY TO THIS MESSAGE OR ROUTE BACK TO THE AUTHOR in order to give authorization for orders when needed.  This is considered a verbal order, you will still receive a faxed copy of orders for signature.  Thank you for your assistance in improving collaboration for our patients.    FYI....when adding eliquis to med list, a severe level 2 drug interaction came up between eliquis and clopidogrel.    Thank you,  Melissa Dykes RN  [phone]  [email or web address]

## 2020-07-23 NOTE — PROGRESS NOTES
RN called patient's Home care RN Audrey and she advised she could have an RN out there as early as tomorrow to have BMP drawn. RN will update LUIS ANGEL Brittany. RN will also send reminder to look for BMP results           So the ED one is what prompted the torsemide reduction and now I see that her PMD talks about sob and edema  They told her to go to ed and she did not so home care should NOT be canceling my orders without talking to me     I need these rescheduled

## 2020-07-23 NOTE — TELEPHONE ENCOUNTER
So the ED one is what prompted the torsemide reduction and now I see that her PMD talks about sob and edema  They told her to go to ed and she did not so home care should NOT be canceling my orders without talking to me    I need these rescheduled somewhere somehow please

## 2020-07-23 NOTE — PROGRESS NOTES
I thought she was supposed to have BMP by home care at some point  Do we know if one was drawn--if not we will need to arrange for one

## 2020-07-23 NOTE — PROGRESS NOTES
"Marquise Jj is a 75 year old female who is being evaluated via a billable video visit.      The patient has been notified of following:     \"This video visit will be conducted via a call between you and your physician/provider. We have found that certain health care needs can be provided without the need for an in-person physical exam.  This service lets us provide the care you need with a video conversation.  If a prescription is necessary we can send it directly to your pharmacy.  If lab work is needed we can place an order for that and you can then stop by our lab to have the test done at a later time.    Video visits are billed at different rates depending on your insurance coverage.  Please reach out to your insurance provider with any questions.    If during the course of the call the physician/provider feels a video visit is not appropriate, you will not be charged for this service.\"    Patient has given verbal consent for Video visit? Yes  How would you like to obtain your AVS? Mail a copy  If you are dropped from the video visit, the video invite should be resent to: Text to cell phone: [phone]  Will anyone else be joining your video visit? Yes daughter Norman      Vital signs reported by patient  BP.92/62  P. 79  Wt.145.4  Ht. 5'8\"  Review Of Systems  Skin: NEGATIVE  Eyes:Ears/Nose/Throat: NEGATIVE  Respiratory: sob on exertion and at rest  Cardiovascular: fatigue, edema in feet and ankles   Gastrointestinal: NEGATIVE  Genitourinary:NEGATIVE   Musculoskeletal: NEGATIVE  Neurologic: NEGATIVE  Psychiatric: NEGATIVE  Hematologic/Lymphatic/Immunologic: NEGATIVE  Endocrine:  thyroid  Telephone number of patient:    Candace Martinez Lpn    Video-Visit Details    Type of service:  Video Visit      History of Present Illness:     Marquise Jj is a 75 year old female who I am having a video visit for her complex cardiac history. She was hospitalized from 6-25 to 7-     Marquise has a history of " ischemic CM with EF less than 20%, mural thrombus, Multivessel CAD with CABG April 2020 in [facility], [facility].  She was on a cruise this past spring with her daughter and developed severe nausea and vomiting after a buffet meal. Symptoms persisted for days including fatigue so after 5-7 days she sought medical attention in [place] when she got off of the ship. Angiogram showed   Left main 30-40%; LAD occluded; diagonals occluded  RCA 70%; circumflex 60-70% with small targets  Diagonal grafted after LAD dissected intraop  KURT to RCA  She went for an angiogram during this admission after a viability study showed absent to minimal uptake in the LAD territory. Circumflex has mildly decreased perfusion, suggesting mostly viable myocardium. RCA appeared 100% viable.     She then went to the cath lab for possible revascularization. The KURT was atretic; Elias supplied a small vascular territory of the diagonal; she has significant stenosis in the mid to distal RCA and a 2.5 X 24mm Synergy SEMAJ was placed. The circumflex proximal to mid had a 70-80% stenosis with iFR of 0.82. She will need staged PCI to the circumflex which we are planning to do with Dr. Perla in August. She is on Plavix.     She was evaluated by EP to see if she would qualify for upgrade of her ICD to Bi-V and based on narrow QRS and lack of dyssynchrony she does not quailfy  RHC: 53/ 27 mean 37; PCWP 30 LVEDP 22 TPG 7 at weight of 146.     She was diuresed from a weight of 156 to 146 with IV lasix; at one point her creatinine miroslava to 1.55 (3 days post contrast as well).   She was placed on IV Dopamine briefly with improved diuresis again and ability to restart Entresto. Creatinine improved on baseline to 1.14. She was sent out on Torsemide 20mg daily    Last visit, her creatinine elevated again to 1.6 and we reduced her Torsemide to 10mg daily and Entresto to 1/2 pill of the 24/26mg dose. Now her BP is  versus 80's. I had ordered a BMP prior  to today's visit but the homecare nurse canceled it. I will have this drawn tomorrow with a repeat N-terminal pro-BNP.     Due to her mural thrombus, she as placed on IV Heparin and changed to warfarin with Lovenox for bridging. She developed epistaxis on this regimen and was sent to the ED. Hemoglobin was 9.2 and at that point we stopped lovenox and Heparin and placed her on Eliquis 5mg twice daily instead.     Baseline hemoglobin as around 10.     At some point, she may need to be referred to the [facility] for advanced heart failure team however age and LV thrombus may be a limiting factor for LVAD or transplant.     Echo: EF less than 20%, severe TR, mild to moderately dilated RV with mildly decreased RV systolic function, mid to moderate MR     Her daughter Norman is present today for our visit.      Since the reduction in her Torsemide, she last lost 4 pounds from 149 to 145. She has 1+ foot edema with blistering that was weeping but they tell me has stopped now. JVP on the phone video looks to be around 9-10 at most.       She denies chest pain, orthopnea or PND. She states she feels breathless at rest and with activity but her O2 sats sitting are 99%; heart rates 76-80. She has no chest pain. She was found to have a UTI and was placed on Cephalexin. After the first couple doses she felt nausea and vomited once.     Her new PMD has referred her to Nephrology through the [facility], this is scheduled 8-3     Her grandson is getting  on 8-22 and she would prefer to wait for her staged circumflex intervention until after that. She has a face to face visit with Dr. Perla on 8-19 and he is fine waiting if she remains stable. Dr. Franco did her PCI and is in the cath lab the week of 8-25.     She is following a low sodium diet and is trying to wear support hose as often as she can. Home care is still coming in as well as PT. Her new PMD has ordered lymphedema care as well.     She reports feeling mildly anxious and  not sleeping well. Her PMD made adjustments to her Seroquel.    General Appearance:     no distress, normal body habitus, upright. weak     ENT/Mouth:     membranes moist, no nasal discharge or bleeding gums. Normal head shape, no evidence of injury or laceration.     EYES:     no scleral icterus, normal conjunctivae     Neck:     no evidence of thyromegaly.  JVP 9     Chest/Lungs:     No audible wheezing equal chest wall expansion. Non labored breathing. No cough.     Cardiovascular:     No evidence of elevated jugular venous pressure. No evidence of pitting edema bilaterally     Abdomen:     no evidence of abdominal distention. No observed jaundice.     Extremities:     no cyanosis or clubbing noted. 1+ pitting edema of feet with scattered areas of previous blistering and oozing     Skin:     no xanthelasma, normal skin collar. No evidence of facial lacerations.     Neurologic:     Normal arm motion bilateral, no tremors. No evidence of focal defect.     Psychiatric:     alert and oriented x3, appears mildly anxious         Impression/Plan:     1. Ischemic CM with EF less than 20% chronic ischemic heart failure  Cardiogenic shock last admission  Severe TR; mild to moderate MR    Weight is down on lower dose Torsemide at 10mg daily  -try extra 5mg once today to see if sob improves  -I suspect there could be a component of anxiety  Bmp/BNP  Tomorrow via home care  Contine entresto to 1/2 of her 24/26mg tablet at pm--> if her BMP is improved, I may try to add 1/2 pill in am as well.  Follow up with me in one week arranged  Once revascularized, we will plan on repeating an echocardiogram        2. Coronary artery disease  S/p CABG in [facility] to [place] serves a small territory  KURT atretic  LAD nonviable  S/p stent to RCA  Will need staged PCI of circumflex-likely week of 8-25 with Dr. Franco  -will place her on the shadow schedule     3. LORI in hospital likely multifactorial  Low BP  Contrast and CHF  -Nephrology  consult reviewed with Dr. Perla and he agrees  -Keep at [employer] system.     4. LV thrombus  -due to epistaxis was changed to Eliquis  -lovenox and warfarin stopped  -no further nosebleeds       5. Dyslipidemia  On crestor 20mg daily  Will need lipid panel at some point     6. Anxiety  Became unresponsive on benzos  Placed on Seroquel  Dose adjusted by PMD      7. Chronic edema  Has undergone bilateral vein stripping in the past  Lymphedema planned     8. Hypothyroidism  tsh 7.55 Free T4 1.44--follow up at PMD     9.. Anemia  Before cardiac events 12-13   9.2 in ED  Recheck tomorrow  Iron level normal  tibc normal    CURRENT MEDICATIONS:  Current Outpatient Medications   Medication Sig Dispense Refill     acetaminophen (TYLENOL) 325 MG tablet Take 2 tablets (650 mg) by mouth every 6 hours as needed for mild pain (Patient not taking: Reported on 7/20/2020) 100 tablet 0     apixaban ANTICOAGULANT (ELIQUIS) 5 MG tablet Take 1 tablet (5 mg) by mouth 2 times daily First dose to be on 7/19/20 PM dose. 60 tablet 0     bimatoprost (LUMIGAN) 0.01 % SOLN Place 1 drop into both eyes At Bedtime        busPIRone (BUSPAR) 10 MG tablet Take 1 tablet (10 mg) by mouth 3 times daily 90 tablet 0     clopidogrel (PLAVIX) 75 MG tablet Take 1 tablet (75 mg) by mouth daily 30 tablet 11     co-enzyme Q-10 100 MG CAPS capsule Take 100 mg by mouth daily       dorzolamide-timolol (COSOPT) 2-0.5 % ophthalmic solution Place 1 drop into both eyes 2 times daily        ferrous sulfate (FEROSUL) 325 (65 Fe) MG tablet Take 1 tablet (325 mg) by mouth 2 times daily 60 tablet 0     latanoprost (XALATAN) 0.005 % ophthalmic solution Place 1 drop into both eyes daily       levothyroxine (SYNTHROID/LEVOTHROID) 125 MCG tablet Take 0.5 tablets (62.5 mcg) by mouth every morning (before breakfast) 15 tablet 0     nitroGLYcerin (NITROSTAT) 0.4 MG sublingual tablet For chest pain place 1 tablet under the tongue every 5 minutes for 3 doses. If symptoms persist 5  minutes after 1st dose call 911. (Patient not taking: Reported on 7/20/2020) 20 tablet 0     OMEPRAZOLE PO Take 20 mg by mouth daily        ondansetron (ZOFRAN-ODT) 4 MG ODT tab Take 4 mg by mouth every 8 hours as needed        QUEtiapine (SEROQUEL) 25 MG tablet Take 1 tablet (25 mg) by mouth At Bedtime 90 tablet 1     rosuvastatin (CRESTOR) 20 MG tablet Take 20 mg by mouth daily       sacubitril-valsartan (ENTRESTO) 24-26 MG per tablet 1/2 tablet in PM daily 60 tablet 1     sodium chloride (OCEAN) 0.65 % nasal spray Spray 2 sprays into both nostrils 3 times daily (Patient not taking: Reported on 7/20/2020) 15 mL 0     torsemide (DEMADEX) 20 MG tablet Take 10mg (1/2 tab) PO daily 30 tablet 0       ALLERGIES     Allergies   Allergen Reactions     Combigan [Brimonidine Tartrate-Timolol] Swelling     Swollen eyelids     Ativan [Lorazepam] Anxiety and Other (See Comments)     Increased confusion       PAST MEDICAL HISTORY:  Past Medical History:   Diagnosis Date     CAD (coronary artery disease)      ICD (implantable cardioverter-defibrillator) in place     Primary prevention AICD placed in [place] in May 2020.     Ischemic cardiomyopathy     LVEF less than 20%.     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     Status post coronary artery bypass grafting     Done in [facility] in April 2020.  LIMA to diagonal (as LAD dissected) and KURT to the right coronary artery.         Video Start Time: 1050am  Video End Time: 11:35am    Originating Location (pt. Location): Home    Distant Location (provider location):  home office  Platform used for Video Visit: D[employer]im[employer]  Greater than 50% of this 45 minute visit was spent in counseling and collaboration    STEPHAN Ramachandran CNP

## 2020-07-23 NOTE — PROGRESS NOTES
LUIS ANGEL Brittany requested that BNP be added to labs. RN placed order and called home care RN team. They advised this RN that they will draw both BMP and BNP. RN was advised that labs should be available for review tomorrow.

## 2020-07-23 NOTE — TELEPHONE ENCOUNTER
Discussed with patient/daughter.  Ok for video visit.  Appointment set up for tomorrow.    Lisset Lund RN

## 2020-07-23 NOTE — LETTER
7/23/2020    Poonam Cast  [facility] [id number]    RE: Marquise Laboy Анна       Dear Colleague,    I had the pleasure of seeing Marquise Jj in the [facility].    Marquise Jj is a 75 year old female who is being evaluated via a billable video visit.      History of Present Illness:     Marquise Jj is a 75 year old female who I am having a video visit for her complex cardiac history. She was hospitalized from 6-25 to 7-     Marquise has a history of ischemic CM with EF less than 20%, mural thrombus, Multivessel CAD with CABG April 2020 in [facility].  She was on a cruise this past spring with her daughter and developed severe nausea and vomiting after a buffet meal. Symptoms persisted for days including fatigue so after 5-7 days she sought medical attention in [place] when she got off of the ship. Angiogram showed   Left main 30-40%; LAD occluded; diagonals occluded  RCA 70%; circumflex 60-70% with small targets  Diagonal grafted after LAD dissected intraop  KURT to RCA  She went for an angiogram during this admission after a viability study showed absent to minimal uptake in the LAD territory. Circumflex has mildly decreased perfusion, suggesting mostly viable myocardium. RCA appeared 100% viable.     She then went to the cath lab for possible revascularization. The KURT was atretic; Elias supplied a small vascular territory of the diagonal; she has significant stenosis in the mid to distal RCA and a 2.5 X 24mm Synergy SEMAJ was placed. The circumflex proximal to mid had a 70-80% stenosis with iFR of 0.82. She will need staged PCI to the circumflex which we are planning to do with Dr. Perla in August. She is on Plavix.     She was evaluated by EP to see if she would qualify for upgrade of her ICD to Bi-V and based on narrow QRS and lack of dyssynchrony she does not quailfy  RHC: 53/ 27 mean 37; PCWP 30  LVEDP 22 TPG 7 at weight of 146.     She was diuresed from a weight of 156 to 146 with IV lasix; at one point her creatinine miroslava to 1.55 (3 days post contrast as well).   She was placed on IV Dopamine briefly with improved diuresis again and ability to restart Entresto. Creatinine improved on baseline to 1.14. She was sent out on Torsemide 20mg daily    Last visit, her creatinine elevated again to 1.6 and we reduced her Torsemide to 10mg daily and Entresto to 1/2 pill of the 24/26mg dose. Now her BP is  versus 80's. I had ordered a BMP prior to today's visit but the homecare nurse canceled it. I will have this drawn tomorrow with a repeat N-terminal pro-BNP.     Due to her mural thrombus, she as placed on IV Heparin and changed to warfarin with Lovenox for bridging. She developed epistaxis on this regimen and was sent to the ED. Hemoglobin was 9.2 and at that point we stopped lovenox and Heparin and placed her on Eliquis 5mg twice daily instead.     Baseline hemoglobin as around 10.     At some point, she may need to be referred to the [facility] for advanced heart failure team however age and LV thrombus may be a limiting factor for LVAD or transplant.     Echo: EF less than 20%, severe TR, mild to moderately dilated RV with mildly decreased RV systolic function, mid to moderate MR     Her daughter Norman is present today for our visit.      Since the reduction in her Torsemide, she last lost 4 pounds from 149 to 145. She has 1+ foot edema with blistering that was weeping but they tell me has stopped now. JVP on the phone video looks to be around 9-10 at most.       She denies chest pain, orthopnea or PND. She states she feels breathless at rest and with activity but her O2 sats sitting are 99%; heart rates 76-80. She has no chest pain. She was found to have a UTI and was placed on Cephalexin. After the first couple doses she felt nausea and vomited once.     Her new PMD has referred her to Nephrology through  the [facility], this is scheduled 8-3     Her grandson is getting  on 8-22 and she would prefer to wait for her staged circumflex intervention until after that. She has a face to face visit with Dr. Perla on 8-19 and he is fine waiting if she remains stable. Dr. Franco did her PCI and is in the cath lab the week of 8-25.     She is following a low sodium diet and is trying to wear support hose as often as she can. Home care is still coming in as well as PT. Her new PMD has ordered lymphedema care as well.     She reports feeling mildly anxious and not sleeping well. Her PMD made adjustments to her Seroquel.    General Appearance:     no distress, normal body habitus, upright. weak     ENT/Mouth:     membranes moist, no nasal discharge or bleeding gums. Normal head shape, no evidence of injury or laceration.     EYES:     no scleral icterus, normal conjunctivae     Neck:     no evidence of thyromegaly.  JVP 9     Chest/Lungs:     No audible wheezing equal chest wall expansion. Non labored breathing. No cough.     Cardiovascular:     No evidence of elevated jugular venous pressure. No evidence of pitting edema bilaterally     Abdomen:     no evidence of abdominal distention. No observed jaundice.     Extremities:     no cyanosis or clubbing noted. 1+ pitting edema of feet with scattered areas of previous blistering and oozing     Skin:     no xanthelasma, normal skin collar. No evidence of facial lacerations.     Neurologic:     Normal arm motion bilateral, no tremors. No evidence of focal defect.     Psychiatric:     alert and oriented x3, appears mildly anxious         Impression/Plan:     1. Ischemic CM with EF less than 20% chronic ischemic heart failure  Cardiogenic shock last admission  Severe TR; mild to moderate MR    Weight is down on lower dose Torsemide at 10mg daily  -try extra 5mg once today to see if sob improves  -I suspect there could be a component of anxiety  Bmp/BNP  Tomorrow via home  care  Contine entresto to 1/2 of her 24/26mg tablet at pm--> if her BMP is improved, I may try to add 1/2 pill in am as well.  Follow up with me in one week arranged  Once revascularized, we will plan on repeating an echocardiogram        2. Coronary artery disease  S/p CABG in [place]  LIM to [place] serves a small territory  KURT atretic  LAD nonviable  S/p stent to RCA  Will need staged PCI of circumflex-likely week of 8-25 with Dr. Franco  -will place her on the shadow schedule     3. LORI in hospital likely multifactorial  Low BP  Contrast and CHF  -Nephrology consult reviewed with Dr. Perla and he agrees  -Keep at [facility].     4. LV thrombus  -due to epistaxis was changed to Eliquis  -lovenox and warfarin stopped  -no further nosebleeds       5. Dyslipidemia  On crestor 20mg daily  Will need lipid panel at some point     6. Anxiety  Became unresponsive on benzos  Placed on Seroquel  Dose adjusted by PMD      7. Chronic edema  Has undergone bilateral vein stripping in the past  Lymphedema planned     8. Hypothyroidism  tsh 7.55 Free T4 1.44--follow up at PMD     9.. Anemia  Before cardiac events 12-13   9.2 in ED  Recheck tomorrow  Iron level normal  tibc normal    CURRENT MEDICATIONS:  Current Outpatient Medications   Medication Sig Dispense Refill     acetaminophen (TYLENOL) 325 MG tablet Take 2 tablets (650 mg) by mouth every 6 hours as needed for mild pain (Patient not taking: Reported on 7/20/2020) 100 tablet 0     apixaban ANTICOAGULANT (ELIQUIS) 5 MG tablet Take 1 tablet (5 mg) by mouth 2 times daily First dose to be on 7/19/20 PM dose. 60 tablet 0     bimatoprost (LUMIGAN) 0.01 % SOLN Place 1 drop into both eyes At Bedtime        busPIRone (BUSPAR) 10 MG tablet Take 1 tablet (10 mg) by mouth 3 times daily 90 tablet 0     clopidogrel (PLAVIX) 75 MG tablet Take 1 tablet (75 mg) by mouth daily 30 tablet 11     co-enzyme Q-10 100 MG CAPS capsule Take 100 mg by mouth daily       dorzolamide-timolol  (COSOPT) 2-0.5 % ophthalmic solution Place 1 drop into both eyes 2 times daily        ferrous sulfate (FEROSUL) 325 (65 Fe) MG tablet Take 1 tablet (325 mg) by mouth 2 times daily 60 tablet 0     latanoprost (XALATAN) 0.005 % ophthalmic solution Place 1 drop into both eyes daily       levothyroxine (SYNTHROID/LEVOTHROID) 125 MCG tablet Take 0.5 tablets (62.5 mcg) by mouth every morning (before breakfast) 15 tablet 0     nitroGLYcerin (NITROSTAT) 0.4 MG sublingual tablet For chest pain place 1 tablet under the tongue every 5 minutes for 3 doses. If symptoms persist 5 minutes after 1st dose call 911. (Patient not taking: Reported on 7/20/2020) 20 tablet 0     OMEPRAZOLE PO Take 20 mg by mouth daily        ondansetron (ZOFRAN-ODT) 4 MG ODT tab Take 4 mg by mouth every 8 hours as needed        QUEtiapine (SEROQUEL) 25 MG tablet Take 1 tablet (25 mg) by mouth At Bedtime 90 tablet 1     rosuvastatin (CRESTOR) 20 MG tablet Take 20 mg by mouth daily       sacubitril-valsartan (ENTRESTO) 24-26 MG per tablet 1/2 tablet in PM daily 60 tablet 1     sodium chloride (OCEAN) 0.65 % nasal spray Spray 2 sprays into both nostrils 3 times daily (Patient not taking: Reported on 7/20/2020) 15 mL 0     torsemide (DEMADEX) 20 MG tablet Take 10mg (1/2 tab) PO daily 30 tablet 0       ALLERGIES     Allergies   Allergen Reactions     Combigan [Brimonidine Tartrate-Timolol] Swelling     Swollen eyelids     Ativan [Lorazepam] Anxiety and Other (See Comments)     Increased confusion       PAST MEDICAL HISTORY:  Past Medical History:   Diagnosis Date     CAD (coronary artery disease)      ICD (implantable cardioverter-defibrillator) in place     Primary prevention AICD placed in [place] in May 2020.     Ischemic cardiomyopathy     LVEF less than 20%.     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     Status post coronary artery bypass grafting     Done in [facility]  in April 2020.  LIMA to diagonal (as LAD dissected) and KURT to the right coronary artery.       Thank you for allowing me to participate in the care of your patient.    Sincerely,     STEPHAN Ramachandran [facility]

## 2020-07-23 NOTE — PATIENT INSTRUCTIONS
Try an extra 5mg torsemide (1/4 pill) today    Agree with lymphedema clinic    I will have home care draw labs tomorrow and see if we can increase your entresto    See me next week on 7-31 at 110 for a video visit  Any questions, call my nurse  SAFIA Krishna  [phone]

## 2020-07-24 PROBLEM — F41.9 ANXIETY: Status: ACTIVE | Noted: 2020-01-01

## 2020-07-24 NOTE — PROGRESS NOTES
"Marquise Jj is a 75 year old female who is being evaluated via a billable telephone visit.      The patient has been notified of following:     \"This telephone visit will be conducted via a call between you and your physician/provider. We have found that certain health care needs can be provided without the need for a physical exam.  This service lets us provide the care you need with a short phone conversation.  If a prescription is necessary we can send it directly to your pharmacy.  If lab work is needed we can place an order for that and you can then stop by our lab to have the test done at a later time.    Telephone visits are billed at different rates depending on your insurance coverage. During this emergency period, for some insurers they may be billed the same as an in-person visit.  Please reach out to your insurance provider with any questions.    If during the course of the call the physician/provider feels a telephone visit is not appropriate, you will not be charged for this service.\"    Patient has given verbal consent for Telephone visit?  Yes    What phone number would you like to be contacted at? [phone]    How would you like to obtain your AVS? MyChart    Subjective     Marquise Jj is a 75 year old female who presents via phone visit today for the following health issues:    HPI    HPI  Chief Complaint   Patient presents with     Anxiety     Patient feels like buspar is not helping.        Depression and Anxiety Follow-Up    How are you doing with your depression since your last visit? No change    How are you doing with your anxiety since your last visit?  Worsened     Are you having other symptoms that might be associated with depression or anxiety? Yes:  Breathing is heavier due to anxiety    Have you had a significant life event? No     Do you have any concerns with your use of alcohol or other drugs? No    Social History     Tobacco Use     Smoking status: Never Smoker "     Smokeless tobacco: Never Used   Substance Use Topics     Alcohol use: Not Currently     Drug use: Never     No flowsheet data found.  No flowsheet data found.  Last PHQ-9 7/24/2020   1.  Little interest or pleasure in doing things 0   2.  Feeling down, depressed, or hopeless 0   3.  Trouble falling or staying asleep, or sleeping too much 3   4.  Feeling tired or having little energy 3   5.  Poor appetite or overeating 2   6.  Feeling bad about yourself 0   7.  Trouble concentrating 0   8.  Moving slowly or restless 0   Q9: Thoughts of better off dead/self-harm past 2 weeks 0   PHQ-9 Total Score 8   Difficulty at work, home, or with people Somewhat difficult     TERESA-7  7/24/2020   1. Feeling nervous, anxious, or on edge 1   2. Not being able to stop or control worrying 0   3. Worrying too much about different things 0   4. Trouble relaxing 1   5. Being so restless that it is hard to sit still 0   6. Becoming easily annoyed or irritable 0   7. Feeling afraid, as if something awful might happen 1   TERESA-7 Total Score 3   If you checked any problems, how difficult have they made it for you to do your work, take care of things at home, or get along with other people? Somewhat difficult       Suicide Assessment Five-step Evaluation and Treatment (SAFE-T)      How many servings of fruits and vegetables do you eat daily?  4 or more    On average, how many sweetened beverages do you drink each day (Examples: soda, juice, sweet tea, etc.  Do NOT count diet or artificially sweetened beverages)?   5 glasses of juice    How many days per week do you exercise enough to make your heart beat faster? 7    How many minutes a day do you exercise enough to make your heart beat faster? Walk around house with walker    How many days per week do you miss taking your medication? 0    Buspar already at 30mg/day.  With her renal function I'm hesitant to increase further.     Not sleeping well. Only taking 25 mg of the seroquel at bedtime.         Reviewed and updated as needed this visit by Provider         Review of Systems   Constitutional, HEENT, cardiovascular, pulmonary, gi and gu systems are negative, except as otherwise noted.       Objective   Reported vitals:  There were no vitals taken for this visit.   healthy, alert and no distress  PSYCH: Alert and oriented times 3; coherent speech, normal   rate and volume, able to articulate logical thoughts, able   to abstract reason, no tangential thoughts, no hallucinations   or delusions  Her affect is normal  RESP: No cough, no audible wheezing, able to talk in full sentences  Remainder of exam unable to be completed due to telephone visits            Assessment/Plan:    1. Anxiety  Increase seroquel to 50 mg at hs.  Will take 2 of the 25 mg pills.   Recheck (phone visit okay) in 2 weeks to see how this dose is working.  May need further adjustment.  No need to renally dose this.     No changes made to the buspar.       No follow-ups on file.      Phone call duration:  7 minutes    Siri Aguilar MD

## 2020-07-24 NOTE — PROGRESS NOTES
Potassium came back at 2.6    Creatinine better at 1.37    I have spoken to daughter who states she has some potassium pills at home    I called Dr. Perla to review  KCL 40meq bid today  Then 20meq tomorrow  Check labs on Sunday  Add slo mag 64mg bid for 2 weeks; discussed with daughter  rx sent to [facility] in [facility] as daughter is there and can pick it up tonite    If home care can do we will arrange for that; if not her daughter will have to take her to  [place]    I will call patient and follow up on arranging labs tomorrow    Will try to reach home care patient restated these directions accurately to me    Her cell is [phone]

## 2020-07-24 NOTE — PATIENT INSTRUCTIONS
Our Clinic hours are:  Mondays    7:20 am - 7 pm  Tues -  Fri  7:20 am - 5 pm    Clinic Phone: [phone]    The clinic lab opens at 7:30 am Mon - Fri and appointments are required.    [facility]. [phone]  Monday  8 am - 7pm  Tues - Fri 8 am - 5:30 pm

## 2020-07-26 NOTE — PROGRESS NOTES
See below  Can you arrange bmp prior to my visit on 7-31 via homecare so maybe draw on Wednesday or Thursday if it is early Thursday so I have it back Friday  thanks

## 2020-07-26 NOTE — PROGRESS NOTES
Patient had potassium of 2.6 on Friday 7-24  She was supplemented with 40meq bid on Friday and 20meq daily after that    She was started no Slo Mag 64mg bid  Home care went out today and labs are as follows:  Component      Latest Ref Rng & Units 7/26/2020   Sodium      133 - 144 mmol/L 139   Potassium      3.4 - 5.3 mmol/L 4.0   Chloride      94 - 109 mmol/L 104   Carbon Dioxide      20 - 32 mmol/L 26   Anion Gap      3 - 14 mmol/L 9   Glucose      70 - 99 mg/dL 82   Urea Nitrogen      7 - 30 mg/dL 27   Creatinine      0.52 - 1.04 mg/dL 1.34 (H)   GFR Estimate      >60 mL/min/1.73:m2 39 (L)   GFR Estimate If Black      >60 mL/min/1.73:m2 45 (L)   Calcium      8.5 - 10.1 mg/dL 9.0     Patient contacted  No med changes today  Weight stable between 147 and 147.9    Will have my nurse arrange for BMP on Wednesday or Thursday via home care prior to my visit next Friday 7-31

## 2020-07-27 NOTE — TELEPHONE ENCOUNTER
[facility] now requests orders and shares plan of care/discharge summaries for some patients through [employer].  Please REPLY TO THIS MESSAGE OR ROUTE BACK TO THE AUTHOR in order to give authorization for orders when needed.  This is considered a verbal order, you will still receive a faxed copy of orders for signature.  Thank you for your assistance in improving collaboration for our patients.    ORDER: SN 2wk6, 5 prn visits for labs, monitor cardiac status, monitor respiratory status, symptom management.  OT Lymphedema to eval and treat.    FYI...patient reports increase in anxiety. Has not been sleeping well for fear of SOB and that she will not wake up. She did increase Seroquel to 50mg every night on Friday but has not really noticed a difference.    Thank you,  Melissa Dykes RN  [phone]  [email or web address]

## 2020-07-27 NOTE — TELEPHONE ENCOUNTER
[facility] now requests orders and shares plan of care/discharge summaries for some patients through [employer].  Please REPLY TO THIS MESSAGE OR ROUTE BACK TO THE AUTHOR in order to give authorization for orders when needed.  This is considered a verbal order, you will still receive a faxed copy of orders for signature.  Thank you for your assistance in improving collaboration for our patients.    ORDER    PT reassessment done today PT to continue 1 x more this week then 2 x a week x 4 weeks for strengthening, endurance with ambulation. Practice stairs and climbing on slopes so she can leave her home. Establish a home program.    Lymphedema eval done last week and requested further orders for client to see for 2 more visits in the new cert period.

## 2020-07-28 NOTE — PROGRESS NOTES
"RX and MD portion of application to team 6 in box for DR Perla to sign  pts portion is on my desk, when all portions are completed, will fax together to [facility]    I spoke w/ Team 6 RN, Dr Perla will be in clinic 8- he has 2 pts on his schedule for \"in clinic visits\"  I will keep the BMS paperwork at my desk for now, and I  will bring it down on 8- for a signature  [facility]      8- MD signed lázaro and RX, all completed papers faxed to [facility]  "

## 2020-07-28 NOTE — PROGRESS NOTES
Called [facility] RN, Melissa, (ph: [phone]), no answer, LVM requesting to know if she could draw BMP either 7/29 or 7/30 for results to be back when pt has video visit with DOTTY Sunshine on 7/31/20 at 1:10pm. BMP order is in Epic.     SAFIA Toledo 2:02 PM 7/28/2020

## 2020-07-28 NOTE — PROGRESS NOTES
Received return call from [facility] RN, Melissa, who confirms a nurse will be seeing pt on 7/30/20 and can draw BMP that day.  Requested that lab be drawn in the AM rather than PM to help ensure that results will be back by the time pt has visit with DOTTY Sunshine on at 1:10pm on 7/31/20.  Melissa states she will put a note requesting an AM nurse visit that day.    SAFIA Toledo 3:49 PM 7/28/2020

## 2020-07-30 NOTE — PROGRESS NOTES
Called and spoke with pt's daughter, Norman.  She reports pt's wts this week have been:  7/27  148#  7/28  147#  7/29  145.9#  7/30  146.8#    *wt at last visit on 7/23 was 145.4#    Norman also confirms that pt is still taking KCL 20mEq daily.     Discussed with Norman that will update DOTTY Sunshine with this information and call if any changes recommended prior to pt's video visit with DOTTY Sunshine at 1:10pm tomorrow.     Routed to DOTTY Sunshine for review.     SAFIA Toledo 5:04 PM 7/30/2020

## 2020-07-30 NOTE — TELEPHONE ENCOUNTER
Agree, though would do CBC with platelets, not just hemoglobin.  Need to evaluate if plts are low.    Siri Aguilar M.D.

## 2020-07-30 NOTE — PROGRESS NOTES
[facility] now requests orders and shares plan of care/discharge summaries for some patients through [employer].  Please REPLY TO THIS MESSAGE OR ROUTE BACK TO THE AUTHOR in order to give authorization for orders when needed.  This is considered a verbal order, you will still receive a faxed copy of orders for signature.  Thank you for your assistance in improving collaboration for our patients.    ORDER    SN to draw HGB today at visit along with BMP. Patient has requested this due to nose bleeds in the past, tired/weak low energy    Esperanza RN  [phone]

## 2020-07-31 NOTE — LETTER
7/31/2020    Poonam Cast  [facility] [address] [facility] [id number]    RE: Marquise Laboy Анна       Dear Colleague,    I had the pleasure of seeing Marquise Jj in the [facility].    Marquise Jj is a 75 year old female who is being evaluated via a billable video visit.      History of Present Illness:     Marquise jJ is a 75 year old female who I am having a video visit for her complex cardiac history. She was hospitalized from 6-25 to 7-     Marquise has a history of ischemic CM with EF less than 20%, mural thrombus, Multivessel CAD with CABG April 2020 in [facility] [place] [facility].  She was on a cruise this past spring with her daughter and developed severe nausea and vomiting after a buffet meal. Symptoms persisted for days including fatigue so after 5-7 days she sought medical attention in [place] when she got off of the ship. Angiogram showed   Left main 30-40%; LAD occluded; diagonals occluded  RCA 70%; circumflex 60-70% with small targets  Diagonal grafted after LAD dissected intraop  KURT to RCA  She went for an angiogram during this admission after a viability study showed absent to minimal uptake in the LAD territory. Circumflex has mildly decreased perfusion, suggesting mostly viable myocardium. RCA appeared 100% viable.     She then went to the cath lab for possible revascularization. The KURT was atretic; Elias supplied a small vascular territory of the diagonal; she has significant stenosis in the mid to distal RCA and a 2.5 X 24mm Synergy SEMAJ was placed. The circumflex proximal to mid had a 70-80% stenosis with iFR of 0.82. She will need staged PCI to the circumflex which we are planning to do with Dr. Perla in August. She is on Plavix.     She was evaluated by EP to see if she would qualify for upgrade of her ICD to Bi-V and based on narrow QRS and lack of dyssynchrony she does not quailfy  RHC: 53/ 27 mean 37; PCWP 30  LVEDP 22 TPG 7 at weight of 146.     She was diuresed from a weight of 156 to 146 with IV lasix; at one point her creatinine miroslava to 1.55 (3 days post contrast as well).   She was placed on IV Dopamine briefly with improved diuresis again and ability to restart Entresto. Creatinine improved on baseline to 1.14. I have adjusted her torsmide to 10mg per day and creatinine now is 1.3-1.5 Her new PMD has referred her to Nephrology through the [facility] which we very much appreciate;  this is scheduled 8-3     Last week she had a potassium of 2.6; she was supplemented orally and placed on 20meq daily and now it is 3.4; with her CM I would prefer to see it closer to 4.0 so we will increase the dose to 20meq bid. I will check labs next week. With the low potassium, we empirically placed her on Magneisum bid for 2 weeks, complete on 8-.     Due to her mural thrombus, she as placed on IV Heparin and changed to warfarin with Lovenox for bridging. She developed epistaxis on this regimen and was sent to the ED. Hemoglobin was 9.2 and at that point we stopped lovenox and Heparin and placed her on Eliquis 5mg twice daily instead.She has tolerated this well with no further epistaxis     Baseline hemoglobin as around 10, 10.2 yesterday; she is on iron.     At some point, she may need to be referred to the [facility] for advanced heart failure team however age and LV thrombus may be a limiting factor for LVAD or transplant.     Echo: EF less than 20%, severe TR, mild to moderately dilated RV with mildly decreased RV systolic function, mid to moderate MR     Her daughter Norman is present today for our visit.      Weight now is 146-148; she states her breathing is improving. Her legs still have 2+ edema with weeping at time; Lymphedema wraps will be here next week.        She denies chest pain, orthopnea or PND. O2 sats sitting are 99%; heart rates 76-91.  She was found to have a UTI which was treated and cleared.       Her grandson  is getting  on 8-22 and she would prefer to wait for her staged circumflex intervention until after that. She has a face to face visit with Dr. Perla on 8-19 and he is fine waiting if she remains stable. Dr. Franco did her PCI and is in the cath lab the week of 8-25.     She is following a low sodium diet and is trying to wear support hose as often as she can. Home care is still coming in as well as PT.      She reported feeling mildly anxious and not sleeping well. Her PMD made adjustments to her Seroquel which seems to be helping.     General Appearance:     no distress, normal body habitus, upright. weak     ENT/Mouth:     membranes moist, no nasal discharge or bleeding gums. Normal head shape, no evidence of injury or laceration.     EYES:     no scleral icterus, normal conjunctivae     Neck:     no evidence of thyromegaly.  JVP 9-10     Chest/Lungs:     No audible wheezing equal chest wall expansion. Non labored breathing. No cough.     Cardiovascular:     No evidence of elevated jugular venous pressure. No evidence of pitting edema bilaterally     Abdomen:     no evidence of abdominal distention. No observed jaundice.     Extremities:     no cyanosis or clubbing noted. 1-2+ pitting edema of feet with scattered areas of previous blistering and oozing     Skin:     no xanthelasma, normal skin collar. No evidence of facial lacerations.     Neurologic:     Normal arm motion bilateral, no tremors. No evidence of focal defect.     Psychiatric:     alert and oriented x3, appears mildly anxious           Impression/Plan:     1. Ischemic CM with EF less than 20% chronic ischemic heart failure  Cardiogenic shock last admission  Severe TR; mild to moderate MR     Weight is down/stable on lower dose Torsemide at 10mg daily  Contine entresto to 1/2 of her 24/26mg tablet at pm--> if her BMP is improves and bp stays , I may try to add 1/2 pill in am as well.  Follow up with me in two weeks arranged  Once  revascularized, we will plan on repeating an echocardiogram        2. Coronary artery disease  S/p CABG in [place]  LIMA to diagonal serves a small territory  KURT atretic  LAD nonviable  S/p stent to RCA  Will need staged PCI of circumflex-likely week of 8-25 with Dr. Franco  -will place her on the shadow schedule     3. LORI in hospital likely multifactorial  Creatinine 1.1 baseline; now 1.3-1.5  Low BP  Contrast and CHF  -Nephrology consult next week     4. LV thrombus  -due to epistaxis was changed to Eliquis  -lovenox and warfarin stopped  -no further epistaxis        5. Dyslipidemia  On crestor 20mg daily  Will need lipid panel at some point     6. Anxiety  Became unresponsive on benzos  Placed on Seroquel  Dose adjusted by PMD      7. Chronic edema  Has undergone bilateral vein stripping in the past  Lymphedema planned     8. Hypothyroidism  tsh 7.55 Free T4 1.44--follow up at PMD     9.. Anemia  Before cardiac events hemoglobin 12-13   Now 10.2      CURRENT MEDICATIONS:  Current Outpatient Medications   Medication Sig Dispense Refill     acetaminophen (TYLENOL) 325 MG tablet Take 2 tablets (650 mg) by mouth every 6 hours as needed for mild pain 100 tablet 0     apixaban ANTICOAGULANT (ELIQUIS) 5 MG tablet Take 1 tablet (5 mg) by mouth 2 times daily First dose to be on 7/19/20 PM dose. 180 tablet 3     BACITRACIN EX        bimatoprost (LUMIGAN) 0.01 % SOLN Place 1 drop into both eyes At Bedtime        busPIRone (BUSPAR) 10 MG tablet Take 1 tablet (10 mg) by mouth 3 times daily 90 tablet 0     cephALEXin (KEFLEX) 500 MG capsule 1 capsule BID for 10 days       clopidogrel (PLAVIX) 75 MG tablet Take 1 tablet (75 mg) by mouth daily 30 tablet 11     co-enzyme Q-10 100 MG CAPS capsule Take 100 mg by mouth daily       dorzolamide-timolol (COSOPT) 2-0.5 % ophthalmic solution Place 1 drop into both eyes 2 times daily        ferrous sulfate (FEROSUL) 325 (65 Fe) MG tablet Take 1 tablet (325 mg) by mouth 2 times daily 60  tablet 0     latanoprost (XALATAN) 0.005 % ophthalmic solution Place 1 drop into both eyes daily       levothyroxine (SYNTHROID/LEVOTHROID) 125 MCG tablet Take 0.5 tablets (62.5 mcg) by mouth every morning (before breakfast) 15 tablet 0     magnesium chloride 535 (64 Mg) MG TBEC CR tablet Take 1 tablet (535 mg) by mouth two times daily for 14 days 28 tablet 0     nitroGLYcerin (NITROSTAT) 0.4 MG sublingual tablet For chest pain place 1 tablet under the tongue every 5 minutes for 3 doses. If symptoms persist 5 minutes after 1st dose call 911. 20 tablet 0     OMEPRAZOLE PO Take 20 mg by mouth daily        ondansetron (ZOFRAN-ODT) 4 MG ODT tab Take 4 mg by mouth every 8 hours as needed        potassium chloride ER (KLOR-CON M) 20 MEQ CR tablet 40meq twice daily today, then 20meq daily       QUEtiapine (SEROQUEL) 25 MG tablet Take 1 tablet (25 mg) by mouth At Bedtime (Patient taking differently: Take 50 mg by mouth At Bedtime ) 90 tablet 1     rosuvastatin (CRESTOR) 20 MG tablet Take 20 mg by mouth daily       sacubitril-valsartan (ENTRESTO) 24-26 MG per tablet 1/2 tablet in PM daily 60 tablet 1     torsemide (DEMADEX) 20 MG tablet Take 10mg (1/2 tab) PO daily 30 tablet 0       ALLERGIES     Allergies   Allergen Reactions     Combigan [Brimonidine Tartrate-Timolol] Swelling     Swollen eyelids     Ativan [Lorazepam] Anxiety and Other (See Comments)     Increased confusion       PAST MEDICAL HISTORY:  Past Medical History:   Diagnosis Date     CAD (coronary artery disease)      ICD (implantable cardioverter-defibrillator) in place     Primary prevention AICD placed in [place] in May 2020.     Ischemic cardiomyopathy     LVEF less than 20%.     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     Status post coronary artery bypass grafting     Done in [facility] in April 2020.  LIMA to diagonal (as LAD dissected) and KURT to the right coronary  artery.       Thank you for allowing me to participate in the care of your patient.    Sincerely,     STEPHAN Ramachandran CNP     [facility]

## 2020-07-31 NOTE — PROGRESS NOTES
"Marquise Jj is a 75 year old female who is being evaluated via a billable video visit.      The patient has been notified of following:     \"This video visit will be conducted via a call between you and your physician/provider. We have found that certain health care needs can be provided without the need for an in-person physical exam.  This service lets us provide the care you need with a video conversation.  If a prescription is necessary we can send it directly to your pharmacy.  If lab work is needed we can place an order for that and you can then stop by our lab to have the test done at a later time.    Video visits are billed at different rates depending on your insurance coverage.  Please reach out to your insurance provider with any questions.    If during the course of the call the physician/provider feels a video visit is not appropriate, you will not be charged for this service.\"    Patient has given verbal consent for Video visit? Yes  How would you like to obtain your AVS? MyChart  If you are dropped from the video visit, the video invite should be resent to: Text to cell phone: [phone]  Will anyone else be joining your video visit? No      Bp:86/ per daughter  Pulse:91  Wt:146.1lb    Review Of Systems  Skin: NEGATIVE  Eyes:Ears/Nose/Throat: Glasses  Respiratory: NEGATIVE  Cardiovascular:NEGATIVE  Gastrointestinal: NEGATIVE  Genitourinary:NEGATIVE   Musculoskeletal: Arthritis  Neurologic: NEGATIVE  Psychiatric: NEGATIVE  Hematologic/Lymphatic/Immunologic: NEGATIVE  Endocrine:  NEGATIVE    Margarette ALVARES    Video-Visit Details    History of Present Illness:     Marquise Jj is a 75 year old female who I am having a video visit for her complex cardiac history. She was hospitalized from 6-25 to 7-     Marquise has a history of ischemic CM with EF less than 20%, mural thrombus, Multivessel CAD with CABG April 2020 in [facility].  She was on a cruise this past spring " with her daughter and developed severe nausea and vomiting after a buffet meal. Symptoms persisted for days including fatigue so after 5-7 days she sought medical attention in [place] when she got off of the ship. Angiogram showed   Left main 30-40%; LAD occluded; diagonals occluded  RCA 70%; circumflex 60-70% with small targets  Diagonal grafted after LAD dissected intraop  KURT to RCA  She went for an angiogram during this admission after a viability study showed absent to minimal uptake in the LAD territory. Circumflex has mildly decreased perfusion, suggesting mostly viable myocardium. RCA appeared 100% viable.     She then went to the cath lab for possible revascularization. The KURT was atretic; Elias supplied a small vascular territory of the diagonal; she has significant stenosis in the mid to distal RCA and a 2.5 X 24mm Synergy SEMAJ was placed. The circumflex proximal to mid had a 70-80% stenosis with iFR of 0.82. She will need staged PCI to the circumflex which we are planning to do with Dr. Perla in August. She is on Plavix.     She was evaluated by EP to see if she would qualify for upgrade of her ICD to Bi-V and based on narrow QRS and lack of dyssynchrony she does not quailfy  RHC: 53/ 27 mean 37; PCWP 30 LVEDP 22 TPG 7 at weight of 146.     She was diuresed from a weight of 156 to 146 with IV lasix; at one point her creatinine miroslava to 1.55 (3 days post contrast as well).   She was placed on IV Dopamine briefly with improved diuresis again and ability to restart Entresto. Creatinine improved on baseline to 1.14. I have adjusted her torsmide to 10mg per day and creatinine now is 1.3-1.5 Her new PMD has [facility] very much [facility];  this is scheduled 8-3     Last week she had a potassium of 2.6; she was supplemented orally and placed on 20meq daily and now it is 3.4; with her CM I would prefer to see it closer to 4.0 so we will increase the dose to 20meq bid.  I will check labs next week. With the low potassium, we empirically placed her on Magneisum bid for 2 weeks, complete on 8-.     Due to her mural thrombus, she as placed on IV Heparin and changed to warfarin with Lovenox for bridging. She developed epistaxis on this regimen and was sent to the ED. Hemoglobin was 9.2 and at that point we stopped lovenox and Heparin and placed her on Eliquis 5mg twice daily instead.She has tolerated this well with no further epistaxis     Baseline hemoglobin as around 10, 10.2 yesterday; she is on iron.     At some point, she may need to be referred to the [facility] for advanced heart failure team however age and LV thrombus may be a limiting factor for LVAD or transplant.     Echo: EF less than 20%, severe TR, mild to moderately dilated RV with mildly decreased RV systolic function, mid to moderate MR     Her daughter Norman is present today for our visit.      Weight now is 146-148; she states her breathing is improving. Her legs still have 2+ edema with weeping at time; Lymphedema wraps will be here next week.        She denies chest pain, orthopnea or PND. O2 sats sitting are 99%; heart rates 76-91.  She was found to have a UTI which was treated and cleared.       Her grandson is getting  on 8-22 and she would prefer to wait for her staged circumflex intervention until after that. She has a face to face visit with Dr. Perla on 8-19 and he is fine waiting if she remains stable. Dr. Franco did her PCI and is in the cath lab the week of 8-25.     She is following a low sodium diet and is trying to wear support hose as often as she can. Home care is still coming in as well as PT.      She reported feeling mildly anxious and not sleeping well. Her PMD made adjustments to her Seroquel which seems to be helping.     General Appearance:     no distress, normal body habitus, upright. weak     ENT/Mouth:     membranes moist, no nasal discharge or bleeding gums. Normal head shape,  no evidence of injury or laceration.     EYES:     no scleral icterus, normal conjunctivae     Neck:     no evidence of thyromegaly.  JVP 9-10     Chest/Lungs:     No audible wheezing equal chest wall expansion. Non labored breathing. No cough.     Cardiovascular:     No evidence of elevated jugular venous pressure. No evidence of pitting edema bilaterally     Abdomen:     no evidence of abdominal distention. No observed jaundice.     Extremities:     no cyanosis or clubbing noted. 1-2+ pitting edema of feet with scattered areas of previous blistering and oozing     Skin:     no xanthelasma, normal skin collar. No evidence of facial lacerations.     Neurologic:     Normal arm motion bilateral, no tremors. No evidence of focal defect.     Psychiatric:     alert and oriented x3, appears mildly anxious           Impression/Plan:     1. Ischemic CM with EF less than 20% chronic ischemic heart failure  Cardiogenic shock last admission  Severe TR; mild to moderate MR     Weight is down/stable on lower dose Torsemide at 10mg daily  Contine entresto to 1/2 of her 24/26mg tablet at pm--> if her BMP is improves and bp stays , I may try to add 1/2 pill in am as well.  Follow up with me in two weeks arranged  Once revascularized, we will plan on repeating an echocardiogram        2. Coronary artery disease  S/p CABG in [facility] to [place] serves a small territory  KURT atretic  LAD nonviable  S/p stent to RCA  Will need staged PCI of circumflex-likely week of 8-25 with Dr. Franco  -will place her on the shadow schedule     3. LORI in hospital likely multifactorial  Creatinine 1.1 baseline; now 1.3-1.5  Low BP  Contrast and CHF  -Nephrology consult next week     4. LV thrombus  -due to epistaxis was changed to Eliquis  -lovenox and warfarin stopped  -no further epistaxis        5. Dyslipidemia  On crestor 20mg daily  Will need lipid panel at some point     6. Anxiety  Became unresponsive on benzos  Placed on  Seroquel  Dose adjusted by PMD      7. Chronic edema  Has undergone bilateral vein stripping in the past  Lymphedema planned     8. Hypothyroidism  tsh 7.55 Free T4 1.44--follow up at PMD     9.. Anemia  Before cardiac events hemoglobin 12-13   Now 10.2      CURRENT MEDICATIONS:  Current Outpatient Medications   Medication Sig Dispense Refill     acetaminophen (TYLENOL) 325 MG tablet Take 2 tablets (650 mg) by mouth every 6 hours as needed for mild pain 100 tablet 0     apixaban ANTICOAGULANT (ELIQUIS) 5 MG tablet Take 1 tablet (5 mg) by mouth 2 times daily First dose to be on 7/19/20 PM dose. 180 tablet 3     BACITRACIN EX        bimatoprost (LUMIGAN) 0.01 % SOLN Place 1 drop into both eyes At Bedtime        busPIRone (BUSPAR) 10 MG tablet Take 1 tablet (10 mg) by mouth 3 times daily 90 tablet 0     cephALEXin (KEFLEX) 500 MG capsule 1 capsule BID for 10 days       clopidogrel (PLAVIX) 75 MG tablet Take 1 tablet (75 mg) by mouth daily 30 tablet 11     co-enzyme Q-10 100 MG CAPS capsule Take 100 mg by mouth daily       dorzolamide-timolol (COSOPT) 2-0.5 % ophthalmic solution Place 1 drop into both eyes 2 times daily        ferrous sulfate (FEROSUL) 325 (65 Fe) MG tablet Take 1 tablet (325 mg) by mouth 2 times daily 60 tablet 0     latanoprost (XALATAN) 0.005 % ophthalmic solution Place 1 drop into both eyes daily       levothyroxine (SYNTHROID/LEVOTHROID) 125 MCG tablet Take 0.5 tablets (62.5 mcg) by mouth every morning (before breakfast) 15 tablet 0     magnesium chloride 535 (64 Mg) MG TBEC CR tablet Take 1 tablet (535 mg) by mouth two times daily for 14 days 28 tablet 0     nitroGLYcerin (NITROSTAT) 0.4 MG sublingual tablet For chest pain place 1 tablet under the tongue every 5 minutes for 3 doses. If symptoms persist 5 minutes after 1st dose call 911. 20 tablet 0     OMEPRAZOLE PO Take 20 mg by mouth daily        ondansetron (ZOFRAN-ODT) 4 MG ODT tab Take 4 mg by mouth every 8 hours as needed        potassium  chloride ER (KLOR-CON M) 20 MEQ CR tablet 40meq twice daily today, then 20meq daily       QUEtiapine (SEROQUEL) 25 MG tablet Take 1 tablet (25 mg) by mouth At Bedtime (Patient taking differently: Take 50 mg by mouth At Bedtime ) 90 tablet 1     rosuvastatin (CRESTOR) 20 MG tablet Take 20 mg by mouth daily       sacubitril-valsartan (ENTRESTO) 24-26 MG per tablet 1/2 tablet in PM daily 60 tablet 1     torsemide (DEMADEX) 20 MG tablet Take 10mg (1/2 tab) PO daily 30 tablet 0       ALLERGIES     Allergies   Allergen Reactions     Combigan [Brimonidine Tartrate-Timolol] Swelling     Swollen eyelids     Ativan [Lorazepam] Anxiety and Other (See Comments)     Increased confusion       PAST MEDICAL HISTORY:  Past Medical History:   Diagnosis Date     CAD (coronary artery disease)      ICD (implantable cardioverter-defibrillator) in place     Primary prevention AICD placed in [place] in May 2020.     Ischemic cardiomyopathy     LVEF less than 20%.     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     Status post coronary artery bypass grafting     Done in [facility] in April 2020.  LIMA to diagonal (as LAD dissected) and KURT to the right coronary artery.       Type of service:  Video Visit    Video Start Time: 125pm  Video End Time: 158pm  Greater than 50% of this 30 minutes was spent in counseling  Originating Location (pt. Location): Home    Distant Location (provider location):  home office    Platform used for Video Visit: STEPHAN Dubose CNP

## 2020-08-03 NOTE — LETTER
8/3/2020       RE: aMrquise Jj  [address] [facility] [address]     Dear Colleague,    Thank you for referring your patient, Marquise Jj, to the [facility] NEPHROLOGY at [facility]. Please see a copy of my visit note below.    Marquise Jj is a 75 year old female who is being evaluated via a billable video visit.        Video-Visit Details    Type of service:  Video Visit    Video Start Time: 2:45  Video End Time: 3:10    Originating Location (pt. Location): Home    Distant Location (provider location):  [facility] NEPHROLOGY     Platform used for Video Visit: [employer]      Nephrology Initial Consult  August 3, 2020      Marquise Jj   MRN:[id number]   YOB: 1945    REASON FOR CONSULT:   Elevated creatinine    HISTORY OF PRESENT ILLNESS:  Patient is a 75 year old female with past history significant for ischemic cardiomyopathy with EF of 20%, LV thrombus on anticoagulation, CAD s/p CABG, hypothyroidism, and GERD who is referred for evaluation of CKD stage 3.     She was recently admitted to the hospital for evaluation of cardiogenic shock and was dismissed on July 11th. She had another hospitalization prior to that in March 2020 in [place] with heart failure. At the time she underwent urgent 3 vessel surgery. Her baseline creatinine is about 1.1-1.3 (daring back to 2015) and creatinine increased to 1.9 during the hospital admission. It is currently at 1.54 mg/dL. She recalls in the 90s her creatinine was up to mid 3's and was attributed to Valdecoxib. After stopping the NSAID it improved back to normal. Her UA showed pyuria and hematuria with multiple squamous cells. She was diagnosed with UTI and recently finished antibiotic. She took it for 10 days (cephalaxin for 5 days and then Macrobid for 5 days). She has been on PPI for over 10 years and unable to wean herself off due to recurrence of symptoms.     Renal US showed small left  kidney of 8.1 cm and normal right kidney of 10.9 with no hydronephrosis. BP at home is about 88/60 mmHg. He weight has been stable around 145. She continues to have edema in her legs but no change. She has SOB but no change compared to baseline.        PAST MEDICAL HISTORY:  Reviewed with patient on 08/03/2020   As per HPI    MEDICATIONS:  Reviewed with the patient in detail    ALLERGIES:    Reviewed with the patient in detail    REVIEW OF SYSTEMS:  A comprehensive of systems was negative except as noted above.    SOCIAL HISTORY:   Reviewed with patient, no smoking and no alcohol use     FAMILY MEDICAL HISTORY:   Reviewed, no family history of need for dialysis, transplant or CKD    PHYSICAL EXAM:   Vital signs:BP (!) 88/62   Pulse 92   Wt 66 kg (145 lb 9.6 oz)   BMI 22.14 kg/m      Physical Exam  Constitutional:       Appearance: Normal appearance.   Neurological:      Mental Status: She is alert and oriented to person, place, and time.       ASSESSMENT AND RECOMMENDATIONS:   #1 CKD stage 3  #2 Ischemic cardiomyopathy with EF of 20%  #3 Recent episode of cardiogenic shock  I discussed with the patient that her CKD has been long standing and in the setting of small vessel disease and low EF and therefore not surprising. Noted that her left kidney is smaller in size and given her risk factor recommend a renal US with doppler. Will also recheck urine now that her UTI has been treated. Her management is more focused on her cardiac health. Will visit with her again in 4 months.         Again, thank you for allowing me to participate in the care of your patient.      Sincerely,    Andressa Ayala MD

## 2020-08-03 NOTE — PROGRESS NOTES
"Marquise Jj is a 75 year old female who is being evaluated via a billable video visit.      The patient has been notified of following:     \"This video visit will be conducted via a call between you and your physician/provider. We have found that certain health care needs can be provided without the need for an in-person physical exam.  This service lets us provide the care you need with a video conversation.  If a prescription is necessary we can send it directly to your pharmacy.  If lab work is needed we can place an order for that and you can then stop by our lab to have the test done at a later time.    Video visits are billed at different rates depending on your insurance coverage.  Please reach out to your insurance provider with any questions.    If during the course of the call the physician/provider feels a video visit is not appropriate, you will not be charged for this service.\"    Patient has given verbal consent for Video visit? Yes  How would you like to obtain your AVS? Mail a copy  If you are dropped from the video visit, the video invite should be resent to: Text to cell phone: [phone]  Will anyone else be joining your video visit? No        Video-Visit Details    Type of service:  Video Visit    Video Start Time: 2:45  Video End Time: 3:10    Originating Location (pt. Location): Home    Distant Location (provider location):  [facility]     Platform used for Video Visit: [employer]      Nephrology Initial Consult  August 3, 2020      Marquise Jj   MRN:[id number]   YOB: 1945    REASON FOR CONSULT:   Elevated creatinine    HISTORY OF PRESENT ILLNESS:  Patient is a 75 year old female with past history significant for ischemic cardiomyopathy with EF of 20%, LV thrombus on anticoagulation, CAD s/p CABG, hypothyroidism, and GERD who is referred for evaluation of CKD stage 3.     She was recently admitted to the hospital for evaluation of cardiogenic shock and was " dismissed on July 11th. She had another hospitalization prior to that in March 2020 in [place] with heart failure. At the time she underwent urgent 3 vessel surgery. Her baseline creatinine is about 1.1-1.3 (daring back to 2015) and creatinine increased to 1.9 during the hospital admission. It is currently at 1.54 mg/dL. She recalls in the 90s her creatinine was up to mid 3's and was attributed to Valdecoxib. After stopping the NSAID it improved back to normal. Her UA showed pyuria and hematuria with multiple squamous cells. She was diagnosed with UTI and recently finished antibiotic. She took it for 10 days (cephalaxin for 5 days and then Macrobid for 5 days). She has been on PPI for over 10 years and unable to wean herself off due to recurrence of symptoms.     Renal US showed small left kidney of 8.1 cm and normal right kidney of 10.9 with no hydronephrosis. BP at home is about 88/60 mmHg. He weight has been stable around 145. She continues to have edema in her legs but no change. She has SOB but no change compared to baseline.        PAST MEDICAL HISTORY:  Reviewed with patient on 08/03/2020   As per HPI    MEDICATIONS:  Reviewed with the patient in detail    ALLERGIES:    Reviewed with the patient in detail    REVIEW OF SYSTEMS:  A comprehensive of systems was negative except as noted above.    SOCIAL HISTORY:   Reviewed with patient, no smoking and no alcohol use     FAMILY MEDICAL HISTORY:   Reviewed, no family history of need for dialysis, transplant or CKD    PHYSICAL EXAM:   Vital signs:BP (!) 88/62   Pulse 92   Wt 66 kg (145 lb 9.6 oz)   BMI 22.14 kg/m      Physical Exam  Constitutional:       Appearance: Normal appearance.   Neurological:      Mental Status: She is alert and oriented to person, place, and time.       ASSESSMENT AND RECOMMENDATIONS:   #1 CKD stage 3  #2 Ischemic cardiomyopathy with EF of 20%  #3 Recent episode of cardiogenic shock  I discussed with the patient that her CKD has been long  standing and in the setting of small vessel disease and low EF and therefore not surprising. Noted that her left kidney is smaller in size and given her risk factor recommend a renal US with doppler. Will also recheck urine now that her UTI has been treated. Her management is more focused on her cardiac health. Will visit with her again in 4 months.

## 2020-08-04 NOTE — PROGRESS NOTES
Received return call from [facility] nurse, Esperanza, who states that pt's next homecare visit is scheduled for 8/7/20 and asked if it would be OK to get BMP drawn at that visit.  Reviewed that BMP on 8/7/20 is OK if it is drawn in the morning and labs results are back before end of day on Friday.  Esperanza states she will make note on pt's visit with this request.      SAFIA Toledo 2:30 PM 8/4/2020

## 2020-08-04 NOTE — PROGRESS NOTES
Called pt's [facility] , Cristiana, (ph: [phone]), no answer, LVM requesting to confirm if BMP can be drawn for pt on 8/6/20.    SAFIA Toledo 12:01 PM 8/4/2020

## 2020-08-06 NOTE — PROGRESS NOTES
Called [facility] homecare nurse, Vivien, no answer, LVM requesting that pt be seen for homecare visit today and have BMP today instead of tomorrow. Requested callback to know if this can be done or not.     Called and spoke with Norman and reviewed recommendations for pt to have extra 10mg torsemide today and extra 20mEq KCL today.  Also discussed that homecare nurse may see pt today instead of tomorrow, as DOTTY Sunshine thought that would be a good idea. She verbalized understanding and agrees with this plan.    Reminder sent to call for wt/sx update tomorrow.    SAFIA Toledo 12:11 PM 8/6/2020

## 2020-08-06 NOTE — PROGRESS NOTES
Received VM from [facility] nurse, Vivien, who reports she got a call from pt's daughter, Norman, reporting that pt has gained 4# in the last 3 days, has increased SOB and is coughing but swelling is stable. Vivien confirms that plan is for visit with pt tomorrow and BMP lab drawn; however, she states the visit and lab could be moved to today if needed.     Called and spoke with pt's daughter, Norman. She reports pts wt had been stable at 145-146# up until Tuesday of this week:  8/4  147#  8/5  148#  8/6  149#  She reports pt has had a cough, but it seems to be getting worse and pt is feeling more SOB than usual too. Pt denies feeling SOB at rest today; however, was SOB at rest on 8/4 and 8/5.  Norman also reports that pt slept in a chair due to her breathing on 8/1, propped up with more pillows in bed due to her breathing on 8/2 and coughed all night, has been able to sleep in bed with usual pillows the last two nights.     Reviewed to call cardiology if SOB at rest and/or PND/orthopnea noted again- she agrees with this plan.     Will review with DOTTY Sunshine and callback with recommendations.     SAFIA Toledo 11:21 AM 8/6/2020

## 2020-08-06 NOTE — PROGRESS NOTES
Received call from Vivien homecare nurse, who reports that they will not be able to see pt today, but plan is to see pt tomorrow morning and draw labs. Called and updated pt's daughter, Norman, with this information as well.    SAFIA Toledo 5:01 PM 8/6/2020

## 2020-08-07 PROBLEM — I50.9 ACUTE EXACERBATION OF CHF (CONGESTIVE HEART FAILURE) (H): Status: ACTIVE | Noted: 2020-01-01

## 2020-08-07 NOTE — PROGRESS NOTES
Received VM from pt's daughter, Norman, who reports pt is SOB as rest today and is very fatigued/low energy.     Reviewed with DOTTY Sunshine who recommends pt to to [facility] ED for evaluation.     Called and spoke with Norman and pt and reviewed DOTTY Sunshine's recommendations to go to [facility] ED for eval. Norman verbalized understanding and agrees with this plan and states she will bring pt to ED today.     Called Janine, homecare nurse that saw pt today. Gave update that DOTTY Sunshine recommended pt go to ED for evaluation. Will plan to check pt's chart on 8/10 for update and requested that homecare see pt early next week and draw BMP (if she was not admitted to the hospital) in preparation for pt's visit with DOTTY Sunshine on 8/13/20.    SAFIA Toledo 2:30 PM 8/7/2020

## 2020-08-07 NOTE — ED PROVIDER NOTES
Pressure while here.  Her chest x-ray shows  HPI   The patient is a 74 yo female presenting with her daughter by private car for sob and increased weight gain.  She describes having increased her weight by six pounds since three days ago.  She describes having more sob during this time.  Typically she is able to get up with the help of a walker and get to the bathroom on her own without sob, but now she is not able to do so without taking a break.  She denies new edema.  She has had some R midback pain over night and into the daytime today.  It is mild to moderate currently.  She has constant pain.  She denies radiation.  No n/v.  No lightheadedness or fainting.  No cough or fever.  She increased her torsemide yesterday from 1/2 a tablet to a full tablet.  This has not increased her urine output or decreased her weight at all.         Allergies:  Allergies   Allergen Reactions     Combigan [Brimonidine Tartrate-Timolol] Swelling     Swollen eyelids     Ativan [Lorazepam] Anxiety and Other (See Comments)     Increased confusion     Problem List:    Patient Active Problem List    Diagnosis Date Noted     Anxiety 07/24/2020     Priority: Medium     Epistaxis 07/11/2020     Priority: Medium     chronic kidney disease 07/08/2020     Priority: Medium     Septic shock (H) 06/25/2020     Priority: Medium     Mural thrombus of left ventricle 06/10/2020     Priority: Medium     Ischemic cardiomyopathy 06/04/2020     Priority: Medium     Anemia due to blood loss, acute 11/22/2017     Priority: Medium     Failure of total hip arthroplasty, 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     Priority: Medium     Anemia 11/20/2017     Priority: Medium     Weakness 11/19/2017     Priority: Medium     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     Priority: Medium     Gastroesophageal reflux disease 11/13/2017     Priority: Medium     HTN (hypertension) 08/05/2016     Priority: Medium     Overview:   Diagnosed 8/2016       Chronic kidney disease, stage  III (moderate) (H) 08/05/2013     Priority: Medium     Hyperlipidemia 08/05/2013     Priority: Medium      Past Medical History:    Past Medical History:   Diagnosis Date     CAD (coronary artery disease)      ICD (implantable cardioverter-defibrillator) in place      Ischemic cardiomyopathy      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)      Status post coronary artery bypass grafting      Past Surgical History:    Past Surgical History:   Procedure Laterality Date     ARTHROPLASTY REVISION HIP Right 11/16/2017    Procedure: ARTHROPLASTY REVISION HIP;  Right total hip arthroplasty revision.;  Surgeon: Jozef Garcia MD;  Location: [facility] OR     cabg  04/2020     CV HEART CATHETERIZATION WITH POSSIBLE INTERVENTION N/A 7/2/2020    Procedure: Heart Catheterization with Possible Intervention;  Surgeon: Kaden Franco MD;  Location:  HEART CARDIAC CATH LAB     CV PCI STENT DRUG ELUTING N/A 7/2/2020    Procedure: Percutaneous Coronary Intervention Stent Drug Eluting;  Surgeon: Kaden Franco MD;  Location:  HEART CARDIAC CATH LAB     CV RIGHT HEART CATH N/A 7/2/2020    Procedure: Right Heart Cath;  Surgeon: Kaden Franco MD;  Location:  HEART CARDIAC CATH LAB     Family History:    Family History   Problem Relation Age of Onset     Coronary Artery Disease No family hx of      Heart Failure No family hx of      Social History:  Marital Status:   [2]  Social History     Tobacco Use     Smoking status: Never Smoker     Smokeless tobacco: Never Used   Substance Use Topics     Alcohol use: Not Currently     Drug use: Never      Medications:    acetaminophen (TYLENOL) 325 MG tablet  apixaban ANTICOAGULANT (ELIQUIS) 5 MG tablet  BACITRACIN EX  bimatoprost (LUMIGAN) 0.01 % SOLN  busPIRone (BUSPAR) 10 MG tablet  clopidogrel (PLAVIX) 75 MG tablet  co-enzyme Q-10 100 MG CAPS capsule  dorzolamide-timolol (COSOPT) 2-0.5 % ophthalmic solution  ferrous sulfate (FEROSUL) 325 (65 Fe) MG  "tablet  latanoprost (XALATAN) 0.005 % ophthalmic solution  levothyroxine (SYNTHROID/LEVOTHROID) 125 MCG tablet  magnesium chloride 535 (64 Mg) MG TBEC CR tablet  Multiple Vitamins-Minerals (PRESERVISION AREDS 2 PO)  nitroFURantoin (FURADANTIN) 25 MG/5ML suspension  nitroGLYcerin (NITROSTAT) 0.4 MG sublingual tablet  OMEPRAZOLE PO  ondansetron (ZOFRAN-ODT) 4 MG ODT tab  potassium chloride ER (KLOR-CON M) 20 MEQ CR tablet  QUEtiapine (SEROQUEL) 25 MG tablet  rosuvastatin (CRESTOR) 20 MG tablet  sacubitril-valsartan (ENTRESTO) 24-26 MG per tablet  torsemide (DEMADEX) 20 MG tablet      Review of Systems   All other systems reviewed and are negative.      PE   BP: (!) 84/50(Daughter states BP normally low \"high 80s, to mid 90's systolic\")  Pulse: 55  Heart Rate: 71  Temp: 96.5  F (35.8  C)  Resp: 16  Weight: 68.1 kg (150 lb 1.6 oz)  SpO2: 98 %  Physical Exam  Vitals signs and nursing note reviewed.   Constitutional:       General: She is in acute distress.      Appearance: She is well-developed.      Comments: Tired appearing.  Sitting in a wheelchair.  Talking in full sentences.   HENT:      Head: Atraumatic.      Nose: Nose normal.      Mouth/Throat:      Mouth: Mucous membranes are moist.   Eyes:      Extraocular Movements: Extraocular movements intact.      Conjunctiva/sclera: Conjunctivae normal.      Pupils: Pupils are equal, round, and reactive to light.   Neck:      Musculoskeletal: Normal range of motion and neck supple.   Cardiovascular:      Rate and Rhythm: Normal rate and regular rhythm.   Pulmonary:      Effort: Pulmonary effort is normal.   Abdominal:      Palpations: Abdomen is soft.      Tenderness: There is no abdominal tenderness.   Musculoskeletal: Normal range of motion.         General: No tenderness.      Comments: Legs are wrapped B.   Skin:     General: Skin is warm.      Findings: No rash.   Neurological:      Mental Status: She is alert and oriented to person, place, and time.         ED COURSE " "and [facility]   1627.  The patient has sob and fatigue along with a weight gain.  Labs pending.  CXR.      2026.  The patient continues to have low blood pressure while here.  She is without changing symptoms.  Her chest x-ray shows increased vascular congestion and her BNP is high.  Other lab values are similar to previous.  I spoke with the cardiologist at [facility] who is recommending doubling her dose of torsemide IV route.  The hospitalist group here is uncomfortable given her severity of heart failure and ongoing hypotension and so is requesting that the patient be transferred to [facility] where cardiology can be consulted.  The [facility] hospitalist, Dr. Bryan, is excepting.  No additional orders requested.  COVID swab will be obtained prior to transfer.    2057.  The patient has been complaining of low and mid back pain throughout her stay.  She tells me that this is very mechanical in nature.  When her daughter rubs on her back it feels better.  When she changes positions it is worsened.  She tells me that she has had this same pain \"for 60 years.\"  She tells me that her pain is more significant than usual.  Tylenol has not helped.  Oxycodone 5 mg tablet provided.  With her pain her blood pressure is increased to 104 and then 110 systolic.    EKG  (1649)   Interpretation performed by me.  Rate: 73     Rhythm: sinus with frequent pacs     Axis: L  Intervals: NM (12-2) 156, QRS (<12) 92, QTc (>5) 412  P wave: down in V1 and V2     QRS complex: poor R wave progression  ST segment / T-wave: nl  Conclusion: likely ant infarct, age indeterminate.  R atrial enlargement likely.  PACs.    LABS  Labs Ordered and Resulted from Time of ED Arrival Up to the Time of Departure from the ED   CBC WITH PLATELETS DIFFERENTIAL - Abnormal; Notable for the following components:       Result Value    Hemoglobin 10.9 (*)     MCHC 29.6 (*)     RDW 23.6 (*)     All other components within normal limits   NT PROBNP " INPATIENT - Abnormal; Notable for the following components:    N-Terminal Pro BNP Inpatient 17,722 (*)     All other components within normal limits   TROPONIN I   COVID-19 VIRUS (CORONAVIRUS) BY PCR       IMAGING  Images reviewed by me.  Radiology report also reviewed.  XR Chest 2 Views   Final Result   IMPRESSION: Two views of the chest were obtained. Left chest wall   cardiac pacer lead is in stable position. Stable cardiomediastinal   silhouette. Small bilateral pleural effusions and associated basilar   atelectasis/consolidation. No significant pneumothorax.      JAMILA CABRERA MD          Procedures    Medications   oxyCODONE (ROXICODONE) tablet 5 mg (has no administration in time range)   acetaminophen (TYLENOL) tablet 1,000 mg (1,000 mg Oral Given 8/7/20 1937)         IMPRESSION       ICD-10-CM    1. Acute on chronic congestive heart failure, unspecified heart failure type (H)  I50.9    2. Cardiogenic shock (H)  R57.0               Medication List      There are no discharge medications for this visit.                       Aris Byrd MD  08/07/20 2029       Aris Byrd MD  08/07/20 2058

## 2020-08-07 NOTE — PROGRESS NOTES
Called and spoke with pt's daughter, Norman, who reports she has not been to see her mom yet today. She reports that homecare nurse was planning visit around 9am today. Discussed with Norman that will reach out to homecare nurse for an update, but also requested Norman call if she has anything she wants to relay about her mom once she does see her today- she agrees with this plan.    Called RAL[facility] Puga homecare nurse, no answer, LVM requesting return call with update on pt today.    SAFIA Toledo 11:49 AM 8/7/2020

## 2020-08-07 NOTE — PROGRESS NOTES
Received VM from RALPH Mehta homecare nurse (ph: [phone]) that saw pt today. She reports pt's wt this AM was 150.1#, which is up from 149# yesterday despite extra 10mg torsemide given yesterday afternoon. She notes pt is still SOB but denies cough. BP at visit was 88/58.     Routed to DOTTY Sunshine for review before calling pt's daughter regarding potassium dose change.    SAFIA Toledo 12:58 PM 8/7/2020

## 2020-08-08 PROBLEM — R57.0 CARDIOGENIC SHOCK (H): Status: ACTIVE | Noted: 2020-01-01

## 2020-08-08 NOTE — PROGRESS NOTES
"Was notified on BP 70s and sleepiness. Somewhat cold extremities but patient states they have been cold for years and that she was \"just tired\". We will plan to transfer to CCU to initiate low dose dobutamine 2.5 mcg/kg/min. Will keep dose low for now given hx of CAD and propensity to arrhythmia. Bumex is held but will resume after dobutamine starts if BP allows.     Wil Taylor M.D.  "

## 2020-08-08 NOTE — ED NOTES
Pt still having a lot of low left side back pain which usually goes away following tylenol. Will talk with MD about this back pain that seems to be worse then normal

## 2020-08-08 NOTE — PLAN OF CARE
Update given to daughter Norman via phone per pt's request.    Katie Zarco RN on 8/8/2020 at 11:06 AM

## 2020-08-08 NOTE — PLAN OF CARE
"RRT called at 1411 for soft blood pressures, critical K+ of 7.4, lethargy, & poor perfusion to extremities. Pt's BP had been soft but she was not symptomatic & stated that her Bps are soft (SBP 70S-90S) at baseline. Denies chest pain, SOB, dizziness. Just states she is \"tired\". Trops trending down, most recent at time of RRT was 0.018. Skin tear to L) arm from dog scratch at home. Redness to spine with foam dressing, pt repositioned. Lungs clear, diminished in bases. Tele sinus dysrhythmia 50s-60s. +2 BLE edema. Wraps in place until early afternoon, removed to continuously check for changes in perfusion. At end of RRT calcium gluconate was infusing, norepinephrine being titrated by Flyer. Report given to RN taking over cares in ICU.     Katie Zarco RN on 8/8/2020 at 4:56 PM     "

## 2020-08-08 NOTE — Clinical Note
dry, intact, no bleeding and no hematoma. Teodoro Lizarraga @ 55 cm, LIJ suture, biopatch, capped, tegaderm

## 2020-08-08 NOTE — PHARMACY-ADMISSION MEDICATION HISTORY
Pharmacy Medication History  Admission medication history interview status for the 8/7/2020  admission is complete. See EPIC admission navigator for prior to admission medications     Medication history sources: Patient, Surescripts, Care Everywhere and Chart review/ clinic records  Medication history source reliability: Moderate  Adherence assessment: Moderate    Significant changes made to the medication list:  No significant changes. Patient was discharged from [facility] on 7/11/2020- no med changes to report since then. Confirmed list with patient and cardiology clinic records from July 2020.     Medication reconciliation completed by provider prior to medication history? Yes    Time spent in this activity: 40 min      Prior to Admission medications    Medication Sig Last Dose Taking? Auth Provider   apixaban ANTICOAGULANT (ELIQUIS) 5 MG tablet Take 1 tablet (5 mg) by mouth 2 times daily First dose to be on 7/19/20 PM dose. 8/7/2020 at AM Yes Jared Perla MD   busPIRone (BUSPAR) 10 MG tablet Take 1 tablet (10 mg) by mouth 3 times daily 8/7/2020 at AM Yes Glen Tipton MD   clopidogrel (PLAVIX) 75 MG tablet Take 1 tablet (75 mg) by mouth daily 8/7/2020 at AM Yes Glen Tipton MD   co-enzyme Q-10 100 MG CAPS capsule Take 100 mg by mouth 2 times daily  8/7/2020 at AM Yes Unknown, Entered By History   dorzolamide-timolol (COSOPT) 2-0.5 % ophthalmic solution Place 1 drop into both eyes 2 times daily  8/7/2020 at AM Yes Reported, Patient   ferrous sulfate (FEROSUL) 325 (65 Fe) MG tablet Take 1 tablet (325 mg) by mouth 2 times daily 8/7/2020 at AM Yes Glen Tipton MD   latanoprost (XALATAN) 0.005 % ophthalmic solution Place 1 drop into both eyes daily In the morning. 8/7/2020 at AM Yes Unknown, Entered By History   levothyroxine (SYNTHROID/LEVOTHROID) 125 MCG tablet Take 0.5 tablets (62.5 mcg) by mouth every morning (before breakfast) 8/7/2020 at AM Yes Glen Tipton MD   Multiple Vitamins-Minerals  (PRESERVISION AREDS 2 PO) Take 1 tablet by mouth 2 times daily 8/7/2020 at AM Yes Reported, Patient   omeprazole (PRILOSEC) 20 MG DR capsule Take 20 mg by mouth daily  8/7/2020 at AM Yes Reported, Patient   potassium chloride ER (KLOR-CON M) 20 MEQ CR tablet Take 1 tablet (20 mEq) by mouth 2 times daily 20meq daily  Patient taking differently: Take 20 mEq by mouth daily 20meq daily 8/7/2020 at AM Yes Brittany Kemp, STEPHAN CNP   QUEtiapine (SEROQUEL) 25 MG tablet Take 50 mg by mouth At Bedtime 8/6/2020 at HS Yes Unknown, Entered By History   sacubitril-valsartan (ENTRESTO) 24-26 MG per tablet 1/2 tablet in PM daily 8/7/2020 at Unknown time Yes Brittany Kemp APRN CNP   torsemide (DEMADEX) 20 MG tablet Take 10mg (1/2 tab) PO daily 8/7/2020 at Unknown time Yes Brittany Kemp, STEPHAN CNP   acetaminophen (TYLENOL) 325 MG tablet Take 2 tablets (650 mg) by mouth every 6 hours as needed for mild pain prn at prn  Jozef Dyer MD   bimatoprost (LUMIGAN) 0.01 % SOLN Place 1 drop into both eyes At Bedtime  8/6/2020 at HS  Reported, Patient   nitroGLYcerin (NITROSTAT) 0.4 MG sublingual tablet For chest pain place 1 tablet under the tongue every 5 minutes for 3 doses. If symptoms persist 5 minutes after 1st dose call 911. prn at prn  Glen Tipton MD   ondansetron (ZOFRAN-ODT) 4 MG ODT tab Take 4 mg by mouth every 8 hours as needed  prn at prn  Reported, Patient   rosuvastatin (CRESTOR) 20 MG tablet Take 20 mg by mouth daily 8/6/2020 at HS  Reported, Patient

## 2020-08-08 NOTE — H&P
Admitted:     08/07/2020      CHIEF COMPLAINT:  Shortness of breath and increased weight gain of about 5 pounds over the last few days.      HISTORY OF PRESENT ILLNESS:  Marquise Jj is a 75-year-old  with a very complicated medical history.  The patient was admitted at [facility] from 06/25 through 07/11, primarily due to her cardiac history.  She has history of ischemic cardiomyopathy with EF of less than 20%, complicated by mural thrombus.  She has multivessel coronary disease and underwent coronary artery bypass surgery in 04/2020 down in [place].  This occurred right after she had completed a cruise.  It appeared she was actually having symptoms while she was on the cruise.  The patient after getting out of the ship underwent angiogram which showed occlusion of her LAD, as well as her diagonals and she had numerous other advanced disease in other territories.  The patient also underwent viability study with minimal uptake in the LAD territory; however, the circumflex had mildly decreased perfusion, suggestive of some viable myocardium.  RCA appeared to be 100% viable, where she was found to have 70% occlusion.  The patient was scheduled to undergo staged PCI to the circumflex at the end of August.  She did undergo stenting of her RCA.  She apparently did not qualify for BiV upgrade and currently has an ICD in place.      The patient was on Coumadin with Lovenox bridging for mural thrombus, but she had complications of epistaxis and this was switched over to Eliquis and since then, she has not had any significant nosebleeds.      The patient states that she has some intermittent shortness of breath that comes and goes, but over the last couple days, she had increased shortness of breath and weight gain of about 5 pounds.  She is normally on 10 mg of torsemide.  She was seen in the [facility] Emergency Department.  In the Emergency Department, the patient was noted to be in acute  distress.  Her hemoglobin was 10.9.  Her proBNP was 17,000.  Chest x-ray showed small bilateral pleural effusions.  Her blood pressures were soft at 84/50, although her baseline blood pressures are typically in the 80s-90s systolically.  She normally does not get dizzy with this.  The patient was diagnosed as having cardiogenic shock without any significant other laboratory evaluations to support this.  The Hospitalist Service felt the patient was too complex to diurese and the patient was subsequently transferred to [facility] for further assessment.      In the [facility], the patient was evaluated.  She is resting comfortably.  She does complain of some increased edema, but overall her blood pressure is in the high 80s-90s.  She is mentating fine and is not in any sort of cardiopulmonary distress.  The patient is being admitted for acute decompensated congestive heart failure.      PAST MEDICAL HISTORY:   1.  ASCVD with details with details outlined above.   2.  History of ischemic cardiomyopathy, EF of 20%.  The patient is status post ICD, but failed to qualify for BiV upgrade.   3.  ASCVD with history of coronary bypass surgery and has had multiple PCIs, most recent RCA, patient is scheduled for staged intervention of her circumflex towards the end of August.   4.  History of TR and mild to moderate MR.   5.  History of LV mural thrombus, currently on anticoagulation with Eliquis.  She was intolerant to Coumadin due to nosebleeds.   6.  History of recurrent epistaxis.   7.  Acute kidney injury in history of chronic kidney disease, stage III.   8.  Hypothyroidism.   9.  Anxiety.   10.  GERD.   11.  Chronic anemia.      PAST SURGICAL HISTORY:   1.  Hip arthroplasty with revision.   2.  Left heart catheterization.   3.  Coronary artery bypass surgery in 04/2020.   4.  History of multiple cardiac catheterizations.      FAMILY HISTORY:  Positive for heart failure, coronary artery disease.       SOCIAL HISTORY:  The patient is .  No tobacco, no alcohol, no IV drug use.  She is full code.      ALLERGIES:  POSITIVE FOR COMBIGAN EYEDROPS AND ATIVAN.      CURRENT MEDICATIONS:   1.  Tylenol.   2.  Apixaban.   3.  BuSpar.   4.  Plavix.   5.  Ferrous sulfate.   6.  Levothyroxine.   7.  Magnesium chloride.   8.  Nitroglycerin sublingual.   9.  Potassium chloride.   10.  Quetiapine.   11.  Bacitracin   12.  Lumigan eyedrops.   13.  Coenzyme Q10.   14.  Cosopt eyedrops.   15.  Xalatan eyedrops.    16.  PreserVision capsules.   17.  Omeprazole.   18.  Zofran.   19.  Crestor.   20.  Torsemide.      REVIEW OF SYSTEMS:  Ten points reviewed.  Denies any chest pain.  Positive for shortness of breath, increased peripheral edema.  Denies any syncope or presyncope.  Denies any fevers, chills, cough, sputum production, wheezing.  Otherwise, 10 points reviewed.      PHYSICAL EXAMINATION:   VITAL SIGNS:  Temperature 97.1, heart rate 68, respirations 24, blood pressure 90/47, sats 100% on room air.   GENERAL:  The patient is a 70-year-old  female.  She is pleasant, cooperative, in no distress other than some mild work of breathing.   HEENT:  Pupils equal.  Sclerae are anicteric.  Mucous membranes are moist.   NECK:  JVP is elevated.   PULMONARY:  She has some crackles at the very base of her lungs, right greater than left.   CARDIOVASCULAR:  S1, S2, regular rate and rhythm.  She has a 2/6 systolic murmur.   GASTROINTESTINAL:  Soft, nontender, normoactive bowel sounds.   MUSCULOSKELETAL:  She has +2 pitting edema.   NEUROLOGIC:  She is able to move all 4 extremities.  Cranial nerves grossly intact.   DERMATOLOGIC:  Skin is warm, dry, well perfused.   PSYCHIATRIC:  Mood and affect stable.  Stress of present illness.      ASSESSMENT:  Marquise is 75 with a complicated coronary artery disease history including multivessel coronary artery disease, ischemic cardiomyopathy, ejection fraction of 20%, intramural thrombus,  who has an implantable cardiac defibrillator placed, who is currently awaiting staged intervention of her circumflex, who now is admitted with acute decompensated congestive heart failure.      PLAN:   1.  Acute decompensated congestive heart failure.  The patient has likely both systolic and diastolic heart failure.  She is only about 5 pounds above her baseline weight.  She has taken about 10 mg of torsemide every day.  She did not receive any diuretics at the outside hospital.  We will put compression stockings for lower extremity.  We will obtain a formal Cardiology consultation.  We will defer to them for diuretic regimen.  At this juncture, I will start the patient on IV Bumex 1  mg b.i.d.  We will monitor I's and O's.   2.  Atherosclerotic cardiovascular disease.  The patient is pending staged intervention of her circumflex.  We will continue the patient on her Plavix and all of her other cardiac medications including statins.  The patient will undergo serial troponins.   3.  History of cardiac intramural thrombus.  The patient will be continued on her apixaban.   4.  Hyperlipidemia.  We will continue the patient on Crestor.   5.  History of depression and anxiety.  We will continue the patient on BuSpar.   6.  Hypothyroidism.  We will continue the patient on Synthroid.   7.  Gastroesophageal reflux disease.  We will continue the patient on Prilosec.   8.  History of glaucoma. We will continue the patient on her eyedrops.   9.  Deep venous thrombosis prophylaxis.  The patient is already anticoagulated.      CODE STATUS:  Full.         EMERSON VALDERRAMA MD             D: 2020   T: 2020   MT: GH      Name:     EDILSON SMITH   MRN:      [id number]        Account:      [id number]   :      1945        Admitted:     2020                   Document: [id number]       cc: Tova Perla MD

## 2020-08-08 NOTE — DISCHARGE SUMMARY
[facility]  Hospitalist Discharge Summary      Date of Admission:  8/7/2020  Date of Discharge:  8/8/2020  Discharging Provider: Freida Nice MD      Discharge Diagnoses   Cardiogenic shock  Hyperkalemia   Ischemic cardiomyopathy s/p ICD  Tricuspid and Mitral Regurgitation   CAD s/p CABG and PCI  Mural Thrombus   CKD III  Hypothyroidism  Anxiety  GERD  Chronic anemia       Follow-ups Needed After Discharge   Follow-up Appointments     Follow Up and recommended labs and tests      Pt transferring to the [place] for advanced heart failure care.             Unresulted Labs Ordered in the Past 30 Days of this Admission     No orders found for last 31 day(s).      These results will be followed up by NA    Discharge Disposition   Discharged to [facility]   Condition at discharge: Guarded    Hospital Course    76 yo female with hx of ischemic cardiomyopathy with EF ,20%, complicated by mural thrombus on chronic anticoagulation, s/p ICD placement, multivessel CAD s/p CABG 4/2020 and PCI to RCA, CKD III. She initially presented to [facility] after reporting increased shortness of breath and weight gain concerning for CHF exacerbation. She was transferred here for cardiology consultation. When she was evaluated this morning she was noted to be quite hypotensive with cool peripheral extremities, concerning for cardiogenic shock. She was started on a levophed infusion with improvement in her blood pressures. Repeat ECHO showed worsening of her LVEF to about 10% with severe TR and pulmonary HTN. Furthermore she was found to have very elevated potassium to 7.4 with fairly stable creatinine. She was urgently treated in the ICU for hyperkalemia.     Cardiology was consulted and have recommended that she is transferred to the [facility] for advanced heart failure treatment and evaluation. She has been accepted for transfer by Dr. Stephens on the advanced heart failure unit.      Consultations This Hospital Stay   CARDIOLOGY IP CONSULT  SMOKING CESSATION PROGRAM IP CONSULT    Code Status   Full Code    Time Spent on this Encounter   I, Freida Nice MD, personally saw the patient today and spent greater than 30 minutes discharging this patient.       Freida Nice MD  [facility]  ______________________________________________________________________    Physical Exam   Vital Signs: Temp: 97.4  F (36.3  C) Temp src: Oral BP: 98/69 Pulse: 68 Heart Rate: 69 Resp: 25 SpO2: 100 % O2 Device: Oxymask Oxygen Delivery: 10 LPM  Weight: 151 lbs 8 oz  Constitutional: awake, alert, cooperative, no apparent distress, and appears stated age  Respiratory: mildly increased work of breathing, good air exchange, clear to auscultation bilaterally, no crackles or wheezing  Cardiovascular: Normal apical impulse, regular rate and rhythm, normal S1 and S2, no S3 or S4, and no murmur noted  GI: No scars, normal bowel sounds, soft, non-distended, non-tender, no masses palpated, no hepatosplenomegally  Skin: no rashes and no lesions  Vascular:  Fingers are cool peripherally, with prolonged capillary refill   Musculoskeletal: There is no redness, warmth, or swelling of the joints. 1+ bilateral lower extremity pitting edema present  Neurologic: Awake, alert, oriented to name, place and time.  Cranial nerves II-XII are grossly intact.         Primary Care Physician   Poonam Cast    Discharge Orders      Reason for your hospital stay    Heart failure     Follow Up and recommended labs and tests    Pt transferring to the [place] for advanced heart failure care.     Activity - Up with nursing assistance     Full Code     Fall precautions     Advance Diet as Tolerated    Follow this diet upon discharge: Orders Placed This Encounter      Combination Diet Low Saturated Fat Na <2400mg Diet; No Caffeine for 24 hours (once tests completed, may have caffeine)       Significant Results and  Procedures   Most Recent 3 CBC's:  Recent Labs   Lab Test 08/08/20  1459 08/07/20  1656 07/30/20  1055   WBC 9.1 8.9 5.1   HGB 12.5 10.9* 10.2*  10.2*   MCV 88 91 92    249 231     Most Recent 3 BMP's:  Recent Labs   Lab Test 08/08/20  1459 08/08/20  1324 08/07/20  0940 07/30/20  1055   NA  --  131* 134 140   POTASSIUM 6.8* 7.4* 5.3 3.4   CHLORIDE  --  107 106 105   CO2  --  16* 22 26   BUN  --  58* 55* 37*   CR  --  1.60* 1.48* 1.54*   ANIONGAP  --  8 6 9   FERNANDO  --  8.7 8.9 8.8   GLC  --  89 68* 91     Most Recent 3 INR's:  Recent Labs   Lab Test 08/08/20  1459 07/16/20  1631 07/16/20   INR 2.91* 2.60* 2.6     Most Recent 3 BNP's:  Recent Labs   Lab Test 08/07/20  1656 07/24/20  1500 07/01/20  2219 06/25/20  1617 06/22/20  1244 06/10/20  1139   NTBNPI 17,722*  --  33,199* 23,376*  --   --    NTBNP  --  21,351*  --   --  16,515* 13,630*       TTE 8/8/2020     Limited echo in the ICU for hypotension.  The left ventricle is severely dilated. There is severe eccentric left  ventricular hypertrophy. The visual ejection fraction is estimated at 10%.  Large anterior akinesis with a large layered LV thrombus as previously noted.  Pleural effusion. Correlate with alternative imaging.  There is severe (4+) tricuspid regurgitation.  Pulmonary hypertension 40 plus RA.  Mildly decreased right ventricular systolic function. The right ventricle is  normal size. There is a pacemaker lead in the right ventricle.  There is annular dilatation and tenting of the MV leaflets. There is moderate  to severe MR.  IVC diameter >2.1 cm collapsing <50% with sniff suggests a high RA pressure  estimated at 15 mmHg or greater.     Signs of very low LV output. Critical findings.    Discharge Medications   Current Discharge Medication List      START taking these medications    Details   norepinephrine (LEVOPHED) 16-0.9 MG/250ML-% SOLN Inject 2.061-27.48 mcg/min into the vein continuous  Qty:      Associated Diagnoses: Cardiogenic shock  (H)         CONTINUE these medications which have NOT CHANGED    Details   apixaban ANTICOAGULANT (ELIQUIS) 5 MG tablet Take 1 tablet (5 mg) by mouth 2 times daily First dose to be on 7/19/20 PM dose.  Qty: 180 tablet, Refills: 3    Associated Diagnoses: Left ventricular thrombus      busPIRone (BUSPAR) 10 MG tablet Take 1 tablet (10 mg) by mouth 3 times daily  Qty: 90 tablet, Refills: 0    Comments: Future refills by PCP Dr. Poonam Cast with phone number [phone].  Associated Diagnoses: Anxiety      clopidogrel (PLAVIX) 75 MG tablet Take 1 tablet (75 mg) by mouth daily  Qty: 30 tablet, Refills: 11    Comments: Future refills by PCP Dr. Poonam Cast with phone number [phone].  Associated Diagnoses: Ischemic cardiomyopathy      co-enzyme Q-10 100 MG CAPS capsule Take 100 mg by mouth 2 times daily       dorzolamide-timolol (COSOPT) 2-0.5 % ophthalmic solution Place 1 drop into both eyes 2 times daily       ferrous sulfate (FEROSUL) 325 (65 Fe) MG tablet Take 1 tablet (325 mg) by mouth 2 times daily  Qty: 60 tablet, Refills: 0    Comments: Future refills by PCP Dr. Poonam Cast with phone number [phone].  Associated Diagnoses: Anemia, unspecified type      latanoprost (XALATAN) 0.005 % ophthalmic solution Place 1 drop into both eyes daily In the morning.      levothyroxine (SYNTHROID/LEVOTHROID) 125 MCG tablet Take 0.5 tablets (62.5 mcg) by mouth every morning (before breakfast)  Qty: 15 tablet, Refills: 0    Comments: Future refills by PCP Dr. Poonam Cast with phone number [phone].  Associated Diagnoses: Hypothyroidism, unspecified type      Multiple Vitamins-Minerals (PRESERVISION AREDS 2 PO) Take 1 tablet by mouth 2 times daily      omeprazole (PRILOSEC) 20 MG DR capsule Take 20 mg by mouth daily       QUEtiapine (SEROQUEL) 25 MG tablet Take 50 mg by mouth At Bedtime      sacubitril-valsartan (ENTRESTO) 24-26 MG per tablet 1/2 tablet in PM daily  Qty: 60 tablet, Refills: 1    Associated  Diagnoses: Ischemic cardiomyopathy      acetaminophen (TYLENOL) 325 MG tablet Take 2 tablets (650 mg) by mouth every 6 hours as needed for mild pain  Qty: 100 tablet, Refills: 0      bimatoprost (LUMIGAN) 0.01 % SOLN Place 1 drop into both eyes At Bedtime       nitroGLYcerin (NITROSTAT) 0.4 MG sublingual tablet For chest pain place 1 tablet under the tongue every 5 minutes for 3 doses. If symptoms persist 5 minutes after 1st dose call 911.  Qty: 20 tablet, Refills: 0    Comments: Future refills by PCP Dr. Poonam Cast with phone number [phone].  Associated Diagnoses: Ischemic cardiomyopathy      rosuvastatin (CRESTOR) 20 MG tablet Take 20 mg by mouth daily         STOP taking these medications       ondansetron (ZOFRAN-ODT) 4 MG ODT tab Comments:   Reason for Stopping:         potassium chloride ER (KLOR-CON M) 20 MEQ CR tablet Comments:   Reason for Stopping:         torsemide (DEMADEX) 20 MG tablet Comments:   Reason for Stopping:             Allergies   Allergies   Allergen Reactions     Combigan [Brimonidine Tartrate-Timolol] Swelling     Swollen eyelids     Ativan [Lorazepam] Anxiety and Other (See Comments)     Increased confusion

## 2020-08-08 NOTE — Clinical Note
Sheath secured by suture. Caps on sheath and [place]. Air removed from balloon and syringe. Biopatch at site. Nalcrest locked at 55cm. Covered with Tegaderm.

## 2020-08-08 NOTE — Clinical Note
IABP inserted in the right femoral artery. IABP inserted with 50 cc balloon volume Lot number is: [id number]

## 2020-08-08 NOTE — PROGRESS NOTES
I was notified by the [place] fellow about patient's deteriorating clinical status.  Please see prior notes for details.  This is a 75-year-old lady who had a bypass surgery this year with an anterior infarction and an LV clot.  She has severe tricuspid regurgitation.  She has severe ischemic cardiomyopathy with marked LV dilatation and an EF of around 10 to 15%.  She, as an outpatient, has not been able to tolerate any heart failure therapy and in fact is just on half tablet twice a day of Entresto.  She was admitted with shortness of breath and volume overload and congestion.  Her weight has been going up and there is failure of outpatient diuretic therapy.  On arrival her blood pressure was at baseline at about 80 and then dropped into the 70s.  Her creatinine has been going up and her potassium is at 7.  Clinically she is on shock with low output and end-organ perfusion.    This is the first time I am meeting the patient.  I personally reviewed her echocardiogram.  RV function is down but not terrible.  There is severe tricuspid regurgitation.  LV function is significantly down and LV is dilated with anterolateral apical akinesis.  There is a layered large LV thrombus.  At this point the patient has been started on levo and is in the ICU. I personally prefer dopamine if possible, switching to dobutamine when able from a BP standpoint. Blood pressure is now stable.  Oxygenation is stable.      Over the long run I do not think this patient is going to tolerate heart failure therapy and her LV is unlikely to recover with conservative measures.  She is at a point of severe end-stage heart failure and I think she needs advanced heart failure therapies.  Issues are twofold - 1 is the degree of RV dysfunction and severe TR that will need a ring; and secondly is a large layered LV thrombus.  I spoke to Dr. Stephens at the [facility] and I recommend transfer to the [facility] for advanced heart failure candidacy  evaluation. Had a long conversation with patient and I went over details about destination therapy LVAD with her.  She has had epistaxis with Coumadin and she is on Eliquis for LV thrombus.  Of course this is an issue that needs to be evaluated as well.  She is willing to get an LVAD if her candidacy would be reasonable.  In regards to social support she lives with her .  Her  seem to be quite interested in the process as well.  Her daughter Debbie lives right next to them.  I spoken to ICU attending.  Will facilitate transfer to [place] and Dr. Stephens has accepted the patient on the advanced heart failure unit.  They will put in a [place] on the floor there.    Interim hyperkalemia management per ICU team    I spent 40 minutes in direct coronation of care and providing critical care to this patient.           Assessment and Plan:           Interval History:             Medications:       apixaban ANTICOAGULANT  5 mg Oral BID     bimatoprost  1 drop Both Eyes At Bedtime     [Held by provider] bumetanide  1 mg Intravenous Q12H     busPIRone  10 mg Oral TID     clopidogrel  75 mg Oral QPM     dextrose  50 mL Intravenous Once     docusate sodium  100 mg Oral BID     dorzolamide-timolol  1 drop Both Eyes BID     ferrous sulfate  325 mg Oral BID     insulin regular (HumuLIN R,NovoLIN R) for IV use  10 Units Intravenous Once     latanoprost  1 drop Both Eyes QAM     levothyroxine  62.5 mcg Oral QAM AC     multivitamin  with lutein  1 capsule Oral BID     omeprazole  20 mg Oral Daily     QUEtiapine  25 mg Oral At Bedtime     rosuvastatin  20 mg Oral At Bedtime     sodium chloride (PF)  3 mL Intracatheter Q8H            Physical Exam:     Patient Vitals for the past 24 hrs:   BP Temp Temp src Pulse Heart Rate Resp SpO2 Weight   08/08/20 1530 98/69 -- -- 68 69 25 100 % --   08/08/20 1520 91/72 -- -- -- 72 22 100 % --   08/08/20 1515 114/77 -- -- 71 -- -- 91 % --   08/08/20 1510 109/73 -- -- 81 -- -- 96 % --    08/08/20 1505 120/73 -- -- 76 -- -- (!) 88 % --   08/08/20 1500 105/65 -- -- 71 -- -- 90 % --   08/08/20 1455 110/69 -- -- 77 -- -- 93 % --   08/08/20 1450 125/74 -- -- 72 -- -- 91 % --   08/08/20 1445 115/63 -- -- 70 -- -- 97 % --   08/08/20 1442 111/74 -- -- -- -- -- 94 % --   08/08/20 1440 90/60 -- -- 63 -- -- 94 % --   08/08/20 1439 (!) 76/55 -- -- 66 -- -- 95 % --   08/08/20 1436 (!) 89/59 -- -- 68 -- -- 96 % --   08/08/20 1432 (!) 84/58 -- -- 65 -- -- 95 % --   08/08/20 1410 (!) 79/53 -- -- 62 -- -- -- --   08/08/20 1400 (!) 74/53 -- -- 67 -- -- -- --   08/08/20 1350 (!) 80/48 -- -- 63 -- -- -- --   08/08/20 1340 (!) 84/53 -- -- 63 -- -- 97 % --   08/08/20 1336 (!) 83/31 -- -- 61 -- -- -- --   08/08/20 1332 (!) 75/25 -- -- 71 -- -- -- --   08/08/20 1329 (!) 88/56 -- -- 64 -- -- -- --   08/08/20 1220 (!) 79/52 97.4  F (36.3  C) Oral -- 61 24 97 % --   08/08/20 0832 (!) 86/42 -- -- -- 62 -- -- --   08/08/20 0759 (!) 75/50 98.2  F (36.8  C) Oral -- 61 25 100 % --   08/08/20 0500 -- -- -- -- -- -- -- 68.7 kg (151 lb 8 oz)   08/08/20 0400 90/47 -- -- -- 73 24 -- --   08/08/20 0000 (!) 85/49 97.1  F (36.2  C) Oral 68 -- 24 98 % --     Vitals:    08/08/20 0500   Weight: 68.7 kg (151 lb 8 oz)         Intake/Output Summary (Last 24 hours) at 8/8/2020 1636  Last data filed at 8/8/2020 1300  Gross per 24 hour   Intake 515 ml   Output 250 ml   Net 265 ml       08/03 1500 - 08/08 1459  In: 515 [P.O.:515]  Out: 250 [Urine:250]  Net: 265    Exam:  GENERAL APPEARANCE ADULT: Alert, in no acute distress  RESP: crackles   CV: regular systolic murmur  ABDOMEN: no guarding or rebound  EXTREMITIES: Edema 1+         Data:   LABS (Last four results)  CMP  Recent Labs   Lab 08/08/20  1459 08/08/20  1324 08/07/20  0940   NA  --  131* 134   POTASSIUM 6.8* 7.4* 5.3   CHLORIDE  --  107 106   CO2  --  16* 22   ANIONGAP  --  8 6   GLC  --  89 68*   BUN  --  58* 55*   CR  --  1.60* 1.48*   GFRESTIMATED  --  31* 34*   GFRESTBLACK  --  36*  40*   FERNANDO  --  8.7 8.9   PROTTOTAL 7.2  --   --    ALBUMIN 2.8*  --   --    BILITOTAL 0.8  --   --    ALKPHOS 191*  --   --    AST 60*  --   --    ALT 42  --   --      CBC  Recent Labs   Lab 08/08/20  1459 08/07/20  1656   WBC 9.1 8.9   RBC 4.61 4.06   HGB 12.5 10.9*   HCT 40.7 36.8   MCV 88 91   MCH 27.1 26.8   MCHC 30.7* 29.6*   RDW 22.8* 23.6*    249     INR  Recent Labs   Lab 08/08/20  1459   INR 2.91*     TROPONINS   Lab Results   Component Value Date    TROPI 0.018 08/08/2020    TROPI 0.026 08/08/2020    TROPI 0.023 08/08/2020    TROPI 0.024 08/08/2020    TROPI 0.024 08/07/2020                                                                                                               Priscila Ramirez MD

## 2020-08-08 NOTE — PLAN OF CARE
Pt A and O x4. VSS overnight ex. low blood pressure, which is normal for pt. Pt typically runs in the 80s-90 and MD knows this as well. RA, but very DASH. +2 JHONATAN. Velcro lymph wraps off. Spine with non-blanchable redness, no open areas. Foam applied and WOCN ordered for possible pressure injury. Per pt- has had diarrhea. Up with assist of 1 and walker. Monitor closely. Tele

## 2020-08-08 NOTE — PROGRESS NOTES
Opened chart to review for possible admission.  Patient is hypotensive (although often has soft blood pressure) and requires further diuresis.  Discussed with Hospitalist Dr. Jose can patient required admission at hospital with inpatient cardiology service given hypotension and need for diuresis.  Patient not accepted for admission at [facility].    Sera Rosales PA-C on 8/7/2020 at 7:45 PM   [facility] Hospitalist Service

## 2020-08-08 NOTE — CONSULTS
[facility]    Cardiology Consultation     Date of Admission:  8/7/2020  Reason for consult:     Assessment & Plan     #1 HFrEF with acute exacerbation  #2 Tricuspid and mitral regurgitation  Agree with bumex IV 2 mg BID, compression wraps, low Na diet, replete K as needed. Anticipate admission for 1-2 days. I don't think a repeat echo will help guide further treatment.     #3 CAD s/p CABG and PCI  Troponin flat at 0.024 --> 0.023-->0.026, no CP, will continue Plavix for chronic CAD and recent stenting in [place].     #4 Mural thrombus history  Continue apixaban.     #5 s/p ICD    History of Present Illness   Marquise Jj is a 75 year old female who is admitted for CHF exacerbation. Patient has history of ICM with EF less than 20%, mural thrombus, multivessel CAD, s/p ICD, CABG in [place] in 4/2020 after late presentation STEMI and subsequent staged PCI to circumflex, recently admitted 6/25-7/11 for cardiogenic shock and heart failure but improved. Patient was admitted because of increasing swelling, SOB and orthopnea along with a 6 pound weight gain for the past 3 days. She has not had any chest pain with this. On admission troponin minimally elevated and BNP 17,000. She was started on IV bumex. At home she is on torsemide 10 mg daily. She claims she has been consistent with diuretic use but has trouble with adhering to K supplements.     Past Medical History   Past Medical History:   Diagnosis Date     CAD (coronary artery disease)      ICD (implantable cardioverter-defibrillator) in place     Primary prevention AICD placed in [place] in May 2020.     Ischemic cardiomyopathy     LVEF less than 20%.     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     Status post coronary artery bypass grafting     Done in [facility] in April 2020.  LIMA to diagonal (as LAD dissected) and KURT to the right coronary artery.        Past  Surgical History   Past Surgical History:   Procedure Laterality Date     ARTHROPLASTY REVISION HIP Right 11/16/2017    Procedure: ARTHROPLASTY REVISION HIP;  Right total hip arthroplasty revision.;  Surgeon: Jozef Garcia MD;  Location: [facility] OR     cabg  04/2020     CV HEART CATHETERIZATION WITH POSSIBLE INTERVENTION N/A 7/2/2020    Procedure: Heart Catheterization with Possible Intervention;  Surgeon: Kaden Franco MD;  Location:  HEART CARDIAC CATH LAB     CV PCI STENT DRUG ELUTING N/A 7/2/2020    Procedure: Percutaneous Coronary Intervention Stent Drug Eluting;  Surgeon: Kaden Franco MD;  Location:  HEART CARDIAC CATH LAB     CV RIGHT HEART CATH N/A 7/2/2020    Procedure: Right Heart Cath;  Surgeon: Kaden Franco MD;  Location:  HEART CARDIAC CATH LAB        Prior to Admission Medications   Prior to Admission Medications   Prescriptions Last Dose Informant Patient Reported? Taking?   Multiple Vitamins-Minerals (PRESERVISION AREDS 2 PO) 8/7/2020 at AM Self Yes Yes   Sig: Take 1 tablet by mouth 2 times daily   QUEtiapine (SEROQUEL) 25 MG tablet 8/6/2020 at HS Self Yes Yes   Sig: Take 50 mg by mouth At Bedtime   acetaminophen (TYLENOL) 325 MG tablet prn at prn Self No No   Sig: Take 2 tablets (650 mg) by mouth every 6 hours as needed for mild pain   apixaban ANTICOAGULANT (ELIQUIS) 5 MG tablet 8/7/2020 at AM Self No Yes   Sig: Take 1 tablet (5 mg) by mouth 2 times daily First dose to be on 7/19/20 PM dose.   bimatoprost (LUMIGAN) 0.01 % SOLN 8/6/2020 at HS Self Yes No   Sig: Place 1 drop into both eyes At Bedtime    busPIRone (BUSPAR) 10 MG tablet 8/7/2020 at AM Self No Yes   Sig: Take 1 tablet (10 mg) by mouth 3 times daily   clopidogrel (PLAVIX) 75 MG tablet 8/7/2020 at AM Self No Yes   Sig: Take 1 tablet (75 mg) by mouth daily   co-enzyme Q-10 100 MG CAPS capsule 8/7/2020 at AM Self Yes Yes   Sig: Take 100 mg by mouth 2 times daily    dorzolamide-timolol (COSOPT) 2-0.5 % ophthalmic  solution 2020 at AM Self Yes Yes   Sig: Place 1 drop into both eyes 2 times daily    ferrous sulfate (FEROSUL) 325 (65 Fe) MG tablet 2020 at AM Self No Yes   Sig: Take 1 tablet (325 mg) by mouth 2 times daily   latanoprost (XALATAN) 0.005 % ophthalmic solution 2020 at AM Self Yes Yes   Sig: Place 1 drop into both eyes daily In the morning.   levothyroxine (SYNTHROID/LEVOTHROID) 125 MCG tablet 2020 at AM Self No Yes   Sig: Take 0.5 tablets (62.5 mcg) by mouth every morning (before breakfast)   nitroGLYcerin (NITROSTAT) 0.4 MG sublingual tablet prn at prn Self No No   Sig: For chest pain place 1 tablet under the tongue every 5 minutes for 3 doses. If symptoms persist 5 minutes after 1st dose call 911.   omeprazole (PRILOSEC) 20 MG DR capsule 2020 at AM Self Yes Yes   Sig: Take 20 mg by mouth daily    ondansetron (ZOFRAN-ODT) 4 MG ODT tab prn at prn Self Yes No   Sig: Take 4 mg by mouth every 8 hours as needed    potassium chloride ER (KLOR-CON M) 20 MEQ CR tablet 2020 at AM Self No Yes   Sig: Take 1 tablet (20 mEq) by mouth 2 times daily 20meq daily   Patient taking differently: Take 20 mEq by mouth daily 20meq daily   rosuvastatin (CRESTOR) 20 MG tablet 2020 at HS Self Yes No   Sig: Take 20 mg by mouth daily   sacubitril-valsartan (ENTRESTO) 24-26 MG per tablet 2020 at Unknown time Self Yes Yes   Si/2 tablet in PM daily   torsemide (DEMADEX) 20 MG tablet 2020 at Unknown time Self Yes Yes   Sig: Take 10mg (1/2 tab) PO daily      Facility-Administered Medications: None     Allergies   Allergies   Allergen Reactions     Combigan [Brimonidine Tartrate-Timolol] Swelling     Swollen eyelids     Ativan [Lorazepam] Anxiety and Other (See Comments)     Increased confusion       Social History   I have reviewed this patient's social history and updated it with pertinent information if needed. Marquise Jj  reports that she has never smoked. She has never used smokeless tobacco.  She reports previous alcohol use. She reports that she does not use drugs.    Family History   I have reviewed this patient's family history and updated it with pertinent information if needed.   Family History   Problem Relation Age of Onset     Coronary Artery Disease No family hx of      Heart Failure No family hx of        Code Status    Full Code    Review of Systems   12 point review of systems reviewed and as noted in History of Present Illness    Physical Exam   Temp: 97.4  F (36.3  C) Temp src: Oral BP: (!) 79/52 Pulse: 68 Heart Rate: 61 Resp: 24 SpO2: 97 % O2 Device: None (Room air)    Vital Signs with Ranges  Temp:  [96.5  F (35.8  C)-98.2  F (36.8  C)] 97.4  F (36.3  C)  Pulse:  [55-73] 68  Heart Rate:  [61-74] 61  Resp:  [16-42] 24  BP: ()/() 79/52  SpO2:  [97 %-100 %] 97 %  151 lbs 8 oz    Constitutional: Awake, alert, cooperative, no apparent distress.  Eyes: Conjunctiva examined and normal.  HEENT: Moist mucous membranes, normal dentition.  Respiratory: Clear to auscultation bilaterally, no crackles or wheezing.  Cardiovascular: Regular rate and rhythm, normal S1 and S2, and no murmur noted.  GI: Soft, non-distended, non-tender, normal bowel sounds.  Skin: No rashes, no cyanosis, no edema.  Psychiatric: Alert, oriented to person, place and time, no obvious anxiety or depression.   Extremities: No lower or upper extremity edema, no clubbing or cyanosis  Vascular: Radial pulses 4+ and symmetric bilaterally    Data   Most Recent 3 CBC's:  Recent Labs   Lab Test 08/07/20  1656 07/30/20  1055 07/16/20  1631   WBC 8.9 5.1 5.5   HGB 10.9* 10.2*  10.2* 9.2*   MCV 91 92 86    231 225     Most Recent 3 BMP's:  Recent Labs   Lab Test 08/07/20  0940 07/30/20  1055 07/26/20  1235    140 139   POTASSIUM 5.3 3.4 4.0   CHLORIDE 106 105 104   CO2 22 26 26   BUN 55* 37* 27   CR 1.48* 1.54* 1.34*   ANIONGAP 6 9 9   FERNANDO 8.9 8.8 9.0   GLC 68* 91 82     Most Recent 3 Troponin's:  Recent Labs    Lab Test 08/08/20  0808 08/08/20  0610 08/08/20  0014   TROPI 0.026 0.023 0.024     Most Recent 3 BNP's:  Recent Labs   Lab Test 08/07/20  1656 07/24/20  1500 07/01/20  2219 06/25/20  1617 06/22/20  1244 06/10/20  1139   NTBNPI 17,722*  --  33,199* 23,376*  --   --    NTBNP  --  21,351*  --   --  16,515* 13,630*     Most Recent Cholesterol Panel:  Recent Labs   Lab Test 08/08/20  0014   CHOL <50   LDL Unable to Calculate   HDL 25*   TRIG 56     CARDIOLOGY TESTING AND IMAGING:    Electrocardiogram:       Transthoracic echocardiogram ( 5/28/20 ):   Interpretation Summary  The left ventricle is normal in size. Proximal septal thickening is noted.  Left ventricular systolic function is severely reduced. The visual ejection  fraction is estimated at <20%. Grade II or moderate diastolic dysfunction.  Diastolic Doppler findings (E/E' ratio and/or other parameters) suggest left  ventricular filling pressures are increased. There is severe global  hypokinesis to akinesis of the left ventriclular wall segments with relative  preservation of systolic function at the base. A large layered apical thrombus  is noted.  The right ventricle is mildly dilated. There is a catheter/pacemaker lead seen  in the right ventricle. Moderately decreased right ventricular systolic  function.  Moderate biatrial enlargement.  Moderate mitral regurgitation.  Moderate to moderately severe tricuspid regurgitation with mild to moderate  pulmonary hypertension.  No pericardial effusion.  No previous study for comparison.  Findings discussed with Dr Fong and nursing staff, immediate initiation of  anticoagulation recommended.  Coronary angiogram (  ):   Left Main    The left main is diffusely diseased with 30% stenosis throughout. The left main bifurcates into the LAD and LCx.    Left Anterior Descending    The LAD is heavily calcified at the ostium and proximal segment, with 60-70% stenosis. In the mid segment the vessel becomes a  after  giving off a large septal branch. There is flow in a diagonal branch from the LIMA, however, the area of supply is small and doesn't provide retrograde perfusion to the mid LAD and anterograde perfusion to the apical LAD. There doesn't appear to be any meaningful collateral vessels from the left or right supplying this region.    Left Circumflex    The LCx is heavily calcified in the proximal and mid portion of the vessel, with diffuse 70-80% stenosis. iFR was performed on the lesion with the wire placed in the distal vessel and was 0.82. On pullback, there was a significant step up in the mid LCx suggesting this was the most significant area of stenosis. The distal LCx has diffuse 30-40% stenosis. The LCx gives rise to several small OM vessels that appear diffusely diseased.    Right Coronary Artery    The RCA is a large, dominant vessel supplying the PL and PDA branches. There is a proximal tapering to a 40-50% stenosis in the mid RCA and a more focal stenosis of 80-90% in the distal RCA. The PL and PDA branches appear diffusely diseased with 20-30% stenosis throughout.    Dist RCA lesion is 85% stenosed.    KURT Graft to Dist RCA    The graft is atretic. The KURT was engaged however it becomes atretic before it's touchdown to the coronary vessels. It provides no coronary perfusion.    LIMA Graft to 2nd Diag    The LIMA is engaged and provides flow to a diagonal branch off the LAD. The vascular territory that is perfused is small. It doesn't provide any meaningful retrograde flow to the LAD and doesn't provide anterograde flow to the distal/apical LAD.       Wil Taylor MD

## 2020-08-08 NOTE — CODE/RAPID RESPONSE
[facility]    RRT Note  8/8/2020   Time Called: 212pm    RRT called for: Hypotension, suspected cardiogenic shock    Assessment & Plan   IMPRESSION & PLAN:    Marquise Jj is a 75 year old female w/ PMH of ischemic cardiomyopathy with HFrEF with LV less than 20%, mural thrombus on anticoagulation, multiple valvulopathy he is, CKD 3, hypothyroidism, anxiety who was admitted on 8/7/2020 for acute on chronic decompensated HFrEF and was started on diuretics With Bumex.  Since admission patient's had hypotension though she may run quite low to begin with.  After being evaluated by her hospitalist attending physician because of ongoing hypotension rapid response was called.  Additionally patient was hyperkalemic with potassium of 7.    On my arrival BP was 79/53, heart rate in the 60s, respiratory rate 20, not able to obtain pulse ox easily.  She is awake per RN not making much urine, extremities are cool with the exception of the toes which are somewhat purple.    Impression  Possible cardiogenic shock (hypotension but LA WNL, awake, mentating/oriented x3, not much urine output).  Known to have EF <20  Differential diagnosis:  -need to r/o hemorrhagic shock    INTERVENTIONS:  -recheck K now  -discontinue KCl  -stat calcium gluconate   -stat abg  -start norepi gtt for vasopressor support and inotropic  -add on LFTs  -stat cbc and PT INR to r/o hemorrhage  -considered D50 and insulin but gluc 79 want to avoid iatrogenic hypoglycemia  -considered NaHCO3 but large sodium load may worsen HF  -Assisted in getting patient transferred to intensive care.  -Stat echo    Working diagnosis: Most likely cardiogenic shock given EF less than 20 in June 2020    At the end of the RRT: Patient was transferred to intensive care.  Handoff had been given to intensivist by hospitalist attending physician    Disposition: Intensive care unit    Discussed with and defer further cares to rounding hospitalist attending  physician Dr. Nice    Code Status: Full Code    Allergies   Allergies   Allergen Reactions     Combigan [Brimonidine Tartrate-Timolol] Swelling     Swollen eyelids     Ativan [Lorazepam] Anxiety and Other (See Comments)     Increased confusion       Physical Exam   Vital Signs with Ranges:  Temp:  [97.1  F (36.2  C)-98.2  F (36.8  C)] 97.4  F (36.3  C)  Pulse:  [61-81] 68  Heart Rate:  [61-74] 69  Resp:  [22-42] 25  BP: ()/() 98/69  SpO2:  [88 %-100 %] 100 %  I/O last 3 completed shifts:  In: 515 [P.O.:515]  Out: 250 [Urine:250]    Constitutional:no acute distress  Neuro: +follows commands wiggle toes and show 2 fingers bilat, face symmetric, tongue midline, speech fluent  HEENT: mouth moist oral mucosa  Neck: supple  Heart: NSR, hypotension  Lungs: RR 20, paradoxical abd breathing pattern when supine  Abd:deferred  Ext: no edema, erythematous toes    Data     EKG:  Interpreted by STEPHAN Yee CNP  Time reviewed: 2:19 PM  Symptoms at time of EKG: Shock  Rhythm: normal sinus   Rate: Normal  Axis: Left Axis Deviation  Ectopy: none  Conduction: normal  ST Segments/ T Waves: No acute ischemic changes  Q Waves: none  Comparison to prior: Unchanged from 8/7/2020    ABG:  -  Recent Labs   Lab 08/08/20  1515   PH 7.40   PCO2 21*   PO2 95   HCO3 13*       Troponin:    Recent Labs   Lab Test 08/08/20  1248   TROPI 0.018       IMAGING: (X-ray/CT/MRI)   Recent Results (from the past 24 hour(s))   XR Chest 2 Views    Narrative    XR CHEST TWO VIEWS   8/7/2020 5:43 PM     HISTORY: Shortness of breath, chest pain.      COMPARISON: Chest x-ray on 7/3/2020      Impression    IMPRESSION: Two views of the chest were obtained. Left chest wall  cardiac pacer lead is in stable position. Stable cardiomediastinal  silhouette. Small bilateral pleural effusions and associated basilar  atelectasis/consolidation. No significant pneumothorax.    JAMILA CABRERA MD   Echo Limited    Narrative     [id number]  [id number]  [id number]^AYUSH^ALY^DARIA           [facility]  Echocardiography Laboratory  [address] [place] [address]        Name: EDILSON SMITH  MRN: [id number]  : 1945  Study Date: 2020 03:08 PM  Age: 75 yrs  Gender: Female  Patient Location: [facility]  Reason For Study: Shock  Ordering Physician: ALY PEACOCK  Referring Physician: CAMILO STAUFFER  Performed By: Cj Johnson     BSA: 1.8 m2  Height: 68 in  Weight: 151 lb  HR: 90  BP: 79/53 mmHg  _____________________________________________________________________________  __        Procedure  Limited Portable Echo Adult.  _____________________________________________________________________________  __        Interpretation Summary     Limited echo in the ICU for hypotension.  The left ventricle is severely dilated. There is severe eccentric left  ventricular hypertrophy. The visual ejection fraction is estimated at 10%.  Large anterior akinesis with a large layered LV thrombus as previously noted.  Pleural effusion. Correlate with alternative imaging.  There is severe (4+) tricuspid regurgitation.  Pulmonary hypertension 40 plus RA.  Mildly decreased right ventricular systolic function. The right ventricle is  normal size. There is a pacemaker lead in the right ventricle.  There is annular dilatation and tenting of the MV leaflets. There is moderate  to severe MR.  IVC diameter >2.1 cm collapsing <50% with sniff suggests a high RA pressure  estimated at 15 mmHg or greater.     Signs of very low LV output. Critical findings.  _____________________________________________________________________________  __        Left Ventricle  The left ventricle is severely dilated. There is severe eccentric left  ventricular hypertrophy. The visual ejection fraction is estimated at 10%.  There is anterior, septal, and apical wall akinesis. There is severe global  hypokinesia of the left ventricle.     Right  Ventricle  The right ventricle is normal size. Mildly decreased right ventricular  systolic function. There is a pacemaker lead in the right ventricle.     Mitral Valve  There is annular dilatation and tenting of the MV leaflets. There is moderate  to severe MR.     Tricuspid Valve  There is severe (4+) tricuspid regurgitation. The right ventricular systolic  pressure is elevated at 39.7 mmHg. Pulmonary hypertension.        Aortic Valve  Normal tricuspid aortic valve. No aortic regurgitation is present. No aortic  stenosis is present.     Pulmonic Valve  The pulmonic valve is not well visualized.     Vessels  The aortic root is normal size. IVC diameter >2.1 cm collapsing <50% with  sniff suggests a high RA pressure estimated at 15 mmHg or greater.     Pericardium  There is no pericardial effusion.     _____________________________________________________________________________  __  MMode/2D Measurements & Calculations  IVSd: 3.7 cm  LVIDd: 6.6 cm  LVPWd: 0.59 cm  LV mass(C)d: 837.5 grams  LV mass(C)dI: 461.7 grams/m2  asc Aorta Diam: 3.5 cm  LVOT diam: 2.0 cm  LVOT area: 3.2 cm2  RWT: 0.18           Doppler Measurements & Calculations  Ao V2 max: 89.2 cm/sec  Ao max PG: 3.0 mmHg  Ao V2 mean: 63.8 cm/sec  Ao mean P.8 mmHg  Ao V2 VTI: 15.5 cm  ANDREA(I,D): 1.7 cm2  ANDREA(V,D): 2.0 cm2  LV V1 max P.3 mmHg  LV V1 max: 56.3 cm/sec  LV V1 VTI: 8.3 cm  CO(LVOT): 2.0 l/min  CI(LVOT): 1.1 l/min/m2  SV(LVOT): 26.6 ml  SI(LVOT): 14.6 ml/m2  TR max ángel: 314.9 cm/sec  TR max P.7 mmHg  AV Ángel Ratio (DI): 0.63  ANDREA Index (cm2/m2): 0.95           _____________________________________________________________________________  __           Report approved by: Imani Figueredo 2020 04:43 PM          CBC with Diff:  Recent Labs   Lab Test 20  1459   WBC 9.1   HGB 12.5   MCV 88      INR 2.91*        Lactic Acid:    Lab Results   Component Value Date    LACT 1.6 2020           Comprehensive Metabolic  Panel:  Recent Labs   Lab 08/08/20  1459 08/08/20  1324   NA  --  131*   POTASSIUM 6.8* 7.4*   CHLORIDE  --  107   CO2  --  16*   ANIONGAP  --  8   GLC  --  89   BUN  --  58*   CR  --  1.60*   GFRESTIMATED  --  31*   GFRESTBLACK  --  36*   FERNANDO  --  8.7   PROTTOTAL 7.2  --    ALBUMIN 2.8*  --    BILITOTAL 0.8  --    ALKPHOS 191*  --    AST 60*  --    ALT 42  --        INR:    Recent Labs   Lab Test 08/08/20  1459   INR 2.91*       Time Spent on this Encounter   I spent 45 minutes (215-3pm) of critical care time on the unit/floor managing the care of Marquise Jj. Upon evaluation, this patient had a high probability of imminent or life-threatening deterioration due to shock state, likely cardiogenic, which required my direct attention, intervention, and personal management. 100% of my time was spent at the bedside counseling the patient and/or coordinating care regarding services listed in this note.    Gloria Ma, [facility]  Hospitalist [place] LUIS ANGEL  [phone]

## 2020-08-08 NOTE — PROVIDER NOTIFICATION
BPs have been low this morning. Last BP was recorded at 79/52.  I evaluated the patient and she is awake, alert and oriented. She has some mild increased work of breathing, but her lung sounds are clear and her O2 sats have been normal on room air. She does have some lower extremity edema and possibly elevated JVD. She is notably cool peripherally with evidence of poor circulation in her hands (feet are wrapped so circulation is difficult to assess in lower extremities).    Given her severe underlying cardiomyopathy, I am concerned about HF exacerbation, and possibly even cardiogenic shock given her hypotension and cool extremities. She does have low BP at baseline, but this seems quite low. Lactate, BMP ordered. Appreciate cardiology input regarding diuretic plan and other medical therapy given hypotension.     Freida Nice MD     Addendum:     Lactate 1.6, BMP notable for potassium of 7.4 with stable renal function. Pt reassessed and remains awake and oriented in no distress. STAT EKG ordered and RRT called. Please see RRT note for further details. Planning to transfer pt to ICU for closer monitoring and vasopressor support.     Cardiology had recommended starting dobutamine infusion, but after discussion with the intensivist decision was made to start low dose levophed through a peripheral IV.      Etiology for hypotension is likely cardiogenic shock given her history and clinical presentation. She has no fever or leukocytosis to suggest septic shock, but she has not had a full infectious work-up-  this with empiric antibiotics could be considered. She has no evidence of bleeding, but CBC is pending.     Appreciate assistance from Intensivist and cardiologist with further management of this patient.     Freida Nice MD

## 2020-08-08 NOTE — PLAN OF CARE
MD Notification    Notified Person: MD    Notified Person Name: Tex    Notification Date/Time: 9:05 AM     Notification Interaction: Web-based paging    Purpose of Notification: IV push bumex ordered for this AM, pt BP 86/42 (61), baseline SBP high 70s-low 90s per pt, asymptomatic. Hold parameters for bumex?    Orders Received: no response - paged again at 11:49 AM    Comments: provider will come assess pt & decide on bumex

## 2020-08-09 NOTE — PLAN OF CARE
Major Shift Events: A/OX$, -130, Afib/afluter, team aware, [place] in place, Winter q6h. Dobutamine at 3, Heparin restarted. Rojo in place Large UOP, bumex stoped per MD order. Tolerating diet. Passing gas no stool  Plan Lvad workup  For vital signs and complete assessments, please see documentation flowsheets.

## 2020-08-09 NOTE — PLAN OF CARE
[id number]  Received from heart center.  Denies SOB, Levophed at 0.06 mcg/kg/min.  Echocardiogram being done.  Cardiology here and discussed with , pt, and pt's dtr, Gypsy, plan of care.  Pt decided to transfer to [facility].  A&O   Lungs: diminished, clear on Oxymask 10 LPM.  Much difficulty picking up O2 sats via ear or finger.  Mostly reads % intermittently.  O2 decreased to 8LPM.  CV: Levophed increased to 0.08 mcg/kg/min to keep MAP >65.  Sinus rhythm with frequent PAC's.  GI: sips of water.  U/O Voided 275cc [id number].    K+6.8 at 1500 during Calcium Gluconate infustion.  Albuterol, D50 and Insulin given after. No recheck done here.  Fingers pale, cool. Bilateral toes and ball of feet dusky, red, nathan.  Report called to BEHZAD Lozada RN at 0710  Transferred at 2000 to [facility].

## 2020-08-09 NOTE — PROGRESS NOTES
CLINICAL NUTRITION SERVICES - ASSESSMENT NOTE     Nutrition Prescription    RECOMMENDATIONS FOR MDs/PROVIDERS TO ORDER:  Recommend 100 mg thiamine daily for reduced EF and MVI with minerals given malnutrition.     Malnutrition Status:    at least Non-severe malnutrition in the context of acute illness    Recommendations already ordered by Registered Dietitian (RD):  Boost Plus chocolate between meals  Kcal counts    Future/Additional Recommendations:  Monitor PO intake, provide nutrition education as able.   Monitor WOCN evaluation and ordering PI vitamin/mineral supplements as indicated.     REASON FOR ASSESSMENT  Marquise Jj is a/an 75 year old female assessed by the dietitian for Provider Order -  Congestive Heart Failure (CHF) - Dietitian to instruct patient on 2 gram sodium diet    NUTRITION HISTORY  PMH CAD s/p CABG in April 2020, ischemic cardiomyopathy s/p ICD, and known mural thrombus who is presenting as a transfer from [facility] with cardiogenic shock. The patient's cardiac history began in April of this past year.  She was on a cruise and developed 5 days of nausea.  After disembarking, the patient went to the hospital where coronary angiogram revealed three-vessel coronary artery disease.  She underwent coronary artery bypass grafting with a LIMA to the diagonal (LAD dissected during the case) and a KURT to the right coronary artery.  She also had an ICD placed.  EF at the time was found to be severely reduced. Possible LVAD workup.     Pt evaluated by RD at OSH, last note on 7/8/2020. Pt was receiving chocolate Boost Plus TID, 100 mg thiamine daily (given EF < 30%), MVI w/ minerals. Pt was diagnosed with severe malnutrition on 7/3/2020. Pt was on a 1 g Na diet during this admission.     CURRENT NUTRITION ORDERS  Diet: 2 g Sodium, 2000 ml fluid restriction  Intake/Tolerance: no PO intake recorded at this time    LABS  K+ 4.2 (WNL <- 7.4 on admission)  Cr 1.47 (H)  BG  "    MEDICATIONS  Ferrous sulfate (325 mg BID)  Synthroid  Bumex gtt  Dobutamine gtt  Norepinephrine gtt    ANTHROPOMETRICS  Height: 0 cm (Data Unavailable)   Ht Readings from Last 2 Encounters:   07/31/20 1.727 m (5' 8\")   07/20/20 1.727 m (5' 8\")     Most Recent Weight: 68.5 kg (151 lb 0.2 oz)    IBW: 63.6 kg  BMI: Normal BMI  Weight History: 8.6% wt loss x 1 month - significant, though some confounded by fluid  Wt Readings from Last 10 Encounters:   08/09/20 68.5 kg (151 lb 0.2 oz)   08/08/20 68.7 kg (151 lb 8 oz)   08/07/20 68.1 kg (150 lb 1.6 oz)   08/03/20 66 kg (145 lb 9.6 oz)   07/31/20 66.6 kg (146 lb 12.8 oz)   07/20/20 67.5 kg (148 lb 12.8 oz)   07/16/20 68.3 kg (150 lb 8 oz)   07/16/20 68.3 kg (150 lb 8 oz)   07/11/20 68.7 kg (151 lb 8 oz)   06/25/20 71.7 kg (158 lb)     Dosing Weight: 66 kg driest weight from 8/3    ASSESSED NUTRITION NEEDS  Estimated Energy Needs: 7550-6079 kcals/day (25 - 30 kcals/kg)  Justification: Maintenance  Estimated Protein Needs: 79-99 grams protein/day (1.2 - 1.5 grams of pro/kg)  Justification: Increased needs  Estimated Fluid Needs: (1 mL/kcal)   Justification: Maintenance, On a fluid restriction and Per provider pending fluid status    PHYSICAL FINDINGS  See malnutrition section below.  Skin: PI on spine - WOCN c/s pending    Unable to obtain in-person nutrition history or nutrition focused physical assessment (NFPA) from patient as the number of staff going into rooms is restricted to limit exposure and to minimize use of PPE.    MALNUTRITION  % Intake: Unable to assess  % Weight Loss: > 5% in 1 month (severe)  Subcutaneous Fat Loss: Unable to assess  Muscle Loss: Unable to assess  Fluid Accumulation/Edema: 2+ mild edema  Malnutrition Diagnosis: at least Non-severe malnutrition in the context of acute illness    NUTRITION DIAGNOSIS  Inadequate oral intake related to poor appetite during recent admission due to very restricted diet as evidenced by weight loss 8.6% x 1 " month.     INTERVENTIONS  Implementation  Nutrition Education: Unable to complete due to unable to reach pt via room phone   Kcal counts  Medical food supplement therapy - chocolate boost plus between meals    Goals  Patient to consume % of nutritionally adequate meal trays TID, or the equivalent with supplements/snacks.     Monitoring/Evaluation  Progress toward goals will be monitored and evaluated per protocol.    Arabella Lomeli MS, RD, LD, CNSC  Weekend Pager: [phone]  CVICU RD [phone]  Pgr: [phone]

## 2020-08-09 NOTE — PROGRESS NOTES
Major Shift Events:  Alert and oriented. Converted to afib, team aware. Started on bumex and dobutamine gtt. Trending hemodynamics, improving with drips. Rojo inserted. Pt complains of some back pain, relieved with repositioning.       Plan: Continue to monitor. Optimize HF medically for now. Possible LVAD workup.     For vital signs and complete assessments, please see documentation flowsheets.

## 2020-08-09 NOTE — H&P
Cardiology History and Physical  Marquise Jj MRN: [id number]  Age: 75 year old, : 1945  Primary care provider: Poonam Cast            Assessment and Plan:     In summary, this is a 75-year-old female with a past medical history notable for coronary artery disease, ischemic cardiomyopathy, and known mural thrombus who is presenting with cardiogenic shock.    Acute on chronic decompensated heart failure with reduced ejection fraction: NYHA IV / ACC D  Cardiogenic shock  Ischemic cardiomyopathy  Coronary artery disease s/p CABG  in [place], s/p PCI to RCA   Mural thrombus  Severe MR/TR  The patient is presenting to our hospital with cardiogenic shock.  On norepinephrine, her cardiac index is approximately 1.37.  She has severely elevated biventricular filling pressures.  The etiology of her decompensation appears to be progression of her cardiomyopathy.  Despite medical treatment, she has been hospitalized twice since returning to [place], both with low output.  She has unfortunately no viability in her LAD territory, and doubt that PCI to her circumflex would make meaningful LV recovery.  We will tentatively plan to aggressively diurese the patient and use inotropy. It is unclear if she would be a candidate for an LVAD or if he would even be technically feasible given the presence of mural thrombus.  -Inotropes: Start dobutamine 5 mcg/kg/min, continue norepinephrine at 0.08 mcg/kg/min  -Diuresis: Give 4 mg IV Bumex and start Bumex drip at 1 mg/h  -Goal-directed medical therapy: Holding home Entresto  -Afterload reduction: Currently with dobutamine  -SCD prevention: Patient with ICD in place  -PA catheter placed at bedside, monitor cardiac index and hemodynamics every 6 hours  -Unclear if she would be a candidate for long-term mechanical support, will discuss further with team  -Continue home Plavix  -Stop home apixaban and start heparin drip  -Continue home Crestor    Acute hypoxic  respiratory failure  This is secondary to pulmonary edema bilateral pleural effusions.  -Oximask for now  -Aggressive diuresis, as above    Hyperkalemia  Acute kidney injury on chronic kidney disease, stage III  Chronic kidney disease likely driven by her cardiac dysfunction.  Acute worsening secondary to her cardiogenic shock.  -Trend potassium every 12 hours  -Given insulin and dextrose x2, potassium improving, plan to give large doses of diuretics, as above  -No EKG changes    Anxiety: Continue home BuSpar and quetiapine  GERD: Continue home Prilosec  Hypothyroidism: Continue home Synthroid    FEN: 2 liter fluid restriction, 2 gram Na diet  PPX: heparin ggt    Code Status: FULL     Patient discussed with staff attending, Dr. Stephens.    Christopher Tran MD  Cardiology Fellow  Pager: [phone]            Chief Complaint:     Acute heart failure          History of Present Illness:     The patient is a 75-year-old female with a past medical history notable for coronary artery disease status post CABG in April 2020, ischemic cardiomyopathy s/p ICD, and known mural thrombus who is presenting as a transfer from [facility] with cardiogenic shock.    The patient's cardiac history began in April of this past year.  She was on a cruise and developed 5 days of nausea.  After disembarking, the patient went to the hospital where coronary angiogram revealed three-vessel coronary artery disease.  She underwent coronary artery bypass grafting with a LIMA to the diagonal (LAD dissected during the case) and a KURT to the right coronary artery.  She also had an ICD placed.  EF at the time was found to be severely reduced.  She established care with [facility] and underwent medical therapy titration.  She had one admission for acute decompensated heart failure over the summer.  She underwent a viability study which was notable for viability of the RCA and circumflex territory, however the LAD territory had no viability.   She underwent coronary angiogram on July 2 which showed an atretic KURT graft and therefore she underwent stenting to the RCA.  In addition, cardiac index was found to be 1.2 with severely elevated biventricular filling pressures.  She was ultimately diuresed and discharged, and there was plans to undergo staged stenting to her left circumflex.    However, the patient reports that she continued to have worsening shortness of breath and weight gain in the outpatient setting.  Due to these worsening symptoms, the patient presented to the hospital.  She reports that prior to her cruise, she was able to walk approximately 1 mile per day, and now can barely walk a block.  She has chronic lower extremity edema and dyspnea, however largely denies PND.  She has no chest pain, syncope, or presyncope.  After being admitted to [facility], she underwent echocardiogram which showed large mural thrombus and severely reduced LV function.  She became progressively more hypotensive, was started on norepinephrine, and transferred to the [facility].    She lives at home with her  of 56 years.    A comprehensive 12 point review of systems was completed and relevant findings are noted in the HPI.          Past Medical History:     Past Medical History:   Diagnosis Date     CAD (coronary artery disease)      ICD (implantable cardioverter-defibrillator) in place     Primary prevention AICD placed in [place] in May 2020.     Ischemic cardiomyopathy     LVEF less than 20%.     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     Status post coronary artery bypass grafting     Done in [facility] in April 2020.  LIMA to diagonal (as LAD dissected) and KURT to the right coronary artery.              Past Surgical History:      Past Surgical History:   Procedure Laterality Date     ARTHROPLASTY REVISION HIP Right 11/16/2017    Procedure: ARTHROPLASTY  REVISION HIP;  Right total hip arthroplasty revision.;  Surgeon: Jozef Garcia MD;  Location: [facility] OR     cabg  04/2020     CV HEART CATHETERIZATION WITH POSSIBLE INTERVENTION N/A 7/2/2020    Procedure: Heart Catheterization with Possible Intervention;  Surgeon: Kaden Franco MD;  Location:  HEART CARDIAC CATH LAB     CV PCI STENT DRUG ELUTING N/A 7/2/2020    Procedure: Percutaneous Coronary Intervention Stent Drug Eluting;  Surgeon: Kaden Franco MD;  Location:  HEART CARDIAC CATH LAB     CV RIGHT HEART CATH N/A 7/2/2020    Procedure: Right Heart Cath;  Surgeon: Kaden Franco MD;  Location:  HEART CARDIAC CATH LAB              Social History:     Social History     Socioeconomic History     Marital status:      Spouse name: Not on file     Number of children: Not on file     Years of education: Not on file     Highest education level: Not on file   Occupational History     Occupation: [employer].   Social Needs     Financial resource strain: Not on file     Food insecurity     Worry: Not on file     Inability: Not on file     Transportation needs     Medical: Not on file     Non-medical: Not on file   Tobacco Use     Smoking status: Never Smoker     Smokeless tobacco: Never Used   Substance and Sexual Activity     Alcohol use: Not Currently     Drug use: Never     Sexual activity: Not on file   Lifestyle     Physical activity     Days per week: Not on file     Minutes per session: Not on file     Stress: Not on file   Relationships     Social connections     Talks on phone: Not on file     Gets together: Not on file     Attends Taoism service: Not on file     Active member of club or organization: Not on file     Attends meetings of clubs or organizations: Not on file     Relationship status: Not on file     Intimate partner violence     Fear of current or ex partner: Not on file     Emotionally abused: Not on file     Physically abused: Not on file     Forced sexual activity: Not on  file   Other Topics Concern     Not on file   Social History Narrative     Not on file              Family History:     Family History   Problem Relation Age of Onset     Coronary Artery Disease No family hx of      Heart Failure No family hx of      Family history reviewed and updated in EPIC          Allergies:     Allergies   Allergen Reactions     Combigan [Brimonidine Tartrate-Timolol] Swelling     Swollen eyelids     Ativan [Lorazepam] Anxiety and Other (See Comments)     Increased confusion              Medications:     Medications Prior to Admission   Medication Sig Dispense Refill Last Dose     acetaminophen (TYLENOL) 325 MG tablet Take 2 tablets (650 mg) by mouth every 6 hours as needed for mild pain 100 tablet 0      apixaban ANTICOAGULANT (ELIQUIS) 5 MG tablet Take 1 tablet (5 mg) by mouth 2 times daily First dose to be on 7/19/20 PM dose. 180 tablet 3      bimatoprost (LUMIGAN) 0.01 % SOLN Place 1 drop into both eyes At Bedtime         busPIRone (BUSPAR) 10 MG tablet Take 1 tablet (10 mg) by mouth 3 times daily 90 tablet 0      clopidogrel (PLAVIX) 75 MG tablet Take 1 tablet (75 mg) by mouth daily 30 tablet 11      co-enzyme Q-10 100 MG CAPS capsule Take 100 mg by mouth 2 times daily         dorzolamide-timolol (COSOPT) 2-0.5 % ophthalmic solution Place 1 drop into both eyes 2 times daily         ferrous sulfate (FEROSUL) 325 (65 Fe) MG tablet Take 1 tablet (325 mg) by mouth 2 times daily 60 tablet 0      latanoprost (XALATAN) 0.005 % ophthalmic solution Place 1 drop into both eyes daily In the morning.        levothyroxine (SYNTHROID/LEVOTHROID) 125 MCG tablet Take 0.5 tablets (62.5 mcg) by mouth every morning (before breakfast) 15 tablet 0      Multiple Vitamins-Minerals (PRESERVISION AREDS 2 PO) Take 1 tablet by mouth 2 times daily        nitroGLYcerin (NITROSTAT) 0.4 MG sublingual tablet For chest pain place 1 tablet under the tongue every 5 minutes for 3 doses. If symptoms persist 5 minutes after  1st dose call 911. 20 tablet 0      norepinephrine (LEVOPHED) 16-0.9 MG/250ML-% SOLN Inject 2.061-27.48 mcg/min into the vein continuous        omeprazole (PRILOSEC) 20 MG DR capsule Take 20 mg by mouth daily         QUEtiapine (SEROQUEL) 25 MG tablet Take 50 mg by mouth At Bedtime        rosuvastatin (CRESTOR) 20 MG tablet Take 20 mg by mouth daily        sacubitril-valsartan (ENTRESTO) 24-26 MG per tablet 1/2 tablet in PM daily 60 tablet 1                     Physical Exam:     B/P: 96/66, T: Data Unavailable, P: Data Unavailable, R: 25    Wt Readings from Last 4 Encounters:   08/08/20 68.7 kg (151 lb 8 oz)   08/07/20 68.1 kg (150 lb 1.6 oz)   08/03/20 66 kg (145 lb 9.6 oz)   07/31/20 66.6 kg (146 lb 12.8 oz)         Intake/Output Summary (Last 24 hours) at 8/8/2020 2155  Last data filed at 8/8/2020 2100  Gross per 24 hour   Intake 8.6 ml   Output --   Net 8.6 ml       Gen: No acute distress  HEENT: NC/AT, PERRL, EOM intact, MMM, OP without exudates  PULM/THORAX: Diminished at the bases and coarse bilaterally  CV: regular rate, irregular rhythm, loud systolic blowing murmur radiating to the axilla, JVP > 15 cm  ABD: Soft, NTND, bowel sounds present, no masses  EXT: 2+ BL edema to the level of the mid-leg, purple toes  NEURO: non focal          Data:     Labs Reviewed on Admission  Pertinent for:  Lab Results   Component Value Date    TROPI 0.018 08/08/2020    TROPI 0.026 08/08/2020    TROPI 0.023 08/08/2020    TROPI 0.024 08/08/2020    TROPI 0.024 08/07/2020               Most Recent Imaging:     ECG: NSR with PACs, LAE, AL infarct    C 8/8/20  CVP: 18  PA: 66/38  Wedge: 26  MV02: 36  Sa02: 96  CI: 1.37 (obtained on norepineprhine at 0.08 mcg/kg/min)  SVR: 1768    Echo: 8/8/20   Limited echo in the ICU for hypotension.  The left ventricle is severely dilated. There is severe eccentric left  ventricular hypertrophy. The visual ejection fraction is estimated at 10%.  Large anterior akinesis with a large layered LV  thrombus as previously noted.  Pleural effusion. Correlate with alternative imaging.  There is severe (4+) tricuspid regurgitation.  Pulmonary hypertension 40 plus RA.  Mildly decreased right ventricular systolic function. The right ventricle is  normal size. There is a pacemaker lead in the right ventricle.  There is annular dilatation and tenting of the MV leaflets. There is moderate  to severe MR.  IVC diameter >2.1 cm collapsing <50% with sniff suggests a high RA pressure  estimated at 15 mmHg or greater.     Signs of very low LV output. Critical findings.    Cath: 7/2/20    Right sided filling pressures are mildly elevated.    Left sided filling pressures are moderately elevated.    Moderately elevated pulmonary artery hypertension.    Reduced cardiac output level.    Atretic KURT graft.    Patent LIMA supplying small vascular territory of a diagonal branch.    Significant mid to distal RCA stenosis s/p PCI with single SEMAJ, no complications.    Hemodynamically significant lesion of the proximal-mid LCx (iFR 0.82). Plan for staged procedure given contrast risk.

## 2020-08-09 NOTE — PROGRESS NOTES
She decompensated when you were off  Low BP, weight gain  Cardiogenic shock and needed pressors    She was sent to the [facility] to Dr. Stephens   They are going to evaluate for LVAD but may not be a candidate due to LV clot    And her TR is very bad  EF 10% and LV dilated

## 2020-08-09 NOTE — PROCEDURES
PA Catheter Insertion    Indication: Cardiogenic shock, hemodynamic monitoring  Attending: Angelo  Procedures performed: Ultrasound guided vascular access, central venous catheter placement, [place] tammi catheter placement  Complications: None    Access was obtained in the right internal jugular artery with a 21-gauge micropuncture needle.  Using an over-the-wire technique and with serial dilations, a 9 [place] MAC introducer catheter was inserted successfully.  Through the introducer catheter, a PA catheter was placed into the pulmonary artery using hemodynamic guidance.  There were no complications.    The attending was present for all portions of the procedure.    Christopher Tran, cardiology fellow

## 2020-08-09 NOTE — PROGRESS NOTES
Cardiology Progress Note    Assessment & Plan   In summary, Marquise Jj is a 75-year-old female with a past medical history notable for coronary artery disease, ischemic cardiomyopathy, and known mural thrombus who was transferred from [facility] for further evaluation/treatment of cardiogenic shock.    Today:  - Wean NE to MAP of >65  - Decrease dobutamine to 3mcg/kg/hr  - Start digoxin 0.5mcg once    Neuro:   # Sedation:   - NTD    # Pain:  - Lidocaine patch  - Tylenol 325 q6hrs prn   - Buspirone 10mg tid sched    Cardio:  # Acute on chronic decompensated heart failure with reduced ejection fraction: NYHA IV / ACC D / INTERMACS 2-3  # ICM d/t CAD s/p CABG 4/2020 (KURT to RCA and LIMA to LAD) and PCI to RCA 7/20  # Mural thrombus  # Severe MR/TR   Presents with cardiogenic shock. On NE, cardiac index approximately 1.37 on admission. Severely elevated biventricular filling pressures. Etiology of her decompensation appears to be progression of her cardiomyopathy. Despite medical treatment, she has been hospitalized twice since returning to [place], both with low output. No viability in her LAD territory, and doubt that PCI to her circumflex would make meaningful LV recovery. It is unclear if she would be a candidate for an LVAD or if he would even be technically feasible given the presence of mural thrombus. SG placed on 8/8. NE continued, dobutamine started at 5/mcg/kg/min, provided with 4mg IV bumex followed by bumex drip at 1mg/hr.   - Wean norepinephrine at 0.08 mcg/kg/min, MAP >65  - Decrease bumex drip 1 mg/h, reduce if CVP <7 -> intermittent dosing   - Decrease dobutamine 5mcg/kg/hr to 3mcg/kg/hr   - Continue heparin ggt, high dose   - Start digoxin 0.5mcg  - Continue plavix 75mg po qd   - Continue to hold entresto   - Hemodynamics q6hrs   - Consider LVAD   - Continue home crestor  - Insurance pre-authorization for VAD    SGz  - CXR: positioned in PA  - 59 cm     8/9 8/9      CVP 12 9       Mean PA  35 30      PCWP  18      Oxy HGB  59 64      CI 2.3 2.3      SVR 1100 1100        Pulm:  # Acute hypoxic respiratory failure  Secondary to pulmonary edema bilateral pleural effusions. Improving with diuresis. No fever or leukocytosis to raise concern for pneumonia.   - Oximask for now  - Continue diuresis as above      Renal/Electrolytes   # Hyperkalemia  # Acute kidney injury on chronic kidney disease, stage III  Chronic kidney disease likely driven by her cardiac dysfunction. Acute worsening secondary to her cardiogenic shock. Given insulin and dextrose x2 and started on diuresis with improvement in potassium. No EKG changes.   -Trend potassium every 12 hours     GI: NTD     ID: NTD    Hem/Onc  # Coagulopathy  INR and Xa elevated d/t DOAC. PTT wnl. Will continue heparin GGT.   - Heparin ggt, high dose     Endo  # Hypothyroidism   - Continue PTA levothyroxine     DVT Prophylaxis: Heparin ggt  Code Status: Full Code    Miguelito Muir MD  Internal Medicine, PGY2  ASCOM 60527, [id number]    Interval History   Converted into afib with rates in the 110s to 140s overnight. NAEON. Patient complains of low back pain. Denies chest pain. States breathing is improving. No abdominal pain, N/V. Feels tired. No other concerns at this time.     Physical Exam   Temp: 98.1  F (36.7  C) Temp src: Axillary BP: 98/58 Pulse: 72 Heart Rate: 128 Resp: 18 SpO2: 97 % O2 Device: Oxymask Oxygen Delivery: 8 LPM  Vitals:    08/09/20 0600   Weight: 68.5 kg (151 lb 0.2 oz)     Vital Signs with Ranges  Temp:  [97.4  F (36.3  C)-98.8  F (37.1  C)] 98.8  F (37.1  C)  Pulse:  [61-81] 72  Heart Rate:  [] 128  Resp:  [12-40] 18  BP: ()/(25-81) 98/58  SpO2:  [70 %-100 %] 97 %  I/O last 3 completed shifts:  In: 719.98 [P.O.:125; I.V.:594.98]  Out: 2525 [Urine:2525]    Heart Rate: 128, Blood pressure 98/58, pulse 72, temperature 98.1  F (36.7  C), temperature source Axillary, resp. rate 18, weight 68.5 kg (151 lb 0.2 oz), SpO2 97  %.  151 lbs .24 oz     GEN: Alert, oriented x 3, appears comfortable, NAD.  CV: Tachy, regular, no murmur or JVD.   LUNGS: Clear to auscultation bilaterally.  ABD: Active bowel sounds, soft, non-tender/non-distended. No rebound/guarding/rigidity.  EXT: 2+ LE edema extending to just below the tibial tuberosity.      Medications     - MEDICATION INSTRUCTIONS -       bumetanide 1 mg/hr (08/09/20 0700)     dextrose 10% Stopped (08/09/20 0400)     DOBUTamine 5 mcg/kg/min (08/09/20 0700)     [Held by provider] HEParin 1,050 Units/hr (08/09/20 0700)     norepinephrine 0.08 mcg/kg/min (08/09/20 0700)     BETA BLOCKER NOT PRESCRIBED         aspirin  81 mg Oral Daily     bimatoprost  1 drop Both Eyes At Bedtime     busPIRone  10 mg Oral TID     clopidogrel  75 mg Oral Daily     dorzolamide-timolol  1 drop Both Eyes BID     ferrous sulfate  325 mg Oral BID     latanoprost  1 drop Both Eyes Daily     levothyroxine  62.5 mcg Oral QAM AC     omeprazole  20 mg Oral Daily     QUEtiapine  50 mg Oral At Bedtime     rosuvastatin  20 mg Oral Daily       Data   Recent Labs   Lab 08/09/20  0401 08/09/20  0152 08/08/20  2126 08/08/20  1459  08/08/20  1324 08/08/20  1248 08/08/20  0808   WBC 8.1  --  8.4 9.1  --   --   --   --    HGB 10.9*  --  11.2* 12.5  --   --   --   --    MCV 88  --  90 88  --   --   --   --      --  287 284  --   --   --   --    INR 3.88*  --   --  2.91*  --   --   --   --      --  133  --   --  131*  --   --    POTASSIUM 4.7 5.1 6.2* 6.8*  --  7.4*  --   --    CHLORIDE 104  --  105  --   --  107  --   --    CO2 19*  --  20  --   --  16*  --   --    BUN 52*  --  55*  --   --  58*  --   --    CR 1.45*  --  1.58*  --   --  1.60*  --   --    ANIONGAP 11  --  8  --   --  8  --   --    FERNANDO 9.1  --  9.1  --   --  8.7  --   --    *  --  74  --   --  89  --   --    ALBUMIN 2.4*  --  2.3* 2.8*   < >  --   --   --    PROTTOTAL 6.4*  --  6.4* 7.2   < >  --   --   --    BILITOTAL 0.9  --  0.7 0.8   < >  --    --   --    ALKPHOS 215*  --  208* 191*   < >  --   --   --    ALT 42  --  40 42   < >  --   --   --    AST 50*  --  47* 60*   < >  --   --   --    TROPI  --   --  0.028  --   --   --  0.018 0.026    < > = values in this interval not displayed.       Recent Results (from the past 24 hour(s))   Echo Limited    Narrative    [id number]  [id number]  [id number]  [id number]^AYUSH^ALY^DARIA           [facility]  Echocardiography Laboratory  [address] [place] [id number]        Name: EDILSON SMITH  MRN: [id number]  : 1945  Study Date: 2020 03:08 PM  Age: 75 yrs  Gender: Female  Patient [facility]: [facility]  Reason For Study: Shock  Ordering Physician: ALY PEACOCK  Referring Physician: CAMILO STAUFFER  Performed By: Cj Johnson     BSA: 1.8 m2  Height: 68 in  Weight: 151 lb  HR: 90  BP: 79/53 mmHg  _____________________________________________________________________________  __        Procedure  Limited Portable Echo Adult.  _____________________________________________________________________________  __        Interpretation Summary     Limited echo in the ICU for hypotension.  The left ventricle is severely dilated. There is severe eccentric left  ventricular hypertrophy. The visual ejection fraction is estimated at 10%.  Large anterior akinesis with a large layered LV thrombus as previously noted.  Pleural effusion. Correlate with alternative imaging.  There is severe (4+) tricuspid regurgitation.  Pulmonary hypertension 40 plus RA.  Mildly decreased right ventricular systolic function. The right ventricle is  normal size. There is a pacemaker lead in the right ventricle.  There is annular dilatation and tenting of the MV leaflets. There is moderate  to severe MR.  IVC diameter >2.1 cm collapsing <50% with sniff suggests a high RA pressure  estimated at 15 mmHg or greater.     Signs of very low LV output. Critical  findings.  _____________________________________________________________________________  __        Left Ventricle  The left ventricle is severely dilated. There is severe eccentric left  ventricular hypertrophy. The visual ejection fraction is estimated at 10%.  There is anterior, septal, and apical wall akinesis. There is severe global  hypokinesia of the left ventricle.     Right Ventricle  The right ventricle is normal size. Mildly decreased right ventricular  systolic function. There is a pacemaker lead in the right ventricle.     Mitral Valve  There is annular dilatation and tenting of the MV leaflets. There is moderate  to severe MR.     Tricuspid Valve  There is severe (4+) tricuspid regurgitation. The right ventricular systolic  pressure is elevated at 39.7 mmHg. Pulmonary hypertension.        Aortic Valve  Normal tricuspid aortic valve. No aortic regurgitation is present. No aortic  stenosis is present.     Pulmonic Valve  The pulmonic valve is not well visualized.     Vessels  The aortic root is normal size. IVC diameter >2.1 cm collapsing <50% with  sniff suggests a high RA pressure estimated at 15 mmHg or greater.     Pericardium  There is no pericardial effusion.     _____________________________________________________________________________  __  MMode/2D Measurements & Calculations  IVSd: 3.7 cm  LVIDd: 6.6 cm  LVPWd: 0.59 cm  LV mass(C)d: 837.5 grams  LV mass(C)dI: 461.7 grams/m2  asc Aorta Diam: 3.5 cm  LVOT diam: 2.0 cm  LVOT area: 3.2 cm2  RWT: 0.18           Doppler Measurements & Calculations  Ao V2 max: 89.2 cm/sec  Ao max PG: 3.0 mmHg  Ao V2 mean: 63.8 cm/sec  Ao mean P.8 mmHg  Ao V2 VTI: 15.5 cm  ANDREA(I,D): 1.7 cm2  ANDREA(V,D): 2.0 cm2  LV V1 max P.3 mmHg  LV V1 max: 56.3 cm/sec  LV V1 VTI: 8.3 cm  CO(LVOT): 2.0 l/min  CI(LVOT): 1.1 l/min/m2  SV(LVOT): 26.6 ml  SI(LVOT): 14.6 ml/m2  TR max ángel: 314.9 cm/sec  TR max P.7 mmHg  AV Ángel Ratio (DI): 0.63  ANDREA Index (cm2/m2): 0.95            _____________________________________________________________________________  __           Report approved by: Imani Figueredo 08/08/2020 04:43 PM      XR Chest Port 1 View    Narrative    EXAM: XR CHEST PORT 1 VW  8/8/2020 10:56 PM      HISTORY: [place] placement    COMPARISON: 8/7/2020    TECHNIQUE: AP chest radiograph    FINDINGS:   Postsurgical chest. Left ICD. Right internal jugular [place]-Zia  catheter tip projects over the right pulmonary artery.  Cardiomediastinal silhouette is enlarged, stable. Increased pleural  effusions with overlying pulmonary opacities. Increased intersitial  and scattered airspace opacities. No pneumothorax. No acute abdominal  pathology.      Impression    IMPRESSION:  1.  Right internal jugular [place]-Zia catheter tip projects over the  right pulmonary artery.  2.  Increased diffuse mixed pulmonary opacities, consistent with  pulmonary interstitial edema versus atelectasis, less likely  infection.  3.  Increased pleural effusions with overlying atelectasis versus  infectious consolidation.    I have personally reviewed the examination and initial interpretation  and I agree with the findings.    CRYSTAL BLANKENSHIP MD

## 2020-08-10 NOTE — PROGRESS NOTES
"CLINICAL NUTRITION SERVICES - BRIEF NOTE    Received provider consult with comments \"Heart Failure pre-Ventricular Assist Device (VAD) planning, Frailty assessment\". See 8/9 note for full nutrition assessment. Will provide pre-VAD diet education as able.    Rosemary Wyatt RD, LD  [id number]  Pgr: [phone]           "

## 2020-08-10 NOTE — PLAN OF CARE
OT 4E: Cancel. Attempt x 3 but unable to connect with pt for therapy today. Initially at CT, then on the phone, and lastly with another provider. Will reschedule for tomorrow.

## 2020-08-10 NOTE — PROGRESS NOTES
CRS brief note    Touched base with primary team via [facility] [id number].  Colon and Rectal Surgery team consulted in error.  They will contact us if there are questions/concerns.      Lisset Woodward PA-C  Colon and Rectal Surgery   [phone]

## 2020-08-10 NOTE — PLAN OF CARE
Discharge Planner OT   Patient plan for discharge: Pt would like to be able to return home.   Current status: Evaluation completed and treatment initiated. Therapist educated pt on OT role and discussed POC/goals for therapy. Pt supine inclined in bed upon arrival Pt required Min A and vc's for transfer supine<->seated EOB and sit<->standing pivot transfer to bedside chair. Pt completed self cares with setup, vc's and Min A. Pt tolerated this activity well. VSS. RN present for mobility with swan line.   Barriers to return to prior living situation: Decreased strength and endurance, Medical status, Decreased independence with functional transfers and ADLs.   Recommendations for discharge: Pending medical course.   Rationale for recommendations: Pt will benefit from continued therapy while IP to maximize functional independence, strength and endurance.        Entered by: Tre Lockett 08/09/2020 9:41 PM

## 2020-08-10 NOTE — PLAN OF CARE
ICU End of Shift Summary. See flowsheets for vital signs and detailed assessment.    Changes this shift: A&O x4. Has been pretty lethargic, pt got PRN oxy dose x1 last night. Has been more awake towards the end of the day. On 4L via oxy plus, bubler in place d/t dry nose and bleeding. Went into flutter this evening, amio loading dose administered with no change in rate, HR btwn 97-137bpm, amnio drip started. PA:32/10,30-11, CVP:3,5, SVO2:66,71. K replaced per orders.     Plan: LVAD workup started. Hep @800. Will continue to monitor and update team with changes.

## 2020-08-10 NOTE — PROGRESS NOTES
[facility]   Patient is currently open to home care services with [facility]. The patient is currently receiving RN PT OT services. [facility]  and team have been notified of patient admission. [facility] liaison will continue to follow patient during stay. If appropriate provide orders to resume home care at time of discharge.    Hilary Ivan RN  [facility] Liaison  [phone]

## 2020-08-10 NOTE — PLAN OF CARE
Major Shift Events:  slept well during the night . One time oxycodone given for back pain with good relief. BP labile on Levo gtt titrated to keep MAP > 65 . On 4 L oxi-plus . Tele SR//ST  . Hemodynamics with CVP 8-10 . PA 36//24. 34/22  . CI 2.8.>> 2.4 . SVO2 67%>> 61%  . On dobutamine 3 mcg/kg/min . On Hep att 950 units/hr . Potassium 3.3 replaced per protocol . No BM since 8/6 .   Plan: Recheck potassium at 0900, PTT at noon . Plan for ECHO and calorie count today .  For vital signs and complete assessments, please see documentation flowsheets.      Problem: Arrhythmia/Dysrhythmia (Heart Failure)  Goal: Stable Heart Rate and Rhythm  Outcome: No Change     Problem: Cardiac Output Decreased (Heart Failure)  Goal: Optimal Cardiac Output  Outcome: No Change     Problem: Fluid Imbalance (Heart Failure)  Goal: Fluid Balance  Outcome: No Change     Problem: Functional Ability Impaired (Heart Failure)  Goal: Optimal Functional Ability  Outcome: No Change     Problem: Oral Intake Inadequate (Heart Failure)  Goal: Optimal Nutrition Intake  Outcome: No Change     Problem: Respiratory Compromise (Heart Failure)  Goal: Effective Oxygenation and Ventilation  Outcome: No Change

## 2020-08-10 NOTE — CONSULTS
CARDIOTHORACIC SURGERY CONSULT NOTE  8/10/2020      Reason for Consult: VAD Assessment      ASSESSMENT/PLAN: Patient is a 75 year old female with a history of coronary artery disease s/p CABG 4/2020 (LIMA to LAD, KURT to RCA) and PCI to RCA in 7/2020, ischemic cardiomyopathy (EF 15-20%), severe MR/TR, large LV apical thrombus, CKD stage III who transferred from [facility] for further evaluation and treatment of cardiogenic shock. Patient currently being worked up for LVAD/advanced.    - Agree with work-up for LVAD/advanced therapies, will discuss at weekly conference.   - Continue therapeutic anticoagulation for the LV thrombus  - Will need a repeat CT chest without contrast  - Other cares per primary team  - Thank you for the opportunity to participate in the care of this patient.    Patient and plan discussed with attending, Dr. Kip Chisholm.      Stuart Noonan PA-C  Cardiothoracic Surgery  August 10, 2020 11:51 AM   p: [phone]       ________________________________________________________________________________________________    HPI: Patient is a 75 year old female with a history of coronary artery disease s/p CABG 4/2020 (LIMA to LAD, KURT to RCA) and PCI to RCA in 7/2020, ischemic cardiomyopathy (EF 15-20%), severe MR/TR, large LV apical thrombus, CKD stage III who transferred from [facility] for further evaluation and treatment of cardiogenic shock.     Patient has had two hospitalizations for low output since returning to [place]. She has a viability study that shows no viability in her LAD territory. She is currently being medically managed with NE, dobutamine and bumex gtt and being worked up for LVAD/advanced.     Patient reports improvement in her symptoms, but still has SOB and fatigue.     PMH:  Past Medical History:   Diagnosis Date     CAD (coronary artery disease)      ICD (implantable cardioverter-defibrillator) in place     Primary prevention AICD placed in [place] in May 2020.     Ischemic  cardiomyopathy     LVEF less than 20%.     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     Status post coronary artery bypass grafting     Done in [facility] in April 2020.  LIMA to diagonal (as LAD dissected) and KURT to the right coronary artery.       PSH:  Past Surgical History:   Procedure Laterality Date     ARTHROPLASTY REVISION HIP Right 11/16/2017    Procedure: ARTHROPLASTY REVISION HIP;  Right total hip arthroplasty revision.;  Surgeon: Jozef Garcia MD;  Location: [facility] OR     cabg  04/2020     CV HEART CATHETERIZATION WITH POSSIBLE INTERVENTION N/A 7/2/2020    Procedure: Heart Catheterization with Possible Intervention;  Surgeon: Kaden Franco MD;  Location:  HEART CARDIAC CATH LAB     CV PCI STENT DRUG ELUTING N/A 7/2/2020    Procedure: Percutaneous Coronary Intervention Stent Drug Eluting;  Surgeon: Kaden Franco MD;  Location:  HEART CARDIAC CATH LAB     CV RIGHT HEART CATH N/A 7/2/2020    Procedure: Right Heart Cath;  Surgeon: Kaden Franco MD;  Location:  HEART CARDIAC CATH LAB       FH:  Family History   Problem Relation Age of Onset     Coronary Artery Disease No family hx of      Heart Failure No family hx of        SH:  Social History     Socioeconomic History     Marital status:      Spouse name: Not on file     Number of children: Not on file     Years of education: Not on file     Highest education level: Not on file   Occupational History     Occupation: [employer].   Social Needs     Financial resource strain: Not on file     Food insecurity     Worry: Not on file     Inability: Not on file     Transportation needs     Medical: Not on file     Non-medical: Not on file   Tobacco Use     Smoking status: Never Smoker     Smokeless tobacco: Never Used   Substance and Sexual Activity     Alcohol use: Not Currently     Drug use: Never     Sexual activity: Not on file   Lifestyle     Physical  activity     Days per week: Not on file     Minutes per session: Not on file     Stress: Not on file   Relationships     Social connections     Talks on phone: Not on file     Gets together: Not on file     Attends [employer] service: Not on file     Active member of club or organization: Not on file     Attends meetings of clubs or organizations: Not on file     Relationship status: Not on file     Intimate partner violence     Fear of current or ex partner: Not on file     Emotionally abused: Not on file     Physically abused: Not on file     Forced sexual activity: Not on file   Other Topics Concern     Not on file   Social History Narrative     Not on file       Home Meds:  Medications Prior to Admission   Medication Sig Dispense Refill Last Dose     acetaminophen (TYLENOL) 325 MG tablet Take 2 tablets (650 mg) by mouth every 6 hours as needed for mild pain 100 tablet 0      apixaban ANTICOAGULANT (ELIQUIS) 5 MG tablet Take 1 tablet (5 mg) by mouth 2 times daily First dose to be on 7/19/20 PM dose. 180 tablet 3      bimatoprost (LUMIGAN) 0.01 % SOLN Place 1 drop into both eyes At Bedtime         busPIRone (BUSPAR) 10 MG tablet Take 1 tablet (10 mg) by mouth 3 times daily 90 tablet 0      clopidogrel (PLAVIX) 75 MG tablet Take 1 tablet (75 mg) by mouth daily 30 tablet 11      co-enzyme Q-10 100 MG CAPS capsule Take 100 mg by mouth 2 times daily         dorzolamide-timolol (COSOPT) 2-0.5 % ophthalmic solution Place 1 drop into both eyes 2 times daily         ferrous sulfate (FEROSUL) 325 (65 Fe) MG tablet Take 1 tablet (325 mg) by mouth 2 times daily 60 tablet 0      latanoprost (XALATAN) 0.005 % ophthalmic solution Place 1 drop into both eyes daily In the morning.        levothyroxine (SYNTHROID/LEVOTHROID) 125 MCG tablet Take 0.5 tablets (62.5 mcg) by mouth every morning (before breakfast) 15 tablet 0      Multiple Vitamins-Minerals (PRESERVISION AREDS 2 PO) Take 1 tablet by mouth 2 times daily         nitroGLYcerin (NITROSTAT) 0.4 MG sublingual tablet For chest pain place 1 tablet under the tongue every 5 minutes for 3 doses. If symptoms persist 5 minutes after 1st dose call 911. 20 tablet 0      norepinephrine (LEVOPHED) 16-0.9 MG/250ML-% SOLN Inject 2.061-27.48 mcg/min into the vein continuous        omeprazole (PRILOSEC) 20 MG DR capsule Take 20 mg by mouth daily         QUEtiapine (SEROQUEL) 25 MG tablet Take 50 mg by mouth At Bedtime        rosuvastatin (CRESTOR) 20 MG tablet Take 20 mg by mouth daily        sacubitril-valsartan (ENTRESTO) 24-26 MG per tablet 1/2 tablet in PM daily 60 tablet 1        Allergies:  Allergies   Allergen Reactions     Combigan [Brimonidine Tartrate-Timolol] Swelling     Swollen eyelids     Ativan [Lorazepam] Anxiety and Other (See Comments)     Increased confusion       ROS: 12 point ROS negative other than noted in HPI.    Physical Exam:  Temp:  [97.3  F (36.3  C)-97.7  F (36.5  C)] 97.7  F (36.5  C)  Heart Rate:  [] 78  Resp:  [15-20] 18  BP: ()/(44-76) 75/52  SpO2:  [87 %-99 %] 91 %  Gen: NAD, resting comfortably in bed, conversational  HEENT: normocephalic, atraumatic cranium, EOMI, sclerae anicteric. Oral mucosa pink and moist, no tonsillar edema or erythema, midline trachea, nonpalpable thyroid  Lungs: Lungs diminished in bases, otherwise CTA, no wheezing or rhonchi  CV: Irregular rhythm S1S2 normal, no murmur. Radial pulses and DP pulses symmetric. 1+ dependent edema.   Abd: Positive normal pitched bowel sounds, overall soft and non distended, nontender, no hepatosplenomegaly, no masses/guarding/rebound tenderness.   Musculoskeletal: grossly intact, strength 5/5 upper and lower extremities  Neuro: AOx3, CN II-VII grossly intact, sensation/motor intact in upper and lower extremities  Mental: normal mood and affect, regular rate of speech    Labs:  ABG   Recent Labs   Lab 08/08/20  1515   PH 7.40   PCO2 21*   PO2 95   HCO3 13*     CBC  Recent Labs   Lab  08/10/20  0403 08/09/20  1328 08/09/20  0401 08/08/20 2126   WBC 6.6 8.0 8.1 8.4   HGB 11.3* 11.2* 10.9* 11.2*    267 263 287     BMP  Recent Labs   Lab 08/10/20  0851 08/10/20  0403 08/09/20  1328 08/09/20  0401 08/08/20 2126   NA  --  137 135 133  --  133   POTASSIUM 3.6 3.3* 4.2 4.7   < > 6.2*   CHLORIDE  --  102 104 104  --  105   CO2  --  26 22 19*  --  20   BUN  --  45* 50* 52*  --  55*   CR  --  1.30* 1.47* 1.45*  --  1.58*   GLC  --  103* 111* 132*  --  74    < > = values in this interval not displayed.     LFT  Recent Labs   Lab 08/10/20  0403 08/09/20  1328 08/09/20  0401 08/08/20 2126 08/08/20  1459   AST 34 44 50* 47* 60*   ALT 37 41 42 40 42   ALKPHOS 235* 219* 215* 208* 191*   BILITOTAL 0.8 1.3 0.9 0.7 0.8   ALBUMIN 2.3* 2.4* 2.4* 2.3* 2.8*   INR 2.26*  --  3.88*  --  2.91*     PancreasNo lab results found in last 7 days.    Imaging:  Recent Results (from the past 24 hour(s))   XR Chest Port 1 View    Narrative    XR chest portable 1 view on 8/10/2020 2:21 AM.    INDICATION: confirm swan .    COMPARISON: Radiograph dated 8/8/2020    FINDINGS:   Portable AP semiupright radiograph of the chest. Median sternotomy  wires are intact. Mediastinal surgical clips. Right IJ Woodland-Zia  catheter tip projects over the right pulmonary artery. Left chest ICD  with lead.    Trachea is clear. Cardiac silhouette is obscured. No pneumothorax.  Bilateral moderate pleural effusions are increased. Diffuse  interstitial opacities. Degenerative changes of the spine.      Impression    IMPRESSION:   1. Right IJ Woodland-Zia catheter tip projects over the right pulmonary  artery.  2. Increased bilateral moderate pleural effusions.  3. Diffuse interstitial edema.    I have personally reviewed the examination and initial interpretation  and I agree with the findings.    CRYSTAL BLANKENSHIP MD   Echocardiogram Limited    Narrative    [id number]  [id number]  [id number]  [id number]^BRITTANY^ELDER           [facility]  Staples,Ovando  Echocardiography Laboratory  [address] [place] [id number]     Name: EDILSON SMITH  MRN: [id number]  : 1945  Study Date: 08/10/2020 07:37 AM  Age: 75 yrs  Gender: Female  Patient Location: [facility]  Reason For Study: Heart Failure - Left  Ordering Physician: ELDER SOTO  Referring Physician: IVY CHRISTENSEN  Performed By: Luisito Solomon     BSA: 1.8 m2  Height: 68 in  Weight: 143 lb  HR: 82  BP: 83/50 mmHg  _____________________________________________________________________________  __        Procedure  Limited Portable Echo Adult.  _____________________________________________________________________________  __        Interpretation Summary  Severe left ventricular dilation is present.  The Ejection Fraction is estimated at 15-20%.  Akinetic apical segments with very large LV apical thrombus as reported  before.  Right ventricular function, chamber size, wall motion, and thickness are  normal.  The inferior vena cava is normal.  Trivial pericardial effusion is present.  A left pleural effusion is present.     _____________________________________________________________________________  __        Left Ventricle  Left ventricular wall thickness is normal. Severe left ventricular dilation is  present. The Ejection Fraction is estimated at 15-20%. Left ventricular  diastolic function is not assessable. Akinetic apical segments with very large  LV apical thrombus as reported before.     Right Ventricle  Right ventricular function, chamber size, wall motion, and thickness are  normal. A pacemaker lead is noted in the right ventricle.     Atria  Severe biatrial enlargement is present.     Mitral Valve  The mitral valve cannot be assessed.        Aortic Valve  The aortic valve cannot be assessed.     Tricuspid Valve  The tricuspid valve cannot be assessed.     Pulmonic Valve  The pulmonic valve cannot be assessed.     Vessels  The pulmonary artery and bifurcation cannot be  assessed. The inferior vena  cava is normal. Ascending aorta 3.7 cm.     Pericardium  Trivial pericardial effusion is present.        Miscellaneous  A left pleural effusion is present.  _____________________________________________________________________________  __  MMode/2D Measurements & Calculations  asc Aorta Diam: 3.7 cm     LA Volume Index (BP): 71.4 ml/m2           _____________________________________________________________________________  __           Report approved by: Imani Landry 08/10/2020 09:47 AM

## 2020-08-10 NOTE — PROGRESS NOTES
08/09/20 0900   Quick Adds   Type of Visit Initial Occupational Therapy Evaluation   Living Environment   Lives With spouse   Living Arrangements house   Home Accessibility stairs to enter home   Number of Stairs, Main Entrance 5   Stair Railings, Main Entrance railings on both sides of stairs   Transportation Anticipated family or friend will provide   Self-Care   Usual Activity Tolerance moderate   Current Activity Tolerance fair   Regular Exercise Yes   Activity/Exercise Type walking;other (see comments)  (PT 2x week)   Exercise Amount/Frequency 2 times/wk   Equipment Currently Used at Home grab bar, toilet;grab bar, tub/shower;tub bench;walker, standard   Functional Level   Ambulation 1-->assistive equipment   Transferring 1-->assistive equipment   Toileting 1-->assistive equipment   Bathing 3-->assistive equipment and person   Dressing 1-->assistive equipment   Eating 0-->independent   Communication 0-->understands/communicates without difficulty   Swallowing 0-->swallows foods/liquids without difficulty   Cognition 0 - no cognition issues reported   Fall history within last six months no   General Information   Onset of Illness/Injury or Date of Surgery - Date 08/08/20   Referring Physician Quinton Stephens MD    Patient/Family Goals Statement Pt would like to return home.    Additional Occupational Profile Info/Pertinent History of Current Problem The patient is a 75-year-old female with a past medical history notable for coronary artery disease status post CABG in April 2020, ischemic cardiomyopathy s/p ICD, and known mural thrombus who is presenting as a transfer from [facility] with cardiogenic shock.   Precautions/Limitations fall precautions   Weight-Bearing Status - LUE full weight-bearing   Weight-Bearing Status - RUE full weight-bearing   Weight-Bearing Status - LLE full weight-bearing   Weight-Bearing Status - RLE full weight-bearing   Cognitive Status Examination   Orientation orientation to  "person, place and time   Level of Consciousness alert   Follows Commands (Cognition) WFL   Visual Perception   Visual Perception No deficits were identified;Wears glasses   Sensory Examination   Sensory Quick Adds No deficits were identified   Pain Assessment   Patient Currently in Pain No   Range of Motion (ROM)   ROM Comment BUE AROM WFL.    Strength   Strength Comments BUE strength grossly 4-/5 MMT. Pt demonstrates a mild deficit in  strength.    Mobility   Bed Mobility Comments Min A and vc's.    Transfer Skills   Transfer Comments Min A and vc's.    Activities of Daily Living Analysis   Impairments Contributing to Impaired Activities of Daily Living balance impaired;fear and anxiety;flexibility decreased;strength decreased   General Therapy Interventions   Planned Therapy Interventions ADL retraining;IADL retraining;bed mobility training;ROM;strengthening;stretching;transfer training;home program guidelines;progressive activity/exercise   Clinical Impression   Criteria for Skilled Therapeutic Interventions Met yes, treatment indicated   OT Diagnosis Decreased independence with functional transfers/ADLS.    Influenced by the following impairments Decreased activity tolerance, Decreased functional mobility.    Assessment of Occupational Performance 1-3 Performance Deficits   Identified Performance Deficits Decreased indepenence with functional transfers and ADLs.    Clinical Decision Making (Complexity) Low complexity   Therapy Frequency 5x/week   Predicted Duration of Therapy Intervention (days/wks) 8/23/2020   Anticipated Discharge Disposition Home with Assist;Home with Outpatient Therapy   Risks and Benefits of Treatment have been explained. Yes   Patient, Family & other staff in agreement with plan of care Yes   [facility] AM-PAC TM \"6 Clicks\"   2016, Trustees of [facility], under license to [employer].  All rights reserved.   6 Clicks Short Forms Daily Activity Inpatient Short Form " "  [facility] AM-PAC  \"6 Clicks\" Daily Activity Inpatient Short Form   1. Putting on and taking off regular lower body clothing? 3 - A Little   2. Bathing (including washing, rinsing, drying)? 3 - A Little   3. Toileting, which includes using toilet, bedpan or urinal? 1 - Total   4. Putting on and taking off regular upper body clothing? 3 - A Little   5. Taking care of personal grooming such as brushing teeth? 4 - None   6. Eating meals? 4 - None   Daily Activity Raw Score (Score out of 24.Lower scores equate to lower levels of function) 18   Total Evaluation Time   Total Evaluation Time (Minutes) 10     "

## 2020-08-10 NOTE — PROGRESS NOTES
Cardiology Progress Note    Assessment & Plan   In summary, Marquise Jj is a 75-year-old female with a past medical history notable for coronary artery disease, ischemic cardiomyopathy, and known mural thrombus who was transferred from [facility] for further evaluation/treatment of cardiogenic shock.    Today:  - Wean NE to MAP of >65  - Continue dobutamine to 3mcg/kg/hr  - Start torsemide 10mg daily, adjust based of hemodynamics  - Continue digoxin 0.125mcg daily  - Initiate pre-LVAD workup    Neuro:   # Sedation:   - NTD    # Pain:  - Lidocaine patch  - Tylenol 325 q6hrs prn   - Buspirone 10mg tid sched    Cardio:  # Acute on chronic decompensated heart failure with reduced ejection fraction: NYHA IV / ACC D / INTERMACS 2-3  # ICM d/t CAD s/p CABG 4/2020 (KURT to RCA and LIMA to LAD) and PCI to RCA 7/20  # Mural thrombus  # Severe MR/TR   Presents with cardiogenic shock. On NE, cardiac index approximately 1.37 on admission. Severely elevated biventricular filling pressures. Etiology of her decompensation appears to be progression of her cardiomyopathy. Despite medical treatment, she has been hospitalized twice since returning to [place], both with low output. No viability in her LAD territory, and doubt that PCI to her circumflex would make meaningful LV recovery. Hemodynamics improved with dobutamine, NE, and aggressive diuresis. Insurance authorization approved for LVAD workup, initiate today. Continued dobutamine 3mcg/kg/hr and NE with MAP >60. Start home torsemide 10mg daily, adjust based of hemodynamics.   - Wean norepinephrine to MAP >60  - Torsemide 10mg daily, adjust based of hemodynamics   - Continue dobutamine 3mcg/kg/hr   - Continue heparin ggt, high dose   - Continue digoxin 125mcg  - Continue plavix 75mg po qd   - Continue to hold entresto   - Hemodynamics q6hrs   - Consider LVAD   - Continue home crestor  - Initiate pre-LVAD evaluation     SGz  - CXR: positioned in PA  - 57  cm    Date 8/9 8/9 8/10     CVP 12 9 3     Mean PA  35 30 21     PCWP  18 14     Oxy HGB  59 64 61     CI/CO 2.3 2.3 2.4/4.2     SVR 1100 1100 1200       Pulm:  # Acute hypoxic respiratory failure  Secondary to pulmonary edema bilateral pleural effusions. Improving with diuresis. No fever or leukocytosis to raise concern for pneumonia.   - Oximask for now  - Continue diuresis as above      Renal/Electrolytes   # Hyperkalemia/Hypokalemia   # Acute kidney injury on chronic kidney disease, stage III  Chronic kidney disease likely driven by her cardiac dysfunction. No EKG changes. Acute worsening secondary to her cardiogenic shock. Given insulin and dextrose x2 and started on diuresis with improvement in potassium.   -Trend potassium every 12 hours  - Electrolyte replacement protocol     GI: NTD     ID: NTD    Hem/Onc  # Coagulopathy  INR and Xa elevated d/t DOAC. PTT wnl. Will continue heparin GGT.   - Heparin ggt, high dose     Endo  # Hypothyroidism   - Continue PTA levothyroxine     DVT Prophylaxis: Heparin ggt  Code Status: Full Code    Miguelito Muir MD  Internal Medicine, PGY2  ASCOM [id number], [id number]    Interval History   HR controlled with dig. Patient received oxycodone for back pain early this morning. NAEON. Nursing notes reviewed. On initial assessment today, patient appeared more lethargic. Denied pain. No chest pain. Shortness of breath unchanged. Further history limited d/t lethargy. On reassessment, patient answering questions more clearly and seems more alert. States oxycodone made her really sleepy. No focal weakness, numbness, vision changes. No other concerns at this time.     Physical Exam   Temp: 97.7  F (36.5  C) Temp src: Oral BP: (!) 83/50   Heart Rate: 76 Resp: 18 SpO2: 96 % O2 Device: Oxi Plus Oxygen Delivery: 4 LPM  Vitals:    08/09/20 0600 08/10/20 0400   Weight: 68.5 kg (151 lb 0.2 oz) 65.2 kg (143 lb 11.8 oz)     Vital Signs with Ranges  Temp:  [97.3  F (36.3  C)-97.7  F (36.5  C)] 97.7  F  (36.5  C)  Heart Rate:  [] 76  Resp:  [15-20] 18  BP: ()/(44-76) 83/50  SpO2:  [85 %-99 %] 96 %  I/O last 3 completed shifts:  In: 1312.8 [P.O.:725; I.V.:587.8]  Out: 4050 [Urine:4050]    Heart Rate: 76, Blood pressure (!) 83/50, pulse 72, temperature 97.7  F (36.5  C), temperature source Oral, resp. rate 18, weight 65.2 kg (143 lb 11.8 oz), SpO2 96 %.  143 lbs 11.84 oz     Drips:  - Dobutamine 5  - NE 0.06  - Heparin    GEN: Lethargic, appears tired, will answer some questions, more awake and answering questions clearly on reassessment    CV: Normal rate, irregular rhythm, no murmur or JVD   LUNGS: Clear to auscultation bilaterally  ABD: Active bowel sounds, soft, mild periumbilical tenderness that resolved on reexamination. No rebound/guarding/rigidity.  EXT: 1+ LE edema extending 1/3 up leg.     NEURO: AOx3, CNII-XII intact, sensation/strength intact bilat upper and lower extremities     Medications     - MEDICATION INSTRUCTIONS -       DOBUTamine 3 mcg/kg/min (08/10/20 0700)     HEParin 950 Units/hr (08/10/20 0700)     norepinephrine 0.06 mcg/kg/min (08/10/20 0700)     BETA BLOCKER NOT PRESCRIBED         aspirin  81 mg Oral Daily     bimatoprost  1 drop Both Eyes At Bedtime     busPIRone  10 mg Oral TID     clopidogrel  75 mg Oral Daily     digoxin  125 mcg Oral Daily     dorzolamide-timolol  1 drop Both Eyes BID     ferrous sulfate  325 mg Oral BID     latanoprost  1 drop Both Eyes Daily     levothyroxine  62.5 mcg Oral QAM AC     lidocaine  1 patch Transdermal Q24H     lidocaine   Transdermal Q8H     omeprazole  20 mg Oral Daily     QUEtiapine  50 mg Oral At Bedtime     rosuvastatin  20 mg Oral Daily       Data   Recent Labs   Lab 08/10/20  0403 08/09/20  1328 08/09/20  0401  08/08/20  2126 08/08/20  1459  08/08/20  1248 08/08/20  0808   WBC 6.6 8.0 8.1  --  8.4 9.1  --   --   --    HGB 11.3* 11.2* 10.9*  --  11.2* 12.5  --   --   --    MCV 86 87 88  --  90 88  --   --   --     267 263  --   287 284  --   --   --    INR 2.26*  --  3.88*  --   --  2.91*  --   --   --     135 133  --  133  --    < >  --   --    POTASSIUM 3.3* 4.2 4.7   < > 6.2* 6.8*   < >  --   --    CHLORIDE 102 104 104  --  105  --    < >  --   --    CO2 26 22 19*  --  20  --    < >  --   --    BUN 45* 50* 52*  --  55*  --    < >  --   --    CR 1.30* 1.47* 1.45*  --  1.58*  --    < >  --   --    ANIONGAP 10 8 11  --  8  --    < >  --   --    FERNANDO 8.4* 8.4* 9.1  --  9.1  --    < >  --   --    * 111* 132*  --  74  --    < >  --   --    ALBUMIN 2.3* 2.4* 2.4*  --  2.3* 2.8*   < >  --   --    PROTTOTAL 6.2* 6.4* 6.4*  --  6.4* 7.2   < >  --   --    BILITOTAL 0.8 1.3 0.9  --  0.7 0.8   < >  --   --    ALKPHOS 235* 219* 215*  --  208* 191*   < >  --   --    ALT 37 41 42  --  40 42   < >  --   --    AST 34 44 50*  --  47* 60*   < >  --   --    TROPI  --   --   --   --  0.028  --   --  0.018 0.026    < > = values in this interval not displayed.       Recent Results (from the past 24 hour(s))   XR Chest Port 1 View    Narrative    XR chest portable 1 view on 8/10/2020 2:21 AM.    INDICATION: confirm swan .    COMPARISON: Radiograph dated 8/8/2020    FINDINGS:   Portable AP semiupright radiograph of the chest. Median sternotomy  wires are intact. Mediastinal surgical clips. Right IJ Pacific-Zia  catheter tip projects over the right pulmonary artery. Left chest ICD  with lead.    Trachea is clear. Cardiac silhouette is obscured. No pneumothorax.  Bilateral moderate pleural effusions are increased. Diffuse  interstitial opacities. Degenerative changes of the spine.      Impression    IMPRESSION:   1. Right IJ Pacific-Zia catheter tip projects over the right pulmonary  artery.  2. Increased bilateral moderate pleural effusions.  3. Diffuse interstitial edema.    I have personally reviewed the examination and initial interpretation  and I agree with the findings.    CRYSTAL BLANKENSHIP MD

## 2020-08-11 NOTE — PROGRESS NOTES
Major Shift Events:  Neuro exam unchanged overnight- patient A&O x4, PERRLA, moved all extremities, and followed commands. Patient remained in a-flutter with HR  overnight (MD notified of HR sustaining in 120's, no changes to POC made). Amiodarone infusing at 0.5 mg/min. MD aware that levophed increased to 0.17 overnight, dobutamine infusing at 3 mcg/kg/min. SpO2 WNL on 2L via oxymask. No BM overnight, [place] intact with adequate output.     Plan: Continue to monitor and wean pressor as tolerated, will continue to update team with acute changes.   For vital signs and complete assessments, please see documentation flowsheets.

## 2020-08-11 NOTE — TELEPHONE ENCOUNTER
Not a surprise  Hope the U has something to offer  Were we still thinking of pci of one more artery??  Short of LVAD etc not much else to offer

## 2020-08-11 NOTE — PROVIDER NOTIFICATION
Cards 2 notified of changes in hemodynamic monitoring and calculations at 2200. Oxyhemoglobin redrawn at 0000 with improvement to 62. Per MD okay to recheck numbers at 0400.    MD aware of 0400 hemodynamic monitoring calculations and levophed titration to 0.14. No changes to POC made, will continue to titrate down as tolerated.

## 2020-08-11 NOTE — PROCEDURES
[facility], [place]    Procedure: Arterial line placement L radial     Date/Time: 8/10/2020 7:44 PM  Performed by: Mallorie Anders MD  Authorized by: Mallorie Anders MD     UNIVERSAL PROTOCOL   Site Marked: Yes  Prior Images Obtained and Reviewed:  Yes  Required items: Required blood products, implants, devices and special equipment available    Patient identity confirmed:  Verbally with patient  Patient was reevaluated immediately before administering moderate or deep sedation or anesthesia  Confirmation Checklist:  Patient's identity using two indicators, relevant allergies, procedure was appropriate and matched the consent or emergent situation and correct equipment/implants were available  Time out: Immediately prior to the procedure a time out was called    Universal Protocol: the Joint Commission Universal Protocol was followed    Preparation: Patient was prepped and draped in usual sterile fashion    ESBL (mL):  5         ANESTHESIA    Anesthesia: Local infiltration  Local Anesthetic:  Lidocaine 1% without epinephrine  Anesthetic Total (mL):  10      SEDATION    Patient Sedated: No    PROCEDURE   Patient Tolerance:  Patient tolerated the procedure well with no immediate complications  Describe Procedure: Patient identified, left radial artery identified with U/S guidance. Area anesthetized with lidocaine 1%; under U/S guidance access to L radial artery obtained with 21G Cook micropuncture needle (3rd attempt), 4F Cook sheath placed and exchanged with Arrow a line sheath (4F). Pulsatile flow and hemodynamic tracings confirmed placement to artery. Line sutured in place and dressing applied. No immediate complications.   Length of time physician/provider present for 1:1 monitoring during sedation: 0

## 2020-08-11 NOTE — CONSULTS
Electrophysiology Consultation Note   EP Attending: .   Reason for consultation: AFL.   Provider requesting consultation: , Cardiology II Service.  Date of Service: 8/11/2020      HPI:   Ms. Jj is a 75 year old female who has a past medical history significant for MI, CAD s/p CABG X3  LIMA- Diagonal and KURT to RCA 4/2020, ICM LVEF 20%, s/p ICD 5/2020, TR, MR, LV mural thrombus, recurrent epistaxis, OA s/p hip arthroplasty with revision, CKD III, hypothyroidism, GERD, anemia, and anxiety.   Her heart history started in 4/2020. In 4/2020, she was traveling and went on a cruise. She had a 5 day history of nausea and feeling unwell, but thought it was related to the cruise. After disembarking, she went to a local hospital in [place] and coronary angiogram showed severe 3v CAD. She underwent CABG with LIMA to Diagonal (due to LAD dissection during the case) and KURT to RCA. She was found to have ICM LVEF 20%. She had an ICD placed at that time. When she returned home she established care at [facility] and underwent medical therapy titration. She had one admission for acute decompensated heart failure over the Summer 2020.  She underwent a viability study which was notable for viability of the RCA and circumflex territory, however the LAD territory had no viability.  She underwent coronary angiogram on July 2 which showed an atretic KURT graft and therefore she underwent stenting to the RCA.  In addition, cardiac index was found to be 1.2 with severely elevated biventricular filling pressures.  She was ultimately diuresed and discharged, and there was plans to undergo staged stenting to her left circumflex. Unfortunately, she developed worsening DASH/SOB and represented to the hospital. She was found to have massive LV mural thrombus and reduced LVEF in cardiogenic shock. She required norepinephrine for hypotension and she was transferred her for advanced management. She had required dobutamine and  norepinephrine gtts here. She was having tenuous hemodynamics. She developed AFL despite weaning of vasoactive medications. She was given amiodarone with noted decrease in her CI after starting amiodarone. She is undergoing LVAD work up; however per primary team difficult to tell if she is a candidate at this time given her significant LV thrombus as well as the LVAD work up needs to be completed. She remains in AFL with RR 130s.      Past Medical History:   Past Medical History:   Diagnosis Date     CAD (coronary artery disease)      ICD (implantable cardioverter-defibrillator) in place     Primary prevention AICD placed in [place] in May 2020.     Ischemic cardiomyopathy     LVEF less than 20%.     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     Status post coronary artery bypass grafting     Done in [facility] in April 2020.  LIMA to diagonal (as LAD dissected) and KURT to the right coronary artery.     Past Surgical History:   Past Surgical History:   Procedure Laterality Date     ARTHROPLASTY REVISION HIP Right 11/16/2017    Procedure: ARTHROPLASTY REVISION HIP;  Right total hip arthroplasty revision.;  Surgeon: Jozef Garcia MD;  Location: [facility] OR     cabg  04/2020     CV HEART CATHETERIZATION WITH POSSIBLE INTERVENTION N/A 7/2/2020    Procedure: Heart Catheterization with Possible Intervention;  Surgeon: Kaden Franco MD;  Location:  HEART CARDIAC CATH LAB     CV PCI STENT DRUG ELUTING N/A 7/2/2020    Procedure: Percutaneous Coronary Intervention Stent Drug Eluting;  Surgeon: Kaden Franco MD;  Location:  HEART CARDIAC CATH LAB     CV RIGHT HEART CATH N/A 7/2/2020    Procedure: Right Heart Cath;  Surgeon: Kaden Franco MD;  Location:  HEART CARDIAC CATH LAB     Allergies: Per MAR     Allergies   Allergen Reactions     Combigan [Brimonidine Tartrate-Timolol] Swelling     Swollen eyelids     Ativan [Lorazepam]  Anxiety and Other (See Comments)     Increased confusion     Medications:   Per MAR current outpatient cardiovascular medications include:   Medications Prior to Admission   Medication Sig Dispense Refill Last Dose     acetaminophen (TYLENOL) 325 MG tablet Take 2 tablets (650 mg) by mouth every 6 hours as needed for mild pain 100 tablet 0      apixaban ANTICOAGULANT (ELIQUIS) 5 MG tablet Take 1 tablet (5 mg) by mouth 2 times daily First dose to be on 7/19/20 PM dose. 180 tablet 3      bimatoprost (LUMIGAN) 0.01 % SOLN Place 1 drop into both eyes At Bedtime         busPIRone (BUSPAR) 10 MG tablet Take 1 tablet (10 mg) by mouth 3 times daily 90 tablet 0      clopidogrel (PLAVIX) 75 MG tablet Take 1 tablet (75 mg) by mouth daily 30 tablet 11      co-enzyme Q-10 100 MG CAPS capsule Take 100 mg by mouth 2 times daily         dorzolamide-timolol (COSOPT) 2-0.5 % ophthalmic solution Place 1 drop into both eyes 2 times daily         ferrous sulfate (FEROSUL) 325 (65 Fe) MG tablet Take 1 tablet (325 mg) by mouth 2 times daily 60 tablet 0      latanoprost (XALATAN) 0.005 % ophthalmic solution Place 1 drop into both eyes daily In the morning.        levothyroxine (SYNTHROID/LEVOTHROID) 125 MCG tablet Take 0.5 tablets (62.5 mcg) by mouth every morning (before breakfast) 15 tablet 0      Multiple Vitamins-Minerals (PRESERVISION AREDS 2 PO) Take 1 tablet by mouth 2 times daily        nitroGLYcerin (NITROSTAT) 0.4 MG sublingual tablet For chest pain place 1 tablet under the tongue every 5 minutes for 3 doses. If symptoms persist 5 minutes after 1st dose call 911. 20 tablet 0      norepinephrine (LEVOPHED) 16-0.9 MG/250ML-% SOLN Inject 2.061-27.48 mcg/min into the vein continuous        omeprazole (PRILOSEC) 20 MG DR capsule Take 20 mg by mouth daily         QUEtiapine (SEROQUEL) 25 MG tablet Take 50 mg by mouth At Bedtime        rosuvastatin (CRESTOR) 20 MG tablet Take 20 mg by mouth daily        sacubitril-valsartan (ENTRESTO)  24-26 MG per tablet 1/2 tablet in PM daily 60 tablet 1      No current outpatient medications on file.     Current Facility-Administered Medications   Medication Dose Route Frequency     aspirin  81 mg Oral Daily     bimatoprost  1 drop Both Eyes At Bedtime     busPIRone  10 mg Oral TID     clopidogrel  75 mg Oral Daily     digoxin  125 mcg Oral Daily     dorzolamide-timolol  1 drop Both Eyes BID     ferrous sulfate  325 mg Oral BID     latanoprost  1 drop Both Eyes Daily     levothyroxine  62.5 mcg Oral QAM AC     lidocaine  1 patch Transdermal Q24H     lidocaine   Transdermal Q8H     omeprazole  20 mg Oral Daily     QUEtiapine  50 mg Oral At Bedtime     rosuvastatin  20 mg Oral Daily     senna-docusate  2 tablet Oral BID     Family History:   Family History   Problem Relation Age of Onset     Coronary Artery Disease No family hx of      Heart Failure No family hx of      Social History:   Social History     Tobacco Use     Smoking status: Never Smoker     Smokeless tobacco: Never Used   Substance Use Topics     Alcohol use: Not Currently       ROS:   A comprehensive 10 point ROS was negative other than as mentioned in HPI.    Physical Examination:   VITALS: BP 94/57 (BP Location: Right leg)   Pulse 72   Temp 97.6  F (36.4  C) (Oral)   Resp 20   Wt 63.5 kg (139 lb 15.9 oz)   SpO2 98%   BMI 21.29 kg/m    GENERAL APPEARANCE: AxO, NAD   HEENT: NCAT, EOMI, MMM. PERRLA.   NECK: Supple. Lostant tammi present.   CHEST: CTAB   CARDIOVASCULAR: tachy, regularly irregular    ABDOMEN: BS+, soft, NT, Nd.   EXTREMITIES: 2+ BLE edema. Blair BLE.  Distal pulses intact.   NEURO: Grossly nonfocal.   PSYCH: Normal affect.  SKIN: Warm and dry.   Data:   Labs:  [facility]  Recent Labs   Lab 08/11/20  0411 08/10/20  1243 08/10/20  0851 08/10/20  0403 08/09/20  1328   * 135  --  137 135   POTASSIUM 3.8 3.5 3.6 3.3* 4.2   CHLORIDE 98 101  --  102 104   FERNANDO 8.4* 8.2*  --  8.4* 8.4*   CO2 27 27  --  26 22   BUN 38* 40*  --  45* 50*   CR  1.14* 1.19*  --  1.30* 1.47*   * 108*  --  103* 111*     CBC  Recent Labs   Lab 20  0411 08/10/20  1243 08/10/20  0403 20  1328   WBC 7.0 7.5 6.6 8.0   RBC 4.50 4.43 4.28 4.30   HGB 12.0 11.7 11.3* 11.2*   HCT 38.5 38.7 37.0 37.3   MCV 86 87 86 87   MCH 26.7 26.4* 26.4* 26.0*   MCHC 31.2* 30.2* 30.5* 30.0*   RDW 22.5* 23.1* 22.9* 22.6*    265 255 267     INR  Recent Labs   Lab 20  0411 08/10/20  0403 20  0401 20  1459   INR 1.48* 2.26* 3.88* 2.91*     No results found for: CKTOTAL, CKMB, TROPN  Cholesterol (mg/dL)   Date Value   08/10/2020 57   2020 <50     HDL Cholesterol (mg/dL)   Date Value   08/10/2020 26 (L)   2020 25 (L)     LDL Cholesterol Calculated (mg/dL)   Date Value   08/10/2020 18   2020 Unable to Calculate     EK/10/20 ECHO:   Interpretation Summary  Severe left ventricular dilation is present.  The Ejection Fraction is estimated at 15-20%.  Akinetic apical segments with very large LV apical thrombus as reported  before.  Right ventricular function, chamber size, wall motion, and thickness are  normal.  The inferior vena cava is normal.  Trivial pericardial effusion is present.  A left pleural effusion is present.  Assessment:   Ms. Jj is a 75 year old female who has a past medical history significant for MI, CAD s/p CABG X3  LIMA- Diagonal and KURT to RCA 2020, ICM LVEF 20%, s/p ICD 2020, TR, MR, LV mural thrombus, recurrent epistaxis, OA s/p hip arthroplasty with revision, CKD III, hypothyroidism, GERD, anemia, and anxiety.   Her heart history started in 2020. In 2020, she was traveling and went on a cruise. She had a 5 day history of nausea and feeling unwell, but thought it was related to the cruise. After disembarking, she went to a local hospital in [place] and coronary angiogram showed severe 3v CAD. She underwent CABG with LIMA to Diagonal (due to LAD dissection during the case) and KURT to RCA. She was found to have  ICM LVEF 20%. She had an ICD placed at that time. When she returned home she established care at [facility] and underwent medical therapy titration. She had one admission for acute decompensated heart failure over the Summer 2020.  She underwent a viability study which was notable for viability of the RCA and circumflex territory, however the LAD territory had no viability.  She underwent coronary angiogram on  which showed an atretic KURT graft and therefore she underwent stenting to the RCA.  In addition, cardiac index was found to be 1.2 with severely elevated biventricular filling pressures.  She was ultimately diuresed and discharged, and there was plans to undergo staged stenting to her left circumflex. Unfortunately, she developed worsening DASH/SOB and represented to the hospital. She was found to have massive LV mural thrombus and reduced LVEF in cardiogenic shock. She required norepinephrine for hypotension and she was transferred her for advanced management. She had required dobutamine and norepinephrine gtts here. She was having tenuous hemodynamics. She developed AFL despite weaning of vasoactive medications. She was given amiodarone with noted decrease in her CI after starting amiodarone. She is undergoing LVAD work up; however per primary team difficult to tell if she is a candidate at this time given her significant LV thrombus as well as the LVAD work up needs to be completed. She remains in AFL with RR 130s.      EP Recommendations:  Atrial Flutter:   We discussed in detail with the patient management/treatment options for AFL includin. Stroke Prophylaxis:  CHADSVASC=++age, +gender, +HF, +CAD  5, corresponding to a 6.7% annual stroke / systemic [facility] event rate. indicating need for long term oral anticoagulation.  She is currently on a heparin gtt which she also requires for her large LV mural thrombus.   2. Rate Control: not well rate controlled. Receiving digoxin. CCB and BB contraindicated  in her cardiogenic shock.   3. Rhythm Control: Cardioversion, Antiarrhythmics and/or ablation are options for rhythm control. Limited AAT options given her comorbities and cardiogenic shock. OK with amiodarone for now. Discussed that she has limited options for treating her AFL as we cannot pursue DCCV with her LV thrombus, but also ablation would be higher risk too. We agreed that anything we do in her would be higher risk given her condition. An ablation can be considered; however, if LVAD is pursued, would consider surgical removal of LV thrombus and then DCCV while in OR. Primary team is going to work to clarify her LVAD candidacy and plans and let us know if they think we should pursue ablation.   4. Risk Factor Management: Statin, BP control, and FAY evaluation.      The patient states understanding and is agreeable with plan.   Thank you for allowing us to participate in the care of this patient.     The patient was discussed w/ Dr. Parmar.  The above note reflects our joint plan.    STEPHAN Saxena CNP  Electrophysiology Consult Service  Pager: [phone]    EP STAFF NOTE  I have seen and examined the patient as part of a shared visit with KALEY Saxena NP.  I agree with the note above. I reviewed today's vital signs and medications. I have reviewed and discussed with the advanced practice provider their physical examination, assessment, and plan   Briefly, high risk patient, advanced HF, LV thrombus, AFL, may have had AF before  My key history/exam findings are: AFL.   The key management decisions made by me: cannot cardiovert, can consider AFL but might need hold on AC, would not pursue if .decision is for hospice.    Jose Parmar MD [facility]  Cardiology - Electrophysiology

## 2020-08-11 NOTE — TELEPHONE ENCOUNTER
We were thinking of staged prox circ; scheduled late august    They are contemplating an LV thrombectomy and LVAD

## 2020-08-11 NOTE — PROGRESS NOTES
Cardiology Progress Note    Assessment & Plan   In summary, Marquise Jj is a 75-year-old female with a past medical history notable for coronary artery disease, ischemic cardiomyopathy, and known mural thrombus who was transferred from [facility] for further evaluation/treatment of cardiogenic shock.    Today:  - EP consults  - Trial stopping dobutamine with recheck lactic acid and VBG   - Stop torsemide     Neuro:   # Sedation:   - NTD    # Pain:  - Lidocaine patch  - Tylenol 325 q6hrs prn   - Buspirone 10mg tid sched    Cardio:  # Acute on chronic decompensated heart failure with reduced ejection fraction: NYHA IV / ACC D / INTERMACS 2-3  # ICM d/t CAD s/p CABG 4/2020 (KURT to RCA and LIMA to LAD) and PCI to RCA 7/20  # Mural thrombus  # Severe MR/TR   Presents with cardiogenic shock. On NE, cardiac index approximately 1.37 on admission. Severely elevated biventricular filling pressures. Etiology of her decompensation appears to be progression of her cardiomyopathy. Despite medical treatment, she has been hospitalized twice since returning to [place], both with low output. No viability in her LAD territory, and doubt that PCI to her circumflex would make meaningful LV recovery. Hemodynamics improved with dobutamine, NE, and aggressive diuresis. Currently undergoing pre-LVAD workup. CXR shows improved pulmonary edema. Given persistent a-flutter will trial stopping dobutamine, trend lactate and VBG. Undergoing LVAD workup.   - Wean norepinephrine to MAP >60  - Stop torsemide 10mg po qd   - Trial stopping dobutamine 3mcg/kg/hr, trend lactate/VBG  - Continue heparin ggt, high dose   - Continue digoxin 125mcg  - Continue plavix 75mg po qd   - Continue to hold entresto   - Continue home crestor  - Hemodynamics q6hrs   - LVAD workup      SGz  - CXR: positioned in PA    Date 8/9 8/9 8/10 8/11    CVP 12 9 3 4    Mean PA  35 30 21 25    PCWP  18 14 12    Oxy HGB  59 64 61 61    CI/CO 2.3 2.3 2.4/4.2 2.3     SVR 1100 1100 1200 1400      Pulm:  # Acute hypoxic respiratory failure  Secondary to pulmonary edema bilateral pleural effusions. Improved with diuresis. No fever or leukocytosis to raise concern for pneumonia.   - Oximask for now, titrate to saturation of >92%     Renal/Electrolytes   # Hyperkalemia/Hypokalemia   # Acute kidney injury on chronic kidney disease, stage III  Chronic kidney disease likely driven by her cardiac dysfunction. No EKG changes. Acute worsening secondary to her cardiogenic shock. Given insulin and dextrose x2 and started on diuresis with improvement in potassium.   -Trend potassium and creatinine q12hrs  - Electrolyte replacement protocol     GI: NTD     ID: NTD    Hem/Onc  # Coagulopathy  INR and Xa elevated d/t DOAC. PTT wnl. Will continue heparin GGT.   - Heparin ggt, high dose     Endo  # Hypothyroidism   - Continue PTA levothyroxine     DVT Prophylaxis: Heparin ggt  Code Status: Full Code    Miguelito Muir MD  Internal Medicine, PGY2  ASCOM 67450, [id number]    Interval History   Patient converted back to a typical a-flutter yesterday afternoon. Amio started. Arterial line was placed as still require NE to maintain MAP >60. See procedure note for details. No other events overnight. Nursing notes reviewed. Breathing unchanged from yesterday. No chest pain. No BM for a few days but no abdominal pain. No N/V. No other concerns at this time.       Physical Exam   Temp: 97.6  F (36.4  C) Temp src: Oral BP: 94/57   Heart Rate: 124 Resp: 18 SpO2: 98 % O2 Device: Oxi Plus Oxygen Delivery: 2 LPM  Vitals:    08/09/20 0600 08/10/20 0400 08/11/20 0430   Weight: 68.5 kg (151 lb 0.2 oz) 65.2 kg (143 lb 11.8 oz) 63.5 kg (139 lb 15.9 oz)     Vital Signs with Ranges  Temp:  [97  F (36.1  C)-98.5  F (36.9  C)] 97.6  F (36.4  C)  Heart Rate:  [] 124  Resp:  [16-20] 18  BP: ()/(50-66) 94/57  MAP:  [59 mmHg-116 mmHg] 73 mmHg  Arterial Line BP: ()/() 123/57  SpO2:  [89 %-99 %] 98  %  I/O last 3 completed shifts:  In: 957.89 [P.O.:360; I.V.:597.89]  Out: 3040 [Urine:3040]    Heart Rate: 124, Blood pressure 94/57, pulse 72, temperature 97.6  F (36.4  C), temperature source Oral, resp. rate 18, weight 63.5 kg (139 lb 15.9 oz), SpO2 98 %.  139 lbs 15.87 oz     Drips:  - Dobutamine 3mcg/kg/min  - NE 0.14mcg/kg/min  - Amiodarone 0.5mg/min   - Heparin ggt    GEN: AOx3, ND, appears tired but more talkative today   CV: Tachy but regular, no murmur or JVD   LUNGS: Clear to auscultation bilaterally  ABD: Active bowel sounds, soft, mild periumbilical tenderness that resolved on reexamination. No rebound/guarding/rigidity.  EXT: No LE edema.     NEURO: AOx3, CNII-XII intact, sensation/strength intact bilat upper and lower extremities     Medications     - MEDICATION INSTRUCTIONS -       amiodarone 0.5 mg/min (08/11/20 0600)     DOBUTamine 3 mcg/kg/min (08/11/20 0600)     HEParin 650 Units/hr (08/11/20 0600)     norepinephrine 0.14 mcg/kg/min (08/11/20 0600)     BETA BLOCKER NOT PRESCRIBED         aspirin  81 mg Oral Daily     bimatoprost  1 drop Both Eyes At Bedtime     busPIRone  10 mg Oral TID     clopidogrel  75 mg Oral Daily     digoxin  125 mcg Oral Daily     dorzolamide-timolol  1 drop Both Eyes BID     ferrous sulfate  325 mg Oral BID     latanoprost  1 drop Both Eyes Daily     levothyroxine  62.5 mcg Oral QAM AC     lidocaine  1 patch Transdermal Q24H     lidocaine (PF)         lidocaine   Transdermal Q8H     omeprazole  20 mg Oral Daily     QUEtiapine  50 mg Oral At Bedtime     rosuvastatin  20 mg Oral Daily     torsemide  10 mg Oral Daily       Data   Recent Labs   Lab 08/11/20  0411 08/10/20  1243 08/10/20  0851 08/10/20  0403  08/09/20  0401  08/08/20  2126  08/08/20  1248 08/08/20  0808   WBC 7.0 7.5  --  6.6   < > 8.1  --  8.4   < >  --   --    HGB 12.0 11.7  --  11.3*   < > 10.9*  --  11.2*   < >  --   --    MCV 86 87  --  86   < > 88  --  90   < >  --   --     265  --  255   < > 263   --  287   < >  --   --    INR 1.48*  --   --  2.26*  --  3.88*  --   --    < >  --   --    * 135  --  137   < > 133  --  133   < >  --   --    POTASSIUM 3.8 3.5 3.6 3.3*   < > 4.7   < > 6.2*   < >  --   --    CHLORIDE 98 101  --  102   < > 104  --  105   < >  --   --    CO2 27 27  --  26   < > 19*  --  20   < >  --   --    BUN 38* 40*  --  45*   < > 52*  --  55*   < >  --   --    CR 1.14* 1.19*  --  1.30*   < > 1.45*  --  1.58*   < >  --   --    ANIONGAP 7 7  --  10   < > 11  --  8   < >  --   --    FERNANDO 8.4* 8.2*  --  8.4*   < > 9.1  --  9.1   < >  --   --    * 108*  --  103*   < > 132*  --  74   < >  --   --    ALBUMIN 2.3* 2.3*  --  2.3*   < > 2.4*  --  2.3*   < >  --   --    PROTTOTAL 6.4* 6.4*  --  6.2*   < > 6.4*  --  6.4*   < >  --   --    BILITOTAL 0.7 0.8  --  0.8   < > 0.9  --  0.7   < >  --   --    ALKPHOS 233* 242*  --  235*   < > 215*  --  208*   < >  --   --    ALT 33 37  --  37   < > 42  --  40   < >  --   --    AST 29 37  --  34   < > 50*  --  47*   < >  --   --    TROPI  --   --   --   --   --   --   --  0.028  --  0.018 0.026    < > = values in this interval not displayed.       Recent Results (from the past 24 hour(s))   Echocardiogram Limited    Narrative    [id number]  [id number]  [id number]  [id number]^MARIO           [facility] [place],[facility]  Echocardiography Laboratory  [address] [place] [id number]     Name: EDILSON SMITH  MRN: [id number]  : 1945  Study Date: 08/10/2020 07:37 AM  Age: 75 yrs  Gender: Female  Patient Location: [facility]  Reason For Study: Heart Failure - Left  Ordering Physician: ELDER SOTO  Referring Physician: IVY CHRISTENSEN  Performed By: Luisito Solomon     BSA: 1.8 m2  Height: 68 in  Weight: 143 lb  HR: 82  BP: 83/50 mmHg  _____________________________________________________________________________  __        Procedure  Limited Portable Echo  Adult.  _____________________________________________________________________________  __        Interpretation Summary  Severe left ventricular dilation is present.  The Ejection Fraction is estimated at 15-20%.  Akinetic apical segments with very large LV apical thrombus as reported  before.  Right ventricular function, chamber size, wall motion, and thickness are  normal.  The inferior vena cava is normal.  Trivial pericardial effusion is present.  A left pleural effusion is present.     _____________________________________________________________________________  __        Left Ventricle  Left ventricular wall thickness is normal. Severe left ventricular dilation is  present. The Ejection Fraction is estimated at 15-20%. Left ventricular  diastolic function is not assessable. Akinetic apical segments with very large  LV apical thrombus as reported before.     Right Ventricle  Right ventricular function, chamber size, wall motion, and thickness are  normal. A pacemaker lead is noted in the right ventricle.     Atria  Severe biatrial enlargement is present.     Mitral Valve  The mitral valve cannot be assessed.        Aortic Valve  The aortic valve cannot be assessed.     Tricuspid Valve  The tricuspid valve cannot be assessed.     Pulmonic Valve  The pulmonic valve cannot be assessed.     Vessels  The pulmonary artery and bifurcation cannot be assessed. The inferior vena  cava is normal. Ascending aorta 3.7 cm.     Pericardium  Trivial pericardial effusion is present.        Miscellaneous  A left pleural effusion is present.  _____________________________________________________________________________  __  MMode/2D Measurements & Calculations  asc Aorta Diam: 3.7 cm     LA Volume Index (BP): 71.4 ml/m2           _____________________________________________________________________________  __           Report approved by: Imani Landry 08/10/2020 09:47 AM      Cardiac Device Check - Inpatient   Result Value     Date Time Interrogation Session [id number]    Implantable Pulse Generator  Medtronic    Implantable Pulse Generator Model [id number] Visia AF MRI VR    Implantable Pulse Generator Serial Number [id number]    Type Interrogation Session In Clinic    Clinic Name [facility]    Implantable Pulse Generator Type Defibrillator    Implantable Pulse Generator Implant Date 20200508    Implantable Lead  Medtronic    Implantable Lead Model 6947M Sprint Quattro Secure MRI SureScan    Implantable Lead Serial Number [id number]    Implantable Lead Implant Date 20200508    Implantable Lead Polarity Type Quadripolar Lead    Implantable Lead Location Detail 1 UNKNOWN    Implantable Lead Location Right Ventricle    Franco Setting Mode (NBG Code) VVI    Franco Setting Lower Rate Limit 50    Franco Setting Hysterisis Rate 40    Lead Channel Setting Sensing Polarity Bipolar    Lead Channel Setting Sensing Anode Location Right Ventricle    Lead Channel Setting Sensing Anode Terminal Ring    Lead Channel Setting Sensing Cathode Location Right Ventricle    Lead Channel Setting Sensing Cathode Terminal Tip    Lead Channel Setting Sensing Sensitivity 0.3    Lead Channel Setting Pacing Polarity Bipolar    Lead Channel Setting Pacing Anode Location Right Ventricle    Lead Channel Setting Pacing Anode Terminal Ring    Lead Channel Setting Sensing Cathode Location Right Ventricle    Lead Channel Setting Sensing Cathode Terminal Tip    Lead Channel Setting Pacing Pulse Width 0.4    Lead Channel Setting Pacing Amplitude 2    Lead Channel Setting Pacing Capture Mode Adaptive    Zone Setting Type Category VF    Zone Setting Detection Interval 300    Zone Setting Detection Beats Numerator 30    Zone Setting Detection Beats Denominator 40    Zone Setting Type Category VT    Zone Setting Detection Interval 270    Zone Setting Type Category VT    Zone Setting Detection Interval 390    Zone Setting Type  Category VT    Zone Setting Detection Interval 450    Zone Setting Type Category ATRIAL_FIBRILLATION    Zone Setting Type Category AT/AF    Lead Channel Impedance Value 475    Lead Channel Impedance Value 361    Lead Channel Sensing Intrinsic Amplitude 10.8    Lead Channel Pacing Threshold Amplitude 0.5    Lead Channel Pacing Threshold Pulse Width 0.4    Battery Date Time of Measurements 20200810124117    Battery Status OK    Battery RRT Trigger 2.727    Battery Remaining Longevity 135    Battery Voltage 3.08    Capacitor Charge Type Reformation    Capacitor Last Charge Date Time 20200808054231    Capacitor Charge Time 3.893    Capacitor Charge Energy 18    Franco Statistic Date Time Start 20200508112256    Franco Statistic Date Time End 20200810123716    Franco Statistic RV Percent Paced 0.01    Atrial Tachy Statistic Date Time Start 20200508112256    Atrial Tachy Statistic Date Time End 20200810123716    Atrial Tachy Statistic AT/AF [place] Percent 0.2    Therapy Statistic Recent Shocks Delivered 0    Therapy Statistic Recent Shocks Aborted 0    Therapy Statistic Recent ATP Delivered 10    Therapy Statistic Recent Date Time Start 20200508112256    Therapy Statistic Recent Date Time End 20200810123716    Therapy Statistic Total Shocks Delivered 0    Therapy Statistic Total Shocks Aborted 0    Therapy Statistic Total ATP Delivered 10    Therapy Statistic Total  Date Time Start 47896508267070    Therapy Statistic Total  Date Time End 20200810123716    Episode Statistic Recent Count 21    Episode Statistic Type Category AT/AF    Episode Statistic Recent Count 0    Episode Statistic Type Category SVT    Episode Statistic Recent Count 315    Episode Statistic Type Category VT    Episode Statistic Recent Count 0    Episode Statistic Type Category VF    Episode Statistic Recent Count 0    Episode Statistic Type Category VT    Episode Statistic Recent Count 10    Episode Statistic Type Category VT    Episode Statistic Recent  Count 9    Episode Statistic Type Category VT    Episode Statistic Recent Date Time Start 20200508112256    Episode Statistic Recent Date Time End 35417895188716    Episode Statistic Recent Date Time Start 90581473673821    Episode Statistic Recent Date Time End 46867097605375    Episode Statistic Recent Date Time Start 83028650038351    Episode Statistic Recent Date Time End 95948534117627    Episode Statistic Recent Date Time Start 94026092163440    Episode Statistic Recent Date Time End 50714177140595    Episode Statistic Recent Date Time Start 03944911284364    Episode Statistic Recent Date Time End 51311886134270    Episode Statistic Recent Date Time Start 88350981991517    Episode Statistic Recent Date Time End 37307996362316    Episode Statistic Recent Date Time Start 54949309652593    Episode Statistic Recent Date Time End 67920234763306    Episode Statistic Total Count 21    Episode Statistic Type Category AT/AF    Episode Statistic Total Count 0    Episode Statistic Type Category SVT    Episode Statistic Total Count 315    Episode Statistic Type Category VT    Episode Statistic Total Count 0    Episode Statistic Type Category VF    Episode Statistic Total Count 0    Episode Statistic Type Category VT    Episode Statistic Total Count 10    Episode Statistic Type Category VT    Episode Statistic Total Count 9    Episode Statistic Type Category VT    Episode Statistic Total Date Time Start 20200508105729    Episode Statistic Total Date Time End 28489798385260    Episode Statistic Total Date Time Start 12554112161262    Episode Statistic Total Date Time End 08116995880947    Episode Statistic Total Date Time Start 28592021065789    Episode Statistic Total Date Time End 37761982184114    Episode Statistic Total Date Time Start 96851606543620    Episode Statistic Total Date Time End 97156410667281    Episode Statistic Total Date Time Start 71131140520783    Episode Statistic Total Date Time End 35354512874244     Episode Statistic Total Date Time Start 54214253337852    Episode Statistic Total Date Time End 15972653282218    Episode Statistic Total Date Time Start 15191758645859    Episode Statistic Total Date Time End 58790030306097    Episode Identifier 349    Episode Type Category VT1_MONITOR    Episode Date Time 91012758947366    Episode Duration 1,249    Episode Identifier 347    Episode Type Category VT    Episode Date Time 22903298229916    Episode Duration 0    Episode Identifier 346    Episode Type Category VT    Episode Date Time 17690700646359    Episode Duration 0    Episode Identifier 345    Episode Type Category VT    Episode Date Time 13761059470666    Episode Duration 1    Episode Identifier 344    Episode Type Category VT    Episode Date Time 08645530033149    Episode Duration 1    Episode Identifier 343    Episode Type Category VT    Episode Date Time 75950712370551    Episode Duration 1    Episode Identifier 342    Episode Type Category VT    Episode Date Time 36752841143548    Episode Duration 1    Episode Identifier 341    Episode Type Category VT    Episode Date Time 87861701298128    Episode Duration 1    Episode Identifier 340    Episode Type Category VT    Episode Date Time 81550533473950    Episode Duration 1    Episode Identifier 339    Episode Type Category VT    Episode Date Time 99250952665606    Episode Duration 1    Episode Identifier 338    Episode Type Category VT    Episode Date Time [id number]    Episode Duration 1    Episode Identifier 337    Episode Type Category VT    Episode Date Time 60586031212922    Episode Duration 1    Episode Identifier 336    Episode Type Category VT    Episode Date Time 93549771584725    Episode Duration 0    Episode Identifier 335    Episode Type Category VT    Episode Date Time 14472209532921    Episode Duration 1    Episode Identifier 334    Episode Type Category VT    Episode Date Time [id number]    Episode Duration 2    Episode Identifier 333     Episode Type Category VT    Episode Date Time [id number]    Episode Duration 1    Episode Identifier 355    Episode Type Category Monitor    Episode Date Time 35152972941551    Episode Duration 480    Episode Identifier 354    Episode Type Category Monitor    Episode Date Time [id number]    Episode Duration 960    Episode Identifier 353    Episode Type Category Monitor    Episode Date Time [id number]    Episode Duration 480    Episode Identifier 352    Episode Type Category Monitor    Episode Date Time 63430611344224    Episode Duration 600    Episode Identifier 351    Episode Type Category Monitor    Episode Date Time 67170753918107    Episode Duration 360    Episode Identifier 350    Episode Type Category Monitor    Episode Date Time 20200809205129    Episode Duration 1,200    Episode Identifier 348    Episode Type Category Monitor    Episode Date Time [id number]    Episode Duration 960    Episode Identifier 87    Episode Type Category Monitor    Episode Date Time 14200409167518    Episode Duration 480    Episode Identifier 86    Episode Type Category Monitor    Episode Date Time [id number]    Episode Duration 960    Episode Identifier 85    Episode Type Category Monitor    Episode Date Time 88012887843375    Episode Duration 720    Episode Identifier 84    Episode Type Category Monitor    Episode Date Time 66015094851108    Episode Duration 1,440    Episode Identifier 83    Episode Type Category Monitor    Episode Date Time [id number]    Episode Duration 1,080    Episode Identifier 82    Episode Type Category Monitor    Episode Date Time [id number]    Episode Duration 720    Episode Identifier 81    Episode Type Category Monitor    Episode Date Time [id number]    Episode Duration 360    Episode Identifier 80    Episode Type Category Monitor    Episode Date Time 67713163014028    Episode Duration 360    Episode Identifier 79    Episode Type Category Monitor    Episode Date Time  [id number]    Episode Duration 840    Episode Identifier 78    Episode Type Category Monitor    Episode Date Time 59822992797129    Episode Duration 360    Episode Identifier 77    Episode Type Category Monitor    Episode Date Time 31271401964097    Episode Duration 720    Episode Identifier 76    Episode Type Category Monitor    Episode Date Time 08890907669430    Episode Duration 480    Episode Identifier 71    Episode Type Category Monitor    Episode Date Time [id number]    Episode Duration 360    Narrative    Patient seen in [address] [place] [address] for evaluation and iterative programming of Medtronic single lead ICD per MD orders. Patient was admitted for cardiogenic shock. Normal ICD function. 10 treated VT, 9 monitored VT, 315 nonsustained VT and 21 AF episodes recorded since 5/8/20. 1 monitored VT, 15 VT-NS and 20 AF episodes recorded since last interrogatino on 6/3/20. All 16 VT episodes recorded on 8/9/20. All VT-NS episodes lasted < 2 seconds.  The monitored VT episode lasted 20:49. No VT episodes since 6/3/20 required intervention with tachy therapy. Intrinsic rhythm = vs @  bpm.  = 0%. OptiVol fluid index is 40 above baseline. Estimated battery longevity to YUDELKA = 11 years. Battery voltage = 3.09V. No short v-v intervals recorded. Lead impedance trends appear stable. Patient reports that she is feeling very fatigued. Plan for continued inpatient evaluation and treatment.  Possibly will have an LVAD placed.  If this occurs the tachy therapies can be programmed off at that time.  JEZ Dleong RN    single lead ICD    I have reviewed and interpreted the device interrogation, settings, programming and nurse's summary.  The device is functioning within normal device parameters.   I agree with the current findings, assessment and plan.   CT Chest Abdomen Pelvis w/o Contrast    Narrative    EXAMINATION: CT CHEST ABDOMEN PELVIS W/O CONTRAST, 8/10/2020 2:05 PM    TECHNIQUE:  Helical CT images from the lung bases  through the  symphysis pubis were obtained without IV contrast.    COMPARISON: Same-day radiograph    HISTORY: Heart Failure pre Ventricular Assist Device (VAD) planning    FINDINGS:  CHEST:  LINES: Left chest wall cardiac device with lead terminating in the  right ventricle. Right IJ approach [place]-Zia catheter tip terminates  in the right pulmonary artery. Rojo catheter with tip in the urinary  bladder.    LUNGS: The central tracheobronchial tree is patent. Large bilateral  pleural effusions. Atelectasis/consolidation of bilateral lower lobes.  No pneumothorax. Diffuse interlobular septal thickening.    MEDIASTINUM: The visualized thyroid is unremarkable. Three-vessel  branching pattern. The ascending aorta and main pulmonary artery are  normal in caliber. Mild cardiomegaly. Small pericardial effusion.  Prominent mediastinal lymph nodes are favored to be reactive. No  axillary or hilar lymphadenopathy. Mediastinal surgical clips noted.    ABDOMEN/PELVIS:  LIVER: Hypoattenuating cysts in the left lobe of the liver measuring  2.1 cm and in the right lobe of the liver measuring 2.7 cm. No  suspicious hepatic mass on this noncontrast examination. No  intrahepatic biliary ductal dilation.  GALLBLADDER: Within normal limits.  PANCREAS: Within normal limits.  SPLEEN: Within normal limits.  ADRENAL GLANDS: Nonspecific thickening of the left adrenal gland. The  right adrenal gland is unremarkable.  URINARY TRACT: Symmetric cortical enhancement bilaterally. No  hydronephrosis, nephrolithiasis, or suspicious renal mass. Limited  evaluation of the urinary bladder due to extensive streak artifact  from bilateral hip arthroplasties.  REPRODUCTIVE ORGANS: Limited evaluation due to streak artifact from  bilateral hip arthroplasties.  BOWEL: Normal caliber of the small and large bowel. No abnormal wall  thickening or inflammatory change.  PERITONEUM/FLUID: No free fluid or air in the abdomen or pelvis. Mild  mesenteric  edema.    VESSELS: Scattered atherosclerotic calcifications of the abdominal  aorta and iliac vessels. Arterial and venous patency cannot be  assessed on this noncontrast examination. No aneurysmal dilatation of  the infrarenal abdominal aorta.    LYMPH NODES: Prominent reactive lymph nodes in the abdomen.    BONES/SOFT TISSUES: Scoliotic curvature of the spine centered about  L2-L3. Advanced degenerative changes of the lumbar spine. Postsurgical  changes of bilateral hip arthroplasties. Postsurgical changes of the  chest with intact median sternotomy wires. No suspicious osseous  lesions.      Impression    IMPRESSION:   1. Large bilateral pleural effusions with consolidation of bilateral  lower lobes, favored to represent atelectasis versus infection.  2. Mild interstitial edema.     I have personally reviewed the examination and initial interpretation  and I agree with the findings.    MORENO RODRIGUEZ MD   CT Head w/o contrast*    Narrative    CT HEAD W/O CONTRAST 8/10/2020 2:05 PM    History: Heart Failure pre Ventricular Assist Device (VAD) planning   ICD-10:    Comparison: None available    Technique: Using multidetector thin collimation helical acquisition  technique, axial, coronal and sagittal CT images from the skull base  to the vertex were obtained without intravenous contrast.    Findings: There is no intracranial hemorrhage, mass effect, or midline  shift. Gray/white matter differentiation in both cerebral hemispheres  is preserved. Minimal periventricular patchy white matter  hypodensities that are nonspecific, but likely secondary to small  vessel ischemic disease. There is mild cerebral atrophy. Ventricles  are proportionate to the cerebral sulci. The basal cisterns are clear.    The bony calvaria and the bones of the skull base are normal. The  visualized portions of the paranasal sinuses and mastoid air cells are  clear.       Impression    Impression:    1. No acute intracranial pathology.    2. Minimal leukoaraiosis and mild cerebral atrophy.    FRACISCO COLEMAN MD   XR Chest Port 1 View    Narrative    XR CHEST PORT 1 VW on 8/11/2020 1:45 AM.    INDICATION: confirm swan.    COMPARISON: Radiograph dated 8/10/2020. CT dated 8/10/2020.    FINDINGS:   Portable AP semiupright radiograph of the chest. Median sternotomy  wires are intact. Mediastinal surgical clips. Right IJ [facility]  catheter tip projects over the right pulmonary artery. Left chest ICD  with lead projecting over the right ventricle.    Trachea is clear. Cardiac silhouette is obscured. Pulmonary  vasculature is relatively indistinct. Aortic arch calcifications. No  pneumothorax. Bilateral small pleural effusions are decreased. Diffuse  interstitial opacities are slightly decreased. Retrocardiac opacities.  Degenerative changes of the spine.      Impression    IMPRESSION:   1. Right IJ [facility] catheter tip projects over the right pulmonary  artery.  2. Decreased bilateral small pleural effusions.  3. Decreased diffuse interstitial edema.   4. Retrocardiac opacities may represent atelectasis versus  consolidation.

## 2020-08-11 NOTE — PROGRESS NOTES
SW aware of consult as part of LVAD evaluation. Writer met w/ pt and her , whom was at bedside. Writer will plan to see them tomorrow morning at 1030.

## 2020-08-11 NOTE — PROGRESS NOTES
Calorie Count  Intake recorded for: 8/10  Total Kcals: 0 Total Protein: 0g  Kcals from Hospital Food: 0   Protein: 0g  Kcals from Outside Food (average):0 Protein: 0g  # Meals Recorded: 2 meals ordered from kitchen, no intake recorded.   # Supplements Recorded: no intake recorded.

## 2020-08-12 PROBLEM — I50.9 HEART FAILURE (H): Status: ACTIVE | Noted: 2020-01-01

## 2020-08-12 NOTE — PLAN OF CARE
ICU End of Shift Summary. See flowsheets for vital signs and detailed assessment.    Changes this shift: A&O x4. On 3L via oxy plus, bubler in place d/t dry nose and bleeding. In atrila flutter, amio running @0.5, doubutamine held earlier today, but SVO2 dropped from 66-41, resumed @3. EP consulted. HR btwn 97-137bpm, amnio drip started. PA:36/10,37/11 CVP:4,5, SVO2:66,61. K replaced per orders.      Plan: Pre LVAD teaching done today.  LVAD workup started. Hep @550. Will continue to monitor and update team with changes.

## 2020-08-12 NOTE — CONSULTS
[facility]  Palliative Care Consultation Note    Patient: Marquise Jj  Date of Admission:  8/8/2020    Requesting Clinician / Team: Dr. Stephens, Cardiology  Reason for consult: LVAD procedure, required Visit    Recommendations:    Pt indicates that she is wanting to pursue LVAD if it is an option. She is primarily concerned about the risk of stroke and emphatically does not want to be kept alive if she was comatose / not interactive with her loved ones but would be willing to go through physical disability including paralysis as long as her mental functioning is intact. Wee preparedness plan questions below for further details of our discussion today.  She understands that it will be discussed and formally decided upon this Friday. She also understands that if LVAD ends up not being offered then her treatment options are limited and we discussed briefly that if that happened our team would be open to following up to further discuss goals and care planning at that time.      Pt has had anxiety since her recent MI this spring and has been managed outpatient with buspar and nightime quetiapine for sleep. She indicates that this has overall been helpful for her. I counseled her on how selective serotonin reuptake inhibitor treatment is typically first line for ongoing anxiety and recommended that if her anxiety persists or worsens and is getting in the way of her coping with her illness and daily life that she should start an selective serotonin reuptake inhibitor. She did not want to do this today but would keep it in mind for the future.      Encouraged her to complete a healthcare directive and gave her copy of honoring choices.       Palliative care team will sign off. Please re-consult if new needs arise.           LVAD preparedness plan template     Patient's legally designated health care agent: has not completed a healthcare directive but indicates she would  "want her  Reyes and Debbie daughter to be her primary decision makers and that in general says her family works well together to make decisions as a group.         Patient's description of how they make decisions (by themselves, in consultation with certain close loved ones, based on advice from medical professionals, etc):  Pt and  stated \"we make decision together as a family\", but they do like to hear what providers have to say, as well, as hear as much information as possible.         Patient's thoughts about what constitutes a 'good' quality of life/ what makes life worth living: Pt notes that she lives for \"time with her family\". A quality life to her is being able to communicate and understand her loved ones. She would also like to do basic ADLs, but she notes that if she is able to communicate even with side effects of stroke or other medical complication it would still be worth living to communicate.         Patient's personal goals/hopes for receiving the LVAD and how they anticipate future with LVAD to be like: \"More time with my family\". Pt has numerous family milestones coming up that she hopes to be part of. She stated wanted to have the procedure to see two grandsons get  and the birth of two more great-grandkids. She and her  both state that the understand that there may be challenges with the LVAD; however, they are comfortable with the device and feel competent in providing cares with it in place.         Patient's worries/concerns when considering receiving the LVAD: The patient's major concern is suffering a massive stroke that would render her unable to communicate and enjoy her family. Additionally, she does not want to end up on a ventilator if that should mean she has no chance at a meaningful recovery (\"being able to verbally communicate and interact with/ enjoy family).         Patient's thoughts about what health conditions would be unacceptable (situations the " "patient would want her/his doctors and loved ones to know that she/he would not want life prolonged)? Pt states that it would not be acceptable to be on a ventilator with no forseeable meaningful, communicative life after. Any life without being able to verbally communicate and understand and interact with what is going on around her is not acceptable.         There are risks for kidney failure in patients with advancing heart disease who need LVAD support.  The places/locations that offer dialysis and accept patients with LVADs may be limited in your community.  What do you know about dialysis? Would you be open to doing dialysis and if so what quality of life concerns would you have? Pt and  were aware of dialysis and what this meant. The both reiterated that will complete this if it means Marquise will have more time with her  and family, and that regardless of driving and distance they will make this happen. Again, she stated \"as long as I can verbally communicate and understand and be there for my family, I want to do what ever I can.\"        An LVAD carries a risk of stroke which, for some people, may limit their ability to communicate their wishes to others.  After a stroke, what sort of outcomes would be unacceptable to you (ie needing to live in nursing facility, loss of independence.. etc.)Pt states that she could accept having hemiparesis and would accept increased care services should it mean that she would have more time for family. However, if she could no longer communicate and understand her family, she no longer wants the LVAD to be left on.         The need to be on a ventilator/breathing machine through a tracheostomy (a tube in your neck) is a risk with LVAD. What are with circumstances you would want your medical providers and family to keep you alive with long term mechanical ventilation?Marquise is ok with going on a ventilator if it is a temporary measure that will allow her to again " be able to communicate with her family; however, if it is something that will be a long standing means of ventilation, she and her family do not wish to proceed.         LVAD's are sometimes placed with the possibility of later pursuing heart transplant. Some of our patients are determined not to be heart transplant candidates, or choose to forego heart transplant surgery for personal reasons.  If you had an LVAD placed inside of you for the remainder of your life, what would be your concerns? N/A        What circumstances or events would lead you to decide or ask your health care agent to decide that you would want the LVAD turned off and be allowed to die a natural death? If she is no longer able to speak with family, understand, or enjoy time with them, they she would like to stop the LVAD.             These recommendations have been discussed with patient, , and primary team.      Laurie Yusuf,   MS4    This patient has been seen and evaluated independently by me. I discussed with the medical student. This note was initiated by the medical student and completed by myself to reflect my full findings, assessment, and plan.      Total time spent was 75 minutes,  >50% of time was spent counseling and/or coordination of care regarding goals of care, symptom assessment, patient support and advance care planning for patient with end stage heart failure who is being evaluated for an LVAD.    Harini Alexander  Palliative Care   Pager [phone]        Thank you for the opportunity to participate in the care of this patient and family. Our team: does not plan on following further, however do not hesitate to call or re-consult if we can be of further assistance to the patient/family.     During regular M-F work hours -- if you are not sure who specifically to contact -- please contact us by sending a text page to our team consult pager at [phone].    After regular work hours and on weekends/holidays, you can call  "our answering service at [phone]. Also, who's on call for us is available in [employer].       Assessments:  Marquise Jj is a 75-year-old female with a past medical history notable for coronary artery disease, ischemic cardiomyopathy, and known mural thrombus who was transferred from [facility] for further evaluation/treatment of cardiogenic shock. Palliative was consulted as part of LVAD workup.     Today, the patient was seen for:  GOC  Discussion of ACP  Family and patient support  Planning for LVAD    Prognosis, Goals, & Planning:      Functional Status just prior to hospitalization: 2 (Ambulatory and capable of all selfcare but unable to carry out any work activities; may need help with IADLs up and about > 50% of waking hours)      Prognosis, Goals, and/or Advance Care Planning were addressed today: Yes        Summary/Comments:       Patient's decision making preferences: shared with support from loved ones      Family likes to have medical information and the physician opinion of options.    Patient has decision-making capacity today for complex decisions: Yes            I have concerns about the patient/family's health literacy today: No           Patient has a completed Health Care Directive: No. Pt and her  given paper copies to fill out and discuss with their daughter, Debbie.      Code status: full code    Coping, Meaning, & Spirituality:   Mood, coping, and/or meaning in the context of serious illness were addressed today: Yes  Summary/Comments:   How do you make sense of this? \"I am very angry that this is happening to me\" \"My anxiety has stared since the heart attack\"  Are you Pentecostal? Spiritual? Not so much? Medium; they lean on family more  Are you at peace? Not yet  What are your concerns, medical and non-medical, that are not being addressed? None thus far  Who are your \"go-to\" people when you need support? , children      Plan for psychosocial/spiritual " support/follow-up from palliative team: Family is amenable to having a refugio speak with them.          Social:   Living situation: Pt lives at home with , Reyes. They have been wintering in [place], [place] until her MI in April.   Family system: , 5 children (very close); 18 grandkids; 4 great-grandkids  Functional status (needs help with ADLs or IADLs): Limited help with ADLs, weaker since MI; needs help with IALS  Employment/education: retired; was a RN  Use of community resources: not with COVID  Activities/interests: bird watching, dogs, and family   History of substance use/abuse: no    History of Present Illness:  History obtained from: Patient, her , SW and Chart checking     TE is a 75-year-old female with a past medical history of coronary artery disease status post CABG in April 2020, ischemic cardiomyopathy s/p ICD, and known mural thrombus c/b cardiac shock.      Cardiac study was completed showing no viability of LAD; she had an angiogram 7/2/20  atretic KURT graft with subsequent RCA shut. She subsequently experience decreased conditioning and worsening symptoms. She was readmitted to [facility]; ECHO showed large mural thrombus and severely reduced LV function.  She became progressively more hypotensive, requiring pressors with led to transferred to the [facility].      Please see Cardiology H/P 8/8 for more detail.     Pt denies pain or other symptoms other than fatigue and anxiety associated with SOB. Gets anxious first and that makes her more short of breath. This resolves after ~5 minutes with use of breathing exercises. She has been taking buspar and quetiapine at night -per pt she feels this is working ok and does not want to add or change her anxiety management. Denies depressed mood. But does endorse feeling angry about her situation.     Key Palliative Symptom Data:  # Pain severity the last 12 hours: none  # Dyspnea severity the last 12 hours: low  #  Nausea severity the last 12 hours: none  # Anxiety severity the last 12 hours: moderate    ROS:  Comprehensive ROS is reviewed and is negative except as here & per HPI: + weakness, anxiety     Past Medical History:  Past Medical History:   Diagnosis Date     CAD (coronary artery disease)      ICD (implantable cardioverter-defibrillator) in place     Primary prevention AICD placed in [place] in May 2020.     Ischemic cardiomyopathy     LVEF less than 20%.     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     Status post coronary artery bypass grafting     Done in [facility] in April 2020.  LIMA to diagonal (as LAD dissected) and KURT to the right coronary artery.        Past Surgical History:  Past Surgical History:   Procedure Laterality Date     ARTHROPLASTY REVISION HIP Right 11/16/2017    Procedure: ARTHROPLASTY REVISION HIP;  Right total hip arthroplasty revision.;  Surgeon: Jozef Garcia MD;  Location: [facility] OR     cabg  04/2020     CV HEART CATHETERIZATION WITH POSSIBLE INTERVENTION N/A 7/2/2020    Procedure: Heart Catheterization with Possible Intervention;  Surgeon: Kaden Franco MD;  Location:  HEART CARDIAC CATH LAB     CV PCI STENT DRUG ELUTING N/A 7/2/2020    Procedure: Percutaneous Coronary Intervention Stent Drug Eluting;  Surgeon: Kaden Franco MD;  Location:  HEART CARDIAC CATH LAB     CV RIGHT HEART CATH N/A 7/2/2020    Procedure: Right Heart Cath;  Surgeon: Kaden Franco MD;  Location:  HEART CARDIAC CATH LAB         Family History:  Family History   Problem Relation Age of Onset     Coronary Artery Disease No family hx of      Heart Failure No family hx of          Allergies:  Allergies   Allergen Reactions     Combigan [Brimonidine Tartrate-Timolol] Swelling     Swollen eyelids     Ativan [Lorazepam] Anxiety and Other (See Comments)     Increased confusion        Medications:  I have reviewed this  patient's medication profile and medications from this hospitalization.   Noted scheduled meds are:  Buspirone, quetiapine - anxiety medicines discussed; pt and  did not think selective serotonin reuptake inhibitor was necessary at this time. They had not heard it discussed before      Noted PRN meds are:      Physical Exam:  Vital Signs: Temp: 97.9  F (36.6  C) Temp src: Oral BP: 100/72   Heart Rate: 117 Resp: 20 SpO2: 98 % O2 Device: Nasal cannula Oxygen Delivery: 2 LPM  Weight: 139 lbs 12.35 oz  Constitutional: Alert and oriented, responds appropriately to questions, sitting upright in bed, appropriately emotional  Eyes: Pupils appropriately tracking, no notable swelling or redness.  Lungs: non-labored breathing on NC,    Cardiovascular: 2+ pulses,   Ext: no noted edema, WWP  Neuro/mental status: alert, oriented fully, answering questions appropriately, flat affect    Data reviewed:  Recent imaging reviewed, my comments on pertinents:   IMPRESSION:  1. RIGHT CECI is borderline, 0.96.  2. LEFT CECI is normal, 1.07.    IMPRESSION: U/S 8/11 - LE  1. Arrhythmia and abnormal waveforms consistent with heart failure.  2. No significant arterial stenosis demonstrated.    IMPRESSION: CT Abdomen 8/10  1. Large bilateral pleural effusions with consolidation of bilateral  lower lobes, favored to represent atelectasis versus infection.  2. Mild interstitial edema.     Recent lab data reviewed, my comments on pertinents:        Lab Results   Component Value Date     08/12/2020    Lab Results   Component Value Date    CHLORIDE 102 08/12/2020    Lab Results   Component Value Date    BUN 37 08/12/2020      Lab Results   Component Value Date    POTASSIUM 4.6 08/12/2020    Lab Results   Component Value Date    CO2 24 08/12/2020    Lab Results   Component Value Date    CR 1.11 08/12/2020        Lab Results   Component Value Date    NTBNPI 36,668 (H) 08/10/2020    NTBNP 21,351 (H) 07/24/2020     Lab Results   Component  Value Date    WBC 6.5 08/12/2020    HGB 11.1 (L) 08/12/2020    HCT 36.1 08/12/2020    MCV 85 08/12/2020     08/12/2020     Lab Results   Component Value Date    CR 1.11 (H) 08/12/2020     Lab Results   Component Value Date    HGB 11.1 (L) 08/12/2020

## 2020-08-12 NOTE — PROGRESS NOTES
CLINICAL NUTRITION SERVICES - BRIEF NOTE     Patient reports poor intake 2/2 lack of appetite. Patient does not like Boost Plus. Patient's  is bringing in oral nutrition supplements from home, which patient prefers.      Nutrition interventions:  - Ordered Magic Cups and high-protein snacks between meals.  - Nutrition education for post-VAD diet recommendations. Provided instruction on the need to eat small, frequent meals. Importance of adequate oral intake post-op, especially for 6-8 weeks, was discussed. Provided handouts regarding the amounts of protein in various food items, tips to increasing protein intake and list of oral supplements while inpatient. Discussed protein goals with pt. Reviewed low sodium education, patient has been following this at home for some time.      Rosemary Wyatt RD, LD  [id number]  Pgr: [phone]

## 2020-08-12 NOTE — PROGRESS NOTES
"Met with patient, , , to present the Heartmate 3 & HeartWare VAD(s) as possible treatment option. DaughterNorman was included virtually/over the phone related to COVID-19.     Discussed patient and caregiver responsibilities before and after VAD implant. Clarified that, r/t COVID-19 visitor restrictions, only 2 caregivers may be trained. Clarified the need for a caregiver to be present for education and to assist patient for at least first 30 days after a patient returns home. Patient's designated caregiver would be , Boogie & daughter, Norman.     Discussed basic overview of VAD equipment, on going management, need for anticoagulation, regular dressing change, grounded three-pronged outlet and functioning telephone. Also discussed frequency of follow-up clinic appointments and the need to stay locally for at least 2-4 weeks.  Reviewed restrictions of having a VAD such as no swimming, bathing, boats, MRI's, or arc welding.     Provided and discussed the VAD educational brochures, information regarding the VAD and transplant support groups, information on INTERMACs registry, and \"VAD What You Should Know\" with additional details. Patient and  signed \"VAD What You Should Know\" document (or agreed to have VAD Coordinator sign on their behalf). VAD coordinator contact information provided.  Encouraged patient, Boogie, & Norman to call with questions.       "

## 2020-08-12 NOTE — CONSULTS
Patient of Dr. Stephens seen in clinic for psychosocial assessment.   60 minutes spent with patient, her , at bedside, and with dtr, Norman, via phone. 100% of visit consisted of counseling related to issues surrounding LVAD.    Psychosocial Assessment   Name: Marquise Jj     MRN:  [id number]        Patient Name / Age / Race: Marquise Jj 75 year old    Source of Information: Patient and Family   : Brooklynn Hall [facility]   Interview Date: 2020   Reason for Referral: Mechanical Circulatory Support     Current Living Situation   Location ([facility]/State): [address] [place] [id number]   With Whom: Living arrangements - the patient lives with their spouse.   Type of Home: single family: ramp into home   Working Phone? Yes    Three Pronged Outlet? Yes    Distance from Hospital: 45 minutes   Need to Relocate Post Surgery? No   Discussed? Yes      Vocational/Employment/Financial   Employment: Retired in     Occupation: Pt is a retired RN (30yrs)   Education: High School + 4yr nursing degree    ? No   Income: SS custodial and other: spouses [employer] custodial   Insurance: Medicare   Name of Private Insurance: [employer]    Medication Coverage: No: Pt and family working with financial counselor to determine if she is eligible to apply for a part D plan as currently it is outside of enrollment period.     In current situation, pt. can afford medication and supply costs:  Yes    Other Financial Concerns/Issues: Pt,  and dtr deny financial concerns.      Family/Social Support   Marital Status:    Partner Name: Reyes    Length of Time : 56yrs    Partner s Employment: Retired from the [employer]    Relationship: stable/supportive   Children: 5 children: Uvaldo Gutierrez Chris, Andrea and Gino Gutierrez lives in [place] and all others live locally to pt   Parents:     Siblings: None    Other Family or Friends Close by: Friends     Support System: available, helpful   Primary Support Person: Pt's  and dtr, Norman    Issues: Pt's family very familiar with caregiver support as they have providing this support to pt since her MI, in [place], in March (2020). Pt's  and dtr, Norman, have essentially been providing 24hr care and supervision since after MI.      Activities/Functional Ability)   Current Level: Prior to MI, in March 2020, pt was very active. Pt did send a few weeks in rehab center, in [place]. Pt is currently needing standby assist walking around the house. Pt just starting being able to do the stairs, with assist. Pt using a walker for short distances and a w/c for community outings.  Pt requires assistance w/ medication management, bathing, transportation and cooking    Daily Activities: Pt tries    Transportation: Other: Family provides      Medical Status   Patient s understanding of need for surgical intervention: Good understanding.    Advanced Directive? No    Details: Will think about completing  Pt's  reports that family made collective decisions when pt had an MI.    Adherence History: Good    Ability to Adhere to Complex Medical Regime: Good      Behavioral   Chemical Dependency Issues: No: Reports social drinking prior to MI, but since has not drank anything. Pt denies drug use.    Smoker? No   Psychiatric impairment: Yes : Pt reports increase in anxiety since her MI. Dtr reports that pt is not sleeping well and she (pt) has shared with family she is scared she is not going to wake up. Discussed with palliative and will look into ways to improve anxiety. Pt has no hx of psychiatric hospitalizations or suicide attempts.    Coping Style/Strategies: Pt has great comfort in being around her family-has been able to communicate with them via video chat and  has been at bedside.    Recent Legal History: Yes    Teaching Completed During Assessment Related To Mechanical Circulatory Support: 1. Housing and  relocation needs post surgery.  2. Caregiver needs post surgery.  3. Financial issues related to surgery.  4. Risks of alcohol use post surgery.  5. Common psychosocial stressors pre/post surgery.  6. Support group availability.   Psychosocial Risks Reviewed Related To Mechanical Circulatory Support: 1. Increased stress related to your emotional, family, social, employment, or financial situation.  2. Affect on work and/or disability benefits.  3. Affect on future health and life insurance.  4. Outcome expectations may not be met.  5. Mental Health risks: anxiety, depression, PTSD, guilt, grief and chronic fatigue.     Observed Behavior   Well informed? Yes  Angry? No   Process info well? Yes  Hostile? No   Evasive? No Oriented X3? Yes    Cautious/Suspicious? No Motivated? Yes    Appropriate Behavior? Yes  Depressed? Yes : Depressed mood due to situation   Appropriate Affect? Yes  Anxious? Yes    Interview Observations: Pt anxious throughout assessment      Selection Criteria Met   Plan for Support Yes    Chemical Dependence Yes    Smoking Yes    Mental Health Yes    Adequate Finances Yes      Risk Issues:    -Writer would assess pt to be in the moderately frail category, prior to admission.     Final Evaluation/Assessment:     Pt being evaluated for LVAD. Writer met w/ pt and her , Reyes, at bedside and also spoke to dtr, Norman, via phone. Pt has a great support system and would have an established caregiver plan. Prior to hospitalization, pt's  and dtr were providing 24hr supervision and assistance. There are no concerns around chemical dependency and smoking. Pt would benefit from health psychology after LVAD, to help manage anxiety related to adjustment to illness.      From a psychosocial perspective, pt appears to be a good candidate to proceed with LVAD.     Frailty Score: Moderately Frail     Patient understands risks and benefits of Mechanical Circulatory Support: Yes      Recommendation:    good    Signature: Brooklynn Hall, [facility]     Title: Licensed Independent Clinical                Interview Date: August 12, 2020

## 2020-08-12 NOTE — PROGRESS NOTES
Cardiology Progress Note    Assessment & Plan   In summary, Marquise Jj is a 75-year-old female with a past medical history notable for coronary artery disease, ischemic cardiomyopathy, and known mural thrombus who was transferred from [facility] for further evaluation/treatment of cardiogenic shock.    Today:  - Wean NE, if not tolerated wean will go up on dobutamine     Neuro:   # Sedation:   - NTD    # Pain:  - Lidocaine patch  - Tylenol 325 q6hrs prn   - Buspirone 10mg tid sched    Cardio:  # Acute on chronic decompensated heart failure with reduced ejection fraction: NYHA IV / ACC D / INTERMACS 2-3  # ICM d/t CAD s/p CABG 4/2020 (KURT to RCA and LIMA to LAD) and PCI to RCA 7/20  # Mural thrombus  # Severe MR/TR   Presents with cardiogenic shock. On NE, cardiac index approximately 1.37 on admission. Severely elevated biventricular filling pressures. Etiology of her decompensation appears to be progression of her cardiomyopathy. Despite medical treatment, she has been hospitalized twice since returning to [place], both with low output. No viability in her LAD territory, and doubt that PCI to her circumflex would make meaningful LV recovery. Hemodynamics improved with dobutamine, NE, and aggressive diuresis. Currently undergoing pre-LVAD workup. CXR shows improved pulmonary edema. Did not tolerate stopping dobutamine. EP will plan for ablation pending approval for LVAD. Will wean NE today, titrate dobutamine as needed.   - Wean norepinephrine to MAP >60  - Stop torsemide 10mg po qd   - Continue dobutamine 3mcg/kg/hr, titrate as needed while weaning norepinephrine, max 5mcg/kg/hr  - Continue heparin ggt, high dose   - Continue amiodarone 0.5mg/kg/min  - Continue digoxin 125mcg  - Continue plavix 75mg po qd   - Continue to hold entresto   - Continue home crestor  - Hemodynamics q6hrs   - LVAD workup      SGz  - CXR: positioned in PA    Date 8/9 8/9 8/10 8/11    CVP 12 9 3 4    Mean PA  35 30 21 25     PCWP  18 14 12    Oxy HGB  59 64 61 61    CI/CO 2.3 2.3 2.4/4.2 2.3    SVR 1100 1100 1200 1400      Pulm:  # Acute hypoxic respiratory failure  Secondary to pulmonary edema bilateral pleural effusions. Improved with diuresis. No fever or leukocytosis to raise concern for pneumonia.   - Oximask for now, titrate to saturation of >92%     Renal/Electrolytes   # Hyperkalemia/Hypokalemia   # Acute kidney injury on chronic kidney disease, stage III  Chronic kidney disease likely driven by her cardiac dysfunction. No EKG changes. Acute worsening secondary to her cardiogenic shock. Given insulin and dextrose x2 and started on diuresis with improvement in potassium.   -Trend potassium and creatinine q12hrs  - Electrolyte replacement protocol     GI:  # Constipation  - Senna-docusate bid scheduled  - Miralax bid prn constipation     ID: NTD    Hem/Onc  # Coagulopathy  INR and Xa elevated d/t DOAC. PTT wnl. Will continue heparin GGT.   - Heparin ggt, high dose     Endo  # Hypothyroidism   - Continue PTA levothyroxine     DVT Prophylaxis: Heparin ggt  Code Status: Full Code    Miguelito Muir MD  Internal Medicine, PGY2  [facility] [id number], [id number]    Interval History   NAEON. Nursing notes reviewed. Breathing unchanged from yesterday. No chest pain. No N/V. No other concerns at this time.       Physical Exam   Temp: 97.8  F (36.6  C) Temp src: Axillary BP: 100/72   Heart Rate: 118 Resp: 18 SpO2: 98 % O2 Device: Nasal cannula Oxygen Delivery: 2 LPM  Vitals:    08/10/20 0400 08/11/20 0430 08/12/20 0000   Weight: 65.2 kg (143 lb 11.8 oz) 63.5 kg (139 lb 15.9 oz) 63.4 kg (139 lb 12.4 oz)     Vital Signs with Ranges  Temp:  [97.3  F (36.3  C)-97.8  F (36.6  C)] 97.8  F (36.6  C)  Heart Rate:  [] 118  Resp:  [16-24] 18  BP: (100)/(72) 100/72  MAP:  [54 mmHg-120 mmHg] 69 mmHg  Arterial Line BP: ()/(41-93) 100/53  SpO2:  [80 %-100 %] 98 %  I/O last 3 completed shifts:  In: 2023.25 [P.O.:1090; I.V.:933.25]  Out: 1460  [Urine:1460]    Heart Rate: 118, Blood pressure 100/72, pulse 72, temperature 97.8  F (36.6  C), temperature source Axillary, resp. rate 18, weight 63.4 kg (139 lb 12.4 oz), SpO2 98 %.  139 lbs 12.35 oz     Drips:  - Dobutamine 3mcg/kg/min  - NE 0.06mcg/kg/min  - Amiodarone 0.5mg/min   - Heparin ggt    GEN: AOx3, ND, appears tired but more talkative today   CV: Tachy but regular, no murmur or JVD   LUNGS: Clear to auscultation bilaterally  ABD: Active bowel sounds, soft, mild periumbilical tenderness that resolved on reexamination. No rebound/guarding/rigidity.  EXT: No LE edema.     NEURO: AOx3, CNII-XII intact, sensation/strength intact bilat upper and lower extremities     Medications     - MEDICATION INSTRUCTIONS -       amiodarone 0.5 mg/min (08/12/20 0800)     DOBUTamine 3 mcg/kg/min (08/12/20 0800)     HEParin 550 Units/hr (08/12/20 0800)     norepinephrine 0.04 mcg/kg/min (08/12/20 0700)     BETA BLOCKER NOT PRESCRIBED         aspirin  81 mg Oral Daily     bimatoprost  1 drop Both Eyes At Bedtime     busPIRone  10 mg Oral TID     clopidogrel  75 mg Oral Daily     digoxin  125 mcg Oral Daily     dorzolamide-timolol  1 drop Both Eyes BID     ferrous sulfate  325 mg Oral BID     latanoprost  1 drop Both Eyes Daily     levothyroxine  62.5 mcg Oral QAM AC     lidocaine  1 patch Transdermal Q24H     lidocaine   Transdermal Q8H     omeprazole  20 mg Oral Daily     QUEtiapine  50 mg Oral At Bedtime     rosuvastatin  20 mg Oral Daily     senna-docusate  2 tablet Oral BID       Data   Recent Labs   Lab 08/12/20  0355 08/11/20  1716 08/11/20  0411 08/10/20  1243  08/10/20  0403  08/08/20  2126  08/08/20  1248 08/08/20  0808   WBC 6.5  --  7.0 7.5  --  6.6   < > 8.4   < >  --   --    HGB 11.1*  --  12.0 11.7  --  11.3*   < > 11.2*   < >  --   --    MCV 85  --  86 87  --  86   < > 90   < >  --   --      --  272 265  --  255   < > 287   < >  --   --    INR 1.35*  --  1.48*  --   --  2.26*   < >  --    < >  --   --     *  --  132* 135  --  137   < > 133   < >  --   --    POTASSIUM 4.6 3.0* 3.8 3.5   < > 3.3*   < > 6.2*   < >  --   --    CHLORIDE 102  --  98 101  --  102   < > 105   < >  --   --    CO2 24  --  27 27  --  26   < > 20   < >  --   --    BUN 37*  --  38* 40*  --  45*   < > 55*   < >  --   --    CR 1.11*  --  1.14* 1.19*  --  1.30*   < > 1.58*   < >  --   --    ANIONGAP 7  --  7 7  --  10   < > 8   < >  --   --    FERNANDO 8.4*  --  8.4* 8.2*  --  8.4*   < > 9.1   < >  --   --    *  --  104* 108*  --  103*   < > 74   < >  --   --    ALBUMIN  --   --  2.3* 2.3*  --  2.3*   < > 2.3*   < >  --   --    PROTTOTAL  --   --  6.4* 6.4*  --  6.2*   < > 6.4*   < >  --   --    BILITOTAL  --   --  0.7 0.8  --  0.8   < > 0.7   < >  --   --    ALKPHOS  --   --  233* 242*  --  235*   < > 208*   < >  --   --    ALT  --   --  33 37  --  37   < > 40   < >  --   --    AST  --   --  29 37  --  34   < > 47*   < >  --   --    TROPI  --   --   --   --   --   --   --  0.028  --  0.018 0.026    < > = values in this interval not displayed.       Recent Results (from the past 24 hour(s))   US Lower Extremity Arterial Duplex Bilateral    Narrative    BILATERAL LOWER EXTREMITY DUPLEX ARTERIAL ULTRASOUND 8/11/2020    CLINICAL HISTORY: Heart failure. LVAD candidate.    COMPARISONS: None available.    REFERRING PROVIDER: TONI ELLIS MICHAEL    TECHNIQUE: Bilateral leg arteries evaluated with grayscale, color  Doppler, and Doppler waveform ultrasound.    FINDINGS: Arrhythmia.    RIGHT:       Common femoral artery: 102/18 cm/s, monophasic       Profundus femoral artery: 68/9 cm/s, monophasic         Superficial femoral artery, origin: 90/13 cm/s, monophasic       Superficial femoral artery, mid thigh: 86/20 cm/s, monophasic       Superficial femoral artery, distal thigh: 111/22 cm/s, monophasic         Popliteal artery: 128/11 cm/s, monophasic         Posterior tibial artery: 30/0 cm/s, triphasic       Anterior tibial artery: 39/4 cm/s,  triphasic    LEFT:       Common femoral artery: 77/10 cm/s, triphasic       Profundus femoral artery: 110/13 cm/s, triphasic         Superficial femoral artery, origin: 88/21 cm/s, monophasic       Superficial femoral artery, mid thigh: 84/17 cm/s, monophasic       Superficial femoral artery, distal thigh: 89/17 cm/s, monophasic         Popliteal artery: 95/9 cm/s, triphasic         Posterior tibial artery: 82/0 cm/s, biphasic       Anterior tibial artery: 86/12 cm/s, monophasic      Impression    IMPRESSION:  1. Arrhythmia and abnormal waveforms consistent with heart failure.  2. No significant arterial stenosis demonstrated.    Guidelines:  [facility] duplex criteria for lower limb arterial  occlusive disease  -Percent stenosis- Normal (1-19%): Peak systolic velocity (cm/s):  <150, End-diastolic velocity (cm/s): <40, Velocity ratio (Vr): <1.5,  Distal arterial waveform: Triphasic  -Percent stenosis- 20-49%: Peak systolic velocity (cm/s): 150-200,  End-diastolic velocity (cm/s): <40, Velocity ratio (Vr): 1.5-2.0,  Distal arterial waveform: Triphasic  -Percent stenosis- 50-75%: Peak systolic velocity (cm/s): 200-300,  End-diastolic velocity (cm/s): <90, Velocity ratio (Vr): 2.0-3.9,  Distal arterial waveform: Poststenotic turbulence distal to stenosis,  monophasic distal waveform  -Percent stenosis- >75%: Peak systolic velocity (cm/s): >300,  End-diastolic velocity (cm/s): <90, Velocity ratio (Vr): >4.0, Distal  arterial waveform: Dampened distal waveform and low PSV/EDV* in the  stenosis  -Percent stenosis- Occlusion: Absent flow by color Doppler/pulsed  Doppler spectral analysis; length of occlusion estimated from distance  between exit and reentry collateral arteries  *PSV = peak systolic velocity, EDV = end-diastolic velocity  http://link.johnson.com/chapter/10.1007/930-0-2817-4005-4_23/fulltext  html    CAMILO GRADY MD   US Abdomen Complete w Doppler Complete    Narrative    EXAMINATION: US  ABDOMEN COMPLETE WITH DOPPLER COMPLETE 8/11/2020 9:36  AM     COMPARISON: CT chest pelvis without contrast 8/10/2020.    HISTORY: Heart failure. LVAD planning.    TECHNIQUE: The abdomen was scanned in standard fashion with  specialized ultrasound transducer(s) using both gray-scale, color  Doppler, and spectral flow techniques.    Findings:  Liver: The liver demonstrates slightly heterogeneous echotexture.  Anechoic lesions consistent with cysts demonstrated in the liver. One  of these in the left lateral segment measures 1.9 x 1.7 x 4.6 cm. This  may contain some internal septations. There is also a cystic septated  lesion in the inferior right hepatic lobe measuring 3.8 x 2.8 x 2.7  cm.     Extrahepatic portal vein flow is antegrade at 20 cm/s.  Right portal vein flow is antegrade, measuring 19 cm/s.  Left portal vein flow is antegrade, measuring 12 cm/s.    Flow in the hepatic artery is towards the liver and:  40 cm/s peak systolic  0.87 resistive index.     The splenic vein is patent and flow is towards the liver.  The left,  middle, and right hepatic veins are patent with flow towards the IVC.  The IVC is patent with flow towards the heart measuring 2.3 cm.   The  visualized aorta is not dilated measuring 2 cm. The mid aorta measures  1.7 cm in the distal 1.2 cm. Right common iliac artery measures 0.9  cm. Left common iliac artery measures 1 cm. Atherosclerosis of the  aorta and iliac arteries noted.    Gallbladder: There is no wall thickening, pericholecystic fluid,  positive sonographic Schwartz's sign or evidence for cholelithiasis    Bile Ducts: Both the intra- and extrahepatic biliary system are of  normal caliber.  The common bile duct measures 4 mm.    Pancreas: Visualized portions of the head and body of the pancreas are  unremarkable.     Kidneys: Both kidneys are of normal echotexture, without definite mass  or hydronephrosis.   Renal lengths: right- 10 cm, left- 8.3 cm. This  might be slightly  foreshortened small bowel gas shadowing with limited  visualization of the left kidney.    Spleen: The spleen measures 11.1 cm in length.    Fluid: Bilateral pleural effusions.    Other: Rojo catheter in the bladder demonstrated. Correlation with  catheter function as there is currently 129 cc in the bladder.      Impression    Impression:   1.  Hepatic vessels are patent with appropriate directional flow.  2.  Liver is slightly heterogeneous with anechoic lesions consistent  with septated cysts, also noted on CT.  3.  Bilateral pleural effusions.  4.  Correlation with Rojo catheter function as bladder is distended  with 129 cc.    MORENO RODRIGUEZ MD   XR Chest Port 1 View    Narrative    XR CHEST PORT 1 VW on 8/12/2020 1:35 AM.    INDICATION: confirm swan.    COMPARISON: Radiograph dated 8/11/2020.    FINDINGS:   Portable AP semiupright radiograph of the chest. Median sternotomy  wires are intact. Mediastinal surgical clips. Right IJ [place]  catheter tip projects over the right pulmonary artery. Left chest ICD  with lead projecting over the right ventricle.    Trachea is clear. Cardiac silhouette is obscured. Pulmonary  vasculature is relatively indistinct. Aortic arch calcifications. No  pneumothorax. Bilateral small pleural effusions are unchanged. Diffuse  interstitial opacities are unchanged. Retrocardiac opacities are  unchanged. Degenerative changes of the spine.      Impression    IMPRESSION:   1. Right IJ [place] catheter tip projects over the right pulmonary  artery.  2. Bilateral small pleural effusions.  3. Diffuse interstitial edema.   4. Retrocardiac opacities may represent atelectasis versus  consolidation.    I have personally reviewed the examination and initial interpretation  and I agree with the findings.    CRYSTAL BLANKENSHIP MD   US CECI Doppler No Exercise    Narrative    RESTING ABIs 8/12/2020    CLINICAL HISTORY: LVAD workup.    COMPARISONS: None available.    REFERRING PROVIDER:  IVY CHRISTENSEN    TECHNIQUE: Bilateral ABIs obtained. No VPR or PPG obtained.    FINDINGS:  RIGHT:       Brachial: 96 mmHg       Ankle (PT): 74 mmHg       Ankle (DP): 92 mmHg         CECI: 0.96    LEFT:       Brachial: 93 mmHg       Ankle (PT): 91 mmHg       Ankle (DP): 103 mmHg         CECI: 1.07      Impression    IMPRESSION:  1. RIGHT CECI is borderline, 0.96.  2. LEFT CECI is normal, 1.07.    Guidelines:    CECI Diagnostic Criteria (Based on criteria published in Circulation  2011; 124: 7576-4650):    > 1.4: Non compressible    1.00 - 1.40: Normal    0.91 - 0.99: Borderline    At or below 0.90: Abnormal    CECI Diagnostic Criteria (Based on ACC/AHA guideline 2008):    >/=1.3 - non compressible vessels    1.00  -1.29 - Normal    0.91 - 0.99 - Borderline    0.41 - 0.90 - Mild to moderate PAD    0.00 - 0.40 - Severe PAD    CAMILO GRADY MD

## 2020-08-12 NOTE — PLAN OF CARE
Admitted/transferred from: 4E  Reason for admission/transfer: different room  Patient status upon admission/transfer: A/O X 4; AVSS. ISAACS.  Interventions: Up in chair, transfers with stand-by assist.  Plan: Continue same plan: LVAD work-up.  2 RN skin assessment: completed by Jacquelin Porras, RN and Christy Stephens RN.  Result of skin assessment and interventions/actions: Red but blancheable area mid-back on deb protuberance, covered by mepilex. Bright red toes, soles of feet and heels bilaterally but blancheable.  Height, weight, drug calc weight: 65.5 kg/ bed scale  Patient belongings (see Flowsheet - Adult Profile for details): cellphone, eyeglasses and case, bracelet all with patient  MDRO education (if applicable): NA

## 2020-08-12 NOTE — PLAN OF CARE
Major Shift Events:  Pt A&Ox4, able to make needs known, ISAACS, standby assist x1 OOB. Map goal >60, levo being titrated to achieve. [place] being complete q6hr, CVPs 8 and 9. CO 2.74 and 3.4, CI 1.6 and 2. SVR 1754 and 1282. PA pressures low 40's/low 30's. [place] @ 56. Pt on 2 LPM NC. C/o SOB at 0400, resolved when sitting more elevated in bed. Lungs clear/dim. Pt had large soft formed BM during shift in BSC. 2gm Na+ diet and 2L fluid restriction. Rojo in place, urine myriam and cloudy. Tylenol given per request. Amiodarone @ 0.5 mg/min, dobutamine @ 3 mcg/kg/min, Heparin @ 550 units/hr, levo @ 0.04 mcg/kg/min.  Plan: Continue LVAD workup, wean pressor as able, notify MD with acute changes in exam  For vital signs and complete assessments, please see documentation flowsheets.

## 2020-08-12 NOTE — PROGRESS NOTES
Calorie Count  Intake recorded for: 8/11  Total Kcals: 0 Total Protein: 0g  Kcals from Hospital Food: 0   Protein: 0g  Kcals from Outside Food (average):0 Protein: 0g  # Meals Recorded: 2 meals ordered, no food intake recorded  # Supplements Recorded: no intake recorded

## 2020-08-12 NOTE — PLAN OF CARE
Discharge Planner OT   Patient plan for discharge: Not discussed this session.   Current status: Pt supine inclined in bed upon arrival. Pt completed transfer supine->seated EOB with Min A and vc's. Pt completed transfer sit<->standing pivot transfer to bedside chair with CGA/Min A. RN present for swan and other line management. Pt completed self cares with setup, vc's and Min A. Pt completed 8 minutes of intermittent leg ergometer exercise with fair tolerance and rest breaks as needed. 2-3L O2 NC.   Barriers to return to prior living situation: Current medical status, Decreased strength/endurance. Fatigue.   Recommendations for discharge: Pending medical course.   Rationale for recommendations: Pt will benefit from continued therapy to address barriers above and to maximize functional independence.        Entered by: Tre Lockett 08/11/2020 10:44 PM

## 2020-08-13 NOTE — PLAN OF CARE
ICU End of Shift Summary. See flowsheets for vital signs and detailed assessment.    Changes this shift: A&Ox4. Tylenol given x1 for back pain. [place] removed. Dobutamine and nipride infusing, titrating nipride to keep MAP 65-75. NST and atrial flutter throughout the day. Heparin gtt. SOB this morning, improved when dobutamine increased and nipride added. 2-4L O2 via oxymask for comfort. Poor appetite. Encourage small meals/snacks. Soft BM today. Using bedside commode. Stand by assist.     Plan: determine status of LVAD workup, continue to monitor and notify team of changes

## 2020-08-13 NOTE — PLAN OF CARE
Discharge Planner OT   Patient plan for discharge: Not discussed.   Current status: Pt supine inclined in bed upon arrival. Pt required Min A and vc's for transfer supine<->seated EOB. Pt required Min A x 2 for transfer sit<->standing pivot transfer to bedside chair. Pt required extra assist for line management. Pt completed 10 minutes of leg ergometer exercise with Mild resistance. VSS.   Barriers to return to prior living situation: Current medical status, Fatigue, Deconditioning.   Recommendations for discharge: Pending medical course.   Rationale for recommendations: Pt will benefit from continued therapy to maximize functional independence, strength and endurance.        Entered by: Tre Lockett 08/12/2020 11:53 PM

## 2020-08-13 NOTE — PLAN OF CARE
Discharge Planner OT   Patient plan for discharge: Did not discuss.   Current status: Pt completes bed mobility IND. STS with CGA + v/c for hand and foot placement. Pt ambulates while pushing [place] x ~70', sits, ambulates ~70'. Mildly unsteady on feet but no LOB noted. Up in chair, VSS on 4L O2.   Barriers to return to prior living situation: Medical status, medical course, deconditioning.   Recommendations for discharge: Defer, pending medical course.   Rationale for recommendations: Pt will continue to benefit from OT to address activity tolerance, strength, and ADL I.        Entered by: Harini Khanna 08/13/2020 2:35 PM     OT/CR 4C

## 2020-08-13 NOTE — PROGRESS NOTES
The patient was seen for a video for neuropsychological evaluation at the request of Dr. Miguelito Muir , for the purposes of diagnostic clarification and treatment planning. 125 minutes of test administration and scoring were provided by this writer, Yao Sarmiento. Please see Dr. Caesar Hylton's report for a full interpretation of the findings.

## 2020-08-13 NOTE — PROGRESS NOTES
Patient:  Marquise Jj MRN:  [id number] :  45 DASH:  20   Education: 16 Handedness:  Right Provider: MOE Psychometrist:  PARESH   Station: 4C Age: 75 Visit Type: (tel/vid) Video Platform (if video) [place]   Orientation  WRAT-4  WAIS-IV Raw SS RDS   Personal Info 4 Raw 61 Digit Span 19 8 7   Place 2  Vocabulary 37 10    Time 0 %ile 53 Matrix Reasoning 7 8 Est VIQ   Presidents 4 Grade Equivalence 12.5 Similarities 20 9 98   BNT-15  COWAT  Animal Fluency      Raw 15 Form CFL Raw 12     z 0.90 Raw 18 SS/z 6     Total Stim Correct 0 SS/MAS/z 5 T 34     Total Phonemic Correct 0 %ile 3-5       Complex Ideational Material  Clock Drawing        Raw 12 Command Normal       SS 12 Copy Normal       T 54         TSAT          Total time 135 Z -1.13       Total Errors 0 Z 0.92       HVLT           Trial 1 6   T-Score   T-Score   Trial 2 8 Total Recall 23 50 True Positives 12    Trial 3 9 Delayed Recall 7 45 False Positives 3    Learning 3 Percent Retention 78 46 Discrim. Index 9 43   RBANS Story           Total Immediate 18 z Score 0.17 Scaled Score 11     Total Delay 4 z Score -2.27 Scaled Score 5     ILS Health and Safety Questionnaire         Total 37 Classification High       GDS          Total 12 Interpretation Mild

## 2020-08-13 NOTE — PROGRESS NOTES
Calorie Count  Intake recorded for: 8/12  Total Kcals: 0 Total Protein: 0g  Kcals from Hospital Food: 0   Protein: 0g  Kcals from Outside Food (average):0 Protein: 0g  # Meals Recorded: 1 meal ordered from kitchen, no intake recorded.   # Supplements Recorded: no intake recorded.

## 2020-08-13 NOTE — PLAN OF CARE
ICU End of Shift Summary. See flowsheets for vital signs and detailed assessment.    Changes this shift: Pt denies discomfort but c/o shortness of breath and was more comfortable sitting in chair at bedside for part of the night, did not require increase in O2 needs, but was more comfortable on oxymask instead of NC.  SvO2 levels intermittently low on VBGs, altering NEISHA readings.  CO was 2.3 and 3.8 overnight and CI was 1.3 and 2.2, higher SvO2 and better numbers on rechecks of VBG.  Dopamine gtt continues at 4mcg/kg/min, MAPs borderline low while sleeping, see flowsheets.  Heparin gtt titrated per protocal.    Plan: Continue to monitor and update MD with concerns and changes, continue q 6 hr hemodynamics and recheck PTT at 1145.    Problem: Adult Inpatient Plan of Care  Goal: Plan of Care Review  8/13/2020 0620 by Brianne Collins RN  Outcome: No Change  8/13/2020 0558 by Brianne Collins RN  Outcome: No Change  Goal: Patient-Specific Goal (Individualization)  8/13/2020 0620 by Brianne Collins RN  Outcome: No Change  8/13/2020 0558 by Brianne Collins RN  Outcome: No Change  Goal: Absence of Hospital-Acquired Illness or Injury  8/13/2020 0620 by Brianne Collins RN  Outcome: No Change  8/13/2020 0558 by Brianne Collins RN  Outcome: No Change  Goal: Optimal Comfort and Wellbeing  8/13/2020 0620 by Brianne Collins RN  Outcome: No Change  8/13/2020 0558 by Brianne Collins RN  Outcome: No Change  Goal: Readiness for Transition of Care  8/13/2020 0620 by Brianne Collins RN  Outcome: No Change  8/13/2020 0558 by Brianne Collins RN  Outcome: No Change  Goal: Rounds/Family Conference  8/13/2020 0620 by Brianne Collins RN  Outcome: No Change  8/13/2020 0558 by Brianne Collins RN  Outcome: No Change

## 2020-08-14 NOTE — TELEPHONE ENCOUNTER
Multidisciplinary review date: 8/14/20   Inclusion Criteria   Discussed VAD with patient and/or decision makers. Reviewed anticipated survival benefit, anticipated functional improvement, risks, and benefits. Patient and/or decision maker verbalize understanding and agree to VAD implant?: No   VAD is elective procedure?: YES    NYHA class: IV   INTERMACS score: 2   Patient has: acute onset critical illness with precipitous decline. Significant risk posed to patient by delaying VAD implant.   Additional detail: Failing dobutamine rapidly   Cardiopulmonary stress testing completed?: None related to acute illness   LVEF is: < 25%   Exclusion Criteria   Has condition, other than heart failure, which would limit survival to < 2 years?: No   Cardiomyopathy with restrictive physiology, unless severe LV systolic failure and LV dilation present?: No   Technical obstacles that pose and inordinately high surgical risk in the judgment of the MCS surgeon?: No   Active systemic infection? (unless ALL of following criteria met: negative blood cultures x 2 days, antibiotic treatment x 7 days and Infectious Disease clearance pre-operatively): No   Presence of active malignancy AND life expectancy < 2 years?: No   Stroke within the last six weeks, unless cleared by neurology team: No   Diagnosed with severe, irreversible pulmonary disease (supplemental O2 usage, FEV1 < 50% predicted): No   Pulmonary hypertension as a primary diagnosis: No   Chronic dialysis: No   Evidence of significant peripheral vascular disease accompanied by resting leg pain or ulceration unless treatment plan is in place: No (Comment: has some calcium on aorta- ABIs okay)   Carotid artery disease (>80% extracranial stenosis on Doppler) that could result in an adverse neurological event if left untreated: No   Evidence of an untreated abdominal aortic aneurysm >5 cm as measured by abdominal ultrasounds within the last 180 days unless treatment plan in place: No    Severe or irreversible hepatic disease, evidence of shock liver, and/o biopsy-proven cirrhosis: No   Active contraindication to anticoagulation, INR > 2.5 in absence of concurrent anticoagulation therapy, platelet < 50,000, history of intolerance to anticoagulation, or other coagulopathy unless Hematology consulted and plan is in place: No   Acute valvular infective endocarditis with bacteremia: No   Neuromuscular disease, psychiatric diagnosis, dementia or other process that severely compromises ability to use and care for external system components or to ambulate/exercise: No   Inability to understand the procedure, risk involved, or to comply with follow-up evaluation by VAD team: No   For menstruating females: Current pregnancy or unable/unwilling to follow contraception plan: Not applicable   Relative Contraindications   Alcohol and/or recreational drug abuse within past six months: No   Significant history of depression or other mental illness, refractory to therapy or thought to be a risk to successful outcome with MCS, as per assessment of trained professional: No   Demonstrated on-going non-compliance with demonstrated inability to comply with medical recommendations on multiple occasions: No   Unsafe living environment or lack of adequate support system: No   Lack of adequate insurance coverage or waiver by hospital administration: No   Evidence of other ongoing physical, financial, or psychosocial issues that will preclude the intended goal of safety and improvements in quality and duration of life, unless a plan for resolution and support is in process: No   Multidisciplinary Decision   Decision: Approved  Comment: She is very high risk- recent CABG, older, low albumin, LV thrombus- patient aware    Implant as: Destination Therapy   Ineligible for transplant reason: Age

## 2020-08-14 NOTE — PROGRESS NOTES
Cardiology Progress Note    Assessment & Plan   In summary, Marquise Jj is a 75-year-old female with a past medical history notable for coronary artery disease, ischemic cardiomyopathy, and known mural thrombus who was transferred from [facility] for further evaluation/treatment of cardiogenic shock.    Addendum:   - RH with CI of 1.1, patient with worsening dyspnea and peripheral cyanosis, plan for balloon pump and transfer to     Today:  - Continue hydralazine  - Hold nipride  - Continue dobutamine at 5  - Ceftriaxone IV x3 days UTI  - [facility]     Neuro:   # Sedation:   - NTD    # Pain:  - Lidocaine patch  - Tylenol 325 q6hrs prn   - Buspirone 10mg tid sched    Cardio:  # Acute on chronic decompensated heart failure with reduced ejection fraction: NYHA IV / ACC D / INTERMACS 2-3  # ICM d/t CAD s/p CABG 4/2020 (KURT to RCA and LIMA to LAD) and PCI to RCA 7/20  # Mural thrombus  # Severe MR/TR   Presents with cardiogenic shock. On NE, cardiac index approximately 1.37 on admission. Severely elevated biventricular filling pressures. Etiology of her decompensation appears to be progression of her cardiomyopathy. Despite medical treatment, she has been hospitalized twice since returning to [place], both with low output. No viability in her LAD territory, and doubt that PCI to her circumflex would make meaningful LV recovery. Hemodynamics improved with dobutamine, NE, and aggressive diuresis. Currently undergoing pre-LVAD workup. CXR shows improved pulmonary edema. Did not tolerate stopping dobutamine. EP will plan for ablation pending approval for LVAD.   - Continue dobutamine 5mcg/kg/hr,   - Continue heparin ggt, high dose   - Continue digoxin 125mcg  - Hold plavix 75mg po qd   - Continue hydralazine 25 mg TID  - Continue home crestor  - Hemodynamics q6hrs   - LVAD workup ongoing    SGz  - CXR: positioned in PA, removed today    Date 8/9 8/9 8/10 8/11 08/13/20   CVP 12 9 3 4 8   Mean PA  35 30 21 25 23    PCWP  18 14 12 x   Oxy HGB  59 64 61 61 60   CI/CO 2.3 2.3 2.4/4.2 2.3 2.2   SVR 1100 1100 1200 1400 1316     # A-flutter  New a-flutter after dobutamine initiation. EP consulted and recommend DCCV while in OR for LVAD after mural thrombus is removed.  - Continue amiodarone 400mg BID    Pulm:  # Acute hypoxic respiratory failure  Secondary to pulmonary edema bilateral pleural effusions. Improved with diuresis. No fever or leukocytosis to raise concern for pneumonia.   - Oximask for now, titrate to saturation of >92%     Renal/Electrolytes   # Hyperkalemia/Hypokalemia   # Acute kidney injury on chronic kidney disease, stage III  Chronic kidney disease likely driven by her cardiac dysfunction. No EKG changes. Acute worsening secondary to her cardiogenic shock. Given insulin and dextrose x2 and started on diuresis with improvement in potassium.   -Trend potassium and creatinine q12hrs  - Electrolyte replacement protocol     GI:  # Constipation  - Senna-docusate bid scheduled  - Miralax bid prn constipation     ID: NTD    Hem/Onc  # Mural thrombus   - Heparin ggt, high dose     Endo  # Hypothyroidism   - Continue PTA levothyroxine     DVT Prophylaxis: Heparin ggt  Code Status: Full Code    Miguelito Muir MD  Internal Medicine, PGY2  ASCOM [id number], [id number]    Interval History   Yesterday, required afterload reduction for flash pulmonary edema. Overnight, patient anxious. No other acute events. This morning, patient feels anxious and complains of mild dyspnea. Feels anxiety started before the dyspnea. Is afraid of death and that she might not make it to the VAD procedure. She denies chest pain. Feels edema is unchanged. No fevers/chills. No other concerns at this time.      Physical Exam   Temp: 96  F (35.6  C) Temp src: Oral BP: 102/61 Pulse: 109 Heart Rate: 94 Resp: 16 SpO2: 100 % O2 Device: Oxymask Oxygen Delivery: 2 LPM  Vitals:    08/12/20 0000 08/13/20 0045 08/14/20 0300   Weight: 63.4 kg (139 lb 12.4 oz) 63.1 kg (139  lb 1.8 oz) 64.4 kg (141 lb 15.6 oz)     Vital Signs with Ranges  Temp:  [96  F (35.6  C)-97  F (36.1  C)] 96  F (35.6  C)  Pulse:  [109] 109  Heart Rate:  [] 94  Resp:  [9-35] 16  BP: (102)/(61) 102/61  MAP:  [60 mmHg-116 mmHg] 71 mmHg  Arterial Line BP: ()/(38-79) 106/56  SpO2:  [82 %-100 %] 100 %  I/O last 3 completed shifts:  In: 559.21 [P.O.:90; I.V.:469.21]  Out: 590 [Urine:490; Emesis/NG output:100]    Heart Rate: 94, Blood pressure 102/61, pulse 109, temperature 96  F (35.6  C), temperature source Oral, resp. rate 16, weight 64.4 kg (141 lb 15.6 oz), SpO2 100 %.  141 lbs 15.62 oz     Drips:  - Dobutamine 5mcg/kg/min  - Heparin ggt    GEN: AOx3, ND, appears tired but more talkative today   CV: Tachy but regular, no murmur or JVD   LUNGS: Clear to auscultation bilaterally  ABD: Active bowel sounds, soft, mild periumbilical tenderness that resolved on reexamination. No rebound/guarding/rigidity.  EXT: Trace to 1+ LE edema to distal 1/3 of leg     NEURO: AOx3, CNII-XII grossly intact, moves all extremities     Medications     - MEDICATION INSTRUCTIONS -       DOBUTamine 5 mcg/kg/min (08/14/20 0812)     HEParin 450 Units/hr (08/14/20 0700)     nitroPRUsside (NIPRIDE) IV infusion ADULT/PEDS GREATER than or EQUAL to 45 kg std conc Stopped (08/14/20 0140)     BETA BLOCKER NOT PRESCRIBED         amiodarone  400 mg Oral BID     aspirin  81 mg Oral Daily     bimatoprost  1 drop Both Eyes At Bedtime     busPIRone  10 mg Oral TID     digoxin  125 mcg Oral Daily     dorzolamide-timolol  1 drop Both Eyes BID     ferrous sulfate  325 mg Oral BID     hydrALAZINE  25 mg Oral Q8H CHRISTINA     hydrOXYzine  10 mg Oral Once     latanoprost  1 drop Both Eyes Daily     levothyroxine  62.5 mcg Oral QAM AC     lidocaine  1 patch Transdermal Q24H     lidocaine   Transdermal Q8H     omeprazole  20 mg Oral Daily     QUEtiapine  50 mg Oral At Bedtime     rosuvastatin  20 mg Oral Daily     senna-docusate  2 tablet Oral BID        Data   Recent Labs   Lab 08/14/20  0356 08/13/20  0348 08/12/20  0355  08/11/20  0411 08/10/20  1243  08/08/20  2126  08/08/20  1248 08/08/20  0808   WBC 6.3 5.3 6.5  --  7.0 7.5   < > 8.4   < >  --   --    HGB 10.6* 10.5* 11.1*  --  12.0 11.7   < > 11.2*   < >  --   --    MCV 86 86 85  --  86 87   < > 90   < >  --   --     199 253  --  272 265   < > 287   < >  --   --    INR 1.29* 1.37* 1.35*  --  1.48*  --    < >  --    < >  --   --    * 132* 132*  --  132* 135   < > 133   < >  --   --    POTASSIUM 4.2 4.2 4.6   < > 3.8 3.5   < > 6.2*   < >  --   --    CHLORIDE 99 102 102  --  98 101   < > 105   < >  --   --    CO2 20 24 24  --  27 27   < > 20   < >  --   --    BUN 38* 36* 37*  --  38* 40*   < > 55*   < >  --   --    CR 1.13* 1.05* 1.11*  --  1.14* 1.19*   < > 1.58*   < >  --   --    ANIONGAP 11 6 7  --  7 7   < > 8   < >  --   --    FERNANDO 8.5 8.4* 8.4*  --  8.4* 8.2*   < > 9.1   < >  --   --    * 91 105*  --  104* 108*   < > 74   < >  --   --    ALBUMIN  --   --   --   --  2.3* 2.3*   < > 2.3*   < >  --   --    PROTTOTAL  --   --   --   --  6.4* 6.4*   < > 6.4*   < >  --   --    BILITOTAL  --   --   --   --  0.7 0.8   < > 0.7   < >  --   --    ALKPHOS  --   --   --   --  233* 242*   < > 208*   < >  --   --    ALT  --   --   --   --  33 37   < > 40   < >  --   --    AST  --   --   --   --  29 37   < > 47*   < >  --   --    TROPI  --   --   --   --   --   --   --  0.028  --  0.018 0.026    < > = values in this interval not displayed.       Recent Results (from the past 24 hour(s))   XR Chest Port 1 View    Narrative    Portable chest 8/13/2020 at 1135 hours    INDICATION: Shortness of breath    COMPARISON: 8/13/2020 0606 hours    FINDINGS: Continued bilateral costophrenic angle blunting with basilar  atelectasis. Implantable cardiac defibrillator again noted. Right IJ  Bourbon-Zia catheter tip is in the main pulmonary artery. No significant  changes. Her size remains normal. Median sternotomy  again evident.      Impression    IMPRESSION: No significant interval changes. Small bilateral pleural  effusions and basilar atelectasis.    MUNA BRADFORD MD   XR Chest Port 1 View    Narrative    Exam: XR CHEST PORT 1 VW, 8/13/2020 12:01 PM    Indication: swan advanced    Comparison: Chest radiograph 8/13/2020 1135    Findings:   AP views of the chest. Slight interval advancement of a Hornsby-Zia  catheter with the tip remaining in the main pulmonary artery.  Unchanged left chest wall ICD. Persistent basilar atelectasis and  small pleural effusions. Cardiac silhouette is partially obscured. No  acute findings in the upper abdomen. Degenerative changes of the  spine.      Impression    Impression:   1. Hornsby-Zia catheter advanced slightly, tip remains in the main  pulmonary artery.  2. Bibasilar atelectasis and small pleural effusions, unchanged.    I have personally reviewed the examination and initial interpretation  and I agree with the findings.    MUNA BRADFORD MD

## 2020-08-14 NOTE — PROGRESS NOTES
Cardiology Progress Note    Assessment & Plan   In summary, Marquise Jj is a 75-year-old female with a past medical history notable for coronary artery disease, ischemic cardiomyopathy, and known mural thrombus who was transferred from [facility] for further evaluation/treatment of cardiogenic shock.    Today:  - Started nipride drip and hydralazine for afterload reduction  - Increased dobutamine    Neuro:   # Sedation:   - NTD    # Pain:  - Lidocaine patch  - Tylenol 325 q6hrs prn   - Buspirone 10mg tid sched    Cardio:  # Acute on chronic decompensated heart failure with reduced ejection fraction: NYHA IV / ACC D / INTERMACS 2-3  # ICM d/t CAD s/p CABG 4/2020 (KURT to RCA and LIMA to LAD) and PCI to RCA 7/20  # Mural thrombus  # Severe MR/TR   Presents with cardiogenic shock. On NE, cardiac index approximately 1.37 on admission. Severely elevated biventricular filling pressures. Etiology of her decompensation appears to be progression of her cardiomyopathy. Despite medical treatment, she has been hospitalized twice since returning to [place], both with low output. No viability in her LAD territory, and doubt that PCI to her circumflex would make meaningful LV recovery. Hemodynamics improved with dobutamine, NE, and aggressive diuresis. Currently undergoing pre-LVAD workup. CXR shows improved pulmonary edema. Did not tolerate stopping dobutamine. EP will plan for ablation pending approval for LVAD.   - Discontinued norepinephrine  - Started nipride, MAP goal 65-75  - Continue dobutamine 5mcg/kg/hr,   - Continue heparin ggt, high dose   - Started oral amiodarone 400mg BID  - Continue digoxin 125mcg  - Continue plavix 75mg po qd   - Started hydralazine 25 mg TID  - Continue home crestor  - Hemodynamics q6hrs   - LVAD workup ongoing    SGz  - CXR: positioned in PA, removed today    Date 8/9 8/9 8/10 8/11 08/13/20   CVP 12 9 3 4 8   Mean PA  35 30 21 25 23   PCWP  18 14 12 x   Oxy HGB  59 64 61 61 60    CI/CO 2.3 2.3 2.4/4.2 2.3 2.2   SVR 1100 1100 1200 1400 1316     Pulm:  # Acute hypoxic respiratory failure  Secondary to pulmonary edema bilateral pleural effusions. Improved with diuresis. No fever or leukocytosis to raise concern for pneumonia.   - Oximask for now, titrate to saturation of >92%     Renal/Electrolytes   # Hyperkalemia/Hypokalemia   # Acute kidney injury on chronic kidney disease, stage III  Chronic kidney disease likely driven by her cardiac dysfunction. No EKG changes. Acute worsening secondary to her cardiogenic shock. Given insulin and dextrose x2 and started on diuresis with improvement in potassium.   -Trend potassium and creatinine q12hrs  - Electrolyte replacement protocol     GI:  # Constipation  - Senna-docusate bid scheduled  - Miralax bid prn constipation     ID: NTD    Hem/Onc  # Coagulopathy  INR and Xa elevated d/t DOAC. PTT wnl. Will continue heparin GGT.   - Heparin ggt, high dose     Endo  # Hypothyroidism   - Continue PTA levothyroxine     DVT Prophylaxis: Heparin ggt  Code Status: Full Code    Cameron Cook, [facility]  Cardiology Fellow.    Interval History   NAEON. Feels much improved with nipride and shortness of breath also better. However, still inotrope dependent and with severe dyspnea on minimal exertion.     Physical Exam   Temp: 96.5  F (35.8  C) Temp src: Oral BP: 99/66   Heart Rate: 103 Resp: (!) 32 SpO2: 98 % O2 Device: Oxymask Oxygen Delivery: 2 LPM  Vitals:    08/11/20 0430 08/12/20 0000 08/13/20 0045   Weight: 63.5 kg (139 lb 15.9 oz) 63.4 kg (139 lb 12.4 oz) 63.1 kg (139 lb 1.8 oz)     Vital Signs with Ranges  Temp:  [96.5  F (35.8  C)-98  F (36.7  C)] 96.5  F (35.8  C)  Heart Rate:  [] 103  Resp:  [9-35] 32  BP: (99)/(66) 99/66  MAP:  [57 mmHg-91 mmHg] 66 mmHg  Arterial Line BP: ()/(42-79) 101/52  SpO2:  [73 %-100 %] 98 %  I/O last 3 completed shifts:  In: 735.75 [P.O.:180; I.V.:555.75]  Out: 595 [Urine:595]    Heart Rate: 103, Blood pressure 99/66,  pulse 72, temperature 96.5  F (35.8  C), temperature source Oral, resp. rate (!) 32, weight 63.1 kg (139 lb 1.8 oz), SpO2 98 %.  139 lbs 1.76 oz     Drips:  - Dobutamine 5mcg/kg/min  - Nipride 0.756mcg/kg/min  - Heparin ggt    GEN: AOx3, ND, appears tired but more talkative today   CV: Tachy but regular, no murmur or JVD   LUNGS: Clear to auscultation bilaterally  ABD: Active bowel sounds, soft, mild periumbilical tenderness that resolved on reexamination. No rebound/guarding/rigidity.  EXT: No LE edema.     NEURO: AOx3, CNII-XII intact, sensation/strength intact bilat upper and lower extremities     Medications     - MEDICATION INSTRUCTIONS -       DOBUTamine 5 mcg/kg/min (08/13/20 2000)     HEParin 450 Units/hr (08/13/20 2000)     nitroPRUsside (NIPRIDE) IV infusion ADULT/PEDS GREATER than or EQUAL to 45 kg std conc 0.5 mcg/kg/min (08/13/20 2000)     BETA BLOCKER NOT PRESCRIBED         amiodarone  400 mg Oral BID     aspirin  81 mg Oral Daily     bimatoprost  1 drop Both Eyes At Bedtime     busPIRone  10 mg Oral TID     digoxin  125 mcg Oral Daily     dorzolamide-timolol  1 drop Both Eyes BID     ferrous sulfate  325 mg Oral BID     hydrALAZINE  25 mg Oral Q8H CHRISTINA     latanoprost  1 drop Both Eyes Daily     levothyroxine  62.5 mcg Oral QAM AC     lidocaine  1 patch Transdermal Q24H     lidocaine   Transdermal Q8H     omeprazole  20 mg Oral Daily     QUEtiapine  50 mg Oral At Bedtime     rosuvastatin  20 mg Oral Daily     senna-docusate  2 tablet Oral BID       Data   Recent Labs   Lab 08/13/20  0348 08/12/20  0355 08/11/20  1716 08/11/20  0411 08/10/20  1243  08/08/20  2126  08/08/20  1248 08/08/20  0808   WBC 5.3 6.5  --  7.0 7.5   < > 8.4   < >  --   --    HGB 10.5* 11.1*  --  12.0 11.7   < > 11.2*   < >  --   --    MCV 86 85  --  86 87   < > 90   < >  --   --     253  --  272 265   < > 287   < >  --   --    INR 1.37* 1.35*  --  1.48*  --    < >  --    < >  --   --    * 132*  --  132* 135   < > 133    < >  --   --    POTASSIUM 4.2 4.6 3.0* 3.8 3.5   < > 6.2*   < >  --   --    CHLORIDE 102 102  --  98 101   < > 105   < >  --   --    CO2 24 24  --  27 27   < > 20   < >  --   --    BUN 36* 37*  --  38* 40*   < > 55*   < >  --   --    CR 1.05* 1.11*  --  1.14* 1.19*   < > 1.58*   < >  --   --    ANIONGAP 6 7  --  7 7   < > 8   < >  --   --    FERNANDO 8.4* 8.4*  --  8.4* 8.2*   < > 9.1   < >  --   --    GLC 91 105*  --  104* 108*   < > 74   < >  --   --    ALBUMIN  --   --   --  2.3* 2.3*   < > 2.3*   < >  --   --    PROTTOTAL  --   --   --  6.4* 6.4*   < > 6.4*   < >  --   --    BILITOTAL  --   --   --  0.7 0.8   < > 0.7   < >  --   --    ALKPHOS  --   --   --  233* 242*   < > 208*   < >  --   --    ALT  --   --   --  33 37   < > 40   < >  --   --    AST  --   --   --  29 37   < > 47*   < >  --   --    TROPI  --   --   --   --   --   --  0.028  --  0.018 0.026    < > = values in this interval not displayed.       Recent Results (from the past 24 hour(s))   XR Chest Port 1 View    Narrative    XR CHEST PORT 1 VW on 8/13/2020 6:15 AM.    INDICATION: confirm swan.    COMPARISON: Radiograph dated 8/12/2020.    FINDINGS:   Portable AP upright radiograph of the chest. Median sternotomy wires  are intact. Mediastinal surgical clips. Right IJ [place]-Zia catheter  tip projects over the main pulmonary artery, slightly retracted. Left  chest ICD with lead projecting over the right ventricle.    Trachea is clear. Cardiac silhouette is stable. Pulmonary vasculature  is relatively indistinct. Aortic arch calcifications. No pneumothorax.  Bilateral small pleural effusions are unchanged. Mild diffuse  interstitial opacities. Retrocardiac opacities are unchanged.  Degenerative changes of the spine.      Impression    IMPRESSION:   1. Right IJ [place]-Zia catheter tip projects over the main pulmonary  artery, slightly retracted.  2. Bilateral small pleural effusions.  3. Mild diffuse interstitial edema.   4. Retrocardiac opacities are  unchanged.    I have personally reviewed the examination and initial interpretation  and I agree with the findings.    MORENO RODRIGUEZ MD   XR Chest Port 1 View    Narrative    Portable chest 8/13/2020 at 1135 hours    INDICATION: Shortness of breath    COMPARISON: 8/13/2020 0606 hours    FINDINGS: Continued bilateral costophrenic angle blunting with basilar  atelectasis. Implantable cardiac defibrillator again noted. Right IJ  [place]-Zia catheter tip is in the main pulmonary artery. No significant  changes. Her size remains normal. Median sternotomy again evident.      Impression    IMPRESSION: No significant interval changes. Small bilateral pleural  effusions and basilar atelectasis.    MUNA BRADFORD MD   XR Chest Port 1 View    Narrative    Exam: XR CHEST PORT 1 VW, 8/13/2020 12:01 PM    Indication: swan advanced    Comparison: Chest radiograph 8/13/2020 1135    Findings:   AP views of the chest. Slight interval advancement of a [place]-Zia  catheter with the tip remaining in the main pulmonary artery.  Unchanged left chest wall ICD. Persistent basilar atelectasis and  small pleural effusions. Cardiac silhouette is partially obscured. No  acute findings in the upper abdomen. Degenerative changes of the  spine.      Impression    Impression:   1. [place]-Zia catheter advanced slightly, tip remains in the main  pulmonary artery.  2. Bibasilar atelectasis and small pleural effusions, unchanged.    I have personally reviewed the examination and initial interpretation  and I agree with the findings.    MUNA BRADFORD MD

## 2020-08-14 NOTE — PLAN OF CARE
ICU End of Shift Summary. See flowsheets for vital signs and detailed assessment.    Changes this shift: Nipride drip off at 0140. Dobutamine continues. Pt nauseous x1, vomited up evening meds, gave compazine. Gave one time dose of atarax for anxiety. Up to chair several times. Pt has dry cough new as of today. 2 VBGs sent.    Plan: Continue to monitor and notify team of any changes.

## 2020-08-14 NOTE — PLAN OF CARE
ICU End of Shift Summary. See flowsheets for vital signs and detailed assessment.    Changes this shift: A&O, neurologically intact.  Extremely anxious-atarax and Seroquel not helping.  Hypothermic (refusing chavez-hugger).  RA-2LPM oxymask.  LS: clear, diminished in bases.  ST/Aflutter/Afib c murmur.  Maintaining MAPs btw 65-75.  MAPs decreasing throughout the day(MD's aware).  2g Na+/2L fluid restriction.  Minimal UO.  1BM.  Cath lab in AM to get NEISHA #s-->increased Dobutamine.  Cath lab in PM for IABP.    Plan: 4E post-procedure.  LVAD Monday.

## 2020-08-14 NOTE — PROGRESS NOTES
Cardiology Progress Note    Assessment & Plan   In summary, Marquise Jj is a 75-year-old female with a past medical history notable for coronary artery disease, ischemic cardiomyopathy, and known mural thrombus who was transferred from [facility] for further evaluation/treatment of cardiogenic shock. Balloon pump placed 8/14. Plan for LVAD 8/19.     Today's changes:  - Restart heparin ggt for mural thrombus   - Stop digoxin given elevated dig level   - Reduce amiodarone to 200mg qd   - Continue dobutamine 6mcg/kg/min  - Start nipride ggt with max of 2mcg/kg/min  - Stop date on ceftriaxone 8/18  - LVAD currently scheduled for 8/19  - Diet order    Neuro:   # Sedation:   - NTD    # Pain:  - Lidocaine patch  - Tylenol 325 q6hrs prn   - Buspirone 10mg tid sched    Cardio:  # Cardiogenic shock   # ICM: NYHA IV / ACC D / INTERMACS 2-3  # Severe MR/TR   Presents with cardiogenic shock. On NE, cardiac index approximately 1.37 on admission. Severely elevated biventricular filling pressures. Etiology of her decompensation appears to be progression of her cardiomyopathy. Despite medical treatment, she has been hospitalized twice since returning to [place], both with low output. No viability in her LAD territory, and doubt that PCI to her circumflex would make meaningful LV recovery. Hemodynamics improved with dobutamine, did not tolerate attempt to wean inotropics. RHC removed on 8/13 after clotting off, replaced on 8/14 after patient with increased work of breathing. CI of 1.1, balloon pump placed with CI of 1.6-1.8 and improvement in symptoms, 2.2 this morning. MAPs 90 with SVR 1900. Will start nipride for additional afterload reduction, transition to hydralazine tomorrow (8/15).   - Balloon pump, 1:1, appropriate placement on imaging   - Continue dobutamine 6mcg/kg/hr  - Start nipride max 2mcg/kg/hr, wean to MAP >65 and <85  - Hemodynamics q6hrs   - LVAD scheduled for 8/15     Date 8/9 8/9 8/10 8/11  08/13/20 08/15/20   CVP 12 9 3 4 8 9   Mean PA  35 30 21 25 23 21   PCWP  18 14 12 x 13   Oxy HGB  59 64 61 61 60 47   CI/CO 2.3 2.3 2.4/4.2 2.3 2.2 1.8   SVR 1100 1100 1200 1400 1316 1900     # CAD s/p CABG 4/2020 (KURT to RCA and LIMA to LAD) and PCI to RCA 7/20  - Stop home plavix po qd (8/13) for LVAD surgery  - Continue home aspirin 81mg po qd   - Continue home crestor    # Mural thrombus  - Restart heparin ggt     # A-flutter  New a-flutter following  An ablation can be considered; however, if LVAD is pursued, would consider surgical removal of LV thrombus and then DCCV while in OR. Rate controlled following balloon pump insertion.   - Reduce to amiodarone 200mg po qd    - Stop digoxin 125mcg po qd    Pulm:  # Acute hypoxic respiratory failure  Secondary to pulmonary edema bilateral pleural effusions. Improved with diuresis. No fever or leukocytosis to raise concern for pneumonia. Increased work of  Breathing better following balloon pump placement.   - Oximask for now, titrate to saturation of >92%     Renal/Electrolytes   # Hyperkalemia/Hypokalemia   # Acute kidney injury on chronic kidney disease, stage III  Chronic kidney disease likely driven by her cardiac dysfunction. No EKG changes. Acute worsening secondary to her cardiogenic shock. Given insulin and dextrose x2 and started on diuresis with improvement in potassium.   -Trend potassium and creatinine q12hrs  - Electrolyte replacement protocol  - Monitor UOP following balloon pump placement 8/14     GI:  # Constipation  - Senna-docusate bid scheduled  - Miralax bid prn constipation     ID:   # UTI vs ASB  Elect to treat given balloon pump and plan for LVAD  - Ceftriaxone 1g IV qd (8/14 - 8/18)     Hem/Onc  # Mural thrombus   - Heparin ggt, high dose     # Anemia, mild  D/t frequent blood draws and procedures  - Monitor hgb    Endo  # Hypothyroidism   - Continue PTA levothyroxine     DVT Prophylaxis: Heparin ggt  Code Status: Full Code    Miguelito Muir  MD  Internal Medicine, PGY2  ASCOM [id number], [id number]    Interval History   Balloon pump placed yesterday with CI of 1.6. [place] and arterial line dysfunction overnight. Patient otherwise feeling improved. No other acute events. Nursing notes reviewed. Nurse flushed swan this morning with normal function afterwards. Patient states she feels better this morning. Denies difficulty breathing or chest pain. Denies belly pain or pain in the region of the BP line insertion site. No fevers/chills. Expresses no other concerns at this time.     Physical Exam   Temp: 96  F (35.6  C) Temp src: Oral BP: 102/61 Pulse: 109 Heart Rate: 94 Resp: 16 SpO2: 100 % O2 Device: Oxymask Oxygen Delivery: 2 LPM  Vitals:    08/12/20 0000 08/13/20 0045 08/14/20 0300   Weight: 63.4 kg (139 lb 12.4 oz) 63.1 kg (139 lb 1.8 oz) 64.4 kg (141 lb 15.6 oz)     Vital Signs with Ranges  Temp:  [96  F (35.6  C)-97  F (36.1  C)] 96  F (35.6  C)  Pulse:  [109] 109  Heart Rate:  [] 94  Resp:  [9-35] 16  BP: (102)/(61) 102/61  MAP:  [60 mmHg-116 mmHg] 71 mmHg  Arterial Line BP: ()/(38-79) 106/56  SpO2:  [82 %-100 %] 100 %  I/O last 3 completed shifts:  In: 559.21 [P.O.:90; I.V.:469.21]  Out: 590 [Urine:490; Emesis/NG output:100]    Heart Rate: 94, Blood pressure 102/61, pulse 109, temperature 96  F (35.6  C), temperature source Oral, resp. rate 16, weight 64.4 kg (141 lb 15.6 oz), SpO2 100 %.  141 lbs 15.62 oz     Drips:  - Dobutamine 5mcg/kg/min  - Heparin ggt    Balloon pump:  - Set to ECG  - Mean pressure 68 - 89  - Augmented diastolic pressure 100 - 145  - Appropriate position on repeat CXR    SG:  - Positioned in PA on CXR  - CVP 9, mean PA 21, PCWP 13    GEN: AOx3, ND  CV: Regular rate, irregular rhythm, no murmur or JVD   LUNGS: Clear to auscultation bilaterally  ABD: Active bowel sounds, soft, mild periumbilical tenderness that resolved on reexamination. No rebound/guarding/rigidity.  EXT: Trace to 1+ LE edema to distal 1/3 of leg     SKIN: Right  fem insertion site with old blood, no hematoma, no active bleeding; some fluctuance around left radial arterial line without surrounding erythema or active bleeding, right SG line without active bleeding or surrounding erythema  NEURO: AOx3, CNII-XII grossly intact, moves all extremities     Medications     - MEDICATION INSTRUCTIONS -       DOBUTamine 5 mcg/kg/min (08/14/20 0812)     HEParin 450 Units/hr (08/14/20 0700)     nitroPRUsside (NIPRIDE) IV infusion ADULT/PEDS GREATER than or EQUAL to 45 kg std conc Stopped (08/14/20 0140)     BETA BLOCKER NOT PRESCRIBED         amiodarone  400 mg Oral BID     aspirin  81 mg Oral Daily     bimatoprost  1 drop Both Eyes At Bedtime     busPIRone  10 mg Oral TID     digoxin  125 mcg Oral Daily     dorzolamide-timolol  1 drop Both Eyes BID     ferrous sulfate  325 mg Oral BID     hydrALAZINE  25 mg Oral Q8H CHRISTINA     hydrOXYzine  10 mg Oral Once     latanoprost  1 drop Both Eyes Daily     levothyroxine  62.5 mcg Oral QAM AC     lidocaine  1 patch Transdermal Q24H     lidocaine   Transdermal Q8H     omeprazole  20 mg Oral Daily     QUEtiapine  50 mg Oral At Bedtime     rosuvastatin  20 mg Oral Daily     senna-docusate  2 tablet Oral BID       Data   Recent Labs   Lab 08/14/20  0356 08/13/20  0348 08/12/20  0355  08/11/20  0411 08/10/20  1243  08/08/20  2126  08/08/20  1248 08/08/20  0808   WBC 6.3 5.3 6.5  --  7.0 7.5   < > 8.4   < >  --   --    HGB 10.6* 10.5* 11.1*  --  12.0 11.7   < > 11.2*   < >  --   --    MCV 86 86 85  --  86 87   < > 90   < >  --   --     199 253  --  272 265   < > 287   < >  --   --    INR 1.29* 1.37* 1.35*  --  1.48*  --    < >  --    < >  --   --    * 132* 132*  --  132* 135   < > 133   < >  --   --    POTASSIUM 4.2 4.2 4.6   < > 3.8 3.5   < > 6.2*   < >  --   --    CHLORIDE 99 102 102  --  98 101   < > 105   < >  --   --    CO2 20 24 24  --  27 27   < > 20   < >  --   --    BUN 38* 36* 37*  --  38* 40*   < > 55*   < >  --   --    CR 1.13*  1.05* 1.11*  --  1.14* 1.19*   < > 1.58*   < >  --   --    ANIONGAP 11 6 7  --  7 7   < > 8   < >  --   --    FERNANDO 8.5 8.4* 8.4*  --  8.4* 8.2*   < > 9.1   < >  --   --    * 91 105*  --  104* 108*   < > 74   < >  --   --    ALBUMIN  --   --   --   --  2.3* 2.3*   < > 2.3*   < >  --   --    PROTTOTAL  --   --   --   --  6.4* 6.4*   < > 6.4*   < >  --   --    BILITOTAL  --   --   --   --  0.7 0.8   < > 0.7   < >  --   --    ALKPHOS  --   --   --   --  233* 242*   < > 208*   < >  --   --    ALT  --   --   --   --  33 37   < > 40   < >  --   --    AST  --   --   --   --  29 37   < > 47*   < >  --   --    TROPI  --   --   --   --   --   --   --  0.028  --  0.018 0.026    < > = values in this interval not displayed.       Recent Results (from the past 24 hour(s))   XR Chest Port 1 View    Narrative    Portable chest 8/13/2020 at 1135 hours    INDICATION: Shortness of breath    COMPARISON: 8/13/2020 0606 hours    FINDINGS: Continued bilateral costophrenic angle blunting with basilar  atelectasis. Implantable cardiac defibrillator again noted. Right IJ  [place]-Zia catheter tip is in the main pulmonary artery. No significant  changes. Her size remains normal. Median sternotomy again evident.      Impression    IMPRESSION: No significant interval changes. Small bilateral pleural  effusions and basilar atelectasis.    MUNA BRADFORD MD   XR Chest Port 1 View    Narrative    Exam: XR CHEST PORT 1 VW, 8/13/2020 12:01 PM    Indication: swan advanced    Comparison: Chest radiograph 8/13/2020 1135    Findings:   AP views of the chest. Slight interval advancement of a [place]-Zia  catheter with the tip remaining in the main pulmonary artery.  Unchanged left chest wall ICD. Persistent basilar atelectasis and  small pleural effusions. Cardiac silhouette is partially obscured. No  acute findings in the upper abdomen. Degenerative changes of the  spine.      Impression    Impression:   1. [place]-Zia catheter advanced slightly,  tip remains in the main  pulmonary artery.  2. Bibasilar atelectasis and small pleural effusions, unchanged.    I have personally reviewed the examination and initial interpretation  and I agree with the findings.    MUNA BRADFORD MD

## 2020-08-14 NOTE — PROGRESS NOTES
CLINICAL NUTRITION SERVICES - REASSESSMENT NOTE   Nutrition Prescription    RECOMMENDATIONS FOR MDs/PROVIDERS TO ORDER:  1. Recommend 100 mg thiamine daily for reduced EF and MVI with minerals (Thera-Vit-M) given suspect inadequate PO.     2. Recommend pt on average meets at least 75% minimum assessed needs (~1180 kcal, 57 g pro) to avoid additional nutrition interventions such as additional scheduled snacks/supplements vs EN support.     Malnutrition Status:    Unable to determine due to unable to complete all parameters of nutrition assessment at this time. Minimally at risk of malnutrition.    Recommendations already ordered by Registered Dietitian (RD):  -Continue kcal cts as ordered by provider (8/15-8/17)  -Continue snacks/supplements as ordered on 8/12 by colleague (pt unavailable today - in Heart Cath).     Future/Additional Recommendations:  1. Monitor kcal cts and snack/supplement use  2. If EN becomes nutrition POC, rec via gastric access if no N/V vs post-pyloric if N/V:  Nutren 1.5 @ 50 mL/hr to provide 1800 kcals (29 kcal/kg/day), 82 g PRO (1.3 g/kg/day), 912 mL H2O, 211 g CHO and no fiber daily.  - Initiate @ 10 mL/hr and advance by 10 mL q8hr as tolerated  - Do not start or advance unless lytes (Mg++/K+) >/= WNL and phos>1.9   - 30 mL q4hr fluid flushes for tube patency. Additional fluids and/or adjustments per MD.    - Multivitamin/mineral (15 mL/day via FT) to help ensure micronutrient needs being met with suspected hypermetabolic demands and potential interruptions to TF infusions.  - Aspiration precautions with gastric feeds       EVALUATION OF THE PROGRESS TOWARD GOALS   Diet: NPO (previously on 2 gm Na+ diet) + strawberry yogurt @ 10am + chocolate magic cup @ 2pm + string cheese @ 8pm + 2000 mL FR + kcal cts 8/15-8/17    Oral Intake: Pt nauseated and vomited evening meds overnight per RN chart note. Unable to reach patient in Heart Cath Lab at time of attempt.  -Per RN flowsheets: Limited PO  data since admit. On 8/13, ate 25% of a yogurt, 100% of milkshake made with home-supplied milk and 2 ice creams and macaroni & cheese and carrots. Today, ate 25% of a meal at 0800.    Kcal cts 8/10-8/12  Intake recorded for: 8/10       Total Kcals: 0           Total Protein: 0g  Intake recorded for: 8/11       Total Kcals: 0           Total Protein: 0g  Intake recorded for: 8/12       Total Kcals: 0           Total Protein: 0g  -->No intake recorded, but meals ordered from kitchen - suspect pt might have eaten something, but unable to confirm.      NEW FINDINGS     -Wt trends: Wt overall down from admit (pt on diuresis, so possibly confounded by fluid loss). 7% wt loss over past month (based on wt of 68.3 kg on 7/16).  08/14/20 0300  64.4 kg (141 lb 15.6 oz)    08/13/20 0045  63.1 kg (139 lb 1.8 oz) - lowest/driest wt this admit   08/12/20 0000  63.4 kg (139 lb 12.4 oz)    08/11/20 0430  63.5 kg (139 lb 15.9 oz)    08/10/20 0400  65.2 kg (143 lb 11.8 oz)    08/09/20 0600  68.5 kg (151 lb 0.2 oz)       -Labs: Reviewed, notable for: Na+ 130 (L), K+ 4.2 (WNL), BUN 38 (H), Cr 1.13 (H), Mg++ 2.4 (H)    -Cardio: Acute on chronic decompensated heart failure with reduced ejection fraction: NYHA IV; undergoing pre-LVAD workup.    -GI: Constipation on bowel regimen - LBM 8/13 per I/Os.     -Renal: LORI on CKD stage 3 on diuresis    -Respiratory: Oximask    -Skin: No WOCN notes, but RN flowsheets note a potential pressure injury on spine    -Meds & Vitamin/Mineral Supplementation: Reviewed, notable for: Ferosul 325 mg, levothyroxine, omeprazole, senna-docusate BID    NEW Dosing Weight: 63 kg (actual, based on driest weight of 63.1 kg on 8/13)    Updated ASSESSED NUTRITION NEEDS   Estimated Energy Needs: 7586-4342 kcals/day (25 - 30 kcals/kg)   Justification: Maintenance   Estimated Protein Needs: 76-95 grams protein/day (1.2 - 1.5 grams of pro/kg)   Justification: Increased needs   Estimated Fluid Needs: 2000 mL    Justification: On a fluid restriction     MALNUTRITION**Nutrition focused physical exam available per MD request. --> Unable to obtain in-person nutrition history or nutrition focused physical assessment (NFPA) from patient as the number of staff going into rooms is restricted to limit exposure and to minimize use of PPE.  % Intake: Unable to assess - suspect eating <75% needs since at least admit given poor kcal ct data  % Weight Loss: > 5% in 1 month (severe) - ongoing from PTA  Subcutaneous Fat Loss: Unable to assess  Muscle Loss: Unable to assess  Fluid Accumulation/Edema: Does not meet criteria (1+ trace edema per RN flowsheets)  Malnutrition Diagnosis: Unable to determine due to unable to complete all parameters of nutrition assessment at this time.    Previous Goals   Patient to consume % of nutritionally adequate meal trays TID, or the equivalent with supplements/snacks.  Evaluation: Not met    Previous Nutrition Diagnosis  Inadequate oral intake related to poor appetite during recent admission due to very restricted diet as evidenced by weight loss 8.6% x 1 month.   Evaluation: No change, details updated    CURRENT NUTRITION DIAGNOSIS  Inadequate oral intake related to ongoing poor appetite and recent N/V as evidenced by continued weight loss 7% x 1 month.     INTERVENTIONS  Implementation  -Medical food supplement therapy: Continue  -Nutrition education: Post-VAD ed by colleague on 8/12; unable to reach pt as in Heart Cath Lab in afternoon.    Goals  Total avg nutritional intake to meet a minimum of 25 kcal/kg and 1.2 g PRO/kg daily (per dosing wt 63 kg).    Monitoring/Evaluation  Progress toward goals will be monitored and evaluated per protocol.     Megan Kauffman RD, LD  Pager: [phone]

## 2020-08-15 NOTE — PROGRESS NOTES
Admitted/transferred from: cath lab  Reason for admission/transfer: IABP monitoring and pressor requirement  2 RN skin assessment: completed by Pramod SMITH and Sobia MULLER  Result of skin assessment and interventions/actions: Small pre-existing suspected pressure injury along patient's spine with blanchable redness, covered with mepilex. Bruising and small skin tears along patient's arms. Bilateral feet red and nathan, patient states this is baseline. Foam dressing applied to each heel for skin protection. Repositioned every 2 hours with turn and weight shift per patient request.   Height, weight, drug calc weight: done  Patient belongings patient's cell phone and glasses at bedside, jewelry remains on patient per request  MDRO education added to care plan NA  ?

## 2020-08-15 NOTE — PLAN OF CARE
Major Shift Events: Nipride started this shift for added afterload reduction following an SVR of 2406 in the AM. Improved to 1335 in afternoon. PO amio dose decreased for HR in 50s. Arterial line removed this shift d/t occlusion. IABP remains 1:1 100% with assisted pressures ranging 60-70s/30-40s and augmented diastolic pressures ranging 120-140s, then 90-110s following nipride. Heparin gtt restarted this shift at 1250 units/hour, but reduced to 1150 units/hour per 10A protocol. Next check due at 2245. Pt vitally stable, oriented, and not c/o pain. Continues on dobutamine at 6, nipride 0.75, and heparin 1150.   Plan: Possible transition to PO hydralazine tomorrow. Plan for LVAD implant 08/18. Continue to monitor & update primary team with changes.   For vital signs and complete assessments, please see documentation flowsheets.

## 2020-08-15 NOTE — PROGRESS NOTES
Assisted with IABP insertion in cathlab. Patient has 50cc, 8FR with size 9 sheath. Patient is transferred up to 4E room 508.

## 2020-08-15 NOTE — PLAN OF CARE
Major Shift Events:  Awake and oriented x 4. PRN tylenol and one time dose of oxy given for incisional pain. IABP 1:1 overnight with no alarms. Arterial line unable to draw blood or measure accurate BP at 0400, MD notified. Okay to use cuff pressures and IABP. Hemodynamic monitoring overnight, see charted numbers. Unable to draw back from PA at 0400, MD notified and aware. Stable on 2L nasal cannula overnight. No BM. Swallowing meds safely. Small UOP. Skin fragile, feet nathan. Mepilex for skin protection.   Plan: LVAD workup  For vital signs and complete assessments, please see documentation flowsheets.

## 2020-08-15 NOTE — PLAN OF CARE
Discharge Planner OT   Patient plan for discharge: Pending medical course.   Current status: Pt supine inclined in bed upon arrival. Pt required CGA and vc's for transfer supine<->seated EOB with RN present to assist with swan. Pt completed 3 BUE AROM exercises x 10 reps with fair tolerance though fatigue and SOB. VSS.   Barriers to return to prior living situation: Current medical status.   Recommendations for discharge: Pending medical course, Will likely need rehab post LVAD.   Rationale for recommendations: Pt will benefit from continued therapy while IP to maximize function.        Entered by: Tre Lockett 08/14/2020 10:41 PM

## 2020-08-16 NOTE — PROGRESS NOTES
CLINICAL NUTRITION SERVICES - BRIEF NOTE    Nutrition Prescription    RECOMMENDATIONS FOR MDs/PROVIDERS TO ORDER:  - Recommend placement of gastric access/NG per RN. Discussed with cardiology.  - Continue diet as ordered; pt to continue with oral PO as tolerated and appropriate.     Recommendations already ordered by Registered Dietitian (RD):  - Okay to continue supplements; donald counts with oral PO  - Ordered 100 mg thiamine for reduced EF  - Once enteral access/NG placed and verified for use: recommend Nutren 1.5 @ 50 mL/hr to provide 1800 kcals (29 kcal/kg/day), 82 g PRO (1.3 g/kg/day), 912 mL H2O, 211 g CHO and no fiber daily.  - Initiate @ 10 mL/hr and advance by 10 mL q8hr as tolerated  - Do not start or advance unless lytes (Mg++/K+) >/= WNL and phos>1.9   - 30 mL q4hr fluid flushes for tube patency. Additional fluids and/or adjustments per MD.    - Multivitamin/mineral (15 mL/day via FT) to help ensure micronutrient needs being met with suspected hypermetabolic demands and potential interruptions to TF infusions.  - Aspiration precautions with gastric feeds    Future/Additional Recommendations:  - PO/suppl intake (donald counts ordered 8/16)  - Enteral access placement; initiation/tolerance of EN via NG (RN). Adjust accordingly to PO intakes.      Nutrition Progress Note - f/u for progress towards previous nutrition POC (see previous assessment for details)     Mathew Patel RD, LD, [facility]  Neuro ICU  Pager: [phone]    4E unti Pager: [phone]

## 2020-08-16 NOTE — PLAN OF CARE
Major Shift Events: Started PO doses of hydralazine and isordil this shift. Nipride weaned down to 0.75 while maintaining MAPs 65-75. IABP remains 1:1 100% without issue. Started calorie counts this shift with goal of 1800 kcal/day. Pt may require NJ placement with IR tomorrow. New order for PRN oxycodone 2.5-5 mg q6hour for lower back pain. Lidocaine patch in place. Continues on dobutamine 6, nipride 0.75, and heparin 800.   Plan: LVAD with Dr. Farley either Tuesday or Wednesday.   For vital signs and complete assessments, please see documentation flowsheets.

## 2020-08-16 NOTE — PROGRESS NOTES
Cardiology Progress Note    Assessment & Plan   In summary, Marquise Jj is a 75-year-old female with a past medical history notable for coronary artery disease, ischemic cardiomyopathy, and known mural thrombus who was transferred from [facility] for further evaluation/treatment of cardiogenic shock. Balloon pump placed 8/14. Plan for LVAD 8/19.     Today's changes:  - Nutrition consult for TF  - IR to place NJ  - Start hydralazine 12.5mg q6hrs  - Start ISDN 10mg q6hrs  - Wean nipride as tolerated, MAP >65 and <85  - Start oxycodone 2.5mg q6hrs prn moderate-severe pain    Neuro:   # Sedation:   - NTD    # Pain:  - Lidocaine patch  - Tylenol 325 q6hrs prn   - Buspirone 10mg tid sched  - Start oxycodone 2.5mg q6hrs prn moderate-severe pain    Cardio:  # Cardiogenic shock   # ICM: NYHA IV / ACC D / INTERMACS 2-3  # Severe MR/TR   Presents with cardiogenic shock. On NE, cardiac index approximately 1.37 on admission. Severely elevated biventricular filling pressures. Etiology of her decompensation appears to be progression of her cardiomyopathy. Despite medical treatment, she has been hospitalized twice since returning to [place], both with low output. No viability in her LAD territory, and doubt that PCI to her circumflex would make meaningful LV recovery. Hemodynamics improved with dobutamine, did not tolerate attempt to wean inotropics. RHC removed on 8/13 after clotting off, replaced on 8/14 after patient with increased work of breathing. CI of 1.1, balloon pump placed with CI of 1.6-1.8 and improvement in symptoms, 2.2 this morning. MAPs 90 with SVR 1900. Will start nipride for additional afterload reduction, transition to hydralazine tomorrow (8/15).   - Balloon pump, 1:1, appropriate placement on imaging   - Continue dobutamine 6mcg/kg/hr  - Start hydralazine 12.5mg q6hrs  - Start ISDN 10mg q6hrs  - Wean nipride as tolerated, MAP >65 and <85  - Hemodynamics q6hrs   - LVAD scheduled for 8/15      Date 8/9 8/9 8/10 8/11 08/13/20 08/15/20 08/16/20   CVP 12 9 3 4 8 9 6   Mean PA  35 30 21 25 23 21 20   PCWP  18 14 12 x 13 14   Oxy HGB  59 64 61 61 60 47 62   CI/CO 2.3 2.3 2.4/4.2 2.3 2.2 1.8 2.8/4.9   SVR 1100 1100 1200 1400 1316 1900 1040   Device     IABP 1:1 IABP 1:1 IABP 1:1     # CAD s/p CABG 4/2020 (KURT to RCA and LIMA to LAD) and PCI to RCA 7/20  - Stop home plavix po qd (8/13) for LVAD surgery  - Continue home aspirin 81mg po qd   - Continue home crestor    # Mural thrombus  - Restart heparin ggt     # A-flutter  New a-flutter following  An ablation can be considered; however, if LVAD is pursued, would consider surgical removal of LV thrombus and then DCCV while in OR. Rate controlled following balloon pump insertion.   - Reduce to amiodarone 200mg po qd      Pulm:  # Acute hypoxic respiratory failure  Secondary to pulmonary edema bilateral pleural effusions. Improved with diuresis. No fever or leukocytosis to raise concern for pneumonia. Increased work of  Breathing better following balloon pump placement.   - Oximask for now, titrate to saturation of >92%     Renal/Electrolytes   # Hyperkalemia/Hypokalemia   # Acute kidney injury on chronic kidney disease, stage III  Chronic kidney disease likely driven by her cardiac dysfunction. No EKG changes. Acute worsening secondary to her cardiogenic shock. Given insulin and dextrose x2 and started on diuresis with improvement in potassium.   -Trend potassium and creatinine q12hrs  - Electrolyte replacement protocol  - Monitor UOP following balloon pump placement 8/14     GI:  # Constipation  - Senna-docusate bid scheduled  - Miralax bid prn constipation     ID:   # UTI vs ASB  Elect to treat given balloon pump and plan for LVAD  - Ceftriaxone 1g IV qd (8/14 - 8/18)     Hem/Onc  # Mural thrombus   - Heparin ggt, high dose     # Anemia, mild  D/t frequent blood draws and procedures  - Monitor hgb    Endo  # Hypothyroidism   - Continue PTA levothyroxine      DVT Prophylaxis: Heparin ggt  Code Status: Full Code    Miguelito Muir MD  Internal Medicine, PGY2  ASCOM [id number], [id number]    Interval History   NAEON. Nursing notes reviewed. Patient complains of back pain and left hip pain from lying flat tonight. Did require onetime dose of oxy overnight. Breathing continues to feel improved since IABP. Denies abdominal pain. No fever/chills. Discussed need for TF given poor nutrition, agreeable. No other concerns at this time.     Physical Exam   Temp: 97.4  F (36.3  C) Temp src: Oral BP: 132/77   Heart Rate: 67 Resp: 12 SpO2: 93 % O2 Device: Nasal cannula Oxygen Delivery: 2 LPM  Vitals:    08/13/20 0045 08/14/20 0300 08/16/20 0400   Weight: 63.1 kg (139 lb 1.8 oz) 64.4 kg (141 lb 15.6 oz) 63.8 kg (140 lb 10.5 oz)     Vital Signs with Ranges  Temp:  [96.3  F (35.7  C)-98.6  F (37  C)] 97.4  F (36.3  C)  Heart Rate:  [56-78] 67  Resp:  [9-35] 12  BP: ()/(44-90) 132/77  SpO2:  [90 %-100 %] 93 %  I/O last 3 completed shifts:  In: 1650.9 [P.O.:880; I.V.:770.9]  Out: 450 [Urine:450]    Heart Rate: 67, Blood pressure 132/77, pulse 109, temperature 97.4  F (36.3  C), temperature source Oral, resp. rate 12, weight 63.8 kg (140 lb 10.5 oz), SpO2 93 %.  140 lbs 10.46 oz     Drips:  - Dobutamine 6mcg/kg/min  - Nipride 1.5mcg/kg/min   - Heparin ggt    IABP:  - Appropriate position on repeat CXR  - 4x limbs wtt, intact bilat radial pulses, DP/PT pulses   - 1:1, triggered by ECG  - Mean pressure 69-76  - Augmented diastolic pressure     SG:  - Positioned in PA on CXR  - CVP 10, PA 38/18/24, CI/CO 2.8/4.9, SVR 1417, SvO2 62    GEN: AOx3, ND  CV: Regular rate, irregular rhythm, no murmur or JVD   LUNGS: Clear to auscultation bilaterally  ABD: Active bowel sounds, soft, no abdominal tenderness.   EXT: No LE edema   SKIN: Right fem insertion site without hematoma, active bleeding, or surrounding erythema; right SG without active bleeding or surrounding erythema  NEURO: AOx3, CNII-XII  grossly intact, moves all extremities     Medications     - MEDICATION INSTRUCTIONS -       DOBUTamine 6 mcg/kg/min (08/16/20 0700)     HEParin 900 Units/hr (08/16/20 0831)     nitroPRUsside (NIPRIDE) IV infusion ADULT/PEDS GREATER than or EQUAL to 45 kg std conc 1.5 mcg/kg/min (08/16/20 0717)     BETA BLOCKER NOT PRESCRIBED         amiodarone  200 mg Oral Daily     aspirin  81 mg Oral Daily     bimatoprost  1 drop Both Eyes At Bedtime     busPIRone  10 mg Oral TID     cefTRIAXone  1 g Intravenous Q24H     dorzolamide-timolol  1 drop Both Eyes BID     ferrous sulfate  325 mg Oral BID     latanoprost  1 drop Both Eyes Daily     levothyroxine  62.5 mcg Oral QAM AC     lidocaine  1 patch Transdermal Q24H     lidocaine   Transdermal Q8H     omeprazole  20 mg Oral Daily     QUEtiapine  50 mg Oral At Bedtime     rosuvastatin  20 mg Oral Daily     senna-docusate  2 tablet Oral BID       Data   Recent Labs   Lab 08/16/20  0406 08/15/20  0904 08/15/20  0413 08/14/20  0356   WBC 6.4 6.1 5.6 6.3   HGB 9.1* 10.2* 9.9* 10.6*   MCV 87 87 86 86   * 133* 139* 195   INR 1.49*  --  1.43* 1.29*     --  132* 130*   POTASSIUM 3.9  --  4.3 4.2   CHLORIDE 105  --  102 99   CO2 23  --  21 20   BUN 33*  --  40* 38*   CR 1.00  --  1.18* 1.13*   ANIONGAP 6  --  10 11   FERNANDO 7.9*  --  8.5 8.5   GLC 82  --  79 172*   ALBUMIN 2.1*  --   --  2.6*   PROTTOTAL 5.6*  --   --  6.6*   BILITOTAL 0.5  --   --  0.7   ALKPHOS 172*  --   --  202*   ALT 17  --   --  19   AST 25  --   --  28       Recent Results (from the past 24 hour(s))   XR Chest Port 1 View    Narrative    Exam: XR CHEST PORT 1 VW, 8/15/2020 10:38 AM    Indication: sgz iabp position    Comparison: Chest 8/15/2020 2:15 AM    Findings:   Supine AP portable chest. Left-sided cardiac pacemaker/implantable  cardiac defibrillator stable in position. Left internal jugular  [place]-Zia catheter has been advanced with tip now present in right  thorax likely in the right lower lobe  pulmonary artery branch. Right  internal jugular sheath in place. Intra-aortic balloon pump marker is  demonstrated inferior to the aortic arch and enrico by approximately 2  cm. Sternotomy wires and clips present.    Cardiomediastinal contour is enlarged, stable. Pulmonary vasculature  is indistinct. Bilateral pleural effusions remain present.  Retrocardiac opacity stable. No definite pneumothorax. Degenerative  changes.      Impression    Impression:   1.  [place]-Zia catheter has been advanced now present likely in the  right lower lobe pulmonary artery branch. This could be retracted  depending on desired tip location.  2.  Intra-aortic balloon pump marker below the aortic arch and enrico  by approximately 2 cm.  3.  Other support devices as discussed above stable.  4.  Stable bilateral effusions and suspected interstitial edema.    MORENO RODRIGUEZ MD   XR Abdomen Port 1 View    Narrative    Exam: XR ABDOMEN PORT 1 VW, 8/15/2020 10:39 AM    Indication: iabp marker    Comparison: CT chest abdomen and pelvis 8/20/2020    Findings:   Portable abdominal radiograph. There are some anatomical distortion  secondary to scoliosis with degenerative changes. The inferior IABP  marker overlies the mid L3 vertebral body. Gas along the length of the  IABP noted. Please see chest radiograph for location of the superior  marker. Sternotomy wires, surgical clips and cardiac pacer/implantable  cardiac defibrillator in the thorax noted. Right-sided femoral  catheter partially visualized.    Nonobstructive bowel gas pattern. Evaluation for free air limited on  supine radiograph. Scoliosis with degenerative changes as above. Right  hip arthroplasty present. The left hip arthroplasty is not included on  the radiograph.      Impression    Impression: Inferior IABP marker overlying the L3 vertebral body.    MORENO RODRIGUEZ MD   XR Chest Port 1 View    Narrative    XR chest portable one view on 8/16/2020 3:31 AM.    INDICATION: IABP  position, SGz .    COMPARISON: Radiograph dated 8/15/2020    FINDINGS:   Portable AP supine radiograph of the chest. Median sternotomy wires  are intact. Mediastinal surgical clips. Left IJ [place]-Zia catheter tip  projects over the main pulmonary artery. Right IJ sheath. Left chest  ICD with lead projecting over the right ventricle.    Trachea is clear. Cardiac silhouette is stable. Pulmonary vasculature  is indistinct. Aortic arch calcifications. No pneumothorax. Bilateral  small pleural effusions. Diffuse interstitial opacities. Bibasilar and  retrocardiac opacities are unchanged. Degenerative changes of the  spine.      Impression    IMPRESSION:   1. Intra-aortic balloon pump marker projects approximately 1.5 cm  below the level of the enrico.  2. Left IJ [place]-Zia catheter tip projects over the right pulmonary  artery, unchanged.  3. Bilateral small pleural effusions.  4. Diffuse interstitial edema.   5. Retrocardiac opacities are unchanged.    I have personally reviewed the examination and initial interpretation  and I agree with the findings.    MORENO RODRIGUEZ MD

## 2020-08-16 NOTE — PROGRESS NOTES
Calorie Count  Intake recorded for: 8/15  Total Kcals: 392 Total Protein: 30g  Kcals from Hospital Food: 392  Protein: 30g  Kcals from Outside Food (average):0 Protein: 0g  # Meals Recorded: 2 meals (First - 75% grilled cheese, fruit cup)      (Second - 75% chicken salad, tomato slice, skim milk)  # Supplements Recorded: 0

## 2020-08-16 NOTE — PHARMACY-CONSULT NOTE
Pharmacy Tube Feeding Consult    Medication reviewed for administration by feeding tube and for potential food/drug interactions.    Recommendation: Changed Aspirin EC 81mg daily to 81mg chewable aspirin daily    Pharmacy will continue to follow as new medications are ordered.

## 2020-08-16 NOTE — PLAN OF CARE
Major Shift Events: Lethargic overnight, c/o incisional pain one time oxy ordered and given. IABP 1:1 without alarms. See charted hemodynamics. 2L nasal cannula while asleep. C/o nausea x 1 with relief from antiemetic. 1 x BM. Voiding spontaneously. Heparin gtt titrated according to Xa, redraw at 0600.   Plan: LVAD placement on 8/18  For vital signs and complete assessments, please see documentation flowsheets.

## 2020-08-17 NOTE — PLAN OF CARE
Major shift events: IABP 1:1 without alarms. Nipride weaned off while maintaining MAP goal 65-85. NEISHA q6. CI 3.4 and improving. SR 70-80s. Kept on 2L NC while sleeping. No appetite overnight. Encourage patient to increase PO intake to meet dietary goal and continue donald counts. Minimal urine output and no BM. Senna given. Low back pain controlled with tylenol and x1 dose oxycodone. Heparin therapeutic at 800 unit(s)/hr and dobutamine continued at 6 mcg/kg/min. Continue to monitor patient and update team with further changes. See flowsheet for details and assessment.

## 2020-08-17 NOTE — PROGRESS NOTES
Calorie Count    Intake recorded for: 8/16/2020  Total Kcals: 264 Total Protein: 14g    Kcals from Hospital Food: 264   Protein: 14g    Kcals from Outside Food (average):0 Protein: 0g    # Meals Recorded: 2 meals (first - 100% string cheese, 50% low-fat strawberry yogurt)  (second - 50% half order sarbjit food cake w/ strawberries & whipped topping, 25% entree mac n cheese)    # Supplements Recorded: less than 25% Magic Cup

## 2020-08-17 NOTE — PLAN OF CARE
Major Shift Events:  Advanced swan to 55cm. Pulled back IABP. Placed NJ @95. Will continue to monitor.    Plan: LVAD planned for Wed with Dr Farley.    For vital signs and complete assessments, please see documentation flowsheets.

## 2020-08-17 NOTE — CONSULTS
Patient seen and examined. Investigations reviewed. Agree with recommendation to proceed with LVAD as destination therapy as patient is IABP dependant. Risks and benefits of surgery discussed with patient and family including risks of death, bleeding, stroke, infection, renal failure and arrhythmias. They understand and are willing to proceed with surgery.

## 2020-08-17 NOTE — PROGRESS NOTES
LVAD Social Work Services Progress Note      Date of Initial Social Work Evaluation: 8/12/2020  Collaborated with: Pt and , Reyes, at bedside    Data: Pt with hx of coronary artery disease, ischemic cardiomyopathy and known thrombus. Pt is s/p CABG in [place], this past March. Pt has been w/u'ed up for LVAD. Discussed in multidisciplinary team meeting last Friday and plan is to proceed with LVAD, which has now been scheduled for 8/19.     Intervention: Supportive Visit   Assessment: Checked in with pt and spouse now that there is a schedule date for LVAD implant. Pt and  are feeling well informed on plan of care and have no questions or concerns at this time. Writer did advise pt's  to obtain an extended parking card down at [facility].   Education provided by MIQUEL: Ongoing Social Work support, parking   Plan:    Discharge Plans in Progress: None     Barriers to d/c plan: Pt scheduled for LVAD on 8/19    Follow up Plan: SW will check-in with  at end of the week.

## 2020-08-17 NOTE — PROGRESS NOTES
Care Coordinator Progress Note    Admission Date/Time:  8/8/2020  Attending MD:  Quinton Stephens MD    Data  Chart reviewed, discussed with interdisciplinary team.   Patient was admitted for:    Cardiogenic shock (H)  LV (left ventricular) mural thrombus  Heart failure (H).     Assessment  Pt is with history of coronary artery disease, ischemic cardiomyopathy, and known mural thrombus who was transferred from [facility] for further evaluation/treatment of cardiogenic shock. Balloon pump placed 8/14. Plan for LVAD 8/19.  RNCC will cont to follow plan of care.    Plan  Anticipated Discharge Date:  TBD.  Anticipated Discharge Plan:   TBD.  RNCC will cont to follow plan of care.      Sandy Yu RN, PHN, BSN  4A and 4E/ ICU  Care Coordinator  Phone: [phone]  Pager: [phone]

## 2020-08-17 NOTE — PLAN OF CARE
Discharge Planner OT   Patient plan for discharge: rehab  Current status: Pt performed UE exercise in supine with 2# weights and LLE exercises, tolerating well with VSS. Will re-eval post-op.  Barriers to return to prior living situation: Decreased ADL independence and activity tolerance  Recommendations for discharge: will re-assess post-op  Rationale for recommendations: Increase functional endurance and ADL independence at rehab         Entered by: Mitch Ortiz 08/17/2020 3:54 PM

## 2020-08-17 NOTE — PROCEDURES
Small Bowel Feeding Tube Placement Assessment  Reason for Feeding Tube Placement: Team request for post pyloric feeding tube   Cortrak Start Time: 1220   Cortrak End Time: 1250  Medicine Delivered During Procedure: Lidocaine gel   Placement Successful: Presume FT tip at pylorus -vs- in proximal duodenum, AXR confirmation pending    Procedure Complications: Unable to advance tip beyond pylorus -vs- proximal duodenum, repeatedly coiling within stomach   Final Placement Yung at exit of nare: 94 cm  Face to Face time with patient: 30 min       Bridle Placement:   Reason for bridle placement: Securement of FT    Medicine delivered during procedure: lidocaine gel   Procedure: Successful  Location of top of clip on FT: @ 95 cm marker   Condition of nose/skin at time of bridle placement: Unremarkable   Face to Face time with patient: <5 minutes.    Rosemary Wyatt RD, LD  [id number]  Pgr: [phone]

## 2020-08-17 NOTE — PROGRESS NOTES
Cardiology Progress Note    Assessment & Plan   In summary, Marquise Jj is a 75-year-old female with a past medical history notable for coronary artery disease, ischemic cardiomyopathy, and known mural thrombus who was transferred from [facility] for further evaluation/treatment of cardiogenic shock. Balloon pump placed 8/14. Plan for LVAD 8/19.     Today's changes:  - NJ placement with TF    Neuro:   # Sedation:   - NTD    # Pain:  - Lidocaine patch  - Tylenol 325 q6hrs prn   - Buspirone 10mg tid sched  - Start oxycodone 2.5mg q6hrs prn moderate-severe pain    Cardio:  # Cardiogenic shock   # ICM: NYHA IV / ACC D / INTERMACS 2-3  # Severe MR/TR   Presents with cardiogenic shock. On NE, cardiac index approximately 1.37 on admission. Severely elevated biventricular filling pressures. Etiology of her decompensation appears to be progression of her cardiomyopathy. Despite medical treatment, she has been hospitalized twice since returning to [place], both with low output. No viability in her LAD territory, and doubt that PCI to her circumflex would make meaningful LV recovery. Hemodynamics improved with dobutamine, did not tolerate attempt to wean inotropics. RHC removed on 8/13 after clotting off, replaced on 8/14 after patient with increased work of breathing. CI of 1.1, balloon pump placed with CI of 1.6-1.8 and improvement in symptoms/hemodynamics. Able to wean of nipride overnight after starting hydralazine and ISDN yesterday.    - Balloon pump, 1:1, low on CXR, readjust    - Continue dobutamine 6mcg/kg/hr  - Continue hydralazine 12.5mg q6hrs  - Continue ISDN 10mg q6hrs  - Wean nipride as tolerated, MAP >65 and <85  - Hemodynamics q6hrs   - LVAD scheduled for 8/15   - Nutrition consulted, NJ with TF today    Date 8/9 8/9 8/10 8/11 08/13/20 08/15/20 08/16/20 08/17/20   CVP 12 9 3 4 8 9 6 8   Mean PA  35 30 21 25 23 21 20 35/18   PCWP  18 14 12 x 13 14    Oxy HGB  59 64 61 61 60 47 62 69   CI/CO 2.3  2.3 2.4/4.2 2.3 2.2 1.8 2.8/4.9 4.1   SVR 1100 1100 1200 1400 1316 1900 1040 850   Device     IABP 1:1 IABP 1:1 IABP 1:1 IABP 1:1     # CAD s/p CABG 4/2020 (KURT to RCA and LIMA to LAD) and PCI to RCA 7/20  - Stop home plavix po qd (8/13) for LVAD surgery  - Continue home aspirin 81mg po qd   - Continue home crestor    # Mural thrombus  - Restart heparin ggt     # A-flutter  New a-flutter following  An ablation can be considered; however, if LVAD is pursued, would consider surgical removal of LV thrombus and then DCCV while in OR. Rate controlled following balloon pump insertion.   - Continue amiodarone 200mg po qd      Pulm:  # Acute hypoxic respiratory failure  Secondary to pulmonary edema bilateral pleural effusions. Improved with diuresis. No fever or leukocytosis to raise concern for pneumonia. Increased work of  Breathing better following balloon pump placement.   - Oximask for now, titrate to saturation of >92%     Renal/Electrolytes   # Hyperkalemia/Hypokalemia   # Acute kidney injury on chronic kidney disease, stage III  Chronic kidney disease likely driven by her cardiac dysfunction. No EKG changes. Acute worsening secondary to her cardiogenic shock. Given insulin and dextrose x2 and started on diuresis with improvement in potassium.   - Trend potassium and creatinine q12hrs  - Electrolyte replacement protocol  - Monitor UOP following balloon pump placement 8/14     GI:  # Constipation  - Senna-docusate bid scheduled  - Miralax bid prn constipation     ID:   # UTI vs ASB  Elect to treat given balloon pump and plan for LVAD  - Ceftriaxone 1g IV qd (8/14 - 8/18)     Hem/Onc  # Mural thrombus   - Heparin ggt, high dose     # Anemia, mild  D/t frequent blood draws and procedures  - Monitor hgb    Endo  # Hypothyroidism   - Continue PTA levothyroxine     Nutrition: NJ with TF today  DVT Prophylaxis: Heparin ggt  Code Status: Full Code    Miguelito Muir MD  Internal Medicine, PGY2  ASCOM [id number], [id number]    Interval  History   NAEON. Nursing notes reviewed. Breathing unchanged. No chest pain. Back pain well controlled. No dizziness/headaches. No fevers/chills. Discussed need for NJ, patient nervous about it but agreeable. Expresses no other concerns at this time.    Physical Exam   Temp: 97.7  F (36.5  C) Temp src: Oral BP: (!) 131/118   Heart Rate: 70 Resp: 18 SpO2: 97 % O2 Device: Nasal cannula Oxygen Delivery: 2 LPM  Vitals:    08/14/20 0300 08/16/20 0400 08/17/20 0400   Weight: 64.4 kg (141 lb 15.6 oz) 63.8 kg (140 lb 10.5 oz) 63.6 kg (140 lb 3.4 oz)     Vital Signs with Ranges  Temp:  [96.3  F (35.7  C)-97.9  F (36.6  C)] 97.7  F (36.5  C)  Heart Rate:  [62-79] 70  Resp:  [11-54] 18  BP: ()/() 131/118  SpO2:  [93 %-100 %] 97 %  I/O last 3 completed shifts:  In: 1716 [P.O.:845; I.V.:871]  Out: 775 [Urine:775]    Heart Rate: 70, Blood pressure (!) 131/118, pulse 109, temperature 97.7  F (36.5  C), temperature source Oral, resp. rate 18, weight 63.6 kg (140 lb 3.4 oz), SpO2 97 %.  140 lbs 3.4 oz     Drips:  - Dobutamine 6mcg/kg/min  - Heparin ggt    IABP:  - Low on x-ray, needs readjustment   - 4x limbs wtt, intact bilat radial pulses, DP/PT pulses   - 1:1, triggered by ECG  - Mean pressure 74-80  - Augmented diastolic pressure 104-120    SG:  - Positioned deep on CXR, pull back with repeat cxr  - CVP 8, PA 35/18, CI/CO 4.1, , SvO2 69    GEN: AOx3, ND  CV: RRR, no murmur or JVD   LUNGS: Clear to auscultation bilaterally  ABD: Active bowel sounds, soft, no abdominal tenderness.   EXT: Trace LE edema   SKIN: Right fem insertion site without hematoma, active bleeding, or surrounding erythema; right SG without active bleeding or surrounding erythema  NEURO: AOx3, CNII-XII grossly intact, moves all extremities     Medications     - MEDICATION INSTRUCTIONS -       DOBUTamine 6 mcg/kg/min (08/17/20 0900)     HEParin 800 Units/hr (08/17/20 0900)     nitroPRUsside (NIPRIDE) IV infusion ADULT/PEDS GREATER than or  EQUAL to 45 kg std conc Stopped (08/17/20 0600)     BETA BLOCKER NOT PRESCRIBED         amiodarone  200 mg Oral Daily     artificial saliva  5-10 mL Swish & Spit 4x Daily     aspirin  81 mg Oral or Feeding Tube Daily     bimatoprost  1 drop Both Eyes At Bedtime     busPIRone  10 mg Oral TID     cefTRIAXone  1 g Intravenous Q24H     dorzolamide-timolol  1 drop Both Eyes BID     ferrous sulfate  325 mg Oral BID     hydrALAZINE  12.5 mg Oral Q6H     isosorbide dinitrate  10 mg Oral TID     latanoprost  1 drop Both Eyes Daily     levothyroxine  62.5 mcg Oral QAM AC     omeprazole  20 mg Oral Daily     QUEtiapine  50 mg Oral At Bedtime     rosuvastatin  20 mg Oral Daily     senna-docusate  2 tablet Oral BID     vitamin B1  100 mg Oral Daily       Data   Recent Labs   Lab 08/17/20  0335 08/16/20  1530 08/16/20  1003 08/16/20  0406 08/15/20  0904 08/15/20  0413   WBC 6.5  --   --  6.4 6.1 5.6   HGB 8.6*  --   --  9.1* 10.2* 9.9*   MCV 89  --   --  87 87 86   *  --   --  135* 133* 139*   INR 1.29*  --   --  1.49*  --  1.43*    135  --  135  --  132*   POTASSIUM 4.3 4.1 4.3 3.9  --  4.3   CHLORIDE 107 106  --  105  --  102   CO2 24 22  --  23  --  21   BUN 26 31*  --  33*  --  40*   CR 0.83 0.91  --  1.00  --  1.18*   ANIONGAP 4 6  --  6  --  10   FERNANDO 8.1* 7.8*  --  7.9*  --  8.5   GLC 90 104*  --  82  --  79   ALBUMIN 2.1* 2.2*  --  2.1*  --   --    PROTTOTAL 5.7* 5.7*  --  5.6*  --   --    BILITOTAL 0.4 0.3  --  0.5  --   --    ALKPHOS 187* 179*  --  172*  --   --    ALT 16 17  --  17  --   --    AST 23 26  --  25  --   --        Recent Results (from the past 24 hour(s))   XR Chest Port 1 View    Narrative    EXAMINATION:  XR CHEST PORT 1 VW 8/17/2020 2:23 AM.    COMPARISON: 8/16/2020    HISTORY:  IABP position, SGz    FINDINGS: AP supine radiograph of the chest. Stable position of  [place]-Zia catheter, with tip projecting over the right pulmonary  artery. Intra-aortic balloon pump marker is at the level of  T7-8.  Implantable cardiac device generator and leads are unchanged. Stable  postsurgical changes of CABG.    Trachea is midline. Cardiac silhouette is unchanged. Small bilateral  pleural effusions are unchanged. No pneumothorax. Mild diffuse  bilateral interstitial opacities are not significantly changed. Dense  retrocardiac opacities are stable.      Impression    IMPRESSION:   1. Unchanged position of [place]-Zia catheter, with tip projecting over  the right pulmonary artery.  2. Unchanged position of intra-aortic balloon pump, with tip at the  level of T7-8.  3. Stable small bilateral pleural effusions and mild diffuse  interstitial edema. Retrocardiac opacities are stable.    I have personally reviewed the examination and initial interpretation  and I agree with the findings.    CRYSTAL BLANKENSHIP MD

## 2020-08-17 NOTE — CONSULTS
IR was consulted for feeding tube placement. Please place XR Feeding Tube Placement Order ([id number]) and call main radiology at [phone] to schedule with general radiology.    Ryan Sotelo PA-C  Interventional Radiology  IR on-call pager: [phone]

## 2020-08-18 NOTE — PROGRESS NOTES
Calorie Count  Intake recorded for: 8/17  Total Kcals: 0 Total Protein: 0g  Kcals from Hospital Food: 0   Protein: 0g  Kcals from Outside Food (average):0 Protein: 0g  # Meals Recorded: No meals or intake recorded.  # Supplements Recorded: 0

## 2020-08-18 NOTE — PLAN OF CARE
Major Shift Events:  CVP up to 14 at 0000, 40 IV Lasix given. Good response, often leaks from Purewick so inaccurate output measurements. See flowsheet for full NEISHA numbers. Dobutamine continuing at 5/hour. IABP 1:1 EKG trigger; would occasionally auto transition to pressure trigger then auto correct back to EKG trigger. IABP MAPs 65-85. Room air. Heparin therapeutic, continuing at 800/hour. TF advanced to 30mL/hour. Miralax given last night per pt request, 2 bowel movements overnight. Pain controlled with Oxycodone.   Plan: Increase tube feeds to goal. NEISHA Q6H. Monitor balloon pump.   For vital signs and complete assessments, please see documentation flowsheets.

## 2020-08-18 NOTE — ANESTHESIA PREPROCEDURE EVALUATION
Anesthesia Pre-Procedure Evaluation    Patient: Marquise Jj   MRN:     [id number] Gender:   female   Age:    75 year old :      1945        Preoperative Diagnosis: Heart failure (H) [I50.9]   Procedure(s):  REDO STERNOTOMY, INSERTION, LEFT VENTRICULAR ASSIST DEVICE (HEARTMATE III) AND ALL OTHER ASSOCIATED PROCEDURES     LABS:  CBC:   Lab Results   Component Value Date    WBC 7.0 2020    WBC 6.5 2020    HGB 9.8 (L) 2020    HGB 8.6 (L) 2020    HCT 32.5 (L) 2020    HCT 28.7 (L) 2020     (L) 2020     (L) 2020     BMP:   Lab Results   Component Value Date     2020     2020    POTASSIUM 4.0 2020    POTASSIUM 4.0 2020    CHLORIDE 108 2020    CHLORIDE 108 2020    CO2 23 2020    CO2 24 2020    BUN 20 2020    BUN 20 2020    CR 0.75 2020    CR 0.77 2020    GLC 87 2020    GLC 90 2020     COAGS:   Lab Results   Component Value Date    PTT 40 (H) 08/15/2020    INR 1.31 (H) 2020     POC:   Lab Results   Component Value Date     (H) 2020     OTHER:   Lab Results   Component Value Date    PH 7.42 2020    LACT 2.7 (H) 2020    A1C 6.2 (H) 2020    FERNANDO 8.2 (L) 2020    PHOS 3.9 08/10/2020    MAG 2.2 2020    ALBUMIN 2.4 (L) 2020    PROTTOTAL 6.2 (L) 2020    ALT 18 2020    AST 29 2020    ALKPHOS 188 (H) 2020    BILITOTAL 0.5 2020    TSH 15.00 (H) 08/10/2020    T4 1.26 2020        Preop Vitals    BP Readings from Last 3 Encounters:   20 126/56   08/08/20 90/64   20 (!) 89/68    Pulse Readings from Last 3 Encounters:   20 109   20 68   20 69      Resp Readings from Last 3 Encounters:   20 16   20 20   20 (!) 42    SpO2 Readings from Last 3 Encounters:   20 94%   20 95%   20 98%      Temp Readings from Last 1  "Encounters:   08/18/20 36.4  C (97.6  F) (Oral)    Ht Readings from Last 1 Encounters:   07/31/20 1.727 m (5' 8\")      Wt Readings from Last 1 Encounters:   08/18/20 65.2 kg (143 lb 11.8 oz)    Estimated body mass index is 21.86 kg/m  as calculated from the following:    Height as of 7/31/20: 1.727 m (5' 8\").    Weight as of this encounter: 65.2 kg (143 lb 11.8 oz).     LDA:  Peripheral IV 08/08/20 Right Upper forearm (Active)   Site Assessment WDL 08/18/20 1200   Line Status Infusing;Checked every 1 hour 08/18/20 1200   Phlebitis Scale 0-->no symptoms 08/18/20 1200   Infiltration Scale 0 08/18/20 1200   Infiltration Site Treatment Method  None 08/14/20 0400   Extravasation? No 08/18/20 1200   Number of days: 10       Arterial Sheath  (Active)   Specific Qualities Stat Locked;Sutured 08/18/20 1200   Site Assessment WDL 08/18/20 1200   Dressing Type Sutured;Securing device 08/18/20 1200   Dressing Intervention Dressing changed/new dressing 08/17/20 0800   Arterial Sheath Comment IABP 08/18/20 1200   Number of days: 4       Venous Sheath (Active)   Specific Qualities Sutured 08/18/20 1200   Site Assessment [facility] 08/18/20 1200   Dressing Type Biopatch;Transparent 08/18/20 1200   Dressing Intervention Dressing changed/new dressing 08/16/20 0400   Venous Sheath Comment Cordis 08/18/20 0800   Number of days: 10       Pulmonary Artery Catheter - Double Lumen 08/14/20 1419 Left internal jugular vein standard thermodilution catheter 7 Fr (Active)   Dressing dressing dry and intact 08/18/20 1200   Securement secured with sutures 08/18/20 1200   PA Catheter Markings (cm) 55 08/18/20 1200   Phlebitis 0-->no symptoms 08/18/20 1200   Infiltration 0-->no symptoms 08/18/20 1200   Waveform normal 08/18/20 1200   Lumen A - Color YELLOW 08/18/20 1200   Lumen A - Status transduced 08/18/20 1200   Lumen A - Cap Change Due 08/18/20 08/17/20 2000   Lumen B - Color BLUE 08/18/20 1200   Lumen B - Status transduced 08/18/20 1200   Lumen B - " Cap Change Due 08/18/20 08/17/20 2000   Number of days: 4       NG/OG/NJ Tube Nasogastric 10 fr Left nostril (Active)   Site Description WDL 08/18/20 1200   Status Enteral Feedings 08/18/20 1200   Placement Confirmation [place] unchanged 08/18/20 1200   [place] (cm marking) at nare/mouth 95 cm 08/18/20 1200   Intake (ml) 30 ml 08/18/20 0800   Flush/Free Water (mL) 30 mL 08/18/20 1200   Number of days: 1       External Urinary Catheter (Active)   Skin no breakdown;no redness 08/18/20 0800   Output (mL) 450 mL 08/18/20 1200   Collection Container Standard 08/18/20 0800   Securement Method Tape 08/18/20 0400   External Catheter changed Done 08/18/20 0600   Number of days: 1        Past Medical History:   Diagnosis Date     CAD (coronary artery disease)      ICD (implantable cardioverter-defibrillator) in place     Primary prevention AICD placed in [place] in May 2020.     Ischemic cardiomyopathy     LVEF less than 20%.     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     Status post coronary artery bypass grafting     Done in [facility] in April 2020.  LIMA to diagonal (as LAD dissected) and KURT to the right coronary artery.      Past Surgical History:   Procedure Laterality Date     ARTHROPLASTY REVISION HIP Right 11/16/2017    Procedure: ARTHROPLASTY REVISION HIP;  Right total hip arthroplasty revision.;  Surgeon: Jozef Garcia MD;  Location: [facility] OR     cabg  04/2020     CV HEART CATHETERIZATION WITH POSSIBLE INTERVENTION N/A 7/2/2020    Procedure: Heart Catheterization with Possible Intervention;  Surgeon: Kaden Franco MD;  Location:  HEART CARDIAC CATH LAB     CV INTRA-AORTIC BALLOON PUMP INSERTION N/A 8/14/2020    Procedure: Intra-Aortic Balloon Pump Insertion;  Surgeon: Cale Armijo MD;  Location:  HEART CARDIAC CATH LAB     CV PCI STENT DRUG ELUTING N/A 7/2/2020    Procedure: Percutaneous Coronary  Intervention Stent Drug Eluting;  Surgeon: Kaden Franco MD;  Location:  HEART CARDIAC CATH LAB     CV RIGHT HEART CATH N/A 7/2/2020    Procedure: Right Heart Cath;  Surgeon: Kaden Franco MD;  Location:  HEART CARDIAC CATH LAB     CV RIGHT HEART CATH N/A 8/14/2020    Procedure: Right Heart Cath with leave in [place];  Surgeon: Jose Padilla MD;  Location:  HEART CARDIAC CATH LAB      Allergies   Allergen Reactions     Combigan [Brimonidine Tartrate-Timolol] Swelling     Swollen eyelids     Ativan [Lorazepam] Anxiety and Other (See Comments)     Increased confusion        Anesthesia Evaluation     .             ROS/MED HX    ENT/Pulmonary: Comment:  Acute hypoxic respiratory failure secondary to pulmonary edema bilateral pleural effusions that Improved with diuresis. Currently on nasal cannula      Neurologic:       Cardiovascular: Comment: - ischemic cardiomyopathy and known mural thrombus on heparin gtt. Admitted with cardiogenic shock with cardiac index approximately 1.37 on admission that improved to 1.6-1.8 with  improvement in symptoms/hemodynamics after  IABP 1:1 via Right femoral Artery was placed on 8/14, currently on dobutamine 5 mcg/kg/hr and hydralazine 12.5mg q6hrs.       -Severely elevated biventricular filling pressures.  -Per cardiology notes-  No viability in her LAD territory, and doubt that PCI to her circumflex would make meaningful LV recovery.      (+) hypertension--CAD (CABG 4/2020 (KURT to RCA and LIMA to LAD) and PCI to RCA 7/20), --. : . CHF etiology: ischemic cardiomyopathy  Last EF: <25% NYHA classification: IV. . :ICD Reason placed:CHF  type;Medtronic single lead ICD - Patient is dependent on ICD . dysrhythmias (currently in SR after cardioversion on 8/15, on PO amiodarone ) a-flutter, Irregular Heartbeat/Palpitations (Medtronic single lead ICD), . pulmonary hypertension, Previous cardiac testing date:results:date: results:ECG reviewed date:8/8/2020 results:SR, low  voltage QRS, left anterior fascicular block, Lateral wall infarct Cath date: 8/8/20 results: Right sided filling pressures are severely elevated.   Mild elevated Pulmonary Hypertension. 56/21/34.  Left ventricular filling pressures are moderately elevated .   Reduced cardiac output level. 1.9          METS/Exercise Tolerance:     Hematologic:         Musculoskeletal:         GI/Hepatic: Comment: Severe protein calorie malnutrition, receiving  tube feeds at 30 mL/hr          Renal/Genitourinary: Comment: Hyperkalemia     (+) chronic renal disease, type: CRI,       Endo:     (+) thyroid problem hypothyroidism, .      Psychiatric:         Infectious Disease:         Malignancy:         Other: Comment: Medtronic single lead ICD     Patient was admitted for cardiogenic shock. Normal ICD function. 10 treated VT, 9 monitored VT, 315 nonsustained VT and 21 AF episodes recorded since 5/8/20. 1 monitored VT, 15 VT-NS and 20 AF episodes recorded since last interrogatino on 6/3/20. All 16 VT episodes recorded on 8/9/20. All VT-NS episodes lasted < 2 seconds.  The monitored VT episode lasted 20:49. No VT episodes since 6/3/20 required intervention with tachy therapy. Intrinsic rhythm = vs @  bpm.  = 0%. OptiVol fluid index is 40 above baseline. Estimated battery longevity to YUDELKA = 11 years. Battery voltage = 3.09V. No short v-v intervals recorded. Lead impedance trends appear stable. Patient reports that she is feeling very fatigued. Plan for continued inpatient evaluation and treatment.  Possibly will have an LVAD placed.  If this occurs the tachy therapies can be programmed off at that time.  JEZ Delong RN                       [facility] AN PHYSICAL EXAM    Assessment:   ASA SCORE: 4      Smoking Status:  Non-Smoker/Unknown   NPO Status: NPO Appropriate     Plan:   Anes. Type:  General   Pre-Medication: None   Induction:  IV (Standard)   Airway: ETT; Oral   Access/Monitoring: PIV; A-Line; FloTrac; Central Access/Port  present; MAC-Line; PAC   Maintenance: Balanced     Blood products: Blood in Room     Drips/Meds: Epinephrine; Vasopressin; Nitric Oxide; Norepi     Advanced Monitoring: BIS; NIRS (cerebral); MARIANO Adult            ADULT MARIANO Checklist:               Absolute Contra-Indications: NONE               Relative Contra-Indications:  NONE               Final Plan: Proceed with MARIANO     Postop Plan:   Postop Pain: Opioids  Postop Sedation/Airway: Sedation likely; SECURED AIRWAY likely       Postop Sedation: Dexmedetomidine Infusion  Disposition: ICU     PONV Management:  NO PONV Prophylaxis Required     CONSENT: Direct conversation   Plan and risks discussed with: Patient   Blood Products: Consented (ALL Blood Products)                   Kip Garcia MD

## 2020-08-18 NOTE — PROGRESS NOTES
DT LVAD    Marquise Jj is a 75 year old year old female with ischemic cardiomyopathy, chronic systolic heart failure. The patient has has undergone evaluation for Mechanical Circiulatory Support (MCS) therapy. Despite optimal medical management for at least 45 of the last 60 days, the patient is NYHA Class IV, INTERMACS 2, has EF < 25%, and MVO2 < 14 (or is unable to perform CPX), has been IABP dependent for 6 days.     Based on the evaluation, Marquise Jj meets criteria for durable LVAD as destination therapy    Marquise Jj  does not currently qualify for heart transplant related to age. This was explained to the patient.    I discussed with Marquise Jj the risks of MCS therapy, including the risk of death, stroke, bleeding, wound infection, renal failure, arrhythmias, GIB, and pump thrombosis. I also discussed the survivability benefit of MCS therapy. Marquise Jj  verbalized understanding of the above and is willing to proceed with surgery.    Maricarmen Brito MD [facility]  Section of Advanced Heart Failure and Transplantation  Cardiovascular Division  [phone]

## 2020-08-18 NOTE — PROGRESS NOTES
Cardiology Progress Note    Assessment & Plan   In summary, Marquise Jj is a 75-year-old female with a past medical history notable for coronary artery disease, ischemic cardiomyopathy, and known mural thrombus who was transferred from [facility] for further evaluation/treatment of cardiogenic shock. Balloon pump placed 8/14. Plan for LVAD 8/19.     Today's changes:  - holding hydralazine, isosorbide dinitrate in anticipation of surgery  - will hold heparin gtt 4 hours before surgery  - NPO and holding tube feeds at midnight   - Plan for CVP <10 tonight     Neuro:   # Sedation:   - NTD    # Pain:  - Lidocaine patch  - Tylenol 325 q6hrs prn   - Buspirone 10mg tid sched  - C/w oxycodone 2.5mg q6hrs prn moderate-severe pain    Cardio:  # Cardiogenic shock   # ICM: NYHA IV / ACC D / INTERMACS 2-3  # Severe MR/TR   Presents with cardiogenic shock. On NE, cardiac index approximately 1.37 on admission. Severely elevated biventricular filling pressures. Etiology of her decompensation appears to be progression of her cardiomyopathy. Despite medical treatment, she has been hospitalized twice since returning to [place], both with low output. No viability in her LAD territory, and doubt that PCI to her circumflex would make meaningful LV recovery. Hemodynamics improved with dobutamine, did not tolerate attempt to wean inotropics. RHC removed on 8/13 after clotting off, replaced on 8/14 after patient with increased work of breathing. CI of 1.1, balloon pump placed with CI of 1.6-1.8 and improvement in symptoms/hemodynamics. Able to wean of nipride after starting hydralazine and ISDN yesterday.    - Balloon pump, 1:1, low on CXR, readjust    - Continue dobutamine 5 mcg/kg/hr  - Holding hydralazine 12.5mg q6hrs  - Holding ISDN 10mg q6hrs  - Hemodynamics q6hrs   - LVAD scheduled for 8/15   - Nutrition consulted, NJ with TF today    Date 8/9 8/9 8/10 8/11 08/13/20 08/15/20 08/16/20 08/17/20 8/18/20   CVP 12 9 3 4 8  9 6 8 11   Mean PA  35 30 21 25 23 21 20 35/18 35/18   PCWP  18 14 12 x 13 14  13   Oxy HGB  59 64 61 61 60 47 62 69 54   CI/CO 2.3 2.3 2.4/4.2 2.3 2.2 1.8 2.8/4.9 4.1 4.3/2.5   SVR 1100 1100 1200 1400 1316 1900 4610 503 5352   Device     IABP 1:1 IABP 1:1 IABP 1:1 IABP 1:1 IABP 1:1     # CAD s/p CABG 4/2020 (KURT to RCA and LIMA to LAD) and PCI to RCA 7/20  - Stop home plavix po qd (8/13) for LVAD surgery  - Continue home aspirin 81mg po qd   - Continue home crestor    # Mural thrombus  - Continue with heparin ggt , will hold 4 hours before surgery     # A-flutter  Converted to NSR on 8/15   - C/w heparin gtt , will hold 4 hours before surgery   - Continue amiodarone 200mg po qd      Pulm:  # Acute hypoxic respiratory failure  Secondary to pulmonary edema bilateral pleural effusions. Improved with diuresis. No fever or leukocytosis to raise concern for pneumonia. Increased work of  Breathing better following balloon pump placement.   - On nasal cannula, titrate to saturation of >92%     Renal/Electrolytes   # Hyperkalemia/Hypokalemia   # Acute kidney injury on chronic kidney disease, stage III  Chronic kidney disease likely driven by her cardiac dysfunction. No EKG changes. Acute worsening secondary to her cardiogenic shock. Given insulin and dextrose x2 and started on diuresis with improvement in potassium.   - Trend potassium and creatinine q12hrs  - Electrolyte replacement protocol  - Monitor UOP following balloon pump placement 8/14     GI:  # Constipation  # Severe protein calorie malnutrition  - Nutrition through tube feeds at 30 mL/hr    - Senna-docusate bid scheduled  - Miralax bid prn constipation     ID:   # UTI vs ASB  Elect to treat given balloon pump and plan for LVAD  - Completed Ceftriaxone treatment 1g IV qd (8/14 - 8/18)     Hem/Onc  # Mural thrombus   - Heparin ggt, high dose will hold 4 hours before surgery     # Anemia, mild  D/t frequent blood draws and procedures  - Monitor hgb    Endo  #  Hypothyroidism   - Continue PTA levothyroxine     Nutrition: NJ with TF today  DVT Prophylaxis: Heparin ggt  Code Status: Full Code    Mallorie Anders MD    Interval History   NAEON. Nursing notes reviewed. Breathing unchanged. No chest pain. Back pain well controlled.     Physical Exam   Temp: 97.6  F (36.4  C) Temp src: Oral BP: 128/75   Heart Rate: 68 Resp: 16 SpO2: 97 % O2 Device: None (Room air) Oxygen Delivery: 2 LPM  Vitals:    08/16/20 0400 08/17/20 0400 08/18/20 0500   Weight: 63.8 kg (140 lb 10.5 oz) 63.6 kg (140 lb 3.4 oz) 65.2 kg (143 lb 11.8 oz)     Vital Signs with Ranges  Temp:  [97.5  F (36.4  C)-98.1  F (36.7  C)] 97.6  F (36.4  C)  Heart Rate:  [52-79] 68  Resp:  [16-18] 16  BP: (103-145)/() 128/75  SpO2:  [87 %-99 %] 97 %  I/O last 3 completed shifts:  In: 1351.25 [I.V.:521.25; NG/GT:540]  Out: 1000 [Urine:1000]    Heart Rate: 68, Blood pressure 128/75, pulse 109, temperature 97.6  F (36.4  C), temperature source Oral, resp. rate 16, weight 65.2 kg (143 lb 11.8 oz), SpO2 97 %.  143 lbs 11.84 oz     Drips:  - Dobutamine 5 mcg/kg/min  - Heparin ggt    IABP:  - Low on x-ray, needs readjustment   - 4x limbs wtt, intact bilat radial pulses, DP/PT pulses   - 1:1, triggered by ECG  - Mean pressure     SG:  - Good position   - CVP 8, PA 35/18, CI/CO 4.1, , SvO2 69    GEN: AOx3, ND  CV: RRR, no murmur or JVD   LUNGS: Clear to auscultation bilaterally  ABD: Active bowel sounds, soft, no abdominal tenderness.   EXT: Trace LE edema   SKIN: Right fem insertion site without hematoma, active bleeding, or surrounding erythema; right SG without active bleeding or surrounding erythema  NEURO: AOx3, CNII-XII grossly intact, moves all extremities     Medications     - MEDICATION INSTRUCTIONS -       DOBUTamine 5 mcg/kg/min (08/18/20 0700)     HEParin 800 Units/hr (08/18/20 0700)     nitroPRUsside (NIPRIDE) IV infusion ADULT/PEDS GREATER than or EQUAL to 45 kg std conc Stopped (08/17/20 0600)      BETA BLOCKER NOT PRESCRIBED         amiodarone  200 mg Oral Daily     artificial saliva  5-10 mL Swish & Spit 4x Daily     aspirin  81 mg Oral or Feeding Tube Daily     bimatoprost  1 drop Both Eyes At Bedtime     busPIRone  10 mg Oral TID     cefTRIAXone  1 g Intravenous Q24H     dorzolamide-timolol  1 drop Both Eyes BID     ferrous sulfate  325 mg Oral BID     hydrALAZINE  12.5 mg Oral Q6H     isosorbide dinitrate  10 mg Oral TID     latanoprost  1 drop Both Eyes Daily     levothyroxine  62.5 mcg Oral QAM AC     omeprazole  20 mg Oral Daily     QUEtiapine  50 mg Oral At Bedtime     rosuvastatin  20 mg Oral Daily     senna-docusate  2 tablet Oral BID     vitamin B1  100 mg Oral Daily       Data   Recent Labs   Lab 08/18/20  0349 08/17/20  1900 08/17/20  0335  08/16/20  0406   WBC 7.0  --  6.5  --  6.4   HGB 9.8*  --  8.6*  --  9.1*   MCV 89  --  89  --  87   *  --  133*  --  135*   INR 1.31*  --  1.29*  --  1.49*    136 136   < > 135   POTASSIUM 4.0 4.0 4.3   < > 3.9   CHLORIDE 108 108 107   < > 105   CO2 23 24 24   < > 23   BUN 20 20 26   < > 33*   CR 0.75 0.77 0.83   < > 1.00   ANIONGAP 6 4 4   < > 6   FERNANDO 8.2* 8.2* 8.1*   < > 7.9*   GLC 87 90 90   < > 82   ALBUMIN 2.4*  --  2.1*   < > 2.1*   PROTTOTAL 6.2*  --  5.7*   < > 5.6*   BILITOTAL 0.5  --  0.4   < > 0.5   ALKPHOS 188*  --  187*   < > 172*   ALT 18  --  16   < > 17   AST 29  --  23   < > 25    < > = values in this interval not displayed.       Recent Results (from the past 24 hour(s))   XR Chest Port 1 View    Narrative    Portable chest  8/17/2020 0844 hours    INDICATION: IABP position    COMPARISON: 8/17/2020 at 0137 hours    FINDINGS: Radiopaque marker is an intra-aortic balloon pump projects  at the mid descending thoracic aorta. Left IJ [place]-Zia catheter tip  is in the interlobar an orogastric right lower lobar pulmonary artery.  Left heart border is obscured. Retrograde atelectasis and possible  associated effusion noted. Hazy  opacification in the right lower lung  field which may indicate small effusion and associated atelectasis  appears similar. Left subclavian transvenous approach implantable  cardiac defibrillator again evident. Right IJ sheath tip is in the  proximal SVC. No pneumothorax. Degenerative changes in the thoracic  spine.      Impression    IMPRESSION: IABP radiopaque marker projecting in the mid descending  thoracic aorta. [place]-Z[place] catheter with tip in either the interlobar  pulmonary artery on the right or the proximal aspect of the right  lower lobar pulmonary artery. Implantable cardiac defibrillator.  Unchanged right pleural effusion and associated atelectasis. Unchanged  retrocardiac atelectasis and probable associated left effusion.    MUNA BRADFORD MD   XR Chest Port 1 View    Narrative    Exam: XR CHEST PORT 1 VW, 8/17/2020 11:21 AM    Indication: PA catheter repositioned    Comparison: Chest radiograph dated 8/17/2020 8:44 AM    Findings:   AP view of the chest. [place]-Zia catheter with the tip in the right  main pulmonary artery. Right IJ approach vascular sheath of the tip  near the innominate confluence. Left chest wall ICD with a single lead  projecting over the right ventricle. Intra-aortic balloon pump  catheter marker visible in the mid thoracic aorta, below the level of  the en[place]. Intact median sternotomy wires.    Bilateral pleural effusions. Left basilar atelectasis and right lower  lung predominant hazy airspace opacities similar to prior. No  pneumothorax. Degenerative changes of the spine and shoulders. No  acute osseous pathology. Visualized portion of the upper abdomen is  unremarkable.      Impression    Impression:   1. [place]-Zia catheter with the tip in the right main pulmonary artery.  Additional support devices as detailed above.  2. Bilateral pleural effusions and left greater than right basilar  atelectasis similar to prior.    I have personally reviewed the examination and initial  interpretation  and I agree with the findings.    ROBERT DE LA PAZ MD   XR Abdomen Port 1 View    Narrative    Exam: XR ABDOMEN PORT 1 VW, 8/17/2020 1:43 PM    Indication: FT position    Comparison: 8/15/2020    Findings:   Portable supine AP radiograph of the abdomen. Feeding tube tip  projects over the right lower abdomen at the expected location of the  second portion of the duodenum. Nonobstructive bowel gas pattern. No  pneumatosis or portal venous gas. Partially visualized cardiac lead  projecting over the right ventricle. Median sternotomy wires.  Intra-aortic balloon pump marker projecting of the aorta at the level  of the enrico. [place]-Zia catheter tip at the proximal right main  pulmonary artery. Mediastinal surgical clips and median sternotomy  wires. Left greater than right pleural effusions. Convex right  scoliotic curvature of the thoracolumbar spine.      Impression    Impression:   1. Feeding tube tip projects over the right lower abdomen in the  expected location of the second portion of the duodenum.  2. Nonobstructive bowel gas pattern.  3. Left greater than right pleural effusions.    I have personally reviewed the examination and initial interpretation  and I agree with the findings.    TORSTEN COX DO   XR Chest Port 1 View    Narrative    EXAMINATION:  XR CHEST PORT 1 VW 8/18/2020 6:05 AM.    COMPARISON: 8/17/2020    HISTORY:  IABP position, [place]-Zia    FINDINGS: AP supine radiograph of the chest. IABP marker is at the  level of T8. Right IJ sheath is unchanged. [place]-Zia catheter tip  projects over the central right pulmonary artery. Stable position  postsurgical changes of implantable defibrillator generator and lead.  Postsurgical changes within the mediastinum. Partially visualized  enteric tube.    Stable cardiomegaly. Bilateral pleural effusions are unchanged. No  large pneumothorax. Diffuse bilateral hazy opacities, not  significantly changed from prior exam. Left lower lobe opacity.  Upper  abdomen and bones are unremarkable.      Impression    IMPRESSION:   1. Stable position of IABP and [place]-Zia catheter.  2. Stable cardiomegaly with small bilateral pleural effusions and  diffuse hazy opacities, likely representing mild pulmonary edema.  3. Left lower lobe opacity, likely representing a combination of  pleural fluid, atelectasis and/or consolidation.    I have personally reviewed the examination and initial interpretation  and I agree with the findings.    CALVIN CRUZ MD

## 2020-08-18 NOTE — PLAN OF CARE
Major Shift Events:  Gave 40 lasix, had 1.5L out. Heparin remains at 800units and Dobut at 5. LVAD scheduled at 0730 tomorrow, consent form completed and in chart, stop tube feeds at 0000, stop heparin 4 hours prior to scheduled time 0330, if surgery is postponed stop 4 hours prior to new scheduled time. Will continue to monitor.    Plan: LVAD, valve repair and thrombectomy planned at 0730 8/19.    For vital signs and complete assessments, please see documentation flowsheets.

## 2020-08-19 PROBLEM — Z95.811 LVAD (LEFT VENTRICULAR ASSIST DEVICE) PRESENT (H): Status: ACTIVE | Noted: 2020-01-01

## 2020-08-19 NOTE — PROGRESS NOTES
Patient in OR for HM3 implant. Pump prepped by India Bautista, VAD Coordinator and started by IND[place] Bautista, VAD Coordinator. VAD speed titrated up in collaboration with surgeon, anesthesia, and perfusion. Report was given to 4E RN upon arrival to the unit.       Parameters when leaving OR:  o Speed: 5400 rpm  o Flow: 3.1 lpm  o PI (or flow waveform peak/trough for HW): 6.2  o Power: 3.7 mota    Flow range at set speed: 3-3.5 lpm                                               PI range at set speed: 3-6.4    Factors noted to cause a significant variation in VAD parameters: none    Other significant events: none    Please page the VAD Coordinator on-call with any VAD related questions (* * [phone], job code [id number] from an internal line).     CECILE SUMNER RN

## 2020-08-19 NOTE — ANESTHESIA CARE TRANSFER NOTE
Patient: Marquise Jj    Procedure(s):  Redo Sternotomy, On Cardiopulmonary Bypass, Insertion of Left Ventricular Assist Device (HeartMate III), Tricuspid Valve Repair with [employer]kins MC3 Annuloplasty Ring size T30, Left Ventricular Thrombectomy.    Diagnosis: Heart failure (H) [I50.9]  Diagnosis Additional Information: No value filed.    Anesthesia Type:   General     Note:  Airway :ETT  Patient transferred to:ICU  ICU Handoff: Call for PAUSE to initiate/utilize ICU HANDOFF, Identified Patient, Identified Responsible Provider, Reviewed the Pertinent Medical History, Discussed Surgical Course, Reviewed Intra-OP Anesthesia Management and Issues during Anesthesia, Set Expectations for Post Procedure Period and Allowed Opportunity for Questions and Acknowledgement of Understanding      Vitals: (Last set prior to Anesthesia Care Transfer)    CRNA VITALS  8/19/2020 1656 - 8/19/2020 1756      8/19/2020             Resp Rate (observed):  10                Electronically Signed By: Hank Farley MD  August 19, 2020  5:59 PM

## 2020-08-19 NOTE — ANESTHESIA PROCEDURE NOTES
Perioperative MARIANO Report  Anesthesia Information  MARIANO probe placed and report generated by:   Akbar Quintana DO in the presence of a teaching physician :  Reggie Brand MD    I personally reviewed the images and the fellow/resident interpretation.  I agree with the fellow/resident interpretation as amended by myself. Reggie Brand MD          Preanesthesia Checklist:  Patient identified, IV assessed, risks and benefits discussed, monitors and equipment assessed, procedure being performed at surgeon's request and anesthesia consent obtained.  General Procedure Information  Modalities:  2D, 3D, CW Doppler, PW Doppler and Color flow mapping  Diagnostic indications for MARIANO:   Cardiomyopathy, other                          .  LVAD  Echocardiographic and Doppler Measurements  Right Ventricle:  Cavity size dilated.   Hypertrophy not present.   Thrombus not present.    Global function mildly impaired.     Left Ventricle:  Cavity size dilated.   Hypertrophy not present.   Thrombus present.   Global Function severely impaired.   Ejection Fraction 20%.    Other Ventricular Findings:  Apical LV thrombus  Ventricular Regional Function:  1- Basal Anteroseptal:  hypokinetic  2- Basal Anterior:  hypokinetic  3- Basal Anterolateral:  hypokinetic  4- Basal Inferolateral:  hypokinetic  5- Basal Inferior:  hypokinetic  6- Basal Inferoseptal:  hypokinetic  7- Mid Anteroseptal:  hypokinetic  8- Mid Anterior:  hypokinetic  9- Mid Anterolateral:  hypokinetic  10- Mid Inferolateral:  hypokinetic  11- Mid Inferior:  hypokinetic  12- Mid Inferoseptal:  hypokinetic  13- Apical Anterior:  hypokinetic  14- Apical Lateral:  hypokinetic  15- Apical Inferior:  hypokinetic  16- Apical Septal:  hypokinetic  17- [place]:  hypokinetic    Valves  Aortic Valve: Annulus normal.  Stenosis not present.  Regurgitation +1.  Leaflets normal.  Leaflet motions normal.    Mitral Valve: Annulus normal.  Stenosis not present.  Regurgitation +1.   Leaflets normal.  Leaflet motions normal.    Tricuspid Valve: Annulus dilated.  Stenosis not present.  Regurgitation +4.  Leaflets normal.  Leaflet motions normal.    Pulmonic Valve: Annulus normal.  Stenosis not present.  Regurgitation absent.      Aorta: Ascending Aorta: Size normal.  Dissection not present.    Aortic Arch: Size normal.   Dissection not present.     Descending Aorta: Size normal.   Dissection not present.     Other Aortic Findings:  IABP present  Right Atrium:  Size dilated.   Thrombus not present.    Left Atrium: Size dilated.  Thrombus not present.  Left atrial appendage normal.     Atrial Septum: Intra-atrial septal morphology normal.   Other atrial septal defect findings:  Negative bubble study  Ventricular Septum: Intra-ventricular septum morphology normal.       Other Findings:   Pericardium:  normal.  Pleural Effusion:  none. .  .  .  .  Post Intervention Findings  Procedure(s) performed:  LVAD Placement and Tricuspid Valve Repair/Replace. Global function:  Unchanged.   Regional wall motion:  Unchanged   Surgeon(s) notified of all postintervention findings:  Yes  .  .  .   .  .  .  .  .  .  .    LV thrombus absent. Minimal TR present. RV function remains mildly depressed. Good LV chamber size, unchanged contractility. No new aortic insufficiency. No aortic dissection seen. No significant pericardial effusion.    Echocardiogram Comments

## 2020-08-19 NOTE — PROGRESS NOTES
Calorie Count  Intake recorded for: 8/18  Total Kcals: 0 Total Protein: 0g  Kcals from Hospital Food: 0   Protein: 0g  Kcals from Outside Food (average):0 Protein: 0g  # Meals Recorded: no meals ordered from kitchen, no intake recorded.   # Supplements Recorded: no intake recorded.

## 2020-08-19 NOTE — H&P
CV ICU ADMISSION NOTE  8/19/2020    PRIMARY TEAM: CVTS  PRIMARY PHYSICIAN: Dr. Farley    REASON FOR CRITICAL CARE ADMISSION: Hemodynamic instability   ADMITTING PHYSICIAN: Dr. Gambino    ASSESSMENT: 75 year-old female with PMH notable for coronary artery disease s/p CABG (4/20), ischemic cardiomyopathy, severe MR/TR and known mural thrombus who is admitted to the CV ICU s/p redo sternotomy, LVAD (heartmate III), and TV repair on 8/19/20 with Dr. Farley. Chest was left open and packed with plan for RTOR later this week.     Notably, patient with LV collapse after coming off of bypass thought to be secondary to air in the coronaries.     Arrived from the OR on epi, levo, vaso, and dobutamine for hemodynamic support. Post op echo with EF 20%, improved TR, mild R heart dysfunction, and minimal AI.     Received   4 PRBCs  2 FFP  2 Plt  5 L crystalloid     PLAN:   Neuro/ pain/ sedation:  #Acute post-operative pain   - Monitor neurological status. Notify the MD for any acute changes in exam.  - Fentanyl, Precedex, versed (avoiding propofol in this profoundly hypotensive patient)   - Seroquel 50 at bedtime + 25 PRN   - Tylenol toi      Pulmonary:   #Acute hypoxic respiratory failure  - MV   - Serial ABG   - CXR reviewed   - Continue NO overnight      Cardiovascular:    #Acute on chronic decompensated heart failure with reduced ejection fraction: NYHA IV / ACC D  #Cardiogenic shock  #Ischemic cardiomyopathy  #Coronary artery disease s/p CABG 4/20 in [place], s/p PCI to RCA 7/20  #Mural thrombus  #Severe MR/TR  - Monitor hemodynamic status.   - Dobutamine, epi, vaso, levo: do not aggressively wean overnight   - Rosuvastatin 20   - Vent paced 80      GI/Nutrition:   - NPO, OGT   - Pantoprazole ppx   - Bowel regimen: senna-colace, miralax.  - No indication for parenteral nutrition.  - Nutrition consulted. Appreciate recs     Renal/Fluid Balance/ Electrolytes:   #CKD III  - Will monitor intake and output.  - ICU electrolyte  replacement protocol  - STAT labs, q 6 hours      Endocrine:    #Glycemic control  #Hx hypothyroid   - Insulin gtt   - Synthroid daily      ID/ Antibiotics:  - Perioperative vancomycin, levofloxacin, fluconazole, zosyn      Heme:     #Acute perioperative blood loss anemia  - KCentra given in the OR   - TEG post op looked good   - Overnight labs q6 hours   - Perioperative amicar      Prophylaxis:    - Mechanical prophylaxis for DVT.  - No chemical DVT prophylaxis due to high risk of bleeding.     MSK:    - PT and OT consulted. Appreciate recs.     Lines/ tubes/ drains:  - ETT  - A-line  - R internal jugular CVC  - PAC  - PIV  - Rojo  - Chest tubes     Disposition:  - CV ICU.    Patient seen, findings and plan discussed with CV ICU staff.    Angela Sales   Surgery Resident   *[id number]      - - - - - - - - - - - - - - - - - - - - - - - - - - - - - - - - - - - - - - - - - - - - - - - - - - - - - - - - - - - - - - - - - - - - - -    HISTORY PRESENTING ILLNESS:   75 year-old female with PMH notable for coronary artery disease s/p CABG (4/20), ischemic cardiomyopathy, severe MR/TR and known mural thrombus who is admitted to the CV ICU s/p LVAD on 8/19/20 with Dr. Farley. Her initial presentation is consistent with cardiogenic shock in the setting of  acute on chronic decompensated heart failure with reduced ejection fraction: NYHA IV / ACC D.    The patient's cardiac history began in April of this past year.  She was on a cruise and developed 5 days of nausea.  After disembarking, the patient went to the hospital where coronary angiogram revealed three-vessel coronary artery disease.  She underwent coronary artery bypass grafting with a LIMA to the diagonal (LAD dissected during the case) and a KURT to the right coronary artery.  She also had an ICD placed.  EF at the time was found to be severely reduced.  She established care with [facility] and underwent medical therapy titration.  She had one admission for acute  decompensated heart failure over the summer.  She underwent a viability study which was notable for viability of the RCA and circumflex territory, however the LAD territory had no viability.  She underwent coronary angiogram on July 2 which showed an atretic KURT graft and therefore she underwent stenting to the RCA.  In addition, cardiac index was found to be 1.2 with severely elevated biventricular filling pressures.  She was ultimately diuresed and discharged, and there was plans to undergo staged stenting to her left circumflex.     Presented to [facility] for worsening symptoms, she underwent echocardiogram which showed large mural thrombus and severely reduced LV function.  She became progressively more hypotensive, was started on norepinephrine, and transferred to the [facility].      REVIEW OF SYSTEMS: 10 point ROS neg other than the symptoms noted above in the HPI.    PAST MEDICAL HISTORY:   Past Medical History:   Diagnosis Date     CAD (coronary artery disease)      ICD (implantable cardioverter-defibrillator) in place     Primary prevention AICD placed in [place] in May 2020.     Ischemic cardiomyopathy     LVEF less than 20%.     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     Status post coronary artery bypass grafting     Done in [facility] in April 2020.  LIMA to diagonal (as LAD dissected) and KURT to the right coronary artery.       SURGICAL HISTORY:   Past Surgical History:   Procedure Laterality Date     ARTHROPLASTY REVISION HIP Right 11/16/2017    Procedure: ARTHROPLASTY REVISION HIP;  Right total hip arthroplasty revision.;  Surgeon: Jozef Garcia MD;  Location: [facility] OR     cabg  04/2020     CV HEART CATHETERIZATION WITH POSSIBLE INTERVENTION N/A 7/2/2020    Procedure: Heart Catheterization with Possible Intervention;  Surgeon: Kaden Franco MD;  Location: [facility] CARDIAC CATH LAB      CV INTRA-AORTIC BALLOON PUMP INSERTION N/A 8/14/2020    Procedure: Intra-Aortic Balloon Pump Insertion;  Surgeon: Cale Armijo MD;  Location:  HEART CARDIAC CATH LAB     CV PCI STENT DRUG ELUTING N/A 7/2/2020    Procedure: Percutaneous Coronary Intervention Stent Drug Eluting;  Surgeon: Kaden Franco MD;  Location:  HEART CARDIAC CATH LAB     CV RIGHT HEART CATH N/A 7/2/2020    Procedure: Right Heart Cath;  Surgeon: Kaden Franco MD;  Location:  HEART CARDIAC CATH LAB     CV RIGHT HEART CATH N/A 8/14/2020    Procedure: Right Heart Cath with leave in [place];  Surgeon: Jose Padilla MD;  Location:  HEART CARDIAC CATH LAB       SOCIAL HISTORY:   Social History     Socioeconomic History     Marital status:      Spouse name: Not on file     Number of children: Not on file     Years of education: Not on file     Highest education level: Not on file   Occupational History     Occupation: [employer].   Social Needs     Financial resource strain: Not on file     Food insecurity     Worry: Not on file     Inability: Not on file     Transportation needs     Medical: Not on file     Non-medical: Not on file   Tobacco Use     Smoking status: Never Smoker     Smokeless tobacco: Never Used   Substance and Sexual Activity     Alcohol use: Not Currently     Drug use: Never     Sexual activity: Not on file   Lifestyle     Physical activity     Days per week: Not on file     Minutes per session: Not on file     Stress: Not on file   Relationships     Social connections     Talks on phone: Not on file     Gets together: Not on file     Attends Restorationist service: Not on file     Active member of club or organization: Not on file     Attends meetings of clubs or organizations: Not on file     Relationship status: Not on file     Intimate partner violence     Fear of current or ex partner: Not on file     Emotionally abused: Not on file     Physically abused: Not on file     Forced sexual activity:  Not on file   Other Topics Concern     Not on file   Social History Narrative     Not on file       FAMILY HISTORY: No bleeding/clotting disorders nor problems with anesthesia.     ALLERGIES:      Allergies   Allergen Reactions     Combigan [Brimonidine Tartrate-Timolol] Swelling     Swollen eyelids     Ativan [Lorazepam] Anxiety and Other (See Comments)     Increased confusion       MEDICATIONS:  No current facility-administered medications on file prior to encounter.   acetaminophen (TYLENOL) 325 MG tablet, Take 2 tablets (650 mg) by mouth every 6 hours as needed for mild pain  apixaban ANTICOAGULANT (ELIQUIS) 5 MG tablet, Take 1 tablet (5 mg) by mouth 2 times daily First dose to be on 7/19/20 PM dose.  bimatoprost (LUMIGAN) 0.01 % SOLN, Place 1 drop into both eyes At Bedtime   busPIRone (BUSPAR) 10 MG tablet, Take 1 tablet (10 mg) by mouth 3 times daily  clopidogrel (PLAVIX) 75 MG tablet, Take 1 tablet (75 mg) by mouth daily  co-enzyme Q-10 100 MG CAPS capsule, Take 100 mg by mouth 2 times daily   dorzolamide-timolol (COSOPT) 2-0.5 % ophthalmic solution, Place 1 drop into both eyes 2 times daily   ferrous sulfate (FEROSUL) 325 (65 Fe) MG tablet, Take 1 tablet (325 mg) by mouth 2 times daily  latanoprost (XALATAN) 0.005 % ophthalmic solution, Place 1 drop into both eyes daily In the morning.  levothyroxine (SYNTHROID/LEVOTHROID) 125 MCG tablet, Take 0.5 tablets (62.5 mcg) by mouth every morning (before breakfast)  Multiple Vitamins-Minerals (PRESERVISION AREDS 2 PO), Take 1 tablet by mouth 2 times daily  nitroGLYcerin (NITROSTAT) 0.4 MG sublingual tablet, For chest pain place 1 tablet under the tongue every 5 minutes for 3 doses. If symptoms persist 5 minutes after 1st dose call 911.  norepinephrine (LEVOPHED) 16-0.9 MG/250ML-% SOLN, Inject 2.061-27.48 mcg/min into the vein continuous  omeprazole (PRILOSEC) 20 MG DR capsule, Take 20 mg by mouth daily   QUEtiapine (SEROQUEL) 25 MG tablet, Take 50 mg by mouth At  Bedtime  rosuvastatin (CRESTOR) 20 MG tablet, Take 20 mg by mouth daily  sacubitril-valsartan (ENTRESTO) 24-26 MG per tablet, 1/2 tablet in PM daily        PHYSICAL EXAMINATION:  Temp:  [97.7  F (36.5  C)-97.8  F (36.6  C)] 97.8  F (36.6  C)  Pulse:  [66-81] 81  RETIRE: Heart Rate:  [59-78] 71  Resp:  [14-20] 14  BP: (118-148)/(48-89) 141/71  MAP:  [89 mmHg] 89 mmHg  Arterial Line BP: (96)/(61) 96/61  FiO2 (%):  [50 %] 50 %  SpO2:  [91 %-100 %] 100 %    General: Intubated, sedated   Neuro: Sedated   Resp: CTAB, minimal vent settings   CV: Regular rhythm, Rate 80s on exam, ventricular pacer for backup   Abdomen: Soft, Non-distended, Non-tender  Incisions: Chest remains open   Extremities: warm and well perfused    LABS: Reviewed.   Arterial Blood Gases   Recent Labs   Lab 08/19/20  1758 08/19/20  1523 08/19/20  1435 08/19/20  1434   PH 7.37 7.38 7.31* 7.32*   PCO2 44 36 45 45   PO2 132* 254* 43* 40*   HCO3 25 21 23 23     Complete Blood Count   Recent Labs   Lab 08/19/20  1805 08/19/20  1629 08/19/20  1559 08/19/20  1523 08/19/20  1458  08/19/20  0423   WBC 16.2* 16.2* 16.8*  --   --   --  6.8   HGB 10.0* 9.6* 9.1* 8.5* 9.1*   < > 9.6*    134* 113*  --  46*  --  128*    < > = values in this interval not displayed.     Basic Metabolic Panel  Recent Labs   Lab 08/19/20  1805 08/19/20  1523 08/19/20  1435 08/19/20  1434  08/19/20  0423 08/18/20  1624 08/18/20  0349   * 139 137 137   < > 137 137 138   POTASSIUM 3.3* 3.8 4.1 4.3   < > 4.1 3.8 4.0   CHLORIDE 115*  --   --   --   --  106 106 108   CO2 22  --   --   --   --  27 27 23   BUN 13  --   --   --   --  22 21 20   CR 0.50*  --   --   --   --  0.85 0.79 0.75   * 114* 181* 168*   < > 76 112* 87    < > = values in this interval not displayed.     Liver Function Tests  Recent Labs   Lab 08/19/20  1805 08/19/20  1629 08/19/20  1559 08/19/20  1432  08/18/20  1624 08/18/20  0349 08/17/20  0335   AST 47*  --   --   --   --  28 29 23   ALT 13  --   --    --   --  18 18 16   ALKPHOS 69  --   --   --   --  217* 188* 187*   BILITOTAL 2.8*  --   --   --   --  0.4 0.5 0.4   ALBUMIN 1.6*  --   --   --   --  2.4* 2.4* 2.1*   INR 1.40* 1.44* 1.52* 2.22*   < >  --  1.31* 1.29*    < > = values in this interval not displayed.     Pancreatic Enzymes  No lab results found in last 7 days.  Coagulation Profile  Recent Labs   Lab 08/19/20  1805 08/19/20  1629 08/19/20  1559 08/19/20  1432  08/15/20  0413   INR 1.40* 1.44* 1.52* 2.22*   < > 1.43*   PTT 43* 43* 44*  --   --  40*    < > = values in this interval not displayed.       IMAGING:  Recent Results (from the past 24 hour(s))   XR Chest Port 1 View    Narrative    EXAMINATION:  XR CHEST PORT 1 VW 8/19/2020 6:22 AM.    COMPARISON: 8/18/2020    HISTORY:  IABP and [place]-Zia    FINDINGS: AP semiupright radiograph of the chest. [place]-Zia catheter  tip projects over the central right pulmonary artery. Unchanged  cardiac device and leads. Unchanged right IJ sheath and partially  visualized enteric tube. IABP marker is at the level of T8.    Stable cardiac silhouette. Bilateral pleural effusions are unchanged.  Bibasilar atelectasis is unchanged. Upper abdomen is unremarkable.      Impression    IMPRESSION:   1. [place]-Zia catheter tip projects over the central right pulmonary  artery. IABP marker is at T8.  2. Unchanged bilateral pleural effusions and bibasilar atelectasis.    I have personally reviewed the examination and initial interpretation  and I agree with the findings.    CRYSTAL BLANKENSHIP MD   Cardiac Device Check - Inpatient   Result Value    Date Time Interrogation Session [id number]    Implantable Pulse Generator  Medtronic    Implantable Pulse Generator Model [id number] Visia AF MRI VR    Implantable Pulse Generator Serial Number [id number]    Type Interrogation Session In Clinic    Clinic Name [facility]    Implantable Pulse Generator Type Defibrillator    Implantable Pulse Generator  Implant Date 20200508    Implantable Lead  Medtronic    Implantable Lead Model 6947M Sprint Quattro Secure MRI SureScan    Implantable Lead Serial Number [id number]    Implantable Lead Implant Date 20200508    Implantable Lead Polarity Type Quadripolar Lead    Implantable Lead Location Detail 1 UNKNOWN    Implantable Lead Location Right Ventricle    Franco Setting Mode (NBG Code) VVI    Franco Setting Lower Rate Limit 50    Franco Setting Hysterisis Rate 40    Lead Channel Setting Sensing Polarity Bipolar    Lead Channel Setting Sensing Anode Location Right Ventricle    Lead Channel Setting Sensing Anode Terminal Ring    Lead Channel Setting Sensing Cathode Location Right Ventricle    Lead Channel Setting Sensing Cathode Terminal Tip    Lead Channel Setting Sensing Sensitivity 0.3    Lead Channel Setting Pacing Polarity Bipolar    Lead Channel Setting Pacing Anode Location Right Ventricle    Lead Channel Setting Pacing Anode Terminal Ring    Lead Channel Setting Sensing Cathode Location Right Ventricle    Lead Channel Setting Sensing Cathode Terminal Tip    Lead Channel Setting Pacing Pulse Width 0.4    Lead Channel Setting Pacing Amplitude 2    Lead Channel Setting Pacing Capture Mode Adaptive    Zone Setting Type Category VF    Zone Setting Detection Interval 300    Zone Setting Detection Beats Numerator 30    Zone Setting Detection Beats Denominator 40    Zone Setting Type Category VT    Zone Setting Detection Interval 270    Zone Setting Type Category VT    Zone Setting Detection Interval 390    Zone Setting Type Category VT    Zone Setting Detection Interval 450    Zone Setting Type Category ATRIAL_FIBRILLATION    Zone Setting Type Category AT/AF    Lead Channel Impedance Value 399    Lead Channel Impedance Value 304    Lead Channel Sensing Intrinsic Amplitude 6    Lead Channel Sensing Intrinsic Amplitude 5.75    Lead Channel Pacing Threshold Amplitude 0.75    Lead Channel Pacing Threshold Pulse Width  0.4    Battery Date Time of Measurements 20200819101710    Battery Status OK    Battery RRT Trigger 2.727    Battery Remaining Longevity 135    Battery Voltage 3.09    Capacitor Charge Type Reformation    Capacitor Last Charge Date Time 20200808054231    Capacitor Charge Time 3.893    Capacitor Charge Energy 18    Franco Statistic Date Time Start 20200810123826    Franco Statistic Date Time End 20200819101500    Franco Statistic RV Percent Paced 0.17    Atrial Tachy Statistic Date Time Start 20200810123826    Atrial Tachy Statistic Date Time End 20200819101500    Atrial Tachy Statistic AT/AF [place] Percent 1.4    Therapy Statistic Recent Shocks Delivered 0    Therapy Statistic Recent Shocks Aborted 0    Therapy Statistic Recent ATP Delivered 0    Therapy Statistic Recent Date Time Start 20200810123826    Therapy Statistic Recent Date Time End 20200819101500    Therapy Statistic Total Shocks Delivered 0    Therapy Statistic Total Shocks Aborted 0    Therapy Statistic Total ATP Delivered 10    Therapy Statistic Total  Date Time Start 20200508105729    Therapy Statistic Total  Date Time End 20200819101500    Episode Statistic Recent Count 12    Episode Statistic Type Category AT/AF    Episode Statistic Recent Count 0    Episode Statistic Type Category SVT    Episode Statistic Recent Count 0    Episode Statistic Type Category VT    Episode Statistic Recent Count 0    Episode Statistic Type Category VF    Episode Statistic Recent Count 0    Episode Statistic Type Category VT    Episode Statistic Recent Count 0    Episode Statistic Type Category VT    Episode Statistic Recent Count 2    Episode Statistic Type Category VT    Episode Statistic Recent Date Time Start 20200810123826    Episode Statistic Recent Date Time End 20200819101500    Episode Statistic Recent Date Time Start 20200810123826    Episode Statistic Recent Date Time End 20200819101500    Episode Statistic Recent Date Time Start 20200810123826    Episode  Statistic Recent Date Time End 76924646341784    Episode Statistic Recent Date Time Start 94546345994267    Episode Statistic Recent Date Time End 18425994065604    Episode Statistic Recent Date Time Start 79077352787012    Episode Statistic Recent Date Time End 76828429376752    Episode Statistic Recent Date Time Start 19266368639303    Episode Statistic Recent Date Time End 57346603262446    Episode Statistic Recent Date Time Start 29677797919227    Episode Statistic Recent Date Time End 23113819686749    Episode Statistic Total Count 33    Episode Statistic Type Category AT/AF    Episode Statistic Total Count 0    Episode Statistic Type Category SVT    Episode Statistic Total Count 315    Episode Statistic Type Category VT    Episode Statistic Total Count 0    Episode Statistic Type Category VF    Episode Statistic Total Count 0    Episode Statistic Type Category VT    Episode Statistic Total Count 10    Episode Statistic Type Category VT    Episode Statistic Total Count 11    Episode Statistic Type Category VT    Episode Statistic Total Date Time Start 66520337001960    Episode Statistic Total Date Time End 37197924168451    Episode Statistic Total Date Time Start 16524438983084    Episode Statistic Total Date Time End 94526365480631    Episode Statistic Total Date Time Start 10355131358011    Episode Statistic Total Date Time End 75589720723146    Episode Statistic Total Date Time Start 46349441946969    Episode Statistic Total Date Time End 80735236757755    Episode Statistic Total Date Time Start 46350960458659    Episode Statistic Total Date Time End 04009392287081    Episode Statistic Total Date Time Start 78235592780889    Episode Statistic Total Date Time End 13052355038404    Episode Statistic Total Date Time Start 14747558926821    Episode Statistic Total Date Time End 40995954673270    Episode Identifier 357    Episode Type Category VT1_MONITOR    Episode Date Time 44678988958810    Episode Duration  5,071    Episode Identifier 356    Episode Type Category VT1_MONITOR    Episode Date Time 86977015278897    Episode Duration 866    Episode Identifier 369    Episode Type Category Monitor    Episode Date Time 90307304376522    Episode Duration 600    Episode Identifier 368    Episode Type Category Monitor    Episode Date Time [id number]    Episode Duration 480    Episode Identifier 367    Episode Type Category Monitor    Episode Date Time 48886389414257    Episode Duration 480    Episode Identifier 366    Episode Type Category Monitor    Episode Date Time 58620800912697    Episode Duration 600    Episode Identifier 365    Episode Type Category Monitor    Episode Date Time 22707802503926    Episode Duration 1,200    Episode Identifier 364    Episode Type Category Monitor    Episode Date Time [id number]    Episode Duration 1,800    Episode Identifier 363    Episode Type Category Monitor    Episode Date Time [id number]    Episode Duration 600    Episode Identifier 362    Episode Type Category Monitor    Episode Date Time [id number]    Episode Duration 600    Episode Identifier 361    Episode Type Category Monitor    Episode Date Time [id number]    Episode Duration 720    Episode Identifier 360    Episode Type Category Monitor    Episode Date Time [id number]    Episode Duration 480    Episode Identifier 359    Episode Type Category Monitor    Episode Date Time [id number]    Episode Duration 600    Episode Identifier 358    Episode Type Category Monitor    Episode Date Time [id number]    Episode Duration 2,400    Narrative    Patient seen in OR #24 for evaluation and iterative programming of her Medtronic single lead ICD per MD orders. Normal ICD function. 2 episodes recorded in the VT monitor zone - 14 min 26 sec - 1 hr 24 min, 140-143 bpm. Stored EGM's reveal ST-SVT. 12 AT/AF episodes recorded - < 1 min - 40 min in duration. AF burden = 1.4%. Intrinsic rhythm = NSR @ 74 bpm.  = 0.2%.  OptiVol fluid index is near baseline. Estimated battery longevity to YUDELKA = 11.2 years. No short v-v intervals recorded. Lead trends appear stable. ICD tachy therapies programmed off for surgery. Pacing mode remains programmed VVI @ 50 bpm. Programming changes made per Anesthesia MD orders. MENDOZA Whitehead RN    Single lead ICD    I have reviewed and interpreted the device interrogation, settings, programming and nurse's summary. The device is functioning within normal device parameters. I agree with the current findings, assessment and plan.   XR Chest Port 1 View    Narrative    XR CHEST PORT 1 VW on 8/19/2020 6:17 PM.    INDICATION: s/p LVAD.    COMPARISON: Same-day radiograph    FINDINGS:   Portable AP supine radiograph of the chest. Open chest. Surgical  sponges projecting over the mediastinum. New LVAD. Left IJ Ber[place]-Zia  catheter tip projects over the left pulmonary artery. Right IJ sheath.  Left chest implantable cardiac defibrillator with lead projecting over  the right ventricle. Mediastinal drains. Bilateral chest tubes.  Endotracheal tube tip projects over the midthoracic trachea. Partially  visualized feeding tube coursing into the left upper quadrant and out  of the field-of-view.    Cardiac and mediastinal silhouette are stable. Pulmonary vasculature  is somewhat indistinct. Aortic arch calcifications. No appreciable  right pneumothorax. Trace left pneumothorax. Small left and trace  right pleural effusions. Diffuse interstitial opacities.      Impression    IMPRESSION:     1. Open chest. New LVAD placement. Surgical sponges project over the  mediastinum.  2. Endotracheal tube tip projects over the midthoracic trachea.  3. Berwind-Zia catheter tip projects over the left pulmonary artery.  4. Trace left pneumothorax. Chest tube in place.  5. Small left and trace right pleural effusions.  6. Diffuse interstitial edema.

## 2020-08-19 NOTE — PLAN OF CARE
Major Shift Events: 40 IV Lasix given overnight for CVP of 10 and low UOP. See flowsheet for full NEISHA numbers. AM CVP 9. Good UOP following Lasix, unmeasurable due to incontinence. IABP 1:1 EKG trigger. MAPs 75-95 with hydralazine DC'd yesterday, team notified. TF held at 0000, heparin gtt turned off at 0330. Pain controlled with oxy and tylenol.   Plan: LVAD today.   For vital signs and complete assessments, please see documentation flowsheets.

## 2020-08-19 NOTE — PROGRESS NOTES
Cardiology Progress Note    Assessment & Plan   In summary, Marquise Jj is a 75-year-old female with a past medical history notable for coronary artery disease, ischemic cardiomyopathy, and known mural thrombus who was transferred from [facility] for further evaluation/treatment of cardiogenic shock. Balloon pump placed . Plan for LVAD .     Today's changes:  - patient had LVAD placement   - post procedure patient on epi 0.15 mcg/kg/min,  5 mcg/kg/min, norepi 5 mcg/kg/min, vaso 1 U  - received amicar 7.5 mg IV    Neuro:   # Sedation  - precedex gtt  - versed gtt   - propofol gtt    Cardio:  # Cardiogenic shock   # ICM: NYHA IV / ACC  # Severe MR/TR s/p tricuspid valve repair with Elkins MC3 Annuloplasty Ring size T30  # s/p LVAD HM3 on 2020  LAVD speed 5400, flow 3.1, pi 1.7-3.5, power 3.7   Presents with cardiogenic shock. On NE, cardiac index approximately 1.37 on admission. Severely elevated biventricular filling pressures. Etiology of her decompensation appears to be progression of her cardiomyopathy. Despite medical treatment, she has been hospitalized twice since returning to [place], both with low output. No viability in her LAD territory, and doubt that PCI to her circumflex would make meaningful LV recovery. Hemodynamics improved with dobutamine, did not tolerate attempt to wean inotropics. RHC removed on  after clotting off, replaced on  after patient with increased work of breathing. CI of 1.1, balloon pump placed with CI of 1.6-1.8 and improvement in symptoms/hemodynamics. Had LVAD HM3 placed on . After return on ICU CVP 5.  - Continue with LIANA for RV afterload reduction   - Continue with epi 0.15 mcg/kg/min,  5 mcg/kg/min, norepi 5 mcg/kg/min, vaso 1 U  - Hemodynamics q6hrs, monitor lactate   - LVAD scheduled for 8/15   - Nutrition consulted, NJ with TF    Date 8/9 8/9 8/10 8/11 08/13/20 08/15/20 08/16/20 08/17/20 8/18/20 8/19   CVP 12 9 3 4 8 9 6 8 11 5    Mean PA  35 30 21 25 23 21 20 35/18 35/18 -   PCWP  18 14 12 x 13 14  13 -   Oxy HGB  59 64 61 61 60 47 62 69 54 -   CI/CO 2.3 2.3 2.4/4.2 2.3 2.2 1.8 2.8/4.9 4.1 4.3/2.5 -   SVR 1100 1100 1200 1400 1316 1900 4302 410 1333 -   Device     IABP 1:1 IABP 1:1 IABP 1:1 IABP 1:1 IABP 1:1 No IABP     # CAD s/p CABG 4/2020 (KURT to RCA and LIMA to LAD) and PCI to RCA 7/20  - Stop home plavix po qd (8/13) for LVAD surgery  - holding aspirin 81mg PO qd   - c/w home crestor    # Mural thrombus  - removed during surgery on 8/19    # A-flutter  Converted to NSR on 8/15    - Continue amiodarone 200mg po qd      Pulm:  # Acute hypoxic respiratory failure  Intubated on 8/19   Vent setting: RR 12, , PEEP 5, FiO2 50%   Secondary to pulmonary edema bilateral pleural effusions. Improved with diuresis. No fever or leukocytosis to raise concern for pneumonia. Increased work of  Breathing better following balloon pump placement.   - On nasal cannula, titrate to saturation of >92%     Renal/Electrolytes   # Hyperkalemia/Hypokalemia   # Acute kidney injury on chronic kidney disease, stage III  - Trend potassium and creatinine q12hrs  - Electrolyte replacement protocol  - Monitor UOP      GI:  # Constipation  # Severe protein calorie malnutrition  - Nutrition through tube feeds at 30 mL/hr    - Senna-docusate bid scheduled  - Miralax bid prn constipation     ID:   # UTI vs ASB  Periprocedural abx as per surgical team   - levoloxacin, zosyn, rifampin, vancomycin, fluconazole    Elect to treat given balloon pump and plan for LVAD  - Completed Ceftriaxone treatment 1g IV qd (8/14 - 8/18)     Hem/Onc  # Mural thrombus removed on 8/19    # Anemia, mild  D/t frequent blood draws and procedures  - Monitor hgb    Endo  # Hypothyroidism   - Continue PTA levothyroxine     Nutrition: NJ with TF today  DVT Prophylaxis: Heparin ggt  Code Status: Full Code    Mallorie Anders MD    Discussed with Dr Brito     Interval History   NAEON. LVAD  placed.     Physical Exam   Temp: 97.8  F (36.6  C) Temp src: Axillary BP: (!) 141/71 Pulse: 66 RETIRE: Heart Rate: 71 Resp: 20 SpO2: 98 % O2 Device: Nasal cannula Oxygen Delivery: 2 LPM  Vitals:    08/17/20 0400 08/18/20 0500 08/19/20 0600   Weight: 63.6 kg (140 lb 3.4 oz) 65.2 kg (143 lb 11.8 oz) 62.3 kg (137 lb 5.6 oz)     Vital Signs with Ranges  Temp:  [97.7  F (36.5  C)-97.8  F (36.6  C)] 97.8  F (36.6  C)  Pulse:  [66-73] 66  RETIRE: Heart Rate:  [59-79] 71  Resp:  [14-20] 20  BP: (118-148)/(48-89) 141/71  FiO2 (%):  [50 %] 50 %  SpO2:  [91 %-99 %] 98 %  I/O last 3 completed shifts:  In: 1665.8 [I.V.:655.8; NG/GT:300]  Out: 2275 [Urine:2275]    RETIRE: Heart Rate: 71, Blood pressure (!) 141/71, pulse 66, temperature 97.8  F (36.6  C), temperature source Axillary, resp. rate 20, weight 62.3 kg (137 lb 5.6 oz), SpO2 98 %.  137 lbs 5.55 oz       IABP:  - removed 8/19    SG:  - Good position   - CVP 5, PA , CI/CO , SVR, SvO2    GEN: intubated , sedated   CV: RRR, open chest , mediastinal x2, bilateral pleural, pump pocket jerman drain  LUNGS: Clear to auscultation bilaterally  ABD: Active bowel sounds, soft, no abdominal tenderness.   EXT: Trace LE edema   NEURO: sedated     Medications     [Auto Hold] - MEDICATION INSTRUCTIONS -       DOBUTamine 5 mcg/kg/min (08/19/20 0900)     DOPamine       EPINEPHrine IV infusion ADULT       [Auto Hold] BETA BLOCKER NOT PRESCRIBED         acetaminophen  975 mg Oral or Feeding Tube Q8H     amiodarone  200 mg Oral Daily     artificial saliva  5-10 mL Swish & Spit 4x Daily     bimatoprost  1 drop Both Eyes At Bedtime     busPIRone  10 mg Oral TID     dorzolamide-timolol  1 drop Both Eyes BID     ferrous sulfate  325 mg Oral BID     fluconazole  200 mg Intravenous Q24H     latanoprost  1 drop Both Eyes Daily     [START ON 8/20/2020] levofloxacin  500 mg Intravenous Q24H     levothyroxine  62.5 mcg Oral QAM AC     [START ON 8/20/2020] lidocaine  1 patch Transdermal Q24H     [START  ON 8/20/2020] lidocaine   Transdermal Q8H     mupirocin  1 g Both Nostrils BID     pantoprazole (PROTONIX) IV  40 mg Intravenous Daily     piperacillin-tazobactam  4.5 g Intravenous Q6H     low cardioplegia solution   PERFUSION Once     high cardioplegia solution   PERFUSION Once     propofol         QUEtiapine  50 mg Oral At Bedtime     [START ON 8/20/2020] rifampin  600 mg Intravenous Q24H     rosuvastatin  20 mg Oral Daily     senna-docusate  1 tablet Oral or Feeding Tube BID    Or     senna-docusate  2 tablet Oral or Feeding Tube BID     sodium chloride (PF)  3 mL Intracatheter Q8H     vitamin B1  100 mg Oral Daily     vancomycin (VANCOCIN) IV  1,000 mg Intravenous Q12H       Data   Recent Labs   Lab 08/19/20  1805 08/19/20  1629 08/19/20  1559 08/19/20  1523  08/19/20  1435  08/19/20  0423 08/18/20  1624   WBC 16.2* 16.2* 16.8*  --   --   --   --  6.8  --    HGB 10.0* 9.6* 9.1* 8.5*   < > 8.5*   < > 9.6*  --    MCV 87 89 89  --   --   --   --  89  --     134* 113*  --    < >  --   --  128*  --    INR 1.40* 1.44* 1.52*  --   --   --    < > 1.25*  --    NA PENDING  --   --  139  --  137   < > 137 137   POTASSIUM 3.3*  --   --  3.8  --  4.1   < > 4.1 3.8   CHLORIDE 115*  --   --   --   --   --   --  106 106   CO2 22  --   --   --   --   --   --  27 27   BUN 13  --   --   --   --   --   --  22 21   CR PENDING  --   --   --   --   --   --  0.85 0.79   ANIONGAP PENDING  --   --   --   --   --   --  4 4   FERNANDO 7.4*  --   --   --   --   --   --  8.4* 8.2*   *  --   --  114*  --  181*   < > 76 112*   ALBUMIN 1.6*  --   --   --   --   --   --   --  2.4*   PROTTOTAL PENDING  --   --   --   --   --   --   --  6.4*   BILITOTAL PENDING  --   --   --   --   --   --   --  0.4   ALKPHOS PENDING  --   --   --   --   --   --   --  217*   ALT PENDING  --   --   --   --   --   --   --  18   AST PENDING  --   --   --   --   --   --   --  28    < > = values in this interval not displayed.       Recent Results (from the  past 24 hour(s))   XR Chest Port 1 View    Narrative    EXAMINATION:  XR CHEST PORT 1 VW 8/19/2020 6:22 AM.    COMPARISON: 8/18/2020    HISTORY:  IABP and Silverton-Zia    FINDINGS: AP semiupright radiograph of the chest. Silverton-Zia catheter  tip projects over the central right pulmonary artery. Unchanged  cardiac device and leads. Unchanged right IJ sheath and partially  visualized enteric tube. IABP marker is at the level of T8.    Stable cardiac silhouette. Bilateral pleural effusions are unchanged.  Bibasilar atelectasis is unchanged. Upper abdomen is unremarkable.      Impression    IMPRESSION:   1. Silverton-Zia catheter tip projects over the central right pulmonary  artery. IABP marker is at T8.  2. Unchanged bilateral pleural effusions and bibasilar atelectasis.    I have personally reviewed the examination and initial interpretation  and I agree with the findings.    CRYSTAL BLANKENSHIP MD   Cardiac Device Check - Inpatient   Result Value    Date Time Interrogation Session [id number]    Implantable Pulse Generator  Medtronic    Implantable Pulse Generator Model [id number] Visia AF MRI VR    Implantable Pulse Generator Serial Number [id number]    Type Interrogation Session In Clinic    Clinic Name [facility]    Implantable Pulse Generator Type Defibrillator    Implantable Pulse Generator Implant Date 20200508    Implantable Lead  Medtronic    Implantable Lead Model 6947M Sprint Quattro Secure MRI SureScan    Implantable Lead Serial Number [id number]    Implantable Lead Implant Date 20200508    Implantable Lead Polarity Type Quadripolar Lead    Implantable Lead Location Detail 1 UNKNOWN    Implantable Lead Location Right Ventricle    Franco Setting Mode (NBG Code) VVI    Franco Setting Lower Rate Limit 50    Franco Setting Hysterisis Rate 40    Lead Channel Setting Sensing Polarity Bipolar    Lead Channel Setting Sensing Anode Location Right Ventricle    Lead Channel Setting  Sensing Anode Terminal Ring    Lead Channel Setting Sensing Cathode Location Right Ventricle    Lead Channel Setting Sensing Cathode Terminal Tip    Lead Channel Setting Sensing Sensitivity 0.3    Lead Channel Setting Pacing Polarity Bipolar    Lead Channel Setting Pacing Anode Location Right Ventricle    Lead Channel Setting Pacing Anode Terminal Ring    Lead Channel Setting Sensing Cathode Location Right Ventricle    Lead Channel Setting Sensing Cathode Terminal Tip    Lead Channel Setting Pacing Pulse Width 0.4    Lead Channel Setting Pacing Amplitude 2    Lead Channel Setting Pacing Capture Mode Adaptive    Zone Setting Type Category VF    Zone Setting Detection Interval 300    Zone Setting Detection Beats Numerator 30    Zone Setting Detection Beats Denominator 40    Zone Setting Type Category VT    Zone Setting Detection Interval 270    Zone Setting Type Category VT    Zone Setting Detection Interval 390    Zone Setting Type Category VT    Zone Setting Detection Interval 450    Zone Setting Type Category ATRIAL_FIBRILLATION    Zone Setting Type Category AT/AF    Lead Channel Impedance Value 399    Lead Channel Impedance Value 304    Lead Channel Sensing Intrinsic Amplitude 6    Lead Channel Sensing Intrinsic Amplitude 5.75    Lead Channel Pacing Threshold Amplitude 0.75    Lead Channel Pacing Threshold Pulse Width 0.4    Battery Date Time of Measurements 20200819101710    Battery Status OK    Battery RRT Trigger 2.727    Battery Remaining Longevity 135    Battery Voltage 3.09    Capacitor Charge Type Reformation    Capacitor Last Charge Date Time 20200808054231    Capacitor Charge Time 3.893    Capacitor Charge Energy 18    Franco Statistic Date Time Start 20200810123826    Franco Statistic Date Time End 20200819101500    Franco Statistic RV Percent Paced 0.17    Atrial Tachy Statistic Date Time Start 20200810123826    Atrial Tachy Statistic Date Time End 20200819101500    Atrial Tachy Statistic AT/AF [place]  Percent 1.4    Therapy Statistic Recent Shocks Delivered 0    Therapy Statistic Recent Shocks Aborted 0    Therapy Statistic Recent ATP Delivered 0    Therapy Statistic Recent Date Time Start 20200810123826    Therapy Statistic Recent Date Time End 20200819101500    Therapy Statistic Total Shocks Delivered 0    Therapy Statistic Total Shocks Aborted 0    Therapy Statistic Total ATP Delivered 10    Therapy Statistic Total  Date Time Start 00330856022750    Therapy Statistic Total  Date Time End 20200819101500    Episode Statistic Recent Count 12    Episode Statistic Type Category AT/AF    Episode Statistic Recent Count 0    Episode Statistic Type Category SVT    Episode Statistic Recent Count 0    Episode Statistic Type Category VT    Episode Statistic Recent Count 0    Episode Statistic Type Category VF    Episode Statistic Recent Count 0    Episode Statistic Type Category VT    Episode Statistic Recent Count 0    Episode Statistic Type Category VT    Episode Statistic Recent Count 2    Episode Statistic Type Category VT    Episode Statistic Recent Date Time Start 20200810123826    Episode Statistic Recent Date Time End 20200819101500    Episode Statistic Recent Date Time Start 68004747803330    Episode Statistic Recent Date Time End 20200819101500    Episode Statistic Recent Date Time Start 60237647653095    Episode Statistic Recent Date Time End 20200819101500    Episode Statistic Recent Date Time Start 20200810123826    Episode Statistic Recent Date Time End 20200819101500    Episode Statistic Recent Date Time Start 20200810123826    Episode Statistic Recent Date Time End 20200819101500    Episode Statistic Recent Date Time Start 08040767477235    Episode Statistic Recent Date Time End 20200819101500    Episode Statistic Recent Date Time Start 20200810123826    Episode Statistic Recent Date Time End 20200819101500    Episode Statistic Total Count 33    Episode Statistic Type Category AT/AF    Episode Statistic  Total Count 0    Episode Statistic Type Category SVT    Episode Statistic Total Count 315    Episode Statistic Type Category VT    Episode Statistic Total Count 0    Episode Statistic Type Category VF    Episode Statistic Total Count 0    Episode Statistic Type Category VT    Episode Statistic Total Count 10    Episode Statistic Type Category VT    Episode Statistic Total Count 11    Episode Statistic Type Category VT    Episode Statistic Total Date Time Start 31055348882811    Episode Statistic Total Date Time End 20200819101500    Episode Statistic Total Date Time Start 41969144442759    Episode Statistic Total Date Time End 20200819101500    Episode Statistic Total Date Time Start 20200508105729    Episode Statistic Total Date Time End 20200819101500    Episode Statistic Total Date Time Start 14465318340858    Episode Statistic Total Date Time End 20200819101500    Episode Statistic Total Date Time Start 55733616866722    Episode Statistic Total Date Time End 20200819101500    Episode Statistic Total Date Time Start 57378886434660    Episode Statistic Total Date Time End 20200819101500    Episode Statistic Total Date Time Start 46974527694562    Episode Statistic Total Date Time End 20200819101500    Episode Identifier 357    Episode Type Category VT1_MONITOR    Episode Date Time [id number]    Episode Duration 5,071    Episode Identifier 356    Episode Type Category VT1_MONITOR    Episode Date Time [id number]    Episode Duration 866    Episode Identifier 369    Episode Type Category Monitor    Episode Date Time [id number]    Episode Duration 600    Episode Identifier 368    Episode Type Category Monitor    Episode Date Time [id number]    Episode Duration 480    Episode Identifier 367    Episode Type Category Monitor    Episode Date Time [id number]    Episode Duration 480    Episode Identifier 366    Episode Type Category Monitor    Episode Date Time 90159227576897    Episode Duration 600     Episode Identifier 365    Episode Type Category Monitor    Episode Date Time [id number]    Episode Duration 1,200    Episode Identifier 364    Episode Type Category Monitor    Episode Date Time 20306024273097    Episode Duration 1,800    Episode Identifier 363    Episode Type Category Monitor    Episode Date Time [id number]    Episode Duration 600    Episode Identifier 362    Episode Type Category Monitor    Episode Date Time [id number]    Episode Duration 600    Episode Identifier 361    Episode Type Category Monitor    Episode Date Time [id number]    Episode Duration 720    Episode Identifier 360    Episode Type Category Monitor    Episode Date Time [id number]    Episode Duration 480    Episode Identifier 359    Episode Type Category Monitor    Episode Date Time [id number]    Episode Duration 600    Episode Identifier 358    Episode Type Category Monitor    Episode Date Time [id number]    Episode Duration 2,400    Narrative    Patient seen in OR #24 for evaluation and iterative programming of her Medtronic single lead ICD per MD orders. Normal ICD function. 2 episodes recorded in the VT monitor zone - 14 min 26 sec - 1 hr 24 min, 140-143 bpm. Stored EGM's reveal ST-SVT. 12 AT/AF episodes recorded - < 1 min - 40 min in duration. AF burden = 1.4%. Intrinsic rhythm = NSR @ 74 bpm.  = 0.2%. OptiVol fluid index is near baseline. Estimated battery longevity to YUDELKA = 11.2 years. No short v-v intervals recorded. Lead trends appear stable. ICD tachy therapies programmed off for surgery. Pacing mode remains programmed VVI @ 50 bpm. Programming changes made per Anesthesia MD orders. MENDOZA Whitehead RN    Single lead ICD    I have reviewed and interpreted the device interrogation, settings, programming and nurse's summary. The device is functioning within normal device parameters. I agree with the current findings, assessment and plan.   XR Chest Port 1 View    Narrative    XR CHEST PORT 1 VW on  8/19/2020 6:17 PM.    INDICATION: s/p LVAD.    COMPARISON: Same-day radiograph    FINDINGS:   Portable AP supine radiograph of the chest. Open chest. Surgical  sponges projecting over the mediastinum. New LVAD. Left IJ Bird In Hand-Zia  catheter tip projects over the left pulmonary artery. Right IJ sheath.  Left chest implantable cardiac defibrillator with lead projecting over  the right ventricle. Mediastinal drains. Bilateral chest tubes.  Endotracheal tube tip projects over the midthoracic trachea. Partially  visualized feeding tube coursing into the left upper quadrant and out  of the field-of-view.    Cardiac and mediastinal silhouette are stable. Pulmonary vasculature  is somewhat indistinct. Aortic arch calcifications. No appreciable  right pneumothorax. Trace left pneumothorax. Small left and trace  right pleural effusions. Diffuse interstitial opacities.      Impression    IMPRESSION:     1. Open chest. New LVAD placement. Surgical sponges project over the  mediastinum.  2. Endotracheal tube tip projects over the midthoracic trachea.  3. Bird In Hand-Zia catheter tip projects over the left pulmonary artery.  4. Trace left pneumothorax. Chest tube in place.  5. Small left and trace right pleural effusions.  6. Diffuse interstitial edema.

## 2020-08-19 NOTE — ANESTHESIA PROCEDURE NOTES
MARIANO Probe Insertion Note:  Staff -   Anesthesiologist:  Reggie Brand MD  Resident/Fellow: Akbar Quintana DO    Performed By: fellow  Procedure performed by resident/CRNA in presence of a teaching physician.        Probe Number: liz  Probe Status PRE Insertion: NO obvious damage  Probe type:  Adult 3D    Bite block used:   Yes  Insertion Technique: Jaw Lift  Insertion complications: None obvious    Billing Report:MARIANO report by Anesthesiologist (See Separate Report note)    Probe Status POST Removal: NO obvious damage

## 2020-08-19 NOTE — BRIEF OP NOTE
[facility], [place]    Brief Operative Note    Pre-operative diagnosis: Heart failure (H) [I50.9]  Post-operative diagnosis Same as pre-operative diagnosis    Procedure: Procedure(s):  Redo Sternotomy, On Cardiopulmonary Bypass, Insertion of Left Ventricular Assist Device (HeartMate III), Tricuspid Valve Repair with Elkins MC3 Annuloplasty Ring size T30  Surgeon: Surgeon(s) and Role:     * Catarino Farley MD - Primary     * Kip Chisholm MD - Assisting     * Stuart Noonan PA-C - Assisting  Anesthesia: General   Estimated blood loss: * No values recorded between 8/19/2020 10:34 AM and 8/19/2020  4:53 PM *  Drains: Mediastinal x2, bilateral pleural, pump pocket jerman drain  Specimens:   ID Type Source Tests Collected by Time Destination   A : Left Ventricular Garrison Tissue Heart SURGICAL PATHOLOGY EXAM Catarino Farley MD 8/19/2020  1:02 PM    B : Left Ventricular Thrombus Tissue Heart SURGICAL PATHOLOGY EXAM Catarino Farley MD 8/19/2020  1:03 PM      Findings:   see op note.  Complications: None.  Implants:   Implant Name Type Inv. Item Serial No.  Lot No. LRB No. Used Action   ANNULOPLASTY RING MC3 TRICUSPID 30MM [id number] Annuloplasty Ring ANNULOPLASTY RING MC3 TRICUSPID 30MM [id number] [employer]  N/A 1 Implanted   Thoratec HeartMate 3 Ventricular Assist Device-Blood Pump   [id number] [employer] [place]   N/A 1 Implanted   Thoratec HeartMate 3 Sealed Outflow Graft with Bend Relief    THORATEMetaps  [id number] N/A 1 Implanted   IMP KIT SUTURE COR-KNOT MINI 4X14MM [id number] Metallic Hardware/[place] IMP KIT SUTURE COR-KNOT MINI 4X14MM [id number]  [employer] [id number] N/A 1 Implanted     LVAD (HM3) implant, TV repair. Patient coagulopathic, chest packed open

## 2020-08-19 NOTE — ANESTHESIA PROCEDURE NOTES
Arterial Line Procedure Note  Staff -   Anesthesiologist:  Reggie Brand MD  Resident/Fellow: Hank Farley MD  Other Anesthesia Staff: Akbar Quintana DO  Performed By: resident  Procedure performed by resident/CRNA in presence of a teaching physician.          Location: In OR Before Induction  Procedure Start/Stop Times:     patient identified, IV checked, site marked, risks and benefits discussed, informed consent, monitors and equipment checked, pre-op evaluation and at physician/surgeon's request      Correct Patient: Yes      Correct Position: Yes      Correct Site: Yes      Correct Procedure: Yes      Correct Laterality:  N/A    Site Marked:  N/A  Line Placement:     Procedure:  Arterial Line    Insertion Site:  Radial    Insertion laterality:  Right    Skin Prep: Chloraprep      Patient Prep: patient draped, mask, sterile gloves, hat and hand hygiene      Local skin infiltration:  None    Ultrasound Guided?: Yes      Artery evaluated via ultrasound confirming patency.   Using realtime imaging, the artery was punctured and the needle was observed entering the artery.      A permanent image is entered into patient's chart.      Catheter size:  20 gauge, 12 cm    Cath secured with: suture      Dressing:  Tegaderm    Complications:  None obvious    Arterial waveform: Yes      IBP within 10% of NIBP: Yes

## 2020-08-19 NOTE — OR NURSING
Gold wedding ring with single yoana and fused wedding band removed from patient in the OR. Placed in specimen cup with patient label and placed in patient's chart.

## 2020-08-20 PROBLEM — Z98.890 STATUS POST CARDIAC SURGERY: Status: ACTIVE | Noted: 2020-01-01

## 2020-08-20 NOTE — CONSULTS
[facility]  Neurology Critical Care Consultation    Patient Name:  Marquise Jj  MRN:  [id number]    :  1945  Date of Service:  2020  Primary care provider:  Poonam Cast      Neurology consultation service was asked to see Marquise Jj by Dr. Sales to evaluate seizure.    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 She was on sedation with versed 2 mg/hr and fentanyl 50 mcg/kg/hr overnight but not after . She was quite somnolent by report since that time. At 1155 today, she was receiving cares and in response to stimulus had right arm extension and abduction and left arm flexion and rhythmic twitching. There was also left eyebrow and chin twitching. This aborted spontaneously. She was taken to CT which was interpreted as normal. Almost immediately after she arrived back to her room, again, she was being cared for and adjusted in bed and she had another event with left arm, leg, and face twitching. This aborted after 2 mg of versed were administered. During both events she had leftward gaze deviation and some roving eye movements, but she has been preferring the L, not gazing R, and her head is turned L also.    ROS  Not obtained due to intubated and encephalopathic    PMH  Past Medical History:   Diagnosis Date     CAD (coronary artery disease)      ICD (implantable cardioverter-defibrillator) in place     Primary prevention AICD placed in [place] in May 2020.     Ischemic cardiomyopathy     LVEF less than 20%.     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     Status post coronary  artery bypass grafting     Done in [facility] in April 2020.  LIMA to diagonal (as LAD dissected) and KURT to the right coronary artery.     Past Surgical History:   Procedure Laterality Date     ARTHROPLASTY REVISION HIP Right 11/16/2017    Procedure: ARTHROPLASTY REVISION HIP;  Right total hip arthroplasty revision.;  Surgeon: Jozef Garcia MD;  Location: [place] OR     cabg  04/2020     CV HEART CATHETERIZATION WITH POSSIBLE INTERVENTION N/A 7/2/2020    Procedure: Heart Catheterization with Possible Intervention;  Surgeon: Kaden Franco MD;  Location:  HEART CARDIAC CATH LAB     CV INTRA-AORTIC BALLOON PUMP INSERTION N/A 8/14/2020    Procedure: Intra-Aortic Balloon Pump Insertion;  Surgeon: Cale Armijo MD;  Location:  HEART CARDIAC CATH LAB     CV PCI STENT DRUG ELUTING N/A 7/2/2020    Procedure: Percutaneous Coronary Intervention Stent Drug Eluting;  Surgeon: Kaden Franco MD;  Location:  HEART CARDIAC CATH LAB     CV RIGHT HEART CATH N/A 7/2/2020    Procedure: Right Heart Cath;  Surgeon: Kaden Franco MD;  Location:  HEART CARDIAC CATH LAB     CV RIGHT HEART CATH N/A 8/14/2020    Procedure: Right Heart Cath with leave in [place];  Surgeon: Jose Padilla MD;  Location:  HEART CARDIAC CATH LAB     INSERT VENTRICULAR ASSIST DEVICE LEFT (HEARTMATE II) N/A 8/19/2020    Procedure: Redo Sternotomy, On Cardiopulmonary Bypass, Insertion of Left Ventricular Assist Device (HeartMate III), Tricuspid Valve Repair with Elkins MC3 Annuloplasty Ring size T30, Left Ventricular Thrombectomy.;  Surgeon: Catarino Farley MD;  Location:  OR       Medications   Medications Prior to Admission   Medication Sig Dispense Refill Last Dose     acetaminophen (TYLENOL) 325 MG tablet Take 2 tablets (650 mg) by mouth every 6 hours as needed for mild pain 100 tablet 0      apixaban ANTICOAGULANT (ELIQUIS) 5 MG tablet Take 1 tablet (5 mg) by mouth 2 times daily First dose to be on 7/19/20  PM dose. 180 tablet 3      bimatoprost (LUMIGAN) 0.01 % SOLN Place 1 drop into both eyes At Bedtime         busPIRone (BUSPAR) 10 MG tablet Take 1 tablet (10 mg) by mouth 3 times daily 90 tablet 0      clopidogrel (PLAVIX) 75 MG tablet Take 1 tablet (75 mg) by mouth daily 30 tablet 11      co-enzyme Q-10 100 MG CAPS capsule Take 100 mg by mouth 2 times daily         dorzolamide-timolol (COSOPT) 2-0.5 % ophthalmic solution Place 1 drop into both eyes 2 times daily         ferrous sulfate (FEROSUL) 325 (65 Fe) MG tablet Take 1 tablet (325 mg) by mouth 2 times daily 60 tablet 0      latanoprost (XALATAN) 0.005 % ophthalmic solution Place 1 drop into both eyes daily In the morning.        levothyroxine (SYNTHROID/LEVOTHROID) 125 MCG tablet Take 0.5 tablets (62.5 mcg) by mouth every morning (before breakfast) 15 tablet 0      Multiple Vitamins-Minerals (PRESERVISION AREDS 2 PO) Take 1 tablet by mouth 2 times daily        nitroGLYcerin (NITROSTAT) 0.4 MG sublingual tablet For chest pain place 1 tablet under the tongue every 5 minutes for 3 doses. If symptoms persist 5 minutes after 1st dose call 911. 20 tablet 0      norepinephrine (LEVOPHED) 16-0.9 MG/250ML-% SOLN Inject 2.061-27.48 mcg/min into the vein continuous        omeprazole (PRILOSEC) 20 MG DR capsule Take 20 mg by mouth daily         QUEtiapine (SEROQUEL) 25 MG tablet Take 50 mg by mouth At Bedtime        rosuvastatin (CRESTOR) 20 MG tablet Take 20 mg by mouth daily        sacubitril-valsartan (ENTRESTO) 24-26 MG per tablet 1/2 tablet in PM daily 60 tablet 1        Allergies  Allergies   Allergen Reactions     Combigan [Brimonidine Tartrate-Timolol] Swelling     Swollen eyelids     Ativan [Lorazepam] Anxiety and Other (See Comments)     Increased confusion     Social History  Social History     Tobacco Use     Smoking status: Never Smoker     Smokeless tobacco: Never Used   Substance Use Topics     Alcohol use: Not Currently     Family History    Family History    Problem Relation Age of Onset     Coronary Artery Disease No family hx of      Heart Failure No family hx of        Physical Examination   Vitals: BP (!) 141/71   Pulse 68   Temp 99  F (37.2  C)   Resp 17   Wt 67.6 kg (149 lb 0.5 oz)   SpO2 100%   BMI 22.66 kg/m    General: Adult female patient, lying in bed, NAD  HEENT: NC/AT, no icterus, op pink and moist  Cardiac: LVAD  Chest: mechanically ventilated    Neuro:  Mental status: Eyes slightly open, does not awaken to voice, does not react or track, does not follow commands  Cranial nerves: Does not blink to threat, PERRL 4 mm, briskly reactive, eyes move from midgaze with slight L exotropia to L conjugate gaze not overcome by VOR. Frequent chin twitches  Motor: L arm increased tone with cogwheeling, frequent L arm internal rotation twitches, erratic L leg twitches. RUE and RLE tone flaccid  Reflexes: Normoreflexic and symmetric biceps, brachioradialis, triceps, patellae, and achilles. Negative Delacruz, no clonus, toes up-going bilaterally.  Sensory: Does not respond to noxious stim in 4/4 extremities  Coordination: Not tested  Gait: Not tested    Investigations   CT reviewed    Impression  Maruqise Jj is a 75 year old female with complex cardiac history who is POD 1 from LVAD placement complicated by post-procedural encephalopathy and two clinical focal seizures with concern for ongoing non-convulsive status epilepticus.    Recommendations  -versed 2 mg IV administered during evaluation, further twitching ceased, gaze resolved transiently then back to L fixed  -keppra 2 g IV load followed by 1 g IV q12  -vEEG ordered for you, initial interpretation pending    Thank you for involving Neurology in the care of Marquise Jj. We will continue to follow.  Please do not hesitate to call with questions/concerns (consult pager [phone]).      Patient was seen and discussed with Dr. Mcgregor.    Lyndsey West MD  Neurology PGY-4  [phone]

## 2020-08-20 NOTE — PROGRESS NOTES
CV ICU PROGRESS NOTE  August 20, 2020      CO-MORBIDITIES:   Cardiogenic shock (H)  (primary encounter diagnosis)  LV (left ventricular) mural thrombus  Heart failure (H)  Heart failure (H)  Status post cardiac surgery    ASSESSMENT: Marquise Jj is a 75 year old female 75 year-old female with PMH notable for coronary artery disease s/p CABG (4/20), ischemic cardiomyopathy, severe MR/TR and known mural thrombus who is admitted to the CV ICU s/p redo sternotomy, LVAD (heartmate III), and TV repair on 8/19/20 with Dr. Farley. Chest was left open and packed with plan for RTOR later this week.     TODAY'S PROGRESS:   - OK to resume sedation: propofol gtt on standby for sedation along with midazolam boluses PRN; discontinue midazolam gtt  - Will consider low-intensity heparin infusion; pending multi-disciplinary team discussion  - CVP goal < 12  - Wean Trey to no less than 10 ppm  - Wean off epi; titrate to CVP < 12      PLAN:   Neuro/ pain/ sedation:  #Acute post-operative pain   - Monitor neurological status. Notify the MD for any acute changes in exam.  - OK to resume sedation: propofol gtt on standby for sedation along with midazolam boluses PRN; discontinue midazolam gtt  - Seroquel 50 at bedtime + 25 PRN   - Tylenol toi      Pulmonary:   #Acute hypoxic respiratory failure  - MV   - Serial ABG   - CXR reviewed   - Continue NO overnight       Cardiovascular:    #Acute on chronic decompensated heart failure with reduced ejection fraction: NYHA IV / ACC D  #Cardiogenic shock  #Ischemic cardiomyopathy  #Coronary artery disease s/p CABG 4/20 in [place], s/p PCI to RCA 7/20  #Mural thrombus  #Severe MR/TR  - Monitor hemodynamic status.   - Dobutamine, epi, vaso, levo: do not aggressively wean overnight   - Rosuvastatin 20   - Vent paced 80       GI/Nutrition:   - NPO, OGT   - Pantoprazole ppx   - Bowel regimen: senna-colace, miralax.  - No indication for parenteral nutrition.  - Nutrition consulted. Appreciate  recs      Renal/Fluid Balance/ Electrolytes:   #CKD III  - Will monitor intake and output.  - ICU electrolyte replacement protocol  - STAT labs, q 6 hours       Endocrine:    #Glycemic control  #Hx hypothyroid   - Insulin gtt   - Synthroid daily       ID/ Antibiotics:  - Perioperative vancomycin, levofloxacin, fluconazole, zosyn       Heme:     #Acute perioperative blood loss anemia  - KCentra given in the OR   - TEG post op looked good   - Overnight labs q6 hours   - Perioperative amicar       Prophylaxis:    - Mechanical prophylaxis for DVT.  - No chemical DVT prophylaxis due to high risk of bleeding.      MSK:    - PT and OT consulted. Appreciate recs.      Lines/ tubes/ drains:  - ETT  - A-line  - R internal jugular CVC  - PAC  - PIV  - Rojo  - Chest tubes       Disposition:  - CV ICU.    Patient seen, findings and plan discussed with CV ICU staff, Dr. Gambino.    -----------------------------------  Ronaldo Li MD  Anesthesiology Resident, PGY-3  *[id number]  ====================================    SUBJECTIVE:   Sedated, intubated     OBJECTIVE:   1. VITAL SIGNS:   Temp:  [94.5  F (34.7  C)-100.8  F (38.2  C)] 99.9  F (37.7  C)  Pulse:  [59-84] 68  Resp:  [14-17] 17  MAP:  [55 mmHg-104 mmHg] 65 mmHg  Arterial Line BP: ()/(41-84) 72/59  FiO2 (%):  [40 %-50 %] 40 %  SpO2:  [98 %-100 %] 100 %  Ventilation Mode: CMV/AC  (Continuous Mandatory Ventilation/ Assist Control)  FiO2 (%): 40 %  Rate Set (breaths/minute): 14 breaths/min  Tidal Volume Set (mL): 400 mL  PEEP (cm H2O): 5 cmH2O  Oxygen Concentration (%): 50 %  Resp: 17      2. INTAKE/ OUTPUT:   I/O last 3 completed shifts:  In: 38140.62 [I.V.:1554.62; Other:500; NG/GT:270; IV Piggyback:2000]  Out: 4985 [Urine:3985; Emesis/NG output:200; Chest Tube:800]    3. PHYSICAL EXAMINATION:   General: Intubated, sedated   Neuro: Sedated, PERRL   Resp: mechanically ventilated, minimal vent settings   CV: ventricular paced @ 80  Abdomen: Soft, Non-distended,  Non-tender  Incisions: Chest remains open   Extremities: warm and well perfused    4. INVESTIGATIONS:   Arterial Blood Gases   Recent Labs   Lab 08/20/20  0949 08/20/20  0337 08/19/20 2203 08/19/20  1758   PH 7.42 7.39 7.40 7.37   PCO2 36 39 39 44   PO2 131* 186* 126* 132*   HCO3 23 23 24 25     Complete Blood Count   Recent Labs   Lab 08/20/20  0949 08/20/20  0341 08/19/20 2324 08/19/20 2210   WBC 10.8 11.4* 15.0* 17.0*   HGB 8.6* 9.9* 8.1* 8.9*   * 112* 128* 135*     Basic Metabolic Panel  Recent Labs   Lab 08/20/20  0949 08/20/20 0341 08/20/20 0337 08/19/20 2327 08/19/20 2210 08/19/20  1805    141  --   --  144  --  145*   POTASSIUM 4.2 3.9  --   --  4.1  --  3.3*   CHLORIDE 109 109  --   --  111*  --  115*   CO2 24 24  --   --  25  --  22   BUN 18 18  --   --  17  --  13   CR 0.95 0.86  --   --  0.68  --  0.50*   * 116* 117* 204* 170*   < > 141*    < > = values in this interval not displayed.     Liver Function Tests  Recent Labs   Lab 08/20/20  0949 08/20/20  0341 08/19/20 2210 08/19/20  1805 08/18/20  1624 08/18/20  0349   AST  --  52*  --  47*  --  28 29   ALT  --  16  --  13  --  18 18   ALKPHOS  --  65  --  69  --  217* 188*   BILITOTAL  --  4.8*  --  2.8*  --  0.4 0.5   ALBUMIN  --  2.2*  --  1.6*  --  2.4* 2.4*   INR 1.44* 1.35* 1.40* 1.40*   < >  --  1.31*    < > = values in this interval not displayed.     Pancreatic Enzymes  No lab results found in last 7 days.  Coagulation Profile  Recent Labs   Lab 08/20/20  0949 08/20/20  0341 08/19/20  2210 08/19/20  1805 08/19/20  1629 08/19/20  1559   INR 1.44* 1.35* 1.40* 1.40* 1.44* 1.52*   PTT  --  41*  --  43* 43* 44*         5. RADIOLOGY:   Recent Results (from the past 24 hour(s))   Cardiac Device Check - Inpatient   Result Value    Date Time Interrogation Session [id number]    Implantable Pulse Generator  Medtronic    Implantable Pulse Generator Model [id number] Visia AF MRI VR    Implantable Pulse Generator  Serial Number [id number]    Type Interrogation Session In Clinic    Clinic Name [facility]    Implantable Pulse Generator Type Defibrillator    Implantable Pulse Generator Implant Date 20200508    Implantable Lead  Medtronic    Implantable Lead Model 6947M Sprint Quattro Secure MRI SureScan    Implantable Lead Serial Number [id number]    Implantable Lead Implant Date 20200508    Implantable Lead Polarity Type Quadripolar Lead    Implantable Lead Location Detail 1 UNKNOWN    Implantable Lead Location Right Ventricle    Franco Setting Mode (NBG Code) VVI    Franco Setting Lower Rate Limit 50    Franco Setting Hysterisis Rate 40    Lead Channel Setting Sensing Polarity Bipolar    Lead Channel Setting Sensing Anode Location Right Ventricle    Lead Channel Setting Sensing Anode Terminal Ring    Lead Channel Setting Sensing Cathode Location Right Ventricle    Lead Channel Setting Sensing Cathode Terminal Tip    Lead Channel Setting Sensing Sensitivity 0.3    Lead Channel Setting Pacing Polarity Bipolar    Lead Channel Setting Pacing Anode Location Right Ventricle    Lead Channel Setting Pacing Anode Terminal Ring    Lead Channel Setting Sensing Cathode Location Right Ventricle    Lead Channel Setting Sensing Cathode Terminal Tip    Lead Channel Setting Pacing Pulse Width 0.4    Lead Channel Setting Pacing Amplitude 2    Lead Channel Setting Pacing Capture Mode Adaptive    Zone Setting Type Category VF    Zone Setting Detection Interval 300    Zone Setting Detection Beats Numerator 30    Zone Setting Detection Beats Denominator 40    Zone Setting Type Category VT    Zone Setting Detection Interval 270    Zone Setting Type Category VT    Zone Setting Detection Interval 390    Zone Setting Type Category VT    Zone Setting Detection Interval 450    Zone Setting Type Category ATRIAL_FIBRILLATION    Zone Setting Type Category AT/AF    Lead Channel Impedance Value 399    Lead Channel Impedance  Value 304    Lead Channel Sensing Intrinsic Amplitude 6    Lead Channel Sensing Intrinsic Amplitude 5.75    Lead Channel Pacing Threshold Amplitude 0.75    Lead Channel Pacing Threshold Pulse Width 0.4    Battery Date Time of Measurements 20200819101710    Battery Status OK    Battery RRT Trigger 2.727    Battery Remaining Longevity 135    Battery Voltage 3.09    Capacitor Charge Type Reformation    Capacitor Last Charge Date Time 20200808054231    Capacitor Charge Time 3.893    Capacitor Charge Energy 18    Franco Statistic Date Time Start 20200810123826    Franco Statistic Date Time End 20200819101500    Franco Statistic RV Percent Paced 0.17    Atrial Tachy Statistic Date Time Start 20200810123826    Atrial Tachy Statistic Date Time End 20200819101500    Atrial Tachy Statistic AT/AF Brickeys Percent 1.4    Therapy Statistic Recent Shocks Delivered 0    Therapy Statistic Recent Shocks Aborted 0    Therapy Statistic Recent ATP Delivered 0    Therapy Statistic Recent Date Time Start 20200810123826    Therapy Statistic Recent Date Time End 20200819101500    Therapy Statistic Total Shocks Delivered 0    Therapy Statistic Total Shocks Aborted 0    Therapy Statistic Total ATP Delivered 10    Therapy Statistic Total  Date Time Start 20200508105729    Therapy Statistic Total  Date Time End 20200819101500    Episode Statistic Recent Count 12    Episode Statistic Type Category AT/AF    Episode Statistic Recent Count 0    Episode Statistic Type Category SVT    Episode Statistic Recent Count 0    Episode Statistic Type Category VT    Episode Statistic Recent Count 0    Episode Statistic Type Category VF    Episode Statistic Recent Count 0    Episode Statistic Type Category VT    Episode Statistic Recent Count 0    Episode Statistic Type Category VT    Episode Statistic Recent Count 2    Episode Statistic Type Category VT    Episode Statistic Recent Date Time Start 20200810123826    Episode Statistic Recent Date Time End  91275660799226    Episode Statistic Recent Date Time Start 65490734317753    Episode Statistic Recent Date Time End 60856452453418    Episode Statistic Recent Date Time Start 61483742583239    Episode Statistic Recent Date Time End 39853879349084    Episode Statistic Recent Date Time Start 90366230551534    Episode Statistic Recent Date Time End 18724994159700    Episode Statistic Recent Date Time Start 94038843642703    Episode Statistic Recent Date Time End 23705705086477    Episode Statistic Recent Date Time Start 86241294306739    Episode Statistic Recent Date Time End 20200819101500    Episode Statistic Recent Date Time Start 19929397028488    Episode Statistic Recent Date Time End 20200819101500    Episode Statistic Total Count 33    Episode Statistic Type Category AT/AF    Episode Statistic Total Count 0    Episode Statistic Type Category SVT    Episode Statistic Total Count 315    Episode Statistic Type Category VT    Episode Statistic Total Count 0    Episode Statistic Type Category VF    Episode Statistic Total Count 0    Episode Statistic Type Category VT    Episode Statistic Total Count 10    Episode Statistic Type Category VT    Episode Statistic Total Count 11    Episode Statistic Type Category VT    Episode Statistic Total Date Time Start 45112697330535    Episode Statistic Total Date Time End 65488059703535    Episode Statistic Total Date Time Start 65489656013293    Episode Statistic Total Date Time End 70266081625416    Episode Statistic Total Date Time Start 22952148078328    Episode Statistic Total Date Time End 56635323360438    Episode Statistic Total Date Time Start 68318612379381    Episode Statistic Total Date Time End 41726763398893    Episode Statistic Total Date Time Start 39216614191522    Episode Statistic Total Date Time End 26870264630590    Episode Statistic Total Date Time Start 22207837961506    Episode Statistic Total Date Time End 73855279760354    Episode Statistic Total Date  Time Start 78329478082173    Episode Statistic Total Date Time End 70752089177395    Episode Identifier 357    Episode Type Category VT1_MONITOR    Episode Date Time [id number]    Episode Duration 5,071    Episode Identifier 356    Episode Type Category VT1_MONITOR    Episode Date Time 36222682834551    Episode Duration 866    Episode Identifier 369    Episode Type Category Monitor    Episode Date Time 56388634013032    Episode Duration 600    Episode Identifier 368    Episode Type Category Monitor    Episode Date Time 73012783032006    Episode Duration 480    Episode Identifier 367    Episode Type Category Monitor    Episode Date Time 93271111147417    Episode Duration 480    Episode Identifier 366    Episode Type Category Monitor    Episode Date Time 88288999369903    Episode Duration 600    Episode Identifier 365    Episode Type Category Monitor    Episode Date Time 26308935402623    Episode Duration 1,200    Episode Identifier 364    Episode Type Category Monitor    Episode Date Time 20200810222329    Episode Duration 1,800    Episode Identifier 363    Episode Type Category Monitor    Episode Date Time 20200810220929    Episode Duration 600    Episode Identifier 362    Episode Type Category Monitor    Episode Date Time [id number]    Episode Duration 600    Episode Identifier 361    Episode Type Category Monitor    Episode Date Time [id number]    Episode Duration 720    Episode Identifier 360    Episode Type Category Monitor    Episode Date Time 30253253007560    Episode Duration 480    Episode Identifier 359    Episode Type Category Monitor    Episode Date Time 18063048703073    Episode Duration 600    Episode Identifier 358    Episode Type Category Monitor    Episode Date Time [id number]    Episode Duration 2,400    Narrative    Patient seen in OR #24 for evaluation and iterative programming of her   Medtronic single lead ICD per MD orders. Normal ICD function. 2 episodes   recorded in the VT  monitor zone - 14 min 26 sec - 1 hr 24 min, 140-143   bpm. Stored EGM's reveal ST-SVT. 12 AT/AF episodes recorded - < 1 min - 40   min in duration. AF burden = 1.4%. Intrinsic rhythm = NSR @ 74 bpm.  =   0.2%. OptiVol fluid index is near baseline. Estimated battery longevity to   YUDELKA = 11.2 years. No short v-v intervals recorded. Lead trends appear   stable. ICD tachy therapies programmed off for surgery. Pacing mode   remains programmed VVI @ 50 bpm. Programming changes made per Anesthesia   MD orders. MENDOZA Whitehead RN    Single lead ICD    I have reviewed and interpreted the device interrogation, settings,   programming and nurse's summary. The device is functioning within normal   device parameters. I agree with the current findings, assessment and plan.   XR Chest Port 1 View    Narrative    XR CHEST PORT 1 VW on 8/19/2020 6:17 PM.    INDICATION: s/p LVAD.    COMPARISON: Same-day radiograph    FINDINGS:   Portable AP supine radiograph of the chest. Open chest. Surgical  sponges projecting over the mediastinum. New LVAD. Left IJ Morris Run-Zia  catheter tip projects over the distal main or proximal left pulmonary  artery. Right IJ sheath. Left chest implantable cardiac defibrillator  with lead projecting over the right ventricle. Mediastinal drains.  Bilateral chest tubes. Endotracheal tube tip projects over the  midthoracic trachea. Partially visualized feeding tube coursing into  the left upper quadrant and out of the field-of-view.    Cardiac and mediastinal silhouette are stable. Pulmonary vasculature  is somewhat indistinct. Aortic arch calcifications. No appreciable  right pneumothorax. Trace left pneumothorax. Small left and trace  right pleural effusions. Diffuse interstitial opacities.      Impression    IMPRESSION:     1. Open chest. New LVAD placement. Surgical sponges project over the  mediastinum.  2. Endotracheal tube tip projects over the midthoracic trachea.  3. Morris Run-Zia catheter tip projects over the  distal main or proximal  left pulmonary artery.  4. Trace left pneumothorax. Chest tube in place.  5. Small left and trace right pleural effusions.  6. Diffuse interstitial edema.    I have personally reviewed the examination and initial interpretation  and I agree with the findings.    MUNA BRADFORD MD   XR Chest Port 1 View    Narrative    EXAMINATION:  XR CHEST PORT 1 VW 8/20/2020 2:20 AM.    COMPARISON: 8/19/2020    HISTORY:  Status post LVAD    FINDINGS: AP supine radiograph of the chest. Endotracheal tube tip  projects over the mid thoracic trachea. Bilateral chest tubes are  unchanged. Unchanged position of right IJ sheath. Left IJ approach  Chambers-Zia catheter tip is likely in the proximal left pulmonary  artery. Interval placement of gastric tube. Unchanged partially  visualized feeding tube. Stable changes of LVAD placement with open  chest and curvilinear radiopaque bandaging material overlying much of  the mediastinum. Mediastinal surgical drains are unchanged. Left chest  wall implantable defibrillator lead projects over the right ventricle.    Cardiac silhouette is stable. Small left and trace right pleural  effusions are stable. Trace biapical pneumothoraces, left greater than  right, are unchanged. No focal pulmonary opacity.      Impression    IMPRESSION:   1. Interval placement of gastric tube. Other support devices are  stable.  2. Stable changes of LVAD placement with open chest and radiopaque  packing material overlying much of the mediastinum.  3. Small left and trace right apical pneumothoraces are unchanged.  Bilateral chest tubes in place.  4. Small left and trace right pleural effusions are unchanged.    I have personally reviewed the examination and initial interpretation  and I agree with the findings.    CRYSTAL BLANKENSHIP MD   Cardiac Device Check - Inpatient   Result Value    Date Time Interrogation Session [id number]    Implantable Pulse Generator  [employer]     Implantable Pulse Generator Model [id number] Visia AF MRI VR    Implantable Pulse Generator Serial Number [id number]    Type Interrogation Session In Clinic    Clinic Name [facility]    Implantable Pulse Generator Type Defibrillator    Implantable Pulse Generator Implant Date 20200508    Implantable Lead  Medtronic    Implantable Lead Model 6947M Sprint Quattro Secure MRI SureScan    Implantable Lead Serial Number [id number]    Implantable Lead Implant Date 20200508    Implantable Lead Polarity Type Quadripolar Lead    Implantable Lead Location Detail 1 UNKNOWN    Implantable Lead Location Right Ventricle    Franco Setting Mode (NBG Code) VVI    Franco Setting Lower Rate Limit 50    Franco Setting Hysterisis Rate 40    Lead Channel Setting Sensing Polarity Bipolar    Lead Channel Setting Sensing Anode Location Right Ventricle    Lead Channel Setting Sensing Anode Terminal Ring    Lead Channel Setting Sensing Cathode Location Right Ventricle    Lead Channel Setting Sensing Cathode Terminal Tip    Lead Channel Setting Sensing Sensitivity 0.3    Lead Channel Setting Pacing Polarity Bipolar    Lead Channel Setting Pacing Anode Location Right Ventricle    Lead Channel Setting Pacing Anode Terminal Ring    Lead Channel Setting Sensing Cathode Location Right Ventricle    Lead Channel Setting Sensing Cathode Terminal Tip    Lead Channel Setting Pacing Pulse Width 0.4    Lead Channel Setting Pacing Amplitude 2    Lead Channel Setting Pacing Capture Mode Adaptive    Zone Setting Type Category VF    Zone Setting Detection Interval 300    Zone Setting Detection Beats Numerator 30    Zone Setting Detection Beats Denominator 40    Zone Setting Type Category VT    Zone Setting Detection Interval 270    Zone Setting Type Category VT    Zone Setting Detection Interval 390    Zone Setting Type Category VT    Zone Setting Detection Interval 450    Zone Setting Type Category ATRIAL_FIBRILLATION    Zone  Setting Type Category AT/AF    Lead Channel Impedance Value 418    Lead Channel Impedance Value 304    Lead Channel Sensing Intrinsic Amplitude 7.125    Lead Channel Sensing Intrinsic Amplitude 7.625    Lead Channel Pacing Threshold Amplitude 0.5    Lead Channel Pacing Threshold Pulse Width 0.4    Lead Channel Pacing Threshold Amplitude 0.5    Lead Channel Pacing Threshold Pulse Width 0.4    Battery Date Time of Measurements 20200820090207    Battery Status OK    Battery RRT Trigger 2.727    Battery Remaining Longevity 135    Battery Voltage 3.07    Capacitor Charge Type Reformation    Capacitor Last Charge Date Time 20200808054231    Capacitor Charge Time 3.893    Capacitor Charge Energy 18    Franco Statistic Date Time Start 20200819101549    Franco Statistic Date Time End 20200820090019    Franco Statistic RV Percent Paced 6.61    Atrial Tachy Statistic Date Time Start 20200819101549    Atrial Tachy Statistic Date Time End 20200820090019    Atrial Tachy Statistic AT/AF [place] Percent 0.6    Therapy Statistic Recent Shocks Delivered 0    Therapy Statistic Recent Shocks Aborted 0    Therapy Statistic Recent ATP Delivered 0    Therapy Statistic Recent Date Time Start 20200819101549    Therapy Statistic Recent Date Time End 20200820090019    Therapy Statistic Total Shocks Delivered 0    Therapy Statistic Total Shocks Aborted 0    Therapy Statistic Total ATP Delivered 10    Therapy Statistic Total  Date Time Start 79267179114326    Therapy Statistic Total  Date Time End 20200820090019    Episode Statistic Recent Count 1    Episode Statistic Type Category AT/AF    Episode Statistic Recent Count 0    Episode Statistic Type Category SVT    Episode Statistic Recent Count 0    Episode Statistic Type Category VT    Episode Statistic Recent Count 0    Episode Statistic Type Category VF    Episode Statistic Recent Count 0    Episode Statistic Type Category VT    Episode Statistic Recent Count 0    Episode Statistic Type Category  VT    Episode Statistic Recent Count 0    Episode Statistic Type Category VT    Episode Statistic Recent Date Time Start 20200819101549    Episode Statistic Recent Date Time End 20200820090019    Episode Statistic Recent Date Time Start 20200819101549    Episode Statistic Recent Date Time End 20200820090019    Episode Statistic Recent Date Time Start 20200819101549    Episode Statistic Recent Date Time End 20200820090019    Episode Statistic Recent Date Time Start 20200819101549    Episode Statistic Recent Date Time End 20200820090019    Episode Statistic Recent Date Time Start 20200819101549    Episode Statistic Recent Date Time End 20200820090019    Episode Statistic Recent Date Time Start 20200819101549    Episode Statistic Recent Date Time End 20200820090019    Episode Statistic Recent Date Time Start 20200819101549    Episode Statistic Recent Date Time End 20200820090019    Episode Statistic Total Count 34    Episode Statistic Type Category AT/AF    Episode Statistic Total Count 0    Episode Statistic Type Category SVT    Episode Statistic Total Count 315    Episode Statistic Type Category VT    Episode Statistic Total Count 0    Episode Statistic Type Category VF    Episode Statistic Total Count 0    Episode Statistic Type Category VT    Episode Statistic Total Count 10    Episode Statistic Type Category VT    Episode Statistic Total Count 11    Episode Statistic Type Category VT    Episode Statistic Total Date Time Start 20200508105729    Episode Statistic Total Date Time End 20200820090019    Episode Statistic Total Date Time Start 20200508105729    Episode Statistic Total Date Time End 20200820090019    Episode Statistic Total Date Time Start 20200508105729    Episode Statistic Total Date Time End 60393986119449    Episode Statistic Total Date Time Start 20200508105729    Episode Statistic Total Date Time End 70499376713378    Episode Statistic Total Date Time Start 20200508105729    Episode Statistic Total  Date Time End 20200820090019    Episode Statistic Total Date Time Start 20200508105729    Episode Statistic Total Date Time End 20200820090019    Episode Statistic Total Date Time Start 20200508105729    Episode Statistic Total Date Time End 20200820090019    Episode Identifier 370    Episode Type Category Monitor    Episode Date Time 20200819124729    Episode Duration 480    Narrative    Patient seen on 4 E for evaluation and iterative programming of a Medtronic Single lead ICD per MD orders. Patient is s/p LVAD placement. Normal ICD function. 1 AF episode recorded lasting 8 minutes, EGM for review appears to be SR w/ PAC/PVC's. Intrinsic rhythm = VS @ 76 bpm.   = 6.6%. OptiVol fluid index is slightly above the baseline. Estimated battery longevity to YUDELKA = 11.2 years. 53 short v-v intervals recorded. Lead trends appear stable. Patient is on ventilator and chest remains open. Verified with bedside nurse that the team wants ICD tachy therapies back ON. ICD Tachy therapies programmed ON to match pre-op settings. Pacing mode remains VVI 50 bpm. Plan to follow patient as needed while inpatient. JOY Diaz RN, BSN.     Single lead ICD    I have reviewed and interpreted the device interrogation, settings, programming and nurse's summary. The device is functioning within normal device parameters. I agree with the current findings, assessment and plan.       =========================================

## 2020-08-20 NOTE — PROGRESS NOTES
Calorie Count  Intake recorded for: 8/19  Total Kcals: 227 Total Protein: 10g  Kcals from Hospital Food: 227  Protein: 10g  Kcals from Outside Food (average):0 Protein: 0g  # Meals Recorded: 80% grilled cheese sandwich, fruit cup  # Supplements Recorded: 0

## 2020-08-20 NOTE — CONSULTS
CORE consult not completed for this patient d/t planned VAD surgery. Post VAD surgery and recovery patient will follow-up with Heart Failure Cardiologists and VAD coordinators, not requiring CORE clinic. Thank you for the consideration of a CORE clinic consult.     Catherine Su RN BSN CHFN  Cardiology Care Coordinator - [facility]  Questions and [phone]

## 2020-08-20 NOTE — PLAN OF CARE
D/I:    Neuro:  Fentanyl, Versed & Precedex Gtt's all stopped at 2130 last evening to assess mental status. Pt remains unresponsive except for slight ficker of eye lid and slight contracture movement of right fingers once @ 0600 this AM. Lexi hugger on pearl heat all shift to prevent hypothermia.    Cardiac:  Temporary Pacemaker set @ VVI rate= 80 lost both capture and not sensing @ 2100.  MD assessed. Increasing V output and changing sensing voltage did not fix.  Temporary Pacer shut off per MD recommendations to prevent a spike on the pt's T wave. Heart rate SB/SR 59 to low 70's since.  Dobutamine Gtt @ 5 mcg/kg/min.  Epi Gtt 0.1 to 0.15 ( currently @ 0.11) mcg/kg/min.  Levo Gtt @ 0.08 to 0.1 ( currently @ 0.1 ) mcg/kg/min.  Vaso Gtt @ off to 2.4 ( currently @ 1.5 ) units/hr.  HM3 LVAD Flows= 2.4 to 4 lpm.  PI = 1.5 to 6.2.  Speed 5300 rpm.  Power= 3.5 to 3.7 mota.  Both Flows and PI constantly changing all shift.  Fluid resuscitation given this shift for low urine output,  low PI or low flow included:  One liter of 5% Albumin & Two liters of lactated ringers. Pt also got 1 unit of cells for a Hgb of 8.1.    Pulm:  Vent CMV mode:  VT= 400.  RR= 14. PEEP= 5.  FIO2= 40 to 50 ( currently @ 50 )%. Lung clear with diminished bases. Left chest tube pluerovac output 10 to 40 ml/hr ( except once dumped 70 ml when pt turned for linen change.  Right chest tube pluerovac output zero to 40 ml/hr (except once dumped 225 ml when pt turned for linen change).      GI:  NPO.  Bowel Sounds not audible.  Tube feeding kept on hold due to high level of pressors Pt is currently on.  OG had 200 ml bile output this shift.      :  Rojo urine output < 30 ml/hr since 2200 despite all the fluid resuscitation given this shift.  MD notified of low urine output several times.    Endocrine:  Insulin Gtt off to 2.5 ( currently 1.5 ) units/hr with BG = 96 to 204 ( currently 96 ).    Skin:  No new concerns.  Left FA ecchymotic.. Coccyx and   Lower midline spine blanchable erythema with Mepilex dressing on.  Left groin incision liquid bandage with erythema.    A/P:     Goal MAP > 65.  Keep LVAD PI > 2 and Flows > 3.   Wean pressors as able keeping within those parameters.  Monitor low urine output closely.  Restart Versed, Fentanyl & Precedex Gtt's after Pt wakes up long enough to demonstrate she can ISAACS to request.  Current plan is to close chest on Friday.

## 2020-08-20 NOTE — PLAN OF CARE
Major Shift Events:  Pt off sedation since 2100 8/19, arouses to voice this morning otherwise no response to pain. Pt started seizing at 11:55 - eye twitching, left eye deviation and roving eye movements followed by stiffening/jerking in L arm and extension of R arm. CVTS called to bedside, seizure resolved after <2 minutes. CT scan done. Neuro consulted, pt seized again with facial twitching and twitching of LUE lasting up to 5 minutes, 2 mg versed given. Neuro came and assessed at bedside, another 2mg versed given followed by keppra load. Total of 4mg given total today. EEG applied now. Vaso and epi decreased per request of Dr. Farley. Sedation remains off. Versed bolus' as needed for seizures, dex available for RASS 0 - 1, fentanyl available for pain. HM3 numbers stable, no alarms, no fluids given today. CT's with output 10-20cc/hr and voiding 20-40/hr. Nitric decreased to 15 with CVP maintaining at <12, nitric decreased to 10 at 1810, recheck CVP 8.    Plan: Return to OR tomorrow for chest closure.     For vital signs and complete assessments, please see documentation flowsheets.        Problem: Adjustment to Illness (Heart Failure)  Goal: Optimal Coping  8/20/2020 1817 by Ruthie Valencia RN  Outcome: No Change  8/20/2020 0629 by Waylon Marshall RN  Outcome: No Change     Problem: Arrhythmia/Dysrhythmia (Heart Failure)  Goal: Stable Heart Rate and Rhythm  8/20/2020 1817 by Ruthie Valencia RN  Outcome: No Change  8/20/2020 0629 by Waylon Marshall RN  Outcome: No Change     Problem: Cardiac Output Decreased (Heart Failure)  Goal: Optimal Cardiac Output  8/20/2020 1817 by Ruthie Valencia RN  Outcome: Improving  8/20/2020 0629 by Waylon Marshall RN  Outcome: No Change     Problem: Fluid Imbalance (Heart Failure)  Goal: Fluid Balance  8/20/2020 1817 by Ruthie Valencia RN  Outcome: Improving  8/20/2020 0629 by Waylon Marshall RN  Outcome: No Change     Problem: Functional Ability Impaired  (Heart Failure)  Goal: Optimal Functional Ability  8/20/2020 1817 by Ruthie Valencia RN  Outcome: No Change  8/20/2020 0629 by Waylon Marshall RN  Outcome: No Change     Problem: Oral Intake Inadequate (Heart Failure)  Goal: Optimal Nutrition Intake  8/20/2020 1817 by Ruthie Valencia RN  Outcome: No Change  8/20/2020 0629 by Waylon Marshall RN  Outcome: No Change     Problem: Respiratory Compromise (Heart Failure)  Goal: Effective Oxygenation and Ventilation  8/20/2020 1817 by Ruthie Valencia RN  Outcome: Improving  8/20/2020 0629 by Waylon Marshall RN  Outcome: No Change     Problem: Sleep Disordered Breathing (Heart Failure)  Goal: Effective Breathing Pattern During Sleep  8/20/2020 1817 by Ruthie Valencia RN  Outcome: No Change  8/20/2020 0629 by Waylon Marshall RN  Outcome: No Change     Problem: Ventricular Assist Device (Adult)  Goal: Signs and Symptoms of Listed Potential Problems Will be Absent, Minimized or Managed (Ventricular Assist Device)  Description: Signs and symptoms of listed potential problems will be absent, minimized or managed by discharge/transition of care (reference Ventricular Assist Device (Adult) CPG).  8/20/2020 1817 by Ruthie Valencia RN  Outcome: Improving  8/20/2020 0629 by Waylon Marshall RN  Outcome: No Change     Problem: Adjustment to Device (Ventricular Assist Device)  Goal: Optimal Adjustment to Device  8/20/2020 1817 by Ruthie Valencia RN  Outcome: Improving  8/20/2020 0629 by Waylon Marshall RN  Outcome: No Change     Problem: Bleeding (Ventricular Assist Device)  Goal: Absence of Bleeding  8/20/2020 1817 by Ruthie Valencia RN  Outcome: Improving  8/20/2020 0629 by Waylon Marshall RN  Outcome: No Change     Problem: Embolism (Ventricular Assist Device)  Goal: Absence of Embolism Signs/Symptoms  8/20/2020 1817 by Ruthie Valencia RN  Outcome: Improving  8/20/2020 0629 by Waylon Marshall RN  Outcome: No Change      Problem: Hemodynamic Instability (Ventricular Assist Device)  Goal: Optimal Blood Flow  8/20/2020 1817 by Ruthie Valencia RN  Outcome: Improving  8/20/2020 0629 by Waylon Marshall RN  Outcome: No Change     Problem: Infection (Ventricular Assist Device)  Goal: Absence of Infection Signs/Symptoms  8/20/2020 1817 by Ruthie Valencia RN  Outcome: Improving  8/20/2020 0629 by Waylon Marshall RN  Outcome: No Change     Problem: Right-Sided Heart Compromise (Ventricular Assist Device)  Goal: Effective Right-Sided Heart Function  8/20/2020 1817 by Ruthie Valencia RN  Outcome: Improving  8/20/2020 0629 by Waylon Marshall RN  Outcome: No Change

## 2020-08-20 NOTE — PLAN OF CARE
OT 4E: Evaluation orders received. Cancel today-per discussion with RN, pt medically unstable this morning and not appropriate for therapy. Will reassess tomorrow

## 2020-08-20 NOTE — PROGRESS NOTES
Cardiology Progress Note    Assessment & Plan   In summary, Marquise Jj is a 75-year-old female with a past medical history notable for coronary artery disease, ischemic cardiomyopathy, and known mural thrombus who was transferred from [facility] for further evaluation/treatment of cardiogenic shock. Balloon pump placed . Plan for LVAD .     Today's changes:  - patient had LVAD placement   - seizure like activity  - decreasing LIANA to 10  - weaning down epi, norepi, vaso as tolerated     Neuro:   # Sedation  # concern for seizure activity  CTH head  no signs of ICH   - veeg  - consult neurology   - precedex gtt  - propofol gtt    Cardio:  # Cardiogenic shock   # ICM: NYHA IV / ACC  # Severe MR/TR s/p tricuspid valve repair with Elkins MC3 Annuloplasty Ring size T30  # s/p LVAD HM3 on 2020  LAVD speed 5300, flow 3-3.5, pi 3.7-6.1, power 3.7   Presents with cardiogenic shock. On NE, cardiac index approximately 1.37 on admission. Severely elevated biventricular filling pressures. Etiology of her decompensation appears to be progression of her cardiomyopathy. Despite medical treatment, she has been hospitalized twice since returning to [place], both with low output. No viability in her LAD territory, and doubt that PCI to her circumflex would make meaningful LV recovery. Hemodynamics improved with dobutamine, did not tolerate attempt to wean inotropics. RHC removed on  after clotting off, replaced on  after patient with increased work of breathing. CI of 1.1, balloon pump placed with CI of 1.6-1.8 and improvement in symptoms/hemodynamics. Had LVAD HM3 placed on . CVP today around 10 range   - Decrease LIANA from 20 PPM to 10 , monitoring CVP for RV afterload reduction (CVP goal ~10)    - Decreased epi to 0.07 mcg/kg/min, c/w  5 mcg/kg/min, norepi 0.09 mcg/kg/min, vaso 0.5 U  - Hemodynamics q6hrs, monitor lactate   - Nutrition MIR botello with TF    Date 8/9 8/9 8/10 8/11  08/13/20 08/15/20 08/16/20 08/17/20 8/18/20 8/19 8/20   CVP 12 9 3 4 8 9 6 8 11 5 10   Mean PA  35 30 21 25 23 21 20 35/18 35/18 - 30/12   PCWP  18 14 12 x 13 14  13 - -   Oxy HGB  59 64 61 61 60 47 62 69 54 - 68   CI/CO 2.3 2.3 2.4/4.2 2.3 2.2 1.8 2.8/4.9 4.1 4.3/2.5 - 6/3.3   SVR 1100 1100 1200 1400 1316 1900 5445 894 2411 - 1025   Device     IABP 1:1 IABP 1:1 IABP 1:1 IABP 1:1 IABP 1:1 No IABP No IABP     # CAD s/p CABG 4/2020 (KURT to RCA and LIMA to LAD) and PCI to RCA 7/20  - Stop home plavix po qd (8/13) for LVAD surgery  - holding aspirin 81mg PO qd   - c/w home crestor    # Mural thrombus  - removed during surgery on 8/19    # A-flutter  Converted to NSR on 8/15    - Continue amiodarone 200mg po qd      Pulm:  # Acute hypoxic respiratory failure  Intubated on 8/19   Vent setting: RR 12, , PEEP 5, FiO2 50%   Secondary to pulmonary edema bilateral pleural effusions. Improved with diuresis. No fever or leukocytosis to raise concern for pneumonia. Increased work of breathing better following balloon pump placement.   - On nasal cannula, titrate to saturation of >92%     Renal/Electrolytes   # Hyperkalemia/Hypokalemia   # Acute kidney injury on chronic kidney disease, stage III  - Trend potassium and creatinine q12hrs  - Electrolyte replacement protocol  - Monitor UOP      GI:  # Constipation  # Severe protein calorie malnutrition  - Nutrition through tube feeds at 30 mL/hr    - Senna-docusate bid scheduled  - Miralax bid prn constipation     ID:   # UTI vs ASB  Periprocedural abx as per surgical team   - levoloxacin, zosyn, rifampin, vancomycin, fluconazole    - Completed Ceftriaxone treatment 1g IV qd (8/14 - 8/18)     Hem/Onc  # Mural thrombus removed on 8/19  # Anemia, mild  D/t frequent blood draws and procedures  - Monitor hgb    Endo  # Hypothyroidism   - Continue PTA levothyroxine     Nutrition: NJ with TF today  DVT Prophylaxis: mechanical   Code Status: Full Code    Mallorie Anders,  MD    Discussed with Dr Brito     Interval History   NAANGELINA. LVAD placed.     Physical Exam   Temp: 97.9  F (36.6  C) Temp src: Bladder  Pulse: 69   Resp: 22 SpO2: 100 % O2 Device: Mechanical Ventilator    Vitals:    08/18/20 0500 08/19/20 0600 08/20/20 0200   Weight: 65.2 kg (143 lb 11.8 oz) 62.3 kg (137 lb 5.6 oz) 67.6 kg (149 lb 0.5 oz)     Vital Signs with Ranges  Temp:  [94.5  F (34.7  C)-100.8  F (38.2  C)] 97.9  F (36.6  C)  Pulse:  [59-81] 69  Resp:  [14-22] 22  MAP:  [55 mmHg-104 mmHg] 81 mmHg  Arterial Line BP: ()/(41-84) 97/65  FiO2 (%):  [40 %-50 %] 40 %  SpO2:  [98 %-100 %] 100 %  I/O last 3 completed shifts:  In: 9817.09 [I.V.:2234.09; Other:155; NG/GT:440; IV Piggyback:2000]  Out: 3730 [Urine:2450; Emesis/NG output:200; Chest Tube:1080]    RETIRE: Heart Rate: 71, Blood pressure (!) 141/71, pulse 69, temperature 97.9  F (36.6  C), temperature source Bladder, resp. rate 22, weight 67.6 kg (149 lb 0.5 oz), SpO2 100 %.  149 lbs .5 oz       IABP:  - removed 8/19    SG:  - Good position   - CVP 10, PA 30/12, CI/CO 3.3/6, SVR 1025, SvO2 68%    GEN: intubated , sedated   CV: RRR, open chest , mediastinal x2, bilateral pleural, pump pocket jerman drain  LUNGS: Clear to auscultation bilaterally  ABD: Active bowel sounds, soft, no abdominal tenderness.   EXT: Trace LE edema   NEURO: sedated     Medications     - MEDICATION INSTRUCTIONS -       dexmedetomidine Stopped (08/19/20 2133)     dextrose       dextrose 5% and 0.45% NaCl + KCl 20 mEq/L 10 mL/hr (08/19/20 1947)     DOBUTamine 5 mcg/kg/min (08/20/20 1800)     EPINEPHrine IV infusion ADULT 0.07 mcg/kg/min (08/20/20 1800)     fentaNYL Stopped (08/19/20 2133)     insulin (regular) 0.5 Units/hr (08/20/20 1800)     norepinephrine 0.09 mcg/kg/min (08/20/20 1807)     propofol (DIPRIVAN) infusion       BETA BLOCKER NOT PRESCRIBED       BETA BLOCKER NOT PRESCRIBED       vasopressin (PITRESSIN) infusion ADULT 0.5 Units/hr (08/20/20 1800)       acetaminophen  975  mg Oral or Feeding Tube Q8H     amiodarone  200 mg Oral or Feeding Tube Daily     artificial saliva  5-10 mL Swish & Spit 4x Daily     bimatoprost  1 drop Both Eyes At Bedtime     busPIRone  10 mg Oral or Feeding Tube TID     dorzolamide-timolol  1 drop Both Eyes BID     ferrous sulfate  325 mg Oral or Feeding Tube BID     fluconazole  200 mg Intravenous Q24H     latanoprost  1 drop Both Eyes Daily     levETIRAcetam  1,000 mg Oral or Feeding Tube BID     levofloxacin  500 mg Intravenous Q24H     levothyroxine  62.5 mcg Oral or Feeding Tube QAM AC     lidocaine  1 patch Transdermal Q24H     lidocaine   Transdermal Q8H     mupirocin  1 g Both Nostrils BID     [START ON 8/21/2020] pantoprazole  40 mg Oral QAM AC     piperacillin-tazobactam  4.5 g Intravenous Q6H     QUEtiapine  50 mg Oral or Feeding Tube At Bedtime     rifampin  600 mg Intravenous Q24H     rosuvastatin  20 mg Oral or Feeding Tube Daily     senna-docusate  1 tablet Oral or Feeding Tube BID    Or     senna-docusate  2 tablet Oral or Feeding Tube BID     sodium chloride (PF)  3 mL Intracatheter Q8H     vancomycin (VANCOCIN) IV  1,000 mg Intravenous Q12H       Data   Recent Labs   Lab 08/20/20  0949 08/20/20  0341 08/20/20  0337  08/19/20  2324 08/19/20  2210  08/19/20  1805   WBC 10.8 11.4*  --   --  15.0* 17.0*  --  16.2*   HGB 8.6* 9.9*  --   --  8.1* 8.9*  --  10.0*   MCV 87 86  --   --  88 87  --  87   * 112*  --   --  128* 135*  --  166   INR 1.44* 1.35*  --   --   --  1.40*  --  1.40*    141  --   --   --  144  --  145*   POTASSIUM 4.2 3.9  --   --   --  4.1  --  3.3*   CHLORIDE 109 109  --   --   --  111*  --  115*   CO2 24 24  --   --   --  25  --  22   BUN 18 18  --   --   --  17  --  13   CR 0.95 0.86  --   --   --  0.68  --  0.50*   ANIONGAP 10 8  --   --   --  8  --  8   FERNANDO 8.5 8.2*  --   --   --  8.5  --  7.4*   * 116* 117*   < >  --  170*   < > 141*   ALBUMIN  --  2.2*  --   --   --   --   --  1.6*   PROTTOTAL  --  4.3*   --   --   --   --   --  3.5*   BILITOTAL  --  4.8*  --   --   --   --   --  2.8*   ALKPHOS  --  65  --   --   --   --   --  69   ALT  --  16  --   --   --   --   --  13   AST  --  52*  --   --   --   --   --  47*    < > = values in this interval not displayed.       Recent Results (from the past 24 hour(s))   XR Chest Port 1 View    Narrative    EXAMINATION:  XR CHEST PORT 1 VW 8/20/2020 2:20 AM.    COMPARISON: 8/19/2020    HISTORY:  Status post LVAD    FINDINGS: AP supine radiograph of the chest. Endotracheal tube tip  projects over the mid thoracic trachea. Bilateral chest tubes are  unchanged. Unchanged position of right IJ sheath. Left IJ approach  [place]-Zia catheter tip is likely in the proximal left pulmonary  artery. Interval placement of gastric tube. Unchanged partially  visualized feeding tube. Stable changes of LVAD placement with open  chest and curvilinear radiopaque bandaging material overlying much of  the mediastinum. Mediastinal surgical drains are unchanged. Left chest  wall implantable defibrillator lead projects over the right ventricle.    Cardiac silhouette is stable. Small left and trace right pleural  effusions are stable. Trace biapical pneumothoraces, left greater than  right, are unchanged. No focal pulmonary opacity.      Impression    IMPRESSION:   1. Interval placement of gastric tube. Other support devices are  stable.  2. Stable changes of LVAD placement with open chest and radiopaque  packing material overlying much of the mediastinum.  3. Small left and trace right apical pneumothoraces are unchanged.  Bilateral chest tubes in place.  4. Small left and trace right pleural effusions are unchanged.    I have personally reviewed the examination and initial interpretation  and I agree with the findings.    CRYSTAL BLANKENSHIP MD   Cardiac Device Check - Inpatient   Result Value    Date Time Interrogation Session [id number]    Implantable Pulse Generator  Producteev     Implantable Pulse Generator Model [id number] Visia AF MRI VR    Implantable Pulse Generator Serial Number [id number]    Type Interrogation Session In Clinic    Clinic Name [facility]    Implantable Pulse Generator Type Defibrillator    Implantable Pulse Generator Implant Date 20200508    Implantable Lead  Medtronic    Implantable Lead Model 6947M Sprint Quattro Secure MRI SureScan    Implantable Lead Serial Number [id number]    Implantable Lead Implant Date 20200508    Implantable Lead Polarity Type Quadripolar Lead    Implantable Lead Location Detail 1 UNKNOWN    Implantable Lead Location Right Ventricle    Franco Setting Mode (NBG Code) VVI    Franco Setting Lower Rate Limit 50    Franco Setting Hysterisis Rate 40    Lead Channel Setting Sensing Polarity Bipolar    Lead Channel Setting Sensing Anode Location Right Ventricle    Lead Channel Setting Sensing Anode Terminal Ring    Lead Channel Setting Sensing Cathode Location Right Ventricle    Lead Channel Setting Sensing Cathode Terminal Tip    Lead Channel Setting Sensing Sensitivity 0.3    Lead Channel Setting Pacing Polarity Bipolar    Lead Channel Setting Pacing Anode Location Right Ventricle    Lead Channel Setting Pacing Anode Terminal Ring    Lead Channel Setting Sensing Cathode Location Right Ventricle    Lead Channel Setting Sensing Cathode Terminal Tip    Lead Channel Setting Pacing Pulse Width 0.4    Lead Channel Setting Pacing Amplitude 2    Lead Channel Setting Pacing Capture Mode Adaptive    Zone Setting Type Category VF    Zone Setting Detection Interval 300    Zone Setting Detection Beats Numerator 30    Zone Setting Detection Beats Denominator 40    Zone Setting Type Category VT    Zone Setting Detection Interval 270    Zone Setting Type Category VT    Zone Setting Detection Interval 390    Zone Setting Type Category VT    Zone Setting Detection Interval 450    Zone Setting Type Category ATRIAL_FIBRILLATION    Zone  Setting Type Category AT/AF    Lead Channel Impedance Value 418    Lead Channel Impedance Value 304    Lead Channel Sensing Intrinsic Amplitude 7.125    Lead Channel Sensing Intrinsic Amplitude 7.625    Lead Channel Pacing Threshold Amplitude 0.5    Lead Channel Pacing Threshold Pulse Width 0.4    Lead Channel Pacing Threshold Amplitude 0.5    Lead Channel Pacing Threshold Pulse Width 0.4    Battery Date Time of Measurements 20200820090207    Battery Status OK    Battery RRT Trigger 2.727    Battery Remaining Longevity 135    Battery Voltage 3.07    Capacitor Charge Type Reformation    Capacitor Last Charge Date Time 20200808054231    Capacitor Charge Time 3.893    Capacitor Charge Energy 18    Franco Statistic Date Time Start 20200819101549    Franco Statistic Date Time End 20200820090019    Franco Statistic RV Percent Paced 6.61    Atrial Tachy Statistic Date Time Start 20200819101549    Atrial Tachy Statistic Date Time End 20200820090019    Atrial Tachy Statistic AT/AF [place] Percent 0.6    Therapy Statistic Recent Shocks Delivered 0    Therapy Statistic Recent Shocks Aborted 0    Therapy Statistic Recent ATP Delivered 0    Therapy Statistic Recent Date Time Start 20200819101549    Therapy Statistic Recent Date Time End 20200820090019    Therapy Statistic Total Shocks Delivered 0    Therapy Statistic Total Shocks Aborted 0    Therapy Statistic Total ATP Delivered 10    Therapy Statistic Total  Date Time Start 75151285345631    Therapy Statistic Total  Date Time End 20200820090019    Episode Statistic Recent Count 1    Episode Statistic Type Category AT/AF    Episode Statistic Recent Count 0    Episode Statistic Type Category SVT    Episode Statistic Recent Count 0    Episode Statistic Type Category VT    Episode Statistic Recent Count 0    Episode Statistic Type Category VF    Episode Statistic Recent Count 0    Episode Statistic Type Category VT    Episode Statistic Recent Count 0    Episode Statistic Type Category  VT    Episode Statistic Recent Count 0    Episode Statistic Type Category VT    Episode Statistic Recent Date Time Start 20200819101549    Episode Statistic Recent Date Time End 20200820090019    Episode Statistic Recent Date Time Start 20200819101549    Episode Statistic Recent Date Time End 20200820090019    Episode Statistic Recent Date Time Start 20200819101549    Episode Statistic Recent Date Time End 20200820090019    Episode Statistic Recent Date Time Start 20200819101549    Episode Statistic Recent Date Time End 20200820090019    Episode Statistic Recent Date Time Start 20200819101549    Episode Statistic Recent Date Time End 20200820090019    Episode Statistic Recent Date Time Start 20200819101549    Episode Statistic Recent Date Time End 20200820090019    Episode Statistic Recent Date Time Start 20200819101549    Episode Statistic Recent Date Time End 20200820090019    Episode Statistic Total Count 34    Episode Statistic Type Category AT/AF    Episode Statistic Total Count 0    Episode Statistic Type Category SVT    Episode Statistic Total Count 315    Episode Statistic Type Category VT    Episode Statistic Total Count 0    Episode Statistic Type Category VF    Episode Statistic Total Count 0    Episode Statistic Type Category VT    Episode Statistic Total Count 10    Episode Statistic Type Category VT    Episode Statistic Total Count 11    Episode Statistic Type Category VT    Episode Statistic Total Date Time Start 20200508105729    Episode Statistic Total Date Time End 20200820090019    Episode Statistic Total Date Time Start 20200508105729    Episode Statistic Total Date Time End 20200820090019    Episode Statistic Total Date Time Start 20200508105729    Episode Statistic Total Date Time End 14238067536010    Episode Statistic Total Date Time Start 20200508105729    Episode Statistic Total Date Time End 09379121368640    Episode Statistic Total Date Time Start 20200508105729    Episode Statistic Total  Date Time End 20200820090019    Episode Statistic Total Date Time Start 20200508105729    Episode Statistic Total Date Time End 20200820090019    Episode Statistic Total Date Time Start 20200508105729    Episode Statistic Total Date Time End 20200820090019    Episode Identifier [id number]    Episode Type Category Monitor    Episode Date Time 20200819124729    Episode Duration 480    Narrative    Patient seen on 4 E for evaluation and iterative programming of a Medtronic Single lead ICD per MD orders. Patient is s/p LVAD placement. Normal ICD function. 1 AF episode recorded lasting 8 minutes, EGM for review appears to be SR w/ PAC/PVC's. Intrinsic rhythm = VS @ 76 bpm.   = 6.6%. OptiVol fluid index is slightly above the baseline. Estimated battery longevity to YUDELKA = 11.2 years. 53 short v-v intervals recorded. Lead trends appear stable. Patient is on ventilator and chest remains open. Verified with bedside nurse that the team wants ICD tachy therapies back ON. ICD Tachy therapies programmed ON to match pre-op settings. Pacing mode remains VVI 50 bpm. Plan to follow patient as needed while inpatient. JOY Diaz RN, BSN.     Single lead ICD    I have reviewed and interpreted the device interrogation, settings, programming and nurse's summary. The device is functioning within normal device parameters. I agree with the current findings, assessment and plan.   XR Chest Port 1 View    Narrative    Exam: XR CHEST PORT 1 VW, 8/20/2020 1:46 PM    Indication: SGz and lines position    Comparison: Chest radiograph 8/20/2020, CTA 1020    Findings:   AP view of the chest. Endotracheal tube with the tip in the mid  thoracic trachea. Left chest wall ICD with single lead stable in  position. Unchanged LVAD device. Left IJ approach [place]-Zia catheter  with the tip in the main pulmonary outflow tract. Right IJ approach  venous sheath with the tip near the innominate confluence. Partially  visualized enteric tubes. Left and right chest  drains remain in place.  Chest remains open with packing material in place.    Cardiac silhouette is unchanged. No new focal consolidation. Unchanged  left basilar atelectasis and small pleural effusion. Trace blunting of  the right costophrenic angle. Diffuse interstitial markings. Unchanged  small left and trace right pneumothorax.      Impression    Impression:   1. [place]-Zia catheter with the tip main pulmonary artery. Additional  support devices as detailed above.  2. No significant interval change in small left, and trace right  pneumothoraces.  3. Left basilar atelectasis and small bilateral pleural effusions,  unchanged.    I have personally reviewed the examination and initial interpretation  and I agree with the findings.    KARLA NÚÑEZ MD   CT Head w/o Contrast    Narrative    CT HEAD W/O CONTRAST 8/20/2020 3:54 PM    History: Seizure, new, nontraumatic, >40 yrs; Altered level of  consciousness (LOC), unexplained   ICD-10:    Comparison: 8/10/2020    Technique: Using multidetector thin collimation helical acquisition  technique, axial, coronal and sagittal CT images from the skull base  to the vertex were obtained without intravenous contrast.    Findings: There is no intracranial hemorrhage, mass effect, or midline  shift. Gray/white matter differentiation in both cerebral hemispheres  is preserved. Minimal periventricular patchy white matter  hypodensities that are nonspecific, but likely secondary to small  vessel ischemic disease. There is mild cerebral atrophy, unchanged.  Ventricles are proportionate to the cerebral sulci. The basal cisterns  are clear.    The bony calvaria and the bones of the skull base are normal.  Minimally mucosal thickening in the maxillary sinuses and scattered  ethmoid air cells. The mastoid air cells are clear..       Impression    Impression:    1. No acute intracranial pathology.   2. Minimal leukoaraiosis and mild cerebral atrophy.    FRACISCO COLEMAN MD

## 2020-08-20 NOTE — PROGRESS NOTES
CLINICAL NUTRITION SERVICES - REASSESSMENT NOTE     Nutrition Prescription    Recommendations:  Resume feeding if/when pressor requirements stable vs downtrending    Malnutrition Status:    Severe malnutrition in the context of acute on chronic illness     Future recommendations:  Once appropriate to resume feeding, recommend immune-modulating formula post-op:  - Impact Peptide @ 55 ml/hr (1320 ml/day) to provide 1980 kcals (31 kcal/kg/day), 124 g PRO (2 g/kg/day), 1016 ml free H2O, 84 g Fat (50% from MCTs), 185 g CHO and no Fiber daily. Certavite.        EVALUATION OF THE PROGRESS TOWARD GOALS   Diet: NPO, intubated   Nutrition Support: Nutren 1.5 @ 50 ml/hr via NDT to provide 1800 kcal (29 kcal/kg) and 82 g protein (1.3 g/kg), Certavite, thiamine, ferrous sulfate daily.   Intake: Pre-op, patient had poor appetite but had tolerated TF @ goal rate. Now post-op, feeds held with increased pressor requirements.     NEW FINDINGS    CV: s/p LVAD (HM3) placement on 8/19, chest packed open.     Updated Assessed Nutrition Needs:  Based on DW of 63 kg  Estimated Energy Needs: 1600 - 1900 kcal/day (25 - 30 kcal/kg/day)   Justification: Maintenance   Estimated Protein Needs: 95 - 125 g protein/day (1.5 - 2 g/kg/day)   Justification: Increased needs post-VAD     MALNUTRITION  % Intake: < 75% for > 7 days (non-severe)  % Weight Loss: None noted, but difficult to assess w/ fluid status  Subcutaneous Fat Loss: Facial region: Moderate, Upper arm: Moderate, Lower arm: Moderate and Thoracic/intercostal: Moderate  Muscle Loss: Temporal: Moderate, Facial & jaw region: Moderate, Scapular bone: Mild, Upper arm (bicep, tricep): Moderate, Lower arm  (forearm): Moderate and Dorsal hand: Moderate  Fluid Accumulation/Edema: Does not meet criteria  Malnutrition Diagnosis: Severe malnutrition in the context of acute on chronic illness     Previous Goals   Total avg nutritional intake to meet a minimum of 25 kcal/kg and 1.2 g PRO/kg daily (per  dosing wt 63 kg).  Evaluation: Not met consistently     Previous Nutrition Diagnosis  Inadequate oral intake  Evaluation: No change    CURRENT NUTRITION DIAGNOSIS  Inadequate protein-energy intake related to NPO without resumption of nutrition support w/ increased pressor requirements as evidenced by currently meeting 0% estimated needs.       INTERVENTIONS  Implementation  See interventions at top of progress note     Goals  Total avg nutritional intake to meet a minimum of 25 kcal/kg and 1.5 g PRO/kg daily (per dosing wt 63 kg).    Monitoring/Evaluation  Progress toward goals will be monitored and evaluated per protocol.    Rosemary Wyatt RD, LD  [id number]  Pgr: [phone]

## 2020-08-20 NOTE — PLAN OF CARE
PT: Pt not medically stable for therapy today, with plans to close chest Friday. Will reschedule PT eval for Sat and work with pt if medically stable.

## 2020-08-21 NOTE — PROGRESS NOTES
CVTS Progress Note       Cleaned wound and removed right femoral IABP introducer removed without immediate complication.   Had immediate flush bleed with pressure removed as expected.  Held manual pressure for 15 minutes then Fem-stop applied with pressure set 20 mmHg above systolic with plan for reducing pressures per protocol by RN.   Had right foot mottling and minimal/no doppler pulses found during manual pressure.  No hematoma or masses visible before Fem-stop applied.     Procedure preformed in collaboration with GLENN Borrego.     Beth Landrum DNP, CNP  Cardiovascular Thoracic Surgery   Pgr [phone]

## 2020-08-21 NOTE — PROGRESS NOTES
CLINICAL NUTRITION SERVICES - BRIEF NOTE     Today, pressor requirements decreasing. Plan to resume TF at trophic rate and advance as appropriate.     Nutrition changes today:  - Initiate Impact Peptide @ 15 ml/hr via NDT.   - Adv by 10 ml q6h to goal, Impact Peptide @ 45 ml/hr, pending tolerance and electrolytes.     Rosemary Wyatt RD, LD  [id number]  Pgr: [phone]

## 2020-08-21 NOTE — BRIEF OP NOTE
[facility], [place]    Brief Operative Note    Pre-operative diagnosis: Open chest s/p LVAD  Post-operative diagnosis Same as pre-operative diagnosis    Procedure:Chest washout and closure, prevena dressing placed.   Surgeon: Surgeon(s) and Role:     * Live Claudio MD - Primary     * Esperanza Villasenor PA-C  Anesthesia: General   Estimated blood loss: Minimal  Drains: unchanged   Specimens: * No specimens in log *  Findings:   see op report .  Complications: None.  Implants:   Implant Name Type Inv. Item Serial No.  Lot No. LRB No. Used Action   GRAFT GORTEX [id number] STRETCH [id number] Graft GRAFT GORTEX [id number] STRETCH [id number] [employer]   N/A 1 Implanted     Esperanza Cloey PA-C  Cardiothoracic Surgery  Pager [phone]  August 21, 2020

## 2020-08-21 NOTE — ANESTHESIA POSTPROCEDURE EVALUATION
Anesthesia POST Procedure Evaluation    Patient: Marquise Jj   MRN:     [id number] Gender:   female   Age:    75 year old :      1945        Preoperative Diagnosis: Status post cardiac surgery [Z98.890]   Procedure(s):  IRRIGATION AND DEBRIDEMENT, CHEST POSSIBLE CLOSURE   Postop Comments: No value filed.     Anesthesia Type: General       Disposition: ICU            ICU Sign Out: Anesthesiologist/ICU physician sign out WAS performed   Postop Pain Control:    PONV:    Neuro/Psych: Uneventful            Sign Out: PLANNED postop sedation   Airway/Respiratory: Uneventful            Sign Out: AIRWAY IN SITU/Resp. Support               Airway in situ/Resp. Support: ETT                 Reason: Planned Pre-op   CV/Hemodynamics: Uneventful            Sign Out: Acceptable CV status   Other NRE: NONE   DID A NON-ROUTINE EVENT OCCUR? No         Last Anesthesia Record Vitals:  CRNA VITALS  2020 0945 - 2020 1045      2020             Resp Rate (observed):  (!) 0          Last PACU Vitals:  Vitals Value Taken Time   BP     Temp     Pulse 67 2020 11:10 AM   Resp     SpO2 96 % 2020 11:09 AM   Temp src     NIBP     Pulse     SpO2     Resp     Temp     Ht Rate     Temp 2     Vitals shown include unvalidated device data.      Electronically Signed By: Shahbaz Matthew MD, 2020, 11:12 AM

## 2020-08-21 NOTE — ANESTHESIA CARE TRANSFER NOTE
Patient: Marquise Jj    Procedure(s):  IRRIGATION AND DEBRIDEMENT, CHEST POSSIBLE CLOSURE    Diagnosis: Status post cardiac surgery [Z98.890]  Diagnosis Additional Information: No value filed.    Anesthesia Type:   General     Note:  Airway :ETT  Patient transferred to:ICU  Comments: Patient transported back to CVICU with sedation and monitors applied. Hand off given to ICU team at bedside.    William Zavaleta MD  ICU Handoff: Call for PAUSE to initiate/utilize ICU HANDOFF, Identified Patient, Identified Responsible Provider, Reviewed the Pertinent Medical History, Discussed Surgical Course, Reviewed Intra-OP Anesthesia Management and Issues during Anesthesia, Set Expectations for Post Procedure Period and Allowed Opportunity for Questions and Acknowledgement of Understanding      Vitals: (Last set prior to Anesthesia Care Transfer)    CRNA VITALS  8/21/2020 0945 - 8/21/2020 1037      8/21/2020             Resp Rate (observed): 16                Electronically Signed By: William Zavaleta MD  August 21, 2020  10:37 AM

## 2020-08-21 NOTE — PROGRESS NOTES
VEEG monitoring preliminary results:    VEEG has been running.  EEG showed severe generalized slowing with mostly delta activities.  No epileptiform activities, no clinical or electrographic seizures were observed. Findings are consistent with severe diffuse encephalopathy.      Koko Valle MD  Neurology

## 2020-08-21 NOTE — PROGRESS NOTES
LVAD Social Work Services Progress Note      Date of Initial Social Work Evaluation: 8/12/2020  Collaborated with: Attempted to see  but he was not at bedside    Data: Pt is s/p LVAD implant on 8/19/2020 and chest closure today. Pt currently intubated and sedated in the ICU. No family present.     Intervention: Supportive Visit   Assessment: Family not at bedside to check-in.   Education provided by SW: None   Plan:    Discharge Plans in Progress: None     Barriers to d/c plan: Medical Stability     Follow up Plan: SW will check-in with pt and family next week.

## 2020-08-21 NOTE — PROGRESS NOTES
VAD Coordinator in OR throughout duration of washout and closure surgery. VAD parameters were monitored in collaboration with anesthesia. Report given to 4E ICU RN upon leaving the OR.       VAD parameters at START of surgery:  o Speed: 5300 rpm  o PI (or flow waveform peak/trough for HW): 4.2  o Flow: 3.6 lpm  o Power: 3.7 mota    VAD parameters at END of surgery:  o Speed: 5300 rpm  o PI (or flow waveform peak/trough for HW): 4.0  o Flow: 3.6 lpm  o Power: 3.6 mota    Speed adjustments made during OR: None    Flow range: 3.2 - 4.2 lpm    PI (or flow waveform peak/trough for HW) range: 1.6 - 5.9    Factors noted to cause a significant variation in VAD parameters: Low BP/MAP    Other significant events: none    Please page the VAD Coordinator on-call with any VAD related questions (* * * [phone], job code [id number] from an internal line).     Mavis Jama RN

## 2020-08-21 NOTE — PLAN OF CARE
To OR for washout and chest closure this morning,then had CT head done with no acute change.  GCS [id number], YAMILE, EEG showed frequent seizure activity, IV lacosamide has been added, on top of keppra, also started versed drip at 4mg/hour.  Left side movement withdrawal to pain> right side slight contraction.  BP 60-85mmHg, with vasopressin and epinephrine titrated accordingly.  Latest CVP 11, PAP 33/12, SVo2 62% CI 3.4.  HR 60-70s SR with PVC and PACs,  ICD/PPM back on, with epicardial pacer standby.  Maintained on LVAD HM3, flow 3.5-4, RPM 5300, PI 2.3.5, PI dropped with low BP.  Maintains ventilated on a/c mode, same settings, SpO2 good, inhaled NO down to 5 ppm,will continue weaning tomorrow.  Chest drains yield moderate amount of sanguineous, with total 690cc since surgery, Hb stable.  Started low rate tube feeding.  Afebrile.  Urine output low 20cc per hour.

## 2020-08-21 NOTE — OP NOTE
OPERATIVE DATE: 8/21/2020    PRE-OPERATIVE DIAGNOSIS:  1) End stage heart failure  2) Ischemic cardiomyopathy  3) ST elevation myocardial infarction  4) Mural left ventricular thrombus  5) Cardiogenic shock  6) Status post Heartmate III LVAD insertion with TV annuloplasty  7) Temporary sternal closure    POST-OPERATIVE DIAGNOSIS:  1) End stage heart failure  2) Ischemic cardiomyopathy  3) ST elevation myocardial infarction  4) Mural left ventricular thrombus  5) Cardiogenic shock  6) Status post Heartmate III LVAD insertion with TV annuloplasty  7) Temporary sternal closure    PROCEDURE:  1) Mediastinal washout and chest closure  2) Outflow graft wrapping, 18 mm Goretex graft    SURGEON: Live Claudio MD    ASSISTANT: Esperanza Villasenor MD    ANESTHESIA: GETA    ESTIMATED BLOOD LOSS: 10 mL    INDICATIONS:  Ms. EDILSON SMITH is a 75 year old female admitted with cardiogenic shock now s/p LVAD and TV annuloplasty. Due to coagulopathy and bleeding her sternum was left open and packed with a temporary chest closure. She has remained stable post operatively, without hemorrhage, and has remained hemodynamically stable. I recommend chest closure. Risks and benefits were discussed with the daughter, Norman, who agrees to proceed with surgery.    OPERATIVE REPORT:  The patient was transferred to the operating room and positioned supine on the OR table.  General anesthesia was initiated by the anesthesia team.  Endotracheal intubation and IV access was already in place. The patients chest and abdomen prepped and draped in sterile fashion.  A pre-procedure time-out was performed confirming the correct patient, correct site and correct procedure.    The previous sternal dressing was taken down.  The Kerlix packing was removed. The mediastinum was hemostatic. An 18 mm Goretex graft was split down the midline and used to wrap the outflow graft. It was secured in place with several interrupted 4-0 prolene sutures. The same  graft was additionally used to wrap the driveline as it exited the mediastinum.     The sternum was closed using a wire cerclage technique with #6 single stainless steel wires. The linea alba was closed with a 0 looped PDS suture. The remaining soft tissue and skin were closed in running layers of absorbable suture. A negative pressure wound dressing was applied to the closed skin incision.     A chest x-ray was obtained per protocol and was without retained foreign material.    The patient was then transferred from the operating bed to an ICU bed and transferred to the ICU in critical, but stable, condition.    All needle, sponge and instrument counts were correct at the end of the case.    Live Claudio MD  Cardiothoracic Surgery  [phone]

## 2020-08-21 NOTE — PHARMACY-VANCOMYCIN DOSING SERVICE
Pharmacy Empiric Dose Change Per Policy  Original Dose Ordered: vancomycin 1000 mg IV q12h (stop date 8/21)  Dose Changed To: vancomycin 1000 mg IV q24h (stop date 8/21 per original order)    This dose change was based on the pharmacist's assessment of this patient's age, weight, concurrent drug therapy, treatment goals, whether patient's creatinine clearance adequately indicates renal function (factoring in age, muscle mass, fluid and clinical status), and, if applicable, prior pharmacokinetic data.      Estimated Creatinine Clearance: 47.3 mL/min (A) (based on SCr of 1.11 mg/dL (H)).  Will continue to follow and modify dosage according to levels, organ function and clinical condition    Patient going to OR today for chest closure, so will likely need to extend vancomycin duration of therapy. Currently has stop date of today, so will discuss with team on rounds.     Sussy Bryant, PharmD

## 2020-08-21 NOTE — ANESTHESIA PREPROCEDURE EVALUATION
Anesthesia Pre-Procedure Evaluation    Patient: Marquise Jj   MRN:     [id number] Gender:   female   Age:    75 year old :      1945        Preoperative Diagnosis: Status post cardiac surgery [Z98.890]   Procedure(s):  IRRIGATION AND DEBRIDEMENT, CHEST POSSIBLE CLOSURE             LABS:  CBC:   Lab Results   Component Value Date    WBC 10.8 2020    WBC 11.4 (H) 2020    HGB 8.6 (L) 2020    HGB 9.9 (L) 2020    HCT 26.8 (L) 2020    HCT 30.1 (L) 2020     (L) 2020     (L) 2020     BMP:   Lab Results   Component Value Date     2020     2020    POTASSIUM 4.2 2020    POTASSIUM 3.9 2020    CHLORIDE 109 2020    CHLORIDE 109 2020    CO2 24 2020    CO2 24 2020    BUN 18 2020    BUN 18 2020    CR 0.95 2020    CR 0.86 2020     (H) 2020     (H) 2020     COAGS:   Lab Results   Component Value Date    PTT 41 (H) 2020    INR 1.44 (H) 2020    FIBR 282 2020     POC:   Lab Results   Component Value Date     (H) 2020     OTHER:   Lab Results   Component Value Date    PH 7.42 2020    LACT 1.7 2020    A1C 5.5 2020    FERNANDO 8.5 2020    PHOS 3.2 2020    MAG 2.5 (H) 2020    ALBUMIN 2.2 (L) 2020    PROTTOTAL 4.3 (L) 2020    ALT 16 2020    AST 52 (H) 2020    ALKPHOS 65 2020    BILITOTAL 4.8 (H) 2020    TSH 15.00 (H) 08/10/2020    T4 1.26 2020        Preop Vitals    BP Readings from Last 3 Encounters:   20 (!) 141/71   20 90/64   20 (!) 89/68    Pulse Readings from Last 3 Encounters:   20 71   20 68   20 69      Resp Readings from Last 3 Encounters:   20 22   20 20   20 (!) 42    SpO2 Readings from Last 3 Encounters:   20 100%   20 95%   20 98%      Temp Readings from Last 1 Encounters:  "  08/20/20 36.6  C (97.9  F) (Bladder)    Ht Readings from Last 1 Encounters:   07/31/20 1.727 m (5' 8\")      Wt Readings from Last 1 Encounters:   08/20/20 67.6 kg (149 lb 0.5 oz)    Estimated body mass index is 22.66 kg/m  as calculated from the following:    Height as of 7/31/20: 1.727 m (5' 8\").    Weight as of 8/20/20: 67.6 kg (149 lb 0.5 oz).     LDA:  Peripheral IV 08/08/20 Right Upper forearm (Active)   Site Assessment WDL 08/20/20 1600   Line Status Infusing;Checked every 1-2 hour 08/20/20 1600   Phlebitis Scale 0-->no symptoms 08/20/20 1600   Infiltration Scale 0 08/20/20 1600   Infiltration Site Treatment Method  None 08/14/20 0400   Extravasation? No 08/20/20 1600   Number of days: 12       Arterial Line 08/19/20 Radial (Active)   Site Assessment WDL 08/20/20 1600   Line Status Pulsatile blood flow 08/20/20 1600   Arterine Line Cap Change Due 08/21/20 08/20/20 1600   Art Line Waveform Appropriate;Square wave test performed 08/20/20 1600   Art Line Interventions Leveled;Connections checked and tightened;Flushed per protocol 08/20/20 1600   Color/Movement/Sensation Capillary refill less than 3 sec 08/20/20 1600   Line Necessity Yes, meets criteria 08/20/20 1600   Dressing Type Transparent 08/20/20 1600   Dressing Status Clean, dry, intact 08/20/20 1600   Dressing Intervention Dressing changed/new dressing 08/19/20 1600   Dressing Change Due 08/23/20 08/20/20 1600   Number of days: 1       CVC Single Lumen 08/19/20 Left Internal jugular (Active)   Site Assessment WDL 08/20/20 1600   Line Status Saline locked 08/20/20 1600   Dressing Intervention Transparent 08/20/20 1600   Extravasation? No 08/20/20 1600   Dressing Change Due 08/23/20 08/20/20 1600   Lumen A - Cap Change Due 08/21/20 08/20/20 1600   CVC Comment Cordis 08/20/20 1600   Line Necessity Yes, meets criteria 08/20/20 1600   Number of days: 1       CVC Triple Lumen 08/08/20 Right Internal jugular (Active)   Site Assessment WDL 08/20/20 1600 "   Dressing Intervention Chlorhexidine sponge;Transparent 08/20/20 1600   Dressing Change Due 08/23/20 08/20/20 1600   CVC Comment Cordis 08/20/20 1600   Lumen A - Color WHITE 08/20/20 1600   Lumen A - Status infusing 08/20/20 1600   Lumen A - Cap Change Due 08/21/20 08/20/20 1600   Lumen B - Color BROWN 08/20/20 1600   Lumen B - Status infusing 08/20/20 1600   Lumen B - Cap Change Due 08/21/20 08/20/20 1600   Lumen C - Color OTHER 08/20/20 1600   Lumen C - Status infusing 08/20/20 1600   Lumen C - Cap Change Due 08/21/20 08/20/20 1600   Extravasation? No 08/20/20 1600   Number of days: 12       Arterial Sheath  (Active)   Specific Qualities Sutured;Other (Comment) 08/20/20 1600   Site Assessment WDL 08/20/20 1600   Dressing Type Transparent;Biopatch 08/20/20 1600   Dressing Intervention Dressing changed/new dressing 08/17/20 0800   Arterial Sheath Comment arterial sheath to pressure bag - not transduced 08/20/20 1600   Number of days: 6       Pulmonary Artery Catheter - Double Lumen 08/19/20 1600 Left internal jugular vein (Active)   Dressing dressing dry and intact 08/20/20 1600   Securement secured with sutures 08/20/20 1600   PA Catheter Markings (cm) 54 08/20/20 1600   Phlebitis 0-->no symptoms 08/20/20 1600   Infiltration 0-->no symptoms 08/20/20 1600   Waveform dampened;other (see comments) 08/20/20 1600   Lumen A - Color BLUE 08/20/20 1600   Lumen A - Status occluded 08/20/20 1600   Lumen A - Cap Change Due 08/21/20 08/20/20 1600   Lumen B - Color YELLOW 08/20/20 1600   Lumen B - Status transduced 08/20/20 1600   Lumen B - Cap Change Due 08/21/20 08/20/20 1600   Number of days: 1       ETT (Active)   Secured By Commercial tube vasquez 08/20/20 1610   Site Appearance Clean and dry 08/20/20 1610   Secured at (cm) to lip 24 cm 08/20/20 1610   Site of ET Tube Securement At lip 08/20/20 1610   Cuff Pressure - Type minimal leak technique 08/20/20 1610   Tube Care/Reposition repositioned tube left side of mouth  08/20/20 1600   Bite Block Secure and Patent 08/20/20 1610   Safety Measures manual resuscitator at bedside 08/20/20 1610   Number of days: 1       Chest Tube 1 Left Mediastinal;Other (Comment) 24 Swedish (Active)   Site Assessment UTV 08/20/20 1600   Suction -20 cm H2O 08/20/20 1600   Chest Tube Airleak No 08/20/20 1600   Drainage Description Dark red 08/20/20 1600   Dressing Status Normal: Clean, Dry & Intact 08/20/20 1600   Dressing Change Due 08/21/20 08/20/20 1600   Dressing Intervention Gauze 08/20/20 1600   Patency Intervention Tip/Tilt 08/20/20 1600   Chest Tube Clamps at Bedside present 08/20/20 1600   Container Amount 650 08/20/20 1900   Output (ml) 10 ml 08/20/20 1900   Number of days: 1       Chest Tube 2 Left Pleural 28 Swedish (Active)   Site Assessment UTV 08/20/20 1600   Suction -20 cm H2O 08/20/20 1600   Chest Tube Airleak No 08/20/20 1600   Drainage Description Dark red 08/20/20 1600   Dressing Status Normal: Clean, Dry & Intact 08/20/20 1600   Dressing Change Due 08/14/20 08/20/20 1600   Dressing Intervention Gauze 08/20/20 1600   Patency Intervention Tip/Tilt 08/20/20 1600   Chest Tube Clamps at Bedside present 08/20/20 1600   Number of days: 1       Chest Tube 3 Anterior Mediastinal 36 Swedish (Active)   Site Assessment UTV 08/20/20 1600   Suction -20 cm H2O 08/20/20 1600   Chest Tube Airleak No 08/20/20 1600   Drainage Description Dark red 08/20/20 1600   Dressing Status Normal: Clean, Dry & Intact 08/20/20 1600   Dressing Change Due 08/21/20 08/20/20 1600   Dressing Intervention Gauze 08/20/20 1600   Patency Intervention Tip/Tilt 08/20/20 1600   Chest Tube Clamps at Bedside present 08/20/20 1600   Number of days: 1       Chest Tube 4 Anterior Mediastinal 36 Swedish (Active)   Site Assessment UTV 08/20/20 1600   Suction -20 cm H2O 08/20/20 1600   Chest Tube Airleak No 08/20/20 1600   Drainage Description Dark red 08/20/20 1600   Dressing Status Normal: Clean, Dry & Intact 08/20/20 1600   Dressing  Change Due 08/21/20 08/20/20 1600   Dressing Intervention Gauze 08/20/20 1600   Patency Intervention Tip/Tilt 08/20/20 1600   Chest Tube Clamps at Bedside present 08/20/20 1600   Number of days: 1       Chest Tube 5 Right Pleural 28 Nauruan (Active)   Site Assessment UTV 08/20/20 1600   Suction -20 cm H2O 08/20/20 1600   Chest Tube Airleak No 08/20/20 1600   Drainage Description Dark red 08/20/20 1600   Dressing Status Normal: Clean, Dry & Intact 08/20/20 1600   Dressing Change Due 08/21/20 08/20/20 1600   Dressing Intervention Gauze 08/20/20 1600   Patency Intervention Tip/Tilt 08/20/20 1600   Chest Tube Clamps at Bedside present 08/20/20 1600   Container Amount 600 08/20/20 1900   Output (ml) 10 ml 08/20/20 1900   Number of days: 1       NG/OG/NJ Tube Nasogastric 10 fr Left nostril (Active)   Site Description WDL 08/20/20 1600   Status Medications only 08/20/20 1726   Placement Confirmation Schofield unchanged 08/20/20 1600   Schofield (cm marking) at nare/mouth 93 cm 08/20/20 1600   Intake (ml) 30 ml 08/20/20 1726   Flush/Free Water (mL) 15 mL 08/20/20 1726   Number of days: 3       NG/OG/NJ Tube Orogastric 16 fr Left mouth (Active)   Site Description WDL 08/20/20 1600   Status Suction-low intermittent 08/20/20 1600   Placement Confirmation Schofield unchanged 08/20/20 1600   Schofield (cm marking) at nare/mouth 66 cm 08/20/20 1600   Flush/Free Water (mL) 30 mL 08/20/20 0400   Container Amount 200 mL 08/20/20 1600   Output (ml) 0 ml 08/20/20 1600   Number of days: 1       Urethral Catheter Double-lumen;Temperature probe 16 fr (Active)   Tube Description Positional 08/20/20 1600   Catheter Care Catheter wipes 08/20/20 1200   Collection Container Standard;Patent 08/20/20 1600   Securement Method Securing device (Describe) 08/20/20 1600   Rationale for Continued Use Strict 1-2 Hour I&O 08/20/20 1600   Urine Output 20 mL 08/20/20 1900   Number of days: 1        Past Medical History:   Diagnosis Date     CAD (coronary  artery disease)      ICD (implantable cardioverter-defibrillator) in place     Primary prevention AICD placed in [place] in May 2020.     Ischemic cardiomyopathy     LVEF less than 20%.     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     Status post coronary artery bypass grafting     Done in [facility] in April 2020.  LIMA to diagonal (as LAD dissected) and KURT to the right coronary artery.      Past Surgical History:   Procedure Laterality Date     ARTHROPLASTY REVISION HIP Right 11/16/2017    Procedure: ARTHROPLASTY REVISION HIP;  Right total hip arthroplasty revision.;  Surgeon: Jozef Garcia MD;  Location: [facility] OR     cabg  04/2020     CV HEART CATHETERIZATION WITH POSSIBLE INTERVENTION N/A 7/2/2020    Procedure: Heart Catheterization with Possible Intervention;  Surgeon: Kaden Franco MD;  Location:  HEART CARDIAC CATH LAB     CV INTRA-AORTIC BALLOON PUMP INSERTION N/A 8/14/2020    Procedure: Intra-Aortic Balloon Pump Insertion;  Surgeon: Cale Armijo MD;  Location:  HEART CARDIAC CATH LAB     CV PCI STENT DRUG ELUTING N/A 7/2/2020    Procedure: Percutaneous Coronary Intervention Stent Drug Eluting;  Surgeon: Kaden Franco MD;  Location:  HEART CARDIAC CATH LAB     CV RIGHT HEART CATH N/A 7/2/2020    Procedure: Right Heart Cath;  Surgeon: Kaden Franco MD;  Location:  HEART CARDIAC CATH LAB     CV RIGHT HEART CATH N/A 8/14/2020    Procedure: Right Heart Cath with leave in [place];  Surgeon: Jose Padilla MD;  Location:  HEART CARDIAC CATH LAB     INSERT VENTRICULAR ASSIST DEVICE LEFT (HEARTMATE II) N/A 8/19/2020    Procedure: Redo Sternotomy, On Cardiopulmonary Bypass, Insertion of Left Ventricular Assist Device (HeartMate III), Tricuspid Valve Repair with Elkins MC3 Annuloplasty Ring size T30, Left Ventricular Thrombectomy.;  Surgeon: Catarino Farley MD;  Location[facility]       Allergies   Allergen Reactions     Combigan [Brimonidine Tartrate-Timolol] Swelling     Swollen eyelids     Ativan [Lorazepam] Anxiety and Other (See Comments)     Increased confusion        Anesthesia Evaluation     .             ROS/MED HX    ENT/Pulmonary: Comment: Intubated      Neurologic: Comment: Seizure activity noted   Not following commands off sedation      Cardiovascular: Comment: S/p LVAD 8/19      (+) hypertension--CAD (CABG 4/2020 (KURT to RCA and LIMA to LAD) and PCI to RCA 7/20), --. : . CHF etiology: ischemic cardiomyopathy  Last EF: <25% NYHA classification: IV. . :ICD (VVI) Reason placed:CHF  type;Medtronic single lead ICD Settings VVI - Patient is dependent on ICD . dysrhythmias (currently in SR after cardioversion on 8/15, on PO amiodarone ) a-flutter, Irregular Heartbeat/Palpitations (Medtronic single lead ICD), . pulmonary hypertension, Previous cardiac testing date:results:date: results:ECG reviewed date:8/8/2020 results:SR, low voltage QRS, left anterior fascicular block, Lateral wall infarct Cath date: 8/8/20 results: Right sided filling pressures are severely elevated.   Mild elevated Pulmonary Hypertension. 56/21/34.  Left ventricular filling pressures are moderately elevated .   Reduced cardiac output level. 1.9          METS/Exercise Tolerance:     Hematologic:         Musculoskeletal:         GI/Hepatic: Comment: Severe protein calorie malnutrition, receiving  tube feeds at 30 mL/hr          Renal/Genitourinary: Comment: Hyperkalemia     (+) chronic renal disease, type: CRI,       Endo:     (+) thyroid problem hypothyroidism, .      Psychiatric:         Infectious Disease:         Malignancy:         Other: Comment: Medtronic single lead ICD     Patient was admitted for cardiogenic shock. Normal ICD function. 10 treated VT, 9 monitored VT, 315 nonsustained VT and 21 AF episodes recorded since 5/8/20. 1 monitored VT, 15 VT-NS and 20 AF episodes recorded since last interrogatino on  6/3/20. All 16 VT episodes recorded on 8/9/20. All VT-NS episodes lasted < 2 seconds.  The monitored VT episode lasted 20:49. No VT episodes since 6/3/20 required intervention with tachy therapy. Intrinsic rhythm = vs @  bpm.  = 0%. OptiVol fluid index is 40 above baseline. Estimated battery longevity to YUDELKA = 11 years. Battery voltage = 3.09V. No short v-v intervals recorded. Lead impedance trends appear stable. Patient reports that she is feeling very fatigued. Plan for continued inpatient evaluation and treatment.  Possibly will have an LVAD placed.  If this occurs the tachy therapies can be programmed off at that time.  JEZ Delong RN                           PHYSICAL EXAM:   Mental Status/Neuro: A/A/O   Airway: Facies: Feasible  Mallampati: Not Assessed  Mouth/Opening: Not Assessed  TM distance: Not Assessed  Neck ROM: Not Assessed  Airway Device: ETT   Respiratory:    CV: Rhythm: Regular   Comments:                      Assessment:   ASA SCORE: 4      Smoking Status:  Non-Smoker/Unknown   NPO Status: NPO Appropriate     Plan:   Anes. Type:  General   Pre-Medication: None   Induction:  IV (Standard)   Airway: ETT; Oral   Access/Monitoring: PIV; A-Line; FloTrac; Central Access/Port present; MAC-Line; PAC   Maintenance: Balanced     Blood products: Blood in Room     Drips/Meds: Epinephrine; Vasopressin; Nitric Oxide; Norepi     Advanced Monitoring: BIS; NIRS (cerebral)     Postop Plan:   Postop Pain: Opioids  Postop Sedation/Airway: Sedation likely; SECURED AIRWAY likely       Postop Sedation: Dexmedetomidine Infusion  Disposition: ICU     PONV Management:   Adult Risk Factors: Female, Non-Smoker, Postop Opioids   Prevention:, Propofol     CONSENT: Telephone   Plan and risks discussed with: Other (Norman - daughter )   Blood Products: Consented (ALL Blood Products)                       William Zavaleta MD

## 2020-08-21 NOTE — PROGRESS NOTES
At 0644 pt was removed from EEG in preparation for going to the OR. The EEG tech was paged at 0630 with no response.

## 2020-08-21 NOTE — PROGRESS NOTES
CV ICU PROGRESS NOTE  August 21, 2020       CO-MORBIDITIES:   Cardiogenic shock (H)  (primary encounter diagnosis)  LV (left ventricular) mural thrombus  Heart failure (H)  Heart failure (H)  Status post cardiac surgery    ASSESSMENT: Marquise Jj is a 75 year old female 75 year-old female with PMH notable for coronary artery disease s/p CABG (4/20), ischemic cardiomyopathy, severe MR/TR and known mural thrombus who is admitted to the CV ICU s/p redo sternotomy, LVAD (heartmate III), and TV repair on 8/19/20 with Dr. Farley. Chest was left open and packed. S/p chest closure and washout 08/21/20.       PLAN SUMMARY:   - Head CT w/o; touch base with Neurocritical Care  - Post op CXR   - Post op labs   - Remove R arterial groin sheath   - Wean NO, discuss plan with cards   - Hold diuresis, discuss with cards.   - Start TF after removal of arterial sheath, when able to have HOB>30     PLAN:   Neuro/ pain/ sedation:  #Acute post-operative pain   #Concern for seizures   - Monitor neurological status. Notify the MD for any acute changes in exam.  - OK to resume sedation: propofol gtt on standby for sedation along with midazolam boluses PRN  - Seroquel 50 at bedtime + 25 PRN   - Keppra BID, Fentanyl gtt    - Tylenol toi   - Head CT, discuss findings with neurocrit      Pulmonary:   #Acute hypoxic respiratory failure  - MV   - Serial ABG   - CXR reviewed post op   - Continue to monitor CT output closely       Cardiovascular:    #Acute on chronic decompensated heart failure with reduced ejection fraction: NYHA IV / ACC D  #Cardiogenic shock  #Ischemic cardiomyopathy  #Coronary artery disease s/p CABG 4/20 in [place], s/p PCI to RCA 7/20  #Mural thrombus  #Severe MR/TR  - Monitor hemodynamic status.   - Dobutamine, epi, vaso, levo: do not aggressively wean overnight   - Rosuvastatin 20   - Vent paced 80   - Will plan to remove R groin arterial sheath today   - Discuss recommendations with HF Cardiology   - Wean NO        GI/Nutrition:   - NPO, OGT, NJT  - Start TF today     - Pantoprazole ppx   - Bowel regimen: senna-colace, miralax.  - No indication for parenteral nutrition.      Renal/Fluid Balance/ Electrolytes:   #CKD III  - Will monitor intake and output.  - ICU electrolyte replacement protocol  - Labs q 6 hours       Endocrine:    #Glycemic control  #Hx hypothyroid   - Insulin gtt   - Synthroid daily       ID/ Antibiotics:  - Perioperative vancomycin, levofloxacin, fluconazole, zosyn       Heme:     #Acute perioperative blood loss anemia  - KCentra given in the OR   - TEG post op looked good   - Overnight labs q6 hours   - Perioperative amicar       Prophylaxis:    - Mechanical prophylaxis for DVT.  - No chemical DVT prophylaxis due to high risk of bleeding.      MSK:    - PT and OT consulted. Appreciate recs.      Lines/ tubes/ drains:  - ETT  - A-line  - R internal jugular CVC  - PAC  - PIV  - Rojo  - Chest tubes       Disposition:  - CV ICU.    Patient seen, findings and plan discussed with CV ICU staff, Dr. Gambino.    -----------------------------------  Ronaldo Li MD  Anesthesiology Resident, PGY-3  *[id number]  ====================================    SUBJECTIVE:   Intubated, sedated, does not follow commands    OBJECTIVE:   1. VITAL SIGNS:   Temp:  [96.8  F (36  C)-99.9  F (37.7  C)] 98.1  F (36.7  C)  Pulse:  [57-78] 75  Resp:  [15-22] 18  MAP:  [62 mmHg-90 mmHg] 86 mmHg  Arterial Line BP: ()/(43-79) 101/74  FiO2 (%):  [40 %] 40 %  SpO2:  [99 %-100 %] 99 %  Ventilation Mode: CMV/AC  (Continuous Mandatory Ventilation/ Assist Control)  FiO2 (%): 40 %  Rate Set (breaths/minute): 14 breaths/min  Tidal Volume Set (mL): 400 mL  PEEP (cm H2O): 5 cmH2O  Oxygen Concentration (%): 50 %  Resp: 18      2. INTAKE/ OUTPUT:   I/O last 3 completed shifts:  In: 2648.98 [I.V.:2273.98; NG/GT:375]  Out: 1657 [Urine:607; Emesis/NG output:200; Chest Tube:850]    3. PHYSICAL EXAMINATION:   General: Intubated, sedated   Neuro:  Sedated, PERRL   Resp: mechanically ventilated, minimal vent settings   CV: ventricular paced @ 80  Abdomen: Soft, Non-distended, Non-tender  Incisions: sternotomy wound c/d/i s/p closure  Extremities: warm and well perfused    4. INVESTIGATIONS:   Arterial Blood Gases   Recent Labs   Lab 08/21/20  1016 08/21/20  0347 08/20/20  2150 08/20/20  0949   PH 7.45 7.43 7.43 7.42   PCO2 30* 34* 35 36   PO2 177* 163* 156* 131*   HCO3 21 22 23 23     Complete Blood Count   Recent Labs   Lab 08/21/20  1017 08/21/20  0347 08/20/20  0949 08/20/20  0341   WBC 11.4* 13.2* 10.8 11.4*   HGB 7.5* 8.1* 8.6* 9.9*   PLT 75* 91* 103* 112*     Basic Metabolic Panel  Recent Labs   Lab 08/21/20  1017 08/21/20  0347 08/20/20 2150 08/20/20  0949 08/20/20  0341    141  --  143 141   POTASSIUM 3.5 3.6 3.6 4.2 3.9   CHLORIDE 112* 110*  --  109 109   CO2 PENDING 23  --  24 24   BUN PENDING 23  --  18 18   CR PENDING 1.11*  --  0.95 0.86   GLC PENDING 112*  --  119* 116*     Liver Function Tests  Recent Labs   Lab 08/21/20  1017 08/21/20  0347 08/20/20  0949 08/20/20  0341  08/19/20  1805  08/18/20  1624   AST PENDING  --   --  52*  --  47*  --  28   ALT PENDING  --   --  16  --  13  --  18   ALKPHOS PENDING  --   --  65  --  69  --  217*   BILITOTAL PENDING  --   --  4.8*  --  2.8*  --  0.4   ALBUMIN PENDING  --   --  2.2*  --  1.6*  --  2.4*   INR 1.60* 1.59* 1.44* 1.35*   < > 1.40*   < >  --     < > = values in this interval not displayed.     Pancreatic Enzymes  No lab results found in last 7 days.  Coagulation Profile  Recent Labs   Lab 08/21/20  1017 08/21/20  0347 08/20/20  0949 08/20/20  0341  08/19/20  1805 08/19/20  1629 08/19/20  1559   INR 1.60* 1.59* 1.44* 1.35*   < > 1.40* 1.44* 1.52*   PTT  --   --   --  41*  --  43* 43* 44*    < > = values in this interval not displayed.         5. RADIOLOGY:   Recent Results (from the past 24 hour(s))   XR Chest Port 1 View    Narrative    Exam: XR CHEST PORT 1 VW, 8/20/2020 1:46  PM    Indication: SGz and lines position    Comparison: Chest radiograph 8/20/2020, CTA 1020    Findings:   AP view of the chest. Endotracheal tube with the tip in the mid  thoracic trachea. Left chest wall ICD with single lead stable in  position. Unchanged LVAD device. Left IJ approach Kite-Zia catheter  with the tip in the main pulmonary outflow tract. Right IJ approach  venous sheath with the tip near the innominate confluence. Partially  visualized enteric tubes. Left and right chest drains remain in place.  Chest remains open with packing material in place.    Cardiac silhouette is unchanged. No new focal consolidation. Unchanged  left basilar atelectasis and small pleural effusion. Trace blunting of  the right costophrenic angle. Diffuse interstitial markings. Unchanged  small left and trace right pneumothorax.      Impression    Impression:   1. Kite-Zia catheter with the tip main pulmonary artery. Additional  support devices as detailed above.  2. No significant interval change in small left, and trace right  pneumothoraces.  3. Left basilar atelectasis and small bilateral pleural effusions,  unchanged.    I have personally reviewed the examination and initial interpretation  and I agree with the findings.    KARLA NÚÑEZ MD   CT Head w/o Contrast    Narrative    CT HEAD W/O CONTRAST 8/20/2020 3:54 PM    History: Seizure, new, nontraumatic, >40 yrs; Altered level of  consciousness (LOC), unexplained   ICD-10:    Comparison: 8/10/2020    Technique: Using multidetector thin collimation helical acquisition  technique, axial, coronal and sagittal CT images from the skull base  to the vertex were obtained without intravenous contrast.    Findings: There is no intracranial hemorrhage, mass effect, or midline  shift. Gray/white matter differentiation in both cerebral hemispheres  is preserved. Minimal periventricular patchy white matter  hypodensities that are nonspecific, but likely secondary to small  vessel  ischemic disease. There is mild cerebral atrophy, unchanged.  Ventricles are proportionate to the cerebral sulci. The basal cisterns  are clear.    The bony calvaria and the bones of the skull base are normal.  Minimally mucosal thickening in the maxillary sinuses and scattered  ethmoid air cells. The mastoid air cells are clear..       Impression    Impression:    1. No acute intracranial pathology.   2. Minimal leukoaraiosis and mild cerebral atrophy.    FRACISCO COLEMAN MD       =========================================

## 2020-08-21 NOTE — PROGRESS NOTES
Major Shift Events:  No acute events overnight. Urine output dropped off to zero from 20-30ml/hr. 500 LR bolus given. NE weaned to 0.03 from 0.08. Pt opening eyes to voice, inconsistently followed commands with the LUE and LLE. Withdraws with the RLE, no response with the RUE. Ct output minimal, though did dump 130ml with a big turn.     Plan: To OR for chest closure.    For vital signs and complete assessments, please see documentation flowsheets.

## 2020-08-21 NOTE — PROGRESS NOTES
Cardiology Progress Note    Assessment & Plan   In summary, Marquise Jj is a 75-year-old female with a past medical history notable for coronary artery disease, ischemic cardiomyopathy, and known mural thrombus who was transferred from [facility] for further evaluation/treatment of cardiogenic shock. Balloon pump placed 8/14. Had LVAD 8/19.     Today's changes:  - LVAD flows acceptable , CI normal   - seizure like activity; on keppra 1000 PO bid and now versed gtt    - decrease LIANA to 5 (f/u CVP) tonight   - weaned off norepi, vaso. Try to decrease epi to 0.07 mcg/kg/min    Neuro:   # Sedation  # concern for seizure activity  CTH head 8/20 no signs of ICH   CTH head 8/21 Somewhat limited examination secondary to metallic streak  artifact from overlying electroencephalogram leads. No definite acute  intracranial pathology. Left greater than right posterior frontal lobe  hypodensities are likely from streak artifact.  keppra 1 g PO bid   Versed gtt at 4   - veeg  - f/u neurocritical care recs      Cardio:  # Cardiogenic shock with vasoplegic component   # ICM: NYHA IV / ACC  # Severe MR/TR s/p tricuspid valve repair with Elkins MC3 Annuloplasty Ring size T30  # s/p LVAD HM3 on 8/19/2020  LAVD speed 5300, flow 3.3-3.9, pi 2.5-5, power 3.7   Presents with cardiogenic shock. On NE, cardiac index approximately 1.37 on admission. Severely elevated biventricular filling pressures. Etiology of her decompensation appears to be progression of her cardiomyopathy. Despite medical treatment, she has been hospitalized twice since returning to [place], both with low output. No viability in her LAD territory, and doubt that PCI to her circumflex would make meaningful LV recovery. Hemodynamics improved with dobutamine, did not tolerate attempt to wean inotropics. RHC removed on 8/13 after clotting off, replaced on 8/14 after patient with increased work of breathing. CI of 1.1, balloon pump placed with CI of 1.6-1.8  and improvement in symptoms/hemodynamics. Had LVAD HM3 placed on 8/19. CVP today around 10 range. Chest closed on 8/21.   - Continue LIANA 10 , monitoring CVP for RV afterload reduction (CVP goal ~8-12)    - Decreased epi to 0.1 mcg/kg/min plan to wean down as tolerated , stopped dobutamine and norepi and c/w vaso 0.5 U  - Hemodynamics q6hrs, monitor lactate   - Nutrition consulted, NJ with TF    Date 8/9 8/9 8/10 8/11 08/13/20 08/15/20 08/16/20 08/17/20 8/18/20 8/19 8/20 8/21*   CVP 12 9 3 4 8 9 6 8 11 5 10 13   Mean PA  35 30 21 25 23 21 20 35/18 35/18 - 30/12 28/14   PCWP  18 14 12 x 13 14  13 - -    Oxy HGB  59 64 61 61 60 47 62 69 54 - 68 66   CI/CO 2.3 2.3 2.4/4.2 2.3 2.2 1.8 2.8/4.9 4.1 4.3/2.5 - 6/3.3 6.9/3.8   SVR 1100 1100 1200 1400 1316 1900 3578 809 3483 - 1025 715   Device     IABP 1:1 IABP 1:1 IABP 1:1 IABP 1:1 IABP 1:1 No IABP No IABP No IABP   * epi 0.1, vaso 0.5     # CAD s/p CABG 4/2020 (KURT to RCA and LIMA to LAD) and PCI to RCA 7/20  - Stop home plavix po qd (8/13) for LVAD surgery  - holding aspirin 81mg PO qd   - c/w home crestor    # Mural thrombus  - removed during surgery on 8/19    # A-flutter  Converted to NSR on 8/15    - Continue amiodarone 200mg po qd      Pulm:  # Acute hypoxic respiratory failure  Intubated on 8/19   Vent setting: RR 12, , PEEP 5, FiO2 50%   Secondary to pulmonary edema bilateral pleural effusions.   - On nasal cannula, titrate to saturation of >92%     Renal/Electrolytes   # Hyperkalemia/Hypokalemia   # Acute kidney injury on chronic kidney disease, stage III  - Trend potassium and creatinine q12hrs  - Electrolyte replacement protocol  - Monitor UOP      GI:  # Constipation  # Severe protein calorie malnutrition  - Nutrition through tube feeds at 30 mL/hr    - Senna-docusate bid scheduled  - Miralax bid prn constipation     ID:   # UTI vs ASB  Periprocedural abx as per surgical team   - levoloxacin, zosyn, rifampin, vancomycin, fluconazole    - Completed  Ceftriaxone treatment 1g IV qd (8/14 - 8/18)     Hem/Onc  # Mural thrombus removed on 8/19  # Anemia, mild  D/t frequent blood draws and procedures  - Monitor hgb    Endo  # Hypothyroidism   - Continue PTA levothyroxine     Nutrition: NJ with TF today  DVT Prophylaxis: mechanical   Code Status: Full Code    Mallorie Anders MD    Discussed with Dr Craig     Interval History   NAEON. VEEG placed for potential seizures, chest closed on 8/21.     Physical Exam   Temp: 97.9  F (36.6  C) Temp src: Bladder  Pulse: 77   Resp: 16 SpO2: (!) 80 % O2 Device: Mechanical Ventilator    Vitals:    08/19/20 0600 08/20/20 0200 08/21/20 0000   Weight: 62.3 kg (137 lb 5.6 oz) 67.6 kg (149 lb 0.5 oz) 68.4 kg (150 lb 12.7 oz)     Vital Signs with Ranges  Temp:  [96.8  F (36  C)-99.3  F (37.4  C)] 97.9  F (36.6  C)  Pulse:  [57-90] 77  Resp:  [15-22] 16  MAP:  [62 mmHg-90 mmHg] 75 mmHg  Arterial Line BP: ()/(42-79) 86/54  FiO2 (%):  [40 %] 40 %  SpO2:  [37 %-100 %] 80 %  I/O last 3 completed shifts:  In: 2648.98 [I.V.:2273.98; NG/GT:375]  Out: 1657 [Urine:607; Emesis/NG output:200; Chest Tube:850]    RETIRE: Heart Rate: 71, Blood pressure (!) 141/71, pulse 77, temperature 97.9  F (36.6  C), resp. rate 16, weight 68.4 kg (150 lb 12.7 oz), SpO2 (!) 80 %.  150 lbs 12.71 oz       IABP:  - removed 8/19    SG:  - Good position   - CVP 11, PA 32/14, CI/CO 3.8/6.8, , SvO2 70%    GEN: intubated , sedated , on VEEG   CV: RRR, wound vac on chest , mediastinal x2, bilateral pleural, pump pocket jerman drain  LUNGS: Clear to auscultation bilaterally  ABD: Active bowel sounds, soft, no abdominal tenderness.   EXT: Trace LE edema   NEURO: opening eyes    Medications     BETA BLOCKER NOT PRESCRIBED       dexmedetomidine Stopped (08/19/20 2133)     dextrose       dextrose 5% and 0.45% NaCl + KCl 20 mEq/L 10 mL/hr at 08/21/20 0740     EPINEPHrine IV infusion ADULT 0.1 mcg/kg/min (08/21/20 1310)     fentaNYL 25 mcg/hr (08/21/20 1130)      insulin (regular) Stopped (08/21/20 1200)     norepinephrine Stopped (08/21/20 0801)     propofol (DIPRIVAN) infusion       Another Antibiotic has been ordered.       BETA BLOCKER NOT PRESCRIBED       vasopressin (PITRESSIN) infusion ADULT 0.5 Units/hr (08/21/20 1345)       acetaminophen  975 mg Oral or Feeding Tube Q8H     amiodarone  200 mg Oral or Feeding Tube Daily     bimatoprost  1 drop Both Eyes At Bedtime     busPIRone  10 mg Oral or Feeding Tube TID     ferrous sulfate  325 mg Oral or Feeding Tube BID     fluconazole  200 mg Intravenous Q24H     latanoprost  1 drop Both Eyes Daily     levETIRAcetam  1,000 mg Oral or Feeding Tube BID     levofloxacin  500 mg Intravenous Q24H     levothyroxine  62.5 mcg Oral or Feeding Tube QAM AC     lidocaine  1 patch Transdermal Q24H     lidocaine   Transdermal Q8H     mupirocin  1 g Both Nostrils BID     pantoprazole  40 mg Oral QAM AC     piperacillin-tazobactam  4.5 g Intravenous Q6H     QUEtiapine  50 mg Oral or Feeding Tube At Bedtime     rifampin  600 mg Intravenous Q24H     rosuvastatin  20 mg Oral or Feeding Tube Daily     senna-docusate  1 tablet Oral or Feeding Tube BID    Or     senna-docusate  2 tablet Oral or Feeding Tube BID     sodium chloride (PF)  3 mL Intracatheter Q8H     vancomycin (VANCOCIN) IV  1,000 mg Intravenous Q24H       Data   Recent Labs   Lab 08/21/20  1017 08/21/20  0347 08/20/20  2150 08/20/20  0949 08/20/20  0341   WBC 11.4* 13.2*  --  10.8 11.4*   HGB 7.5* 8.1*  --  8.6* 9.9*   MCV 88 87  --  87 86   PLT 75* 91*  --  103* 112*   INR 1.60* 1.59*  --  1.44* 1.35*    141  --  143 141   POTASSIUM 3.5 3.6 3.6 4.2 3.9   CHLORIDE 112* 110*  --  109 109   CO2 23 23  --  24 24   BUN 23 23  --  18 18   CR 1.15* 1.11*  --  0.95 0.86   ANIONGAP 8 9  --  10 8   FERNANDO 8.0* 8.1*  --  8.5 8.2*   GLC 86 112*  --  119* 116*   ALBUMIN 1.8*  --   --   --  2.2*   PROTTOTAL 4.3*  --   --   --  4.3*   BILITOTAL 4.9*  --   --   --  4.8*   ALKPHOS 64  --    --   --  65   ALT 14  --   --   --  16   AST 47*  --   --   --  52*       Recent Results (from the past 24 hour(s))   CT Head w/o Contrast    Narrative    CT HEAD W/O CONTRAST 8/20/2020 3:54 PM    History: Seizure, new, nontraumatic, >40 yrs; Altered level of  consciousness (LOC), unexplained   ICD-10:    Comparison: 8/10/2020    Technique: Using multidetector thin collimation helical acquisition  technique, axial, coronal and sagittal CT images from the skull base  to the vertex were obtained without intravenous contrast.    Findings: There is no intracranial hemorrhage, mass effect, or midline  shift. Gray/white matter differentiation in both cerebral hemispheres  is preserved. Minimal periventricular patchy white matter  hypodensities that are nonspecific, but likely secondary to small  vessel ischemic disease. There is mild cerebral atrophy, unchanged.  Ventricles are proportionate to the cerebral sulci. The basal cisterns  are clear.    The bony calvaria and the bones of the skull base are normal.  Minimally mucosal thickening in the maxillary sinuses and scattered  ethmoid air cells. The mastoid air cells are clear..       Impression    Impression:    1. No acute intracranial pathology.   2. Minimal leukoaraiosis and mild cerebral atrophy.    FRACISCO COLEMAN MD   XR Chest Port 1 View    Narrative    EXAM: XR CHEST PORT 1 VW  8/21/2020 11:40 AM     HISTORY:  post op chest closure, washout       COMPARISON:  Same day chest x-ray    FINDINGS: Single view of the chest. Postsurgical changes of the chest.  Sternotomy wires are intact. Stable left chest wall implantable  cardiac defibrillator and distal leads. Partially visualized LVAD is  stable. Stable bilateral chest tubes and mediastinal drains. Left IJ  Rosebud-Zia catheter with tip projecting over left main pulmonary  artery. Right IJ venous sheath with tip projecting over the high SVC.  Endotracheal tube tip projects approximately 3.5 cm above the enrico.  NG  tube courses inferiorly with tip projecting near stable positioning  of the thoracic support devices. The GE junction. Additional enteric  tube courses inferiorly and projects below the field of view.    Trachea is midline. Stable enlarged cardiac silhouette. No focal  airspace opacity. No large pleural effusion. Stable small left and  trace right apical pneumothoraces.. Perihilar bibasilar streaky  opacities.      Impression    IMPRESSION:   1. Stable small left and trace right apical pneumothoraces. Bilateral  chest tubes in place.  2. Stable positioning of thoracic support devices.  2. Stable cardiomegaly.  3. Perihilar and bibasilar streaky opacities favored to represent  atelectasis.    I have personally reviewed the examination and initial interpretation  and I agree with the findings.    ROBERT DE LA PAZ MD   Cardiac Device Check - Inpatient   Result Value    Date Time Interrogation Session [id number]    Implantable Pulse Generator  Medtronic    Implantable Pulse Generator Model [id number] Visia AF MRI VR    Implantable Pulse Generator Serial Number [id number]    Type Interrogation Session In Clinic    Clinic Name [facility]    Implantable Pulse Generator Type Defibrillator    Implantable Pulse Generator Implant Date 20200508    Implantable Lead  Medtronic    Implantable Lead Model 6947M Sprint Quattro Secure MRI SureScan    Implantable Lead Serial Number [id number]    Implantable Lead Implant Date 20200508    Implantable Lead Polarity Type Quadripolar Lead    Implantable Lead Location Detail 1 UNKNOWN    Implantable Lead Location Right Ventricle    Franco Setting Mode (NBG Code) VVI    Franco Setting Lower Rate Limit 50    Franco Setting Hysterisis Rate 40    Lead Channel Setting Sensing Polarity Bipolar    Lead Channel Setting Sensing Anode Location Right Ventricle    Lead Channel Setting Sensing Anode Terminal Ring    Lead Channel Setting Sensing Cathode Location  Right Ventricle    Lead Channel Setting Sensing Cathode Terminal Tip    Lead Channel Setting Sensing Sensitivity 0.3    Lead Channel Setting Pacing Polarity Bipolar    Lead Channel Setting Pacing Anode Location Right Ventricle    Lead Channel Setting Pacing Anode Terminal Ring    Lead Channel Setting Sensing Cathode Location Right Ventricle    Lead Channel Setting Sensing Cathode Terminal Tip    Lead Channel Setting Pacing Pulse Width 0.4    Lead Channel Setting Pacing Amplitude 2    Lead Channel Setting Pacing Capture Mode Adaptive    Zone Setting Type Category VF    Zone Setting Detection Interval 300    Zone Setting Detection Beats Numerator 30    Zone Setting Detection Beats Denominator 40    Zone Setting Type Category VT    Zone Setting Detection Interval 270    Zone Setting Type Category VT    Zone Setting Detection Interval 390    Zone Setting Type Category VT    Zone Setting Detection Interval 450    Zone Setting Type Category ATRIAL_FIBRILLATION    Zone Setting Type Category AT/AF    Lead Channel Impedance Value 456    Lead Channel Impedance Value 342    Lead Channel Sensing Intrinsic Amplitude 8    Lead Channel Pacing Threshold Amplitude 0.5    Lead Channel Pacing Threshold Pulse Width 0.4    Battery Date Time of Measurements 20200821112859    Battery Status OK    Battery RRT Trigger 2.727    Battery Remaining Longevity 135    Battery Voltage 3.07    Capacitor Charge Type Reformation    Capacitor Last Charge Date Time 20200808054231    Capacitor Charge Time 3.893    Capacitor Charge Energy 18    Franco Statistic Date Time Start 20200820090049    Franco Statistic Date Time End 20200821112809    Franco Statistic RV Percent Paced 0.01    Atrial Tachy Statistic Date Time Start 20200820090049    Atrial Tachy Statistic Date Time End 20200821112809    Atrial Tachy Statistic AT/AF Logan Percent 0    Therapy Statistic Recent Shocks Delivered 0    Therapy Statistic Recent Shocks Aborted 0    Therapy Statistic Recent  ATP Delivered 0    Therapy Statistic Recent Date Time Start 53323098154066    Therapy Statistic Recent Date Time End 12639826160833    Therapy Statistic Total Shocks Delivered 0    Therapy Statistic Total Shocks Aborted 0    Therapy Statistic Total ATP Delivered 10    Therapy Statistic Total  Date Time Start 41833518573524    Therapy Statistic Total  Date Time End 36561391876688    Episode Statistic Recent Count 0    Episode Statistic Type Category AT/AF    Episode Statistic Recent Count 0    Episode Statistic Type Category SVT    Episode Statistic Recent Count 0    Episode Statistic Type Category VT    Episode Statistic Recent Count 0    Episode Statistic Type Category VF    Episode Statistic Recent Count 0    Episode Statistic Type Category VT    Episode Statistic Recent Count 0    Episode Statistic Type Category VT    Episode Statistic Recent Count 0    Episode Statistic Type Category VT    Episode Statistic Recent Date Time Start 14069543466512    Episode Statistic Recent Date Time End 82490400035118    Episode Statistic Recent Date Time Start 3172619453    Episode Statistic Recent Date Time End 41128267128094    Episode Statistic Recent Date Time Start 88578231041958    Episode Statistic Recent Date Time End 20583487289969    Episode Statistic Recent Date Time Start 28119666789815    Episode Statistic Recent Date Time End 44615979711607    Episode Statistic Recent Date Time Start 26110416981524    Episode Statistic Recent Date Time End 22866919310788    Episode Statistic Recent Date Time Start 22150131650660    Episode Statistic Recent Date Time End 30585892795061    Episode Statistic Recent Date Time Start 71355855351862    Episode Statistic Recent Date Time End 15109075976079    Episode Statistic Total Count 34    Episode Statistic Type Category AT/AF    Episode Statistic Total Count 0    Episode Statistic Type Category SVT    Episode Statistic Total Count 315    Episode Statistic Type Category VT     Episode Statistic Total Count 0    Episode Statistic Type Category VF    Episode Statistic Total Count 0    Episode Statistic Type Category VT    Episode Statistic Total Count 10    Episode Statistic Type Category VT    Episode Statistic Total Count 11    Episode Statistic Type Category VT    Episode Statistic Total Date Time Start 20200508105729    Episode Statistic Total Date Time End 20200821112809    Episode Statistic Total Date Time Start 20200508105729    Episode Statistic Total Date Time End 20200821112809    Episode Statistic Total Date Time Start 20200508105729    Episode Statistic Total Date Time End 20200821112809    Episode Statistic Total Date Time Start 20200508105729    Episode Statistic Total Date Time End 20200821112809    Episode Statistic Total Date Time Start 20200508105729    Episode Statistic Total Date Time End 20200821112809    Episode Statistic Total Date Time Start 20200508105729    Episode Statistic Total Date Time End 20200821112809    Episode Statistic Total Date Time Start 20200508105729    Episode Statistic Total Date Time End 20200821112809    Narrative    Patient seen in [address] [place] [address] for evaluation and iterative programming of   Medtronic single lead ICD per MD orders. Patient is post op from wound   debridement and sternal wound closure. Normal ICD function. No episodes   recorded. No arrhythmias recorded. Intrinsic rhythm = regular vs @ 68 bpm.    = 0%. OptiVol fluid index is 40 above baseline. Estimated battery   longevity to YUDELKA = 11 years. Battery voltage = 3.07V. 17 short v-v   intervals recorded. Patient was exposed to cautery in the OR for her chest   closure. Lead trends appear stable. Tachy therapies remain on and device   is programmed VVI @ 50 bpm. Patient remains intubated. Plan for continued   inpatient assessment and for patient to follow up post discharge as   directed by inpatient team.  JEZ Delong RN    single lead ICD    I have reviewed and interpreted the device  interrogation, settings,   programming and nurse's summary. The device is functioning within normal   device parameters. I agree with the current findings, assessment and plan.   CT Head w/o Contrast    Narrative    CT HEAD W/O CONTRAST 8/21/2020 12:07 PM    Provided History: Altered level of consciousness (LOC), unexplained;  left sided stroke symptoms post op LVAD    Comparison: 8/20/2020.    Technique: Using multidetector thin collimation helical acquisition  technique, axial, coronal and sagittal CT images from the skull base  to the vertex were obtained without intravenous contrast.     Findings:      Evaluation is somewhat limited by metallic streak artifact from  overlying electroencephalogram hardware, particularly at the right  hemicranium.    No intracranial hemorrhage, mass effect, or midline shift. The  ventricles are proportionate to the cerebral sulci. The gray to white  matter differentiation of the cerebral hemispheres is preserved. Mild  patchy periventricular white matter hypoattenuation, which is  nonspecific, but can be seen in the setting of chronic small vessel  ischemic disease. The basal cisterns are patent. Hypodensities in  bilateral frontal lobes, likely from streak artifact.    Mild mucosal thickening within the maxillary sinuses and ethmoid air  cells. Trace layering fluid within the sphenoid sinuses, nonspecific  in the setting of endotracheal intubation. Trace left mastoid  effusion. Bilateral pseudophakia, otherwise the orbits are grossly  unremarkable. Partial visualization of nasoenteric tube, within the  left portion of the nasal cavity.      Impression    Impression: Somewhat limited examination secondary to metallic streak  artifact from overlying electroencephalogram leads. No definite acute  intracranial pathology. Left greater than right posterior frontal lobe  hypodensities are likely from streak artifact.    I have personally reviewed the examination and initial  interpretation  and I agree with the findings.    TONIE IRENE MD

## 2020-08-21 NOTE — PROGRESS NOTES
[facility], [place]   Neurology Daily Note  Marquise Jj  [id number]  08/21/2020    Subjective:  No further clinical events after versed 4 mg total and keppra load. To OR for chest closure. AM review of EEG after first hour showed >30 brief, focal electrographic seizures. No clinical correlate per nursing report. When EEG hooked up after OR, having 15 seizures per hour, discussed with CVICU, will plan for keppra maximization, addition of vimpat and versed infusion with initial bolus.    Objective   Physical Examination   Vitals: BP (!) 141/71   Pulse 70   Temp 98.1  F (36.7  C)   Resp 18   Wt 68.4 kg (150 lb 12.7 oz)   SpO2 100%   BMI 22.93 kg/m    General: Adult female patient, lying in bed, NAD  HEENT: NC/AT, no icterus, op pink and moist  Cardiac: LVAD  Chest: intubated, chest tubes in place  Abdomen: S/NT/ND  Extremities: Warm, no edema    Neuro:  Mental status: Eyes open, does not attend, follow commands, or track  Cranial nerves: does not blink to threat, pupils 3, briskly reactive to direct and consensual light, L and mid gaze, does not look right, conjugate, VOR intact, L beating horizontal nystagmus, corneals intact, face symmetric.  Motor: Normal bulk, flaccid tone RUE, increased tone LUE and BLEs. No abnormal movements. Does not move limbs.   Reflexes: Hyperreflexic and symmetric biceps, brachioradialis, triceps, patellae, and achilles. Negative Delacruz, no clonus, toes up-going bilaterally.  Sensory: does not detect noxious in 4/4 extremities  Coordination: not tested  Gait: not tested    Investigations    EEG prelim read mod-gen slowing of 1st hour    Later verbal report of overnight recording: ~30 1 minute long R occipital seizures without generalization. This does not meet definition for non-convulsive status epilepticus.    Verbal report of EEG after return from OR today 8/21: several electrographic seizures per hour, meets criteria for non-convulsive status  epilepticus.    Impression   Marquise Jj is a 75 year old female with complex cardiac history who is POD 2 from LVAD placement complicated by post-procedural encephalopathy and two clinical focal seizures with concern for ongoing non-convulsive status epilepticus, which was initially not present, then she had frequent seizures, and now this has emerged despite keppra monotherapy. We will escalate AEDs and restart versed to attempt to break NCSE. Etiology of focal seizures is unclear at this time, but suggests possible underlying R occipital cortical abnormality or lesion. MRI now not possible due to LVAD, repeat CT head with significant artifact however some evidence of L posterior frontal lobe cortical hypodensity possibly representing infarct.    Recommendations  Continue keppra 2 g IV BID  Start vimpat 200 mg IV load and 200 mg IV BID  Start midazolam 4 mg/hr with 4 mg load now  Continue vEEG    All orders entered for you.    We will continue to follow.    Patient was seen and discussed with Dr. Meche West MD  Vascular Neurology Fellow, PGY-5  [phone]

## 2020-08-22 NOTE — PLAN OF CARE
PT 4E: Hold- chest closed in OR yesterday, having seizures and not medically appropriate for therapy, PT will continue to follow and initiate as medically appropriate.

## 2020-08-22 NOTE — PROGRESS NOTES
[facility], [place]   Neurology Daily Note  Marquise Jj  [id number]  08/22/2020    Subjective:  Ongoing seizures, frequency decreased to 5-10/hour after versed, but not eliminated. Rate increased to 8 after bolus of 4. Since morning, ~1 seizure/hour with interictal sharps that do not evolve into seizures.    Objective   Physical Examination   Vitals: BP (!) 141/71   Pulse 63   Temp 96.3  F (35.7  C)   Resp 15   Wt 72.5 kg (159 lb 13.3 oz)   SpO2 100%   BMI 24.30 kg/m    General: Adult female patient, lying in bed, NAD  HEENT: NC/AT, no icterus, op pink and moist  Cardiac: LVAD  Chest: intubated, chest tubes in place  Abdomen: S/NT/ND  Extremities: Warm, no edema    Neuro:  Mental status: Examined on versed 8 mg/hr. Eyes partially open, does not attend, follow commands, or track.  Cranial nerves: does not blink to threat, pupils 3, briskly reactive to direct and consensual light, mid gaze, corneals intact, face symmetric.  Motor: Normal bulk, flaccid tone RUE, increased tone LUE and BLEs. No abnormal movements. Does not move limbs.   Reflexes: Hyperreflexic and symmetric biceps, brachioradialis, triceps, patellae, and achilles. Negative Delacruz, no clonus, toes up-going bilaterally.  Sensory: does not detect noxious in 4/4 extremities  Coordination: not tested  Gait: not tested    Investigations    Formal EEG report pending, verbal report as described above    Impression   Marquise Jj is a 75 year old female with complex cardiac history who is POD 3 from LVAD placement complicated by post-procedural encephalopathy and two clinical focal seizures, and later focal non-convulsive status epilepticus, now resolved, with rare ongoing seizures and frequent epileptiform discharges.     Etiology of focal seizures is unclear at this time, but suggests possible underlying R occipital cortical abnormality or lesion. MRI now not possible due to LVAD, repeat CT head with significant  "artifact however some evidence of L posterior frontal lobe cortical hypodensity possibly representing infarct. These radiographic findings (and lack thereof) are somewhat equivocal and should not delay anticoagulation for cardiac reasons if indicated. 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    Recommendations  Continue keppra 2 g IV BID  Continue vimpat 200 mg IV BID  Continue midazolam 8 mg/hr as seizure treatment, not as titratable infusion  Continue vEEG  Okay for anticoagulation if indicated    We will continue to follow.    Patient was seen and discussed with Dr. Meche West MD  Vascular Neurology Fellow, PGY-5  [phone]  "

## 2020-08-22 NOTE — PROGRESS NOTES
Cardiology Progress Note    Assessment & Plan   In summary, Marquise Jj is a 75-year-old female with a past medical history notable for coronary artery disease, ischemic cardiomyopathy, and known mural thrombus who was transferred from [facility] for further evaluation/treatment of cardiogenic shock. Balloon pump placed 8/14. Had HM3 8/19. Chest closed 8/21.    Today's changes:  - start aspirin 81mg daily and heparin ggt fixed rate 800 U/hr  - LVAD flows acceptable, CI normal   - seizure like activity; on keppra 2000 q12, vimpat 200 BID, versed increased to 8mg/hr  - continue Trey to 5  - wean epi first (currently 0.1). NE off. Vaso at 1.    Neuro:   # Sedation  # concern for seizure activity  CTH head 8/20 no signs of ICH   CTH head 8/21 Somewhat limited examination secondary to metallic streak  artifact from overlying electroencephalogram leads. No definite acute  intracranial pathology. Left greater than right posterior frontal lobe  hypodensities are likely from streak artifact.  keppra 2 g PO bid   vimpat 200 BID  Versed gtt at 8  - veeg  - f/u neurocritical care recs      Cardio:  # Cardiogenic shock with vasoplegic component   # ICM: NYHA IV / ACC  # Severe MR/TR s/p tricuspid valve repair with Elkins MC3 Annuloplasty Ring size T30  # s/p LVAD HM3 on 8/19/2020  LAVD speed 5300, flow 3.4-3.7, pi 2.5-3.7, power 3.5-3.6   Presents with cardiogenic shock. On NE, cardiac index approximately 1.37 on admission. Severely elevated biventricular filling pressures. Etiology of her decompensation appears to be progression of her cardiomyopathy. Despite medical treatment, she has been hospitalized twice since returning to [place], both with low output. No viability in her LAD territory, and doubt that PCI to her circumflex would make meaningful LV recovery. Hemodynamics improved with dobutamine, did not tolerate attempt to wean inotropics. RHC removed on 8/13 after clotting off, replaced on 8/14 after  patient with increased work of breathing. CI of 1.1, balloon pump placed with CI of 1.6-1.8 and improvement in symptoms/hemodynamics. Had LVAD HM3 placed on 8/19. CVP today around 10 range. Chest closed on 8/21.   - Continue LIANA 5, monitoring CVP for RV afterload reduction (CVP goal ~8-12)    - Decreased epi to 0.1 mcg/kg/min plan to wean down as tolerated, c/w vaso @ 1  - Hemodynamics q6hrs, monitor lactate. Goal CI>2., SvO2 >55%    Date 8/9 8/9 8/10 8/11 08/13/20 08/15/20 08/16/20 08/17/20 8/18/20 8/19 8/20 8/21*   CVP 12 9 3 4 8 9 6 8 11 5 10 13   Mean PA  35 30 21 25 23 21 20 35/18 35/18 - 30/12 28/14   PCWP  18 14 12 x 13 14  13 - -    Oxy HGB  59 64 61 61 60 47 62 69 54 - 68 66   CI/CO 2.3 2.3 2.4/4.2 2.3 2.2 1.8 2.8/4.9 4.1 4.3/2.5 - 6/3.3 6.9/3.8   SVR 1100 1100 1200 1400 1316 1900 4939 783 4051 - 1025 715   Device     IABP 1:1 IABP 1:1 IABP 1:1 IABP 1:1 IABP 1:1 No IABP No IABP No IABP   * epi 0.1, vaso 0.5     8/22: CVP 12, PA 28/14/19, PCWP 14, CO/CI 9.0/4.8. . Epi 0.1, vaso 1.    # CAD s/p CABG 4/2020 (KURT to RCA and LIMA to LAD) and PCI to RCA 7/20  - Stop home plavix po qd (8/13) for LVAD surgery  - aspirin 81mg PO qd   - c/w home crestor    # Mural thrombus  - removed during surgery on 8/19    # A-flutter  Converted to NSR on 8/15    - Continue amiodarone 200mg po qd      Pulm:  # Acute hypoxic respiratory failure  Intubated on 8/19   Vent setting: RR 12, , PEEP 5, FiO2 50%   Secondary to pulmonary edema bilateral pleural effusions.   - On nasal cannula, titrate to saturation of >92%     Renal/Electrolytes   # Hyperkalemia/Hypokalemia   # Acute kidney injury on chronic kidney disease, stage III  - Trend potassium and creatinine q12hrs  - Electrolyte replacement protocol  - Monitor UOP      GI:  # Constipation  # Severe protein calorie malnutrition  - Nutrition through tube feeds at 30 mL/hr    - Senna-docusate bid scheduled  - Miralax bid prn constipation     ID:   # UTI vs  ASB  Periprocedural abx as per surgical team   - levoloxacin, zosyn, rifampin, vancomycin, fluconazole    - Completed Ceftriaxone treatment 1g IV qd (8/14 - 8/18)     Hem/Onc  # Mural thrombus removed on 8/19  # Anemia, mild  D/t frequent blood draws and procedures  - Monitor hgb    Endo  # Hypothyroidism   - Continue PTA levothyroxine     Nutrition: NJ with TF today  DVT Prophylaxis: mechanical   Code Status: Full Code    Mallorie Anders MD    Discussed with Dr Craig     Interval History   NAEON. VEEG placed for potential seizures, chest closed on 8/21.     Physical Exam   Temp: 96.3  F (35.7  C) Temp src: Bladder  Pulse: 64   Resp: 15 SpO2: 99 % O2 Device: Mechanical Ventilator    Vitals:    08/20/20 0200 08/21/20 0000 08/22/20 0400   Weight: 67.6 kg (149 lb 0.5 oz) 68.4 kg (150 lb 12.7 oz) 72.5 kg (159 lb 13.3 oz)     Vital Signs with Ranges  Temp:  [96.3  F (35.7  C)-99  F (37.2  C)] 96.3  F (35.7  C)  Pulse:  [40-95] 64  Resp:  [14-17] 15  MAP:  [55 mmHg-97 mmHg] 82 mmHg  Arterial Line BP: ()/(38-73) 93/52  FiO2 (%):  [40 %] 40 %  SpO2:  [37 %-100 %] 99 %  I/O last 3 completed shifts:  In: 4323.71 [I.V.:2638.71; NG/GT:405; IV Piggyback:1000]  Out: 1589 [Urine:329; Emesis/NG output:100; Chest Tube:1160]    RETIRE: Heart Rate: 71, Blood pressure (!) 141/71, pulse 64, temperature 96.3  F (35.7  C), resp. rate 15, weight 72.5 kg (159 lb 13.3 oz), SpO2 99 %.  159 lbs 13.34 oz       IABP:  - removed 8/19    SG:  - Good position   - CVP 11, PA 32/14, CI/CO 3.8/6.8, , SvO2 70%    GEN: intubated , sedated , on VEEG   CV: RRR, LVAD at 5300 rpm  LUNGS: Clear to auscultation bilaterally  ABD: Active bowel sounds, soft, no abdominal tenderness.   EXT: Trace LE edema   NEURO: opening eyes    Medications     BETA BLOCKER NOT PRESCRIBED       dexmedetomidine Stopped (08/19/20 2133)     dextrose       dextrose 5% and 0.45% NaCl + KCl 20 mEq/L 10 mL/hr at 08/21/20 0740     EPINEPHrine IV infusion ADULT 0.1  mcg/kg/min (08/22/20 0700)     fentaNYL 25 mcg/hr (08/22/20 0700)     insulin (regular) Stopped (08/21/20 1200)     midazolam 8 mg/hr (08/22/20 0700)     norepinephrine Stopped (08/22/20 0515)     propofol (DIPRIVAN) infusion       Another Antibiotic has been ordered.       BETA BLOCKER NOT PRESCRIBED       vasopressin (PITRESSIN) infusion ADULT 1 Units/hr (08/22/20 0700)       acetaminophen  975 mg Oral or Feeding Tube Q8H     amiodarone  200 mg Oral or Feeding Tube Daily     bimatoprost  1 drop Both Eyes At Bedtime     busPIRone  10 mg Oral or Feeding Tube TID     ferrous sulfate  325 mg Oral or Feeding Tube BID     lacosamide (VIMPAT) intermittent infusion  200 mg Intravenous BID     latanoprost  1 drop Both Eyes Daily     levETIRAcetam  2,000 mg Intravenous Q12H     levofloxacin  500 mg Intravenous Q24H     levothyroxine  62.5 mcg Oral or Feeding Tube QAM AC     lidocaine  1 patch Transdermal Q24H     lidocaine   Transdermal Q8H     mupirocin  1 g Both Nostrils BID     pantoprazole  40 mg Oral QAM AC     piperacillin-tazobactam  4.5 g Intravenous Q6H     QUEtiapine  50 mg Oral or Feeding Tube At Bedtime     rosuvastatin  20 mg Oral or Feeding Tube Daily     senna-docusate  1 tablet Oral or Feeding Tube BID    Or     senna-docusate  2 tablet Oral or Feeding Tube BID     sodium chloride (PF)  3 mL Intracatheter Q8H       Data   Recent Labs   Lab 08/22/20  0644 08/22/20  0422 08/21/20 2014 08/21/20  1448 08/21/20  1017  08/20/20  0341   WBC  --  13.5* 17.3* 17.2* 11.4*   < > 11.4*   HGB 6.8* 6.9* 7.6* 8.1* 7.5*   < > 9.9*   MCV  --  89 88 89 88   < > 86   PLT  --  86* 97* 102* 75*   < > 112*   INR  --  1.73* 2.05* 1.53* 1.60*   < > 1.35*   NA  --  141 142 142 142   < > 141   POTASSIUM  --  3.9 4.2 4.0 3.5   < > 3.9   CHLORIDE  --  112* 113* 110* 112*   < > 109   CO2  --  20 23 22 23   < > 24   BUN  --  33* 26 24 23   < > 18   CR  --  1.41* 1.15* 1.12* 1.15*   < > 0.86   ANIONGAP  --  9 6 10 8   < > 8   FERNANDO  --   7.7* 7.8* 8.0* 8.0*   < > 8.2*   GLC  --  167* 119* 98 86   < > 116*   ALBUMIN  --   --   --   --  1.8*  --  2.2*   PROTTOTAL  --   --   --   --  4.3*  --  4.3*   BILITOTAL  --   --   --   --  4.9*  --  4.8*   ALKPHOS  --   --   --   --  64  --  65   ALT  --   --   --   --  14  --  16   AST  --   --   --   --  47*  --  52*    < > = values in this interval not displayed.       Recent Results (from the past 24 hour(s))   XR Chest Port 1 View    Narrative    XR CHEST PORT 1 VW8/21/2020 9:20 AM    INDICATION: open chest, verify to foreign object    COMPARISON:  Same-day radiograph    FINDINGS: Left chest AICD with leads in unchanged position. The base  of the neck is excluded from this exam. Lung apices are obscured.  Right IJ venous catheter sheath in place. Bilateral apically directed  chest tubes. Left IJ Irwin-Zia catheter tip in the left main pulmonary  artery. LVAD in unchanged position. Packing material in the chest has  been removed. Enteric tubes coursing down the field-of-view.  Mediastinal drains. ET tube terminates in the mid thigh thoracic  trachea. Clips project over both hilum. Median sternotomy wires, new  from prior.     Cardiac mediastinal silhouette is stable. Small left-sided pleural  effusion. No focal airspace opacities. No visualized pneumothorax  although the bilateral lung apices are obscured. Visualized upper  abdomen is unremarkable. Bones are stable.      Impression    IMPRESSION: Packing material has been removed and median sternotomy  wires have been placed. Other lines and tubes appear to be in stable  position. Small left pleural effusion with associated left basilar  opacities. Possible trace right-sided pleural effusion similar to  prior. Chest is otherwise stable.    Findings were discussed with the OR #[id number] 8/21/2020 at 9:24 AM by Dr. Tobias Estrada    I have personally reviewed the examination and initial interpretation  and I agree with the findings.    ROBERT DE LA PAZ MD   XR Chest  Port 1 View    Narrative    EXAM: XR CHEST PORT 1 VW  8/21/2020 11:40 AM     HISTORY:  post op chest closure, washout       COMPARISON:  Same day chest x-ray    FINDINGS: Single view of the chest. Postsurgical changes of the chest.  Sternotomy wires are intact. Stable left chest wall implantable  cardiac defibrillator and distal leads. Partially visualized LVAD is  stable. Stable bilateral chest tubes and mediastinal drains. Left IJ  Trumann-Zia catheter with tip projecting over left main pulmonary  artery. Right IJ venous sheath with tip projecting over the high SVC.  Endotracheal tube tip projects approximately 3.5 cm above the enrico.  NG tube courses inferiorly with tip projecting near stable positioning  of the thoracic support devices. The GE junction. Additional enteric  tube courses inferiorly and projects below the field of view.    Trachea is midline. Stable enlarged cardiac silhouette. No focal  airspace opacity. No large pleural effusion. Stable small left and  trace right apical pneumothoraces.. Perihilar bibasilar streaky  opacities.      Impression    IMPRESSION:   1. Stable small left and trace right apical pneumothoraces. Bilateral  chest tubes in place.  2. Stable positioning of thoracic support devices.  2. Stable cardiomegaly.  3. Perihilar and bibasilar streaky opacities favored to represent  atelectasis.    I have personally reviewed the examination and initial interpretation  and I agree with the findings.    ROBERT DE LA PAZ MD   Cardiac Device Check - Inpatient   Result Value    Date Time Interrogation Session [id number]    Implantable Pulse Generator  Medtronic    Implantable Pulse Generator Model [id number] Visia AF MRI VR    Implantable Pulse Generator Serial Number [id number]    Type Interrogation Session In Clinic    Clinic Name [facility]    Implantable Pulse Generator Type Defibrillator    Implantable Pulse Generator Implant Date 20200508     Implantable Lead  Medtronic    Implantable Lead Model 6947M Sprint Quattro Secure MRI SureScan    Implantable Lead Serial Number [id number]    Implantable Lead Implant Date 20200508    Implantable Lead Polarity Type Quadripolar Lead    Implantable Lead Location Detail 1 UNKNOWN    Implantable Lead Location Right Ventricle    Franco Setting Mode (NBG Code) VVI    Franco Setting Lower Rate Limit 50    Franco Setting Hysterisis Rate 40    Lead Channel Setting Sensing Polarity Bipolar    Lead Channel Setting Sensing Anode Location Right Ventricle    Lead Channel Setting Sensing Anode Terminal Ring    Lead Channel Setting Sensing Cathode Location Right Ventricle    Lead Channel Setting Sensing Cathode Terminal Tip    Lead Channel Setting Sensing Sensitivity 0.3    Lead Channel Setting Pacing Polarity Bipolar    Lead Channel Setting Pacing Anode Location Right Ventricle    Lead Channel Setting Pacing Anode Terminal Ring    Lead Channel Setting Sensing Cathode Location Right Ventricle    Lead Channel Setting Sensing Cathode Terminal Tip    Lead Channel Setting Pacing Pulse Width 0.4    Lead Channel Setting Pacing Amplitude 2    Lead Channel Setting Pacing Capture Mode Adaptive    Zone Setting Type Category VF    Zone Setting Detection Interval 300    Zone Setting Detection Beats Numerator 30    Zone Setting Detection Beats Denominator 40    Zone Setting Type Category VT    Zone Setting Detection Interval 270    Zone Setting Type Category VT    Zone Setting Detection Interval 390    Zone Setting Type Category VT    Zone Setting Detection Interval 450    Zone Setting Type Category ATRIAL_FIBRILLATION    Zone Setting Type Category AT/AF    Lead Channel Impedance Value 456    Lead Channel Impedance Value 342    Lead Channel Sensing Intrinsic Amplitude 8    Lead Channel Pacing Threshold Amplitude 0.5    Lead Channel Pacing Threshold Pulse Width 0.4    Battery Date Time of Measurements 40418504617482    Battery Status  OK    Battery RRT Trigger 2.727    Battery Remaining Longevity 135    Battery Voltage 3.07    Capacitor Charge Type Reformation    Capacitor Last Charge Date Time 20200808054231    Capacitor Charge Time 3.893    Capacitor Charge Energy 18    Franco Statistic Date Time Start 20200820090049    Franco Statistic Date Time End 20200821112809    Franco Statistic RV Percent Paced 0.01    Atrial Tachy Statistic Date Time Start 20200820090049    Atrial Tachy Statistic Date Time End 20200821112809    Atrial Tachy Statistic AT/AF [place] Percent 0    Therapy Statistic Recent Shocks Delivered 0    Therapy Statistic Recent Shocks Aborted 0    Therapy Statistic Recent ATP Delivered 0    Therapy Statistic Recent Date Time Start 20200820090049    Therapy Statistic Recent Date Time End 20200821112809    Therapy Statistic Total Shocks Delivered 0    Therapy Statistic Total Shocks Aborted 0    Therapy Statistic Total ATP Delivered 10    Therapy Statistic Total  Date Time Start 89004520924772    Therapy Statistic Total  Date Time End 20200821112809    Episode Statistic Recent Count 0    Episode Statistic Type Category AT/AF    Episode Statistic Recent Count 0    Episode Statistic Type Category SVT    Episode Statistic Recent Count 0    Episode Statistic Type Category VT    Episode Statistic Recent Count 0    Episode Statistic Type Category VF    Episode Statistic Recent Count 0    Episode Statistic Type Category VT    Episode Statistic Recent Count 0    Episode Statistic Type Category VT    Episode Statistic Recent Count 0    Episode Statistic Type Category VT    Episode Statistic Recent Date Time Start 20200820090049    Episode Statistic Recent Date Time End 20200821112809    Episode Statistic Recent Date Time Start 00435947586086    Episode Statistic Recent Date Time End 96110474207140    Episode Statistic Recent Date Time Start 29239293191427    Episode Statistic Recent Date Time End 27790988808876    Episode Statistic Recent Date Time  Start 20200820090049    Episode Statistic Recent Date Time End 13947560491772    Episode Statistic Recent Date Time Start 20200820090049    Episode Statistic Recent Date Time End 85796042160953    Episode Statistic Recent Date Time Start 20200820090049    Episode Statistic Recent Date Time End 07275823067214    Episode Statistic Recent Date Time Start 20200820090049    Episode Statistic Recent Date Time End 98801090768905    Episode Statistic Total Count 34    Episode Statistic Type Category AT/AF    Episode Statistic Total Count 0    Episode Statistic Type Category SVT    Episode Statistic Total Count 315    Episode Statistic Type Category VT    Episode Statistic Total Count 0    Episode Statistic Type Category VF    Episode Statistic Total Count 0    Episode Statistic Type Category VT    Episode Statistic Total Count 10    Episode Statistic Type Category VT    Episode Statistic Total Count 11    Episode Statistic Type Category VT    Episode Statistic Total Date Time Start 20200508105729    Episode Statistic Total Date Time End 18959475729712    Episode Statistic Total Date Time Start 28482928359411    Episode Statistic Total Date Time End 87023593914819    Episode Statistic Total Date Time Start 49393727034366    Episode Statistic Total Date Time End 81360100378428    Episode Statistic Total Date Time Start 01330455961461    Episode Statistic Total Date Time End 31887649402439    Episode Statistic Total Date Time Start 71894573802964    Episode Statistic Total Date Time End 72083733786211    Episode Statistic Total Date Time Start 18597071432727    Episode Statistic Total Date Time End 54864646418445    Episode Statistic Total Date Time Start 62459243142935    Episode Statistic Total Date Time End 28929730658285    Narrative    Patient seen in [address] [place] [address] for evaluation and iterative programming of   Medtronic single lead ICD per MD orders. Patient is post op from wound   debridement and sternal wound closure. Normal  ICD function. No episodes   recorded. No arrhythmias recorded. Intrinsic rhythm = regular vs @ 68 bpm.    = 0%. OptiVol fluid index is 40 above baseline. Estimated battery   longevity to YUDELKA = 11 years. Battery voltage = 3.07V. 17 short v-v   intervals recorded. Patient was exposed to cautery in the OR for her chest   closure. Lead trends appear stable. Tachy therapies remain on and device   is programmed VVI @ 50 bpm. Patient remains intubated. Plan for continued   inpatient assessment and for patient to follow up post discharge as   directed by inpatient team.  JEZ Delong RN    single lead ICD    I have reviewed and interpreted the device interrogation, settings,   programming and nurse's summary. The device is functioning within normal   device parameters. I agree with the current findings, assessment and plan.   CT Head w/o Contrast    Narrative    CT HEAD W/O CONTRAST 8/21/2020 12:07 PM    Provided History: Altered level of consciousness (LOC), unexplained;  left sided stroke symptoms post op LVAD    Comparison: 8/20/2020.    Technique: Using multidetector thin collimation helical acquisition  technique, axial, coronal and sagittal CT images from the skull base  to the vertex were obtained without intravenous contrast.     Findings:      Evaluation is somewhat limited by metallic streak artifact from  overlying electroencephalogram hardware, particularly at the right  hemicranium.    No intracranial hemorrhage, mass effect, or midline shift. The  ventricles are proportionate to the cerebral sulci. The gray to white  matter differentiation of the cerebral hemispheres is preserved. Mild  patchy periventricular white matter hypoattenuation, which is  nonspecific, but can be seen in the setting of chronic small vessel  ischemic disease. The basal cisterns are patent. Hypodensities in  bilateral frontal lobes, likely from streak artifact.    Mild mucosal thickening within the maxillary sinuses and ethmoid  air  cells. Trace layering fluid within the sphenoid sinuses, nonspecific  in the setting of endotracheal intubation. Trace left mastoid  effusion. Bilateral pseudophakia, otherwise the orbits are grossly  unremarkable. Partial visualization of nasoenteric tube, within the  left portion of the nasal cavity.      Impression    Impression: Somewhat limited examination secondary to metallic streak  artifact from overlying electroencephalogram leads. No definite acute  intracranial pathology. Left greater than right posterior frontal lobe  hypodensities are likely from streak artifact.    I have personally reviewed the examination and initial interpretation  and I agree with the findings.    TONIE IRENE MD

## 2020-08-22 NOTE — PLAN OF CARE
OT: Cx, per discussion w/ RN, pt not appropriate for therapy today. OT will reschedule for 8/24/20 as appropriate.

## 2020-08-22 NOTE — PROGRESS NOTES
Patient was visited by  earlier today and daughter Norman called 2 times to get update on patient.  She said she want to get an update from Neuro team so Neurcrit team was notified by writer and she will call family to give them update on patient.

## 2020-08-22 NOTE — PLAN OF CARE
Major Shift Events:  Pt continuing to have multiple seizures per EEG at 2000 bolus of versed given and versed gt titrated up per neuro crit. Pt not experiencing clinical signs for staff. Pupils reactive at 3 ad with draws from pain. Fentanyl PCA for pain. Pt experiencing episodes of low PI on LVAD (1.4-1.6) Maps 50's, needing extra pressor support. Levo restarted. 500 LR bolus given. After interventions PI continue to downtrend to low 2's and Flows downtrend to high 2's low 3's, MD notified. CT output 20-50cc/hr. PPM/ICD, also temporary pacer. Afebrile. LS diminished cough/gag present. TF at 35cc/hr standard flushing. No BM, bowel meds given. UO 5cc/hr, MD aware. MD notified of all critical events and labs.  Plan: continue to monitor and notify MD of changes.   For vital signs and complete assessments, please see documentation flowsheets.

## 2020-08-22 NOTE — PLAN OF CARE
Maintains on versed 8mg per hour to control seizure, PAERL.  RASS -4. Very minimal motor response to pain stimulus; per Neurocrit's order, not holding versed for neuro assessment.  Will reassess tomorrow for possible weaning of versed.  Continue on vasopressin and epinephrine drips to achieve goal MAP >70mmHg.  Maintains on LVAD support flow 3.5-3.8, fluctuating PI 1.9-3.5, dropped with low BP (MAP ~60-65) but self resolved.  Continue nitric oxide (inh) at 5, held back weaning today.  CVP 13, PA 26/13, CI 4.4, SVO2 74%> HR 60-70 SR.     Ventilated on a/c mode, SpO2 good.    Chest drains yield minimal sanguineous output.    Urine output poor, ~5cc per hour. Pending eval and input from CARD.    T/F fairly tolerated, keep at 45cc per hour and not advancing at this moment.

## 2020-08-23 NOTE — PROGRESS NOTES
Cardiology Progress Note    Assessment & Plan   In summary, Marquise Jj is a 75-year-old female with a past medical history notable for coronary artery disease, ischemic cardiomyopathy, and known mural thrombus who was transferred from [facility] for further evaluation/treatment of cardiogenic shock. Balloon pump placed 8/14. Had HM3 8/19. Chest closed 8/21.    Today's changes:  - LVAD flows acceptable, CI normal   - wean Trey to off. Goal CVP 10-12  - consult nephrology for LORI. In the meantime challenge with 120 IV lasix x1  - wean epi first after Trey off (currently 0.1). NE off. Keep vaso at 2 given vasoplegia  - fixed rate heparin ggt per CVTS  - Neuro following for seizure activity    Neuro:   # Sedation  # concern for seizure activity  CTH head 8/20 no signs of ICH   CTH head 8/21 Somewhat limited examination secondary to metallic streak  artifact from overlying electroencephalogram leads. No definite acute  intracranial pathology. Left greater than right posterior frontal lobe  hypodensities are likely from streak artifact.  keppra 2 g PO bid   vimpat 200 BID  Versed gtt at 8  - veeg  - f/u neurocritical care recs      Cardio:  # Cardiogenic shock with vasoplegic component   # ICM: NYHA IV / ACC  # Severe MR/TR s/p tricuspid valve repair with [place]kins MC3 Annuloplasty Ring size T30  # s/p LVAD HM3 on 8/19/2020  LAVD speed 5300, flow 3.4-3.7, pi 2.5-3.7, power 3.5-3.6   Presents with cardiogenic shock. On NE, cardiac index approximately 1.37 on admission. Severely elevated biventricular filling pressures. Etiology of her decompensation appears to be progression of her cardiomyopathy. Despite medical treatment, she has been hospitalized twice since returning to [place], both with low output. No viability in her LAD territory, and doubt that PCI to her circumflex would make meaningful LV recovery. Hemodynamics improved with dobutamine, did not tolerate attempt to wean inotropics. RHC removed on  8/13 after clotting off, replaced on 8/14 after patient with increased work of breathing. CI of 1.1, balloon pump placed with CI of 1.6-1.8 and improvement in symptoms/hemodynamics. Had LVAD HM3 placed on 8/19. CVP today around 10 range. Chest closed on 8/21.   - Wean Trey, monitoring CVP for RV afterload reduction (CVP goal ~8-12)    - Decreased epi to 0.1 mcg/kg/min plan to wean down as tolerated, c/w vaso @ 2 given vasoplegia  - Hemodynamics q6hrs, monitor lactate. Goal CI>2., SvO2 >55%  - fixed rate heparin ggt    Date 8/9 8/9 8/10 8/11 08/13/20 08/15/20 08/16/20 08/17/20 8/18/20 8/19 8/20 8/21*   CVP 12 9 3 4 8 9 6 8 11 5 10 13   Mean PA  35 30 21 25 23 21 20 35/18 35/18 - 30/12 28/14   PCWP  18 14 12 x 13 14  13 - -    Oxy HGB  59 64 61 61 60 47 62 69 54 - 68 66   CI/CO 2.3 2.3 2.4/4.2 2.3 2.2 1.8 2.8/4.9 4.1 4.3/2.5 - 6/3.3 6.9/3.8   SVR 1100 1100 1200 1400 1316 1900 3872 074 9689 - 1025 715   Device     IABP 1:1 IABP 1:1 IABP 1:1 IABP 1:1 IABP 1:1 No IABP No IABP No IABP   * epi 0.1, vaso 0.5     8/22: CVP 12, PA 28/14/19, PCWP 14, CO/CI 9.0/4.8. . Epi 0.1, vaso 1.  8/23: CVP 16, PA 30/14, CI/CO 4.4/8.0, , SvO2 71%    # CAD s/p CABG 4/2020 (KURT to RCA and LIMA to LAD) and PCI to RCA 7/20  - Stop home plavix po qd (8/13) for LVAD surgery  - aspirin 81mg PO qd   - c/w home crestor    # Mural thrombus  - removed during surgery on 8/19    # A-flutter  Converted to NSR on 8/15    - Continue amiodarone 200mg po qd      Pulm:  # Acute hypoxic respiratory failure  Intubated on 8/19   Vent setting: RR 12, , PEEP 5, FiO2 50%   Secondary to pulmonary edema bilateral pleural effusions.   - On nasal cannula, titrate to saturation of >92%     Renal/Electrolytes   # Hyperkalemia/Hypokalemia   # Acute kidney injury on chronic kidney disease, stage III  Likely ATN due to hypoxic insult  - renal consult and diuretic challenge. May need dialysis  - Trend potassium and creatinine q12hrs  - Electrolyte  replacement protocol  - Monitor UOP      GI:  # Constipation  # Severe protein calorie malnutrition  - Nutrition through tube feeds at 30 mL/hr    - Senna-docusate bid scheduled  - Miralax bid prn constipation     ID:   # UTI vs ASB  Periprocedural abx as per surgical team   - levoloxacin, zosyn, rifampin, vancomycin, fluconazole    - Completed Ceftriaxone treatment 1g IV qd (8/14 - 8/18)     Hem/Onc  # Mural thrombus removed on 8/19  # Anemia, mild  D/t frequent blood draws and procedures  - Monitor hgb    Endo  # Hypothyroidism   - Continue PTA levothyroxine     Nutrition: NJ with TF today  DVT Prophylaxis: mechanical   Code Status: Full Code    Mallorie Anders MD    Discussed with Dr Craig     Interval History   NAEON. VEEG placed for potential seizures, chest closed on 8/21.     Physical Exam   Temp: 98.4  F (36.9  C) Temp src: Bladder   Pulse: 73   Resp: 20 SpO2: 100 % O2 Device: Mechanical Ventilator    Vitals:    08/21/20 0000 08/22/20 0400 08/23/20 0400   Weight: 68.4 kg (150 lb 12.7 oz) 72.5 kg (159 lb 13.3 oz) 75.1 kg (165 lb 9.1 oz)     Vital Signs with Ranges  Temp:  [97.7  F (36.5  C)-99.9  F (37.7  C)] 98.4  F (36.9  C)  Pulse:  [55-81] 73  Resp:  [16-21] 20  MAP:  [67 mmHg-270 mmHg] 80 mmHg  Arterial Line BP: ()/() 95/60  FiO2 (%):  [40 %] 40 %  SpO2:  [90 %-100 %] 100 %  I/O last 3 completed shifts:  In: 3933.37 [I.V.:2243.37; NG/GT:420]  Out: 1696 [Urine:236; Emesis/NG output:230; Stool:250; Chest Tube:980]    RETIRE: Heart Rate: 71, Blood pressure 91/56, pulse 73, temperature 98.4  F (36.9  C), resp. rate 20, weight 75.1 kg (165 lb 9.1 oz), SpO2 100 %.  165 lbs 9.05 oz     GEN: intubated , sedated , on VEEG   CV: RRR, LVAD at 5300 rpm  LUNGS: Clear to auscultation bilaterally  ABD: Active bowel sounds, soft, no abdominal tenderness.   EXT: Trace LE edema   NEURO: opening eyes    Medications     BETA BLOCKER NOT PRESCRIBED       dexmedetomidine Stopped (08/19/20 2133)      dextrose       dextrose 5% and 0.45% NaCl + KCl 20 mEq/L 10 mL/hr at 08/21/20 0740     EPINEPHrine IV infusion ADULT 0.08 mcg/kg/min (08/23/20 1400)     fentaNYL 25 mcg/hr (08/23/20 1400)     HEParin 500 Units/hr (08/23/20 1400)     insulin (regular) Stopped (08/21/20 1200)     midazolam 8 mg/hr (08/23/20 1415)     norepinephrine Stopped (08/23/20 1000)     propofol (DIPRIVAN) infusion       BETA BLOCKER NOT PRESCRIBED       vasopressin (PITRESSIN) infusion ADULT 2 Units/hr (08/23/20 1412)       albumin human  12.5 g Intravenous Once     amiodarone  200 mg Oral or Feeding Tube Daily     aspirin  81 mg Oral or Feeding Tube Daily     bimatoprost  1 drop Both Eyes At Bedtime     bumetanide  5 mg Intravenous Once     busPIRone  10 mg Oral or Feeding Tube TID     chlorothiazide  1,000 mg Intravenous Once     ferrous sulfate  325 mg Oral or Feeding Tube BID     lacosamide (VIMPAT) intermittent infusion  200 mg Intravenous BID     latanoprost  1 drop Both Eyes Daily     levETIRAcetam  2,000 mg Intravenous Q12H     levothyroxine  62.5 mcg Oral or Feeding Tube QAM AC     lidocaine  1 patch Transdermal Q24H     lidocaine   Transdermal Q8H     mupirocin  1 g Both Nostrils BID     [START ON 8/24/2020] pantoprazole  40 mg Oral or Feeding Tube QAM AC     piperacillin-tazobactam  4.5 g Intravenous Q6H     QUEtiapine  50 mg Oral or Feeding Tube At Bedtime     rosuvastatin  20 mg Oral or Feeding Tube Daily     senna-docusate  1 tablet Oral or Feeding Tube BID    Or     senna-docusate  2 tablet Oral or Feeding Tube BID     sodium chloride (PF)  3 mL Intracatheter Q8H       Data   Recent Labs   Lab 08/23/20  1316 08/23/20  0838 08/23/20  0410 08/22/20  1223 08/22/20  1023  08/22/20  0422   WBC 13.1*  --  14.2* 13.4* 13.6*  --  13.5*   HGB 7.0*  --  7.7* 8.1* 7.0*   < > 6.9*   MCV 90  --  90 91 90  --  89   *  --  105* 96* 90*  --  86*   INR  --  2.11*  --   --  1.72*  --  1.73*   NA  --  143 141  --  142  --  141   POTASSIUM   --  4.7 4.7  --  4.0  --  3.9   CHLORIDE  --  115* 113*  --  114*  --  112*   CO2  --  20 20  --  20  --  20   BUN  --  56* 55*  --  38*  --  33*   CR  --  2.16* 2.03*  --  1.45*  --  1.41*   ANIONGAP  --  8 9  --  8  --  9   FERNANDO  --  7.3* 7.3*  --  7.7*  --  7.7*   GLC  --  138* 148*  --  153*  --  167*   ALBUMIN  --  1.4* 1.4*  --   --   --   --    PROTTOTAL  --  4.4* 4.4*  --   --   --   --    BILITOTAL  --  4.7* 5.1*  --   --   --   --    ALKPHOS  --  101 95  --   --   --   --    ALT  --  16 14  --   --   --   --    AST  --  49* 50*  --   --   --   --     < > = values in this interval not displayed.       Recent Results (from the past 24 hour(s))   XR Chest Port 1 View    Narrative    EXAMINATION:  XR CHEST PORT 1 VW 8/23/2020 4:13 AM.    COMPARISON: 8/22/2020    HISTORY:  f/u    FINDINGS: AP semiupright radiograph of the chest. Unchanged position  of endotracheal tube, right IJ sheath, partially visualized gastric  and enteric tubes, [place]-Zia catheter, left chest wall cardiac  defibrillator, LVAD, pericardial drain, mediastinal drains, and  bilateral chest tubes.    Stable cardiac silhouette. Unchanged small bilateral pleural  effusions. No definite pneumothorax. Mild diffuse interstitial  opacities.      Impression    IMPRESSION:   1. Stable support devices.  2. Stable enlarged cardiac silhouette and trace bilateral pleural  effusions.  3. Mild interstitial pulmonary edema.     I have personally reviewed the examination and initial interpretation  and I agree with the findings.    SHANA SUTTON MD

## 2020-08-23 NOTE — PROGRESS NOTES
CV ICU PROGRESS NOTE  August 23, 2020       CO-MORBIDITIES:   Cardiogenic shock (H)  (primary encounter diagnosis)  LV (left ventricular) mural thrombus  Heart failure (H)  Heart failure (H)  Status post cardiac surgery    ASSESSMENT: Marquise Jj is a 75 year old female 75 year-old female with PMH notable for coronary artery disease s/p CABG (4/20), ischemic cardiomyopathy, severe MR/TR and known mural thrombus who is admitted to the CV ICU s/p redo sternotomy, LVAD (heartmate III), and TV repair on 8/19/20 with Dr. Farley. Chest was left open and packed. S/p chest closure and washout 08/21/20.       PLAN SUMMARY:   Epilepsia Partialis Continua    Follow-up with NeuroCrit r: management of non-convulsive focal status epilepticus    RV Dysfunction and Vasoplegia    MAP > 70, CVP 10-12    Wean Trey as able    Wean off epinephrine, but not lower than 0.03    Continue vasopressin gtt    Goal-directed fluid therapy    Diuresis     Low-intensity heparin infusion    Aspirin, rosuvastatin    ATN    Lasix challenge    Consult nephrology; possible need for hemodialysis in the setting of hypervolemia       PLAN:   Neuro/ pain/ sedation:  #Acute post-operative pain   #Epilepsia Partialis Continua  - Monitor neurological status. Notify the MD for any acute changes in exam.  - Seroquel 50 at bedtime + 25 PRN   - Keppra, lacosamide, midazolam gtt per NeuroCrit  - Fentanyl gtt    - Tylenol toi      Pulmonary:   #Acute hypoxic respiratory failure  - Monitor CXR, ABG  - Monitor CT output   Ventilation Mode: CMV/AC  (Continuous Mandatory Ventilation/ Assist Control)  FiO2 (%): 40 %  Rate Set (breaths/minute): 16 breaths/min  Tidal Volume Set (mL): 400 mL  PEEP (cm H2O): 5 cmH2O  Oxygen Concentration (%): 40 %  Resp: 18     Cardiovascular:    #Acute on chronic decompensated heart failure with reduced ejection fraction: NYHA IV / ACC D  #Cardiogenic shock  #Ischemic cardiomyopathy  #Coronary artery disease s/p CABG 4/20 in [place],  s/p PCI to RCA 7/20  #Mural thrombus  #Severe MR/TR  #S/p LVAD     MAP > 70, CVP 10-12    Wean Trey as able    Wean off epinephrine, but not lower than 0.03    Continue vasopressin gtt    Goal-directed fluid therapy    Diuresis     Low-intensity heparin infusion    Aspirin, rosuvastatin    Co-managing with Cards-2; recs greatly appreciated      GI/Nutrition:   - NPO, OGT, NJT  - TF   - Pantoprazole ppx   - Bowel regimen: senna-colace, miralax.  - No indication for parenteral nutrition.      Renal/Fluid Balance/ Electrolytes:   - Will monitor intake and output.  - ICU electrolyte replacement protocol  - Goal-directed fluid therapy  - Lasix challenge  - Consult nephrology; possible need for hemodialysis in the setting of hypervolemia       Endocrine:    #Glycemic control  #Hx hypothyroid   - Insulin gtt   - Synthroid daily       ID/ Antibiotics:  - Perioperative vancomycin, levofloxacin, fluconazole, zosyn       Heme:     #Acute perioperative blood loss anemia  #Thrombocytopenia   - KCentra given in the OR   - TEG post op looked good   - Transfuse 1 unit pRBC  - Hgb goal > 7  - Low-intensity heparin gtt        Prophylaxis:    - Mechanical prophylaxis for DVT.  - LIH  - PPI  - Bowel regimen      MSK:    - PT and OT consulted. Appreciate recs.      Lines/ tubes/ drains:  - ETT  - A-line  - R internal jugular CVC  - PAC  - PIV  - Rojo  - Chest tubes       Disposition:  - CV ICU.    Patient seen, findings and plan discussed with CV ICU staff, Dr. Gambino.    -----------------------------------  Ronaldo Li MD  Anesthesiology Resident, PGY-3  *[id number]  ====================================    SUBJECTIVE:   Intubated, sedated, does not follow commands    OBJECTIVE:   1. VITAL SIGNS:   Temp:  [97  F (36.1  C)-99.9  F (37.7  C)] 99.1  F (37.3  C)  Pulse:  [55-81] 68  Resp:  [16-21] 18  BP: (91)/(56) 91/56  MAP:  [66 mmHg-100 mmHg] 76 mmHg  Arterial Line BP: ()/(13-70) 92/61  FiO2 (%):  [40 %] 40 %  SpO2:  [81 %-100  %] 98 %  Ventilation Mode: CMV/AC  (Continuous Mandatory Ventilation/ Assist Control)  FiO2 (%): 40 %  Rate Set (breaths/minute): 16 breaths/min  Tidal Volume Set (mL): 400 mL  PEEP (cm H2O): 5 cmH2O  Oxygen Concentration (%): 40 %  Resp: 18      2. INTAKE/ OUTPUT:   I/O last 3 completed shifts:  In: 3999.14 [I.V.:2119.14; NG/GT:440]  Out: 1176 [Urine:146; Emesis/NG output:300; Chest Tube:730]    3. PHYSICAL EXAMINATION:   General: Intubated, sedated   Neuro: Sedated, PERRL   Resp: mechanically ventilated, minimal vent settings   CV: ventricular paced @ 80  Abdomen: Soft, Non-distended, Non-tender  Incisions: sternotomy wound c/d/i s/p closure  Extremities: warm and well perfused    4. INVESTIGATIONS:   Arterial Blood Gases   Recent Labs   Lab 08/23/20  0838 08/22/20  1223 08/21/20  1016 08/21/20  0347   PH 7.40 7.35 7.45 7.43   PCO2 28* 34* 30* 34*   PO2 158* 170* 177* 163*   HCO3 18* 18* 21 22     Complete Blood Count   Recent Labs   Lab 08/23/20  0410 08/22/20  1223 08/22/20  1023 08/22/20  0644 08/22/20  0422   WBC 14.2* 13.4* 13.6*  --  13.5*   HGB 7.7* 8.1* 7.0* 6.8* 6.9*   * 96* 90*  --  86*     Basic Metabolic Panel  Recent Labs   Lab 08/23/20  0838 08/23/20  0410 08/22/20  1023 08/22/20  0422    141 142 141   POTASSIUM 4.7 4.7 4.0 3.9   CHLORIDE 115* 113* 114* 112*   CO2 20 20 20 20   BUN 56* 55* 38* 33*   CR 2.16* 2.03* 1.45* 1.41*   * 148* 153* 167*     Liver Function Tests  Recent Labs   Lab 08/23/20  0838 08/23/20  0410 08/22/20  1023 08/22/20  0422 08/21/20 2014 08/21/20  1017  08/20/20  0341   AST 49* 50*  --   --   --   --  47*  --  52*   ALT 16 14  --   --   --   --  14  --  16   ALKPHOS 101 95  --   --   --   --  64  --  65   BILITOTAL 4.7* 5.1*  --   --   --   --  4.9*  --  4.8*   ALBUMIN 1.4* 1.4*  --   --   --   --  1.8*  --  2.2*   INR 2.11*  --  1.72* 1.73* 2.05*   < > 1.60*   < > 1.35*    < > = values in this interval not displayed.     Pancreatic Enzymes  No lab results  found in last 7 days.  Coagulation Profile  Recent Labs   Lab 08/23/20  0838 08/22/20  1023 08/22/20  0422 08/21/20 2014 08/21/20  1017  08/20/20  0341  08/19/20  1805 08/19/20  1629   INR 2.11* 1.72* 1.73* 2.05*   < > 1.60*   < > 1.35*   < > 1.40* 1.44*   PTT  --   --   --   --   --  48*  --  41*  --  43* 43*    < > = values in this interval not displayed.         5. RADIOLOGY:   Recent Results (from the past 24 hour(s))   XR Chest Port 1 View    Narrative    EXAMINATION:  XR CHEST PORT 1 VW 8/23/2020 4:13 AM.    COMPARISON: 8/22/2020    HISTORY:  f/u    FINDINGS: AP semiupright radiograph of the chest. Unchanged position  of endotracheal tube, right IJ sheath, partially visualized gastric  and enteric tubes, [place]-Zia catheter, left chest wall cardiac  defibrillator, LVAD, pericardial drain, mediastinal drains, and  bilateral chest tubes.    Stable cardiac silhouette. Unchanged small bilateral pleural  effusions. No definite pneumothorax. Mild diffuse interstitial  opacities.      Impression    IMPRESSION:   1. Stable support devices.  2. Stable enlarged cardiac silhouette and trace bilateral pleural  effusions.  3. Mild interstitial pulmonary edema.     I have personally reviewed the examination and initial interpretation  and I agree with the findings.    SHANA SUTTON MD       =========================================

## 2020-08-23 NOTE — PLAN OF CARE
Major Shift Events:       MAP 70-80s; Nor-Epi off. Weaning Epi gtt slowly. Vaso gtt to remain @ 2 or > per MD.      SR w/ freq PACs, PVCs, w/ occas AFib or pacing. HR 50s-80s.     Hemodynamics w/ minimal changes: CVP 16-12; PAP 30/14->40/18. CI 4s; .     NO wened off slowly. Tolerated well.     Remains sedated w/ versed gtt. Per Neurology service continued seizure activity on EEG. No seizures observed.      Worsening renal failure w/ up-trending Cr and low UV. Very minimal response to Lasix 120 mg in am and Bumex/ Diuril this pm.     Mod amt bloody CT output. Hgb 7.7- 6.2.      LVAD PI 1.9-3 w/ low Hgb. MD notified. PRBCs x2 ordered.       and daughter updated.       Plan:      Continue to monitor closely.      Plan CRRT tomorrow.  For vital signs and complete assessments, please see documentation flowsheets.

## 2020-08-23 NOTE — PROGRESS NOTES
[facility], [place]   Neurology Daily Note  Marquise Jj  [id number]  08/23/2020    Subjective:  <5 seizures yesterday, with frequent repetitive interictal R occipital spikes,  including this morning, have not seen any seizures yet today.    Objective   Physical Examination   Vitals: BP 91/56   Pulse 73   Temp 98.4  F (36.9  C)   Resp 20   Wt 75.1 kg (165 lb 9.1 oz)   SpO2 100%   BMI 25.17 kg/m    General: Adult female patient, lying in bed, NAD  HEENT: NC/AT, no icterus, op pink and moist  Cardiac: LVAD  Chest: intubated, chest tubes in place  Abdomen: S/NT/ND  Extremities: Warm, no edema    Neuro:  Mental status: Examined on versed 8 mg/hr. Eyes partially open, does not attend, follow commands, or track.  Cranial nerves: does not blink to threat, pupils 4, briskly reactive to direct and consensual light, mid gaze, corneals intact, face symmetric.  Motor: Normal bulk, flaccid tone RUE, increased tone LUE and BLEs. No abnormal movements. Does not move limbs.   Reflexes: Hyperreflexic and symmetric biceps, brachioradialis, triceps, patellae, and achilles. Negative Del[place], no clonus, toes up-going bilaterally.  Sensory: does not detect noxious in 4/4 extremities  Coordination: not tested  Gait: not tested    Investigations    Formal EEG report pending, verbal report as described above    Impression   Marquise Jj is a 75 year old female with complex cardiac history who is POD 4 from LVAD placement complicated by post-procedural encephalopathy and two clinical focal seizures, and later focal non-convulsive status epilepticus, now resolved, with further seizures and frequent epileptiform discharges, improved from prior day.     Etiology of focal seizures is unclear at this time, but suggests possible underlying R occipital cortical abnormality or lesion. MRI now not possible due to LVAD, repeat CT head with significant artifact however some evidence of L posterior frontal lobe  "cortical hypodensity possibly representing infarct. These radiographic findings (and lack thereof) are somewhat equivocal and should not delay anticoagulation for cardiac reasons if indicated. Epilepsia partialis continua can \"burn out\" on it's own, so would recommend no changes to current seizure treatment regimen, we will continue to follow EEG interpretations and plan to gradually wean sedation tomorrow (8/24) if electrographically appropriate.    Recommendations  Continue keppra 2 g IV BID  Continue vimpat 200 mg IV BID  Continue midazolam 8 mg/hr as seizure treatment, not as titratable infusion  Continue vEEG  Okay for anticoagulation if indicated    Plan to continue versed infusion for one more day to allow brain time to \"cool off\" so to speak was discussed with the patient's daughter Norman over the phone at 1500 today. She expressed that her questions had been answered.    We will continue to follow.    Patient was seen and discussed with Dr. Meche West MD  Vascular Neurology Fellow, PGY-5  [phone]  "

## 2020-08-23 NOTE — PLAN OF CARE
Major Shift Events:  No witnessed seizure activity. Pupils reactive at 3 and with draws from pain. Fentanyl PCA for pain.  Levo restarted, maps lows 60's, Vaso at 2 and Epi at .1. MD notified. CT output 50cc/hr. PPM/ICD, also temporary pacer. Febrile 37.7. LS diminished cough/gag present. TF at 55cc/hr standard flushing. No BM, bowel meds given. UO 5cc/hr, MD aware.     MD aware Urine output has been less than 10cc/hr for greater than 48 hours, pitting edema present and worsening, and weight went up 3 kg.  MD notified of all critical events and labs.  Plan: continue to monitor and notify MD of changes.   For vital signs and complete assessments, please see documentation flowsheets.

## 2020-08-23 NOTE — PROGRESS NOTES
Nephrology Initial Consult  August 23, 2020      Marquise Jj MRN:[id number] YOB: 1945  Date of Admission:8/8/2020  Primary care provider: Poonam Cast  Requesting physician: Quinton Stephens MD    ASSESSMENT AND RECOMMENDATIONS:   LORI on CKD3, oliguric  Patient admitted on 8/8 with cardiogenic shock and creatinine elevation up to 1.5 (baseline noted to be around 1.3 per chart review) that subsequently improved to 0.7 up till 8/19.  Patient is now status post LVAD placement on 8/19 and her creatinine started to rise again starting 8/20 likely due to perioperative ATN with most recent level at 2.3.  Urinalysis from 8/18 shows just pyuria and positive leukocyte esterase likely coinciding with Rojo placement.  Her urine output has continued to decline over the past few days and she has not responded well to high-dose Lasix challenge.  The patient's electrolytes and pH remain normal, she remains on minimal vent settings and her CVP and PCWP are not significantly elevated.  For now plan to challenge her further with high-dose diuretics and monitor closely.  Patient's albumin is also low and she may benefit from an albumin challenge as well.  At this time she has no urgent indication for RRT but anticipate she may need this soon.   -Please give patient 5 mg IV Bumex, 1000 mg IV Diuril and 50 g albumin  -If patient does not respond to above challenge will likely need to start CRRT  -Continue serial monitoring of electrolytes and blood gas  -Please page on-call nephrology fellow with any questions  -Avoid nephrotoxins  -Renally dose medications  -Renal diet  -Daily weights  -Strict I/Os    Consent for CRRT obtained from daughter Norman and placed in paper chart [see scanned below]    Recommendations were communicated to primary team verbally and via note    Seen and discussed with Dr. Darya Thompson MD   [phone]    REASON FOR CONSULT: LORI    HISTORY OF PRESENT ILLNESS:  Admitting  provider and nursing notes reviewed  Marquise Jj is a 75 year old with PMH notable for coronary artery disease s/p CABG (4/20), ischemic cardiomyopathy, severe MR/TR and known mural thrombus, acute kidney injry, hypothyroidism, CKD3, anxiety, GERD, chronic anemia who is now POD 4 status post LVAD placement and tricuspid valve repair for cardiogenic shock, complicated by convulsive status epilepticus.    Patient initially presented to [facility] and was transferred to [facility] for advanced heart failure therapy after developing cardiac shock.  She initially had an intra-aortic balloon pump placed and subsequently underwent LVAD placement on 8/19.  She required multiple transfusions and significant resuscitation during this procedure.  Her creatinine was subsequently noted to rise, now up to 2.3, with coincident declining urine output.  She received 120 mg of IV Lasix this morning without a significant increase in her urine output.  She remains on 2 vasopressors, CVP is in the 12-14 range and most recent pulmonary capillary wedge pressure was 12 today.  She is on minimal vent settings.  Her seizures are being managed by antiepileptics and a Versed drip.    PAST MEDICAL HISTORY:  Past Medical History:   Diagnosis Date     CAD (coronary artery disease)      ICD (implantable cardioverter-defibrillator) in place     Primary prevention AICD placed in [place] in May 2020.     Ischemic cardiomyopathy     LVEF less than 20%.     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     Status post coronary artery bypass grafting     Done in [facility] in April 2020.  LIMA to diagonal (as LAD dissected) and KURT to the right coronary artery.       Past Surgical History:   Procedure Laterality Date     ARTHROPLASTY REVISION HIP Right 11/16/2017    Procedure: ARTHROPLASTY REVISION HIP;  Right total hip arthroplasty revision.;  Surgeon:  Jozef Garcia MD;  Location: [facility] OR     cabg  04/2020     CV HEART CATHETERIZATION WITH POSSIBLE INTERVENTION N/A 7/2/2020    Procedure: Heart Catheterization with Possible Intervention;  Surgeon: Kaden Franco MD;  Location:  HEART CARDIAC CATH LAB     CV INTRA-AORTIC BALLOON PUMP INSERTION N/A 8/14/2020    Procedure: Intra-Aortic Balloon Pump Insertion;  Surgeon: Cale Armijo MD;  Location:  HEART CARDIAC CATH LAB     CV PCI STENT DRUG ELUTING N/A 7/2/2020    Procedure: Percutaneous Coronary Intervention Stent Drug Eluting;  Surgeon: Kaden Franco MD;  Location:  HEART CARDIAC CATH LAB     CV RIGHT HEART CATH N/A 7/2/2020    Procedure: Right Heart Cath;  Surgeon: Kaden Franco MD;  Location:  HEART CARDIAC CATH LAB     CV RIGHT HEART CATH N/A 8/14/2020    Procedure: Right Heart Cath with leave in [place];  Surgeon: Jose Padilla MD;  Location:  HEART CARDIAC CATH LAB     INSERT VENTRICULAR ASSIST DEVICE LEFT (HEARTMATE II) N/A 8/19/2020    Procedure: Redo Sternotomy, On Cardiopulmonary Bypass, Insertion of Left Ventricular Assist Device (HeartMate III), Tricuspid Valve Repair with Elkins MC3 Annuloplasty Ring size T30, Left Ventricular Thrombectomy.;  Surgeon: Catarino Farley MD;  [facility]        MEDICATIONS:  PTA Meds  Prior to Admission medications    Medication Sig Last Dose Taking? Auth Provider   acetaminophen (TYLENOL) 325 MG tablet Take 2 tablets (650 mg) by mouth every 6 hours as needed for mild pain   Jozef Dyer MD   apixaban ANTICOAGULANT (ELIQUIS) 5 MG tablet Take 1 tablet (5 mg) by mouth 2 times daily First dose to be on 7/19/20 PM dose.   Jared Perla MD   bimatoprost (LUMIGAN) 0.01 % SOLN Place 1 drop into both eyes At Bedtime    Reported, Patient   busPIRone (BUSPAR) 10 MG tablet Take 1 tablet (10 mg) by mouth 3 times daily   Glen Tipton MD   clopidogrel (PLAVIX) 75 MG tablet Take 1 tablet (75 mg) by mouth daily   Glen Tipton  MD   co-enzyme Q-10 100 MG CAPS capsule Take 100 mg by mouth 2 times daily    Unknown, Entered By History   dorzolamide-timolol (COSOPT) 2-0.5 % ophthalmic solution Place 1 drop into both eyes 2 times daily    Reported, Patient   ferrous sulfate (FEROSUL) 325 (65 Fe) MG tablet Take 1 tablet (325 mg) by mouth 2 times daily   Glen Tipton MD   latanoprost (XALATAN) 0.005 % ophthalmic solution Place 1 drop into both eyes daily In the morning.   Unknown, Entered By History   levothyroxine (SYNTHROID/LEVOTHROID) 125 MCG tablet Take 0.5 tablets (62.5 mcg) by mouth every morning (before breakfast)   Glen Tipton MD   Multiple Vitamins-Minerals (PRESERVISION AREDS 2 PO) Take 1 tablet by mouth 2 times daily   Reported, Patient   nitroGLYcerin (NITROSTAT) 0.4 MG sublingual tablet For chest pain place 1 tablet under the tongue every 5 minutes for 3 doses. If symptoms persist 5 minutes after 1st dose call 911.   Glen Tipton MD   norepinephrine (LEVOPHED) 16-0.9 MG/250ML-% SOLN Inject 2.061-27.48 mcg/min into the vein continuous   Freida Nice MD   omeprazole (PRILOSEC) 20 MG DR capsule Take 20 mg by mouth daily    Reported, Patient   QUEtiapine (SEROQUEL) 25 MG tablet Take 50 mg by mouth At Bedtime   Unknown, Entered By History   rosuvastatin (CRESTOR) 20 MG tablet Take 20 mg by mouth daily   Reported, Patient   sacubitril-valsartan (ENTRESTO) 24-26 MG per tablet 1/2 tablet in PM daily   Brittany Kemp, APRN CNP      Current Meds    amiodarone  200 mg Oral or Feeding Tube Daily     aspirin  81 mg Oral or Feeding Tube Daily     bimatoprost  1 drop Both Eyes At Bedtime     busPIRone  10 mg Oral or Feeding Tube TID     ferrous sulfate  325 mg Oral or Feeding Tube BID     lacosamide (VIMPAT) intermittent infusion  200 mg Intravenous BID     latanoprost  1 drop Both Eyes Daily     levETIRAcetam  2,000 mg Intravenous Q12H     levothyroxine  62.5 mcg Oral or Feeding Tube QAM AC     lidocaine  1 patch Transdermal  Q24H     lidocaine   Transdermal Q8H     mupirocin  1 g Both Nostrils BID     [START ON 8/24/2020] pantoprazole  40 mg Oral or Feeding Tube QAM AC     piperacillin-tazobactam  4.5 g Intravenous Q6H     QUEtiapine  50 mg Oral or Feeding Tube At Bedtime     rosuvastatin  20 mg Oral or Feeding Tube Daily     sodium chloride (PF)  3 mL Intracatheter Q8H     Infusion Meds    BETA BLOCKER NOT PRESCRIBED       dexmedetomidine Stopped (08/19/20 2133)     dextrose       dextrose 5% and 0.45% NaCl + KCl 20 mEq/L 10 mL/hr at 08/21/20 0740     EPINEPHrine IV infusion ADULT 0.08 mcg/kg/min (08/23/20 1800)     fentaNYL 25 mcg/hr (08/23/20 1800)     HEParin 500 Units/hr (08/23/20 1800)     insulin (regular) Stopped (08/21/20 1200)     midazolam 8 mg/hr (08/23/20 1800)     norepinephrine Stopped (08/23/20 1000)     propofol (DIPRIVAN) infusion       BETA BLOCKER NOT PRESCRIBED       vasopressin (PITRESSIN) infusion ADULT 2 Units/hr (08/23/20 1800)     ALLERGIES:    Allergies   Allergen Reactions     Combigan [Brimonidine Tartrate-Timolol] Swelling     Swollen eyelids     Ativan [Lorazepam] Anxiety and Other (See Comments)     Increased confusion     REVIEW OF SYSTEMS:  Unable to perform as patient intubated    SOCIAL HISTORY:   Social History     Socioeconomic History     Marital status:      Spouse name: Not on file     Number of children: Not on file     Years of education: Not on file     Highest education level: Not on file   Occupational History     Occupation: [employer].   Social Needs     Financial resource strain: Not on file     Food insecurity     Worry: Not on file     Inability: Not on file     Transportation needs     Medical: Not on file     Non-medical: Not on file   Tobacco Use     Smoking status: Never Smoker     Smokeless tobacco: Never Used   Substance and Sexual Activity     Alcohol use: Not Currently     Drug use: Never     Sexual activity: Not on file   Lifestyle     Physical activity     Days per week: Not  on file     Minutes per session: Not on file     Stress: Not on file   Relationships     Social connections     Talks on phone: Not on file     Gets together: Not on file     Attends Shinto service: Not on file     Active member of club or organization: Not on file     Attends meetings of clubs or organizations: Not on file     Relationship status: Not on file     Intimate partner violence     Fear of current or ex partner: Not on file     Emotionally abused: Not on file     Physically abused: Not on file     Forced sexual activity: Not on file   Other Topics Concern     Not on file   Social History Narrative     Not on file     FAMILY MEDICAL HISTORY:   Family History   Problem Relation Age of Onset     Coronary Artery Disease No family hx of      Heart Failure No family hx of      PHYSICAL EXAM:   Temp  Av  F (36.7  C)  Min: 94.5  F (34.7  C)  Max: 100.8  F (38.2  C)  Arterial Line BP  Min: 63/42  Max: 272/272  Arterial Line MAP (mmHg)  Av.7 mmHg  Min: 53 mmHg  Max: 272 mmHg      Pulse  Av.4  Min: 40  Max: 109 Resp  Av.8  Min: 8  Max: 54  FiO2 (%)  Av.3 %  Min: 40 %  Max: 50 %  SpO2  Av %  Min: 37 %  Max: 100 %    CVP (mmHg): 12 mmHg  BP 91/56   Pulse 77   Temp 98.2  F (36.8  C)   Resp 17   Wt 75.1 kg (165 lb 9.1 oz)   SpO2 96%   BMI 25.17 kg/m     Date 20 0700 - 20 0659   Shift 0020-4208 4736-1678 2131-8609 24 Hour Total   INTAKE   I.V. 650.9 508.8  1159.7   NG/ 90  240   Colloid 50 100  150   Enteral 440 220  660   Shift Total(mL/kg) 1290.9(17.19) 918.8(12.23)  2209.7(29.42)   OUTPUT   Urine 140 160  300   Emesis/NG output  50  50   Drains 0   0   Stool 250 50  300   Chest Tube(mL/kg) 350(4.66) 170(2.26)  520(6.92)   Shift Total(mL/kg) 740(9.85) 430(5.73)  1170(15.58)   Weight (kg) 75.1 75.1 75.1 75.1      Admit Weight: 68.5 kg (151 lb 0.2 oz)     GENERAL APPEARANCE: sedated  EYES: no scleral icterus, pupils equal  Endo: no goiter  Pulmonary: lungs clear  to auscultation with equal breath sounds bilaterally  CV: regular rhythm, normal rate, no rub   - Edema 1+  GI: soft, nontender, normal bowel sounds  MS: no evidence of inflammation in joints  : [place] present  SKIN: no rash on exposed surfaces  NEURO: sedated    LABS:   CMP  Recent Labs   Lab 08/23/20  1711 08/23/20  0838 08/23/20  0410 08/22/20  1023 08/22/20  0422 08/21/20 2014 08/21/20  1017  08/20/20  0341    143 141 142 141 142   < > 142   < > 141   POTASSIUM 4.9 4.7 4.7 4.0 3.9 4.2   < > 3.5   < > 3.9   CHLORIDE 116* 115* 113* 114* 112* 113*   < > 112*   < > 109   CO2 20 20 20 20 20 23   < > 23   < > 24   ANIONGAP 8 8 9 8 9 6   < > 8   < > 8   * 138* 148* 153* 167* 119*   < > 86   < > 116*   BUN 66* 56* 55* 38* 33* 26   < > 23   < > 18   CR 2.34* 2.16* 2.03* 1.45* 1.41* 1.15*   < > 1.15*   < > 0.86   GFRESTIMATED 20* 22* 23* 35* 36* 46*   < > 46*   < > 66   GFRESTBLACK 23* 25* 27* 41* 42* 54*   < > 54*   < > 77   FERNANDO 7.6* 7.3* 7.3* 7.7* 7.7* 7.8*   < > 8.0*   < > 8.2*   MAG 2.4*  --  2.6*  --  2.5* 2.2  --   --    < > 2.5*   PHOS 5.2*  --  4.6*  --  4.7* 4.6*  --   --    < > 3.2   PROTTOTAL  --  4.4* 4.4*  --   --   --   --  4.3*  --  4.3*   ALBUMIN  --  1.4* 1.4*  --   --   --   --  1.8*  --  2.2*   BILITOTAL  --  4.7* 5.1*  --   --   --   --  4.9*  --  4.8*   ALKPHOS  --  101 95  --   --   --   --  64  --  65   AST  --  49* 50*  --   --   --   --  47*  --  52*   ALT  --  16 14  --   --   --   --  14  --  16    < > = values in this interval not displayed.     CBC  Recent Labs   Lab 08/23/20  1711 08/23/20  1316 08/23/20  0410 08/22/20  1223 08/22/20  1023   HGB 6.2* 7.0* 7.7* 8.1* 7.0*   WBC  --  13.1* 14.2* 13.4* 13.6*   RBC  --  2.48* 2.70* 2.84* 2.47*   HCT 20.4* 22.4* 24.2* 25.7* 22.1*   MCV  --  90 90 91 90   MCH  --  28.2 28.5 28.5 28.3   MCHC  --  31.3* 31.8 31.5 31.7   RDW  --  19.1* 19.0* 18.6* 19.6*   PLT  --  112* 105* 96* 90*     INR  Recent Labs   Lab 08/23/20  0838  08/22/20  1023 08/22/20  0422 08/21/20 2014 08/21/20  1017  08/20/20  0341  08/19/20  1805 08/19/20  1629   INR 2.11* 1.72* 1.73* 2.05*   < > 1.60*   < > 1.35*   < > 1.40* 1.44*   PTT  --   --   --   --   --  48*  --  41*  --  43* 43*    < > = values in this interval not displayed.     ABG  Recent Labs   Lab 08/23/20  1640 08/23/20  0838 08/23/20  0837 08/23/20  0410  08/22/20  1223  08/21/20  1016   PH 7.32* 7.40  --   --   --  7.35  --  7.45   PCO2 32* 28*  --   --   --  34*  --  30*   PO2 84 158*  --   --   --  170*  --  177*   HCO3 16* 18*  --   --   --  18*  --  21   O2PER 40.0  40.0 40 40 40   < > 40   < > 40    < > = values in this interval not displayed.      URINE STUDIES  Recent Labs   Lab Test 08/18/20  1910 08/12/20  1431 08/10/20  1243 06/25/20 1959   COLOR Light Yellow Yellow Light Yellow Janine   APPEARANCE Clear Cloudy Clear Cloudy   URINEGLC Negative Negative Negative Negative   URINEBILI Negative Negative Negative Negative   URINEKETONE Negative 5* Negative Negative   SG 1.006 1.018 1.007 1.018   UBLD Trace* Moderate* Negative Negative   URINEPH 6.0 6.0 5.0 5.0   PROTEIN Negative 30* Negative 100*   NITRITE Negative Negative Negative Negative   LEUKEST Moderate* Large* Large* Moderate*   RBCU 1 36 3* 8*   WBCU 7* 1494 11* 143*     No lab results found.  PTH  No lab results found.  IRON STUDIES  Recent Labs   Lab Test 08/10/20  1052 07/01/20  0530   IRON 48 65    385   IRONSAT 15 17   HUSEYIN 165  --      IMAGING:    EXAMINATION: US ABDOMEN COMPLETE WITH DOPPLER COMPLETE 8/11/2020 9:36  AM      COMPARISON: CT chest pelvis without contrast 8/10/2020.     HISTORY: Heart failure. LVAD planning.     TECHNIQUE: The abdomen was scanned in standard fashion with  specialized ultrasound transducer(s) using both gray-scale, color  Doppler, and spectral flow techniques.     Findings:  Liver: The liver demonstrates slightly heterogeneous echotexture.  Anechoic lesions consistent with cysts demonstrated in  the liver. One  of these in the left lateral segment measures 1.9 x 1.7 x 4.6 cm. This  may contain some internal septations. There is also a cystic septated  lesion in the inferior right hepatic lobe measuring 3.8 x 2.8 x 2.7  cm.      Extrahepatic portal vein flow is antegrade at 20 cm/s.  Right portal vein flow is antegrade, measuring 19 cm/s.  Left portal vein flow is antegrade, measuring 12 cm/s.     Flow in the hepatic artery is towards the liver and:  40 cm/s peak systolic  0.87 resistive index.      The splenic vein is patent and flow is towards the liver.  The left,  middle, and right hepatic veins are patent with flow towards the IVC.  The IVC is patent with flow towards the heart measuring 2.3 cm.   The  visualized aorta is not dilated measuring 2 cm. The mid aorta measures  1.7 cm in the distal 1.2 cm. Right common iliac artery measures 0.9  cm. Left common iliac artery measures 1 cm. Atherosclerosis of the  aorta and iliac arteries noted.     Gallbladder: There is no wall thickening, pericholecystic fluid,  positive sonographic Schwartz's sign or evidence for cholelithiasis     Bile Ducts: Both the intra- and extrahepatic biliary system are of  normal caliber.  The common bile duct measures 4 mm.     Pancreas: Visualized portions of the head and body of the pancreas are  unremarkable.      Kidneys: Both kidneys are of normal echotexture, without definite mass  or hydronephrosis.   Renal lengths: right- 10 cm, left- 8.3 cm. This  might be slightly foreshortened small bowel gas shadowing with limited  visualization of the left kidney.     Spleen: The spleen measures 11.1 cm in length.     Fluid: Bilateral pleural effusions.     Other: Rojo catheter in the bladder demonstrated. Correlation with  catheter function as there is currently 129 cc in the bladder.                                                      Impression:   1.  Hepatic vessels are patent with appropriate directional flow.  2.  Liver is  slightly heterogeneous with anechoic lesions consistent  with septated cysts, also noted on CT.  3.  Bilateral pleural effusions.  4.  Correlation with Rojo catheter function as bladder is distended  with 129 cc.     Marium Thompson MD   I was present with the fellow during the history and exam.  I discussed the case with the fellow and agree with the findings as documented in the assessment and plan. LORI with fairly rapid rise in creatinine (0.5 / day). Some UOP, will trial diuretic challenge as no acute indication for dialysis, but will monitor closely.  Elvira Lackey

## 2020-08-24 NOTE — PROGRESS NOTES
"4E PT Eval     08/24/20 1400   Quick Adds   Type of Visit Initial PT Evaluation   Living Environment   Lives With spouse   Living Arrangements house   Home Accessibility stairs to enter home   Number of Stairs, Main Entrance 5   Stair Railings, Main Entrance railings on both sides of stairs   Transportation Anticipated family or friend will provide   Living Environment Comment unable to obtain this session   Self-Care   Usual Activity Tolerance moderate   Current Activity Tolerance fair   Regular Exercise Yes   Activity/Exercise Type walking;other (see comments)  (PT 2x/week)   Equipment Currently Used at Home grab bar, toilet;grab bar, tub/shower;tub bench;walker, standard   Activity/Exercise/Self-Care Comment unable to obtain this session   Functional Level Prior   Ambulation 1-->assistive equipment   Transferring 1-->assistive equipment   Communication 0-->understands/communicates without difficulty   Swallowing 0-->swallows foods/liquids without difficulty   Cognition 0 - no cognition issues reported   Fall history within last six months no   Which of the above functional risks had a recent onset or change? ambulation;toileting;transferring;bathing;dressing;cognition;communication/speech   Prior Functional Level Comment  Per EMR noted decreased in endurance past month, has HH PT/OT/RN   General Information   Onset of Illness/Injury or Date of Surgery - Date 08/08/20   Referring Physician Wil Taylor MD    Patient/Family Goals Statement none stated   Pertinent History of Current Problem (include personal factors and/or comorbidities that impact the POC) Per EMR \" Marquise Jj is a 75 year old female 75 year-old female with PMH notable for coronary artery disease s/p CABG (4/20), ischemic cardiomyopathy, severe MR/TR and known mural thrombus who is admitted to the CV ICU s/p redo sternotomy, LVAD (heartmate III), and TV repair on 8/19/20 with Dr. Farley. Chest was left open and packed. S/p chest " "closure and washout 08/21/20.\"   Precautions/Limitations sternal precautions   General Info Comments activity: progression to ambulate with assist as tolerated   Cognitive Status Examination   Level of Consciousness unresponsive;lethargic/somnolent   Follows Commands and Answers Questions unresponsive   Cognitive Comment patient sedated at time of eval   Range of Motion (ROM)   ROM Comment LE ROM grossly WFL   Strength   Strength Comments LE strength grossly 0/5   Bed Mobility   Bed Mobility Comments unable to assess   Transfer Skills   Transfer Comments unable to assess   Gait   Gait Comments unable to assess   Balance   Balance Comments unable to assess   General Therapy Interventions   Planned Therapy Interventions balance training;bed mobility training;gait training;prosthetic fitting/training;ROM;strengthening;transfer training;stretching   Clinical Impression   Criteria for Skilled Therapeutic Intervention yes, treatment indicated   PT Diagnosis impaired functional mobility   Influenced by the following impairments impaired cognition, decreased strenght, impaired activity tolerance   Functional limitations due to impairments bed mobility, transfers, gait   Clinical Presentation Stable/Uncomplicated   Clinical Presentation Rationale clinical judgement   Clinical Decision Making (Complexity) Low complexity   Therapy Frequency 3x/week   Predicted Duration of Therapy Intervention (days/wks) 2 weeks   Anticipated Discharge Disposition Transitional Care Facility   Risk & Benefits of therapy have been explained Yes   Patient, Family & other staff in agreement with plan of care Yes   [facility] [employer] TM \"6 Clicks\"   2016, Trustees of [facility], under license to [employer]  All rights reserved.   6 Clicks Short Forms Basic Mobility Inpatient Short Form   [facility] AM-PAC  \"6 Clicks\" V.2 Basic Mobility Inpatient Short Form   1. Turning from your back to your side while in a flat bed without " using bedrails? 2 - A Lot   2. Moving from lying on your back to sitting on the side of a flat bed without using bedrails? 2 - A Lot   3. Moving to and from a bed to a chair (including a wheelchair)? 1 - Total   4. Standing up from a chair using your arms (e.g., wheelchair, or bedside chair)? 1 - Total   5. To walk in hospital room? 1 - Total   6. Climbing 3-5 steps with a railing? 1 - Total   Basic Mobility Raw Score (Score out of 24.Lower scores equate to lower levels of function) 8   Total Evaluation Time   Total Evaluation Time (Minutes) 10     Majority of information gathered from EMR, patient non responsive.       Danyel Estrada, PT, DPT  Pager #[phone]

## 2020-08-24 NOTE — PROGRESS NOTES
Cardiology Progress Note    Assessment & Plan   In summary, Marquise Jj is a 75-year-old female with a past medical history notable for coronary artery disease, ischemic cardiomyopathy, and known mural thrombus who was transferred from [facility] for further evaluation/treatment of cardiogenic shock. Balloon pump placed 8/14. Had HM3 8/19. Chest closed 8/21.    Today's changes:  - Hypotension overnight after trial of Bumex gtt (promptly discontinued)  - LVAD flows acceptable, CI high-normal   - wean Trey to off. Goal CVP 10-12  - Rechallenge with diuretics (Bumex 4mg IV x1, diuril, goal CVP < 14 mmHg)  - continue to wean epi by 0.01, hold at 0.03. NE off. Keep vaso at 2 given vasoplegia  - fixed rate heparin ggt per CVTS  - wean versed as tolerated      Neuro:   # Sedation  # concern for seizure activity  CTH head 8/20 no signs of ICH   CTH head 8/21 Somewhat limited examination secondary to metallic streak  artifact from overlying electroencephalogram leads. No definite acute  intracranial pathology. Left greater than right posterior frontal lobe  hypodensities are likely from streak artifact.  keppra 2 g PO bid   vimpat 200 BID  Versed gtt at 8 (wean as tolerated)  - veeg  - f/u neurocritical care recs      Cardio:  # Cardiogenic shock with vasoplegic component   # ICM: NYHA IV / ACC  # Severe MR/TR s/p tricuspid valve repair with Elkins MC3 Annuloplasty Ring size T30  # s/p LVAD HM3 on 8/19/2020  LAVD speed 5300, flow 3.4-3.7, pi 2.5-3.7, power 3.5-3.6   Presents with cardiogenic shock. On NE, cardiac index approximately 1.37 on admission. Severely elevated biventricular filling pressures. Etiology of her decompensation appears to be progression of her cardiomyopathy. Despite medical treatment, she has been hospitalized twice since returning to [place], both with low output. No viability in her LAD territory, and doubt that PCI to her circumflex would make meaningful LV recovery. Hemodynamics  improved with dobutamine, did not tolerate attempt to wean inotropics. RHC removed on 8/13 after clotting off, replaced on 8/14 after patient with increased work of breathing. CI of 1.1, balloon pump placed with CI of 1.6-1.8 and improvement in symptoms/hemodynamics. Had LVAD HM3 placed on 8/19. CVP today around 10 range. Chest closed on 8/21.   - Monitoring CVP for RV/fluid overload (CVP goal < 14)  - Bumex 4mg IV x1, continue diuril    - Decreased epi to 0.06 mcg/kg/min plan to wean down as tolerated, c/w vaso @ 2 given vasoplegia  - Hemodynamics q6hrs, monitor lactate. Goal CI>2., SvO2 >55%  - fixed rate heparin ggt      Date 8/9 8/9 8/10 8/11 08/13/20 08/15/20 08/16/20 08/17/20 8/18/20 8/19 8/20 8/21*   CVP 12 9 3 4 8 9 6 8 11 5 10 13   Mean PA  35 30 21 25 23 21 20 35/18 35/18 - 30/12 28/14   PCWP  18 14 12 x 13 14  13 - -    Oxy HGB  59 64 61 61 60 47 62 69 54 - 68 66   CI/CO 2.3 2.3 2.4/4.2 2.3 2.2 1.8 2.8/4.9 4.1 4.3/2.5 - 6/3.3 6.9/3.8   SVR 1100 1100 1200 1400 1316 1900 5308 186 0128 - 1025 715   Device     IABP 1:1 IABP 1:1 IABP 1:1 IABP 1:1 IABP 1:1 No IABP No IABP No IABP   * epi 0.1, vaso 0.5     8/22: CVP 12, PA 28/14/19, PCWP 14, CO/CI 9.0/4.8. . Epi 0.1, vaso 1.  8/23: CVP 16, PA 30/14, CI/CO 4.4/8.0, , SvO2 71%  8/24: CVP 14, PA 44/20/28, PCWP 16, CI/CO 7.3/3.9, SvO2 68% epi 0.06, vaso 2    # CAD s/p CABG 4/2020 (KURT to RCA and LIMA to LAD) and PCI to RCA 7/20  - Stop home plavix po qd (8/13) for LVAD surgery  - aspirin 81mg PO qd   - c/w home crestor    # Mural thrombus  - removed during surgery on 8/19    # A-flutter  Converted to NSR on 8/15    - Continue amiodarone 200mg po qd      Pulm:  # Acute hypoxic respiratory failure  Intubated on 8/19   Vent setting: RR 14, , PEEP 5, FiO2 40%   Secondary to pulmonary edema bilateral pleural effusions.   - On nasal cannula, titrate to saturation of >92%     Renal/Electrolytes   # Hyperkalemia/Hypokalemia   # Acute kidney injury on  chronic kidney disease, stage III  Likely ATN due to hypoxic insult  - renal consult and diuretic challenge. May need dialysis  - Trend potassium and creatinine q12hrs  - Electrolyte replacement protocol  - Monitor UOP      GI:  # Constipation  # Severe protein calorie malnutrition  - Nutrition through tube feeds at 30 mL/hr    - Senna-docusate bid scheduled  - Miralax bid prn constipation     ID:   # UTI vs ASB  Periprocedural abx as per surgical team   - levoloxacin, zosyn, rifampin, vancomycin, fluconazole    - Completed Ceftriaxone treatment 1g IV qd (8/14 - 8/18)     Hem/Onc  # Mural thrombus removed on 8/19  # Anemia, mild  D/t frequent blood draws and procedures  - Monitor hgb    Endo  # Hypothyroidism   - Continue PTA levothyroxine     Nutrition: NJ with TF today  DVT Prophylaxis: mechanical   Code Status: Full Code    Waylon Garcia MD, MSc  Cardiovascular Disease Fellow  [facility]    Discussed with Dr Craig     Interval History   NAEON. Started on IV bumex gtt overnight with resultant hypotension. Bumex stopped, given IV fluids. Pressors increased.     Physical Exam   Temp: 97.5  F (36.4  C) Temp src: Bladder   Pulse: 84   Resp: 16 SpO2: 99 % O2 Device: Mechanical Ventilator    Vitals:    08/22/20 0400 08/23/20 0400 08/24/20 0000   Weight: 72.5 kg (159 lb 13.3 oz) 75.1 kg (165 lb 9.1 oz) 75.1 kg (165 lb 9.1 oz)     Vital Signs with Ranges  Temp:  [96.6  F (35.9  C)-99.5  F (37.5  C)] 97.5  F (36.4  C)  Pulse:  [60-84] 84  Resp:  [15-21] 16  MAP:  [61 mmHg-270 mmHg] 71 mmHg  Arterial Line BP: ()/() 78/52  FiO2 (%):  [40 %] 40 %  SpO2:  [84 %-100 %] 99 %  I/O last 3 completed shifts:  In: 4485.38 [I.V.:1990.38; NG/GT:400]  Out: 2415 [Urine:675; Emesis/NG output:450; Stool:400; Chest Tube:890]    RETIRE: Heart Rate: 71, Blood pressure 91/56, pulse 84, temperature 97.5  F (36.4  C), resp. rate 16, weight 75.1 kg (165 lb 9.1 oz), SpO2 99 %.  165 lbs 9.05 oz     GEN: intubated ,  sedated , on VEEG   CV: RRR, LVAD at 5300 rpm  LUNGS: Clear to auscultation bilaterally  ABD: Active bowel sounds, soft, no abdominal tenderness.   EXT: Trace LE edema   NEURO: opening eyes    Medications     BETA BLOCKER NOT PRESCRIBED       bumetanide Stopped (08/23/20 2252)     dexmedetomidine Stopped (08/19/20 2133)     dextrose       dextrose 5% and 0.45% NaCl + KCl 20 mEq/L 10 mL/hr at 08/21/20 0740     EPINEPHrine IV infusion ADULT 0.06 mcg/kg/min (08/24/20 0705)     fentaNYL 50 mcg/hr (08/24/20 0700)     HEParin 350 Units/hr (08/24/20 0700)     insulin (regular) Stopped (08/21/20 1200)     midazolam 8 mg/hr (08/24/20 0700)     norepinephrine Stopped (08/23/20 1000)     propofol (DIPRIVAN) infusion       BETA BLOCKER NOT PRESCRIBED       vasopressin (PITRESSIN) infusion ADULT 2 Units/hr (08/24/20 0700)       albumin human  12.5 g Intravenous Once     amiodarone  200 mg Oral or Feeding Tube Daily     aspirin  81 mg Oral or Feeding Tube Daily     bimatoprost  1 drop Both Eyes At Bedtime     busPIRone  10 mg Oral or Feeding Tube TID     ferrous sulfate  325 mg Oral or Feeding Tube BID     lacosamide (VIMPAT) intermittent infusion  200 mg Intravenous BID     latanoprost  1 drop Both Eyes Daily     levETIRAcetam  2,000 mg Intravenous Q12H     levothyroxine  62.5 mcg Oral or Feeding Tube QAM AC     lidocaine  1 patch Transdermal Q24H     lidocaine   Transdermal Q8H     pantoprazole  40 mg Oral or Feeding Tube QAM AC     piperacillin-tazobactam  3.375 g Intravenous Q6H     QUEtiapine  50 mg Oral or Feeding Tube At Bedtime     rosuvastatin  20 mg Oral or Feeding Tube Daily     sodium chloride (PF)  3 mL Intracatheter Q8H       Data   Recent Labs   Lab 08/24/20  0353 08/24/20  0141 08/23/20  2148 08/23/20  1711 08/23/20  1316 08/23/20  0838 08/23/20  0410  08/22/20  1023   WBC 10.5  --   --   --  13.1*  --  14.2*   < > 13.6*   HGB 7.5* 7.6* 8.3* 6.2* 7.0*  --  7.7*   < > 7.0*   MCV 90  --   --   --  90  --  90   < >  90   PLT 89*  --   --   --  112*  --  105*   < > 90*   INR 2.15*  --   --   --   --  2.11*  --   --  1.72*     --   --  144  --  143 141  --  142   POTASSIUM 4.4  --   --  4.9  --  4.7 4.7  --  4.0   CHLORIDE 116*  --   --  116*  --  115* 113*  --  114*   CO2 17*  --   --  20  --  20 20  --  20   BUN 76*  --   --  66*  --  56* 55*  --  38*   CR 2.56*  --   --  2.34*  --  2.16* 2.03*  --  1.45*   ANIONGAP 11  --   --  8  --  8 9  --  8   FERNANDO 7.6*  --   --  7.6*  --  7.3* 7.3*  --  7.7*   *  --   --  132*  --  138* 148*  --  153*   ALBUMIN 1.9*  --   --   --   --  1.4* 1.4*  --   --    PROTTOTAL 4.7*  --   --   --   --  4.4* 4.4*  --   --    BILITOTAL 4.2*  --   --   --   --  4.7* 5.1*  --   --    ALKPHOS 112  --   --   --   --  101 95  --   --    ALT 16  --   --   --   --  16 14  --   --    AST 51*  --   --   --   --  49* 50*  --   --     < > = values in this interval not displayed.       Recent Results (from the past 24 hour(s))   XR Chest Port 1 View    Narrative    Exam: XR CHEST PORT 1 VW, 8/24/2020 1:31 AM    Indication: Follow-up    Comparison: Chest x-ray 20/3/2020    Findings:   Portable supine AP radiograph the chest. Endotracheal tube tip  projects 2.5 cm above the enrico. Partially visualized LVAD device.  Left chest wall implantable cardiac defibrillator with leads in stable  position. Right IJ sheath, mediastinal drains, pericardial drain and  bilateral chest tubes are stable in position. Left IJ [place]-Zia  catheter tip projects over left pulmonary artery, stable in position,  cardiac silhouette stable. No appreciable pneumothorax. Trace  bilateral pleural effusions. Upper abdomen is unremarkable.      Impression    Impression:   1. Stable position of supportive devices.  2. Stable cardiomegaly and mild pulmonary edema.  3. Stable trace bilateral pleural effusions.    I have personally reviewed the examination and initial interpretation  and I agree with the findings.    CRYSTAL BLANKENSHIP MD

## 2020-08-24 NOTE — PROGRESS NOTES
CV ICU PROGRESS NOTE  August 24, 2020       CO-MORBIDITIES:   Cardiogenic shock (H)  (primary encounter diagnosis)  LV (left ventricular) mural thrombus  Heart failure (H)  Heart failure (H)  Status post cardiac surgery    ASSESSMENT: Marquise Jj is a 75 year old female 75 year-old female with PMH notable for coronary artery disease s/p CABG (4/20), ischemic cardiomyopathy, severe MR/TR and known mural thrombus who is admitted to the CV ICU s/p redo sternotomy, LVAD (heartmate III), and TV repair on 8/19/20 with Dr. Farley. Chest was left open and packed. S/p chest closure and washout 08/21/20.       PLAN SUMMARY:   1- Epilepsia Partialis Continua; resolved per vEEG    Follow-up with NeuroCrit r: management of non-convulsive focal status epilepticus    Wean off versed gtt     2- RV Dysfunction and Vasoplegia    MAP > 70, CVP 10-12    Wean epi today. Then will wean vaso as able.      Diuresis: Bumex/Diuril.      Disontinue low-intensity heparin infusion in the setting of ongoing coagulopathy    Aspirin, rosuvastatin    3- ATN    Lasix challenge    Consult nephrology; possible need for hemodialysis in the setting of hypervolemia     4- Glycemic control: insulin gtt to sliding scale (medium dose)     5- Acute blood loss anemia: transfusion goal > 8; TEG with heparinase, coags      PLAN:   Neuro/ pain/ sedation:  #Acute post-operative pain   #Likely anoxic brain injury   #Epilepsia Partialis Continua  - Monitor neurological status. Notify the MD for any acute changes in exam.  - Seroquel 50 at bedtime + 25 PRN   - Keppra, lacosamide, midazolam gtt per NeuroCrit  - Fentanyl gtt    - Tylenol toi   - Wean off versed gtt      Pulmonary:   #Acute hypoxic respiratory failure  - Monitor CXR, ABG  - Monitor CT output   Ventilation Mode: CMV/AC  (Continuous Mandatory Ventilation/ Assist Control)  FiO2 (%): 40 %  Rate Set (breaths/minute): 14 breaths/min  Tidal Volume Set (mL): 400 mL  PEEP (cm H2O): 5 cmH2O  Oxygen  Concentration (%): 40 %  Resp: 20     Cardiovascular:    #Acute on chronic decompensated heart failure with reduced ejection fraction: NYHA IV / ACC D  #Cardiogenic shock, possible vasoplegia   #Ischemic cardiomyopathy  #Coronary artery disease s/p CABG 4/20 in [place], s/p PCI to RCA 7/20  #Mural thrombus  #Severe MR/TR  #S/p LVAD     MAP > 70, CVP 10-12    Wean off epinephrine, but not lower than 0.03    Continue vasopressin gtt    Goal-directed fluid therapy    Diuresis     Hold low-intensity heparin infusion    Aspirin, rosuvastatin    Co-managing with Cards-2; recs greatly appreciated      GI/Nutrition:   - NPO, OGT, NJT  - TF   - Pantoprazole ppx   - Bowel regimen: senna-colace, miralax.  - No indication for parenteral nutrition.      Renal/Fluid Balance/ Electrolytes:   #Oliguria in the setting of likely ATN  - Will monitor intake and output.  - ICU electrolyte replacement protocol  - Goal-directed fluid therapy  - Lasix challenge  - Consult nephrology; possible need for hemodialysis in the setting of hypervolemia and oliguria      Endocrine:    #Glycemic control  #Hx hypothyroid   - ISS, medium intensity   - Synthroid daily       ID/ Antibiotics:  - Completed perioperative antibiotic regimen: vancomycin, levofloxacin, fluconazole, Zosyn       Heme:     #Acute perioperative blood loss anemia  #Thrombocytopenia   - KCentra given in the OR   - TEG post op looked good   - Hgb goal > 8  - Holding low-intensity heparin gtt  - Repeat CBC, TEG, Coags        Prophylaxis:    - Mechanical prophylaxis for DVT.  - Holding LIH in the setting of occult bleeding   - PPI  - Bowel regimen      MSK:    - PT and OT consulted. Appreciate recs.      Lines/ tubes/ drains:  - ETT  - A-line  - R internal jugular CVC  - PAC  - PIV  - Rojo  - Chest tubes       Disposition:  - CV ICU.    Patient seen, findings and plan discussed with CV ICU staff, Dr. Brand.    -----------------------------------  Ronaldo Li MD  Anesthesiology  Resident, PGY-3  *[id number]  ====================================    SUBJECTIVE:   Intubated, deeply sedated/RASS -4    OBJECTIVE:   1. VITAL SIGNS:   Temp:  [96.6  F (35.9  C)-99.1  F (37.3  C)] 97.5  F (36.4  C)  Pulse:  [60-84] 78  Resp:  [15-21] 20  MAP:  [61 mmHg-270 mmHg] 73 mmHg  Arterial Line BP: ()/() 81/44  FiO2 (%):  [40 %] 40 %  SpO2:  [84 %-100 %] 100 %  Ventilation Mode: CMV/AC  (Continuous Mandatory Ventilation/ Assist Control)  FiO2 (%): 40 %  Rate Set (breaths/minute): 14 breaths/min  Tidal Volume Set (mL): 400 mL  PEEP (cm H2O): 5 cmH2O  Oxygen Concentration (%): 40 %  Resp: 20      2. INTAKE/ OUTPUT:   I/O last 3 completed shifts:  In: 4485.38 [I.V.:1990.38; NG/GT:400]  Out: 2415 [Urine:675; Emesis/NG output:450; Stool:400; Chest Tube:890]    3. PHYSICAL EXAMINATION:   General: Intubated, NAD  Neuro: Deeply sedated, PERRL   Resp: mechanically ventilated, minimal vent settings   CV: ventricular paced @ 80  Abdomen: Soft, Non-distended, Non-tender  Incisions: sternotomy wound c/d/i s/p closure  Extremities: warm and well perfused    4. INVESTIGATIONS:   Arterial Blood Gases   Recent Labs   Lab 08/24/20  0420 08/24/20  0145 08/23/20  1640 08/23/20  0838   PH 7.35 7.33* 7.32* 7.40   PCO2 30* 32* 32* 28*   PO2 173* 165* 84 158*   HCO3 16* 17* 16* 18*     Complete Blood Count   Recent Labs   Lab 08/24/20  0353 08/24/20  0141 08/23/20  2148 08/23/20  1711 08/23/20  1316 08/23/20  0410 08/22/20  1223   WBC 10.5  --   --   --  13.1* 14.2* 13.4*   HGB 7.5* 7.6* 8.3* 6.2* 7.0* 7.7* 8.1*   PLT 89*  --   --   --  112* 105* 96*     Basic Metabolic Panel  Recent Labs   Lab 08/24/20  0353 08/23/20  1711 08/23/20  0838 08/23/20  0410    144 143 141   POTASSIUM 4.4 4.9 4.7 4.7   CHLORIDE 116* 116* 115* 113*   CO2 17* 20 20 20   BUN 76* 66* 56* 55*   CR 2.56* 2.34* 2.16* 2.03*   * 132* 138* 148*     Liver Function Tests  Recent Labs   Lab 08/24/20  0353 08/23/20  0838 08/23/20  0410  08/22/20  1023 08/22/20  0422  08/21/20  1017   AST 51* 49* 50*  --   --   --  47*   ALT 16 16 14  --   --   --  14   ALKPHOS 112 101 95  --   --   --  64   BILITOTAL 4.2* 4.7* 5.1*  --   --   --  4.9*   ALBUMIN 1.9* 1.4* 1.4*  --   --   --  1.8*   INR 2.15* 2.11*  --  1.72* 1.73*   < > 1.60*    < > = values in this interval not displayed.     Pancreatic Enzymes  No lab results found in last 7 days.  Coagulation Profile  Recent Labs   Lab 08/24/20  0353 08/23/20  0838 08/22/20  1023 08/22/20  0422  08/21/20  1017  08/20/20  0341  08/19/20  1805 08/19/20  1629   INR 2.15* 2.11* 1.72* 1.73*   < > 1.60*   < > 1.35*   < > 1.40* 1.44*   PTT  --   --   --   --   --  48*  --  41*  --  43* 43*    < > = values in this interval not displayed.         5. RADIOLOGY:   Recent Results (from the past 24 hour(s))   XR Chest Port 1 View    Narrative    Exam: XR CHEST PORT 1 VW, 8/24/2020 1:31 AM    Indication: Follow-up    Comparison: Chest x-ray 20/3/2020    Findings:   Portable supine AP radiograph the chest. Endotracheal tube tip  projects 2.5 cm above the enrico. Partially visualized LVAD device.  Left chest wall implantable cardiac defibrillator with leads in stable  position. Right IJ sheath, mediastinal drains, pericardial drain and  bilateral chest tubes are stable in position. Left IJ [place]-Zia  catheter tip projects over left pulmonary artery, stable in position,  cardiac silhouette stable. No appreciable pneumothorax. Trace  bilateral pleural effusions. Upper abdomen is unremarkable.      Impression    Impression:   1. Stable position of supportive devices.  2. Stable cardiomegaly and mild pulmonary edema.  3. Stable trace bilateral pleural effusions.    I have personally reviewed the examination and initial interpretation  and I agree with the findings.    CRYSTAL BLANKENSHIP MD       =========================================

## 2020-08-24 NOTE — PROGRESS NOTES
D: POD #5 LVAD HM 3 implant. Remained intubated/sedated. Weaned versed gtt per neurocrit (see order). Coughed w/ ETT sxn, no commands. Hemodynamically stable, on epi/vaso, no issue w/ LVAD. UOP 20-50 ml/h, renal and intensivist teams aware, no CRRT tonight. Loose stool, rectal tube in place. Bleeding around chest tube sites, clotted at insertion sites, stripped; otherwise, minimal output. Heparin gtt off tonight per intensivist, hgb stable. Metabolic acidosis, bicarb bolus x1 and gtt initiated.   I: As above. MD updated re: status/labs. Family updated re: status.  A: Critical.  P: Continue to monitor. Notify MD of changes/concerns.

## 2020-08-24 NOTE — PROGRESS NOTES
Major Shift Events:  Pt's MAP's >70 on vaso 2, Epi 0.06. 1PRBC unit administered for HgB 7.5. Heparin therapeutic but turned off around 0830 for INR <2 per CVTS. LVAD numbers flows 3-3. PI's 2-4.3, speeds 0535-7625 mota around 3.5 to 4. Last CVP was 14, PA 44/20 wedged by team at 16. [place]ield at 60 by MD, she was comfortable without getting a repeat CXR. Bumex 4mg IVP with minimal UOP. Rectal tube with dark stool, OG output 600 in 16 hours. Hygiene holiday per NCC, shampoo cap placed.   Plan: Wean Epi 0.01 Q 4hrs until reached 0.03, stop wean. Leave Vaso at 2/hr. EEG leads will return this afternoon and Versed will be weaned per NCC orders.  For vital signs and complete assessments, please see documentation flowsheets.

## 2020-08-24 NOTE — PROGRESS NOTES
[facility], [place]   Neurology Critical Care Consult Progress Note  Marquise Jj  [id number]  08/24/2020    Subjective:  improved interictal R occipital spikes. Heparin gtt stopped this morning. Getting 1 unit PRBCs    Objective   Physical Examination   Vitals: BP 91/56   Pulse 85   Temp 97.7  F (36.5  C)   Resp 20   Wt 75.1 kg (165 lb 9.1 oz)   SpO2 100%   BMI 25.17 kg/m    General: Adult female patient, lying in bed intubated and sedated  HEENT: ETT and OG in place  Cardiac: LVAD  Chest: intubated, chest tubes in place draining serosanguinous fluid  Abdomen: Soft, OG and rectal tube draining dark brown/black thin liquid  Extremities: Warm, grossly edematous    Neuro:  Mental status: Examined on versed 8 mg/hr. Eyes partially open, does not attend, follow commands, or track.  Cranial nerves: does not blink to threat, pupils 2, 2.5 mm and reactive to direct and consensual light, mid gaze, corneals intact, face symmetric.  Motor: no withdrawal to painful stim  Reflexes: Hyperreflexic and symmetric biceps, brachioradialis, triceps, patellae, and achilles. Negative Delacruz, no clonus, toes briskly up-going bilaterally.  Sensory: does not detect noxious in 4/4 extremities    Investigations    Recent Labs   Lab 08/24/20  0353 08/24/20  0141 08/23/20  2148 08/23/20  1711 08/23/20  1316 08/23/20  0838 08/23/20  0410  08/22/20  1023   WBC 10.5  --   --   --  13.1*  --  14.2*   < > 13.6*   HGB 7.5* 7.6* 8.3* 6.2* 7.0*  --  7.7*   < > 7.0*   MCV 90  --   --   --  90  --  90   < > 90   PLT 89*  --   --   --  112*  --  105*   < > 90*   INR 2.15*  --   --   --   --  2.11*  --   --  1.72*     --   --  144  --  143 141  --  142   POTASSIUM 4.4  --   --  4.9  --  4.7 4.7  --  4.0   CHLORIDE 116*  --   --  116*  --  115* 113*  --  114*   CO2 17*  --   --  20  --  20 20  --  20   BUN 76*  --   --  66*  --  56* 55*  --  38*   CR 2.56*  --   --  2.34*  --  2.16* 2.03*  --  1.45*  "  ANIONGAP 11  --   --  8  --  8 9  --  8   FERNANDO 7.6*  --   --  7.6*  --  7.3* 7.3*  --  7.7*   *  --   --  132*  --  138* 148*  --  153*   ALBUMIN 1.9*  --   --   --   --  1.4* 1.4*  --   --    PROTTOTAL 4.7*  --   --   --   --  4.4* 4.4*  --   --    BILITOTAL 4.2*  --   --   --   --  4.7* 5.1*  --   --    ALKPHOS 112  --   --   --   --  101 95  --   --    ALT 16  --   --   --   --  16 14  --   --    AST 51*  --   --   --   --  49* 50*  --   --     < > = values in this interval not displayed.         Impression   Ms. Marquise Jj is a 75 year old female with complex cardiac history who is POD 4 from LVAD placement complicated by post-procedural encephalopathy and two clinical focal seizures, and later focal non-convulsive status epilepticus, now resolved, with further seizures and frequent epileptiform discharges, improved from prior.     Etiology of focal seizures is unclear at this time, but suggests possible underlying R occipital cortical abnormality or lesion. MRI now not possible due to LVAD, repeat CT head with significant artifact however some evidence of L posterior frontal lobe cortical hypodensity possibly representing infarct. These radiographic findings (and lack thereof) are somewhat equivocal and should not delay anticoagulation for cardiac reasons if indicated. Epilepsia partialis continua can \"burn out\" on it's own, so would recommend no changes to current seizure treatment regimen, we will continue to follow EEG interpretations and plan to gradually wean sedation tomorrow (8/24) if electrographically appropriate.    Recommendations  Continue keppra 2 g IV BID  Continue vimpat 200 mg IV BID  Continue midazolam   Continue vEEG  Okay for anticoagulation if indicated    Plan to continue versed infusion for one more day to allow brain time to \"cool off\" so to speak was discussed with the patient's daughter Norman over the phone at 1500 today. She expressed that her questions had been answered.    We " will continue to follow.    Patient was seen and discussed with Dr. Augusto Jensen MD  PGY2 Neurology  [phone]

## 2020-08-24 NOTE — PROVIDER NOTIFICATION
Pt pressures dumped (MAP in the low 50's) after starting the Bumex. Epi increased, pt laid flat and Vaso up to 3. Cards came in, assessed and ordered 250mls of Albumin and Bumex to be stopped. PI dipped to 1.4 during hypotension. Eventually came down to original gtts of Epi 0.08 and working on titrating vaso back to 2. Pt is bleeding in her oral cavity with gentle oral cares. Black stools (Pt takes Iron supplement).

## 2020-08-24 NOTE — PLAN OF CARE
OT: OT: OT orders received and appreciated. Pt appropriate for one discipline at this time  2/2 limited activity tolerance/participation. OT will HOLD and reassess OT needs 8/31. Thank you for the referral.

## 2020-08-24 NOTE — OP NOTE
Procedure Date: 08/19/2020      PREOPERATIVE DIAGNOSES:   1.  Cardiogenic shock, status post intraaortic balloon pump and IV inotropes.   2.  Recent coronary artery bypass grafting.   3.  Severe tricuspid regurgitation.   4.  Large left ventricular clot.      POSTOPERATIVE DIAGNOSES:   1.  Cardiogenic shock, status post intraaortic balloon pump and IV inotropes.   2.  Recent coronary artery bypass grafting.   3.  Severe tricuspid regurgitation.   4.  Large left ventricular clot.      PROCEDURES:   1.  Redo median sternotomy.   2.  Placement of HeartMate 3 left ventricular assist device (intracorporeal left ventricular assist device).   3.  Tricuspid valve repair with 30 mm Elkins MC3 ring.   4.  Removal of intraventricular clot.   5.  Exposure of left femoral artery and vein for full cardiopulmonary bypass.      SURGEON:  Catarino Farley MD.      COSURGEON:  Kip Chisholm MD.      NOTE:  A second attending surgeon was requested for the operation because of absence of any qualified resident or fellow, as well as the complexity of the operation.      ASSISTANT:  Stuart Noonan PA-C.      OPERATIVE INDICATIONS:  The patient is a 75-year-old female in cardiogenic shock, who was balloon pump dependent with ischemic cardiomyopathy.  She had coronary artery bypass grafting approximately 6 months ago.  The innominate vein was close to the posterior surface of the sternum, which made the operation more complicated.  There were also severe adhesions in the pericardial space.  I discussed risks and benefits of LVAD placement with the patient and the family, risk of death, stroke, bleeding, wound infection, renal failure, arrhythmias.  They understood and wished to proceed with surgery.      OPERATIVE FINDINGS:  The patient's sternum was of adequate quality.  The pericardial space had significant adhesions.  Her tissues were very friable.  There was a large amount of clot in the left ventricle.  There was severe tricuspid  regurgitation.      DESCRIPTION OF PROCEDURE:  The patient was brought to operating room in stable condition with emergent anesthesia, the patient's chest, abdomen and lower extremities were prepped and draped in the usual sterile manner.  A left groin incision was made through left femoral artery and vein were identified and exposed in preparation of cardiopulmonary bypass.  Careful redo median sternotomy was performed with the oscillating saw.  Adhesions were carefully dissected to expose the aorta and the superior and inferior vena cava.  Preparation of cardiopulmonary bypass included ACT-guided heparinization and admission of Amicar.  Aorta was cannulated with an 18-Azeri arterial cannula via arthritic pursestring pledgeted suture x 2.  Venous cannula was inserted in both superior and inferior vena cava.  Full cardiopulmonary bypass was initiated.  The rest of the heart was carefully dissected.  An antegrade cardioplegic cannula was placed.  Aortic pressure was temporarily reduced and the aorta was cross-clamped.  One liter of cold blood cardioplegia administered antegrade and retrograde routes.  Subsequent dose of cardioplegia given at no greater than 20 minute intervals, via the antegrade route.  Once cardiac arrest was achieved, with arrest of the heart dissection was exposed.  A coring knife was used to core a portion of apex and left ventricle.  A large amount of intraventricular heart was carefully evacuated.  This was very tedious and took almost 30-40 minutes to remove.  We thoroughly irrigated the inside of the left ventricle with several liters of cold saline solution in order to evacuate any debris.  Finally, this was achieved.  Warm dose of retrograde hotshot cardioplegia was given and the cross-clamp was released.  Organized rhythm resumed after need for no defibrillations.  Inflow ring was sewn onto the surface of the heart in usual standard fashion.  Inflow cannula was inserted along with the  pump and secured in the usual standard fashion.  Driveline was tunneled through right upper quadrant incision.  Next, caval tapes were applied.  Right atriotomy was performed.  A 30 mm MC3 ring was used to perform tricuspid valve annuloplasty.  Right atriotomy was closed with double layer 4-0 Prolene suture.  Outflow graft was measured and sewn to a longitudinal aortotomy using continuous 4-0 Prolene suture.  De-airing maneuvers were performed in the usual standard fashion.  Following confirmation of de-airing by echocardiography, patient gradually weaned off cardiopulmonary bypass and the LVAD pump was initiated.  Initial hemodynamics were stable.  Echo showed mild to moderate LV decompression, mild to moderate RV dysfunction and only trace tricuspid regurgitation.  Nitric oxide was also used.  The next several hours were spent achieving hemostasis.  The patient was extremely coagulopathic.  We decided to leave the chest open.  Two anterior mediastinal chest tubes, a posterior Alejandro drain, and bilateral pleural chest tubes were placed.  Kerlix gauze dressing was used.  The skin was approximated with an Esmarch dressing.  The patient was transferred to ICU in stable, but critical condition.         ANDREAS MUÑOZ MD             D: 2020   T: 2020   MT: NENO      Name:     EDILSON SMITH   MRN:      [id number]        Account:        [id number]   :      1945           Procedure Date: 2020      Document: [id number]       cc: Ned Bell MD, [facility]       Quinton Stephens MD, PH.D       Michelle Fong MD       [facility] Billing

## 2020-08-24 NOTE — PROGRESS NOTES
Patient without good response to bolus diuretic challenge (5mg bumex and 1000 mg diuril IV). Most recent labs okay, no indication for urgent RRT. Suggested trial of diuretic drip to primary CVTS team who agreed with plan.  - Please start bumex @ 1 mg/hr    Marium Thompson MD  Renal Fellow  [phone]

## 2020-08-24 NOTE — PLAN OF CARE
4E PT -   Discharge Planner PT   Patient plan for discharge: not discussed  Current status: patient greeted supine in bed intubated (CMV, FiO2: 40%, PEEP: 5). Performed supine UE/LE PROM to  support joint health and prevent patient from developing contractures. Patient tolerated PROM well, no significant change in vitals.   Barriers to return to prior living situation: medical status, impaired functional mobility  Recommendations for discharge: rehab  Rationale for recommendations: anticipate patient will require continued rehab at time of discharge. Will continue to follow and update recs as appropriate.  Entered by: Danyel Estrada 08/24/2020 2:24 PM

## 2020-08-25 NOTE — PROGRESS NOTES
Nephrology Progress Note  08/24/2020         Assessment & Recommendations:     Marquise Jj is a 75 year old with PMH notable for CAD, s/p CABG (4/20), ischemic cardiomyopathy, severe MR/TR and known mural thrombus, acute kidney injry, hypothyroidism, CKD3, anxiety, GERD, chronic anemia.Patient initially presented to [facility] and was transferred to [facility] for advanced heart failure therapy after developing cardiac shock. Placed on IABP and then  S/p LVAD placement and TVR repair for cardiogenic shock on 8/19 /20  c/b  convulsive status epilepticus.   She required multiple transfusions and significant resuscitation during this procedure.  Nephrology consulted for managign Oliguric LORI on CKD stage 3       LORI on CKD3, oliguric  Etiology : Likely ATN in the setting of cardiogenic shock   Renal US : Renal lengths: right- 10 cm, left- 8.3 cm. No Hydro/mass  Multiple UA  8/10, 8/12/8/18 --> elevated WBC and RBC but most recent UA on 8/18 showing improvement : WBC 7 , RBC 1   She is still making urine   CK 67  No acute electrolyte abnormalities  Metabolic acidosis with respiratory compensation   Anemia 8.1   BMD:Corrected Ca 9.2 . Albumin 1.9 . P 5.2  Antimicrobials : Zosyn     Recommendations :    - No urgent indication to initiate RRT , but she is close to needing it. We will monitor her closely. Consent for CRRT obtained from daughter Norman - it had been placed in paper chart by my colleague Dr Marium Thompson MD   - Please page on-call nephrology fellow with any questions  - Avoid nephrotoxins  - Renally dose medications  - Renal diet  - Daily weights  - Strict I/Os     Recommendations were communicated to primary team via note  Patient seen and discussed with Dr Anastasia Howard MD, FACP  Nephrology Fellow   [facility]   Pager [phone]        Interval History :   Nursing and provider notes from last 24 hours reviewed.  In the last 24 hours Marquise Jj has  remained acidotic . Still making Urine ~ 900 ml since MN   Review of Systems:   I reviewed the following systems:  Unable to obtain , intubated and sedated  Physical Exam:   I/O last 3 completed shifts:  In: 4527.31 [I.V.:1762.31; NG/GT:420]  Out: 2700 [Urine:770; Emesis/NG output:650; Stool:550; Chest Tube:730]   BP 91/56   Pulse 90   Temp 97.7  F (36.5  C)   Resp 19   Wt 75.1 kg (165 lb 9.1 oz)   SpO2 100%   BMI 25.17 kg/m       GENERAL APPEARANCE: no distress  EYES: no scleral icterus, pupils equal  Endo: no goiter  Pulmonary: decreased air entry in both lung bases y  CV: regular rhythm, normal rate, no rub   - Edema 1+  GI: soft, nontender, normal bowel sounds  MS: no evidence of inflammation in joints  : [place] present  SKIN: no rash on exposed surfaces  NEURO: sedated    Labs:   All labs reviewed by me  Electrolytes/Renal -   Recent Labs   Lab Test 08/24/20  1536 08/24/20  0353 08/23/20  1711  08/23/20  0410  08/22/20  0422   * 144 144   < > 141   < > 141   POTASSIUM 4.2 4.4 4.9   < > 4.7   < > 3.9   CHLORIDE 117* 116* 116*   < > 113*   < > 112*   CO2 18* 17* 20   < > 20   < > 20   BUN 83* 76* 66*   < > 55*   < > 33*   CR 2.79* 2.56* 2.34*   < > 2.03*   < > 1.41*   * 138* 132*   < > 148*   < > 167*   FERNANDO 7.4* 7.6* 7.6*   < > 7.3*   < > 7.7*   MAG 2.5* 2.5* 2.4*  --  2.6*  --  2.5*   PHOS  --   --  5.2*  --  4.6*  --  4.7*    < > = values in this interval not displayed.       CBC -   Recent Labs   Lab Test 08/24/20  1536 08/24/20  0353 08/24/20  0141  08/23/20  1316   WBC 10.8 10.5  --   --  13.1*   HGB 8.1* 7.5* 7.6*   < > 7.0*   PLT 89* 89*  --   --  112*    < > = values in this interval not displayed.       LFTs -   Recent Labs   Lab Test 08/24/20  0353 08/23/20  0838 08/23/20  0410   ALKPHOS 112 101 95   BILITOTAL 4.2* 4.7* 5.1*   ALT 16 16 14   AST 51* 49* 50*   PROTTOTAL 4.7* 4.4* 4.4*   ALBUMIN 1.9* 1.4* 1.4*       Iron Panel -   Recent Labs   Lab Test 08/10/20  1052 07/01/20  0530    IRON 48 65   IRONSAT 15 17   HUSEYIN 165  --          Imaging:  All imaging studies reviewed by me.     Current Medications:    albumin human  12.5 g Intravenous Once     amiodarone  200 mg Oral or Feeding Tube Daily     aspirin  81 mg Oral or Feeding Tube Daily     bimatoprost  1 drop Both Eyes At Bedtime     busPIRone  10 mg Oral or Feeding Tube TID     ferrous sulfate  325 mg Oral or Feeding Tube BID     insulin aspart  1-6 Units Subcutaneous Q4H     lacosamide (VIMPAT) intermittent infusion  200 mg Intravenous BID     latanoprost  1 drop Both Eyes Daily     levETIRAcetam  2,000 mg Intravenous Q12H     levothyroxine  62.5 mcg Oral or Feeding Tube QAM AC     lidocaine  1 patch Transdermal Q24H     lidocaine   Transdermal Q8H     pantoprazole  40 mg Oral or Feeding Tube QAM AC     QUEtiapine  50 mg Oral or Feeding Tube At Bedtime     rosuvastatin  20 mg Oral or Feeding Tube Daily     sodium chloride (PF)  3 mL Intracatheter Q8H       BETA BLOCKER NOT PRESCRIBED       dextrose       dextrose 5% and 0.45% NaCl + KCl 20 mEq/L 10 mL/hr at 08/21/20 0740     EPINEPHrine IV infusion ADULT 0.03 mcg/kg/min (08/24/20 1900)     fentaNYL 50 mcg/hr (08/24/20 1900)     [Held by provider] HEParin Stopped (08/24/20 0830)     midazolam 4 mg/hr (08/24/20 1900)     norepinephrine Stopped (08/23/20 1000)     BETA BLOCKER NOT PRESCRIBED       sodium bicarbonate 37.5 mEq/hr (08/24/20 1900)     vasopressin (PITRESSIN) infusion ADULT 2 Units/hr (08/24/20 1900)     Anita Casillas MD

## 2020-08-25 NOTE — PROGRESS NOTES
[facility], [place]   Neurology Critical Care Consult Progress Note  Marquise Jj  [id number]  08/25/2020    Subjective:  No sharps on eeg    Objective   Physical Examination   Vitals: BP 91/56   Pulse 91   Temp 97.3  F (36.3  C)   Resp 16   Wt 79.5 kg (175 lb 4.3 oz)   SpO2 100%   BMI 26.65 kg/m    General: Adult female patient, lying in bed intubated and sedated  HEENT: ETT and OG in place  Cardiac: LVAD  Chest: intubated, chest tubes in place draining serosanguinous fluid  Abdomen: Soft  Extremities: Warm, grossly edematous, toes with red/pink color     Neuro:  Mental status:  Eyes partially open, does not attend, follow commands, or track.  Cranial nerves: does not blink to threat, pupils 2, 2.5 mm and reactive to light, mid gaze, face symmetric.  Motor: no withdrawal to painful stim  Reflexes:  no clonus, toes briskly up-going bilaterally.  Sensory: does not respond to noxious in 4/4 extremities    Investigations    Recent Labs   Lab 08/25/20  0331 08/24/20  1536 08/24/20  0353   WBC 10.8 10.8 10.5   HGB 7.8* 8.1* 7.5*   MCV 88 91 90   * 89* 89*   INR 2.12* 2.21* 2.15*   * 145* 144   POTASSIUM 3.7 4.2 4.4   CHLORIDE 113* 117* 116*   CO2 25 18* 17*   BUN 89* 83* 76*   CR 3.10* 2.79* 2.56*   ANIONGAP 10 10 11   FERNANDO 7.7* 7.4* 7.6*   * 123* 138*   ALBUMIN 1.6*  --  1.9*   PROTTOTAL 4.8*  --  4.7*   BILITOTAL 2.2*  --  4.2*   ALKPHOS 226*  --  112   ALT 22  --  16   AST 66*  --  51*         Impression   Ms. Marquise Jj is a 75 year old female with complex cardiac history who is POD 4 from LVAD placement complicated by post-procedural encephalopathy and two clinical focal seizures, and later focal non-convulsive status epilepticus, now resolved, with further seizures and frequent epileptiform discharges, improved from prior.     Etiology of focal seizures is unclear at this time, but suggests possible underlying R occipital cortical abnormality or lesion.  "MRI now not possible due to LVAD, repeat CT head with significant artifact however some evidence of L posterior frontal lobe cortical hypodensity possibly representing infarct. These radiographic findings (and lack thereof) are somewhat equivocal and should not delay anticoagulation for cardiac reasons if indicated. Epilepsia partialis continua can \"burn out\" on it's own, so would recommend no changes to current seizure treatment regimen, we will continue to follow EEG interpretations and plan to continue to gradually wean sedation     Recommendations  Continue keppra 2 g IV BID  Continue vimpat 200 mg IV BID  Continue midazolam, wean as indicated  Continue vEEG, will follow  Okay for anticoagulation if indicated    We will continue to follow.    Patient was seen and discussed with Dr. Augusto Jensen MD  PGY2 Neurology  [phone]      "

## 2020-08-25 NOTE — PROGRESS NOTES
CV ICU PROGRESS NOTE  August 24, 2020       CO-MORBIDITIES:   Cardiogenic shock (H)  (primary encounter diagnosis)  LV (left ventricular) mural thrombus  Heart failure (H)  Heart failure (H)  Status post cardiac surgery    ASSESSMENT: Marquise Jj is a 75 year old female 75 year-old female with PMH notable for coronary artery disease s/p CABG (4/20), ischemic cardiomyopathy, severe MR/TR and known mural thrombus who is admitted to the CV ICU s/p redo sternotomy, LVAD (heartmate III), and TV repair on 8/19/20 with Dr. Farley. Chest was left open and packed. S/p chest closure and washout 08/21/20.       PLAN SUMMARY:   1- Epilepsia Partialis Continua; resolved per vEEG    Follow-up with NeuroCrit r: management of non-convulsive focal status epilepticus    Off Versed; monitor.    2- RV Dysfunction and Vasoplegia    MAP > 70, CVP 10-12    Wean epi today. Then will wean vaso as able. Dobutamine PRN.      Diuresis: Bumex TID.      Aspirin, rosuvastatin    3- ATN    Bumex TID    I/O net goal 0/-1 L    Free water 30 Q4H for hyper-Na        PLAN:   Neuro/ pain/ sedation:  #Acute post-operative pain   #Likely anoxic brain injury   #Epilepsia Partialis Continua; resolved  - Monitor neurological status. Notify the MD for any acute changes in exam.  - Seroquel 50 at bedtime + 25 PRN   - Keppra, lacosamide, per NeuroCrit  - Fentanyl gtt    - Tylenol toi   - Off versed gtt      Pulmonary:   #Acute hypoxic respiratory failure  - Monitor CXR, ABG  - Monitor CT output   Ventilation Mode: CMV/AC  (Continuous Mandatory Ventilation/ Assist Control)  FiO2 (%): 40 %  Rate Set (breaths/minute): 14 breaths/min  Tidal Volume Set (mL): 400 mL  PEEP (cm H2O): 5 cmH2O  Oxygen Concentration (%): 40 %  Resp: 16     Cardiovascular:    #Acute on chronic decompensated heart failure with reduced ejection fraction: NYHA IV / ACC D  #Cardiogenic shock, possible vasoplegia   #Ischemic cardiomyopathy  #Coronary artery disease s/p CABG 4/20 in  [place], s/p PCI to RCA 7/20  #Mural thrombus  #Severe MR/TR  #S/p LVAD     MAP > 70, CVP 10-12    Wean off epinephrine, but not lower than 0.03    Continue vasopressin gtt    Goal-directed fluid therapy    Diuresis     Hold low-intensity heparin infusion    Aspirin, rosuvastatin    Co-managing with Cards-2; recs greatly appreciated      GI/Nutrition:   - NPO, OGT, NJT  - TF   - Pantoprazole ppx   - Bowel regimen: senna-colace, miralax.  - No indication for parenteral nutrition.      Renal/Fluid Balance/ Electrolytes:   #Oliguria in the setting of likely ATN  - Will monitor intake and output.  - ICU electrolyte replacement protocol  - Goal-directed fluid therapy  - Diuresis as above  - Nephrology consulted      Endocrine:    #Glycemic control  #Hx hypothyroid   - ISS, medium intensity   - Synthroid daily       ID/ Antibiotics:  - Completed perioperative antibiotic regimen: vancomycin, levofloxacin, fluconazole, Zosyn       Heme:     #Acute perioperative blood loss anemia  #Thrombocytopenia   - Hgb goal > 8  - Holding low-intensity heparin gtt        Prophylaxis:    - Mechanical prophylaxis for DVT.  - Holding LIH in the setting of occult bleeding   - PPI  - Bowel regimen      MSK:    - PT and OT consulted. Appreciate recs.      Lines/ tubes/ drains:  - ETT  - A-line  - R internal jugular CVC  - PAC  - PIV  - Rojo  - Chest tubes       Disposition:  - CV ICU.    Patient seen, findings and plan discussed with CV ICU staff, Dr. Brand.    -----------------------------------  Ronaldo Li MD  Anesthesiology Resident, PGY-3  *[id number]  ====================================    SUBJECTIVE:   Intubated, deeply sedated/RASS -4    OBJECTIVE:   1. VITAL SIGNS:   Temp:  [95.4  F (35.2  C)-98.6  F (37  C)] 97.5  F (36.4  C)  Pulse:  [81-94] 88  Resp:  [16-21] 16  MAP:  [63 mmHg-89 mmHg] 69 mmHg  Arterial Line BP: ()/(50-78) 77/62  FiO2 (%):  [40 %] 40 %  SpO2:  [99 %-100 %] 100 %  Ventilation Mode: CMV/AC  (Continuous  Mandatory Ventilation/ Assist Control)  FiO2 (%): 40 %  Rate Set (breaths/minute): 14 breaths/min  Tidal Volume Set (mL): 400 mL  PEEP (cm H2O): 5 cmH2O  Oxygen Concentration (%): 40 %  Resp: 16      2. INTAKE/ OUTPUT:   I/O last 3 completed shifts:  In: 3773.11 [I.V.:1563.11; NG/GT:290]  Out: 2900 [Urine:840; Emesis/NG output:800; Stool:900; Chest Tube:360]    3. PHYSICAL EXAMINATION:   General: Intubated, NAD  Neuro: Deeply sedated, PERRL   Resp: mechanically ventilated, minimal vent settings   CV: RRR  Abdomen: Soft, Non-distended, Non-tender  Incisions: sternotomy wound c/d/i s/p closure  Extremities: warm and well perfused    4. INVESTIGATIONS:   Arterial Blood Gases   Recent Labs   Lab 08/25/20 0331 08/24/20  2358 08/24/20  1959 08/24/20  1536   PH 7.40 7.44 7.38 7.29*   PCO2 35 32* 31* 33*   PO2 141* 135* 148* 144*   HCO3 22 22 18* 16*     Complete Blood Count   Recent Labs   Lab 08/25/20  0755 08/25/20  0331 08/24/20  1536 08/24/20  0353  08/23/20  1316   WBC  --  10.8 10.8 10.5  --  13.1*   HGB 9.1* 7.8* 8.1* 7.5*   < > 7.0*   PLT  --  101* 89* 89*  --  112*    < > = values in this interval not displayed.     Basic Metabolic Panel  Recent Labs   Lab 08/25/20 0331 08/24/20  1536 08/24/20  0353 08/23/20  1711   * 145* 144 144   POTASSIUM 3.7 4.2 4.4 4.9   CHLORIDE 113* 117* 116* 116*   CO2 25 18* 17* 20   BUN 89* 83* 76* 66*   CR 3.10* 2.79* 2.56* 2.34*   * 123* 138* 132*     Liver Function Tests  Recent Labs   Lab 08/25/20  0331 08/24/20  1536 08/24/20  0353 08/23/20  0838 08/23/20  0410   AST 66*  --  51* 49* 50*   ALT 22  --  16 16 14   ALKPHOS 226*  --  112 101 95   BILITOTAL 2.2*  --  4.2* 4.7* 5.1*   ALBUMIN 1.6*  --  1.9* 1.4* 1.4*   INR 2.12* 2.21* 2.15* 2.11*  --      Pancreatic Enzymes  No lab results found in last 7 days.  Coagulation Profile  Recent Labs   Lab 08/25/20  0331 08/24/20  1536 08/24/20  0353 08/23/20  0838  08/21/20  1017  08/20/20  0341  08/19/20  1805   INR 2.12*  2.21* 2.15* 2.11*   < > 1.60*   < > 1.35*   < > 1.40*   PTT  --  63*  --   --   --  48*  --  41*  --  43*    < > = values in this interval not displayed.         5. RADIOLOGY:   Recent Results (from the past 24 hour(s))   XR Chest Port 1 View    Narrative    Exam: XR CHEST PORT 1 VW, 8/25/2020 1:14 AM    Indication: Follow-up    Comparison: Chest x-ray 8/24/2020    Findings:   Portable semiupright AP radiograph of the chest. Stable position of  the LVAD device, left chest wall implantable cardiac defibrillator,  tricuspid annuloplasty, bilateral chest tubes, right IJ sheath and  mediastinal drains. Endotracheal tube tip projects 3.2 cm above the  enrico. Left IJ [place] catheter tip projects of the left pulmonary  artery. The cardiac silhouette is stable. No pneumothorax. Trace  bilateral pleural effusion. Diffuse interstitial opacities in both  lungs are grossly unchanged. Unchanged hazy left retrocardiac  opacities. The visualized upper abdomen is unremarkable.      Impression    Impression:   1. Stable supportive devices.  2. Stable cardiomegaly and mild pulmonary edema.  3. Stable trace bilateral pleural effusions and mild bibasilar  atelectasis/consolidation.    I have personally reviewed the examination and initial interpretation  and I agree with the findings.    CALVIN MCNAMARA MD       =========================================

## 2020-08-25 NOTE — PROGRESS NOTES
CLINICAL NUTRITION SERVICES - BRIEF NOTE     On 8/23, Nephrology team changed to renal formula, ordering Nepro @ 55 ml/hr. This provides 2376 kcal (38 kcal/kg), which is 125% patient's estimated energy needs. Excessive energy intake may impede ability to wean off the vent.      Nutrition changes today:  RD reduced rate to Nepro @ 40 ml/hr + Prosource (1 pkt TID)  to provide 1848 kcals (29 kcal/kg/day), 111 g PRO (1.8 g/kg/day), 701 ml free H2O, 155 g CHO and 12 g Fiber daily. This meets 100% estimated energy and protein needs.     Rosemary Wyatt, HAYLIE, LD  [id number]  Pgr: [phone]

## 2020-08-25 NOTE — PROGRESS NOTES
D: POD #6 LVAD HM 3 insertion, LV thrombus removal, and TV repair. Remained intubated, off versed (at ~0500) and fentanyl (at ~1300). No seizures noted. Coughs w/ ETT sxn, still no commands. Hemodynamically stable, now off epi (per cards 2 and intensivist teams), still on vaso gtt. No issue w/ LVAD. UOP ~30-40 ml/h, bumex boluses x2 w/ minimal response, now on bumex gtt. Renal team follows. GIB noted (significant red output from OGT after heparin gtt restarted today). CVTS and intensivist notified. Protonix IV BID. Heparin now off, pending coag test results.  I: As above. MD updated re: status/lab. Family updated re: status. Feeding goal lowered to 40 ml/h per RD. Fiber added (loose stool). Free water flush per order.  A: Critical/  P: Continue to monitor. Notify MD of changes/concerns. Pending repeat head CT. Pending GI consult.

## 2020-08-25 NOTE — PLAN OF CARE
Major Shift Events:  -Versed weaned off; no physical seizure activity noted. Remains on fentanyl gtt. Does not open eyes or move extremities.  -LVAD without alarms.  -Vaso gtt remains at 2U/hr; Epi at 0.03 mcg/kg/min  -1U PRBCs given for Hgb <8.0  -4mg Bumex given for decreasing UOP and rising CVP  -Minimal Chest tube output noted    Plan: continue to monitor  For vital signs and complete assessments, please see documentation flowsheets.

## 2020-08-25 NOTE — PROGRESS NOTES
Nephrology Progress Note  08/25/2020         Assessment & Recommendations:     Marquise Jj is a 75 year old with PMH notable for CAD, s/p CABG (4/20), ischemic cardiomyopathy, severe MR/TR and known mural thrombus, acute kidney injry, hypothyroidism, CKD3, anxiety, GERD, chronic anemia.Patient initially presented to [facility] and was transferred to [facility] for advanced heart failure therapy after developing cardiac shock. Placed on IABP and then  S/p LVAD placement and TVR repair for cardiogenic shock on 8/19 /20  c/b  convulsive status epilepticus.   She required multiple transfusions and significant resuscitation during this procedure.  Nephrology consulted for managign Oliguric LORI on CKD stage 3       LORI on CKD3, oliguric  Etiology : Likely ATN in the setting of cardiogenic shock   Renal US : Renal lengths: right- 10 cm, left- 8.3 cm. No Hydro/mass  Multiple UA  8/10, 8/12/8/18 --> elevated WBC and RBC but most recent UA on 8/18: WBC 7 , RBC 1   She is still making urine: 880 mL in the last 24 hours.  Net +792 mL  CK 67  No acute electrolyte abnormalities  Metabolic acidosis with respiratory compensation   Anemia 8.1 .  Same reported great output from OG tube after heparin gtt. restarted.  Patient on Protonix IV twice daily.  Patient on ferrous sulfate.  BMD: Hypocalcemia, patient got calcium chloride 2 g today.    Antimicrobials : Zosyn     Recommendations :    - No urgent indication to initiate RRT , but she is close to needing it. We will monitor her closely. Consent for CRRT obtained from daughter Norman - it had been placed in paper chart by my colleague Dr Marium Thompson MD   -Agree with Bumex gtt.   - Please page on-call nephrology fellow with any questions  - Avoid nephrotoxins  - Renally dose medications  - Renal diet  - Daily weights  - Strict I/Os     Recommendations were communicated to primary team via note  Patient seen and discussed with Dr Anastasia Howard MD,  FACP  Nephrology Fellow   [facility]   Pager [phone]        Interval History :   Nursing and provider notes from last 24 hours reviewed.  In the last 24 hours Marquise Jj has remained nonoliguric.  Review of Systems:   I reviewed the following systems:  Unable to obtain , intubated and sedated  Physical Exam:   I/O last 3 completed shifts:  In: 3354.91 [I.V.:1434.91; NG/GT:300]  Out: 2145 [Urine:835; Emesis/NG output:600; Stool:500; Chest Tube:210]   BP 91/56   Pulse 94   Temp 98.1  F (36.7  C) (Bladder)   Resp 18   Wt 79.5 kg (175 lb 4.3 oz)   SpO2 100%   BMI 26.65 kg/m       GENERAL APPEARANCE: no distress  EYES: no scleral icterus, pupils equal  Endo: no goiter  Pulmonary: decreased air entry in both lung bases y  CV: regular rhythm, normal rate, no rub   - Edema 1+  GI: soft, nontender, normal bowel sounds  MS: no evidence of inflammation in joints  : [place] present  SKIN: no rash on exposed surfaces  NEURO: sedated    Labs:   All labs reviewed by me  Electrolytes/Renal -   Recent Labs   Lab Test 08/25/20  0331 08/24/20  1536 08/24/20  0353 08/23/20  1711  08/23/20  0410  08/22/20  0422   * 145* 144 144   < > 141   < > 141   POTASSIUM 3.7 4.2 4.4 4.9   < > 4.7   < > 3.9   CHLORIDE 113* 117* 116* 116*   < > 113*   < > 112*   CO2 25 18* 17* 20   < > 20   < > 20   BUN 89* 83* 76* 66*   < > 55*   < > 33*   CR 3.10* 2.79* 2.56* 2.34*   < > 2.03*   < > 1.41*   * 123* 138* 132*   < > 148*   < > 167*   FERNANDO 7.7* 7.4* 7.6* 7.6*   < > 7.3*   < > 7.7*   MAG 2.7* 2.5* 2.5* 2.4*  --  2.6*  --  2.5*   PHOS  --   --   --  5.2*  --  4.6*  --  4.7*    < > = values in this interval not displayed.       CBC -   Recent Labs   Lab Test 08/25/20  0755 08/25/20  0331 08/24/20  1536 08/24/20  0353   WBC  --  10.8 10.8 10.5   HGB 9.1* 7.8* 8.1* 7.5*   PLT  --  101* 89* 89*       LFTs -   Recent Labs   Lab Test 08/25/20  0331 08/24/20  0353 08/23/20  0838   ALKPHOS 226* 112 101   BILITOTAL 2.2*  4.2* 4.7*   ALT 22 16 16   AST 66* 51* 49*   PROTTOTAL 4.8* 4.7* 4.4*   ALBUMIN 1.6* 1.9* 1.4*       Iron Panel -   Recent Labs   Lab Test 08/10/20  1052 07/01/20  0530   IRON 48 65   IRONSAT 15 17   HUSEYIN 165  --          Imaging:  All imaging studies reviewed by me.     Current Medications:    amiodarone  200 mg Oral or Feeding Tube Daily     aspirin  81 mg Oral or Feeding Tube Daily     bimatoprost  1 drop Both Eyes At Bedtime     busPIRone  10 mg Oral or Feeding Tube TID     ferrous sulfate  325 mg Oral or Feeding Tube BID     insulin aspart  1-6 Units Subcutaneous Q4H     lacosamide (VIMPAT) intermittent infusion  200 mg Intravenous BID     latanoprost  1 drop Both Eyes Daily     levETIRAcetam  2,000 mg Intravenous Q12H     levothyroxine  62.5 mcg Oral or Feeding Tube QAM AC     lidocaine  1 patch Transdermal Q24H     lidocaine   Transdermal Q8H     pantoprazole  40 mg Oral or Feeding Tube QAM AC     protein modular  1 packet Per Feeding Tube TID     QUEtiapine  50 mg Oral or Feeding Tube At Bedtime     rosuvastatin  20 mg Oral or Feeding Tube Daily     sodium chloride (PF)  3 mL Intracatheter Q8H       BETA BLOCKER NOT PRESCRIBED       bumetanide       dextrose       dextrose 5% and 0.45% NaCl + KCl 20 mEq/L 10 mL/hr at 08/21/20 0740     EPINEPHrine IV infusion ADULT 0.01 mcg/kg/min (08/25/20 1600)     fentaNYL Stopped (08/25/20 1244)     HEParin 350 Units/hr (08/25/20 1600)     midazolam Stopped (08/25/20 0500)     norepinephrine Stopped (08/23/20 1000)     BETA BLOCKER NOT PRESCRIBED       vasopressin (PITRESSIN) infusion ADULT 2 Units/hr (08/25/20 1600)     Anita Casillas MD

## 2020-08-26 NOTE — PROGRESS NOTES
Cardiology Progress Note    Assessment & Plan   In summary, Marquise Jj is a 75-year-old female with a past medical history notable for coronary artery disease, ischemic cardiomyopathy, and known mural thrombus who was transferred from [facility] for further evaluation/treatment of cardiogenic shock. Balloon pump placed 8/14. Had HM3 8/19. Chest closed 8/21.    Today's changes:  - Wean Pressors: Epi (off), vaso (1U/hr)  - Bumex 1 mg/hr to maintain UoP  - possible CRRT today for BUN >100, rising Cr (3.5 today)  - PI dropped < 2.0, keep positive 500cc (LR 500cc x1, repeat if PI not improved)  - sedation off, no seroquel either  - CT head today  - hep gtt off for possible GI bleed, likely restart tomorrow (TEG wnl)  - increased FWF  - consider thoracentesis        Neuro:   # Sedation  # concern for seizure activity  CTH head 8/20 no signs of ICH   CTH head 8/21 Somewhat limited examination secondary to metallic streak  artifact from overlying electroencephalogram leads. No definite acute  intracranial pathology. Left greater than right posterior frontal lobe  hypodensities are likely from streak artifact.  keppra 2 g PO bid   vimpat 200 BID  Versed gtt at 8 (wean as tolerated)  - veeg  - f/u neurocritical care recs    - CT head today    Cardio:  # Cardiogenic shock with vasoplegic component   # ICM: NYHA IV / ACC  # Severe MR/TR s/p tricuspid valve repair with Elkins MC3 Annuloplasty Ring size T30  # s/p LVAD HM3 on 8/19/2020  LAVD speed 5300, flow 3.4-3.7, pi 2.5-3.7, power 3.5-3.6   Presents with cardiogenic shock. On NE, cardiac index approximately 1.37 on admission. Severely elevated biventricular filling pressures. Etiology of her decompensation appears to be progression of her cardiomyopathy. Despite medical treatment, she has been hospitalized twice since returning to [place], both with low output. No viability in her LAD territory, and doubt that PCI to her circumflex would make meaningful LV  recovery. Hemodynamics improved with dobutamine, did not tolerate attempt to wean inotropics. RHC removed on 8/13 after clotting off, replaced on 8/14 after patient with increased work of breathing. CI of 1.1, balloon pump placed with CI of 1.6-1.8 and improvement in symptoms/hemodynamics. Had LVAD HM3 placed on 8/19. CVP today around 10 range. Chest closed on 8/21.   - Monitoring CVP for RV/fluid overload (CVP goal < 14)  - continue to wean pressors as above  - Hemodynamics q6hrs, monitor lactate. Goal CI>2., SvO2 >55%  - holding hep gtt as above      Date 8/9 8/9 8/10 8/11 08/13/20 08/15/20 08/16/20 08/17/20 8/18/20 8/19 8/20 8/21*   CVP 12 9 3 4 8 9 6 8 11 5 10 13   Mean PA  35 30 21 25 23 21 20 35/18 35/18 - 30/12 28/14   PCWP  18 14 12 x 13 14  13 - -    Oxy HGB  59 64 61 61 60 47 62 69 54 - 68 66   CI/CO 2.3 2.3 2.4/4.2 2.3 2.2 1.8 2.8/4.9 4.1 4.3/2.5 - 6/3.3 6.9/3.8   SVR 1100 1100 1200 1400 1316 1900 6173 223 8620 - 1025 715   Device     IABP 1:1 IABP 1:1 IABP 1:1 IABP 1:1 IABP 1:1 No IABP No IABP No IABP   * epi 0.1, vaso 0.5     8/22: CVP 12, PA 28/14/19, PCWP 14, CO/CI 9.0/4.8. . Epi 0.1, vaso 1.  8/23: CVP 16, PA 30/14, CI/CO 4.4/8.0, , SvO2 71%  8/24: CVP 14, PA 44/20/28, PCWP 16, CI/CO 7.3/3.9, SvO2 68% epi 0.06, vaso 2  8/25: CVP 16, PA 44/20/29, PCWP 22, CI/CO  3.6, SvO2 82%, vaso 2, epi 0.03  8/26: CVP 13, PA 40/20/26, PCWP 12, CI/CO 7/ 3.6, SvO2 74%, vaso 1    # CAD s/p CABG 4/2020 (KURT to RCA and LIMA to LAD) and PCI to RCA 7/20  - Stop home plavix po qd (8/13) for LVAD surgery  - aspirin 81mg PO qd   - c/w home crestor    # Mural thrombus  - removed during surgery on 8/19    # A-flutter  Converted to NSR on 8/15    - Continue amiodarone 200mg po qd      Pulm:  # Acute hypoxic respiratory failure  Intubated on 8/19   Vent setting: RR 14, , PEEP 5, FiO2 40%   Secondary to pulmonary edema bilateral pleural effusions.   - On nasal cannula, titrate to saturation of  >92%     Renal/Electrolytes   # Hyperkalemia/Hypokalemia   # Acute kidney injury on chronic kidney disease, stage III  Likely ATN due to hypoxic insult  - renal consult and diuretic challenge. May need dialysis  - Trend potassium and creatinine q12hrs  - Electrolyte replacement protocol  - Monitor UOP   - renal to initiate CVVH today     GI:  # Constipation  # Severe protein calorie malnutrition  - Nutrition through tube feeds at 30 mL/hr    - Senna-docusate bid scheduled  - Miralax bid prn constipation  - f/u GI recs for possible GI bleed     ID:   # UTI vs ASB  Periprocedural abx as per surgical team   - levoloxacin, zosyn, rifampin, vancomycin, fluconazole    - Completed Ceftriaxone treatment 1g IV qd (8/14 - 8/18)     Hem/Onc  # Mural thrombus removed on 8/19  # Anemia, mild  D/t frequent blood draws and procedures  - Monitor hgb    Endo  # Hypothyroidism   - Continue PTA levothyroxine     Nutrition: NJ with TF today  DVT Prophylaxis: mechanical   Code Status: Full Code    Waylon Garcia MD, MSc  Cardiovascular Disease Fellow  [facility]    Discussed with Dr Craig     Interval History   NAEON. Weaned off epi, on low dose vasopressin. Possible GI bleed, GI consulted, hep discontinued.     Physical Exam   Temp: 96.1  F (35.6  C) Temp src: Bladder   Pulse: 86   Resp: 22 SpO2: 100 % O2 Device: Mechanical Ventilator    Vitals:    08/24/20 0000 08/25/20 0500 08/26/20 0500   Weight: 75.1 kg (165 lb 9.1 oz) 79.5 kg (175 lb 4.3 oz) 77.1 kg (169 lb 15.6 oz)     Vital Signs with Ranges  Temp:  [96.1  F (35.6  C)-98.6  F (37  C)] 96.1  F (35.6  C)  Pulse:  [77-99] 86  Resp:  [16-22] 22  MAP:  [65 mmHg-92 mmHg] 89 mmHg  Arterial Line BP: ()/(28-82) 103/76  FiO2 (%):  [40 %] 40 %  SpO2:  [98 %-100 %] 100 %  I/O last 3 completed shifts:  In: 3034.44 [I.V.:1294.44; NG/GT:360]  Out: 3562 [Urine:767; Emesis/NG output:1225; Stool:1100; Chest Tube:470]    RETIRE: Heart Rate: 71, Blood pressure 91/56, pulse  86, temperature 96.1  F (35.6  C), resp. rate 22, weight 77.1 kg (169 lb 15.6 oz), SpO2 100 %.  169 lbs 15.59 oz     GEN: intubated , sedated , on VEEG   CV: RRR, Hum of HM3   LUNGS: Clear to auscultation bilaterally  ABD: Active bowel sounds, soft, no abdominal tenderness.   EXT: Trace LE edema   NEURO: opening eyes    Medications     BETA BLOCKER NOT PRESCRIBED       bumetanide 1 mg/hr (08/26/20 0800)     dextrose       dextrose 5% and 0.45% NaCl + KCl 20 mEq/L 10 mL/hr at 08/21/20 0740     EPINEPHrine IV infusion ADULT Stopped (08/25/20 1815)     fentaNYL Stopped (08/25/20 1244)     HEParin Stopped (08/25/20 1700)     midazolam Stopped (08/25/20 0500)     norepinephrine Stopped (08/23/20 1000)     BETA BLOCKER NOT PRESCRIBED       vasopressin (PITRESSIN) infusion ADULT 1 Units/hr (08/26/20 0800)       amiodarone  200 mg Oral or Feeding Tube Daily     aspirin  81 mg Oral or Feeding Tube Daily     bimatoprost  1 drop Both Eyes At Bedtime     busPIRone  10 mg Oral or Feeding Tube TID     ferrous sulfate  325 mg Oral or Feeding Tube BID     insulin aspart  1-6 Units Subcutaneous Q4H     lacosamide (VIMPAT) intermittent infusion  200 mg Intravenous BID     latanoprost  1 drop Both Eyes Daily     levETIRAcetam  2,000 mg Intravenous Q12H     levothyroxine  62.5 mcg Oral or Feeding Tube QAM AC     lidocaine  1 patch Transdermal Q24H     lidocaine   Transdermal Q8H     pantoprazole (PROTONIX) IV  40 mg Intravenous BID AC     protein modular  1 packet Per Feeding Tube TID     QUEtiapine  50 mg Oral or Feeding Tube At Bedtime     rosuvastatin  20 mg Oral or Feeding Tube Daily     sodium chloride (PF)  3 mL Intracatheter Q8H       Data   Recent Labs   Lab 08/26/20  0353 08/26/20  0122 08/25/20  1656 08/25/20  1548 08/25/20  0755 08/25/20  0331   WBC  --  10.8  --  11.9*  --  10.8   HGB  --  6.5*  --  8.4* 9.1* 7.8*   MCV  --  91  --  90  --  88   PLT  --  103*  --  116*  --  101*   INR 1.76*  --  2.26*  --   --  2.12*   NA  148*  --   --  147*  --  148*   POTASSIUM 3.8  --   --  3.9  3.9  --  3.7   CHLORIDE 113*  --   --  114*  --  113*   CO2 24  --   --  23  --  25   *  --   --  97*  --  89*   CR 3.56*  --   --  3.33*  --  3.10*   ANIONGAP 12  --   --  10  --  10   FERNANDO 8.6  --   --  7.4*  --  7.7*   *  --   --  114*  --  126*   ALBUMIN 1.4*  --   --  1.5*  --  1.6*   PROTTOTAL 4.6*  --   --  4.8*  --  4.8*   BILITOTAL 1.1  --   --  1.4*  --  2.2*   ALKPHOS 250*  --   --  262*  --  226*   ALT 23  --   --  24  --  22   AST 63*  --   --  67*  --  66*       Recent Results (from the past 24 hour(s))   XR Chest Port 1 View    Narrative    Exam: XR CHEST PORT 1 VW, 8/26/2020 1:55 AM    Indication: f/u    Comparison: Chest x-ray 20/5/2020    Findings:   Portable supine AP radiograph the chest. Left IJ [place]-Zia catheter  tip projecting over the left pulmonary artery. Stable position of the  LVAD device, left chest wall implantable cardiac defibrillator, right  IJ sheath, pericardial and mediastinal drains and bilateral chest  tubes. Enteric tubes course below the diaphragm. Postsurgical changes  of the chest including median sternotomy wires. Stable cardiac  silhouette. No appreciable pneumothorax. Trace bilateral pleural  effusions are unchanged. Visualized upper abdomen is unremarkable.      Impression    Impression:   1. Stable supportive devices  2. Stable cardiomegaly and mild interstitial pulmonary edema.  3. Stable trace bilateral pleural effusions and mild bibasilar  atelectasis/consolidation.    I have personally reviewed the examination and initial interpretation  and I agree with the findings.    CRYSTAL BLANKENSHIP MD

## 2020-08-26 NOTE — PROGRESS NOTES
LVAD Social Work Services Progress Note      Date of Initial Social Work Evaluation: 8/12/2020  Collaborated with: Spoke to pt's dtr, Norman, via phone ([phone])    Data: Pt is s/p LVAD implant and TV repair on 8/19/2020. Chest closed on 8/21/2020. Post-op complicated by seizures. Pt remains intubated and sedated.   Contacted dtr to check-in with family. Reviewed with her that pt is nearing the end of her Medicare days, but that writer has been informed pt has reserve days and an additional 365 days under her supplement. Dtr appreciative of the information.     Intervention: Supportive Phone Call     Assessment: Dtr is concerned about her dad's health as he has been coming to visit pt daily. She is also concerned that her dad is not always understanding the information as he is very hard of hearing. Dtr is requesting medical updates from team. Writer contacted Cards 2 and asked that they provide dtr with update.   Education provided by SW: Ongoing Social Work support, Medicare days  Plan:    Discharge Plans in Progress: None     Barriers to d/c plan: Medical Stability     Follow up Plan: SW to continue to follow.

## 2020-08-26 NOTE — PROGRESS NOTES
Care Coordinator Progress Note    Admission Date/Time:  8/8/2020  Attending MD:  Catarino Farley MD    Data  Chart reviewed, discussed with interdisciplinary team.   Patient was admitted for:    Cardiogenic shock (H)  LV (left ventricular) mural thrombus  Heart failure (H)  Status post cardiac surgery.    Assessment / Coordination of Care:  Pt is s/p redo sternotomy, LVAD (heartmate III), and TV repair on 8/19/20.  Chest was left open and packed. S/p chest closure and washout 08/21/20.   Pt vented and sedated.  CVICU team requested care conf. arrange with all the providers and family to provide update.  CVTS ( Dr. Farley), CVICU ( Dr. Brand) cards 2 and Neuro ICU team are available on Friday, 8/28 at 13:00.  RNCC attempt to call pt spouse but no answer.  RNCC called pt dtr, Norman # [phone] and discussed about the care conf.  Norman agreed with the date and time.  Norman stated she will inform her dad too and he will be here in person for the care conf. and other family members will call in.  RNCC provided  conference call in number.    Plan  Anticipated Discharge Date:  TBD.  Anticipated Discharge Plan:   TBD.  Care conf.arranged for Friday, 8/28 at 13:00.  Providers will meet at 12:45 in 4E conf. Room for providers meeting prior meting family.  RNCC will cont to follow plan of care.

## 2020-08-26 NOTE — PROGRESS NOTES
CV ICU PROGRESS NOTE  August 26, 2020       CO-MORBIDITIES:   Cardiogenic shock (H)  (primary encounter diagnosis)  LV (left ventricular) mural thrombus  Heart failure (H)  Heart failure (H)  Status post cardiac surgery    ASSESSMENT: Marquise Jj is a 75 year old female 75 year-old female with PMH notable for coronary artery disease s/p CABG (4/20), ischemic cardiomyopathy, severe MR/TR and known mural thrombus who is admitted to the CV ICU s/p redo sternotomy, LVAD (heartmate III), and TV repair on 8/19/20 with Dr. Farley. Chest was left open and packed. S/p chest closure and washout 08/21/20.       PLAN SUMMARY:   1- Epilepsia Partialis Continua; resolved per vEEG    Follow-up with NeuroCrit r: management of non-convulsive focal status epilepticus    Off Versed; monitor.    2- RV Dysfunction (improving) and Vasoplegia    MAP > 70, CVP 10-12    Wean vaso as able. .      Diuresis: Bumex TID    Aspirin, rosuvastatin    3- ATN    Bumex TID    I/O net goal 0/-1 L    Free water 30 Q4H for hyper-Na        PLAN:   Neuro/ pain/ sedation:  #Acute post-operative pain   #Likely anoxic brain injury   #Epilepsia Partialis Continua; resolved  - Monitor neurological status. Notify the MD for any acute changes in exam.  - Seroquel 50 at bedtime + 25 PRN   - Keppra, lacosamide, per NeuroCrit  - Tylenol toi   - Off versed gtt      Pulmonary:   #Acute hypoxic respiratory failure  - Monitor CXR, ABG  - Monitor CT output   Ventilation Mode: CMV/AC  (Continuous Mandatory Ventilation/ Assist Control)  FiO2 (%): 40 %  Rate Set (breaths/minute): 14 breaths/min  Tidal Volume Set (mL): 400 mL  PEEP (cm H2O): 5 cmH2O  Oxygen Concentration (%): 40 %  Resp: 18     Cardiovascular:    #Acute on chronic decompensated heart failure with reduced ejection fraction: NYHA IV / ACC D  #Cardiogenic shock, possible vasoplegia   #Ischemic cardiomyopathy  #Coronary artery disease s/p CABG 4/20 in [place], s/p PCI to RCA 7/20  #Mural thrombus  #Severe  MR/TR  #S/p LVAD     MAP > 70, CVP 10-12    Wean off vasopressin gtt    Diuresis     Hold low-intensity heparin infusion    Aspirin, rosuvastatin    Co-managing with Cards-2; recs greatly appreciated      GI/Nutrition:   - NPO, OGT, NJT  - TF   - Pantoprazole ppx   - Bowel regimen: senna-colace, miralax.  - No indication for parenteral nutrition.      Renal/Fluid Balance/ Electrolytes:   #Oliguria in the setting of likely ATN  - Will monitor intake and output.  - ICU electrolyte replacement protocol  - Goal-directed fluid therapy  - Diuresis as above  - Nephrology consulted      Endocrine:    #Glycemic control  #Hx hypothyroid   - ISS, medium intensity   - Synthroid daily       ID/ Antibiotics:  - Completed perioperative antibiotic regimen: vancomycin, levofloxacin, fluconazole, Zosyn       Heme:     #Acute perioperative blood loss anemia  #Thrombocytopenia   - Hgb goal > 8  - Holding low-intensity heparin gtt        Prophylaxis:    - Mechanical prophylaxis for DVT.  - Holding LIH in the setting of occult bleeding   - PPI  - Bowel regimen      MSK:    - PT and OT consulted. Appreciate recs.      Lines/ tubes/ drains:  - ETT  - A-line  - R internal jugular CVC  - PAC  - PIV  - Rojo  - Chest tubes       Disposition:  - CV ICU.    Patient seen, findings and plan discussed with CV ICU staff, Dr. Brand.    -----------------------------------  Ronaldo Li MD  Anesthesiology Resident, PGY-3  *[id number]  ====================================    SUBJECTIVE:   Intubated, deeply sedated/RASS -4    OBJECTIVE:   1. VITAL SIGNS:   Temp:  [96.3  F (35.7  C)-98.6  F (37  C)] 96.3  F (35.7  C)  Pulse:  [77-99] 77  Resp:  [16-19] 18  MAP:  [65 mmHg-92 mmHg] 77 mmHg  Arterial Line BP: ()/(28-82) 85/60  FiO2 (%):  [40 %] 40 %  SpO2:  [98 %-100 %] 100 %  Ventilation Mode: CMV/AC  (Continuous Mandatory Ventilation/ Assist Control)  FiO2 (%): 40 %  Rate Set (breaths/minute): 14 breaths/min  Tidal Volume Set (mL): 400 mL  PEEP  (cm H2O): 5 cmH2O  Oxygen Concentration (%): 40 %  Resp: 18      2. INTAKE/ OUTPUT:   I/O last 3 completed shifts:  In: 3034.44 [I.V.:1294.44; NG/GT:360]  Out: 3562 [Urine:767; Emesis/NG output:1225; Stool:1100; Chest Tube:470]    3. PHYSICAL EXAMINATION:   General: Intubated, NAD  Neuro: Deeply sedated, PERRL   Resp: mechanically ventilated, minimal vent settings   CV: RRR  Abdomen: Soft, Non-distended, Non-tender  Incisions: sternotomy wound c/d/i s/p closure  Extremities: warm and well perfused    4. INVESTIGATIONS:   Arterial Blood Gases   Recent Labs   Lab 08/26/20  0353 08/25/20  1548 08/25/20  0331 08/24/20  2358   PH 7.37 7.37 7.40 7.44   PCO2 36 34* 35 32*   PO2 160* 129* 141* 135*   HCO3 21 20* 22 22     Complete Blood Count   Recent Labs   Lab 08/26/20  0122 08/25/20  1548 08/25/20  0755 08/25/20  0331 08/24/20  1536   WBC 10.8 11.9*  --  10.8 10.8   HGB 6.5* 8.4* 9.1* 7.8* 8.1*   * 116*  --  101* 89*     Basic Metabolic Panel  Recent Labs   Lab 08/26/20  0353 08/25/20  1548 08/25/20  0331 08/24/20  1536   * 147* 148* 145*   POTASSIUM 3.8 3.9  3.9 3.7 4.2   CHLORIDE 113* 114* 113* 117*   CO2 24 23 25 18*   * 97* 89* 83*   CR 3.56* 3.33* 3.10* 2.79*   * 114* 126* 123*     Liver Function Tests  Recent Labs   Lab 08/26/20  0353 08/25/20  1656 08/25/20  1548 08/25/20  0331 08/24/20  1536 08/24/20  0353   AST 63*  --  67* 66*  --  51*   ALT 23  --  24 22  --  16   ALKPHOS 250*  --  262* 226*  --  112   BILITOTAL 1.1  --  1.4* 2.2*  --  4.2*   ALBUMIN 1.4*  --  1.5* 1.6*  --  1.9*   INR 1.76* 2.26*  --  2.12* 2.21* 2.15*     Pancreatic Enzymes  No lab results found in last 7 days.  Coagulation Profile  Recent Labs   Lab 08/26/20  0353 08/25/20  1656 08/25/20  0331 08/24/20  1536  08/21/20  1017  08/20/20  0341   INR 1.76* 2.26* 2.12* 2.21*   < > 1.60*   < > 1.35*   PTT  --  85*  --  63*  --  48*  --  41*    < > = values in this interval not displayed.         5. RADIOLOGY:   No  results found for this or any previous visit (from the past 24 hour(s)).    =========================================

## 2020-08-26 NOTE — PROGRESS NOTES
Cardiology Progress Note    Assessment & Plan   In summary, Marquise Jj is a 75-year-old female with a past medical history notable for coronary artery disease, ischemic cardiomyopathy, and known mural thrombus who was transferred from [facility] for further evaluation/treatment of cardiogenic shock. Balloon pump placed 8/14. Had HM3 8/19. Chest closed 8/21.    Today's changes:  - goal even, Bumex 4mg IV TID  - LVAD flows acceptable, CI high-normal   - wean Trey to off. Goal CVP 10-12  - continue to wean epi by 0.01, held at 0.03, wean off. Wean vaso next given vasoplegia  - hep gtt started  - continue to wean sedation      Neuro:   # Sedation  # concern for seizure activity  CTH head 8/20 no signs of ICH   CTH head 8/21 Somewhat limited examination secondary to metallic streak  artifact from overlying electroencephalogram leads. No definite acute  intracranial pathology. Left greater than right posterior frontal lobe  hypodensities are likely from streak artifact.  keppra 2 g PO bid   vimpat 200 BID  Versed gtt at 8 (wean as tolerated)  - veeg  - f/u neurocritical care recs      Cardio:  # Cardiogenic shock with vasoplegic component   # ICM: NYHA IV / ACC  # Severe MR/TR s/p tricuspid valve repair with Elkins MC3 Annuloplasty Ring size T30  # s/p LVAD HM3 on 8/19/2020  LAVD speed 5300, flow 3.4-3.7, pi 2.5-3.7, power 3.5-3.6   Presents with cardiogenic shock. On NE, cardiac index approximately 1.37 on admission. Severely elevated biventricular filling pressures. Etiology of her decompensation appears to be progression of her cardiomyopathy. Despite medical treatment, she has been hospitalized twice since returning to [place], both with low output. No viability in her LAD territory, and doubt that PCI to her circumflex would make meaningful LV recovery. Hemodynamics improved with dobutamine, did not tolerate attempt to wean inotropics. RHC removed on 8/13 after clotting off, replaced on 8/14 after  patient with increased work of breathing. CI of 1.1, balloon pump placed with CI of 1.6-1.8 and improvement in symptoms/hemodynamics. Had LVAD HM3 placed on 8/19. CVP today around 10 range. Chest closed on 8/21.   - Monitoring CVP for RV/fluid overload (CVP goal < 14)  - Bumex 4mg IV x1, continue diuril    - Decreased epi to 0.06 mcg/kg/min plan to wean down as tolerated, c/w vaso @ 2 given vasoplegia  - Hemodynamics q6hrs, monitor lactate. Goal CI>2., SvO2 >55%  - fixed rate heparin ggt      Date 8/9 8/9 8/10 8/11 08/13/20 08/15/20 08/16/20 08/17/20 8/18/20 8/19 8/20 8/21*   CVP 12 9 3 4 8 9 6 8 11 5 10 13   Mean PA  35 30 21 25 23 21 20 35/18 35/18 - 30/12 28/14   PCWP  18 14 12 x 13 14  13 - -    Oxy HGB  59 64 61 61 60 47 62 69 54 - 68 66   CI/CO 2.3 2.3 2.4/4.2 2.3 2.2 1.8 2.8/4.9 4.1 4.3/2.5 - 6/3.3 6.9/3.8   SVR 1100 1100 1200 1400 1316 1900 7366 424 1947 - 1025 715   Device     IABP 1:1 IABP 1:1 IABP 1:1 IABP 1:1 IABP 1:1 No IABP No IABP No IABP   * epi 0.1, vaso 0.5     8/22: CVP 12, PA 28/14/19, PCWP 14, CO/CI 9.0/4.8. . Epi 0.1, vaso 1.  8/23: CVP 16, PA 30/14, CI/CO 4.4/8.0, , SvO2 71%  8/24: CVP 14, PA 44/20/28, PCWP 16, CI/CO 7.3/3.9, SvO2 68% epi 0.06, vaso 2  8/25: CVP 16, PA 44/20/29, PCWP 22, CI/CO  3.6, SvO2 82%, vaso 2, epi 0.03    # CAD s/p CABG 4/2020 (KURT to RCA and LIMA to LAD) and PCI to RCA 7/20  - Stop home plavix po qd (8/13) for LVAD surgery  - aspirin 81mg PO qd   - c/w home crestor    # Mural thrombus  - removed during surgery on 8/19    # A-flutter  Converted to NSR on 8/15    - Continue amiodarone 200mg po qd      Pulm:  # Acute hypoxic respiratory failure  Intubated on 8/19   Vent setting: RR 14, , PEEP 5, FiO2 40%   Secondary to pulmonary edema bilateral pleural effusions.   - On nasal cannula, titrate to saturation of >92%     Renal/Electrolytes   # Hyperkalemia/Hypokalemia   # Acute kidney injury on chronic kidney disease, stage III  Likely ATN due to hypoxic  insult  - renal consult and diuretic challenge. May need dialysis  - Trend potassium and creatinine q12hrs  - Electrolyte replacement protocol  - Monitor UOP      GI:  # Constipation  # Severe protein calorie malnutrition  - Nutrition through tube feeds at 30 mL/hr    - Senna-docusate bid scheduled  - Miralax bid prn constipation     ID:   # UTI vs ASB  Periprocedural abx as per surgical team   - levoloxacin, zosyn, rifampin, vancomycin, fluconazole    - Completed Ceftriaxone treatment 1g IV qd (8/14 - 8/18)     Hem/Onc  # Mural thrombus removed on 8/19  # Anemia, mild  D/t frequent blood draws and procedures  - Monitor hgb    Endo  # Hypothyroidism   - Continue PTA levothyroxine     Nutrition: NJ with TF today  DVT Prophylaxis: mechanical   Code Status: Full Code    Waylon Garcia MD, MSc  Cardiovascular Disease Fellow  [facility]    Discussed with Dr Craig     Interval History   NAEON. Epi held at 0.03. Vaso held at 2U. Tolerated well. CVP high overnight with low Urine output, given additional dose of Bumex     Physical Exam   Temp: 96.1  F (35.6  C) Temp src: Bladder   Pulse: 86   Resp: 22 SpO2: 100 % O2 Device: Mechanical Ventilator    Vitals:    08/24/20 0000 08/25/20 0500 08/26/20 0500   Weight: 75.1 kg (165 lb 9.1 oz) 79.5 kg (175 lb 4.3 oz) 77.1 kg (169 lb 15.6 oz)     Vital Signs with Ranges  Temp:  [95.4  F (35.2  C)-98.6  F (37  C)] 97.5  F (36.4  C)  Pulse:  [81-94] 88  Resp:  [16-21] 16  MAP:  [63 mmHg-89 mmHg] 69 mmHg  Arterial Line BP: ()/(50-78) 77/62  FiO2 (%):  [40 %] 40 %  SpO2:  [99 %-100 %] 100 %  Ventilation Mode: CMV/AC  (Continuous Mandatory Ventilation/ Assist Control)  FiO2 (%): 40 %  Rate Set (breaths/minute): 14 breaths/min  Tidal Volume Set (mL): 400 mL  PEEP (cm H2O): 5 cmH2O  Oxygen Concentration (%): 40 %  Resp: 16    GEN: intubated , sedated , on VEEG   CV: RRR, LVAD at 5300 rpm  LUNGS: Clear to auscultation bilaterally  ABD: Active bowel sounds, soft, no  abdominal tenderness.   EXT: Trace LE edema   NEURO: opening eyes    Data     Narrative      Exam: XR CHEST PORT 1 VW, 8/25/2020 1:14 AM     Indication: Follow-up     Comparison: Chest x-ray 8/24/2020     Findings:   Portable semiupright AP radiograph of the chest. Stable position of  the LVAD device, left chest wall implantable cardiac defibrillator,  tricuspid annuloplasty, bilateral chest tubes, right IJ sheath and  mediastinal drains. Endotracheal tube tip projects 3.2 cm above the  enrico. Left IJ Madison-Zia catheter tip projects of the left pulmonary  artery. The cardiac silhouette is stable. No pneumothorax. Trace  bilateral pleural effusion. Diffuse interstitial opacities in both  lungs are grossly unchanged. Unchanged hazy left retrocardiac  opacities. The visualized upper abdomen is unremarkable.        Impression     Impression:   1. Stable supportive devices.  2. Stable cardiomegaly and mild pulmonary edema.  3. Stable trace bilateral pleural effusions and mild bibasilar  atelectasis/consolidation.     I have personally reviewed the examination and initial interpretation  and I agree with the findings.     CALVIN MCNAMARA MD

## 2020-08-26 NOTE — PLAN OF CARE
Major Shift Events: Requiring minimal pressor support to maintain MAP>65. Unable to wean off entirely. Intermittent episodes of low PI and low MAP that last a couple of minutes happening throughout the day. Began withdrawing to painful stimuli this afternoon. AM movements may have been reflexes per Neuro crit. Movement noted in BLE and LUE. Creat continues to trend upwards. HD line placed for CRRT initiation this evening.     Plan: Continue to monitor changes in neuro status. Start CRRT.     For vital signs and complete assessments, please see documentation flowsheets.

## 2020-08-26 NOTE — CONSULTS
GASTROENTEROLOGY CONSULTATION      Date of Admission:  8/8/2020           Reason for Consultation:   We were asked by Jacquelin Stafford of CVTS to evaluate this patient with blood per OG tube         ASSESSMENT AND RECOMMENDATIONS:   Assessment:  75 year old female with a history of coronary artery disease status post CABG 4 2020, ischemic cardiomyopathy, severe tricuspid and mitral regurg unknown mural thrombus, is currently admitted to cardiac ICU post LVAD placement on 8/19 in the setting of cardiogenic shock due to decompensated heart failure.  Since 1 day ago, bloody output has been noted from her OG tube, in the setting of heparin.  Visualization of blood in OG tube prompted this GI consult.    The likely etiology of blood in her OG tube includes ischemic injury to the stomach, stress ulcers, NG tube trauma.  No evidence of liver disease to consider hemorrhage.  Since her stools are green suspect that this is small amount of blood with likely minimal contribution to her hemodynamics changes/increased pressor requirements.    At this point, would manage her conservatively with IV PPI twice daily, which was appropriately started by ICU team yesterday.  From GI standpoint, the goal hemoglobin would be 7, likely higher from cardiovascular standpoint.     Recommendations  -Continue IV PPI twice daily  -Management of heparin per primary team  -Okay to continue tube feeds today, if possible, hold at midnight tonight  -Tomorrow a.m., flush NG tube with about 500 cc of water, and aspirate to see if any fresh blood is seen  -No plans for endoscopic evaluation today.  We will continue to follow the patient to see if endoscopic evaluation would be necessary in the coming days    GI service will continue to follow.    Gastroenterology outpatient follow up recommendations: TBD    Thank you for involving us in this patient's care. Please do not hesitate to contact the GI service with any questions or concerns.     Pt  care plan discussed with Dr. Pereira, GI staff physician.    Ashley MULLER MD  Gastroenterology Fellow  Division of Gastroenterology, Hepatology and Nutrition  [facility]  Text page Pager [phone]    Attending Attestation:  I saw the patient with the Fellow and agree with the findings and the plan of care as documented in the Fellow's note. In summary, the patient is an 75 year old female with a history of coronary artery disease status post CABG 4 2020, ischemic cardiomyopathy, severe tricuspid and mitral regurg unknown mural thrombus, is currently admitted to cardiac ICU post LVAD placement on 8/19 in the setting of cardiogenic shock due to decompensated heart failure. . We have been consulted to assess the patient's bloody OG tube.    The patient was seen in the cardiac ICU and she is critically ill on 2 pressors. She was noted to have bloody output from all four drains exiting her chest as well as blood tinged OGT output. The patient could have some degree of oozing from the upper GI tract secondary to ischemia, critical illness, pressor use, OGT suction trauma in the setting of her cardiogenic shock, LVAD, and heparin.     The concern with performing endoscopy under these circumstances is that the extra procedure may be more risk than therapeutic benefit as the only lesions we would intervene upon would be an ulcer with a visible vessel either bleeding or non-bleeding, or an ulcer with an adherent clot. The pretest probability of finding a lesion we can intervene on is low given the other possible etiologies for bloody OGT output. The rectal bag has green/brown liquid stool which further suggests the lack of a significant GI bleed that one would see with a bleeding gastric ulcer.     We ask that the patient's enteric feeding be held after midnight in the event an upper endoscopy is needed. Recommend flushing the OGT and suctioning to determine if the output clears.     William Pereira DO    of Medicine  Division of Gastroenterology, Hepatology, and Nutrition  [facility]    -----------------------------------------------------------------------------------------------           History of Present Illness:   Marquise Jj is a 75 year old female with a history of coronary artery disease status post CABG 4 2020, ischemic cardiomyopathy, severe tricuspid and mitral regurg unknown mural thrombus, is currently admitted to cardiac ICU post LVAD placement on 8/19 in the setting of cardiogenic shock due to decompensated heart failure.  Postoperatively, she has had nonconvulsive status epilepticus. Since 1 day ago, bloody output has been noted from her OG tube, prompting this GI consult.    Patient is intubated and sedated in the ICU, history is obtained from chart review.    Per patient's history and physical, patient initially was diagnosed with coronary artery disease in April of this year after presenting with nausea, and subsequently underwent angiogram revealing multivessel disease and underwent CABG.  Ejection fraction was noted to be severely reduced.  Over the summer had admission for decompensated heart disease, and then presented again earlier this month to outside hospital with worsening heart failure symptoms, and was found to have large mural thrombus and worsening cardiac function, and was noted to be in cardiogenic shock and then [facility].  She then underwent placement of LVAD on 8/19.  Since the surgery, been on heparin drip, until 1 day ago (8/25, was stopped due to bleeding in the OG, as well as increased bloody output from her pleural/pericardial drains.).  Home Plavix was stopped for LVAD surgery on 8/13.  Aspirin 81 mg was continued.  Patient has been on once daily PPI since 8/19, started twice daily PPI with her first dose last night.    Per nursing, bloody output started yesterday.  In/out documentation show NG output of  1300 mL on August 25, 400 mL so far on August 26.           Data:   Key relevant labs:   Labs reviewed in epic -hemoglobin right before surgery was between 7 and 9.  Postoperatively her hemoglobin has been mostly between 8 and 10.  On August 22, her hemoglobin went down to 6.8, attributed to anemia of surgical blood loss.    Her blood levels went down similarly on 8/23 attributed to frequent blood draws.  Maintain her hemoglobin above 7 for about 3 days, and earlier this morning her hemoglobin was down to 6.5.  She received 1 unit of blood on 8/26, 1 unit on 8/25, and 2 unit on 8/24, 1 unit on 8/22, 1 unit on 8/20.  Intraoperatively on 8/19, she obtained 4 units of PRBC, 2 units FFP, 2 units of platelets, Kcentra due to Intra-Op bleeding.    She has very mild leukocytosis, and mild thrombocytopenia.     Base metabolic panel shows hypernatremia, elevated BUN and creatinine to 3.56.    Liver panel shows elevated alk phos, elevated AST to 60s, bilirubin normal.    Key relevant imaging:  Abdominal x-ray from today shows feeding tube in the second portion of duodenum    CT abdomen pelvis on 8/10 reviewed.  Liver with 2 hypoattenuating cysts without any hepatic mass, normal gallbladder, pancreas, spleen.  Bowel with normal caliber small and large bowel.  No abnormal wall thickening or inflammatory change.  No peritoneal free fluid.         Previous Endoscopy:   No recent EGD and colonoscopy         Medications:     Medications Prior to Admission   Medication Sig Dispense Refill Last Dose     acetaminophen (TYLENOL) 325 MG tablet Take 2 tablets (650 mg) by mouth every 6 hours as needed for mild pain 100 tablet 0      apixaban ANTICOAGULANT (ELIQUIS) 5 MG tablet Take 1 tablet (5 mg) by mouth 2 times daily First dose to be on 7/19/20 PM dose. 180 tablet 3      bimatoprost (LUMIGAN) 0.01 % SOLN Place 1 drop into both eyes At Bedtime         busPIRone (BUSPAR) 10 MG tablet Take 1 tablet (10 mg) by mouth 3 times daily 90 tablet  0      clopidogrel (PLAVIX) 75 MG tablet Take 1 tablet (75 mg) by mouth daily 30 tablet 11      co-enzyme Q-10 100 MG CAPS capsule Take 100 mg by mouth 2 times daily         dorzolamide-timolol (COSOPT) 2-0.5 % ophthalmic solution Place 1 drop into both eyes 2 times daily         ferrous sulfate (FEROSUL) 325 (65 Fe) MG tablet Take 1 tablet (325 mg) by mouth 2 times daily 60 tablet 0      latanoprost (XALATAN) 0.005 % ophthalmic solution Place 1 drop into both eyes daily In the morning.        levothyroxine (SYNTHROID/LEVOTHROID) 125 MCG tablet Take 0.5 tablets (62.5 mcg) by mouth every morning (before breakfast) 15 tablet 0      Multiple Vitamins-Minerals (PRESERVISION AREDS 2 PO) Take 1 tablet by mouth 2 times daily        nitroGLYcerin (NITROSTAT) 0.4 MG sublingual tablet For chest pain place 1 tablet under the tongue every 5 minutes for 3 doses. If symptoms persist 5 minutes after 1st dose call 911. 20 tablet 0      norepinephrine (LEVOPHED) 16-0.9 MG/250ML-% SOLN Inject 2.061-27.48 mcg/min into the vein continuous        omeprazole (PRILOSEC) 20 MG DR capsule Take 20 mg by mouth daily         QUEtiapine (SEROQUEL) 25 MG tablet Take 50 mg by mouth At Bedtime        rosuvastatin (CRESTOR) 20 MG tablet Take 20 mg by mouth daily        sacubitril-valsartan (ENTRESTO) 24-26 MG per tablet 1/2 tablet in PM daily 60 tablet 1              Physical Exam:   BP 91/56   Pulse 78   Temp 96.4  F (35.8  C)   Resp 17   Wt 77.1 kg (169 lb 15.6 oz)   SpO2 99%   BMI 25.84 kg/m    Wt:   Wt Readings from Last 2 Encounters:   08/26/20 77.1 kg (169 lb 15.6 oz)   08/08/20 68.7 kg (151 lb 8 oz)      Constitutional: Intubated and sedated in the ICU  Ears/nose/mouth/throat: ET tube in place.  OG tube in place with sanguinous output.  NJ tube in place with tube feeds ongoing  CV: LVAD in place  Respiratory: Intubated and on the ventilator  Abd: Mid abdomen with gauze covering her multiple drains going through her upper abdomen into  the chest cavity.  OG tube in place with sanguinous output.  Rectal tube in place with green stool output.  No evidence of red or black in the rectal tube.  Skin: warm, perfused, no jaundice  Neuro: Sedated  Psych: Sedated          Past Medical History:   Reviewed and edited as appropriate  Past Medical History:   Diagnosis Date     CAD (coronary artery disease)      ICD (implantable cardioverter-defibrillator) in place     Primary prevention AICD placed in [place] in May 2020.     Ischemic cardiomyopathy     LVEF less than 20%.     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     Status post coronary artery bypass grafting     Done in [facility] in April 2020.  LIMA to diagonal (as LAD dissected) and KURT to the right coronary artery.            Past Surgical History:   Reviewed and edited as appropriate   Past Surgical History:   Procedure Laterality Date     ARTHROPLASTY REVISION HIP Right 11/16/2017    Procedure: ARTHROPLASTY REVISION HIP;  Right total hip arthroplasty revision.;  Surgeon: Jozef Garcia MD;  Location: [facility] OR     cabg  04/2020     CV HEART CATHETERIZATION WITH POSSIBLE INTERVENTION N/A 7/2/2020    Procedure: Heart Catheterization with Possible Intervention;  Surgeon: Kaden Franco MD;  Location:  HEART CARDIAC CATH LAB     CV INTRA-AORTIC BALLOON PUMP INSERTION N/A 8/14/2020    Procedure: Intra-Aortic Balloon Pump Insertion;  Surgeon: Cale Armijo MD;  Location:  HEART CARDIAC CATH LAB     CV PCI STENT DRUG ELUTING N/A 7/2/2020    Procedure: Percutaneous Coronary Intervention Stent Drug Eluting;  Surgeon: Kaden Franco MD;  Location:  HEART CARDIAC CATH LAB     CV RIGHT HEART CATH N/A 7/2/2020    Procedure: Right Heart Cath;  Surgeon: Kaden Franco MD;  Location:  HEART CARDIAC CATH LAB     CV RIGHT HEART CATH N/A 8/14/2020    Procedure: Right Heart Cath with leave in [place];   Surgeon: Jose Padilla MD;  Location:  HEART CARDIAC CATH LAB     INSERT VENTRICULAR ASSIST DEVICE LEFT (HEARTMATE II) N/A 8/19/2020    Procedure: Redo Sternotomy, On Cardiopulmonary Bypass, Insertion of Left Ventricular Assist Device (HeartMate III), Tricuspid Valve Repair with Elkins MC3 Annuloplasty Ring size T30, Left Ventricular Thrombectomy.;  Surgeon: Catarino Farley MD;  Location:  OR     IRRIGATION AND DEBRIDEMENT CHEST WASHOUT, COMBINED N/A 8/21/2020    Procedure: IRRIGATION AND DEBRIDEMENT, CHEST POSSIBLE CLOSURE;  Surgeon: Live Claudio MD;  Location:  OR            Social History:   Full social history could not be obtained from the patient due to intubated and sedated status         Family History:   Reviewed and edited as appropriate  Family History   Problem Relation Age of Onset     Coronary Artery Disease No family hx of      Heart Failure No family hx of             Allergies:   Reviewed and edited as appropriate     Allergies   Allergen Reactions     Combigan [Brimonidine Tartrate-Timolol] Swelling     Swollen eyelids     Ativan [Lorazepam] Anxiety and Other (See Comments)     Increased confusion              Review of Systems:     Full review of systems could not be obtained from the patient because of her intubated and sedated status

## 2020-08-26 NOTE — PROGRESS NOTES
[facility] [place]   Neurology Critical Care Consult Progress Note  Marquise Jj  [id number]  08/26/2020    Subjective:  No significant change on neurologic exam off sedation.     Objective   Physical Examination   Vitals: BP 91/56   Pulse 78   Temp 96.4  F (35.8  C)   Resp 17   Wt 77.1 kg (169 lb 15.6 oz)   SpO2 99%   BMI 25.84 kg/m    General: Adult female patient, lying in bed intubated and sedated  HEENT: ETT and OG in place  Cardiac: LVAD  Chest: intubated, chest tubes in place draining serosanguinous fluid  Extremities: Warm, grossly edematous, toes with red/pink color     Neuro:  Mental status:  Eyes partially open, does not attend, follow verbal commands, or track.  Cranial nerves: does not blink to threat, pupils 2, 2.5 mm and reactive to light, mid gaze, face symmetric.  Motor: no withdrawal to painful stim in lowers, mild withdraw in bilateral uppers  Reflexes:  no clonus, toes briskly up-going bilaterally.  Sensory: withdrawal in upper extremities    Investigations    Recent Labs   Lab 08/26/20  0857 08/26/20  0353 08/26/20  0122 08/25/20  1656 08/25/20  1548  08/25/20  0331   WBC 11.9*  --  10.8  --  11.9*  --  10.8   HGB 9.7*  --  6.5*  --  8.4*   < > 7.8*   MCV 89  --  91  --  90  --  88   *  --  103*  --  116*  --  101*   INR  --  1.76*  --  2.26*  --   --  2.12*   NA  --  148*  --   --  147*  --  148*   POTASSIUM 3.5 3.8  --   --  3.9  3.9  --  3.7   CHLORIDE  --  113*  --   --  114*  --  113*   CO2  --  24  --   --  23  --  25   BUN  --  102*  --   --  97*  --  89*   CR  --  3.56*  --   --  3.33*  --  3.10*   ANIONGAP  --  12  --   --  10  --  10   FERNANDO  --  8.6  --   --  7.4*  --  7.7*   GLC  --  113*  --   --  114*  --  126*   ALBUMIN  --  1.4*  --   --  1.5*  --  1.6*   PROTTOTAL  --  4.6*  --   --  4.8*  --  4.8*   BILITOTAL  --  1.1  --   --  1.4*  --  2.2*   ALKPHOS  --  250*  --   --  262*  --  226*   ALT  --  23  --   --  24  --  22   AST   "--  63*  --   --  67*  --  66*    < > = values in this interval not displayed.         Impression   Ms. Marquise Jj is a 75 year old female with complex cardiac history who is POD 4 from LVAD placement complicated by post-procedural encephalopathy and two clinical focal seizures, and later focal non-convulsive status epilepticus, now resolved, with further seizures and frequent epileptiform discharges, improved from prior on eeg.     Etiology of focal seizures is unclear at this time, but suggests possible underlying R occipital cortical abnormality or lesion. MRI now not possible due to LVAD, repeat CT head with significant artifact however some evidence of L posterior frontal lobe cortical hypodensity possibly representing infarct. These radiographic findings (and lack thereof) are somewhat equivocal and should not delay anticoagulation for cardiac reasons if indicated. 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 Versed now off.     Recommendations  Continue keppra 2 g IV BID  Continue vimpat 200 mg IV BID  Continue vEEG, will follow  Okay for anticoagulation if indicated    We will continue to follow.    Patient was seen and discussed with Dr. Augusto Jensen MD  PGY2 Neurology  [phone]      "

## 2020-08-27 NOTE — PROCEDURES
Dialysis catheter placement       The indications and risks of the temporary dialysis catheter placement, including infection, bleeding severe enough to require transfusion, unintended trauma to adjacent organs or blood vessels  or death, were explained, understood and agreed by the patient's daughter Norman and she gave phone consent      Patient was prepped and draped in the usual sterile manner. Placement site was anesthetized with 1% lidocaine, 5 ml. Real-time ultrasound was used to clearly visualize the rigth femoral vein  . The vein was canulated with 3  attempt(s) and dilated. Estimated blood loss during the procedure was  5 ml. There were no immediate complications. The patient tolerated the procedure without any significant distress or adverse event. Dr. Sales, Surgery Resident assisted with procedure . Dr Oconnor present/available for the key portions of the procedure.     Vinny Howard MD FACP  Nephrology Fellow   [facility]   Pager [phone]

## 2020-08-27 NOTE — PROGRESS NOTES
[facility], [place]   Neurology Critical Care Consult Progress Note  Marquise Jj  [id number]  08/27/2020    Subjective:  No significant change on neurologic exam off sedation. Today EGD. Off EEG    Objective   Physical Examination   Vitals: BP 91/56   Pulse 96   Temp 98.6  F (37  C) (Pulmonary Artery)   Resp 28   Wt 81.4 kg (179 lb 7.3 oz)   SpO2 100%   BMI 27.29 kg/m    General: Adult female patient, lying in bed intubated  HEENT: ETT and OG in place  Cardiac: LVAD  Chest: intubated, chest tubes in place draining serosanguinous fluid  Extremities: Warm, grossly edematous, toes with red/pink color     Neuro: off sedation  Mental status:  Eyes partially open, does not attend, follow verbal commands, or track.  Cranial nerves: does not blink to threat, pupils equal and reactive to light, mid gaze, face symmetric.  Motor: mild withdrawal in RLE to painful stim in lowers, no withdraw in bilateral uppers today  Reflexes:  no clonus, toes briskly up-going bilaterally.  Sensory: withdrawal in RLE    Investigations    Recent Labs   Lab 08/27/20  0347 08/26/20  2159 08/26/20  1822  08/26/20  1720 08/26/20  0857 08/26/20  0353 08/26/20  0122 08/25/20  1656   WBC 13.5*  --   --   --   --  11.9*  --  10.8  --    HGB 7.2*  --   --   --  9.2* 9.7*  --  6.5*  --    MCV 89  --   --   --   --  89  --  91  --      --   --   --   --  119*  --  103*  --    INR 1.41*  --   --   --   --   --  1.76*  --  2.26*   * 147*  --   --   --   --  148*  --   --    POTASSIUM 3.6 3.7 3.5   < > 3.6 3.5 3.8  --   --    CHLORIDE 111* 116*  --   --   --   --  113*  --   --    CO2 23 21  --   --   --   --  24  --   --    BUN 80* 92*  --   --   --   --  102*  --   --    CR 2.63* 2.92*  --   --   --   --  3.56*  --   --    ANIONGAP 12 10  --   --   --   --  12  --   --    FERNANDO 8.4* 7.7*  --   --   --   --  8.6  --   --    * 125*  --   --   --   --  113*  --   --    ALBUMIN 1.7*  --   --   --    "--   --  1.4*  --   --    PROTTOTAL 4.6*  --   --   --   --   --  4.6*  --   --    BILITOTAL 0.9  --   --   --   --   --  1.1  --   --    ALKPHOS 216*  --   --   --   --   --  250*  --   --    ALT 21  --   --   --   --   --  23  --   --    AST 45  --   --   --   --   --  63*  --   --     < > = values in this interval not displayed.         Impression   Ms. Marquise Jj is a 75 year old female with complex cardiac history who is POD 4 from LVAD placement complicated by post-procedural encephalopathy and two clinical focal seizures, and later focal non-convulsive status epilepticus, now resolved.     Etiology of focal seizures is unclear at this time, but suggests possible underlying R occipital cortical abnormality or lesion. MRI not possible due to LVAD, repeat CT head with significant artifact however some evidence of L posterior frontal lobe cortical hypodensity possibly representing infarct. These radiographic findings (and lack thereof) are somewhat equivocal and should not delay anticoagulation for cardiac reasons if indicated. 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 Versed now off, exam not improving significantly.    -family meeting 8/28 at 1pm    Recommendations  Continue keppra 2 g IV BID  Continue vimpat 200 mg IV BID  Okay for anticoagulation if indicated      Thank you for involving neurologic critical care in the care of Marquise Jj.  The Neuro Crit Service is available for family meetings. Please do not hesitate to call with questions/concerns.            Patient was seen and discussed with Dr. Augusto Jensen MD  PGY2 Neurology  [phone]    To page stroke neurology after hours or on a subsequent day, click here: AMCOM  Choose \"On Call\" tab at top, then search dropdown box for \"Neurology Adult\" & press Enter, look for Neuro ICU/Stroke  "

## 2020-08-27 NOTE — PROGRESS NOTES
CRRT STATUS NOTE    DATA:  Time:  7:48 AM  Pressures WNL:  YES  Filter Status:  WDL    Problems Reported/Alarms Noted:  none    Supplies Present:  YES    ASSESSMENT:  Patient Net Fluid Balance:  8/26 +419  8/27 0700 +373  Vital Signs:  Vented 40% LVAD Epi and Vaso to keep MAP>70, BP labile  Labs:  K 3.6  Na 146  Hgb 7.2  Goals of Therapy:  I=O, unable to meet goals of therapy due to labile BP    INTERVENTIONS:   Albumin given per bedside Rn for MAP<70    PLAN:  Continue per Renal orders.  Call CRRT resource RN [phone] with concerns.

## 2020-08-27 NOTE — OR NURSING
EGD in ICU.  Pt with large clots in stomach.  Removed clots using 2 different Joe nets.  Epi injected via sclera needle.  3 clips used.  Pt tolerated procedure.      Writer in room 1150  Consent verified  Scope in 1212  Scope out 1327  Writer out of room at 1345.

## 2020-08-27 NOTE — PROGRESS NOTES
Cardiology Progress Note    Assessment & Plan   In summary, Marquise Jj is a 75-year-old female with a past medical history notable for coronary artery disease, ischemic cardiomyopathy, and known mural thrombus who was transferred from [facility] for further evaluation/treatment of cardiogenic shock. Balloon pump placed 8/14. Had HM3 8/19. Chest closed 8/21.    Today's changes:    - hypotensive overnight, coffee ground emesis, drop in Hbg  - head CT indeterminate  - EEG off  - Wean epi, favor vasopressin for MAP < 65  - CRRT: goal I=O  - Bumex gtt off for hypotension, to rest kidneys (increases oxygen consumption in kidneys)  - sedation remains off  - remove SGC today  - EGD today  - CT head today or tomorrow when EGD done       Neuro:   # Sedation  # concern for seizure activity  CTH head 8/20 no signs of ICH   CTH head 8/21 Somewhat limited examination secondary to metallic streak  artifact from overlying electroencephalogram leads. No definite acute  intracranial pathology. Left greater than right posterior frontal lobe  hypodensities are likely from streak artifact.  keppra 2 g PO bid   vimpat 200 BID  - veeg  - f/u neurocritical care recs      Cardio:  # Cardiogenic shock with vasoplegic component   # ICM: NYHA IV / ACC  # Severe MR/TR s/p tricuspid valve repair with Elkins MC3 Annuloplasty Ring size T30  # s/p LVAD HM3 on 8/19/2020  LAVD speed 5300, flow 3.4-3.7, pi 2.5-3.7, power 3.5-3.6   Presents with cardiogenic shock. On NE, cardiac index approximately 1.37 on admission. Severely elevated biventricular filling pressures. Etiology of her decompensation appears to be progression of her cardiomyopathy. Despite medical treatment, she has been hospitalized twice since returning to [place], both with low output. No viability in her LAD territory, and doubt that PCI to her circumflex would make meaningful LV recovery. Hemodynamics improved with dobutamine, did not tolerate attempt to wean  inotropics. RHC removed on 8/13 after clotting off, replaced on 8/14 after patient with increased work of breathing. CI of 1.1, balloon pump placed with CI of 1.6-1.8 and improvement in symptoms/hemodynamics. Had LVAD HM3 placed on 8/19. CVP today around 10 range. Chest closed on 8/21.   - Monitoring CVP for RV/fluid overload (CVP goal < 14)  - continue to wean pressors as above  - Hemodynamics q6hrs, monitor lactate. Goal CI>2., SvO2 >55%  - holding hep gtt as above      Date 8/9 8/9 8/10 8/11 08/13/20 08/15/20 08/16/20 08/17/20 8/18/20 8/19 8/20 8/21*   CVP 12 9 3 4 8 9 6 8 11 5 10 13   Mean PA  35 30 21 25 23 21 20 35/18 35/18 - 30/12 28/14   PCWP  18 14 12 x 13 14  13 - -    Oxy HGB  59 64 61 61 60 47 62 69 54 - 68 66   CI/CO 2.3 2.3 2.4/4.2 2.3 2.2 1.8 2.8/4.9 4.1 4.3/2.5 - 6/3.3 6.9/3.8   SVR 1100 1100 1200 1400 1316 1900 2660 581 6015 - 1025 715   Device     IABP 1:1 IABP 1:1 IABP 1:1 IABP 1:1 IABP 1:1 No IABP No IABP No IABP   * epi 0.1, vaso 0.5     8/22: CVP 12, PA 28/14/19, PCWP 14, CO/CI 9.0/4.8. . Epi 0.1, vaso 1.  8/23: CVP 16, PA 30/14, CI/CO 4.4/8.0, , SvO2 71%  8/24: CVP 14, PA 44/20/28, PCWP 16, CI/CO 7.3/3.9, SvO2 68% epi 0.06, vaso 2  8/25: CVP 16, PA 44/20/29, PCWP 22, CI/CO  3.6, SvO2 82%, vaso 2, epi 0.03  8/26: CVP 13, PA 40/20/26, PCWP 12, CI/CO 7/ 3.6, SvO2 74%, vaso 1  8/27: CVP 13, PA 38/18/26, PCWP 12-14, CI/CO 9.4/4.7, , PVR ~0.5, SvO2 73%, vaso 1, epi 0.05       # CAD s/p CABG 4/2020 (KURT to RCA and LIMA to LAD) and PCI to RCA 7/20  - Stop home plavix po qd (8/13) for LVAD surgery  - aspirin 81mg PO qd   - c/w home crestor    # Mural thrombus  - removed during surgery on 8/19    # A-flutter  Converted to NSR on 8/15    - Continue amiodarone 200mg po qd      Pulm:  # Acute hypoxic respiratory failure  Intubated on 8/19   Vent setting: RR 14, , PEEP 5, FiO2 40%   Secondary to pulmonary edema bilateral pleural effusions.   - On nasal cannula, titrate to  saturation of >92%     Renal/Electrolytes   # Hyperkalemia/Hypokalemia   # Acute kidney injury on chronic kidney disease, stage III  Likely ATN due to hypoxic insult  - renal consult and diuretic challenge. May need dialysis  - Trend potassium and creatinine q12hrs  - Electrolyte replacement protocol  - Monitor UOP   - continue CVVH      GI:  # Constipation  # Severe protein calorie malnutrition  - Nutrition through tube feeds at 30 mL/hr    - Senna-docusate bid scheduled  - Miralax bid prn constipation  - f/u GI recs    ID:   # UTI vs ASB  Periprocedural abx as per surgical team   - levoloxacin, zosyn, rifampin, vancomycin, fluconazole    - Completed Ceftriaxone treatment 1g IV qd (8/14 - 8/18)     Hem/Onc  # Mural thrombus removed on 8/19  # Anemia, mild  D/t frequent blood draws and procedures  - Monitor hgb    Endo  # Hypothyroidism   - Continue PTA levothyroxine     Nutrition: NJ with TF today  DVT Prophylaxis: mechanical   Code Status: Full Code    Waylon Garcia MD, MSc  Cardiovascular Disease Fellow  [facility]    Discussed with Dr Craig     Interval History   NAEON. On low dose pressors. Remains intubated and sedated.     Physical Exam   Temp: 98.6  F (37  C) Temp src: Bladder   Pulse: 93   Resp: 21 SpO2: 100 % O2 Device: Mechanical Ventilator    Vitals:    08/25/20 0500 08/26/20 0500 08/27/20 0345   Weight: 79.5 kg (175 lb 4.3 oz) 77.1 kg (169 lb 15.6 oz) 81.4 kg (179 lb 7.3 oz)     Vital Signs with Ranges  Temp:  [95  F (35  C)-98.6  F (37  C)] 98.6  F (37  C)  Pulse:  [] 93  Resp:  [15-23] 21  MAP:  [50 mmHg-98 mmHg] 74 mmHg  Arterial Line BP: ()/(28-88) 92/58  FiO2 (%):  [40 %] 40 %  SpO2:  [83 %-100 %] 100 %  I/O last 3 completed shifts:  In: 3866.53 [I.V.:1412.53; Other:4; NG/GT:490; IV Piggyback:500]  Out: 2978 [Urine:560; Emesis/NG output:875; Other:253; Stool:900; Chest Tube:390]    RETIRE: Heart Rate: 71, Blood pressure 91/56, pulse 93, temperature 98.6  F (37  C),  resp. rate 21, weight 81.4 kg (179 lb 7.3 oz), SpO2 100 %.  179 lbs 7.27 oz     GEN: intubated , sedated , on VEEG   CV: RRR, Hum of HM3   LUNGS: Clear to auscultation bilaterally  ABD: Active bowel sounds, soft, no abdominal tenderness.   EXT: Trace LE edema   NEURO: opening eyes    Medications     BETA BLOCKER NOT PRESCRIBED       bumetanide Stopped (08/26/20 1831)     dextrose       CRRT replacement solution 12.5 mL/kg/hr (08/27/20 0610)     EPINEPHrine IV infusion ADULT 0.05 mcg/kg/min (08/27/20 0700)     fentaNYL Stopped (08/25/20 1244)     - MEDICATION INSTRUCTIONS -       CRRT replacement solution 2.911 mL/kg/hr (08/26/20 1842)     CRRT replacement solution 12.5 mL/kg/hr (08/27/20 0610)     BETA BLOCKER NOT PRESCRIBED       vasopressin 1 Units/hr (08/27/20 0700)       amiodarone  200 mg Oral or Feeding Tube Daily     aspirin  81 mg Oral or Feeding Tube Daily     bimatoprost  1 drop Both Eyes At Bedtime     busPIRone  10 mg Oral or Feeding Tube TID     [Held by provider] ferrous sulfate  325 mg Oral or Feeding Tube BID     insulin aspart  1-6 Units Subcutaneous Q4H     lacosamide (VIMPAT) intermittent infusion  200 mg Intravenous BID     latanoprost  1 drop Both Eyes Daily     levETIRAcetam  2,000 mg Intravenous Q12H     levothyroxine  62.5 mcg Oral or Feeding Tube QAM AC     lidocaine  1 patch Transdermal Q24H     lidocaine   Transdermal Q8H     pantoprazole (PROTONIX) IV  40 mg Intravenous Q12H     protein modular  1 packet Per Feeding Tube TID     QUEtiapine  50 mg Oral or Feeding Tube At Bedtime     rosuvastatin  20 mg Oral or Feeding Tube Daily     sodium chloride (PF)  3 mL Intracatheter Q8H       Data   Recent Labs   Lab 08/27/20  0347 08/26/20  2159 08/26/20  1822  08/26/20  1720 08/26/20  0857 08/26/20  0353 08/26/20  0122 08/25/20  1656   WBC 13.5*  --   --   --   --  11.9*  --  10.8  --    HGB 7.2*  --   --   --  9.2* 9.7*  --  6.5*  --    MCV 89  --   --   --   --  89  --  91  --      --    --   --   --  119*  --  103*  --    INR 1.41*  --   --   --   --   --  1.76*  --  2.26*   * 147*  --   --   --   --  148*  --   --    POTASSIUM 3.6 3.7 3.5   < > 3.6 3.5 3.8  --   --    CHLORIDE 111* 116*  --   --   --   --  113*  --   --    CO2 23 21  --   --   --   --  24  --   --    BUN 80* 92*  --   --   --   --  102*  --   --    CR 2.63* 2.92*  --   --   --   --  3.56*  --   --    ANIONGAP 12 10  --   --   --   --  12  --   --    FERNANDO 8.4* 7.7*  --   --   --   --  8.6  --   --    * 125*  --   --   --   --  113*  --   --    ALBUMIN 1.7*  --   --   --   --   --  1.4*  --   --    PROTTOTAL 4.6*  --   --   --   --   --  4.6*  --   --    BILITOTAL 0.9  --   --   --   --   --  1.1  --   --    ALKPHOS 216*  --   --   --   --   --  250*  --   --    ALT 21  --   --   --   --   --  23  --   --    AST 45  --   --   --   --   --  63*  --   --     < > = values in this interval not displayed.       Recent Results (from the past 24 hour(s))   CT Head w/o Contrast    Narrative    CT HEAD W/O CONTRAST 8/26/2020 10:52 AM    History: Altered level of consciousness (LOC), unexplained; s/p LVAD.  Decreased neuro status, unresponsive. New onset seizures. Interval  change.   ICD-10:    Comparison: 8/21/2020    Technique: Using multidetector thin collimation helical acquisition  technique, axial, coronal and sagittal CT images from the skull base  to the vertex were obtained without intravenous contrast.    Findings: Study is mildly limited by metallic streak artifact from  overlying EEG leads. Hypodensities seen over the left frontal and left  parietal lobes are likely secondary to the beam hardening artifact.  There is no intracranial hemorrhage, mass effect, or midline shift.  Gray/white matter differentiation in both cerebral hemispheres is  otherwise preserved. Patchy periventricular white matter  hypoattenuation which is nonspecific, but likely secondary to chronic  small vessel ischemic disease. Ventricles are  proportionate to the  cerebral sulci. The basal cisterns are clear.    The bony calvaria and the bones of the skull base are normal. The  visualized portions of the paranasal sinuses and mastoid air cells are  clear.       Impression    Impression:    1. Study limited by metallic streak and beam hardening artifact.   2. Hypodensities seen over the left frontal and left parietal lobes  are likely secondary to beam hardening artifact. Consider removing EEG  leads and other metallic foreign bodies overlying the skull and  repeating the head CT on the FLASH CT scanner if there is clinical  concern.  3. Mild leukoaraiosis.    FRACISCO COLEMAN MD   XR Abdomen Port 1 View    Narrative    EXAM: XR ABDOMEN PORT 1 VW, 8/26/2020 1:57 PM    INDICATION: feeding tube position    COMPARISON: 8/17/2020    FINDINGS:  Portable supine AP abdominal radiograph.  Feeding tube tip projects over the expected location of the second  portion of duodenum. In the reconstruction to report. Projects over  the proximal stomach. Nonobstructive bowel gas pattern. No evidence of  pneumatosis or portal venous gas. LVAD in the left upper quadrant.  Partially visualized mediastinal drains, cardiac lead and Magnolia-Zia  catheter..      Impression    IMPRESSION:   1. Feeding tube tip projects over the expected location of the second  portion of duodenum.  2. No evidence of bowel obstruction.    JOSE GUADALUPE GIBSON MD   XR Chest Port 1 View    Narrative    Exam: XR CHEST PORT 1 VW, 8/27/2020 1:54 AM    Indication: Follow-up    Comparison: Chest x-ray 20/6/2020    Findings:   Portable supine AP radiograph of the chest. Endotracheal tube tip  projects 3.2 cm above the enrico. Left IJ Magnolia-Zia catheter tip  projects of the left pulmonary artery. Stable position of the left  chest wall implantable cardiac defibrillator and leads, the LVAD  device, bilateral chest tubes, mediastinal drains and the right IJ  sheath. Stable cardiac silhouette. No pneumothorax. Small  trace  pleural effusions. Visualized upper abdomen is unremarkable.      Impression    Impression:   1. Stable position of supportive devices.  2. Stable cardiomegaly and mild interstitial pulmonary edema.  3. Stable trace bilateral pleural effusions and mild bibasilar  atelectasis/consolidation.    I have personally reviewed the examination and initial interpretation  and I agree with the findings.    BRIEN MILLAN MD

## 2020-08-27 NOTE — PROGRESS NOTES
GASTROENTEROLOGY PROGRESS NOTE    Date: 08/27/2020     ASSESSMENT:  75 year old female with a history of coronary artery disease status post CABG 4 2020, ischemic cardiomyopathy, severe tricuspid and mitral regurg unknown mural thrombus, is currently admitted to cardiac ICU post LVAD placement on 8/19 in the setting of cardiogenic shock due to decompensated heart failure.  Since 1 day ago, bloody output has been noted from her OG tube, in the setting of heparin.  Visualization of blood in OG tube prompted this GI consult.     The likely etiology of blood in her OG tube includes ischemic injury to the stomach, stress ulcers, NG tube trauma.  No evidence of liver disease to consider hemorrhage.  Since her stools are green suspect that this is small amount of blood with likely minimal contribution to her hemodynamics changes/increased pressor requirements.     Patient has been managed conservatively so far in the past couple of days with IV PPI. Has not had any black stools in the rectal tube.    I flushed the OG tube with ~200 mL of water, with return of coffee-ground material without any bright red/fresh appearing blood this morning.    We will plan on doing an EGD sometime today in the ICU to evaluate for any sources of bleeding. Again, this would likely be diagnostic study as the differential is     RECOMMENDATIONS:  - Stopped tube feeds (post pyloric ) at 8:30 AM  - Plan for an EGD later today. CVICU team ok with us using small amount of sedation (given her neuro status, may need minimal, and per ICU team will not impact workup for encephalopathy)      Gastroenterology follow up recommendations: Pending clinical course.      Thank you for involving us in this patient's care. Please do not hesitate to contact the GI service with any questions or concerns.      Pt care plan discussed with Dr. Pereira, GI staff physician.    Ashley MULLER MD  Gastroenterology Fellow  Division of Gastroenterology, Hepatology and  Nutrition  [facility]      Attending Attestation:  I saw the patient with the Fellow and agree with the findings and the plan of care as documented in the Fellow's note. In summary, the patient is an 75 year old female with a history of coronary artery disease status post CABG 4 2020, ischemic cardiomyopathy, severe tricuspid and mitral regurg unknown mural thrombus, is currently admitted to cardiac ICU post LVAD placement on 8/19 in the setting of cardiogenic shock due to decompensated heart failure.     Plan for EGD in the ICU to assess for etiology of bloody OGT output and anemia.     William Pereira DO   of Medicine  Division of Gastroenterology, Hepatology, and Nutrition  [facility]      _______________________________________________________________    24 Hour Events:  2 g hgb drop  Continues to require pressors  OG output still dark appearing    Subjective:  Pt intubated and sedated    Objective:  Blood pressure 91/56, pulse 91, temperature 98.6  F (37  C), resp. rate 28, weight 81.4 kg (179 lb 7.3 oz), SpO2 100 %.    Constitutional: Intubated and sedated in the ICU  Ears/nose/mouth/throat: ET tube in place.  OG tube in place with sanguinous output.  NJ tube in place with tube feeds ongoing  CV: LVAD in place  Respiratory: Intubated and on the ventilator  Abd: Mid abdomen with gauze covering her multiple drains going through her upper abdomen into the chest cavity.  OG tube in place, flushed, output is coffee ground, no bright red blood.  Rectal tube in place with green stool output.  No evidence of red or black in the rectal tube.  Skin: warm, perfused, no jaundice  Neuro: Sedated  Psych: Sedated      LABS:  BMP  Recent Labs   Lab 08/27/20  0347 08/26/20  2159 08/26/20  1822 08/26/20  1746  08/26/20  0353 08/25/20  1548   * 147*  --   --   --  148* 147*   POTASSIUM 3.6 3.7 3.5 2.3*   < > 3.8 3.9  3.9   CHLORIDE 111* 116*  --   --   --  113* 114*   FERNANDO 8.4*  7.7*  --   --   --  8.6 7.4*   CO2 23 21  --   --   --  24 23   BUN 80* 92*  --   --   --  102* 97*   CR 2.63* 2.92*  --   --   --  3.56* 3.33*   * 125*  --   --   --  113* 114*    < > = values in this interval not displayed.     CBC  Recent Labs   Lab 08/27/20  0347  08/26/20  0857 08/26/20  0122 08/25/20  1548   WBC 13.5*  --  11.9* 10.8 11.9*   RBC 2.45*  --  3.21* 2.19* 2.77*   HGB 7.2*   < > 9.7* 6.5* 8.4*   HCT 21.7*  --  28.7* 19.9* 24.8*   MCV 89  --  89 91 90   MCH 29.4  --  30.2 29.7 30.0   MCHC 33.2  --  33.8 32.7 32.4   RDW 16.1*  --  15.9* 17.3* 17.0*     --  119* 103* 116*    < > = values in this interval not displayed.     INR  Recent Labs   Lab 08/27/20  0347 08/26/20  0353 08/25/20  1656 08/25/20  0331   INR 1.41* 1.76* 2.26* 2.12*     LFTs  Recent Labs   Lab 08/27/20  0347 08/26/20  0353 08/25/20  1548 08/25/20  0331   ALKPHOS 216* 250* 262* 226*   AST 45 63* 67* 66*   ALT 21 23 24 22   BILITOTAL 0.9 1.1 1.4* 2.2*   PROTTOTAL 4.6* 4.6* 4.8* 4.8*   ALBUMIN 1.7* 1.4* 1.5* 1.6*      PANCNo lab results found in last 7 days.    IMAGING:  Reviewed

## 2020-08-27 NOTE — PROGRESS NOTES
Nephrology Progress Note  08/26/2020         Assessment & Recommendations:     Marquise Jj is a 75 year old with PMH notable for CAD, s/p CABG (4/20), ischemic cardiomyopathy, severe MR/TR and known mural thrombus, acute kidney injry, hypothyroidism, CKD3, anxiety, GERD, chronic anemia.Patient initially presented to [facility] and was transferred to [facility] for advanced heart failure therapy after developing cardiac shock. Placed on IABP and then  S/p LVAD placement and TVR repair for cardiogenic shock on 8/19 /20  c/b  convulsive status epilepticus.   She required multiple transfusions and significant resuscitation during this procedure.  Nephrology consulted for managign Oliguric LORI on CKD stage 3       LORI on CKD3, oliguric   Etiology : Likely ATN in the setting of cardiogenic shock   Renal US : Renal lengths: right- 10 cm, left- 8.3 cm. No Hydro/mass  Multiple UA  8/10, 8/12/8/18 --> elevated WBC and RBC but most recent UA on 8/18: WBC 7 , RBC 1   CK 67  No acute electrolyte abnormalities  Metabolic acidosis with respiratory compensation   Anemia  6.5 . Got 2 units PRBC . Now Hb 9.2 . Heparin drip was stopped .  Patient on Protonix IV twice daily.  Patient on ferrous sulfate.  BMD: Hypocalcemia, patient got calcium chloride 2 g today.    Recommendations :    - UOP slowly decreasing and in need for clearence . Discussed with primary team who are in agreement to start Renal replacement therapy  But would like to keep her net positive  RIGHT FEMORAL Non tunelled HD catheter placed .  Patient started on CRRT ,I = O   Daughter Norman gave consent for HD cathter placement today over phone and she agrees with plan of care . She had already given phone consent to us earlier for RRT     -- Agree [facility] Bumex gtt  - Please page on-call nephrology fellow with any questions  - Avoid nephrotoxins  - Renally dose medications  - Renal diet  - Daily weights  - Strict I/Os     Recommendations were  communicated to primary team via note  Patient seen and discussed with Dr Anastasia Howard MD, FACP  Nephrology Fellow   [facility]   Pager [phone]        Interval History :   Nursing and provider notes from last 24 hours reviewed.  In the last 24 hours Marquise Jj has remained nonoliguric.  Review of Systems:   I reviewed the following systems:  Unable to obtain , intubated and sedated  Physical Exam:   I/O last 3 completed shifts:  In: 3805.54 [I.V.:1685.54; NG/GT:360; IV Piggyback:500]  Out: 4032 [Urine:777; Emesis/NG output:1425; Stool:1300; Chest Tube:530]   BP 91/56   Pulse 75   Temp 95.4  F (35.2  C)   Resp 21   Wt 77.1 kg (169 lb 15.6 oz)   SpO2 96%   BMI 25.84 kg/m       GENERAL APPEARANCE: no distress  EYES: no scleral icterus, pupils equal  Endo: no goiter  Pulmonary: decreased air entry in both lung bases y  CV: regular rhythm, normal rate, no rub   - Edema 1+  GI: soft, nontender, normal bowel sounds  MS: no evidence of inflammation in joints  : [place] present  SKIN: no rash on exposed surfaces  NEURO: sedated, responds minimally to painful stimuli    Labs:   All labs reviewed by me  Electrolytes/Renal -   Recent Labs   Lab Test 08/26/20  1822 08/26/20  1746 08/26/20  1720  08/26/20  0353 08/25/20  1548 08/25/20  0331  08/23/20  1711  08/23/20  0410   NA  --   --   --   --  148* 147* 148*   < > 144   < > 141   POTASSIUM 3.5 2.3* 3.6   < > 3.8 3.9  3.9 3.7   < > 4.9   < > 4.7   CHLORIDE  --   --   --   --  113* 114* 113*   < > 116*   < > 113*   CO2  --   --   --   --  24 23 25   < > 20   < > 20   BUN  --   --   --   --  102* 97* 89*   < > 66*   < > 55*   CR  --   --   --   --  3.56* 3.33* 3.10*   < > 2.34*   < > 2.03*   GLC  --   --   --   --  113* 114* 126*   < > 132*   < > 148*   FERNANDO  --   --   --   --  8.6 7.4* 7.7*   < > 7.6*   < > 7.3*   MAG  --   --   --   --  2.9* 2.7* 2.7*   < > 2.4*  --  2.6*   PHOS  --   --   --   --   --  6.2*  --   --  5.2*  --  4.6*     < > = values in this interval not displayed.       CBC -   Recent Labs   Lab Test 08/26/20  1720 08/26/20  0857 08/26/20  0122 08/25/20  1548   WBC  --  11.9* 10.8 11.9*   HGB 9.2* 9.7* 6.5* 8.4*   PLT  --  119* 103* 116*       LFTs -   Recent Labs   Lab Test 08/26/20  0353 08/25/20  1548 08/25/20  0331   ALKPHOS 250* 262* 226*   BILITOTAL 1.1 1.4* 2.2*   ALT 23 24 22   AST 63* 67* 66*   PROTTOTAL 4.6* 4.8* 4.8*   ALBUMIN 1.4* 1.5* 1.6*       Iron Panel -   Recent Labs   Lab Test 08/10/20  1052 07/01/20  0530   IRON 48 65   IRONSAT 15 17   HUSEYIN 165  --          Imaging:  All imaging studies reviewed by me.     Current Medications:    albumin human  12.5 g Intravenous Once     albumin human         amiodarone  200 mg Oral or Feeding Tube Daily     aspirin  81 mg Oral or Feeding Tube Daily     bimatoprost  1 drop Both Eyes At Bedtime     busPIRone  10 mg Oral or Feeding Tube TID     [Held by provider] ferrous sulfate  325 mg Oral or Feeding Tube BID     insulin aspart  1-6 Units Subcutaneous Q4H     lacosamide (VIMPAT) intermittent infusion  200 mg Intravenous BID     latanoprost  1 drop Both Eyes Daily     levETIRAcetam  2,000 mg Intravenous Q12H     levothyroxine  62.5 mcg Oral or Feeding Tube QAM AC     lidocaine  1 patch Transdermal Q24H     lidocaine   Transdermal Q8H     pantoprazole (PROTONIX) IV  40 mg Intravenous Q12H     protein modular  1 packet Per Feeding Tube TID     QUEtiapine  50 mg Oral or Feeding Tube At Bedtime     rosuvastatin  20 mg Oral or Feeding Tube Daily     sodium chloride (PF)  3 mL Intracatheter Q8H       BETA BLOCKER NOT PRESCRIBED       bumetanide Stopped (08/26/20 1831)     dextrose       CRRT replacement solution 12.5 mL/kg/hr (08/26/20 1841)     EPINEPHrine IV infusion ADULT 0.03 mcg/kg/min (08/26/20 2000)     fentaNYL Stopped (08/25/20 1244)     - MEDICATION INSTRUCTIONS -       CRRT replacement solution 2.911 mL/kg/hr (08/26/20 1842)     CRRT replacement solution 12.5 mL/kg/hr  (08/26/20 1842)     BETA BLOCKER NOT PRESCRIBED       vasopressin Stopped (08/26/20 1930)     Anita Casillas MD

## 2020-08-27 NOTE — PROGRESS NOTES
CRRT STATUS NOTE    DATA:  Time:  6:19 PM  Pressures WNL:  YES  Filter Status:  WDL    Problems Reported/Alarms Noted:  none    Supplies Present:  YES    ASSESSMENT:  Patient Net Fluid Balance:  +151 since MN @ 1800  Vital Signs:  B/P: 91/56, MAPs 70s - 80s pt with LVAD, T: 96.7, P: 76 (70s - 90s), R: 18  Labs:  K 3.6, Mg 2.4, Ical 4.6, Hgb 6.2 - 1PRBC given  Goals of Therapy:  I=O, 2/2 blood pt positive fluid    INTERVENTIONS:  Pt down to CT, Machine restarted    PLAN:  Continue fluid removal as tolerated per goals of therapy.  Check filter daily.  Change filter q 72 hrs and PRN.  Please contact the CRRT resource RN at [phone] with any questions/concerns.

## 2020-08-27 NOTE — PLAN OF CARE
Patient continues to be ventilated and receiving no sedation. Patient does withdraw to painful stimulus to both lower extremity and to LUE. Patient had EGD at bedside and tolerated with no problems. OG and NJ needed to be removed for EGD. Withholding replacing enteric tubes until given OK by GI. Patient is being transfused 2 units of PRBC's for hemoglobin of 6.2. LVAD with no alarms today and refer to flowsheet for specific numbers. Continues on CRRT and refer to flowsheets for specific documentation.

## 2020-08-27 NOTE — PROGRESS NOTES
CRRT INITIATION NOTE    Consent for CRRT Completed:  YES  Patient s Vascular Access: Catheter              Placement Confirmed: YES  Manufacture:  Model:    Length/Bulgarian Size:    Flush Volume:      DATA:  Procedure:    Start Time:  1858  Machine#:  3  Filter:  M150  Blood Flow:  180  ML/min  Pre-Replacement Solution:  Phoxillum BK 4/2.5  Post-Replacement Solution:  Phoxillum BK 4/2.5  Dialysate Solution:  Phoxillum BK 4/2.5  Pre-Replacement Solution Rate:  900 mL/hr  Post-Replacement Solution Rate:  200 mL/hr  Dialysate Flow Rate:  900 mL/hr   Patient Removal Rate:  0 mL/hr  Anticoagulation Type and Rate:  NS 0    ASSESSMENT:  How Patient Tolerated Initiation:   Vital Signs:  BP 91/56   Pulse 79   Temp 96.8  F (36  C) (Oral)   Resp 19   Wt 77.1 kg (169 lb 15.6 oz)   SpO2 100%   BMI 25.84 kg/m    Initial Pressures:  Access:  -62  Filter:  76  Return:  44  TMP:  44  Change in Filter Pressure:  24      INTERVENTIONS:  None    PLAN:  I=O

## 2020-08-27 NOTE — PROGRESS NOTES
CV ICU PROGRESS NOTE  August 27, 2020       CO-MORBIDITIES:   Cardiogenic shock (H)  (primary encounter diagnosis)  LV (left ventricular) mural thrombus  Heart failure (H)  Heart failure (H)  Status post cardiac surgery    ASSESSMENT: Marquise Jj is a 75 year old female 75 year-old female with PMH notable for coronary artery disease s/p CABG (4/20), ischemic cardiomyopathy, severe MR/TR and known mural thrombus who is admitted to the CV ICU s/p redo sternotomy, LVAD (heartmate III), and TV repair on 8/19/20 with Dr. Farley. Chest was left open and packed. S/p chest closure and washout 08/21/20.       PLAN SUMMARY:   1- Possible ischemic stroke    Follow-up with NeuroCrit    Off Versed; monitor    Repeat head FLASH CT    2- RV Dysfunction (improved) and Vasoplegia    MAP > 70, CVP 10-12    Wean epi as able in the setting of improved RV function; keep vaso for vasoplegia      Aspirin, rosuvastatin    3- ATN    I/O goal net even    Free water 60 Q4H for hyper-Na    4- Upper GI Bleed    GI consulted; plan for upper endoscopy    Holding off transfusion for Hgb 7.2; will re-assess        PLAN:   Neuro/ pain/ sedation:  #Acute post-operative pain   #Likely anoxic brain injury; possible stroke  #Epilepsia Partialis Continua; resolved  - Monitor neurological status. Notify the MD for any acute changes in exam.  - Seroquel 50 at bedtime + 25 PRN   - Keppra, lacosamide, per NeuroCrit  - Tylenol toi   - Off versed gtt      Pulmonary:   #Acute hypoxic respiratory failure  - Monitor CXR, ABG  - Monitor CT output   Ventilation Mode: CMV/AC  (Continuous Mandatory Ventilation/ Assist Control)  FiO2 (%): 40 %  Rate Set (breaths/minute): 14 breaths/min  Tidal Volume Set (mL): 400 mL  PEEP (cm H2O): 5 cmH2O  Oxygen Concentration (%): 40 %  Resp: 20     Cardiovascular:    #Acute on chronic decompensated heart failure with reduced ejection fraction: NYHA IV / ACC D  #Cardiogenic shock, possible vasoplegia   #Ischemic  cardiomyopathy  #Coronary artery disease s/p CABG 4/20 in [place], s/p PCI to RCA 7/20  #Mural thrombus  #Severe MR/TR  #S/p LVAD     MAP > 70, CVP 10-12    Wean off vasopressin gtt    Diuresis     Hold low-intensity heparin infusion    Aspirin, rosuvastatin    Co-managing with Cards-2; recs greatly appreciated      GI/Nutrition:   - NPO, OGT, NJT  - TF   - Pantoprazole ppx   - Bowel regimen: senna-colace, miralax.  - No indication for parenteral nutrition.      Renal/Fluid Balance/ Electrolytes:   #Oliguria in the setting of likely ATN  - Will monitor intake and output.  - ICU electrolyte replacement protocol  - Goal-directed fluid therapy  - Nephrology consulted; CRRT      Endocrine:    #Glycemic control  #Hx hypothyroid   - ISS, medium intensity   - Synthroid daily       ID/ Antibiotics:  - Completed perioperative antibiotic regimen: vancomycin, levofloxacin, fluconazole, Zosyn       Heme:     #Acute perioperative blood loss anemia  #Thrombocytopenia   - Hgb goal > 8  - Holding low-intensity heparin gtt        Prophylaxis:    - Mechanical prophylaxis for DVT.  - Holding LIH in the setting of occult bleeding   - PPI  - Bowel regimen      MSK:    - PT and OT consulted. Appreciate recs.      Lines/ tubes/ drains:  - ETT  - A-line  - R internal jugular CVC  - PIV  - Rojo  - Chest tubes       Disposition:  - CV ICU.    Patient seen, findings and plan discussed with CV ICU staff, Dr. Brand.    -----------------------------------  Ronaldo Li MD  Anesthesiology Resident, PGY-3  *[id number]  ====================================    SUBJECTIVE:   Intubated, deeply sedated/RASS -4    OBJECTIVE:   1. VITAL SIGNS:   Temp:  [95  F (35  C)-98.4  F (36.9  C)] 98.4  F (36.9  C)  Pulse:  [] 94  Resp:  [15-23] 20  MAP:  [50 mmHg-98 mmHg] 81 mmHg  Arterial Line BP: ()/(28-88) 103/81  FiO2 (%):  [40 %] 40 %  SpO2:  [83 %-100 %] 100 %  Ventilation Mode: CMV/AC  (Continuous Mandatory Ventilation/ Assist Control)  FiO2  (%): 40 %  Rate Set (breaths/minute): 14 breaths/min  Tidal Volume Set (mL): 400 mL  PEEP (cm H2O): 5 cmH2O  Oxygen Concentration (%): 40 %  Resp: 20      2. INTAKE/ OUTPUT:   I/O last 3 completed shifts:  In: 3830.38 [I.V.:1330.38; NG/GT:490; IV Piggyback:500]  Out: 3331 [Urine:710; Emesis/NG output:650; Other:141; Stool:1400; Chest Tube:430]    3. PHYSICAL EXAMINATION:   General: Intubated, NAD  Neuro: Deeply sedated, PERRL; withdraws to pain LUE, BL lower extremities; grimaces on right upper extremity stimulation  Resp: mechanically ventilated, minimal vent settings   CV: RRR  Abdomen: Soft, Non-distended, Non-tender  Incisions: sternotomy wound c/d/i s/p closure  Extremities: warm and well perfused    4. INVESTIGATIONS:   Arterial Blood Gases   Recent Labs   Lab 08/27/20  0334 08/26/20  0353 08/25/20  1548 08/25/20  0331   PH 7.46* 7.37 7.37 7.40   PCO2 32* 36 34* 35   PO2 132* 160* 129* 141*   HCO3 23 21 20* 22     Complete Blood Count   Recent Labs   Lab 08/27/20  0347 08/26/20  1720 08/26/20  0857 08/26/20  0122 08/25/20  1548   WBC 13.5*  --  11.9* 10.8 11.9*   HGB 7.2* 9.2* 9.7* 6.5* 8.4*     --  119* 103* 116*     Basic Metabolic Panel  Recent Labs   Lab 08/27/20  0347 08/26/20  2159 08/26/20  1822 08/26/20  1746  08/26/20  0353 08/25/20  1548   * 147*  --   --   --  148* 147*   POTASSIUM 3.6 3.7 3.5 2.3*   < > 3.8 3.9  3.9   CHLORIDE 111* 116*  --   --   --  113* 114*   CO2 23 21  --   --   --  24 23   BUN 80* 92*  --   --   --  102* 97*   CR 2.63* 2.92*  --   --   --  3.56* 3.33*   * 125*  --   --   --  113* 114*    < > = values in this interval not displayed.     Liver Function Tests  Recent Labs   Lab 08/27/20  0347 08/26/20  0353 08/25/20  1656 08/25/20  1548 08/25/20  0331   AST 45 63*  --  67* 66*   ALT 21 23  --  24 22   ALKPHOS 216* 250*  --  262* 226*   BILITOTAL 0.9 1.1  --  1.4* 2.2*   ALBUMIN 1.7* 1.4*  --  1.5* 1.6*   INR 1.41* 1.76* 2.26*  --  2.12*     Pancreatic  Enzymes  No lab results found in last 7 days.  Coagulation Profile  Recent Labs   Lab 08/27/20  0347 08/26/20  0353 08/25/20  1656 08/25/20  0331 08/24/20  1536  08/21/20  1017   INR 1.41* 1.76* 2.26* 2.12* 2.21*   < > 1.60*   PTT  --   --  85*  --  63*  --  48*    < > = values in this interval not displayed.         5. RADIOLOGY:   Recent Results (from the past 24 hour(s))   CT Head w/o Contrast    Narrative    CT HEAD W/O CONTRAST 8/26/2020 10:52 AM    History: Altered level of consciousness (LOC), unexplained; s/p LVAD.  Decreased neuro status, unresponsive. New onset seizures. Interval  change.   ICD-10:    Comparison: 8/21/2020    Technique: Using multidetector thin collimation helical acquisition  technique, axial, coronal and sagittal CT images from the skull base  to the vertex were obtained without intravenous contrast.    Findings: Study is mildly limited by metallic streak artifact from  overlying EEG leads. Hypodensities seen over the left frontal and left  parietal lobes are likely secondary to the beam hardening artifact.  There is no intracranial hemorrhage, mass effect, or midline shift.  Gray/white matter differentiation in both cerebral hemispheres is  otherwise preserved. Patchy periventricular white matter  hypoattenuation which is nonspecific, but likely secondary to chronic  small vessel ischemic disease. Ventricles are proportionate to the  cerebral sulci. The basal cisterns are clear.    The bony calvaria and the bones of the skull base are normal. The  visualized portions of the paranasal sinuses and mastoid air cells are  clear.       Impression    Impression:    1. Study limited by metallic streak and beam hardening artifact.   2. Hypodensities seen over the left frontal and left parietal lobes  are likely secondary to beam hardening artifact. Consider removing EEG  leads and other metallic foreign bodies overlying the skull and  repeating the head CT on the FLASH CT scanner if there is  clinical  concern.  3. Mild leukoaraiosis.    FRACISCO COLEMAN MD   XR Abdomen Port 1 View    Narrative    EXAM: XR ABDOMEN PORT 1 VW, 8/26/2020 1:57 PM    INDICATION: feeding tube position    COMPARISON: 8/17/2020    FINDINGS:  Portable supine AP abdominal radiograph.  Feeding tube tip projects over the expected location of the second  portion of duodenum. In the reconstruction to report. Projects over  the proximal stomach. Nonobstructive bowel gas pattern. No evidence of  pneumatosis or portal venous gas. LVAD in the left upper quadrant.  Partially visualized mediastinal drains, cardiac lead and [place]  catheter..      Impression    IMPRESSION:   1. Feeding tube tip projects over the expected location of the second  portion of duodenum.  2. No evidence of bowel obstruction.    JOSE GUADALUPE GIBSON MD       =========================================

## 2020-08-27 NOTE — PROGRESS NOTES
CLINICAL NUTRITION SERVICES - REASSESSMENT NOTE     Nutrition Prescription    Recommendations:  Consider ruling out of infectious sources of diarrhea     Malnutrition Status:    Severe malnutrition in the context of acute on chronic illness     Interventions already ordered by Registered Dietitian (RD):  - Once EGD completed and appropriate to resume feeding, start non-renal formula without fiber;   Impact Peptide @ 55 ml/hr via NDT to provide 1980 kcals (31 kcal/kg/day), 124 g PRO (2 g/kg/day), 1016 ml free H2O, 84 g Fat (50% from MCTs), 185 g CHO and no Fiber daily.    - Nephronex daily while on CRRT       EVALUATION OF THE PROGRESS TOWARD GOALS   Diet: NPO, intubated   Nutrition Support: Nepro @ 40 ml/hr + Prosource (1 pkt TID) via NDT to provide 1848 kcals (29 kcal/kg) and 111 g protein (1.8 g/kg) daily. Certavite.    Intake: Received 100% estimated needs over the past 5 days via TF infusion. TF currently held for procedure.      NEW FINDINGS   Renal: CRRT.  K 3.5 and Phos 5.1. Okay for trial of non-renal TF formula.   GI: Coffee-ground OGT output. EGD planned today. >1 L green stool output today. Suspect may this be related, at least in part, to prebiotic fiber in renal TF formula.      MALNUTRITION  % Intake: No decreased intake noted  % Weight Loss: None noted, but difficult to assess w/ fluid status  Subcutaneous Fat Loss: Facial region: Moderate, Upper arm: Moderate, Lower arm: Moderate and Thoracic/intercostal: Moderate  Muscle Loss: Temporal: Moderate, Facial & jaw region: Moderate, Scapular bone: Mild, Upper arm (bicep, tricep): Moderate, Lower arm  (forearm): Moderate and Dorsal hand: Moderate  Fluid Accumulation/Edema: Moderate  Malnutrition Diagnosis: Severe malnutrition in the context of acute on chronic illness     Previous Goals   Total avg nutritional intake to meet a minimum of 25 kcal/kg and 1.5 g PRO/kg daily (per dosing wt 63 kg).  Evaluation: Met    Previous Nutrition Diagnosis  Inadequate  protein-energy intake   Evaluation: Improving    CURRENT NUTRITION DIAGNOSIS  Inadequate oral intake related to NPO while intubated as evidenced by dependent on enteral nutrition support to meet estimated needs.       INTERVENTIONS  Implementation  See interventions at top of progress note     Goals  Total avg nutritional intake to meet a minimum of 25 kcal/kg and 1.5 g PRO/kg daily (per dosing wt 63 kg).    Monitoring/Evaluation  Progress toward goals will be monitored and evaluated per protocol.    Rosemary Wyatt RD, LD  [id number]  Pgr: [phone]

## 2020-08-28 NOTE — PROGRESS NOTES
Nephrology Progress Note  08/27/2020         Assessment & Recommendations:     Marquise Jj is a 75 year old with PMH notable for CAD, s/p CABG (4/20), ischemic cardiomyopathy, severe MR/TR and known mural thrombus, acute kidney injry, hypothyroidism, CKD3, anxiety, GERD, chronic anemia.Patient initially presented to [facility] and was transferred to [facility] for advanced heart failure therapy after developing cardiac shock. Placed on IABP and then  S/p LVAD placement and TVR repair for cardiogenic shock on 8/19 /20  c/b  convulsive status epilepticus.   She required multiple transfusions and significant resuscitation during this procedure.  Nephrology consulted for managign Oliguric LORI on CKD stage 3       LORI on CKD3, oliguric   Etiology : Likely ATN in the setting of cardiogenic shock   Renal US : Renal lengths: right- 10 cm, left- 8.3 cm. No Hydro/mass  Multiple UA  8/10, 8/12/8/18 --> elevated WBC and RBC but most recent UA on 8/18: WBC 7 , RBC 1   CK 67  No acute electrolyte abnormalities  Metabolic acidosis with respiratory compensation   Anemia  6.2 . Got 2 units PRBC . Now Hb 8.7  Patient on Protonix IV twice daily.  Patient on ferrous sulfate.  BMD: Hypocalcemia, patient got calcium chloride 2 g today.    Recommendations :  Continue CRRT ,I = O       Recommendations were communicated to primary team via note  Patient seen and discussed with Dr Anastasia Howard MD, FACP  Nephrology Fellow   [facility]   Pager [phone]        Interval History :   Nursing and provider notes from last 24 hours reviewed.  In the last 24 hours Marquise Jj has beet started on CRRT   Review of Systems:   I reviewed the following systems:  Unable to obtain , intubated and sedated  Physical Exam:   I/O last 3 completed shifts:  In: 2739.75 [I.V.:985.75; Other:4; NG/GT:530]  Out: 3179 [Urine:390; Emesis/NG output:875; Other:584; Stool:1000; Chest Tube:330]   BP 91/56   Pulse 63    Temp 96.4  F (35.8  C)   Resp 16   Wt 81.4 kg (179 lb 7.3 oz)   SpO2 99%   BMI 27.29 kg/m       GENERAL APPEARANCE: no distress  EYES: no scleral icterus, pupils equal  Endo: no goiter  Pulmonary: decreased air entry in both lung bases y  CV: regular rhythm, normal rate, no rub   - Edema 1+  GI: soft, nontender, normal bowel sounds  MS: no evidence of inflammation in joints  : [place] present  SKIN: no rash on exposed surfaces  NEURO: sedated, responds minimally to painful stimuli    Labs:   All labs reviewed by me  Electrolytes/Renal -   Recent Labs   Lab Test 08/27/20  2113 08/27/20  1527 08/27/20  1005 08/27/20  0347  08/26/20  0353 08/25/20  1548    144 145* 146*   < > 148* 147*   POTASSIUM 3.5 3.6 3.5 3.6   < > 3.8 3.9  3.9   CHLORIDE 111* 110* 113* 111*   < > 113* 114*   CO2 23 26 23 23   < > 24 23   BUN 48* 59* 67* 80*   < > 102* 97*   CR 1.56* 1.92* 2.04* 2.63*   < > 3.56* 3.33*   GLC 66* 84 89 116*   < > 113* 114*   FERNANDO 7.4* 7.9* 7.8* 8.4*   < > 8.6 7.4*   MAG  --  2.4*  --  2.5*  --  2.9* 2.7*   PHOS  --  4.4  --  5.1*  --   --  6.2*    < > = values in this interval not displayed.       CBC -   Recent Labs   Lab Test 08/27/20  2113 08/27/20  1527 08/27/20  0347  08/26/20  0857   WBC 10.4  --  13.5*  --  11.9*   HGB 8.7* 6.2* 7.2*   < > 9.7*   *  --  161  --  119*    < > = values in this interval not displayed.       LFTs -   Recent Labs   Lab Test 08/27/20  0347 08/26/20  0353 08/25/20  1548   ALKPHOS 216* 250* 262*   BILITOTAL 0.9 1.1 1.4*   ALT 21 23 24   AST 45 63* 67*   PROTTOTAL 4.6* 4.6* 4.8*   ALBUMIN 1.7* 1.4* 1.5*       Iron Panel -   Recent Labs   Lab Test 08/10/20  1052 07/01/20  0530   IRON 48 65   IRONSAT 15 17   HUSEYIN 165  --          Imaging:  All imaging studies reviewed by me.     Current Medications:    amiodarone  200 mg Oral or Feeding Tube Daily     aspirin  81 mg Oral or Feeding Tube Daily     B and C vitamin Complex with folic acid  5 mL Oral or Feeding Tube Daily      bimatoprost  1 drop Both Eyes At Bedtime     busPIRone  10 mg Oral or Feeding Tube TID     [Held by provider] ferrous sulfate  325 mg Oral or Feeding Tube BID     insulin aspart  1-6 Units Subcutaneous Q4H     lacosamide (VIMPAT) intermittent infusion  200 mg Intravenous BID     latanoprost  1 drop Both Eyes Daily     levETIRAcetam  2,000 mg Intravenous Q12H     levothyroxine  62.5 mcg Oral or Feeding Tube QAM AC     lidocaine  1 patch Transdermal Q24H     lidocaine   Transdermal Q8H     pantoprazole (PROTONIX) IV  40 mg Intravenous Q12H     QUEtiapine  50 mg Oral or Feeding Tube At Bedtime     rosuvastatin  20 mg Oral or Feeding Tube Daily     sodium chloride (PF)  3 mL Intracatheter Q8H       BETA BLOCKER NOT PRESCRIBED       dextrose       CRRT replacement solution 12.5 mL/kg/hr (08/27/20 2231)     EPINEPHrine IV infusion ADULT Stopped (08/27/20 2123)     - MEDICATION INSTRUCTIONS -       CRRT replacement solution 2.911 mL/kg/hr (08/27/20 1635)     CRRT replacement solution 12.5 mL/kg/hr (08/27/20 2227)     BETA BLOCKER NOT PRESCRIBED       vasopressin 1 Units/hr (08/27/20 2107)     Anita Casillas MD

## 2020-08-28 NOTE — PLAN OF CARE
Discharge Planner PT   Patient plan for discharge: unable to discuss  Current status: PT: Pt intubated, sedated, not following commands but appropriate for ROM to promote joint and mm health. Completed PROM to all extremities with some joint input to shoulders/hips/knees, all prior to and as MD setting up to cannulate for CRRT (will be femoral cannulation).  Barriers to return to prior living situation: medical, functional, cognitive/responsive status  Recommendations for discharge: to be determined, likely TCU  Rationale for recommendations: Pt will need extensive therapy to progress functional mobility and ADLs       Entered by: Deb Caballero 08/28/2020 7:32 AM

## 2020-08-28 NOTE — PROGRESS NOTES
LVAD Social Work Services Progress Note      Date of Initial Social Work Evaluation: 8/12/2020  Collaborated with: Multidisciplinary Team and pt's family    Data: Pt is s/p LVAND implant and TV repair on 8/19/2020. Chest closed on 8/21/2020. Post-op has been complicated by seizures and stroke. Pt started CRRT on 8/26.   Care conference had this afternoon; refer to RNCC note. Plan to continue with current plan of care and do another care conference next week for another update.      Intervention: Care Conference   Assessment: Pt's family expressed desire to continue with full cares. Down the line may need to discuss goals of care especially if pt will require a trach as family expressed that pt would not want to live on a breathing machine.    Education provided by SW: Ongoing Social Work support   Plan:    Discharge Plans in Progress: None     Barriers to d/c plan: Medical Stability,     Follow up Plan: SW to continue to follow and support family.

## 2020-08-28 NOTE — PROGRESS NOTES
CRRT STATUS NOTE    DATA:  Time:  6:39 AM  Pressures WNL:  YES  Filter Status:  WDL    Problems Reported/Alarms Noted:  None    Supplies Present:  YES    ASSESSMENT:  Patient Net Fluid Balance:  Net +5.47ml @ 0600.  Vital Signs:  HR 66, /64, MAP 84   Labs:  K 3.7, Mg 2.4, Phos 3.9, iCa 4.8, Hgb 8.6, Plt 142  Goals of Therapy:  I = O    INTERVENTIONS:   None.    PLAN:  Continue to monitor circuit daily and change set q72 hours or PRN for clotting/clogging. Please call CRRT RN with any questions/problems.

## 2020-08-28 NOTE — PROGRESS NOTES
Cardiology Progress Note    Assessment & Plan   In summary, Marquise Jj is a 75-year-old female with a past medical history notable for coronary artery disease, ischemic cardiomyopathy, and known mural thrombus who was transferred from [facility] for further evaluation/treatment of cardiogenic shock. Balloon pump placed 8/14. Had HM3 8/19. Chest closed 8/21.    Today's changes:  - family meeting today, plan on continuing current treatment at this time.  - wean vaso as tolerated  - CRRT: goal I=O  - sedation remains off    Neuro:   # Sedation  # concern for seizure activity  CTH head 8/20 no signs of ICH   CTH head 8/27 Scattered areas of hypodensity with loss of gray-white matter differentiation in the left frontal lobe. These areas were not present on the CT dated 8/10/2020 and difficult compared to other prior comparison CTs due to extensive hardware artifact. These are  suspicious for involving embolic infarcts.  keppra 2 g PO bid   vimpat 200 BID  - veeg  - f/u neurocritical care recs      Cardio:  # Cardiogenic shock with vasoplegic component   # ICM: NYHA IV / ACC  # Severe MR/TR s/p tricuspid valve repair with Elkins MC3 Annuloplasty Ring size T30  # s/p LVAD HM3 on 8/19/2020  LAVD speed 5300, flow 3.4-3.7, pi 2.5-3.7, power 3.5-3.6   Presents with cardiogenic shock. On NE, cardiac index approximately 1.37 on admission. Severely elevated biventricular filling pressures. Etiology of her decompensation appears to be progression of her cardiomyopathy. Despite medical treatment, she has been hospitalized twice since returning to [place], both with low output. No viability in her LAD territory, and doubt that PCI to her circumflex would make meaningful LV recovery. Hemodynamics improved with dobutamine, did not tolerate attempt to wean inotropics. RHC removed on 8/13 after clotting off, replaced on 8/14 after patient with increased work of breathing. CI of 1.1, balloon pump placed with CI of  1.6-1.8 and improvement in symptoms/hemodynamics. Had LVAD HM3 placed on 8/19. CVP today around 10 range. Chest closed on 8/21.   - Monitoring CVP for RV/fluid overload (CVP goal < 14)  - continue to wean pressors as above  - Hemodynamics q6hrs, monitor lactate. Goal CI>2., SvO2 >55%  - holding hep gtt as above      Date 8/9 8/9 8/10 8/11 08/13/20 08/15/20 08/16/20 08/17/20 8/18/20 8/19 8/20 8/21*   CVP 12 9 3 4 8 9 6 8 11 5 10 13   Mean PA  35 30 21 25 23 21 20 35/18 35/18 - 30/12 28/14   PCWP  18 14 12 x 13 14  13 - -    Oxy HGB  59 64 61 61 60 47 62 69 54 - 68 66   CI/CO 2.3 2.3 2.4/4.2 2.3 2.2 1.8 2.8/4.9 4.1 4.3/2.5 - 6/3.3 6.9/3.8   SVR 1100 1100 1200 1400 1316 1900 4616 982 4124 - 1025 715   Device     IABP 1:1 IABP 1:1 IABP 1:1 IABP 1:1 IABP 1:1 No IABP No IABP No IABP   * epi 0.1, vaso 0.5     8/22: CVP 12, PA 28/14/19, PCWP 14, CO/CI 9.0/4.8. . Epi 0.1, vaso 1.  8/23: CVP 16, PA 30/14, CI/CO 4.4/8.0, , SvO2 71%  8/24: CVP 14, PA 44/20/28, PCWP 16, CI/CO 7.3/3.9, SvO2 68% epi 0.06, vaso 2  8/25: CVP 16, PA 44/20/29, PCWP 22, CI/CO  3.6, SvO2 82%, vaso 2, epi 0.03  8/26: CVP 13, PA 40/20/26, PCWP 12, CI/CO 7/ 3.6, SvO2 74%, vaso 1  8/27: CVP 13, PA 38/18/26, PCWP 12-14, CI/CO 9.4/4.7, , PVR ~0.5, SvO2 73%, vaso 1, epi 0.05     # CAD s/p CABG 4/2020 (KURT to RCA and LIMA to LAD) and PCI to RCA 7/20  - Stop home plavix po qd (8/13) for LVAD surgery  - aspirin 81mg PO qd   - c/w home crestor    # Mural thrombus  - removed during surgery on 8/19    # A-flutter  Converted to NSR on 8/15    - Continue amiodarone 200mg po qd      Pulm:  # Acute hypoxic respiratory failure  Intubated on 8/19   Vent setting: RR 14, , PEEP 5, FiO2 40%   Secondary to pulmonary edema bilateral pleural effusions.   - On nasal cannula, titrate to saturation of >92%     Renal/Electrolytes   # Hyperkalemia/Hypokalemia   # Acute kidney injury on chronic kidney disease, stage III  Likely ATN due to hypoxic insult  -  renal consult and diuretic challenge. May need dialysis  - Trend potassium and creatinine q12hrs  - Electrolyte replacement protocol  - Monitor UOP   - continue CVVH      GI:  #GIB  GI consulted and EGD on 8/28 with protuberant vessel injected and clipped and bleeding gastric ulcer with adherent clot, injected and clipped as welll    # Constipation  # Severe protein calorie malnutrition  - Nutrition through tube feeds at 30 mL/hr    - Senna-docusate bid scheduled  - Miralax bid prn constipation  - f/u GI recs    ID:   # UTI vs ASB  Periprocedural abx as per surgical team   - levoloxacin, zosyn, rifampin, vancomycin, fluconazole    - Completed Ceftriaxone treatment 1g IV qd (8/14 - 8/18)     Hem/Onc  # Mural thrombus removed on 8/19  # Anemia, mild  D/t frequent blood draws and procedures  - Monitor hgb    Endo  # Hypothyroidism   - Continue PTA levothyroxine     Nutrition: NJ with TF today  DVT Prophylaxis: mechanical   Code Status: Full Code    Katy Monte MD  Cardiovascular Disease Fellow  [facility]    Discussed with Dr Johnson    Interval History   NAEON. On low dose pressors. Remains intubated and sedated.  withdraws to pain BLE and extension to pain BUE    Physical Exam   Temp: 98.6  F (37  C) Temp src: Bladder   Pulse: 70   Resp: 18 SpO2: 98 % O2 Device: Mechanical Ventilator    Vitals:    08/26/20 0500 08/27/20 0345 08/28/20 0400   Weight: 77.1 kg (169 lb 15.6 oz) 81.4 kg (179 lb 7.3 oz) 80.6 kg (177 lb 11.1 oz)     Vital Signs with Ranges  Temp:  [95.2  F (35.1  C)-98.6  F (37  C)] 98.6  F (37  C)  Pulse:  [60-96] 70  Resp:  [15-28] 18  MAP:  [64 mmHg-117 mmHg] 64 mmHg  Arterial Line BP: ()/() 75/50  FiO2 (%):  [30 %-35 %] 30 %  SpO2:  [98 %-100 %] 98 %  I/O last 3 completed shifts:  In: 2500.74 [I.V.:1600.74; NG/GT:220]  Out: 2916 [Urine:225; Emesis/NG output:200; Other:1311; Stool:700; Chest Tube:480]    RETIRE: Heart Rate: 71, Blood pressure 91/56, pulse 70, temperature 98.6   F (37  C), resp. rate 18, weight 80.6 kg (177 lb 11.1 oz), SpO2 98 %.  177 lbs 11.05 oz     GEN: intubated , sedated  CV: RRR, Hum of HM3   LUNGS: Clear to auscultation bilaterally  ABD: Active bowel sounds, soft, no abdominal tenderness.   EXT: Trace LE edema   NEURO: withdraws to pain in lower extremities    Medications     BETA BLOCKER NOT PRESCRIBED       dextrose 1,000 mL (08/27/20 2305)     CRRT replacement solution 12.5 mL/kg/hr (08/28/20 0405)     EPINEPHrine IV infusion ADULT Stopped (08/27/20 2123)     - MEDICATION INSTRUCTIONS -       CRRT replacement solution 2.911 mL/kg/hr (08/27/20 1635)     CRRT replacement solution 12.5 mL/kg/hr (08/28/20 0404)     BETA BLOCKER NOT PRESCRIBED       vasopressin 1.5 Units/hr (08/28/20 0700)       amiodarone  200 mg Oral or Feeding Tube Daily     aspirin  81 mg Oral or Feeding Tube Daily     B and C vitamin Complex with folic acid  5 mL Oral or Feeding Tube Daily     bimatoprost  1 drop Both Eyes At Bedtime     busPIRone  10 mg Oral or Feeding Tube TID     [Held by provider] ferrous sulfate  325 mg Oral or Feeding Tube BID     insulin aspart  1-6 Units Subcutaneous Q4H     lacosamide (VIMPAT) intermittent infusion  200 mg Intravenous BID     latanoprost  1 drop Both Eyes Daily     levETIRAcetam  2,000 mg Intravenous Q12H     levothyroxine  62.5 mcg Oral or Feeding Tube QAM AC     lidocaine  1 patch Transdermal Q24H     lidocaine   Transdermal Q8H     pantoprazole (PROTONIX) IV  40 mg Intravenous Q12H     QUEtiapine  50 mg Oral or Feeding Tube At Bedtime     rosuvastatin  20 mg Oral or Feeding Tube Daily     sodium chloride (PF)  3 mL Intracatheter Q8H       Data   Recent Labs   Lab 08/28/20  0259 08/27/20  2113 08/27/20  1527  08/27/20  0347  08/26/20  0353   WBC 11.3* 10.4  --   --  13.5*   < >  --    HGB 8.6* 8.7* 6.2*  --  7.2*   < >  --    MCV 88 89  --   --  89   < >  --    * 134*  --   --  161   < >  --    INR 1.37*  --   --   --  1.41*  --  1.76*     142 144   < > 146*   < > 148*   POTASSIUM 3.7 3.5 3.6   < > 3.6   < > 3.8   CHLORIDE 111* 111* 110*   < > 111*   < > 113*   CO2 26 23 26   < > 23   < > 24   BUN 40* 48* 59*   < > 80*   < > 102*   CR 1.52* 1.56* 1.92*   < > 2.63*   < > 3.56*   ANIONGAP 6 8 8   < > 12   < > 12   FERNANDO 8.0* 7.4* 7.9*   < > 8.4*   < > 8.6   GLC 84 66* 84   < > 116*   < > 113*   ALBUMIN 1.4*  --   --   --  1.7*  --  1.4*   PROTTOTAL 4.4*  --   --   --  4.6*  --  4.6*   BILITOTAL 0.9  --   --   --  0.9  --  1.1   ALKPHOS 136  --   --   --  216*  --  250*   ALT 19  --   --   --  21  --  23   AST 43  --   --   --  45  --  63*    < > = values in this interval not displayed.       Recent Results (from the past 24 hour(s))   CT Head w/o Contrast    Narrative    CT HEAD W/O CONTRAST 8/27/2020 3:55 PM    History: Stroke, follow up; FLASH CT per NeuroCrit     Comparison: Multiple CTs dating back to 8/10/2020    Technique: Using multidetector thin collimation helical acquisition  technique, axial, coronal and sagittal CT images from the skull base  to the vertex were obtained without intravenous contrast.    Findings:   There are scattered areas of hypodensity with loss of gray-white  matter differentiation in the left frontal lobe. These areas were not  present on the CT dated 8/10/2020 and suspicious for evolving embolic  infarcts.    There is no intracranial hemorrhage, mass effect, or midline shift.  Ventricles are proportionate to the cerebral sulci. The basal cisterns  are clear.    The bony calvaria and the bones of the skull base are normal.  Scattered paranasal sinus because of thickening. Mild right mastoid  effusion. The left mastoid air cells are clear.      Impression    Impression:  Scattered areas of hypodensity with loss of gray-white matter  differentiation in the left frontal lobe. These areas were not present  on the CT dated 8/10/2020 and difficult compared to other prior  comparison CTs due to extensive hardware artifact. These  are  suspicious for involving embolic infarcts.    TONIE IRENE MD   XR Chest Port 1 View    Narrative    Exam: XR CHEST PORT 1 VW, 8/28/2020 1:07 AM    Indication: Follow-up    Comparison: Chest x-ray 8/27/2020    Findings:   Portable supine AP radiograph of the chest. Endotracheal tube tip  projects 2.4 cm above the enrico. Stable position of the left chest  wall implantable cardiac defibrillator and leads, LVAD device,  bilateral chest tubes and mediastinal drains. Interval removal of a  right IJ sheath and left IJ Houston-Zia catheter. Enteric tubes have  been removed. Postsurgical changes of the chest, including median  sternotomy wires with a tricuspid valve prosthesis. Cardiac silhouette  is stable. No pneumothorax. No significant pleural effusion. Diffuse  interstitial opacities and hazy retrocardiac opacities. The visualized  upper abdomen is unremarkable.      Impression    Impression:   1. Interval removal of the right IJ sheath, left IJ Houston-Zia  catheter, and enteric tubes. Other support devices are stable in  position.  2. Stable cardiomegaly and mild interstitial pulmonary edema.  3. Stable left retrocardiac atelectasis versus consolidation.    I have personally reviewed the examination and initial interpretation  and I agree with the findings.    TORSTEN COX, DO

## 2020-08-28 NOTE — PROGRESS NOTES
CV ICU PROGRESS NOTE  August 29, 2020       CO-MORBIDITIES:   Cardiogenic shock (H)  (primary encounter diagnosis)  LV (left ventricular) mural thrombus  Heart failure (H)  Heart failure (H)  Status post cardiac surgery    ASSESSMENT: Marquise Jj is a 75 year old female 75 year-old female with PMH notable for coronary artery disease s/p CABG (4/20), ischemic cardiomyopathy, severe MR/TR and known mural thrombus who is admitted to the CV ICU s/p redo sternotomy, LVAD (heartmate III), and TV repair on 8/19/20 with Dr. Farley. Chest was left open and packed. S/p chest closure and washout 08/21/20.       PLAN SUMMARY:   Low intensity heparin   Free water flushes to 30 ml q4 hours   Pressure support trials   Goal I/O -1 L       PLAN:   Neuro/ pain/ sedation:  #Acute post-operative pain   #Likely anoxic brain injury; possible stroke  #Epilepsia Partialis Continua; resolved  - Monitor neurological status. Notify the MD for any acute changes in exam.  - Seroquel 50 at bedtime + 25 PRN   - Keppra, vimpat to continue per NeuroCrit  - Tylenol PRN       Pulmonary:   #Acute hypoxic respiratory failure  - Monitor CXR, ABG  - Monitor CT output   - Daily CXR   - Pressure support trials   Ventilation Mode: CMV/AC  (Continuous Mandatory Ventilation/ Assist Control)  FiO2 (%): 30 %  Rate Set (breaths/minute): 14 breaths/min  Tidal Volume Set (mL): 400 mL  PEEP (cm H2O): 5 cmH2O  Oxygen Concentration (%): 30 %  Resp: 18     Cardiovascular:    #Acute on chronic decompensated heart failure with reduced ejection fraction: NYHA IV / ACC D  #Cardiogenic shock, possible vasoplegia   #Ischemic cardiomyopathy  #Coronary artery disease s/p CABG 4/20 in [place], s/p PCI to RCA 7/20  #Mural thrombus  #Severe MR/TR  #S/p LVAD     MAP > 70, CVP 8-12    Off pressors     Low-intensity heparin infusion    Aspirin, rosuvastatin    Co-managing with Cards-2      GI/Nutrition:   - NPO, OGT, NJT  - TF to goal   - Pantoprazole ppx   - Bowel regimen:  senna-colace, miralax.  - No indication for parenteral nutrition.      Renal/Fluid Balance/ Electrolytes:   #Oliguria in the setting of likely ATN  - Will monitor intake and output.  - ICU electrolyte replacement protocol  - Goal-directed fluid therapy  - Nephrology consulted; CRRT      Endocrine:    #Glycemic control  #Hx hypothyroid   - ISS, medium intensity   - Synthroid daily       ID/ Antibiotics:  - Completed perioperative antibiotic regimen: vancomycin, levofloxacin, fluconazole, Zosyn       Heme:     #Acute perioperative blood loss anemia  #Thrombocytopenia   - Hgb goal > 8  - OK to resume low-intensity heparin gtt        Prophylaxis:    - SCD  - LIH  - PPI  - Bowel regimen      MSK:    - PT and OT consulted. Appreciate recs.      Lines/ tubes/ drains:  - ETT  - A-line  - R internal jugular CVC  - PIV  - Rojo  - Chest tubes       Disposition:  - CV ICU.    Patient seen, findings and plan discussed with CV ICU staff, Dr. Brand.    -----------------------------------  Angela Sales   Surgery Resident   *[id number]  ====================================    SUBJECTIVE:   Intubated, sedated. Fluid given overnight for low PI and hypotension.     OBJECTIVE:   1. VITAL SIGNS:   Temp:  [95.2  F (35.1  C)-98.6  F (37  C)] 98.2  F (36.8  C)  Pulse:  [60-96] 69  Resp:  [15-28] 18  MAP:  [67 mmHg-117 mmHg] 72 mmHg  Arterial Line BP: ()/() 85/59  FiO2 (%):  [30 %-35 %] 30 %  SpO2:  [98 %-100 %] 98 %  Ventilation Mode: CMV/AC  (Continuous Mandatory Ventilation/ Assist Control)  FiO2 (%): 30 %  Rate Set (breaths/minute): 14 breaths/min  Tidal Volume Set (mL): 400 mL  PEEP (cm H2O): 5 cmH2O  Oxygen Concentration (%): 30 %  Resp: 18      2. INTAKE/ OUTPUT:   I/O last 3 completed shifts:  In: 2500.74 [I.V.:1600.74; NG/GT:220]  Out: 2916 [Urine:225; Emesis/NG output:200; Other:1311; Stool:700; Chest Tube:480]    3. PHYSICAL EXAMINATION:   General: Intubated, NAD  Neuro: Deeply sedated, PERRL; withdraws to pain BOTH upper  extremities, BL lower extremities  Resp: mechanically ventilated, minimal vent settings   CV: RRR  Abdomen: Soft, Non-distended, Non-tender  Incisions: sternotomy wound c/d/i s/p closure  Extremities: warm and well perfused    4. INVESTIGATIONS:   Arterial Blood Gases   Recent Labs   Lab 08/28/20 0259 08/27/20  0334 08/26/20  0353 08/25/20  1548   PH 7.47* 7.46* 7.37 7.37   PCO2 32* 32* 36 34*   PO2 91 132* 160* 129*   HCO3 24 23 21 20*     Complete Blood Count   Recent Labs   Lab 08/28/20 0259 08/27/20 2113 08/27/20  1527 08/27/20  0347  08/26/20  0857   WBC 11.3* 10.4  --  13.5*  --  11.9*   HGB 8.6* 8.7* 6.2* 7.2*   < > 9.7*   * 134*  --  161  --  119*    < > = values in this interval not displayed.     Basic Metabolic Panel  Recent Labs   Lab 08/28/20 0259 08/27/20 2113 08/27/20  1527 08/27/20  1005    142 144 145*   POTASSIUM 3.7 3.5 3.6 3.5   CHLORIDE 111* 111* 110* 113*   CO2 26 23 26 23   BUN 40* 48* 59* 67*   CR 1.52* 1.56* 1.92* 2.04*   GLC 84 66* 84 89     Liver Function Tests  Recent Labs   Lab 08/28/20  0259 08/27/20  0347 08/26/20  0353 08/25/20  1656 08/25/20  1548   AST 43 45 63*  --  67*   ALT 19 21 23  --  24   ALKPHOS 136 216* 250*  --  262*   BILITOTAL 0.9 0.9 1.1  --  1.4*   ALBUMIN 1.4* 1.7* 1.4*  --  1.5*   INR 1.37* 1.41* 1.76* 2.26*  --      Pancreatic Enzymes  No lab results found in last 7 days.  Coagulation Profile  Recent Labs   Lab 08/28/20  0259 08/27/20 0347 08/26/20  0353 08/25/20  1656  08/24/20  1536  08/21/20  1017   INR 1.37* 1.41* 1.76* 2.26*   < > 2.21*   < > 1.60*   PTT  --   --   --  85*  --  63*  --  48*    < > = values in this interval not displayed.         5. RADIOLOGY:   Recent Results (from the past 24 hour(s))   CT Head w/o Contrast    Narrative    CT HEAD W/O CONTRAST 8/27/2020 3:55 PM    History: Stroke, follow up; FLASH CT per NeuroCrit     Comparison: Multiple CTs dating back to 8/10/2020    Technique: Using multidetector thin collimation helical  acquisition  technique, axial, coronal and sagittal CT images from the skull base  to the vertex were obtained without intravenous contrast.    Findings:   There are scattered areas of hypodensity with loss of gray-white  matter differentiation in the left frontal lobe. These areas were not  present on the CT dated 8/10/2020 and suspicious for evolving embolic  infarcts.    There is no intracranial hemorrhage, mass effect, or midline shift.  Ventricles are proportionate to the cerebral sulci. The basal cisterns  are clear.    The bony calvaria and the bones of the skull base are normal.  Scattered paranasal sinus because of thickening. Mild right mastoid  effusion. The left mastoid air cells are clear.      Impression    Impression:  Scattered areas of hypodensity with loss of gray-white matter  differentiation in the left frontal lobe. These areas were not present  on the CT dated 8/10/2020 and difficult compared to other prior  comparison CTs due to extensive hardware artifact. These are  suspicious for involving embolic infarcts.    TONIE IRENE MD       =========================================

## 2020-08-28 NOTE — PROGRESS NOTES
[facility], [place]   Neurology Critical Care Consult Progress Note  Marquise Jj  [id number]  08/28/2020    Subjective: no acute changes in neuro status overnight.     Objective   Physical Examination   Vitals: BP 91/56   Pulse 82   Temp 97.5  F (36.4  C)   Resp 19   Wt 80.6 kg (177 lb 11.1 oz)   SpO2 99%   BMI 27.02 kg/m    General: Adult female patient, lying in bed intubated  HEENT: ETT and OG in place  Cardiac: LVAD  Chest: intubated, chest tubes in place draining serosanguinous fluid  Extremities: Warm, grossly edematous diffusely, toes with red/pink color     Neuro: off sedation  Mental status:  Eyes open to sound, with increased movement today  Cranial nerves: does not blink to threat, pupils equal and reactive to light, mid gaze, face symmetric.  Motor/Sensory: mild withdrawal in 4/4 to painful stim   Reflexes:  no clonus, toe up-going on left (poss withdrawal and not upgoing on right)    Investigations    Recent Labs   Lab 08/28/20  1534 08/28/20  1107 08/28/20  0259 08/27/20  2113 08/27/20  1527  08/27/20  0347  08/26/20  0353   WBC  --   --  11.3* 10.4  --   --  13.5*   < >  --    HGB  --   --  8.6* 8.7* 6.2*  --  7.2*   < >  --    MCV  --   --  88 89  --   --  89   < >  --    PLT  --   --  142* 134*  --   --  161   < >  --    INR  --   --  1.37*  --   --   --  1.41*  --  1.76*    142 142 142 144   < > 146*   < > 148*   POTASSIUM 3.7 3.8 3.7 3.5 3.6   < > 3.6   < > 3.8   CHLORIDE 112* 111* 111* 111* 110*   < > 111*   < > 113*   CO2 24 24 26 23 26   < > 23   < > 24   BUN 28 32* 40* 48* 59*   < > 80*   < > 102*   CR 1.13* 1.24* 1.52* 1.56* 1.92*   < > 2.63*   < > 3.56*   ANIONGAP 6 7 6 8 8   < > 12   < > 12   FERNANDO 7.9* 7.4* 8.0* 7.4* 7.9*   < > 8.4*   < > 8.6   GLC 82 86 84 66* 84   < > 116*   < > 113*   ALBUMIN  --   --  1.4*  --   --   --  1.7*  --  1.4*   PROTTOTAL  --   --  4.4*  --   --   --  4.6*  --  4.6*   BILITOTAL  --   --  0.9  --   --   --  0.9  --  " 1.1   ALKPHOS  --   --  136  --   --   --  216*  --  250*   ALT  --   --  19  --   --   --  21  --  23   AST  --   --  43  --   --   --  45  --  63*    < > = values in this interval not displayed.         Impression   Ms. Marquise Jj is a 75 year old female with complex cardiac history who is POD 4 from LVAD placement complicated by post-procedural encephalopathy and two clinical focal seizures, and later focal non-convulsive status epilepticus, now resolved.     Etiology of focal seizures is unclear at this time, but suggests possible underlying R occipital cortical abnormality or lesion. MRI not possible due to LVAD, repeat CT head with significant artifact however some evidence of L posterior frontal lobe cortical hypodensity possibly representing infarct. These radiographic findings (and lack thereof) are somewhat equivocal and should not delay anticoagulation for cardiac reasons if indicated.     Versed now off, exam has improved slightly however she is not interacting meaningfully on exam at this time. More time is likely needed to assess where exam and mental status will plateau. No additional recommendations at this time for medications/testing/imaging.       Recommendations  Continue keppra 2 g IV BID  Continue vimpat 200 mg IV BID      Thank you for involving neurologic critical care in the care of Marquise Jj.  The Neuro Crit Service will follow peripherally at this point. We are available for family meetings. Please do not hesitate to call with questions/concerns.        Patient was seen and discussed with Dr. Jeannette Jensen MD  PGY2 Neurology  [phone]    To page stroke neurology after hours or on a subsequent day, click here: AMCOM  Choose \"On Call\" tab at top, then search dropdown box for \"Neurology Adult\" & press Enter, look for Neuro ICU/Stroke  "

## 2020-08-28 NOTE — CARE CONFERENCE
Care Conference    Care conference was held on August 28, 2020 at 13:00 in 4E conf. Room.    Attending the conference were: CVICU ( Dr. Brand and Dr. Li) Neuro ICU ( Dr. West), SW ( Brooklynn Hall), Cards (Dr. Monte) and RNCC ( Sandy Yu).  Family members-  pt spouse, dtr, 2 sons and 2 other family members (All families were on the speaker phone).    Purpose of the conference was to provide update and discuss the plan of care.    Discussion/Outcomes/Follow-Up: After introduction was done, the team summarized pt hospital course and provided update system by system.  The team stated it is hard to know what kind of recovery pt will have at this time.  Pt will need time to see her neuro and kidney recovery.  The team stated we might need to discuss about trach in the next week or two, if pt is not awake and being able to get extubated.  Pt family stated they know pt would not want to live hooked on a breathing machine.  Pt family stated they understand it is too early to know her prognosis at this time.  The team and family agreed to continue with the current plan of care and assess pt progress.  The team addressed all pt family questions.      Sandy Yu, RN, PHN, BSN  4A and 4E/ ICU  Care Coordinator  Phone: [phone]  Pager: [phone]

## 2020-08-28 NOTE — PLAN OF CARE
Major Shift Events: withdraws to pain BLE and extension to pain BUE. PERRL. On CMV 35%. Scant think tan secretions. HM III, flows around 3.3-4, PI 3-4, and power around 3.6. vaso at 1.5, to achieve MAP >65. NSR, occasional PVCs. CRRT I=O, goal achieved overnight. Rectal tube with moderate output and [place] 5-20cc/hr. No OG or NJ due to clipping from EGD 8/27.   Plan: continue to monitor and notify MD of changes.   For vital signs and complete assessments, please see documentation flowsheets.

## 2020-08-28 NOTE — PLAN OF CARE
Patient continues to be ventilated and receiving no sedation. Patient does withdraw to painful stimulus to both lower extremity and to LUE. Feeding tube replaced at bedside by radiology and tube feedings resumed. LVAD with no alarms today and refer to flowsheet for specific numbers. Continues on CRRT and refer to flowsheets for specific documentation.

## 2020-08-28 NOTE — PROGRESS NOTES
Nephrology Progress Note  08/28/2020       Mrs Jj is a 75 yof w/ICM, s/p CABG (4/20), severe MR/TR and known mural thrombus, acute kidney injry, hypothyroidism, CKD3, anxiety, GERD, chronic anemia.  Transferred from [facility] to [facility] on 8/8 after developing cardiac shock. Placed on IABP and then LVAD placement and TVR repair for cardiogenic shock on 8/19 /20.  Nephrology consulted for managign Oliguric LORI on CKD stage 3, started CRRT on 8/26.    Interval History :   Mrs jJ continues on CRRT, net even yesterday and remains ~15kg up from baseline wt.  On one presso and LVAD, hoping to pull ~25cc/hr for 0.5L overall off today, would be reasonable to increase vasopressin a bit to achieve.  Stable electrolytes on 4k baths.      Assessment & Recommendations:   LORI on CKD-Baseline Cr ~1.5, small L kidney at 8.3cm, 10cm R kidney at baseline.  Started CRRT on 8/26 for management of volume and electrolytes in setting of cardiogenic shock, LVAD placed on 8/19.  Etiology of LORI is likely ATN from hypoperfusion, started CRRT on 8/26.   -Line is temp RFV from 8/26   -Continuing CRRT, 4k baths, 25ml/kg/hr standard dosing, goal 0-25cc/hr net negative.      Volume status-Net even yesterday, will try to pull minimal fluid today, goal 0.5L overall.  Vasopressin at 1u/hr, would be ok from Nephrology standpoint to increase slightly to pull some fluid, is ~15kg up overall.      Electrolytes/pH-K 3.8, bicarb 24, on 4k baths.     Ca/phos/pth-Ca 7.4, Mg and Phos reasonable.     Anemia-Hgb 8.6, acute management per team.     Nutrition-Marcelaro TF.     Seen and discussed with Dr Ayala.     Recommendations were communicated to primary team via verbal communication    STEPHAN Faulkner CNS  Clinical Nurse Specialist  [phone]    Review of Systems:   I reviewed the following systems:  ROS not done due to vent/sedation.     Physical Exam:   I/O last 3 completed shifts:  In: 2500.74 [I.V.:1600.74; NG/GT:220]  Out: 2916 [Urine:225; Emesis/NG  output:200; Other:1311; Stool:700; Chest Tube:480]   BP 91/56   Pulse 74   Temp 98.6  F (37  C)   Resp 19   Wt 80.6 kg (177 lb 11.1 oz)   SpO2 100%   BMI 27.02 kg/m       GENERAL APPEARANCE: Intubated and sedated, on CRRT.   EYES:  No scleral icterus, pupils equal  HENT: mouth without ulcers or lesions  PULM: lungs clear to auscultation, equal air movement, no cyanosis or clubbing  CV: regular rhythm, normal rate, no rub     -JVP not elevated.      -edema +3 generalized.   GI: soft, nontender, +distended, bowel sounds are +  MS: no evidence of inflammation in joints, no muscle tenderness  NEURO: Intubated and sedated.   Lines RFV temp line 8/26    Labs:   All labs reviewed by me  Electrolytes/Renal -   Recent Labs   Lab Test 08/28/20  1107 08/28/20  0259 08/27/20 2113 08/27/20 1527 08/27/20  0347    142 142 144   < > 146*   POTASSIUM 3.8 3.7 3.5 3.6   < > 3.6   CHLORIDE 111* 111* 111* 110*   < > 111*   CO2 24 26 23 26   < > 23   BUN 32* 40* 48* 59*   < > 80*   CR 1.24* 1.52* 1.56* 1.92*   < > 2.63*   GLC 86 84 66* 84   < > 116*   FERNANDO 7.4* 8.0* 7.4* 7.9*   < > 8.4*   MAG  --  2.4*  --  2.4*  --  2.5*   PHOS  --  3.9  --  4.4  --  5.1*    < > = values in this interval not displayed.       CBC -   Recent Labs   Lab Test 08/28/20  0259 08/27/20 2113 08/27/20 1527 08/27/20  0347   WBC 11.3* 10.4  --  13.5*   HGB 8.6* 8.7* 6.2* 7.2*   * 134*  --  161       LFTs -   Recent Labs   Lab Test 08/28/20  0259 08/27/20 0347 08/26/20  0353   ALKPHOS 136 216* 250*   BILITOTAL 0.9 0.9 1.1   ALT 19 21 23   AST 43 45 63*   PROTTOTAL 4.4* 4.6* 4.6*   ALBUMIN 1.4* 1.7* 1.4*       Iron Panel -   Recent Labs   Lab Test 08/10/20  1052 07/01/20  0530   IRON 48 65   IRONSAT 15 17   HUSEYIN 165  --            Current Medications:    amiodarone  200 mg Oral or Feeding Tube Daily     aspirin  81 mg Oral or Feeding Tube Daily     B and C vitamin Complex with folic acid  5 mL Oral or Feeding Tube Daily     bimatoprost  1 drop  Both Eyes At Bedtime     busPIRone  10 mg Oral or Feeding Tube TID     [Held by provider] ferrous sulfate  325 mg Oral or Feeding Tube BID     insulin aspart  1-6 Units Subcutaneous Q4H     lacosamide (VIMPAT) intermittent infusion  200 mg Intravenous BID     latanoprost  1 drop Both Eyes Daily     levETIRAcetam  2,000 mg Intravenous Q12H     levothyroxine  62.5 mcg Oral or Feeding Tube QAM AC     lidocaine  1 patch Transdermal Q24H     lidocaine   Transdermal Q8H     pantoprazole (PROTONIX) IV  40 mg Intravenous Q12H     QUEtiapine  50 mg Oral or Feeding Tube At Bedtime     rosuvastatin  20 mg Oral or Feeding Tube Daily     sodium chloride (PF)  3 mL Intracatheter Q8H       BETA BLOCKER NOT PRESCRIBED       dextrose 40 mL/hr at 08/28/20 0800     CRRT replacement solution 12.5 mL/kg/hr (08/28/20 0934)     - MEDICATION INSTRUCTIONS -       CRRT replacement solution 2.911 mL/kg/hr (08/27/20 1635)     CRRT replacement solution 12.5 mL/kg/hr (08/28/20 0933)     BETA BLOCKER NOT PRESCRIBED       vasopressin 1 Units/hr (08/28/20 1130)

## 2020-08-28 NOTE — PROGRESS NOTES
GASTROENTEROLOGY PROGRESS NOTE    Date: 08/28/2020     ASSESSMENT:  75 year old female with a history of coronary artery disease status post CABG 4 2020, ischemic cardiomyopathy, severe tricuspid and mitral regurg unknown mural thrombus, is currently admitted to cardiac ICU post LVAD placement on 8/19 in the setting of cardiogenic shock due to decompensated heart failure.  Since 1 day ago, bloody output has been noted from her OG tube, in the setting of heparin.  Visualization of blood in OG tube prompted this GI consult.     The likely etiology of blood in her OG tube includes ischemic injury to the stomach, stress ulcers, NG tube trauma.  No evidence of liver disease to consider hemorrhage.  Since her stools are green suspect that this is small amount of blood with likely minimal contribution to her hemodynamics changes/increased pressor requirements.     She underwent EGD with placement of clips on two lesions that were possibly bleeding, please see procedure note on 8/27 for full details.    RECOMMENDATIONS:  - OK to resume tube feeds with placement of NJ tube under XR guidance  - Would also recommend placement of NG under XR guidance to avoid the clips  - OK to resume anticoagulation as benefits outweighs the risks  - Monitor Hgb closely, monitor stool output closely. If concerns for ongoing Gi bleeding, please call GI on call.  - Continue IV PPI BID    Gastroenterology follow up recommendations: Pending clinical course.      Thank you for involving us in this patient's care. Please do not hesitate to contact the GI service with any questions or concerns.      Pt care plan discussed with Dr. Johnson, GI staff physician.    Ashley MULLER MD  Gastroenterology Fellow  Division of Gastroenterology, Hepatology and Nutrition  [facility]    _______________________________________________________________    24 Hour Events:  Hgb stable after EGD intervention and transfusions yesterday  Patient has been off  of heparin    Subjective:  Pt intubated and sedated    Objective:  Blood pressure 91/56, pulse 73, temperature 97.5  F (36.4  C), resp. rate 20, weight 80.6 kg (177 lb 11.1 oz), SpO2 99 %.    Constitutional: Intubated and sedated in the ICU  Ears/nose/mouth/throat: ET tube in place.   CV: LVAD in place  Respiratory: Intubated and on the ventilator  Abd: Mid abdomen with gauze covering her multiple drains going through her upper abdomen into the chest cavity.  Rectal tube with green output  Skin: warm, perfused, no jaundice  Neuro: Sedated  Psych: Sedated      LABS:  BMP  Recent Labs   Lab 08/28/20  1534 08/28/20  1107 08/28/20 0259 08/27/20 2113    142 142 142   POTASSIUM 3.7 3.8 3.7 3.5   CHLORIDE 112* 111* 111* 111*   FERNANDO 7.9* 7.4* 8.0* 7.4*   CO2 24 24 26 23   BUN 28 32* 40* 48*   CR 1.13* 1.24* 1.52* 1.56*   GLC 82 86 84 66*     CBC  Recent Labs   Lab 08/28/20  0259 08/27/20  2113  08/27/20  0347  08/26/20  0857   WBC 11.3* 10.4  --  13.5*  --  11.9*   RBC 2.97* 2.97*  --  2.45*  --  3.21*   HGB 8.6* 8.7*   < > 7.2*   < > 9.7*   HCT 26.1* 26.4*  --  21.7*  --  28.7*   MCV 88 89  --  89  --  89   MCH 29.0 29.3  --  29.4  --  30.2   MCHC 33.0 33.0  --  33.2  --  33.8   RDW 15.8* 15.6*  --  16.1*  --  15.9*   * 134*  --  161  --  119*    < > = values in this interval not displayed.     INR  Recent Labs   Lab 08/28/20  0259 08/27/20  0347 08/26/20  0353 08/25/20  1656   INR 1.37* 1.41* 1.76* 2.26*     LFTs  Recent Labs   Lab 08/28/20  0259 08/27/20  0347 08/26/20  0353 08/25/20  1548   ALKPHOS 136 216* 250* 262*   AST 43 45 63* 67*   ALT 19 21 23 24   BILITOTAL 0.9 0.9 1.1 1.4*   PROTTOTAL 4.4* 4.6* 4.6* 4.8*   ALBUMIN 1.4* 1.7* 1.4* 1.5*      PANCNo lab results found in last 7 days.    IMAGING:  Reviewed

## 2020-08-28 NOTE — PLAN OF CARE
PT 4E: Pt tolerated PROM without adverse events. No command following noted. Withdraw to 25% stimulation. Will continue to follow.

## 2020-08-29 NOTE — PROGRESS NOTES
CV ICU PROGRESS NOTE  August 29, 2020       CO-MORBIDITIES:   Cardiogenic shock (H)  (primary encounter diagnosis)  LV (left ventricular) mural thrombus  Heart failure (H)  Heart failure (H)  Status post cardiac surgery    ASSESSMENT: Marquise Jj is a 75 year old female 75 year-old female with PMH notable for coronary artery disease s/p CABG (4/20), ischemic cardiomyopathy, severe MR/TR and known mural thrombus who is admitted to the CV ICU s/p redo sternotomy, LVAD (heartmate III), and TV repair on 8/19/20 with Dr. Farley. Chest was left open and packed. S/p chest closure and washout 08/21/20.       PLAN SUMMARY:   Low intensity heparin   Free water flushes to 30 ml q4 hours   Pressure support trials   Goal I/O -1 L       PLAN:   Neuro/ pain/ sedation:  #Acute post-operative pain   #Likely anoxic brain injury; possible stroke  #Epilepsia Partialis Continua; resolved  - Monitor neurological status. Notify the MD for any acute changes in exam.  - Seroquel 50 at bedtime + 25 PRN   - Keppra, vimpat to continue per NeuroCrit  - Tylenol PRN       Pulmonary:   #Acute hypoxic respiratory failure  - Monitor CXR, ABG  - Monitor CT output   - Daily CXR   - Pressure support trials   Ventilation Mode: CMV/AC  (Continuous Mandatory Ventilation/ Assist Control)  FiO2 (%): 30 %  Rate Set (breaths/minute): 14 breaths/min  Tidal Volume Set (mL): 400 mL  PEEP (cm H2O): 5 cmH2O  Oxygen Concentration (%): 30 %  Resp: 29     Cardiovascular:    #Acute on chronic decompensated heart failure with reduced ejection fraction: NYHA IV / ACC D  #Cardiogenic shock, possible vasoplegia   #Ischemic cardiomyopathy  #Coronary artery disease s/p CABG 4/20 in [place], s/p PCI to RCA 7/20  #Mural thrombus  #Severe MR/TR  #S/p LVAD     MAP > 70, CVP 8-12    Off pressors     Low-intensity heparin infusion    Aspirin, rosuvastatin    Co-managing with Cards-2      GI/Nutrition:   - NPO, OGT, NJT  - TF to goal   - Pantoprazole ppx   - Bowel regimen:  senna-colace, miralax.  - No indication for parenteral nutrition.      Renal/Fluid Balance/ Electrolytes:   #Oliguria in the setting of likely ATN  - Will monitor intake and output.  - ICU electrolyte replacement protocol  - Goal-directed fluid therapy  - Nephrology consulted; CRRT      Endocrine:    #Glycemic control  #Hx hypothyroid   - ISS, medium intensity   - Synthroid daily       ID/ Antibiotics:  - Completed perioperative antibiotic regimen: vancomycin, levofloxacin, fluconazole, Zosyn       Heme:     #Acute perioperative blood loss anemia  #Thrombocytopenia   - Hgb goal > 8  - OK to resume low-intensity heparin gtt        Prophylaxis:    - SCD  - LIH  - PPI  - Bowel regimen      MSK:    - PT and OT consulted. Appreciate recs.      Lines/ tubes/ drains:  - ETT  - A-line  - R internal jugular CVC  - PIV  - Rojo  - Chest tubes       Disposition:  - CV ICU.    Patient seen, findings and plan discussed with CV ICU staff, Dr. Brand.    -----------------------------------  Angela Sales   Surgery Resident   *[id number]  ====================================    SUBJECTIVE:   Intubated, sedated. Fluid given overnight for low PI and hypotension.     OBJECTIVE:   1. VITAL SIGNS:   Temp:  [95.2  F (35.1  C)-100.3  F (37.9  C)] 100.3  F (37.9  C)  Pulse:  [] 102  Resp:  [18-29] 29  MAP:  [55 mmHg-97 mmHg] 75 mmHg  Arterial Line BP: ()/(41-82) 92/53  FiO2 (%):  [30 %] 30 %  SpO2:  [97 %-100 %] 99 %  Ventilation Mode: CMV/AC  (Continuous Mandatory Ventilation/ Assist Control)  FiO2 (%): 30 %  Rate Set (breaths/minute): 14 breaths/min  Tidal Volume Set (mL): 400 mL  PEEP (cm H2O): 5 cmH2O  Oxygen Concentration (%): 30 %  Resp: 29      2. INTAKE/ OUTPUT:   I/O last 3 completed shifts:  In: 3897.71 [I.V.:1954.71; Other:3; NG/GT:475; IV Piggyback:250]  Out: 4272 [Urine:109; Other:2863; Stool:850; Chest Tube:450]    3. PHYSICAL EXAMINATION:   General: Intubated, NAD  Neuro: Deeply sedated, PERRL; withdraws to pain BOTH  upper extremities, BL lower extremities  Resp: mechanically ventilated, minimal vent settings   CV: RRR  Abdomen: Soft, Non-distended, Non-tender  Incisions: sternotomy wound c/d/i s/p closure  Extremities: warm and well perfused    4. INVESTIGATIONS:   Arterial Blood Gases   Recent Labs   Lab 08/29/20  1536 08/29/20  0405 08/28/20  0259 08/27/20  0334   PH 7.54* 7.51* 7.47* 7.46*   PCO2 29* 30* 32* 32*   PO2 101 101 91 132*   HCO3 25 24 24 23     Complete Blood Count   Recent Labs   Lab 08/29/20  1536 08/29/20  0401 08/28/20 2131 08/28/20 0259 08/27/20 2113 08/27/20  0347   WBC  --  13.2*  --  11.3* 10.4  --  13.5*   HGB 8.0* 8.9* 8.7* 8.6* 8.7*   < > 7.2*   PLT  --  191  --  142* 134*  --  161    < > = values in this interval not displayed.     Basic Metabolic Panel  Recent Labs   Lab 08/29/20  1006 08/29/20  0405 08/29/20  0401 08/28/20 2131 08/28/20  1534     --  140 141 142   POTASSIUM 3.7  --  3.6 3.7 3.7   CHLORIDE 113*  --  109 112* 112*   CO2 24  --  24 24 24   BUN 29  --  28 28 28   CR 0.82  --  0.92 1.05* 1.13*   * 134* 128* 107*  111* 82     Liver Function Tests  Recent Labs   Lab 08/29/20  0401 08/28/20 0259 08/27/20 0347 08/26/20  0353   AST 51* 43 45 63*   ALT 26 19 21 23   ALKPHOS 182* 136 216* 250*   BILITOTAL 0.9 0.9 0.9 1.1   ALBUMIN 1.5* 1.4* 1.7* 1.4*   INR 1.27* 1.37* 1.41* 1.76*     Pancreatic Enzymes  No lab results found in last 7 days.  Coagulation Profile  Recent Labs   Lab 08/29/20  0401 08/28/20  0259 08/27/20 0347 08/26/20  0353 08/25/20  1656  08/24/20  1536   INR 1.27* 1.37* 1.41* 1.76* 2.26*   < > 2.21*   PTT  --   --   --   --  85*  --  63*    < > = values in this interval not displayed.         5. RADIOLOGY:   Recent Results (from the past 24 hour(s))   XR Chest Port 1 View    Narrative    Exam: XR CHEST PORT 1 VW, 8/29/2020 1:26 AM    Indication: Status post LVAD, intubated, interval change    Comparison: Chest x-ray 8/28/2020    Findings:   Portable supine  AP radiograph of the chest. Bilateral chest tubes,  mediastinal drains and pericardial drain in the LVAD device is stable  in position. Stable right IJ sheath and left chest wall implantable  cardiac defibrillator with lead projecting over the right ventricle.  Endotracheal tube tip projects 2.8 cm above the enrico. The cardiac  silhouette is stable. Postsurgical changes of the chest, including  median sternotomy wires. No pneumothorax. Stable trace left pleural  effusion and layering right pleural effusion. Improved hazy  retrocardiac opacities. Upper abdomen is unremarkable.       Impression    Impression:   1. Stable position of supportive devices.  2. Stable layering right pleural effusion and small left pleural  effusion. Overlying left basilar atelectasis/consolidation has  improved.    I have personally reviewed the examination and initial interpretation  and I agree with the findings.    TORSTEN COX, DO       =========================================

## 2020-08-29 NOTE — PLAN OF CARE
D/I:    Neuro:  Pupil 4+ & brisk.  Spontaneously opens eyes slightly @ times but does not track.  Bends left elbow and moves left forearm slightly off bed with suctioning of ETT.  No RUE movement seen this shift.  Rare and Extremely slight contraction/movement of bilateral feet seen.  Off all sedation since Tuesday 8/25 @ 0500.  Off all analgesics since Tuesday since Tuesday 8/25 @ 1244.  Pt continues on scheduled Seroquel 50 mg Q HS.    Cardiac:  SR 70's - 90's with rare fusion or inappropriate paced beat @ times.  HM3 LVAD:  Flows= 3.4 -4.1 lpm. PI= 1.6 - 6.0 ( Episodes of labile PI @ times). Speed= 5300 rpm.  Power= 3.6 - 3.7 mota.  Lactated ringers 250 ml bolus given @ 0025 for PI- 1.6. MAP in very low 60's & CVP= 7 at the time.  PI & MAP quickly increased once LR bolus started.  Vaso Gtt weaned to off since 0044 today keeping MAP > 65.   CVP= 8, 7 & 9.    Pulm:  Vent CMV mode:  VT= 400. RR=14. PEEP= 5 & FIO2= 30%.  Pt in respiratory alkalosis with overbreathing ( respirations= 23 - 26 bpm ) over vent settings.  ABG pH= 7.51. pCO2 =30 & Bicarb= 24.  Lungs clear with diminished bases after suctioning.  Left chest tubes pluerovac put out average of ~ 20 - 25 ml/hr. Right chest tubes pluerovac put out average of ~ 5 ml/hr.     GI:  Tube feeding @ goal rate of 55 ml/hr. Free water via feeding tube=  60 ml Q 4 hrs.  Na+ level down to 140 on today's 0400 AM labs.  Rectal tube in place with 250 ml watery green/black/brown diarrhea this shift.     :  Pt oliguric averaging 8 ml/hr urine in Rojo.  Rojo D/C'd this shift per nursing protocol for low urine output. CRRT net fluid removal yesterday over 24 hour period ending @ midnight was 632 ml net fluid removal for an average of 26 ml/hr net fluid removal.  Net fluid removal today so far since midnight is 87 ml for an average of 11 ml/hr net fluid removal.    Skin:  New pea sized skin tear this shift on left lateral calf.. Blanchable erythema on bilateral feet and mid  spine on back.    A/P:    Keep MAP > 65.  Goal CVP= 8 - 12.  Keep LVAD PI > 2 and Flow's > 3.  Current ordered CRRT goal is 0 to 25 ml/hr net fluid removal as BP allows.  Nursing to continue to monitor for improvement in neuro status avoiding sedation and narcotic analgesics if possible.

## 2020-08-29 NOTE — PLAN OF CARE
Major Shift Events:  Pt was more awake and withdrew on all 4 extremities to pain. Pt is starting propofol overnight to help with alkalosis. HR was SR-ST. Gave a 500 mL LR bolus this AM for a CVP of 3 and low PI's in the 1.6-1.8 range. Responded well no more issues all day. Good stool output today.   Plan: Check ABG at 2000. Continue to monitor and try to get net negative 1L overall for the day. Continue to monitor and notify MD of changes  For vital signs and complete assessments, please see documentation flowsheets.

## 2020-08-29 NOTE — PROGRESS NOTES
CRRT STATUS NOTE    DATA:  Time:  5:43 AM  Pressures WNL:  YES  Filter Status:  WDL    Problems Reported/Alarms Noted:  None    Supplies Present:  YES    ASSESSMENT:  Patient Net Fluid Balance:  +174 ml since midnight. -632ml yesterday  Vital Signs:  BP 91/56   Pulse 88   Temp 95.9  F (35.5  C)   Resp 25   Wt 79.4 kg (175 lb 0.7 oz)   SpO2 98%   BMI 26.62 kg/m      Labs:  WBC 13.2. Hgb 8.9  Goals of Therapy:  0-25 ml /hr net neg     INTERVENTIONS:   None    PLAN:  Continue with POC and contact CRRT RN with questions or concerns.

## 2020-08-29 NOTE — PROGRESS NOTES
Nephrology dialysis note    This patient was seen and examined while on CRRT. Laboratory results and nurses' notes were reviewed. Negative 0.6L. Labs acceptable.     No changes to management of volume, anemia, BMD, acidosis, or electrolytes.    Diagnosis - LORI in setting of cardiogenic shock/TVR repair. Plan to continue CRRT with goal negative 1L.    Glen Marte MD  [phone]

## 2020-08-30 NOTE — OR NURSING
EGD completed in pt room on 4E with ICU RN present for sedation and monitoring.  APC and 1 olympus hemoclip used along with Universal Joe Net to removed clots.

## 2020-08-30 NOTE — PLAN OF CARE
Major Shift Events:  EGD done at bedside. 1 Clip and a couple of spots burned. Lots of clots pulled out of stomach. Off pressors now. PS 7/5 right now. Off sedation. Minimal stool output. NJ was pulled out during EGD.   Plan: Start TPN tonight. Will consult GI for another NJ placement. Continue to monitor and notify MD of changes  For vital signs and complete assessments, please see documentation flowsheets.

## 2020-08-30 NOTE — PROGRESS NOTES
Cardiology Progress Note    Assessment & Plan   In summary, Marquise Jj is a 75-year-old female with a past medical history notable for coronary artery disease, ischemic cardiomyopathy, and known mural thrombus who was transferred from [facility] for further evaluation/treatment of cardiogenic shock. Balloon pump placed 8/14. Had HM3 8/19. Chest closed 8/21.    Today's changes:  - Pan culture and initial temporary broad spectrum antibiotics  - continue PPI  - transfused  - EGD by GI today, f/u recs  - CRRT: goal I=O  - Sedation remains off  - No alarms      Neuro:   # Sedation  # concern for seizure activity  CTH head 8/20 no signs of ICH   CTH head 8/27 Scattered areas of hypodensity with loss of gray-white matter differentiation in the left frontal lobe. These areas were not present on the CT dated 8/10/2020 and difficult compared to other prior comparison CTs due to extensive hardware artifact. These are  suspicious for involving embolic infarcts.  keppra 2 g PO bid   vimpat 200 BID  - veeg  - f/u neurocritical care recs      Cardio:  # Cardiogenic shock with vasoplegic component   # ICM: NYHA IV / ACC  # Severe MR/TR s/p tricuspid valve repair with Elkins MC3 Annuloplasty Ring size T30  # s/p LVAD HM3 on 8/19/2020  LAVD speed 5300, flow 3.4-3.7, pi 2.5-3.7, power 3.5-3.6   Presents with cardiogenic shock. On NE, cardiac index approximately 1.37 on admission. Severely elevated biventricular filling pressures. Etiology of her decompensation appears to be progression of her cardiomyopathy. Despite medical treatment, she has been hospitalized twice since returning to [place], both with low output. No viability in her LAD territory, and doubt that PCI to her circumflex would make meaningful LV recovery. Hemodynamics improved with dobutamine, did not tolerate attempt to wean inotropics. RHC removed on 8/13 after clotting off, replaced on 8/14 after patient with increased work of breathing. CI of  1.1, balloon pump placed with CI of 1.6-1.8 and improvement in symptoms/hemodynamics. Had LVAD HM3 placed on 8/19. CVP today around 10 range. Chest closed on 8/21.   - Monitoring CVP for RV/fluid overload (CVP goal < 14)  - continue to wean pressors as above  - Hemodynamics q6hrs, monitor lactate. Goal CI>2., SvO2 >55%  - holding hep gtt as above      Date 8/9 8/9 8/10 8/11 08/13/20 08/15/20 08/16/20 08/17/20 8/18/20 8/19 8/20 8/21*   CVP 12 9 3 4 8 9 6 8 11 5 10 13   Mean PA  35 30 21 25 23 21 20 35/18 35/18 - 30/12 28/14   PCWP  18 14 12 x 13 14  13 - -    Oxy HGB  59 64 61 61 60 47 62 69 54 - 68 66   CI/CO 2.3 2.3 2.4/4.2 2.3 2.2 1.8 2.8/4.9 4.1 4.3/2.5 - 6/3.3 6.9/3.8   SVR 1100 1100 1200 1400 1316 1900 7491 458 1690 - 1025 715   Device     IABP 1:1 IABP 1:1 IABP 1:1 IABP 1:1 IABP 1:1 No IABP No IABP No IABP   * epi 0.1, vaso 0.5     8/22: CVP 12, PA 28/14/19, PCWP 14, CO/CI 9.0/4.8. . Epi 0.1, vaso 1.  8/23: CVP 16, PA 30/14, CI/CO 4.4/8.0, , SvO2 71%  8/24: CVP 14, PA 44/20/28, PCWP 16, CI/CO 7.3/3.9, SvO2 68% epi 0.06, vaso 2  8/25: CVP 16, PA 44/20/29, PCWP 22, CI/CO  3.6, SvO2 82%, vaso 2, epi 0.03  8/26: CVP 13, PA 40/20/26, PCWP 12, CI/CO 7/ 3.6, SvO2 74%, vaso 1  8/27: CVP 13, PA 38/18/26, PCWP 12-14, CI/CO 9.4/4.7, , PVR ~0.5, SvO2 73%, vaso 1, epi 0.05     # CAD s/p CABG 4/2020 (KURT to RCA and LIMA to LAD) and PCI to RCA 7/20  - Stop home plavix po qd (8/13) for LVAD surgery  - aspirin 81mg PO qd   - c/w home crestor    # Mural thrombus  - removed during surgery on 8/19    # A-flutter  Converted to NSR on 8/15    - Continue amiodarone 200mg po qd      Pulm:  # Acute hypoxic respiratory failure  Intubated on 8/19   Vent setting: RR 14, , PEEP 5, FiO2 40%   Secondary to pulmonary edema bilateral pleural effusions.   - On nasal cannula, titrate to saturation of >92%     Renal/Electrolytes   # Hyperkalemia/Hypokalemia   # Acute kidney injury on chronic kidney disease, stage  III  Likely ATN due to hypoxic insult  - renal consult and diuretic challenge. May need dialysis  - Trend potassium and creatinine q12hrs  - Electrolyte replacement protocol  - Monitor UOP   - continue CVVH      GI:  #GIB  GI consulted and EGD on 8/28 with protuberant vessel injected and clipped and bleeding gastric ulcer with adherent clot, injected and clipped as welll    # Constipation  # Severe protein calorie malnutrition  - Nutrition through tube feeds at 30 mL/hr    - Senna-docusate bid scheduled  - Miralax bid prn constipation  - f/u GI recs    ID:   # UTI vs ASB  Periprocedural abx as per surgical team   - levoloxacin, zosyn, rifampin, vancomycin, fluconazole    - Completed Ceftriaxone treatment 1g IV qd (8/14 - 8/18)     Hem/Onc  # Mural thrombus removed on 8/19  # Anemia, mild  D/t frequent blood draws and procedures  - Monitor hgb    Endo  # Hypothyroidism   - Continue PTA levothyroxine     Nutrition: NJ with TF today  DVT Prophylaxis: mechanical   Code Status: Full Code    Waylon Garcia MD, MSc  Cardiovascular Disease Fellow  [facility]    Discussed with Dr Johnson    Interval History   Had recurrent GI bleed overnight on AC. Remains intubated and heavily sedated. Hypothermic and hypotensive today.     Physical Exam   Temp: 99.6  F (37.6  C) Temp src: Axillary BP: (!) 79/51 Pulse: 102   Resp: 26 SpO2: 98 % O2 Device: Mechanical Ventilator    Vitals:    08/28/20 0400 08/29/20 0200 08/30/20 0200   Weight: 80.6 kg (177 lb 11.1 oz) 79.4 kg (175 lb 0.7 oz) 80.8 kg (178 lb 2.1 oz)     Vital Signs with Ranges  Temp:  [96.3  F (35.7  C)-99.6  F (37.6  C)] 99.6  F (37.6  C)  Pulse:  [] 102  Resp:  [15-26] 26  BP: (79)/(51) 79/51  MAP:  [56 mmHg-94 mmHg] 78 mmHg  Arterial Line BP: ()/(42-81) 117/69  FiO2 (%):  [30 %] 30 %  SpO2:  [96 %-100 %] 98 %  I/O last 3 completed shifts:  In: 4074.91 [I.V.:2166.91; Other:8; NG/GT:165; IV Piggyback:750]  Out: 2139 [Urine:145; Other:1484;  Stool:200; Chest Tube:310]    RETIRE: Heart Rate: 71, Blood pressure (!) 79/51, pulse 102, temperature 99.6  F (37.6  C), temperature source Axillary, resp. rate 26, weight 80.8 kg (178 lb 2.1 oz), SpO2 98 %.  178 lbs 2.11 oz     GEN: intubated , sedated  CV: RRR, Hum of HM3   LUNGS: Clear to auscultation bilaterally  ABD: Active bowel sounds, soft, no abdominal tenderness.   EXT: Trace LE edema   NEURO: heavily sedated    Medications     BETA BLOCKER NOT PRESCRIBED       dextrose       dextrose 20 mL/hr at 08/30/20 1700     CRRT replacement solution 12.5 mL/kg/hr (08/30/20 1648)     - MEDICATION INSTRUCTIONS -       norepinephrine Stopped (08/30/20 1345)     pantoprazole (PROTONIX) infusion ADULT/PEDS GREATER than or EQUAL to 45 kg 8 mg/hr (08/30/20 1700)     parenteral nutrition - ADULT compounded formula       CRRT replacement solution 2.911 mL/kg/hr (08/30/20 1107)     CRRT replacement solution 12.5 mL/kg/hr (08/30/20 1648)     propofol (DIPRIVAN) infusion Stopped (08/30/20 0945)     BETA BLOCKER NOT PRESCRIBED       vasopressin Stopped (08/30/20 0915)       amiodarone  200 mg Oral or Feeding Tube Daily     aspirin  81 mg Oral or Feeding Tube Daily     bimatoprost  1 drop Both Eyes At Bedtime     busPIRone  10 mg Oral or Feeding Tube TID     insulin aspart  1-6 Units Subcutaneous Q4H     lacosamide (VIMPAT) intermittent infusion  200 mg Intravenous BID     latanoprost  1 drop Both Eyes Daily     levETIRAcetam  2,000 mg Intravenous Q12H     levothyroxine  62.5 mcg Oral or Feeding Tube QAM AC     lipids  250 mL Intravenous Q24H     piperacillin-tazobactam  4.5 g Intravenous Q6H     QUEtiapine  75 mg Oral or Feeding Tube At Bedtime     rosuvastatin  20 mg Oral or Feeding Tube Daily     sodium chloride (PF)  3 mL Intracatheter Q8H     vancomycin (VANCOCIN) IV  1,750 mg (central catheter) Intravenous Q24H       Data   Recent Labs   Lab 08/30/20  1541 08/30/20  1014 08/30/20  0758 08/30/20  0341  08/29/20  2043   08/29/20  0401  08/28/20  0259   WBC  --   --   --  10.6  --  11.3*  --  13.2*  --  11.3*   HGB 8.6* 8.8* 8.5* 7.2*   < > 6.6*  6.6*   < > 8.9*   < > 8.6*   MCV  --   --   --  90  --  91  --  88  --  88   PLT  --   --   --  160  --  191  --  191  --  142*   INR  --   --   --  1.34*  --   --   --  1.27*  --  1.37*    139  --  141  --  142   < > 140   < > 142   POTASSIUM 3.9 4.0  --  3.8  --  3.9   < > 3.6   < > 3.7   CHLORIDE 112* 110*  --  111*  --  113*   < > 109   < > 111*   CO2 22 23  --  25  --  26   < > 24   < > 26   BUN 27 31*  --  32*  --  33*   < > 28   < > 40*   CR 0.79 0.79  --  0.84  --  0.86   < > 0.92   < > 1.52*   ANIONGAP 6 6  --  5  --  3   < > 7   < > 6   FERNANDO 7.2* 7.5*  --  7.4*  --  7.5*   < > 7.7*   < > 8.0*   GLC 72 79  --  100*   < > 137*  140*   < > 128*   < > 84   ALBUMIN  --   --   --  1.2*  --   --   --  1.5*  --  1.4*   PROTTOTAL  --   --   --  4.0*  --   --   --  4.7*  --  4.4*   BILITOTAL  --   --   --  0.7  --   --   --  0.9  --  0.9   ALKPHOS  --   --   --  164*  --   --   --  182*  --  136   ALT  --   --   --  23  --   --   --  26  --  19   AST  --   --   --  38  --   --   --  51*  --  43    < > = values in this interval not displayed.       Recent Results (from the past 24 hour(s))   XR Chest Port 1 View    Narrative    Exam: XR CHEST PORT 1 VW, 8/30/2020 3:13 AM    Indication: s/p LVAd, intubated, interval change    Comparison: Chest x-ray 20/9/2020    Findings:   Portable supine AP radiograph of the chest. Endotracheal tube tip  projects 2.0 cm above the enrico. Stable position of the right IJ  sheath, left chest wall implantable cardiac defibrillator and lead,  mediastinal drains, LVAD device and the pericardial drain, and  bilateral chest tubes. Postsurgical changes of the chest, including  median sternotomy wires. No pneumothorax. Stable trace left pleural  effusion and layering right pleural effusion. Slightly increased hazy  retrocardiac opacities. Upper abdomen is  unremarkable.      Impression    Impression:   1. Stable position of supportive devices.  2. Stable layering right pleural effusion and small left pleural  effusion. Slightly increased hazy left basilar and retrocardiac  opacities, which may represent atelectasis or consolidation.    I have personally reviewed the examination and initial interpretation  and I agree with the findings.    TORSTEN COX, DO

## 2020-08-30 NOTE — PROGRESS NOTES
Nephrology dialysis note    This patient was seen and examined while on CRRT. Laboratory results and nurses' notes were reviewed. Positive 1.1L. Labs acceptable.     No changes to management of volume, anemia, BMD, acidosis, or electrolytes.    Diagnosis - LORI in setting of cardiogenic shock/TVR repair. Plan to continue CRRT with goal running even today.    Glen Marte MD  [phone]

## 2020-08-30 NOTE — PLAN OF CARE
D/I:     Neuro:  No extremity movement this shift.  Propofol Gtt 25 to 50 ( currently @ 40 ) mcg/kg/min to control respiratory alkalosis due to overbreathing the vent.  ABG pH down from 7.54 on previous shift before propofol started  to current 7.46.  Off all analgesics since Tuesday since Tuesday 8/25 @ 1244.  Pt continues on scheduled Seroquel 50 mg Q HS.  Lexi hugger and CRRT blood warmer on all shift due to low temps.      Cardiac:  SB/SR 59 - 80's with a rare paced beat @ times.  HM3 LVAD:  Flows= 2.4 - 4.0 lpm. PI= 1.4 - 8.0  Both flows and PI labile. Speed= 5300 rpm.  Power= 3.4 - 3.7 mota.  Lactated ringers 250 ml bolus x 3 given ( total of 750 ml ) for low PI's .  Two units cells given during the shift for low flow alarming LVAD and Hgb down to 6.6.  Hgb up to 7.2 after 1st unit given.   Vaso Gtt off to 3.5 ( currently @ 1 ) unit/hr.  Levo Gtt started @ 0441 @ 0.03 mcg/kg/min to keep MAP > 65.   MAP's, LVAD flow and PI all much more stable since cells given and Levo Gtt started. CVP= 9, 12 & 9.     Pulm:  Vent CMV mode:  VT= 400. RR=14. PEEP= 5 & FIO2= 30%.  Respiratory alkalosis from vent overbreathing much improved since propofol started.  Left chest tubes pluerovac put out average of 10  ml/hr  Had 120 ml out for the shift ).  Right chest tubes pluerovac put out average of <  2 ml/hr ( Had 20 ml out for the shift ) .      GI:  Heparin Gtt stopped @ 2200 last evening due to drop in Hgb.  Tube feeding also put on hold at that time. Protonix Gtt started @ 2330 @ 8 mg/hr.   Rectal tube in place with 50 ml watery green/brown diarrhea this shift.      :  Bladder scan resulted in 235 ml @ 0600.  Straight cath @ 0600 yielded only 140 ml clear yellow urine.  CRRT net fluid gain yesterday over 24 hour period ending @ midnight was 1,105 ml net fluid gain for an average of 46 ml/hr net fluid gain.  Net fluid gain today so far since midnight is 697 ml for an average of 87 ml/hr net fluid gain. Unable to obtain  goal of 1 liter net fluid removal yesterday due to soft MAP's and unstable LVAD flows and PI's.     A/P:     Keep MAP > 65.  Goal CVP= 8 - 12.  Keep LVAD PI > 2 and Flow's > 3.  Current ordered CRRT goal is 0 to 75 ml/hr net fluid removal as BP allows with 1 liter fluid off for the day.  Nursing anticipates GI consult regarding drop in Hgb in consideration of recent GI bleed with 3 clips placed last Thursday 8/27/2020.

## 2020-08-30 NOTE — PROGRESS NOTES
CV ICU PROGRESS NOTE  August 30, 2020        CO-MORBIDITIES:   Cardiogenic shock (H)  (primary encounter diagnosis)  LV (left ventricular) mural thrombus  Heart failure (H)  Heart failure (H)  Status post cardiac surgery    ASSESSMENT: Marquise Jj is a 75 year old female 75 year-old female with PMH notable for coronary artery disease s/p CABG (4/20), ischemic cardiomyopathy, severe MR/TR and known mural thrombus who is admitted to the CV ICU s/p redo sternotomy, LVAD (heartmate III), and TV repair on 8/19/20 with Dr. Farley. Chest was left open and packed. S/p chest closure and washout 08/21/20.     PLAN SUMMARY:   Pan culture   Vanc/Zosyn   GI - touch base regarding EGD   Continue pantoprazole gtt   Propofol off   Goal net even today   Hold TF   PST x2   Repeat Hgb   Seroquel to 75 at bedtime     PLAN:   Neuro/ pain/ sedation:  #Acute post-operative pain   #Likely anoxic brain injury; possible stroke  #Epilepsia Partialis Continua; resolved  - Monitor neurological status. Notify the MD for any acute changes in exam.  - Seroquel 75 at bedtime + 25 PRN   - Keppra, vimpat to continue per NeuroCrit  - Tylenol PRN    - Propofol at night PRN      Pulmonary:   #Acute hypoxic respiratory failure  - Monitor CXR, ABG  - Monitor CT output   - Daily CXR   - Pressure support trials   Ventilation Mode: CMV/AC  (Continuous Mandatory Ventilation/ Assist Control)  FiO2 (%): 30 %  Rate Set (breaths/minute): 14 breaths/min  Tidal Volume Set (mL): 400 mL  PEEP (cm H2O): 5 cmH2O  Pressure Support (cm H2O): 7 cmH2O  Oxygen Concentration (%): 30 %  Resp: 19     Cardiovascular:    #Acute on chronic decompensated heart failure with reduced ejection fraction: NYHA IV / ACC D  #Cardiogenic shock, possible vasoplegia   #Ischemic cardiomyopathy  #Coronary artery disease s/p CABG 4/20 in [place], s/p PCI to RCA 7/20  #Mural thrombus  #Severe MR/TR  #S/p LVAD     MAP > 70, CVP 8-12    Off pressors     Low-intensity heparin infusion - HELD      Aspirin, rosuvastatin    Co-managing with Cards-2    Amiodarone 200 mg daily for afib prophylaxis       GI/Nutrition:   # UGI bleed   - Appreciate GI consult   - NPO, OGT, NJT  - PP tube feeds currently held   - Pantoprazole gtt   - Bowel regimen: senna-colace, miralax.  - No indication for parenteral nutrition.      Renal/Fluid Balance/ Electrolytes:   #Oliguria in the setting of likely ATN  - Will monitor intake and output.  - ICU electrolyte replacement protocol  - Goal-directed fluid therapy  - Nephrology consulted; CRRT      Endocrine:    #Glycemic control  #Hx hypothyroid   - ISS, medium intensity   - Synthroid daily       ID/ Antibiotics:  # Febrile, concern for post operative fevers.   - Completed perioperative antibiotic regimen: vancomycin, levofloxacin, fluconazole, Zosyn   - Pan culture   - Vanc (8/30- ), Zosyn (8/30-)      Heme:     #Acute perioperative blood loss anemia  #Thrombocytopenia   - Hgb goal > 8  - Hold heparin   - Transfuse as necessary       Prophylaxis:    - SCD  - LIH - HOLD   - PPI  - Bowel regimen      MSK:    - PT and OT consulted. Appreciate recs.      Lines/ tubes/ drains:  - ETT  - A-line  - R internal jugular CVC  - PIV  - Chest tubes       Disposition:  - CV ICU.    Patient seen, findings and plan discussed with CV ICU staff, Dr. Brand.    -----------------------------------  Angela Sales   Surgery Resident   *[id number]  ====================================    SUBJECTIVE:   Concern for GI bleed overnight with dropping hgb. Transfused 2 units. Propofol on overnight. Tube feeds held.     OBJECTIVE:   1. VITAL SIGNS:   Temp:  [96.3  F (35.7  C)-100.3  F (37.9  C)] 96.4  F (35.8  C)  Pulse:  [] 84  Resp:  [15-29] 19  BP: (79)/(51) 79/51  MAP:  [56 mmHg-94 mmHg] 86 mmHg  Arterial Line BP: ()/(42-81) 94/61  FiO2 (%):  [30 %] 30 %  SpO2:  [96 %-100 %] 100 %  Ventilation Mode: CMV/AC  (Continuous Mandatory Ventilation/ Assist Control)  FiO2 (%): 30 %  Rate Set  (breaths/minute): 14 breaths/min  Tidal Volume Set (mL): 400 mL  PEEP (cm H2O): 5 cmH2O  Pressure Support (cm H2O): 7 cmH2O  Oxygen Concentration (%): 30 %  Resp: 19      2. INTAKE/ OUTPUT:   I/O last 3 completed shifts:  In: 4576.19 [I.V.:2118.19; Other:8; NG/GT:330; IV Piggyback:750]  Out: 2715 [Urine:145; Other:1645; Stool:650; Chest Tube:275]    3. PHYSICAL EXAMINATION:   General: Intubated, NAD  Neuro: Deeply sedated, PERRL; withdraws to pain BOTH upper extremities, BL lower extremities  Resp: mechanically ventilated, minimal vent settings   CV: RRR  Abdomen: Soft, Non-distended, Non-tender  Incisions: sternotomy wound c/d/i s/p closure  Extremities: warm and well perfused    4. INVESTIGATIONS:   Arterial Blood Gases   Recent Labs   Lab 08/30/20  0340 08/30/20 0014 08/29/20 2043 08/29/20  1536   PH 7.46* 7.46* 7.52* 7.54*   PCO2 34* 35 31* 29*   PO2 99 95 111* 101   HCO3 24 25 25 25     Complete Blood Count   Recent Labs   Lab 08/30/20  0758 08/30/20  0341 08/30/20  0022 08/29/20 2043 08/29/20  0401  08/28/20  0259   WBC  --  10.6  --  11.3*  --  13.2*  --  11.3*   HGB 8.5* 7.2* 7.0* 6.6*  6.6*   < > 8.9*   < > 8.6*   PLT  --  160  --  191  --  191  --  142*    < > = values in this interval not displayed.     Basic Metabolic Panel  Recent Labs   Lab 08/30/20  0341 08/30/20  0014 08/29/20 2043 08/29/20  1536 08/29/20  1006     --  142 140 141   POTASSIUM 3.8  --  3.9 3.8 3.7   CHLORIDE 111*  --  113* 111* 113*   CO2 25  --  26 25 24   BUN 32*  --  33* 30 29   CR 0.84  --  0.86 0.86 0.82   * 114* 137*  140* 116* 123*     Liver Function Tests  Recent Labs   Lab 08/30/20  0341 08/29/20  0401 08/28/20  0259 08/27/20  0347   AST 38 51* 43 45   ALT 23 26 19 21   ALKPHOS 164* 182* 136 216*   BILITOTAL 0.7 0.9 0.9 0.9   ALBUMIN 1.2* 1.5* 1.4* 1.7*   INR 1.34* 1.27* 1.37* 1.41*     Pancreatic Enzymes  No lab results found in last 7 days.  Coagulation Profile  Recent Labs   Lab 08/30/20  0341  08/29/20  0401 08/28/20  0259 08/27/20  0347  08/25/20  1656  08/24/20  1536   INR 1.34* 1.27* 1.37* 1.41*   < > 2.26*   < > 2.21*   PTT  --   --   --   --   --  85*  --  63*    < > = values in this interval not displayed.         5. RADIOLOGY:   Recent Results (from the past 24 hour(s))   XR Chest Port 1 View    Narrative    Exam: XR CHEST PORT 1 VW, 8/30/2020 3:13 AM    Indication: s/p LVAd, intubated, interval change    Comparison: Chest x-ray 20/9/2020    Findings:   Portable supine AP radiograph of the chest. Endotracheal tube tip  projects 2.0 cm above the enrico. Stable position of the right IJ  sheath, left chest wall implantable cardiac defibrillator and lead,  mediastinal drains, LVAD device and the pericardial drain, and  bilateral chest tubes. Postsurgical changes of the chest, including  median sternotomy wires. No pneumothorax. Stable trace left pleural  effusion and layering right pleural effusion. Slightly increased hazy  retrocardiac opacities. Upper abdomen is unremarkable.      Impression    Impression:   1. Stable position of supportive devices.  2. Stable layering right pleural effusion and small left pleural  effusion. Slightly increased hazy left basilar and retrocardiac  opacities, which may represent atelectasis or consolidation.    I have personally reviewed the examination and initial interpretation  and I agree with the findings.    TORSTEN COX, DO       =========================================

## 2020-08-30 NOTE — PROGRESS NOTES
CLINICAL NUTRITION SERVICES - BRIEF NOTE     NDT removed during procedure. Given HM3 and recent clipping, feeding tube needs to be replaced under fluoro. However, Radiology unavailable today. Plan to start TPN.     Nutrition changes today:  PharmD to order TPN via 1080 ml/day (@ 45 ml/hr) with initial 140g Dex (GIR 1.5 mg/kg/min), 125g AA + 250 ml 20% IV lipids daily. Per PharmD/RD discretion, adv by 45-50 g/day to eventual goal of 235g Dex to provide 1799 kcals (29 kcal/kg), 2 g PRO/kg, GIR 2.6 with 28% kcals from Fat (0.7 g fat/kg/day) based on DW of 63 kg.    Rosemary Wyatt RD, LD  [id number]  Pgr: [phone]

## 2020-08-31 NOTE — PROGRESS NOTES
CRRT STATUS NOTE    DATA:  Time:  6:52 PM  Pressures WNL:  YES  Filter Status:  WDL    Problems Reported/Alarms Noted:  Filter pressures jumped high quickly, TMPs high.      Supplies Present:  YES    ASSESSMENT:  Patient Net Fluid Balance:  -773  Vital Signs:  98.2, 84, 20, 100/64, 97%  Labs:  K 3.7, Mag 2.2, Phos 3.3, Hgb 7.5  Goals of Therapy:  0-50ml/hr as BP allows    INTERVENTIONS:   Machine restarted at 1205.    PLAN:  Continue to monitor. Pull as able.

## 2020-08-31 NOTE — PLAN OF CARE
D/I:     Neuro:  No extremity movement this shift.  Started Propofol Gtt @ 2215 running @ 35 mcg/kg/min ( currently off since 0537 ) to control respiratory alkalosis due to overbreathing the vent.  Off all analgesics since Tuesday since Tuesday 8/25 @ 1244.  Tmax 99.9 Oral.     Cardiac:  SR/ST 90's to 100's with a rare paced fusion beat @ times.  HM3 LVAD:  Flows= 3.4 - 4.2 lpm. PI= 2.3 - 4.7.  Speed= 5300 rpm.  Power= 3.6 - 3.8 mota.  Vaso Gtt off to 3.5 ( currently @ 1 ) unit/hr.  Levo Gtt started @ 2235 running @ 0.03 to 0.06 mcg/kg/min to keep MAP > 65 while Pt on Propofol Gtt.  Levo Gtt off since 0600 as not need after propofol stopped.  CVP= 10, 11 & 9.       Pulm:  Vent CMV mode:  VT= 400. RR=14. PEEP= 5 & FIO2= 30%.  Left chest tubes pluerovac put out average of 26  ml/hr  Had 310 ml out for the shift ).  Right chest tubes pluerovac put out average of <  2 ml/hr ( Had 20 ml out for the shift ) .      GI: Tube feeding and all oral med's currently on hold due to feeding tube coming out during EGD with clip placement by GI consult yesterday.  Hgb dropped from 8.5 @ 2100 last evening  to 7.8 on this morning's 0340 AM labs.  Protonix Gtt @ 8 mg/hr.  Continuous TPN @ 45 ml/hr.  Lipids 250 ml over 12 hours infusing.   Rectal tube in place with 150 ml watery green/brown diarrhea this shift.      :  Bladder scan resulted in 234 ml @ 0600.  Straight cath @ 0500 yielded only 140 ml clear yellow urine.  UA with micro specimen sent.  CRRT net fluid gain yesterday over 24 hour period ending @ midnight was 650 ml net fluid gain for an average of 27 ml/hr net fluid gain.  Net fluid removal today so far since midnight is 445 ml for an average of 56 ml/hr net fluid removal.     A/P:     Keep MAP > 65.  Goal CVP= 8 - 12.  Keep LVAD PI > 2 and Flow's > 3.  Current ordered CRRT goal is I = O.  Nursing to continue to monitor Q 6 hr Hgb.  Nursing anticipates possible feeding tube placement under fluroscopy.

## 2020-08-31 NOTE — PROGRESS NOTES
Cardiology Progress Note    Assessment & Plan   In summary, Marquise Jj is a 75-year-old female with a past medical history notable for coronary artery disease, ischemic cardiomyopathy, and known mural thrombus who was transferred from [facility] for further evaluation/treatment of cardiogenic shock. Balloon pump placed 8/14. Had HM3 8/19. Chest closed 8/21.    Today's changes:  - continue temporary broad spectrum antibiotics; consider discontinue if cx remain negative  - continue holding AC  - continue PPI  - no alarms      Neuro:   # Sedation  # concern for seizure activity  CTH head 8/20 no signs of ICH   CTH head 8/27 Scattered areas of hypodensity with loss of gray-white matter differentiation in the left frontal lobe. These areas were not present on the CT dated 8/10/2020 and difficult compared to other prior comparison CTs due to extensive hardware artifact. These are suspicious for involving embolic infarcts.  keppra 2 g PO bid   vimpat 200 BID  - f/u neurocritical care recs      Cardio:  # Cardiogenic shock with vasoplegic component   # ICM: NYHA IV / ACC  # Severe MR/TR s/p tricuspid valve repair with Elkins MC3 Annuloplasty Ring size T30  # s/p LVAD HM3 on 8/19/2020  LAVD speed 5300, flow 3.4-3.7, pi 2.5-3.7, power 3.5-3.6   Presents with cardiogenic shock. On NE, cardiac index approximately 1.37 on admission. Severely elevated biventricular filling pressures. Etiology of her decompensation appears to be progression of her cardiomyopathy. Despite medical treatment, she has been hospitalized twice since returning to [place], both with low output. No viability in her LAD territory, and doubt that PCI to her circumflex would make meaningful LV recovery. Hemodynamics improved with dobutamine, did not tolerate attempt to wean inotropics. RHC removed on 8/13 after clotting off, replaced on 8/14 after patient with increased work of breathing. CI of 1.1, balloon pump placed with CI of 1.6-1.8  and improvement in symptoms/hemodynamics. Had LVAD HM3 placed on 8/19. CVP today around 10 range. Chest closed on 8/21.   - Monitoring CVP for RV/fluid overload (CVP goal < 14)  - continue to wean pressors as above  - Hemodynamics q6hrs, monitor lactate. Goal CI>2., SvO2 >55%  - holding hep gtt as above      Date 8/9 8/9 8/10 8/11 08/13/20 08/15/20 08/16/20 08/17/20 8/18/20 8/19 8/20 8/21*   CVP 12 9 3 4 8 9 6 8 11 5 10 13   Mean PA  35 30 21 25 23 21 20 35/18 35/18 - 30/12 28/14   PCWP  18 14 12 x 13 14  13 - -    Oxy HGB  59 64 61 61 60 47 62 69 54 - 68 66   CI/CO 2.3 2.3 2.4/4.2 2.3 2.2 1.8 2.8/4.9 4.1 4.3/2.5 - 6/3.3 6.9/3.8   SVR 1100 1100 1200 1400 1316 1900 3052 032 2053 - 1025 715   Device     IABP 1:1 IABP 1:1 IABP 1:1 IABP 1:1 IABP 1:1 No IABP No IABP No IABP   * epi 0.1, vaso 0.5     8/22: CVP 12, PA 28/14/19, PCWP 14, CO/CI 9.0/4.8. . Epi 0.1, vaso 1.  8/23: CVP 16, PA 30/14, CI/CO 4.4/8.0, , SvO2 71%  8/24: CVP 14, PA 44/20/28, PCWP 16, CI/CO 7.3/3.9, SvO2 68% epi 0.06, vaso 2  8/25: CVP 16, PA 44/20/29, PCWP 22, CI/CO  3.6, SvO2 82%, vaso 2, epi 0.03  8/26: CVP 13, PA 40/20/26, PCWP 12, CI/CO 7/ 3.6, SvO2 74%, vaso 1  8/27: CVP 13, PA 38/18/26, PCWP 12-14, CI/CO 9.4/4.7, , PVR ~0.5, SvO2 73%, vaso 1, epi 0.05     # CAD s/p CABG 4/2020 (KURT to RCA and LIMA to LAD) and PCI to RCA 7/20  - Stop home plavix po qd (8/13) for LVAD surgery  - aspirin 81mg PO qd   - c/w home crestor    # Mural thrombus  - removed during surgery on 8/19    # A-flutter  Converted to NSR on 8/15    - Continue amiodarone 200mg po qd      Pulm:  # Acute hypoxic respiratory failure  Intubated on 8/19   Secondary to pulmonary edema bilateral pleural effusions.   - possible tracheostomy     Renal/Electrolytes   # Hyperkalemia/Hypokalemia   # Acute kidney injury on chronic kidney disease, stage III  Likely ATN due to hypoxic insult  - renal consult and diuretic challenge. May need dialysis  - Trend potassium and  creatinine q12hrs  - Electrolyte replacement protocol  - Monitor UOP   - continue CVVH      GI:  #GIB  GI consulted and EGD on 8/28 with protuberant vessel injected and clipped and bleeding gastric ulcer with adherent clot, injected and clipped as well  Repeat bleed on 8/29-8/30, transfused and given pRBC, underwent repeat EGD, showed hematin throughout stomache with single bleeding angioectasia vs gastric erosion (from NGT) in stomach. Likely source of bleed and thought related to current bleeding episode. Hypothermia thought to be related to bleed.    # Constipation  # Severe protein calorie malnutrition  - Nutrition through tube feeds at 30 mL/hr    - Senna-docusate bid scheduled  - Miralax bid prn constipation  - f/u GI recs    ID:   # UTI vs ASB (resolved)  Periprocedural abx as per surgical team   - levoloxacin, zosyn, rifampin, vancomycin, fluconazole    - Completed Ceftriaxone treatment 1g IV qd (8/14 - 8/18)   - broadened on 8/31 with vanco 2/2 concern for potential sepsis     Hem/Onc  # Mural thrombus removed on 8/19  # Anemia, mild  D/t frequent blood draws and procedures  - Monitor hgb    Endo  # Hypothyroidism   - Continue PTA levothyroxine     Nutrition: holding TF  DVT Prophylaxis: mechanical   Code Status: Full Code    Waylon Garcia MD, MSc  Cardiovascular Disease Fellow  [facility]    Discussed with Dr Johnson    Interval History   HD stable. Neurologically improving. Remains critically ill, intubated and minimally sedated.    Physical Exam   Temp: 99  F (37.2  C) Temp src: Axillary BP: 103/57 Pulse: 91   Resp: 20 SpO2: 100 % O2 Device: Mechanical Ventilator    Vitals:    08/29/20 0200 08/30/20 0200 08/31/20 0200   Weight: 79.4 kg (175 lb 0.7 oz) 80.8 kg (178 lb 2.1 oz) 81 kg (178 lb 9.2 oz)     Vital Signs with Ranges  Temp:  [99  F (37.2  C)-99.9  F (37.7  C)] 99  F (37.2  C)  Pulse:  [] 91  Resp:  [19-26] 20  BP: (103)/(57) 103/57  MAP:  [66 mmHg-96 mmHg] 89 mmHg  Arterial  Line BP: ()/(37-79) 117/79  FiO2 (%):  [30 %] 30 %  SpO2:  [97 %-100 %] 100 %  I/O last 3 completed shifts:  In: 2980.94 [I.V.:2095.11]  Out: 3797 [Urine:140; Other:3077; Stool:150; Chest Tube:430]    RETIRE: Heart Rate: 71, Blood pressure 103/57, pulse 91, temperature 99  F (37.2  C), temperature source Axillary, resp. rate 20, weight 81 kg (178 lb 9.2 oz), SpO2 100 %.  178 lbs 9.16 oz     GEN: intubated , sedated  CV: RRR, Hum of HM3   LUNGS: Clear to auscultation bilaterally  ABD: Active bowel sounds, soft, no abdominal tenderness.   EXT: Trace LE edema   NEURO: lightly sedated; moves LE to command    Medications     BETA BLOCKER NOT PRESCRIBED       dextrose       dextrose Stopped (08/30/20 2221)     CRRT replacement solution 12.5 mL/kg/hr (08/31/20 1140)     - MEDICATION INSTRUCTIONS -       norepinephrine Stopped (08/31/20 0600)     pantoprazole (PROTONIX) infusion ADULT/PEDS GREATER than or EQUAL to 45 kg 8 mg/hr (08/31/20 1600)     parenteral nutrition - ADULT compounded formula       parenteral nutrition - ADULT compounded formula 45 mL/hr at 08/31/20 1600     CRRT replacement solution 2.911 mL/kg/hr (08/31/20 1141)     CRRT replacement solution 12.5 mL/kg/hr (08/31/20 1140)     propofol (DIPRIVAN) infusion Stopped (08/31/20 0537)     BETA BLOCKER NOT PRESCRIBED         amiodarone  200 mg Oral or Feeding Tube Daily     aspirin  81 mg Oral or Feeding Tube Daily     bimatoprost  1 drop Both Eyes At Bedtime     busPIRone  10 mg Oral or Feeding Tube TID     insulin aspart  1-6 Units Subcutaneous Q4H     lacosamide (VIMPAT) intermittent infusion  200 mg Intravenous BID     latanoprost  1 drop Both Eyes Daily     levETIRAcetam  2,000 mg Intravenous Q12H     levothyroxine  62.5 mcg Oral or Feeding Tube QAM AC     lipids  250 mL Intravenous Q24H     piperacillin-tazobactam  4.5 g Intravenous Q6H     QUEtiapine  75 mg Oral or Feeding Tube At Bedtime     rosuvastatin  20 mg Oral or Feeding Tube Daily     sodium  chloride (PF)  3 mL Intracatheter Q8H     vancomycin (VANCOCIN) IV  1,750 mg (central catheter) Intravenous Q24H       Data   Recent Labs   Lab 08/31/20  1534 08/31/20  0955 08/31/20  0339  08/30/20  0341  08/29/20  0401   WBC 14.1* 15.0* 14.7*  --  10.6   < > 13.2*   HGB 7.5* 7.6* 7.8*   < > 7.2*   < > 8.9*   MCV 90 89 89  --  90   < > 88    215 225  --  160   < > 191   INR  --   --  1.29*  --  1.34*  --  1.27*    137 139   < > 141   < > 140   POTASSIUM 3.7 3.7 3.7   < > 3.8   < > 3.6   CHLORIDE 111* 109 110*   < > 111*   < > 109   CO2 22 24 23   < > 25   < > 24   BUN 27 25 25   < > 32*   < > 28   CR 0.83 0.84 0.80   < > 0.84   < > 0.92   ANIONGAP 6 4 6   < > 5   < > 7   FERNANDO 7.6* 7.8* 7.7*   < > 7.4*   < > 7.7*   * 116* 113*   < > 100*   < > 128*   ALBUMIN  --   --  1.2*  --  1.2*  --  1.5*   PROTTOTAL  --   --  4.2*  --  4.0*  --  4.7*   BILITOTAL  --   --  0.9  --  0.7  --  0.9   ALKPHOS  --   --  176*  --  164*  --  182*   ALT  --   --  24  --  23  --  26   AST  --   --  41  --  38  --  51*    < > = values in this interval not displayed.       Recent Results (from the past 24 hour(s))   XR Chest Port 1 View    Narrative    XR CHEST PORT 1 VW  8/31/2020 3:30 AM      HISTORY: s/p LVAd, intubated, interval change    COMPARISON: 8/30/2020, 8/29/2020    FINDINGS: AP chest radiograph. Endotracheal tube tip projects  approximately 2.7 cm above the enrico. Right central venous catheter,  left chest wall implantable cardiac device, left chest tube, and LVAD  are unchanged. CABG. Stable cardiomediastinal silhouette. Persistent  bilateral pleural effusions with associated basilar and retrocardiac  opacities. Question tiny left lateral pneumothorax. No acute osseous  or abdominal abnormality.      Impression    IMPRESSION:  1. Stable bilateral pleural effusions with associated basilar  retrocardiac atelectasis versus consolidation.  2. Question tiny lateral left pneumothorax with chest tube in place.  3.  Stable support devices as above.    I have personally reviewed the examination and initial interpretation  and I agree with the findings.    CRYSTAL BLANKENSHIP MD

## 2020-08-31 NOTE — PROGRESS NOTES
CV ICU PROGRESS NOTE  August 31, 2020       CO-MORBIDITIES:   Cardiogenic shock (H)  (primary encounter diagnosis)  LV (left ventricular) mural thrombus  Heart failure (H)  Heart failure (H)  Status post cardiac surgery    ASSESSMENT: Marquise Jj is a 75 year old female 75 year-old female with PMH notable for coronary artery disease s/p CABG (4/20), ischemic cardiomyopathy, severe MR/TR and known mural thrombus who is admitted to the CV ICU s/p redo sternotomy, LVAD (heartmate III), and TV repair on 8/19/20 with Dr. Farley. Chest was left open and packed. S/p chest closure and washout 08/21/20.     PLAN SUMMARY:   Monitor gases   Discuss feeding tube, resumption of heparin with GI   - 48 hours hold feeding tube   - 72 hours hold heparin   Dialysis catheter from groin      PLAN:   Neuro/ pain/ sedation:  #Acute post-operative pain   #Likely anoxic brain injury; possible stroke  #Epilepsia Partialis Continua; resolved  - Monitor neurological status. Notify the MD for any acute changes in exam.  - Seroquel 75 at bedtime + 25 PRN   - Keppra, vimpat to continue per NeuroCrit  - Tylenol PRN    - Propofol at night PRN      Pulmonary:   #Acute hypoxic respiratory failure  - Monitor CXR, ABG  - Monitor CT output   - Daily CXR   - Pressure support trials   Ventilation Mode: (S) CPAP/PS  (Continuous positive airway pressure with Pressure Support)  FiO2 (%): 30 %  Rate Set (breaths/minute): 14 breaths/min  Tidal Volume Set (mL): 400 mL  PEEP (cm H2O): 5 cmH2O  Pressure Support (cm H2O): 5 cmH2O  Oxygen Concentration (%): 30 %  Resp: 21    Cardiovascular:    #Acute on chronic decompensated heart failure with reduced ejection fraction: NYHA IV / ACC D  #Cardiogenic shock, possible vasoplegia   #Ischemic cardiomyopathy  #Coronary artery disease s/p CABG 4/20 in [place], s/p PCI to RCA 7/20  #Mural thrombus  #Severe MR/TR  #S/p LVAD     MAP > 70, CVP 8-12    Off pressors     Low-intensity heparin infusion - HELD     Aspirin,  rosuvastatin    Co-managing with Cards-2    Amiodarone 200 mg daily for afib prophylaxis - Transition to IV until NGT placed       GI/Nutrition:   # UGI bleed   - Appreciate GI consult   - NPO, no OGT until at least Tuesday    - PP tube feeds currently held   - Pantoprazole gtt   - Bowel regimen: senna-colace, miralax.  - No indication for parenteral nutrition.      Renal/Fluid Balance/ Electrolytes:   #Oliguria in the setting of likely ATN  - Will monitor intake and output.  - ICU electrolyte replacement protocol  - Goal-directed fluid therapy  - Nephrology consulted; CRRT      Endocrine:    #Glycemic control  #Hx hypothyroid   - ISS, medium intensity   - Synthroid daily       ID/ Antibiotics:  # Febrile, concern for post operative fevers.   - Completed perioperative antibiotic regimen: vancomycin, levofloxacin, fluconazole, Zosyn   - Pan culture   - Vanc (8/30- ), Zosyn (8/30-)      Heme:     #Acute perioperative blood loss anemia  #Thrombocytopenia   - Hgb goal > 8  - Hold heparin for 72 hours post EGT   - Transfuse as necessary       Prophylaxis:    - SCD  - LIH - HOLD   - PPI  - Bowel regimen      MSK:    - PT and OT consulted. Appreciate recs.      Lines/ tubes/ drains:  - ETT  - A-line  - R internal jugular CVC  - PIV  - Chest tubes       Disposition:  - CV ICU.    Patient seen, findings and plan discussed with CV ICU staff, Dr. Vazquez.    -----------------------------------  Angela Sales   Surgery Resident   *[id number]  ====================================    SUBJECTIVE:   No acute events. Comfortable overnight. No concern for bleeding.     OBJECTIVE:   1. VITAL SIGNS:   Temp:  [98.6  F (37  C)-99.9  F (37.7  C)] 99  F (37.2  C)  Pulse:  [] 96  Resp:  [19-26] 21  BP: (103)/(57) 103/57  MAP:  [66 mmHg-96 mmHg] 85 mmHg  Arterial Line BP: ()/(37-78) 112/54  FiO2 (%):  [30 %] 30 %  SpO2:  [97 %-100 %] 100 %  Ventilation Mode: (S) CPAP/PS  (Continuous positive airway pressure with Pressure  Support)  FiO2 (%): 30 %  Rate Set (breaths/minute): 14 breaths/min  Tidal Volume Set (mL): 400 mL  PEEP (cm H2O): 5 cmH2O  Pressure Support (cm H2O): 5 cmH2O  Oxygen Concentration (%): 30 %  Resp: 21      2. INTAKE/ OUTPUT:   I/O last 3 completed shifts:  In: 3133.66 [I.V.:2469.43]  Out: 3621 [Urine:140; Other:2791; Stool:200; Chest Tube:490]    3. PHYSICAL EXAMINATION:   General: Intubated, NAD  Neuro: Deeply sedated, PERRL; withdraws to pain BOTH upper extremities, BL lower extremities  Resp: mechanically ventilated, minimal vent settings   CV: RRR  Abdomen: Soft, Non-distended, Non-tender  Incisions: sternotomy wound c/d/i s/p closure  Extremities: warm and well perfused    4. INVESTIGATIONS:   Arterial Blood Gases   Recent Labs   Lab 08/31/20  0955 08/31/20 0339 08/30/20 2059 08/30/20  0340   PH 7.52* 7.51* 7.52* 7.46*   PCO2 28* 29* 26* 34*   PO2 114* 105 99 99   HCO3 23 23 21 24     Complete Blood Count   Recent Labs   Lab 08/31/20 0339 08/30/20 2059 08/30/20  1541 08/30/20  1014  08/30/20  0341  08/29/20 2043 08/29/20  0401   WBC 14.7*  --   --   --   --  10.6  --  11.3*  --  13.2*   HGB 7.8* 8.5* 8.6* 8.8*   < > 7.2*   < > 6.6*  6.6*   < > 8.9*     --   --   --   --  160  --  191  --  191    < > = values in this interval not displayed.     Basic Metabolic Panel  Recent Labs   Lab 08/31/20 0339 08/30/20 2059 08/30/20  1541 08/30/20  1014    141 140 139   POTASSIUM 3.7 4.1 3.9 4.0   CHLORIDE 110* 111* 112* 110*   CO2 23 23 22 23   BUN 25 25 27 31*   CR 0.80 0.82 0.79 0.79   * 96 72 79     Liver Function Tests  Recent Labs   Lab 08/31/20  0339 08/30/20  0341 08/29/20  0401 08/28/20  0259   AST 41 38 51* 43   ALT 24 23 26 19   ALKPHOS 176* 164* 182* 136   BILITOTAL 0.9 0.7 0.9 0.9   ALBUMIN 1.2* 1.2* 1.5* 1.4*   INR 1.29* 1.34* 1.27* 1.37*     Pancreatic Enzymes  No lab results found in last 7 days.  Coagulation Profile  Recent Labs   Lab 08/31/20  0339 08/30/20  0341 08/29/20 0401  08/28/20  0259  08/25/20  1656  08/24/20  1536   INR 1.29* 1.34* 1.27* 1.37*   < > 2.26*   < > 2.21*   PTT  --   --   --   --   --  85*  --  63*    < > = values in this interval not displayed.         5. RADIOLOGY:   Recent Results (from the past 24 hour(s))   XR Chest Port 1 View    Narrative    XR CHEST PORT 1 VW  8/31/2020 3:30 AM      HISTORY: s/p LVAd, intubated, interval change    COMPARISON: 8/30/2020, 8/29/2020    FINDINGS: AP chest radiograph. Endotracheal tube tip projects  approximately 2.7 cm above the enrico. Right central venous catheter,  left chest wall implantable cardiac device, left chest tube, and LVAD  are unchanged. CABG. Stable cardiomediastinal silhouette. Persistent  bilateral pleural effusions with associated basilar and retrocardiac  opacities. Question tiny left lateral pneumothorax. No acute osseous  or abdominal abnormality.      Impression    IMPRESSION:  1. Stable bilateral pleural effusions with associated basilar  retrocardiac atelectasis versus consolidation.  2. Question tiny lateral left pneumothorax with chest tube in place.  3. Stable support devices as above.    I have personally reviewed the examination and initial interpretation  and I agree with the findings.    CRYSTAL BLANKENSHIP MD       =========================================

## 2020-08-31 NOTE — PROGRESS NOTES
Nephrology Progress Note  08/31/2020         Mrs Jj is a 75 yof w/ICM, s/p CABG (4/20), severe MR/TR and known mural thrombus, acute kidney injry, hypothyroidism, CKD3, anxiety, GERD, chronic anemia.  Transferred from [facility] to [facility] on 8/8 after developing cardiac shock. Placed on IABP and then LVAD placement and TVR repair for cardiogenic shock on 8/19 /20.  Nephrology consulted for managign Oliguric LORI on CKD stage 3, started CRRT on 8/26.     Interval History :   Mrs Jj continues on CRRT, net positive slightly yesterday as he was back on one pressor but this is now weaned off.  Trying to pull some fluid today while staying off of pressor, is ~20kg up in wt but CVP 9-11 suggests this is nearly all 3rd spaced.       Assessment & Recommendations:   LORI on CKD-Baseline Cr ~1.5, small L kidney at 8.3cm, 10cm R kidney at baseline.  Started CRRT on 8/26 for management of volume and electrolytes in setting of cardiogenic shock, LVAD placed on 8/19.  Etiology of LORI is likely ATN from hypoperfusion, started CRRT on 8/26.               -Line is temp RFV from 8/26               -Continuing CRRT, 4k baths, 25ml/kg/hr standard dosing, goal 0-50cc/hr net negative.       Volume status-Net positive yesterday, likely close to even with insensible losses as he was back on pressors which are back to off this am.  Trying to pull 0-50cc/hr net negative as able while keeping off of pressors.  Wt is up nearly 20kg.       Electrolytes/pH-K 3.7, bicarb 24, on 4k baths.      Ca/phos/pth-Ca 7.8, Mg and Phos WNL this am.      Anemia-Hgb 7.6, acute management per team.      Nutrition-Nepro TF.      Seen and discussed with Dr Chavez     Recommendations were communicated to primary team via verbal communication    STEPHAN Faulkner CNS  Clinical Nurse Specialist  [phone]    Review of Systems:   I reviewed the following systems:  ROS not done due to vent/sedation.     Physical Exam:   I/O last 3 completed shifts:  In: 4173.66  [I.V.:2469.43]  Out: 3621 [Urine:140; Other:2791; Stool:200; Chest Tube:490]   /57   Pulse 86   Temp 99  F (37.2  C) (Axillary)   Resp 24   Wt 81 kg (178 lb 9.2 oz)   SpO2 100%   BMI 27.15 kg/m       GENERAL APPEARANCE: Intubated and sedated, on CRRT.   EYES:  No scleral icterus, pupils equal  HENT: mouth without ulcers or lesions  PULM: lungs clear to auscultation, equal air movement, no cyanosis or clubbing  CV: regular rhythm, normal rate, no rub     -JVP not elevated.      -edema +3 generalized.   GI: soft, nontender, +distended, bowel sounds are +  MS: no evidence of inflammation in joints, no muscle tenderness  NEURO: Intubated and sedated.   Lines RFV line 8/26    Labs:   All labs reviewed by me  Electrolytes/Renal -   Recent Labs   Lab Test 08/31/20  0955 08/31/20 0339 08/30/20 2059 08/30/20  1541    139 141 140   POTASSIUM 3.7 3.7 4.1 3.9   CHLORIDE 109 110* 111* 112*   CO2 24 23 23 22   BUN 25 25 25 27   CR 0.84 0.80 0.82 0.79   * 113* 96 72   FERNANDO 7.8* 7.7* 8.0* 7.2*   MAG  --  2.2 2.1 2.0   PHOS  --  3.6 3.7 3.4       CBC -   Recent Labs   Lab Test 08/31/20  0955 08/31/20 0339 08/30/20 2059 08/30/20  0341   WBC 15.0* 14.7*  --   --  10.6   HGB 7.6* 7.8* 8.5*   < > 7.2*    225  --   --  160    < > = values in this interval not displayed.       LFTs -   Recent Labs   Lab Test 08/31/20 0339 08/30/20 0341 08/29/20  0401   ALKPHOS 176* 164* 182*   BILITOTAL 0.9 0.7 0.9   ALT 24 23 26   AST 41 38 51*   PROTTOTAL 4.2* 4.0* 4.7*   ALBUMIN 1.2* 1.2* 1.5*       Iron Panel -   Recent Labs   Lab Test 08/10/20  1052 07/01/20  0530   IRON 48 65   IRONSAT 15 17   HUSEYIN 165  --            Current Medications:    amiodarone  200 mg Oral or Feeding Tube Daily     aspirin  81 mg Oral or Feeding Tube Daily     bimatoprost  1 drop Both Eyes At Bedtime     busPIRone  10 mg Oral or Feeding Tube TID     insulin aspart  1-6 Units Subcutaneous Q4H     lacosamide (VIMPAT) intermittent infusion   200 mg Intravenous BID     latanoprost  1 drop Both Eyes Daily     levETIRAcetam  2,000 mg Intravenous Q12H     levothyroxine  62.5 mcg Oral or Feeding Tube QAM AC     lipids  250 mL Intravenous Q24H     piperacillin-tazobactam  4.5 g Intravenous Q6H     QUEtiapine  75 mg Oral or Feeding Tube At Bedtime     rosuvastatin  20 mg Oral or Feeding Tube Daily     sodium chloride (PF)  3 mL Intracatheter Q8H     vancomycin (VANCOCIN) IV  1,750 mg (central catheter) Intravenous Q24H       BETA BLOCKER NOT PRESCRIBED       dextrose       dextrose Stopped (08/30/20 2221)     CRRT replacement solution 12.5 mL/kg/hr (08/31/20 1140)     - MEDICATION INSTRUCTIONS -       norepinephrine Stopped (08/31/20 0600)     pantoprazole (PROTONIX) infusion ADULT/PEDS GREATER than or EQUAL to 45 kg 8 mg/hr (08/31/20 1300)     parenteral nutrition - ADULT compounded formula       parenteral nutrition - ADULT compounded formula 45 mL/hr at 08/31/20 1300     CRRT replacement solution 2.911 mL/kg/hr (08/31/20 1141)     CRRT replacement solution 12.5 mL/kg/hr (08/31/20 1140)     propofol (DIPRIVAN) infusion Stopped (08/31/20 0537)     BETA BLOCKER NOT PRESCRIBED       vasopressin Stopped (08/30/20 0915)

## 2020-08-31 NOTE — PLAN OF CARE
Major Shift Events:  pt remains off pressors. PS 7/5 all day for comfort. Off sedation. can slowly respond to simple questions. Minimal stool output. No VAD alarms today.   Plan: Continue to monitor and notify MD of changes  For vital signs and complete assessments, please see documentation flowsheets.

## 2020-09-01 NOTE — PROGRESS NOTES
Nephrology Progress Note  09/01/2020         Mrs Jj is a 75 yof w/ICM, s/p CABG (4/20), severe MR/TR and known mural thrombus, acute kidney injry, hypothyroidism, CKD3, anxiety, GERD, chronic anemia.  Transferred from [facility] to [facility] on 8/8 after developing cardiac shock. Placed on IABP and then LVAD placement and TVR repair for cardiogenic shock on 8/19 /20.  Nephrology consulted for managign Oliguric LORI on CKD stage 3, started CRRT on 8/26.     Interval History :   Mrs Jj continues on CRRT, net negative 1.4L yesterday and has slowly increasing UOP.  Similar goal for 1-2L net negative today, is off of pressors but severely overloaded and still with fairly high obligate intake.  Once closer to euvolemic we can consider iHD depending on how UOP trends.       Assessment & Recommendations:   LORI on CKD-Baseline Cr ~1.5, small L kidney at 8.3cm, 10cm R kidney at baseline.  Started CRRT on 8/26 for management of volume and electrolytes in setting of cardiogenic shock, LVAD placed on 8/19.  Etiology of LORI is likely ATN from hypoperfusion, started CRRT on 8/26.               -Line is temp RFV from 8/26               -Continuing CRRT, 4k baths, 25ml/kg/hr standard dosing, goal 50-100cc/hr net negative.  Once closer to euvolemic will plan to consider iHD    -Some UOP but not nearly enough to keep up with intake, monitoring.      Volume status-Net negative 1.4L yesterday, similar goal for 1-2L net negative today, still very volume overloaded, up ~18kg from baseline wt.       Electrolytes/pH-K 3.8, bicarb 22, on 4k baths.      Ca/phos/pth-Ca 7.8, Mg and Phos WNL this am.      Anemia-Hgb 7.6, acute management per team.      Nutrition-[place]arcelaro TF.      Seen and discussed with Dr Chavez     Recommendations were communicated to primary team via verbal communication    STEPHAN Faulkner CNS  Clinical Nurse Specialist  [phone]    Review of Systems:   I reviewed the following systems:  ROS not done due to vent/sedation.      Physical Exam:   I/O last 3 completed shifts:  In: 3120.68 [I.V.:1789]  Out: 4656 [Urine:225; Other:3841; Stool:100; Chest Tube:490]   /78   Pulse 88   Temp 98  F (36.7  C) (Axillary)   Resp 16   Wt 80.6 kg (177 lb 11.1 oz)   SpO2 100%   BMI 27.02 kg/m       GENERAL APPEARANCE: Intubated and sedated, on CRRT.   EYES:  No scleral icterus, pupils equal  HENT: mouth without ulcers or lesions  PULM: lungs clear to auscultation, equal air movement, no cyanosis or clubbing  CV: regular rhythm, normal rate, no rub     -JVP not elevated.      -edema +3 generalized.   GI: soft, nontender, +distended, bowel sounds are +  MS: no evidence of inflammation in joints, no muscle tenderness  NEURO: Intubated and sedated.   Lines RFV line 8/26    Labs:   All labs reviewed by me  Electrolytes/Renal -   Recent Labs   Lab Test 09/01/20  1148 09/01/20 0337 08/31/20 2103 08/31/20  1534    138 138 139   POTASSIUM 3.8 3.6 3.6 3.7   CHLORIDE 109 108 108 111*   CO2 22 22 24 22   BUN 27 28 28 27   CR 0.74 0.77 0.82 0.83   GLC 87 113* 110* 116*   FERNANDO 7.8* 7.7* 7.6* 7.6*   MAG  --  2.2 2.2 2.2   PHOS  --  3.4 3.0 3.3       CBC -   Recent Labs   Lab Test 09/01/20  1148 09/01/20 0337 08/31/20 2103 08/31/20  1534   WBC 15.7* 17.0*  --  14.1*   HGB 8.4* 8.4* 7.9* 7.5*    246  --  211       LFTs -   Recent Labs   Lab Test 09/01/20  0337 08/31/20 0339 08/30/20  0341   ALKPHOS 199* 176* 164*   BILITOTAL 1.1 0.9 0.7   ALT 24 24 23   AST 40 41 38   PROTTOTAL 5.0* 4.2* 4.0*   ALBUMIN 1.3* 1.2* 1.2*       Iron Panel -   Recent Labs   Lab Test 08/10/20  1052 07/01/20  0530   IRON 48 65   IRONSAT 15 17   HUSEYIN 165  --            Current Medications:    amiodarone  200 mg Oral or Feeding Tube Daily     aspirin  81 mg Oral or Feeding Tube Daily     bimatoprost  1 drop Both Eyes At Bedtime     busPIRone  10 mg Oral or Feeding Tube TID     insulin aspart  1-6 Units Subcutaneous Q4H     lacosamide (VIMPAT) intermittent infusion   200 mg Intravenous BID     latanoprost  1 drop Both Eyes Daily     levETIRAcetam  2,000 mg Intravenous Q12H     levothyroxine  62.5 mcg Oral or Feeding Tube QAM AC     lipids  250 mL Intravenous Q24H     micafungin  100 mg Intravenous Q24H     multivitamins w/minerals  15 mL Per Feeding Tube Daily     piperacillin-tazobactam  4.5 g Intravenous Q6H     QUEtiapine  75 mg Oral or Feeding Tube At Bedtime     rosuvastatin  20 mg Oral or Feeding Tube Daily     sodium chloride (PF)  3 mL Intracatheter Q8H     vancomycin (VANCOCIN) IV  1,750 mg (central catheter) Intravenous Q24H       BETA BLOCKER NOT PRESCRIBED       dextrose       dextrose Stopped (08/30/20 2221)     CRRT replacement solution 12.5 mL/kg/hr (09/01/20 1037)     - MEDICATION INSTRUCTIONS -       norepinephrine Stopped (08/31/20 0600)     pantoprazole (PROTONIX) infusion ADULT/PEDS GREATER than or EQUAL to 45 kg 8 mg/hr (09/01/20 1200)     parenteral nutrition - ADULT compounded formula 45 mL/hr at 09/01/20 1000     CRRT replacement solution 2.911 mL/kg/hr (08/31/20 1141)     CRRT replacement solution 12.5 mL/kg/hr (09/01/20 1039)     propofol (DIPRIVAN) infusion Stopped (08/31/20 0537)     BETA BLOCKER NOT PRESCRIBED

## 2020-09-01 NOTE — PROGRESS NOTES
[facility], [place]  Procedure Note          Extubation:       Marquise Jj  MRN# [id number]   September 1, 2020, 10:45 AM         Patient extubated at: September 1, 2020, 10:45 AM   Supplemental Oxygen: Via nasal cannula at 4 liters per minute   Cough: The cough is weak   Secretion Mode: PRN suction with assistance   Secretion Amount: Small amount, thick and creamy in color   Respiratory Exam:: Breath sounds: clear     Location: bilaterally   Skin Exam:: Patient color: natural   Patient Status: Currently appears comfortable             Recorded by Caroline Olmstead

## 2020-09-01 NOTE — PROGRESS NOTES
CVTS PROGRESS NOTE  9/1/2020         CO-MORBIDITIES:   Cardiogenic shock (H)  (primary encounter diagnosis)  LV (left ventricular) mural thrombus  Heart failure (H)  Heart failure (H)  Status post cardiac surgery    ASSESSMENT: Marquise Jj is a 75 year old female 75 year-old female with PMH notable for coronary artery disease s/p CABG (4/20), ischemic cardiomyopathy, severe MR/TR and known mural thrombus who is admitted to the CV ICU s/p redo sternotomy, LVAD (heartmate III), and TV repair on 8/19/20 with Dr. Farley. Chest was left open and packed. S/p chest closure and washout 08/21/20.     PLAN SUMMARY:   Monitor gases   Discuss feeding tube, resumption of heparin with GI   - 48 hours hold feeding tube - place on 9/1/2020 in afternoon  - 72 hours hold heparin   Dialysis catheter from groin to RIJ  PT/OT   ECHO today (post VAD evaluation per cards)  Trial extubation and proceed with reintubation and tracheostomy if fails.   Net goal is -1.5L   Redraw cultures for Micafungin at one site, rule out contaminant, resite/replace all lines if infected and stop TPN.       PLAN:   Neuro/ pain/ sedation:  #Acute post-operative pain   #Likely anoxic brain injury; possible stroke  #Epilepsia Partialis Continua; resolved  - Monitor neurological status. Notify the MD for any acute changes in exam.  - Seroquel 75 at bedtime + 25 PRN   - Keppra, vimpat to continue per NeuroCrit  - Tylenol PRN    - Propofol at night PRN      Pulmonary:   #Acute hypoxic respiratory failure  - Monitor CXR, ABG  - Monitor CT output   - Daily CXR   - Pressure support trials   - Extubated on 9/1/2020, if needs support consider BIPAP vs HFNC    Cardiovascular:    #Acute on chronic decompensated heart failure with reduced ejection fraction: NYHA IV / ACC D  #Cardiogenic shock, possible vasoplegia   #Ischemic cardiomyopathy  #Coronary artery disease s/p CABG 4/20 in [place], s/p PCI to RCA 7/20  #Mural thrombus  #Severe MR/TR  #S/p LVAD     MAP > 70,  CVP 8-12    Off pressors     Low-intensity heparin infusion - HELD     Aspirin, rosuvastatin    Co-managing with Cards-2    Amiodarone 200 mg daily - discontinued per CVTS staff.       GI/Nutrition:   # UGI bleed   - Appreciate GI consult   - NPO, no OGT until at least Tuesday    - PP tube feeds currently held   - Pantoprazole gtt   - Bowel regimen: senna-colace, miralax.  - Put in OG per radiology due to presence of clips       Renal/Fluid Balance/ Electrolytes:   #Oliguria in the setting of likely ATN  - Will monitor intake and output.  - ICU electrolyte replacement protocol  - Goal-directed fluid therapy  - Nephrology consulted; CRRT  -      Endocrine:    #Glycemic control  #Hx hypothyroid   - ISS, medium intensity   - Synthroid daily       ID/ Antibiotics:  # Febrile, concern for post operative fevers.   - Completed perioperative antibiotic regimen: vancomycin, levofloxacin, fluconazole, Zosyn   - Pan culture   - Vanc (8/30- ), Zosyn (8/30-)  - Micafungin for yeast in blood from one peripheral site  - ID Consult.  - Redraw cultures - follow up on results      Heme:     #Acute perioperative blood loss anemia  #Thrombocytopenia   - Hgb goal > 8  - Hold heparin for 72 hours post EGT   - Transfuse as necessary       Prophylaxis:    - SCD  - LIH - HOLD   - PPI  - Bowel regimen      MSK:    - PT and OT consulted. Appreciate recs.      Lines/ tubes/ drains:  - ETT  - A-line  - R internal jugular CVC  - PIV  - Chest tubes       Disposition:  - CV ICU.    Patient seen, findings and plan discussed with CVTS Fellow    -----------------------------------  Spencer Trevizo MD CA-3, PGY-4  CVICU Resident  *[id number]  ====================================    SUBJECTIVE:   No acute events. Comfortable overnight. No concern for bleeding. Extubated to Nasal cannula    OBJECTIVE:   1. VITAL SIGNS:   Temp:  [97.5  F (36.4  C)-98.5  F (36.9  C)] 98.4  F (36.9  C)  Pulse:  [] 81  Resp:  [16-25] 18  BP: (130)/(78) 130/78  MAP:  [78  mmHg-109 mmHg] 93 mmHg  Arterial Line BP: ()/(47-91) 111/67  FiO2 (%):  [30 %] 30 %  SpO2:  [96 %-100 %] 100 %  Ventilation Mode: CPAP/PS  (Continuous positive airway pressure with Pressure Support)  FiO2 (%): 30 %  Rate Set (breaths/minute): 14 breaths/min  Tidal Volume Set (mL): 400 mL  PEEP (cm H2O): 5 cmH2O  Pressure Support (cm H2O): 7 cmH2O  Oxygen Concentration (%): 30 %  Resp: 18      2. INTAKE/ OUTPUT:   I/O last 3 completed shifts:  In: 3120.68 [I.V.:1789]  Out: 4656 [Urine:225; Other:3841; Stool:100; Chest Tube:490]    3. PHYSICAL EXAMINATION:   General: Intubated, NAD  Neuro: Deeply sedated, PERRL; withdraws to pain BOTH upper extremities, BL lower extremities  Resp: mechanically ventilated, minimal vent settings   CV: RRR  Abdomen: Soft, Non-distended, Non-tender  Incisions: sternotomy wound c/d/i s/p closure  Extremities: warm and well perfused    4. INVESTIGATIONS:   Arterial Blood Gases   Recent Labs   Lab 09/01/20  1148 09/01/20 0334 08/31/20 2103 08/31/20  1534   PH 7.48* 7.51* 7.50* 7.51*   PCO2 31* 28* 30* 28*   PO2 171* 114* 115* 126*   HCO3 23 22 23 23     Complete Blood Count   Recent Labs   Lab 09/01/20  1148 09/01/20 0337 08/31/20 2103 08/31/20  1534 08/31/20  0955   WBC 15.7* 17.0*  --  14.1* 15.0*   HGB 8.4* 8.4* 7.9* 7.5* 7.6*    246  --  211 215     Basic Metabolic Panel  Recent Labs   Lab 09/01/20  1148 09/01/20 0337 08/31/20 2103 08/31/20  1534    138 138 139   POTASSIUM 3.8 3.6 3.6 3.7   CHLORIDE 109 108 108 111*   CO2 22 22 24 22   BUN 27 28 28 27   CR 0.74 0.77 0.82 0.83   GLC 87 113* 110* 116*     Liver Function Tests  Recent Labs   Lab 09/01/20  0337 08/31/20  0339 08/30/20  0341 08/29/20  0401 08/28/20  0259   AST 40 41 38 51* 43   ALT 24 24 23 26 19   ALKPHOS 199* 176* 164* 182* 136   BILITOTAL 1.1 0.9 0.7 0.9 0.9   ALBUMIN 1.3* 1.2* 1.2* 1.5* 1.4*   INR  --  1.29* 1.34* 1.27* 1.37*     Pancreatic Enzymes  No lab results found in last 7  days.  Coagulation Profile  Recent Labs   Lab 20  0339 20  0341 20  0401 20  0259  20  1656   INR 1.29* 1.34* 1.27* 1.37*   < > 2.26*   PTT  --   --   --   --   --  85*    < > = values in this interval not displayed.         5. RADIOLOGY:   Recent Results (from the past 24 hour(s))   XR Chest Port 1 View    Narrative    Exam: AP chest radiograph, 2020 1:37 AM    Indication: Status post LVAD, intubated, interval change    Comparison: 2020, 2020    Findings: Single AP chest radiograph. Endotracheal tube tip projects  approximately 3.6 cm above the enrico. Stable right jugular central  venous catheter sheath, bilateral chest tubes, LVAD, and left chest  wall cardiac device. CABG. Stable cardiomediastinal silhouette.  Persistent bilateral pleural effusions with overlying basilar  retrocardiac opacities and diffuse right lung haziness. No  pneumothorax. No acute osseous or upper abdominal abnormality.      Impression    IMPRESSION:  1. Stable postsurgical changes of the chest support devices as above.  2. Stable bilateral pleural effusions with overlying basilar  retrocardiac atelectasis versus consolidation.    I have personally reviewed the examination and initial interpretation  and I agree with the findings.    JOSE GUADALUPE GIBSON MD   Echo Complete    Narrative    [id number]  [id number]  [id number]  [id number]^RAFFAELE           [facility],[place]  Echocardiography Laboratory  [address] [place] [id number]     Name: EDILSON SMITH  MRN: [id number]  : 1945  Study Date: 2020 11:23 AM  Age: 75 yrs  Gender: Female  Patient Location: [facility]  Reason For Study: Cardiac Device, Unspecified  Ordering Physician: RAFFAELE HAMLIN  Referring Physician: IVY CHRISTENSEN  Performed By: Bess Nichols RDCS     BSA: 1.9 m2  Height: 68 in  Weight: 177 lb  HR: 87  BP: 121/61  mmHg  _____________________________________________________________________________  __        Procedure  LVAD Echocardiogram with two-dimensional, color and spectral Doppler  performed.  _____________________________________________________________________________  __        Interpretation Summary  s/p HeartMate III LVAD. Speed is 5300 rpm. LVIDd is 4.4 cm. The septum appears  midline. The AV opens intermittently. There is mild continuous AI. Inflow and  outflow graft velocities are normal.  Severely (EF 10-20%) reduced left ventricular function is present.  Global right ventricular function is moderately reduced. The right ventricle  is normal size.  s/p TV repair with 30 mm Edward MC3 annuloplasty ring. At least moderate TR is  present. Etiology is unclear though may partially due to septal impingement  from the ICD lead. There is no prosthetic stenosis. Gradient is 2 mmHg at 83  bpm.  This study was compared with the study from 08/10/2020. The LVAD is new, as is  the RV dysfunction.  _____________________________________________________________________________  __        Left Ventricle  Left ventricular wall thickness is normal. LVIDD 4.4. Severely (EF 10-20%)  reduced left ventricular function is present. Left ventricular diastolic  function is not assessable. Severe diffuse hypokinesis is present. s/p  HeartMate III LVAD. Speed is 5300 rpm. LVIDd is 4.4 cm. The septum appears  midline. The AV opens intermittently. There is mild continuous AI. Inflow and  outflow graft velocities are normal. LVAD cannula was seen in the expected  anatomic position in the LV apex.     Right Ventricle  The right ventricle is normal size. Global right ventricular function is  moderately reduced. A pacemaker lead is noted in the right ventricle.     Mitral Valve  The mitral valve is normal. Trace mitral insufficiency is present.     Aortic Valve  Trileaflet aortic sclerosis without stenosis. Mild aortic insufficiency is  present.  The AV opens with roughly every other systole.        Tricuspid Valve  Moderate tricuspid insufficiency is present. Right ventricular systolic  pressure is 31mmHg above the right atrial pressure. s/p TV repair with 30 mm  Edward MC3 annuloplasty ring. At least moderate TR is present. Etiology is  unclear though may partially due to septal impingement from the ICD lead.  There is no prosthetic stenosis. Gradient is 2 mmHg at 83 bpm.     Pulmonic Valve  The valve leaflets are not well visualized. Mild pulmonic insufficiency is  present.     Vessels  The proximal ascending aorta is mildly dilated, measuring 4.0 cm. Unable to  assess mean RA pressure given the patient is on a ventilator.     Pericardium  No pericardial effusion is present.     Compared to Previous Study  This study was compared with the study from 08/10/2020 . The LVAD is new, as  is the RV dysfunction.        Attestation  I have personally viewed the imaging and agree with the interpretation and  report as documented by the fellow, Christopher Tran, and/or edited by me.  _____________________________________________________________________________  __  MMode/2D Measurements & Calculations  IVSd: 0.96 cm     LVIDd: 4.4 cm  LVIDs: 3.4 cm  LVPWd: 1.0 cm  FS: 21.9 %  LV mass(C)d: 145.0 grams  LV mass(C)dI: 74.7 grams/m2  RWT: 0.47        Doppler Measurements & Calculations  TV V2 max: 90.7 cm/sec  TV max PG: 3.3 mmHg  TV mean P.7 mmHg  TV V2 VTI: 18.5 cm  TR max gary: 269.6 cm/sec  TR max P.1 mmHg     _____________________________________________________________________________  __           Report approved by: Imani Lora 2020 01:22 PM          =========================================

## 2020-09-01 NOTE — PLAN OF CARE
Discharge Planner PT   Patient plan for discharge: Not discussed  Current status:  Pt now extubated and weaning sedation per RN; follows roughly 5-10% of motor commands but very inconsistent (is able to grasp w/ L hand and wiggle bilateral toes x 1 time to command). Pt is appropriate for ROM to promote joint and mm health.  Completed PROM to all extremities x 10-15 reps. 2 rep x 30 second stretch to B hand opening and ankle DF. VSS  Barriers to return to prior living situation: Medical status, alertness/sedation, dependent w/ all mobility  Recommendations for discharge: TCU   Rationale for recommendations: Given extent of deconditioning expected and limited participation in therapy thus far, expect significant rehab needs once more alert/participatory.        Entered by: John Lomeli 09/01/2020 11:17 AM

## 2020-09-01 NOTE — PROGRESS NOTES
Cardiology Progress Note    Assessment & Plan   In summary, Marquise Jj is a 75-year-old female with a past medical history notable for coronary artery disease, ischemic cardiomyopathy, and known mural thrombus who was transferred from [facility] for further evaluation/treatment of cardiogenic shock. Balloon pump placed 8/14. Had HM3 8/19. Chest closed 8/21.    Today's changes:  - Fungal cultures positive, ID to be consulted  - continue micafungin for now  - remove lines and exhange lines as needed  - discontinue TPN  - TTE for baseline post-LVAD assessment  - Successfully extubated    Neuro:   # Sedation  # concern for seizure activity  CTH head 8/20 no signs of ICH   CTH head 8/27 Scattered areas of hypodensity with loss of gray-white matter differentiation in the left frontal lobe. These areas were not present on the CT dated 8/10/2020 and difficult compared to other prior comparison CTs due to extensive hardware artifact. These are suspicious for involving embolic infarcts.  keppra 2 g PO bid   vimpat 200 BID  - f/u neurocritical care recs      Cardio:  # Cardiogenic shock with vasoplegic component    # ICM: NYHA IV / ACC  # Severe MR/TR s/p tricuspid valve repair with Elkins MC3 Annuloplasty Ring size T30  # s/p LVAD HM3 on 8/19/2020  LAVD speed 5300, flow 3.4-3.7, pi 2.5-3.7, power 3.5-3.6   Presents with cardiogenic shock. On NE, cardiac index approximately 1.37 on admission. Severely elevated biventricular filling pressures. Etiology of her decompensation appears to be progression of her cardiomyopathy. Despite medical treatment, she has been hospitalized twice since returning to [place], both with low output. No viability in her LAD territory, and doubt that PCI to her circumflex would make meaningful LV recovery. Hemodynamics improved with dobutamine, did not tolerate attempt to wean inotropics. RHC removed on 8/13 after clotting off, replaced on 8/14 after patient with increased work of  breathing. CI of 1.1, balloon pump placed with CI of 1.6-1.8 and improvement in symptoms/hemodynamics. Had LVAD HM3 placed on 8/19. CVP today around 10 range. Chest closed on 8/21.   - Monitoring CVP for RV/fluid overload (CVP goal < 14)  - continue to wean pressors as above  - Hemodynamics q6hrs, monitor lactate. Goal CI>2., SvO2 >55%  - holding hep gtt as above      Date 8/9 8/9 8/10 8/11 08/13/20 08/15/20 08/16/20 08/17/20 8/18/20 8/19 8/20 8/21*   CVP 12 9 3 4 8 9 6 8 11 5 10 13   Mean PA  35 30 21 25 23 21 20 35/18 35/18 - 30/12 28/14   PCWP  18 14 12 x 13 14  13 - -    Oxy HGB  59 64 61 61 60 47 62 69 54 - 68 66   CI/CO 2.3 2.3 2.4/4.2 2.3 2.2 1.8 2.8/4.9 4.1 4.3/2.5 - 6/3.3 6.9/3.8   SVR 1100 1100 1200 1400 1316 1900 1140 256 5981 - 1025 715   Device     IABP 1:1 IABP 1:1 IABP 1:1 IABP 1:1 IABP 1:1 No IABP No IABP No IABP   * epi 0.1, vaso 0.5     8/22: CVP 12, PA 28/14/19, PCWP 14, CO/CI 9.0/4.8. . Epi 0.1, vaso 1.  8/23: CVP 16, PA 30/14, CI/CO 4.4/8.0, , SvO2 71%  8/24: CVP 14, PA 44/20/28, PCWP 16, CI/CO 7.3/3.9, SvO2 68% epi 0.06, vaso 2  8/25: CVP 16, PA 44/20/29, PCWP 22, CI/CO  3.6, SvO2 82%, vaso 2, epi 0.03  8/26: CVP 13, PA 40/20/26, PCWP 12, CI/CO 7/ 3.6, SvO2 74%, vaso 1  8/27: CVP 13, PA 38/18/26, PCWP 12-14, CI/CO 9.4/4.7, , PVR ~0.5, SvO2 73%, vaso 1, epi 0.05     # CAD s/p CABG 4/2020 (KURT to RCA and LIMA to LAD) and PCI to RCA 7/20  - Stop home plavix po qd (8/13) for LVAD surgery  - aspirin 81mg PO qd   - c/w home crestor    # Mural thrombus  - removed during surgery on 8/19    # A-flutter  Converted to NSR on 8/15    - Continue amiodarone 200mg po qd      Pulm:  # Acute hypoxic respiratory failure  Extubated on 9/1/2020     Renal/Electrolytes   # Hyperkalemia/Hypokalemia   # Acute kidney injury on chronic kidney disease, stage III  Likely ATN due to hypoxic insult  - renal consult and diuretic challenge. May need dialysis  - Trend potassium and creatinine q12hrs  -  Electrolyte replacement protocol  - Monitor UOP   - continue CVVH      GI:  #GIB  GI consulted and EGD on 8/28 with protuberant vessel injected and clipped and bleeding gastric ulcer with adherent clot, injected and clipped as well  Repeat bleed on 8/29-8/30, transfused and given pRBC, underwent repeat EGD, showed hematin throughout stomache with single bleeding angioectasia vs gastric erosion (from NGT) in stomach. Likely source of bleed and thought related to current bleeding episode. Hypothermia thought to be related to bleed.    # Constipation  # Severe protein calorie malnutrition  - Nutrition through tube feeds at 30 mL/hr    - Senna-docusate bid scheduled  - Miralax bid prn constipation  - f/u GI recs    ID:   # UTI vs ASB (resolved)  Periprocedural abx as per surgical team   - levoloxacin, zosyn, rifampin, vancomycin, fluconazole    - Completed Ceftriaxone treatment 1g IV qd (8/14 - 8/18)   - broadened on 8/31 with vanco 2/2 concern for potential sepsis   - micrafungin added 9/1 for blood culture positive for yeast. ID consulted    Hem/Onc  # Mural thrombus removed on 8/19  # Anemia, mild  D/t frequent blood draws and procedures  - Monitor hgb    Endo  # Hypothyroidism   - Continue PTA levothyroxine     Nutrition: holding TF  DVT Prophylaxis: mechanical   Code Status: Full Code    Waylon Garcia MD, MSc  Cardiovascular Disease Fellow  [facility]    Discussed with Dr Christian      I have reviewed today's vital signs, notes, medications, labs and imaging. I have also seen and examined the patient and agree with the findings and plan as outlined above.    Nel Christian MD  Section Head - Advanced Heart Failure, Transplantation and Mechanical Circulatory Support  Director - Adult [facility]en[facility]ovascular [facility]  Associate Professor of Medicine, [facility]        Interval History   HD stable. Neurologically improving. Remains critically ill, intubated and minimally  sedated.    Physical Exam   Temp: 98.4  F (36.9  C) Temp src: Axillary BP: 130/78 Pulse: 107   Resp: 18 SpO2: (!) 86 % O2 Device: Nasal cannula Oxygen Delivery: 4 LPM  Vitals:    08/30/20 0200 08/31/20 0200 09/01/20 0200   Weight: 80.8 kg (178 lb 2.1 oz) 81 kg (178 lb 9.2 oz) 80.6 kg (177 lb 11.1 oz)     Vital Signs with Ranges  Temp:  [97.5  F (36.4  C)-98.5  F (36.9  C)] 98.4  F (36.9  C)  Pulse:  [] 107  Resp:  [16-25] 18  BP: (130)/(78) 130/78  MAP:  [81 mmHg-109 mmHg] 85 mmHg  Arterial Line BP: ()/(52-91) 102/73  FiO2 (%):  [30 %] 30 %  SpO2:  [86 %-100 %] 86 %  I/O last 3 completed shifts:  In: 3171.61 [I.V.:2039]  Out: 5026 [Urine:225; Other:4151; Stool:200; Chest Tube:450]    RETIRE: Heart Rate: 71, Blood pressure 130/78, pulse 107, temperature 98.4  F (36.9  C), temperature source Axillary, resp. rate 18, weight 80.6 kg (177 lb 11.1 oz), SpO2 (!) 86 %.  177 lbs 11.05 oz     GEN: intubated , sedated  CV: RRR, Hum of HM3   LUNGS: Clear to auscultation bilaterally  ABD: Active bowel sounds, soft, no abdominal tenderness.   EXT: Trace LE edema   NEURO: lightly sedated; moves LE to command    Medications     BETA BLOCKER NOT PRESCRIBED       dextrose       dextrose Stopped (08/30/20 2221)     CRRT replacement solution 12.5 mL/kg/hr (09/01/20 1440)     - MEDICATION INSTRUCTIONS -       norepinephrine Stopped (08/31/20 0600)     pantoprazole (PROTONIX) infusion ADULT/PEDS GREATER than or EQUAL to 45 kg 8 mg/hr (09/01/20 1700)     CRRT replacement solution 2.911 mL/kg/hr (09/01/20 1440)     CRRT replacement solution 12.5 mL/kg/hr (09/01/20 1440)     BETA BLOCKER NOT PRESCRIBED         amiodarone  200 mg Oral or Feeding Tube Daily     aspirin  81 mg Oral or Feeding Tube Daily     B and C vitamin Complex with folic acid  5 mL Oral Daily     bimatoprost  1 drop Both Eyes At Bedtime     busPIRone  10 mg Oral or Feeding Tube TID     insulin aspart  1-6 Units Subcutaneous Q4H     lacosamide (VIMPAT)  intermittent infusion  200 mg Intravenous BID     latanoprost  1 drop Both Eyes Daily     levETIRAcetam  2,000 mg Intravenous Q12H     levothyroxine  62.5 mcg Oral or Feeding Tube QAM AC     lipids  250 mL Intravenous Q24H     micafungin  100 mg Intravenous Q24H     piperacillin-tazobactam  4.5 g Intravenous Q6H     QUEtiapine  50 mg Oral or Feeding Tube At Bedtime     rosuvastatin  20 mg Oral or Feeding Tube Daily     sodium chloride (PF)  3 mL Intracatheter Q8H     vancomycin (VANCOCIN) IV  1,750 mg (central catheter) Intravenous Q24H       Data   Recent Labs   Lab 09/01/20  1625 09/01/20  1148 09/01/20  0337  08/31/20  0339  08/30/20  0341   WBC 13.3* 15.7* 17.0*   < > 14.7*  --  10.6   HGB 7.8* 8.4* 8.4*   < > 7.8*   < > 7.2*   MCV 91 90 91   < > 89  --  90    230 246   < > 225  --  160   INR 1.26*  --   --   --  1.29*  --  1.34*    139 138   < > 139   < > 141   POTASSIUM 3.7 3.8 3.6   < > 3.7   < > 3.8   CHLORIDE 110* 109 108   < > 110*   < > 111*   CO2 22 22 22   < > 23   < > 25   BUN 29 27 28   < > 25   < > 32*   CR 0.82 0.74 0.77   < > 0.80   < > 0.84   ANIONGAP 8 9 8   < > 6   < > 5   FERNANDO 8.0* 7.8* 7.7*   < > 7.7*   < > 7.4*   GLC 72 87 113*   < > 113*   < > 100*   ALBUMIN  --   --  1.3*  --  1.2*  --  1.2*   PROTTOTAL  --   --  5.0*  --  4.2*  --  4.0*   BILITOTAL  --   --  1.1  --  0.9  --  0.7   ALKPHOS  --   --  199*  --  176*  --  164*   ALT  --   --  24  --  24  --  23   AST  --   --  40  --  41  --  38    < > = values in this interval not displayed.       Recent Results (from the past 24 hour(s))   XR Chest Port 1 View    Narrative    Exam: AP chest radiograph, 9/1/2020 1:37 AM    Indication: Status post LVAD, intubated, interval change    Comparison: 8/31/2020, 8/30/2020    Findings: Single AP chest radiograph. Endotracheal tube tip projects  approximately 3.6 cm above the enrico. Stable right jugular central  venous catheter sheath, bilateral chest tubes, LVAD, and left chest  wall  cardiac device. CABG. Stable cardiomediastinal silhouette.  Persistent bilateral pleural effusions with overlying basilar  retrocardiac opacities and diffuse right lung haziness. No  pneumothorax. No acute osseous or upper abdominal abnormality.      Impression    IMPRESSION:  1. Stable postsurgical changes of the chest support devices as above.  2. Stable bilateral pleural effusions with overlying basilar  retrocardiac atelectasis versus consolidation.    I have personally reviewed the examination and initial interpretation  and I agree with the findings.    JOSE GUADALUPE GIBSON MD   Echo Complete    Narrative    [id number]  [id number]  [id number]  [id number]^BOUBACAR^RAFFAELE           [facility]  Echocardiography [facility]  [address] [place] [id number]     Name: EDILSON SMITH  MRN: [id number]  : 1945  Study Date: 2020 11:23 AM  Age: 75 yrs  Gender: Female  [facility]  Reason For Study: Cardiac Device, Unspecified  Ordering Physician: RAFFAELE HAMLIN  Referring Physician: IVY CHRISTENSEN  Performed By: Bess Nichols RDCS     BSA: 1.9 m2  Height: 68 in  Weight: 177 lb  HR: 87  BP: 121/61 mmHg  _____________________________________________________________________________  __        Procedure  LVAD Echocardiogram with two-dimensional, color and spectral Doppler  performed.  _____________________________________________________________________________  __        Interpretation Summary  s/p HeartMate III LVAD. Speed is 5300 rpm. LVIDd is 4.4 cm. The septum appears  midline. The AV opens intermittently. There is mild continuous AI. Inflow and  outflow graft velocities are normal.  Severely (EF 10-20%) reduced left ventricular function is present.  Global right ventricular function is moderately reduced. The right ventricle  is normal size.  s/p TV repair with 30 mm Edward MC3 annuloplasty ring. At least moderate TR is  present. Etiology is unclear  though may partially due to septal impingement  from the ICD lead. There is no prosthetic stenosis. Gradient is 2 mmHg at 83  bpm.  This study was compared with the study from 08/10/2020. The LVAD is new, as is  the RV dysfunction.  _____________________________________________________________________________  __        Left Ventricle  Left ventricular wall thickness is normal. LVIDD 4.4. Severely (EF 10-20%)  reduced left ventricular function is present. Left ventricular diastolic  function is not assessable. Severe diffuse hypokinesis is present. s/p  HeartMate III LVAD. Speed is 5300 rpm. LVIDd is 4.4 cm. The septum appears  midline. The AV opens intermittently. There is mild continuous AI. Inflow and  outflow graft velocities are normal. LVAD cannula was seen in the expected  anatomic position in the LV apex.     Right Ventricle  The right ventricle is normal size. Global right ventricular function is  moderately reduced. A pacemaker lead is noted in the right ventricle.     Mitral Valve  The mitral valve is normal. Trace mitral insufficiency is present.     Aortic Valve  Trileaflet aortic sclerosis without stenosis. Mild aortic insufficiency is  present. The AV opens with roughly every other systole.        Tricuspid Valve  Moderate tricuspid insufficiency is present. Right ventricular systolic  pressure is 31mmHg above the right atrial pressure. s/p TV repair with 30 mm  Edward MC3 annuloplasty ring. At least moderate TR is present. Etiology is  unclear though may partially due to septal impingement from the ICD lead.  There is no prosthetic stenosis. Gradient is 2 mmHg at 83 bpm.     Pulmonic Valve  The valve leaflets are not well visualized. Mild pulmonic insufficiency is  present.     Vessels  The proximal ascending aorta is mildly dilated, measuring 4.0 cm. Unable to  assess mean RA pressure given the patient is on a ventilator.     Pericardium  No pericardial effusion is present.     Compared to Previous  Study  This study was compared with the study from 08/10/2020 . The LVAD is new, as  is the RV dysfunction.        Attestation  I have personally viewed the imaging and agree with the interpretation and  report as documented by the fellow, Christopher Tran, and/or edited by me.  _____________________________________________________________________________  __  MMode/2D Measurements & Calculations  IVSd: 0.96 cm     LVIDd: 4.4 cm  LVIDs: 3.4 cm  LVPWd: 1.0 cm  FS: 21.9 %  LV mass(C)d: 145.0 grams  LV mass(C)dI: 74.7 grams/m2  RWT: 0.47        Doppler Measurements & Calculations  TV V2 max: 90.7 cm/sec  TV max PG: 3.3 mmHg  TV mean P.7 mmHg  TV V2 VTI: 18.5 cm  TR max gary: 269.6 cm/sec  TR max P.1 mmHg     _____________________________________________________________________________  __           Report approved by: Imani Lora 2020 01:22 PM

## 2020-09-01 NOTE — PROGRESS NOTES
CV ICU PROGRESS NOTE  9/1/2020         CO-MORBIDITIES:   Cardiogenic shock (H)  (primary encounter diagnosis)  LV (left ventricular) mural thrombus  Heart failure (H)  Heart failure (H)  Status post cardiac surgery    ASSESSMENT: Marquise Jj is a 75 year old female 75 year-old female with PMH notable for coronary artery disease s/p CABG (4/20), ischemic cardiomyopathy, severe MR/TR and known mural thrombus who is admitted to the CV ICU s/p redo sternotomy, LVAD (heartmate III), and TV repair on 8/19/20 with Dr. Farley. Chest was left open and packed. S/p chest closure and washout 08/21/20.     PLAN SUMMARY:   Monitor gases   Discuss feeding tube, resumption of heparin with GI   - 48 hours hold feeding tube - place on 9/1/2020 in afternoon  - 72 hours hold heparin   Dialysis catheter from groin to IJ  PT/OT   ECHO today (post VAD evaluation per cards)  Trial extubation and proceed with reintubation and tracheostomy if fails.   Net goal is -1.5L   Redraw cultures for Micafungin at one site, rule out contaminant, resite/replace all lines if infected and stop TPN.       PLAN:   Neuro/ pain/ sedation:  #Acute post-operative pain   #Likely anoxic brain injury; possible stroke  #Epilepsia Partialis Continua; resolved  - Monitor neurological status. Notify the MD for any acute changes in exam.  - Seroquel 75 at bedtime + 25 PRN   - Keppra, vimpat to continue per NeuroCrit  - Tylenol PRN    - Propofol at night PRN      Pulmonary:   #Acute hypoxic respiratory failure  - Monitor CXR, ABG  - Monitor CT output   - Daily CXR   - Pressure support trials   - Extubated on 9/1/2020, if needs support consider BIPAP vs HFNC    Cardiovascular:    #Acute on chronic decompensated heart failure with reduced ejection fraction: NYHA IV / ACC D  #Cardiogenic shock, possible vasoplegia   #Ischemic cardiomyopathy  #Coronary artery disease s/p CABG 4/20 in [place], s/p PCI to RCA 7/20  #Mural thrombus  #Severe MR/TR  #S/p LVAD     MAP >  70, CVP 8-12    Off pressors     Low-intensity heparin infusion - HELD     Aspirin, rosuvastatin    Co-managing with Cards-2    Amiodarone 200 mg daily - discontinued per CVTS staff.       GI/Nutrition:   # UGI bleed   - Appreciate GI consult   - NPO, no OGT until at least Tuesday    - PP tube feeds currently held   - Pantoprazole gtt   - Bowel regimen: senna-colace, miralax.  - Put in OG per radiology due to presence of clips       Renal/Fluid Balance/ Electrolytes:   #Oliguria in the setting of likely ATN  - Will monitor intake and output.  - ICU electrolyte replacement protocol  - Goal-directed fluid therapy  - Nephrology consulted; CRRT  -      Endocrine:    #Glycemic control  #Hx hypothyroid   - ISS, medium intensity   - Synthroid daily       ID/ Antibiotics:  # Febrile, concern for post operative fevers.   - Completed perioperative antibiotic regimen: vancomycin, levofloxacin, fluconazole, Zosyn   - Pan culture   - Vanc (8/30- ), Zosyn (8/30-)  - Micafungin for yeast in blood from one peripheral site  - ID Consult.  - Redraw cultures - follow up on results      Heme:     #Acute perioperative blood loss anemia  #Thrombocytopenia   - Hgb goal > 8  - Hold heparin for 72 hours post EGT   - Transfuse as necessary       Prophylaxis:    - SCD  - LIH - HOLD   - PPI  - Bowel regimen      MSK:    - PT and OT consulted. Appreciate recs.      Lines/ tubes/ drains:  - ETT  - A-line  - R internal jugular CVC  - PIV  - Chest tubes       Disposition:  - CV ICU.    Patient seen, findings and plan discussed with CV ICU staff, Dr. Vazquez.    -----------------------------------  Spencer Trevizo MD CA-3, PGY-4  CVICU Resident  *[id number]  ====================================    SUBJECTIVE:   No acute events. Comfortable overnight. No concern for bleeding. Extubated to Nasal cannula    OBJECTIVE:   1. VITAL SIGNS:   Temp:  [97.5  F (36.4  C)-99  F (37.2  C)] 98  F (36.7  C)  Pulse:  [] 85  Resp:  [18-25] 20  MAP:  [67  mmHg-109 mmHg] 102 mmHg  Arterial Line BP: ()/(47-91) 133/82  FiO2 (%):  [30 %] 30 %  SpO2:  [96 %-100 %] 100 %  Ventilation Mode: CPAP/PS  (Continuous positive airway pressure with Pressure Support)  FiO2 (%): 30 %  Rate Set (breaths/minute): 14 breaths/min  Tidal Volume Set (mL): 400 mL  PEEP (cm H2O): 5 cmH2O  Pressure Support (cm H2O): 7 cmH2O  Oxygen Concentration (%): 30 %  Resp: 20      2. INTAKE/ OUTPUT:   I/O last 3 completed shifts:  In: 3120.68 [I.V.:1789]  Out: 4656 [Urine:225; Other:3841; Stool:100; Chest Tube:490]    3. PHYSICAL EXAMINATION:   General: Intubated, NAD  Neuro: Deeply sedated, PERRL; withdraws to pain BOTH upper extremities, BL lower extremities  Resp: mechanically ventilated, minimal vent settings   CV: RRR  Abdomen: Soft, Non-distended, Non-tender  Incisions: sternotomy wound c/d/i s/p closure  Extremities: warm and well perfused    4. INVESTIGATIONS:   Arterial Blood Gases   Recent Labs   Lab 09/01/20 0334 08/31/20 2103 08/31/20  1534 08/31/20  0955   PH 7.51* 7.50* 7.51* 7.52*   PCO2 28* 30* 28* 28*   PO2 114* 115* 126* 114*   HCO3 22 23 23 23     Complete Blood Count   Recent Labs   Lab 09/01/20 0337 08/31/20 2103 08/31/20 1534 08/31/20  0955 08/31/20  0339   WBC 17.0*  --  14.1* 15.0* 14.7*   HGB 8.4* 7.9* 7.5* 7.6* 7.8*     --  211 215 225     Basic Metabolic Panel  Recent Labs   Lab 09/01/20 0337 08/31/20 2103 08/31/20  1534 08/31/20  0955    138 139 137   POTASSIUM 3.6 3.6 3.7 3.7   CHLORIDE 108 108 111* 109   CO2 22 24 22 24   BUN 28 28 27 25   CR 0.77 0.82 0.83 0.84   * 110* 116* 116*     Liver Function Tests  Recent Labs   Lab 09/01/20  0337 08/31/20  0339 08/30/20  0341 08/29/20  0401 08/28/20  0259   AST 40 41 38 51* 43   ALT 24 24 23 26 19   ALKPHOS 199* 176* 164* 182* 136   BILITOTAL 1.1 0.9 0.7 0.9 0.9   ALBUMIN 1.3* 1.2* 1.2* 1.5* 1.4*   INR  --  1.29* 1.34* 1.27* 1.37*     Pancreatic Enzymes  No lab results found in last 7  days.  Coagulation Profile  Recent Labs   Lab 08/31/20  0339 08/30/20  0341 08/29/20  0401 08/28/20  0259  08/25/20  1656   INR 1.29* 1.34* 1.27* 1.37*   < > 2.26*   PTT  --   --   --   --   --  85*    < > = values in this interval not displayed.         5. RADIOLOGY:   Recent Results (from the past 24 hour(s))   XR Chest Port 1 View    Narrative    Exam: AP chest radiograph, 9/1/2020 1:37 AM    Indication: Status post LVAD, intubated, interval change    Comparison: 8/31/2020, 8/30/2020    Findings: Single AP chest radiograph. Endotracheal tube tip projects  approximately 3.6 cm above the enrico. Stable right jugular central  venous catheter sheath, bilateral chest tubes, LVAD, and left chest  wall cardiac device. CABG. Stable cardiomediastinal silhouette.  Persistent bilateral pleural effusions with overlying basilar  retrocardiac opacities and diffuse right lung haziness. No  pneumothorax. No acute osseous or upper abdominal abnormality.      Impression    IMPRESSION:  1. Stable postsurgical changes of the chest support devices as above.  2. Stable bilateral pleural effusions with overlying basilar  retrocardiac atelectasis versus consolidation.    I have personally reviewed the examination and initial interpretation  and I agree with the findings.    JOSE GUADALUPE GIBSON MD       =========================================

## 2020-09-01 NOTE — PROGRESS NOTES
Pt continues to be obtunded. Nystagmus noted, team aware.  Extubated today, post gas showed no issue. No vad alarms today, CRRT groin line replaced with L internal jugular line. L groin CRRT line still needs to be pulled.   Plan: pull CRRT line and start NJ tube placement and feed tomorrow.

## 2020-09-01 NOTE — PROGRESS NOTES
CLINICAL NUTRITION SERVICES - REASSESSMENT NOTE     Nutrition Prescription    Recommendations:  Discontinue TPN     Malnutrition Status:    Severe malnutrition in the context of acute on chronic illness     Interventions ordered by Registered Dietitian (RD):  - Once feeding tube in place, initiate Impact Peptide @ 15 ml/hr. Adv by 15-20 ml q8h to goal of Impact Peptide @ 55 ml/hr.   - FWF of 30 ml q4h for tube patency   - Nephronex daily        EVALUATION OF THE PROGRESS TOWARD GOALS   Diet: NPO, intubated   Nutrition Support: TPN via 1080 ml/day via 140 g Dex, 125 g AA + 250 ml 20% lipids daily to provide 1480 kcal (23 kcal/kg/day), 2 g protein/kg/day. Infulvite. MTE-5.   Intake: Patient previously on TF via NDT, however, NDT was removed during EGD on 8/30. TPN started on 8/30 as unable to obtain enteral access post-clipping. Overall, patient has received <75% estimated energy and protein needs in the past week.      NEW FINDINGS   ID: Micafungin for yeast in blood from one peripheral site  GI: GIB s/p clipping.   Renal: CRRT continues.      MALNUTRITION  % Intake: < 75% for > 7 days (non-severe)  % Weight Loss: None noted  Subcutaneous Fat Loss: Facial region: Moderate, Upper arm: Moderate, Lower arm: Moderate and Thoracic/intercostal: Moderate  Muscle Loss: Temporal: Moderate, Facial & jaw region: Moderate, Scapular bone: Mild, Upper arm (bicep, tricep): Moderate, Lower arm  (forearm): Moderate and Dorsal hand: Moderate  Fluid Accumulation/Edema: Mild  Malnutrition Diagnosis: Severe malnutrition in the context of acute on chronic illness     Previous Goals   Total avg nutritional intake to meet a minimum of 25 kcal/kg and 1.5 g PRO/kg daily (per dosing wt 63 kg).  Evaluation: Not met consistently     Previous Nutrition Diagnosis  Inadequate oral intake  Evaluation: No change    CURRENT NUTRITION DIAGNOSIS  Inadequate protein-energy intake related to unable to obtain enteral access as evidenced by patient received  <75% of nutrition needs over the past week.       INTERVENTIONS  Implementation  Enteral Nutrition - Plan for FT placement by Rads, then initiate enteral feeding   Parenteral Nutrition/IV Fluids - Discontinue.     Goals  Total avg nutritional intake to meet a minimum of 25 kcal/kg and 1.5 g PRO/kg daily (per dosing wt 63 kg).    Monitoring/Evaluation  Progress toward goals will be monitored and evaluated per protocol.    Rosemary Wyatt RD, LD  [id number]  Pgr: [phone]

## 2020-09-01 NOTE — PROGRESS NOTES
GASTROENTEROLOGY PROGRESS NOTE    Date: 09/01/2020     ASSESSMENT:  75 year old female with a history of coronary artery disease status post CABGx4 in 2020, ischemic cardiomyopathy, severe tricuspid and mitral regurg unknown mural thrombus, is currently admitted to the cardiac ICU post LVAD placement on 8/19 in the setting of cardiogenic shock due to decompensated heart failure. Course has been c/b hypoxic resp failure requiring intubation, strokes, seizures, and LORI currently on CRRT. GI was initially consulted on 8/26 for bloody NG tube OP.     She underwent EGD with placement of clips on two lesions that were possibly bleeding, please see procedure note on 8/27 for full details. AC was resumed and she unfortunately had a recurrent UGIB. EGD on 8/30 showed a bleeding angioectasia vs gastric erosion related to the NG in the stomach to which 1 hemoclip was placed (an additional hemoclip was placed at the site of the prior ulcer). The recommendation was to hold feeding tube placement and to hold AC for 72hrs.     Since her second EGD, the patient's Hgb has remained stable and she has not had obvious GI bleeding.      RECOMMENDATIONS:  - Pantoprazole 40 mg PO BID  - Ok to place feeding tube   - Resume AC tomorrow  - Continue to monitor for signs of bleeding      Thank you for involving us in this patient's care. Please do not hesitate to contact the GI service with any questions or concerns.      Pt care plan discussed with Dr. Johnson, GI staff physician.    Verna Kaur   Gastroenterology Fellow  _______________________________________________________________    24 Hour Events: NAEO; has had 100cc of black/dark brown liquid OP in her rectal tube since yesterday; no blood or coffee ground emesis seen from her ET tube; she remains intubated but is not on pressors; she has remained unresponsive despite being off analgesia and sedation; is also on CRRT     Objective:  Blood pressure 130/78, pulse 107, temperature  98.4  F (36.9  C), temperature source Axillary, resp. rate 18, weight 80.6 kg (177 lb 11.1 oz), SpO2 (!) 86 %.    Gen: intubated, not responsive to voice or pain;  at bedside   HEENT: eyes deviated to the R, sclera anicteric  Lungs: intubated  Abd:  +bs, soft, nd, does not react to palpation   Skin: no jaundice    LABS:  BMP  Recent Labs   Lab 09/01/20  1148 09/01/20 0337 08/31/20  2103 08/31/20  1534    138 138 139   POTASSIUM 3.8 3.6 3.6 3.7   CHLORIDE 109 108 108 111*   FERNANDO 7.8* 7.7* 7.6* 7.6*   CO2 22 22 24 22   BUN 27 28 28 27   CR 0.74 0.77 0.82 0.83   GLC 87 113* 110* 116*     CBC  Recent Labs   Lab 09/01/20  1625 09/01/20  1148 09/01/20 0337 08/31/20  1534   WBC 13.3* 15.7* 17.0*  --  14.1*   RBC 2.63* 2.86* 2.79*  --  2.54*   HGB 7.8* 8.4* 8.4*   < > 7.5*   HCT 23.8* 25.7* 25.4*  --  22.9*   MCV 91 90 91  --  90   MCH 29.7 29.4 30.1  --  29.5   MCHC 32.8 32.7 33.1  --  32.8   RDW 16.3* 16.2* 16.3*  --  16.1*    230 246  --  211    < > = values in this interval not displayed.     INR  Recent Labs   Lab 08/31/20  0339 08/30/20  0341 08/29/20  0401 08/28/20  0259   INR 1.29* 1.34* 1.27* 1.37*     LFTs  Recent Labs   Lab 09/01/20  0337 08/31/20  0339 08/30/20  0341 08/29/20  0401   ALKPHOS 199* 176* 164* 182*   AST 40 41 38 51*   ALT 24 24 23 26   BILITOTAL 1.1 0.9 0.7 0.9   PROTTOTAL 5.0* 4.2* 4.0* 4.7*   ALBUMIN 1.3* 1.2* 1.2* 1.5*      PANCNo lab results found in last 7 days.    IMAGING:  n/a

## 2020-09-01 NOTE — PROGRESS NOTES
LVAD Social Work Services Progress Note      Date of Initial Social Work Evaluation: 8/12/2020  Collaborated with: Pt's , Reyes, at bedside    Data: Pt is s/p LVAND implant and TV repair on 8/19/2020. Chest closed on 8/21/2020. Post-op has been complicated by seizures, stroke and GI bleeding. Pt remains intubated with possible need for trach, as discussed at last Friday's care conference. Pt started CRRT on 8/26.   Pt's  asking for clarification on pt's Medicare insurance coverage. Writer had discussed with pt's dtr last week that pt was going to exhaust her Medicare days but still had lifetime reserve days and an additional 365 days of hospital coverage through supplemental insurance. Writer answered 's questions to his satisfaction.     Intervention: Supportive Visit   Assessment: 's insurance questions answered. Provided emotional support and managed expectations as pt's  asking if this is a normal post-operative course for a typical LVAD pt. We talked about pt being very sick prior to procedure and fragility due to MI in March. Pt's  agreed that pt never fully recovered from first insult.  was encouraged by Friday's care conference and is comfortable with current plan of care.   Education provided by SW: Ongoing Social Work support  Plan:    Discharge Plans in Progress: None     Barriers to d/c plan: Medical Stability    Follow up Plan: SW to continue to follow.

## 2020-09-01 NOTE — PLAN OF CARE
"D/I:     Neuro:  Pupils 5+, equal and brisk.  Pupils conjugate with slow roving eye movements vs nystagmus all shift.  On call MD notified and came to beside to assess.  Head CT on 08/27/2020 stated \" suspicious for involving embolic infarcts \" that were not present on head CT from 08/10/2020.   Slightly flexed elbow raising left arm up when being suctioned.  Moved feet rocked back and forth when doppler being done.  No RUE movement this shift.   Pt off all analgesics  ( Fentanyl Gtt ) since Tuesday 8/25 @ 1244.  Pt off all sedation ( Propofol Gtt ) since Monday 08/31/2020 @ 0537.  Tmax 98.5 Oral.  ID called to notify of  blood culture taken from right hand on Sunday 08/30 @ 0931 is positive for yeast.  Dr Sobia Mijares notified.      Cardiac:  SR/ST 80's to 100's with a rare paced fusion beat @ times.  HM3 LVAD:  Flows= 3 - 3.4 lpm. PI= 5.2 - 7.  Speed= 5300 rpm.  Power= 3.7 - 3.8 mota.  Pt off all pressors ( Levo Gtt ) since Monday 08/31/2020 @ 0600.  CVP= 10, 10 & 10.        Pulm:  Pt on vent pressure support mode since Monday 08/31/2020 @ 0810:  RR=14. Pressure support= 7.  PEEP= 5 & FIO2= 30%.  Left chest tubes pluerovac put 270 ml out for the shift averaging 22 ml/hr.  Right chest tubes pluerovac put 10 ml out for the shift averaging < 1 ml/hr.      GI: Tube feeding and all oral med's currently on hold due to feeding tube coming out during EGD with clip placement by GI consult on Monday 08/31/2020.  Hgb remains stable since.   Protonix Gtt @ 8 mg/hr.  Continuous TPN @ 45 ml/hr.  Lipids 250 ml over 12 hours infusing.   Rectal tube in place with 100 ml watery green/brown diarrhea this shift.      :  Bladder scan resulted in 320 ml @ 0600.  Straight cath @ 0600 yielded only 225 ml clear yellow urine.  CRRT net fluid gain yesterday over 24 hour period ending @ midnight was 1,353 ml net fluid removal for an average of 56 ml/hr net fluid removed.  Net fluid removal today so far since midnight is 560 ml for an " average of 70 ml/hr net fluid removed.     A/P:     Keep MAP > 65.  Goal CVP= 8 - 12.  Keep LVAD PI > 2 and Flow's > 3.  Current ordered CRRT goal is 0  to 50 ml/hr fluid removal.  Nursing to continue to monitor Hgb.  Nursing anticipates possible feeding tube placement under fluoroscopy soon.

## 2020-09-01 NOTE — PROCEDURES
PROCEDURE NAME: Placement of HD catheter, LIJ     PATIENT NAME: Marquise Jj   MRN: [id number]   DATE: September 1, 2020       Consent was obtained from the patient's . The patient was prepped and draped in the usual sterile fashion. An ultrasound was used to identify the left internal jugular vein. A needle was introduced into the vein, and a wire was introduced through the needle, using the Seldinger technique. There was no resistance when the wire was advanced. Next, the skin was dilated and a skin knick was made with an 11 blade. The double lumen HD line was then advanced over the wire. The wire was removed and the line was sewn in place. The line was flushed and covered with a sterile dressing. A CXR was ordered to confirm placement. The patient tolerated the procedure.     Dr. Vazquez was available     [facility]   Surgery Resident

## 2020-09-02 NOTE — PROGRESS NOTES
CRRT STATUS NOTE    DATA:  Time:  1:53 PM  Pressures WNL:  YES  Filter Status:  WDL    Problems Reported/Alarms Noted:  none    Supplies Present:  YES    ASSESSMENT:  Patient Net Fluid Balance:  Net - 1110 ml since MN 9/2  Vital Signs:  /78   Pulse 76   Temp 97.6  F (36.4  C) (Axillary)   Resp 22   Wt 79.1 kg (174 lb 6.1 oz)   SpO2 98%   BMI 26.51 kg/m    Labs:  Hgb=8, K= 3.4  Goals of Therapy: 50cc/hr net negative as BP's allow     INTERVENTIONS:   none    PLAN:  Continue POC. Call CRRT RN at [phone] with any questions or concerns.

## 2020-09-02 NOTE — IR NOTE
Patient Name: Marquise Laboy [facility]  Medical Record Number: [id number]  Today's Date: 9/2/2020    Procedure: right non tunneled chest tube placement  Proceduralist: Mindi Gómez and Raul    Procedure Start: 1516  Procedure end: 1535  Sedation medications administered: local anesthetic    Report given to: Eliud DUTTA RN    Other Notes: Pt in 4E rm 4502 . Consent reviewed. Pt denies any questions or concerns regarding procedure. Pt positioned left side done  and monitored per protocol. Pt tolerated procedure without any noted complications. Tolerated well..

## 2020-09-02 NOTE — PROGRESS NOTES
CVTS PROGRESS NOTE  September 2, 2020          CO-MORBIDITIES:   Cardiogenic shock (H)  (primary encounter diagnosis)  LV (left ventricular) mural thrombus  Heart failure (H)  Heart failure (H)  Status post cardiac surgery    ASSESSMENT: Marquise Jj is a 75 year old female 75 year-old female with PMH notable for coronary artery disease s/p CABG (4/20), ischemic cardiomyopathy, severe MR/TR and known mural thrombus who is admitted to the CV ICU s/p redo sternotomy, LVAD (heartmate III), and TV repair on 8/19/20 with Dr. Farley. Chest was left open and packed. S/p chest closure and washout 08/21/20. Extubated 9/1/20.     PLAN SUMMARY:   Pantoprazole to IV BID   Feeding tube by radiology today   R chest pigtail   Start coumadin (hold heparin)   ABG daily   Discontinue seroquel     PLAN:   Neuro/ pain/ sedation:  #Acute post-operative pain   #Likely anoxic brain injury; possible stroke  #Epilepsia Partialis Continua; resolved  - Monitor neurological status. Notify the MD for any acute changes in exam.  - Discontinue seroquel, PRN narcotic   - Keppra, vimpat to continue per NeuroCrit  - Tylenol PRN       Pulmonary:   #Acute hypoxic respiratory failure  #R pleural effusion   - Monitor CXR, ABG  - R pigtail placement today for effusion   - Remove R pleural CT   - Daily CXR   - Extubated on 9/1/2020, if needs support consider BIPAP vs HFNC    Cardiovascular:    #Acute on chronic decompensated heart failure with reduced ejection fraction: NYHA IV / ACC D  #Cardiogenic shock, possible vasoplegia   #Ischemic cardiomyopathy  #Coronary artery disease s/p CABG 4/20 in [place], s/p PCI to RCA 7/20  #Mural thrombus  #Severe MR/TR  #S/p LVAD     MAP > 70, CVP 8-12    Off pressors     Low-intensity heparin infusion - DISCONTINUE     Starting warfarin     Aspirin, rosuvastatin    Co-managing with Cards-2    Amiodarone 200 mg daily - discontinued per CVTS staff.       GI/Nutrition:   # UGI bleed   - Appreciate GI consult   - TF  once feeding tube placed   - Pantoprazole BID   - Bowel regimen: senna-colace, miralax.  - Put in OG per radiology due to presence of clips       Renal/Fluid Balance/ Electrolytes:   #Oliguria in the setting of likely ATN  - Will monitor intake and output.  - ICU electrolyte replacement protocol  - Goal-directed fluid therapy  - Nephrology consulted; CRRT      Endocrine:    #Glycemic control  #Hx hypothyroid   - ISS, medium intensity   - Synthroid daily       ID/ Antibiotics:  # Febrile, concern for post operative fevers.   - Completed perioperative antibiotic regimen: vancomycin, levofloxacin, fluconazole, Zosyn   - Vanc (8/30-9/2 ), Zosyn (8/30-9/2). Hold today.   - Micafungin for yeast in blood from one peripheral site  - ID Consult.      Heme:     #Acute perioperative blood loss anemia  #Thrombocytopenia   - Hgb goal > 8  - Transfuse as necessary   - Coumadin       Prophylaxis:    - SCD  - LIH - HOLD   - PPI  - Bowel regimen      MSK:    - PT and OT consulted. Appreciate recs.      Lines/ tubes/ drains:  - A-line  - R internal jugular CVC  - LIJ CRRT line   - PIV  - Chest tubes       Disposition:  - CV ICU.    Patient seen, findings and plan discussed with CVTS Fellow    -----------------------------------  Angela Sales   Surgery Resident   *[phone]  ====================================    SUBJECTIVE:   No acute events. Extubated. In the chair this morning. Sat well on 3L NC. Down three lbs.     OBJECTIVE:   1. VITAL SIGNS:   Temp:  [96  F (35.6  C)-98.4  F (36.9  C)] 97.6  F (36.4  C)  Pulse:  [] 87  Resp:  [14-22] 20  MAP:  [72 mmHg-98 mmHg] 82 mmHg  Arterial Line BP: ()/(45-82) 102/49  SpO2:  [86 %-100 %] 97 %  Ventilation Mode: CPAP/PS  (Continuous positive airway pressure with Pressure Support)  FiO2 (%): 30 %  PEEP (cm H2O): 5 cmH2O  Pressure Support (cm H2O): 7 cmH2O  Oxygen Concentration (%): 30 %  Resp: 20      2. INTAKE/ OUTPUT:   I/O last 3 completed shifts:  In: 3037.85 [I.V.:2630.17;  Other:2]  Out: 3342 [Urine:150; Other:2512; Stool:110; Chest Tube:570]    3. PHYSICAL EXAMINATION:   General: NAD, in chair   Neuro: Will follow person around the room. Does not purposefully track.   CV: RRR  Abdomen: Soft, Non-distended, Non-tender  Incisions: sternotomy wound c/d/i s/p closure  Extremities: warm and well perfused    4. INVESTIGATIONS:   Arterial Blood Gases   Recent Labs   Lab 20  11420  0334   PH 7.47* 7.46* 7.48* 7.51*   PCO2 30* 32* 31* 28*   PO2 121* 105 171* 114*   HCO3 21 23 23 22     Complete Blood Count   Recent Labs   Lab 20  11420  0337   WBC 11.9* 13.3* 15.7* 17.0*   HGB 8.0* 7.8* 8.4* 8.4*    214 230 246     Basic Metabolic Panel  Recent Labs   Lab 20  21320  1148    139 140 139   POTASSIUM 3.7 3.7 3.7 3.8   CHLORIDE 109 109 110* 109   CO2 20 21 22 22   BUN 24 26 29 27   CR 0.84 0.84 0.82 0.74   GLC 97 86 72 87     Liver Function Tests  Recent Labs   Lab 20  03520  03320  0339 20  03420  0401   AST 51*  --  40 41 38 51*   ALT 30  --  24 24 23 26   ALKPHOS 255*  --  199* 176* 164* 182*   BILITOTAL 1.5*  --  1.1 0.9 0.7 0.9   ALBUMIN 1.2*  --  1.3* 1.2* 1.2* 1.5*   INR  --  1.26*  --  1.29* 1.34* 1.27*     Pancreatic Enzymes  No lab results found in last 7 days.  Coagulation Profile  Recent Labs   Lab 20  03320  0341 20  0401   INR 1.26* 1.29* 1.34* 1.27*         5. RADIOLOGY:   Recent Results (from the past 24 hour(s))   Echo Complete    Narrative    [id number]  [id number]  [id number]  [id number]^RAFFAELE           [facility],[place]  Echocardiography Laboratory  [address] [place] [id number]     Name: EDILSON SMITH  MRN: [id number]  : 1945  Study Date: 2020 11:23 AM  Age: 75 yrs  Gender: Female  Patient  Location: [facility]  Reason For Study: Cardiac Device, Unspecified  Ordering Physician: RAFFAELE HAMLIN  Referring Physician: IVY CHRISTENSEN  Performed By: Bess Nichols RDCS     BSA: 1.9 m2  Height: 68 in  Weight: 177 lb  HR: 87  BP: 121/61 mmHg  _____________________________________________________________________________  __        Procedure  LVAD Echocardiogram with two-dimensional, color and spectral Doppler  performed.  _____________________________________________________________________________  __        Interpretation Summary  s/p HeartMate III LVAD. Speed is 5300 rpm. LVIDd is 4.4 cm. The septum appears  midline. The AV opens intermittently. There is mild continuous AI. Inflow and  outflow graft velocities are normal.  Severely (EF 10-20%) reduced left ventricular function is present.  Global right ventricular function is moderately reduced. The right ventricle  is normal size.  s/p TV repair with 30 mm Raffaele MC3 annuloplasty ring. At least moderate TR is  present. Etiology is unclear though may partially due to septal impingement  from the ICD lead. There is no prosthetic stenosis. Gradient is 2 mmHg at 83  bpm.  This study was compared with the study from 08/10/2020. The LVAD is new, as is  the RV dysfunction.  _____________________________________________________________________________  __        Left Ventricle  Left ventricular wall thickness is normal. LVIDD 4.4. Severely (EF 10-20%)  reduced left ventricular function is present. Left ventricular diastolic  function is not assessable. Severe diffuse hypokinesis is present. s/p  HeartMate III LVAD. Speed is 5300 rpm. LVIDd is 4.4 cm. The septum appears  midline. The AV opens intermittently. There is mild continuous AI. Inflow and  outflow graft velocities are normal. LVAD cannula was seen in the expected  anatomic position in the LV apex.     Right Ventricle  The right ventricle is normal size. Global right ventricular function is  moderately  reduced. A pacemaker lead is noted in the right ventricle.     Mitral Valve  The mitral valve is normal. Trace mitral insufficiency is present.     Aortic Valve  Trileaflet aortic sclerosis without stenosis. Mild aortic insufficiency is  present. The AV opens with roughly every other systole.        Tricuspid Valve  Moderate tricuspid insufficiency is present. Right ventricular systolic  pressure is 31mmHg above the right atrial pressure. s/p TV repair with 30 mm  Edward MC3 annuloplasty ring. At least moderate TR is present. Etiology is  unclear though may partially due to septal impingement from the ICD lead.  There is no prosthetic stenosis. Gradient is 2 mmHg at 83 bpm.     Pulmonic Valve  The valve leaflets are not well visualized. Mild pulmonic insufficiency is  present.     Vessels  The proximal ascending aorta is mildly dilated, measuring 4.0 cm. Unable to  assess mean RA pressure given the patient is on a ventilator.     Pericardium  No pericardial effusion is present.     Compared to Previous Study  This study was compared with the study from 08/10/2020 . The LVAD is new, as  is the RV dysfunction.        Attestation  I have personally viewed the imaging and agree with the interpretation and  report as documented by the fellow, Christopher Tran, and/or edited by me.  _____________________________________________________________________________  __  MMode/2D Measurements & Calculations  IVSd: 0.96 cm     LVIDd: 4.4 cm  LVIDs: 3.4 cm  LVPWd: 1.0 cm  FS: 21.9 %  LV mass(C)d: 145.0 grams  LV mass(C)dI: 74.7 grams/m2  RWT: 0.47        Doppler Measurements & Calculations  TV V2 max: 90.7 cm/sec  TV max PG: 3.3 mmHg  TV mean P.7 mmHg  TV V2 VTI: 18.5 cm  TR max gary: 269.6 cm/sec  TR max P.1 mmHg     _____________________________________________________________________________  __           Report approved by: Imani Lora 2020 01:22 PM      XR Chest Port 1 View    Narrative    Exam:  Chest X-ray  9/1/2020 5:26 PM    History: CRRT line placement    Comparison: X-ray 9/1/2020    Findings: Frontal radiograph of the chest. Median sternotomy wires.  Unchanged bilateral chest tubes, mediastinal drains, LVAD, and left  chest wall implantable cardiac defibrillator. Unchanged right IJ  central venous catheter. Newly placed left IJ central venous catheter  with the tip projecting over the mid SVC. The trachea is midline.  Unchanged cardiomediastinal silhouette. Bilateral layering pleural  effusions and overlying opacities. No pneumothorax.      Impression    Impression:   1. New left IJ central venous catheter with the tip projecting over  the mid SVC. Otherwise unchanged postsurgical chest with stable  supportive devices.  2. Small bilateral layering pleural effusions and overlying opacities,  atelectasis or consolidation.    I have personally reviewed the examination and initial interpretation  and I agree with the findings.    TORSTEN COX, DO   XR Chest Port 1 View    Narrative    AP chest radiograph 9/2/2020.    HISTORY: Status post LVAD, intubated, interval change.    COMPARISON: 9/1/2020, 8/31/2020.    FINDINGS: AP chest radiograph. Stable LVAD, left chest wall potential  cardiac device, mediastinal drain, and right apical chest tube. Stable  right jugular central venous catheter. Left jugular central venous  catheter is unchanged. CABG. Stable cardiac mediastinal silhouette.  Slightly increased right and stable left pleural effusions with  overlying basilar retrocardiac opacities. Mild coronary edema. No  appreciable pneumothorax. No acute osseous or upper abdominal  abnormality.      Impression    IMPRESSION:  1. Slightly increased right and stable left pleural effusion with  overlying basilar retrocardiac atelectasis versus consolidation.  2. Stable support devices as above.  3. Mild pulmonary edema.    I have personally reviewed the examination and initial interpretation  and I agree with the  findings.    CRYSTAL BLANKENSHIP, MD       =========================================

## 2020-09-02 NOTE — PHARMACY-ANTICOAGULATION SERVICE
Clinical Pharmacy - Warfarin Dosing Consult     Pharmacy has been consulted to manage this patient s warfarin therapy.  Indication: LVAD/RVAD  Therapy Goal: INR 2-3  Warfarin Prior to Admission: No (Was on eliquis PTA, but was on warfarin in 6/2020 at dose of 1 mg daily for therapeutic INR)  Significant drug interactions:    Increased risk of bleeding: aspirin      Increased INR: amiodarone  Recent documented change in oral intake/nutrition: Yes, Lack of nutrition over last several days (no tube feeds and TPN stopped), likely malnourished    INR   Date Value Ref Range Status   09/01/2020 1.26 (H) 0.86 - 1.14 Final   08/31/2020 1.29 (H) 0.86 - 1.14 Final       Recommend warfarin 1 mg today.  Pharmacy will monitor Marquise Laboy Анна daily and order warfarin doses to achieve specified goal.      Please contact pharmacy as soon as possible if the warfarin needs to be held for a procedure or if the warfarin goals change.      Alan Deshpande, MichaelD

## 2020-09-02 NOTE — CONSULTS
Interventional Radiology Consult Service Note    Patient is on IR schedule 9/2 for a right sided chest tube placement.   Labs WNL for procedure.  No NPO required.  Consent will be done prior to procedure with patient's .     Please [place] the [place] [phone] for estimated time of procedure.     Case discussed with Dr. Gómez from IR and SICU team. This is a 75-year-old female with a past medical history notable for coronary artery disease, ischemic cardiomyopathy, and known mural thrombus who was transferred from [facility] for further evaluation/treatment of cardiogenic shock. Balloon pump placed 8/14. Had HM3 8/19. Chest closed 8/21. Pt extubated 9/1. Chest xray shows enlarging right pleural effusion and IR is consulted for drain placement. Dx labs per SICU.    Vitals:   /78   Pulse 83   Temp 97.6  F (36.4  C) (Axillary)   Resp 22   Wt 79.1 kg (174 lb 6.1 oz)   SpO2 95%   BMI 26.51 kg/m      Pertinent Labs:     Lab Results   Component Value Date    WBC 11.9 (H) 09/02/2020       Lab Results   Component Value Date    HGB 8.0 09/02/2020    HGB 7.8 09/01/2020    HGB 8.4 09/01/2020       Lab Results   Component Value Date     09/02/2020     09/01/2020     09/01/2020       Lab Results   Component Value Date    INR 1.26 (H) 09/01/2020    PTT 85 (H) 08/25/2020       Lab Results   Component Value Date    POTASSIUM 3.7 09/02/2020        Anastasiia Figueroa, SERENE, APRN  Interventional Radiology  Pager: [phone]

## 2020-09-02 NOTE — PROGRESS NOTES
No vad alarms today, No issue with CRRT. NJ placed under fluoroscopy. Pt at goal rate of 55ml/hr.  Chest tube placed in R pleural space. 600ml out initially. Xray taken for placement check.

## 2020-09-02 NOTE — PLAN OF CARE
OT-4E: OT to continue to hold. Pt not yet appropriate for multiple therapies. Will reschedule and initiate as appropriate.

## 2020-09-02 NOTE — PROGRESS NOTES
[facility]  NeuroCritical Care Progress Note    Patient Name:  Marquise Jj  MRN:  [id number]    :  1945  Date of Service:  2020  Primary care provider:  Poonam Cast        24 HOUR EVENTS:  Extubated this morning.  Found to have positive fungal cultures, on micafungin.    ASSESSMENT/PLAN:  Marquise Jj is a 75 year old female with complex cardiac history who is now s/p LVAD placement complicated by post procedurally encephalopathy and later focal non-convulsive status which has since resolved.  She was extubated today and is found to have flaccid weakness in all four extremities and neck weakness.     Recommendation:  Overall suspect the patient has a component of critical illness myoneuronopathy that is likely the main cause of her weakness in the setting of her prolonged critical illness.  She was able to withdraw distally to noxious stimulation and appears to have sensation intact to noxious stimulation in all for extremities.  She will likely need time, PT and OT for gradual improvement.  Based on her age she likely will not have 100% return of function especially in the setting of her embolic infarctions as it is unclear what her deficits will be from these at this time but will need more time to determine what her functional outcome will be.  Would not recommend EMG at this time as she is still in the ICU and significant artifact would obscure findings and likely not change present management.    Lynne Machado MD    ______________________________    24 HOUR VITAL SIGNS SUMMARY:   Temperatures:  Current - Temp: 97.2  F (36.2  C); Max - Temp  Av.9  F (36.6  C)  Min: 97.2  F (36.2  C)  Max: 98.5  F (36.9  C)  Respiration range: Resp  Av.4  Min: 16  Max: 25  Pulse range: Pulse  Av  Min: 68  Max: 111  Blood pressure range: Systolic (24hrs), Av , Min:130 , Max:130   ; Diastolic (24hrs), Av, Min:78, Max:78    Pulse oximetry range:  "SpO2  Av.5 %  Min: 86 %  Max: 100 %    VENTILATOR SETTINGS:  N/A extubated    Intake/Output Summary (Last 24 hours) at 2020  Last data filed at 2020 1800  Gross per 24 hour   Intake 3025.61 ml   Output 4180 ml   Net -1154.39 ml       Arterial Line BP: ()/(52-91) 106/55  MAP:  [81 mmHg-109 mmHg] 90 mmHg  BP - Mean:  [97] 97  CVP:  [10 mmHg] 10 mmHg    PHYSICAL EXAMINATION:   /78   Pulse 83   Temp 97.2  F (36.2  C) (Axillary)   Resp 22   Wt 80.6 kg (177 lb 11.1 oz)   SpO2 (!) 86%   BMI 27.02 kg/m      Neuro:  Mental status: awake but not alert, not following commands, does regard the examiner intermittently  Speech: able to mumble some words but mostly said \"ow\"  Cranial nerves: pupils are equal round and reactive, cough is present, horizontal eye movements appear intact  Motor: decreased tone in her limbs, withdraws in all four extremities to noxious stimulation was able to withdraw her left arm antigravity at the elbow  Sensory: reacts to noxious stimulation in all four extremities  Coordination: unable to test due to cooperation  Gait: unable to test due to weakness      CURRENT MEDICATIONS:    amiodarone  200 mg Oral or Feeding Tube Daily     aspirin  81 mg Oral or Feeding Tube Daily     B and C vitamin Complex with folic acid  5 mL Oral Daily     bimatoprost  1 drop Both Eyes At Bedtime     busPIRone  10 mg Oral or Feeding Tube TID     insulin aspart  1-6 Units Subcutaneous Q4H     lacosamide (VIMPAT) intermittent infusion  200 mg Intravenous BID     latanoprost  1 drop Both Eyes Daily     levETIRAcetam  2,000 mg Intravenous Q12H     levothyroxine  62.5 mcg Oral or Feeding Tube QAM AC     lipids  250 mL Intravenous Q24H     micafungin  100 mg Intravenous Q24H     piperacillin-tazobactam  4.5 g Intravenous Q6H     QUEtiapine  50 mg Oral or Feeding Tube At Bedtime     rosuvastatin  20 mg Oral or Feeding Tube Daily     sodium chloride (PF)  3 mL Intracatheter Q8H     vancomycin " (VANCOCIN) IV  1,750 mg (central catheter) Intravenous Q24H     PRN MEDICATIONS:  acetaminophen, artificial tears, BETA BLOCKER NOT PRESCRIBED, calcium chloride, calcium chloride in 0.9% NaCl intermittent infusion, calcium chloride in 0.9% NaCl intermittent infusion, calcium chloride, dextrose, dextrose, glucose **OR** dextrose **OR** glucagon, EPINEPHrine, fentaNYL, hydrALAZINE, lidocaine 4%, lidocaine (buffered or not buffered), magnesium sulfate, methocarbamol, midazolam, midazolam, naloxone, - MEDICATION INSTRUCTIONS -, ondansetron **OR** ondansetron, oxyCODONE, polyethylene glycol, polyethylene glycol-propylene glycol PF, potassium chloride, prochlorperazine **OR** prochlorperazine, QUEtiapine, BETA BLOCKER NOT PRESCRIBED, senna-docusate **OR** senna-docusate, simethicone, sodium chloride (PF), sodium phosphate (NaPHOS) CRRT Replacement    INFUSIONS:    BETA BLOCKER NOT PRESCRIBED       dextrose       dextrose Stopped (08/30/20 2221)     CRRT replacement solution 12.5 mL/kg/hr (09/01/20 1440)     - MEDICATION INSTRUCTIONS -       norepinephrine Stopped (08/31/20 0600)     pantoprazole (PROTONIX) infusion ADULT/PEDS GREATER than or EQUAL to 45 kg 8 mg/hr (09/01/20 1944)     CRRT replacement solution 2.911 mL/kg/hr (09/01/20 1440)     CRRT replacement solution 12.5 mL/kg/hr (09/01/20 1440)     BETA BLOCKER NOT PRESCRIBED       ALLERGIES:  Allergies   Allergen Reactions     Combigan [Brimonidine Tartrate-Timolol] Swelling     Swollen eyelids     Ativan [Lorazepam] Anxiety and Other (See Comments)     Increased confusion     LABS/STUDIES:  Recent Labs   Lab Test 09/01/20  1625 09/01/20  1148 09/01/20  0337    139 138   POTASSIUM 3.7 3.8 3.6   CHLORIDE 110* 109 108   CO2 22 22 22   ANIONGAP 8 9 8   GLC 72 87 113*   BUN 29 27 28   CR 0.82 0.74 0.77   FERNANDO 8.0* 7.8* 7.7*   WBC 13.3* 15.7* 17.0*   RBC 2.63* 2.86* 2.79*   HGB 7.8* 8.4* 8.4*    230 246       Recent Labs   Lab Test 09/01/20  1629  08/31/20  0339 08/30/20  0341  08/25/20  1656  08/24/20  1536  08/21/20  1017   INR 1.26* 1.29* 1.34*   < > 2.26*   < > 2.21*   < > 1.60*   PTT  --   --   --   --  85*  --  63*  --  48*    < > = values in this interval not displayed.       Recent Labs   Lab 09/01/20  1625 09/01/20  1148 09/01/20  0334 08/31/20  2103   PH 7.46* 7.48* 7.51* 7.50*   PCO2 32* 31* 28* 30*   PO2 105 171* 114* 115*   HCO3 23 23 22 23   O2PER 4L 4L 30 30.0     I have personally reviewed all labs and imaging for this patient.

## 2020-09-02 NOTE — PROGRESS NOTES
CVICU PROGRESS NOTE  September 2, 2020          CO-MORBIDITIES:   Cardiogenic shock (H)  (primary encounter diagnosis)  LV (left ventricular) mural thrombus  Heart failure (H)  Heart failure (H)  Status post cardiac surgery    ASSESSMENT: Marquise Jj is a 75 year old female 75 year-old female with PMH notable for coronary artery disease s/p CABG (4/20), ischemic cardiomyopathy, severe MR/TR and known mural thrombus who is admitted to the CV ICU s/p redo sternotomy, LVAD (heartmate III), and TV repair on 8/19/20 with Dr. Farley. Chest was left open and packed. S/p chest closure and washout 08/21/20. Extubated 9/1/20.     PLAN SUMMARY:   Pantoprazole to IV BID   Feeding tube by radiology today   R chest pigtail   Start coumadin (hold heparin)   ABG daily   Discontinue seroquel     PLAN:   Neuro/ pain/ sedation:  #Acute post-operative pain   #Likely anoxic brain injury; possible stroke  #Epilepsia Partialis Continua; resolved  - Monitor neurological status. Notify the MD for any acute changes in exam.  - Discontinue seroquel, PRN narcotic   - Keppra, vimpat to continue per NeuroCrit  - Tylenol PRN       Pulmonary:   #Acute hypoxic respiratory failure  #R pleural effusion   - Monitor CXR, ABG  - R pigtail placement today for effusion   - Remove R pleural CT   - Daily CXR   - Extubated on 9/1/2020, if needs support consider BIPAP vs HFNC    Cardiovascular:    #Acute on chronic decompensated heart failure with reduced ejection fraction: NYHA IV / ACC D  #Cardiogenic shock, possible vasoplegia   #Ischemic cardiomyopathy  #Coronary artery disease s/p CABG 4/20 in [place], s/p PCI to RCA 7/20  #Mural thrombus  #Severe MR/TR  #S/p LVAD     MAP > 70, CVP 8-12    Off pressors     Low-intensity heparin infusion - DISCONTINUE     Starting warfarin     Aspirin, rosuvastatin    Co-managing with Cards-2    Amiodarone 200 mg daily - discontinued per CVTS staff.       GI/Nutrition:   # UGI bleed   - Appreciate GI consult   - TF  once feeding tube placed   - Pantoprazole BID   - Bowel regimen: senna-colace, miralax.  - Put in OG per radiology due to presence of clips       Renal/Fluid Balance/ Electrolytes:   #Oliguria in the setting of likely ATN  - Will monitor intake and output.  - ICU electrolyte replacement protocol  - Goal-directed fluid therapy  - Nephrology consulted; CRRT      Endocrine:    #Glycemic control  #Hx hypothyroid   - ISS, medium intensity   - Synthroid daily       ID/ Antibiotics:  # Febrile, concern for post operative fevers.   - Completed perioperative antibiotic regimen: vancomycin, levofloxacin, fluconazole, Zosyn   - Vanc (8/30-9/2 ), Zosyn (8/30-9/2). Hold today.   - Micafungin for yeast in blood from one peripheral site  - ID Consult.      Heme:     #Acute perioperative blood loss anemia  #Thrombocytopenia   - Hgb goal > 8  - Transfuse as necessary   - Coumadin       Prophylaxis:    - SCD  - LIH - HOLD   - PPI  - Bowel regimen      MSK:    - PT and OT consulted. Appreciate recs.      Lines/ tubes/ drains:  - A-line  - R internal jugular CVC  - LIJ CRRT line   - PIV  - Chest tubes       Disposition:  - CV ICU.    Patient seen, findings and plan discussed with CVTS Fellow    -----------------------------------  Angela Sales   Surgery Resident   *[id number]  ====================================    SUBJECTIVE:   No acute events. Extubated. In the chair this morning. Sat well on 3L NC. Down three lbs.     OBJECTIVE:   1. VITAL SIGNS:   Temp:  [96  F (35.6  C)-98.4  F (36.9  C)] 97.6  F (36.4  C)  Pulse:  [] 87  Resp:  [14-22] 20  MAP:  [72 mmHg-98 mmHg] 82 mmHg  Arterial Line BP: ()/(45-82) 102/49  SpO2:  [86 %-100 %] 97 %  Ventilation Mode: CPAP/PS  (Continuous positive airway pressure with Pressure Support)  FiO2 (%): 30 %  PEEP (cm H2O): 5 cmH2O  Pressure Support (cm H2O): 7 cmH2O  Oxygen Concentration (%): 30 %  Resp: 20      2. INTAKE/ OUTPUT:   I/O last 3 completed shifts:  In: 3037.85 [I.V.:2630.17;  Other:2]  Out: 3342 [Urine:150; Other:2512; Stool:110; Chest Tube:570]    3. PHYSICAL EXAMINATION:   General: NAD, in chair   Neuro: Will follow person around the room. Does not purposefully track.   CV: RRR  Abdomen: Soft, Non-distended, Non-tender  Incisions: sternotomy wound c/d/i s/p closure  Extremities: warm and well perfused    4. INVESTIGATIONS:   Arterial Blood Gases   Recent Labs   Lab 20  11420  0334   PH 7.47* 7.46* 7.48* 7.51*   PCO2 30* 32* 31* 28*   PO2 121* 105 171* 114*   HCO3 21 23 23 22     Complete Blood Count   Recent Labs   Lab 20  11420  0337   WBC 11.9* 13.3* 15.7* 17.0*   HGB 8.0* 7.8* 8.4* 8.4*    214 230 246     Basic Metabolic Panel  Recent Labs   Lab 20  21320  1148    139 140 139   POTASSIUM 3.7 3.7 3.7 3.8   CHLORIDE 109 109 110* 109   CO2 20 21 22 22   BUN 24 26 29 27   CR 0.84 0.84 0.82 0.74   GLC 97 86 72 87     Liver Function Tests  Recent Labs   Lab 20  03520  03320  0339 20  03420  0401   AST 51*  --  40 41 38 51*   ALT 30  --  24 24 23 26   ALKPHOS 255*  --  199* 176* 164* 182*   BILITOTAL 1.5*  --  1.1 0.9 0.7 0.9   ALBUMIN 1.2*  --  1.3* 1.2* 1.2* 1.5*   INR  --  1.26*  --  1.29* 1.34* 1.27*     Pancreatic Enzymes  No lab results found in last 7 days.  Coagulation Profile  Recent Labs   Lab 20  03320  0341 20  0401   INR 1.26* 1.29* 1.34* 1.27*         5. RADIOLOGY:   Recent Results (from the past 24 hour(s))   Echo Complete    Narrative    [id number]  [id number]  [id number]  [id number]^RAFFAELE           [facility],[place]  Echocardiography Laboratory  [address] [place] [id number]     Name: EDILSON SMITH  MRN: [id number]  : 1945  Study Date: 2020 11:23 AM  Age: 75 yrs  Gender: Female  Patient  Location: [facility]  Reason For Study: Cardiac Device, Unspecified  Ordering Physician: RAFFAELE HAMLIN  Referring Physician: IVY CHRISTENSEN  Performed By: Bess Nichols RDCS     BSA: 1.9 m2  Height: 68 in  Weight: 177 lb  HR: 87  BP: 121/61 mmHg  _____________________________________________________________________________  __        Procedure  LVAD Echocardiogram with two-dimensional, color and spectral Doppler  performed.  _____________________________________________________________________________  __        Interpretation Summary  s/p HeartMate III LVAD. Speed is 5300 rpm. LVIDd is 4.4 cm. The septum appears  midline. The AV opens intermittently. There is mild continuous AI. Inflow and  outflow graft velocities are normal.  Severely (EF 10-20%) reduced left ventricular function is present.  Global right ventricular function is moderately reduced. The right ventricle  is normal size.  s/p TV repair with 30 mm Raffaele MC3 annuloplasty ring. At least moderate TR is  present. Etiology is unclear though may partially due to septal impingement  from the ICD lead. There is no prosthetic stenosis. Gradient is 2 mmHg at 83  bpm.  This study was compared with the study from 08/10/2020. The LVAD is new, as is  the RV dysfunction.  _____________________________________________________________________________  __        Left Ventricle  Left ventricular wall thickness is normal. LVIDD 4.4. Severely (EF 10-20%)  reduced left ventricular function is present. Left ventricular diastolic  function is not assessable. Severe diffuse hypokinesis is present. s/p  HeartMate III LVAD. Speed is 5300 rpm. LVIDd is 4.4 cm. The septum appears  midline. The AV opens intermittently. There is mild continuous AI. Inflow and  outflow graft velocities are normal. LVAD cannula was seen in the expected  anatomic position in the LV apex.     Right Ventricle  The right ventricle is normal size. Global right ventricular function is  moderately  reduced. A pacemaker lead is noted in the right ventricle.     Mitral Valve  The mitral valve is normal. Trace mitral insufficiency is present.     Aortic Valve  Trileaflet aortic sclerosis without stenosis. Mild aortic insufficiency is  present. The AV opens with roughly every other systole.        Tricuspid Valve  Moderate tricuspid insufficiency is present. Right ventricular systolic  pressure is 31mmHg above the right atrial pressure. s/p TV repair with 30 mm  Edward MC3 annuloplasty ring. At least moderate TR is present. Etiology is  unclear though may partially due to septal impingement from the ICD lead.  There is no prosthetic stenosis. Gradient is 2 mmHg at 83 bpm.     Pulmonic Valve  The valve leaflets are not well visualized. Mild pulmonic insufficiency is  present.     Vessels  The proximal ascending aorta is mildly dilated, measuring 4.0 cm. Unable to  assess mean RA pressure given the patient is on a ventilator.     Pericardium  No pericardial effusion is present.     Compared to Previous Study  This study was compared with the study from 08/10/2020 . The LVAD is new, as  is the RV dysfunction.        Attestation  I have personally viewed the imaging and agree with the interpretation and  report as documented by the fellow, Christopher Tran, and/or edited by me.  _____________________________________________________________________________  __  MMode/2D Measurements & Calculations  IVSd: 0.96 cm     LVIDd: 4.4 cm  LVIDs: 3.4 cm  LVPWd: 1.0 cm  FS: 21.9 %  LV mass(C)d: 145.0 grams  LV mass(C)dI: 74.7 grams/m2  RWT: 0.47        Doppler Measurements & Calculations  TV V2 max: 90.7 cm/sec  TV max PG: 3.3 mmHg  TV mean P.7 mmHg  TV V2 VTI: 18.5 cm  TR max gary: 269.6 cm/sec  TR max P.1 mmHg     _____________________________________________________________________________  __           Report approved by: Imani Lora 2020 01:22 PM      XR Chest Port 1 View    Narrative    Exam:  Chest X-ray  9/1/2020 5:26 PM    History: CRRT line placement    Comparison: X-ray 9/1/2020    Findings: Frontal radiograph of the chest. Median sternotomy wires.  Unchanged bilateral chest tubes, mediastinal drains, LVAD, and left  chest wall implantable cardiac defibrillator. Unchanged right IJ  central venous catheter. Newly placed left IJ central venous catheter  with the tip projecting over the mid SVC. The trachea is midline.  Unchanged cardiomediastinal silhouette. Bilateral layering pleural  effusions and overlying opacities. No pneumothorax.      Impression    Impression:   1. New left IJ central venous catheter with the tip projecting over  the mid SVC. Otherwise unchanged postsurgical chest with stable  supportive devices.  2. Small bilateral layering pleural effusions and overlying opacities,  atelectasis or consolidation.    I have personally reviewed the examination and initial interpretation  and I agree with the findings.    TORSTEN COX, DO   XR Chest Port 1 View    Narrative    AP chest radiograph 9/2/2020.    HISTORY: Status post LVAD, intubated, interval change.    COMPARISON: 9/1/2020, 8/31/2020.    FINDINGS: AP chest radiograph. Stable LVAD, left chest wall potential  cardiac device, mediastinal drain, and right apical chest tube. Stable  right jugular central venous catheter. Left jugular central venous  catheter is unchanged. CABG. Stable cardiac mediastinal silhouette.  Slightly increased right and stable left pleural effusions with  overlying basilar retrocardiac opacities. Mild coronary edema. No  appreciable pneumothorax. No acute osseous or upper abdominal  abnormality.      Impression    IMPRESSION:  1. Slightly increased right and stable left pleural effusion with  overlying basilar retrocardiac atelectasis versus consolidation.  2. Stable support devices as above.  3. Mild pulmonary edema.    I have personally reviewed the examination and initial interpretation  and I agree with the  findings.    CRYSTAL BLANKENSHIP, MD       =========================================

## 2020-09-02 NOTE — PROGRESS NOTES
GASTROENTEROLOGY PROGRESS NOTE    Date: 09/02/2020     ASSESSMENT:  75 year old female with a history of coronary artery disease status post CABGx4 in 2020, ischemic cardiomyopathy, severe tricuspid and mitral regurg unknown mural thrombus, is currently admitted to the cardiac ICU post LVAD placement on 8/19 in the setting of cardiogenic shock due to decompensated heart failure. Course has been c/b hypoxic resp failure requiring intubation, strokes, seizures, and LORI currently on CRRT. GI was initially consulted on 8/26 for bloody NG tube OP.     She underwent EGD with placement of clips on two lesions that were possibly bleeding, please see procedure note on 8/27 for full details. AC was resumed and she unfortunately had a recurrent UGIB. EGD on 8/30 showed a bleeding angioectasia vs gastric erosion related to the NG in the stomach to which 1 hemoclip was placed (an additional hemoclip was placed at the site of the prior ulcer). The recommendation was to hold feeding tube placement and to hold AC for 72hrs.      Since her second EGD, the patient's Hgb has remained stable and she has not had obvious GI bleeding.      The patient has had worsening LFTs which is likely 2/2 a combination of decompensated HF, critical illness, medication-related (Zosyn), and TPN-related. No obvious RUQ abd pain on exam although the patient is minimally responsive.    RECOMMENDATIONS:  - Pantoprazole 40 mg PO BID  - Agree w/ tube feeds   - Resume AC today   - Continue to monitor for signs of bleeding   - Continue to monitor LFTs, if still uptrending can consider RUQ US w/ dopplers     Gastroenterology follow up recommendations: Pending clinical course.      Thank you for involving us in this patient's care. Please do not hesitate to contact the GI service with any questions or concerns.      Pt care plan discussed with Dr. Johnson, GI staff physician.    Verna Kaur   Gastroenterology  Fellow  _______________________________________________________________    24 Hour Events: Extubated yesterday, now on 3L NC; was able to ask where she was yesterday although for the most part is not responding; blood cx x1 from 8/30 + for yeast, is now on micafungin. She has not had a BM since the rectal tube was removed yesterday. Still on CRRT although the HD catheter was removed and replaced given the fungemia. She has been on TPN since 8/30 although this was stopped yesterday and NJ by was placed under fluoro (is receiving tube feeds now)    Subjective: The patient tracks w/ her eyes but does not answer ?s    Objective:  Blood pressure 130/78, pulse 83, temperature 97.6  F (36.4  C), temperature source Axillary, resp. rate 22, weight 79.1 kg (174 lb 6.1 oz), SpO2 95 %.    Gen: A&Ox3, NAD, sitting in chair  HEENT: ncat, perrl, eomi, sclera anicteric  CV: RRR  Lungs: on 3L NC  Abd: +bs, soft, nd/nt, negative Schwartz's sign  Skin: no jaundice  MS: appropriate muscle mass for age  Neuro: tracks w/ eyes, does not follow commands or answer ?s    LABS:  BMP  Recent Labs   Lab 09/02/20  0959 09/02/20  0350 09/01/20  2138 09/01/20  1625    139 139 140   POTASSIUM 3.7 3.7 3.7 3.7   CHLORIDE 109 109 109 110*   FERNANDO 7.5* 7.4* 7.7* 8.0*   CO2 23 20 21 22   BUN 20 24 26 29   CR 0.82 0.84 0.84 0.82   * 97 86 72     CBC  Recent Labs   Lab 09/02/20  0350 09/01/20  1625 09/01/20  1148 09/01/20  0337   WBC 11.9* 13.3* 15.7* 17.0*   RBC 2.68* 2.63* 2.86* 2.79*   HGB 8.0* 7.8* 8.4* 8.4*   HCT 24.3* 23.8* 25.7* 25.4*   MCV 91 91 90 91   MCH 29.9 29.7 29.4 30.1   MCHC 32.9 32.8 32.7 33.1   RDW 16.5* 16.3* 16.2* 16.3*    214 230 246     INR  Recent Labs   Lab 09/01/20  1625 08/31/20  0339 08/30/20  0341 08/29/20  0401   INR 1.26* 1.29* 1.34* 1.27*     LFTs  Recent Labs   Lab 09/02/20  0350 09/01/20  0337 08/31/20  0339 08/30/20  0341   ALKPHOS 255* 199* 176* 164*   AST 51* 40 41 38   ALT 30 24 24 23   BILITOTAL 1.5*  1.1 0.9 0.7   PROTTOTAL 4.8* 5.0* 4.2* 4.0*   ALBUMIN 1.2* 1.3* 1.2* 1.2*      PANCNo lab results found in last 7 days.    IMAGING:  n/a

## 2020-09-02 NOTE — PROGRESS NOTES
Cardiology Progress Note    Assessment & Plan   In summary, Marquise Jj is a 75-year-old female with a past medical history notable for coronary artery disease, ischemic cardiomyopathy, and known mural thrombus who was transferred from [facility] for further evaluation/treatment of cardiogenic shock. Balloon pump placed 8/14. Had HM3 8/19. Chest closed 8/21.    Today's changes:  - f/u ID recs re: micafungin, broad specturm abx  - MRSA negative, discontinue vancomycin  - pigtail catheter for R pleural effusion   - NG feeding tube today  - no changes to LVAD speed today (TTE: LVEF 10-20%, intermittent [Q3beat] opening of AV, LVIDd 4.4 cm)  - f/u renal and neurocrit recs  - start warfarin and resume PO meds once NG tube placed  - transition PPI gtt to IV BID  - restart all PO meds once NG tube placed  - goal negative 1L today on CRRT    Neuro:   # Sedation --> extubated, alert  # concern for seizure activity  CTH head 8/20 no signs of ICH   CTH head 8/27 Scattered areas of hypodensity with loss of gray-white matter differentiation in the left frontal lobe. These areas were not present on the CT dated 8/10/2020 and difficult compared to other prior comparison CTs due to extensive hardware artifact. These are suspicious for involving embolic infarcts.  keppra 2 g PO bid   vimpat 200 BID  - f/u neurocritical care recs      Cardio:  # Cardiogenic shock with vasoplegic component    # ICM: NYHA IV / ACC  # Severe MR/TR s/p tricuspid valve repair with Elkins MC3 Annuloplasty Ring size T30  # s/p LVAD HM3 on 8/19/2020  LAVD speed 5300, flow 3.4-3.7, pi 2.5-3.7, power 3.5-3.6   Presents with cardiogenic shock. On NE, cardiac index approximately 1.37 on admission. Severely elevated biventricular filling pressures. Etiology of her decompensation appears to be progression of her cardiomyopathy. Despite medical treatment, she has been hospitalized twice since returning to [place], both with low output. No  viability in her LAD territory, and doubt that PCI to her circumflex would make meaningful LV recovery. Hemodynamics improved with dobutamine, did not tolerate attempt to wean inotropics. RHC removed on 8/13 after clotting off, replaced on 8/14 after patient with increased work of breathing. CI of 1.1, balloon pump placed with CI of 1.6-1.8 and improvement in symptoms/hemodynamics. Had LVAD HM3 placed on 8/19. CVP today around 10 range. Chest closed on 8/21.   - Monitoring CVP for RV/fluid overload (CVP goal < 14)  - continue to wean pressors as above  - Hemodynamics q6hrs, monitor lactate. Goal CI>2., SvO2 >55%  - holding hep gtt as above      Date 8/9 8/9 8/10 8/11 08/13/20 08/15/20 08/16/20 08/17/20 8/18/20 8/19 8/20 8/21*   CVP 12 9 3 4 8 9 6 8 11 5 10 13   Mean PA  35 30 21 25 23 21 20 35/18 35/18 - 30/12 28/14   PCWP  18 14 12 x 13 14  13 - -    Oxy HGB  59 64 61 61 60 47 62 69 54 - 68 66   CI/CO 2.3 2.3 2.4/4.2 2.3 2.2 1.8 2.8/4.9 4.1 4.3/2.5 - 6/3.3 6.9/3.8   SVR 1100 1100 1200 1400 1316 1900 7557 563 0095 - 1025 715   Device     IABP 1:1 IABP 1:1 IABP 1:1 IABP 1:1 IABP 1:1 No IABP No IABP No IABP   * epi 0.1, vaso 0.5     8/22: CVP 12, PA 28/14/19, PCWP 14, CO/CI 9.0/4.8. . Epi 0.1, vaso 1.  8/23: CVP 16, PA 30/14, CI/CO 4.4/8.0, , SvO2 71%  8/24: CVP 14, PA 44/20/28, PCWP 16, CI/CO 7.3/3.9, SvO2 68% epi 0.06, vaso 2  8/25: CVP 16, PA 44/20/29, PCWP 22, CI/CO  3.6, SvO2 82%, vaso 2, epi 0.03  8/26: CVP 13, PA 40/20/26, PCWP 12, CI/CO 7/ 3.6, SvO2 74%, vaso 1  8/27: CVP 13, PA 38/18/26, PCWP 12-14, CI/CO 9.4/4.7, , PVR ~0.5, SvO2 73%, vaso 1, epi 0.05     # CAD s/p CABG 4/2020 (KURT to RCA and LIMA to LAD) and PCI to RCA 7/20  - Stop home plavix po qd (8/13) for LVAD surgery  - aspirin 81mg PO qd   - c/w home crestor    # Mural thrombus  - removed during surgery on 8/19    # A-flutter  Converted to NSR on 8/15    - Continue amiodarone 200mg po qd      Pulm:  # Acute hypoxic respiratory  failure  Extubated on 9/1/2020     Renal/Electrolytes   # Hyperkalemia/Hypokalemia   # Acute kidney injury on chronic kidney disease, stage III  Likely ATN due to hypoxic insult  - renal consult and diuretic challenge. May need dialysis  - Trend potassium and creatinine q12hrs  - Electrolyte replacement protocol  - Monitor UOP   - continue CVVH      GI:  #GIB  GI consulted and EGD on 8/28 with protuberant vessel injected and clipped and bleeding gastric ulcer with adherent clot, injected and clipped as well  Repeat bleed on 8/29-8/30, transfused and given pRBC, underwent repeat EGD, showed hematin throughout stomache with single bleeding angioectasia vs gastric erosion (from NGT) in stomach. Likely source of bleed and thought related to current bleeding episode. Hypothermia thought to be related to bleed.    # Constipation  # Severe protein calorie malnutrition  - Nutrition through tube feeds at 30 mL/hr    - Senna-docusate bid scheduled  - Miralax bid prn constipation  - f/u GI recs    ID:   # UTI vs ASB (resolved)  Periprocedural abx as per surgical team   - has been treated with levoloxacin, zosyn, rifampin, vancomycin, fluconazole, ceftriaxone during hospitalization  - broadened on 8/31 with vanco 2/2 concern for potential sepsis --> now discontinued  - micrafungin added 9/1 for blood culture positive for yeast. ID consulted, appreciate recs    Hem/Onc  # Mural thrombus removed on 8/19  # Anemia, mild  D/t frequent blood draws and procedures, and two large GI bleesd  - Monitor hgb  - PPI gtt --> IV BID    Endo  # Hypothyroidism   - Continue PTA levothyroxine     Nutrition: holding TF  DVT Prophylaxis: mechanical   Code Status: Full Code    Waylon Garcia MD, MSc  Cardiovascular Disease Fellow  [facility]    Discussed with Dr Christian      Late entry - pt seen and examined on 9/2/2020  I have reviewed today's vital signs, notes, medications, labs and imaging. I have also seen and examined the patient  and agree with the findings and plan as outlined above.    Nel Christian MD  Section Head - Advanced Heart Failure, Transplantation and Mechanical Circulatory Support  Director - [facility]  Associate Professor of Medicine, [facility]        Interval History   Extubated yesterday, asked where she was overnight. Today less verbal but awake and responsive.       Physical Exam   Temp: 97.6  F (36.4  C) Temp src: Axillary   Pulse: 87   Resp: 20 SpO2: 97 % O2 Device: Nasal cannula Oxygen Delivery: 3 LPM  Vitals:    08/31/20 0200 09/01/20 0200 09/02/20 0116   Weight: 81 kg (178 lb 9.2 oz) 80.6 kg (177 lb 11.1 oz) 79.1 kg (174 lb 6.1 oz)     Vital Signs with Ranges  Temp:  [96  F (35.6  C)-98.4  F (36.9  C)] 97.6  F (36.4  C)  Pulse:  [] 87  Resp:  [14-22] 20  MAP:  [72 mmHg-98 mmHg] 82 mmHg  Arterial Line BP: ()/(45-82) 102/49  SpO2:  [86 %-100 %] 97 %  I/O last 3 completed shifts:  In: 3037.85 [I.V.:2630.17; Other:2]  Out: 3342 [Urine:150; Other:2512; Stool:110; Chest Tube:570]    RETIRE: Heart Rate: 71, Blood pressure 130/78, pulse 87, temperature 97.6  F (36.4  C), temperature source Axillary, resp. rate 20, weight 79.1 kg (174 lb 6.1 oz), SpO2 97 %.  174 lbs 6.14 oz     GEN: alert, non to minimally verbal  CV: RRR, Hum of HM3   LUNGS: Clear to auscultation bilaterally  ABD: Active bowel sounds, soft, no abdominal tenderness.   EXT: Trace LE edema   NEURO: altert, tracks people/objects across room; responds to her name, husbands name, daughter's name. moves b/l LE and LUE to command    Medications     BETA BLOCKER NOT PRESCRIBED       dextrose 70 mL/hr at 09/02/20 1000     dextrose Stopped (08/30/20 2221)     CRRT replacement solution 12.5 mL/kg/hr (09/02/20 0811)     - MEDICATION INSTRUCTIONS -       norepinephrine Stopped (08/31/20 0600)     CRRT replacement solution 2.911 mL/kg/hr (09/02/20 0206)     CRRT replacement solution 12.5 mL/kg/hr  (09/02/20 0811)     BETA BLOCKER NOT PRESCRIBED       Warfarin Therapy Reminder         amiodarone  200 mg Oral or Feeding Tube Daily     aspirin  81 mg Oral or Feeding Tube Daily     B and C vitamin Complex with folic acid  5 mL Oral Daily     bimatoprost  1 drop Both Eyes At Bedtime     busPIRone  10 mg Oral or Feeding Tube TID     insulin aspart  1-6 Units Subcutaneous Q4H     lacosamide (VIMPAT) intermittent infusion  200 mg Intravenous BID     latanoprost  1 drop Both Eyes Daily     levETIRAcetam  2,000 mg Intravenous Q12H     levothyroxine  62.5 mcg Oral or Feeding Tube QAM AC     micafungin  100 mg Intravenous Q24H     pantoprazole (PROTONIX) IV  40 mg Intravenous BID     piperacillin-tazobactam  4.5 g Intravenous Q6H     rosuvastatin  20 mg Oral or Feeding Tube Daily     sodium chloride (PF)  3 mL Intracatheter Q8H     vancomycin (VANCOCIN) IV  1,750 mg (central catheter) Intravenous Q24H       Data        Intake/Output Summary (Last 24 hours) at 9/2/2020 1754  Last data filed at 9/2/2020 1748  Gross per 24 hour   Intake 3812.85 ml   Output 4578 ml   Net -765.15 ml           Recent Labs   Lab 09/02/20  0350 09/01/20  2138 09/01/20  1625 09/01/20  1148 09/01/20  0337  08/31/20  0339  08/30/20  0341   WBC 11.9*  --  13.3* 15.7* 17.0*   < > 14.7*  --  10.6   HGB 8.0*  --  7.8* 8.4* 8.4*   < > 7.8*   < > 7.2*   MCV 91  --  91 90 91   < > 89  --  90     --  214 230 246   < > 225  --  160   INR  --   --  1.26*  --   --   --  1.29*  --  1.34*    139 140 139 138   < > 139   < > 141   POTASSIUM 3.7 3.7 3.7 3.8 3.6   < > 3.7   < > 3.8   CHLORIDE 109 109 110* 109 108   < > 110*   < > 111*   CO2 20 21 22 22 22   < > 23   < > 25   BUN 24 26 29 27 28   < > 25   < > 32*   CR 0.84 0.84 0.82 0.74 0.77   < > 0.80   < > 0.84   ANIONGAP 10 8 8 9 8   < > 6   < > 5   FERNANDO 7.4* 7.7* 8.0* 7.8* 7.7*   < > 7.7*   < > 7.4*   GLC 97 86 72 87 113*   < > 113*   < > 100*   ALBUMIN 1.2*  --   --   --  1.3*  --  1.2*  --  1.2*    PROTTOTAL 4.8*  --   --   --  5.0*  --  4.2*  --  4.0*   BILITOTAL 1.5*  --   --   --  1.1  --  0.9  --  0.7   ALKPHOS 255*  --   --   --  199*  --  176*  --  164*   ALT 30  --   --   --  24  --  24  --  23   AST 51*  --   --   --  40  --  41  --  38    < > = values in this interval not displayed.       Recent Results (from the past 24 hour(s))   Echo Complete    Narrative    [id number]  [id number]  [id number]  [id number]^RAFFAELE           [facility],[place]  Echocardiography Laboratory  [address] [place] [address]     Name: EDILSON SMITH  MRN: [id number]  : 1945  Study Date: 2020 11:23 AM  Age: 75 yrs  Gender: Female  Patient Location: [facility]  Reason For Study: Cardiac Device, Unspecified  Ordering Physician: RAFFAELE HMALIN  Referring Physician: IVY CHRISTENSEN  Performed By: Bess Nichols RDCS     BSA: 1.9 m2  Height: 68 in  Weight: 177 lb  HR: 87  BP: 121/61 mmHg  _____________________________________________________________________________  __        Procedure  LVAD Echocardiogram with two-dimensional, color and spectral Doppler  performed.  _____________________________________________________________________________  __        Interpretation Summary  s/p HeartMate III LVAD. Speed is 5300 rpm. LVIDd is 4.4 cm. The septum appears  midline. The AV opens intermittently. There is mild continuous AI. Inflow and  outflow graft velocities are normal.  Severely (EF 10-20%) reduced left ventricular function is present.  Global right ventricular function is moderately reduced. The right ventricle  is normal size.  s/p TV repair with 30 mm Edward MC3 annuloplasty ring. At least moderate TR is  present. Etiology is unclear though may partially due to septal impingement  from the ICD lead. There is no prosthetic stenosis. Gradient is 2 mmHg at 83  bpm.  This study was compared with the study from 08/10/2020. The LVAD is new, as is  the RV  dysfunction.  _____________________________________________________________________________  __        Left Ventricle  Left ventricular wall thickness is normal. LVIDD 4.4. Severely (EF 10-20%)  reduced left ventricular function is present. Left ventricular diastolic  function is not assessable. Severe diffuse hypokinesis is present. s/p  HeartMate III LVAD. Speed is 5300 rpm. LVIDd is 4.4 cm. The septum appears  midline. The AV opens intermittently. There is mild continuous AI. Inflow and  outflow graft velocities are normal. LVAD cannula was seen in the expected  anatomic position in the LV apex.     Right Ventricle  The right ventricle is normal size. Global right ventricular function is  moderately reduced. A pacemaker lead is noted in the right ventricle.     Mitral Valve  The mitral valve is normal. Trace mitral insufficiency is present.     Aortic Valve  Trileaflet aortic sclerosis without stenosis. Mild aortic insufficiency is  present. The AV opens with roughly every other systole.        Tricuspid Valve  Moderate tricuspid insufficiency is present. Right ventricular systolic  pressure is 31mmHg above the right atrial pressure. s/p TV repair with 30 mm  Edward MC3 annuloplasty ring. At least moderate TR is present. Etiology is  unclear though may partially due to septal impingement from the ICD lead.  There is no prosthetic stenosis. Gradient is 2 mmHg at 83 bpm.     Pulmonic Valve  The valve leaflets are not well visualized. Mild pulmonic insufficiency is  present.     Vessels  The proximal ascending aorta is mildly dilated, measuring 4.0 cm. Unable to  assess mean RA pressure given the patient is on a ventilator.     Pericardium  No pericardial effusion is present.     Compared to Previous Study  This study was compared with the study from 08/10/2020 . The LVAD is new, as  is the RV dysfunction.        Attestation  I have personally viewed the imaging and agree with the interpretation and  report as  documented by the fellow, Christopher Tran, and/or edited by me.  _____________________________________________________________________________  __  MMode/2D Measurements & Calculations  IVSd: 0.96 cm     LVIDd: 4.4 cm  LVIDs: 3.4 cm  LVPWd: 1.0 cm  FS: 21.9 %  LV mass(C)d: 145.0 grams  LV mass(C)dI: 74.7 grams/m2  RWT: 0.47        Doppler Measurements & Calculations  TV V2 max: 90.7 cm/sec  TV max PG: 3.3 mmHg  TV mean P.7 mmHg  TV V2 VTI: 18.5 cm  TR max gary: 269.6 cm/sec  TR max P.1 mmHg     _____________________________________________________________________________  __           Report approved by: Imani Lora 2020 01:22 PM      XR Chest Port 1 View    Narrative    Exam:  Chest X-ray 2020 5:26 PM    History: CRRT line placement    Comparison: X-ray 2020    Findings: Frontal radiograph of the chest. Median sternotomy wires.  Unchanged bilateral chest tubes, mediastinal drains, LVAD, and left  chest wall implantable cardiac defibrillator. Unchanged right IJ  central venous catheter. Newly placed left IJ central venous catheter  with the tip projecting over the mid SVC. The trachea is midline.  Unchanged cardiomediastinal silhouette. Bilateral layering pleural  effusions and overlying opacities. No pneumothorax.      Impression    Impression:   1. New left IJ central venous catheter with the tip projecting over  the mid SVC. Otherwise unchanged postsurgical chest with stable  supportive devices.  2. Small bilateral layering pleural effusions and overlying opacities,  atelectasis or consolidation.    I have personally reviewed the examination and initial interpretation  and I agree with the findings.    TORSTEN COX, DO   XR Chest Port 1 View    Narrative    AP chest radiograph 2020.    HISTORY: Status post LVAD, intubated, interval change.    COMPARISON: 2020, 2020.    FINDINGS: AP chest radiograph. Stable LVAD, left chest wall potential  cardiac device, mediastinal drain, and  right apical chest tube. Stable  right jugular central venous catheter. Left jugular central venous  catheter is unchanged. CABG. Stable cardiac mediastinal silhouette.  Slightly increased right and stable left pleural effusions with  overlying basilar retrocardiac opacities. Mild coronary edema. No  appreciable pneumothorax. No acute osseous or upper abdominal  abnormality.      Impression    IMPRESSION:  1. Slightly increased right and stable left pleural effusion with  overlying basilar retrocardiac atelectasis versus consolidation.  2. Stable support devices as above.  3. Mild pulmonary edema.    I have personally reviewed the examination and initial interpretation  and I agree with the findings.    CRYSTAL BLANKENSHIP MD

## 2020-09-02 NOTE — PLAN OF CARE
"Major Shift Events: Blood sugar 56 at 2000. CVTS notified. D10 started at 55 mL/hr. At midnight blood sugar 69, D10 increased to 70 mL/hr per CVTS. Left femoral CRRT line removed by CVTS. Left chest tubes had out 330 mL in 2 hours, MD aware, no new orders at this time. Oxygen decreased to 3L nasal cannula. Able to squeeze hands weakly. Stated \"Where am I?\" Eyes open spontaneously. CVP=6. NSR. No LVAD alarms. Straight cath x1 for 150 mL.   Plan: NJ placement for tube feedings. Up to chair. Therapies. Will continue to monitor.   For vital signs and complete assessments, please see documentation flowsheets.    "

## 2020-09-02 NOTE — PROGRESS NOTES
CRRT STATUS NOTE    DATA:  Time:  5:59 AM  Pressures WNL:  YES  Filter Status:WDL  Problems Reported/Alarms Noted:    Flow alarms on effluent bag as well as PBP bag, RN stated that she had been turning pt and thought the bags were possibly moving, had excessive Gain/loss alarm and was restarted at 2217.       RN again had alarms for flow problems and called resource when gain loss was at 80.  Resource went to bedside and it was alarming 2/2 dialysate flow.  There was no obvious reason for alarm and writer went to get binders to try hanging bags, pt alarmed again and met gain/loss limit a second time and set was restarted second time.    Supplies Present: Yes  ASSESSMENT:  Patient Net Fluid Balance:Pt was -375ml at midnight for 9/1/2020 and is -369 since midnight for today.    Vital Signs:  /78   Pulse 86   Temp 97.2  F (36.2  C) (Axillary)   Resp 18   Wt 79.1 kg (174 lb 6.1 oz)   SpO2 98%   BMI 26.51 kg/m    Labs: Glucoses critically low multiple times this shift, see flowsheet.  K 3.7     Goals of therapy: Pull  negative as BP allows, goal being met.     INTERVENTIONS:   Set changed x 2, added bands to all bags except , D10 gtt increased to maintain normoglycemia.     PLAN:  Continue to monitor alarms closely and consider switching machines if alarms persist.  Call CRRT RN at [phone] with questions and concerns.

## 2020-09-02 NOTE — CONSULTS
[facility]SULT[facility]     Patient:  Marquise Jj   Date of birth 1945, Medical record number [id number]  Date of Visit:  09/02/2020  Date of Admission: 8/8/2020  Consult Requester:Catarino Farley MD          Assessment and Recommendations:   ASSESSMENT:  1. Fungemia  2. LVAD (HeartMate III, 8/19/20)  3. Cardiogenic shock, resolved  4. LORI on CKD stage III requiring CRRT  5. GI bleed while on heparin s/p EGD with clipping on 8/27, 8/30  6. CAD, ischemic cardiomyopathy  7. Seizures, encephalopathy, possible CVA        RECOMMENDATION:  1. Continue micafungin  2. Stop vancomycin  3. Stop zosyn  4. Repeat blood cultures x2 on 9/3  5. Daily blood cultures until negative 72 hours  6. Will need ophthalmology evaluation for ocular involvement, especially since she is unable to report symptoms  7. Remove all possible lines   - L internal jugular was placed for dialysis yesterday, possibly contaminated depending on whether culture from 9/2 grows yeast, okay to leave for now   - all other lines are considered contaminated as they were present when BCx was positive    DISCUSSION:   Marquise Jj is a 75 year old female with history of CAD s/p CABG (4/2020), ischemic cardiomyopathy with EF <20%, severe MR/TR with known mural thrombus, hypothyroidism, anxiety, and GERD who was transferred from [facility] on 8/8/20 for management of cardiogenic shock and admitted underwent redo sternotomy, LVAD placement (Heartmate III), and TV repair on 8/19/20 with Dr. Farley. Low-grade fever on 8/29, 8/30 prompted peripheral blood culture x1 and sputum culture with initiation of broad spectrum abx on 8/30. Blood culture positive for yeast on day 1.     Multiple risk factors for fungemia. Patient currently has multiple central lines in place (bilateral internal jugular 24 days and <1 day old and art line 13 days old) as well as previous IABP and Stapleton Zia catheter. Recent surgery with placement of LVAD and TV  repair. Briefly on TPN, stopped after team notified of positive yeast.    Micafungin was started on 9/1, would continue. Awaiting speciation of yeast. TTE was obtained on 9/1 as part of post-LVAD plan. New left internal jugular line was placed for dialysis okay to leave for now, however, may be contaminated as other lines remain in place and do not have sufficient cultures to show clearance. Also unable to remove LVAD and has TV repair ring. Would recommend removing all possible lines to decrease potential ongoing sources of infection.      Thank you for this consult. ID will continue to follow.     Patient was discussed with Dr. Hogue.     Iesha Estes PA-C  Infectious Disease  Pager # [phone]  ________________________________________________________________    Consult Question: yeast in blood.  Admission Diagnosis: Heart failure  Cardiogenic shock (H)  LVAD (left ventricular assist device) present (H)         History of Present Illness:     Marquise Jj is a 75 year old female with history of CAD s/p CABG (4/2020), ischemic cardiomyopathy with EF <20%, severe MR/TR with known mural thrombus, hypothyroidism, anxiety, and GERD who was transferred from [facility] on 8/8/20 for management of cardiogenic shock and admitted underwent redo sternotomy, LVAD placement (Heartmate III), and TV repair on 8/19/20 with Dr. Farley.     Timeline:  4/2020: Several days of nausea on a cruise, went to ED in [place], [place] and found to have severe multivessel disease, underwent emergent CABG (LIMA to diagonal, KURT to RCA, LAD dissection during case), ICD placed.   6/25-7/11: Admitted to [facility] for heart failure with cardiogenic shock. RCA stented on 7/2. Plan to undergo staged stenting to left circumflex.  8/7/20: admitted to [facility] for management of acute decompensated heart failure  8/8/20: progression to cardiogenic shock requiring dobutamine and levophed. Echo with large layered LV  thrombus and low EF and apical akinesis. Viability study with no LAD territory viability. Transferred to [facility]. Stanton tammi catheter placed. Converted to A.fib.  8/14/20: IABP placed, flash pulmonary edema  8/19/20: Redo sternotomy on cardiopulmonary bypass, insertion of LVAD (HeartMate III), tricuspid valve repair with Elkins MC3 annuloplasty ring, removal of intraventricular clot. Chest packed open.   8/20/20: 2 clinical focal seizures, neuro consulted and video EEG capturing up to 15 seizures per hour. (per later NeuroCrit notes possible R occipital cortical abnormality or lesion, unable to do MRI due to LVAD, repeat CT w/ possible L posterior frontal lobe cortical hypodensity possibly representing infarct)   8/21/20: Mediastinal washout and chest closure, outflow graft wrapping (Goretex graft). IABP removed.  8/25/20: blood in OG tube, GI consulted 8/26, heparin was held  8/26/20: Dialysis catheter placed and started CRRT due to persistent renal failure and azotemia  8/27/20: EGD with clots in stomach, 2 clips placed on possible bleeding lesions   8/30/20: hypothermia with CRRT warmer and Lexi hugger used, signs of shock and dropping hgb to 6.6 with 2 units of pRBCs transfused, norepinepherine gtt started. Heparin stopped. Repeat EGD with clots removed and clip applied. Concern for low-grade fever with temp 99-100F. Started on vancomycin and zosyn. Cultures obtained (blood x1 site, sputum). TPN started after NGT removed during EGD.  9/1/20: Blood cx positive for yeast on day 1, micafungin started and TPN stopped. Extubated. New left internal jugular line placed.      Antimicrobials:  Current  - Micafungin (start 9/1)  - Vancomycin (start 8/30; previous 8/19-8/21)  - Zosyn (start 8/30; previous 8/19-8/24)    Previous  - Rifampin (8/19-8/21)  - Fluconazole (8/19-8/21)  - ceftriaxone (8/14-8/18)         Review of Systems:   Unable to obtain due to encephalopathy         Past Medical History:     Past Medical History:    Diagnosis Date     CAD (coronary artery disease)      ICD (implantable cardioverter-defibrillator) in place     Primary prevention AICD placed in [place] in May 2020.     Ischemic cardiomyopathy     LVEF less than 20%.     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     Status post coronary artery bypass grafting     Done in [facility] in April 2020.  LIMA to diagonal (as LAD dissected) and KURT to the right coronary artery.            Past Surgical History:     Past Surgical History:   Procedure Laterality Date     ARTHROPLASTY REVISION HIP Right 11/16/2017    Procedure: ARTHROPLASTY REVISION HIP;  Right total hip arthroplasty revision.;  Surgeon: Jozef Garcia MD;  Location: [facility] OR     cabg  04/2020     CV HEART CATHETERIZATION WITH POSSIBLE INTERVENTION N/A 7/2/2020    Procedure: Heart Catheterization with Possible Intervention;  Surgeon: Kaden Franco MD;  Location:  HEART CARDIAC CATH LAB     CV INTRA-AORTIC BALLOON PUMP INSERTION N/A 8/14/2020    Procedure: Intra-Aortic Balloon Pump Insertion;  Surgeon: Cale Armijo MD;  Location: [facility] CARDIAC CATH LAB     CV PCI STENT DRUG ELUTING N/A 7/2/2020    Procedure: Percutaneous Coronary Intervention Stent Drug Eluting;  Surgeon: Kaden Franco MD;  Location:  HEART CARDIAC CATH LAB     CV RIGHT HEART CATH N/A 7/2/2020    Procedure: Right Heart Cath;  Surgeon: Kaden Franco MD;  Location:  HEART CARDIAC CATH LAB     CV RIGHT HEART CATH N/A 8/14/2020    Procedure: Right Heart Cath with leave in [place];  Surgeon: Jose Padilla MD;  Location: [facility] CARDIAC CATH LAB     INSERT VENTRICULAR ASSIST DEVICE LEFT (HEARTMATE II) N/A 8/19/2020    Procedure: Redo Sternotomy, On Cardiopulmonary Bypass, Insertion of Left Ventricular Assist Device (HeartMate III), Tricuspid Valve Repair with Elkins MC3 Annuloplasty Ring size T30, Left Ventricular  Thrombectomy.;  Surgeon: Catarino Farley MD;  Location: [facility] OR     IRRIGATION AND DEBRIDEMENT CHEST WASHOUT, COMBINED N/A 8/21/2020    Procedure: IRRIGATION AND DEBRIDEMENT, CHEST POSSIBLE CLOSURE;  Surgeon: Live Claudio MD;  Location: [facility] OR            Family History:   Reviewed and non-contributory.   Family History   Problem Relation Age of Onset     Coronary Artery Disease No family hx of      Heart Failure No family hx of             Social History:     Social History     Tobacco Use     Smoking status: Never Smoker     Smokeless tobacco: Never Used   Substance Use Topics     Alcohol use: Not Currently     History   Sexual Activity     Sexual activity: Not on file            Current Medications:       amiodarone  200 mg Oral or Feeding Tube Daily     aspirin  81 mg Oral or Feeding Tube Daily     B and C vitamin Complex with folic acid  5 mL Oral Daily     bimatoprost  1 drop Both Eyes At Bedtime     busPIRone  10 mg Oral or Feeding Tube TID     insulin aspart  1-6 Units Subcutaneous Q4H     lacosamide (VIMPAT) intermittent infusion  200 mg Intravenous BID     latanoprost  1 drop Both Eyes Daily     levETIRAcetam  2,000 mg Intravenous Q12H     levothyroxine  62.5 mcg Oral or Feeding Tube QAM AC     micafungin  100 mg Intravenous Q24H     pantoprazole (PROTONIX) IV  40 mg Intravenous BID     piperacillin-tazobactam  4.5 g Intravenous Q6H     rosuvastatin  20 mg Oral or Feeding Tube Daily     sodium chloride (PF)  3 mL Intracatheter Q8H     vancomycin (VANCOCIN) IV  1,750 mg (central catheter) Intravenous Q24H            Allergies:     Allergies   Allergen Reactions     Combigan [Brimonidine Tartrate-Timolol] Swelling     Swollen eyelids     Ativan [Lorazepam] Anxiety and Other (See Comments)     Increased confusion            Physical Exam:   Vitals were reviewed  Patient Vitals for the past 24 hrs:   Temp Temp src Pulse Resp SpO2 Weight   09/02/20 0800 97.6  F (36.4  C) Axillary 88 -- 100 % --   09/02/20 0438  97.2  F (36.2  C) Axillary 87 -- 98 % --   09/02/20 0400 96  F (35.6  C) Axillary 88 20 100 % --   09/02/20 0247 97.7  F (36.5  C) Axillary 89 14 100 % --   09/02/20 0000 97.9  F (36.6  C) Axillary 83 20 92 % --   09/01/20 2000 96.8  F (36  C) Axillary 82 20 99 % --   09/01/20 1600 97.2  F (36.2  C) Axillary 88 22 -- --   09/01/20 1200 98.4  F (36.9  C) Axillary 88 18 100 % --       Physical Examination:  GENERAL:  Awake, alert, eyes open. Responds to stimuli but does not answer questions.  HEENT:  Head is normocephalic, atraumatic   EYES:  Eyes have anicteric sclerae without conjunctival injection. Pupils equal and round.  ENT:  Oropharynx is moist.  NECK:  Supple. +left internal jugular line, +Right internal jugular line  LUNGS:  Clear to auscultation bilateral, no rales or ronchi appreciated.   CARDIOVASCULAR:  +LVAD hum, 2+ pitting edema bilaterally  ABDOMEN:  soft, non-tender, non-distended. +bowel sounds  SKIN:  No acute rashes or mottling.  Line(s) are in place without any surrounding erythema or exudate. No stigmata of endocarditis.  NEUROLOGIC:  Awake, responds to stimuli  LINES:    - PIV RUE upper forearm (8/8/20), PIV RUE lower forearm (8/29/20)   - CVC triple lumen R- internal jugular (8/8/20)   - Art line- right radial (8/19/20)   - CVC double lumen L - internal jugular (9/1/20)         Laboratory Data:     Inflammatory Markers  No lab results found.    Hematology Studies    Recent Labs   Lab Test 09/02/20  0350 09/01/20  1625 09/01/20  1148 09/01/20  0337 08/31/20  2103 08/31/20  1534 08/31/20  0955  08/29/20  2043  08/11/20  0411 08/10/20  1243 08/10/20  0403 08/09/20  1328 08/09/20  0401   WBC 11.9* 13.3* 15.7* 17.0*  --  14.1* 15.0*   < > 11.3*   < > 7.0 7.5 6.6 8.0 8.1   ANEU  --   --   --   --   --   --   --   --  8.8*  --  3.9 5.3 4.4 6.3 5.6   AEOS  --   --   --   --   --   --   --   --  0.5  --  0.4 0.3 0.2 0.0 0.2   HGB 8.0* 7.8* 8.4* 8.4* 7.9* 7.5* 7.6*   < > 6.6*  6.6*   < > 12.0 11.7 11.3*  11.2* 10.9*   MCV 91 91 90 91  --  90 89   < > 91   < > 86 87 86 87 88    214 230 246  --  211 215   < > 191   < > 272 265 255 267 263    < > = values in this interval not displayed.       Metabolic Studies     Recent Labs   Lab Test 09/02/20  0350 09/01/20  2138 09/01/20  1625 09/01/20  1148 09/01/20  0337    139 140 139 138   POTASSIUM 3.7 3.7 3.7 3.8 3.6   CHLORIDE 109 109 110* 109 108   CO2 20 21 22 22 22   BUN 24 26 29 27 28   CR 0.84 0.84 0.82 0.74 0.77   GFRESTIMATED 68 68 70 80 76       Hepatic Studies    Recent Labs   Lab Test 09/02/20  0350 09/01/20  0337 08/31/20  0339 08/30/20  0341 08/29/20  0401 08/28/20  0259   BILITOTAL 1.5* 1.1 0.9 0.7 0.9 0.9   ALKPHOS 255* 199* 176* 164* 182* 136   ALBUMIN 1.2* 1.3* 1.2* 1.2* 1.5* 1.4*   AST 51* 40 41 38 51* 43   ALT 30 24 24 23 26 19       Microbiology:  Culture Micro   Date Value Ref Range Status   09/01/2020 No growth after 11 hours  Preliminary   09/01/2020 No growth after 11 hours  Preliminary   08/30/2020 No growth  Final   08/30/2020 Cultured on the 1st day of incubation:  Yeast   (A)  Preliminary   08/30/2020   Preliminary    Critical Value/Significant Value, preliminary result only, called to and read back by  Waylon Marshall, RN @ 0147 9/1/20 TM.     08/18/2020   Final    <10,000 colonies/mL  mixed urogenital valeriano  Susceptibility testing not routinely done     08/12/2020 >100,000 colonies/mL  Klebsiella pneumoniae   (A)  Final   08/10/2020 No growth  Final   06/25/2020 No growth  Final   06/25/2020 No growth  Final   06/25/2020 No growth  Final       Urine Studies    Recent Labs   Lab Test 08/31/20  0515 08/18/20  1910 08/12/20  1431 08/10/20  1243 06/25/20 1959   LEUKEST Negative Moderate* Large* Large* Moderate*   WBCU 4 7* 1494 11* 143*       Vancomycin Levels  No lab results found.    Invalid input(s): VANCO    Hepatitis B Testing No lab results found.  Hepatitis C Testing   No results found for: HCVAB, HQTG, HCGENO, HCPCR, HQTRNA,  HEPRNA  Respiratory Virus Testing    No results found for: RS, FLUAG          Imaging:   CXR 9/2/2020  IMPRESSION:  1. Slightly increased right and stable left pleural effusion with  overlying basilar retrocardiac atelectasis versus consolidation.  2. Stable support devices as above.  3. Mild pulmonary edema.    ECHO (9/1/20)  Interpretation Summary  s/p HeartMate III LVAD. Speed is 5300 rpm. LVIDd is 4.4 cm. The septum appears  midline. The AV opens intermittently. There is mild continuous AI. Inflow and  outflow graft velocities are normal.  Severely (EF 10-20%) reduced left ventricular function is present.  Global right ventricular function is moderately reduced. The right ventricle  is normal size.  s/p TV repair with 30 mm Edward MC3 annuloplasty ring. At least moderate TR is  present. Etiology is unclear though may partially due to septal impingement  from the ICD lead. There is no prosthetic stenosis. Gradient is 2 mmHg at 83  bpm.  This study was compared with the study from 08/10/2020. The LVAD is new, as is  the RV dysfunction.

## 2020-09-02 NOTE — CONSULTS
OPHTHALMOLOGY CONSULT NOTE  09/02/20    Patient: Marquise Jj      HISTORY OF PRESENTING ILLNESS:     Marquise Jj is a 75 year old female who is being followed for fungemia. This is an ophthalmology consult to rule out candida involvement in the eyes. Medical history is significant for CAD s/p CABG (04/2020), ischemic cardiomyopathy, LORI, acute respiratory failure, anoxic brain injury, possible stroke, and hypothyroidism. Patient was transferred from [facility] for management of cardiogenic shock.       10+ review of systems were otherwise negative except for that which has been stated above.      OCULAR/MEDICAL/SURGICAL HISTORIES:     Past Ocular History:  Unable due to encephalopathy    Social history: unable    Family history: unable    Past Medical History:   Diagnosis Date     CAD (coronary artery disease)      ICD (implantable cardioverter-defibrillator) in place     Primary prevention AICD placed in [place] in May 2020.     Ischemic cardiomyopathy     LVEF less than 20%.     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     Status post coronary artery bypass grafting     Done in [facility] in April 2020.  LIMA to diagonal (as LAD dissected) and UKRT to the right coronary artery.       Past Surgical History:   Procedure Laterality Date     ARTHROPLASTY REVISION HIP Right 11/16/2017    Procedure: ARTHROPLASTY REVISION HIP;  Right total hip arthroplasty revision.;  Surgeon: Jozef Garcia MD;  Location: [facility] OR     cabg  04/2020     CV HEART CATHETERIZATION WITH POSSIBLE INTERVENTION N/A 7/2/2020    Procedure: Heart Catheterization with Possible Intervention;  Surgeon: Kaden Franco MD;  Location: [facility] CARDIAC CATH LAB     CV INTRA-AORTIC BALLOON PUMP INSERTION N/A 8/14/2020    Procedure: Intra-Aortic Balloon Pump Insertion;  Surgeon: Cale Armijo MD;  Location: [facility] CARDIAC CATH LAB      CV PCI STENT DRUG ELUTING N/A 7/2/2020    Procedure: Percutaneous Coronary Intervention Stent Drug Eluting;  Surgeon: Kaden Franco MD;  Location:  HEART CARDIAC CATH LAB     CV RIGHT HEART CATH N/A 7/2/2020    Procedure: Right Heart Cath;  Surgeon: Kaden Franco MD;  Location:  HEART CARDIAC CATH LAB     CV RIGHT HEART CATH N/A 8/14/2020    Procedure: Right Heart Cath with leave in [place];  Surgeon: Jose Padilla MD;  Location:  HEART CARDIAC CATH LAB     INSERT VENTRICULAR ASSIST DEVICE LEFT (HEARTMATE II) N/A 8/19/2020    Procedure: Redo Sternotomy, On Cardiopulmonary Bypass, Insertion of Left Ventricular Assist Device (HeartMate III), Tricuspid Valve Repair with Elkins MC3 Annuloplasty Ring size T30, Left Ventricular Thrombectomy.;  Surgeon: Catarino Farley MD;  Location:  OR     IRRIGATION AND DEBRIDEMENT CHEST WASHOUT, COMBINED N/A 8/21/2020    Procedure: IRRIGATION AND DEBRIDEMENT, CHEST POSSIBLE CLOSURE;  Surgeon: Live Claudio MD;  Location[facility]       EXAMINATION:     Base Eye Exam     Visual Acuity    Unable due to encephalopathy           Tonometry (Applanation, 4:16 PM)       Right Left    Pressure 20 20          Pupils       Pupils APD    Right PERRL     Left PERRL no apd          Visual Fields    Unable           Extraocular Movement    Unable           Neuro/Psych     Mood/Affect:  unable due encephalopathy          Dilation     Both eyes:  1.0% Mydriacyl, 2.5% Dandy Synephrine @ 4:16PM            Slit Lamp and Fundus Exam     External Exam       Right Left    External Normal Normal          Portable Slit Lamp Exam       Right Left    Lids/Lashes Normal Normal    Conjunctiva/Sclera White and quiet White and quiet    Cornea S/P RK S/P RK    Anterior Chamber Deep and quiet Deep and quiet    Iris Round and reactive Round and reactive    Lens Posterior chamber intraocular lens Posterior chamber intraocular lens          Fundus Exam       Right Left    Vitreous Normal, media clear  Normal, media clear    Disc Cupping Cupping    C/D Ratio Vertical 0.8 0.8    C/D Ratio Horizontal 0.8 0.8    Macula Normal Normal    Vessels Normal Normal    Periphery Normal Normal                     ASSESSMENT/PLAN:       #Fungemia  Marquise Jj is a 75 year old female who presents as a transfer from [facility] for cardiogenic shock. Patient also suffers from an anoxic brain injury and hypoxic respiratory failure. Infectious disease follows the patient for fungemia. There is no evidence of fungemia in the eyes.   - Management per infectious disease.  - Recommends repeat dilation in 2 weeks. Please re-consult ophthalmology if patient remains inpatient.     #Dry Eye Syndrome both eyes.   - Start artificial tear ointment 5 times daily both eyes.   - Clean eyelids with saline and washcloth daily.     #Radial Keratotomy both eyes.   - Monitor     #Pseudophakia both eyes   - Monitor.      #Glaucoma, both eyes.   - Sees her own eye doctor.  - Continue latanoprost at bedtime both eyes.       It is our pleasure to participate in this patient's care and treatment. Please contact us with any further questions or concerns.      William Pérez, SEE  Adjunct   Ophthalmology   Pager: [phone]

## 2020-09-02 NOTE — PROGRESS NOTES
Nephrology Progress Note  09/02/2020         Mrs Jj is a 75 yof w/ICM, s/p CABG (4/20), severe MR/TR and known mural thrombus, acute kidney injry, hypothyroidism, CKD3, anxiety, GERD, chronic anemia.  Transferred from [facility] to [facility] on 8/8 after developing cardiac shock. Placed on IABP and then LVAD placement and TVR repair for cardiogenic shock on 8/19 /20.  Nephrology consulted for managign Oliguric LORI on CKD stage 3, started CRRT on 8/26.     Interval History :   Mrs Jj continues on CRRT, net negative 0.4L yesterday, cultures + for fungemia and line was changed from LFV to LIJ, ID consulted.  Planning for 50cc/hr net negative, has been extubated but still lethargic.       Assessment & Recommendations:   LORI on CKD-Baseline Cr ~1.5, small L kidney at 8.3cm, 10cm R kidney at baseline.  Started CRRT on 8/26 for management of volume and electrolytes in setting of cardiogenic shock, LVAD placed on 8/19.  Etiology of LORI is likely ATN from hypoperfusion, started CRRT on 8/26.               -Line is LIJ temp line from 9/1               -Continuing CRRT, 4k baths, 25ml/kg/hr standard dosing, goal 50cc/hr net negative.  Once closer to euvolemic will plan to consider iHD                -Some UOP but not nearly enough to keep up with intake, monitoring.      Volume status-Net negative 1.4L yesterday, similar goal for 1-2L net negative today, still very volume overloaded, up ~18kg from baseline wt.       Electrolytes/pH-K 3.7, bicarb 23, on 4k baths.      Ca/phos/pth-Ca 7.5, Mg and Phos WNL this am.     ID-Fungal cultures +, ID consulted, new HD line yesterday, stopped TPN.       Anemia-Hgb 8.0, acute management per team.      Nutrition-Had been on TPN, off now with fungemia.      Seen and discussed with Dr Chavez     Recommendations were communicated to primary team via verbal communication    STEPHAN Faulkner CNS  Clinical Nurse Specialist  [phone]    Review of Systems:   I reviewed the following  systems:  ROS not done due to lethargy.     Physical Exam:   I/O last 3 completed shifts:  In: 3037.85 [I.V.:2630.17; Other:2]  Out: 3342 [Urine:150; Other:2512; Stool:110; Chest Tube:570]   /78   Pulse 92   Temp 97.6  F (36.4  C) (Axillary)   Resp 20   Wt 79.1 kg (174 lb 6.1 oz)   SpO2 97%   BMI 26.51 kg/m       GENERAL APPEARANCE: Extubated, lethargic but responds to stimuli.   EYES:  No scleral icterus, pupils equal  HENT: mouth without ulcers or lesions  PULM: lungs clear to auscultation, equal air movement, no cyanosis or clubbing  CV: regular rhythm, normal rate, no rub     -JVP not elevated.      -edema +3 generalized.   GI: soft, nontender, +distended, bowel sounds are +  MS: no evidence of inflammation in joints, no muscle tenderness  NEURO: Lethargic, no focal deficits.   Lines LIJ temp line from 9/1    Labs:   All labs reviewed by me  Electrolytes/Renal -   Recent Labs   Lab Test 09/02/20  0959 09/02/20 0350 09/01/20  2138 09/01/20  1625 09/01/20  0337    139 139 140   < > 138   POTASSIUM 3.7 3.7 3.7 3.7   < > 3.6   CHLORIDE 109 109 109 110*   < > 108   CO2 23 20 21 22   < > 22   BUN 20 24 26 29   < > 28   CR 0.82 0.84 0.84 0.82   < > 0.77   * 97 86 72   < > 113*   FERNANDO 7.5* 7.4* 7.7* 8.0*   < > 7.7*   MAG  --  2.1  --  2.2  --  2.2   PHOS  --  3.9  --  3.8  --  3.4    < > = values in this interval not displayed.       CBC -   Recent Labs   Lab Test 09/02/20 0350 09/01/20  1625 09/01/20  1148   WBC 11.9* 13.3* 15.7*   HGB 8.0* 7.8* 8.4*    214 230       LFTs -   Recent Labs   Lab Test 09/02/20 0350 09/01/20  0337 08/31/20  0339   ALKPHOS 255* 199* 176*   BILITOTAL 1.5* 1.1 0.9   ALT 30 24 24   AST 51* 40 41   PROTTOTAL 4.8* 5.0* 4.2*   ALBUMIN 1.2* 1.3* 1.2*       Iron Panel -   Recent Labs   Lab Test 08/10/20  1052 07/01/20  0530   IRON 48 65   IRONSAT 15 17   HUSEYIN 165  --            Current Medications:    amiodarone  200 mg Oral or Feeding Tube Daily     aspirin  81 mg  Oral or Feeding Tube Daily     B and C vitamin Complex with folic acid  5 mL Oral Daily     bimatoprost  1 drop Both Eyes At Bedtime     busPIRone  10 mg Oral or Feeding Tube TID     insulin aspart  1-6 Units Subcutaneous Q4H     lacosamide (VIMPAT) intermittent infusion  200 mg Intravenous BID     latanoprost  1 drop Both Eyes Daily     levETIRAcetam  2,000 mg Intravenous Q12H     levothyroxine  62.5 mcg Oral or Feeding Tube QAM AC     micafungin  100 mg Intravenous Q24H     pantoprazole (PROTONIX) IV  40 mg Intravenous BID     piperacillin-tazobactam  4.5 g Intravenous Q6H     rosuvastatin  20 mg Oral or Feeding Tube Daily     sodium chloride (PF)  3 mL Intracatheter Q8H     vancomycin (VANCOCIN) IV  1,750 mg (central catheter) Intravenous Q24H       BETA BLOCKER NOT PRESCRIBED       dextrose 70 mL/hr at 09/02/20 1200     dextrose Stopped (08/30/20 2221)     CRRT replacement solution 12.5 mL/kg/hr (09/02/20 0811)     - MEDICATION INSTRUCTIONS -       norepinephrine Stopped (08/31/20 0600)     CRRT replacement solution 2.911 mL/kg/hr (09/02/20 0206)     CRRT replacement solution 12.5 mL/kg/hr (09/02/20 0811)     BETA BLOCKER NOT PRESCRIBED       Warfarin Therapy Reminder

## 2020-09-03 NOTE — PROGRESS NOTES
[facility] ID Service: Follow Up Note      Patient:  Marquise Jj   Date of birth 1945, Medical record number [id number]  Date of Visit:  09/03/2020  Date of Admission: 8/8/2020         Assessment and Recommendations:   ID Problem List:  1. Fungemia  2. LVAD (HeartMate III, 8/19/20)  3. Cardiogenic shock, resolved  4. LORI on CKD stage III requiring CRRT  5. GI bleed while on heparin s/p EGD with clipping on 8/27, 8/30  6. CAD, ischemic cardiomyopathy  7. Seizures, encephalopathy, possible CVA      Recommendations:  1. Continue micafungin  2. Daily blood cultures until negative 72 hours  3. Remove/exchange remaining lines as able  4. Agree with checking C.diff  5. Agree with add on culture to pleural fluid from 9/2    Discussion:  Marquise Jj is a 75 year old female with history of CAD s/p CABG (4/2020), ischemic cardiomyopathy with EF <20%, severe MR/TR with known mural thrombus, hypothyroidism, anxiety, and GERD who was transferred from [facility] on 8/8/20 for management of cardiogenic shock and admitted underwent redo sternotomy, LVAD placement (Heartmate III), and TV repair on 8/19/20 with Dr. Farley. Low-grade fever on 8/29, 8/30 prompted peripheral blood culture x1 and sputum culture with initiation of broad spectrum abx on 8/30. Blood culture positive for yeast on day 1.      Multiple risk factors for fungemia. Patient had multiple central lines in place (bilateral internal jugular 24 days and <1 day old and art line 13 days old) as well as previous IABP and [place] catheter. Recent surgery with placement of LVAD and TV repair. Briefly on TPN, stopped after team notified of positive yeast. Right internal jugular removed on 9/2.      Micafungin was started on 9/1, would continue. TTE was obtained on 9/1 as part of post-LVAD plan. New left internal jugular line was placed for dialysis okay to leave for now, however if ongoing positive cultures would recommend removing. Remove remaining lines  as able. Pleural fluid with 551 WBC, would add cx to fluid from chest. CXR this AM with possible hemothorax.      Recs were discussed with primary team today. Don't hesitate to call with questions.     Attestation:  I have reviewed today's vital signs, medications, labs and imaging.  Iesha Estes PA-C, Pager # [phone]            Interval History:     More awake this morning, answered questions for RN earlier. Multiple liquid stools this AM. Had rectal tube previously that was removed due to low output. Low Hgb 6.8 and transfused 1 unit(s) pRBCs. S/p right sided chest tube on afternoon of 9/2. CXR on AM 9/3 with increase in size of right pleural effusion, concern for hemothorax.         Review of Systems:   Very limited due to altered mental status.          Current Antimicrobials   Current:  - Micafungin (start 9/1)    Prior:  - Vancomycin (8/30-9/2, 8/19-8/21)  - Zosyn (8/30-9/2, 8/19-8/24)  - Rifampin (8/19-8/21)  - Fluconazole (8/19-8/21)  - ceftriaxone (8/14-8/18)       Physical Exam:   Ranges for vital signs:  Temp:  [95.1  F (35.1  C)-98.8  F (37.1  C)] 98.8  F (37.1  C)  Pulse:  [] 90  Resp:  [16-24] 22  BP: (63-86)/(50-75) 77/50  MAP:  [52 mmHg-113 mmHg] 113 mmHg  Arterial Line BP: ()/() 112/110  SpO2:  [63 %-100 %] 100 %    Intake/Output Summary (Last 24 hours) at 9/3/2020 1107  Last data filed at 9/3/2020 1100  Gross per 24 hour   Intake 3944.5 ml   Output 2965 ml   Net 979.5 ml     Exam:  GENERAL:  Awake, makes eye contact and tracks people in room, does not verbally answer questions at this time.   ENT:  Head is normocephalic, atraumatic. Oropharynx is moist without exudates or ulcers.  EYES:  Eyes have anicteric sclerae.    NECK:  Supple. Old RIJ site with dressing in place, CRRT running via LIJ  LUNGS:  Clear to auscultation, no rales or ronchi.  CARDIOVASCULAR:  +LVAD hum  ABDOMEN:  Soft, non-tender, non-distended, + bowel sounds  EXT: Extremities warm, no mottling. 2+ pitting  edema  SKIN:  No acute rashes.   LINES:               - PIV RUE lower forearm (8/29/20)              - Art line- right radial (8/19/20)              - CVC double lumen L - internal jugular (9/1/20)         Laboratory Data:   Reviewed.  Pertinent for:    Culture data:  9/3/20 blood culture x2: NGTD  9/1/20 blood culture x2: NGTD  8/30/20 sputum culture: NG (final)  8/30/20 blood culture: Candida glabrata complex, cultured on 1st day of incubation  8/18/20 urine culture: <10K cfu mixed urogenital valeriano  8/12/20 urine culture: >100K cfu Klebsiella pneumoniae  8/10/20 urine culture: no growth    Inflammatory Markers  No lab results found.    Hematology Studies    Recent Labs   Lab Test 09/03/20  0713 09/03/20  0339 09/02/20 2035 09/02/20  0350 09/01/20  1625   WBC  --  10.1 9.9 11.9* 13.3*   HGB 8.3* 6.8* 6.9* 8.0* 7.8*   MCV  --  91 91 91 91   PLT  --  171 201 241 214     Recent Labs   Lab Test 08/29/20  2043 08/11/20  0411 08/10/20  1243 08/10/20  0403   ANEU 8.8* 3.9 5.3 4.4   AEOS 0.5 0.4 0.3 0.2       Metabolic Studies     Recent Labs   Lab Test 09/03/20  0339 09/02/20  1951 09/02/20  1542 09/02/20  0959    139 137 138   POTASSIUM 3.8 3.8 3.4 3.7   CHLORIDE 109 110* 107 109   CO2 22 22 21 23   BUN 21 17 17 20   CR 0.76 0.74 0.73 0.82   GFRESTIMATED 77 79 81 70       Hepatic Studies    Recent Labs   Lab Test 09/03/20  0339 09/02/20  0350 09/01/20  0337   BILITOTAL 1.7* 1.5* 1.1   ALKPHOS 258* 255* 199*   ALBUMIN 1.4* 1.2* 1.3*   AST 47* 51* 40   ALT 33 30 24            Imaging:   CXR (9/3/2020)  1. New loculated right pleural effusion with associated compressive atelectasis. Significant short interval increase, question hemothorax.  (2 chest tubes in place).  2. Small left pleural effusion. Bibasilar and retrocardiac atelectasis  versus developing consolidation.  3. Mild pulmonary edema.    CXR 9/2/2020  IMPRESSION:  1. Slightly increased right and stable left pleural effusion with  overlying basilar  retrocardiac atelectasis versus consolidation.  2. Stable support devices as above.  3. Mild pulmonary edema.     ECHO (9/1/20)  Interpretation Summary  s/p HeartMate III LVAD. Speed is 5300 rpm. LVIDd is 4.4 cm. The septum appears  midline. The AV opens intermittently. There is mild continuous AI. Inflow and  outflow graft velocities are normal.  Severely (EF 10-20%) reduced left ventricular function is present.  Global right ventricular function is moderately reduced. The right ventricle  is normal size.  s/p TV repair with 30 mm Edward MC3 annuloplasty ring. At least moderate TR is  present. Etiology is unclear though may partially due to septal impingement  from the ICD lead. There is no prosthetic stenosis. Gradient is 2 mmHg at 83  bpm.  This study was compared with the study from 08/10/2020. The LVAD is new, as is  the RV dysfunction.

## 2020-09-03 NOTE — PLAN OF CARE
Major Shift Events: No acute events. LVAD numbers stable, flows 3-4, PIs 2-3, speed 5300, power 3.7. CRRT stable, able to meet goal of 0-50 net negative / hr. Waxing / waning neuro status, slow to respond but can wiggle toes to command and squeeze in left hand. On 2L nasal cannula, LS clear. TF at goal of 55 via [place]. Rectal tube with watery brown stool. Straight cathed for 150mL yellow urine at 1700.     Plan: Continue to monitor, notify MD of any changes. Up to chair for activity.     For vital signs and complete assessments, please see documentation flowsheets.     Pramod Connor RN

## 2020-09-03 NOTE — PROGRESS NOTES
Cardiology Progress Note    Assessment & Plan   In summary, Marquise Jj is a 75-year-old female with a past medical history notable for coronary artery disease, ischemic cardiomyopathy, and known mural thrombus who was transferred from [facility] for further evaluation/treatment of cardiogenic shock. Balloon pump placed 8/14. Had HM3 8/19. Chest closed 8/21.    Today's changes:  - continue antifungal therapy for fungemia  - ophto consulted  - LVAD without alarms, no changes needed  - discontinue arterial line      Neuro:   # Sedation --> extubated, alert  # concern for seizure activity  CTH head 8/20 no signs of ICH   CTH head 8/27 Scattered areas of hypodensity with loss of gray-white matter differentiation in the left frontal lobe. These areas were not present on the CT dated 8/10/2020 and difficult compared to other prior comparison CTs due to extensive hardware artifact. These are suspicious for involving embolic infarcts.  keppra 2 g PO bid   vimpat 200 BID  - f/u neurocritical care recs      Cardio:  # Cardiogenic shock with vasoplegic component    # ICM: NYHA IV / ACC  # Severe MR/TR s/p tricuspid valve repair with Elkins MC3 Annuloplasty Ring size T30  # s/p LVAD HM3 on 8/19/2020  LAVD speed 5300, flow 3.4-3.7, pi 2.5-3.7, power 3.5-3.6   Presents with cardiogenic shock. On NE, cardiac index approximately 1.37 on admission. Severely elevated biventricular filling pressures. Etiology of her decompensation appears to be progression of her cardiomyopathy. Despite medical treatment, she has been hospitalized twice since returning to [place], both with low output. No viability in her LAD territory, and doubt that PCI to her circumflex would make meaningful LV recovery. Hemodynamics improved with dobutamine, did not tolerate attempt to wean inotropics. RHC removed on 8/13 after clotting off, replaced on 8/14 after patient with increased work of breathing. CI of 1.1, balloon pump placed with CI  of 1.6-1.8 and improvement in symptoms/hemodynamics. Had LVAD HM3 placed on 8/19. CVP today around 10 range. Chest closed on 8/21.   - Monitoring CVP for RV/fluid overload (CVP goal < 14)  - continue to wean pressors as above  - Hemodynamics q6hrs, monitor lactate. Goal CI>2., SvO2 >55%  - holding hep gtt as above      Date 8/9 8/9 8/10 8/11 08/13/20 08/15/20 08/16/20 08/17/20 8/18/20 8/19 8/20 8/21*   CVP 12 9 3 4 8 9 6 8 11 5 10 13   Mean PA  35 30 21 25 23 21 20 35/18 35/18 - 30/12 28/14   PCWP  18 14 12 x 13 14  13 - -    Oxy HGB  59 64 61 61 60 47 62 69 54 - 68 66   CI/CO 2.3 2.3 2.4/4.2 2.3 2.2 1.8 2.8/4.9 4.1 4.3/2.5 - 6/3.3 6.9/3.8   SVR 1100 1100 1200 1400 1316 1900 5590 549 0640 - 1025 715   Device     IABP 1:1 IABP 1:1 IABP 1:1 IABP 1:1 IABP 1:1 No IABP No IABP No IABP   * epi 0.1, vaso 0.5     8/22: CVP 12, PA 28/14/19, PCWP 14, CO/CI 9.0/4.8. . Epi 0.1, vaso 1.  8/23: CVP 16, PA 30/14, CI/CO 4.4/8.0, , SvO2 71%  8/24: CVP 14, PA 44/20/28, PCWP 16, CI/CO 7.3/3.9, SvO2 68% epi 0.06, vaso 2  8/25: CVP 16, PA 44/20/29, PCWP 22, CI/CO  3.6, SvO2 82%, vaso 2, epi 0.03  8/26: CVP 13, PA 40/20/26, PCWP 12, CI/CO 7/ 3.6, SvO2 74%, vaso 1  8/27: CVP 13, PA 38/18/26, PCWP 12-14, CI/CO 9.4/4.7, , PVR ~0.5, SvO2 73%, vaso 1, epi 0.05     # CAD s/p CABG 4/2020 (KURT to RCA and LIMA to LAD) and PCI to RCA 7/20  - Stop home plavix po qd (8/13) for LVAD surgery  - aspirin 81mg PO qd   - c/w home crestor    # Mural thrombus  - removed during surgery on 8/19    # A-flutter  Converted to NSR on 8/15    - Continue amiodarone 200mg po qd      Pulm:  # Acute hypoxic respiratory failure  Extubated on 9/1/2020     Renal/Electrolytes   # Hyperkalemia/Hypokalemia   # Acute kidney injury on chronic kidney disease, stage III  Likely ATN due to hypoxic insult  - renal consult and diuretic challenge. May need dialysis  - Trend potassium and creatinine q12hrs  - Electrolyte replacement protocol  - Monitor UOP   -  continue CVVH      GI:  #GIB  GI consulted and EGD on 8/28 with protuberant vessel injected and clipped and bleeding gastric ulcer with adherent clot, injected and clipped as well  Repeat bleed on 8/29-8/30, transfused and given pRBC, underwent repeat EGD, showed hematin throughout stomache with single bleeding angioectasia vs gastric erosion (from NGT) in stomach. Likely source of bleed and thought related to current bleeding episode. Hypothermia thought to be related to bleed.    # Constipation  # Severe protein calorie malnutrition  - Nutrition through tube feeds at 30 mL/hr    - Senna-docusate bid scheduled  - Miralax bid prn constipation  - f/u GI recs    ID:   # UTI vs ASB (resolved)  Periprocedural abx as per surgical team   - has been treated with levoloxacin, zosyn, rifampin, vancomycin, fluconazole, ceftriaxone during hospitalization  - broadened on 8/31 with vanco 2/2 concern for potential sepsis --> now discontinued  - micrafungin added 9/1 for blood culture positive for yeast. ID consulted, appreciate recs    Hem/Onc  # Mural thrombus removed on 8/19  # Anemia, mild  D/t frequent blood draws and procedures, and two large GI bleesd  - Monitor hgb  - PPI gtt --> IV BID    Endo  # Hypothyroidism   - Continue PTA levothyroxine     Nutrition: holding TF  DVT Prophylaxis: mechanical   Code Status: Full Code    Waylon Garcia MD, MSc  Cardiovascular Disease Fellow  [facility]    Discussed with Dr Christian      Interval History   H/H drop, given blood overnight. Otherwise no events.       Physical Exam   Temp: 98.9  F (37.2  C) Temp src: Axillary BP: 90/64 Pulse: 94   Resp: 23 SpO2: 98 % O2 Device: Nasal cannula Oxygen Delivery: 3 LPM  Vitals:    09/01/20 0200 09/02/20 0116 09/03/20 0400   Weight: 80.6 kg (177 lb 11.1 oz) 79.1 kg (174 lb 6.1 oz) 79.5 kg (175 lb 4.3 oz)     Vital Signs with Ranges  Temp:  [95.1  F (35.1  C)-98.9  F (37.2  C)] 98.9  F (37.2  C)  Pulse:  [] 94  Resp:  [16-24]  23  BP: (63-91)/(50-75) 90/64  MAP:  [52 mmHg-113 mmHg] 113 mmHg  Arterial Line BP: ()/() 112/110  SpO2:  [63 %-100 %] 98 %  I/O last 3 completed shifts:  In: 3800.5 [I.V.:1584.5; Other:1]  Out: 2975 [Other:1445; Stool:250; Chest Tube:1280]    RETIRE: Heart Rate: 71, Blood pressure 90/64, pulse 94, temperature 98.9  F (37.2  C), temperature source Axillary, resp. rate 23, weight 79.5 kg (175 lb 4.3 oz), SpO2 98 %.  175 lbs 4.25 oz     GEN: alert, non to minimally verbal  CV: RRR, Hum of HM3   LUNGS: Clear to auscultation bilaterally  ABD: Active bowel sounds, soft, no abdominal tenderness.   EXT: Trace LE edema   NEURO: altert, tracks people/objects across room; responds to her name, husbands name, daughter's name. moves b/l LE and LUE to command    Medications     BETA BLOCKER NOT PRESCRIBED       dextrose Stopped (09/02/20 1957)     dextrose Stopped (08/30/20 2221)     CRRT replacement solution 12.5 mL/kg/hr (09/03/20 1430)     - MEDICATION INSTRUCTIONS -       CRRT replacement solution 2.911 mL/kg/hr (09/03/20 0148)     CRRT replacement solution 12.5 mL/kg/hr (09/03/20 1430)     BETA BLOCKER NOT PRESCRIBED       Warfarin Therapy Reminder         amiodarone  200 mg Oral or Feeding Tube Daily     artificial tears   Both Eyes 5x Daily     aspirin  81 mg Oral or Feeding Tube Daily     B and C vitamin Complex with folic acid  5 mL Oral Daily     bimatoprost  1 drop Both Eyes At Bedtime     busPIRone  10 mg Oral or Feeding Tube TID     calcium gluconate  4 g Intravenous Once     insulin aspart  1-6 Units Subcutaneous Q4H     lacosamide (VIMPAT) intermittent infusion  200 mg Intravenous BID     latanoprost  1 drop Both Eyes Daily     levETIRAcetam  2,000 mg Intravenous Q12H     levothyroxine  62.5 mcg Oral or Feeding Tube QAM AC     micafungin  100 mg Intravenous Q24H     pantoprazole (PROTONIX) IV  40 mg Intravenous BID     rosuvastatin  20 mg Oral or Feeding Tube Daily     sodium chloride (PF)  3 mL  Intracatheter Q8H     warfarin ANTICOAGULANT  1 mg Oral or Feeding Tube ONCE at 18:00       Data        Intake/Output Summary (Last 24 hours) at 9/2/2020 1754  Last data filed at 9/2/2020 1748  Gross per 24 hour   Intake 3812.85 ml   Output 4578 ml   Net -765.15 ml           Recent Labs   Lab 09/03/20  1041 09/03/20  0713 09/03/20  0339 09/02/20 2035 09/02/20  1951 09/02/20  0350  09/01/20  1625  08/31/20  0339   WBC  --   --  10.1 9.9  --   --  11.9*  --  13.3*   < > 14.7*   HGB  --  8.3* 6.8* 6.9*  --   --  8.0*  --  7.8*   < > 7.8*   MCV  --   --  91 91  --   --  91  --  91   < > 89   PLT  --   --  171 201  --   --  241  --  214   < > 225   INR  --   --  1.27*  --   --   --   --   --  1.26*  --  1.29*     --  138  --  139   < > 139   < > 140   < > 139   POTASSIUM 3.7  --  3.8  --  3.8   < > 3.7   < > 3.7   < > 3.7   CHLORIDE 112*  --  109  --  110*   < > 109   < > 110*   < > 110*   CO2 21  --  22  --  22   < > 20   < > 22   < > 23   BUN 21  --  21  --  17   < > 24   < > 29   < > 25   CR 0.78  --  0.76  --  0.74   < > 0.84   < > 0.82   < > 0.80   ANIONGAP 6  --  8  --  7   < > 10   < > 8   < > 6   FERNANDO 7.2*  --  7.2*  --  7.8*   < > 7.4*   < > 8.0*   < > 7.7*   *  --  112*  --  144*   < > 97   < > 72   < > 113*   ALBUMIN  --   --  1.4*  --   --   --  1.2*  --   --    < > 1.2*   PROTTOTAL  --   --  4.2*  --   --   --  4.8*  --   --    < > 4.2*   BILITOTAL  --   --  1.7*  --   --   --  1.5*  --   --    < > 0.9   ALKPHOS  --   --  258*  --   --   --  255*  --   --    < > 176*   ALT  --   --  33  --   --   --  30  --   --    < > 24   AST  --   --  47*  --   --   --  51*  --   --    < > 41    < > = values in this interval not displayed.       Recent Results (from the past 24 hour(s))   XR Chest Port 1 View   Result Value    Radiologist flags New right pneumothorax (Urgent)    Narrative    Exam:  Chest X-ray 9/2/2020 6:00 PM    History: Chest tube placement    Comparison: X-ray 9/2/2020    Findings:  Frontal radiograph of the chest. Unchanged left chest wall  implantable cardiac defibrillator, feeding tube, LVAD, bilateral chest  tubes, mediastinal drains, and right IJ central venous catheter. New  right-sided chest tube. The trachea is midline. Stable cardiac  mediastinal silhouette. Bilateral pleural effusions and overlying  opacities are similar to the prior exam. New small right pneumothorax.  The upper abdomen is unremarkable. No acute bone findings.      Impression    Impression:   1. Small right pneumothorax with newly placed right-sided chest tube.  Other support devices are unchanged.  2. Relatively unchanged bilateral pleural effusions and overlying  opacities, atelectasis or infection.    [Urgent Result: New right pneumothorax]    Finding was identified on 9/2/2020 6:37 PM.     Dr. Schulz was contacted by Dr. Cat at 9/2/2020 6:44 PM and  verbalized understanding of the urgent finding.     I have personally reviewed the examination and initial interpretation  and I agree with the findings.    SCOTT ANDERSON MD   XR Chest Port 1 View   Result Value    Radiologist flags (Urgent)     New right-sided pleural effusion, question hemothorax    Narrative    Exam: AP chest radiograph 9/3/2020.    HISTORY: Status post LVAD, intubated, interval change.    COMPARISON: 9/2/2020, 9/1/2020.    FINDINGS: AP chest radiograph. Stable LVAD, and implantable cardiac  device, right pigtail and large bore chest tubes, sternotomy wires,  and enteric tubing. Stable cardiomediastinal silhouette, CABG. New  loculated right pleural effusion with associated compressive  atelectasis. Bilateral mixed interstitial and airspace opacities.  Small left pleural effusion noted.      Impression    IMPRESSION:  1. New loculated right pleural effusion with associated compressive  atelectasis. Significant short interval increase, question hemothorax.  (2 chest tubes in place).  2. Small left pleural effusion. Bibasilar and  retrocardiac atelectasis  versus developing consolidation.  3. Mild pulmonary edema.    [Access Center: New right-sided pleural effusion, question hemothorax]    This report will be copied to the [facility] to ensure a  provider acknowledges the finding. [facility] is available Monday  through Friday 8am-3:30 pm.     I have personally reviewed the examination and initial interpretation  and I agree with the findings.    SCOTT ANDERSON MD   CT Chest w/o Contrast    Narrative    EXAMINATION: Chest CT  9/3/2020 1:32 PM    CLINICAL HISTORY: Hemothorax; rule out chest wall hematoma vs  hemothorax    COMPARISON: Earlier same day chest x-ray, CT chest 8/10/2020.    TECHNIQUE: CT imaging obtained through the chest without contrast.  Axial, coronal, and sagittal reconstructions and axial MIP reformatted  images are reviewed.     FINDINGS:  Lines and tubes:  Left IJ central line tip is in the mid SVC. Defibrillator wire tip is  in the right atrium. LVAD appropriately positioned. Bilateral anterior  approach chest tubes. Right lateral approach pleural drainage  catheter. Pericardial drain. 2 mediastinal drains. Median sternotomy  wires. Partial visualized enteric tube.    Thyroid gland is unremarkable. No adenopathy in the chest. Heart is  nonenlarged. Small pericardial effusion. Major intrathoracic vessels  are normal in caliber and configuration. Moderate calcification of the  thoracic aorta and coronary arteries.    Lungs and pleura:  New loculated hemothorax along the lateral margin of the right upper  lobe measuring 10.2 x 4.1 x 9.1 cm (AP by transverse by craniocaudal)  extending inferiorly from the tip of the apically directed chest tube.  Additionally, there are new foci of gas within the right pleural space  near the tip of the chest tube and along its tract anteriorly. Large  right pleural effusion layering dependently in the thorax is increased  since 8/10/2020. Small left pleural effusion is  decreased. There is  associated passive atelectasis of the upper and lower lobes  posteriorly.    Upper abdomen:  Limited    Bones and soft tissues:  Subacute nondisplaced right first rib fracture      Impression    IMPRESSION:   1. New loculated hemothorax measuring up to 10.2 cm extending  inferiorly from the region of the right apical chest tube tip. There  are a few foci of gas within the pleural space on the right near the  apical chest tube.  2. Large right pleural effusion is mildly increased since 8/10/2020.  Small left pleural effusion is decreased.    I have personally reviewed the examination and initial interpretation  and I agree with the findings.    ROBERT DE LA PAZ MD

## 2020-09-03 NOTE — PROGRESS NOTES
CRRT STATUS NOTE    DATA:  Time:  6:01 PM  Pressures WNL:  YES  Filter Status:  WDL    Problems Reported/Alarms Noted:  None    Supplies Present:  YES    ASSESSMENT:  Patient Net Fluid Balance:  +750 mL today, -487 mL yesterday, +69882 mL since admit.  Vital Signs:  BP (!) 81/43   Pulse 98   Temp 98.9  F (37.2  C) (Axillary)   Resp 16   Wt 79.5 kg (175 lb 4.3 oz)   SpO2 98%   BMI 26.65 kg/m      Labs:  unremarkable  Goals of Therapy:  0-50 mL off hourly as bp's allow.    INTERVENTIONS:   Recirculated for CT scan.    PLAN:  Continue CRRT per orders; contact resource nurse with concerns.

## 2020-09-03 NOTE — PROGRESS NOTES
Interventional Radiology Follow-up Note    This is a 75 year-old female with PMH notable for coronary artery disease s/p CABG (4/20), ischemic cardiomyopathy, severe MR/TR and known mural thrombus who is admitted to the CV ICU s/p redo sternotomy, LVAD (heartmate III), and TV repair on 8/19/20 with Dr. Farley. Chest was left open and packed. S/p chest closure and washout 08/21/20. Extubated 9/1/20. IR consulted for right sided chest tube placement for enlarging pleural effusion on 9/2 and completed bedside same day. Concern on chest xray 9/3 that there was associated hemothorax. Pt did have down trend in hemoglobin last evening (8.0->6.9) and she was transfused. 8.3 at 0713 without recheck currently. Vitally stable based on chart review, though more hypotensive overnight. CT chest without was requested.     Case and imaging was reviewed with Dr. Berman from IR. CT chest shows a new loculated hemothorax in the right chest near right chest tube and large right pleural effusion. Right chest tube with no ouput at this time and likely clogged per teams report. Unlikely that IR placed chest tube will drain hemothorax (largest size we place is 24F). Team will speak with surgery about possibility of placing larger bore chest tube. Pt will likely need current chest tube addressed to ensure the pleural effusion is drained adequately.     Recommendations were reviewed with SICU team. Plan to call IR back after discussion with CVTS.    Vitals:   BP 91/44   Pulse 102   Temp 98.9  F (37.2  C) (Axillary)   Resp 22   Wt 79.5 kg (175 lb 4.3 oz)   SpO2 100%   BMI 26.65 kg/m      Pertinent Labs:     Lab Results   Component Value Date    WBC 10.1 09/03/2020       Lab Results   Component Value Date    HGB 8.3 09/03/2020    HGB 6.8 09/03/2020    HGB 6.9 09/02/2020       Lab Results   Component Value Date     09/03/2020     09/02/2020     09/02/2020       Lab Results   Component Value Date    INR 1.27 (H)  09/03/2020    PTT 34 09/03/2020       Lab Results   Component Value Date    POTASSIUM 3.7 09/03/2020        Anastasiia Figueroa DNP, APRN  Interventional Radiology  Pager: [phone]

## 2020-09-03 NOTE — PROGRESS NOTES
CVICU PROGRESS NOTE  September 3, 2020          CO-MORBIDITIES:   Cardiogenic shock (H)  (primary encounter diagnosis)  LV (left ventricular) mural thrombus  Heart failure (H)  Heart failure (H)  Status post cardiac surgery    ASSESSMENT: Marquise Jj is a 75 year old female 75 year-old female with PMH notable for coronary artery disease s/p CABG (4/20), ischemic cardiomyopathy, severe MR/TR and known mural thrombus who is admitted to the CV ICU s/p redo sternotomy, LVAD (heartmate III), and TV repair on 8/19/20 with Dr. Farley. Chest was left open and packed. S/p chest closure and washout 08/21/20. Extubated 9/1/20.     PLAN SUMMARY:   Discuss with IR regarding hemothorax near new R chest pigtail, upsizing vs new tube  Remove lines placed before 8/31: PIV, A-line  Discontinue scheduled ABGs  C diff toxin test  Continue CRRT, will re-evaluate for switching to HD tomorrow  Continue coumadin  Non-con Chest CT and IR consult to fix IR drain and rule out chest wall hematoma  Discontinue ABGs    PLAN:   Neuro/ pain/ sedation:  #Acute post-operative pain   #Likely anoxic brain injury; possible stroke  #Epilepsia Partialis Continua; resolved  - Monitor neurological status. Notify the MD for any acute changes in exam.  - Discontinue seroquel, PRN narcotic   - Keppra, vimpat to continue per NeuroCrit  - Tylenol PRN     Pulmonary:   #Acute hypoxic respiratory failure, improved  #R pleural effusion   #R Hemopneumothorax  - Monitor CXR, O2 saturation  - R pigtail placed 9/2, drained 700 mL fluid  - Developed hemopneumothorax after IR chest tube placement, R pigtail clogged, will discuss with IR options for resizing  - Non con Chest CT per IR prior to chest tube replacement  - Daily CXR  - Extubated on 9/1/2020, if needs support consider BIPAP vs HFNC    Cardiovascular:    #Acute on chronic decompensated heart failure with reduced ejection fraction: NYHA IV / ACC D  #Cardiogenic shock, possible vasoplegia   #Ischemic  cardiomyopathy  #Coronary artery disease s/p CABG 4/20 in [place], s/p PCI to RCA 7/20  #Mural thrombus  #Severe MR/TR  #S/p LVAD     MAP > 60, CVP 8-12    Off pressors    Starting warfarin     Aspirin, rosuvastatin    Co-managing with Cards-2    Amiodarone 200 mg daily      GI/Nutrition:   # UGI bleed   - Appreciate GI consult   - Continue tube feeds at goal  - Pantoprazole BID   - Bowel regimen: senna-colace, miralax.  - Put in OG per radiology due to presence of clips       Renal/Fluid Balance/ Electrolytes:   #Oliguria in the setting of likely ATN  - Will monitor intake and output.  - ICU electrolyte replacement protocol  - Goal-directed fluid therapy  - Nephrology consulted; CRRT      Endocrine:    #Glycemic control  #Hx hypothyroid   - ISS, medium intensity   - Synthroid daily       ID/ Antibiotics:  # Febrile, concern for post operative fevers, resolved  # Candidemia   - Completed perioperative antibiotic regimen: vancomycin, levofloxacin, fluconazole, Zosyn   - Vanc (8/30-9/2 ), Zosyn (8/30-9/2).  - Micafungin for yeast in blood from one peripheral site  - ID Consult: treat as real candidemia and pull lines in place at time of positive culture and continue Micafungin   - Removed A-line, R PIV  - C. Diff  - Pleural fluid labs sent per ID      Heme:     #Acute perioperative blood loss anemia  #Thrombocytopenia   - Hgb goal > 8  - Transfuse as necessary   - Coumadin continue after IR resizes R pigtail chest tube      Prophylaxis:    - SCD  - LIH - HOLD   - PPI  - Bowel regimen      MSK:    - PT and OT consulted. Appreciate recs.      Lines/ tubes/ drains:  - LIJ CRRT line   - PIV  - Chest tubes  - Remove arterial line.        Disposition:  - CV ICU.    Patient seen, findings and plan discussed with CVTS Fellow    -----------------------------------  Nikolas Crenshaw, MS4  Spencer Santos PGY4 CA3  CV ICU   *[id number]  ====================================    SUBJECTIVE:   IR pigtail chest tube placed with 600 mL out  initially. Hgb drop to 6.9 from 8.0, s/p 2 units pRBCs and CRRT clotting without giving back all the volume, Hgb now 8.3 from 6.8. Plan for IR evaluation of new CT following Chest CT    OBJECTIVE:   1. VITAL SIGNS:   Temp:  [95.1  F (35.1  C)-98.8  F (37.1  C)] 98.8  F (37.1  C)  Pulse:  [] 90  Resp:  [16-24] 22  BP: (63-86)/(50-75) 77/50  MAP:  [52 mmHg-113 mmHg] 113 mmHg  Arterial Line BP: ()/() 112/110  SpO2:  [63 %-100 %] 100 %  Resp: 22      2. INTAKE/ OUTPUT:   I/O last 3 completed shifts:  In: 4525.3 [I.V.:2728.5; Other:1]  Out: 3484 [Other:2324; Chest Tube:1160]    3. PHYSICAL EXAMINATION:   General: NAD, lying in bed  Neuro: Will follow person around the room. Does not purposefully track. Focuses to voice.  HENT: feeding tube in place via nostril, nasal cannula oxygen  CV: VVI standby, LVAD humming.   Abdomen: Soft, Non-distended, Non-tender  Incisions: sternotomy wound c/d/i s/p closure  Extremities: warm and well perfused  Lines: left dialysis access catheter in place with CRRT running no bleeding or erythema at insertion site    4. INVESTIGATIONS:   Arterial Blood Gases   Recent Labs   Lab 09/03/20 0339 09/02/20 0350 09/01/20  1625 09/01/20  1148   PH 7.50* 7.47* 7.46* 7.48*   PCO2 28* 30* 32* 31*   PO2 70* 121* 105 171*   HCO3 22 21 23 23     Complete Blood Count   Recent Labs   Lab 09/03/20  0713 09/03/20 0339 09/02/20 2035 09/02/20  0350 09/01/20  1625   WBC  --  10.1 9.9 11.9* 13.3*   HGB 8.3* 6.8* 6.9* 8.0* 7.8*   PLT  --  171 201 241 214     Basic Metabolic Panel  Recent Labs   Lab 09/03/20  1041 09/03/20  0339 09/02/20  1951 09/02/20  1542    138 139 137   POTASSIUM 3.7 3.8 3.8 3.4   CHLORIDE 112* 109 110* 107   CO2 21 22 22 21   BUN 21 21 17 17   CR 0.78 0.76 0.74 0.73   * 112* 144* 108*     Liver Function Tests  Recent Labs   Lab 09/03/20  0339 09/02/20  0350 09/01/20  1625 09/01/20  0337 08/31/20  0339 08/30/20  0341   AST 47* 51*  --  40 41 38   ALT 33 30  --   24 24 23   ALKPHOS 258* 255*  --  199* 176* 164*   BILITOTAL 1.7* 1.5*  --  1.1 0.9 0.7   ALBUMIN 1.4* 1.2*  --  1.3* 1.2* 1.2*   INR 1.27*  --  1.26*  --  1.29* 1.34*     Pancreatic Enzymes  No lab results found in last 7 days.  Coagulation Profile  Recent Labs   Lab 09/03/20  0713 09/03/20  0339 09/01/20  1625 08/31/20  0339 08/30/20  0341   INR  --  1.27* 1.26* 1.29* 1.34*   PTT 34  --   --   --   --          5. RADIOLOGY:   Recent Results (from the past 24 hour(s))   XR Feeding Tube Placement    Narrative    FEEDING TUBE PLACEMENT 9/2/2020 3:19 PM    INDICATION: Nutritional needs    COMPARISON: 8/28/2020    FLUOROSCOPY TIME: 5.0 minutes.    FINDINGS: The feeding tube was advanced under fluoroscopic guidance  with final position of tip in the third portion of the duodenum. A  small amount of barium was injected to demonstrate placement within  the small bowel. The tube was flushed with sterile water and secured  via nasal bridle. There were no complications of the procedure.      Impression    IMPRESSION: Uncomplicated feeding tube placement with tip in the third  portion of the duodenum.    I, MORENO RODRIGUEZ MD, attest that I was immediately available to  provide guidance and assistance during the entirety of the procedure.  Examination was done portable in the ICU therefore I was not present  during the tube placement.    I have personally reviewed the examination and initial interpretation  and I agree with the findings.    MORENO RODRIGUEZ MD   XR Chest Port 1 View   Result Value    Radiologist flags New right pneumothorax (Urgent)    Narrative    Exam:  Chest X-ray 9/2/2020 6:00 PM    History: Chest tube placement    Comparison: X-ray 9/2/2020    Findings: Frontal radiograph of the chest. Unchanged left chest wall  implantable cardiac defibrillator, feeding tube, LVAD, bilateral chest  tubes, mediastinal drains, and right IJ central venous catheter. New  right-sided chest tube. The trachea is midline.  Stable cardiac  mediastinal silhouette. Bilateral pleural effusions and overlying  opacities are similar to the prior exam. New small right pneumothorax.  The upper abdomen is unremarkable. No acute bone findings.      Impression    Impression:   1. Small right pneumothorax with newly placed right-sided chest tube.  Other support devices are unchanged.  2. Relatively unchanged bilateral pleural effusions and overlying  opacities, atelectasis or infection.    [Urgent Result: New right pneumothorax]    Finding was identified on 9/2/2020 6:37 PM.     Dr. Schulz was contacted by Dr. Cat at 9/2/2020 6:44 PM and  verbalized understanding of the urgent finding.     I have personally reviewed the examination and initial interpretation  and I agree with the findings.    SCOTT ANDERSON MD   XR Chest Port 1 View   Result Value    Radiologist flags (Urgent)     New right-sided pleural effusion, question hemothorax    Narrative    Exam: AP chest radiograph 9/3/2020.    HISTORY: Status post LVAD, intubated, interval change.    COMPARISON: 9/2/2020, 9/1/2020.    FINDINGS: AP chest radiograph. Stable LVAD, and implantable cardiac  device, right pigtail and large bore chest tubes, sternotomy wires,  and enteric tubing. Stable cardiomediastinal silhouette, CABG. New  loculated right pleural effusion with associated compressive  atelectasis. Bilateral mixed interstitial and airspace opacities.  Small left pleural effusion noted.      Impression    IMPRESSION:  1. New loculated right pleural effusion with associated compressive  atelectasis. Significant short interval increase, question hemothorax.  (2 chest tubes in place).  2. Small left pleural effusion. Bibasilar and retrocardiac atelectasis  versus developing consolidation.  3. Mild pulmonary edema.    [Access Center: New right-sided pleural effusion, question hemothorax]    This report will be copied to the [facility] to ensure a  provider acknowledges  the finding. Access Center is available Monday  through Friday 8am-3:30 pm.     I have personally reviewed the examination and initial interpretation  and I agree with the findings.    SCOTT ANDERSON MD       =========================================

## 2020-09-03 NOTE — PLAN OF CARE
Major Shift Events: Hypothermic at 35.1C at beginning of shift, Lexi Hugger applied, now 36.7C. Low Pi 1.8-3 for most of shift, Hgb <7, CVTS and CARDS II aware, total of 2 units of PRBCs given this shift and 250mL albumin. No obvious signs of bleeding. BM x2 this shift, loose brown without signs of blood. Did not tolerate fluid removal this shift on CRRT, low MAPs/Pi. CRRT filter clotted and changed, unable to return blood. R internal jugular CVC pulled per order, art-line left in place per CARDS II to closely monitor BP and for lab draws (extremely difficult peripheral draw). PRN oxy x1 for CPOT>2.     Plan: Monitor Hgb and fluid status closely. Plan to increase activity.     For vital signs and complete assessments, please see documentation flowsheets.

## 2020-09-03 NOTE — PROGRESS NOTES
Nephrology Progress Note  09/03/2020         Mrs Jj is a 75 yof w/ICM, s/p CABG (4/20), severe MR/TR and known mural thrombus, acute kidney injry, hypothyroidism, CKD3, anxiety, GERD, chronic anemia.  Transferred from [facility] to [facility] on 8/8 after developing cardiac shock. Placed on IABP and then LVAD placement and TVR repair for cardiogenic shock on 8/19 /20.  Nephrology consulted for managign Oliguric LORI on CKD stage 3, started CRRT on 8/26.     Interval History :   Mrs Jj continues on CRRT, net negative 0.5L yesterday but positive this am with some hypotension.  Getting albumin and PRBC's, ordered for 0-50cc/hr net negative depending on how she responds to this, still tenuous status with fungemia, low Hgb and high obligate intake.        Assessment & Recommendations:   LORI on CKD-Baseline Cr ~1.5, small L kidney at 8.3cm, 10cm R kidney at baseline.  Started CRRT on 8/26 for management of volume and electrolytes in setting of cardiogenic shock, LVAD placed on 8/19.  Etiology of LORI is likely ATN from hypoperfusion, started CRRT on 8/26.               -Line is LIJ temp line from 9/1               -Continuing CRRT, 4k baths, 25ml/kg/hr standard dosing, goal 0-50cc/hr net negative.                 -Some UOP but not nearly enough to keep up with intake, monitoring.      Volume status-Net negative 0.5L yesterday but positive this am with some lower BP's, trying to pull 0-50cc/hr depending on how she responds to albumin and PRBC's.        Electrolytes/pH-K 3.7, bicarb 21, on 4k baths.      Ca/phos/pth-Ca 7.2, Mg and Phos WNL this am.      ID-Fungal cultures +, ID consulted, new HD line yesterday, stopped TPN.       Anemia-Hgb 6.8, getting PRBC's this am, acute management per team.      Nutrition-Had been on TPN, off now with fungemia.      Seen and discussed with Dr Chavez     Recommendations were communicated to primary team via verbal communication    STEPHAN Faulkner CNS  Clinical Nurse  Specialist  [phone]    Review of Systems:   I reviewed the following systems:  ROS not done due to lethargy.     Physical Exam:   I/O last 3 completed shifts:  In: 4525.3 [I.V.:2728.5; Other:1]  Out: 3484 [Other:2324; Chest Tube:1160]   BP 91/62 (BP Location: Left arm)   Pulse 95   Temp 98.9  F (37.2  C) (Axillary)   Resp 20   Wt 79.5 kg (175 lb 4.3 oz)   SpO2 100%   BMI 26.65 kg/m       GENERAL APPEARANCE: Extubated, lethargic but responds to stimuli.   EYES:  No scleral icterus, pupils equal  HENT: mouth without ulcers or lesions  PULM: lungs clear to auscultation, equal air movement, no cyanosis or clubbing  CV: regular rhythm, normal rate, no rub     -JVP not elevated.      -edema +3 generalized.   GI: soft, nontender, +distended, bowel sounds are +  MS: no evidence of inflammation in joints, no muscle tenderness  NEURO: Lethargic, no focal deficits.   Lines LIJ temp line from 9/1    Labs:   All labs reviewed by me  Electrolytes/Renal -   Recent Labs   Lab Test 09/03/20  1041 09/03/20  0339 09/02/20 1951 09/02/20  1542  09/02/20  0350    138 139 137   < > 139   POTASSIUM 3.7 3.8 3.8 3.4   < > 3.7   CHLORIDE 112* 109 110* 107   < > 109   CO2 21 22 22 21   < > 20   BUN 21 21 17 17   < > 24   CR 0.78 0.76 0.74 0.73   < > 0.84   * 112* 144* 108*   < > 97   FERNANDO 7.2* 7.2* 7.8* 7.3*   < > 7.4*   MAG  --  2.2  --  2.2  --  2.1   PHOS  --  3.7  --  3.6  --  3.9    < > = values in this interval not displayed.       CBC -   Recent Labs   Lab Test 09/03/20  0713 09/03/20  0339 09/02/20 2035 09/02/20  0350   WBC  --  10.1 9.9 11.9*   HGB 8.3* 6.8* 6.9* 8.0*   PLT  --  171 201 241       LFTs -   Recent Labs   Lab Test 09/03/20  0339 09/02/20  0350 09/01/20  0337   ALKPHOS 258* 255* 199*   BILITOTAL 1.7* 1.5* 1.1   ALT 33 30 24   AST 47* 51* 40   PROTTOTAL 4.2* 4.8* 5.0*   ALBUMIN 1.4* 1.2* 1.3*       Iron Panel -   Recent Labs   Lab Test 08/10/20  1052 07/01/20  0530   IRON 48 65   IRONSAT 15 17    HUSEYIN 165  --            Current Medications:    amiodarone  200 mg Oral or Feeding Tube Daily     artificial tears   Both Eyes 5x Daily     aspirin  81 mg Oral or Feeding Tube Daily     B and C vitamin Complex with folic acid  5 mL Oral Daily     bimatoprost  1 drop Both Eyes At Bedtime     busPIRone  10 mg Oral or Feeding Tube TID     insulin aspart  1-6 Units Subcutaneous Q4H     lacosamide (VIMPAT) intermittent infusion  200 mg Intravenous BID     latanoprost  1 drop Both Eyes Daily     levETIRAcetam  2,000 mg Intravenous Q12H     levothyroxine  62.5 mcg Oral or Feeding Tube QAM AC     micafungin  100 mg Intravenous Q24H     pantoprazole (PROTONIX) IV  40 mg Intravenous BID     rosuvastatin  20 mg Oral or Feeding Tube Daily     sodium chloride (PF)  3 mL Intracatheter Q8H     warfarin ANTICOAGULANT  1 mg Oral or Feeding Tube ONCE at 18:00       BETA BLOCKER NOT PRESCRIBED       dextrose Stopped (09/02/20 1957)     dextrose Stopped (08/30/20 2221)     CRRT replacement solution 12.5 mL/kg/hr (09/03/20 0739)     - MEDICATION INSTRUCTIONS -       CRRT replacement solution 2.911 mL/kg/hr (09/03/20 0148)     CRRT replacement solution 12.5 mL/kg/hr (09/03/20 0739)     BETA BLOCKER NOT PRESCRIBED       Warfarin Therapy Reminder

## 2020-09-03 NOTE — PROGRESS NOTES
GASTROENTEROLOGY PROGRESS NOTE    Date: 09/03/2020     75 year old female with a history of coronary artery disease status post CABGx4 in 2020, ischemic cardiomyopathy, severe tricuspid and mitral regurg unknown mural thrombus, is currently admitted to the cardiac ICU post LVAD placement on 8/19 in the setting of cardiogenic shock due to decompensated heart failure. Course has been c/b hypoxic resp failure requiring intubation, strokes, seizures, and LORI currently on CRRT. GI was initially consulted on 8/26 for bloody NG tube OP.     She underwent EGD with placement of clips on two lesions that were possibly bleeding, please see procedure note on 8/27 for full details. AC was resumed and she unfortunately had a recurrent UGIB. EGD on 8/30 showed a bleeding angioectasia vs gastric erosion related to the NG in the stomach to which 1 hemoclip was placed (an additional hemoclip was placed at the site of the prior ulcer). The recommendation was to hold feeding tube placement and to hold AC for 72hrs.      Since her second EGD, the patient's Hgb has remained stable and she has not had obvious GI bleeding. Warfarin was started 9/2.     The patient has had worsening LFTs which is likely 2/2 a combination of decompensated HF, critical illness, medication-related (Zosyn), and TPN-related. No obvious RUQ abd pain on exam although the patient is minimally responsive. LFTs today are stable.     The patient had a drop in Hgb from 6.8-> 8 overnight on 9/3 w/o melena or other signs of GIB. This is likely 2/2 the bloody OP from her chest tubes.        RECOMMENDATIONS:  - Pantoprazole 40 mg PO BID  - Agree w/ tube feeds   - Cont to monitor for GIB  - GI will sign off     Thank you for involving us in this patient's care. Please do not hesitate to contact the GI service with any questions or concerns.      Pt care plan discussed with Dr. Johnson, GI staff physician.    Verna Kaur   Gastroenterology  Fellow  _______________________________________________________________    24 Hour Events: blood cx from 8/30 speciated Candida glabrata; chest tubes placed for a Rt pleural effusion w/ bloody/serous drainage       Subjective: patient does not answer ?s       Objective:  Blood pressure 91/62, pulse 95, temperature 98.9  F (37.2  C), temperature source Axillary, resp. rate 20, weight 79.5 kg (175 lb 4.3 oz), SpO2 100 %.    Gen: A&Ox3, NAD  HEENT: ncat, eomi, sclera anicteric  CV: LVAD hum   Lungs: on 3L NC  Abd: +bs, soft, nd/nt; rectal tube w/ brown loose stool  Skin: no jaundice  Neuro: eyes deviated to the L; does not follow commands or answer ?s     LABS:  BMP  Recent Labs   Lab 09/03/20  1041 09/03/20  0339 09/02/20 1951 09/02/20  1542    138 139 137   POTASSIUM 3.7 3.8 3.8 3.4   CHLORIDE 112* 109 110* 107   FERNANDO 7.2* 7.2* 7.8* 7.3*   CO2 21 22 22 21   BUN 21 21 17 17   CR 0.78 0.76 0.74 0.73   * 112* 144* 108*     CBC  Recent Labs   Lab 09/03/20  0713 09/03/20  0339 09/02/20 2035 09/02/20  0350 09/01/20  1625   WBC  --  10.1 9.9 11.9* 13.3*   RBC  --  2.29* 2.32* 2.68* 2.63*   HGB 8.3* 6.8* 6.9* 8.0* 7.8*   HCT  --  20.8* 21.0* 24.3* 23.8*   MCV  --  91 91 91 91   MCH  --  29.7 29.7 29.9 29.7   MCHC  --  32.7 32.9 32.9 32.8   RDW  --  15.8* 16.6* 16.5* 16.3*   PLT  --  171 201 241 214     INR  Recent Labs   Lab 09/03/20  0339 09/01/20  1625 08/31/20  0339 08/30/20  0341   INR 1.27* 1.26* 1.29* 1.34*     LFTs  Recent Labs   Lab 09/03/20  0339 09/02/20  0350 09/01/20  0337 08/31/20  0339   ALKPHOS 258* 255* 199* 176*   AST 47* 51* 40 41   ALT 33 30 24 24   BILITOTAL 1.7* 1.5* 1.1 0.9   PROTTOTAL 4.2* 4.8* 5.0* 4.2*   ALBUMIN 1.4* 1.2* 1.3* 1.2*      PANCNo lab results found in last 7 days.    IMAGING:  n/a

## 2020-09-03 NOTE — PROGRESS NOTES
CRRT STATUS NOTE    DATA:  Time:  0621 AM  Pressures WNL:  YES  Filter Status: elevated TMP after 0030.  Filter clotted and circuit restarted.  WDL after restart    Problems Reported/Alarms Noted:  TMP's increasing.  Resolved with change of circuit.    Supplies Present:  YES    ASSESSMENT:  Patient Net Fluid Balance:  Net negative 487 9/2/2020, net positive 348 since midnight, net positive 11.641 since admission  Vital Signs:  Vitals reviewed.  Afebrile. Heater on circuit. NSR 70-, MAP 60-70 on LVAD  Labs: Labs reviewed.  WBC 10.1. Hbg 6.8 and transfused 1 unit pRBC's   Goals of Therapy:  50 ml/hr net negative as BP's allow    INTERVENTIONS:   Circuit change at 0208    PLAN:  Fluid removal per plan of care.  Restart every 72 hours and PRN.  Please contact CRRT resource RN at [phone] with questions and concerns.

## 2020-09-03 NOTE — PROGRESS NOTES
CVTS PROGRESS NOTE  September 3, 2020          CO-MORBIDITIES:   Cardiogenic shock (H)  (primary encounter diagnosis)  LV (left ventricular) mural thrombus  Heart failure (H)  Heart failure (H)  Status post cardiac surgery    ASSESSMENT: Marquise Jj is a 75 year old female 75 year-old female with PMH notable for coronary artery disease s/p CABG (4/20), ischemic cardiomyopathy, severe MR/TR and known mural thrombus who is admitted to the CV ICU s/p redo sternotomy, LVAD (heartmate III), and TV repair on 8/19/20 with Dr. Farley. Chest was left open and packed. S/p chest closure and washout 08/21/20. Extubated 9/1/20.     PLAN SUMMARY:   Discuss with IR regarding hemothorax near new R chest pigtail, upsizing vs new tube  Remove lines placed before 8/31: PIV, A-line  Discontinue scheduled ABGs  C diff toxin test  Continue CRRT, will re-evaluate for switching to HD tomorrow  Continue coumadin  Non-con Chest CT and IR consult to fix IR drain and rule out chest wall hematoma  Discontinue ABGs    PLAN:   Neuro/ pain/ sedation:  #Acute post-operative pain   #Likely anoxic brain injury; possible stroke  #Epilepsia Partialis Continua; resolved  - Monitor neurological status. Notify the MD for any acute changes in exam.  - Discontinue seroquel, PRN narcotic   - Keppra, vimpat to continue per NeuroCrit  - Tylenol PRN     Pulmonary:   #Acute hypoxic respiratory failure, improved  #R pleural effusion   #R Hemopneumothorax  - Monitor CXR, O2 saturation  - R pigtail placed 9/2, drained 700 mL fluid  - Developed hemopneumothorax after IR chest tube placement, R pigtail clogged, will discuss with IR options for resizing  - Non con Chest CT per IR prior to chest tube replacement  - Daily CXR  - Extubated on 9/1/2020, if needs support consider BIPAP vs HFNC    Cardiovascular:    #Acute on chronic decompensated heart failure with reduced ejection fraction: NYHA IV / ACC D  #Cardiogenic shock, possible vasoplegia   #Ischemic  cardiomyopathy  #Coronary artery disease s/p CABG 4/20 in [place], s/p PCI to RCA 7/20  #Mural thrombus  #Severe MR/TR  #S/p LVAD     MAP > 60, CVP 8-12    Off pressors    Starting warfarin     Aspirin, rosuvastatin    Co-managing with Cards-2    Amiodarone 200 mg daily      GI/Nutrition:   # UGI bleed   - Appreciate GI consult   - Continue tube feeds at goal  - Pantoprazole BID   - Bowel regimen: senna-colace, miralax.  - Put in OG per radiology due to presence of clips       Renal/Fluid Balance/ Electrolytes:   #Oliguria in the setting of likely ATN  - Will monitor intake and output.  - ICU electrolyte replacement protocol  - Goal-directed fluid therapy  - Nephrology consulted; CRRT      Endocrine:    #Glycemic control  #Hx hypothyroid   - ISS, medium intensity   - Synthroid daily       ID/ Antibiotics:  # Febrile, concern for post operative fevers, resolved  # Candidemia   - Completed perioperative antibiotic regimen: vancomycin, levofloxacin, fluconazole, Zosyn   - Vanc (8/30-9/2 ), Zosyn (8/30-9/2).  - Micafungin for yeast in blood from one peripheral site  - ID Consult: treat as real candidemia and pull lines in place at time of positive culture and continue Micafungin   - Removed A-line, R PIV  - C. Diff  - Pleural fluid labs sent per ID      Heme:     #Acute perioperative blood loss anemia  #Thrombocytopenia   - Hgb goal > 8  - Transfuse as necessary   - Coumadin continue after IR resizes R pigtail chest tube      Prophylaxis:    - SCD  - LIH - HOLD   - PPI  - Bowel regimen      MSK:    - PT and OT consulted. Appreciate recs.      Lines/ tubes/ drains:  - LIJ CRRT line   - PIV  - Chest tubes  - Remove arterial line.        Disposition:  - CV ICU.    Patient seen, findings and plan discussed with CVTS Fellow and Dr. Vazqeuz     -----------------------------------  Nikolas Cresnhaw, MS4  Spencer Santos PGY4 CA3  CV ICU   *[id number]    Angela Sales   Surgery Resident    [id number]    ====================================    SUBJECTIVE:   IR pigtail chest tube placed with 600 mL out initially. Hgb drop to 6.9 from 8.0, s/p 2 units pRBCs and CRRT clotting without giving back all the volume, Hgb now 8.3 from 6.8. Plan for IR evaluation of new CT following Chest CT    OBJECTIVE:   1. VITAL SIGNS:   Temp:  [95.1  F (35.1  C)-98.9  F (37.2  C)] 98.9  F (37.2  C)  Pulse:  [] 104  Resp:  [16-24] 24  BP: (63-94)/(44-86) 94/86  MAP:  [52 mmHg-113 mmHg] 113 mmHg  Arterial Line BP: ()/() 112/110  SpO2:  [63 %-100 %] 100 %  Resp: 24      2. INTAKE/ OUTPUT:   I/O last 3 completed shifts:  In: 3800.5 [I.V.:1584.5; Other:1]  Out: 2975 [Other:1445; Stool:250; Chest Tube:1280]    3. PHYSICAL EXAMINATION:   General: NAD, lying in bed  Neuro: Will follow person around the room. Does not purposefully track. Focuses to voice.  HENT: feeding tube in place via nostril, nasal cannula oxygen  CV: VVI standby, LVAD humming.   Abdomen: Soft, Non-distended, Non-tender  Incisions: sternotomy wound c/d/i s/p closure  Extremities: warm and well perfused  Lines: left dialysis access catheter in place with CRRT running no bleeding or erythema at insertion site    4. INVESTIGATIONS:   Arterial Blood Gases   Recent Labs   Lab 09/03/20 0339 09/02/20  0350 09/01/20  1625 09/01/20  1148   PH 7.50* 7.47* 7.46* 7.48*   PCO2 28* 30* 32* 31*   PO2 70* 121* 105 171*   HCO3 22 21 23 23     Complete Blood Count   Recent Labs   Lab 09/03/20  1555 09/03/20  0713 09/03/20 0339 09/02/20  2035 09/02/20  0350 09/01/20  1625   WBC  --   --  10.1 9.9 11.9* 13.3*   HGB 8.0* 8.3* 6.8* 6.9* 8.0* 7.8*   PLT  --   --  171 201 241 214     Basic Metabolic Panel  Recent Labs   Lab 09/03/20  1041 09/03/20  0339 09/02/20  1951 09/02/20  1542    138 139 137   POTASSIUM 3.7 3.8 3.8 3.4   CHLORIDE 112* 109 110* 107   CO2 21 22 22 21   BUN 21 21 17 17   CR 0.78 0.76 0.74 0.73   * 112* 144* 108*     Liver Function  Tests  Recent Labs   Lab 09/03/20  0339 09/02/20  0350 09/01/20  1625 09/01/20  0337 08/31/20  0339 08/30/20  0341   AST 47* 51*  --  40 41 38   ALT 33 30  --  24 24 23   ALKPHOS 258* 255*  --  199* 176* 164*   BILITOTAL 1.7* 1.5*  --  1.1 0.9 0.7   ALBUMIN 1.4* 1.2*  --  1.3* 1.2* 1.2*   INR 1.27*  --  1.26*  --  1.29* 1.34*     Pancreatic Enzymes  No lab results found in last 7 days.  Coagulation Profile  Recent Labs   Lab 09/03/20  0713 09/03/20  0339 09/01/20  1625 08/31/20  0339 08/30/20  0341   INR  --  1.27* 1.26* 1.29* 1.34*   PTT 34  --   --   --   --          5. RADIOLOGY:   Recent Results (from the past 24 hour(s))   XR Chest Port 1 View   Result Value    Radiologist flags New right pneumothorax (Urgent)    Narrative    Exam:  Chest X-ray 9/2/2020 6:00 PM    History: Chest tube placement    Comparison: X-ray 9/2/2020    Findings: Frontal radiograph of the chest. Unchanged left chest wall  implantable cardiac defibrillator, feeding tube, LVAD, bilateral chest  tubes, mediastinal drains, and right IJ central venous catheter. New  right-sided chest tube. The trachea is midline. Stable cardiac  mediastinal silhouette. Bilateral pleural effusions and overlying  opacities are similar to the prior exam. New small right pneumothorax.  The upper abdomen is unremarkable. No acute bone findings.      Impression    Impression:   1. Small right pneumothorax with newly placed right-sided chest tube.  Other support devices are unchanged.  2. Relatively unchanged bilateral pleural effusions and overlying  opacities, atelectasis or infection.    [Urgent Result: New right pneumothorax]    Finding was identified on 9/2/2020 6:37 PM.     Dr. Schulz was contacted by Dr. Cat at 9/2/2020 6:44 PM and  verbalized understanding of the urgent finding.     I have personally reviewed the examination and initial interpretation  and I agree with the findings.    SCOTT ANDERSON MD   XR Chest Port 1 View   Result Value     Radiologist flags (Urgent)     New right-sided pleural effusion, question hemothorax    Narrative    Exam: AP chest radiograph 9/3/2020.    HISTORY: Status post LVAD, intubated, interval change.    COMPARISON: 9/2/2020, 9/1/2020.    FINDINGS: AP chest radiograph. Stable LVAD, and implantable cardiac  device, right pigtail and large bore chest tubes, sternotomy wires,  and enteric tubing. Stable cardiomediastinal silhouette, CABG. New  loculated right pleural effusion with associated compressive  atelectasis. Bilateral mixed interstitial and airspace opacities.  Small left pleural effusion noted.      Impression    IMPRESSION:  1. New loculated right pleural effusion with associated compressive  atelectasis. Significant short interval increase, question hemothorax.  (2 chest tubes in place).  2. Small left pleural effusion. Bibasilar and retrocardiac atelectasis  versus developing consolidation.  3. Mild pulmonary edema.    [Access Center: New right-sided pleural effusion, question hemothorax]    This report will be copied to the [facility] to ensure a  provider acknowledges the finding. Access Center is available Monday  through Friday 8am-3:30 pm.     I have personally reviewed the examination and initial interpretation  and I agree with the findings.    SCOTT ANDERSON MD   CT Chest w/o Contrast    Narrative    EXAMINATION: Chest CT  9/3/2020 1:32 PM    CLINICAL HISTORY: Hemothorax; rule out chest wall hematoma vs  hemothorax    COMPARISON: Earlier same day chest x-ray, CT chest 8/10/2020.    TECHNIQUE: CT imaging obtained through the chest without contrast.  Axial, coronal, and sagittal reconstructions and axial MIP reformatted  images are reviewed.     FINDINGS:  Lines and tubes:  Left IJ central line tip is in the mid SVC. Defibrillator wire tip is  in the right atrium. LVAD appropriately positioned. Bilateral anterior  approach chest tubes. Right lateral approach pleural drainage  catheter.  Pericardial drain. 2 mediastinal drains. Median sternotomy  wires. Partial visualized enteric tube.    Thyroid gland is unremarkable. No adenopathy in the chest. Heart is  nonenlarged. Small pericardial effusion. Major intrathoracic vessels  are normal in caliber and configuration. Moderate calcification of the  thoracic aorta and coronary arteries.    Lungs and pleura:  New loculated hemothorax along the lateral margin of the right upper  lobe measuring 10.2 x 4.1 x 9.1 cm (AP by transverse by craniocaudal)  extending inferiorly from the tip of the apically directed chest tube.  Additionally, there are new foci of gas within the right pleural space  near the tip of the chest tube and along its tract anteriorly. Large  right pleural effusion layering dependently in the thorax is increased  since 8/10/2020. Small left pleural effusion is decreased. There is  associated passive atelectasis of the upper and lower lobes  posteriorly.    Upper abdomen:  Limited    Bones and soft tissues:  Subacute nondisplaced right first rib fracture      Impression    IMPRESSION:   1. New loculated hemothorax measuring up to 10.2 cm extending  inferiorly from the region of the right apical chest tube tip. There  are a few foci of gas within the pleural space on the right near the  apical chest tube.  2. Large right pleural effusion is mildly increased since 8/10/2020.  Small left pleural effusion is decreased.    I have personally reviewed the examination and initial interpretation  and I agree with the findings.    ROBERT DE LA PAZ MD       =========================================

## 2020-09-04 NOTE — PLAN OF CARE
"Major Shift Events: Low PI's and MAP's throughout night, see previous notes. Epinephrine started for MAP's greater than 65. Moaning throughout night, sleepy after Oxycodone given. States \"ow\". Able to respond yes or no intermittently to questions. Denies shortness of breath. Moves left hand and arm spontaneously. Withdraws to pain in all other extremities. NSR to low sinus tachycardia. 3L nasal cannula with oxygen saturations in the 90's. Bladder scanned for 167 mL of urine.   Plan: IR for chest tube. CRRT. Wean off Epinephrine. Will continue to monitor.   For vital signs and complete assessments, please see documentation flowsheets.    "

## 2020-09-04 NOTE — PROGRESS NOTES
CRRT STATUS NOTE    DATA:  Time:  5:58 PM  Pressures WNL:  Yes  Filter Status:  WDL    Problems Reported/Alarms Noted:  Fluid gain/loss alarm requiring restart this shift.    Supplies Present:  Yes    ASSESSMENT:  Patient Net Fluid Balance:  -1339cc since midnight    Vital Signs:  Vital signs:  Temp: 97.2  F (36.2  C) Temp src: Axillary BP: 93/69 Pulse: 78   Resp: 14 SpO2: 100 % O2 Device: Nasal cannula Oxygen Delivery: 3 LPM     Labs:  Unremarkable  Goals of Therapy:  0-50cc/hr net negative as BPs allow    INTERVENTIONS:   Pt went down to IR for chest tube placement. CRRT circuit was recirculated and restarted at 1206 once pt returned. Pt's CRRT alarmed fluid gain/loss so circuit was taken down and new one put up and restarted at 1746.    PLAN:  Continue to plan of care. Call CRRT Resource with any questions or concerns @ [phone].

## 2020-09-04 NOTE — PROVIDER NOTIFICATION
Continuing low PI's of 1.5-2 and MAP's in the 50's. CVTS notified. 500 mL of Albumin to be given. Am labs drawn. Will continue to monitor.    PI's increased. MAP's still in the 50's. CVTS notified. Epipinephrine to be started for MAP's greater than 65. Will continue to monitor.

## 2020-09-04 NOTE — PLAN OF CARE
Major Shift Events:  Neuro status waxing and waning, intermittently following commands. Moaning and c/o pain, oxy x2, tylenol x2. HR in NSR, HM 3 in place, speed increased to 5400; flows 3.5-4.4, PI 1.9-4.9. power 3.7-3.9. Pt on 2 L NC, lung sounds clear/dim. New R CT placed w/ immediate output of 1300 mL sanguinous drainage, MD aware, slowly tapered off; minimal output from other 5 CT. Large amount of watery stool, bladder scanned for 195. CRRT running, goal is net negative 0-50 mL/hr. Up to chair x2, family updated on POC    Plan: Continue to monitor neuro and hemodynamic status    For vital signs and complete assessments, please see documentation flowsheets.

## 2020-09-04 NOTE — PROCEDURES
[facility], [place]    Double Lumen Midline Placement    Date/Time: 9/3/2020 7:21 PM  Performed by: Tracy Nevarez RN  Authorized by: Spencer Santos MD   Indications: access, blood draws.    UNIVERSAL PROTOCOL   Site Marked: Yes  Prior Images Obtained and Reviewed:  Yes  Required items: Required blood products, implants, devices and special equipment available    Patient identity confirmed:  Verbally with patient  Patient was reevaluated immediately before administering moderate or deep sedation or anesthesia  Confirmation Checklist:  Patient's identity using two indicators, relevant allergies, procedure was appropriate and matched the consent or emergent situation and correct equipment/implants were available  Time out: Immediately prior to the procedure a time out was called    Universal Protocol: the Joint Commission Universal Protocol was followed    Preparation: Patient was prepped and draped in usual sterile fashion           ANESTHESIA    Anesthesia: See MAR for details  Local Anesthetic:  Lidocaine 1% without epinephrine  Anesthetic Total (mL):  2      SEDATION    Patient Sedated: No        Preparation: skin prepped with ChloraPrep  Skin prep agent: skin prep agent completely dried prior to procedure  Sterile barriers: maximum sterile barriers were used: cap, mask, sterile gown, sterile gloves, and large sterile sheet  Hand hygiene: hand hygiene performed prior to central venous catheter insertion  Type of line used: Midline  Catheter type: double lumen  Lumen type: non-valved  Catheter size: 5 Fr  : BioFlo.  Lot number: [id number]  Placement method: venipuncture, MST, ultrasound and tip confirmation system  Number of attempts: 1  Successful placement: yes  Orientation: right  Location: basilic vein (Vein Diameter 0.48)  Site rationale: LVD  Arm circumference: adults 10 cm  Extremity circumference: 30  Visible catheter length: 1  Internal length: 19 cm  Total catheter length:  20  Dressing and securement: blood removed, chlorhexidine disc applied, occlusive dressing applied, site cleaned and statlock  Post procedure assessment: blood return through all ports  PROCEDURE   Patient Tolerance:  Patient tolerated the procedure well with no immediate complications  Describe Procedure: Difficulty threading PICC catheter past 30 cm.  Patient's nurse notified MD and ok to leave catheter as midline.

## 2020-09-04 NOTE — PROCEDURES
[facility], [place]    Procedure: IR Procedure Note - CT guided right chest tube placement    Date/Time: 9/4/2020 11:08 AM  Performed by: Caesar Hammond MD  Authorized by: Caesar Hammond MD   IR Fellow Physician:  Radiology Resident Physician: LUIS Hammond MD  Other(s) attending procedure: Aris Berman MD    UNIVERSAL PROTOCOL   Site Marked: Yes  Prior Images Obtained and Reviewed:  Yes  Required items: Required blood products, implants, devices and special equipment available    Patient identity confirmed:  Verbally with patient, arm band, provided demographic data and hospital-assigned identification number  Patient was reevaluated immediately before administering moderate or deep sedation or anesthesia  Confirmation Checklist:  Patient's identity using two indicators, relevant allergies, procedure was appropriate and matched the consent or emergent situation and correct equipment/implants were available  Time out: Immediately prior to the procedure a time out was called    Universal Protocol: the Joint Commission Universal Protocol was followed    Preparation: Patient was prepped and draped in usual sterile fashion    ESBL (mL):  0         ANESTHESIA    Anesthesia: Local infiltration  Local Anesthetic:  Lidocaine 1% without epinephrine  Anesthetic Total (mL):  10      SEDATION    Patient Sedated: Yes    Sedation:  Fentanyl and midazolam  Vital signs: Vital signs monitored during sedation    See dictated procedure note for full details.  Findings: Large right hemothorax.    Specimens: none    Complications: None    Condition: Stable    Plan: - 1 hour bed rest post sedation  - right chest tube set to suction    PROCEDURE   Patient Tolerance:  Patient tolerated the procedure well with no immediate complications  Describe Procedure: CT guided right lateral 24 Fr Thalquick chest tube placement.    Length of time physician/provider present for 1:1 monitoring during sedation: 15

## 2020-09-04 NOTE — PROGRESS NOTES
CRRT STATUS NOTE    DATA:  Time:  5:29 AM  Pressures WNL:  Yes, after restart  Filter Status: WDL after restart    Problems Reported/Alarms Noted:  Filter clotting alarm 2025    Supplies Present:  Yes    ASSESSMENT:  Patient Net Fluid Balance: Net positive 15L since admit, net positive 1L since midnight  Vital Signs: BP (!) 86/65   Pulse 95   Temp 97.8  F (36.6  C) (Axillary)   Resp 15   Wt 80.6 kg (177 lb 11.1 oz)   SpO2 96%   BMI 27.02 kg/m    Labs:  K+ 3.5, Creat 0.83, ICA 4.5, Lactate 0.8, WBC 7.6, Hgb 8.4  Goals of Therapy: 0-50 ml/hr net negative as BPs allow    INTERVENTIONS:   Circuit restarted at 2059    PLAN:  Continue current plan of care.  Remove fluid as pt VS allow.  Pt having low BP and PIs, given 750 ml Albumin, 2 units PRBCs for Hgb 6.5 and Epinephrine gtt started. Unable to remove fluid currently.  Notify CRRT RN with questions or concerns #[id number].

## 2020-09-04 NOTE — PLAN OF CARE
PT: Pt gone for procedure that occurred earlier than initially planned. PT will attempt back this pm to work with pt.

## 2020-09-04 NOTE — PRE-PROCEDURE
GENERAL PRE-PROCEDURE:   Procedure:  Chest tube    Written consent obtained?: Yes    Risks and benefits: Risks, benefits and alternatives were discussed    Consent given by:  Patient  Patient states understanding of procedure being performed: Yes    Patient's understanding of procedure matches consent: Yes    Procedure consent matches procedure scheduled: Yes    Expected level of sedation:  Moderate  Appropriately NPO:  Yes  ASA Class:  Class 4- Severe systemic disease, acute unstable problems  Mallampati  :  Grade 2- soft palate, base of uvula, tonsillar pillars, and portion of posterior pharyngeal wall visible  Lungs:  Lungs clear with good breath sounds bilaterally  Heart:  Normal heart sounds and rate  History & Physical reviewed:  History and physical reviewed and no updates needed  Statement of review:  I have reviewed the lab findings, diagnostic data, medications, and the plan for sedation

## 2020-09-04 NOTE — PROVIDER NOTIFICATION
PI's 1.8-2. MAP's dropped to 55. CVTS notified. CBC sent and 250 mL of Albumin being given. Will continue to monitor.    PI's and MAP increased with Albumin. Hemoglobin is 6.5. CVTS notified. 2 units RBC to be given. Will continue to monitor.

## 2020-09-04 NOTE — PROGRESS NOTES
CVICU PROGRESS NOTE  September 4, 2020           CO-MORBIDITIES:   Cardiogenic shock (H)  (primary encounter diagnosis)  LV (left ventricular) mural thrombus  Heart failure (H)  Status post cardiac surgery    ASSESSMENT: Marquise Jj is a 75 year old female 75 year-old female with PMH notable for coronary artery disease s/p CABG (4/20), ischemic cardiomyopathy, severe MR/TR and known mural thrombus who is admitted to the CV ICU s/p redo sternotomy, LVAD (heartmate III), and TV repair on 8/19/20 with Dr. Farley. Chest was left open and packed. S/p chest closure and washout 08/21/20. Extubated 9/1/20.     PLAN SUMMARY:   Per ID increase micafungin to 150, will need 4-6 week course and will need IV if were to discharge.   - Needs OPH f/u in two weeks for candidemia  - Care conference schedule for next week and coordinate with family. Determine goals of care  - Consult palliative for care conference   - Hold Coumadin per cardiology  - Fluid net goal 500-1000  - Hold off on PICC line d/t fungemia   S/p IR for right CT up-sizing   Trend hemoglobin       PLAN:   Neuro/ pain/ sedation:  #Acute post-operative pain   #Likely anoxic brain injury; possible stroke  #Epilepsia Partialis Continua; resolved  - Monitor neurological status. Notify the MD for any acute changes in exam.  - Discontinue seroquel, PRN narcotic   - Keppra, vimpat to continue per NeuroCrit  - Tylenol PRN     Pulmonary:   #Acute hypoxic respiratory failure, improved  #R pleural effusion   #R Hemopneumothorax  - Monitor CXR, O2 saturation  - R pigtail placed 9/2, drained 700 mL fluid  - Developed hemopneumothorax after IR chest tube placement, R pigtail clogged, will discuss with IR options for resizing  - Non con Chest CT per IR prior to chest tube replacement  - Daily CXR  - Extubated on 9/1/2020, if needs support consider BIPAP vs HFNC    Cardiovascular:    #Acute on chronic decompensated heart failure with reduced ejection fraction: NYHA IV / ACC  D  #Cardiogenic shock, possible vasoplegia   #Ischemic cardiomyopathy  #Coronary artery disease s/p CABG 4/20 in [place], s/p PCI to RCA 7/20  #Mural thrombus  #Severe MR/TR  #S/p LVAD     MAP > 60, CVP 8-12    Off pressors    Starting warfarin     Aspirin, rosuvastatin    Co-managing with Cards-2    Amiodarone 200 mg daily      GI/Nutrition:   # UGI bleed   - Appreciate GI consult   - Continue tube feeds at goal  - Pantoprazole BID   - Bowel regimen: senna-colace, miralax.  - Put in OG per radiology due to presence of clips       Renal/Fluid Balance/ Electrolytes:   #Oliguria in the setting of likely ATN  - Will monitor intake and output.  - ICU electrolyte replacement protocol  - Goal-directed fluid therapy  - Nephrology consulted; CRRT      Endocrine:    #Glycemic control  #Hx hypothyroid   - ISS, medium intensity   - Synthroid daily       ID/ Antibiotics:  # Febrile, concern for post operative fevers, resolved  # Candidemia   - Completed perioperative antibiotic regimen: vancomycin, levofloxacin, fluconazole, Zosyn   - Vanc (8/30-9/2 ), Zosyn (8/30-9/2).  - Micafungin for yeast in blood from one peripheral site  - ID Consult: treat as real candidemia and pull lines in place at time of positive culture and continue Micafungin   - Removed A-line, R PIV  - C. Diff  - Pleural fluid labs sent per ID      Heme:     #Acute perioperative blood loss anemia  #Thrombocytopenia   - Hgb goal > 8  - Transfuse as necessary   - Coumadin continue after IR resizes R pigtail chest tube      Prophylaxis:    - SCD  - LIH - HOLD   - PPI  - Bowel regimen      MSK:    - PT and OT consulted. Appreciate recs.      Lines/ tubes/ drains:  - [facility] CRRT line   - PIV  - Chest tubes  - Remove arterial line.        Disposition:  - CV ICU.    Patient seen, findings and plan discussed with CVTS Fellow and Dr. Vazquez     -----------------------------------  Spencer Santos PGY4 CA3  CV ICU    *[id number]      ====================================    SUBJECTIVE:   IR pigtail chest tube placed with 600 mL out initially. Hgb drop to 6.9 from 8.0, s/p 2 units pRBCs and CRRT clotting without giving back all the volume, Hgb now 8.3 from 6.8. Plan for IR evaluation of new CT following Chest CT    OBJECTIVE:   1. VITAL SIGNS:   Temp:  [96.4  F (35.8  C)-98.9  F (37.2  C)] 97.8  F (36.6  C)  Pulse:  [] 106  Resp:  [12-24] 17  BP: ()/(33-94) 96/73  MAP:  [68 mmHg-113 mmHg] 113 mmHg  Arterial Line BP: ()/() 112/110  SpO2:  [94 %-100 %] 100 %  Resp: 17      2. INTAKE/ OUTPUT:   I/O last 3 completed shifts:  In: 4164.73 [I.V.:1156.73; Other:20; NG/GT:295]  Out: 3337 [Urine:150; Other:897; Stool:1400; Chest Tube:890]    3. PHYSICAL EXAMINATION:   General: NAD, lying in bed  Neuro: Will follow person around the room. Does not purposefully track. Focuses to voice.  HENT: feeding tube in place via nostril, nasal cannula oxygen  CV: VVI standby, LVAD humming.   Abdomen: Soft, Non-distended, Non-tender  Incisions: sternotomy wound c/d/i s/p closure  Extremities: warm and well perfused  Lines: left dialysis access catheter in place with CRRT running no bleeding or erythema at insertion site    4. INVESTIGATIONS:   Arterial Blood Gases   Recent Labs   Lab 09/03/20  0339 09/02/20  0350 09/01/20  1625 09/01/20  1148   PH 7.50* 7.47* 7.46* 7.48*   PCO2 28* 30* 32* 31*   PO2 70* 121* 105 171*   HCO3 22 21 23 23     Complete Blood Count   Recent Labs   Lab 09/04/20  0544 09/04/20  0156 09/03/20  2046 09/03/20  1555  09/03/20  0339 09/02/20  2035   WBC  --  7.6 8.6  --   --  10.1 9.9   HGB 8.8* 8.4* 6.5* 8.0*   < > 6.8* 6.9*   PLT  --  131* 138*  --   --  171 201    < > = values in this interval not displayed.     Basic Metabolic Panel  Recent Labs   Lab 09/04/20  0156 09/03/20  2212 09/03/20  1555 09/03/20  1041    142 139 139   POTASSIUM 3.5 3.7 4.1 3.7   CHLORIDE 114* 112* 110* 112*   CO2 24 24 23 21    BUN 23 23 22 21   CR 0.83 0.88 0.87 0.78   * 105* 111* 118*     Liver Function Tests  Recent Labs   Lab 09/04/20  0156 09/03/20  0339 09/02/20  0350 09/01/20  1625 09/01/20  0337 08/31/20  0339   AST 38 47* 51*  --  40 41   ALT 26 33 30  --  24 24   ALKPHOS 235* 258* 255*  --  199* 176*   BILITOTAL 1.2 1.7* 1.5*  --  1.1 0.9   ALBUMIN 1.4* 1.4* 1.2*  --  1.3* 1.2*   INR 1.26* 1.27*  --  1.26*  --  1.29*     Pancreatic Enzymes  No lab results found in last 7 days.  Coagulation Profile  Recent Labs   Lab 09/04/20  0156 09/03/20  0713 09/03/20  0339 09/01/20  1625 08/31/20  0339   INR 1.26*  --  1.27* 1.26* 1.29*   PTT  --  34  --   --   --          5. RADIOLOGY:   Recent Results (from the past 24 hour(s))   CT Chest w/o Contrast    Narrative    EXAMINATION: Chest CT  9/3/2020 1:32 PM    CLINICAL HISTORY: Hemothorax; rule out chest wall hematoma vs  hemothorax    COMPARISON: Earlier same day chest x-ray, CT chest 8/10/2020.    TECHNIQUE: CT imaging obtained through the chest without contrast.  Axial, coronal, and sagittal reconstructions and axial MIP reformatted  images are reviewed.     FINDINGS:  Lines and tubes:  Left IJ central line tip is in the mid SVC. Defibrillator wire tip is  in the right atrium. LVAD appropriately positioned. Bilateral anterior  approach chest tubes. Right lateral approach pleural drainage  catheter. Pericardial drain. 2 mediastinal drains. Median sternotomy  wires. Partial visualized enteric tube.    Thyroid gland is unremarkable. No adenopathy in the chest. Heart is  nonenlarged. Small pericardial effusion. Major intrathoracic vessels  are normal in caliber and configuration. Moderate calcification of the  thoracic aorta and coronary arteries.    Lungs and pleura:  New loculated hemothorax along the lateral margin of the right upper  lobe measuring 10.2 x 4.1 x 9.1 cm (AP by transverse by craniocaudal)  extending inferiorly from the tip of the apically directed chest  tube.  Additionally, there are new foci of gas within the right pleural space  near the tip of the chest tube and along its tract anteriorly. Large  right pleural effusion layering dependently in the thorax is increased  since 8/10/2020. Small left pleural effusion is decreased. There is  associated passive atelectasis of the upper and lower lobes  posteriorly.    Upper abdomen:  Limited    Bones and soft tissues:  Subacute nondisplaced right first rib fracture      Impression    IMPRESSION:   1. New loculated hemothorax measuring up to 10.2 cm extending  inferiorly from the region of the right apical chest tube tip. There  are a few foci of gas within the pleural space on the right near the  apical chest tube.  2. Large right pleural effusion is mildly increased since 8/10/2020.  Small left pleural effusion is decreased.    I have personally reviewed the examination and initial interpretation  and I agree with the findings.    ROBERT DE LA PAZ MD   XR Chest Port 1 View    Narrative    Exam: AP chest radiograph, 9/3/2020 11:34 PM    Indication: Evaluate for expanding hemothorax    Comparison: 9/3/2020, chest CT 9/3/2020    FINDINGS: AP chest radiograph. Stable LVAD, and implantable cardiac  device, right pigtail, biapical, and left basilar chest tubes, and  mediastinal drains are stable. Sternotomy wires and enteric tubing.  Stable cardiomediastinal silhouette, CABG. Right upper extremity PICC  tip projecting over the axilla. Stable to minimally increased  loculated right pleural fluid collection/hemothorax. Compressive  atelectasis of the right lung. Bilateral mixed interstitial and  airspace opacities, right greater than left. Stable small left pleural  effusion. Resolved right pneumothorax.      Impression    IMPRESSION:  1. Stable to minimally increased loculated right pleural fluid  collection/hemothorax with associated right pulmonary compressive  atelectasis.   2. Stable small left pleural effusion. Bibasilar  and retrocardiac  atelectasis versus developing consolidation.  3. Mild pulmonary edema.  4. Right upper extremity PICC line tip projects over the axilla.  5. Resolved right pneumothorax.    I have personally reviewed the examination and initial interpretation  and I agree with the findings.    TORSTEN COX, DO       =========================================

## 2020-09-04 NOTE — PROGRESS NOTES
CVTS PROGRESS NOTE  September 4, 2020           CO-MORBIDITIES:   Cardiogenic shock (H)  (primary encounter diagnosis)  LV (left ventricular) mural thrombus  Heart failure (H)  Status post cardiac surgery    ASSESSMENT: Marquise Jj is a 75 year old female 75 year-old female with PMH notable for coronary artery disease s/p CABG (4/20), ischemic cardiomyopathy, severe MR/TR and known mural thrombus who is admitted to the CV ICU s/p redo sternotomy, LVAD (heartmate III), and TV repair on 8/19/20 with Dr. Farley. Chest was left open and packed. S/p chest closure and washout 08/21/20. Extubated 9/1/20.     PLAN SUMMARY:   Per ID increase micafungin to 150, will need 4-6 week course and will need IV if were to discharge.   - Needs OPH f/u in two weeks for candidemia  - Care conference schedule for next week and coordinate with family. Determine goals of care  - Consult palliative for care conference   - Hold Coumadin per cardiology  - Fluid net goal 500-1000  - Hold off on PICC line d/t fungemia   S/p IR for right CT up-sizing   Trend hemoglobin       PLAN:   Neuro/ pain/ sedation:  #Acute post-operative pain   #Likely anoxic brain injury; possible stroke  #Epilepsia Partialis Continua; resolved  - Monitor neurological status. Notify the MD for any acute changes in exam.  - Discontinue seroquel, PRN narcotic   - Keppra, vimpat to continue per NeuroCrit  - Tylenol PRN     Pulmonary:   #Acute hypoxic respiratory failure, improved  #R pleural effusion   #R Hemopneumothorax  - Monitor CXR, O2 saturation  - R pigtail placed 9/2, drained 700 mL fluid  - Developed hemopneumothorax after IR chest tube placement, R pigtail clogged, will discuss with IR options for resizing  - Non con Chest CT per IR prior to chest tube replacement  - Daily CXR  - Extubated on 9/1/2020, if needs support consider BIPAP vs HFNC    Cardiovascular:    #Acute on chronic decompensated heart failure with reduced ejection fraction: NYHA IV / ACC  D  #Cardiogenic shock, possible vasoplegia   #Ischemic cardiomyopathy  #Coronary artery disease s/p CABG 4/20 in [place], s/p PCI to RCA 7/20  #Mural thrombus  #Severe MR/TR  #S/p LVAD     MAP > 60, CVP 8-12    Off pressors    Starting warfarin     Aspirin, rosuvastatin    Co-managing with Cards-2    Amiodarone 200 mg daily      GI/Nutrition:   # UGI bleed   - Appreciate GI consult   - Continue tube feeds at goal  - Pantoprazole BID   - Bowel regimen: senna-colace, miralax.  - Put in OG per radiology due to presence of clips       Renal/Fluid Balance/ Electrolytes:   #Oliguria in the setting of likely ATN  - Will monitor intake and output.  - ICU electrolyte replacement protocol  - Goal-directed fluid therapy  - Nephrology consulted; CRRT      Endocrine:    #Glycemic control  #Hx hypothyroid   - ISS, medium intensity   - Synthroid daily       ID/ Antibiotics:  # Febrile, concern for post operative fevers, resolved  # Candidemia   - Completed perioperative antibiotic regimen: vancomycin, levofloxacin, fluconazole, Zosyn   - Vanc (8/30-9/2 ), Zosyn (8/30-9/2).  - Micafungin for yeast in blood from one peripheral site  - ID Consult: treat as real candidemia and pull lines in place at time of positive culture and continue Micafungin   - Removed A-line, R PIV  - C. Diff  - Pleural fluid labs sent per ID      Heme:     #Acute perioperative blood loss anemia  #Thrombocytopenia   - Hgb goal > 8  - Transfuse as necessary   - Coumadin continue after IR resizes R pigtail chest tube      Prophylaxis:    - SCD  - LIH - HOLD   - PPI  - Bowel regimen      MSK:    - PT and OT consulted. Appreciate recs.      Lines/ tubes/ drains:  - [facility] CRRT line   - PIV  - Chest tubes  - Remove arterial line.        Disposition:  - CV ICU.    Patient seen, findings and plan discussed with CVTS Fellow    -----------------------------------  Spencer Santos PGY4 CA3  CV ICU   *[id number]      ====================================    SUBJECTIVE:   IR pigtail  chest tube placed with 600 mL out initially. Hgb drop to 6.9 from 8.0, s/p 2 units pRBCs and CRRT clotting without giving back all the volume, Hgb now 8.3 from 6.8. Plan for IR evaluation of new CT following Chest CT    OBJECTIVE:   1. VITAL SIGNS:   Temp:  [96.4  F (35.8  C)-98.2  F (36.8  C)] 97.2  F (36.2  C)  Pulse:  [] 76  Resp:  [12-23] 14  BP: ()/(33-94) 80/69  SpO2:  [91 %-100 %] 100 %  Resp: 14      2. INTAKE/ OUTPUT:   I/O last 3 completed shifts:  In: 3976.44 [I.V.:917.44; Other:26; NG/GT:450]  Out: 4792 [Urine:150; Other:752; Stool:1250; Chest Tube:2640]    3. PHYSICAL EXAMINATION:   General: NAD, lying in bed  Neuro: Will follow person around the room. Does not purposefully track. Focuses to voice.  HENT: feeding tube in place via nostril, nasal cannula oxygen  CV: VVI standby, LVAD humming.   Abdomen: Soft, Non-distended, Non-tender  Incisions: sternotomy wound c/d/i s/p closure  Extremities: warm and well perfused  Lines: left dialysis access catheter in place with CRRT running no bleeding or erythema at insertion site    4. INVESTIGATIONS:   Arterial Blood Gases   Recent Labs   Lab 09/03/20  0339 09/02/20  0350 09/01/20  1625 09/01/20  1148   PH 7.50* 7.47* 7.46* 7.48*   PCO2 28* 30* 32* 31*   PO2 70* 121* 105 171*   HCO3 22 21 23 23     Complete Blood Count   Recent Labs   Lab 09/04/20  1139 09/04/20  0544 09/04/20  0156 09/03/20 2046  09/03/20  0339 09/02/20 2035   WBC  --   --  7.6 8.6  --  10.1 9.9   HGB 8.6* 8.8* 8.4* 6.5*   < > 6.8* 6.9*   PLT  --   --  131* 138*  --  171 201    < > = values in this interval not displayed.     Basic Metabolic Panel  Recent Labs   Lab 09/04/20  1139 09/04/20  0156 09/03/20  2212 09/03/20  1555    142 142 139   POTASSIUM 3.5 3.5 3.7 4.1   CHLORIDE 112* 114* 112* 110*   CO2 25 24 24 23   BUN 24 23 23 22   CR 0.83 0.83 0.88 0.87   * 114* 105* 111*     Liver Function Tests  Recent Labs   Lab 09/04/20  0156 09/03/20  0339 09/02/20  0350  09/01/20  1625 09/01/20  0337 08/31/20  0339   AST 38 47* 51*  --  40 41   ALT 26 33 30  --  24 24   ALKPHOS 235* 258* 255*  --  199* 176*   BILITOTAL 1.2 1.7* 1.5*  --  1.1 0.9   ALBUMIN 1.4* 1.4* 1.2*  --  1.3* 1.2*   INR 1.26* 1.27*  --  1.26*  --  1.29*     Pancreatic Enzymes  No lab results found in last 7 days.  Coagulation Profile  Recent Labs   Lab 09/04/20  0156 09/03/20  0713 09/03/20  0339 09/01/20  1625 08/31/20  0339   INR 1.26*  --  1.27* 1.26* 1.29*   PTT  --  34  --   --   --          5. RADIOLOGY:   Recent Results (from the past 24 hour(s))   XR Chest Port 1 View    Narrative    Exam: AP chest radiograph, 9/3/2020 11:34 PM    Indication: Evaluate for expanding hemothorax    Comparison: 9/3/2020, chest CT 9/3/2020    FINDINGS: AP chest radiograph. Stable LVAD, and implantable cardiac  device, right pigtail, biapical, and left basilar chest tubes, and  mediastinal drains are stable. Sternotomy wires and enteric tubing.  Stable cardiomediastinal silhouette, CABG. Right upper extremity PICC  tip projecting over the axilla. Stable to minimally increased  loculated right pleural fluid collection/hemothorax. Compressive  atelectasis of the right lung. Bilateral mixed interstitial and  airspace opacities, right greater than left. Stable small left pleural  effusion. Resolved right pneumothorax.      Impression    IMPRESSION:  1. Stable to minimally increased loculated right pleural fluid  collection/hemothorax with associated right pulmonary compressive  atelectasis.   2. Stable small left pleural effusion. Bibasilar and retrocardiac  atelectasis versus developing consolidation.  3. Mild pulmonary edema.  4. Right upper extremity PICC line tip projects over the axilla.  5. Resolved right pneumothorax.    I have personally reviewed the examination and initial interpretation  and I agree with the findings.    TORSTEN COX, DO   XR Chest Port 1 View    Narrative    EXAM: XR CHEST PORT 1 VW  9/4/2020 8:15 AM      HISTORY:  interval change, right hemothorax       COMPARISON:  Chest radiograph 9/3/2020    FINDINGS:   Single AP view of the chest at 30 degrees. Unchanged position of LVAD  and left chest wall implantable cardiac device. Unchanged position of  mediastinal drains, biapical chest tubes, left basilar chest tube, and  right apically directed pigtail chest tube. Right upper extremity PICC  tip continues to project over the axilla. Feeding tube courses below  the diaphragm and beyond the field-of-view. Postsurgical changes of  tricuspid valve repair and intraventricular clot removal with intact  sternotomy wires and mediastinal surgical clips.    Trachea is midline. Cardiomediastinal silhouette is stable. Slightly  decreased loculated right pleural collection/hemothorax with  compressive atelectasis of the right lung. Slightly decreased  bilateral mixed interstitial and airspace opacities, right greater  than left. Decreased left pleural effusion.        Impression    IMPRESSION:   1. Chest tubes in place.  2. Right upper extremity PICC tip projects over the right axilla.  Unchanged position of support devices.  3. Slightly decreased right loculated pleural fluid  collection/hemothorax with associated compressive atelectasis.  4. Improving pulmonary edema.     I have personally reviewed the examination and initial interpretation  and I agree with the findings.    ROBERT DE LA PAZ MD   CT Chest Tube with Cath Placement    Narrative    PROCEDURES 9/4/2020:  1. CT-guided right chest tube placement.    CLINICAL HISTORY: Right hemothorax. Previously placed chest tube not  functioning well. Large bore right chest tube placement requested.    COMPARISONS: Chest x-ray 9/4/2020    STAFF RADIOLOGIST: LOIDA Berman MD    FELLOW/RESIDENT: MD TRIP Caldwell, CAMILO BERMAN MD, attest that I was present in the procedure room for  the entire procedure.    MEDICATIONS: The patient was placed on continuous monitoring. Vital  signs and sedation  were monitored by the Interventional Radiology  nurse under the Attending Physician's supervision. 1 mg Versed and 50  mcg fentanyl IV. The patient remained stable throughout the procedure.    ATTENDING FACE-TO-FACE SEDATION TIME: 15 minutes    DOSE: 646 mGy*cm.    PROCEDURE: The patient understood the limitations, alternatives, and  risks of the procedure and requested the procedure be performed. Both  written and verbal consent were obtained.    Left lateral decubitus CT demonstrated the right hemothorax. The right  lateral chest was prepped and draped in usual sterile fashion. 1%  lidocaine without epinephrine was used for local anesthesia.    Under CT guidance, 5 [place] [employer] centesis catheter needle  was advanced to the right hemothorax and directed posteriorly. CT  image documenting catheter needle placement was saved in the patient's  record.    Needle removed. Catheter removed over guidewire. Tract dilated over  guidewire to 28 [place] size. 24 [place] [employer]al[employer]  drainage catheter advanced over guidewire placed as right chest tube.  Guidewire removed.    CT image documenting new right chest tube placement and position was  saved in the patient's record.    2-0 nylon catheter retaining suture and sterile dressing applied.  Catheter to water seal chest drainage. No immediate complication.      Impression    IMPRESSION: 24 [place] [employer] right chest tube placed  under CT guidance. Catheter to 20 cm underwater suction once the  patient returns to her unit.    CAMILO GRADY MD       =========================================

## 2020-09-04 NOTE — PROGRESS NOTES
Patient Name: Marquise Jj  Medical Record Number: [id number]  Today's Date: 9/4/2020    Procedure: right side chest tube placement   Proceduralist:  (#[id number])    Procedure Start: 1045  Procedure end: 1115  Sedation medications administered: 1mg versed 50mcg fentanyl     Report given to: Akbar BAIG     Pt arrived to IR room CT-2 from 4509-01. Consent reviewed/ obtained from daughter Norman JOHNSON per thu Gómez. Pt positioned left side and monitored per protocol. Pt tolerated procedure without any noted complications. Pt transferred back to 4509-01.

## 2020-09-04 NOTE — PROGRESS NOTES
Cardiology Progress Note    Assessment & Plan   In summary, Marquise Jj is a 75-year-old female with a past medical history notable for coronary artery disease, ischemic cardiomyopathy, and known mural thrombus who was transferred from [facility] for further evaluation/treatment of cardiogenic shock. Balloon pump placed 8/14. Had HM3 8/19. Chest closed 8/21.    Today's changes:  - IR for chest tube of Hemopneumothorax  - continue antifungal therapy for fungemia  - no evidence of ophthalmologic fungal involvement   - increase LVAD speed 100 rpm  - Fluid goal: neg 500 ml to 1L  - f/u ID re: duration of fungal therapy        Neuro:   # Sedation --> extubated, alert  # concern for seizure activity  CTH head 8/20 no signs of ICH   CTH head 8/27 Scattered areas of hypodensity with loss of gray-white matter differentiation in the left frontal lobe. These areas were not present on the CT dated 8/10/2020 and difficult compared to other prior comparison CTs due to extensive hardware artifact. These are suspicious for involving embolic infarcts.  keppra 2 g PO bid   vimpat 200 BID    Cardio:  # Cardiogenic shock with vasoplegic component    # ICM: NYHA IV / ACC  # Severe MR/TR s/p tricuspid valve repair with Elkins MC3 Annuloplasty Ring size T30  # s/p LVAD HM3 on 8/19/2020  LAVD speed 5300, flow 3.4-3.7, pi 2.5-3.7, power 3.5-3.6   Presents with cardiogenic shock. On NE, cardiac index approximately 1.37 on admission. Severely elevated biventricular filling pressures. Etiology of her decompensation appears to be progression of her cardiomyopathy. Despite medical treatment, she has been hospitalized twice since returning to [place], both with low output. No viability in her LAD territory, and doubt that PCI to her circumflex would make meaningful LV recovery. Hemodynamics improved with dobutamine, did not tolerate attempt to wean inotropics. RHC removed on 8/13 after clotting off, replaced on 8/14 after  patient with increased work of breathing. CI of 1.1, balloon pump placed with CI of 1.6-1.8 and improvement in symptoms/hemodynamics. Had LVAD HM3 placed on 8/19. CVP today around 10 range. Chest closed on 8/21.   -- holding hep gtt as above      Date 8/9 8/9 8/10 8/11 08/13/20 08/15/20 08/16/20 08/17/20 8/18/20 8/19 8/20 8/21*   CVP 12 9 3 4 8 9 6 8 11 5 10 13   Mean PA  35 30 21 25 23 21 20 35/18 35/18 - 30/12 28/14   PCWP  18 14 12 x 13 14  13 - -    Oxy HGB  59 64 61 61 60 47 62 69 54 - 68 66   CI/CO 2.3 2.3 2.4/4.2 2.3 2.2 1.8 2.8/4.9 4.1 4.3/2.5 - 6/3.3 6.9/3.8   SVR 1100 1100 1200 1400 1316 1900 4303 434 4106 - 1025 715   Device     IABP 1:1 IABP 1:1 IABP 1:1 IABP 1:1 IABP 1:1 No IABP No IABP No IABP   * epi 0.1, vaso 0.5     8/22: CVP 12, PA 28/14/19, PCWP 14, CO/CI 9.0/4.8. . Epi 0.1, vaso 1.  8/23: CVP 16, PA 30/14, CI/CO 4.4/8.0, , SvO2 71%  8/24: CVP 14, PA 44/20/28, PCWP 16, CI/CO 7.3/3.9, SvO2 68% epi 0.06, vaso 2  8/25: CVP 16, PA 44/20/29, PCWP 22, CI/CO  3.6, SvO2 82%, vaso 2, epi 0.03  8/26: CVP 13, PA 40/20/26, PCWP 12, CI/CO 7/ 3.6, SvO2 74%, vaso 1  8/27: CVP 13, PA 38/18/26, PCWP 12-14, CI/CO 9.4/4.7, , PVR ~0.5, SvO2 73%, vaso 1, epi 0.05     # CAD s/p CABG 4/2020 (KURT to RCA and LIMA to LAD) and PCI to RCA 7/20  - Stop home plavix po qd (8/13) for LVAD surgery  - aspirin 81mg PO qd   - c/w home crestor    # Mural thrombus  - removed during surgery on 8/19    # A-flutter  Converted to NSR on 8/15    - Continue amiodarone 200mg po qd      Pulm:  # Acute hypoxic respiratory failure  Extubated on 9/1/2020     Renal/Electrolytes   # Hyperkalemia/Hypokalemia   # Acute kidney injury on chronic kidney disease, stage III  Likely ATN due to hypoxic insult  - renal consult and diuretic challenge. May need dialysis  - Trend potassium and creatinine q12hrs  - Electrolyte replacement protocol  - Monitor UOP   - continue CVVH      GI:  #GIB  GI consulted and EGD on 8/28 with  protuberant vessel injected and clipped and bleeding gastric ulcer with adherent clot, injected and clipped as well  Repeat bleed on 8/29-8/30, transfused and given pRBC, underwent repeat EGD, showed hematin throughout stomache with single bleeding angioectasia vs gastric erosion (from NGT) in stomach. Likely source of bleed and thought related to current bleeding episode. Hypothermia thought to be related to bleed.    # Constipation  # Severe protein calorie malnutrition  - Nutrition through tube feeds at 30 mL/hr    - Senna-docusate bid scheduled  - Miralax bid prn constipation    ID:   # UTI vs ASB (resolved)  Periprocedural abx as per surgical team   - has been treated with levoloxacin, zosyn, rifampin, vancomycin, fluconazole, ceftriaxone during hospitalization  - broadened on 8/31 with vanco 2/2 concern for potential sepsis --> now discontinued  - micrafungin added 9/1 for blood culture positive for yeast. ID consulted, appreciate recs    Hem/Onc  # Mural thrombus removed on 8/19  # Anemia, mild  D/t frequent blood draws and procedures, and two large GI bleesd  - Monitor hgb  - PPI gtt --> IV BID    Endo  # Hypothyroidism   - Continue PTA levothyroxine     Nutrition: holding TF  DVT Prophylaxis: mechanical   Code Status: Full Code    Waylon Garcia MD, MSc  Cardiovascular Disease Fellow  [facility]    Discussed with Dr Christian      I have reviewed today's vital signs, notes, medications, labs and imaging. I have also seen and examined the patient and agree with the findings and plan as outlined above.    Nel Christian MD  Section Head - Advanced Heart Failure, Transplantation and Mechanical Circulatory Support  Director - Adult Congenital and Cardiovascular Genetics Center  Associate Professor of Medicine, [facility]        Interval History   H/H drop, given blood again overnight. Hemopneumothorax diagnosed.       Physical Exam   Temp: 97  F (36.1  C) Temp src: Axillary BP: 104/75  Pulse: 87   Resp: 14 SpO2: 96 % O2 Device: None (Room air) Oxygen Delivery: 3 LPM  Vitals:    09/02/20 0116 09/03/20 0400 09/04/20 0515   Weight: 79.1 kg (174 lb 6.1 oz) 79.5 kg (175 lb 4.3 oz) 80.6 kg (177 lb 11.1 oz)     Vital Signs with Ranges  Temp:  [96.4  F (35.8  C)-98.9  F (37.2  C)] 97  F (36.1  C)  Pulse:  [] 87  Resp:  [12-24] 14  BP: ()/(33-94) 104/75  MAP:  [68 mmHg-113 mmHg] 113 mmHg  Arterial Line BP: ()/() 112/110  SpO2:  [91 %-100 %] 96 %  I/O last 3 completed shifts:  In: 4164.73 [I.V.:1156.73; Other:20; NG/GT:295]  Out: 3337 [Urine:150; Other:897; Stool:1400; Chest Tube:890]    RETIRE: Heart Rate: 71, Blood pressure 104/75, pulse 87, temperature 97  F (36.1  C), temperature source Axillary, resp. rate 14, weight 80.6 kg (177 lb 11.1 oz), SpO2 96 %.  177 lbs 11.05 oz     GEN: alert, non to minimally verbal  CV: RRR, Hum of HM3   LUNGS: Clear to auscultation bilaterally  ABD: Active bowel sounds, soft, no abdominal tenderness.   EXT: Trace LE edema   NEURO: altert, tracks people/objects across room; responds to her name, husbands name, daughter's name. moves b/l LE and LUE to command    Medications     BETA BLOCKER NOT PRESCRIBED       dextrose Stopped (09/02/20 1957)     dextrose Stopped (08/30/20 2221)     CRRT replacement solution 12.5 mL/kg/hr (09/04/20 0205)     EPINEPHrine IV infusion ADULT 0.02 mcg/kg/min (09/04/20 0900)     - MEDICATION INSTRUCTIONS -       CRRT replacement solution 2.911 mL/kg/hr (09/03/20 2036)     CRRT replacement solution 12.5 mL/kg/hr (09/04/20 0803)     BETA BLOCKER NOT PRESCRIBED       vasopressin       Warfarin Therapy Reminder         amiodarone  200 mg Oral or Feeding Tube Daily     artificial tears   Both Eyes 5x Daily     aspirin  81 mg Oral or Feeding Tube Daily     B and C vitamin Complex with folic acid  5 mL Oral Daily     bimatoprost  1 drop Both Eyes At Bedtime     busPIRone  10 mg Oral or Feeding Tube TID     heparin lock flush   5-15 mL Intracatheter Q24H     insulin aspart  1-6 Units Subcutaneous Q4H     lacosamide (VIMPAT) intermittent infusion  200 mg Intravenous BID     latanoprost  1 drop Both Eyes Daily     levETIRAcetam  2,000 mg Intravenous Q12H     levothyroxine  62.5 mcg Oral or Feeding Tube QAM AC     micafungin  100 mg Intravenous Q24H     pantoprazole (PROTONIX) IV  40 mg Intravenous BID     rosuvastatin  20 mg Oral or Feeding Tube Daily     sodium chloride (PF)  10 mL Intracatheter Q8H     sodium chloride (PF)  3 mL Intracatheter Q8H       Data          Intake/Output Summary (Last 24 hours) at 9/4/2020 0948  Last data filed at 9/4/2020 0900  Gross per 24 hour   Intake 4314.24 ml   Output 3434 ml   Net 880.24 ml           Recent Labs   Lab 09/04/20  0544 09/04/20  0156 09/03/20  2212 09/03/20  2046 09/03/20  1555  09/03/20  0339  09/01/20  1625   WBC  --  7.6  --  8.6  --   --  10.1   < > 13.3*   HGB 8.8* 8.4*  --  6.5* 8.0*   < > 6.8*   < > 7.8*   MCV  --  91  --  93  --   --  91   < > 91   PLT  --  131*  --  138*  --   --  171   < > 214   INR  --  1.26*  --   --   --   --  1.27*  --  1.26*   NA  --  142 142  --  139   < > 138   < > 140   POTASSIUM  --  3.5 3.7  --  4.1   < > 3.8   < > 3.7   CHLORIDE  --  114* 112*  --  110*   < > 109   < > 110*   CO2  --  24 24  --  23   < > 22   < > 22   BUN  --  23 23  --  22   < > 21   < > 29   CR  --  0.83 0.88  --  0.87   < > 0.76   < > 0.82   ANIONGAP  --  5 5  --  6   < > 8   < > 8   FERNANDO  --  7.2* 7.4*  --  7.3*   < > 7.2*   < > 8.0*   GLC  --  114* 105*  --  111*   < > 112*   < > 72   ALBUMIN  --  1.4*  --   --   --   --  1.4*   < >  --    PROTTOTAL  --  4.0*  --   --   --   --  4.2*   < >  --    BILITOTAL  --  1.2  --   --   --   --  1.7*   < >  --    ALKPHOS  --  235*  --   --   --   --  258*   < >  --    ALT  --  26  --   --   --   --  33   < >  --    AST  --  38  --   --   --   --  47*   < >  --     < > = values in this interval not displayed.       Recent Results (from the past  24 hour(s))   CT Chest w/o Contrast    Narrative    EXAMINATION: Chest CT  9/3/2020 1:32 PM    CLINICAL HISTORY: Hemothorax; rule out chest wall hematoma vs  hemothorax    COMPARISON: Earlier same day chest x-ray, CT chest 8/10/2020.    TECHNIQUE: CT imaging obtained through the chest without contrast.  Axial, coronal, and sagittal reconstructions and axial MIP reformatted  images are reviewed.     FINDINGS:  Lines and tubes:  Left IJ central line tip is in the mid SVC. Defibrillator wire tip is  in the right atrium. LVAD appropriately positioned. Bilateral anterior  approach chest tubes. Right lateral approach pleural drainage  catheter. Pericardial drain. 2 mediastinal drains. Median sternotomy  wires. Partial visualized enteric tube.    Thyroid gland is unremarkable. No adenopathy in the chest. Heart is  nonenlarged. Small pericardial effusion. Major intrathoracic vessels  are normal in caliber and configuration. Moderate calcification of the  thoracic aorta and coronary arteries.    Lungs and pleura:  New loculated hemothorax along the lateral margin of the right upper  lobe measuring 10.2 x 4.1 x 9.1 cm (AP by transverse by craniocaudal)  extending inferiorly from the tip of the apically directed chest tube.  Additionally, there are new foci of gas within the right pleural space  near the tip of the chest tube and along its tract anteriorly. Large  right pleural effusion layering dependently in the thorax is increased  since 8/10/2020. Small left pleural effusion is decreased. There is  associated passive atelectasis of the upper and lower lobes  posteriorly.    Upper abdomen:  Limited    Bones and soft tissues:  Subacute nondisplaced right first rib fracture      Impression    IMPRESSION:   1. New loculated hemothorax measuring up to 10.2 cm extending  inferiorly from the region of the right apical chest tube tip. There  are a few foci of gas within the pleural space on the right near the  apical chest  tube.  2. Large right pleural effusion is mildly increased since 8/10/2020.  Small left pleural effusion is decreased.    I have personally reviewed the examination and initial interpretation  and I agree with the findings.    ROBERT DE LA PAZ MD   XR Chest Port 1 View    Narrative    Exam: AP chest radiograph, 9/3/2020 11:34 PM    Indication: Evaluate for expanding hemothorax    Comparison: 9/3/2020, chest CT 9/3/2020    FINDINGS: AP chest radiograph. Stable LVAD, and implantable cardiac  device, right pigtail, biapical, and left basilar chest tubes, and  mediastinal drains are stable. Sternotomy wires and enteric tubing.  Stable cardiomediastinal silhouette, CABG. Right upper extremity PICC  tip projecting over the axilla. Stable to minimally increased  loculated right pleural fluid collection/hemothorax. Compressive  atelectasis of the right lung. Bilateral mixed interstitial and  airspace opacities, right greater than left. Stable small left pleural  effusion. Resolved right pneumothorax.      Impression    IMPRESSION:  1. Stable to minimally increased loculated right pleural fluid  collection/hemothorax with associated right pulmonary compressive  atelectasis.   2. Stable small left pleural effusion. Bibasilar and retrocardiac  atelectasis versus developing consolidation.  3. Mild pulmonary edema.  4. Right upper extremity PICC line tip projects over the axilla.  5. Resolved right pneumothorax.    I have personally reviewed the examination and initial interpretation  and I agree with the findings.    TORSTEN COX, DO

## 2020-09-04 NOTE — PROGRESS NOTES
[facility] General ID Service: Follow Up Note      Patient:  Marquise Jj   Date of birth 1945, Medical record number [id number]  Date of Visit:  09/04/2020  Date of Admission: 8/8/2020         Assessment and Recommendations:   ID Problem List:  1. Fungemia- Candida glabrata   2. LVAD (HeartMate III, 8/19/20)  3. Cardiogenic shock, resolved  4. LORI on CKD stage III requiring CRRT  5. GI bleed while on heparin s/p EGD with clipping on 8/27, 8/30  6. CAD, ischemic cardiomyopathy  7. Seizures, encephalopathy, possible CVA      Recommendations:  1. Increase micafungin to 150mg IV q24hrs, anticipate prolonged (4+ weeks) course of IV therapy.  2. Daily blood cultures  3. Remove/exchange remaining lines as able    Dr. Bland will be on call for General ID service from 9/5-9/7.       Discussion:  Marquise Jj is a 75 year old female with history of CAD s/p CABG (4/2020), ischemic cardiomyopathy with EF <20%, severe MR/TR with known mural thrombus, hypothyroidism, anxiety, and GERD who was transferred from [facility] on 8/8/20 for management of cardiogenic shock and admitted underwent redo sternotomy, LVAD placement (Heartmate III), and TV repair on 8/19/20 with Dr. Farley. Low-grade fever on 8/29, 8/30 prompted peripheral blood culture x1 and sputum culture with initiation of broad spectrum abx on 8/30. Blood culture (8/30) positive for yeast on day 1.      Multiple risk factors for fungemia. Patient had multiple central lines in place (bilateral internal jugular 24 days and <1 day old and art line 13 days old) as well as previous IABP and [place] Zia catheter. Recent surgery with placement of LVAD and TV repair. Briefly on TPN, stopped after team notified of positive yeast. Right internal jugular removed on 9/2.      Micafungin was started on 9/1, continue. TTE was obtained on 9/1 as part of post-LVAD plan. Blood cultures from 9/1 now positive for yeast. internal jugular line placed on 9/1, would remove as able,  other lines have been removed. Increase Micafungin to 150, unfortunately it is intermediate to fluconazole. Continue to collect daily blood cultures to assess for clearance of fungemia.      Recs were discussed with primary team today. Don't hesitate to call with questions.     Attestation:  I have reviewed today's vital signs, medications, labs and imaging.  Iesha Estes PA-C, Pager # [phone]            Interval History:     Blood culture 9/1 positive. On epi this AM. Not tracking today, neuro status remains fluctuant. Up in chair and CRRT running. Plan for chest tube replacement/size increase today.         Review of Systems:   Unable to obtain due to altered mental status.          Current Antimicrobials   Current:  - Micafungin (start 9/1)    Prior:  - Vancomycin (8/30-9/2, 8/19-8/21)  - Zosyn (8/30-9/2, 8/19-8/24)  - Rifampin (8/19-8/21)  - Fluconazole (8/19-8/21)  - ceftriaxone (8/14-8/18)       Physical Exam:   Ranges for vital signs:  Temp:  [96.4  F (35.8  C)-98.9  F (37.2  C)] 97  F (36.1  C)  Pulse:  [] 83  Resp:  [12-24] 16  BP: ()/(33-94) 107/59  SpO2:  [91 %-100 %] 98 %    Intake/Output Summary (Last 24 hours) at 9/3/2020 1107  Last data filed at 9/3/2020 1100  Gross per 24 hour   Intake 3944.5 ml   Output 2965 ml   Net 979.5 ml     Exam:  GENERAL:  Eyes open, does no track or interact this AM. Up in chair.   ENT:  Head is normocephalic, atraumatic. Oropharynx is moist without exudates or ulcers.  EYES:  Eyes have anicteric sclerae.    NECK:  Old RIJ site with dressing in place, CRRT running via LIJ  LUNGS:  Clear to auscultation, no rales or ronchi.  CARDIOVASCULAR:  +LVAD hum  ABDOMEN:  Soft, non-tender, non-distended, + bowel sounds  EXT: Extremities warm, no mottling. 1+ pitting edema  SKIN:  No acute rashes.   LINES:               - CVC double lumen L - internal jugular (9/1/20)   - midline (9/3/20)         Laboratory Data:   Reviewed.  Pertinent for:    Culture data:  9/4/20 blood  culture x2: PENDING  9/3/20 blood culture x2: NGTD  9/1/20 blood culture: +Yeast on 4th day of incubation   9/1/20 blood culture: NGTD   8/30/20 sputum culture: NG (final)  8/30/20 blood culture: Candida glabrata complex, cultured on 1st day of incubation   8/18/20 urine culture: <10K cfu mixed urogenital valeriano  8/12/20 urine culture: >100K cfu Klebsiella pneumoniae  8/10/20 urine culture: no growth    Inflammatory Markers  No lab results found.    Hematology Studies    Recent Labs   Lab Test 09/04/20  0544 09/04/20  0156 09/03/20  2046 09/03/20  1555  09/03/20  0339 09/02/20 2035   WBC  --  7.6 8.6  --   --  10.1 9.9   HGB 8.8* 8.4* 6.5* 8.0*   < > 6.8* 6.9*   MCV  --  91 93  --   --  91 91   PLT  --  131* 138*  --   --  171 201    < > = values in this interval not displayed.     Recent Labs   Lab Test 08/29/20  2043 08/11/20  0411 08/10/20  1243 08/10/20  0403   ANEU 8.8* 3.9 5.3 4.4   AEOS 0.5 0.4 0.3 0.2       Metabolic Studies     Recent Labs   Lab Test 09/04/20  0156 09/03/20  2212 09/03/20  1555 09/03/20  1041    142 139 139   POTASSIUM 3.5 3.7 4.1 3.7   CHLORIDE 114* 112* 110* 112*   CO2 24 24 23 21   BUN 23 23 22 21   CR 0.83 0.88 0.87 0.78   GFRESTIMATED 69 64 65 75       Hepatic Studies    Recent Labs   Lab Test 09/04/20  0156 09/03/20  0339 09/02/20  0350   BILITOTAL 1.2 1.7* 1.5*   ALKPHOS 235* 258* 255*   ALBUMIN 1.4* 1.4* 1.2*   AST 38 47* 51*   ALT 26 33 30            Imaging:   CXR (9/3/2020)  IMPRESSION:   1. Chest tubes in place.  2. Right upper extremity PICC tip projects over the right axilla.  Unchanged position of support devices.  3. Slightly decreased right loculated pleural fluid  collection/hemothorax with associated compressive atelectasis.  4. Improving pulmonary edema.     CXR 9/2/2020  IMPRESSION:  1. Slightly increased right and stable left pleural effusion with  overlying basilar retrocardiac atelectasis versus consolidation.  2. Stable support devices as above.  3. Mild  pulmonary edema.     ECHO (9/1/20)  Interpretation Summary  s/p HeartMate III LVAD. Speed is 5300 rpm. LVIDd is 4.4 cm. The septum appears  midline. The AV opens intermittently. There is mild continuous AI. Inflow and  outflow graft velocities are normal.  Severely (EF 10-20%) reduced left ventricular function is present.  Global right ventricular function is moderately reduced. The right ventricle  is normal size.  s/p TV repair with 30 mm Edward MC3 annuloplasty ring. At least moderate TR is  present. Etiology is unclear though may partially due to septal impingement  from the ICD lead. There is no prosthetic stenosis. Gradient is 2 mmHg at 83  bpm.  This study was compared with the study from 08/10/2020. The LVAD is new, as is  the RV dysfunction.

## 2020-09-04 NOTE — PROGRESS NOTES
Nephrology Progress Note  09/04/2020         Mrs Jj is a 75 yof w/ICM, s/p CABG (4/20), severe MR/TR and known mural thrombus, acute kidney injry, hypothyroidism, CKD3, anxiety, GERD, chronic anemia.  Transferred from [facility] to [facility] on 8/8 after developing cardiac shock. Placed on IABP and then LVAD placement and TVR repair for cardiogenic shock on 8/19 /20.  Nephrology consulted for managign Oliguric LORI on CKD stage 3, started CRRT on 8/26.     Interval History :   Mrs Jj continues on CRRT, net positive 1L yesterday with some hypotension.  On track to be ~1L net negative today, BP's borderline and intake is fairly high with ongoing need for blood products and abx.  Ordered for 50cc/hr net negative.       Assessment & Recommendations:   LORI on CKD-Baseline Cr ~1.5, small L kidney at 8.3cm, 10cm R kidney at baseline.  Started CRRT on 8/26 for management of volume and electrolytes in setting of cardiogenic shock, LVAD placed on 8/19.  Etiology of LORI is likely ATN from hypoperfusion, started CRRT on 8/26.               -Line is LIJ temp line from 9/1               -Continuing CRRT, 4k baths, 25ml/kg/hr standard dosing, goal 50cc/hr net negative.                 -Some UOP but not nearly enough to keep up with intake, monitoring.      Volume status-Net positive yesterday with hypotension, goal is 50cc/hr net negative for ~1L net negative today, may need pressor to achieve.       Electrolytes/pH-K 3.5, bicarb 24, on 4k baths.      Ca/phos/pth-Ca 7.2, Mg and Phos WNL this am.      ID-Fungal cultures +, ID consulted, new HD line yesterday, stopped TPN.       Anemia-Hgb 8.8, intermittent PRBC's, acute management per team.      Nutrition-Had been on TPN, off now and on Impact TF with fungemia.      Seen and discussed with Dr Chavez     Recommendations were communicated to primary team via verbal communication    STEPHAN Faulkner CNS  Clinical Nurse Specialist  [phone]    Review of Systems:   I reviewed the  following systems:  ROS not done due to lethargy.     Physical Exam:   I/O last 3 completed shifts:  In: 4164.73 [I.V.:1156.73; Other:20; NG/GT:295]  Out: 3337 [Urine:150; Other:897; Stool:1400; Chest Tube:890]   /59   Pulse 83   Temp 97  F (36.1  C) (Axillary)   Resp 16   Wt 80.6 kg (177 lb 11.1 oz)   SpO2 98%   BMI 27.02 kg/m       GENERAL APPEARANCE: Extubated, lethargic but responds to stimuli.   EYES:  No scleral icterus, pupils equal  HENT: mouth without ulcers or lesions  PULM: lungs clear to auscultation, equal air movement, no cyanosis or clubbing  CV: regular rhythm, normal rate, no rub     -JVP not elevated.      -edema +3 generalized.   GI: soft, nontender, +distended, bowel sounds are +  MS: no evidence of inflammation in joints, no muscle tenderness  NEURO: Lethargic, no focal deficits.   Lines LIJ temp line from 9/1    Labs:   All labs reviewed by me  Electrolytes/Renal -   Recent Labs   Lab Test 09/04/20  0156 09/03/20  2212 09/03/20  1555  09/03/20  0339    142 139   < > 138   POTASSIUM 3.5 3.7 4.1   < > 3.8   CHLORIDE 114* 112* 110*   < > 109   CO2 24 24 23   < > 22   BUN 23 23 22   < > 21   CR 0.83 0.88 0.87   < > 0.76   * 105* 111*   < > 112*   FERNANDO 7.2* 7.4* 7.3*   < > 7.2*   MAG 2.2  --  2.1  --  2.2   PHOS 3.6  --  3.6  --  3.7    < > = values in this interval not displayed.       CBC -   Recent Labs   Lab Test 09/04/20  0544 09/04/20  0156 09/03/20  2046  09/03/20  0339   WBC  --  7.6 8.6  --  10.1   HGB 8.8* 8.4* 6.5*   < > 6.8*   PLT  --  131* 138*  --  171    < > = values in this interval not displayed.       LFTs -   Recent Labs   Lab Test 09/04/20  0156 09/03/20  0339 09/02/20  0350   ALKPHOS 235* 258* 255*   BILITOTAL 1.2 1.7* 1.5*   ALT 26 33 30   AST 38 47* 51*   PROTTOTAL 4.0* 4.2* 4.8*   ALBUMIN 1.4* 1.4* 1.2*       Iron Panel -   Recent Labs   Lab Test 08/10/20  1052 07/01/20  0530   IRON 48 65   IRONSAT 15 17   HUSEYIN 165  --            Current Medications:     amiodarone  200 mg Oral or Feeding Tube Daily     artificial tears   Both Eyes 5x Daily     aspirin  81 mg Oral or Feeding Tube Daily     B and C vitamin Complex with folic acid  5 mL Oral Daily     bimatoprost  1 drop Both Eyes At Bedtime     busPIRone  10 mg Oral or Feeding Tube TID     heparin lock flush  5-15 mL Intracatheter Q24H     insulin aspart  1-6 Units Subcutaneous Q4H     lacosamide  200 mg Oral or Feeding Tube BID     latanoprost  1 drop Both Eyes Daily     levETIRAcetam  2,000 mg Oral or Feeding Tube BID     levothyroxine  62.5 mcg Oral or Feeding Tube QAM AC     micafungin  100 mg Intravenous Q24H     pantoprazole  40 mg Per Feeding Tube BID AC     rosuvastatin  20 mg Oral or Feeding Tube Daily     sodium chloride (PF)  10 mL Intracatheter Q8H     sodium chloride (PF)  3 mL Intracatheter Q8H       BETA BLOCKER NOT PRESCRIBED       dextrose Stopped (09/02/20 1957)     dextrose Stopped (08/30/20 2221)     CRRT replacement solution 12.5 mL/kg/hr (09/04/20 0205)     EPINEPHrine IV infusion ADULT Stopped (09/04/20 1130)     - MEDICATION INSTRUCTIONS -       CRRT replacement solution 2.911 mL/kg/hr (09/03/20 2036)     CRRT replacement solution 12.5 mL/kg/hr (09/04/20 0803)     BETA BLOCKER NOT PRESCRIBED       vasopressin

## 2020-09-05 NOTE — PROGRESS NOTES
Cardiology Progress Note    Assessment & Plan   In summary, Marquise Jj is a 75-year-old female with a past medical history notable for coronary artery disease, ischemic cardiomyopathy, and known mural thrombus who was transferred from [facility] for further evaluation/treatment of cardiogenic shock. Balloon pump placed 8/14. Had HM3 8/19. Chest closed 8/21.    Today's changes:  - restart Coumadin today  - vanc for Methicillin resistant staph epi in blood cultures on 9/3, possibly contaminant  - continue antifungal therapy for fungemia  - Fluid goal: neg 500 ml to 1L  - family meeting Tuesday    Neuro:   # Sedation --> extubated, alert  # concern for seizure activity  CTH head 8/20 no signs of ICH   CTH head 8/27 Scattered areas of hypodensity with loss of gray-white matter differentiation in the left frontal lobe. These areas were not present on the CT dated 8/10/2020 and difficult compared to other prior comparison CTs due to extensive hardware artifact. These are suspicious for involving embolic infarcts.  keppra 2 g PO bid   vimpat 200 BID    Cardio:  # Cardiogenic shock with vasoplegic component    # ICM: NYHA IV / ACC  # Severe MR/TR s/p tricuspid valve repair with Elkins MC3 Annuloplasty Ring size T30  # s/p LVAD HM3 on 8/19/2020  LAVD speed 5400, flow 3.7-3.9, pi 3.5, power 3.8-3.9   Presents with cardiogenic shock. On NE, cardiac index approximately 1.37 on admission. Severely elevated biventricular filling pressures. Etiology of her decompensation appears to be progression of her cardiomyopathy. Despite medical treatment, she has been hospitalized twice since returning to [place], both with low output. No viability in her LAD territory, and doubt that PCI to her circumflex would make meaningful LV recovery. Hemodynamics improved with dobutamine, did not tolerate attempt to wean inotropics. RHC removed on 8/13 after clotting off, replaced on 8/14 after patient with increased work of  breathing. CI of 1.1, balloon pump placed with CI of 1.6-1.8 and improvement in symptoms/hemodynamics. Had LVAD HM3 placed on 8/19. CVP today around 10 range. Chest closed on 8/21.   -- holding hep gtt as above  -- start warfarin today      Date 8/9 8/9 8/10 8/11 08/13/20 08/15/20 08/16/20 08/17/20 8/18/20 8/19 8/20 8/21*   CVP 12 9 3 4 8 9 6 8 11 5 10 13   Mean PA  35 30 21 25 23 21 20 35/18 35/18 - 30/12 28/14   PCWP  18 14 12 x 13 14  13 - -    Oxy HGB  59 64 61 61 60 47 62 69 54 - 68 66   CI/CO 2.3 2.3 2.4/4.2 2.3 2.2 1.8 2.8/4.9 4.1 4.3/2.5 - 6/3.3 6.9/3.8   SVR 1100 1100 1200 1400 1316 1900 3784 174 0771 - 1025 715   Device     IABP 1:1 IABP 1:1 IABP 1:1 IABP 1:1 IABP 1:1 No IABP No IABP No IABP   * epi 0.1, vaso 0.5     8/22: CVP 12, PA 28/14/19, PCWP 14, CO/CI 9.0/4.8. . Epi 0.1, vaso 1.  8/23: CVP 16, PA 30/14, CI/CO 4.4/8.0, , SvO2 71%  8/24: CVP 14, PA 44/20/28, PCWP 16, CI/CO 7.3/3.9, SvO2 68% epi 0.06, vaso 2  8/25: CVP 16, PA 44/20/29, PCWP 22, CI/CO  3.6, SvO2 82%, vaso 2, epi 0.03  8/26: CVP 13, PA 40/20/26, PCWP 12, CI/CO 7/ 3.6, SvO2 74%, vaso 1  8/27: CVP 13, PA 38/18/26, PCWP 12-14, CI/CO 9.4/4.7, , PVR ~0.5, SvO2 73%, vaso 1, epi 0.05     # CAD s/p CABG 4/2020 (KURT to RCA and LIMA to LAD) and PCI to RCA 7/20  - Stop home plavix po qd (8/13) for LVAD surgery  - aspirin 81mg PO qd   - c/w home crestor    # Mural thrombus  - removed during surgery on 8/19    # A-flutter  Converted to NSR on 8/15    - Continue amiodarone 200mg po qd      Pulm:  # Acute hypoxic respiratory failure  # Hemopneumothorax  Extubated on 9/1/2020. S/p IR for chest tube of Hemopneumothorax on 9/5 with 2.4L output immediately.     Renal/Electrolytes   # Hyperkalemia/Hypokalemia   # Acute kidney injury on chronic kidney disease, stage III  Likely ATN due to hypoxic insult  - renal consult and diuretic challenge. May need dialysis  - Trend potassium and creatinine q12hrs  - Electrolyte replacement  protocol  - Monitor UOP   - continue CVVH      GI:  #GIB  GI consulted and EGD on 8/28 with protuberant vessel injected and clipped and bleeding gastric ulcer with adherent clot, injected and clipped as well  Repeat bleed on 8/29-8/30, transfused and given pRBC, underwent repeat EGD, showed hematin throughout stomache with single bleeding angioectasia vs gastric erosion (from NGT) in stomach. Likely source of bleed and thought related to current bleeding episode. Hypothermia thought to be related to bleed.    # Constipation  # Severe protein calorie malnutrition  - Nutrition through tube feeds at 30 mL/hr    - Senna-docusate bid scheduled  - Miralax bid prn constipation    ID:   # Candidemia   # Methicillin resistant staph epidermidis  Zosyn   Current:  - Micafungin (9/1-**)  - Vanc (9/4-**)     Prior:  - Vancomycin (8/30-9/2, 8/19-8/21)  - Zosyn (8/30-9/2, 8/19-8/24)  - Rifampin (8/19-8/21)  - Fluconazole (8/19-8/21)  - ceftriaxone (8/14-8/18)    - ID Consult: treat as real candidemia and pull lines in place at time of positive culture and continue Micafungin  - C. Diff negative  - No evidence of ophthalmologic yeast involvement  - Pleural fluid labs sent per ID    Hem/Onc  # Mural thrombus removed on 8/19  # Anemia, mild  D/t frequent blood draws and procedures, and two large GI bleesd  - Monitor hgb  - PPI gtt --> IV BID    Endo  # Hypothyroidism   - Continue PTA levothyroxine     Nutrition: TFs  DVT Prophylaxis: mechanical   Code Status: Full Code    Katy Monte MD  Cardiovascular Disease Fellow  [facility]    Discussed with Dr Christian      I have reviewed today's vital signs, notes, medications, labs and imaging. I have also seen and examined the patient and agree with the findings and plan as outlined above.    Nel Christian MD  Section Head - Advanced Heart Failure, Transplantation and Mechanical Circulatory Support  Director - [facility]  Associate  Professor of Medicine, [facility]      Interval History   No acute events overnight, answers yes/no questions intermittently. Left chest tube with 20-80cc out per hour.      Physical Exam   Temp: 96.8  F (36  C) Temp src: Axillary BP: 97/77 Pulse: 79   Resp: 18 SpO2: 99 % O2 Device: None (Room air) Oxygen Delivery: 3 LPM  Vitals:    09/03/20 0400 09/04/20 0515 09/05/20 0400   Weight: 79.5 kg (175 lb 4.3 oz) 80.6 kg (177 lb 11.1 oz) 80 kg (176 lb 5.9 oz)     Vital Signs with Ranges  Temp:  [95.9  F (35.5  C)-97.2  F (36.2  C)] 96.8  F (36  C)  Pulse:  [] 79  Resp:  [14-20] 18  BP: ()/(38-94) 97/77  SpO2:  [65 %-100 %] 99 %  I/O last 3 completed shifts:  In: 2075.71 [I.V.:339.71; Other:6; NG/GT:520]  Out: 4986 [Other:996; Stool:1100; Chest Tube:2890]    RETIRE: Heart Rate: 71, Blood pressure 97/77, pulse 79, temperature 96.8  F (36  C), temperature source Axillary, resp. rate 18, weight 80 kg (176 lb 5.9 oz), SpO2 99 %.  176 lbs 5.89 oz     GEN: alert, non to minimally verbal  CV: RRR, Hum of HM3   LUNGS: Clear to auscultation bilaterally  ABD: Active bowel sounds, soft, no abdominal tenderness.   EXT: Trace LE edema   NEURO: altert, tracks people/objects across room; responds to her name, husbands name, daughter's name. moves b/l LE and LUE to command    Medications     BETA BLOCKER NOT PRESCRIBED       dextrose Stopped (09/02/20 1957)     dextrose Stopped (08/30/20 2221)     CRRT replacement solution 12.5 mL/kg/hr (09/05/20 0306)     EPINEPHrine IV infusion ADULT Stopped (09/04/20 1130)     - MEDICATION INSTRUCTIONS -       CRRT replacement solution 2.911 mL/kg/hr (09/04/20 1659)     CRRT replacement solution 12.5 mL/kg/hr (09/05/20 0306)     BETA BLOCKER NOT PRESCRIBED       vasopressin         amiodarone  200 mg Oral or Feeding Tube Daily     artificial tears   Both Eyes 5x Daily     aspirin  81 mg Oral or Feeding Tube Daily     B and C vitamin Complex with folic acid  5 mL Oral Daily      bimatoprost  1 drop Both Eyes At Bedtime     busPIRone  10 mg Oral or Feeding Tube TID     heparin lock flush  5-15 mL Intracatheter Q24H     insulin aspart  1-6 Units Subcutaneous Q4H     lacosamide  200 mg Oral or Feeding Tube BID     latanoprost  1 drop Both Eyes Daily     levETIRAcetam  2,000 mg Oral or Feeding Tube BID     levothyroxine  62.5 mcg Oral or Feeding Tube QAM AC     micafungin  150 mg Intravenous Q24H     pantoprazole  40 mg Per Feeding Tube BID AC     rosuvastatin  20 mg Oral or Feeding Tube Daily     sodium chloride (PF)  10 mL Intracatheter Q8H     sodium chloride (PF)  3 mL Intracatheter Q8H     vancomycin (VANCOCIN) IV  1,750 mg Intravenous Q24H       Data    Date 09/05/20 0700 - 09/06/20 0659   Shift 6412-6583 9196-7090 7352-4679 24 Hour Total   INTAKE   I.V. 715   715   NG/   110   Enteral 385   385   Shift Total(mL/kg) 1210(15.13)   1210(15.13)   OUTPUT   Other 372   372   Stool 650   650   Chest Tube(mL/kg) 328(4.1)   328(4.1)   Shift Total(mL/kg) 1350(16.88)   1350(16.88)   Weight (kg) 80 80 80 80               Recent Labs   Lab 09/05/20  0401 09/04/20  2159 09/04/20  1710 09/04/20  1139  09/04/20  0156  09/03/20  2046  09/03/20  0339   WBC 7.8  --   --   --   --  7.6  --  8.6  --  10.1   HGB 8.7* 9.1*  --  8.6*   < > 8.4*  --  6.5*   < > 6.8*   MCV 92  --   --   --   --  91  --  93  --  91   *  --   --   --   --  131*  --  138*  --  171   INR 1.18*  --   --   --   --  1.26*  --   --   --  1.27*    143 142 142  --  142   < >  --    < > 138   POTASSIUM 3.8 3.9 3.6 3.5  --  3.5   < >  --    < > 3.8   CHLORIDE 113* 114* 112* 112*  --  114*   < >  --    < > 109   CO2 25 25 26 25  --  24   < >  --    < > 22   BUN 27 26 26 24  --  23   < >  --    < > 21   CR 0.79 0.86 0.82 0.83  --  0.83   < >  --    < > 0.76   ANIONGAP 5 4 4 5  --  5   < >  --    < > 8   FERNANDO 7.7* 7.7* 8.7 7.5*  --  7.2*   < >  --    < > 7.2*   GLC 97 94 104* 102*  --  114*   < >  --    < > 112*   ALBUMIN 1.6*   --   --   --   --  1.4*  --   --   --  1.4*   PROTTOTAL 4.4*  --   --   --   --  4.0*  --   --   --  4.2*   BILITOTAL 1.0  --   --   --   --  1.2  --   --   --  1.7*   ALKPHOS 204*  --   --   --   --  235*  --   --   --  258*   ALT 27  --   --   --   --  26  --   --   --  33   AST 38  --   --   --   --  38  --   --   --  47*    < > = values in this interval not displayed.       Recent Results (from the past 24 hour(s))   XR Chest Port 1 View    Narrative    EXAM: XR CHEST PORT 1 VW  9/4/2020 8:15 AM     HISTORY:  interval change, right hemothorax       COMPARISON:  Chest radiograph 9/3/2020    FINDINGS:   Single AP view of the chest at 30 degrees. Unchanged position of LVAD  and left chest wall implantable cardiac device. Unchanged position of  mediastinal drains, biapical chest tubes, left basilar chest tube, and  right apically directed pigtail chest tube. Right upper extremity PICC  tip continues to project over the axilla. Feeding tube courses below  the diaphragm and beyond the field-of-view. Postsurgical changes of  tricuspid valve repair and intraventricular clot removal with intact  sternotomy wires and mediastinal surgical clips.    Trachea is midline. Cardiomediastinal silhouette is stable. Slightly  decreased loculated right pleural collection/hemothorax with  compressive atelectasis of the right lung. Slightly decreased  bilateral mixed interstitial and airspace opacities, right greater  than left. Decreased left pleural effusion.        Impression    IMPRESSION:   1. Chest tubes in place.  2. Right upper extremity PICC tip projects over the right axilla.  Unchanged position of support devices.  3. Slightly decreased right loculated pleural fluid  collection/hemothorax with associated compressive atelectasis.  4. Improving pulmonary edema.     I have personally reviewed the examination and initial interpretation  and I agree with the findings.    ROBERT DE LA PAZ MD   CT Chest Tube with Cath Placement     Narrative    PROCEDURES 9/4/2020:  1. CT-guided right chest tube placement.    CLINICAL HISTORY: Right hemothorax. Previously placed chest tube not  functioning well. Large bore right chest tube placement requested.    COMPARISONS: Chest x-ray 9/4/2020    STAFF RADIOLOGIST: LOIDA Grady MD    FELLOW/RESIDENT: MER Hammond MD    I, CAMILO GRADY MD, attest that I was present in the procedure room for  the entire procedure.    MEDICATIONS: The patient was placed on continuous monitoring. Vital  signs and sedation were monitored by the Interventional Radiology  nurse under the Attending Physician's supervision. 1 mg Versed and 50  mcg fentanyl IV. The patient remained stable throughout the procedure.    ATTENDING FACE-TO-FACE SEDATION TIME: 15 minutes    DOSE: 646 mGy*cm.    PROCEDURE: The patient understood the limitations, alternatives, and  risks of the procedure and requested the procedure be performed. Both  written and verbal consent were obtained.    Left lateral decubitus CT demonstrated the right hemothorax. The right  lateral chest was prepped and draped in usual sterile fashion. 1%  lidocaine without epinephrine was used for local anesthesia.    Under CT guidance, 5 Welsh Wordyeh centesis catheter needle  was advanced to the right hemothorax and directed posteriorly. CT  image documenting catheter needle placement was saved in the patient's  record.    Needle removed. Catheter removed over guidewire. Tract dilated over  guidewire to 28 Welsh size. 24 Welsh [employer] Medical Thal-Quick  drainage catheter advanced over guidewire placed as right chest tube.  Guidewire removed.    CT image documenting new right chest tube placement and position was  saved in the patient's record.    2-0 nylon catheter retaining suture and sterile dressing applied.  Catheter to water seal chest drainage. No immediate complication.      Impression    IMPRESSION: 24 Welsh [employer] Medical Thal-Quick right chest tube placed  under CT  guidance. Catheter to 20 cm underwater suction once the  patient returns to her unit.    CAMILO GRADY MD

## 2020-09-05 NOTE — PROGRESS NOTES
CRRT STATUS NOTE    DATA:  Time:  11:04 AM  Pressures WNL: Yes  Filter Status:  WDL    Problems Reported/Alarms Noted: No    Supplies Present: Yes  ASSESSMENT:  Patient Net Fluid Balance:      Intake/Output Summary (Last 24 hours) at 9/5/2020 1737  Last data filed at 9/5/2020 1700  Gross per 24 hour   Intake 2550 ml   Output 4252 ml   Net -1702 ml     BP (!) 70/59   Pulse 85   Temp 97.6  F (36.4  C) (Axillary)   Resp 14   Wt 80 kg (176 lb 5.9 oz)   SpO2 99%   BMI 26.82 kg/m      Labs:    Last Comprehensive Metabolic Panel:  Sodium   Date Value Ref Range Status   09/05/2020 141 133 - 144 mmol/L Final     Potassium   Date Value Ref Range Status   09/05/2020 3.9 3.4 - 5.3 mmol/L Final     Chloride   Date Value Ref Range Status   09/05/2020 112 (H) 94 - 109 mmol/L Final     Carbon Dioxide   Date Value Ref Range Status   09/05/2020 25 20 - 32 mmol/L Final     Anion Gap   Date Value Ref Range Status   09/05/2020 4 3 - 14 mmol/L Final     Glucose   Date Value Ref Range Status   09/05/2020 100 (H) 70 - 99 mg/dL Final     Urea Nitrogen   Date Value Ref Range Status   09/05/2020 28 7 - 30 mg/dL Final     Creatinine   Date Value Ref Range Status   09/05/2020 0.73 0.52 - 1.04 mg/dL Final     GFR Estimate   Date Value Ref Range Status   09/05/2020 80 >60 mL/min/[1.73_m2] Final     Comment:     Non  GFR Calc  Starting 12/18/2018, serum creatinine based estimated GFR (eGFR) will be   calculated using the Chronic Kidney Disease Epidemiology Collaboration   (CKD-EPI) equation.       Calcium   Date Value Ref Range Status   09/05/2020 7.8 (L) 8.5 - 10.1 mg/dL Final     Bilirubin Total   Date Value Ref Range Status   09/05/2020 1.0 0.2 - 1.3 mg/dL Final     Alkaline Phosphatase   Date Value Ref Range Status   09/05/2020 204 (H) 40 - 150 U/L Final     ALT   Date Value Ref Range Status   09/05/2020 27 0 - 50 U/L Final     AST   Date Value Ref Range Status   09/05/2020 38 0 - 45 U/L Final     Earlier Comprehensive  Metabolic Panel:  Sodium   Date Value Ref Range Status   09/05/2020 143 133 - 144 mmol/L Final     Potassium   Date Value Ref Range Status   09/05/2020 3.8 3.4 - 5.3 mmol/L Final     Chloride   Date Value Ref Range Status   09/05/2020 113 (H) 94 - 109 mmol/L Final     Carbon Dioxide   Date Value Ref Range Status   09/05/2020 25 20 - 32 mmol/L Final     Anion Gap   Date Value Ref Range Status   09/05/2020 5 3 - 14 mmol/L Final     Glucose   Date Value Ref Range Status   09/05/2020 97 70 - 99 mg/dL Final     Urea Nitrogen   Date Value Ref Range Status   09/05/2020 27 7 - 30 mg/dL Final     Creatinine   Date Value Ref Range Status   09/05/2020 0.79 0.52 - 1.04 mg/dL Final     GFR Estimate   Date Value Ref Range Status   09/05/2020 73 >60 mL/min/[1.73_m2] Final     Comment:     Non  GFR Calc  Starting 12/18/2018, serum creatinine based estimated GFR (eGFR) will be   calculated using the Chronic Kidney Disease Epidemiology Collaboration   (CKD-EPI) equation.       Calcium   Date Value Ref Range Status   09/05/2020 7.7 (L) 8.5 - 10.1 mg/dL Final     Bilirubin Total   Date Value Ref Range Status   09/05/2020 1.0 0.2 - 1.3 mg/dL Final     Alkaline Phosphatase   Date Value Ref Range Status   09/05/2020 204 (H) 40 - 150 U/L Final     ALT   Date Value Ref Range Status   09/05/2020 27 0 - 50 U/L Final     AST   Date Value Ref Range Status   09/05/2020 38 0 - 45 U/L Final     Lab Results   Component Value Date    WBC 7.8 09/05/2020     Lab Results   Component Value Date    RBC 2.87 09/05/2020     Lab Results   Component Value Date    HGB 8.7 09/05/2020     Lab Results   Component Value Date    HCT 26.4 09/05/2020     No components found for: MCT  Lab Results   Component Value Date    MCV 92 09/05/2020     Lab Results   Component Value Date    MCH 30.3 09/05/2020     Lab Results   Component Value Date    MCHC 33.0 09/05/2020     Lab Results   Component Value Date    RDW 16.4 09/05/2020     Lab Results   Component  Value Date     09/05/2020     Goals of Therapy: 50-100cc/hr net negative as BP's allow    INTERVENTIONS:   Rounded with Renal APN and Bedside RN    PLAN:  Support the bedside RN and change circuit q72H and PRN.

## 2020-09-05 NOTE — PROGRESS NOTES
CVICU PROGRESS NOTE  September 5, 2020           CO-MORBIDITIES:   Cardiogenic shock (H)  (primary encounter diagnosis)  LV (left ventricular) mural thrombus  Heart failure (H)  Status post cardiac surgery    ASSESSMENT: Marquise Jj is a 75 year old female 75 year-old female with PMH notable for coronary artery disease s/p CABG (4/20), ischemic cardiomyopathy, severe MR/TR and known mural thrombus who is admitted to the CV ICU s/p redo sternotomy, LVAD (heartmate III), and TV repair on 8/19/20 with Dr. Farley. Chest was left open and packed. S/p chest closure and washout 08/21/20. Extubated 9/1/20. Found to have candidemia (+ culture x2) and methicillin resistant staph epidermidis on blood culture, ID following.     PLAN SUMMARY:   Re-start coumadin today  Methicillin resistant staph epi from 1 of 2 bottles on 9/3, started Vancomycin IV   Remove R pleural chest tube  Imodium prn  Warfarin, no heparin bridge  Family meeting on Tuesday, involve palliative  Discuss with SW on Monday  Started Vancomycin for MRSA blood culture 9/5       PLAN:   Neuro/ pain/ sedation:  #Acute post-operative pain   #Likely anoxic brain injury; possible stroke  #Epilepsia Partialis Continua; resolved  - Monitor neurological status. Notify the MD for any acute changes in exam.  - Discontinue seroquel, PRN narcotic   - Keppra, vimpat to continue per NeuroCrit  - Tylenol PRN  - Oxycodone prn     Pulmonary:   #Acute hypoxic respiratory failure, improved  #R pleural effusion   #R Hemopneumothorax  - Monitor CXR, O2 saturation  - R pigtail placed 9/2, drained 700 mL fluid  - Developed hemopneumothorax after IR chest tube placement, R pigtail clogged, will discuss with IR options for resizing  - Non con Chest CT per IR prior to chest tube replacement  - Daily CXR  - Extubated on 9/1/2020, if needs support consider BIPAP vs HFNC    Cardiovascular:    #Acute on chronic decompensated heart failure with reduced ejection fraction: NYHA IV / ACC  D  #Cardiogenic shock, possible vasoplegia   #Ischemic cardiomyopathy  #Coronary artery disease s/p CABG 4/20 in [place], s/p PCI to RCA 7/20  #Mural thrombus  #Severe MR/TR  #S/p LVAD     MAP > 60, CVP 8-12    Off pressors    Starting warfarin     Aspirin, rosuvastatin    Co-managing with Cards-2    Amiodarone 200 mg daily      GI/Nutrition:   # UGI bleed   - Appreciate GI consult   - Continue tube feeds at goal  - Pantoprazole BID   - Bowel regimen: senna-colace, miralax.  - Put in OG per radiology due to presence of clips       Renal/Fluid Balance/ Electrolytes:   #Oliguria in the setting of likely ATN  - Will monitor intake and output.  - ICU electrolyte replacement protocol  - Goal-directed fluid therapy  - Nephrology consulted; CRRT      Endocrine:    #Glycemic control  #Hx hypothyroid   - ISS, medium intensity   - Synthroid daily       ID/ Antibiotics:  # Febrile, concern for post operative fevers, resolved  # Candidemia   # Methicillin resistant staph epidermidis  - Completed perioperative antibiotic regimen: vancomycin, levofloxacin, fluconazole, Zosyn   - Vanc (8/30-9/2, 9/5- ), Zosyn (8/30-9/2). Restarted Vancomycin for + Blood culture MRSA  - Micafungin for yeast in blood for 6 weeks at least IV  - ID Consult: candidemia and pull lines in place at time of positive culture and continue Micafungin  - Will need Dialysis Line swapped out to new site due to bacteremia and fungemia  - Positive MRSA in blood  - C. Diff negative  - No evidence of ophthalmologic yeast involvement  - Pleural fluid labs sent per ID      Heme:     #Acute perioperative blood loss anemia  #Thrombocytopenia   - Hgb goal > 8  - Transfuse as necessary   - Re-start coumadin today      Prophylaxis:    - SCD  - Restarted Coumadin  - PPI  - Bowel regimen      MSK:    - PT and OT consulted. Appreciate recs.      Lines/ tubes/ drains:  - [facility] CRRT line   - PIV  - Chest tubes  - Remove arterial line.        Disposition:  - CV ICU.    Patient seen,  "findings and plan discussed with CVICU  Staff  Dr. Vazquez     -----------------------------------  Spencer Santos PGY4 CA3  CV ICU   *[id number]      ====================================    SUBJECTIVE:   Intermittently confused,  at bedside, repeats \"She just always told me she didn't want to be a vegetable\". He said he can have his kids available for Tuesday for a care conference. Will allow SW to make contact on Monday to pick a time that works best with family.     OBJECTIVE:   1. VITAL SIGNS:   Temp:  [95.9  F (35.5  C)-97.8  F (36.6  C)] 97.8  F (36.6  C)  Pulse:  [] 95  Resp:  [14-20] 16  BP: ()/(38-94) 83/55  SpO2:  [65 %-100 %] 97 %  Resp: 16      2. INTAKE/ OUTPUT:   I/O last 3 completed shifts:  In: 2075.71 [I.V.:339.71; Other:6; NG/GT:520]  Out: 4986 [Other:996; Stool:1100; Chest Tube:2890]    3. PHYSICAL EXAMINATION:   General: NAD, lying in bed  Neuro: Will follow person around the room. Does not purposefully track. Focuses to voice.  HENT: feeding tube in place via nostril, nasal cannula oxygen  CV: VVI standby, LVAD humming.   Abdomen: Soft, Non-distended, Non-tender  Incisions: sternotomy wound c/d/i s/p closure  Extremities: warm and well perfused  Lines: left dialysis access catheter in place with CRRT running no bleeding or erythema at insertion site    4. INVESTIGATIONS:   Arterial Blood Gases   Recent Labs   Lab 09/03/20  0339 09/02/20  0350 09/01/20  1625 09/01/20  1148   PH 7.50* 7.47* 7.46* 7.48*   PCO2 28* 30* 32* 31*   PO2 70* 121* 105 171*   HCO3 22 21 23 23     Complete Blood Count   Recent Labs   Lab 09/05/20  0401 09/04/20 2159 09/04/20  1139 09/04/20  0544 09/04/20  0156 09/03/20 2046  09/03/20  0339   WBC 7.8  --   --   --  7.6 8.6  --  10.1   HGB 8.7* 9.1* 8.6* 8.8* 8.4* 6.5*   < > 6.8*   *  --   --   --  131* 138*  --  171    < > = values in this interval not displayed.     Basic Metabolic Panel  Recent Labs   Lab 09/05/20  0401 09/04/20 2159 09/04/20  1710 " 09/04/20  1139    143 142 142   POTASSIUM 3.8 3.9 3.6 3.5   CHLORIDE 113* 114* 112* 112*   CO2 25 25 26 25   BUN 27 26 26 24   CR 0.79 0.86 0.82 0.83   GLC 97 94 104* 102*     Liver Function Tests  Recent Labs   Lab 09/05/20  0401 09/04/20  0156 09/03/20  0339 09/02/20  0350 09/01/20  1625   AST 38 38 47* 51*  --    ALT 27 26 33 30  --    ALKPHOS 204* 235* 258* 255*  --    BILITOTAL 1.0 1.2 1.7* 1.5*  --    ALBUMIN 1.6* 1.4* 1.4* 1.2*  --    INR 1.18* 1.26* 1.27*  --  1.26*     Pancreatic Enzymes  No lab results found in last 7 days.  Coagulation Profile  Recent Labs   Lab 09/05/20  0401 09/04/20  0156 09/03/20  0713 09/03/20  0339 09/01/20  1625   INR 1.18* 1.26*  --  1.27* 1.26*   PTT  --   --  34  --   --          5. RADIOLOGY:   Recent Results (from the past 24 hour(s))   CT Chest Tube with Cath Placement    Narrative    PROCEDURES 9/4/2020:  1. CT-guided right chest tube placement.    CLINICAL HISTORY: Right hemothorax. Previously placed chest tube not  functioning well. Large bore right chest tube placement requested.    COMPARISONS: Chest x-ray 9/4/2020    STAFF RADIOLOGIST: LOIDA Grady MD    FELLOW/RESIDENT: MER Hammond MD    I, CAMILO GRADY MD, attest that I was present in the procedure room for  the entire procedure.    MEDICATIONS: The patient was placed on continuous monitoring. Vital  signs and sedation were monitored by the Interventional Radiology  nurse under the Attending Physician's supervision. 1 mg Versed and 50  mcg fentanyl IV. The patient remained stable throughout the procedure.    ATTENDING FACE-TO-FACE SEDATION TIME: 15 minutes    DOSE: 646 mGy*cm.    PROCEDURE: The patient understood the limitations, alternatives, and  risks of the procedure and requested the procedure be performed. Both  written and verbal consent were obtained.    Left lateral decubitus CT demonstrated the right hemothorax. The right  lateral chest was prepped and draped in usual sterile fashion. 1%  lidocaine without  epinephrine was used for local anesthesia.    Under CT guidance, 5 [place] [employer] Yueh centesis catheter needle  was advanced to the right hemothorax and directed posteriorly. CT  image documenting catheter needle placement was saved in the patient's  record.    Needle removed. Catheter removed over guidewire. Tract dilated over  guidewire to 28 Surinamese size. 24 [place] [employer] Thal-Quick  drainage catheter advanced over guidewire placed as right chest tube.  Guidewire removed.    CT image documenting new right chest tube placement and position was  saved in the patient's record.    2-0 nylon catheter retaining suture and sterile dressing applied.  Catheter to water seal chest drainage. No immediate complication.      Impression    IMPRESSION: 24 [place] [employer] Thal-Quick right chest tube placed  under CT guidance. Catheter to 20 cm underwater suction once the  patient returns to her unit.    CAMILO GRADY MD   XR Chest Port 1 View    Narrative    Exam: AP chest radiograph, 9/5/2020 4:17 AM    Indication: Interval change, right pneumothorax    Comparison:  9/4/2020, 9/3/2020    FINDINGS: AP chest radiograph. Stable LVAD, and implantable cardiac  device, right pigtail, biapical, and left basilar chest tubes, and  mediastinal drains are stable. Sternotomy wires and enteric tubing.  Stable cardiomediastinal silhouette, CABG. Right upper extremity PICC  tip projecting over the axilla. Stable to minimally increased  loculated right pleural fluid collection/hemothorax. Bilateral mixed  interstitial and airspace opacities, right greater than left.  Substantially reduced right pleural effusion. Question trace right  basilar Stable small left pleural effusion.       Impression    IMPRESSION:  1. Substantially reduced right pleural fluid collection with  persistent loculated right pleural fluid collection/hemothorax.   2. Stable small left pleural effusion. Bibasilar and retrocardiac  atelectasis versus developing  consolidation.   3. Question tiny right basilar pneumothorax. Mild pulmonary edema.  4. Right upper extremity PICC line tip projects over the axilla.    I have personally reviewed the examination and initial interpretation  and I agree with the findings.    CRYSTAL BLANKENSHIP MD       =========================================

## 2020-09-05 NOTE — PROGRESS NOTES
Nephrology Progress Note  09/05/2020         Mrs Jj is a 75 yof w/ICM, s/p CABG (4/20), severe MR/TR and known mural thrombus, acute kidney injry, hypothyroidism, CKD3, anxiety, GERD, chronic anemia.  Transferred from [facility] to [facility] on 8/8 after developing cardiac shock. Placed on IABP and then LVAD placement and TVR repair for cardiogenic shock on 8/19 /20.  Nephrology consulted for managign Oliguric LORI on CKD stage 3, started CRRT on 8/26.     Interval History :   Mrs Jj continues on CRRT, net negative 1.4L of UF yesterday and already 0.8L net negative today.  UOP minimal, planning 50-100cc/hr net negative today, still about 15kg up.  Getting closer to considering iHD particularly with high output from CT and GI losses, will continue for now as she is still quite overloaded.        Assessment & Recommendations:   LORI on CKD-Baseline Cr ~1.5, small L kidney at 8.3cm, 10cm R kidney at baseline.  Started CRRT on 8/26 for management of volume and electrolytes in setting of cardiogenic shock, LVAD placed on 8/19.  Etiology of LORI is likely ATN from hypoperfusion, started CRRT on 8/26.               -Line is LIJ temp line from 9/1               -Continuing CRRT, 4k baths, 25ml/kg/hr standard dosing, goal 50-100cc/hr net negative today.                    Volume status-Net negative 1.4L yesterday after being positive the day before.  UF is now less of a factor as she has large chest tube and GI output, 722cc of UF yesterday.  Plan to continue CRRT but may be able to consider iHD in coming days if she remains stable although her wt is still ~15kg up.     Electrolytes/pH-K 3.8, bicarb 25, on 4k baths.      Ca/phos/pth-Ca 7.7, Mg and Phos WNL this am.      ID-Fungal cultures +, ID consulted, new HD line placed, stopped TPN and started TF.       Anemia-Hgb 8.7, intermittent PRBC's, acute management per team.      Nutrition-Had been on TPN, off now and on Impact TF with fungemia.      Recommendations were communicated  to primary team via verbal communication    STEPHAN Faulkner CNS  Clinical Nurse Specialist  [phone]    Review of Systems:   I reviewed the following systems:  ROS not done due to lethargy    Physical Exam:   I/O last 3 completed shifts:  In: 2075.71 [I.V.:339.71; Other:6; NG/GT:520]  Out: 4986 [Other:996; Stool:1100; Chest Tube:2890]   BP 98/84   Pulse 87   Temp 97.8  F (36.6  C)   Resp 16   Wt 80 kg (176 lb 5.9 oz)   SpO2 (!) 87%   BMI 26.82 kg/m       GENERAL APPEARANCE: Extubated, lethargic but responds to stimuli.   EYES:  No scleral icterus, pupils equal  HENT: mouth without ulcers or lesions  PULM: lungs clear to auscultation, equal air movement, no cyanosis or clubbing  CV: regular rhythm, normal rate, no rub     -JVP not elevated.      -edema +3 generalized.   GI: soft, nontender, +distended, bowel sounds are +  MS: no evidence of inflammation in joints, no muscle tenderness  NEURO: Lethargic, no focal deficits.   Lines LIJ temp line from 9/1    Labs:   All labs reviewed by me  Electrolytes/Renal -   Recent Labs   Lab Test 09/05/20 0401 09/04/20 2159 09/04/20  1710  09/04/20  0156    143 142   < > 142   POTASSIUM 3.8 3.9 3.6   < > 3.5   CHLORIDE 113* 114* 112*   < > 114*   CO2 25 25 26   < > 24   BUN 27 26 26   < > 23   CR 0.79 0.86 0.82   < > 0.83   GLC 97 94 104*   < > 114*   FERNANDO 7.7* 7.7* 8.7   < > 7.2*   MAG 2.5*  --  2.4*  --  2.2   PHOS 3.2  --  3.7  --  3.6    < > = values in this interval not displayed.       CBC -   Recent Labs   Lab Test 09/05/20 0401 09/04/20 2159 09/04/20  1139  09/04/20  0156 09/03/20  2046   WBC 7.8  --   --   --  7.6 8.6   HGB 8.7* 9.1* 8.6*   < > 8.4* 6.5*   *  --   --   --  131* 138*    < > = values in this interval not displayed.       LFTs -   Recent Labs   Lab Test 09/05/20  0401 09/04/20  0156 09/03/20  0339   ALKPHOS 204* 235* 258*   BILITOTAL 1.0 1.2 1.7*   ALT 27 26 33   AST 38 38 47*   PROTTOTAL 4.4* 4.0* 4.2*   ALBUMIN 1.6* 1.4* 1.4*        Iron Panel -   Recent Labs   Lab Test 08/10/20  1052 07/01/20  0530   IRON 48 65   IRONSAT 15 17   HUSEYIN 165  --            Current Medications:    amiodarone  200 mg Oral or Feeding Tube Daily     artificial tears   Both Eyes 5x Daily     aspirin  81 mg Oral or Feeding Tube Daily     B and C vitamin Complex with folic acid  5 mL Oral Daily     bimatoprost  1 drop Both Eyes At Bedtime     busPIRone  10 mg Oral or Feeding Tube TID     heparin lock flush  5-15 mL Intracatheter Q24H     insulin aspart  1-6 Units Subcutaneous Q4H     lacosamide  200 mg Oral or Feeding Tube BID     latanoprost  1 drop Both Eyes Daily     levETIRAcetam  2,000 mg Oral or Feeding Tube BID     levothyroxine  62.5 mcg Oral or Feeding Tube QAM AC     micafungin  150 mg Intravenous Q24H     pantoprazole  40 mg Per Feeding Tube BID AC     rosuvastatin  20 mg Oral or Feeding Tube Daily     sodium chloride (PF)  10 mL Intracatheter Q8H     sodium chloride (PF)  3 mL Intracatheter Q8H     vancomycin (VANCOCIN) IV  1,750 mg Intravenous Q24H       BETA BLOCKER NOT PRESCRIBED       dextrose Stopped (09/02/20 1957)     dextrose Stopped (08/30/20 2221)     CRRT replacement solution 12.5 mL/kg/hr (09/05/20 1025)     - MEDICATION INSTRUCTIONS -       CRRT replacement solution 2.911 mL/kg/hr (09/04/20 1659)     CRRT replacement solution 12.5 mL/kg/hr (09/05/20 0306)     BETA BLOCKER NOT PRESCRIBED       Warfarin Therapy Reminder

## 2020-09-05 NOTE — PROGRESS NOTES
CVICU PROGRESS NOTE  September 5, 2020           CO-MORBIDITIES:   Cardiogenic shock (H)  (primary encounter diagnosis)  LV (left ventricular) mural thrombus  Heart failure (H)  Status post cardiac surgery    ASSESSMENT: Marquise Jj is a 75 year old female 75 year-old female with PMH notable for coronary artery disease s/p CABG (4/20), ischemic cardiomyopathy, severe MR/TR and known mural thrombus who is admitted to the CV ICU s/p redo sternotomy, LVAD (heartmate III), and TV repair on 8/19/20 with Dr. Farley. Chest was left open and packed. S/p chest closure and washout 08/21/20. Extubated 9/1/20. Found to have candidemia (+ culture x2) and methicillin resistant staph epidermidis on blood culture, ID following.     PLAN SUMMARY:   Re-start coumadin today  Methicillin resistant staph epi from 1 of 2 bottles on 9/3, started Vancomycin IV   Remove R pleural chest tube  Imodium prn  Warfarin, no heparin bridge  Family meeting on Tuesday, involve palliative  Discuss with SW on Monday  Started Vancomycin for MRSA blood culture 9/5       PLAN:   Neuro/ pain/ sedation:  #Acute post-operative pain   #Likely anoxic brain injury; possible stroke  #Epilepsia Partialis Continua; resolved  - Monitor neurological status. Notify the MD for any acute changes in exam.  - Discontinue seroquel, PRN narcotic   - Keppra, vimpat to continue per NeuroCrit  - Tylenol PRN  - Oxycodone prn     Pulmonary:   #Acute hypoxic respiratory failure, improved  #R pleural effusion   #R Hemopneumothorax  - Monitor CXR, O2 saturation  - R pigtail placed 9/2, drained 700 mL fluid  - Developed hemopneumothorax after IR chest tube placement, R pigtail clogged, will discuss with IR options for resizing  - Non con Chest CT per IR prior to chest tube replacement  - Daily CXR  - Extubated on 9/1/2020, if needs support consider BIPAP vs HFNC    Cardiovascular:    #Acute on chronic decompensated heart failure with reduced ejection fraction: NYHA IV / ACC  D  #Cardiogenic shock, possible vasoplegia   #Ischemic cardiomyopathy  #Coronary artery disease s/p CABG 4/20 in [place], s/p PCI to RCA 7/20  #Mural thrombus  #Severe MR/TR  #S/p LVAD     MAP > 60, CVP 8-12    Off pressors    Starting warfarin     Aspirin, rosuvastatin    Co-managing with Cards-2    Amiodarone 200 mg daily      GI/Nutrition:   # UGI bleed   - Appreciate GI consult   - Continue tube feeds at goal  - Pantoprazole BID   - Bowel regimen: senna-colace, miralax.  - Put in OG per radiology due to presence of clips       Renal/Fluid Balance/ Electrolytes:   #Oliguria in the setting of likely ATN  - Will monitor intake and output.  - ICU electrolyte replacement protocol  - Goal-directed fluid therapy  - Nephrology consulted; CRRT      Endocrine:    #Glycemic control  #Hx hypothyroid   - ISS, medium intensity   - Synthroid daily       ID/ Antibiotics:  # Febrile, concern for post operative fevers, resolved  # Candidemia   # Methicillin resistant staph epidermidis  - Completed perioperative antibiotic regimen: vancomycin, levofloxacin, fluconazole, Zosyn   - Vanc (8/30-9/2, 9/5- ), Zosyn (8/30-9/2). Restarted Vancomycin for + Blood culture MRSA  - Micafungin for yeast in blood for 6 weeks at least IV  - ID Consult: candidemia and pull lines in place at time of positive culture and continue Micafungin  - Will need Dialysis Line swapped out to new site due to bacteremia and fungemia  - Positive MRSA in blood  - C. Diff negative  - No evidence of ophthalmologic yeast involvement  - Pleural fluid labs sent per ID      Heme:     #Acute perioperative blood loss anemia  #Thrombocytopenia   - Hgb goal > 8  - Transfuse as necessary   - Re-start coumadin today      Prophylaxis:    - SCD  - Restarted Coumadin  - PPI  - Bowel regimen      MSK:    - PT and OT consulted. Appreciate recs.      Lines/ tubes/ drains:  - LIJ CRRT line   - PIV  - Chest tubes  - Remove arterial line.        Disposition:  - CV ICU.    Patient seen,  "findings and plan discussed with CVTS Fellow  -----------------------------------  Spencer Santos PGY4 CA3  CV ICU   *[id number]      ====================================    SUBJECTIVE:   Intermittently confused,  at bedside, repeats \"She just always told me she didn't want to be a vegetable\". He said he can have his kids available for Tuesday for a care conference. Will allow SW to make contact on Monday to pick a time that works best with family.     OBJECTIVE:   1. VITAL SIGNS:   Temp:  [95.9  F (35.5  C)-97.8  F (36.6  C)] 96.3  F (35.7  C)  Pulse:  [68-96] 89  Resp:  [14-20] 14  BP: ()/(38-93) 100/69  SpO2:  [65 %-100 %] 100 %  Resp: 14      2. INTAKE/ OUTPUT:   I/O last 3 completed shifts:  In: 2690 [I.V.:895; NG/GT:475]  Out: 4277 [Other:1407; Stool:1650; Chest Tube:1220]    3. PHYSICAL EXAMINATION:   General: NAD, lying in bed  Neuro: Will follow person around the room. Does not purposefully track. Focuses to voice.  HENT: feeding tube in place via nostril, nasal cannula oxygen  CV: VVI standby, LVAD humming.   Abdomen: Soft, Non-distended, Non-tender  Incisions: sternotomy wound c/d/i s/p closure  Extremities: warm and well perfused  Lines: left dialysis access catheter in place with CRRT running no bleeding or erythema at insertion site    4. INVESTIGATIONS:   Arterial Blood Gases   Recent Labs   Lab 09/03/20  0339 09/02/20  0350 09/01/20  1625 09/01/20  1148   PH 7.50* 7.47* 7.46* 7.48*   PCO2 28* 30* 32* 31*   PO2 70* 121* 105 171*   HCO3 22 21 23 23     Complete Blood Count   Recent Labs   Lab 09/05/20  0401 09/04/20 2159 09/04/20  1139 09/04/20  0544 09/04/20  0156 09/03/20 2046  09/03/20  0339   WBC 7.8  --   --   --  7.6 8.6  --  10.1   HGB 8.7* 9.1* 8.6* 8.8* 8.4* 6.5*   < > 6.8*   *  --   --   --  131* 138*  --  171    < > = values in this interval not displayed.     Basic Metabolic Panel  Recent Labs   Lab 09/05/20  1127 09/05/20  0401 09/04/20 2159 09/04/20  1710    143 143 " 142   POTASSIUM 3.9 3.8 3.9 3.6   CHLORIDE 112* 113* 114* 112*   CO2 25 25 25 26   BUN 28 27 26 26   CR 0.73 0.79 0.86 0.82   * 97 94 104*     Liver Function Tests  Recent Labs   Lab 09/05/20  0401 09/04/20  0156 09/03/20  0339 09/02/20  0350 09/01/20  1625   AST 38 38 47* 51*  --    ALT 27 26 33 30  --    ALKPHOS 204* 235* 258* 255*  --    BILITOTAL 1.0 1.2 1.7* 1.5*  --    ALBUMIN 1.6* 1.4* 1.4* 1.2*  --    INR 1.18* 1.26* 1.27*  --  1.26*     Pancreatic Enzymes  No lab results found in last 7 days.  Coagulation Profile  Recent Labs   Lab 09/05/20  0401 09/04/20  0156 09/03/20  0713 09/03/20  0339 09/01/20  1625   INR 1.18* 1.26*  --  1.27* 1.26*   PTT  --   --  34  --   --          5. RADIOLOGY:   Recent Results (from the past 24 hour(s))   XR Chest Port 1 View    Narrative    Exam: AP chest radiograph, 9/5/2020 4:17 AM    Indication: Interval change, right pneumothorax    Comparison:  9/4/2020, 9/3/2020    FINDINGS: AP chest radiograph. Stable LVAD, and implantable cardiac  device, right pigtail, biapical, and left basilar chest tubes, and  mediastinal drains are stable. Sternotomy wires and enteric tubing.  Stable cardiomediastinal silhouette, CABG. Right upper extremity PICC  tip projecting over the axilla. Stable to minimally increased  loculated right pleural fluid collection/hemothorax. Bilateral mixed  interstitial and airspace opacities, right greater than left.  Substantially reduced right pleural effusion. Question trace right  basilar Stable small left pleural effusion.       Impression    IMPRESSION:  1. Substantially reduced right pleural fluid collection with  persistent loculated right pleural fluid collection/hemothorax.   2. Stable small left pleural effusion. Bibasilar and retrocardiac  atelectasis versus developing consolidation.   3. Question tiny right basilar pneumothorax. Mild pulmonary edema.  4. Right upper extremity PICC line tip projects over the axilla.    I have personally  reviewed the examination and initial interpretation  and I agree with the findings.    CRYSTAL BLANKENSHIP MD       =========================================

## 2020-09-05 NOTE — PHARMACY-VANCOMYCIN DOSING SERVICE
Pharmacy Vancomycin Initial Note  Date of Service 2020  Patient's  1945  75 year old, female    Indication: Bacteremia    Current estimated CrCl = Estimated Creatinine Clearance: 63 mL/min (based on SCr of 0.86 mg/dL).    Creatinine for last 3 days  2020:  9:59 AM Creatinine 0.82 mg/dL;  3:42 PM Creatinine 0.73 mg/dL;  7:51 PM Creatinine 0.74 mg/dL  9/3/2020:  3:39 AM Creatinine 0.76 mg/dL; 10:41 AM Creatinine 0.78 mg/dL;  3:55 PM Creatinine 0.87 mg/dL; 10:12 PM Creatinine 0.88 mg/dL  2020:  1:56 AM Creatinine 0.83 mg/dL; 11:39 AM Creatinine 0.83 mg/dL;  5:10 PM Creatinine 0.82 mg/dL;  9:59 PM Creatinine 0.86 mg/dL    Recent Vancomycin Level(s) for last 3 days  No results found for requested labs within last 72 hours.      Vancomycin IV Administrations (past 72 hours)                   vancomycin (VANCOCIN) 1,750 mg in sodium chloride 0.9 % 250 mL intermittent infusion (mg) 1,750 mg New Bag 20 0817                Nephrotoxins and other renal medications (From now, onward)    Start     Dose/Rate Route Frequency Ordered Stop    20 0430  vancomycin (VANCOCIN) 1,750 mg in sodium chloride 0.9 % 500 mL intermittent infusion      1,750 mg  over 2 Hours Intravenous EVERY 24 HOURS 20 0424      20 0330  vasopressin 40 units in NS 40 mL (PITRESSIN) infusion      2.4 Units/hr  2.4 mL/hr  Intravenous CONTINUOUS 20 0300            Contrast Orders - past 72 hours (72h ago, onward)    Start     Dose/Rate Route Frequency Ordered Stop    20 1400  barium sulfate (EZ PAQUE) oral suspension 96%       Oral ONCE 20 1330 20 1507                Plan:  1.  Start vancomycin  1750 mg IV q24h.   2.  Goal Trough Level: 15-20 mg/L   3.  Pharmacy will check trough levels as appropriate in 1-3 Days.    4. Serum creatinine levels will be ordered daily for the first week of therapy and at least twice weekly for subsequent weeks.    5. [place] method utilized to dose  vancomycin therapy: Method 2    Tobias Mata RPH

## 2020-09-05 NOTE — PLAN OF CARE
Major Shift Events:  Neuro status improved today, more alert and consistently following commands; R hand w/ weak , left w/ moderate. Moaning and c/o pain, oxy x2, tylenol x1. HR in NSR, HM 3 in place, speed 5400, flows 3.5-4.4, PI 2- 5.1. power 3.7-3.9. Pt on RA, lung sounds dim. Pacer wires removed and L pleural CT removed; minimal output from other CT. Large amount of watery stool, imodium x1; bladder scanned for 165. CRRT running, goal is net negative  mL/hr. Up to chair x2, family updated on POC    Plan: Plan for future HD run, closely monitor closely.     For vital signs and complete assessments, please see documentation flowsheets.

## 2020-09-05 NOTE — PLAN OF CARE
Major Shift Events:  Patient continues on CRRT. No fluid bolus or blood products overnight. Labile BP by auto cuff, intermittent doppler manual cuff pressure checked MAP 78-86. Ongoing garbled speech, some clear words, intermittently answers yes and no clearly.   this morning.  Ongoing watery stool, rectal tube in place, 400ml stool since midnight. Left lateral chest tube with 20-80/hr, 560 from all chest tubes since midnight. Positive blood cultures from 9/3 sample, will resume Vancomycin.     Plan: Resume Vancomycin, continue CRRT, activity as tolerated, pulmonary toilet, , care conference this week yet to be determined.      Patient will continue to be monitored and assessed. Please see MAR, flow sheets, and remainder of chart for further details. .

## 2020-09-05 NOTE — PHARMACY-ANTICOAGULATION SERVICE
Clinical Pharmacy - Warfarin Dosing Consult     Pharmacy has been consulted to manage this patient s warfarin therapy.  Indication: LVAD/RVAD  Therapy Goal: INR 2-3  Provider/Team: CVTS/Cards II  Warfarin Prior to Admission: No  Significant drug interactions: amiodarone (increases INR), aspirin (increased bleeding)  Recent documented change in oral intake/nutrition: No  Dose Comments: will dose conservatively given recent hemopneumothorax    INR   Date Value Ref Range Status   09/05/2020 1.18 (H) 0.86 - 1.14 Final   09/04/2020 1.26 (H) 0.86 - 1.14 Final       Recommend warfarin 1.5 mg today.  Pharmacy will monitor Marquise Jj daily and order warfarin doses to achieve specified goal.      Please contact pharmacy as soon as possible if the warfarin needs to be held for a procedure or if the warfarin goals change.      James Bruce, PharmD, BCCCP

## 2020-09-06 NOTE — PROGRESS NOTES
Cardiology Progress Note    Assessment & Plan   In summary, Marquise Jj is a 75-year-old female with a past medical history notable for coronary artery disease, ischemic cardiomyopathy, and known mural thrombus who was transferred from [facility] for further evaluation/treatment of cardiogenic shock. Balloon pump placed 8/14. Had HM3 8/19. Chest closed 8/21.    Today's changes:  - Coumadin   - vanc for Methicillin resistant staph epi in blood cultures on 9/3, possibly contaminant  - continue antifungal therapy for fungemia  - Fluid goal: neg 500 ml to 1L  - family meeting Tuesday    Neuro:   # Sedation --> extubated, alert  # concern for seizure activity  CTH head 8/20 no signs of ICH   CTH head 8/27 Scattered areas of hypodensity with loss of gray-white matter differentiation in the left frontal lobe. These areas were not present on the CT dated 8/10/2020 and difficult compared to other prior comparison CTs due to extensive hardware artifact. These are suspicious for involving embolic infarcts.  keppra 2 g PO bid   vimpat 200 BID    Cardio:  # Cardiogenic shock with vasoplegic component    # ICM: NYHA IV / ACC  # Severe MR/TR s/p tricuspid valve repair with Elkins MC3 Annuloplasty Ring size T30  # s/p LVAD HM3 on 8/19/2020  LAVD speed 5400, flow 3.7-3.9, pi 3.5, power 3.8-3.9   Presents with cardiogenic shock. On NE, cardiac index approximately 1.37 on admission. Severely elevated biventricular filling pressures. Etiology of her decompensation appears to be progression of her cardiomyopathy. Despite medical treatment, she has been hospitalized twice since returning to [place], both with low output. No viability in her LAD territory, and doubt that PCI to her circumflex would make meaningful LV recovery. Hemodynamics improved with dobutamine, did not tolerate attempt to wean inotropics. RHC removed on 8/13 after clotting off, replaced on 8/14 after patient with increased work of breathing. CI of  1.1, balloon pump placed with CI of 1.6-1.8 and improvement in symptoms/hemodynamics. Had LVAD HM3 placed on 8/19. CVP today around 10 range. Chest closed on 8/21.   -- holding hep gtt as above  -- start warfarin today      Date 8/9 8/9 8/10 8/11 08/13/20 08/15/20 08/16/20 08/17/20 8/18/20 8/19 8/20 8/21*   CVP 12 9 3 4 8 9 6 8 11 5 10 13   Mean PA  35 30 21 25 23 21 20 35/18 35/18 - 30/12 28/14   PCWP  18 14 12 x 13 14  13 - -    Oxy HGB  59 64 61 61 60 47 62 69 54 - 68 66   CI/CO 2.3 2.3 2.4/4.2 2.3 2.2 1.8 2.8/4.9 4.1 4.3/2.5 - 6/3.3 6.9/3.8   SVR 1100 1100 1200 1400 1316 1900 6856 554 1544 - 1025 715   Device     IABP 1:1 IABP 1:1 IABP 1:1 IABP 1:1 IABP 1:1 No IABP No IABP No IABP   * epi 0.1, vaso 0.5     8/22: CVP 12, PA 28/14/19, PCWP 14, CO/CI 9.0/4.8. . Epi 0.1, vaso 1.  8/23: CVP 16, PA 30/14, CI/CO 4.4/8.0, , SvO2 71%  8/24: CVP 14, PA 44/20/28, PCWP 16, CI/CO 7.3/3.9, SvO2 68% epi 0.06, vaso 2  8/25: CVP 16, PA 44/20/29, PCWP 22, CI/CO  3.6, SvO2 82%, vaso 2, epi 0.03  8/26: CVP 13, PA 40/20/26, PCWP 12, CI/CO 7/ 3.6, SvO2 74%, vaso 1  8/27: CVP 13, PA 38/18/26, PCWP 12-14, CI/CO 9.4/4.7, , PVR ~0.5, SvO2 73%, vaso 1, epi 0.05     # CAD s/p CABG 4/2020 (KURT to RCA and LIMA to LAD) and PCI to RCA 7/20  - Stop home plavix po qd (8/13) for LVAD surgery  - aspirin 81mg PO qd   - c/w home crestor    # Mural thrombus  - removed during surgery on 8/19    # A-flutter  Converted to NSR on 8/15    - Continue amiodarone 200mg po qd      Pulm:  # Acute hypoxic respiratory failure  # Hemopneumothorax  Extubated on 9/1/2020. S/p IR for chest tube of Hemopneumothorax on 9/5 with 2.4L output immediately.     Renal/Electrolytes   # Hyperkalemia/Hypokalemia   # Acute kidney injury on chronic kidney disease, stage III  Likely ATN due to hypoxic insult  - renal consult and diuretic challenge. May need dialysis  - Trend potassium and creatinine q12hrs  - Electrolyte replacement protocol  - Monitor UOP    - continue CVVH      GI:  #GIB  GI consulted and EGD on 8/28 with protuberant vessel injected and clipped and bleeding gastric ulcer with adherent clot, injected and clipped as well  Repeat bleed on 8/29-8/30, transfused and given pRBC, underwent repeat EGD, showed hematin throughout stomache with single bleeding angioectasia vs gastric erosion (from NGT) in stomach. Likely source of bleed and thought related to current bleeding episode. Hypothermia thought to be related to bleed.    # Constipation  # Severe protein calorie malnutrition  - Nutrition through tube feeds at 30 mL/hr    - Senna-docusate bid scheduled  - Miralax bid prn constipation    ID:   # Candidemia   # Methicillin resistant staph epidermidis  Currently C. Glabrata positive on 8/30, 9/1, 9/3 cultures. Also growing MRSE on 9/3 and GPCs on 9/1  Current:  - Micafungin (9/1-**)  - Vanc (9/4-**)     Prior:  - Vancomycin (8/30-9/2, 8/19-8/21)  - Zosyn (8/30-9/2, 8/19-8/24)  - Rifampin (8/19-8/21)  - Fluconazole (8/19-8/21)  - ceftriaxone (8/14-8/18)    - ID Consult: treat as real candidemia and pull lines in place at time of positive culture and continue Micafungin  - C. Diff negative  - No evidence of ophthalmologic yeast involvement  - Pleural fluid labs sent per ID    Hem/Onc  # Mural thrombus removed on 8/19  # Anemia, mild  D/t frequent blood draws and procedures, and two large GI bleesd  - Monitor hgb  - PPI gtt --> IV BID    Endo  # Hypothyroidism   - Continue PTA levothyroxine     Nutrition: TFs  DVT Prophylaxis: mechanical   Code Status: Full Code    Katy Monte MD  Cardiovascular Disease Fellow  [facility]    Discussed with Dr Christian        I have reviewed today's vital signs, notes, medications, labs and imaging. I have also seen and examined the patient and agree with the findings and plan as outlined above.    Nel Christian MD  Section Head - Advanced Heart Failure, Transplantation and Mechanical Circulatory  Support  Director - [facility]  Associate Professor of Medicine, [facility]          Interval History   No acute events overnight, answers yes/no questions intermittently. Left chest tube with 20cc out per hour.    Physical Exam   Temp: 98  F (36.7  C) Temp src: Axillary BP: (!) 88/75 Pulse: 99   Resp: 16 SpO2: 98 % O2 Device: None (Room air)    Vitals:    09/04/20 0515 09/05/20 0400 09/06/20 0400   Weight: 80.6 kg (177 lb 11.1 oz) 80 kg (176 lb 5.9 oz) 76.2 kg (167 lb 15.9 oz)     Vital Signs with Ranges  Temp:  [96.3  F (35.7  C)-98  F (36.7  C)] 98  F (36.7  C)  Pulse:  [] 99  Resp:  [14-18] 16  BP: ()/(55-99) 88/75  SpO2:  [87 %-100 %] 98 %  I/O last 3 completed shifts:  In: 2610 [I.V.:900; NG/GT:390]  Out: 4051 [Urine:200; Other:2121; Stool:1050; Chest Tube:680]    RETIRE: Heart Rate: 71, Blood pressure (!) 88/75, pulse 99, temperature 98  F (36.7  C), temperature source Axillary, resp. rate 16, weight 76.2 kg (167 lb 15.9 oz), SpO2 98 %.  167 lbs 15.85 oz     GEN: alert, non to minimally verbal  CV: RRR, Hum of HM3   LUNGS: Clear to auscultation bilaterally  ABD: Active bowel sounds, soft, no abdominal tenderness.   EXT: Trace LE edema   NEURO: altert, tracks people/objects across room; responds to her name, husbands name, daughter's name. moves b/l LE and LUE to command    Medications     BETA BLOCKER NOT PRESCRIBED       dextrose Stopped (09/02/20 1957)     dextrose Stopped (08/30/20 2221)     CRRT replacement solution 12.5 mL/kg/hr (09/06/20 0356)     - MEDICATION INSTRUCTIONS -       CRRT replacement solution 2.911 mL/kg/hr (09/06/20 0357)     CRRT replacement solution 12.5 mL/kg/hr (09/06/20 0357)     BETA BLOCKER NOT PRESCRIBED       Warfarin Therapy Reminder         amiodarone  200 mg Oral or Feeding Tube Daily     artificial tears   Both Eyes 5x Daily     aspirin  81 mg Oral or Feeding Tube Daily     B and C vitamin Complex with folic  acid  5 mL Oral Daily     bimatoprost  1 drop Both Eyes At Bedtime     busPIRone  10 mg Oral or Feeding Tube TID     heparin lock flush  5-15 mL Intracatheter Q24H     insulin aspart  1-6 Units Subcutaneous Q4H     lacosamide  200 mg Oral or Feeding Tube BID     latanoprost  1 drop Both Eyes Daily     levETIRAcetam  2,000 mg Oral or Feeding Tube BID     levothyroxine  62.5 mcg Oral or Feeding Tube QAM AC     micafungin  150 mg Intravenous Q24H     pantoprazole  40 mg Per Feeding Tube BID AC     rosuvastatin  20 mg Oral or Feeding Tube Daily     sodium chloride (PF)  10 mL Intracatheter Q8H     sodium chloride (PF)  3 mL Intracatheter Q8H     vancomycin (VANCOCIN) IV  1,750 mg Intravenous Q24H       Data    Date 09/05/20 0700 - 09/06/20 0659   Shift 5623-9819 1472-8925 3966-3189 24 Hour Total   INTAKE   I.V. 715   715   NG/   110   Enteral 385   385   Shift Total(mL/kg) 1210(15.13)   1210(15.13)   OUTPUT   Other 372   372   Stool 650   650   Chest Tube(mL/kg) 328(4.1)   328(4.1)   Shift Total(mL/kg) 1350(16.88)   1350(16.88)   Weight (kg) 80 80 80 80               Recent Labs   Lab 09/06/20  0428 09/05/20 2155 09/05/20  1620  09/05/20  0401 09/04/20  2159  09/04/20  0156   WBC 9.2  --   --   --  7.8  --   --  7.6   HGB 9.1*  --   --   --  8.7* 9.1*   < > 8.4*   MCV 94  --   --   --  92  --   --  91     --   --   --  128*  --   --  131*   INR 1.18*  --   --   --  1.18*  --   --  1.26*    143 143   < > 143 143   < > 142   POTASSIUM 4.3 4.1 4.0   < > 3.8 3.9   < > 3.5   CHLORIDE 112* 113* 115*   < > 113* 114*   < > 114*   CO2 23 24 24   < > 25 25   < > 24   BUN 32* 28 28   < > 27 26   < > 23   CR 0.82 0.80 0.82   < > 0.79 0.86   < > 0.83   ANIONGAP 6 6 5   < > 5 4   < > 5   FERNANDO 8.4* 7.8* 9.4   < > 7.7* 7.7*   < > 7.2*   * 103* 86   < > 97 94   < > 114*   ALBUMIN 1.5*  --   --   --  1.6*  --   --  1.4*   PROTTOTAL 4.5*  --   --   --  4.4*  --   --  4.0*   BILITOTAL 0.9  --   --   --  1.0  --    --  1.2   ALKPHOS 213*  --   --   --  204*  --   --  235*   ALT 28  --   --   --  27  --   --  26   AST 39  --   --   --  38  --   --  38    < > = values in this interval not displayed.       No results found for this or any previous visit (from the past 24 hour(s)).

## 2020-09-06 NOTE — PROGRESS NOTES
CRRT STATUS NOTE    DATA:  Time:  6:13 AM  Pressures WNL:  YES  Filter Status:  WDL    Problems Reported/Alarms Noted:  Air in circuit, spike was partially in bag per report, frothy appearance in circut and alarm,  no blood returned. Upon restart machine would not allow same patient, data uploaded to card and card put in with used cards, new data placed in machine.     Supplies Present:  YES    ASSESSMENT:  Patient Net Fluid Balance:  Net neg 1.58L on 9/5/2020, net neg 449ml so far today.     Vital Signs:  Temp:  [96.3  F (35.7  C)-98  F (36.7  C)] 98  F (36.7  C)  Pulse:  [] 104  Resp:  [14-18] 18  BP: ()/(55-99) 93/82  SpO2:  [87 %-100 %] 95 %    Labs:    Last Renal Panel:  Sodium   Date Value Ref Range Status   09/06/2020 140 133 - 144 mmol/L Final     Potassium   Date Value Ref Range Status   09/06/2020 4.3 3.4 - 5.3 mmol/L Final     Chloride   Date Value Ref Range Status   09/06/2020 112 (H) 94 - 109 mmol/L Final     Carbon Dioxide   Date Value Ref Range Status   09/06/2020 23 20 - 32 mmol/L Final     Anion Gap   Date Value Ref Range Status   09/06/2020 6 3 - 14 mmol/L Final     Glucose   Date Value Ref Range Status   09/06/2020 104 (H) 70 - 99 mg/dL Final     Urea Nitrogen   Date Value Ref Range Status   09/06/2020 32 (H) 7 - 30 mg/dL Final     Creatinine   Date Value Ref Range Status   09/06/2020 0.82 0.52 - 1.04 mg/dL Final     GFR Estimate   Date Value Ref Range Status   09/06/2020 69 >60 mL/min/[1.73_m2] Final     Comment:     Non  GFR Calc  Starting 12/18/2018, serum creatinine based estimated GFR (eGFR) will be   calculated using the Chronic Kidney Disease Epidemiology Collaboration   (CKD-EPI) equation.       Calcium   Date Value Ref Range Status   09/06/2020 8.4 (L) 8.5 - 10.1 mg/dL Final     Phosphorus   Date Value Ref Range Status   09/06/2020 3.2 2.5 - 4.5 mg/dL Final     Albumin   Date Value Ref Range Status   09/06/2020 1.5 (L) 3.4 - 5.0 g/dL Final       Goals of  Therapy:  Being met.     INTERVENTIONS/PLAN:  Circuit changed. Dressing changed. No new orders at this time. Patient will continue to be monitored and assessed. Please see MAR, flow sheets, and remainder of chart for further details. .

## 2020-09-06 NOTE — PROGRESS NOTES
CVTS PROGRESS NOTE  September 6, 2020            CO-MORBIDITIES:   Cardiogenic shock (H)  (primary encounter diagnosis)  LV (left ventricular) mural thrombus  Heart failure (H)  Status post cardiac surgery    ASSESSMENT: Marquise Jj is a 75 year old female 75 year-old female with PMH notable for coronary artery disease s/p CABG (4/20), ischemic cardiomyopathy, severe MR/TR and known mural thrombus who is admitted to the CV ICU s/p redo sternotomy, LVAD (heartmate III), and TV repair on 8/19/20 with Dr. Farley. Chest was left open and packed. S/p chest closure and washout 08/21/20. Extubated 9/1/20. Found to have candidemia (+ culture x2) and methicillin resistant staph epidermidis on blood culture, ID following.     PLAN SUMMARY:   Daily blood cultures until clear   Coumadin, Goal INR 1.8-2.2   Plan for potential family meeting Tuesday   Will discuss removal of mediastinal CT with CVTS staff        PLAN:   Neuro/ pain/ sedation:  #Acute post-operative pain   #Likely anoxic brain injury; possible stroke  #Epilepsia Partialis Continua; resolved  - Monitor neurological status. Notify the MD for any acute changes in exam.  - Keppra, vimpat to continue per NeuroCrit  - Tylenol PRN  - Oxycodone prn     Pulmonary:   #Acute hypoxic respiratory failure, improved  #R pleural effusion   #R Hemopneumothorax  - Monitor CXR, O2 saturation  - R pigtail placed 9/2, drained 700 mL fluid immediately   - Daily CXR    Cardiovascular:    #Acute on chronic decompensated heart failure with reduced ejection fraction: NYHA IV / ACC D  #Cardiogenic shock, possible vasoplegia   #Ischemic cardiomyopathy  #Coronary artery disease s/p CABG 4/20 in [place], s/p PCI to RCA 7/20  #Mural thrombus  #Severe MR/TR  #S/p LVAD     MAP > 60, CVP 8-12    Off pressors    Starting warfarin     Aspirin, rosuvastatin    Co-managing with Cards-2    Amiodarone 200 mg daily      GI/Nutrition:   # UGI bleed   - Appreciate GI consult   - Continue tube feeds  "at goal  - Pantoprazole BID   - Bowel regimen: senna-colace, miralax.       Renal/Fluid Balance/ Electrolytes:   #Oliguria in the setting of likely ATN  - Will monitor intake and output.  - ICU electrolyte replacement protocol  - Nephrology consulted; CRRT      Endocrine:    #Glycemic control  #Hx hypothyroid   - ISS, medium intensity   - Synthroid daily       ID/ Antibiotics:  # Febrile, concern for post operative fevers, resolved  # Candidemia   # Methicillin resistant staph epidermidis  - Completed perioperative antibiotic regimen: vancomycin, levofloxacin, fluconazole, Zosyn   - Vanc (8/30-9/2, 9/5- ), Zosyn (8/30-9/2). Restarted Vancomycin for + Blood culture MRSA  - ID Consult (fungemia), appreciate recs   - Micafungin for yeast in blood for 6 weeks at least IV  - Daily blood cultures until clear   - Will need Dialysis Line swapped out to new site due to bacteremia and fungemia  - C. Diff negative  - No evidence of ophthalmologic yeast involvement  - Pleural fluid labs sent per ID      Heme:     #Acute perioperative blood loss anemia  #Thrombocytopenia   - Hgb goal > 8  - Transfuse as necessary   - Have started coumadin with goal INR 1.8-2.2   - No heparin bridge       Prophylaxis:    - SCD  - Coumadin  - PPI  - Bowel regimen      MSK:    - PT and OT consulted. Appreciate recs.      Lines/ tubes/ drains:  - LIJ CRRT line   - PIV  - Chest tubes       Disposition:  - CV ICU.    Patient seen, findings and plan discussed with CVTS Fellow and CVICU staff   -----------------------------------  Angela Sales   Surgery Resident   [id number]      ====================================    SUBJECTIVE:   Intermittently verbal. Saying 's name, \"Reyes\" to command this morning. RUE seems stronger today.     OBJECTIVE:   1. VITAL SIGNS:   Temp:  [97.4  F (36.3  C)-99  F (37.2  C)] 99  F (37.2  C)  Pulse:  [] 89  Resp:  [14-18] 14  BP: ()/(56-99) 100/88  SpO2:  [88 %-100 %] 97 %  Resp: 14      2. INTAKE/ OUTPUT: "   I/O last 3 completed shifts:  In: 2610 [I.V.:900; NG/GT:390]  Out: 4051 [Urine:200; Other:2121; Stool:1050; Chest Tube:680]    3. PHYSICAL EXAMINATION:   General: NAD, lying in bed  Neuro: Does not track eyes. Intermittently verbal. Says 's name to command.   HENT: feeding tube in place via nostril, nasal cannula oxygen  CV: VVI standby, LVAD humming.   Abdomen: Soft, Non-distended, Non-tender  Incisions: sternotomy wound c/d/i s/p closure  Extremities: warm and well perfused  Lines: left dialysis access catheter in place with CRRT running no bleeding or erythema at insertion site    4. INVESTIGATIONS:   Arterial Blood Gases   Recent Labs   Lab 09/03/20  0339 09/02/20  0350 09/01/20  1625 09/01/20  1148   PH 7.50* 7.47* 7.46* 7.48*   PCO2 28* 30* 32* 31*   PO2 70* 121* 105 171*   HCO3 22 21 23 23     Complete Blood Count   Recent Labs   Lab 09/06/20 0428 09/05/20  0401 09/04/20 2159 09/04/20  1139 09/04/20  0156 09/03/20  2046   WBC 9.2 7.8  --   --   --  7.6 8.6   HGB 9.1* 8.7* 9.1* 8.6*   < > 8.4* 6.5*    128*  --   --   --  131* 138*    < > = values in this interval not displayed.     Basic Metabolic Panel  Recent Labs   Lab 09/06/20  1138 09/06/20  0428 09/05/20 2155 09/05/20  1620    140 143 143   POTASSIUM 4.1 4.3 4.1 4.0   CHLORIDE 111* 112* 113* 115*   CO2 25 23 24 24   BUN 32* 32* 28 28   CR 0.84 0.82 0.80 0.82   * 104* 103* 86     Liver Function Tests  Recent Labs   Lab 09/06/20  0428 09/05/20  0401 09/04/20  0156 09/03/20  0339   AST 39 38 38 47*   ALT 28 27 26 33   ALKPHOS 213* 204* 235* 258*   BILITOTAL 0.9 1.0 1.2 1.7*   ALBUMIN 1.5* 1.6* 1.4* 1.4*   INR 1.18* 1.18* 1.26* 1.27*     Pancreatic Enzymes  No lab results found in last 7 days.  Coagulation Profile  Recent Labs   Lab 09/06/20  0428 09/05/20  0401 09/04/20  0156 09/03/20  0713 09/03/20  0339   INR 1.18* 1.18* 1.26*  --  1.27*   PTT  --   --   --  34  --          5. RADIOLOGY:   Recent Results (from the past 24  hour(s))   XR Chest Port 1 View    Narrative    Exam: AP chest radiograph, 9/6/2020 4:11 AM    Indication: Interval change, right hemothorax    Comparison: 9/5/2020, chest CT 9/4/2020    Findings: AP chest radiograph. CABG. Stable right pigtail catheter,  LVAD, left chest wall implantable cardiac device, enteric tube. Slight  retraction of right upper extremity PICC line, tip projects over the  axilla. Stable cardiomediastinal silhouette. Unchanged loculated right  pleural fluid collection/hemopneumothorax. Question tiny right basilar  pneumothorax. Mild perihilar interstitial opacities. Trace left  pleural effusion.      Impression    IMPRESSION:  1. Stable small bilateral pleural fluid collections and loculated  right hemothorax.  2. Mild pulmonary edema.  3. Question tiny right basilar pneumothorax.   4. Stable support devices as above.    I have personally reviewed the examination and initial interpretation  and I agree with the findings.    CRYSTAL BLANKENSHIP MD       =========================================

## 2020-09-06 NOTE — PLAN OF CARE
Major Shift Events: Pt intermittently lethargic, following commands; R hand w/ weak , left w/ moderate. Robaxin, tylenol, and oxy for pain. HR in NSR, HM 3 in place, speed 5400, flows 3.5-4.4, PI 1.4-5.9. power 3.7-3.9. Pt on RA, lung sounds dim. Minimal output from CTs.  CRRT running, goal is net negative  mL/hr. PIs started to drop towards end of shift, stopped pulling for one hour, good result. Up to chair for five hours total. Family updated on POC    Plan: Continue on CRRT, care conference on Tuesday     For vital signs and complete assessments, please see documentation flowsheets.

## 2020-09-06 NOTE — PROGRESS NOTES
Nephrology Progress Note  09/06/2020         Mrs Jj is a 75 yof w/ICM, s/p CABG (4/20), severe MR/TR and known mural thrombus, acute kidney injry, hypothyroidism, CKD3, anxiety, GERD, chronic anemia.  Transferred from [facility] to [facility] on 8/8 after developing cardiac shock. Placed on IABP and then LVAD placement and TVR repair for cardiogenic shock on 8/19 /20.  Nephrology consulted for managign Oliguric LORI on CKD stage 3, started CRRT on 8/26.     Interval History :   Mrs Jj continues on CRRT, net negative 1.4L of UF yesterday and already 0.8L net negative today.  UOP minimal, planning 50-150cc/hr net negative as BP's are normal.  Getting closer to iHD but would like to be closer to euvolemic so we are only trying to keep up with intake at that time.  Currently still ~12kg up from baseline wt.     Assessment & Recommendations:   LORI on CKD-Baseline Cr ~1.5, small L kidney at 8.3cm, 10cm R kidney at baseline.  Started CRRT on 8/26 for management of volume and electrolytes in setting of cardiogenic shock, LVAD placed on 8/19.  Etiology of LORI is likely ATN from hypoperfusion, started CRRT on 8/26.               -Line is LIJ temp line from 9/1               -Continuing CRRT, 4k baths, 25ml/kg/hr standard dosing, goal 50-150cc/hr net negative today.                    Volume status-Net negative 1.6L the past 2 days UF is not biggest factor but still needed 1.9L of UF.  Plan to continue CRRT but may be able to consider iHD in coming days if she remains stable although would like to be closer to euvolemic, still ~12kg up.     Electrolytes/pH-K 4.3, bicarb 23, on 4k baths.  Will change to BID labs.       Ca/phos/pth-Ca 8.4, Mg and Phos WNL this am.      ID-Fungal cultures +, ID consulted, new HD line placed, stopped TPN and started TF.       Anemia-Hgb 9.1, intermittent PRBC's, acute management per team.      Nutrition-Had been on TPN, off now and on Impact TF with fungemia.      Recommendations were communicated to  primary team via verbal communication    STEPHAN Faulkner CNS  Clinical Nurse Specialist  [phone]    Review of Systems:   I reviewed the following systems:  ROS not done due to vent/sedation.     Physical Exam:   I/O last 3 completed shifts:  In: 2610 [I.V.:900; NG/GT:390]  Out: 4051 [Urine:200; Other:2121; Stool:1050; Chest Tube:680]   BP 92/56   Pulse 88   Temp 99  F (37.2  C) (Axillary)   Resp 14   Wt 76.2 kg (167 lb 15.9 oz)   SpO2 99%   BMI 25.54 kg/m       GENERAL APPEARANCE: Extubated, lethargic but responds to stimuli.   EYES:  No scleral icterus, pupils equal  HENT: mouth without ulcers or lesions  PULM: lungs clear to auscultation, equal air movement, no cyanosis or clubbing  CV: regular rhythm, normal rate, no rub     -JVP not elevated.      -edema +3 generalized.   GI: soft, nontender, +distended, bowel sounds are +  MS: no evidence of inflammation in joints, no muscle tenderness  NEURO: Lethargic, no focal deficits.   Lines LIJ temp line from 9/1    Labs:   All labs reviewed by me  Electrolytes/Renal -   Recent Labs   Lab Test 09/06/20 0428 09/05/20 2155 09/05/20  1620  09/05/20  0401    143 143   < > 143   POTASSIUM 4.3 4.1 4.0   < > 3.8   CHLORIDE 112* 113* 115*   < > 113*   CO2 23 24 24   < > 25   BUN 32* 28 28   < > 27   CR 0.82 0.80 0.82   < > 0.79   * 103* 86   < > 97   FERNANDO 8.4* 7.8* 9.4   < > 7.7*   MAG 2.5*  --  2.3  --  2.5*   PHOS 3.2  --  3.1  --  3.2    < > = values in this interval not displayed.       CBC -   Recent Labs   Lab Test 09/06/20 0428 09/05/20  0401 09/04/20 2159 09/04/20  0156   WBC 9.2 7.8  --   --  7.6   HGB 9.1* 8.7* 9.1*   < > 8.4*    128*  --   --  131*    < > = values in this interval not displayed.       LFTs -   Recent Labs   Lab Test 09/06/20  0428 09/05/20  0401 09/04/20  0156   ALKPHOS 213* 204* 235*   BILITOTAL 0.9 1.0 1.2   ALT 28 27 26   AST 39 38 38   PROTTOTAL 4.5* 4.4* 4.0*   ALBUMIN 1.5* 1.6* 1.4*       Iron Panel -    Recent Labs   Lab Test 08/10/20  1052 07/01/20  0530   IRON 48 65   IRONSAT 15 17   HUSEYIN 165  --            Current Medications:    amiodarone  200 mg Oral or Feeding Tube Daily     artificial tears   Both Eyes 5x Daily     aspirin  81 mg Oral or Feeding Tube Daily     B and C vitamin Complex with folic acid  5 mL Oral Daily     bimatoprost  1 drop Both Eyes At Bedtime     busPIRone  10 mg Oral or Feeding Tube TID     heparin lock flush  5-15 mL Intracatheter Q24H     insulin aspart  1-6 Units Subcutaneous Q4H     lacosamide  200 mg Oral or Feeding Tube BID     latanoprost  1 drop Both Eyes Daily     levETIRAcetam  2,000 mg Oral or Feeding Tube BID     levothyroxine  62.5 mcg Oral or Feeding Tube QAM AC     micafungin  150 mg Intravenous Q24H     pantoprazole  40 mg Per Feeding Tube BID AC     rosuvastatin  20 mg Oral or Feeding Tube Daily     sodium chloride (PF)  10 mL Intracatheter Q8H     sodium chloride (PF)  3 mL Intracatheter Q8H     vancomycin (VANCOCIN) IV  1,750 mg Intravenous Q24H       BETA BLOCKER NOT PRESCRIBED       dextrose Stopped (09/02/20 1957)     dextrose Stopped (08/30/20 2221)     CRRT replacement solution 12.5 mL/kg/hr (09/06/20 1006)     - MEDICATION INSTRUCTIONS -       CRRT replacement solution 2.911 mL/kg/hr (09/06/20 0357)     CRRT replacement solution 12.5 mL/kg/hr (09/06/20 1004)     BETA BLOCKER NOT PRESCRIBED       Warfarin Therapy Reminder

## 2020-09-06 NOTE — PLAN OF CARE
Major Shift Events:  Lethargic to awake and restless. Moaning throughout the night, PRN oxy given for discomfort. Able to follow simple commands such as squeeze hand, but not able to answer orientation questions. Normal sinus to sinus tach, BP stable. LVAD without alarms. LS clear, on room air, sats stable. TF continued at goal. Rectal tube with continued loose, watery stool. Oliguric on CRRT, bladder scan for 612mL, straight cath for 200mL. Calcium replaced per CRRT order protocol. Tolerating pull.   Plan: Continue CRRT fluid pull as tolerated.   For vital signs and complete assessments, please see documentation flowsheets.

## 2020-09-07 NOTE — PHARMACY-VANCOMYCIN DOSING SERVICE
Pharmacy Vancomycin Note  Date of Service 2020  Patient's  1945   75 year old, female    Indication: Bacteremia  Goal Trough Level: 15-20 mg/L  Day of Therapy: 3  Current Vancomycin regimen:  1750 mg IV q24h    Current estimated CrCl = Estimated Creatinine Clearance: 68 mL/min (based on SCr of 0.86 mg/dL).    Creatinine for last 3 days  2020: 11:39 AM Creatinine 0.83 mg/dL;  5:10 PM Creatinine 0.82 mg/dL;  9:59 PM Creatinine 0.86 mg/dL  2020:  4:01 AM Creatinine 0.79 mg/dL; 11:27 AM Creatinine 0.73 mg/dL;  4:20 PM Creatinine 0.82 mg/dL;  9:55 PM Creatinine 0.80 mg/dL  2020:  4:28 AM Creatinine 0.82 mg/dL; 11:38 AM Creatinine 0.84 mg/dL;  4:42 PM Creatinine 0.84 mg/dL;  9:56 PM Creatinine 0.89 mg/dL  2020:  4:07 AM Creatinine 0.86 mg/dL    Recent Vancomycin Levels (past 3 days)  2020:  6:47 AM Vancomycin Level 20.6 mg/L    Vancomycin IV Administrations (past 72 hours)                   vancomycin (VANCOCIN) 1,750 mg in sodium chloride 0.9 % 500 mL intermittent infusion (mg) 1,750 mg New Bag 20 0757     1,750 mg New Bag 20 0716     1,750 mg New Bag 20 0649                Nephrotoxins and other renal medications (From now, onward)    Start     Dose/Rate Route Frequency Ordered Stop    20 0700  vancomycin 1500 mg in 0.9% NaCl 250 ml intermittent infusion 1,500 mg      1,500 mg  over 90 Minutes Intravenous EVERY 24 HOURS 20 0916               Contrast Orders - past 72 hours (72h ago, onward)    None          Interpretation of levels and current regimen:  Trough level is  Supratherapeutic prior to the 3rd dose    Has serum creatinine changed > 50% in last 72 hours: No    Urine output:  diminished urine output    Renal Function: on CRRT    Plan:  1.  Decrease Dose to IV vancomycin 1500 mg (19.7 mg/kg) Q24H  2.  Pharmacy will check trough levels as appropriate in 1-3 Days.    3. Serum creatinine levels will be ordered daily for the first week of therapy  and at least twice weekly for subsequent weeks.      Joshua Hernandez RPH        .

## 2020-09-07 NOTE — PROGRESS NOTES
CVTS PROGRESS NOTE  September 7, 2020           CO-MORBIDITIES:   Cardiogenic shock (H)  (primary encounter diagnosis)  LV (left ventricular) mural thrombus  Heart failure (H)  Status post cardiac surgery    ASSESSMENT: Marquise Jj is a 75 year-old female with PMH notable for coronary artery disease s/p CABG (4/20), ischemic cardiomyopathy, severe MR/TR and known mural thrombus who is admitted to the CV ICU s/p redo sternotomy, LVAD (heartmate III), and TV repair on 8/19/20 with Dr. Farley. Chest was left open and packed. S/p chest closure and washout 08/21/20. Extubated 9/1/20. Found to have candidemia (+ culture x2) and methicillin resistant staph epidermidis on blood culture, ID following.     PLAN SUMMARY:   Remove left chest tube   Blood cultures  Schedule family meeting with SW   Fluid goal -500 ml to 1L     PLAN:   Neuro/ pain/ sedation:  #Acute post-operative pain   #Likely anoxic brain injury; possible stroke  #Epilepsia Partialis Continua; resolved  - Monitor neurological status. Notify the MD for any acute changes in exam.  - Kesaulo maradiagapat to continue per NeuroCrit  - Tylenol PRN  - Oxycodone prn     Pulmonary:   #Acute hypoxic respiratory failure, improved  #R pleural effusion   #R Hemopneumothorax  - Monitor CXR, O2 saturation  - R pigtail placed 9/2, drained 700 mL fluid immediately   - Daily CXR  - Remove CTx1 today     Cardiovascular:    #Acute on chronic decompensated heart failure with reduced ejection fraction: NYHA IV / ACC D  #Cardiogenic shock, possible vasoplegia   #Ischemic cardiomyopathy  #Coronary artery disease s/p CABG 4/20 in [place], s/p PCI to RCA 7/20  #Mural thrombus  #Severe MR/TR  #S/p LVAD     MAP > 60, CVP 8-12    Off pressors    Starting warfarin     Aspirin, rosuvastatin    Co-managing with Cards-2    Amiodarone 200 mg daily      GI/Nutrition:   # UGI bleed   - Appreciate GI consult   - Continue tube feeds at goal  - Pantoprazole BID   - Bowel regimen: senna-colace,  miralax.       Renal/Fluid Balance/ Electrolytes:   #Oliguria in the setting of likely ATN  - Will monitor intake and output.  - ICU electrolyte replacement protocol  - Nephrology consulted; CRRT      Endocrine:    #Glycemic control  #Hx hypothyroid   - ISS, medium intensity   - Synthroid daily       ID/ Antibiotics:  # Febrile, concern for post operative fevers, resolved  # Candidemia   # Methicillin resistant staph epidermidis  - Completed perioperative antibiotic regimen: vancomycin, levofloxacin, fluconazole, Zosyn   - Vanc (8/30-9/2, 9/5- ), Zosyn (8/30-9/2). Restarted Vancomycin for + Blood culture MRSA  - ID Consult (fungemia), appreciate recs   - Micafungin for yeast in blood for 6 weeks at least IV  - Daily blood cultures until clear   - C. Diff negative  - No evidence of ophthalmologic yeast involvement      Heme:     #Acute perioperative blood loss anemia  #Thrombocytopenia   - Hgb goal > 8  - Transfuse as necessary   - Have started coumadin with goal INR 1.8-2.2   - No heparin bridge       Prophylaxis:    - SCD  - Coumadin  - PPI  - Bowel regimen      MSK:    - PT and OT consulted. Appreciate recs.      Lines/ tubes/ drains:  - LIJ CRRT line   - PIV  - Chest tubes       Disposition:  - CV ICU.    Patient seen, findings and plan discussed with CVTS Fellow and CVICU staff Dr. Herrera   -----------------------------------  Angela Sales   Surgery Resident   [id number]      ====================================    SUBJECTIVE:   No acute events. Up to the chair this morning. Does occasionally track voice.     OBJECTIVE:   1. VITAL SIGNS:   Temp:  [97.1  F (36.2  C)-98.1  F (36.7  C)] 98  F (36.7  C)  Pulse:  [67-98] 77  Resp:  [8-22] 15  BP: ()/(54-91) 84/60  SpO2:  [97 %-100 %] 99 %  Resp: 15      2. INTAKE/ OUTPUT:   I/O last 3 completed shifts:  In: 2571 [I.V.:805; Other:1; NG/GT:445]  Out: 3914 [Other:2254; Stool:900; Chest Tube:760]    3. PHYSICAL EXAMINATION:   General: NAD, lying in bed  Neuro: Does not  track eyes. Intermittently verbal.   HENT: feeding tube in place via nostril, nasal cannula oxygen  CV: VVI standby, LVAD humming.   Abdomen: Soft, Non-distended, Non-tender  Incisions: sternotomy wound c/d/i s/p closure  Extremities: warm and well perfused  Lines: left dialysis access catheter in place with CRRT running no bleeding or erythema at insertion site    4. INVESTIGATIONS:   Arterial Blood Gases   Recent Labs   Lab 09/03/20  0339 09/02/20  0350 09/01/20  1625 09/01/20  1148   PH 7.50* 7.47* 7.46* 7.48*   PCO2 28* 30* 32* 31*   PO2 70* 121* 105 171*   HCO3 22 21 23 23     Complete Blood Count   Recent Labs   Lab 09/07/20  0407 09/06/20 0428 09/05/20 0401 09/04/20 2159 09/04/20  0156   WBC 9.1 9.2 7.8  --   --  7.6   HGB 8.7* 9.1* 8.7* 9.1*   < > 8.4*    165 128*  --   --  131*    < > = values in this interval not displayed.     Basic Metabolic Panel  Recent Labs   Lab 09/07/20  1557 09/07/20 0407 09/06/20 2156 09/06/20  1642    141 141 143   POTASSIUM 4.3 4.1 4.4 4.0   CHLORIDE 111* 112* 113* 112*   CO2 27 25 24 25   BUN 36* 34* 33* 33*   CR 0.82 0.86 0.89 0.84   GLC 96 113* 101* 117*     Liver Function Tests  Recent Labs   Lab 09/07/20 0407 09/06/20 0428 09/05/20  0401 09/04/20  0156   AST 41 39 38 38   ALT 28 28 27 26   ALKPHOS 222* 213* 204* 235*   BILITOTAL 0.8 0.9 1.0 1.2   ALBUMIN 1.4* 1.5* 1.6* 1.4*   INR 1.25* 1.18* 1.18* 1.26*     Pancreatic Enzymes  No lab results found in last 7 days.  Coagulation Profile  Recent Labs   Lab 09/07/20  0407 09/06/20 0428 09/05/20  0401 09/04/20  0156 09/03/20  0713   INR 1.25* 1.18* 1.18* 1.26*  --    PTT  --   --   --   --  34         5. RADIOLOGY:   Recent Results (from the past 24 hour(s))   XR Chest Port 1 View   Result Value    Radiologist flags Right apical pneumothorax (Urgent)    Narrative    Exam: XR CHEST PORT 1 VW, 9/7/2020 1:20 AM    Indication: interval change, right hemothorax    Comparison: 9/6/2020    Findings:   Single  portable view the chest. Support devices in similar position to  previous. Persistent small bilateral pleural effusions and loculated  right hemothorax. The cardiomediastinum and upper abdomen are  unchanged. Increased small right apical pneumothorax with chest tube  in unchanged position.      Impression    Impression:   1. Increased/new small right apical pneumothorax with chest tube in  place.  2. Otherwise, support devices and chest findings are grossly  unchanged.    [Urgent Result: Right apical pneumothorax]    Finding was identified on 9/7/2020 3:39 AM.     Dr. Yao was contacted by Dr. Nieto at 9/7/2020 3:41 AM and  verbalized understanding of the urgent finding.     I have personally reviewed the examination and initial interpretation  and I agree with the findings.    BRIEN MILLAN MD       =========================================

## 2020-09-07 NOTE — PROGRESS NOTES
Nephrology Progress Note  09/07/2020           Mrs Jj is a 75 yof w/ICM, s/p CABG (4/20), severe MR/TR and known mural thrombus, acute kidney injry, hypothyroidism, CKD3, anxiety, GERD, chronic anemia.  Transferred from [facility] to [facility] on 8/8 after developing cardiac shock. Placed on IABP and then LVAD placement and TVR repair for cardiogenic shock on 8/19 /20.  Nephrology consulted for managign Oliguric LORI on CKD stage 3, started CRRT on 8/26.     Interval History :   Mrs Jj continues on CRRT, net negative 1.4L yesterday and overall has done well the past 3 days with fluid removal although remains ~10kg up.  I discussed care with her and her  at bedside, she want to go home (on hospice) which he is supportive of, her children are more interested in seeing how things progress with treatment of fungemia and more time to see if her issues improve.  Will have care conference tomorrow to discuss options.  For now will continue fluid removal as it has helped with work of breathing and overall comfort.      Assessment & Recommendations:   LORI on CKD-Baseline Cr ~1.5, small L kidney at 8.3cm, 10cm R kidney at baseline.  Started CRRT on 8/26 for management of volume and electrolytes in setting of cardiogenic shock, LVAD placed on 8/19.  Etiology of LORI is likely ATN from hypoperfusion, started CRRT on 8/26.               -Line is LIJ temp line from 9/1               -Continuing CRRT, 4k baths, 25ml/kg/hr standard dosing, goal 50-100cc/hr net negative today.                    Volume status-Net negative 1.6L the past 3 days UF is not biggest factor but still needed 2.6L of UF.  Plan to continue CRRT as she is still ~10kg up to help with breathing and edema.       Electrolytes/pH-K 4.1, bicarb 23, on 4k baths. BID labs.       Ca/phos/pth-Ca ical 4.3, Mg and Phos WNL this am.      ID-Fungal cultures +, ID consulted, new HD line placed, stopped TPN and started TF.       Anemia-Hgb 8.7, intermittent PRBC's, acute  management per team.      Nutrition-Had been on TPN, off now and on Impact TF with fungemia.      Recommendations were communicated to primary team via verbal communication    STEPHAN Faulkner CNS  Clinical Nurse Specialist  [phone]      Review of Systems:   I reviewed the following systems:  Gen: No fevers or chills  CV: No CP at rest  Resp: No SOB at rest  GI: No N/V      Physical Exam:   I/O last 3 completed shifts:  In: 2526 [I.V.:805; Other:1; NG/GT:400]  Out: 3927 [Other:2567; Stool:650; Chest Tube:710]   BP (!) 82/63   Pulse 89   Temp 98.1  F (36.7  C) (Axillary)   Resp 22   Wt 76.2 kg (167 lb 15.9 oz)   SpO2 100%   BMI 25.54 kg/m       GENERAL APPEARANCE: Extubated, lethargic but responds to stimuli.   EYES:  No scleral icterus, pupils equal  HENT: mouth without ulcers or lesions  PULM: lungs clear to auscultation, equal air movement, no cyanosis or clubbing  CV: regular rhythm, normal rate, no rub     -JVP not elevated.      -edema +3 generalized.   GI: soft, nontender, +distended, bowel sounds are +  MS: no evidence of inflammation in joints, no muscle tenderness  NEURO: Lethargic, no focal deficits.   Lines LIJ temp line from 9/1    Labs:   All labs reviewed by me  Electrolytes/Renal -   Recent Labs   Lab Test 09/07/20 0407 09/06/20  2156 09/06/20  1642  09/06/20  0428    141 143   < > 140   POTASSIUM 4.1 4.4 4.0   < > 4.3   CHLORIDE 112* 113* 112*   < > 112*   CO2 25 24 25   < > 23   BUN 34* 33* 33*   < > 32*   CR 0.86 0.89 0.84   < > 0.82   * 101* 117*   < > 104*   FERNANDO 7.7* 7.5* 7.5*   < > 8.4*   MAG 2.6*  --  2.3  --  2.5*   PHOS 3.2  --  2.9  --  3.2    < > = values in this interval not displayed.       CBC -   Recent Labs   Lab Test 09/07/20 0407 09/06/20  0428 09/05/20  0401   WBC 9.1 9.2 7.8   HGB 8.7* 9.1* 8.7*    165 128*       LFTs -   Recent Labs   Lab Test 09/07/20 0407 09/06/20 0428 09/05/20  0401   ALKPHOS 222* 213* 204*   BILITOTAL 0.8 0.9 1.0   ALT 28  28 27   AST 41 39 38   PROTTOTAL 4.5* 4.5* 4.4*   ALBUMIN 1.4* 1.5* 1.6*       Iron Panel -   Recent Labs   Lab Test 08/10/20  1052 07/01/20  0530   IRON 48 65   IRONSAT 15 17   HUSEYIN 165  --            Current Medications:    amiodarone  200 mg Oral or Feeding Tube Daily     artificial tears   Both Eyes 5x Daily     aspirin  81 mg Oral or Feeding Tube Daily     B and C vitamin Complex with folic acid  5 mL Oral Daily     bimatoprost  1 drop Both Eyes At Bedtime     busPIRone  10 mg Oral or Feeding Tube TID     heparin lock flush  5-15 mL Intracatheter Q24H     insulin aspart  1-6 Units Subcutaneous Q4H     lacosamide  200 mg Oral or Feeding Tube BID     latanoprost  1 drop Both Eyes Daily     levETIRAcetam  2,000 mg Oral or Feeding Tube BID     levothyroxine  62.5 mcg Oral or Feeding Tube QAM AC     micafungin  150 mg Intravenous Q24H     pantoprazole  40 mg Per Feeding Tube BID AC     rosuvastatin  20 mg Oral or Feeding Tube Daily     sodium chloride (PF)  10 mL Intracatheter Q8H     sodium chloride (PF)  3 mL Intracatheter Q8H     [START ON 9/8/2020] vancomycin (VANCOCIN) IV  1,500 mg Intravenous Q24H     warfarin ANTICOAGULANT  2 mg Oral ONCE at 18:00       BETA BLOCKER NOT PRESCRIBED       dextrose Stopped (09/02/20 1957)     dextrose Stopped (08/30/20 2221)     CRRT replacement solution 12.5 mL/kg/hr (09/07/20 0605)     - MEDICATION INSTRUCTIONS -       CRRT replacement solution 2.911 mL/kg/hr (09/07/20 0605)     CRRT replacement solution 12.5 mL/kg/hr (09/07/20 0605)     BETA BLOCKER NOT PRESCRIBED       Warfarin Therapy Reminder

## 2020-09-07 NOTE — PLAN OF CARE
Major Shift Events:  Patient lethargic to restless and moaning. Orientation EBONIE due to inappropriate, garbled speech. Reorientation provided. PRN oxy and robaxin for appeared discomfort with relief. Afebrile, blanket warmer on. Normal sinus rhythm, BP within limits. LVAD without alarms. Room air, weak cough. Rectal pouch in place, loose watery stools continued. TF at goal with standard flush. Oliguric on CRRT. Tolerating pull, circuit restarted at 0600 for increasing pressures. Electrolytes replaced per protocol.   Plan: Continue to pull fluid on CRRT as able.   For vital signs and complete assessments, please see documentation flowsheets.

## 2020-09-07 NOTE — PROGRESS NOTES
Cardiology Progress Note    Assessment & Plan   In summary, Marquise Jj is a 75-year-old female with a past medical history notable for coronary artery disease, ischemic cardiomyopathy, and known mural thrombus who was transferred from [facility] for further evaluation/treatment of cardiogenic shock. Balloon pump placed 8/14. Had HM3 8/19. Chest closed 8/21.    Today's changes:  - Coumadin. INR 1.8-2.2  - vanc for Methicillin resistant staph epi in blood cultures on 9/3, and S. Capitis on 9/1 will discuss with ID for appropriate abx  - continue micofungin therapy for fungemia  - Fluid goal: neg 500 ml to 1L  - family meeting tmrw    Neuro:   # Sedation --> extubated, alert  # concern for seizure activity  CTH head 8/20 no signs of ICH   CTH head 8/27 Scattered areas of hypodensity with loss of gray-white matter differentiation in the left frontal lobe. These areas were not present on the CT dated 8/10/2020 and difficult compared to other prior comparison CTs due to extensive hardware artifact. These are suspicious for involving embolic infarcts.  keppra 2 g PO bid   vimpat 200 BID    Cardio:  # Cardiogenic shock with vasoplegic component    # ICM: NYHA IV / ACC  # Severe MR/TR s/p tricuspid valve repair with Elkins MC3 Annuloplasty Ring size T30  # s/p LVAD HM3 on 8/19/2020  LAVD speed 5400, flow 3.7-3.9, pi 3.5, power 3.8-3.9   Presents with cardiogenic shock. On NE, cardiac index approximately 1.37 on admission. Severely elevated biventricular filling pressures. Etiology of her decompensation appears to be progression of her cardiomyopathy. Despite medical treatment, she has been hospitalized twice since returning to [place], both with low output. No viability in her LAD territory, and doubt that PCI to her circumflex would make meaningful LV recovery. Hemodynamics improved with dobutamine, did not tolerate attempt to wean inotropics. RHC removed on 8/13 after clotting off, replaced on 8/14  after patient with increased work of breathing. CI of 1.1, balloon pump placed with CI of 1.6-1.8 and improvement in symptoms/hemodynamics. Had LVAD HM3 placed on 8/19. CVP today around 10 range. Chest closed on 8/21.   -- holding hep gtt as above  -- warfarin with INR goal 1.8-2.2      Date 8/9 8/9 8/10 8/11 08/13/20 08/15/20 08/16/20 08/17/20 8/18/20 8/19 8/20 8/21*   CVP 12 9 3 4 8 9 6 8 11 5 10 13   Mean PA  35 30 21 25 23 21 20 35/18 35/18 - 30/12 28/14   PCWP  18 14 12 x 13 14  13 - -    Oxy HGB  59 64 61 61 60 47 62 69 54 - 68 66   CI/CO 2.3 2.3 2.4/4.2 2.3 2.2 1.8 2.8/4.9 4.1 4.3/2.5 - 6/3.3 6.9/3.8   SVR 1100 1100 1200 1400 1316 1900 3831 478 6047 - 1025 715   Device     IABP 1:1 IABP 1:1 IABP 1:1 IABP 1:1 IABP 1:1 No IABP No IABP No IABP   * epi 0.1, vaso 0.5     8/22: CVP 12, PA 28/14/19, PCWP 14, CO/CI 9.0/4.8. . Epi 0.1, vaso 1.  8/23: CVP 16, PA 30/14, CI/CO 4.4/8.0, , SvO2 71%  8/24: CVP 14, PA 44/20/28, PCWP 16, CI/CO 7.3/3.9, SvO2 68% epi 0.06, vaso 2  8/25: CVP 16, PA 44/20/29, PCWP 22, CI/CO  3.6, SvO2 82%, vaso 2, epi 0.03  8/26: CVP 13, PA 40/20/26, PCWP 12, CI/CO 7/ 3.6, SvO2 74%, vaso 1  8/27: CVP 13, PA 38/18/26, PCWP 12-14, CI/CO 9.4/4.7, , PVR ~0.5, SvO2 73%, vaso 1, epi 0.05     # CAD s/p CABG 4/2020 (KURT to RCA and LIMA to LAD) and PCI to RCA 7/20  - Stop home plavix po qd (8/13) for LVAD surgery  - aspirin 81mg PO qd   - c/w home crestor    # Mural thrombus  - removed during surgery on 8/19    # A-flutter  Converted to NSR on 8/15    - Continue amiodarone 200mg po qd      Pulm:  # Acute hypoxic respiratory failure  # Hemopneumothorax  Extubated on 9/1/2020. S/p IR for chest tube of Hemopneumothorax on 9/5 with 2.4L output immediately.     Renal/Electrolytes   # Hyperkalemia/Hypokalemia   # Acute kidney injury on chronic kidney disease, stage III  Likely ATN due to hypoxic insult  - renal consult and diuretic challenge. May need dialysis  - Trend potassium and  creatinine q12hrs  - Electrolyte replacement protocol  - Monitor UOP   - continue CVVH      GI:  #GIB  GI consulted and EGD on 8/28 with protuberant vessel injected and clipped and bleeding gastric ulcer with adherent clot, injected and clipped as well  Repeat bleed on 8/29-8/30, transfused and given pRBC, underwent repeat EGD, showed hematin throughout stomache with single bleeding angioectasia vs gastric erosion (from NGT) in stomach. Likely source of bleed and thought related to current bleeding episode. Hypothermia thought to be related to bleed.    # Constipation  # Severe protein calorie malnutrition  - Nutrition through tube feeds at 30 mL/hr    - Senna-docusate bid scheduled  - Miralax bid prn constipation    ID:   # Candidemia   # Methicillin resistant staph epidermidis  Currently C. Glabrata positive on 8/30, 9/1, 9/3 cultures.   Also growing MRSE on 9/3 and S. Capitis on 9/1  Current:  - Micafungin (9/1-**)  - Vanc (9/4-**)     Prior:  - Vancomycin (8/30-9/2, 8/19-8/21)  - Zosyn (8/30-9/2, 8/19-8/24)  - Rifampin (8/19-8/21)  - Fluconazole (8/19-8/21)  - ceftriaxone (8/14-8/18)    - ID Consult: treat as real candidemia and pull lines in place at time of positive culture as able and continue Micafungin  - C. Diff negative  - No evidence of ophthalmologic yeast involvement  - Pleural fluid labs sent per ID    Hem/Onc  # Mural thrombus removed on 8/19  # Anemia, mild  D/t frequent blood draws and procedures, and two large GI bleesd  - Monitor hgb  - PPI gtt --> IV BID    Endo  # Hypothyroidism   - Continue PTA levothyroxine     Nutrition: TFs  DVT Prophylaxis: mechanical   Code Status: Full Code    Katy Monte MD  Cardiovascular Disease Fellow  [facility]    Discussed with Dr Christian      Late entry - pt seen and examined on 9/7/2020  I have reviewed today's vital signs, notes, medications, labs and imaging. I have also seen and examined the patient and agree with the findings and plan as  outlined above.    Nel Christian MD  Section Head - Advanced Heart Failure, Transplantation and Mechanical Circulatory Support  Director - [facility]  Associate Professor of Medicine, [facility]        Interval History   No acute events overnight, answers yes/no questions intermittently.    Physical Exam   Temp: 97.5  F (36.4  C) Temp src: Axillary BP: 90/77 Pulse: 87   Resp: 15 SpO2: 99 % O2 Device: None (Room air)    Vitals:    09/04/20 0515 09/05/20 0400 09/06/20 0400   Weight: 80.6 kg (177 lb 11.1 oz) 80 kg (176 lb 5.9 oz) 76.2 kg (167 lb 15.9 oz)     Vital Signs with Ranges  Temp:  [97.1  F (36.2  C)-99  F (37.2  C)] 97.5  F (36.4  C)  Pulse:  [] 87  Resp:  [14-18] 15  BP: ()/(50-90) 90/77  SpO2:  [90 %-100 %] 99 %  I/O last 3 completed shifts:  In: 2526 [I.V.:805; Other:1; NG/GT:400]  Out: 3927 [Other:2567; Stool:650; Chest Tube:710]    RETIRE: Heart Rate: 71, Blood pressure 90/77, pulse 87, temperature 97.5  F (36.4  C), temperature source Axillary, resp. rate 15, weight 76.2 kg (167 lb 15.9 oz), SpO2 99 %.  167 lbs 15.85 oz     GEN: alert, non to minimally verbal  CV: RRR, Hum of HM3   LUNGS: Clear to auscultation bilaterally  ABD: Active bowel sounds, soft, no abdominal tenderness.   EXT: Trace LE edema   NEURO: altert, tracks people/objects across room; responds to her name, husbands name, daughter's name. moves b/l LE and LUE to command    Medications     BETA BLOCKER NOT PRESCRIBED       dextrose Stopped (09/02/20 1957)     dextrose Stopped (08/30/20 2221)     CRRT replacement solution 12.5 mL/kg/hr (09/07/20 0605)     - MEDICATION INSTRUCTIONS -       CRRT replacement solution 2.911 mL/kg/hr (09/07/20 0605)     CRRT replacement solution 12.5 mL/kg/hr (09/07/20 0605)     BETA BLOCKER NOT PRESCRIBED       Warfarin Therapy Reminder         amiodarone  200 mg Oral or Feeding Tube Daily     artificial tears   Both Eyes 5x Daily     aspirin   81 mg Oral or Feeding Tube Daily     B and C vitamin Complex with folic acid  5 mL Oral Daily     bimatoprost  1 drop Both Eyes At Bedtime     busPIRone  10 mg Oral or Feeding Tube TID     heparin lock flush  5-15 mL Intracatheter Q24H     insulin aspart  1-6 Units Subcutaneous Q4H     lacosamide  200 mg Oral or Feeding Tube BID     latanoprost  1 drop Both Eyes Daily     levETIRAcetam  2,000 mg Oral or Feeding Tube BID     levothyroxine  62.5 mcg Oral or Feeding Tube QAM AC     micafungin  150 mg Intravenous Q24H     pantoprazole  40 mg Per Feeding Tube BID AC     rosuvastatin  20 mg Oral or Feeding Tube Daily     sodium chloride (PF)  10 mL Intracatheter Q8H     sodium chloride (PF)  3 mL Intracatheter Q8H     vancomycin (VANCOCIN) IV  1,750 mg Intravenous Q24H       Data      Intake/Output Summary (Last 24 hours) at 9/7/2020 0717  Last data filed at 9/7/2020 0700  Gross per 24 hour   Intake 2281 ml   Output 3794 ml   Net -1513 ml         Recent Labs   Lab 09/07/20  0407 09/06/20  2156 09/06/20  1642  09/06/20  0428  09/05/20  0401   WBC 9.1  --   --   --  9.2  --  7.8   HGB 8.7*  --   --   --  9.1*  --  8.7*   MCV 94  --   --   --  94  --  92     --   --   --  165  --  128*   INR 1.25*  --   --   --  1.18*  --  1.18*    141 143   < > 140   < > 143   POTASSIUM 4.1 4.4 4.0   < > 4.3   < > 3.8   CHLORIDE 112* 113* 112*   < > 112*   < > 113*   CO2 25 24 25   < > 23   < > 25   BUN 34* 33* 33*   < > 32*   < > 27   CR 0.86 0.89 0.84   < > 0.82   < > 0.79   ANIONGAP 4 4 6   < > 6   < > 5   FERNANDO 7.7* 7.5* 7.5*   < > 8.4*   < > 7.7*   * 101* 117*   < > 104*   < > 97   ALBUMIN 1.4*  --   --   --  1.5*  --  1.6*   PROTTOTAL 4.5*  --   --   --  4.5*  --  4.4*   BILITOTAL 0.8  --   --   --  0.9  --  1.0   ALKPHOS 222*  --   --   --  213*  --  204*   ALT 28  --   --   --  28  --  27   AST 41  --   --   --  39  --  38    < > = values in this interval not displayed.       No results found for this or any  previous visit (from the past 24 hour(s)).

## 2020-09-07 NOTE — PROGRESS NOTES
CRRT STATUS NOTE    DATA:  Time:  7:34 AM  Pressures WNL:  YES  Filter Status:  WDL and Clotting    Problems Reported/Alarms Noted:  Clot in deaeration chamber increasing, circuit changed at time of post bag change being due.     Supplies Present:  YES    ASSESSMENT:  Patient Net Fluid Balance:  Net net 1.6L on 9/6/2020, net neg 441ml so far today.     Vital Signs:  Temp:  [97.1  F (36.2  C)-99  F (37.2  C)] 98.1  F (36.7  C)  Pulse:  [] 87  Resp:  [14-18] 15  BP: ()/(50-90) 90/77  SpO2:  [90 %-100 %] 99 %    Labs:    Last Renal Panel:  Sodium   Date Value Ref Range Status   09/07/2020 141 133 - 144 mmol/L Final     Potassium   Date Value Ref Range Status   09/07/2020 4.1 3.4 - 5.3 mmol/L Final     Chloride   Date Value Ref Range Status   09/07/2020 112 (H) 94 - 109 mmol/L Final     Carbon Dioxide   Date Value Ref Range Status   09/07/2020 25 20 - 32 mmol/L Final     Anion Gap   Date Value Ref Range Status   09/07/2020 4 3 - 14 mmol/L Final     Glucose   Date Value Ref Range Status   09/07/2020 113 (H) 70 - 99 mg/dL Final     Urea Nitrogen   Date Value Ref Range Status   09/07/2020 34 (H) 7 - 30 mg/dL Final     Creatinine   Date Value Ref Range Status   09/07/2020 0.86 0.52 - 1.04 mg/dL Final     GFR Estimate   Date Value Ref Range Status   09/07/2020 66 >60 mL/min/[1.73_m2] Final     Comment:     Non  GFR Calc  Starting 12/18/2018, serum creatinine based estimated GFR (eGFR) will be   calculated using the Chronic Kidney Disease Epidemiology Collaboration   (CKD-EPI) equation.       Calcium   Date Value Ref Range Status   09/07/2020 7.7 (L) 8.5 - 10.1 mg/dL Final     Phosphorus   Date Value Ref Range Status   09/07/2020 3.2 2.5 - 4.5 mg/dL Final     Albumin   Date Value Ref Range Status   09/07/2020 1.4 (L) 3.4 - 5.0 g/dL Final       Goals of Therapy: Being met. .     INTERVENTIONS/PLAN:No changes this shift. Plan for care confrence this week. Patient will continue to be monitored and  assessed. Please see MAR, flow sheets, and remainder of chart for further details. .

## 2020-09-07 NOTE — PROGRESS NOTES
CVICU PROGRESS NOTE  September 7, 2020           CO-MORBIDITIES:   Cardiogenic shock (H)  (primary encounter diagnosis)  LV (left ventricular) mural thrombus  Heart failure (H)  Status post cardiac surgery    ASSESSMENT: Marquise Jj is a 75 year-old female with PMH notable for coronary artery disease s/p CABG (4/20), ischemic cardiomyopathy, severe MR/TR and known mural thrombus who is admitted to the CV ICU s/p redo sternotomy, LVAD (heartmate III), and TV repair on 8/19/20 with Dr. Farley. Chest was left open and packed. S/p chest closure and washout 08/21/20. Extubated 9/1/20. Found to have candidemia (+ culture x2) and methicillin resistant staph epidermidis on blood culture, ID following.     PLAN SUMMARY:   Remove left chest tube   Blood cultures  Schedule family meeting with SW   Fluid goal -500 ml to 1L     PLAN:   Neuro/ pain/ sedation:  #Acute post-operative pain   #Likely anoxic brain injury; possible stroke  #Epilepsia Partialis Continua; resolved  - Monitor neurological status. Notify the MD for any acute changes in exam.  - Keppra, vimpat to continue per NeuroCrit  - Tylenol PRN  - Oxycodone prn     Pulmonary:   #Acute hypoxic respiratory failure, improved  #R pleural effusion   #R Hemopneumothorax  - Monitor CXR, O2 saturation  - R pigtail placed 9/2, drained 700 mL fluid immediately   - Daily CXR  - Remove CTx1 today     Cardiovascular:    #Acute on chronic decompensated heart failure with reduced ejection fraction: NYHA IV / ACC D  #Cardiogenic shock, possible vasoplegia   #Ischemic cardiomyopathy  #Coronary artery disease s/p CABG 4/20 in [place], s/p PCI to RCA 7/20  #Mural thrombus  #Severe MR/TR  #S/p LVAD     MAP > 60, CVP 8-12    Off pressors    Starting warfarin     Aspirin, rosuvastatin    Co-managing with Cards-2    Amiodarone 200 mg daily      GI/Nutrition:   # UGI bleed   - Appreciate GI consult   - Continue tube feeds at goal  - Pantoprazole BID   - Bowel regimen: senna-colace,  miralax.       Renal/Fluid Balance/ Electrolytes:   #Oliguria in the setting of likely ATN  - Will monitor intake and output.  - ICU electrolyte replacement protocol  - Nephrology consulted; CRRT      Endocrine:    #Glycemic control  #Hx hypothyroid   - ISS, medium intensity   - Synthroid daily       ID/ Antibiotics:  # Febrile, concern for post operative fevers, resolved  # Candidemia   # Methicillin resistant staph epidermidis  - Completed perioperative antibiotic regimen: vancomycin, levofloxacin, fluconazole, Zosyn   - Vanc (8/30-9/2, 9/5- ), Zosyn (8/30-9/2). Restarted Vancomycin for + Blood culture MRSA  - ID Consult (fungemia), appreciate recs   - Micafungin for yeast in blood for 6 weeks at least IV  - Daily blood cultures until clear   - C. Diff negative  - No evidence of ophthalmologic yeast involvement      Heme:     #Acute perioperative blood loss anemia  #Thrombocytopenia   - Hgb goal > 8  - Transfuse as necessary   - Have started coumadin with goal INR 1.8-2.2   - No heparin bridge       Prophylaxis:    - SCD  - Coumadin  - PPI  - Bowel regimen      MSK:    - PT and OT consulted. Appreciate recs.      Lines/ tubes/ drains:  - LIJ CRRT line   - PIV  - Chest tubes       Disposition:  - CV ICU.    Patient seen, findings and plan discussed with CVTS Fellow and CVICU staff Dr. Herrera   -----------------------------------  Angela Sales   Surgery Resident   [id number]      ====================================    SUBJECTIVE:   No acute events. Up to the chair this morning. Does occasionally track voice.     OBJECTIVE:   1. VITAL SIGNS:   Temp:  [97.1  F (36.2  C)-98.1  F (36.7  C)] 98  F (36.7  C)  Pulse:  [67-98] 77  Resp:  [8-22] 15  BP: ()/(54-91) 84/60  SpO2:  [97 %-100 %] 99 %  Resp: 15      2. INTAKE/ OUTPUT:   I/O last 3 completed shifts:  In: 2571 [I.V.:805; Other:1; NG/GT:445]  Out: 3914 [Other:2254; Stool:900; Chest Tube:760]    3. PHYSICAL EXAMINATION:   General: NAD, lying in bed  Neuro: Does not  track eyes. Intermittently verbal.   HENT: feeding tube in place via nostril, nasal cannula oxygen  CV: VVI standby, LVAD humming.   Abdomen: Soft, Non-distended, Non-tender  Incisions: sternotomy wound c/d/i s/p closure  Extremities: warm and well perfused  Lines: left dialysis access catheter in place with CRRT running no bleeding or erythema at insertion site    4. INVESTIGATIONS:   Arterial Blood Gases   Recent Labs   Lab 09/03/20  0339 09/02/20  0350 09/01/20  1625 09/01/20  1148   PH 7.50* 7.47* 7.46* 7.48*   PCO2 28* 30* 32* 31*   PO2 70* 121* 105 171*   HCO3 22 21 23 23     Complete Blood Count   Recent Labs   Lab 09/07/20  0407 09/06/20 0428 09/05/20 0401 09/04/20 2159 09/04/20  0156   WBC 9.1 9.2 7.8  --   --  7.6   HGB 8.7* 9.1* 8.7* 9.1*   < > 8.4*    165 128*  --   --  131*    < > = values in this interval not displayed.     Basic Metabolic Panel  Recent Labs   Lab 09/07/20  1557 09/07/20 0407 09/06/20 2156 09/06/20  1642    141 141 143   POTASSIUM 4.3 4.1 4.4 4.0   CHLORIDE 111* 112* 113* 112*   CO2 27 25 24 25   BUN 36* 34* 33* 33*   CR 0.82 0.86 0.89 0.84   GLC 96 113* 101* 117*     Liver Function Tests  Recent Labs   Lab 09/07/20 0407 09/06/20 0428 09/05/20  0401 09/04/20  0156   AST 41 39 38 38   ALT 28 28 27 26   ALKPHOS 222* 213* 204* 235*   BILITOTAL 0.8 0.9 1.0 1.2   ALBUMIN 1.4* 1.5* 1.6* 1.4*   INR 1.25* 1.18* 1.18* 1.26*     Pancreatic Enzymes  No lab results found in last 7 days.  Coagulation Profile  Recent Labs   Lab 09/07/20  0407 09/06/20 0428 09/05/20  0401 09/04/20  0156 09/03/20  0713   INR 1.25* 1.18* 1.18* 1.26*  --    PTT  --   --   --   --  34         5. RADIOLOGY:   Recent Results (from the past 24 hour(s))   XR Chest Port 1 View   Result Value    Radiologist flags Right apical pneumothorax (Urgent)    Narrative    Exam: XR CHEST PORT 1 VW, 9/7/2020 1:20 AM    Indication: interval change, right hemothorax    Comparison: 9/6/2020    Findings:   Single  portable view the chest. Support devices in similar position to  previous. Persistent small bilateral pleural effusions and loculated  right hemothorax. The cardiomediastinum and upper abdomen are  unchanged. Increased small right apical pneumothorax with chest tube  in unchanged position.      Impression    Impression:   1. Increased/new small right apical pneumothorax with chest tube in  place.  2. Otherwise, support devices and chest findings are grossly  unchanged.    [Urgent Result: Right apical pneumothorax]    Finding was identified on 9/7/2020 3:39 AM.     Dr. Yao was contacted by Dr. Nieto at 9/7/2020 3:41 AM and  verbalized understanding of the urgent finding.     I have personally reviewed the examination and initial interpretation  and I agree with the findings.    BRIEN MILLAN MD       =========================================

## 2020-09-08 NOTE — PROGRESS NOTES
CVICU PROGRESS NOTE  September 8, 2020           CO-MORBIDITIES:   Cardiogenic shock (H)  (primary encounter diagnosis)  LV (left ventricular) mural thrombus  Heart failure (H)  Status post cardiac surgery    ASSESSMENT: Marquise Jj is a 75 year-old female with PMH notable for coronary artery disease s/p CABG (4/20), ischemic cardiomyopathy, severe MR/TR and known mural thrombus who is admitted to the CV ICU s/p redo sternotomy, LVAD (heartmate III), and TV repair on 8/19/20 with Dr. Farley. Chest was left open and packed. S/p chest closure and washout 08/21/20. Extubated 9/1/20. Found to have candidemia (+ culture x2) and methicillin resistant staph epidermidis on blood culture, ID following.     PLAN SUMMARY:   Family meeting/care conference today  Pulling CRRT, goal net negative 1.0 L    PLAN:   Neuro/ pain/ sedation:  #Acute post-operative pain   #Likely anoxic brain injury; possible stroke  #Epilepsia Partialis Continua; resolved  - Monitor neurological status. Notify the MD for any acute changes in exam.  - Kehiren vimpat to continue per NeuroCrit  - Tylenol PRN  - Oxycodone prn     Pulmonary:   #Acute hypoxic respiratory failure, improved  #R pleural effusion   #R Hemopneumothorax  - Monitor CXR, O2 saturation  - R pigtail placed 9/2, drained 700 mL fluid immediately   - Daily CXR  - Remove CTx1 today     Cardiovascular:    #Acute on chronic decompensated heart failure with reduced ejection fraction: NYHA IV / ACC D  #Cardiogenic shock, possible vasoplegia   #Ischemic cardiomyopathy  #Coronary artery disease s/p CABG 4/20 in [place], s/p PCI to RCA 7/20  #Mural thrombus  #Severe MR/TR  #S/p LVAD     MAP > 60, CVP 8-12    Off pressors    Starting warfarin     Aspirin, rosuvastatin    Co-managing with Cards-2    Amiodarone 200 mg daily      GI/Nutrition:   # UGI bleed   - Appreciate GI consult   - Continue tube feeds at goal  - Pantoprazole BID   - Bowel regimen: senna-colace, miralax.       Renal/Fluid  Balance/ Electrolytes:   #Oliguria in the setting of likely ATN  - Will monitor intake and output.  - ICU electrolyte replacement protocol  - Nephrology consulted; CRRT      Endocrine:    #Glycemic control  #Hx hypothyroid   - ISS, medium intensity   - Synthroid daily       ID/ Antibiotics:  # Febrile, concern for post operative fevers, resolved  # Candidemia   # Methicillin resistant staph epidermidis  - Completed perioperative antibiotic regimen: vancomycin, levofloxacin, fluconazole, Zosyn   - Vanc (8/30-9/2, 9/5- ), Zosyn (8/30-9/2). Restarted Vancomycin for + Blood culture MRSA  - ID Consult (fungemia), appreciate recs   - Micafungin for yeast in blood for 6 weeks at least IV  - Daily blood cultures until clear   - C. Diff negative  - No evidence of ophthalmologic yeast involvement      Heme:     #Acute perioperative blood loss anemia  #Thrombocytopenia   - Hgb goal > 8  - Transfuse as necessary   - Have started coumadin with goal INR 1.8-2.2   - No heparin bridge       Prophylaxis:    - SCD  - Coumadin  - PPI  - Bowel regimen      MSK:    - PT and OT consulted. Appreciate recs.      Lines/ tubes/ drains:  - LIJ CRRT line   - PIV  - Chest tubes       Disposition:  - CV ICU.    Patient seen, findings and plan discussed with CVTS Fellow and CVICU staff Dr. Herrera   -----------------------------------  Spencer Santos MD  Anesthesiology Resident CA3, PGY4  CVICU Resident    ====================================    SUBJECTIVE:   No acute events. Up to the chair this morning. Does occasionally track voice. Had a fever in the afternoon    OBJECTIVE:   1. VITAL SIGNS:   Temp:  [97.3  F (36.3  C)-98.9  F (37.2  C)] 98.9  F (37.2  C)  Pulse:  [] 95  Resp:  [8-26] 9  BP: ()/(40-96) 75/62  SpO2:  [93 %-100 %] 97 %  Resp: 9      2. INTAKE/ OUTPUT:   I/O last 3 completed shifts:  In: 2480 [I.V.:705; NG/GT:455]  Out: 3774 [Other:2514; Stool:750; Chest Tube:510]    3. PHYSICAL EXAMINATION:   General: NAD, lying in  bed  Neuro: Does not track eyes. Intermittently verbal.   HENT: feeding tube in place via nostril, nasal cannula oxygen  CV: VVI standby, LVAD humming.   Abdomen: Soft, Non-distended, Non-tender  Incisions: sternotomy wound c/d/i s/p closure  Extremities: warm and well perfused  Lines: left dialysis access catheter in place with CRRT running no bleeding or erythema at insertion site    4. INVESTIGATIONS:   Arterial Blood Gases   Recent Labs   Lab 09/03/20  0339 09/02/20  0350 09/01/20  1625 09/01/20  1148   PH 7.50* 7.47* 7.46* 7.48*   PCO2 28* 30* 32* 31*   PO2 70* 121* 105 171*   HCO3 22 21 23 23     Complete Blood Count   Recent Labs   Lab 09/08/20 0339 09/07/20  0407 09/06/20  0428 09/05/20  0401   WBC 9.3 9.1 9.2 7.8   HGB 8.0* 8.7* 9.1* 8.7*    166 165 128*     Basic Metabolic Panel  Recent Labs   Lab 09/08/20  0339 09/07/20  1557 09/07/20  0407 09/06/20  2156    142 141 141   POTASSIUM 4.4 4.3 4.1 4.4   CHLORIDE 111* 111* 112* 113*   CO2 26 27 25 24   BUN 35* 36* 34* 33*   CR 0.85 0.82 0.86 0.89   * 96 113* 101*     Liver Function Tests  Recent Labs   Lab 09/08/20  0339 09/07/20  0407 09/06/20  0428 09/05/20  0401   AST 36 41 39 38   ALT 27 28 28 27   ALKPHOS 232* 222* 213* 204*   BILITOTAL 0.7 0.8 0.9 1.0   ALBUMIN 1.3* 1.4* 1.5* 1.6*   INR 1.36* 1.25* 1.18* 1.18*     Pancreatic Enzymes  No lab results found in last 7 days.  Coagulation Profile  Recent Labs   Lab 09/08/20  0339 09/07/20  0407 09/06/20  0428 09/05/20  0401  09/03/20  0713   INR 1.36* 1.25* 1.18* 1.18*   < >  --    PTT  --   --   --   --   --  34    < > = values in this interval not displayed.         5. RADIOLOGY:   Recent Results (from the past 24 hour(s))   XR Chest Port 1 View    Narrative    EXAMINATION: XR CHEST PORT 1 VW, 9/7/2020 8:06 PM    COMPARISON: Earlier same-day    HISTORY: Post chest tube pull CXR    FINDINGS: Left chest tube is been removed. No pneumothorax. Right  chest tubes remain. Heart size is  unchanged. LVAD and left chest wall  pacemaker remain in place. Enteric tube courses below the diaphragm.  Left IJ sheath tip in the upper SVC. Right PICC line tip in the  axillary vein. Loculated right pleural effusion and left pleural  effusion with basilar atelectasis persists.      Impression    IMPRESSION: No pneumothorax following thorax following removal of left  chest tube. Other lines and tubes are unchanged.     BRIEN MILLAN MD   XR Chest Port 1 View    Narrative    Exam: XR CHEST PORT 1 VW, 9/8/2020 5:45 AM    Indication: interval change, right hemothorax    Comparison: 9/7/2020    Findings:   Single portable view of the chest. Partially visualized LVAD.  Implantable cardiac device. Feeding tube in similar position. Right  sided chest tubes in similar position. Right-sided PICC projecting  over the right axilla, unchanged. Cardiomediastinum and upper abdomen  are unchanged. Persistent loculated right pleural effusion and  scattered bibasilar atelectasis. No significant pneumothorax.      Impression    Impression:   1. Support devices in similar position to previous.  2. Grossly unchanged loculated right pleural effusion and bibasilar  atelectasis.    I have personally reviewed the examination and initial interpretation  and I agree with the findings.    MORENO RODRIGUEZ MD       =========================================

## 2020-09-08 NOTE — PROGRESS NOTES
CLINICAL NUTRITION SERVICES - REASSESSMENT NOTE     Nutrition Prescription    Recommendations:  If loose stools persist over the next 2-3 days, consider scheduled imodium.     Malnutrition Status:    Severe malnutrition in the context of acute on chronic illness     Interventions by Registered Dietitian (RD):  Added fiber modulars (Nutrisource fiber 1 pkt TID = 9 g fiber/day) in setting of loose stools.    Future Recommendations:  Pending stools trends, consider transition to standard formula, Nutren 1.5 @ 50 mL/hr + Prosource (1 pkt QID) to provide 1960 kcals (31 kcal/kg/day), 126 g PRO (2 g/kg/day), 912 mL H2O, 211 g CHO and no fiber daily.       EVALUATION OF THE PROGRESS TOWARD GOALS   Diet: NPO  Nutrition Support: Impact Peptide @ 55 ml/hr via NDT to provide 1980 kcal (31 kcal/kg), 124 g protein (2 g/kg) daily. Nephornex.   Intake: Received 100% minimum needs from avg TF infusion over the past week. However, question degree of absorption in setting of loose stools.        NEW FINDINGS   Resp: Extubated on 9/1.   GI: Loose stools. 200-1200 ml/day. C diff (-) on 9/3  Renal: CRRT. K/Phos within normal limits.     MALNUTRITION  % Intake: No decreased intake noted  % Weight Loss: None noted  Subcutaneous Fat Loss: Facial region: Moderate, Upper arm: Moderate, Lower arm: Moderate and Thoracic/intercostal: Moderate  Muscle Loss: Temporal: Moderate, Facial & jaw region: Moderate, Scapular bone: Mild, Upper arm (bicep, tricep): Moderate, Lower arm  (forearm): Moderate and Dorsal hand: Moderate  Fluid Accumulation/Edema: Moderate  Malnutrition Diagnosis: Severe malnutrition in the context of acute on chronic illness     Previous Goals   Total avg nutritional intake to meet a minimum of 25 kcal/kg and 1.5 g PRO/kg daily (per dosing wt 63 kg).  Evaluation: Met    Previous Nutrition Diagnosis  Inadequate protein-energy intake   Evaluation: Improving    CURRENT NUTRITION DIAGNOSIS  Increased nutrient needs (protein) related  to CRRT and new LVAD as evidenced by estimated needs of 1.5+ g protein/kg/day.      INTERVENTIONS  Implementation  Enteral Nutrition - Add fiber given loose stools.    Goals  Total avg nutritional intake to meet a minimum of 25 kcal/kg and 1.5 g PRO/kg daily (per dosing wt 63 kg).    Monitoring/Evaluation  Progress toward goals will be monitored and evaluated per protocol.    Rosemary Wyatt RD, LD  [id number]  Pgr: [phone]

## 2020-09-08 NOTE — PROGRESS NOTES
CRRT STATUS NOTE    DATA:  Time:  7:45 PM  Pressures WNL:  YES  Filter Status:  WDL    Problems Reported/Alarms Noted:  none    Supplies Present:  YES    ASSESSMENT:  Patient Net Fluid Balance:  Net negative 930 since midnight  Vital Signs:  Vitals reviewed.  LVAD support.  RA sats 100%.     Labs:  Labs reviewed.  K 4.3  Goals of Therapy:   ml/hr net negative    INTERVENTIONS:   none    PLAN:  Fluid removal per plan of care.  Restart every 72 hours and PRN.  Please notify CRRT resource RN at [phone] with questions and concerns

## 2020-09-08 NOTE — PROGRESS NOTES
PCP: written, awaiting discharge to send  Referring Cardiologist: none, transferred in from [facility] ED  EMS: written, awaiting discharge to send

## 2020-09-08 NOTE — PROGRESS NOTES
Major Shift Events:  No acute events overnight. Pt opens eyes, squeezes with LUE inconsistently, occasionally answers yes or no questions, but mostly just moans incoherently. Remains on RA, weak non-productive cough, afebrile, TF at goal, continues to have watery stools; rectal pouch in place. Oxy and Robaxin given for pain as needed. CRRT continues to run, goal  negative an hour. LVAD operating well, no alarms overnight, dressing changed.    Plan: Continue with plan of care. Family care conference today.    For vital signs and complete assessments, please see documentation flowsheets.

## 2020-09-08 NOTE — PLAN OF CARE
ICU End of Shift Summary. See flowsheets for vital signs and detailed assessment.    Changes this shift: Oxycodone and Robaxin given for pain as needed. Remains globally aphasic, will squeeze left hand to command and intermittently track persons in the room. Up to chair for most of late morning/afternoon.  bedside, participated in care conference and updated on plan of care/goals. TF at goal, continues to have watery stools; fiber added per nutrition.  CRRT goal  negative an hour. LVAD operating well, no alarms.    Plan: no alarmsper care conference today continue with plan of care, full code, decrease oxycodone 2.5mg q6hrs PRN to avoid oversedation. Notify the team of any changes.       Problem: Adult Inpatient Plan of Care  Goal: Readiness for Transition of Care  Outcome: No Change     Problem: Adult Inpatient Plan of Care  Goal: Plan of Care Review  Outcome: Improving  Flowsheets (Taken 9/8/2020 1632)  Plan of Care Reviewed With:    family    spouse     Problem: Adult Inpatient Plan of Care  Goal: Patient-Specific Goal (Individualization)  Outcome: Improving  Flowsheets (Taken 9/8/2020 1632)  Anxieties, Fears or Concerns: pain medication too sedating  Patient-Specific Goals (Include Timeframe): pain medication dose and frequency decreased     Problem: Adult Inpatient Plan of Care  Goal: Rounds/Family Conference  Outcome: Improving  Flowsheets (Taken 9/8/2020 1632)  Participants:        family    pharmacy    physician    social work/services

## 2020-09-08 NOTE — PROGRESS NOTES
CRRT STATUS NOTE    DATA:  Time:  5:40 AM  Pressures WNL:  YES  Filter Status:  WDL    Problems Reported/Alarms Noted:  None    Supplies Present:  NO    ASSESSMENT:  Patient Net Fluid Balance:  -591ml today. - 1.1L yesterday  Vital Signs:  BP (!) 121/96   Pulse 98   Temp 98.9  F (37.2  C) (Axillary)   Resp 19   Wt 73.5 kg (162 lb 0.6 oz)   SpO2 98%   BMI 24.64 kg/m      Labs:  K+ 4.4. WBC 9.3. Hgb 8.0  Goals of Therapy:  Net neg 50-100ml/hr     INTERVENTIONS:   none    PLAN:  Continue with POC. Contact CRRT RN with questions or concerns.

## 2020-09-08 NOTE — PROGRESS NOTES
Care Coordinator Progress Note    Admission Date/Time:  8/8/2020  Attending MD:  Catarino Farley MD    Data  Chart reviewed, discussed with interdisciplinary team.   Patient was admitted for:    Cardiogenic shock (H)  LV (left ventricular) mural thrombus  Heart failure (H)  Status post cardiac surgery      Assessment/ Coordination of Care     Pt is in ICU extubated, with LVAD and CRRT.  Team requested care conf. to be arranged for today with family and all the providers to discuss goal of care.  Care conf. arranged for today, 9/8 at 2:00.  Team members that have been informed about the care conf time are; CVTS, CVICU, Nephrology, Cards 2, ID, SW and bedside  RN.  RNCC visited pt and spouse and provided update about the care conf. Time.  Pt spouse agreed with the team and stated he will inform the [facility] too.  RNCC provided conf. Call in number.  RNCC will cont to follow plan of care.     Plan  Anticipated Discharge Date:  TBD.  Anticipated Discharge Plan:   TBD.  Care conf. Arranged for today, 9/8 at 2:00 in 4E conf. Room.  RNCC will cont to follow plan of care.      Sandy Yu RN, PHN, BSN  4A and 4E/ ICU  Care Coordinator  Phone: [phone]  Pager: [phone]

## 2020-09-08 NOTE — PROGRESS NOTES
Cardiology Progress Note    Assessment & Plan   In summary, Marquise Jj is a 75-year-old female with a past medical history notable for coronary artery disease, ischemic cardiomyopathy, and known mural thrombus who was transferred from [facility] for further evaluation/treatment of cardiogenic shock. Balloon pump placed 8/14. Had HM3 8/19. Chest closed 8/21.    Today's changes:  - family meeting: allow  1-2 more weeks of progress, reevaluate next week  - f/u renal: regarding potential recovery of renal function  - f/u ID re: potential tunnel line placement timing  - run CVVH less negative (-50 ml instead of -100 ml) to avoid further low flow alarms, reevaluate later    Neuro:   # Sedation --> extubated, alert  # concern for seizure activity  CTH head 8/20 no signs of ICH   CTH head 8/27 Scattered areas of hypodensity with loss of gray-white matter differentiation in the left frontal lobe. These areas were not present on the CT dated 8/10/2020 and difficult compared to other prior comparison CTs due to extensive hardware artifact. These are suspicious for involving embolic infarcts.  keppra 2 g PO bid   vimpat 200 BID    Cardio:  # Cardiogenic shock with vasoplegic component    # ICM: NYHA IV / ACC  # Severe MR/TR s/p tricuspid valve repair with Elkins MC3 Annuloplasty Ring size T30  # s/p LVAD HM3 on 8/19/2020  LAVD speed 5400, flow 3.8-4.3, pi 2.2-5, power 3.8   Presents with cardiogenic shock. On NE, cardiac index approximately 1.37 on admission. Severely elevated biventricular filling pressures. Etiology of her decompensation appears to be progression of her cardiomyopathy. Despite medical treatment, she has been hospitalized twice since returning to [place], both with low output. No viability in her LAD territory, and doubt that PCI to her circumflex would make meaningful LV recovery. Hemodynamics improved with dobutamine, did not tolerate attempt to wean inotropics. RHC removed on 8/13 after  clotting off, replaced on 8/14 after patient with increased work of breathing. CI of 1.1, balloon pump placed with CI of 1.6-1.8 and improvement in symptoms/hemodynamics. Had LVAD HM3 placed on 8/19. CVP today around 10 range. Chest closed on 8/21.   -- holding hep gtt as above  -- warfarin with INR goal 1.8-2.2      Date 8/9 8/9 8/10 8/11 08/13/20 08/15/20 08/16/20 08/17/20 8/18/20 8/19 8/20 8/21*   CVP 12 9 3 4 8 9 6 8 11 5 10 13   Mean PA  35 30 21 25 23 21 20 35/18 35/18 - 30/12 28/14   PCWP  18 14 12 x 13 14  13 - -    Oxy HGB  59 64 61 61 60 47 62 69 54 - 68 66   CI/CO 2.3 2.3 2.4/4.2 2.3 2.2 1.8 2.8/4.9 4.1 4.3/2.5 - 6/3.3 6.9/3.8   SVR 1100 1100 1200 1400 1316 1900 3581 082 7961 - 1025 715   Device     IABP 1:1 IABP 1:1 IABP 1:1 IABP 1:1 IABP 1:1 No IABP No IABP No IABP   * epi 0.1, vaso 0.5     8/22: CVP 12, PA 28/14/19, PCWP 14, CO/CI 9.0/4.8. . Epi 0.1, vaso 1.  8/23: CVP 16, PA 30/14, CI/CO 4.4/8.0, , SvO2 71%  8/24: CVP 14, PA 44/20/28, PCWP 16, CI/CO 7.3/3.9, SvO2 68% epi 0.06, vaso 2  8/25: CVP 16, PA 44/20/29, PCWP 22, CI/CO  3.6, SvO2 82%, vaso 2, epi 0.03  8/26: CVP 13, PA 40/20/26, PCWP 12, CI/CO 7/ 3.6, SvO2 74%, vaso 1  8/27: CVP 13, PA 38/18/26, PCWP 12-14, CI/CO 9.4/4.7, , PVR ~0.5, SvO2 73%, vaso 1, epi 0.05     # CAD s/p CABG 4/2020 (KURT to RCA and LIMA to LAD) and PCI to RCA 7/20  - Stop home plavix po qd (8/13) for LVAD surgery  - aspirin 81mg PO qd   - c/w home crestor    # Mural thrombus  - removed during surgery on 8/19    # A-flutter  Converted to NSR on 8/15    - Continue amiodarone 200mg po qd      Pulm:  # Acute hypoxic respiratory failure  # Hemopneumothorax  Extubated on 9/1/2020. S/p IR for chest tube of Hemopneumothorax on 9/5 with 2.4L output immediately.     Renal/Electrolytes   # Hyperkalemia/Hypokalemia   # Acute kidney injury on chronic kidney disease, stage III  Likely ATN due to hypoxic insult  - renal consult and diuretic challenge. May need  dialysis  - Trend potassium and creatinine q12hrs  - Electrolyte replacement protocol  - Monitor UOP        GI:  #GIB  GI consulted and EGD on 8/28 with protuberant vessel injected and clipped and bleeding gastric ulcer with adherent clot, injected and clipped as well  Repeat bleed on 8/29-8/30, transfused and given pRBC, underwent repeat EGD, showed hematin throughout stomache with single bleeding angioectasia vs gastric erosion (from NGT) in stomach. Likely source of bleed and thought related to current bleeding episode. Hypothermia thought to be related to bleed.    # Constipation  # Severe protein calorie malnutrition  - Nutrition through tube feeds at 30 mL/hr    - Senna-docusate bid scheduled  - Miralax bid prn constipation    ID:   # Candidemia   # Methicillin resistant staph epidermidis  Currently C. Glabrata positive on 8/30, 9/1, 9/3 cultures.   Also growing MRSE on 9/3 and S. Capitis on 9/1  Current:  - Micafungin (9/1-**)  - Vanc (9/4-**)     Prior:  - Vancomycin (8/30-9/2, 8/19-8/21)  - Zosyn (8/30-9/2, 8/19-8/24)  - Rifampin (8/19-8/21)  - Fluconazole (8/19-8/21)  - ceftriaxone (8/14-8/18)    - ID Consult: treat as real candidemia and pull lines in place at time of positive culture as able and continue Micafungin  - C. Diff negative  - No evidence of ophthalmologic yeast involvement  - Pleural fluid labs sent per ID    Hem/Onc  # Mural thrombus removed on 8/19  # Anemia, mild  D/t frequent blood draws and procedures, and two large GI bleesd  - Monitor hgb  - PPI gtt --> IV BID    Endo  # Hypothyroidism   - Continue PTA levothyroxine     Nutrition: TFs  DVT Prophylaxis: mechanical   Code Status: Full Code    Waylon Garcia MD, Msc  Cardiovascular Disease Fellow  [facility]    Discussed with Dr Christian.      Late entry - pt seen and examined on 9/8/2020  I have reviewed today's vital signs, notes, medications, labs and imaging. I have also seen and examined the patient and  agree with the findings and plan as outlined above.    Nel Christian MD  Section Head - Advanced Heart Failure, Transplantation and Mechanical Circulatory Support  Director - Adult [facility]enital [facility]ovascular [facility]  Associate Professor of Medicine, [facility]        Interval History   SERA. Responds to name, answers yes/no questions intermittently. (Not in pain, no shortness of breath, no chest pain). One low flow alarm which resolved with decreasing CVVH fluid removal.      Physical Exam   Temp: 98.9  F (37.2  C) Temp src: Axillary BP: (!) 82/74 Pulse: 99   Resp: 13 SpO2: 98 % O2 Device: None (Room air)    Vitals:    09/05/20 0400 09/06/20 0400 09/08/20 0000   Weight: 80 kg (176 lb 5.9 oz) 76.2 kg (167 lb 15.9 oz) 73.5 kg (162 lb 0.6 oz)     Vital Signs with Ranges  Temp:  [97.3  F (36.3  C)-98.9  F (37.2  C)] 98.9  F (37.2  C)  Pulse:  [] 99  Resp:  [8-26] 13  BP: ()/(40-96) 82/74  SpO2:  [93 %-100 %] 98 %  I/O last 3 completed shifts:  In: 2480 [I.V.:705; NG/GT:455]  Out: 3774 [Other:2514; Stool:750; Chest Tube:510]    RETIRE: Heart Rate: 71, Blood pressure (!) 82/74, pulse 99, temperature 98.9  F (37.2  C), temperature source Axillary, resp. rate 13, weight 73.5 kg (162 lb 0.6 oz), SpO2 98 %.  162 lbs .61 oz     GEN: alert, non to minimally verbal  CV: RRR, Hum of HM3   LUNGS: Clear to auscultation bilaterally  ABD: Active bowel sounds, soft, no abdominal tenderness.   EXT: Trace LE edema   NEURO: altert, tracks people/objects across room; responds to her name, husbands name, daughter's name. moves b/l LE and LUE to command    Medications     BETA BLOCKER NOT PRESCRIBED       dextrose Stopped (09/02/20 1957)     dextrose Stopped (08/30/20 2221)     CRRT replacement solution 12.5 mL/kg/hr (09/08/20 0502)     - MEDICATION INSTRUCTIONS -       CRRT replacement solution 2.911 mL/kg/hr (09/08/20 0759)     CRRT replacement solution 12.5 mL/kg/hr (09/08/20 0502)     BETA BLOCKER  NOT PRESCRIBED       Warfarin Therapy Reminder         amiodarone  200 mg Oral or Feeding Tube Daily     artificial tears   Both Eyes 5x Daily     aspirin  81 mg Oral or Feeding Tube Daily     B and C vitamin Complex with folic acid  5 mL Oral Daily     bimatoprost  1 drop Both Eyes At Bedtime     busPIRone  10 mg Oral or Feeding Tube TID     heparin lock flush  5-15 mL Intracatheter Q24H     insulin aspart  1-6 Units Subcutaneous Q4H     lacosamide  200 mg Oral or Feeding Tube BID     latanoprost  1 drop Both Eyes Daily     levETIRAcetam  2,000 mg Oral or Feeding Tube BID     levothyroxine  62.5 mcg Oral or Feeding Tube QAM AC     micafungin  150 mg Intravenous Q24H     pantoprazole  40 mg Per Feeding Tube BID AC     rosuvastatin  20 mg Oral or Feeding Tube Daily     sodium chloride (PF)  10 mL Intracatheter Q8H     sodium chloride (PF)  3 mL Intracatheter Q8H     vancomycin (VANCOCIN) IV  1,500 mg Intravenous Q24H       Data        Intake/Output Summary (Last 24 hours) at 9/8/2020 0935  Last data filed at 9/8/2020 0900  Gross per 24 hour   Intake 1930 ml   Output 3520 ml   Net -1590 ml         Recent Labs   Lab 09/08/20  0339 09/07/20  1557 09/07/20  0407  09/06/20  0428   WBC 9.3  --  9.1  --  9.2   HGB 8.0*  --  8.7*  --  9.1*   MCV 94  --  94  --  94     --  166  --  165   INR 1.36*  --  1.25*  --  1.18*    142 141   < > 140   POTASSIUM 4.4 4.3 4.1   < > 4.3   CHLORIDE 111* 111* 112*   < > 112*   CO2 26 27 25   < > 23   BUN 35* 36* 34*   < > 32*   CR 0.85 0.82 0.86   < > 0.82   ANIONGAP 4 3 4   < > 6   FERNANDO 7.9* 7.9* 7.7*   < > 8.4*   * 96 113*   < > 104*   ALBUMIN 1.3*  --  1.4*  --  1.5*   PROTTOTAL 4.4*  --  4.5*  --  4.5*   BILITOTAL 0.7  --  0.8  --  0.9   ALKPHOS 232*  --  222*  --  213*   ALT 27  --  28  --  28   AST 36  --  41  --  39    < > = values in this interval not displayed.       Recent Results (from the past 24 hour(s))   XR Chest Port 1 View    Narrative    EXAMINATION: XR  CHEST PORT 1 VW, 9/7/2020 8:06 PM    COMPARISON: Earlier same-day    HISTORY: Post chest tube pull CXR    FINDINGS: Left chest tube is been removed. No pneumothorax. Right  chest tubes remain. Heart size is unchanged. LVAD and left chest wall  pacemaker remain in place. Enteric tube courses below the diaphragm.  Left IJ sheath tip in the upper SVC. Right PICC line tip in the  axillary vein. Loculated right pleural effusion and left pleural  effusion with basilar atelectasis persists.      Impression    IMPRESSION: No pneumothorax following thorax following removal of left  chest tube. Other lines and tubes are unchanged.     BRIEN MILLAN MD

## 2020-09-08 NOTE — PROGRESS NOTES
[facility] General ID Service: Follow Up Note      Patient:  Marquise Jj   Date of birth 1945, Medical record number [id number]  Date of Visit:  09/08/2020  Date of Admission: 8/8/2020         Assessment and Recommendations:   ID Problem List:  1. Fungemia- Candida glabrata   2. LVAD (HeartMate III, 8/19/20)  3. Cardiogenic shock, resolved  4. LORI on CKD stage III requiring CRRT  5. GI bleed while on heparin s/p EGD with clipping on 8/27, 8/30  6. CAD, ischemic cardiomyopathy  7. Seizures, encephalopathy, possible CVA      Recommendations:  1. Continue micafungin 150mg IV q24hrs, anticipate prolonged (at least 4-6 weeks) course of IV therapy. May need ongoing suppression following IV course  2. Continue vancomycin for 7 days (today is day #4/7)  3. Daily blood cultures  4. Will need to remove/exchange left internal jugular line and midline  as blood cultures positive for C.glabrata on 9/3       Discussion:  Marquise Jj is a 75 year old female with history of CAD s/p CABG (4/2020), ischemic cardiomyopathy with EF <20%, severe MR/TR with known mural thrombus, hypothyroidism, anxiety, and GERD who was transferred from [facility] on 8/8/20 for management of cardiogenic shock and admitted underwent redo sternotomy, LVAD placement (Heartmate III), and TV repair on 8/19/20 with Dr. Farley. Low-grade fever on 8/29, 8/30 prompted peripheral blood culture x1 and sputum culture with initiation of broad spectrum abx on 8/30. Blood culture (8/30) positive for yeast on day 1.      Multiple risk factors for fungemia. Patient had multiple central lines in place (bilateral internal jugular 24 days and <1 day old and art line 13 days old) as well as previous IABP and [place] Zia catheter. Recent surgery with placement of LVAD and TV repair. Briefly on TPN, stopped after team notified of positive yeast. Right internal jugular removed on 9/2.      Micafungin was started on 9/1, continue. TTE was obtained on 9/1 as part of  post-LVAD plan. Blood cultures to 9/3 positive for C.glabrata. Internal jugular line placed on 9/1, would remove as able. Increase Micafungin to 150, unfortunately it is intermediate to fluconazole. Continue to collect daily blood cultures to assess for clearance of fungemia.    1/2 blood cultures on 9/3 with S.epidermidis, BCx no growth on 9/4, vancomycin started 9/5. S.epi likely contaminant, though may continue vancomycin for 7 days given lines and LVAD.      Recs will be discussed with primary team today. Don't hesitate to call with questions.     Attestation:  I have reviewed today's vital signs, medications, labs and imaging.  Iesha Estes PA-C, Pager # [phone]            Interval History:     No acute events. Awake and up in chair this morning.  is visiting. Plan for family meeting today.         Review of Systems:   Unable to obtain due to altered mental status.          Current Antimicrobials   Current:  - Micafungin (start 9/1)  - Vancomycin (start 9/5)    Prior:  - Vancomycin (8/30-9/2, 8/19-8/21)  - Zosyn (8/30-9/2, 8/19-8/24)  - Rifampin (8/19-8/21)  - Fluconazole (8/19-8/21)  - ceftriaxone (8/14-8/18)       Physical Exam:   Ranges for vital signs:  Temp:  [97.3  F (36.3  C)-98.9  F (37.2  C)] 97.3  F (36.3  C)  Pulse:  [] 97  Resp:  [8-26] 16  BP: ()/(40-96) 93/66  SpO2:  [93 %-100 %] 99 %    Intake/Output Summary (Last 24 hours) at 9/3/2020 1107  Last data filed at 9/3/2020 1100  Gross per 24 hour   Intake 3944.5 ml   Output 2965 ml   Net 979.5 ml     Exam:  GENERAL:  Awake, eyes open. Up in chair.   ENT:  Head is normocephalic, atraumatic. Oropharynx is moist without exudates or ulcers. +NG tube  EYES:  Eyes have anicteric sclerae.    NECK:   CRRT running via LIJ  LUNGS:  Clear to auscultation, no rales or ronchi. Chest tubes x2 in place.  CARDIOVASCULAR:  +LVAD hum  ABDOMEN:  Soft, non-tender, non-distended, + bowel sounds  EXT: Extremities warm, no mottling. 1+ pitting  edema  SKIN:  No acute rashes.   LINES:               - CVC double lumen L - internal jugular (9/1/20)   - midline (9/3/20)         Laboratory Data:   Reviewed.  Pertinent for:    Culture data:  9/8/20 blood culture: NGTD  9/7/20 blood culture: NGTD  9/6/20 blood culture: NGTD  9/5/20 blood culture: NGTD  9/4/20 blood culture x2: NGTD  9/3/20 blood culture: Staph Epi on day #2  9/3/20 blood culture: Candida glabrata on 3rd day of incubation  9/1/20 blood culture: +Candida glabrata on 4th day of incubation, Staphylococcus capitis on 4th day of incubation   9/1/20 blood culture: NGTD   8/30/20 sputum culture: NG (final)  8/30/20 blood culture: Candida glabrata complex, cultured on 1st day of incubation   8/18/20 urine culture: <10K cfu mixed urogenital valeriano  8/12/20 urine culture: >100K cfu Klebsiella pneumoniae  8/10/20 urine culture: no growth    Inflammatory Markers  No lab results found.    Hematology Studies    Recent Labs   Lab Test 09/08/20  0339 09/07/20  0407 09/06/20  0428 09/05/20  0401   WBC 9.3 9.1 9.2 7.8   HGB 8.0* 8.7* 9.1* 8.7*   MCV 94 94 94 92    166 165 128*     Recent Labs   Lab Test 08/29/20  2043 08/11/20  0411 08/10/20  1243 08/10/20  0403   ANEU 8.8* 3.9 5.3 4.4   AEOS 0.5 0.4 0.3 0.2       Metabolic Studies     Recent Labs   Lab Test 09/08/20  0339 09/07/20  1557 09/07/20  0407 09/06/20  2156    142 141 141   POTASSIUM 4.4 4.3 4.1 4.4   CHLORIDE 111* 111* 112* 113*   CO2 26 27 25 24   BUN 35* 36* 34* 33*   CR 0.85 0.82 0.86 0.89   GFRESTIMATED 67 70 66 63       Hepatic Studies    Recent Labs   Lab Test 09/08/20  0339 09/07/20  0407 09/06/20  0428   BILITOTAL 0.7 0.8 0.9   ALKPHOS 232* 222* 213*   ALBUMIN 1.3* 1.4* 1.5*   AST 36 41 39   ALT 27 28 28            Imaging:   CXR (9/8/20)  Impression:   1. Support devices in similar position to previous.  2. Grossly unchanged loculated right pleural effusion and bibasilar  Atelectasis.    CXR (9/3/2020)  IMPRESSION:   1. Chest tubes  in place.  2. Right upper extremity PICC tip projects over the right axilla.  Unchanged position of support devices.  3. Slightly decreased right loculated pleural fluid  collection/hemothorax with associated compressive atelectasis.  4. Improving pulmonary edema.     CXR 9/2/2020  IMPRESSION:  1. Slightly increased right and stable left pleural effusion with  overlying basilar retrocardiac atelectasis versus consolidation.  2. Stable support devices as above.  3. Mild pulmonary edema.     ECHO (9/1/20)  Interpretation Summary  s/p HeartMate III LVAD. Speed is 5300 rpm. LVIDd is 4.4 cm. The septum appears  midline. The AV opens intermittently. There is mild continuous AI. Inflow and  outflow graft velocities are normal.  Severely (EF 10-20%) reduced left ventricular function is present.  Global right ventricular function is moderately reduced. The right ventricle  is normal size.  s/p TV repair with 30 mm Edward MC3 annuloplasty ring. At least moderate TR is  present. Etiology is unclear though may partially due to septal impingement  from the ICD lead. There is no prosthetic stenosis. Gradient is 2 mmHg at 83  bpm.  This study was compared with the study from 08/10/2020. The LVAD is new, as is  the RV dysfunction.

## 2020-09-08 NOTE — PROGRESS NOTES
Nephrology Progress Note  09/08/2020         Mrs Jj is a 75 yof w/ICM, s/p CABG (4/20), severe MR/TR and known mural thrombus, acute kidney injry, hypothyroidism, CKD3, anxiety, GERD, chronic anemia.  Transferred from [facility] to [facility] on 8/8 after developing cardiac shock. Placed on IABP and then LVAD placement and TVR repair for cardiogenic shock on 8/19 /20.  Nephrology consulted for managign Oliguric LORI on CKD stage 3, started CRRT on 8/26.     Interval History :   Mrs Jj continues on CRRT, net negative 1.4L again yesterday and now is ~8kg above her baseline wt but improving.  No clear signs of renal recovery, having care conference today to discuss overall issues and goals of care.  She is looking to go home on hospice, family would like to hear update on current issues before following through on this plan.  Chances of recovery unclear but baseline CKD III and ongoing need for LVAD are not encouraging factors.       Assessment & Recommendations:   LORI on CKD-Baseline Cr ~1.5, small L kidney at 8.3cm, 10cm R kidney at baseline.  Started CRRT on 8/26 for management of volume and electrolytes in setting of cardiogenic shock, LVAD placed on 8/19.  Etiology of LORI is likely ATN from hypoperfusion, started CRRT on 8/26.               -Line is LIJ temp line from 9/1               -Continuing CRRT, 4k baths, 25ml/kg/hr standard dosing, goal 50-100cc/hr net negative today.                    Volume status-Net negative 1.2Lyesterday, similar goal today as long as BP's are stable.  Now ~8kg above baseline wt but getting closer, will discuss goals with care conference today.       Electrolytes/pH-K 4.4, bicarb 26, on 4k baths. BID labs.       Ca/phos/pth-Ca ical 4.4, Mg and Phos WNL this am.      ID-Fungal cultures +, ID consulted, new HD line placed, stopped TPN and started TF.       Anemia-Hgb 8.0, intermittent PRBC's, acute management per team.      Nutrition-Had been on TPN, off now and on Impact TF with  fungemia.      Recommendations were communicated to primary team via verbal communication       STEPHAN Faulkner CNS  Clinical Nurse Specialist  [phone]    Review of Systems:   I reviewed the following systems:  Gen: No fevers or chills  CV: No CP at rest  Resp: No SOB at rest  GI: No N/V    Physical Exam:   I/O last 3 completed shifts:  In: 2480 [I.V.:705; NG/GT:455]  Out: 3774 [Other:2514; Stool:750; Chest Tube:510]   BP (!) 74/65   Pulse 89   Temp 97.3  F (36.3  C) (Axillary)   Resp 18   Wt 73.5 kg (162 lb 0.6 oz)   SpO2 98%   BMI 24.64 kg/m       GENERAL APPEARANCE: Extubated, lethargic but responds to stimuli.   EYES:  No scleral icterus, pupils equal  HENT: mouth without ulcers or lesions  PULM: lungs clear to auscultation, equal air movement, no cyanosis or clubbing  CV: regular rhythm, normal rate, no rub     -JVP not elevated.      -edema +3 generalized.   GI: soft, nontender, +distended, bowel sounds are +  MS: no evidence of inflammation in joints, no muscle tenderness  NEURO: Lethargic, no focal deficits.   Lines LIJ temp line from 9/1       Labs:   All labs reviewed by me  Electrolytes/Renal -   Recent Labs   Lab Test 09/08/20 0339 09/07/20  1557 09/07/20  0407    142 141   POTASSIUM 4.4 4.3 4.1   CHLORIDE 111* 111* 112*   CO2 26 27 25   BUN 35* 36* 34*   CR 0.85 0.82 0.86   * 96 113*   FERNANDO 7.9* 7.9* 7.7*   MAG 2.6* 2.6* 2.6*   PHOS 3.6 3.3 3.2       CBC -   Recent Labs   Lab Test 09/08/20 0339 09/07/20  0407 09/06/20  0428   WBC 9.3 9.1 9.2   HGB 8.0* 8.7* 9.1*    166 165       LFTs -   Recent Labs   Lab Test 09/08/20  0339 09/07/20  0407 09/06/20  0428   ALKPHOS 232* 222* 213*   BILITOTAL 0.7 0.8 0.9   ALT 27 28 28   AST 36 41 39   PROTTOTAL 4.4* 4.5* 4.5*   ALBUMIN 1.3* 1.4* 1.5*       Iron Panel -   Recent Labs   Lab Test 08/10/20  1052 07/01/20  0530   IRON 48 65   IRONSAT 15 17   HUSEYIN 165  --            Current Medications:    amiodarone  200 mg Oral or Feeding  Tube Daily     artificial tears   Both Eyes 5x Daily     aspirin  81 mg Oral or Feeding Tube Daily     B and C vitamin Complex with folic acid  5 mL Oral Daily     bimatoprost  1 drop Both Eyes At Bedtime     busPIRone  10 mg Oral or Feeding Tube TID     heparin lock flush  5-15 mL Intracatheter Q24H     insulin aspart  1-6 Units Subcutaneous Q4H     lacosamide  200 mg Oral or Feeding Tube BID     latanoprost  1 drop Both Eyes Daily     levETIRAcetam  2,000 mg Oral or Feeding Tube BID     levothyroxine  62.5 mcg Oral or Feeding Tube QAM AC     micafungin  150 mg Intravenous Q24H     pantoprazole  40 mg Per Feeding Tube BID AC     rosuvastatin  20 mg Oral or Feeding Tube Daily     sodium chloride (PF)  10 mL Intracatheter Q8H     sodium chloride (PF)  3 mL Intracatheter Q8H     vancomycin (VANCOCIN) IV  1,500 mg Intravenous Q24H     warfarin ANTICOAGULANT  2 mg Oral ONCE at 18:00       dextrose Stopped (09/02/20 1957)     CRRT replacement solution 12.5 mL/kg/hr (09/08/20 0945)     - MEDICATION INSTRUCTIONS -       CRRT replacement solution 2.911 mL/kg/hr (09/08/20 0945)     CRRT replacement solution 12.5 mL/kg/hr (09/08/20 0946)     BETA BLOCKER NOT PRESCRIBED       Warfarin Therapy Reminder

## 2020-09-08 NOTE — PLAN OF CARE
Late entry for PT 9/4/20  Discharge Planner PT   Patient plan for discharge: not discussed  Current status: PT: Pt awake, gives very minimal yes/no responses about 30% of the time, a little more engaged with . Pt agreeable to therex, very minimal participation but some with initial reps wrists, ankles, knees. Pt tolerated PROM and AAROM to B LEs with stretch added to HS, and into DF.  Barriers to return to prior living situation: medical, functional status  Recommendations for discharge: TCU  Rationale for recommendations: Pt will need continued therapy to progress functional mobility, ADL return       Entered by: Deb Caballero 09/08/2020 7:58 AM

## 2020-09-08 NOTE — CARE CONFERENCE
Care Conference    Care conference was held on September 8, 2020 at 2:00 in 4E conf. Room.  Conference was coordinated by Care Coordinator     Attending the conference were: CVICU, Cards 2, Nephrology, LVAD SW, RNCC, ID and pt spouse.  On the speaker phone; pt 2 dtrs and 3 sons.    Purpose of the conference was to provide update and discuss the plan of care.     Discussion/Outcomes/Follow-Up:  After introduction was done the team asked family what their understanding is about pt medical status and what questions they have.  Pt spouse explained his understanding and stated pt has been communicating with him and asking him to take her home.  He thought she was tired and giving up but today she hasn't asked him to take her home.  Pt family stated pt doesn't want to live on a machine and they want to make sure that they are respecting her wishes.  Pt family asked what pt prognosis is.  The team stated it is hard to know what pt prognosis is at this time and reviewed pt medical status system by system.  The team stated pt is making small progress, she is off the vent and BP medication.  Pt will need long term ABX, dialysis, LVAD and rehab.  The team discussed about code status.  Pt family were not ready to make and decision about code status and agreed to think about it.  The team and pt family agreed to continue with the current plan of care and reassess pt progress.  The team addressed all pt family questions.       Sandy Yu RN, PHN, BSN  4A and 4E/ ICU  Care Coordinator  Phone: [phone]  Pager: [phone]

## 2020-09-08 NOTE — PROGRESS NOTES
LVAD Social Work Services Progress Note      Date of Initial Social Work Evaluation: 8/12/2020  Collaborated with: Multidisciplinary Team, pt's , Reyes, at bedside, and dtr, Norman, via phone ([phone])    Data: Pt is s/p LVAD implant and TV repair on 8/19/2020. Pt's post-op course has been complicated by seizures, stroke, LORI and bacterial and fungal infections. Pt was extubated on 9/1/2020.   Care conference held today w/ pt's , present, and five adult children by phone. Family provided medical update. Family is currently comfortable with plan of care and continuing full cares. Pt's son expressed concerns with pain management as son thought maybe pt is over sedated; team will review and making any necessary adjustments. Family asked to consider at what point would they not want to pursue aggressive cares; no expectation was made of family to have an answer to this question today.      Intervention: Supportive Visit, Care Conference     Assessment: Writer did have chance to connect w/ dtr, Norman, via phone and  at bedside, after care conference. Both expressed being satisfied with care conference and expressed relief that pt is making some progress. Encouraged Norman to have ongoing discussions with family around goals of care if pt were to have major setbacks. At this time, Norman expressed understanding that family is anticipating that pt will require 24hr care and family is prepared to provide that at home. Norman mentioned health care power of  form and asked her to send that with her dad tomorrow and writer will get that scanned into EMR.   Education provided by SW: Ongoing Social Work support, initiating goals of care conversation, managing expectations   Plan:    Discharge Plans in Progress: None-continuing to assess     Barriers to d/c plan: Medical Stability     Follow up Plan: SW to follow-up tomorrow w/ pt's  re: Medical POA paperwork.

## 2020-09-08 NOTE — PROGRESS NOTES
CVTS PROGRESS NOTE  September 8, 2020           CO-MORBIDITIES:   Cardiogenic shock (H)  (primary encounter diagnosis)  LV (left ventricular) mural thrombus  Heart failure (H)  Status post cardiac surgery    ASSESSMENT: Marquise Jj is a 75 year-old female with PMH notable for coronary artery disease s/p CABG (4/20), ischemic cardiomyopathy, severe MR/TR and known mural thrombus who is admitted to the CV ICU s/p redo sternotomy, LVAD (heartmate III), and TV repair on 8/19/20 with Dr. Farley. Chest was left open and packed. S/p chest closure and washout 08/21/20. Extubated 9/1/20. Found to have candidemia (+ culture x2) and methicillin resistant staph epidermidis on blood culture, ID following.     PLAN SUMMARY:   Family meeting/care conference today  Pulling CRRT, goal net negative 1.0 L    PLAN:   Neuro/ pain/ sedation:  #Acute post-operative pain   #Likely anoxic brain injury; possible stroke  #Epilepsia Partialis Continua; resolved  - Monitor neurological status. Notify the MD for any acute changes in exam.  - Keppra, vimpat to continue per NeuroCrit  - Tylenol PRN  - Oxycodone prn     Pulmonary:   #Acute hypoxic respiratory failure, improved  #R pleural effusion   #R Hemopneumothorax  - Monitor CXR, O2 saturation  - R pigtail placed 9/2, drained 700 mL fluid immediately   - Daily CXR  - Remove CTx1 today     Cardiovascular:    #Acute on chronic decompensated heart failure with reduced ejection fraction: NYHA IV / ACC D  #Cardiogenic shock, possible vasoplegia   #Ischemic cardiomyopathy  #Coronary artery disease s/p CABG 4/20 in [place], s/p PCI to RCA 7/20  #Mural thrombus  #Severe MR/TR  #S/p LVAD     MAP > 60, CVP 8-12    Off pressors    Starting warfarin     Aspirin, rosuvastatin    Co-managing with Cards-2    Amiodarone 200 mg daily      GI/Nutrition:   # UGI bleed   - Appreciate GI consult   - Continue tube feeds at goal  - Pantoprazole BID   - Bowel regimen: senna-colace, miralax.       Renal/Fluid  Balance/ Electrolytes:   #Oliguria in the setting of likely ATN  - Will monitor intake and output.  - ICU electrolyte replacement protocol  - Nephrology consulted; CRRT      Endocrine:    #Glycemic control  #Hx hypothyroid   - ISS, medium intensity   - Synthroid daily       ID/ Antibiotics:  # Febrile, concern for post operative fevers, resolved  # Candidemia   # Methicillin resistant staph epidermidis  - Completed perioperative antibiotic regimen: vancomycin, levofloxacin, fluconazole, Zosyn   - Vanc (8/30-9/2, 9/5- ), Zosyn (8/30-9/2). Restarted Vancomycin for + Blood culture MRSA  - ID Consult (fungemia), appreciate recs   - Micafungin for yeast in blood for 6 weeks at least IV  - Daily blood cultures until clear   - C. Diff negative  - No evidence of ophthalmologic yeast involvement      Heme:     #Acute perioperative blood loss anemia  #Thrombocytopenia   - Hgb goal > 8  - Transfuse as necessary   - Have started coumadin with goal INR 1.8-2.2   - No heparin bridge       Prophylaxis:    - SCD  - Coumadin  - PPI  - Bowel regimen      MSK:    - PT and OT consulted. Appreciate recs.      Lines/ tubes/ drains:  - LIJ CRRT line   - PIV  - Chest tubes       Disposition:  - CV ICU.    Patient seen, findings and plan discussed with CVTS Fellow  -----------------------------------  Spencer Santos MD  Anesthesiology Resident CA3, PGY4  CVICU Resident    ====================================    SUBJECTIVE:   No acute events. Up to the chair this morning. Does occasionally track voice. Had a fever in the afternoon    OBJECTIVE:   1. VITAL SIGNS:   Temp:  [97.3  F (36.3  C)-98.9  F (37.2  C)] 97.3  F (36.3  C)  Pulse:  [] 89  Resp:  [9-26] 18  BP: ()/(40-96) 74/65  SpO2:  [93 %-100 %] 98 %  Resp: 18      2. INTAKE/ OUTPUT:   I/O last 3 completed shifts:  In: 2480 [I.V.:705; NG/GT:455]  Out: 3774 [Other:2514; Stool:750; Chest Tube:510]    3. PHYSICAL EXAMINATION:   General: NAD, lying in bed  Neuro: Does not track  eyes. Intermittently verbal.   HENT: feeding tube in place via nostril, nasal cannula oxygen  CV: VVI standby, LVAD humming.   Abdomen: Soft, Non-distended, Non-tender  Incisions: sternotomy wound c/d/i s/p closure  Extremities: warm and well perfused  Lines: left dialysis access catheter in place with CRRT running no bleeding or erythema at insertion site    4. INVESTIGATIONS:   Arterial Blood Gases   Recent Labs   Lab 09/03/20  0339 09/02/20  0350 09/01/20  1625   PH 7.50* 7.47* 7.46*   PCO2 28* 30* 32*   PO2 70* 121* 105   HCO3 22 21 23     Complete Blood Count   Recent Labs   Lab 09/08/20 0339 09/07/20 0407 09/06/20  0428 09/05/20  0401   WBC 9.3 9.1 9.2 7.8   HGB 8.0* 8.7* 9.1* 8.7*    166 165 128*     Basic Metabolic Panel  Recent Labs   Lab 09/08/20 0339 09/07/20  1557 09/07/20  0407 09/06/20  2156    142 141 141   POTASSIUM 4.4 4.3 4.1 4.4   CHLORIDE 111* 111* 112* 113*   CO2 26 27 25 24   BUN 35* 36* 34* 33*   CR 0.85 0.82 0.86 0.89   * 96 113* 101*     Liver Function Tests  Recent Labs   Lab 09/08/20 0339 09/07/20 0407 09/06/20  0428 09/05/20  0401   AST 36 41 39 38   ALT 27 28 28 27   ALKPHOS 232* 222* 213* 204*   BILITOTAL 0.7 0.8 0.9 1.0   ALBUMIN 1.3* 1.4* 1.5* 1.6*   INR 1.36* 1.25* 1.18* 1.18*     Pancreatic Enzymes  No lab results found in last 7 days.  Coagulation Profile  Recent Labs   Lab 09/08/20 0339 09/07/20  0407 09/06/20  0428 09/05/20  0401  09/03/20  0713   INR 1.36* 1.25* 1.18* 1.18*   < >  --    PTT  --   --   --   --   --  34    < > = values in this interval not displayed.         5. RADIOLOGY:   Recent Results (from the past 24 hour(s))   XR Chest Port 1 View    Narrative    EXAMINATION: XR CHEST PORT 1 VW, 9/7/2020 8:06 PM    COMPARISON: Earlier same-day    HISTORY: Post chest tube pull CXR    FINDINGS: Left chest tube is been removed. No pneumothorax. Right  chest tubes remain. Heart size is unchanged. LVAD and left chest wall  pacemaker remain in place.  Enteric tube courses below the diaphragm.  Left IJ sheath tip in the upper SVC. Right PICC line tip in the  axillary vein. Loculated right pleural effusion and left pleural  effusion with basilar atelectasis persists.      Impression    IMPRESSION: No pneumothorax following thorax following removal of left  chest tube. Other lines and tubes are unchanged.     BRIEN MILLAN MD   XR Chest Port 1 View    Narrative    Exam: XR CHEST PORT 1 VW, 9/8/2020 5:45 AM    Indication: interval change, right hemothorax    Comparison: 9/7/2020    Findings:   Single portable view of the chest. Partially visualized LVAD.  Implantable cardiac device. Feeding tube in similar position. Right  sided chest tubes in similar position. Right-sided PICC projecting  over the right axilla, unchanged. Cardiomediastinum and upper abdomen  are unchanged. Persistent loculated right pleural effusion and  scattered bibasilar atelectasis. No significant pneumothorax.      Impression    Impression:   1. Support devices in similar position to previous.  2. Grossly unchanged loculated right pleural effusion and bibasilar  atelectasis.    I have personally reviewed the examination and initial interpretation  and I agree with the findings.    MORENO RODRIGUEZ MD       =========================================

## 2020-09-09 NOTE — PROGRESS NOTES
Major Shift Events:  No acute events overnight. Pt awake most of the night, moaning and uncomfortable, seeming to get little relief from pain meds. Oriented to self only, follows commands with LUE and has some gross movement of the RUE, but does not squeeze or withdraw to stimuli. Continues on CRRT with fluid removal /hr, tolerating well. LVAD numbers stable. Still having large amounts watery stool.     Plan: Continue with plan of care.    For vital signs and complete assessments, please see documentation flowsheets.

## 2020-09-09 NOTE — PLAN OF CARE
Care hours [id number]    Major shift events: CRRT stopped per orders. LVAD numbers stable. Three large incontinent voids. Up to chair x 2 today. For complete assessments and vital signs please see flowsheets.

## 2020-09-09 NOTE — PROGRESS NOTES
Nephrology Progress Note  09/09/2020   Mrs Jj is a 75 yof w/ICM, s/p CABG (4/20), severe MR/TR and known mural thrombus, acute kidney injry, hypothyroidism, CKD3, anxiety, GERD, chronic anemia.  Transferred from [facility] to [facility] on 8/8 after developing cardiac shock. Placed on IABP and then LVAD placement and TVR repair for cardiogenic shock on 8/19 /20.  Nephrology consulted for managign Oliguric LORI on CKD stage 3, started CRRT on 8/26.     Interval History :   Mrs Jj was negative 1.4L yesterday, wt now down to 5-6kg above baseline wt although likely has some muscle mass loss with being in bed for several weeks, still with some edema.  With improving hemodynamics we will stop CRRT and try iHD tomorrow, will need ~2.5L off with runs to keep on current trajectory of being net negative. Once euvolemic we can likely get to 3x/week but likely daily runs for now.       Assessment & Recommendations:   LORI on CKD-Baseline Cr ~1.5, small L kidney at 8.3cm, 10cm R kidney at baseline.  Started CRRT on 8/26 for management of volume and electrolytes in setting of cardiogenic shock, LVAD placed on 8/19.  Etiology of LORI is likely ATN from hypoperfusion, started CRRT on 8/26.               -Line is LIJ temp line from 9/1.  Will consider tunneled line, negative cultures since 9/3.               -Stopping CRRT today, planning iHD tomorrow.  Likely daily alternating HD/UF runs to start.                     Volume status-Net negative 1.4L yesterday and 10kg in past 2 weeks, still ~5-6kg up with some edema but stable enough from hemodynamic standpoint to try iHD.  Will do first run tomorrow, has needed ~2.5L to achieve goals of net negative and once euvolemic likely need ~1L daily with some GI output.         Electrolytes/pH-K 4.5, bicarb 25, stopping CRRT.      Ca/phos/pth-Ical 4.3, Mg and Phos WNL this am.      ID-Fungal cultures +, ID consulted, new HD line placed, stopped TPN and started TF.  Cultures have been negative since  9/3.     Anemia-Hgb 7.4, intermittent PRBC's, acute management per team.      Nutrition-Had been on TPN, off now and on Impact TF with fungemia.      Seen and discussed with Dr Morin    Recommendations were communicated to primary team via verbal communication     STEPHAN Faulkner CNS  Clinical Nurse Specialist  [phone]    Review of Systems:   I reviewed the following systems:  Gen: No fevers or chills  CV: No CP at rest  Resp: No SOB at rest  GI: No N/V    Physical Exam:   I/O last 3 completed shifts:  In: 2025 [I.V.:215; NG/GT:490]  Out: 3299 [Other:2499; Stool:550; Chest Tube:250]   BP (!) 88/74 (BP Location: Left arm)   Pulse 91   Temp 97  F (36.1  C) (Axillary)   Resp 21   Wt 71.4 kg (157 lb 6.5 oz)   SpO2 100%   BMI 23.93 kg/m       GENERAL APPEARANCE: Extubated, lethargic but responds to stimuli.   EYES:  No scleral icterus, pupils equal  HENT: mouth without ulcers or lesions  PULM: lungs clear to auscultation, equal air movement, no cyanosis or clubbing  CV: regular rhythm, normal rate, no rub     -JVP not elevated.      -edema +2 generalized.   GI: soft, nontender, +distended, bowel sounds are +  MS: no evidence of inflammation in joints, no muscle tenderness  NEURO: Lethargic, no focal deficits.   Lines LIJ temp line from 9/1    Labs:   All labs reviewed by me  Electrolytes/Renal -   Recent Labs   Lab Test 09/09/20 0342 09/08/20  1543 09/08/20  0339    140 141   POTASSIUM 4.5 4.8 4.4   CHLORIDE 110* 110* 111*   CO2 25 24 26   BUN 37* 35* 35*   CR 0.79 0.88 0.85   * 114* 105*   FERNANDO 7.8* 7.6* 7.9*   MAG 2.6* 2.4* 2.6*   PHOS 3.8 3.8 3.6       CBC -   Recent Labs   Lab Test 09/09/20  0342 09/08/20  0339 09/07/20  0407   WBC 11.9* 9.3 9.1   HGB 7.4* 8.0* 8.7*    170 166       LFTs -   Recent Labs   Lab Test 09/09/20  0342 09/08/20  0339 09/07/20  0407   ALKPHOS 270* 232* 222*   BILITOTAL 0.8 0.7 0.8   ALT 28 27 28   AST 40 36 41   PROTTOTAL 4.8* 4.4* 4.5*   ALBUMIN 1.3*  1.3* 1.4*       Iron Panel -   Recent Labs   Lab Test 08/10/20  1052 07/01/20  0530   IRON 48 65   IRONSAT 15 17   HUSEYIN 165  --            Current Medications:    amiodarone  200 mg Oral or Feeding Tube Daily     artificial tears   Both Eyes 5x Daily     aspirin  81 mg Oral or Feeding Tube Daily     B and C vitamin Complex with folic acid  5 mL Oral Daily     bimatoprost  1 drop Both Eyes At Bedtime     busPIRone  10 mg Oral or Feeding Tube TID     fiber modular (NUTRISOURCE FIBER)  1 packet Per Feeding Tube TID     heparin lock flush  5-15 mL Intracatheter Q24H     insulin aspart  1-6 Units Subcutaneous Q4H     lacosamide  200 mg Oral or Feeding Tube BID     latanoprost  1 drop Both Eyes Daily     levETIRAcetam  2,000 mg Oral or Feeding Tube BID     levothyroxine  62.5 mcg Oral or Feeding Tube QAM AC     micafungin  150 mg Intravenous Q24H     pantoprazole  40 mg Per Feeding Tube BID AC     rosuvastatin  20 mg Oral or Feeding Tube Daily     sodium chloride (PF)  10 mL Intracatheter Q8H     sodium chloride (PF)  3 mL Intracatheter Q8H     vancomycin (VANCOCIN) IV  1,500 mg Intravenous Q24H       dextrose Stopped (09/02/20 1957)     BETA BLOCKER NOT PRESCRIBED       Warfarin Therapy Reminder

## 2020-09-09 NOTE — PROGRESS NOTES
CVTS Progress Note     Summary: Marquise Jj is a 75 year-old female with PMH notable for coronary artery disease s/p CABG (4/20), ischemic cardiomyopathy, severe MR/TR and known mural thrombus who is admitted to the CV ICU s/p redo sternotomy, LVAD (heartmate III), and TV repair on 8/19/20 with Dr. Farley. Chest was left open and packed. S/p chest closure and washout 08/21/20. Extubated 9/1/20. Found to have candidemia (+ culture x2) and methicillin resistant staph epidermidis on blood culture, ID following.     Chest Tubes:   Right Pleural (pigtail) CT with 10 ml out in 24 hours, removed without complications.    Right pleural CT with 280 ml out in 24 hours, serosang, no air leak.     INR 1.69, Hgb 7.4    CXR 9/9/2020: Impression:   1. Support devices in similar position to previous.  2. Trace bilateral apical pneumothoraces.  3. Grossly unchanged loculated right pleural effusion and bibasilar  atelectasis.    PLAN:   Removed right pleural (apical) chest tube  Continue basilar right chest tube to suction.   Follow up CXR this afternoon.     Beth Landrum DNP, CNP  Cardiovascular Thoracic Surgery   Pgr [phone]    ADDENDUM:   CXR reviewed. Small increase in right pneumo. Will monitor for now. CXR in am.     Beth Landrum DNP, CNP

## 2020-09-09 NOTE — PROGRESS NOTES
Cardiology Progress Note    Assessment & Plan   In summary, Marquise Jj is a 75-year-old female with a past medical history notable for coronary artery disease, ischemic cardiomyopathy, and known mural thrombus who was transferred from [facility] for further evaluation/treatment of cardiogenic shock. Balloon pump placed 8/14. Had HM3 8/19. Chest closed 8/21.    Today's changes:  - f/u renal re: transition from CRRT to intermittent HD  - f/u ID re: timing of tunnel line placement (not yet due to recent + BCx)  - family/care conference again next Thursday re: goals of care    Neuro:   # Sedation --> extubated, alert  # concern for seizure activity  CTH head 8/20 no signs of ICH   CTH head 8/27 Scattered areas of hypodensity with loss of gray-white matter differentiation in the left frontal lobe. These areas were not present on the CT dated 8/10/2020 and difficult compared to other prior comparison CTs due to extensive hardware artifact. These are suspicious for involving embolic infarcts.  keppra 2 g PO bid   vimpat 200 BID    Cardio:  # Cardiogenic shock with vasoplegic component    # ICM: NYHA IV / ACC  # Severe MR/TR s/p tricuspid valve repair with Elkins MC3 Annuloplasty Ring size T30  # s/p LVAD HM3 on 8/19/2020  LAVD speed 5400, flow 3.8-4.3, pi 2.2-5, power 3.8   Presents with cardiogenic shock. On NE, cardiac index approximately 1.37 on admission. Severely elevated biventricular filling pressures. Etiology of her decompensation appears to be progression of her cardiomyopathy. Despite medical treatment, she has been hospitalized twice since returning to [place], both with low output. No viability in her LAD territory, and doubt that PCI to her circumflex would make meaningful LV recovery. Hemodynamics improved with dobutamine, did not tolerate attempt to wean inotropics. RHC removed on 8/13 after clotting off, replaced on 8/14 after patient with increased work of breathing. CI of 1.1, balloon  pump placed with CI of 1.6-1.8 and improvement in symptoms/hemodynamics. Had LVAD HM3 placed on 8/19. CVP today around 10 range. Chest closed on 8/21.   -- holding hep gtt as above  -- warfarin with INR goal 1.8-2.2      Date 8/9 8/9 8/10 8/11 08/13/20 08/15/20 08/16/20 08/17/20 8/18/20 8/19 8/20 8/21*   CVP 12 9 3 4 8 9 6 8 11 5 10 13   Mean PA  35 30 21 25 23 21 20 35/18 35/18 - 30/12 28/14   PCWP  18 14 12 x 13 14  13 - -    Oxy HGB  59 64 61 61 60 47 62 69 54 - 68 66   CI/CO 2.3 2.3 2.4/4.2 2.3 2.2 1.8 2.8/4.9 4.1 4.3/2.5 - 6/3.3 6.9/3.8   SVR 1100 1100 1200 1400 1316 1900 2028 144 5452 - 1025 715   Device     IABP 1:1 IABP 1:1 IABP 1:1 IABP 1:1 IABP 1:1 No IABP No IABP No IABP   * epi 0.1, vaso 0.5     8/22: CVP 12, PA 28/14/19, PCWP 14, CO/CI 9.0/4.8. . Epi 0.1, vaso 1.  8/23: CVP 16, PA 30/14, CI/CO 4.4/8.0, , SvO2 71%  8/24: CVP 14, PA 44/20/28, PCWP 16, CI/CO 7.3/3.9, SvO2 68% epi 0.06, vaso 2  8/25: CVP 16, PA 44/20/29, PCWP 22, CI/CO  3.6, SvO2 82%, vaso 2, epi 0.03  8/26: CVP 13, PA 40/20/26, PCWP 12, CI/CO 7/ 3.6, SvO2 74%, vaso 1  8/27: CVP 13, PA 38/18/26, PCWP 12-14, CI/CO 9.4/4.7, , PVR ~0.5, SvO2 73%, vaso 1, epi 0.05     # CAD s/p CABG 4/2020 (KURT to RCA and LIMA to LAD) and PCI to RCA 7/20  - Stop home plavix po qd (8/13) for LVAD surgery  - aspirin 81mg PO qd   - c/w home crestor    # Mural thrombus  - removed during surgery on 8/19    # A-flutter  Converted to NSR on 8/15    - Continue amiodarone 200mg po qd      Pulm:  # Acute hypoxic respiratory failure  # Hemopneumothorax  Extubated on 9/1/2020. S/p IR for chest tube of Hemopneumothorax on 9/5 with 2.4L output immediately.     Renal/Electrolytes   # Hyperkalemia/Hypokalemia   # Acute kidney injury on chronic kidney disease, stage III  Likely ATN due to hypoxic insult  - renal consult and diuretic challenge. May need dialysis  - Trend potassium and creatinine q12hrs  - Electrolyte replacement protocol  - Monitor UOP         GI:  #GIB  GI consulted and EGD on 8/28 with protuberant vessel injected and clipped and bleeding gastric ulcer with adherent clot, injected and clipped as well  Repeat bleed on 8/29-8/30, transfused and given pRBC, underwent repeat EGD, showed hematin throughout stomache with single bleeding angioectasia vs gastric erosion (from NGT) in stomach. Likely source of bleed and thought related to current bleeding episode. Hypothermia thought to be related to bleed.    # Constipation  # Severe protein calorie malnutrition  - Nutrition through tube feeds at 30 mL/hr    - Senna-docusate bid scheduled  - Miralax bid prn constipation    ID:   # Candidemia   # Methicillin resistant staph epidermidis  Currently C. Glabrata positive on 8/30, 9/1, 9/3 cultures.   Also growing MRSE on 9/3 and S. Capitis on 9/1  Current:  - Micafungin (9/1-**)  - Vanc (9/4-**)     Prior:  - Vancomycin (8/30-9/2, 8/19-8/21)  - Zosyn (8/30-9/2, 8/19-8/24)  - Rifampin (8/19-8/21)  - Fluconazole (8/19-8/21)  - ceftriaxone (8/14-8/18)    - ID Consult: treat as real candidemia and pull lines in place at time of positive culture as able and continue Micafungin  - C. Diff negative  - No evidence of ophthalmologic yeast involvement  - Pleural fluid labs sent per ID    Hem/Onc  # Mural thrombus removed on 8/19  # Anemia, mild  D/t frequent blood draws and procedures, and two large GI bleesd  - Monitor hgb  - PPI gtt --> IV BID    Endo  # Hypothyroidism   - Continue PTA levothyroxine     Nutrition: TFs  DVT Prophylaxis: mechanical   Code Status: Full Code    Waylon Garcia MD, Msc  Cardiovascular Disease Fellow  [facility]    Discussed with Dr Christian.      I have reviewed today's vital signs, notes, medications, labs and imaging. I have also seen and examined the patient and agree with the findings and plan as outlined above.    Nel Christian MD  Section Head - Advanced Heart Failure, Transplantation and Mechanical  Circulatory Support  Director - [facility]  Associate Professor of Medicine, [facility]      Interval History   SERA. Responds to name, answers yes/no questions with head nods. (Not in pain, no shortness of breath, no chest pain). No low flow events. Moans intermittently, but not in pain.     Physical Exam   Temp: 97  F (36.1  C) Temp src: Axillary BP: 90/53 Pulse: 92   Resp: 21 SpO2: 99 % O2 Device: None (Room air)    Vitals:    09/06/20 0400 09/08/20 0000 09/09/20 0000   Weight: 76.2 kg (167 lb 15.9 oz) 73.5 kg (162 lb 0.6 oz) 71.4 kg (157 lb 6.5 oz)     Vital Signs with Ranges  Temp:  [97  F (36.1  C)-98.5  F (36.9  C)] 97  F (36.1  C)  Pulse:  [79-99] 92  Resp:  [10-27] 21  BP: ()/(43-99) 90/53  SpO2:  [98 %-100 %] 99 %  I/O last 3 completed shifts:  In: 1915 [I.V.:225; NG/GT:370]  Out: 3334 [Other:2584; Stool:500; Chest Tube:250]    RETIRE: Heart Rate: 71, Blood pressure 90/53, pulse 92, temperature 97  F (36.1  C), temperature source Axillary, resp. rate 21, weight 71.4 kg (157 lb 6.5 oz), SpO2 99 %.  157 lbs 6.54 oz     GEN: alert, non to minimally verbal  CV: RRR, Hum of HM3   LUNGS: Clear to auscultation bilaterally  ABD: Active bowel sounds, soft, no abdominal tenderness.   EXT: Trace LE edema   NEURO: altert, tracks people/objects across room; responds to her name, husbands name, daughter's name. moves b/l LE and LUE to command    Medications     dextrose Stopped (09/02/20 1957)     BETA BLOCKER NOT PRESCRIBED       Warfarin Therapy Reminder         amiodarone  200 mg Oral or Feeding Tube Daily     artificial tears   Both Eyes 5x Daily     aspirin  81 mg Oral or Feeding Tube Daily     B and C vitamin Complex with folic acid  5 mL Oral Daily     bimatoprost  1 drop Both Eyes At Bedtime     busPIRone  10 mg Oral or Feeding Tube TID     fiber modular (NUTRISOURCE FIBER)  1 packet Per Feeding Tube TID     heparin lock flush  5-15 mL Intracatheter Q24H      insulin aspart  1-6 Units Subcutaneous Q4H     lacosamide  200 mg Oral or Feeding Tube BID     latanoprost  1 drop Both Eyes Daily     levETIRAcetam  2,000 mg Oral or Feeding Tube BID     levothyroxine  62.5 mcg Oral or Feeding Tube QAM AC     micafungin  150 mg Intravenous Q24H     pantoprazole  40 mg Per Feeding Tube BID AC     rosuvastatin  20 mg Oral or Feeding Tube Daily     sodium chloride (PF)  10 mL Intracatheter Q8H     sodium chloride (PF)  3 mL Intracatheter Q8H     vancomycin (VANCOCIN) IV  1,500 mg Intravenous Q24H     warfarin ANTICOAGULANT  1.5 mg Oral ONCE at 18:00       Data        Intake/Output Summary (Last 24 hours) at 9/8/2020 0935  Last data filed at 9/8/2020 0900  Gross per 24 hour   Intake 1930 ml   Output 3520 ml   Net -1590 ml         Recent Labs   Lab 09/09/20  0342 09/08/20  1543 09/08/20  0339  09/07/20  0407   WBC 11.9*  --  9.3  --  9.1   HGB 7.4*  --  8.0*  --  8.7*   MCV 95  --  94  --  94     --  170  --  166   INR 1.69*  --  1.36*  --  1.25*    140 141   < > 141   POTASSIUM 4.5 4.8 4.4   < > 4.1   CHLORIDE 110* 110* 111*   < > 112*   CO2 25 24 26   < > 25   BUN 37* 35* 35*   < > 34*   CR 0.79 0.88 0.85   < > 0.86   ANIONGAP 6 7 4   < > 4   FERNANDO 7.8* 7.6* 7.9*   < > 7.7*   * 114* 105*   < > 113*   ALBUMIN 1.3*  --  1.3*  --  1.4*   PROTTOTAL 4.8*  --  4.4*  --  4.5*   BILITOTAL 0.8  --  0.7  --  0.8   ALKPHOS 270*  --  232*  --  222*   ALT 28  --  27  --  28   AST 40  --  36  --  41    < > = values in this interval not displayed.       Recent Results (from the past 24 hour(s))   XR Chest Port 1 View   Result Value    Radiologist flags Trace biapical pneumothoraces. (Urgent)    Narrative    Exam: XR CHEST PORT 1 VW, 9/9/2020 1:22 AM    Indication: interval change, right hemothorax    Comparison: None    Findings:   Single portable view of the chest. Partially visualized LVAD.  Implantable cardiac device. Feeding tube in similar position. Left  internal jugular  line tip in the upper SVC. Right sided chest tubes in  similar position. Right-sided PICC projecting over the right axilla,  unchanged. Cardiomediastinum and upper abdomen are unchanged.  Persistent loculated right pleural effusion and scattered bibasilar  atelectasis. Trace biapical pneumothoraces.      Impression    Impression:   1. Support devices in similar position to previous.  2. Trace bilateral apical pneumothoraces.  3. Grossly unchanged loculated right pleural effusion and bibasilar  atelectasis.    [Urgent Result: Trace biapical pneumothoraces.]    Finding was identified on 9/9/2020 4:20 AM.     Dr. Amador was contacted by Dr. Nieto at 9/9/2020 4:23 AM and  verbalized understanding of the urgent finding.     I have personally reviewed the examination and initial interpretation  and I agree with the findings.    MORENO RODRIGUEZ MD

## 2020-09-09 NOTE — PROGRESS NOTES
CVTS PROGRESS NOTE  September 9, 2020          CO-MORBIDITIES:   Cardiogenic shock (H)  (primary encounter diagnosis)  LV (left ventricular) mural thrombus  Heart failure (H)  Status post cardiac surgery    ASSESSMENT: Marquise Jj is a 75 year-old female with PMH notable for coronary artery disease s/p CABG (4/20), ischemic cardiomyopathy, severe MR/TR and known mural thrombus who is admitted to the CV ICU s/p redo sternotomy, LVAD (heartmate III), and TV repair on 8/19/20 with Dr. Farley. Chest was left open and packed. S/p chest closure and washout 08/21/20. Extubated 9/1/20. Found to have candidemia (+ culture x2) and methicillin resistant staph epidermidis on blood culture, ID following.     PLAN SUMMARY:   Care conference late next week  Follow up plan with neurocrit re anti-epileptic medications    Follow up dialysis plan with nephrology     PLAN:   Neuro/ pain/ sedation:  #Acute post-operative pain   #Likely anoxic brain injury; possible stroke  #Epilepsia Partialis Continua; resolved  - Monitor neurological status. Notify the MD for any acute changes in exam.  - Keppra, vimpat to continue per NeuroCrit  - Tylenol PRN  - Follow up seizure meds with neurocrit      Pulmonary:   #Acute hypoxic respiratory failure, improved  #R pleural effusion   #R Hemopneumothorax  - Monitor CXR, O2 saturation  - R pigtail placed 9/2, drained 700 mL fluid immediately   - Daily CXR while tubes in place   - Discuss CT plan with CVTS staff      Cardiovascular:    #Acute on chronic decompensated heart failure with reduced ejection fraction: NYHA IV / ACC D  #Cardiogenic shock, possible vasoplegia   #Ischemic cardiomyopathy  #Coronary artery disease s/p CABG 4/20 in [place], s/p PCI to RCA 7/20  #Mural thrombus  #Severe MR/TR  #S/p LVAD     MAP > 60, CVP 8-12    Off pressors    Warfarin, goal INR 1.8-2.2     Aspirin, rosuvastatin    Co-managing with Cards-2    Amiodarone 200 mg daily      GI/Nutrition:   # UGI bleed, resolved     - Appreciate GI consult   - Continue tube feeds at goal  - Pantoprazole BID   - Bowel regimen: senna-colace, miralax.       Renal/Fluid Balance/ Electrolytes:   #Oliguria in the setting of likely ATN  - Will monitor intake and output.  - ICU electrolyte replacement protocol  - Nephrology consulted; CRRT. Will discuss possibility of transitioning to intermittent HD.       Endocrine:    #Glycemic control  #Hx hypothyroid   - ISS, medium intensity   - Synthroid daily       ID/ Antibiotics:  # Febrile, concern for post operative fevers, resolved  # Candidemia   # Methicillin resistant staph epidermidis  - Completed perioperative antibiotic regimen: vancomycin, levofloxacin, fluconazole, Zosyn   - ID Consult (fungemia), appreciate recs   - Vanc (8/30-9/2, 9/5- ), Zosyn (8/30-9/2). Restarted Vancomycin for + Blood culture MRSA  - Micafungin for yeast in blood for 6 weeks at least IV.   - Daily blood cultures NGTD since 9/3.    - Daily blood cultures until clear   - No evidence of ophthalmologic yeast involvement      Heme:     #Acute perioperative blood loss anemia  #Thrombocytopenia   - Hgb goal > 8  - Transfuse as necessary   - Coumadin with goal INR 1.8-2.2   - No heparin bridge       Prophylaxis:    - SCD  - Coumadin  - PPI  - Bowel regimen      MSK:    - PT and OT consulted.      Lines/ tubes/ drains:  - LIJ CRRT line (9/1)   - Midline (9/3)   - PIV  - Chest tubes R pleural x2 (9/4, 9/2)   - Rectal pouch (9/6)    - NJT (9/2)        Disposition:  - CV ICU.    Patient seen, findings and plan discussed with CVTS Fellow and CVICU staff Dr. Herrera   -----------------------------------  Angela Sales   Surgery Resident   [id number]    ====================================    SUBJECTIVE:   No acute events. Afebrile. Care conference yesterday.     OBJECTIVE:   1. VITAL SIGNS:   Temp:  [97.3  F (36.3  C)-98.5  F (36.9  C)] 98.5  F (36.9  C)  Pulse:  [79-99] 86  Resp:  [10-27] 14  BP: ()/(43-99) 75/63  SpO2:  [96 %-100 %] 100  %  Resp: 14      2. INTAKE/ OUTPUT:   I/O last 3 completed shifts:  In: 2025 [I.V.:215; NG/GT:490]  Out: 3299 [Other:2499; Stool:550; Chest Tube:250]    3. PHYSICAL EXAMINATION:   General: NAD, up in chair   Neuro: Does not track eyes. Intermittently verbal. Non verbal this am.   HENT: feeding tube in place via nostril, nasal cannula oxygen  CV: VVI standby, LVAD humming.   Abdomen: Soft, Non-distended, Non-tender  Incisions: sternotomy wound c/d/i s/p closure  Extremities: warm and well perfused  Lines: left dialysis access catheter in place with CRRT running no bleeding or erythema at insertion site    4. INVESTIGATIONS:   Arterial Blood Gases   Recent Labs   Lab 09/03/20 0339   PH 7.50*   PCO2 28*   PO2 70*   HCO3 22     Complete Blood Count   Recent Labs   Lab 09/09/20 0342 09/08/20 0339 09/07/20 0407 09/06/20  0428   WBC 11.9* 9.3 9.1 9.2   HGB 7.4* 8.0* 8.7* 9.1*    170 166 165     Basic Metabolic Panel  Recent Labs   Lab 09/09/20  0342 09/08/20  1543 09/08/20  0339 09/07/20  1557    140 141 142   POTASSIUM 4.5 4.8 4.4 4.3   CHLORIDE 110* 110* 111* 111*   CO2 25 24 26 27   BUN 37* 35* 35* 36*   CR 0.79 0.88 0.85 0.82   * 114* 105* 96     Liver Function Tests  Recent Labs   Lab 09/09/20 0342 09/08/20 0339 09/07/20  0407 09/06/20  0428   AST 40 36 41 39   ALT 28 27 28 28   ALKPHOS 270* 232* 222* 213*   BILITOTAL 0.8 0.7 0.8 0.9   ALBUMIN 1.3* 1.3* 1.4* 1.5*   INR 1.69* 1.36* 1.25* 1.18*     Pancreatic Enzymes  No lab results found in last 7 days.  Coagulation Profile  Recent Labs   Lab 09/09/20 0342 09/08/20 0339 09/07/20  0407 09/06/20  0428  09/03/20  0713   INR 1.69* 1.36* 1.25* 1.18*   < >  --    PTT  --   --   --   --   --  34    < > = values in this interval not displayed.         5. RADIOLOGY:   Recent Results (from the past 24 hour(s))   XR Chest Port 1 View   Result Value    Radiologist flags Trace biapical pneumothoraces. (Urgent)    Narrative    Exam: XR CHEST PORT 1 VW,  9/9/2020 1:22 AM    Indication: interval change, right hemothorax    Comparison: None    Findings:   Single portable view of the chest. Partially visualized LVAD.  Implantable cardiac device. Feeding tube in similar position. Left  internal jugular line tip in the upper SVC. Right sided chest tubes in  similar position. Right-sided PICC projecting over the right axilla,  unchanged. Cardiomediastinum and upper abdomen are unchanged.  Persistent loculated right pleural effusion and scattered bibasilar  atelectasis. Trace biapical pneumothoraces.      Impression    Impression:   1. Support devices in similar position to previous.  2. Trace bilateral apical pneumothoraces.  3. Grossly unchanged loculated right pleural effusion and bibasilar  atelectasis.    [Urgent Result: Trace biapical pneumothoraces.]    Finding was identified on 9/9/2020 4:20 AM.     Dr. Amador was contacted by Dr. Nieto at 9/9/2020 4:23 AM and  verbalized understanding of the urgent finding.     I have personally reviewed the examination and initial interpretation  and I agree with the findings.    MORENO RODRIGUEZ MD       =========================================

## 2020-09-09 NOTE — PROGRESS NOTES
CRRT STATUS NOTE    DATA:  Time:  6:28 AM  Pressures WNL: Yes  Filter Status: WDL    Problems Reported/Alarms Noted:  None    Supplies Present:  Yes    ASSESSMENT:  Patient Net Fluid Balance: Net positive 6.8 liters since admit, net negative 500 ml since midnight  Vital Signs:  BP (!) 69/60   Pulse 86   Temp 98.2  F (36.8  C) (Axillary)   Resp 16   Wt 71.4 kg (157 lb 6.5 oz)   SpO2 100%   BMI 23.93 kg/m    Labs: K+ 4.5, Creat 0.79, ICA 4.5, Lactate 0.9, WBC 11.9, Hgb 7.4  Goals of Therapy:   ml/hr net negative as BP tolerates    INTERVENTIONS:   None    PLAN:  Continue current plan of care.  Remove fluid as pt VS allow according to orders.  Notify CRRT RN with questions or concerns #[id number].

## 2020-09-09 NOTE — PROGRESS NOTES
CVICU PROGRESS NOTE  September 9, 2020          CO-MORBIDITIES:   Cardiogenic shock (H)  (primary encounter diagnosis)  LV (left ventricular) mural thrombus  Heart failure (H)  Status post cardiac surgery    ASSESSMENT: Marquise Jj is a 75 year-old female with PMH notable for coronary artery disease s/p CABG (4/20), ischemic cardiomyopathy, severe MR/TR and known mural thrombus who is admitted to the CV ICU s/p redo sternotomy, LVAD (heartmate III), and TV repair on 8/19/20 with Dr. Farley. Chest was left open and packed. S/p chest closure and washout 08/21/20. Extubated 9/1/20. Found to have candidemia (+ culture x2) and methicillin resistant staph epidermidis on blood culture, ID following.     PLAN SUMMARY:   Care conference late next week  Follow up plan with neurocrit re anti-epileptic medications    Follow up dialysis plan with nephrology     PLAN:   Neuro/ pain/ sedation:  #Acute post-operative pain   #Likely anoxic brain injury; possible stroke  #Epilepsia Partialis Continua; resolved  - Monitor neurological status. Notify the MD for any acute changes in exam.  - Keppra, vimpat to continue per NeuroCrit  - Tylenol PRN  - Follow up seizure meds with neurocrit      Pulmonary:   #Acute hypoxic respiratory failure, improved  #R pleural effusion   #R Hemopneumothorax  - Monitor CXR, O2 saturation  - R pigtail placed 9/2, drained 700 mL fluid immediately   - Daily CXR while tubes in place   - Discuss CT plan with CVTS staff      Cardiovascular:    #Acute on chronic decompensated heart failure with reduced ejection fraction: NYHA IV / ACC D  #Cardiogenic shock, possible vasoplegia   #Ischemic cardiomyopathy  #Coronary artery disease s/p CABG 4/20 in [place], s/p PCI to RCA 7/20  #Mural thrombus  #Severe MR/TR  #S/p LVAD     MAP > 60, CVP 8-12    Off pressors    Warfarin, goal INR 1.8-2.2     Aspirin, rosuvastatin    Co-managing with Cards-2    Amiodarone 200 mg daily      GI/Nutrition:   # UGI bleed, resolved     - Appreciate GI consult   - Continue tube feeds at goal  - Pantoprazole BID   - Bowel regimen: senna-colace, miralax.       Renal/Fluid Balance/ Electrolytes:   #Oliguria in the setting of likely ATN  - Will monitor intake and output.  - ICU electrolyte replacement protocol  - Nephrology consulted; CRRT. Will discuss possibility of transitioning to intermittent HD.       Endocrine:    #Glycemic control  #Hx hypothyroid   - ISS, medium intensity   - Synthroid daily       ID/ Antibiotics:  # Febrile, concern for post operative fevers, resolved  # Candidemia   # Methicillin resistant staph epidermidis  - Completed perioperative antibiotic regimen: vancomycin, levofloxacin, fluconazole, Zosyn   - ID Consult (fungemia), appreciate recs   - Vanc (8/30-9/2, 9/5- ), Zosyn (8/30-9/2). Restarted Vancomycin for + Blood culture MRSA  - Micafungin for yeast in blood for 6 weeks at least IV.   - Daily blood cultures NGTD since 9/3.    - Daily blood cultures until clear   - No evidence of ophthalmologic yeast involvement      Heme:     #Acute perioperative blood loss anemia  #Thrombocytopenia   - Hgb goal > 8  - Transfuse as necessary   - Coumadin with goal INR 1.8-2.2   - No heparin bridge       Prophylaxis:    - SCD  - Coumadin  - PPI  - Bowel regimen      MSK:    - PT and OT consulted.      Lines/ tubes/ drains:  - LIJ CRRT line (9/1)   - Midline (9/3)   - PIV  - Chest tubes R pleural x2 (9/4, 9/2)   - Rectal pouch (9/6)    - NJT (9/2)        Disposition:  - CV ICU.    Patient seen, findings and plan discussed with CVTS Fellow and CVICU staff Dr. Herrera   -----------------------------------  Angela Sales   Surgery Resident   [id number]    ====================================    SUBJECTIVE:   No acute events. Afebrile. Care conference yesterday.     OBJECTIVE:   1. VITAL SIGNS:   Temp:  [97.3  F (36.3  C)-98.5  F (36.9  C)] 98.5  F (36.9  C)  Pulse:  [79-99] 86  Resp:  [10-27] 14  BP: ()/(43-99) 75/63  SpO2:  [96 %-100 %] 100  %  Resp: 14      2. INTAKE/ OUTPUT:   I/O last 3 completed shifts:  In: 2025 [I.V.:215; NG/GT:490]  Out: 3299 [Other:2499; Stool:550; Chest Tube:250]    3. PHYSICAL EXAMINATION:   General: NAD, up in chair   Neuro: Does not track eyes. Intermittently verbal. Non verbal this am.   HENT: feeding tube in place via nostril, nasal cannula oxygen  CV: VVI standby, LVAD humming.   Abdomen: Soft, Non-distended, Non-tender  Incisions: sternotomy wound c/d/i s/p closure  Extremities: warm and well perfused  Lines: left dialysis access catheter in place with CRRT running no bleeding or erythema at insertion site    4. INVESTIGATIONS:   Arterial Blood Gases   Recent Labs   Lab 09/03/20 0339   PH 7.50*   PCO2 28*   PO2 70*   HCO3 22     Complete Blood Count   Recent Labs   Lab 09/09/20 0342 09/08/20 0339 09/07/20 0407 09/06/20  0428   WBC 11.9* 9.3 9.1 9.2   HGB 7.4* 8.0* 8.7* 9.1*    170 166 165     Basic Metabolic Panel  Recent Labs   Lab 09/09/20  0342 09/08/20  1543 09/08/20  0339 09/07/20  1557    140 141 142   POTASSIUM 4.5 4.8 4.4 4.3   CHLORIDE 110* 110* 111* 111*   CO2 25 24 26 27   BUN 37* 35* 35* 36*   CR 0.79 0.88 0.85 0.82   * 114* 105* 96     Liver Function Tests  Recent Labs   Lab 09/09/20 0342 09/08/20 0339 09/07/20  0407 09/06/20  0428   AST 40 36 41 39   ALT 28 27 28 28   ALKPHOS 270* 232* 222* 213*   BILITOTAL 0.8 0.7 0.8 0.9   ALBUMIN 1.3* 1.3* 1.4* 1.5*   INR 1.69* 1.36* 1.25* 1.18*     Pancreatic Enzymes  No lab results found in last 7 days.  Coagulation Profile  Recent Labs   Lab 09/09/20 0342 09/08/20 0339 09/07/20  0407 09/06/20  0428  09/03/20  0713   INR 1.69* 1.36* 1.25* 1.18*   < >  --    PTT  --   --   --   --   --  34    < > = values in this interval not displayed.         5. RADIOLOGY:   Recent Results (from the past 24 hour(s))   XR Chest Port 1 View   Result Value    Radiologist flags Trace biapical pneumothoraces. (Urgent)    Narrative    Exam: XR CHEST PORT 1 VW,  9/9/2020 1:22 AM    Indication: interval change, right hemothorax    Comparison: None    Findings:   Single portable view of the chest. Partially visualized LVAD.  Implantable cardiac device. Feeding tube in similar position. Left  internal jugular line tip in the upper SVC. Right sided chest tubes in  similar position. Right-sided PICC projecting over the right axilla,  unchanged. Cardiomediastinum and upper abdomen are unchanged.  Persistent loculated right pleural effusion and scattered bibasilar  atelectasis. Trace biapical pneumothoraces.      Impression    Impression:   1. Support devices in similar position to previous.  2. Trace bilateral apical pneumothoraces.  3. Grossly unchanged loculated right pleural effusion and bibasilar  atelectasis.    [Urgent Result: Trace biapical pneumothoraces.]    Finding was identified on 9/9/2020 4:20 AM.     Dr. Amador was contacted by Dr. Nieto at 9/9/2020 4:23 AM and  verbalized understanding of the urgent finding.     I have personally reviewed the examination and initial interpretation  and I agree with the findings.    MORENO RODRIGUEZ MD       =========================================

## 2020-09-09 NOTE — PROGRESS NOTES
LVAD Social Work Services Progress Note      Date of Initial Social Work Evaluation: 8/12/2020  Collaborated with: Pt's dtr, Norman, via phone ([phone])    Data: Pt is s/p LVAD implant and TV repair on 8/19/2020. Pt's post-op course has been complicated by seizures, stroke, LORI and bacterial and fungal infections. Pt was extubated on 9/1/2020. CRRT initiated on 8/26 and will be stopped today with plan for iHD tomorrow.   Received anxious call from pt's dtr, Norman, regarding discharge planning. She reported that she heard today that pt might be ready for discharge imminently. Provided support and discussed that discharge to next level of care will not likely be this week. Pt and family had already been prepared for pt's need for physical rehab and that rehab could only occur at [facility] due to the LVAD, per writer's discussions w/ pt and family pre-LVAD. Discussed the need to have pt consistently dialyze 3x/wk, work with therapies and transition to the floor before pt would transition to [facility]. Discussed situation if pt does not qualify for ARU, but TCU and need dilaysis. In this situation,, pt would need to dialyze at an outpatient dialysis center and family would either have to transport pt or pay for transportation to outpatient dialysis center. Dtr reports that family would be able to transport pt.   Discussed pt's case with [facility] and pt will require a PM&R consult closer to anticipated discharge to give recommendations for [facility] vs [facility]U. [facility] would like to see several therapy sessions completed prior to PM&R consult being placed.   Dtr informed writer that her father forgot to bring in the health care POA paperwork today, but will plan to bring it tomorrow.     Intervention: Supportive Listening, Discharge Planning, Managing Expectations     Assessment: Pt's family in agreement that pt will require rehab at [facility]. Family misunderstood information given today and thought discharge was imminent.  Provided supportive listening and discussed what needs to happen yet before pt can transition to next level of care. Reassured dtr that due to her mother's medical complexity we need to start discharge planning as early as possible so that we are ready when she is medically ready to transition. Pt's dtr expressed relieve and decrease stress after our conversation.   Education provided by SW: Ongoing Social Work support, discharge planning   Plan:    Discharge Plans in Progress: [facility] following-PM&R consult once pt has had several therapy sessions     Barriers to d/c plan: Medical Readiness     Follow up Plan: SW to continue to follow to provide support to pt and family as we prepare for transitioning pt to next level of care.

## 2020-09-09 NOTE — PROGRESS NOTES
[facility] ID Service: Follow Up Note      Patient:  Marquise Jj   Date of birth 1945, Medical record number [id number]  Date of Visit:  09/09/2020  Date of Admission: 8/8/2020         Assessment and Recommendations:   ID Problem List:  1. Fungemia- Candida glabrata   2. LVAD (HeartMate III, 8/19/20)  3. Cardiogenic shock, resolved  4. LORI on CKD stage III requiring CRRT  5. GI bleed while on heparin s/p EGD with clipping on 8/27, 8/30  6. CAD, ischemic cardiomyopathy  7. Seizures, encephalopathy, possible CVA      Recommendations:  1. Continue micafungin 150mg IV q24hrs, anticipate prolonged (at least 4-6 weeks) course of IV therapy. May need ongoing suppression following IV course, which will be challenging due to intermediate SHEN of fluconazole.  2. Continue vancomycin for 7 days (today is day #5/7)  3. Blood culture on 9/10 then can stop daily collection as long as there is no new growth  4. Will need to remove/exchange left internal jugular line and midline as blood cultures positive for C.glabrata on 9/3   5. OK to place tunneled line for dialysis early next week as long as cultures from 9/4 onward remain negative.  6. Will need ID follow up week of October 5th to determine duration of IV therapy and plan for suppression.  7. Weekly monitoring labs to be faxed to [facility] ID clinic ([phone]): CBC with diff, CMP, and CRP  8. ID will sign off at this time, please don't hesitate to contact the [facility] ID team should further questions arise.      Discussion:  Marquise Jj is a 75 year old female with history of CAD s/p CABG (4/2020), ischemic cardiomyopathy with EF <20%, severe MR/TR with known mural thrombus, hypothyroidism, anxiety, and GERD who was transferred from [facility] on 8/8/20 for management of cardiogenic shock and admitted underwent redo sternotomy, LVAD placement (Heartmate III), and TV repair on 8/19/20 with Dr. Farley. Low-grade fever on 8/29, 8/30 prompted peripheral  blood culture x1 and sputum culture with initiation of broad spectrum abx on 8/30. Blood culture (8/30) positive for yeast on day 1.      Multiple risk factors for fungemia. Patient had multiple central lines in place (bilateral internal jugular 24 days and <1 day old and art line 13 days old) as well as previous IABP and Dresden Zia catheter. Recent surgery with placement of LVAD and TV repair. Briefly on TPN, stopped after team notified of positive yeast. Right internal jugular removed on 9/2.      Micafungin was started on 9/1, continue. TTE was obtained on 9/1 as part of post-LVAD plan. Blood cultures to 9/3 positive for C.glabrata. Internal jugular line placed on 9/1, would remove as able. Increase Micafungin to 150, unfortunately it is intermediate to fluconazole. Continue to collect daily blood cultures to assess for clearance of fungemia.    1/2 blood cultures on 9/3 with S.epidermidis, BCx no growth on 9/4, vancomycin started 9/5. S.epi likely contaminant, though may continue vancomycin for 7 days given lines and LVAD.      Recs will be discussed with primary team today. Don't hesitate to call with questions. ID will sign off at this time.    Attestation:  I have reviewed today's vital signs, medications, labs and imaging.  Iesha Estes PA-C, Pager # [phone]            Interval History:     Drowsy this AM. Didn't sleep well overnight per RN. No acute events. Last positive blood cx from 9/3.         Review of Systems:   Unable to obtain due to altered mental status.          Current Antimicrobials   Current:  - Micafungin (start 9/1)  - Vancomycin (start 9/5)    Prior:  - Vancomycin (8/30-9/2, 8/19-8/21)  - Zosyn (8/30-9/2, 8/19-8/24)  - Rifampin (8/19-8/21)  - Fluconazole (8/19-8/21)  - ceftriaxone (8/14-8/18)       Physical Exam:   Ranges for vital signs:  Temp:  [97.6  F (36.4  C)-98.5  F (36.9  C)] 98.5  F (36.9  C)  Pulse:  [79-99] 91  Resp:  [10-27] 21  BP: ()/(43-99) (P) 88/74  SpO2:  [96  %-100 %] 100 %    Intake/Output Summary (Last 24 hours) at 9/3/2020 1107  Last data filed at 9/3/2020 1100  Gross per 24 hour   Intake 3944.5 ml   Output 2965 ml   Net 979.5 ml     Exam:  GENERAL:  Awake but drowsy, eyes open. Lying supine in bed.  ENT:  Head is normocephalic, atraumatic. Oropharynx is moist without exudates or ulcers. +NG tube  EYES:  Eyes have anicteric sclerae.    NECK:   CRRT running via LIJ  LUNGS:  Clear to auscultation, no rales or ronchi. Chest tubes x2 in place.  CARDIOVASCULAR:  +LVAD hum  ABDOMEN:  Soft, non-tender, non-distended, + bowel sounds  EXT: Extremities warm, no mottling. trace pitting edema to LE.  SKIN:  No acute rashes.   LINES:               - CVC double lumen L - internal jugular (9/1/20)   - midline (9/3/20)         Laboratory Data:   Reviewed.  Pertinent for:    Culture data:  9/9/20 blood culture: NGTD  9/8/20 blood culture: NGTD  9/7/20 blood culture: NGTD  9/6/20 blood culture: NGTD  9/5/20 blood culture: NGTD  9/4/20 blood culture x2: NGTD  9/3/20 blood culture: Staph Epi on day #2  9/3/20 blood culture: Candida glabrata on 3rd day of incubation  9/1/20 blood culture: +Candida glabrata on 4th day of incubation, Staphylococcus capitis on 4th day of incubation   9/1/20 blood culture: NGTD   8/30/20 sputum culture: NG (final)  8/30/20 blood culture: Candida glabrata complex, cultured on 1st day of incubation   8/18/20 urine culture: <10K cfu mixed urogenital valeriano  8/12/20 urine culture: >100K cfu Klebsiella pneumoniae  8/10/20 urine culture: no growth    Inflammatory Markers  No lab results found.    Hematology Studies    Recent Labs   Lab Test 09/09/20  0342 09/08/20  0339 09/07/20  0407 09/06/20  0428   WBC 11.9* 9.3 9.1 9.2   HGB 7.4* 8.0* 8.7* 9.1*   MCV 95 94 94 94    170 166 165     Recent Labs   Lab Test 08/29/20  2043 08/11/20  0411 08/10/20  1243 08/10/20  0403   ANEU 8.8* 3.9 5.3 4.4   AEOS 0.5 0.4 0.3 0.2       Metabolic Studies     Recent Labs   Lab  Test 09/09/20  0342 09/08/20  1543 09/08/20  0339 09/07/20  1557    140 141 142   POTASSIUM 4.5 4.8 4.4 4.3   CHLORIDE 110* 110* 111* 111*   CO2 25 24 26 27   BUN 37* 35* 35* 36*   CR 0.79 0.88 0.85 0.82   GFRESTIMATED 73 64 67 70       Hepatic Studies    Recent Labs   Lab Test 09/09/20  0342 09/08/20  0339 09/07/20  0407   BILITOTAL 0.8 0.7 0.8   ALKPHOS 270* 232* 222*   ALBUMIN 1.3* 1.3* 1.4*   AST 40 36 41   ALT 28 27 28            Imaging:   CXR (9/8/20)  Impression:   1. Support devices in similar position to previous.  2. Grossly unchanged loculated right pleural effusion and bibasilar  Atelectasis.    CXR (9/3/2020)  IMPRESSION:   1. Chest tubes in place.  2. Right upper extremity PICC tip projects over the right axilla.  Unchanged position of support devices.  3. Slightly decreased right loculated pleural fluid  collection/hemothorax with associated compressive atelectasis.  4. Improving pulmonary edema.     CXR 9/2/2020  IMPRESSION:  1. Slightly increased right and stable left pleural effusion with  overlying basilar retrocardiac atelectasis versus consolidation.  2. Stable support devices as above.  3. Mild pulmonary edema.     ECHO (9/1/20)  Interpretation Summary  s/p HeartMate III LVAD. Speed is 5300 rpm. LVIDd is 4.4 cm. The septum appears  midline. The AV opens intermittently. There is mild continuous AI. Inflow and  outflow graft velocities are normal.  Severely (EF 10-20%) reduced left ventricular function is present.  Global right ventricular function is moderately reduced. The right ventricle  is normal size.  s/p TV repair with 30 mm Edward MC3 annuloplasty ring. At least moderate TR is  present. Etiology is unclear though may partially due to septal impingement  from the ICD lead. There is no prosthetic stenosis. Gradient is 2 mmHg at 83  bpm.  This study was compared with the study from 08/10/2020. The LVAD is new, as is  the RV dysfunction.

## 2020-09-09 NOTE — PROGRESS NOTES
Nephrology dialysis note    This patient was seen and examined while on dialysis. Laboratory results and nurses' notes were reviewed.    No changes to management of volume, anemia, BMD, acidosis, or electrolytes. Discussed with Cardiology, will plan to transition to iHD. May need intermittent UF runs.  Additional management recommendations per STEPHAN Cardoso, please see his note for further details.    Diagnosis - LORI-D    P MD Patience  [phone]

## 2020-09-10 NOTE — PLAN OF CARE
Discharge Planner PT   Patient plan for discharge: rehab  Current status: Pt greeted supine, eyes open but minimally responsive to verbal and tactile stimuli. Max A for rolling to place universal sling. Following rolling patient demonstrated increased alertness but still unresponsive to commands. Ceiling lift and universal sling for supine<>sit EOB. Max A for trunk control while sitting EOB, patient able to maintain upright head posture with tactile cueing. Completed PROM for UEs, audible noise elicited from patient at end range shoulder flexion, muscle guarding noted with shoulder ER. VSS on RA and LVAD # WNL throughout session.  Barriers to return to prior living situation: current medical status, functional status, currently not following commands consistently.   Recommendations for discharge: TCU  Rationale for recommendations: Pt will need continued therapy to progress functional mobility, ADL return

## 2020-09-10 NOTE — PLAN OF CARE
OT-4E: Cancel/Hold. Pt not yet appropriate for multiple disciplines. Pt with limited command following at this time. Will reschedule and initiate when medically appropriate.

## 2020-09-10 NOTE — PROGRESS NOTES
Cardiology Progress Note    Assessment & Plan   In summary, Marquise Jj is a 75-year-old female with a past medical history notable for coronary artery disease, ischemic cardiomyopathy, and known mural thrombus who was transferred from [facility] for further evaluation/treatment of cardiogenic shock. Balloon pump placed 8/14. Had HM3 8/19. Chest closed 8/21.    Today's changes:  - started on HD yesterday  - additional blood cultures NGTD  - consider keeping euvolemic today given low flow alarm    Neuro:   # Sedation --> extubated, alert  # concern for seizure activity  CTH head 8/20 no signs of ICH   CTH head 8/27 Scattered areas of hypodensity with loss of gray-white matter differentiation in the left frontal lobe. These areas were not present on the CT dated 8/10/2020 and difficult compared to other prior comparison CTs due to extensive hardware artifact. These are suspicious for involving embolic infarcts.  keppra 2 g PO bid   vimpat 200 BID    Cardio:  # Cardiogenic shock with vasoplegic component    # ICM: NYHA IV / ACC  # Severe MR/TR s/p tricuspid valve repair with Elkins MC3 Annuloplasty Ring size T30  # s/p LVAD HM3 on 8/19/2020  LAVD speed 5400, flow 3.8-4.3, pi 2.2-5, power 3.8   Presents with cardiogenic shock. On NE, cardiac index approximately 1.37 on admission. Severely elevated biventricular filling pressures. Etiology of her decompensation appears to be progression of her cardiomyopathy. Despite medical treatment, she has been hospitalized twice since returning to [place], both with low output. No viability in her LAD territory, and doubt that PCI to her circumflex would make meaningful LV recovery. Hemodynamics improved with dobutamine, did not tolerate attempt to wean inotropics. RHC removed on 8/13 after clotting off, replaced on 8/14 after patient with increased work of breathing. CI of 1.1, balloon pump placed with CI of 1.6-1.8 and improvement in symptoms/hemodynamics. Had  LVAD HM3 placed on 8/19. CVP today around 10 range. Chest closed on 8/21.   -- holding hep gtt as above  -- warfarin with INR goal 1.8-2.2      Date 8/9 8/9 8/10 8/11 08/13/20 08/15/20 08/16/20 08/17/20 8/18/20 8/19 8/20 8/21*   CVP 12 9 3 4 8 9 6 8 11 5 10 13   Mean PA  35 30 21 25 23 21 20 35/18 35/18 - 30/12 28/14   PCWP  18 14 12 x 13 14  13 - -    Oxy HGB  59 64 61 61 60 47 62 69 54 - 68 66   CI/CO 2.3 2.3 2.4/4.2 2.3 2.2 1.8 2.8/4.9 4.1 4.3/2.5 - 6/3.3 6.9/3.8   SVR 1100 1100 1200 1400 1316 1900 6402 409 2102 - 1025 715   Device     IABP 1:1 IABP 1:1 IABP 1:1 IABP 1:1 IABP 1:1 No IABP No IABP No IABP   * epi 0.1, vaso 0.5     8/22: CVP 12, PA 28/14/19, PCWP 14, CO/CI 9.0/4.8. . Epi 0.1, vaso 1.  8/23: CVP 16, PA 30/14, CI/CO 4.4/8.0, , SvO2 71%  8/24: CVP 14, PA 44/20/28, PCWP 16, CI/CO 7.3/3.9, SvO2 68% epi 0.06, vaso 2  8/25: CVP 16, PA 44/20/29, PCWP 22, CI/CO  3.6, SvO2 82%, vaso 2, epi 0.03  8/26: CVP 13, PA 40/20/26, PCWP 12, CI/CO 7/ 3.6, SvO2 74%, vaso 1  8/27: CVP 13, PA 38/18/26, PCWP 12-14, CI/CO 9.4/4.7, , PVR ~0.5, SvO2 73%, vaso 1, epi 0.05     # CAD s/p CABG 4/2020 (KURT to RCA and LIMA to LAD) and PCI to RCA 7/20  - Stop home plavix po qd (8/13) for LVAD surgery  - aspirin 81mg PO qd   - c/w home crestor    # Mural thrombus  - removed during surgery on 8/19    # A-flutter  Converted to NSR on 8/15    - Continue amiodarone 200mg po qd      Pulm:  # Acute hypoxic respiratory failure  # Hemopneumothorax  Extubated on 9/1/2020. S/p IR for chest tube of Hemopneumothorax on 9/5 with 2.4L output immediately.     Renal/Electrolytes   # Hyperkalemia/Hypokalemia   # Acute kidney injury on chronic kidney disease, stage III  Likely ATN due to hypoxic insult  - renal consult and diuretic challenge. May need dialysis  - Trend potassium and creatinine q12hrs  - Electrolyte replacement protocol  - Monitor UOP        GI:  #GIB  GI consulted and EGD on 8/28 with protuberant vessel injected  and clipped and bleeding gastric ulcer with adherent clot, injected and clipped as well  Repeat bleed on 8/29-8/30, transfused and given pRBC, underwent repeat EGD, showed hematin throughout stomache with single bleeding angioectasia vs gastric erosion (from NGT) in stomach. Likely source of bleed and thought related to current bleeding episode. Hypothermia thought to be related to bleed.    # Constipation  # Severe protein calorie malnutrition  - Nutrition through tube feeds at 30 mL/hr    - Senna-docusate bid scheduled  - Miralax bid prn constipation    ID:   # Candidemia   # Methicillin resistant staph epidermidis  Currently C. Glabrata positive on 8/30, 9/1, 9/3 cultures.   Also growing MRSE on 9/3 and S. Capitis on 9/1  Current:  - Micafungin (9/1-**)  - Vanc (9/4-**)     Prior:  - Vancomycin (8/30-9/2, 8/19-8/21)  - Zosyn (8/30-9/2, 8/19-8/24)  - Rifampin (8/19-8/21)  - Fluconazole (8/19-8/21)  - ceftriaxone (8/14-8/18)    - ID Consult: treat as real candidemia and pull lines in place at time of positive culture as able and continue Micafungin  - C. Diff negative  - No evidence of ophthalmologic yeast involvement  - Pleural fluid labs sent per ID    Hem/Onc  # Mural thrombus removed on 8/19  # Anemia, mild  D/t frequent blood draws and procedures, and two large GI bleesd  - Monitor hgb  - PPI gtt --> IV BID    Endo  # Hypothyroidism   - Continue PTA levothyroxine     Nutrition: TFs  DVT Prophylaxis: mechanical   Code Status: Full Code    Waylon Garcia MD, Msc  Cardiovascular Disease Fellow  [facility]      Late entry - pt seen and examined on 9/10/2020  I have reviewed today's vital signs, notes, medications, labs and imaging. I have also seen and examined the patient and agree with the findings and plan as outlined above.    Nel Christian MD  Section Head - Advanced Heart Failure, Transplantation and Mechanical Circulatory Support  Director - Adult Congenital and  Cardiovascular Genetics Center  Associate Professor of Medicine, [facility]      Interval History   PI's 1.8-1.9 with hypotension around 0110. 250 mL of Albumin ordered and given with improvement (PI ~ 3's, resolution of hypotension).     Continues to respond to name, answer to yes/no questions with head nods. (No pain, shortness of breath, or chest pain).     Physical Exam   Temp: 98  F (36.7  C) Temp src: Axillary BP: 111/82 Pulse: 97   Resp: 21 SpO2: 99 % O2 Device: None (Room air) Oxygen Delivery: 3 LPM  Vitals:    09/08/20 0000 09/09/20 0000 09/10/20 0400   Weight: 73.5 kg (162 lb 0.6 oz) 71.4 kg (157 lb 6.5 oz) 71.2 kg (156 lb 15.5 oz)     Vital Signs with Ranges  Temp:  [98  F (36.7  C)-99.9  F (37.7  C)] 98  F (36.7  C)  Pulse:  [] 97  Resp:  [14-24] 21  BP: ()/() 111/82  SpO2:  [90 %-100 %] 99 %  I/O last 3 completed shifts:  In: 2323 [I.V.:238; NG/GT:515]  Out: 1961 [Other:701; Stool:1000; Chest Tube:260]    RETIRE: Heart Rate: 71, Blood pressure 111/82, pulse 97, temperature 98  F (36.7  C), temperature source Axillary, resp. rate 21, weight 71.2 kg (156 lb 15.5 oz), SpO2 99 %.  156 lbs 15.48 oz     GEN: alert, non to minimally verbal  CV: RRR, Hum of HM3   LUNGS: Clear to auscultation bilaterally  ABD: Active bowel sounds, soft, no abdominal tenderness.   EXT: Trace LE edema   NEURO: altert, tracks people/objects across room; responds to her name, husbands name, daughter's name. moves b/l LE and LUE to command    Medications     dextrose Stopped (09/02/20 1957)     BETA BLOCKER NOT PRESCRIBED       Warfarin Therapy Reminder         amiodarone  200 mg Oral or Feeding Tube Daily     artificial tears   Both Eyes 5x Daily     aspirin  81 mg Oral or Feeding Tube Daily     B and C vitamin Complex with folic acid  5 mL Oral Daily     bimatoprost  1 drop Both Eyes At Bedtime     busPIRone  10 mg Oral or Feeding Tube TID     fiber modular (NUTRISOURCE FIBER)  1 packet Per Feeding  Tube TID     heparin lock flush  5-15 mL Intracatheter Q24H     insulin aspart  1-6 Units Subcutaneous Q4H     lacosamide  200 mg Oral or Feeding Tube BID     latanoprost  1 drop Both Eyes Daily     levETIRAcetam  2,000 mg Oral or Feeding Tube BID     levothyroxine  62.5 mcg Oral or Feeding Tube QAM AC     micafungin  150 mg Intravenous Q24H     pantoprazole  40 mg Per Feeding Tube BID AC     rosuvastatin  20 mg Oral or Feeding Tube Daily     sodium chloride (PF)  10 mL Intracatheter Q8H     sodium chloride (PF)  3 mL Intracatheter Q8H     vancomycin (VANCOCIN) IV  1,500 mg Intravenous Q24H     warfarin-No DOSE today  1 each Does not apply no dose today (warfarin)       Data        Intake/Output Summary (Last 24 hours) at 9/10/2020 1428  Last data filed at 9/10/2020 1300  Gross per 24 hour   Intake 3818 ml   Output 1680 ml   Net 2138 ml             Recent Labs   Lab 09/10/20  1027 09/10/20  0403 09/09/20  0342 09/08/20  1543 09/08/20  0339   WBC 11.7*  --  11.9*  --  9.3   HGB 5.7*  --  7.4*  --  8.0*   MCV 97  --  95  --  94     --  183  --  170   INR  --  2.44* 1.69*  --  1.36*     --  142 140 141   POTASSIUM 4.4  --  4.5 4.8 4.4   CHLORIDE 111*  --  110* 110* 111*   CO2 24  --  25 24 26   BUN 74*  --  37* 35* 35*   CR 1.40*  --  0.79 0.88 0.85   ANIONGAP 8  --  6 7 4   FERNANDO 7.4*  --  7.8* 7.6* 7.9*   *  --  106* 114* 105*   ALBUMIN  --   --  1.3*  --  1.3*   PROTTOTAL  --   --  4.8*  --  4.4*   BILITOTAL  --   --  0.8  --  0.7   ALKPHOS  --   --  270*  --  232*   ALT  --   --  28  --  27   AST  --   --  40  --  36       Recent Results (from the past 24 hour(s))   XR Chest Port 1 View   Result Value    Radiologist flags slight increase in size of right pneumothorax (Urgent)    Radiologist flags (Urgent)     slight increase in size of right apical pneumothorax    Narrative    EXAM: XR CHEST PORT 1 VW  9/9/2020 2:49 PM      HISTORY: removal of Chest Tube    COMPARISON: X-ray: 9/9/2020    FINDINGS:  Single view of the chest. Sternotomy wires in place.  Surgical clips overlying the left heart. Interval removal of right  apically directed chest tube. Left chest wall pacemaker with lead  overlying the right ventricle. Right PICC tip projects over the right  axilla.    Small right apical pneumothorax, slightly increased from prior exam.  Unchanged left, trace apical pneumothorax. Persistent loculated right  pleural effusion cardiac silhouette is stable. Slight increase in  scattered bibasilar interstitial opacities.      Impression    IMPRESSION:    1. Interval removal of right apically directed chest tube with small  right apical pneumothorax, slightly increased in size from prior exam.    2. Stable left trace apical pneumothorax.    3. Grossly unchanged loculated right pleural effusion with slight  increase in bibasilar interstitial opacities, atelectasis versus  infection versus edema.    [Access Center: slight increase in size of right pneumothorax]    This report will be copied to the [facility] to ensure a  provider acknowledges the finding. Access Center is available Monday  through Friday 8am-3:30 pm.       [Urgent Result: slight increase in size of right apical pneumothorax]    Finding was identified on 9/9/2020 2:54 PM.     Beth Landrum CNP was contacted by Dr. Loja at 9/9/2020 3:00 PM and  verbalized understanding of the urgent finding.     I have personally reviewed the examination and initial interpretation  and I agree with the findings.    ROBERT DE LA PAZ MD   XR Chest Port 1 View    Narrative    Exam: XR CHEST PORT 1 VW, 9/10/2020 1:47 AM    Indication: Chest tube in place, f/u pneumo    Comparison: 9/9/2020    Findings:   Single portable view of the chest. LVAD in place. Implantable cardiac  device with associated leads in place. Right-sided chest tube in  similar position. Small bilateral pleural effusions. Loculated right  pleural effusion/hematoma unchanged. Small bilateral  apical  pneumothoraces. Bilateral atelectasis. No new focal airspace  opacities.      Impression    Impression: Grossly unchanged small bilateral hydropneumothoraces.    I have personally reviewed the examination and initial interpretation  and I agree with the findings.    SCOTT ANDERSON MD

## 2020-09-10 NOTE — PROVIDER NOTIFICATION
PI's 1.8-1.9 with hypotension around 0110. Cards 2 fellow notified. 250 mL of Albumin ordered and given. Increase in PI's to 3's and resolved hypotension. Will continue to monitor.

## 2020-09-10 NOTE — PLAN OF CARE
Major Shift Events:  NSR. No LVAD alarms. Moves left hand and arm, squeezes right hand, withdraws to pain to lower extremities. PI's dropped to 1.8-1.9 250 mL of Albumin given, see previous note. Output from rectal pouch  mL every 2 hours. Incontinent void x2.   Plan: Therapies. To floor today.   For vital signs and complete assessments, please see documentation flowsheets.

## 2020-09-10 NOTE — PROGRESS NOTES
Nephrology Progress Note  09/10/2020         Mrs Jj is a 75 yof w/ICM, s/p CABG (4/20), severe MR/TR and known mural thrombus, acute kidney injry, hypothyroidism, CKD3, anxiety, GERD, chronic anemia.  Transferred from [facility] to [facility] on 8/8 after developing cardiac shock. Placed on IABP and then LVAD placement and TVR repair for cardiogenic shock on 8/19 /20.  Nephrology consulted for managign Oliguric LORI on CKD stage 3, started CRRT on 8/26.     Interval History :   Mrs Jj had CRRT stopped yesterday, still was slightly net negative.  Tried HD today but was hypotensive and had Hgb (result became available on run), gave 1u PRBC's and stopped early due to instability.  Will see how BP's and chemistries trend tomorrow am.       Assessment & Recommendations:   LORI on CKD-Baseline Cr ~1.5, small L kidney at 8.3cm, 10cm R kidney at baseline.  Started CRRT on 8/26 for management of volume and electrolytes in setting of cardiogenic shock, LVAD placed on 8/19.  Etiology of LORI is likely ATN from hypoperfusion, started CRRT on 8/26.               -Line is LIJ temp line from 9/1.  Will consider tunneled line, negative cultures since 9/3.  Lower flows today, TPA locked.                -Stopped HD early, will evaluate tomorrow.                    Volume status-Still up ~6kg, had planned for 2L of UF with run but unstable and Hgb low, stopping early, will eval tomorrow am.      Electrolytes/pH-K 4.5, bicarb 24, no acute issues, stopping HD early with hypotension.      Ca/phos/pth-Ical 4.5, Mg and Phos WNL last check.      ID-Fungal cultures +, ID consulted, new HD line placed, stopped TPN and started TF.  Cultures have been negative since 9/3.     Anemia-Hgb 5.7, down from 7.4 yesterday.  BUN up which suggests possible GIB, further management per team.       Nutrition-Had been on TPN, off now and on Impact TF with fungemia.      Seen and discussed with Dr Morin     Recommendations were communicated to primary team  via verbal communication    STEPHAN Faulkner CNS  Clinical Nurse Specialist  [phone]    Review of Systems:   I reviewed the following systems:  Gen: No fevers or chills  CV: No CP at rest  Resp: No SOB at rest  GI: No N/V    Physical Exam:   I/O last 3 completed shifts:  In: 2323 [I.V.:238; NG/GT:515]  Out: 1961 [Other:701; Stool:1000; Chest Tube:260]   /82   Pulse 97   Temp 98  F (36.7  C) (Axillary)   Resp 21   Wt 71.2 kg (156 lb 15.5 oz)   SpO2 99%   BMI 23.87 kg/m       GENERAL APPEARANCE: Extubated, lethargic but responds to stimuli.   EYES:  No scleral icterus, pupils equal  HENT: mouth without ulcers or lesions  PULM: lungs clear to auscultation, equal air movement, no cyanosis or clubbing  CV: regular rhythm, normal rate, no rub     -JVP not elevated.      -edema +2 generalized.   GI: soft, nontender, +distended, bowel sounds are +  MS: no evidence of inflammation in joints, no muscle tenderness  NEURO: Lethargic, no focal deficits.   Lines LIJ temp line from 9/1    Labs:   All labs reviewed by me  Electrolytes/Renal -   Recent Labs   Lab Test 09/10/20  1027 09/10/20  0403 09/09/20 0342 09/08/20  1543 09/08/20  0339     --  142 140 141   POTASSIUM 4.4  --  4.5 4.8 4.4   CHLORIDE 111*  --  110* 110* 111*   CO2 24  --  25 24 26   BUN 74*  --  37* 35* 35*   CR 1.40*  --  0.79 0.88 0.85   *  --  106* 114* 105*   FERNANDO 7.4*  --  7.8* 7.6* 7.9*   MAG  --  2.5* 2.6* 2.4* 2.6*   PHOS  --   --  3.8 3.8 3.6       CBC -   Recent Labs   Lab Test 09/10/20  1027 09/09/20  0342 09/08/20  0339   WBC 11.7* 11.9* 9.3   HGB 5.7* 7.4* 8.0*    183 170       LFTs -   Recent Labs   Lab Test 09/09/20  0342 09/08/20  0339 09/07/20  0407   ALKPHOS 270* 232* 222*   BILITOTAL 0.8 0.7 0.8   ALT 28 27 28   AST 40 36 41   PROTTOTAL 4.8* 4.4* 4.5*   ALBUMIN 1.3* 1.3* 1.4*       Iron Panel -   Recent Labs   Lab Test 08/10/20  1052 07/01/20  0530   IRON 48 65   IRONSAT 15 17   HUSEYIN 165  --             Current Medications:    amiodarone  200 mg Oral or Feeding Tube Daily     artificial tears   Both Eyes 5x Daily     aspirin  81 mg Oral or Feeding Tube Daily     B and C vitamin Complex with folic acid  5 mL Oral Daily     bimatoprost  1 drop Both Eyes At Bedtime     busPIRone  10 mg Oral or Feeding Tube TID     fiber modular (NUTRISOURCE FIBER)  1 packet Per Feeding Tube TID     heparin lock flush  5-15 mL Intracatheter Q24H     insulin aspart  1-6 Units Subcutaneous Q4H     lacosamide  200 mg Oral or Feeding Tube BID     latanoprost  1 drop Both Eyes Daily     levETIRAcetam  2,000 mg Oral or Feeding Tube BID     levothyroxine  62.5 mcg Oral or Feeding Tube QAM AC     micafungin  150 mg Intravenous Q24H     pantoprazole  40 mg Per Feeding Tube BID AC     rosuvastatin  20 mg Oral or Feeding Tube Daily     sodium chloride (PF)  10 mL Intracatheter Q8H     sodium chloride (PF)  3 mL Intracatheter Q8H     vancomycin (VANCOCIN) IV  1,500 mg Intravenous Q24H     warfarin-No DOSE today  1 each Does not apply no dose today (warfarin)       dextrose Stopped (09/02/20 1957)     BETA BLOCKER NOT PRESCRIBED       Warfarin Therapy Reminder

## 2020-09-10 NOTE — PROGRESS NOTES
HEMODIALYSIS TREATMENT NOTE    Date: 9/10/2020  Time: 1:39 PM    Data:  Pre Wt: 71.2 kg (156 lb 15.5 oz)   Desired Wt: 69.2 kg   Post Wt: 72.4 kg (159 lb 9.8 oz)  Weight change: -1.2 kg  Ultrafiltration - Post Run Net Total Removed (mL): 0 mL  Ultrafiltration - Post Run Net Total Positively gave (mL): 660 mL  Vascular Access Status:Non Tunneled Temporary LIJ CVC  patent  Dialyzer Rinse: Streaked, Light  Total Blood Volume Processed: 18.6 L   Total Dialysis (Treatment) Time: 1hr 26 mins   Dialysate Bath: K 3, Ca 3, Na 138, Bicarb 32  Heparin: None    Lab:   Yes : HB s Ag and AB    Assessment:  LIJ CVC Non Tunneled Double line: No blood out from red port, Mild Sluggish out from blue port, Very positional CVC lines.   Pre run K/Ca:  4.4/ 7.4, BUN/Cr: 74/ 1.4, Hgb/Hct: 5.7/ 18.5  HB s Ag and Ab: Draw today d/t Unknown  Dialysis consent signed at  8-  Heart failure on LAVD  LORI on CRRT, transitioning to iHD today.    Interventions:  Patient dialyzed for 1 hrs 26 mins via positional Non-Tunneled LIJ CVC lines. Reached ~280 ml/mins with reversed CVC lines. Not tolerable for UF 2 kg off d/t soft BP and PI down to 1.7. MUF on and gave NS bolus 100 ml * 2 times, 1 units PRBC and 25% albumin 25 g/ 100 ml IV for pressure supports. Gave Iron 100 mg IV during HD. Notified MD and finished HD with rinse back, CVC locked with TPA (Vol. Specific), Applied clear guards caps.      Plan:    Next run per renal team

## 2020-09-10 NOTE — PROGRESS NOTES
Nephrology dialysis note    This patient was seen and examined while on dialysis. Laboratory results and nurses' notes were reviewed.    Acute drop in Hgb with significant hypotension at iHD initiation. HD run ended prematurely due to hypotension and inability to provide ultrafiltration. Will plan to reassess clinical status tmrw to determine best way to provide RRT.  Otherwise, no changes to management of BMD, acidosis, or electrolytes. Additional management recommendations per STEPHAN Cardoso, please see his note for further details.    Diagnosis - LORI-D, Acute on Chronic Anemia, Hypotension    P MD Patience  [phone]

## 2020-09-10 NOTE — PROGRESS NOTES
CVTS PROGRESS NOTE  September 9, 2020          CO-MORBIDITIES:   Cardiogenic shock (H)  (primary encounter diagnosis)  LV (left ventricular) mural thrombus  Heart failure (H)  Status post cardiac surgery    ASSESSMENT: Marquise Jj is a 75 year-old female with PMH notable for coronary artery disease s/p CABG (4/20), ischemic cardiomyopathy, severe MR/TR and known mural thrombus who is admitted to the CV ICU s/p redo sternotomy, LVAD (heartmate III), and TV repair on 8/19/20 with Dr. Farley. Chest was left open and packed. S/p chest closure and washout 08/21/20. Extubated 9/1/20. Found to have candidemia (+ culture x2) and methicillin resistant staph epidermidis on blood culture, ID following.     PLAN SUMMARY:   Care conference tentatively planned for next Thursday  Hgb 5.7, transfuse with 2 units RBCs  Initiating HD today   Check with ID regarding stopping daily cultures   PT/OT  Talk with social service regarding ARU at discharge      PLAN:   Neuro/ pain/ sedation:  #Acute post-operative pain   #Likely anoxic brain injury; possible stroke  #Epilepsia Partialis Continua; resolved  - CTH head 8/27 Scattered areas of hypodensity with loss of gray-white matter differentiation in the left frontal lobe. These areas were not present on the CT dated 8/10/2020 and difficult compared to other prior comparison CTs due to extensive hardware artifact. These are suspicious for involving embolic infarcts.   - Monitor neurological status. Notify the MD for any acute changes in exam.  - Keppra, vimpat to continue per NeuroCrit  - Tylenol PRN     Pulmonary:   #Acute hypoxic respiratory failure, improved  #R pleural effusion   #R Hemopneumothorax  - Monitor CXR, O2 saturation  - R pigtail placed 9/2, removed 9/9   - Right pleural (basilar) tube in place with 210 ml Serosang fluid. No air leak.   - Daily CXR while tubes in place       Cardiovascular:    #Acute on chronic decompensated heart failure with reduced ejection  fraction: NYHA IV / ACC D  #Cardiogenic shock, possible vasoplegia   #Ischemic cardiomyopathy  #Coronary artery disease s/p CABG 4/20 in [place], s/p PCI to RCA 7/20  #Mural thrombus  #Severe MR/TR  #S/p LVAD     MAP > 60, CVP 8-12    Off pressors    Warfarin, goal INR 1.8-2.2     Aspirin, rosuvastatin    Co-managing with Cards-2    Amiodarone 200 mg daily      GI/Nutrition:   # UGI bleed, resolved    - Appreciate GI consult   - Continue tube feeds at goal  - Pantoprazole BID   - Bowel regimen: senna-colace, miralax.       Renal/Fluid Balance/ Electrolytes:   #Oliguria in the setting of likely ATN  - Will monitor intake and output.  - ICU electrolyte replacement protocol  - Nephrology consulted; CRRT stopped 9/9.   - Starting intermittent HD 9/10      Endocrine:    #Glycemic control  #Hx hypothyroid   - ISS, medium intensity, has not required any insulin in several days    - Synthroid daily        ID/ Antibiotics:  # Febrile, concern for post operative fevers, resolved  # Candidemia   # Methicillin resistant staph epidermidis  - Completed perioperative antibiotic regimen: vancomycin, levofloxacin, fluconazole, Zosyn   - ID Consult (fungemia), appreciate recs: will need a long course of antifungal therapy and probable life-long therapy  - Vanc (8/30-9/2, 9/5-9/11 ), Zosyn (8/30-9/2). Restarted Vancomycin for + Blood culture MRSA  - Micafungin for yeast in blood for 6 weeks at least IV, maybe life long as per ID.   - Daily blood cultures NGTD since 9/3, as per ID.  ? discontinue with NGTD since 9/4, will check with ID.    - No evidence of ophthalmologic yeast involvement      Heme:     #Acute perioperative blood loss anemia  #Thrombocytopenia   - Hgb goal > 8  - Hgb 5.7 today, transfuse 2 units RBC's today   - Coumadin with goal INR 1.8-2.2, INR 2.4 today    - No heparin bridge       Prophylaxis:    - SCD  - Coumadin  - PPI  - Bowel regimen      MSK:    - PT and OT consulted.      Lines/ tubes/ drains:  - LIJ CRRT line  (9/1)   - Midline (9/3)   - PIV  - Chest tubes R pleural x1   - Rectal pouch (9/6)    - NJT (9/2)        Disposition:  - CV ICU     Patient seen, findings and plan discussed with CVTS Fellow and CVICU staff Dr. Herrera   -----------------------------------  Beth Landrum DNP, CNP  Cardiovascular Thoracic Surgery   [facility] [phone]    ====================================    SUBJECTIVE:   No acute events. Afebrile. Patient none verbal, has not followed commands.    is at the bedside.     OBJECTIVE:   1. VITAL SIGNS:   Temp:  [97  F (36.1  C)-99.9  F (37.7  C)] 99.1  F (37.3  C)  Pulse:  [] 95  Resp:  [14-24] 20  BP: ()/(41-84) 102/84  SpO2:  [97 %-100 %] 99 %  Resp: 20      2. INTAKE/ OUTPUT:   I/O last 3 completed shifts:  In: 2323 [I.V.:238; NG/GT:515]  Out: 1961 [Other:701; Stool:1000; Chest Tube:260]    3. PHYSICAL EXAMINATION:   General: NAD, receiving dialysis    Neuro: Does not track eyes. Intermittently verbal. Non verbal this am.   HENT: feeding tube in place via nostril, nasal cannula oxygen  CV: LVAD humming.   Abdomen: Soft, Non-distended, Non-tender  Incisions: sternotomy wound c/d/i s/p closure  Extremities: warm and well perfused  Lines: left dialysis access catheter in place with HD running no bleeding or erythema at insertion site    4. INVESTIGATIONS:   Arterial Blood Gases   No lab results found in last 7 days.  Complete Blood Count   Recent Labs   Lab 09/09/20  0342 09/08/20  0339 09/07/20  0407 09/06/20  0428   WBC 11.9* 9.3 9.1 9.2   HGB 7.4* 8.0* 8.7* 9.1*    170 166 165     Basic Metabolic Panel  Recent Labs   Lab 09/09/20  0342 09/08/20  1543 09/08/20  0339 09/07/20  1557    140 141 142   POTASSIUM 4.5 4.8 4.4 4.3   CHLORIDE 110* 110* 111* 111*   CO2 25 24 26 27   BUN 37* 35* 35* 36*   CR 0.79 0.88 0.85 0.82   * 114* 105* 96     Liver Function Tests  Recent Labs   Lab 09/10/20  0403 09/09/20  0342 09/08/20  0339 09/07/20  0407 09/06/20  0428   AST  --  40  36 41 39   ALT  --  28 27 28 28   ALKPHOS  --  270* 232* 222* 213*   BILITOTAL  --  0.8 0.7 0.8 0.9   ALBUMIN  --  1.3* 1.3* 1.4* 1.5*   INR 2.44* 1.69* 1.36* 1.25* 1.18*     Pancreatic Enzymes  No lab results found in last 7 days.  Coagulation Profile  Recent Labs   Lab 09/10/20  0403 09/09/20  0342 09/08/20  0339 09/07/20  0407   INR 2.44* 1.69* 1.36* 1.25*         5. RADIOLOGY:   Recent Results (from the past 24 hour(s))   XR Chest Port 1 View   Result Value    Radiologist flags slight increase in size of right pneumothorax (Urgent)    Radiologist flags (Urgent)     slight increase in size of right apical pneumothorax    Narrative    EXAM: XR CHEST PORT 1 VW  9/9/2020 2:49 PM      HISTORY: removal of Chest Tube    COMPARISON: X-ray: 9/9/2020    FINDINGS: Single view of the chest. Sternotomy wires in place.  Surgical clips overlying the left heart. Interval removal of right  apically directed chest tube. Left chest wall pacemaker with lead  overlying the right ventricle. Right PICC tip projects over the right  axilla.    Small right apical pneumothorax, slightly increased from prior exam.  Unchanged left, trace apical pneumothorax. Persistent loculated right  pleural effusion cardiac silhouette is stable. Slight increase in  scattered bibasilar interstitial opacities.      Impression    IMPRESSION:    1. Interval removal of right apically directed chest tube with small  right apical pneumothorax, slightly increased in size from prior exam.    2. Stable left trace apical pneumothorax.    3. Grossly unchanged loculated right pleural effusion with slight  increase in bibasilar interstitial opacities, atelectasis versus  infection versus edema.    [Access Center: slight increase in size of right pneumothorax]    This report will be copied to the [facility] to ensure a  provider acknowledges the finding. Access Center is available Monday  through Friday 8am-3:30 pm.       [Urgent Result: slight increase in  size of right apical pneumothorax]    Finding was identified on 9/9/2020 2:54 PM.     Beth Landrum CNP was contacted by Dr. Loja at 9/9/2020 3:00 PM and  verbalized understanding of the urgent finding.     I have personally reviewed the examination and initial interpretation  and I agree with the findings.    ROBERT DE LA PAZ MD   XR Chest Port 1 View    Narrative    Exam: XR CHEST PORT 1 VW, 9/10/2020 1:47 AM    Indication: Chest tube in place, f/u pneumo    Comparison: 9/9/2020    Findings:   Single portable view of the chest. LVAD in place. Implantable cardiac  device with associated leads in place. Right-sided chest tube in  similar position. Small bilateral pleural effusions. Loculated right  pleural effusion/hematoma unchanged. Small bilateral apical  pneumothoraces. Bilateral atelectasis. No new focal airspace  opacities.      Impression    Impression: Grossly unchanged small bilateral hydropneumothoraces.    I have personally reviewed the examination and initial interpretation  and I agree with the findings.    SCOTT ANDERSON MD       =========================================

## 2020-09-10 NOTE — CONSULTS
GASTROENTEROLOGY CONSULTATION      Date of Admission:  8/8/2020           Reason for Consultation:   We were asked by  of ICU to evaluate this patient with new anemia.           ASSESSMENT AND RECOMMENDATIONS:   Assessment:  75 year old female with a history of coronary artery disease status post CABGx4 in 2020, ischemic cardiomyopathy, severe tricuspid and mitral regurg unknown mural thrombus, is currently admitted to the cardiac ICU post LVAD placement on 8/19 in the setting of cardiogenic shock due to decompensated heart failure. Course has been c/b hypoxic resp failure requiring intubation, strokes, seizures, and LORI currently on CRRT. GI was initially consulted on 8/26 for bloody NG tube OP.She underwent EGD with placement of clips on two lesions that were possibly bleeding, please see procedure note on 8/27 for full details. AC was resumed and she unfortunately had a recurrent UGIB. EGD on 8/30 showed a bleeding angioectasia vs gastric erosion related to the NG in the stomach to which 1 hemoclip was placed (an additional hemoclip was placed at the site of the prior ulcer). Patient has no acute evidence of GI bleed. Needs to be resuscitated before scoping.      Recommendations    -Await repeat Hgb. Transfuse hgb is below 7   -Agree with team's resuscitation efforts   -Defer to team on holding anti-coagulation  -Continue current PPI course   -If develops evidence of bleeding, please contact GI immediately for assessment for EGD  -Make NPO at midnight  -GI will continue to follow and assess in morning about doing EGD    Thank you for involving us in this patient's care. Please do not hesitate to contact the GI service with any questions or concerns.     Pt care plan discussed with Dr. Appiah, GI staff physician.    Rashawn Nunez  -------------------------------------------------------------------------------------------------------------------           History of Present Illness:   Marquise  Narda Jj is a 75 year old female with a history of coronary artery disease status post CABGx4 in 2020, ischemic cardiomyopathy, severe tricuspid and mitral regurg unknown mural thrombus, is currently admitted to the cardiac ICU post LVAD placement on 8/19 in the setting of cardiogenic shock due to decompensated heart failure. She was scheduled to start on hemodialysis today, but it was aborted when she developed significant hypotension (MAPs to 40s) and was found to have a Hgb of 5.7 (9/10 Hgb 7.4). History limited by patient's condition and  was unavailable for history. Per team there are no overt signs of GI bleeding (no change in rectal tube output appearance, no evidence of upper GI bleed). She does have a chest tube with red output for a hemothorax, but per the nurse there is not new significant output from that tube. She received two units of pRBC. Recheck is currently pending.     Please see prior GI notes for details of prior bleeds.          Data:   Key relevant labs:   Hgb: 7.4 >> 5.7   WBC: 11.9 >> 11.7  Plt: 183 >> 194    Key relevant imaging:  Please see procedure notes from 8/27 and 8/30.            Previous Endoscopy:   8/27:  Findings:        No gross lesions were noted in the entire esophagus.        Clotted blood was found in the entire examined stomach. Fluid aspiration        was performed through the scope suction channel. Large amount of clot        was removed with burger net. The burger net was introduced repeatedly to        removed the copious amounts of clot and the entire scope, burger net, and        clot was removed to deposit clot outside of the body.        No gross lesions were noted in the entire examined duodenum.        A few sessile fundic gland polyps with no stigmata of recent bleeding        were found on the greater curvature of the stomach.        One 3 mm lesion in the cardia was seen without bleeding or stigmata of        recent bleeding. Area was successfully  injected with 3 mL of a 1:10,000        solution of epinephrine for hemostasis. To prevent bleeding        post-intervention, one hemostatic clip was successfully placed. This        lesion had the appearance of a protuberant vessel however may have        ultimately been a peculiar appearing fundic gland polyp. Given the        critical illness of the patient it was determined to treat as if this        were a protuberant vessel. There was no bleeding at the end of the        procedure.        One oozing gastric ulcer with adherent clot was found in the cardia in        the lesser curvature. The lesion was 1 mm in largest dimension. Area was        successfully injected with 3 mL of a 1:10,000 solution of epinephrine        for hemostasis. For hemostasis, two hemostatic clips were successfully        placed. There was no bleeding at the end of the procedure.        Linear mild oozing/bleeding area likely secondary to trauma from OG/NJ        tube was noted on greater curvature. No intervention was needed.    8/30:  Findings:        The examined esophagus was normal.        Hematin (altered blood/coffee-ground-like material), clots and red blood        was found in the entire examined stomach. Extensive time was taken to        lavage, aspirate (with therapeutic scope) and remove clot with Joe net.        Good visualization was achieved.        A single 3 mm angioectasia vs erosion with bleeding was found on the        lesser curvature of the stomach. Coagulation for hemostasis using argon        plasma at 1 liter/minute and 30 mota was successful.        1 hemoclip was found in the cardia (at site of 3mm lesion noted on prior        EGD from 8/27/20). No evidence of bleeding or other abnormality was        found at this site.        Two hemoclips were found on the proximal lesser curvature of the stomach        at site of prior ulcer. There was a small amount of adherent red blood        at the base of the clips.  No additional lesion was seen. This was        removed and no further bleeding was seen. An additional hemoclip was        placed at this site. No bleeding seen at the end of the procedure.        The examined duodenum was normal apart from hematin that was lavaged and        aspirated.          Medications:     Current Facility-Administered Medications   Medication     acetaminophen (TYLENOL) solution 1,000 mg     amiodarone (PACERONE) tablet 200 mg     artificial tears ophthalmic ointment     artificial tears ophthalmic ointment     aspirin (ASA) chewable tablet 81 mg     B and C vitamin Complex with folic acid (NEPHRONEX) liquid 5 mL     bimatoprost (LUMIGAN) 0.01 % ophthalmic drops 1 drop     busPIRone (BUSPAR) tablet 10 mg     dextrose 10% infusion     glucose gel 15-30 g    Or     dextrose 50 % injection 25-50 mL    Or     glucagon injection 1 mg     fiber modular (NUTRISOURCE FIBER) packet 1 packet     heparin lock flush 10 UNIT/ML injection 5 mL     heparin lock flush 10 UNIT/ML injection 5-15 mL     hydrALAZINE (APRESOLINE) injection 10 mg     insulin aspart (NovoLOG) injection (RAPID ACTING)     lacosamide (VIMPAT) tablet 200 mg     latanoprost (XALATAN) 0.005 % ophthalmic solution 1 drop     levETIRAcetam (KEPPRA) tablet 2,000 mg     levothyroxine (SYNTHROID/LEVOTHROID) half-tab 62.5 mcg     loperamide (IMODIUM) capsule 2 mg     melatonin tablet 1 mg     methocarbamol (ROBAXIN) tablet 500 mg     micafungin (MYCAMINE) 150 mg in sodium chloride 0.9 % 100 mL intermittent infusion     naloxone (NARCAN) injection 0.1-0.4 mg     ondansetron (ZOFRAN-ODT) ODT tab 4 mg    Or     ondansetron (ZOFRAN) injection 4 mg     pantoprazole (PROTONIX) 2 mg/mL suspension 40 mg     polyethylene glycol (MIRALAX) Packet 17 g     polyethylene glycol-propylene glycol PF (SYSTANE ULTRA PF) opthalmic solution 1 drop     prochlorperazine (COMPAZINE) injection 5 mg    Or     prochlorperazine (COMPAZINE) tablet 5 mg     Reason beta  blocker order not selected     rosuvastatin (CRESTOR) tablet 20 mg     senna-docusate (SENOKOT-S/PERICOLACE) 8.6-50 MG per tablet 2 tablet    Or     senna-docusate (SENOKOT-S/PERICOLACE) 8.6-50 MG per tablet 1 tablet     simethicone (MYLICON) suspension     sodium chloride (PF) 0.9% PF flush 10 mL     sodium chloride (PF) 0.9% PF flush 10-20 mL     sodium chloride (PF) 0.9% PF flush 3 mL     sodium chloride (PF) 0.9% PF flush 3 mL     vancomycin 1500 mg in 0.9% NaCl 250 ml intermittent infusion 1,500 mg     Warfarin Therapy Reminder (Check START DATE - warfarin may be starting in the FUTURE)     warfarin-No DOSE today             Physical Exam:   /79   Pulse 91   Temp 98  F (36.7  C) (Axillary)   Resp 21   Wt 71.2 kg (156 lb 15.5 oz)   SpO2 100%   BMI 23.87 kg/m    Wt:   Wt Readings from Last 2 Encounters:   09/10/20 71.2 kg (156 lb 15.5 oz)   08/08/20 68.7 kg (151 lb 8 oz)      Constitutional: not dyspneic/diaphoretic, no acute distress  Eyes: Sclera anicteric/injected  Ears/nose/mouth/throat: Normal oropharynx without ulcers or exudate, no evidence of bleeding  CV: No edema  Respiratory: Unlabored breathing, nasal canula in place, feeding tube in place  Abd: Soft, Nondistended, no hepatosplenomegaly, nontender, no peritoneal signs, rectal tube in place with green liquid output,  Skin: warm, perfused, no jaundice  Neuro: minimally responsive  MSK: No gross deformities          Past Medical History:   Reviewed and edited as appropriate  Past Medical History:   Diagnosis Date     CAD (coronary artery disease)      ICD (implantable cardioverter-defibrillator) in place     Primary prevention AICD placed in [place] in May 2020.     Ischemic cardiomyopathy     LVEF less than 20%.     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     Status post coronary artery bypass grafting     Done in [facility] in April 2020.  REDD to diagonal  (as LAD dissected) and KURT to the right coronary artery.            Past Surgical History:   Reviewed and edited as appropriate   Past Surgical History:   Procedure Laterality Date     ARTHROPLASTY REVISION HIP Right 11/16/2017    Procedure: ARTHROPLASTY REVISION HIP;  Right total hip arthroplasty revision.;  Surgeon: Jozef Garcia MD;  Location: [facility] OR     cabg  04/2020     CV HEART CATHETERIZATION WITH POSSIBLE INTERVENTION N/A 7/2/2020    Procedure: Heart Catheterization with Possible Intervention;  Surgeon: Kaden Franco MD;  Location:  HEART CARDIAC CATH LAB     CV INTRA-AORTIC BALLOON PUMP INSERTION N/A 8/14/2020    Procedure: Intra-Aortic Balloon Pump Insertion;  Surgeon: Cale Armijo MD;  Location:  HEART CARDIAC CATH LAB     CV PCI STENT DRUG ELUTING N/A 7/2/2020    Procedure: Percutaneous Coronary Intervention Stent Drug Eluting;  Surgeon: Kaden Franco MD;  Location:  HEART CARDIAC CATH LAB     CV RIGHT HEART CATH N/A 7/2/2020    Procedure: Right Heart Cath;  Surgeon: Kaden Franco MD;  Location:  HEART CARDIAC CATH LAB     CV RIGHT HEART CATH N/A 8/14/2020    Procedure: Right Heart Cath with leave in [place];  Surgeon: Jose Padilla MD;  Location:  HEART CARDIAC CATH LAB     INSERT VENTRICULAR ASSIST DEVICE LEFT (HEARTMATE II) N/A 8/19/2020    Procedure: Redo Sternotomy, On Cardiopulmonary Bypass, Insertion of Left Ventricular Assist Device (HeartMate III), Tricuspid Valve Repair with [place] MC3 Annuloplasty Ring size T30, Left Ventricular Thrombectomy.;  Surgeon: Catarino Farley MD;  Location: [facility] OR     IR CHEST TUBE PLACEMENT NON-TUNNELED RIGHT  9/2/2020     IRRIGATION AND DEBRIDEMENT CHEST WASHOUT, COMBINED N/A 8/21/2020    Procedure: IRRIGATION AND DEBRIDEMENT, CHEST POSSIBLE CLOSURE;  Surgeon: Live Claudio MD;  Location: [facility] OR            Social History:   Not assessed due to patient's level of mentation.          Family History:   Reviewed and  edited as appropriate  Family History   Problem Relation Age of Onset     Coronary Artery Disease No family hx of      Heart Failure No family hx of      No known history of colorectal cancer, liver disease, or inflammatory bowel disease.         Allergies:   Reviewed and edited as appropriate     Allergies   Allergen Reactions     Combigan [Brimonidine Tartrate-Timolol] Swelling     Swollen eyelids     Ativan [Lorazepam] Anxiety and Other (See Comments)     Increased confusion              Review of Systems:     A complete 10 point review of systems was performed and is negative except as noted in the HPI

## 2020-09-11 NOTE — PROGRESS NOTES
LVAD Social Work Services Progress Note      Date of Initial Social Work Evaluation: 8/12/2020  Collaborated with: Carolyn 2, bedside RN, Charge RN, RNCC,  pt's , Reyes, at bedside, and dtr, Norman, via phone ([phone])    Data: Pt is s/p LVAD implant and TV repair on 8/19/2020. Pt's post-op course has been complicated by seizures, stroke, LORI and bacterial and fungal infections. Pt was extubated on 9/1/2020. CRRT initiated on 8/26 and was stopped 9/9 w/ iHD initiated 9/10, but was unstable. Pt with another hgb and potential of restarting CRRT.    this morning is now requesting that pt go home on hospice. Although this is not an unreasonable request, this is a change from our care conference earlier this week.  asked that writer call his dtr, Norman, to begin plans to bring pt home on hospice. Pt's dtr tearful and reports she does not understand why her father wants hospice and what has changed since care conference earlier this week.   Discussed with Dr. Johnson, whom is in communication with Dr. Farley. Plan for care conference today at 2pm. Writer has paged nephrology and RNCC has paged neuro and ID. Discussed with Charge RN and pt's dtr can come in for care conference. Writer updated dtr and she will plan to be here. Provided dtr with information for call-in for rest of family to participate.   Pt's dtr has referred to she being the medical POA. Writer has asked earlier in the week to have this documentation and  has not brought it in. In speaking to her today asked that has to bring this information today.     Intervention: Supportive Visit, GOC    Assessment: Pt's  is tearful, but does appear to be comfortable with transitioning to comfort care as he does not believe pt would want to live this way. He is in agreement with care conference and knows that his dtr, Norman, will be here with him. It will be beneficial to have dtr present to see pt.   Education provided by SW:  Ongoing Social Work support, GOC  Plan:    Discharge Plans in Progress: Writer has made initial inquiry to  Hospice to check benefits and staffing availability     Barriers to d/c plan: Ongoing GOC discussion     Follow up Plan: SW to attend care conference today.     Addendum:  1722: Attended care conference refer to RNCC for full details. Writer spent some time with pt's  and dtr together and separately after care conference. Pt's dtr did provide a copy of pt's Medical POA form and it indicates that dtr, (Norman) is the only designated Medical POA. Writer updated Cards 2 and bedside RN. Writer sent form to honoring choices to be scanned into EMR and also placed copy in pt's hard chart. Dtr at this time wants to continue aggressive cares and wants pt Full Code. Dtr understands that this would mean that pt would have chest compressions and re-intubated if necessary. Dtr did acknowledge that if pt were to remain in current state her mother would not want to live this way. Dtr is optimistic and believes that pt is making progress albeit small ones.     Writer took some time to provide preliminary hospice information. Discussed potential that pt may not be able to get home if she decompensates and in particular if she were to get re-intubated. We discussed what comfort cares would mean here in the hospital, should pt not be medically stable, including visitor policy around this. Also discussed home hospice. Writer discussed w/ dtr that writer made initial referral to  hospice, as agency is familiar with LVAD patients, and agency would be unable to accept over the weekend due to staffing, if family were to change their mind and want to get pt home. There is a potential other hospice agency ([facility]) that has worked with LVADs but writer did not initiate referral due to time of day and family is currently not wanting to pursue hospice. Would anticipate that [facility] also would not be able to accept  a complex new LVAD pt over a weekend. Due to level of complexity both [facility] and from writer's prior experience, [facility] would need to have their medical directors involved in taking pt on.     In speaking with pt's , he appears tired, and acknowledges that he still believes pt would not want to live this way and would prefer to take pt home on hospice. He is agreeable to giving pt more time and continue to work with his children. Plan for ongoing discussions this weekend amongst family members. Writer does get the impression that this a very cohesive family unit and they are supportive of each other. Writer is not getting any indication that there is any discord amongst family members. Pt's  appears to understand situation, but the adult children, whom are unable to be here, are more optimistic about pt's recovery.

## 2020-09-11 NOTE — PROGRESS NOTES
Cardiology Progress Note    Assessment & Plan   In summary, Marquise Jj is a 75-year-old female with a past medical history notable for coronary artery disease, ischemic cardiomyopathy, and known mural thrombus who was transferred from [facility] for further evaluation/treatment of cardiogenic shock. Balloon pump placed 8/14. Had HM3 8/19. Chest closed 8/21.    Today's changes:  - plan for EGD today (NPO since mn)  - f/u post EGD GI recs  - convert PO meds to IV where able   - family meeting re: goals of care    Neuro:   # Sedation --> extubated, alert  # concern for seizure activity  CTH head 8/20 no signs of ICH   CTH head 8/27 Scattered areas of hypodensity with loss of gray-white matter differentiation in the left frontal lobe. These areas were not present on the CT dated 8/10/2020 and difficult compared to other prior comparison CTs due to extensive hardware artifact. These are suspicious for involving embolic infarcts.  keppra 2 g PO bid   vimpat 200 BID    Cardio:  # Cardiogenic shock with vasoplegic component    # ICM: NYHA IV / ACC  # Severe MR/TR s/p tricuspid valve repair with Elkins MC3 Annuloplasty Ring size T30  # s/p LVAD HM3 on 8/19/2020  LAVD speed 5400, flow 3.8-4.3, pi 2.2-5, power 3.8   Presents with cardiogenic shock. On NE, cardiac index approximately 1.37 on admission. Severely elevated biventricular filling pressures. Etiology of her decompensation appears to be progression of her cardiomyopathy. Despite medical treatment, she has been hospitalized twice since returning to [place], both with low output. No viability in her LAD territory, and doubt that PCI to her circumflex would make meaningful LV recovery. Hemodynamics improved with dobutamine, did not tolerate attempt to wean inotropics. RHC removed on 8/13 after clotting off, replaced on 8/14 after patient with increased work of breathing. CI of 1.1, balloon pump placed with CI of 1.6-1.8 and improvement in  symptoms/hemodynamics. Had LVAD HM3 placed on 8/19. CVP today around 10 range. Chest closed on 8/21.   -- holding hep gtt as above  -- warfarin with INR goal 1.8-2.2      Date 8/9 8/9 8/10 8/11 08/13/20 08/15/20 08/16/20 08/17/20 8/18/20 8/19 8/20 8/21*   CVP 12 9 3 4 8 9 6 8 11 5 10 13   Mean PA  35 30 21 25 23 21 20 35/18 35/18 - 30/12 28/14   PCWP  18 14 12 x 13 14  13 - -    Oxy HGB  59 64 61 61 60 47 62 69 54 - 68 66   CI/CO 2.3 2.3 2.4/4.2 2.3 2.2 1.8 2.8/4.9 4.1 4.3/2.5 - 6/3.3 6.9/3.8   SVR 1100 1100 1200 1400 1316 1900 3622 171 3121 - 1025 715   Device     IABP 1:1 IABP 1:1 IABP 1:1 IABP 1:1 IABP 1:1 No IABP No IABP No IABP   * epi 0.1, vaso 0.5     8/22: CVP 12, PA 28/14/19, PCWP 14, CO/CI 9.0/4.8. . Epi 0.1, vaso 1.  8/23: CVP 16, PA 30/14, CI/CO 4.4/8.0, , SvO2 71%  8/24: CVP 14, PA 44/20/28, PCWP 16, CI/CO 7.3/3.9, SvO2 68% epi 0.06, vaso 2  8/25: CVP 16, PA 44/20/29, PCWP 22, CI/CO  3.6, SvO2 82%, vaso 2, epi 0.03  8/26: CVP 13, PA 40/20/26, PCWP 12, CI/CO 7/ 3.6, SvO2 74%, vaso 1  8/27: CVP 13, PA 38/18/26, PCWP 12-14, CI/CO 9.4/4.7, , PVR ~0.5, SvO2 73%, vaso 1, epi 0.05     # CAD s/p CABG 4/2020 (KURT to RCA and LIMA to LAD) and PCI to RCA 7/20  - Stop home plavix po qd (8/13) for LVAD surgery  - aspirin 81mg PO qd   - c/w home crestor    # Mural thrombus  - removed during surgery on 8/19    # A-flutter  Converted to NSR on 8/15    - Continue amiodarone 200mg po qd      Pulm:  # Acute hypoxic respiratory failure  # Hemopneumothorax  Extubated on 9/1/2020. S/p IR for chest tube of Hemopneumothorax on 9/5 with 2.4L output immediately.     Renal/Electrolytes   # Hyperkalemia/Hypokalemia   # Acute kidney injury on chronic kidney disease, stage III  Likely ATN due to hypoxic insult  - renal consult and diuretic challenge. May need dialysis  - Trend potassium and creatinine q12hrs  - Electrolyte replacement protocol  - Monitor UOP        GI:  #GIB  GI consulted and EGD on 8/28 with  protuberant vessel injected and clipped and bleeding gastric ulcer with adherent clot, injected and clipped as well  Repeat bleed on 8/29-8/30, transfused and given pRBC, underwent repeat EGD, showed hematin throughout stomache with single bleeding angioectasia vs gastric erosion (from NGT) in stomach. Likely source of bleed and thought related to current bleeding episode. Hypothermia thought to be related to bleed.    # Constipation  # Severe protein calorie malnutrition  - Nutrition through tube feeds at 30 mL/hr    - Senna-docusate bid scheduled  - Miralax bid prn constipation    ID:   # Candidemia   # Methicillin resistant staph epidermidis  Currently C. Glabrata positive on 8/30, 9/1, 9/3 cultures.   Also growing MRSE on 9/3 and S. Capitis on 9/1  Current:  - Micafungin (9/1-**)  - Vanc (9/4-**)     Prior:  - Vancomycin (8/30-9/2, 8/19-8/21)  - Zosyn (8/30-9/2, 8/19-8/24)  - Rifampin (8/19-8/21)  - Fluconazole (8/19-8/21)  - ceftriaxone (8/14-8/18)    - ID Consult: treat as real candidemia and pull lines in place at time of positive culture as able and continue Micafungin  - C. Diff negative  - No evidence of ophthalmologic yeast involvement  - Pleural fluid labs sent per ID    Hem/Onc  # Mural thrombus removed on 8/19  # Anemia, mild  D/t frequent blood draws and procedures, and two large GI bleesd  - Monitor hgb  - PPI gtt --> IV BID    Endo  # Hypothyroidism   - Continue PTA levothyroxine     Nutrition: TFs  DVT Prophylaxis: mechanical   Code Status: Full Code    Waylon Garcia MD, Msc  Cardiovascular Disease Fellow  [facility]          Interval History     Received 3 unit(s) pRBC for hypotension and drop in H/H yesterday (later improved post transfusion). Evaluated by GI, plan for EGD today. Patient without complaints today (remains minimally interactive and mostly non-vocal with intermittent moansl).       Physical Exam   Temp: 98.9  F (37.2  C) Temp src: Axillary BP: 103/84  Pulse: 93   Resp: 22 SpO2: 98 % O2 Device: None (Room air) Oxygen Delivery: 3 LPM  Vitals:    09/08/20 0000 09/09/20 0000 09/10/20 0400   Weight: 73.5 kg (162 lb 0.6 oz) 71.4 kg (157 lb 6.5 oz) 71.2 kg (156 lb 15.5 oz)     Vital Signs with Ranges  Temp:  [98  F (36.7  C)-99.1  F (37.3  C)] 98.9  F (37.2  C)  Pulse:  [86-99] 93  Resp:  [13-30] 22  BP: ()/() 103/84  SpO2:  [90 %-100 %] 98 %  I/O last 3 completed shifts:  In: 3675 [I.V.:465; Other:660; NG/GT:330]  Out: 960 [Stool:600; Chest Tube:360]    RETIRE: Heart Rate: 71, Blood pressure 103/84, pulse 93, temperature 98.9  F (37.2  C), temperature source Axillary, resp. rate 22, weight 71.2 kg (156 lb 15.5 oz), SpO2 98 %.  156 lbs 15.48 oz     GEN: alert, non to minimally verbal  CV: RRR, Hum of HM3   LUNGS: Clear to auscultation bilaterally  ABD: Active bowel sounds, soft, no abdominal tenderness.   EXT: Trace LE edema   NEURO: altert, tracks people/objects across room; responds to her name, husbands name, daughter's name. moves b/l LE and LUE to command    Medications     dextrose Stopped (09/02/20 1957)     BETA BLOCKER NOT PRESCRIBED       Warfarin Therapy Reminder         amiodarone  200 mg Oral or Feeding Tube Daily     artificial tears   Both Eyes 5x Daily     aspirin  81 mg Oral or Feeding Tube Daily     B and C vitamin Complex with folic acid  5 mL Oral Daily     bimatoprost  1 drop Both Eyes At Bedtime     busPIRone  10 mg Oral or Feeding Tube TID     fiber modular (NUTRISOURCE FIBER)  1 packet Per Feeding Tube TID     heparin lock flush  5-15 mL Intracatheter Q24H     insulin aspart  1-6 Units Subcutaneous Q4H     lacosamide  200 mg Oral or Feeding Tube BID     latanoprost  1 drop Both Eyes Daily     levETIRAcetam  2,000 mg Oral or Feeding Tube BID     levothyroxine  62.5 mcg Oral or Feeding Tube QAM AC     micafungin  150 mg Intravenous Q24H     pantoprazole  40 mg Per Feeding Tube BID AC     rosuvastatin  20 mg Oral or Feeding Tube Daily      sodium chloride (PF)  10 mL Intracatheter Q8H     sodium chloride (PF)  3 mL Intracatheter Q8H     vancomycin (VANCOCIN) IV  1,500 mg Intravenous Q24H       Data        Intake/Output Summary (Last 24 hours) at 9/10/2020 1428  Last data filed at 9/10/2020 1300  Gross per 24 hour   Intake 3818 ml   Output 1680 ml   Net 2138 ml             Recent Labs   Lab 09/11/20  0336 09/10/20  2042 09/10/20  1624 09/10/20  1027 09/10/20  0403 09/09/20  0342  09/08/20  0339   WBC  --   --   --  11.7*  --  11.9*  --  9.3   HGB 8.0* 7.9*  7.9* 7.9* 5.7*  --  7.4*  --  8.0*   MCV  --   --   --  97  --  95  --  94   PLT  --   --   --  194  --  183  --  170   INR 1.99*  --   --   --  2.44* 1.69*  --  1.36*     --   --  142  --  142   < > 141   POTASSIUM 4.6  --   --  4.4  --  4.5   < > 4.4   CHLORIDE 109  --   --  111*  --  110*   < > 111*   CO2 23  --   --  24  --  25   < > 26   BUN 81*  --   --  74*  --  37*   < > 35*   CR 1.61*  --   --  1.40*  --  0.79   < > 0.85   ANIONGAP 8  --   --  8  --  6   < > 4   FERNANDO 8.2*  --   --  7.4*  --  7.8*   < > 7.9*   *  --   --  105*  --  106*   < > 105*   ALBUMIN  --   --   --   --   --  1.3*  --  1.3*   PROTTOTAL  --   --   --   --   --  4.8*  --  4.4*   BILITOTAL  --   --   --   --   --  0.8  --  0.7   ALKPHOS  --   --   --   --   --  270*  --  232*   ALT  --   --   --   --   --  28  --  27   AST  --   --   --   --   --  40  --  36    < > = values in this interval not displayed.       No results found for this or any previous visit (from the past 24 hour(s)).

## 2020-09-11 NOTE — PROGRESS NOTES
Nephrology Progress Note  09/11/2020         Mrs Jj is a 75 yof w/ICM, s/p CABG (4/20), severe MR/TR and known mural thrombus, acute kidney injry, hypothyroidism, CKD3, anxiety, GERD, chronic anemia.  [facility] to [facility] on 8/8 after developing cardiac shock. Placed on IABP and then LVAD placement and TVR repair for cardiogenic shock on 8/19 /20.  Nephrology consulted for managign Oliguric LORI on CKD stage 3, started CRRT on 8/26.     Interval History :   Mrs Jj had first iHD yesterday, Hgb noted to be low (resulted while on HD) and was given 2u PRBC's but ended up cutting run short at 1.5h due to hypotension.  Had melena overnight but Hgb is stable today.  With this development along with her other serious issues the team is having a care conference today, holding on HD with no major issues with electrolytes.  Will continue to follow.       Assessment & Recommendations:   LORI on CKD-Baseline Cr ~1.5, small L kidney at 8.3cm, 10cm R kidney at baseline.  Started CRRT on 8/26 for management of volume and electrolytes in setting of cardiogenic shock, LVAD placed on 8/19.  Etiology of LORI is likely ATN from hypoperfusion, started CRRT on 8/26.               -Line is [facility] temp line from 9/1.  Having goals of care discussion.                -Stopped HD early yesterday, holding on today and will see how goals of care discussion ends up.  If home hospice is desired we certainly can consider running for clearance before leaving to give more time before uremic issues arise (if she is stable enough for this)                     Volume status-Still up ~6kg with significant edema and loss of muscle mass during course.  Holding on run today to prevent any GI ischemia given GIB yesterday.       Electrolytes/pH-K 4.6, bicarb 23, no acute issues, holding on running today.      Ca/phos/pth-Ca 8.2, Mg and Phos WNL last check.      ID-Fungal cultures +, ID consulted, new HD line placed, stopped TPN and started  TF.  Cultures have been negative since 9/3.     Anemia-Hgb 8.0, stable after 2u PRBC's yesterday but having melena.  GI considering scope depending on goals of care.       Nutrition-Had been on TPN, off now and on Impact TF with fungemia.      Seen and discussed with Dr Morin     Recommendations were communicated to primary team via verbal communication       STEPHAN Faulkner CNS  Clinical Nurse Specialist  [phone]    Review of Systems:   I reviewed the following systems:  ROS not done due to neuro status.     Physical Exam:   I/O last 3 completed shifts:  In: 3675 [I.V.:465; Other:660; NG/GT:330]  Out: 960 [Stool:600; Chest Tube:360]   /84   Pulse 93   Temp 97.8  F (36.6  C) (Axillary)   Resp 22   Wt 71.2 kg (156 lb 15.5 oz)   SpO2 98%   BMI 23.87 kg/m       GENERAL APPEARANCE: Extubated, lethargic but responds to stimuli.   EYES:  No scleral icterus, pupils equal  HENT: mouth without ulcers or lesions  PULM: lungs clear to auscultation, equal air movement, no cyanosis or clubbing  CV: regular rhythm, normal rate, no rub     -JVP not elevated.      -edema +2 generalized.   GI: soft, nontender, +distended, bowel sounds are +  MS: no evidence of inflammation in joints, no muscle tenderness  NEURO: Lethargic, no focal deficits.   Lines LIJ temp line from 9/1    Labs:   All labs reviewed by me  Electrolytes/Renal -   Recent Labs   Lab Test 09/11/20  0336 09/10/20  1027 09/10/20  0403 09/09/20  0342 09/08/20  1543    142  --  142 140   POTASSIUM 4.6 4.4  --  4.5 4.8   CHLORIDE 109 111*  --  110* 110*   CO2 23 24  --  25 24   BUN 81* 74*  --  37* 35*   CR 1.61* 1.40*  --  0.79 0.88   * 105*  --  106* 114*   FERNANDO 8.2* 7.4*  --  7.8* 7.6*   MAG 2.1  --  2.5* 2.6* 2.4*   PHOS 3.7  --   --  3.8 3.8       CBC -   Recent Labs   Lab Test 09/11/20  0336 09/10/20  2042 09/10/20  1624 09/10/20  1027 09/09/20  0342 09/08/20  0339   WBC  --   --   --  11.7* 11.9* 9.3   HGB 8.0* 7.9*  7.9* 7.9*  5.7* 7.4* 8.0*   PLT  --   --   --  194 183 170       LFTs -   Recent Labs   Lab Test 09/09/20  0342 09/08/20  0339 09/07/20  0407   ALKPHOS 270* 232* 222*   BILITOTAL 0.8 0.7 0.8   ALT 28 27 28   AST 40 36 41   PROTTOTAL 4.8* 4.4* 4.5*   ALBUMIN 1.3* 1.3* 1.4*       Iron Panel -   Recent Labs   Lab Test 08/10/20  1052 07/01/20  0530   IRON 48 65   IRONSAT 15 17   HUSEYIN 165  --            Current Medications:    amiodarone  200 mg Oral or Feeding Tube Daily     artificial tears   Both Eyes 5x Daily     aspirin  81 mg Oral or Feeding Tube Daily     B and C vitamin Complex with folic acid  5 mL Oral Daily     bimatoprost  1 drop Both Eyes At Bedtime     busPIRone  10 mg Oral or Feeding Tube TID     fiber modular (NUTRISOURCE FIBER)  1 packet Per Feeding Tube TID     heparin lock flush  5-15 mL Intracatheter Q24H     lacosamide  200 mg Oral or Feeding Tube BID     latanoprost  1 drop Both Eyes Daily     levETIRAcetam  2,000 mg Oral or Feeding Tube BID     levothyroxine  62.5 mcg Oral or Feeding Tube QAM AC     micafungin  150 mg Intravenous Q24H     pantoprazole (PROTONIX) IV  40 mg Intravenous BID     rosuvastatin  20 mg Oral or Feeding Tube Daily     sodium chloride (PF)  10 mL Intracatheter Q8H     sodium chloride (PF)  3 mL Intracatheter Q8H     vancomycin (VANCOCIN) IV  1,500 mg Intravenous Q24H     warfarin ANTICOAGULANT  1 mg Oral ONCE at 18:00       dextrose Stopped (09/02/20 1957)     BETA BLOCKER NOT PRESCRIBED       Warfarin Therapy Reminder

## 2020-09-11 NOTE — PLAN OF CARE
Major Shift Events: HGB 5.7 this am, received to units of blood. Did not tolerate dialysis. Maps >60. Sinus rhythm  , hr 80-90's.  LVAD in place, flow 3.5-3.9, PI 3.1-3.9, speed 5400, Power, 3.8-3.9. Loose stool, dark green, incontinent of urine x2  Plan: Continue to Monitor   For vital signs and complete assessments, please see documentation flowsheets.

## 2020-09-11 NOTE — CARE CONFERENCE
Care Conference    Care conference was held on September 11, 2020 at 14:00 in 4A conf. Room.      Attending the conference were: Team members- CVTS ( Dr. Herrera), SW ( Brooklynn Hall),Cards 2 ( Dr. Cedeno), and RNCC (Sandy Yu).  Family members- pt spouse and dtr in the room.  On the speaker phone; pt 2 sons and dtr.    Purpose of the conference was to update family and discuss the plan of care.      Discussion/Outcomes/Follow-Up: After introduction was done the team provided update about pt current medical status.  The team stated pt remain being off the vent and BP medication.  Pt initiated iHD yesterday but weren't able to complete her iHD run due to BP issue. The team discussed about her Hgb drop and stated currently it is stable and no additional intervention needed.   The team and family had discussion about pt wishes and prognosis.  The team informed family that it is hard to give any prognosis at this time and reviewed the discussion we had 2 days ago that pt will need HD, LVAD, long term abx and aggressive rehab if she recovers from the acute illness.  Pt family stated they know pt would not want to live in a vegetative state.  Pt family expressed not knowing when to stop and for how long to continue with aggressive care  before they see improvement.      The team discussed 3 options: 1- to continue with current aggressive plan of care. 2- to continue with the current plan of care and change pt code status to  DNR/DNI and try iHD dialysis again. 3- to transition to comfort care.  Pt spouse and Childrens had different opinion.  Pt spouse would like to transition to comfort care and take her home. He feels pt has been through a lot and doesn't want her to suffer anymore.  Pt Childrens feel pt still wants to continue and would like to give her more time.  The team suggested that family need to have discussion among themselves before they make any decision and offer to leave the conference room.  Family  agreed to stay online and have family discussion.  The team addressed all pt family questions and left the conference room to give pt family privacy.      Sandy Yu RN, PHN, BSN  4A and 4E/ ICU  Care Coordinator  Phone: [phone]  Pager: [phone]

## 2020-09-11 NOTE — PROGRESS NOTES
CVTS PROGRESS NOTE  September 11, 2020          CO-MORBIDITIES:   Cardiogenic shock (H)  (primary encounter diagnosis)  LV (left ventricular) mural thrombus  Heart failure (H)  Status post cardiac surgery    ASSESSMENT: Marquise Jj is a 75 year-old female with PMH notable for coronary artery disease s/p CABG (4/20), ischemic cardiomyopathy, severe MR/TR and known mural thrombus who is admitted to the CV ICU s/p redo sternotomy, LVAD (heartmate III), and TV repair on 8/19/20 with Dr. Farley. Chest was left open and packed. S/p chest closure and washout 08/21/20. Extubated 9/1/20. Found to have candidemia (+ culture x2) and methicillin resistant staph epidermidis on blood culture, ID following.     Patient's  expressed wanting to have a care conference today to discuss moving to Hospice Care as previously discussed earlier this week.     PLAN SUMMARY:   Care conference this afternoon  Planned HD for today on hold as per family wishes   Tentative EGD today on hold pending care conference     PLAN:   Neuro/ pain/ sedation:  #Acute post-operative pain   #Likely anoxic brain injury; possible stroke  #Epilepsia Partialis Continua; resolved  - CTH head 8/27 Scattered areas of hypodensity with loss of gray-white matter differentiation in the left frontal lobe. These areas were not present on the CT dated 8/10/2020 and difficult compared to other prior comparison CTs due to extensive hardware artifact. These are suspicious for involving embolic infarcts.   - Monitor neurological status. Neurology consulted 9/1, based on age, critical illness and embolic infarcts do not anticipate full neurological recovery.    - Keppra, vimpat to continue per NeuroCrit  - Tylenol PRN  - Family requesting a CC to discuss hospice care, palliative involved. CC this afternoon.      Pulmonary:   #Acute hypoxic respiratory failure, improved  #R pleural effusion   #R Hemopneumothorax  - Monitor CXR, O2 saturation  - R pigtail placed  9/2, removed 9/9   - Right pleural (basilar) tube in place with 360 ml Serosang fluid. No air leak.   - Daily CXR while tubes in place       Cardiovascular:    #Acute on chronic decompensated heart failure with reduced ejection fraction: NYHA IV / ACC D  #Cardiogenic shock, possible vasoplegia   #Ischemic cardiomyopathy  #Coronary artery disease s/p CABG 4/20 in [place], s/p PCI to RCA 7/20  #Mural thrombus  #Severe MR/TR  #S/p LVAD - Cardiology following     MAP > 60, CVP 8-12    Off pressors    Warfarin, goal INR 1.8-2.2     Aspirin, rosuvastatin    Co-managing with Cards-2    Amiodarone 200 mg daily      GI/Nutrition:   # UGI bleed, resolved    - Appreciate GI consult, having maroon stools, was tentatively planning EGD today, which is on hold pending CC   - Continue tube feeds at goal  - Pantoprazole BID   - Bowel regimen on hold with ongoing stooling       Renal/Fluid Balance/ Electrolytes:   #Oliguria in the setting of likely ATN  - Will monitor intake and output.  - ICU electrolyte replacement protocol  - Nephrology consulted; CRRT stopped 9/9.   - Started intermittent HD and was scheduled for today, but  requested it not be done and to pursue Hospice care.       Endocrine:    #Glycemic control  #Hx hypothyroid   - ISS, medium intensity, has not required any insulin in several days    - Synthroid daily        ID/ Antibiotics:  # Febrile, concern for post operative fevers, resolved  # Candidemia   # Methicillin resistant staph epidermidis  - Completed perioperative antibiotic regimen: vancomycin, levofloxacin, fluconazole, Zosyn   - ID Consult (fungemia), appreciate recs: will need a long course of antifungal therapy and probable life-long therapy  - Vanc (8/30-9/2, 9/5-9/11 ), Zosyn (8/30-9/2). Restarted Vancomycin for + Blood culture MRSA  - Micafungin for yeast in blood for 6 weeks at least IV, maybe life long as per ID.   - Daily blood cultures NGTD since 9/3, as per ID.  Will discontinue with negative  cultures since 9/4.    - No evidence of ophthalmologic yeast involvement      Heme:     #Acute perioperative blood loss anemia  #Thrombocytopenia   - Hgb goal > 8  - Hgb 8.0, received 2 units RBC's 9/10  - Coumadin with goal INR 1.8-2.2, INR 1.99 today    - No heparin bridge       Prophylaxis:    - SCD  - Coumadin  - PPI  - Bowel regimen      MSK:    - PT and OT following      Lines/ tubes/ drains:  - LIJ CRRT line (9/1)   - Midline (9/3)   - PIV  - Chest tubes R pleural x1   - Rectal pouch (9/6), removed 9/10    - NJT (9/2)        Disposition:  - CV ICU     Patient seen, findings and plan discussed with CVTS Fellow and CVICU staff Dr. Herrera   -----------------------------------  Beth Landrum, SERENE, CNP  Cardiovascular Thoracic Surgery   [facility] [phone]    ====================================    SUBJECTIVE:   Patient continues to be none verbal, has not followed commands. Good eye contact.    is at the bedside.   TF tolerated, although having frequent maroon stools. No emesis.     OBJECTIVE:   1. VITAL SIGNS:   Temp:  [98  F (36.7  C)-99.1  F (37.3  C)] 98.9  F (37.2  C)  Pulse:  [86-99] 93  Resp:  [13-30] 22  BP: ()/() 103/84  SpO2:  [90 %-100 %] 98 %  Resp: 22      2. INTAKE/ OUTPUT:   I/O last 3 completed shifts:  In: 3675 [I.V.:465; Other:660; NG/GT:330]  Out: 960 [Stool:600; Chest Tube:360]    3. PHYSICAL EXAMINATION:   General: NAD, appears comfortable sitting in the recliner    Neuro: Good eye contact. Reported to be inntermittently verbal, but has been non verbal during exam.   HENT: feeding tube in place via nostril, nasal cannula oxygen  CV: LVAD humming.   Abdomen: Soft, Non-distended, Non-tender  Incisions: sternotomy wound c/d/i s/p closure, CT site with dressing WDI.   Extremities: warm and well perfused  Lines: left dialysis access catheter in place with HD running no bleeding or erythema at insertion site    4. INVESTIGATIONS:   Arterial Blood Gases   No lab results found in last  7 days.  Complete Blood Count   Recent Labs   Lab 09/11/20  0336 09/10/20  2042 09/10/20  1624 09/10/20  1027 09/09/20  0342 09/08/20  0339 09/07/20  0407   WBC  --   --   --  11.7* 11.9* 9.3 9.1   HGB 8.0* 7.9*  7.9* 7.9* 5.7* 7.4* 8.0* 8.7*   PLT  --   --   --  194 183 170 166     Basic Metabolic Panel  Recent Labs   Lab 09/11/20  0336 09/10/20  1027 09/09/20  0342 09/08/20  1543    142 142 140   POTASSIUM 4.6 4.4 4.5 4.8   CHLORIDE 109 111* 110* 110*   CO2 23 24 25 24   BUN 81* 74* 37* 35*   CR 1.61* 1.40* 0.79 0.88   * 105* 106* 114*     Liver Function Tests  Recent Labs   Lab 09/11/20  0336 09/10/20  0403 09/09/20  0342 09/08/20  0339 09/07/20  0407 09/06/20  0428   AST  --   --  40 36 41 39   ALT  --   --  28 27 28 28   ALKPHOS  --   --  270* 232* 222* 213*   BILITOTAL  --   --  0.8 0.7 0.8 0.9   ALBUMIN  --   --  1.3* 1.3* 1.4* 1.5*   INR 1.99* 2.44* 1.69* 1.36* 1.25* 1.18*     Pancreatic Enzymes  No lab results found in last 7 days.  Coagulation Profile  Recent Labs   Lab 09/11/20  0336 09/10/20  0403 09/09/20  0342 09/08/20  0339   INR 1.99* 2.44* 1.69* 1.36*         5. RADIOLOGY:   CXR 9/11/2020: Findings:   Single portable view of the chest. LVAD in place. Implantable cardiac  device with associated leads in place. Right-sided chest tube in  similar position. Right upper extremity PICC tip in the axilla,  stable. Feeding tube extends into the upper abdomen, tip not included  in field of view. Small bilateral pleural effusions. Loculated right  pleural effusion/hematoma, similar. Slightly decreased small bilateral  apical pneumothoraces. Bilateral atelectasis. No new focal airspace  opacities.                                                                 Impression: Slightly decreased small bilateral hydropneumothoraces  =========================================

## 2020-09-11 NOTE — PLAN OF CARE
"Major Shift Events:  Patients  request family care conference to discuss possible home hospice option.  Pt  very tearful at bedside saying \"she wouldn't want any of this\".  Pt daughter is give special permission to come in for care conference.  Teams gather and discuss care.  Pt daughter and siblings decide to continue with full care and full code at this time.  No changes in pt neurologically.  Pt continues to have maroon stools.  Pt scheduled for hemodialysis tomorrow 9/12 and an EGD to assess for bleeding.    Plan: Continue with plan of care.  Pt to have tube feeds turned off at midnight tonight for EGD tomorrow.    For vital signs and complete assessments, please see documentation flowsheets.     "

## 2020-09-11 NOTE — PROGRESS NOTES
Physician Attestation   I, Teo Morin, saw and evaluated Marquise Jj as part of a shared visit.  I have reviewed and discussed with the advanced practice provider, Deandre PEDRO, their history, physical and plan.    I personally reviewed the vital signs, medications, labs and imaging.    My key history or physical exam findings: Patient with worsening anemia yesterday, did not tolerate iHD. Melena noted, potential EGD today. On exam, afebrile. Breathing comf on room air. VAD in place. + peripheral edema.     Key management decisions made by me: Plan to re-evaluate later in the day for RRT. She did not tolerate iHD well yesterday, but she was also acutely anemic at the time. In the setting of melena, anemia, concern for ischemic gut injury with hypotension associated with RRT.  did want to re discuss her overall goals of care, clinical trend. We spoke directly with primary ICU team regarding his concerns.     Teo Morin  Date of Service (when I saw the patient): 09/11/20

## 2020-09-11 NOTE — PLAN OF CARE
Major Shift Events:  Patient hemodynamically stable overnight. Continues to have maroon liquid stools. Hgb stable overnight.    Plan: Re-evaluate dialysis today, possibly transfer.    For vital signs and complete assessments, please see documentation flowsheets.

## 2020-09-12 NOTE — PROGRESS NOTES
CVICU PROGRESS NOTE  September 12, 2020          CO-MORBIDITIES:   Cardiogenic shock (H)  (primary encounter diagnosis)  LV (left ventricular) mural thrombus  Heart failure (H)  Status post cardiac surgery    ASSESSMENT: Marquise Jj is a 75 year-old female with PMH notable for coronary artery disease s/p CABG (4/20), ischemic cardiomyopathy, severe MR/TR and known mural thrombus who is admitted to the CV ICU s/p redo sternotomy, LVAD (heartmate III), and TV repair on 8/19/20 with Dr. Farley. Chest was left open and packed. S/p chest closure and washout 08/21/20. Extubated 9/1/20. Found to have candidemia (+ culture x2) and methicillin resistant staph epidermidis on blood culture, ID following.     Patient's  expressed wanting to have a care conference today to discuss moving to Hospice Care as previously discussed earlier this week.     PLAN SUMMARY:   - Care conference completed, no change in goals of care currently   - Planned HD for with lower flows to better tolerate  - EGD today - negative for bleed      PLAN:    Neuro/ pain/ sedation:  #Acute post-operative pain   #Likely anoxic brain injury; possible stroke  #Epilepsia Partialis Continua; resolved  - CTH head 8/27 Scattered areas of hypodensity with loss of gray-white matter differentiation in the left frontal lobe. These areas were not present on the CT dated 8/10/2020 and difficult compared to other prior comparison CTs due to extensive hardware artifact. These are suspicious for involving embolic infarcts.   - Monitor neurological status. Neurology consulted 9/1, based on age, critical illness and embolic infarcts do not anticipate full neurological recovery.    - Keppra, vimpat to continue per NeuroCrit  - Tylenol PRN  - Family requesting a CC to discuss hospice care, palliative involved. CC this afternoon.      Pulmonary:   #Acute hypoxic respiratory failure, improved  #R pleural effusion   #R Hemopneumothorax  - Monitor CXR, O2  saturation  - R pigtail placed 9/2, removed 9/9   - Right pleural (basilar) tube in place with 360 ml Serosang fluid. No air leak.   - Daily CXR while tubes in place       Cardiovascular:    #Acute on chronic decompensated heart failure with reduced ejection fraction: NYHA IV / ACC D  #Cardiogenic shock, possible vasoplegia   #Ischemic cardiomyopathy  #Coronary artery disease s/p CABG 4/20 in [place], s/p PCI to RCA 7/20  #Mural thrombus  #Severe MR/TR  #S/p LVAD - Cardiology following     MAP > 60, CVP 8-12    Off pressors    Warfarin, goal INR 1.8-2.2     Aspirin, rosuvastatin    Co-managing with Cards-2    Amiodarone 200 mg daily      GI/Nutrition:   # UGI bleed, resolved    - Appreciate GI consult, having maroon stools, was tentatively planning EGD today, which is on hold pending CC   - Continue tube feeds at goal  - Pantoprazole BID   - Bowel regimen on hold with ongoing stooling       Renal/Fluid Balance/ Electrolytes:   #Oliguria in the setting of likely ATN  - Will monitor intake and output.  - ICU electrolyte replacement protocol  - Nephrology consulted; CRRT stopped 9/9.   - Started intermittent HD and was scheduled for today, but  requested it not be done and to pursue Hospice care.       Endocrine:    #Glycemic control  #Hx hypothyroid   - ISS, medium intensity, has not required any insulin in several days    - Synthroid daily        ID/ Antibiotics:  # Febrile, concern for post operative fevers, resolved  # Candidemia   # Methicillin resistant staph epidermidis  - Completed perioperative antibiotic regimen: vancomycin, levofloxacin, fluconazole, Zosyn   - ID Consult (fungemia), appreciate recs: will need a long course of antifungal therapy and probable life-long therapy  - Vanc (8/30-9/2, 9/5-9/11 ), Zosyn (8/30-9/2). Restarted Vancomycin for + Blood culture MRSA  - Micafungin for yeast in blood for 6 weeks at least IV, maybe life long as per ID.   - Daily blood cultures NGTD since 9/3, as per ID.   Will discontinue with negative cultures since 9/4.    - No evidence of ophthalmologic yeast involvement      Heme:     #Acute perioperative blood loss anemia  #Thrombocytopenia   - Hgb goal > 8  - Hgb 8.0, received 2 units RBC's 9/10  - Coumadin with goal INR 1.8-2.2, INR 1.99 today    - No heparin bridge       Prophylaxis:    - SCD  - Coumadin  - PPI  - Bowel regimen      MSK:    - PT and OT following      Lines/ tubes/ drains:  - LIJ CRRT line (9/1)   - Midline (9/3)   - PIV  - Chest tubes R pleural x1   - Rectal pouch (9/6), removed 9/10    - NJT (9/2)        Disposition:  - CV ICU     Patient seen, findings and plan discussed with CVTS Fellow and CVICU staff Dr. Herrera   -----------------------------------    ====================================    SUBJECTIVE:   Patient continues to be none verbal, has not followed commands. More sedated following a EGD     OBJECTIVE:   1. VITAL SIGNS:   Temp:  [98.1  F (36.7  C)-99  F (37.2  C)] 98.1  F (36.7  C)  Pulse:  [] 87  Resp:  [12-37] 21  BP: ()/(63-96) 136/92  SpO2:  [92 %-100 %] 100 %  Resp: 21      2. INTAKE/ OUTPUT:   I/O last 3 completed shifts:  In: 956.73 [I.V.:361.73; NG/GT:210]  Out: 860 [Urine:300; Chest Tube:560]    3. PHYSICAL EXAMINATION:   General: NAD, appears comfortable sitting in bed, minor sedation.  Neuro: Good eye contact. Reported to be inntermittently verbal, but has been non verbal during exam.   HENT: feeding tube in place via nostril, nasal cannula oxygen  CV: LVAD humming.   Abdomen: Soft, Non-distended, Non-tender  Incisions: sternotomy wound c/d/i s/p closure, CT site with dressing WDI.   Extremities: warm and well perfused  Lines: left dialysis access catheter in place with HD running no bleeding or erythema at insertion site    4. INVESTIGATIONS:   Arterial Blood Gases   No lab results found in last 7 days.  Complete Blood Count   Recent Labs   Lab 09/12/20  0401 09/11/20  0336 09/10/20  2042 09/10/20  1624 09/10/20  1027  09/09/20 0342 09/08/20  0339   WBC 13.4*  --   --   --  11.7* 11.9* 9.3   HGB 8.1* 8.0* 7.9*  7.9* 7.9* 5.7* 7.4* 8.0*     --   --   --  194 183 170     Basic Metabolic Panel  Recent Labs   Lab 09/12/20  0401 09/11/20  0336 09/10/20  1027 09/09/20  0342    140 142 142   POTASSIUM 4.8 4.6 4.4 4.5   CHLORIDE 110* 109 111* 110*   CO2 19* 23 24 25   * 81* 74* 37*   CR 2.00* 1.61* 1.40* 0.79   GLC 90 114* 105* 106*     Liver Function Tests  Recent Labs   Lab 09/12/20 0401 09/11/20  0336 09/10/20  0403 09/09/20  0342 09/08/20  0339 09/07/20 0407 09/06/20  0428   AST  --   --   --  40 36 41 39   ALT  --   --   --  28 27 28 28   ALKPHOS  --   --   --  270* 232* 222* 213*   BILITOTAL  --   --   --  0.8 0.7 0.8 0.9   ALBUMIN  --   --   --  1.3* 1.3* 1.4* 1.5*   INR 1.57* 1.99* 2.44* 1.69* 1.36* 1.25* 1.18*     Pancreatic Enzymes  No lab results found in last 7 days.  Coagulation Profile  Recent Labs   Lab 09/12/20  0401 09/11/20  0336 09/10/20  0403 09/09/20  0342   INR 1.57* 1.99* 2.44* 1.69*   PTT 36  --   --   --          5. RADIOLOGY:   CXR 9/11/2020: Findings:   Single portable view of the chest. LVAD in place. Implantable cardiac  device with associated leads in place. Right-sided chest tube in  similar position. Right upper extremity PICC tip in the axilla,  stable. Feeding tube extends into the upper abdomen, tip not included  in field of view. Small bilateral pleural effusions. Loculated right  pleural effusion/hematoma, similar. Slightly decreased small bilateral  apical pneumothoraces. Bilateral atelectasis. No new focal airspace  opacities.                                                                 Impression: Slightly decreased small bilateral hydropneumothoraces  =========================================

## 2020-09-12 NOTE — PROGRESS NOTES
Nephrology Dialysis Note    This patient was seen and examined while on dialysis. Laboratory results and nurses' notes were reviewed. Care Conf notes reviewed. Hgb stable, plan for EGD this AM. 300cc UOP noted, but clearance remains poor.    Will utilize low flows and plan for 2 L of UF. Otherwise, no changes to management of anemia, BMD, acidosis, or electrolytes.     Diagnosis - LORI-D    SANDRA Morin MD  [id number]

## 2020-09-12 NOTE — PROGRESS NOTES
CVTS PROGRESS NOTE  September 12, 2020          CO-MORBIDITIES:   Cardiogenic shock (H)  (primary encounter diagnosis)  LV (left ventricular) mural thrombus  Heart failure (H)  Status post cardiac surgery    ASSESSMENT: Marquise Jj is a 75 year-old female with PMH notable for coronary artery disease s/p CABG (4/20), ischemic cardiomyopathy, severe MR/TR and known mural thrombus who is admitted to the CV ICU s/p redo sternotomy, LVAD (heartmate III), and TV repair on 8/19/20 with Dr. Farley. Chest was left open and packed. S/p chest closure and washout 08/21/20. Extubated 9/1/20. Found to have candidemia (+ culture x2) and methicillin resistant staph epidermidis on blood culture, ID following.     Patient's  expressed wanting to have a care conference today to discuss moving to Hospice Care as previously discussed earlier this week.     PLAN SUMMARY:   - Care conference completed, no change in goals of care currently   - Planned HD for with lower flows to better tolerate  - EGD today - negative for bleed      PLAN:    Neuro/ pain/ sedation:  #Acute post-operative pain   #Likely anoxic brain injury; possible stroke  #Epilepsia Partialis Continua; resolved  - CTH head 8/27 Scattered areas of hypodensity with loss of gray-white matter differentiation in the left frontal lobe. These areas were not present on the CT dated 8/10/2020 and difficult compared to other prior comparison CTs due to extensive hardware artifact. These are suspicious for involving embolic infarcts.   - Monitor neurological status. Neurology consulted 9/1, based on age, critical illness and embolic infarcts do not anticipate full neurological recovery.    - Keppra, vimpat to continue per NeuroCrit  - Tylenol PRN  - Family requesting a CC to discuss hospice care, palliative involved. CC this afternoon.      Pulmonary:   #Acute hypoxic respiratory failure, improved  #R pleural effusion   #R Hemopneumothorax  - Monitor CXR, O2  saturation  - R pigtail placed 9/2, removed 9/9   - Right pleural (basilar) tube in place with 360 ml Serosang fluid. No air leak.   - Daily CXR while tubes in place       Cardiovascular:    #Acute on chronic decompensated heart failure with reduced ejection fraction: NYHA IV / ACC D  #Cardiogenic shock, possible vasoplegia   #Ischemic cardiomyopathy  #Coronary artery disease s/p CABG 4/20 in [place], s/p PCI to RCA 7/20  #Mural thrombus  #Severe MR/TR  #S/p LVAD - Cardiology following     MAP > 60, CVP 8-12    Off pressors    Warfarin, goal INR 1.8-2.2     Aspirin, rosuvastatin    Co-managing with Cards-2    Amiodarone 200 mg daily      GI/Nutrition:   # UGI bleed, resolved    - Appreciate GI consult, having maroon stools, was tentatively planning EGD today, which is on hold pending CC   - Continue tube feeds at goal  - Pantoprazole BID   - Bowel regimen on hold with ongoing stooling       Renal/Fluid Balance/ Electrolytes:   #Oliguria in the setting of likely ATN  - Will monitor intake and output.  - ICU electrolyte replacement protocol  - Nephrology consulted; CRRT stopped 9/9.   - Started intermittent HD and was scheduled for today, but  requested it not be done and to pursue Hospice care.       Endocrine:    #Glycemic control  #Hx hypothyroid   - ISS, medium intensity, has not required any insulin in several days    - Synthroid daily        ID/ Antibiotics:  # Febrile, concern for post operative fevers, resolved  # Candidemia   # Methicillin resistant staph epidermidis  - Completed perioperative antibiotic regimen: vancomycin, levofloxacin, fluconazole, Zosyn   - ID Consult (fungemia), appreciate recs: will need a long course of antifungal therapy and probable life-long therapy  - Vanc (8/30-9/2, 9/5-9/11 ), Zosyn (8/30-9/2). Restarted Vancomycin for + Blood culture MRSA  - Micafungin for yeast in blood for 6 weeks at least IV, maybe life long as per ID.   - Daily blood cultures NGTD since 9/3, as per ID.   Will discontinue with negative cultures since 9/4.    - No evidence of ophthalmologic yeast involvement      Heme:     #Acute perioperative blood loss anemia  #Thrombocytopenia   - Hgb goal > 8  - Hgb 8.0, received 2 units RBC's 9/10  - Coumadin with goal INR 1.8-2.2, INR 1.99 today    - No heparin bridge       Prophylaxis:    - SCD  - Coumadin  - PPI  - Bowel regimen      MSK:    - PT and OT following      Lines/ tubes/ drains:  - LIJ CRRT line (9/1)   - Midline (9/3)   - PIV  - Chest tubes R pleural x1   - Rectal pouch (9/6), removed 9/10    - NJT (9/2)        Disposition:  - CV ICU     Patient seen, findings and plan discussed with CVTS Fellow and CVICU staff Dr. Herrera   -----------------------------------    ====================================    SUBJECTIVE:   Patient continues to be none verbal, has not followed commands. More sedated following a EGD     OBJECTIVE:   1. VITAL SIGNS:   Temp:  [98.1  F (36.7  C)-99  F (37.2  C)] 98.1  F (36.7  C)  Pulse:  [] 87  Resp:  [12-37] 21  BP: ()/(63-96) 136/92  SpO2:  [92 %-100 %] 100 %  Resp: 21      2. INTAKE/ OUTPUT:   I/O last 3 completed shifts:  In: 956.73 [I.V.:361.73; NG/GT:210]  Out: 860 [Urine:300; Chest Tube:560]    3. PHYSICAL EXAMINATION:   General: NAD, appears comfortable sitting in bed, minor sedation.  Neuro: Good eye contact. Reported to be inntermittently verbal, but has been non verbal during exam.   HENT: feeding tube in place via nostril, nasal cannula oxygen  CV: LVAD humming.   Abdomen: Soft, Non-distended, Non-tender  Incisions: sternotomy wound c/d/i s/p closure, CT site with dressing WDI.   Extremities: warm and well perfused  Lines: left dialysis access catheter in place with HD running no bleeding or erythema at insertion site    4. INVESTIGATIONS:   Arterial Blood Gases   No lab results found in last 7 days.  Complete Blood Count   Recent Labs   Lab 09/12/20  0401 09/11/20  0336 09/10/20  2042 09/10/20  1624 09/10/20  1027  09/09/20 0342 09/08/20  0339   WBC 13.4*  --   --   --  11.7* 11.9* 9.3   HGB 8.1* 8.0* 7.9*  7.9* 7.9* 5.7* 7.4* 8.0*     --   --   --  194 183 170     Basic Metabolic Panel  Recent Labs   Lab 09/12/20  0401 09/11/20  0336 09/10/20  1027 09/09/20  0342    140 142 142   POTASSIUM 4.8 4.6 4.4 4.5   CHLORIDE 110* 109 111* 110*   CO2 19* 23 24 25   * 81* 74* 37*   CR 2.00* 1.61* 1.40* 0.79   GLC 90 114* 105* 106*     Liver Function Tests  Recent Labs   Lab 09/12/20 0401 09/11/20  0336 09/10/20  0403 09/09/20  0342 09/08/20  0339 09/07/20 0407 09/06/20  0428   AST  --   --   --  40 36 41 39   ALT  --   --   --  28 27 28 28   ALKPHOS  --   --   --  270* 232* 222* 213*   BILITOTAL  --   --   --  0.8 0.7 0.8 0.9   ALBUMIN  --   --   --  1.3* 1.3* 1.4* 1.5*   INR 1.57* 1.99* 2.44* 1.69* 1.36* 1.25* 1.18*     Pancreatic Enzymes  No lab results found in last 7 days.  Coagulation Profile  Recent Labs   Lab 09/12/20  0401 09/11/20  0336 09/10/20  0403 09/09/20  0342   INR 1.57* 1.99* 2.44* 1.69*   PTT 36  --   --   --          5. RADIOLOGY:   CXR 9/11/2020: Findings:   Single portable view of the chest. LVAD in place. Implantable cardiac  device with associated leads in place. Right-sided chest tube in  similar position. Right upper extremity PICC tip in the axilla,  stable. Feeding tube extends into the upper abdomen, tip not included  in field of view. Small bilateral pleural effusions. Loculated right  pleural effusion/hematoma, similar. Slightly decreased small bilateral  apical pneumothoraces. Bilateral atelectasis. No new focal airspace  opacities.                                                                 Impression: Slightly decreased small bilateral hydropneumothoraces  =========================================

## 2020-09-12 NOTE — OR NURSING
Procedure: Upper Endoscopy  Sedation: Conscious sedation (50 mcg fentanyl, 1 mg versed)  Staff SAFIA Kline at bedside to administer sedation at beside on 4E  Reggie Mancilla, RN   Registered Nurse    Since 9/12/2020    [id number]     O2: 2 LPM NC during procedure  Tolerated: VS stable during and post procedure.  Consent obtained via telephone with daughter Norman who is POA.    Bonnie Vogel RN

## 2020-09-12 NOTE — PLAN OF CARE
Major Shift Events: Neuro no change overnight. VSS. NPO at midnight for EGD. Pure wick placed with minimal output. x1 BM.   Plan: EGD and possible HD trial.   For vital signs and complete assessments, please see documentation flowsheets.

## 2020-09-12 NOTE — PROGRESS NOTES
Bridle Placement:   Reason for bridle placement: New Feeding tube placement  Medicine delivered during procedure: lubricating jelly  Procedure: Successful  Location of top of clip on FT: @ 83 cm marker   Condition of nose/skin at time of bridle placement: Unremarkable  Face to Face time with patient: 5 minutes.

## 2020-09-12 NOTE — PROGRESS NOTES
Small Bowel Feeding Tube Placement Assessment  Reason for Feeding Tube Placement: Enteral Feeding  Cortrak Start Time: 1705  Cortrak End Time: 1730  Medicine Delivered During Procedure: None  Placement Successful: Gastric  Procedure Complications: slight bleeding of right nare. Cortrak machine malfunction  Final Placement Yung at exit of nare: 80 cm  Face to Face time with patient: 25 minutes

## 2020-09-12 NOTE — PLAN OF CARE
Pt tolerated HD well, EGD tolerated well, see notes, two large dark red BM over shift. About 150ml UO before HD. PI spikes up to 8 after HD. New Tube feed placed at end of shift for meds only D/T placement issue.

## 2020-09-12 NOTE — PROGRESS NOTES
Cardiology Progress Note    Assessment & Plan   In summary, Marquise Jj is a 75-year-old female with a past medical history notable for coronary artery disease, ischemic cardiomyopathy, and known mural thrombus who was transferred from [facility] for further evaluation/treatment of cardiogenic shock. Balloon pump placed 8/14. Had HM3 8/19. Chest closed 8/21.    Today's changes:  - no LVAD alarms  - per GI, EGD negative and may need capsule study  - tenuous course during dialysis today due to low BP  - no change in mental status    Neuro:   # Sedation --> extubated, alert  # concern for seizure activity  CTH head 8/20 no signs of ICH   CTH head 8/27 Scattered areas of hypodensity with loss of gray-white matter differentiation in the left frontal lobe. These areas were not present on the CT dated 8/10/2020 and difficult compared to other prior comparison CTs due to extensive hardware artifact. These are suspicious for involving embolic infarcts.  keppra 2 g PO bid   vimpat 200 BID    Cardio:  # Cardiogenic shock with vasoplegic component    # ICM: NYHA IV / ACC  # Severe MR/TR s/p tricuspid valve repair with Elkins MC3 Annuloplasty Ring size T30  # s/p LVAD HM3 on 8/19/2020  LAVD speed 5400, flow 3.8-4.3, pi 2.2-5, power 3.8   Presents with cardiogenic shock. On NE, cardiac index approximately 1.37 on admission. Severely elevated biventricular filling pressures. Etiology of her decompensation appears to be progression of her cardiomyopathy. Despite medical treatment, she has been hospitalized twice since returning to [place], both with low output. No viability in her LAD territory, and doubt that PCI to her circumflex would make meaningful LV recovery. Hemodynamics improved with dobutamine, did not tolerate attempt to wean inotropics. RHC removed on 8/13 after clotting off, replaced on 8/14 after patient with increased work of breathing. CI of 1.1, balloon pump placed with CI of 1.6-1.8 and  improvement in symptoms/hemodynamics. Had LVAD HM3 placed on 8/19. CVP today around 10 range. Chest closed on 8/21.   -- holding hep gtt as above  -- warfarin with INR goal 1.8-2.2      Date 8/9 8/9 8/10 8/11 08/13/20 08/15/20 08/16/20 08/17/20 8/18/20 8/19 8/20 8/21*   CVP 12 9 3 4 8 9 6 8 11 5 10 13   Mean PA  35 30 21 25 23 21 20 35/18 35/18 - 30/12 28/14   PCWP  18 14 12 x 13 14  13 - -    Oxy HGB  59 64 61 61 60 47 62 69 54 - 68 66   CI/CO 2.3 2.3 2.4/4.2 2.3 2.2 1.8 2.8/4.9 4.1 4.3/2.5 - 6/3.3 6.9/3.8   SVR 1100 1100 1200 1400 1316 1900 9703 400 8008 - 1025 715   Device     IABP 1:1 IABP 1:1 IABP 1:1 IABP 1:1 IABP 1:1 No IABP No IABP No IABP   * epi 0.1, vaso 0.5     8/22: CVP 12, PA 28/14/19, PCWP 14, CO/CI 9.0/4.8. . Epi 0.1, vaso 1.  8/23: CVP 16, PA 30/14, CI/CO 4.4/8.0, , SvO2 71%  8/24: CVP 14, PA 44/20/28, PCWP 16, CI/CO 7.3/3.9, SvO2 68% epi 0.06, vaso 2  8/25: CVP 16, PA 44/20/29, PCWP 22, CI/CO  3.6, SvO2 82%, vaso 2, epi 0.03  8/26: CVP 13, PA 40/20/26, PCWP 12, CI/CO 7/ 3.6, SvO2 74%, vaso 1  8/27: CVP 13, PA 38/18/26, PCWP 12-14, CI/CO 9.4/4.7, , PVR ~0.5, SvO2 73%, vaso 1, epi 0.05     # CAD s/p CABG 4/2020 (KURT to RCA and LIMA to LAD) and PCI to RCA 7/20  - Stop home plavix po qd (8/13) for LVAD surgery  - aspirin 81mg PO qd   - c/w home crestor    # Mural thrombus  - removed during surgery on 8/19    # A-flutter  Converted to NSR on 8/15    - Continue amiodarone 200mg po qd      Pulm:  # Acute hypoxic respiratory failure  # Hemopneumothorax  Extubated on 9/1/2020. S/p IR for chest tube of Hemopneumothorax on 9/5 with 2.4L output immediately.     Renal/Electrolytes   # Hyperkalemia/Hypokalemia   # Acute kidney injury on chronic kidney disease, stage III  Likely ATN due to hypoxic insult  - renal consult and diuretic challenge. May need dialysis  - Trend potassium and creatinine q12hrs  - Electrolyte replacement protocol  - Monitor UOP        GI:  #GIB  GI consulted and EGD  on 8/28 with protuberant vessel injected and clipped and bleeding gastric ulcer with adherent clot, injected and clipped as well  Repeat bleed on 8/29-8/30, transfused and given pRBC, underwent repeat EGD, showed hematin throughout stomache with single bleeding angioectasia vs gastric erosion (from NGT) in stomach. Likely source of bleed and thought related to current bleeding episode. Hypothermia thought to be related to bleed.    # Constipation  # Severe protein calorie malnutrition  - Nutrition through tube feeds at 30 mL/hr    - Senna-docusate bid scheduled  - Miralax bid prn constipation    ID:   # Candidemia   # Methicillin resistant staph epidermidis  Currently C. Glabrata positive on 8/30, 9/1, 9/3 cultures.   Also growing MRSE on 9/3 and S. Capitis on 9/1  Current:  - Micafungin (9/1-**)  - Vanc (9/4-**)     Prior:  - Vancomycin (8/30-9/2, 8/19-8/21)  - Zosyn (8/30-9/2, 8/19-8/24)  - Rifampin (8/19-8/21)  - Fluconazole (8/19-8/21)  - ceftriaxone (8/14-8/18)    - ID Consult: treat as real candidemia and pull lines in place at time of positive culture as able and continue Micafungin  - C. Diff negative  - No evidence of ophthalmologic yeast involvement  - Pleural fluid labs sent per ID    Hem/Onc  # Mural thrombus removed on 8/19  # Anemia, mild  D/t frequent blood draws and procedures, and two large GI bleesd  - Monitor hgb  - PPI gtt --> IV BID    Endo  # Hypothyroidism   - Continue PTA levothyroxine     Nutrition: TFs  DVT Prophylaxis: mechanical   Code Status: Full Code    Waylon Garcia MD, Msc  Cardiovascular Disease Fellow  [facility]          Interval History     Received 3 unit(s) pRBC for hypotension and drop in H/H yesterday (later improved post transfusion). Evaluated by GI, plan for EGD today. Patient without complaints today (remains minimally interactive and mostly non-vocal with intermittent moansl).       Physical Exam   Temp: 98.1  F (36.7  C) Temp src: Axillary  BP: (!) 136/92 Pulse: 87   Resp: 21 SpO2: 100 % O2 Device: None (Room air) Oxygen Delivery: 2 LPM  Vitals:    09/09/20 0000 09/10/20 0400 09/12/20 1140   Weight: 71.4 kg (157 lb 6.5 oz) 71.2 kg (156 lb 15.5 oz) 70.5 kg (155 lb 6.8 oz)     Vital Signs with Ranges  Temp:  [98.1  F (36.7  C)-99  F (37.2  C)] 98.1  F (36.7  C)  Pulse:  [] 87  Resp:  [12-37] 21  BP: ()/(63-96) 136/92  SpO2:  [92 %-100 %] 100 %  I/O last 3 completed shifts:  In: 956.73 [I.V.:361.73; NG/GT:210]  Out: 860 [Urine:300; Chest Tube:560]    RETIRE: Heart Rate: 71, Blood pressure (!) 136/92, pulse 87, temperature 98.1  F (36.7  C), temperature source Axillary, resp. rate 21, weight 70.5 kg (155 lb 6.8 oz), SpO2 100 %.  155 lbs 6.79 oz     GEN: alert, non to minimally verbal  CV: RRR, Hum of HM3   LUNGS: Clear to auscultation bilaterally  ABD: Active bowel sounds, soft, no abdominal tenderness.   EXT: Trace LE edema   NEURO: altert, tracks people/objects across room; responds to her name, husbands name, daughter's name. moves b/l LE and LUE to command    Medications     dextrose Stopped (09/02/20 1957)     BETA BLOCKER NOT PRESCRIBED       vasopressin 1 Units/hr (09/12/20 1600)       amiodarone  200 mg Oral or Feeding Tube Daily     artificial tears   Both Eyes 5x Daily     aspirin  81 mg Oral or Feeding Tube Daily     B and C vitamin Complex with folic acid  5 mL Oral Daily     bimatoprost  1 drop Both Eyes At Bedtime     busPIRone  10 mg Oral or Feeding Tube TID     fentaNYL  25-50 mcg Intravenous Once within 24 hrs     fiber modular (NUTRISOURCE FIBER)  1 packet Per Feeding Tube TID     heparin lock flush  5-15 mL Intracatheter Q24H     lacosamide  200 mg Oral or Feeding Tube BID     latanoprost  1 drop Both Eyes Daily     levETIRAcetam  2,000 mg Oral or Feeding Tube BID     levothyroxine  62.5 mcg Oral or Feeding Tube QAM AC     lidocaine  5 mL Topical Once     micafungin  150 mg Intravenous Q24H     midazolam  0.5-1 mg Intravenous  Once within 24 hrs     pantoprazole (PROTONIX) IV  40 mg Intravenous BID     rosuvastatin  20 mg Oral or Feeding Tube Daily     sodium chloride (PF)  10 mL Intracatheter Q8H     sodium chloride (PF)  3 mL Intracatheter Q8H       Data        Intake/Output Summary (Last 24 hours) at 9/10/2020 1428  Last data filed at 9/10/2020 1300  Gross per 24 hour   Intake 3818 ml   Output 1680 ml   Net 2138 ml             Recent Labs   Lab 09/12/20  0401 09/11/20  0336 09/10/20  2042  09/10/20  1027 09/10/20  0403 09/09/20  0342  09/08/20  0339   WBC 13.4*  --   --   --  11.7*  --  11.9*  --  9.3   HGB 8.1* 8.0* 7.9*  7.9*   < > 5.7*  --  7.4*  --  8.0*   MCV 93  --   --   --  97  --  95  --  94     --   --   --  194  --  183  --  170   INR 1.57* 1.99*  --   --   --  2.44* 1.69*  --  1.36*    140  --   --  142  --  142   < > 141   POTASSIUM 4.8 4.6  --   --  4.4  --  4.5   < > 4.4   CHLORIDE 110* 109  --   --  111*  --  110*   < > 111*   CO2 19* 23  --   --  24  --  25   < > 26   * 81*  --   --  74*  --  37*   < > 35*   CR 2.00* 1.61*  --   --  1.40*  --  0.79   < > 0.85   ANIONGAP 12 8  --   --  8  --  6   < > 4   FERNANDO 8.5 8.2*  --   --  7.4*  --  7.8*   < > 7.9*   GLC 90 114*  --   --  105*  --  106*   < > 105*   ALBUMIN  --   --   --   --   --   --  1.3*  --  1.3*   PROTTOTAL  --   --   --   --   --   --  4.8*  --  4.4*   BILITOTAL  --   --   --   --   --   --  0.8  --  0.7   ALKPHOS  --   --   --   --   --   --  270*  --  232*   ALT  --   --   --   --   --   --  28  --  27   AST  --   --   --   --   --   --  40  --  36    < > = values in this interval not displayed.       No results found for this or any previous visit (from the past 24 hour(s)).

## 2020-09-12 NOTE — PROGRESS NOTES
HEMODIALYSIS TREATMENT NOTE    Date: 9/12/2020  Time: 12:05 PM    Data:  Pre Wt: 70.5   Desired Wt: 68.5 kg   Post Wt:  69.2 KG  Weight change: 1.3  Ultrafiltration - Post Run Net Total Removed (mL): 1384 mL  Vascular Access Status: CVC  patent  Dialyzer Rinse: Streaked, streaked medium   Total Blood Volume Processed: 31.4  Total Dialysis (Treatment) Time: 3 hrs   Dialysate Bath: K 2, Ca 3  Heparin: None    Lab:   No    Interventions:  Fluid goal titrated to support bp, frequent titrations help to reach final fluid amount pulled.  Pressors on board to support BP  Lines reversed pt had TPA dwell. Lines still sluggish to aspirate especially the red lumen. Both lumens flush well.    Assessment:  First attempt at starting therapy delayed due to BP below parameters. Pressors started and approximately 30 mins later stabilized enough to allow for dialysis. Pt able to make eye contact but otherwise does not follow commands or speak. Pt has wet sounding cough. Fluid goal titrated to support BP within parameters. Renal team aware of tenuous nature of dialysis.      Plan:    Will continue to monitor and follow POC per renal team.

## 2020-09-13 NOTE — PROGRESS NOTES
CVICU PROGRESS NOTE  September 13, 2020          CO-MORBIDITIES:   Cardiogenic shock (H)  (primary encounter diagnosis)  LV (left ventricular) mural thrombus  Heart failure (H)  Status post cardiac surgery    ASSESSMENT: Marquise Jj is a 75 year-old female with PMH notable for coronary artery disease s/p CABG (4/20), ischemic cardiomyopathy, severe MR/TR and known mural thrombus who is admitted to the CV ICU s/p redo sternotomy, LVAD (heartmate III), and TV repair on 8/19/20 with Dr. Farley. Chest was left open and packed. S/p chest closure and washout 08/21/20. Extubated 9/1/20. Found to have candidemia (+ culture x2) and methicillin resistant staph epidermidis on blood culture, ID following.     Patient's  expressed wanting to have a care conference today to discuss [facility] as previously discussed earlier this week.     PLAN SUMMARY:   Transfuse 1 U PRBC   HD today       PLAN:    Neuro/ pain/ sedation:  #Acute post-operative pain   #Likely anoxic brain injury; possible stroke  #Epilepsia Partialis Continua; resolved  - CTH head 8/27 Scattered areas of hypodensity with loss of gray-white matter differentiation in the left frontal lobe. These areas were not present on the CT dated 8/10/2020 and difficult compared to other prior comparison CTs due to extensive hardware artifact. These are suspicious for involving embolic infarcts.   - Monitor neurological status. Neurology consulted 9/1, based on age, critical illness and embolic infarcts do not anticipate full neurological recovery.    - Keppra, vimpat to continue per NeuroCrit  - Tylenol PRN     Pulmonary:   #Acute hypoxic respiratory failure, improved  #R pleural effusion   #R Hemopneumothorax  - Monitor CXR, O2 saturation  - Right pleural (basilar) tube in place with 360 ml Serosang fluid. No air leak.   - Daily CXR while tubes in place       Cardiovascular:    #Acute on chronic decompensated heart failure with reduced ejection  fraction: NYHA IV / ACC D  #Cardiogenic shock, possible vasoplegia   #Ischemic cardiomyopathy  #Coronary artery disease s/p CABG 4/20 in [place], s/p PCI to RCA 7/20  #Mural thrombus  #Severe MR/TR  #S/p LVAD - Cardiology following     MAP > 60, CVP 8-12    Continue to hold coumadin     Aspirin, rosuvastatin    Co-managing with Cards-2    Amiodarone 200 mg daily      GI/Nutrition:   # UGI bleed, resolved    - EGD 9/12/20 negative (may need capsule study)   - Continue tube feeds at goal  - Pantoprazole BID   - Bowel regimen on hold with ongoing stooling       Renal/Fluid Balance/ Electrolytes:   #Oliguria in the setting of likely ATN  - Will monitor intake and output.  - ICU electrolyte replacement protocol  - Nephrology consulted; CRRT stopped 9/9.   - Started intermittent HD, last 9/12/20      Endocrine:    #Glycemic control  #Hx hypothyroid   - ISS, medium intensity, has not required any insulin in several days    - Synthroid daily        ID/ Antibiotics:  # Febrile, concern for post operative fevers, resolved  # Candidemia   # Methicillin resistant staph epidermidis  - Completed perioperative antibiotic regimen: vancomycin, levofloxacin, fluconazole, Zosyn   - ID Consult (fungemia), appreciate recs: will need a long course of antifungal therapy and probable life-long therapy  - Vanc (8/30-9/2, 9/5-9/11 ), Zosyn (8/30-9/2).     - Micafungin (9/5- ) for yeast in blood for 6 weeks at least IV, maybe life long as per ID.   - No evidence of ophthalmologic yeast involvement      Heme:     #Acute perioperative blood loss anemia  #Thrombocytopenia   - Hgb goal > 8  - Hold coumadin   - No heparin bridge       Prophylaxis:    - SCD  - Coumadin - held   - PPI  - Bowel regimen      MSK:    - PT and OT following      Lines/ tubes/ drains:  - LIJ CRRT line (9/1)   - Midline (9/3)   - PIV  - Chest tubes R pleural x1   - Rectal pouch (9/6), removed 9/10    - NJT (9/2)        Disposition:  - CV ICU     Patient seen, findings and  plan discussed with CVTS Fellow and CVICU staff Dr. Herrera   -----------------------------------    Angela Sales   Surgery Resident   [id number]    ====================================    SUBJECTIVE:   Sitting up in chair. No acute events overnight. Transfusing one unit PRBC today.     OBJECTIVE:   1. VITAL SIGNS:   Temp:  [97.7  F (36.5  C)-98.6  F (37  C)] 97.7  F (36.5  C)  Pulse:  [75-92] 83  Resp:  [12-37] 14  BP: ()/() 99/79  SpO2:  [92 %-100 %] 95 %  Resp: 14      2. INTAKE/ OUTPUT:   I/O last 3 completed shifts:  In: 609.73 [I.V.:459.73; NG/GT:150]  Out: 460 [Urine:150; Chest Tube:310]    3. PHYSICAL EXAMINATION:   General: NAD, appears comfortable sitting in bed, minor sedation.  Neuro: Moving all 4 extremities   HENT: feeding tube in place via nostril, nasal cannula oxygen  CV: LVAD humming.   Abdomen: Soft, Non-distended, Non-tender  Incisions: sternotomy wound c/d/i s/p closure, CT site with dressing WDI.   Extremities: warm and well perfused  Lines: left dialysis access catheter in place with HD running no bleeding or erythema at insertion site    4. INVESTIGATIONS:   Arterial Blood Gases   No lab results found in last 7 days.  Complete Blood Count   Recent Labs   Lab 09/12/20  0401 09/11/20  0336 09/10/20  2042 09/10/20  1624 09/10/20  1027 09/09/20  0342 09/08/20  0339   WBC 13.4*  --   --   --  11.7* 11.9* 9.3   HGB 8.1* 8.0* 7.9*  7.9* 7.9* 5.7* 7.4* 8.0*     --   --   --  194 183 170     Basic Metabolic Panel  Recent Labs   Lab 09/12/20  0401 09/11/20  0336 09/10/20  1027 09/09/20  0342    140 142 142   POTASSIUM 4.8 4.6 4.4 4.5   CHLORIDE 110* 109 111* 110*   CO2 19* 23 24 25   * 81* 74* 37*   CR 2.00* 1.61* 1.40* 0.79   GLC 90 114* 105* 106*     Liver Function Tests  Recent Labs   Lab 09/13/20  0349 09/12/20  0401 09/11/20  0336 09/10/20  0403 09/09/20  0342 09/08/20  0339 09/07/20  0407   AST  --   --   --   --  40 36 41   ALT  --   --   --   --  28 27 28   ALKPHOS  --    --   --   --  270* 232* 222*   BILITOTAL  --   --   --   --  0.8 0.7 0.8   ALBUMIN  --   --   --   --  1.3* 1.3* 1.4*   INR 1.75* 1.57* 1.99* 2.44* 1.69* 1.36* 1.25*     Pancreatic Enzymes  No lab results found in last 7 days.  Coagulation Profile  Recent Labs   Lab 09/13/20  0349 09/12/20  0401 09/11/20  0336 09/10/20  0403   INR 1.75* 1.57* 1.99* 2.44*   PTT  --  36  --   --          5. RADIOLOGY:   Radiology and imaging reviewed

## 2020-09-13 NOTE — PROGRESS NOTES
Nephrology Dialysis Note    This patient was seen and examined while on dialysis. Laboratory results and nurses' notes were reviewed.    Low flow HD run today to see if hemodynamics tolerate better, UF 500cc as BP allows. Will eval for HD again tmrw, anticipate next HD run will be with normal BFR/DFR. Otherwise, no changes to management of anemia, BMD, acidosis, or electrolytes.     Diagnosis - OLRI-D    P MD Patience  [phone]

## 2020-09-13 NOTE — PROGRESS NOTES
HEMODIALYSIS TREATMENT NOTE    Date: 9/13/2020  Time: 4:18 PM    Data:  Pre Wt: 69.5 kg (153 lb 3.5 oz)   Desired Wt: 69 kg   Post Wt: 69.4 kg (153 lb)  Weight change: 0.1 kg  Ultrafiltration - Post Run Net Total Removed (mL): 100 mL  Vascular Access Status: patent  Dialyzer Rinse: Streaked, Moderate  Total Blood Volume Processed: 26.2 L Liters  Total Dialysis (Treatment) Time: 2.5 Hours    Lab:        Interventions:  2.5 hours of HD with no real fluid removed. BP too low and PI dropped to 1.6 when fluid removal attempted. 200 BFR as art pressure too high at 250 BFR    Assessment:  LORI patient in ICU getting second run of HD initiation     Plan:    Per renal

## 2020-09-13 NOTE — PROGRESS NOTES
CVTS PROGRESS NOTE  September 13, 2020          CO-MORBIDITIES:   Cardiogenic shock (H)  (primary encounter diagnosis)  LV (left ventricular) mural thrombus  Heart failure (H)  Status post cardiac surgery    ASSESSMENT: Marquise Jj is a 75 year-old female with PMH notable for coronary artery disease s/p CABG (4/20), ischemic cardiomyopathy, severe MR/TR and known mural thrombus who is admitted to the [facility] ICU s/p redo sternotomy, LVAD (heartmate III), and TV repair on 8/19/20 with Dr. Farley. Chest was left open and packed. S/p chest closure and washout 08/21/20. Extubated 9/1/20. Found to have candidemia (+ culture x2) and methicillin resistant staph epidermidis on blood culture, ID following.     Patient's  expressed wanting to have a care conference today to discuss [facility] to [facility] as previously discussed earlier this week.     PLAN SUMMARY:   Transfuse 1 U PRBC   HD today       PLAN:    Neuro/ pain/ sedation:  #Acute post-operative pain   #Likely anoxic brain injury; possible stroke  #Epilepsia Partialis Continua; resolved  - CTH head 8/27 Scattered areas of hypodensity with loss of gray-white matter differentiation in the left frontal lobe. These areas were not present on the CT dated 8/10/2020 and difficult compared to other prior comparison CTs due to extensive hardware artifact. These are suspicious for involving embolic infarcts.   - Monitor neurological status. Neurology consulted 9/1, based on age, critical illness and embolic infarcts do not anticipate full neurological recovery.    - Keppra, vimpat to continue per NeuroCrit  - Tylenol PRN     Pulmonary:   #Acute hypoxic respiratory failure, improved  #R pleural effusion   #R Hemopneumothorax  - Monitor CXR, O2 saturation  - Right pleural (basilar) tube in place with 360 ml Serosang fluid. No air leak.   - Daily CXR while tubes in place       Cardiovascular:    #Acute on chronic decompensated heart failure with reduced ejection  fraction: NYHA IV / ACC D  #Cardiogenic shock, possible vasoplegia   #Ischemic cardiomyopathy  #Coronary artery disease s/p CABG 4/20 in [place], s/p PCI to RCA 7/20  #Mural thrombus  #Severe MR/TR  #S/p LVAD - Cardiology following     MAP > 60, CVP 8-12    Continue to hold coumadin     Aspirin, rosuvastatin    Co-managing with Cards-2    Amiodarone 200 mg daily      GI/Nutrition:   # UGI bleed, resolved    - EGD 9/12/20 negative (may need capsule study)   - Continue tube feeds at goal  - Pantoprazole BID   - Bowel regimen on hold with ongoing stooling       Renal/Fluid Balance/ Electrolytes:   #Oliguria in the setting of likely ATN  - Will monitor intake and output.  - ICU electrolyte replacement protocol  - Nephrology consulted; CRRT stopped 9/9.   - Started intermittent HD, last 9/12/20      Endocrine:    #Glycemic control  #Hx hypothyroid   - ISS, medium intensity, has not required any insulin in several days    - Synthroid daily        ID/ Antibiotics:  # Febrile, concern for post operative fevers, resolved  # Candidemia   # Methicillin resistant staph epidermidis  - Completed perioperative antibiotic regimen: vancomycin, levofloxacin, fluconazole, Zosyn   - ID Consult (fungemia), appreciate recs: will need a long course of antifungal therapy and probable life-long therapy  - Vanc (8/30-9/2, 9/5-9/11 ), Zosyn (8/30-9/2).     - Micafungin (9/5- ) for yeast in blood for 6 weeks at least IV, maybe life long as per ID.   - No evidence of ophthalmologic yeast involvement      Heme:     #Acute perioperative blood loss anemia  #Thrombocytopenia   - Hgb goal > 8  - Hold coumadin   - No heparin bridge       Prophylaxis:    - SCD  - Coumadin - held   - PPI  - Bowel regimen      MSK:    - PT and OT following      Lines/ tubes/ drains:  - LIJ CRRT line (9/1)   - Midline (9/3)   - PIV  - Chest tubes R pleural x1   - Rectal pouch (9/6), removed 9/10    - NJT (9/2)        Disposition:  - [facility] ICU     Patient seen, findings and  plan discussed with CVTS Fellow and CVICU staff Dr. Herrera   -----------------------------------    Angela Sales   Surgery Resident   [id number]    ====================================    SUBJECTIVE:   Sitting up in chair. No acute events overnight. Transfusing one unit PRBC today.     OBJECTIVE:   1. VITAL SIGNS:   Temp:  [97.7  F (36.5  C)-98.6  F (37  C)] 98.1  F (36.7  C)  Pulse:  [72-92] 85  Resp:  [11-37] 17  BP: ()/() 98/82  SpO2:  [71 %-100 %] 97 %  Resp: 17      2. INTAKE/ OUTPUT:   I/O last 3 completed shifts:  In: 609.73 [I.V.:459.73; NG/GT:150]  Out: 460 [Urine:150; Chest Tube:310]    3. PHYSICAL EXAMINATION:   General: NAD, appears comfortable sitting in bed, minor sedation.  Neuro: Moving all 4 extremities   HENT: feeding tube in place via nostril, nasal cannula oxygen  CV: LVAD humming.   Abdomen: Soft, Non-distended, Non-tender  Incisions: sternotomy wound c/d/i s/p closure, CT site with dressing WDI.   Extremities: warm and well perfused  Lines: left dialysis access catheter in place with HD running no bleeding or erythema at insertion site    4. INVESTIGATIONS:   Arterial Blood Gases   No lab results found in last 7 days.  Complete Blood Count   Recent Labs   Lab 09/13/20  1256 09/12/20  0401 09/11/20  0336 09/10/20  2042  09/10/20  1027 09/09/20  0342   WBC 11.1* 13.4*  --   --   --  11.7* 11.9*   HGB 6.8* 8.1* 8.0* 7.9*  7.9*   < > 5.7* 7.4*    217  --   --   --  194 183    < > = values in this interval not displayed.     Basic Metabolic Panel  Recent Labs   Lab 09/13/20  1256 09/12/20  0401 09/11/20  0336 09/10/20  1027    141 140 142   POTASSIUM 3.9 4.8 4.6 4.4   CHLORIDE 111* 110* 109 111*   CO2 22 19* 23 24   BUN 76* 104* 81* 74*   CR 2.07* 2.00* 1.61* 1.40*   * 90 114* 105*     Liver Function Tests  Recent Labs   Lab 09/13/20  0349 09/12/20  0401 09/11/20  0336 09/10/20  0403 09/09/20  0342 09/08/20  0339 09/07/20  0407   AST  --   --   --   --  40 36 41   ALT  --    --   --   --  28 27 28   ALKPHOS  --   --   --   --  270* 232* 222*   BILITOTAL  --   --   --   --  0.8 0.7 0.8   ALBUMIN  --   --   --   --  1.3* 1.3* 1.4*   INR 1.75* 1.57* 1.99* 2.44* 1.69* 1.36* 1.25*     Pancreatic Enzymes  No lab results found in last 7 days.  Coagulation Profile  Recent Labs   Lab 09/13/20  0349 09/12/20  0401 09/11/20  0336 09/10/20  0403   INR 1.75* 1.57* 1.99* 2.44*   PTT  --  36  --   --          5. RADIOLOGY:   Radiology and imaging reviewed

## 2020-09-13 NOTE — PLAN OF CARE
Another run of HD today. Stopped pulling D/T drop in PI.  at bedside for shift. No real change in neuro status. Spoke with daughter in phone and updated. No VAD alarms. ! Unit prbc's given for 6.8 hgb.

## 2020-09-13 NOTE — PLAN OF CARE
Major Shift Events: Neuro no change overnight. VSS. NPO D10 started. Pure wick placed with minimal output. No BM.   Plan: continue to monitor and notify MD of changes.    For vital signs and complete assessments, please see documentation flowsheets.

## 2020-09-14 NOTE — PROGRESS NOTES
Cardiology Progress Note    Assessment & Plan   In summary, Marquise Jj is a 75-year-old female with a past medical history notable for coronary artery disease, ischemic cardiomyopathy, and known mural thrombus who was transferred from [facility] for further evaluation/treatment of cardiogenic shock. Balloon pump placed 8/14. HM3 placed 8/19. Chest closed 8/21. ICU course complicated by status eplilepticus, CVA, prolonged intubation, renal failure requiring CVVH/HD, bacteremia and candidemia.    Today's changes:  - HD run today per nephrology  - GI recs: continue PPI, may need CLS and capsule study if signs of further bleeding  - Daily blood cultures given previous/recent Candidemia, continue antifungal for 6 weeks  - Midodrine 10 mg on dialysis days for reported hypotension with HD  - No more ICU needs, agree with transfer to floor  - Consider re-consultation of Neurology for interval assessment/prognosis of mental function    Neuro:   # Sedation --> extubated, alert, remains minimally active with little improvement in mental status  # concern for seizure activity  CTH head 8/20 no signs of ICH   CTH head 8/27 Scattered areas of hypodensity with loss of gray-white matter differentiation in the left frontal lobe. These areas were not present on the CT dated 8/10/2020 and difficult compared to other prior comparison CTs due to extensive hardware artifact. These are suspicious for involving embolic infarcts.  keppra 2 g PO bid   vimpat 200 BID    Cardio:  # Cardiogenic shock with vasoplegic component    # ICM: NYHA IV / ACC  # Severe MR/TR s/p tricuspid valve repair with Elkins MC3 Annuloplasty Ring size T30  # s/p LVAD HM3 on 8/19/2020  LAVD speed 5400, flow 3.8-4.3, pi 2.2-5, power 3.8   Presents with cardiogenic shock. On NE, cardiac index approximately 1.37 on admission. Severely elevated biventricular filling pressures. Etiology of her decompensation appears to be progression of her  cardiomyopathy. Despite medical treatment, she has been hospitalized twice since returning to [place], both with low output. No viability in her LAD territory, and doubt that PCI to her circumflex would make meaningful LV recovery. Hemodynamics improved with dobutamine, did not tolerate attempt to wean inotropics. RHC removed on 8/13 after clotting off, replaced on 8/14 after patient with increased work of breathing. CI of 1.1, balloon pump placed with CI of 1.6-1.8 and improvement in symptoms/hemodynamics. Had LVAD HM3 placed on 8/19. CVP today around 10 range. Chest closed on 8/21.   -- warfarin with INR goal 1.8-2.2      Date 8/9 8/9 8/10 8/11 08/13/20 08/15/20 08/16/20 08/17/20 8/18/20 8/19 8/20 8/21*   CVP 12 9 3 4 8 9 6 8 11 5 10 13   Mean PA  35 30 21 25 23 21 20 35/18 35/18 - 30/12 28/14   PCWP  18 14 12 x 13 14  13 - -    Oxy HGB  59 64 61 61 60 47 62 69 54 - 68 66   CI/CO 2.3 2.3 2.4/4.2 2.3 2.2 1.8 2.8/4.9 4.1 4.3/2.5 - 6/3.3 6.9/3.8   SVR 1100 1100 1200 1400 1316 1900 6684 019 2133 - 1025 715   Device     IABP 1:1 IABP 1:1 IABP 1:1 IABP 1:1 IABP 1:1 No IABP No IABP No IABP   * epi 0.1, vaso 0.5     8/22: CVP 12, PA 28/14/19, PCWP 14, CO/CI 9.0/4.8. . Epi 0.1, vaso 1.  8/23: CVP 16, PA 30/14, CI/CO 4.4/8.0, , SvO2 71%  8/24: CVP 14, PA 44/20/28, PCWP 16, CI/CO 7.3/3.9, SvO2 68% epi 0.06, vaso 2  8/25: CVP 16, PA 44/20/29, PCWP 22, CI/CO  3.6, SvO2 82%, vaso 2, epi 0.03  8/26: CVP 13, PA 40/20/26, PCWP 12, CI/CO 7/ 3.6, SvO2 74%, vaso 1  8/27: CVP 13, PA 38/18/26, PCWP 12-14, CI/CO 9.4/4.7, , PVR ~0.5, SvO2 73%, vaso 1, epi 0.05     # CAD s/p CABG 4/2020 (KURT to RCA and LIMA to LAD) and PCI to RCA 7/20  - Stop home plavix po qd (8/13) for LVAD surgery  - aspirin 81mg PO qd   - c/w home crestor    # Mural thrombus  - removed during surgery on 8/19    # A-flutter  Converted to NSR on 8/15    - Continue amiodarone 200mg po qd      Pulm:  # Acute hypoxic respiratory failure  #  Hemopneumothorax  Extubated on 9/1/2020. S/p IR for chest tube of Hemopneumothorax on 9/5 with 2.4L output immediately.     Renal/Electrolytes   # Hyperkalemia/Hypokalemia   # Acute kidney injury on chronic kidney disease, stage III  Likely ATN due to hypoxic insult  - renal consult and diuretic challenge. Continue dialysis her nephrology.  - Trend renal labs  - Electrolyte replacement protocol  - Monitor UOP        GI:  #GIB  GI consulted and EGD on 8/28 with protuberant vessel injected and clipped and bleeding gastric ulcer with adherent clot, injected and clipped as well. Repeat bleed on 8/29-8/30, transfused and given pRBC, underwent repeat EGD, showed hematin throughout stomache with single bleeding angioectasia vs gastric erosion (from NGT) in stomach. Likely source of bleed and thought related to current bleeding episode. If recurrent signs of bleeding, GI recommends CLS and capsule study    # Constipation  # Severe protein calorie malnutrition  - Nutrition through tube feeds at 30 mL/hr    - Senna-docusate bid scheduled  - Miralax bid prn constipation    ID:   # Candidemia   # Methicillin resistant staph epidermidis  Currently C. Glabrata positive on 8/30, 9/1, 9/3 cultures.   Also growing MRSE on 9/3 and S. Capitis on 9/1  Current:  - Micafungin (9/1-**)  - Vanc (9/4-**)     Prior:  - Vancomycin (8/30-9/2, 8/19-8/21)  - Zosyn (8/30-9/2, 8/19-8/24)  - Rifampin (8/19-8/21)  - Fluconazole (8/19-8/21)  - ceftriaxone (8/14-8/18)    - ID Consult: treat as real candidemia and pull lines in place at time of positive culture as able and continue Micafungin  - C. Diff negative  - No evidence of ophthalmologic yeast involvement  - Pleural fluid labs sent per ID    Hem/Onc  # Mural thrombus removed on 8/19  # Anemia, mild  D/t frequent blood draws and procedures, and two large GI bleesd  - Monitor hgb  - PPI gtt --> IV BID    Endo  # Hypothyroidism   - Continue PTA levothyroxine     Nutrition: TFs  DVT Prophylaxis:  mechanical   Code Status: Full Code    Waylon Garcia MD, Msc  Cardiovascular Disease Fellow  [facility]          Interval History     Yesterday, 2.5 hours of HD with no real fluid removed. BP too low and PI dropped to 1.6 when fluid removal attempted. Remains nonverbal today, alert, follows/tracks with eyes.       Physical Exam   Temp: 97.8  F (36.6  C) Temp src: Axillary BP: 91/57 Pulse: 65   Resp: 13 SpO2: 100 % O2 Device: Nasal cannula Oxygen Delivery: 2 LPM  Vitals:    09/13/20 0000 09/13/20 1330 09/14/20 0600   Weight: 69.5 kg (153 lb 3.5 oz) 69.5 kg (153 lb 3.5 oz) 69.9 kg (154 lb 1.6 oz)     Vital Signs with Ranges  Temp:  [97.8  F (36.6  C)-98.6  F (37  C)] 97.8  F (36.6  C)  Pulse:  [62-86] 65  Resp:  [10-35] 13  BP: ()/(43-99) 91/57  SpO2:  [57 %-100 %] 100 %  I/O last 3 completed shifts:  In: 2025 [I.V.:1265; NG/GT:260; IV Piggyback:250]  Out: 480 [Urine:50; Other:100; Chest Tube:330]    RETIRE: Heart Rate: 71, Blood pressure 91/57, pulse 65, temperature 97.8  F (36.6  C), temperature source Axillary, resp. rate 13, weight 69.9 kg (154 lb 1.6 oz), SpO2 100 %.  154 lbs 1.62 oz     GEN: alert, non to minimally verbal  CV: RRR, Hum of HM3   LUNGS: Clear to auscultation bilaterally  ABD: Active bowel sounds, soft, no abdominal tenderness.   EXT: Trace LE edema   NEURO: altert, tracks people/objects across room; responds to her name, husbands name, daughter's name. moves b/l LE and LUE to command    Medications     dextrose 1,000 mL (09/13/20 0033)     BETA BLOCKER NOT PRESCRIBED       vasopressin Stopped (09/12/20 1700)       amiodarone  200 mg Oral or Feeding Tube Daily     artificial tears   Both Eyes 5x Daily     aspirin  81 mg Oral or Feeding Tube Daily     B and C vitamin Complex with folic acid  5 mL Oral Daily     bimatoprost  1 drop Both Eyes At Bedtime     busPIRone  10 mg Oral or Feeding Tube TID     fiber modular (NUTRISOURCE FIBER)  1 packet Per Feeding Tube  TID     heparin lock flush  5-15 mL Intracatheter Q24H     lacosamide  200 mg Oral or Feeding Tube BID     latanoprost  1 drop Both Eyes Daily     levETIRAcetam  2,000 mg Oral or Feeding Tube BID     levothyroxine  62.5 mcg Oral or Feeding Tube QAM AC     micafungin  150 mg Intravenous Q24H     miconazole   Topical BID     pantoprazole (PROTONIX) IV  40 mg Intravenous BID     rosuvastatin  20 mg Oral or Feeding Tube Daily     sodium chloride (PF)  10 mL Intracatheter Q8H     sodium chloride (PF)  3 mL Intracatheter Q8H       Data      .    Intake/Output Summary (Last 24 hours) at 9/14/2020 0824  Last data filed at 9/14/2020 0737  Gross per 24 hour   Intake 1890 ml   Output 470 ml   Net 1420 ml             Recent Labs   Lab 09/14/20  0333 09/13/20  1256 09/13/20  0349 09/12/20  0401  09/09/20  0342   WBC 12.8* 11.1*  --  13.4*   < > 11.9*   HGB 8.5* 6.8*  --  8.1*   < > 7.4*   MCV 92 95  --  93   < > 95    205  --  217   < > 183   INR 2.05*  --  1.75* 1.57*   < > 1.69*   * 139  --  141   < > 142   POTASSIUM 3.6 3.9  --  4.8   < > 4.5   CHLORIDE 98 111*  --  110*   < > 110*   CO2 24 22  --  19*   < > 25   BUN 51* 76*  --  104*   < > 37*   CR 1.78* 2.07*  --  2.00*   < > 0.79   ANIONGAP 8 6  --  12   < > 6   FERNANDO 7.8* 7.5*  --  8.5   < > 7.8*   * 119*  --  90   < > 106*   ALBUMIN 1.5*  --   --   --   --  1.3*   PROTTOTAL 5.1*  --   --   --   --  4.8*   BILITOTAL 0.7  --   --   --   --  0.8   ALKPHOS 249*  --   --   --   --  270*   ALT 36  --   --   --   --  28   AST 40  --   --   --   --  40    < > = values in this interval not displayed.       Recent Results (from the past 24 hour(s))   XR Chest Port 1 View    Narrative    EXAM: XR CHEST PORT 1 VW  9/13/2020 12:25 PM     HISTORY:  eval chest tubes, interval change       COMPARISON:  9/11/2020    FINDINGS: Single view of the chest. Surgical changes related to CABG.  LVAD. Left hemithorax automatic implantable cardiac defibrillator.  Stable  positioning of partially visualized feeding tube, right  axillary line, right-sided basilar chest tube. Unchanged small  bilateral loculated pleural effusions. Unchanged small right  hydropneumothorax. Left greater than right basilar opacities  unchanged. No new focal airspace opacity.       Impression    IMPRESSION:   1. Stable support device positioning.  2. Unchanged small right hydropneumothorax with loculated right  pleural fluid.  3. Stable left pleural effusion and adjacent atelectasis versus  consolidation. No appreciable left pneumothorax.  4. No new airspace disease.    I have personally reviewed the examination and initial interpretation  and I agree with the findings.    TORSTEN COX,    XR Abdomen Port 1 View    Narrative    EXAM: XR ABDOMEN PORT 1 VW  9/13/2020 12:25 PM      HISTORY: To verify SBFT placement    COMPARISON: 9/12/2020    FINDINGS: Portable abdominal radiograph. A portion of the right  abdomen is excluded from field of view.  Endoscopy clips project over  the proximal stomach.  Feeding tube tip projects over the mid abdomen.  Nonobstructive bowel gas pattern.  Bilateral total hip arthroplasties.  Visualized portions of the lung demonstrate LVAD, surgical clips,  valve prosthesis, right basilar chest tube, sternotomy wires, and left  pleural effusion.  Bibasilar opacities.      Impression    IMPRESSION: Feeding tube tip projects over the gastric antrum/pylorus.   It is not definitively post pyloric.         I have personally reviewed the examination and initial interpretation  and I agree with the findings.    DAISY KULKARNI MD   XR Chest Port 1 View    Narrative    Exam:  Chest X-ray 9/14/2020 1:49 AM    History: interval change    Comparison: 9/13/2020    Findings: Frontal radiograph of the chest. Unchanged support devices.  Right upper extremity PICC remains in the right axilla. Trachea is  midline. Stable enlarged cardiac silhouette. Loculated right pleural  effusion. A pneumothorax  component is not well seen. Layering left  pleural effusion. Upper abdomen is unremarkable. No acute bone  findings.      Impression    Impression:   1. No substantial change in the loculated right pleural effusion. No  pneumothorax component is visualized on this exam.  2. Layering left pleural effusion and overlying atelectasis.  3. Stable supportive devices.    I have personally reviewed the examination and initial interpretation  and I agree with the findings.    SCOTT ANDERSON MD

## 2020-09-14 NOTE — PROGRESS NOTES
CVTS PROGRESS NOTE  September 14, 2020          CO-MORBIDITIES:   Cardiogenic shock (H)  (primary encounter diagnosis)  LV (left ventricular) mural thrombus  Heart failure (H)  Status post cardiac surgery    ASSESSMENT: Marquise Jj is a 75 year-old female with PMH notable for coronary artery disease s/p CABG (4/20), ischemic cardiomyopathy, severe MR/TR and known mural thrombus who is admitted to the CV ICU s/p redo sternotomy, LVAD (heartmate III), and TV repair on 8/19/20 with Dr. Farley. Chest was left open and packed. S/p chest closure and washout 08/21/20. Extubated 9/1/20. Found to have candidemia (+ culture x2) and methicillin resistant staph epidermidis on blood culture, ID following.     Patient's  expressed wanting to have a care conference today to discuss moving [facility] as previously discussed earlier this week.     PLAN SUMMARY:   Recheck sodium today  Re-start tube feeds  Discuss with neurocritical care if their assessment of prognosis has changed  To 6B today    PLAN:    Neuro/ pain/ sedation:  #Acute post-operative pain   #Likely anoxic brain injury; possible stroke  #Epilepsia Partialis Continua; resolved  - CTH head 8/27 Scattered areas of hypodensity with loss of gray-white matter differentiation in the left frontal lobe. These areas were not present on the CT dated 8/10/2020 and difficult compared to other prior comparison CTs due to extensive hardware artifact. These are suspicious for involving embolic infarcts.   - Monitor neurological status. Neurology consulted 9/1, based on age, critical illness and embolic infarcts do not anticipate full neurological recovery.    - Keppra, vimpat to continue per NeuroCrit  - Tylenol PRN  Plan:  Discuss with neurocritical care if their assessment of prognosis has changed     Pulmonary:   #Acute hypoxic respiratory failure, improved  #R pleural effusion   #R Hemopneumothorax  - Monitor CXR, O2 saturation  - Right pleural (basilar) tube  in place with 360 ml Serosang fluid. No air leak.   - Daily CXR while tubes in place    Increased L side effusion on CXR today.      Cardiovascular:    #Acute on chronic decompensated heart failure with reduced ejection fraction: NYHA IV / ACC D  #Cardiogenic shock, possible vasoplegia   #Ischemic cardiomyopathy  #Coronary artery disease s/p CABG 4/20 in [place], s/p PCI to RCA 7/20  #Mural thrombus  #Severe MR/TR  #S/p LVAD - Cardiology following     MAP > 60, CVP 8-12    Continue to hold coumadin     Aspirin, rosuvastatin    Co-managing with Cards-2    Amiodarone 200 mg daily      GI/Nutrition:   # UGI bleed, resolved    - EGD 9/12/20 negative (may need capsule study)   - Continue tube feeds at goal  - Pantoprazole BID   - Bowel regimen on hold with ongoing stooling   Plan:  Re-start tube feeds  -Monitor document stool output  Follow GI recs : Trend Hgb, Anticoagulation per primary team, IIf evidence of further bleeding, would need to consider colonoscopy +/-capsule endoscopy, if consistent with patient and family's goals of care     Renal/Fluid Balance/ Electrolytes:   #Oliguria in the setting of likely ATN  - Will monitor intake and output.  - ICU electrolyte replacement protocol  - Nephrology consulted; CRRT stopped 9/9.   - Started intermittent HD, last 9/12/20      Endocrine:    #Glycemic control  #Hx hypothyroid   - ISS, medium intensity, has not required any insulin in several days    - Synthroid daily        ID/ Antibiotics:  # Febrile, concern for post operative fevers, resolved  # Candidemia   # Methicillin resistant staph epidermidis  - Completed perioperative antibiotic regimen: vancomycin, levofloxacin, fluconazole, Zosyn   - ID Consult (fungemia), appreciate recs: will need a long course of antifungal therapy and probable life-long therapy  - Vanc (8/30-9/2, 9/5-9/11 ), Zosyn (8/30-9/2).     - Micafungin (9/5- ) for yeast in blood for 6 weeks at least IV, maybe life long as per ID.   - No evidence of  ophthalmologic yeast involvement      Heme:     #Acute perioperative blood loss anemia  #Thrombocytopenia   - Hgb goal > 8  - Hold coumadin   - No heparin bridge       Prophylaxis:    - SCD  - Coumadin - held   - PPI  - Bowel regimen      MSK:    - PT and OT following      Lines/ tubes/ drains:  - LIJ CRRT line (9/1)   - Midline (9/3)   - PIV  - Chest tubes R pleural x1   - Rectal pouch (9/6), removed 9/10    - NJT (9/2)        Disposition:  - 6B    Patient seen, findings and plan discussed with CVTS Fellow and CVICU staff Dr. Perry  -----------------------------------    Hank Farley MD  [facility]  [id number]  ====================================    SUBJECTIVE:   Received fluid bolus 250 LR and 250 5% albumin overnight for low PIs with improvement. HD attempted yesterday    OBJECTIVE:   1. VITAL SIGNS:   Temp:  [94.4  F (34.7  C)-98.6  F (37  C)] 94.4  F (34.7  C)  Pulse:  [60-86] 69  Resp:  [10-35] 16  BP: ()/(26-99) 108/87  SpO2:  [71 %-100 %] 100 %  Resp: 16      2. INTAKE/ OUTPUT:   I/O last 3 completed shifts:  In: 2025 [I.V.:1265; NG/GT:260; IV Piggyback:250]  Out: 480 [Urine:50; Other:100; Chest Tube:330]    3. PHYSICAL EXAMINATION:   General: NAD, appears comfortable sitting in bed, minor sedation.  Neuro: Moving all 4 extremities   HENT: feeding tube in place via nostril, nasal cannula oxygen  CV: LVAD humming.   Abdomen: Soft, Non-distended, Non-tender  Incisions: sternotomy wound c/d/i s/p closure, CT site with dressing WDI.   Extremities: warm and well perfused  Lines: left dialysis access catheter in place with HD running no bleeding or erythema at insertion site    4. INVESTIGATIONS:   Arterial Blood Gases   No lab results found in last 7 days.  Complete Blood Count   Recent Labs   Lab 09/14/20  0333 09/13/20  1256 09/12/20  0401 09/11/20  0336  09/10/20  1027   WBC 12.8* 11.1* 13.4*  --   --  11.7*   HGB 8.5* 6.8* 8.1* 8.0*   < > 5.7*    205 217  --   --  194    < > = values in this interval not  displayed.     Basic Metabolic Panel  Recent Labs   Lab 09/14/20 0333 09/13/20  1256 09/12/20 0401 09/11/20 0336   * 139 141 140   POTASSIUM 3.6 3.9 4.8 4.6   CHLORIDE 98 111* 110* 109   CO2 24 22 19* 23   BUN 51* 76* 104* 81*   CR 1.78* 2.07* 2.00* 1.61*   * 119* 90 114*     Liver Function Tests  Recent Labs   Lab 09/14/20 0333 09/13/20 0349 09/12/20 0401 09/11/20 0336 09/09/20 0342 09/08/20 0339   AST 40  --   --   --   --  40 36   ALT 36  --   --   --   --  28 27   ALKPHOS 249*  --   --   --   --  270* 232*   BILITOTAL 0.7  --   --   --   --  0.8 0.7   ALBUMIN 1.5*  --   --   --   --  1.3* 1.3*   INR 2.05* 1.75* 1.57* 1.99*   < > 1.69* 1.36*    < > = values in this interval not displayed.     Pancreatic Enzymes  No lab results found in last 7 days.  Coagulation Profile  Recent Labs   Lab 09/14/20 0333 09/13/20 0349 09/12/20 0401 09/11/20 0336   INR 2.05* 1.75* 1.57* 1.99*   PTT  --   --  36  --          5. RADIOLOGY:   Radiology and imaging reviewed

## 2020-09-14 NOTE — PLAN OF CARE
Lethargic this am. Wakes to repeated stimulus. Rarely follows commands. Was able to nod head to questions once and wiggle toes.   Lasix given this afternoon. See mixed urine/stool output.    at the bedside. Ipad set up for children to visit. 6B transfer orders if bed becomes available.

## 2020-09-14 NOTE — PROGRESS NOTES
Nephrology Progress Note  09/14/2020         Assessment & Recommendations:   Marquise Jj is a 75 year old year old female  With CAD, ischemic cardiomyopathy in cardiogenic shock. Balloon pump 8/14, HM3 8/19.  Recommendations were communicated to primary team via phone    1. LORI: No oliguric. Creatinine is not very elevated b ut she is very small and I suspect her actual GFR is over represented by estimators. I do have orders in tomorrow to attempt some fluid removal.    2. Volume: Discussed a trial of diuretic with the team. I cannot determine what her threshold dose will be as I am not 100% clear regarding the degree of her kidney function. Again, I will attempt some fluid removal tomorrow as well (this will be done via isolated UF which is t ypically the easiest to tolerate from a cardiovascular standpoint.)        Kriss Holt MD   [phone]    Interval History :   Nursing and provider notes from last 24 hours reviewed.  In the last 24 hours Marquise Jj had some hypotension for which she received IVF's overnight. Blood pressures today have been generally 70's to 100's. She was up to a chair this morning. She had dialysis yesterday with no weight removal. The day prior she also had dialysis and we pulled 1.3 L but she required pressor support to do so. The access works poorly and required TPA on Sat.  Labs today were stable.       Review of Systems:   Sleeping. Did not answer quesitons. Spoke with her  who said she had been sleeping since being up to a chair but had previously not had a complaint and did PT ok.     Physical Exam:   I/O last 3 completed shifts:  In: 2126.25 [I.V.:1326.25; NG/GT:250; IV Piggyback:250]  Out: 935 [Urine:425; Other:100; Chest Tube:410]   BP (!) 75/65   Pulse 80   Temp 94.4  F (34.7  C) (Axillary)   Resp 25   Wt 69.9 kg (154 lb 1.6 oz)   SpO2 100%   BMI 23.43 kg/m       Gen: Thin woman. NAd. Sleeping in her chair  Lungs: clear but difficult exam over  VAD  Card: VAD regular  Ext: there is some LE edema  Neuro: no asterixis no myoclonus    Labs:   All labs reviewed by me  Electrolytes/Renal -   Recent Labs   Lab Test 09/14/20  1334 09/14/20  0333 09/13/20  1256 09/13/20  0349 09/12/20  0401 09/11/20  0336  09/09/20  0342    130* 139  --  141 140   < > 142   POTASSIUM  --  3.6 3.9  --  4.8 4.6   < > 4.5   CHLORIDE  --  98 111*  --  110* 109   < > 110*   CO2  --  24 22  --  19* 23   < > 25   BUN  --  51* 76*  --  104* 81*   < > 37*   CR  --  1.78* 2.07*  --  2.00* 1.61*   < > 0.79   GLC  --  486* 119*  --  90 114*   < > 106*   FERNANDO  --  7.8* 7.5*  --  8.5 8.2*   < > 7.8*   MAG  --  1.8  --  2.3 2.5* 2.1   < > 2.6*   PHOS  --  4.0  --   --   --  3.7  --  3.8    < > = values in this interval not displayed.       CBC -   Recent Labs   Lab Test 09/14/20  0333 09/13/20  1256 09/12/20  0401   WBC 12.8* 11.1* 13.4*   HGB 8.5* 6.8* 8.1*    205 217       LFTs -   Recent Labs   Lab Test 09/14/20  0333 09/09/20  0342 09/08/20  0339   ALKPHOS 249* 270* 232*   BILITOTAL 0.7 0.8 0.7   ALT 36 28 27   AST 40 40 36   PROTTOTAL 5.1* 4.8* 4.4*   ALBUMIN 1.5* 1.3* 1.3*       Iron Panel -   Recent Labs   Lab Test 08/10/20  1052 07/01/20  0530   IRON 48 65   IRONSAT 15 17   HUSEYIN 165  --              Current Medications:    amiodarone  200 mg Oral or Feeding Tube Daily     artificial tears   Both Eyes 5x Daily     aspirin  81 mg Oral or Feeding Tube Daily     B and C vitamin Complex with folic acid  5 mL Oral Daily     bimatoprost  1 drop Both Eyes At Bedtime     busPIRone  10 mg Oral or Feeding Tube TID     fiber modular (NUTRISOURCE FIBER)  1 packet Per Feeding Tube TID     heparin lock flush  5-15 mL Intracatheter Q24H     lacosamide  200 mg Oral or Feeding Tube BID     latanoprost  1 drop Both Eyes Daily     levETIRAcetam  2,000 mg Oral or Feeding Tube BID     levothyroxine  62.5 mcg Oral or Feeding Tube QAM AC     micafungin  150 mg Intravenous Q24H     miconazole    Topical BID     pantoprazole (PROTONIX) IV  40 mg Intravenous BID     rosuvastatin  20 mg Oral or Feeding Tube Daily     sodium chloride (PF)  10 mL Intracatheter Q8H     sodium chloride (PF)  3 mL Intracatheter Q8H       dextrose 30 mL/hr at 09/14/20 1600     BETA BLOCKER NOT PRESCRIBED       Kriss Holt MD

## 2020-09-14 NOTE — PROGRESS NOTES
Brief GI note    Reportedly having darker stools per nursing.  Hemoglobin 6.8 yesterday, improved appropriately with transfusion to 8.5.    Impression:  75 year old female with a history of coronary artery disease status post CABGx4 in 2020, ischemic cardiomyopathy, severe tricuspid and mitral regurg unknown mural thrombus, admitted to the cardiac ICU post LVAD placement on 8/19 in the setting of cardiogenic shock due to decompensated heart failure. Course has been c/b hypoxic resp failure requiring intubation, strokes, seizures, and LORI currently on CRRT. GI was initially consulted on 8/26 for bloody NG tube OP.She underwent EGD with placement of clips on two lesions that were possibly bleeding, please see procedure note on 8/27 for full details. AC was resumed and she unfortunately had a recurrent UGIB. EGD on 8/30 showed a bleeding angioectasia vs gastric erosion related to the NG in the stomach to which 1 hemoclip was placed (an additional hemoclip was placed at the site of the prior ulcer).     GI reconsult given recurrent GI bleeding. Underwent EGD on 9/12/2020 without clear source of bleeding.  There is a medium sized hiatal hernia and patchy pale mucosa in the stomach likely related to ischemia.  Hemoglobin dropped yesterday, however responded to transfusion and per nursing stool output appears to be less bloody. Unclear source of recurrent hematochezia - may have colonic or small bowel AVMs that lose in setting of anticoagulation given prior angioectasia seen on EGD.  Ongoing goals of care discussions taking place per primary team.    Recommendations  -Continue PPI twice daily  -Monitor document stool output  -Trend hemoglobin  -Anticoagulation per primary team  -If evidence of further bleeding, would need to consider colonoscopy +/-capsule endoscopy, if consistent with patient and family's goals of care    GI will sign off    Discussed with Dr. Bridger Yeager MD  GI Fellow  p [phone]

## 2020-09-14 NOTE — PROGRESS NOTES
"LVAD Social Work Services Progress Note      Date of Initial Social Work Evaluation: 8/12/2020  Collaborated with: Bedside RN and pt's , Reyes, at bedside    Data: Pt is s/p LVAD implant and TV repair on 8/19/2020. Pt's post-op course has been complicated by seizures, stroke, LORI and bacterial and fungal infections. Pt was extubated on 9/1/2020. CRRT initiated on 8/26 and is now doing iHD.Pt continues to be in the ICU with no change in neuro status.   Care conference had on Friday (9/11) w/ pt's  and four adult children. Pt 's preference is hospice, but all of the children wanted to continue with aggressive cares. Pt's dtr, Norman, was able to produce paper that listed her as medical POA; this document is now scanned into EMR.   Checked in with , at bedside, and he reports that he and his adult children had ongoing discussions over the weekend, around GOC. He still is of the belief that pt would not aggressive cares in this situation, but is going to go along with his children's wishes to pursue full aggressive cares.   Writer did speak to pt's dtr, Norman (POA), via phone. She reports they still want to pursue all treatments \"until the doctors say she is not making progress or is suffering\".     Intervention: Supportive Visit   Assessment: Pt's  is tearful. He did express some frustration around the care conferences and the message that his adult children are receiving. Reyes feels that their adult children are getting the positive picture that pt is improving and hold out hope with that information. Reyes contends that pt would not want to live like this, but is not going to go against what his children want. Even if Reyes were to insist on hospice medical decision making goes to the dtr as she is POA.   Education provided by MIQUEL: Ongoing Social Work support   Plan:    Discharge Plans in Progress: [facility] is following     Barriers to d/c plan: Medical Stability, GOC    Follow up " Plan: SW to continue to provide support to  and family.

## 2020-09-14 NOTE — PLAN OF CARE
4A PT -   Discharge Planner PT   Patient plan for discharge: rehab  Current status: patient rolled with max A and lifted to chair via overhead lift. From chair performed 3x10 bouts of anterior leans to address functional balance and trunk strength.  Patient with limited participation in seated leans requiring max A. After trunk activity performed LE PROM and able to elicit slight LE active participation for hip/knee extension movements. At end of session patient's spouse asking about rehab potential and shared guarded prognosis based on limited participation to this point in acute rehab.   Barriers to return to prior living situation: medical status, impaired functional mobility  Recommendations for discharge: TCU  Rationale for recommendations: Patient will benefit from skilled PT in TCU setting to address functional mobility deficits for eventual safe return home.  Entered by: Danyel Estrada 09/14/2020 9:58 AM

## 2020-09-14 NOTE — PROGRESS NOTES
CVICU PROGRESS NOTE  September 14, 2020          CO-MORBIDITIES:   Cardiogenic shock (H)  (primary encounter diagnosis)  LV (left ventricular) mural thrombus  Heart failure (H)  Status post cardiac surgery    ASSESSMENT: Marquise Jj is a 75 year-old female with PMH notable for coronary artery disease s/p CABG (4/20), ischemic cardiomyopathy, severe MR/TR and known mural thrombus who is admitted to the CV ICU s/p redo sternotomy, LVAD (heartmate III), and TV repair on 8/19/20 with Dr. Farley. Chest was left open and packed. S/p chest closure and washout 08/21/20. Extubated 9/1/20. Found to have candidemia (+ culture x2) and methicillin resistant staph epidermidis on blood culture, ID following.     Patient's  expressed wanting to have a care conference today to discuss moving to [facility] as previously discussed earlier this week.     PLAN SUMMARY:   Recheck sodium today  Re-start tube feeds  Discuss with neurocritical care if their assessment of prognosis has changed  To 6B today    PLAN:    Neuro/ pain/ sedation:  #Acute post-operative pain   #Likely anoxic brain injury; possible stroke  #Epilepsia Partialis Continua; resolved  - CTH head 8/27 Scattered areas of hypodensity with loss of gray-white matter differentiation in the left frontal lobe. These areas were not present on the CT dated 8/10/2020 and difficult compared to other prior comparison CTs due to extensive hardware artifact. These are suspicious for involving embolic infarcts.   - Monitor neurological status. Neurology consulted 9/1, based on age, critical illness and embolic infarcts do not anticipate full neurological recovery.    - Keppra, vimpat to continue per NeuroCrit  - Tylenol PRN  Plan:  Discuss with neurocritical care if their assessment of prognosis has changed     Pulmonary:   #Acute hypoxic respiratory failure, improved  #R pleural effusion   #R Hemopneumothorax  - Monitor CXR, O2 saturation  - Right pleural (basilar)  tube in place with 360 ml Serosang fluid. No air leak.   - Daily CXR while tubes in place    Increased L side effusion on CXR today.      Cardiovascular:    #Acute on chronic decompensated heart failure with reduced ejection fraction: NYHA IV / ACC D  #Cardiogenic shock, possible vasoplegia   #Ischemic cardiomyopathy  #Coronary artery disease s/p CABG 4/20 in [place], s/p PCI to RCA 7/20  #Mural thrombus  #Severe MR/TR  #S/p LVAD - Cardiology following     MAP > 60, CVP 8-12    Continue to hold coumadin     Aspirin, rosuvastatin    Co-managing with Cards-2    Amiodarone 200 mg daily      GI/Nutrition:   # UGI bleed, resolved    - EGD 9/12/20 negative (may need capsule study)   - Continue tube feeds at goal  - Pantoprazole BID   - Bowel regimen on hold with ongoing stooling   Plan:  Re-start tube feeds  -Monitor document stool output  Follow GI recs : Trend Hgb, Anticoagulation per primary team, IIf evidence of further bleeding, would need to consider colonoscopy +/-capsule endoscopy, if consistent with patient and family's goals of care     Renal/Fluid Balance/ Electrolytes:   #Oliguria in the setting of likely ATN  - Will monitor intake and output.  - ICU electrolyte replacement protocol  - Nephrology consulted; CRRT stopped 9/9.   - Started intermittent HD, last 9/12/20      Endocrine:    #Glycemic control  #Hx hypothyroid   - ISS, medium intensity, has not required any insulin in several days    - Synthroid daily        ID/ Antibiotics:  # Febrile, concern for post operative fevers, resolved  # Candidemia   # Methicillin resistant staph epidermidis  - Completed perioperative antibiotic regimen: vancomycin, levofloxacin, fluconazole, Zosyn   - ID Consult (fungemia), appreciate recs: will need a long course of antifungal therapy and probable life-long therapy  - Vanc (8/30-9/2, 9/5-9/11 ), Zosyn (8/30-9/2).     - Micafungin (9/5- ) for yeast in blood for 6 weeks at least IV, maybe life long as per ID.   - No evidence  of ophthalmologic yeast involvement      Heme:     #Acute perioperative blood loss anemia  #Thrombocytopenia   - Hgb goal > 8  - Hold coumadin   - No heparin bridge       Prophylaxis:    - SCD  - Coumadin - held   - PPI  - Bowel regimen      MSK:    - PT and OT following      Lines/ tubes/ drains:  - LIJ CRRT line (9/1)   - Midline (9/3)   - PIV  - Chest tubes R pleural x1   - Rectal pouch (9/6), removed 9/10    - NJT (9/2)        Disposition:  - 6B    Patient seen, findings and plan discussed with CVTS Fellow and CVICU staff Dr. Perry  -----------------------------------    Hank Farley MD  [facility]  [id number]  ====================================    SUBJECTIVE:   Received fluid bolus 250 LR and 250 5% albumin overnight for low PIs with improvement. HD attempted yesterday    OBJECTIVE:   1. VITAL SIGNS:   Temp:  [97.8  F (36.6  C)-98.6  F (37  C)] 97.8  F (36.6  C)  Pulse:  [62-86] 68  Resp:  [10-35] 13  BP: ()/(43-99) 78/62  SpO2:  [57 %-100 %] 100 %  Resp: 13      2. INTAKE/ OUTPUT:   I/O last 3 completed shifts:  In: 2025 [I.V.:1265; NG/GT:260; IV Piggyback:250]  Out: 480 [Urine:50; Other:100; Chest Tube:330]    3. PHYSICAL EXAMINATION:   General: NAD, appears comfortable sitting in bed, minor sedation.  Neuro: Moving all 4 extremities   HENT: feeding tube in place via nostril, nasal cannula oxygen  CV: LVAD humming.   Abdomen: Soft, Non-distended, Non-tender  Incisions: sternotomy wound c/d/i s/p closure, CT site with dressing WDI.   Extremities: warm and well perfused  Lines: left dialysis access catheter in place with HD running no bleeding or erythema at insertion site    4. INVESTIGATIONS:   Arterial Blood Gases   No lab results found in last 7 days.  Complete Blood Count   Recent Labs   Lab 09/14/20  0333 09/13/20  1256 09/12/20  0401 09/11/20  0336  09/10/20  1027   WBC 12.8* 11.1* 13.4*  --   --  11.7*   HGB 8.5* 6.8* 8.1* 8.0*   < > 5.7*    205 217  --   --  194    < > = values in this interval not  displayed.     Basic Metabolic Panel  Recent Labs   Lab 09/14/20 0333 09/13/20  1256 09/12/20 0401 09/11/20 0336   * 139 141 140   POTASSIUM 3.6 3.9 4.8 4.6   CHLORIDE 98 111* 110* 109   CO2 24 22 19* 23   BUN 51* 76* 104* 81*   CR 1.78* 2.07* 2.00* 1.61*   * 119* 90 114*     Liver Function Tests  Recent Labs   Lab 09/14/20 0333 09/13/20 0349 09/12/20 0401 09/11/20 0336 09/09/20 0342 09/08/20 0339   AST 40  --   --   --   --  40 36   ALT 36  --   --   --   --  28 27   ALKPHOS 249*  --   --   --   --  270* 232*   BILITOTAL 0.7  --   --   --   --  0.8 0.7   ALBUMIN 1.5*  --   --   --   --  1.3* 1.3*   INR 2.05* 1.75* 1.57* 1.99*   < > 1.69* 1.36*    < > = values in this interval not displayed.     Pancreatic Enzymes  No lab results found in last 7 days.  Coagulation Profile  Recent Labs   Lab 09/14/20 0333 09/13/20 0349 09/12/20 0401 09/11/20 0336   INR 2.05* 1.75* 1.57* 1.99*   PTT  --   --  36  --          5. RADIOLOGY:   Radiology and imaging reviewed

## 2020-09-14 NOTE — PLAN OF CARE
Major Shift Events: Neuro no change overnight. VSS. On D10 gtt.  Pure wick in place with minimal output. x1 BM. AT 0500 pt had low PI event <2.0 sustained. Blood pressure low with MAP <65. 250 LR given and 12.5g 5% albumin given, improvement to PI and MAP.   Plan: continue to monitor and notify MD of changes.    For vital signs and complete assessments, please see documentation flowsheets.

## 2020-09-14 NOTE — PROGRESS NOTES
CVICU PROGRESS NOTE  September 13, 2020          CO-MORBIDITIES:   Cardiogenic shock (H)  (primary encounter diagnosis)  LV (left ventricular) mural thrombus  Heart failure (H)  Status post cardiac surgery    ASSESSMENT: Marquise Jj is a 75 year-old female with PMH notable for coronary artery disease s/p CABG (4/20), ischemic cardiomyopathy, severe MR/TR and known mural thrombus who is admitted to the CV ICU s/p redo sternotomy, LVAD (heartmate III), and TV repair on 8/19/20 with Dr. Farley. Chest was left open and packed. S/p chest closure and washout 08/21/20. Extubated 9/1/20. Found to have candidemia (+ culture x2) and methicillin resistant staph epidermidis on blood culture, ID following.     Patient's  expressed wanting to have a care conference today to discuss moving to [facility] as previously discussed earlier this week.     PLAN SUMMARY:   Lasix 100mg iv once  Plan for HD tomorrow. Held today since unable to pull fluid previously due to hypotension  Care conference needed.     PLAN:    Neuro/ pain/ sedation:  #Acute post-operative pain   #Likely anoxic brain injury; possible stroke  #Epilepsia Partialis Continua; resolved  - CTH head 8/27 Scattered areas of hypodensity with loss of gray-white matter differentiation in the left frontal lobe. These areas were not present on the CT dated 8/10/2020 and difficult compared to other prior comparison CTs due to extensive hardware artifact. These are suspicious for involving embolic infarcts.   - Monitor neurological status. Neurology consulted 9/1, based on age, critical illness and embolic infarcts do not anticipate full neurological recovery.    - Keppra, vimpat to continue per NeuroCrit  - Tylenol PRN     Pulmonary:   #Acute hypoxic respiratory failure, improved  #R pleural effusion   #R Hemopneumothorax  - Monitor CXR, O2 saturation  - Right pleural (basilar) tube in place with 360 ml Serosang fluid. No air leak.   - Daily CXR while tubes in  place       Cardiovascular:    #Acute on chronic decompensated heart failure with reduced ejection fraction: NYHA IV / ACC D  #Cardiogenic shock, possible vasoplegia   #Ischemic cardiomyopathy  #Coronary artery disease s/p CABG 4/20 in [place], s/p PCI to RCA 7/20  #Mural thrombus  #Severe MR/TR  #S/p LVAD - Cardiology following     MAP > 60, CVP 8-12    Continue to hold coumadin     Aspirin, rosuvastatin    Co-managing with Cards-2    Amiodarone 200 mg daily      GI/Nutrition:   # UGI bleed, resolved    - EGD 9/12/20 negative (may need capsule study)   - Continue tube feeds at goal  - Pantoprazole BID   - Bowel regimen on hold with ongoing stooling       Renal/Fluid Balance/ Electrolytes:   #Oliguria in the setting of likely ATN  - Will monitor intake and output.  - ICU electrolyte replacement protocol  - Nephrology consulted; CRRT stopped 9/9.   - Started intermittent HD, last 9/12/20   Plan:  F/u Na levels : 135   Discussed with Nephro team:  Recommended Lasix 100mg iv once  Plan for HD tomorrow. Held today since unable to pull fluid previously due to hypotension     Endocrine:    #Glycemic control  #Hx hypothyroid   - ISS, medium intensity, has not required any insulin in several days    - Synthroid daily        ID/ Antibiotics:  # Febrile, concern for post operative fevers, resolved  # Candidemia   # Methicillin resistant staph epidermidis  - Completed perioperative antibiotic regimen: vancomycin, levofloxacin, fluconazole, Zosyn   - ID Consult (fungemia), appreciate recs: will need a long course of antifungal therapy and probable life-long prevention therapy  - Vanc (8/30-9/2, 9/5-9/11 ), Zosyn (8/30-9/2).     - Micafungin (9/5- ) for yeast in blood for 6 weeks at least IV, maybe life long as per ID.   - No evidence of ophthalmologic yeast involvement      Heme:     #Acute perioperative blood loss anemia  #Thrombocytopenia   - Hgb goal > 8  - Hold coumadin   - No heparin bridge       Prophylaxis:    - SCD  -  Coumadin - held   - PPI  - Bowel regimen      MSK:    - PT and OT following      Lines/ tubes/ drains:  - LIJ CRRT line (9/1)   - Midline (9/3)   - PIV  - Chest tubes R pleural x1   - Rectal pouch (9/6), removed 9/10    - NJT (9/2)        Disposition:  - Shift to 6B    Patient seen, findings and plan discussed with CVTS Fellow and CVICU staff Dr. Perry  -----------------------------------  Nikolas Crenshaw, MS4    Hank Farley MD  CVICU resident     ====================================    SUBJECTIVE:   Given  mL and 5% albumin 250 mL for low PIs overnight.    OBJECTIVE:   1. VITAL SIGNS:   Temp:  [97.8  F (36.6  C)-98.6  F (37  C)] 97.8  F (36.6  C)  Pulse:  [62-86] 71  Resp:  [11-35] 15  BP: ()/(43-99) 101/87  SpO2:  [57 %-100 %] 99 %  Resp: 15      2. INTAKE/ OUTPUT:   I/O last 3 completed shifts:  In: 2025 [I.V.:1265; NG/GT:260; IV Piggyback:250]  Out: 480 [Urine:50; Other:100; Chest Tube:330]    3. PHYSICAL EXAMINATION:   General: NAD, appears comfortable sitting in bed, minor sedation.  Neuro: Moving all 4 extremities   HENT: feeding tube in place via nostril, nasal cannula oxygen  CV: LVAD humming.   Abdomen: Soft, Non-distended, Non-tender  Incisions: sternotomy wound c/d/i s/p closure, CT site with dressing WDI.   Extremities: warm and well perfused  Lines: left dialysis access catheter in place with HD running no bleeding or erythema at insertion site    4. INVESTIGATIONS:   Arterial Blood Gases   No lab results found in last 7 days.  Complete Blood Count   Recent Labs   Lab 09/14/20  0333 09/13/20  1256 09/12/20  0401 09/11/20  0336  09/10/20  1027   WBC 12.8* 11.1* 13.4*  --   --  11.7*   HGB 8.5* 6.8* 8.1* 8.0*   < > 5.7*    205 217  --   --  194    < > = values in this interval not displayed.     Basic Metabolic Panel  Recent Labs   Lab 09/14/20  0333 09/13/20  1256 09/12/20  0401 09/11/20  0336   * 139 141 140   POTASSIUM 3.6 3.9 4.8 4.6   CHLORIDE 98 111* 110* 109   CO2 24 22 19*  23   BUN 51* 76* 104* 81*   CR 1.78* 2.07* 2.00* 1.61*   * 119* 90 114*     Liver Function Tests  Recent Labs   Lab 09/14/20 0333 09/13/20 0349 09/12/20 0401 09/11/20 0336 09/09/20 0342 09/08/20 0339   AST 40  --   --   --   --  40 36   ALT 36  --   --   --   --  28 27   ALKPHOS 249*  --   --   --   --  270* 232*   BILITOTAL 0.7  --   --   --   --  0.8 0.7   ALBUMIN 1.5*  --   --   --   --  1.3* 1.3*   INR 2.05* 1.75* 1.57* 1.99*   < > 1.69* 1.36*    < > = values in this interval not displayed.     Pancreatic Enzymes  No lab results found in last 7 days.  Coagulation Profile  Recent Labs   Lab 09/14/20 0333 09/13/20 0349 09/12/20 0401 09/11/20 0336   INR 2.05* 1.75* 1.57* 1.99*   PTT  --   --  36  --          5. RADIOLOGY:   Radiology and imaging reviewed

## 2020-09-14 NOTE — PLAN OF CARE
OT 4E: OT to continue holding. Pt with limited participation in PT. Per chart review, likely to have care conference soon.

## 2020-09-15 NOTE — PROGRESS NOTES
HEMODIALYSIS TREATMENT NOTE    Date: 9/15/2020  Time: 1:49 PM    Data:  Pre Wt: 67 kg (147 lb 11.3 oz)   Desired Wt: 65 kg   Post Wt: 66.7 kg (147 lb 0.8 oz)  Weight change: 0.3 kg  Ultrafiltration - Post Run Net Total Removed (mL): 500 mL  Vascular Access Status: LIJ Temorary Non Tunneled CVC  patent  Dialyzer Rinse: Streaked, Light  Total Blood Volume Processed: 0 L   Total Dialysis (Treatment) Time: 3.0 hrs  Dialysate Bath: SEQ for UF only  Heparin: None    Lab:   Yes :Lactic Acid    Assessment:  Patent LIJ Non Tunneled CVC Lines.- Sluggish out from Both lumens.  Generalized and Pitting edema :+2~3   LVAD on (HM3), LORI on HD.  Gave pre run midodrine 10 mg PL per ICU RN    Interventions:  Patient dialyzed for 3 hrs via LIJ CVC Lines. Reached BFR to 270 ml/m d/t high AP Alarms. Not tolerable for 2 kg off d/t soft BP. Gave NS locus 100 ml and 25% albumin 25%/100 ml for pressure support. Tried to keep SBP above 85 mmHg during HD per BP parameters. Finished HD with 0.5 kg off within 3 hrs run. CVC locked with NS 10 ml and applied clear guards caps. Gave new CVC dressing with bio-patch on.     Plan:    Next run per renal team.

## 2020-09-15 NOTE — PROGRESS NOTES
CVICU PROGRESS NOTE  September 15, 2020          CO-MORBIDITIES:   Cardiogenic shock (H)  (primary encounter diagnosis)  LV (left ventricular) mural thrombus  Heart failure (H)  Status post cardiac surgery    ASSESSMENT: Marquise Jj is a 75 year-old female with PMH notable for coronary artery disease s/p CABG (4/20), ischemic cardiomyopathy, severe MR/TR and known mural thrombus who is admitted to the CV ICU s/p redo sternotomy, LVAD (heartmate III), and TV repair on 8/19/20 with Dr. Farley. Chest was left open and packed. S/p chest closure and washout 08/21/20. Extubated 9/1/20. Found to have candidemia (+ culture x2) and methicillin resistant staph epidermidis on blood culture, ID following.     Patient's  expressed wanting to have a care conference today to discuss moving to [facility]osp[facility] as previously discussed earlier this week.     PLAN SUMMARY:   Pan culture   Keep patient in ICU considering possibility of aspiration pneumonia from the emesis today morning.   Start Van/cefepime/flagyl for aspiration pneumonia coverage   F/U with Neurocrit for prognosis.   Holding ASA (from today) and coumadin (since few days) due to GI bleed   Tube feeds -start slowly today    PLAN:    Neuro/ pain/ sedation:  #Acute post-operative pain   #Likely anoxic brain injury; possible stroke  #Epilepsia Partialis Continua; resolved  - CTH head 8/27 Scattered areas of hypodensity with loss of gray-white matter differentiation in the left frontal lobe. These areas were not present on the CT dated 8/10/2020 and difficult compared to other prior comparison CTs due to extensive hardware artifact. These are suspicious for involving embolic infarcts.   - Monitor neurological status. Neurology consulted 9/1, based on age, critical illness and embolic infarcts do not anticipate full neurological recovery.    - Keppra, vimpat to continue per NeuroCrit  - Tylenol PRN  F/U with Neurocrit for assessment and prognosis.      Pulmonary:    #Acute hypoxic respiratory failure, improved  #R pleural effusion   #R Hemopneumothorax  - Monitor CXR, O2 saturation  - Right pleural (basilar) tube in place with 360 ml Serosang fluid. No air leak.   - Daily CXR while tubes in place    Increased L side effusion on CXR today.    - ABx for aspiration pneumonia (details under ID)     Cardiovascular:    #Acute on chronic decompensated heart failure with reduced ejection fraction: NYHA IV / ACC D  #Cardiogenic shock, possible vasoplegia   #Ischemic cardiomyopathy  #Coronary artery disease s/p CABG 4/20 in [place], s/p PCI to RCA 7/20  #Mural thrombus  #Severe MR/TR  #S/p LVAD - Cardiology following     MAP > 60, CVP 8-12    Continue to hold coumadin      rosuvastatin    Co-managing with Cards-2    Amiodarone 200 mg daily  - Holding ASA (from today) and coumadin (since few days) due to GI bleed       GI/Nutrition:   # UGI bleed, resolved    - EGD 9/12/20 negative (may need capsule study)   - Continue tube feeds at goal  - Pantoprazole BID   - Bowel regimen on hold with ongoing stooling   Tube feeds -start slowly today     Renal/Fluid Balance/ Electrolytes:   #Oliguria in the setting of likely ATN  - Will monitor intake and output.  - ICU electrolyte replacement protocol  - Nephrology consulted; CRRT stopped 9/9.   - Started intermittent HD, last 9/12/20   Made 350ml UOP after 100 lasix yesterday. Nephro may plan on HD today.      Endocrine:    #Glycemic control  #Hx hypothyroid   - ISS, medium intensity, has not required any insulin in several days    - Synthroid daily        ID/ Antibiotics:  # Febrile, concern for post operative fevers, resolved  # Candidemia   # Methicillin resistant staph epidermidis  - Completed perioperative antibiotic regimen: vancomycin, levofloxacin, fluconazole, Zosyn   - ID Consult (fungemia), appreciate recs: will need a long course of antifungal therapy and probable life-long therapy  - Vanc (8/30-9/2, 9/5-9/11 ), Zosyn (8/30-9/2).   - To  start Van/cefepime/flagyl for aspiration pneumonia coverage (9/15- )    - Micafungin (9/5- ) for yeast in blood for 6 weeks at least IV, maybe life long as per ID.   - No evidence of ophthalmologic yeast involvement      Heme:     #Acute perioperative blood loss anemia  #Thrombocytopenia   - Hgb goal > 8  - Hold coumadin   - No heparin bridge   - ASA to hold       Prophylaxis:    - SCD  - Coumadin - held   - PPI  - Bowel regimen      MSK:    - PT and OT following      Lines/ tubes/ drains:  - LIJ CRRT line (9/1)   - Midline (9/3)   - PIV  - Chest tubes R pleural x1   - Rectal pouch (9/6), removed 9/10    - NJT (9/2)        Disposition:  - CVICU    Patient seen, findings and plan discussed with CVTS Fellow and CVICU staff Dr. Perry  -----------------------------------    Hank Farley MD  [facility]  [id number]  ====================================    SUBJECTIVE:   2 epidoses of large emesis. Concerned about rhythm changes, not not obvious on EKG,     OBJECTIVE:   1. VITAL SIGNS:   Temp:  [94.4  F (34.7  C)-100.1  F (37.8  C)] 99.5  F (37.5  C)  Pulse:  [] 80  Resp:  [14-28] 21  BP: ()/(33-95) 84/72  SpO2:  [87 %-100 %] 95 %  Resp: 21      2. INTAKE/ OUTPUT:   I/O last 3 completed shifts:  In: 1481.25 [I.V.:681.25; NG/GT:425]  Out: 2210 [Urine:350; Emesis/NG output:400; Other:1150; Chest Tube:310]    3. PHYSICAL EXAMINATION:   General: NAD, appears comfortable sitting in bed, minor sedation.  Neuro: Moving all 4 extremities   HENT: feeding tube in place via nostril, nasal cannula oxygen  CV: LVAD humming.   Abdomen: Soft, Non-distended, Non-tender  Incisions: sternotomy wound c/d/i s/p closure, CT site with dressing WDI.   Extremities: warm and well perfused  Lines: left dialysis access catheter in place with HD running no bleeding or erythema at insertion site    4. INVESTIGATIONS:   Arterial Blood Gases   No lab results found in last 7 days.  Complete Blood Count   Recent Labs   Lab 09/15/20  0337 09/14/20  0331  09/13/20  1256 09/12/20  0401   WBC 27.0* 12.8* 11.1* 13.4*   HGB 9.5* 8.5* 6.8* 8.1*    197 205 217     Basic Metabolic Panel  Recent Labs   Lab 09/15/20  0337 09/14/20  1334 09/14/20  0333 09/13/20  1256 09/12/20  0401    135 130* 139 141   POTASSIUM 3.8  --  3.6 3.9 4.8   CHLORIDE 104  --  98 111* 110*   CO2 18*  --  24 22 19*   BUN 59*  --  51* 76* 104*   CR 2.16*  --  1.78* 2.07* 2.00*   *  --  486* 119* 90     Liver Function Tests  Recent Labs   Lab 09/15/20  0337 09/14/20  0333 09/13/20  0349 09/12/20  0401 09/09/20  0342   AST 39 40  --   --   --  40   ALT 41 36  --   --   --  28   ALKPHOS 249* 249*  --   --   --  270*   BILITOTAL 0.7 0.7  --   --   --  0.8   ALBUMIN 1.7* 1.5*  --   --   --  1.3*   INR 2.59* 2.05* 1.75* 1.57*   < > 1.69*    < > = values in this interval not displayed.     Pancreatic Enzymes  No lab results found in last 7 days.  Coagulation Profile  Recent Labs   Lab 09/15/20  0337 09/14/20  0333 09/13/20  0349 09/12/20  0401   INR 2.59* 2.05* 1.75* 1.57*   PTT  --   --   --  36         5. RADIOLOGY:   Radiology and imaging reviewed

## 2020-09-15 NOTE — PROGRESS NOTES
CVTS PROGRESS NOTE  September 15, 2020          CO-MORBIDITIES:   Cardiogenic shock (H)  (primary encounter diagnosis)  LV (left ventricular) mural thrombus  Heart failure (H)  Status post cardiac surgery    ASSESSMENT: Marquise Jj is a 75 year-old female with PMH notable for coronary artery disease s/p CABG (4/20), ischemic cardiomyopathy, severe MR/TR and known mural thrombus who is admitted to the CV ICU s/p redo sternotomy, LVAD (heartmate III), and TV repair on 8/19/20 with Dr. Farley. Chest was left open and packed. S/p chest closure and washout 08/21/20. Extubated 9/1/20. Found to have candidemia (+ culture x2) and methicillin resistant staph epidermidis on blood culture, ID following.     Patient's  expressed wanting to have a care conference today to discuss moving to [facility]osp[facility] as previously discussed earlier this week.     PLAN SUMMARY:   Pan culture   Keep patient in ICU considering possibility of aspiration pneumonia from the emesis today morning.   Start Van/cefepime/flagyl for aspiration pneumonia coverage   F/U with Neurocrit for prognosis.   Holding ASA (from today) and coumadin (since few days) due to GI bleed   Tube feeds -start slowly today    PLAN:    Neuro/ pain/ sedation:  #Acute post-operative pain   #Likely anoxic brain injury; possible stroke  #Epilepsia Partialis Continua; resolved  - CTH head 8/27 Scattered areas of hypodensity with loss of gray-white matter differentiation in the left frontal lobe. These areas were not present on the CT dated 8/10/2020 and difficult compared to other prior comparison CTs due to extensive hardware artifact. These are suspicious for involving embolic infarcts.   - Monitor neurological status. Neurology consulted 9/1, based on age, critical illness and embolic infarcts do not anticipate full neurological recovery.    - Keppra, vimpat to continue per NeuroCrit  - Tylenol PRN  F/U with Neurocrit for assessment and prognosis.      Pulmonary:    #Acute hypoxic respiratory failure, improved  #R pleural effusion   #R Hemopneumothorax  - Monitor CXR, O2 saturation  - Right pleural (basilar) tube in place with 360 ml Serosang fluid. No air leak.   - Daily CXR while tubes in place    Increased L side effusion on CXR today.    - ABx for aspiration pneumonia (details under ID)     Cardiovascular:    #Acute on chronic decompensated heart failure with reduced ejection fraction: NYHA IV / ACC D  #Cardiogenic shock, possible vasoplegia   #Ischemic cardiomyopathy  #Coronary artery disease s/p CABG 4/20 in [place], s/p PCI to RCA 7/20  #Mural thrombus  #Severe MR/TR  #S/p LVAD - Cardiology following     MAP > 60, CVP 8-12    Continue to hold coumadin      rosuvastatin    Co-managing with Cards-2    Amiodarone 200 mg daily  - Holding ASA (from today) and coumadin (since few days) due to GI bleed       GI/Nutrition:   # UGI bleed, resolved    - EGD 9/12/20 negative (may need capsule study)   - Continue tube feeds at goal  - Pantoprazole BID   - Bowel regimen on hold with ongoing stooling   Tube feeds -start slowly today     Renal/Fluid Balance/ Electrolytes:   #Oliguria in the setting of likely ATN  - Will monitor intake and output.  - ICU electrolyte replacement protocol  - Nephrology consulted; CRRT stopped 9/9.   - Started intermittent HD, last 9/12/20   Made 350ml UOP after 100 lasix yesterday. Nephro may plan on HD today.      Endocrine:    #Glycemic control  #Hx hypothyroid   - ISS, medium intensity, has not required any insulin in several days    - Synthroid daily        ID/ Antibiotics:  # Febrile, concern for post operative fevers, resolved  # Candidemia   # Methicillin resistant staph epidermidis  - Completed perioperative antibiotic regimen: vancomycin, levofloxacin, fluconazole, Zosyn   - ID Consult (fungemia), appreciate recs: will need a long course of antifungal therapy and probable life-long therapy  - Vanc (8/30-9/2, 9/5-9/11 ), Zosyn (8/30-9/2).   - To  start Van/cefepime/flagyl for aspiration pneumonia coverage (9/15- )    - Micafungin (9/5- ) for yeast in blood for 6 weeks at least IV, maybe life long as per ID.   - No evidence of ophthalmologic yeast involvement      Heme:     #Acute perioperative blood loss anemia  #Thrombocytopenia   - Hgb goal > 8  - Hold coumadin   - No heparin bridge   - ASA to hold       Prophylaxis:    - SCD  - Coumadin - held   - PPI  - Bowel regimen      MSK:    - PT and OT following      Lines/ tubes/ drains:  - LIJ CRRT line (9/1)   - Midline (9/3)   - PIV  - Chest tubes R pleural x1   - Rectal pouch (9/6), removed 9/10    - NJT (9/2)        Disposition:  - CVICU    Patient seen, findings and plan discussed with CVTS Fellow and CVICU staff Dr. Perry  -----------------------------------    Hank Farley MD  [facility]  [id number]  ====================================    SUBJECTIVE:   2 epidoses of large emesis. Concerned about rhythm changes, not not obvious on EKG,     OBJECTIVE:   1. VITAL SIGNS:   Temp:  [94.4  F (34.7  C)-100.1  F (37.8  C)] 99.5  F (37.5  C)  Pulse:  [] 99  Resp:  [12-28] 25  BP: ()/(26-95) 112/89  SpO2:  [87 %-100 %] 87 %  Resp: 25      2. INTAKE/ OUTPUT:   I/O last 3 completed shifts:  In: 1481.25 [I.V.:681.25; NG/GT:425]  Out: 2210 [Urine:350; Emesis/NG output:400; Other:1150; Chest Tube:310]    3. PHYSICAL EXAMINATION:   General: NAD, appears comfortable sitting in bed, minor sedation.  Neuro: Moving all 4 extremities   HENT: feeding tube in place via nostril, nasal cannula oxygen  CV: LVAD humming.   Abdomen: Soft, Non-distended, Non-tender  Incisions: sternotomy wound c/d/i s/p closure, CT site with dressing WDI.   Extremities: warm and well perfused  Lines: left dialysis access catheter in place with HD running no bleeding or erythema at insertion site    4. INVESTIGATIONS:   Arterial Blood Gases   No lab results found in last 7 days.  Complete Blood Count   Recent Labs   Lab 09/15/20  0337 09/14/20  0336  09/13/20  1256 09/12/20  0401   WBC 27.0* 12.8* 11.1* 13.4*   HGB 9.5* 8.5* 6.8* 8.1*    197 205 217     Basic Metabolic Panel  Recent Labs   Lab 09/15/20  0337 09/14/20  1334 09/14/20  0333 09/13/20  1256 09/12/20  0401    135 130* 139 141   POTASSIUM 3.8  --  3.6 3.9 4.8   CHLORIDE 104  --  98 111* 110*   CO2 18*  --  24 22 19*   BUN 59*  --  51* 76* 104*   CR 2.16*  --  1.78* 2.07* 2.00*   *  --  486* 119* 90     Liver Function Tests  Recent Labs   Lab 09/15/20  0337 09/14/20  0333 09/13/20  0349 09/12/20  0401 09/09/20  0342   AST 39 40  --   --   --  40   ALT 41 36  --   --   --  28   ALKPHOS 249* 249*  --   --   --  270*   BILITOTAL 0.7 0.7  --   --   --  0.8   ALBUMIN 1.7* 1.5*  --   --   --  1.3*   INR 2.59* 2.05* 1.75* 1.57*   < > 1.69*    < > = values in this interval not displayed.     Pancreatic Enzymes  No lab results found in last 7 days.  Coagulation Profile  Recent Labs   Lab 09/15/20  0337 09/14/20  0333 09/13/20  0349 09/12/20  0401   INR 2.59* 2.05* 1.75* 1.57*   PTT  --   --   --  36         5. RADIOLOGY:   Radiology and imaging reviewed

## 2020-09-15 NOTE — PROGRESS NOTES
[facility]  Neurocritical Care Progress Note    Patient Name:  Marquise Jj  MRN:  [id number]    :  1945  Date of Service:  September 15, 2020  Primary care provider:  Poonam Cast  Date of Admission:  2020  Hospital Day: 39         Interval History   Discussed with CVTS. Patient will be transitioning to comfort cares. Her loved ones were at the bedside with her tonight.          Objective   Temp:  [96.9  F (36.1  C)-100.1  F (37.8  C)] 96.9  F (36.1  C)  Pulse:  [] 83  Resp:  [16-28] 21  BP: ()/(33-95) 106/86  SpO2:  [87 %-100 %] 97 %    General: Laying in bed, no acute distress  CV: Extremities appear appropriately perfused  Resp: No accessory muscle use.  Skin: Cool, dry. No jaundice.  Neuro: Eyes are open, roving eye movements, not responding to voice. Moving extremities intermittently, appears non-purposeful.      Labs and Imaging   Reviewed       Assesment & Plan     75yoF with complex cardiac history s/p LVAD complicated by post-procedural encephalopathy, focal seizures, and non-convulsive status epilepticus. NCC previously followed for status epilepticus. Discussed with primary team today, patient is transitioning to comfort cares. Will plan to decrease Vimpat to 150mg BID and start EEG to monitor to subclinical seizures.    - Start vEEG (ordered)  - Decrease Vimpat to 150mg BID  - Continue Keppra 2g BID    Patient was discussed with the NCC attending, Dr. Casas.    Josefa Calero  Neurology PGY-2  9/15/2020 18:02PM

## 2020-09-15 NOTE — PROGRESS NOTES
Spoke with patient and family at the bedside. They would like her to be DNR/DNI. All questions were answered.     Angela PeñaSales   Surgery Resident

## 2020-09-15 NOTE — PLAN OF CARE
Major Shift Events:  Neuro status unchanged ex patient moaned majority of night. Writer could not determine what was causing pt to moan as pt did not not/shake head to questions. PRN tylenol given with little change. VSS, tmax 100.1. No LVAD alarms overnight. Writer attempted to advance TF to goal this evening but pt had small episode of emesis; TF left at 25 pt had second large emesis around 0345, TF turned off at this time. See additional note regarding abnormal EKG.  Plan: Continue to monitor. Transfer to 6B when bed available.  For vital signs and complete assessments, please see documentation flowsheets.

## 2020-09-15 NOTE — PROGRESS NOTES
LVAD Social Work Services Progress Note      Date of Initial Social Work Evaluation: 8/12/2020  Collaborated with: Charge RN, bedside RN, Pt's , Reyes, at bedside and pt's dtr, Norman, via phone and at bedside    Data: Pt is s/p LVAD implant and TV repair on 8/19/2020. Pt's post-op course has been complicated by seizures, stroke, LORI and bacterial and fungal infections. Pt was extubated on 9/1/2020. CRRT initiated on 8/26 and is now doing iHD.Pt continues to be in the ICU with no change in neuro status.   Met w/ spouse at bedside as part of a supportive visit. Pt's  adamant that he wants to take pt home. Writer spoke to pt's dtr, whom is also medical POA. Dtr is now in agreement with with proceeding with hospice/comfort care with goal to get pt home. Offered choice in hospice agency, with recommendation that we go with [facility] as agency is familiar with the LVAD.   Writer spoke to  Hospice liaison to initiate referral ([phone]) likely will be a few days before all arrangements can be made.   Discussed case with Cards 2, Charge RN and bedside RN and updated both on change towards comfort care/hospice. Confirmed visitor policy in this situation and updated pt's dtr that there can be up to six visitors with two visiting at a time w/in visiting hours of 8-6:30.   Intervention: Supportive Visit, GOC, Discharge Planning   Assessment: Pt and his adult children are all in agreement with change to comfort care and making arrangements to get pt home on hospice.   Education provided by SW: Ongoing Social Work support   Plan:    Discharge Plans in Progress: Referral initiated to  Hospice     Barriers to d/c plan: Medical Stability, Complex LVAD pt    Follow up Plan: SW awaiting to hear back from  Hospice to confirm can take referral.

## 2020-09-15 NOTE — PHARMACY-VANCOMYCIN DOSING SERVICE
Pharmacy Vancomycin Initial Note  Date of Service September 15, 2020  Patient's  1945  75 year old, female    Indication: Aspiration Pneumonia    Current estimated CrCl = Estimated Creatinine Clearance: 23.8 mL/min (A) (based on SCr of 2.16 mg/dL (H)).    Creatinine for last 3 days  2020: 12:56 PM Creatinine 2.07 mg/dL  2020:  3:33 AM Creatinine 1.78 mg/dL  9/15/2020:  3:37 AM Creatinine 2.16 mg/dL    Recent Vancomycin Level(s) for last 3 days  No results found for requested labs within last 72 hours.      Vancomycin IV Administrations (past 72 hours)      No vancomycin orders with administrations in past 72 hours.                Nephrotoxins and other renal medications (From now, onward)    Start     Dose/Rate Route Frequency Ordered Stop    09/15/20 1330  vancomycin 1250 mg in 0.9% NaCl 250 mL intermittent infusion 1,250 mg      1,250 mg  over 90 Minutes Intravenous ONCE 09/15/20 1159      09/15/20 1159  vancomycin place vasquez - receiving intermittent dosing      1 each Does not apply SEE ADMIN INSTRUCTIONS 09/15/20 1159            Contrast Orders - past 72 hours (72h ago, onward)    Start     Dose/Rate Route Frequency Ordered Stop    20 1400  barium sulfate (EZ-HD) oral suspension 98%       Oral ONCE 20 1336 20 1400                Plan:  1.  Start vancomycin  1250 mg IV once. Future doses based on levels and HD schedule.   2.  Goal Trough Level: 15-20 mg/L   3.  Pharmacy will check trough levels as appropriate in 1-3 Days.    4. Serum creatinine levels will be ordered daily for the first week of therapy and at least twice weekly for subsequent weeks.    5. [place] method utilized to dose vancomycin therapy: Method 2    James Bruce, PharmD, BCCCP

## 2020-09-15 NOTE — PROGRESS NOTES
CLINICAL NUTRITION SERVICES - REASSESSMENT NOTE     Nutrition Prescription    Recommendations:  Consider G-tube placement. Nasal FT has been in place for x4 weeks, however, is not suitable for long-term use.     Malnutrition Status:    Severe malnutrition in the context of acute on chronic illness     Interventions by Registered Dietitian (RD):  Restart Impact Peptide @ 10 ml/hr via NDT.   Adv by 10-15 ml q8h to goal as tolerated. Eventual goal rate @ 55 ml/hr.       EVALUATION OF THE PROGRESS TOWARD GOALS   Diet: NPO  Nutrition Support: Impact Peptide @ 55 ml/hr + Nutrisource fiber (1 pkt TID) via NDT to provide 1980 kcal (31 kcal/kg), 124 g protein (2 g/kg), 9 g fiber daily. Nephornex.   Intake: TF held over the weekend due to lack of enteral access. Received avg of 70-75% estimated minimum needs from TF over the past week. x2 episodes of emesis with TF resumption. TF currently held with D10% @ 25 ml/hr since. Not appropriate for diet due to neuro status.     NEW FINDINGS   Renal: iHD. K/Phos within normal limits.   GI: x1-3 stools/day. C. Diff negative on 9/3    MALNUTRITION  % Intake: Decreased intake does not meet criteria  % Weight Loss: None noted  Subcutaneous Fat Loss: Facial region: Moderate, Upper arm: Moderate, Lower arm: Moderate and Thoracic/intercostal: Moderate  Muscle Loss: Temporal: Moderate, Facial & jaw region: Moderate, Scapular bone: Mild, Upper arm (bicep, tricep): Moderate, Lower arm  (forearm): Moderate and Dorsal hand: Moderate  Fluid Accumulation/Edema: Moderate  Malnutrition Diagnosis: Severe malnutrition in the context of acute on chronic illness     Previous Goals   Total avg nutritional intake to meet a minimum of 25 kcal/kg and 1.5 g PRO/kg daily (per dosing wt 63 kg).  Evaluation: Not met    Previous Nutrition Diagnosis  Increased nutrient needs (protein)  Evaluation: No change, updated     CURRENT NUTRITION DIAGNOSIS  Inadequate protein-energy intake related to NPO dependent on  enteral nutrition support, held for lack of enteral access as evidenced by received av gof 70-75% estimated nutrition needs over the past week from TF.      INTERVENTIONS  Implementation  Collaboration with other providers    Goals  Total avg nutritional intake to meet a minimum of 25 kcal/kg and 1.5 g PRO/kg daily (per dosing wt 63 kg).    Monitoring/Evaluation  Progress toward goals will be monitored and evaluated per protocol.    Rosemary Wyatt RD, LD  [id number]  Pgr: [id number]

## 2020-09-15 NOTE — PROVIDER NOTIFICATION
CVTS notified after pt had large emesis at 0345 and writer noticed changed rhythm on monitor. 12 lead completed, showing acute MI/STEMI. Pt had also spiked a temp (100.1), WBC up to 27 and elevated lactic. Unable to reach CVTS; Cards 2 fellow at bedside to evaluate. Per C2 fellow, does not think pt having STEMI. Hepatic panel, troponin and blood cultures ordered. CVTS notified for troponin of 0.147.

## 2020-09-15 NOTE — PROGRESS NOTES
Nephrology Progress Note  09/15/2020         Mrs Jj is a 75 yof w/ICM, s/p CABG (4/20), severe MR/TR and known mural thrombus, acute kidney injry, hypothyroidism, CKD3, anxiety, GERD, chronic anemia.  Transferred from [facility] to [facility] on 8/8 after developing cardiac shock. Placed on IABP and then LVAD placement and TVR repair for cardiogenic shock on 8/19 /20.  Nephrology consulted for managign Oliguric LORI on CKD stage 3, started CRRT on 8/26.     Interval History :   Mrs Jj was seen on UF only run today, has had multiple tenuous runs in the past few days.  It is becoming clear that she may not be able to tolerate iHD in the hospital and it is even more difficult to see her stabilizing to the point of doing HD as an outpatient with an LVAD.  Pulling 500cc on UF only run today despite giving albumin, will discuss with team and family daily.  Per pt's 's request we called pt's daughter Norman and had ~30 minute conversation regarding the difficulties with HD and in general, they will discuss as a family what they would like to do.  Home hospice was something she brought up to allow the maximum amount of visitors, will let primary team know.        Assessment & Recommendations:   LORI on CKD-Baseline Cr ~1.5, small L kidney at 8.3cm, 10cm R kidney at baseline.  Started CRRT on 8/26 for management of volume and electrolytes in setting of cardiogenic shock, LVAD placed on 8/19.  Etiology of LORI is likely ATN from hypoperfusion, started CRRT on 8/26.               -Line is LIJ temp line from 9/1.  Having goals of care discussion.                -UF only run today, tenuous and only netting ~500cc.      -Had ~30 minute phone call with pt's daughter, not currently an outpt dialysis with LVAD and we do not anticipate recovery.  Family is considering home hospice and it would be prudent to arrange this sooner rather than later as she is barely tolerating UF only run today with albumin prime.                      Volume status-Still up ~6kg with significant edema and loss of muscle mass during course. UF only run today only netting 500cc due to up and down BP's.  May just be intravascularly on dry side due to low albumin.        Electrolytes/pH-K 3.8, bicarb 18, no acute issues, holding on running today.      Ca/phos/pth-Ca 8.1, Mg and Phos WNL last check.      ID-Fungal cultures +, ID consulted, new HD line placed, stopped TPN and started TF.  Cultures have been negative since 9/3.     Anemia-Hgb 9.5, has needed PRBC's intermittently with GIB.       Nutrition-Had been on TPN, off now and on Impact TF with fungemia.      Seen and discussed with Dr Holt     Recommendations were communicated to primary team via verbal communication    STEPHAN Faulkner CNS  Clinical Nurse Specialist  [phone]  Attestation:  This patient has been seen, examined and evaluated by me, Kriss Holt MD as a shared visit with the NP/PA above.    In brief:  Marquise Jj is a 75 year old female with severe CHF with LVAD, s/p stroke and dialysis dependence. Previously on CRRT but in the past 5 days have attempted IHD/UF.    I personally reviewed major complaints, physical findings, investigations, medications, lab values, vital signs, I/O's and the overall management plan.      My key findings:  1. Hypotension complicating dialysis  2. LIkely permanent renal dysfunction and dialysis dependence  3. Intolerance of intermittent therapies    Key management decisions made by me:  1. Dialysis: We have attempt several intermittent runs and today is a UF only run. With dialysate she immediately drops her blood pressures to below 80 systolic. On UF we are hovering in the 80 systolic range but are unable to introduce any UF and her crit line indicates no intravascular accessible volume. With albumin this improves although we were only able to net 500 ml's.    Spoke with her daughter Norman. The mortality on dialysis in  this situation is substantially less than 1 year and given her inability to tolerate any form of intermittent treatment she is not a candidate for outpatient therapies. In addition, she is largely bed ridden at this point and on an LVAD. There are only two units that can take an LVAD and these patients are otherwise independent. Norman, her Reyes, both have excellent questions regarding the appropriateness and role of dialysis given her current other medical issues. They expressed and interest in ceasing dialysis to bring her home for home hospice. We will await their final decision.     45 minutes was spent with the patient, speaking to family, directly observing and directing dialysis/UF therapy.     Kriss Holt MD   Date of service (date I saw patient) 15 sept 2020    Review of Systems:   I reviewed the following systems:  ROS not done due to neuro status.     Physical Exam:   I/O last 3 completed shifts:  In: 1481.25 [I.V.:681.25; NG/GT:425]  Out: 2210 [Urine:350; Emesis/NG output:400; Other:1150; Chest Tube:310]   BP (!) 86/72   Pulse 80   Temp 99.5  F (37.5  C) (Axillary)   Resp 17   Wt 67 kg (147 lb 11.3 oz)   SpO2 95%   BMI 22.46 kg/m       GENERAL APPEARANCE: Extubated, lethargic but responds to stimuli.   EYES:  No scleral icterus, pupils equal  HENT: mouth without ulcers or lesions  PULM: lungs clear to auscultation, equal air movement, no cyanosis or clubbing  CV: regular rhythm, normal rate, no rub     -JVP not elevated.      -edema +2 generalized.   GI: soft, nontender, +distended, bowel sounds are +  MS: no evidence of inflammation in joints, no muscle tenderness  NEURO: Lethargic, no focal deficits.   Lines LIJ temp line from 9/1    Labs:   All labs reviewed by me  Electrolytes/Renal -   Recent Labs   Lab Test 09/15/20  0849 09/15/20  0337 09/14/20  1334 09/14/20  0333 09/13/20  1256  09/11/20  0336  09/09/20  0342   NA  --  133 135 130* 139   < > 140   < > 142   POTASSIUM  --  3.8  --   3.6 3.9   < > 4.6   < > 4.5   CHLORIDE  --  104  --  98 111*   < > 109   < > 110*   CO2  --  18*  --  24 22   < > 23   < > 25   BUN  --  59*  --  51* 76*   < > 81*   < > 37*   CR  --  2.16*  --  1.78* 2.07*   < > 1.61*   < > 0.79   GLC  --  123*  --  486* 119*   < > 114*   < > 106*   FERNANDO  --  8.1*  --  7.8* 7.5*   < > 8.2*   < > 7.8*   MAG 1.7 1.8  --  1.8  --    < > 2.1   < > 2.6*   PHOS  --   --   --  4.0  --   --  3.7  --  3.8    < > = values in this interval not displayed.       CBC -   Recent Labs   Lab Test 09/15/20  0337 09/14/20  0333 09/13/20  1256   WBC 27.0* 12.8* 11.1*   HGB 9.5* 8.5* 6.8*    197 205       LFTs -   Recent Labs   Lab Test 09/15/20  0337 09/14/20  0333 09/09/20  0342   ALKPHOS 249* 249* 270*   BILITOTAL 0.7 0.7 0.8   ALT 41 36 28   AST 39 40 40   PROTTOTAL 5.2* 5.1* 4.8*   ALBUMIN 1.7* 1.5* 1.3*       Iron Panel -   Recent Labs   Lab Test 08/10/20  1052 07/01/20  0530   IRON 48 65   IRONSAT 15 17   HUSEYIN 165  --            Current Medications:    albumin human         albumin human         amiodarone  200 mg Oral or Feeding Tube Daily     artificial tears   Both Eyes 5x Daily     [Held by provider] aspirin  81 mg Oral or Feeding Tube Daily     B and C vitamin Complex with folic acid  5 mL Oral Daily     bimatoprost  1 drop Both Eyes At Bedtime     ceFEPIme (MAXIPIME) IV  1 g Intravenous Q24H     fiber modular (NUTRISOURCE FIBER)  1 packet Per Feeding Tube TID     gelatin absorbable  1 each Topical During Hemodialysis (from stock)     heparin lock flush  5-15 mL Intracatheter Q24H     lacosamide  200 mg Oral or Feeding Tube BID     latanoprost  1 drop Both Eyes Daily     levETIRAcetam  2,000 mg Oral or Feeding Tube BID     levothyroxine  62.5 mcg Oral or Feeding Tube QAM AC     metroNIDAZOLE  500 mg Intravenous Q6H     micafungin  150 mg Intravenous Q24H     miconazole   Topical BID     pantoprazole (PROTONIX) IV  40 mg Intravenous BID     rosuvastatin  20 mg Oral or Feeding Tube Daily      sodium chloride (PF)  10 mL Intracatheter Q8H     sodium chloride (PF)  3 mL Intracatheter Q8H     sodium chloride (PF)  9 mL Intracatheter During Hemodialysis (from stock)     sodium chloride (PF)  9 mL Intracatheter During Hemodialysis (from stock)     vancomycin (VANCOCIN) IV  1,250 mg Intravenous Once     vancomycin place vasquez - receiving intermittent dosing  1 each Does not apply See Admin Instructions       dextrose 1,000 mL (09/15/20 0820)     BETA BLOCKER NOT PRESCRIBED

## 2020-09-15 NOTE — PLAN OF CARE
Pt is non verbal, tracks, but does not follow command. Pt able to move RUE but not LUE. Pt code status changed to DNR/DNI by MD per family wishes.

## 2020-09-15 NOTE — PROGRESS NOTES
Cardiology Progress Note    Assessment & Plan   In summary, Marquise Jj is a 75-year-old female with a past medical history notable for coronary artery disease, ischemic cardiomyopathy, and known mural thrombus who was transferred from [facility] for further evaluation/treatment of cardiogenic shock. Balloon pump placed 8/14. HM3 placed 8/19. Chest closed 8/21. ICU course complicated by status eplilepticus, CVA, prolonged intubation, renal failure requiring CVVH/HD, bacteremia and candidemia.    Today's changes:  - WBC 27, febrile: BCx, UCx, Sputum Cx, c diff pcr, KUB  - HD today: 500cc fluid removed  - Continue Midodrine 10 mg on dialysis days   - Family meeting today: plan for home with Hospice per family wishes    Neuro:   # Sedation --> extubated, alert, remains minimally active with little improvement in mental status  # concern for seizure activity  CTH head 8/20 no signs of ICH   CTH head 8/27 Scattered areas of hypodensity with loss of gray-white matter differentiation in the left frontal lobe. These areas were not present on the CT dated 8/10/2020 and difficult compared to other prior comparison CTs due to extensive hardware artifact. These are suspicious for involving embolic infarcts.  keppra 2 g PO bid   vimpat 200 BID    Cardio:  # Cardiogenic shock with vasoplegic component    # ICM: NYHA IV / ACC  # Severe MR/TR s/p tricuspid valve repair with Elkins MC3 Annuloplasty Ring size T30  # s/p LVAD HM3 on 8/19/2020  LAVD speed 5400, flow 3.8-4.3, pi 2.2-5, power 3.8   Presents with cardiogenic shock. On NE, cardiac index approximately 1.37 on admission. Severely elevated biventricular filling pressures. Etiology of her decompensation appears to be progression of her cardiomyopathy. Despite medical treatment, she has been hospitalized twice since returning to [place], both with low output. No viability in her LAD territory, and doubt that PCI to her circumflex would make meaningful LV  recovery. Hemodynamics improved with dobutamine, did not tolerate attempt to wean inotropics. RHC removed on 8/13 after clotting off, replaced on 8/14 after patient with increased work of breathing. CI of 1.1, balloon pump placed with CI of 1.6-1.8 and improvement in symptoms/hemodynamics. Had LVAD HM3 placed on 8/19. CVP today around 10 range. Chest closed on 8/21.   -- warfarin with INR goal 1.8-2.2      Date 8/9 8/9 8/10 8/11 08/13/20 08/15/20 08/16/20 08/17/20 8/18/20 8/19 8/20 8/21*   CVP 12 9 3 4 8 9 6 8 11 5 10 13   Mean PA  35 30 21 25 23 21 20 35/18 35/18 - 30/12 28/14   PCWP  18 14 12 x 13 14  13 - -    Oxy HGB  59 64 61 61 60 47 62 69 54 - 68 66   CI/CO 2.3 2.3 2.4/4.2 2.3 2.2 1.8 2.8/4.9 4.1 4.3/2.5 - 6/3.3 6.9/3.8   SVR 1100 1100 1200 1400 1316 1900 5359 938 8377 - 1025 715   Device     IABP 1:1 IABP 1:1 IABP 1:1 IABP 1:1 IABP 1:1 No IABP No IABP No IABP   * epi 0.1, vaso 0.5     8/22: CVP 12, PA 28/14/19, PCWP 14, CO/CI 9.0/4.8. . Epi 0.1, vaso 1.  8/23: CVP 16, PA 30/14, CI/CO 4.4/8.0, , SvO2 71%  8/24: CVP 14, PA 44/20/28, PCWP 16, CI/CO 7.3/3.9, SvO2 68% epi 0.06, vaso 2  8/25: CVP 16, PA 44/20/29, PCWP 22, CI/CO  3.6, SvO2 82%, vaso 2, epi 0.03  8/26: CVP 13, PA 40/20/26, PCWP 12, CI/CO 7/ 3.6, SvO2 74%, vaso 1  8/27: CVP 13, PA 38/18/26, PCWP 12-14, CI/CO 9.4/4.7, , PVR ~0.5, SvO2 73%, vaso 1, epi 0.05     # CAD s/p CABG 4/2020 (KURT to RCA and LIMA to LAD) and PCI to RCA 7/20  - Stop home plavix po qd (8/13) for LVAD surgery  - aspirin 81mg PO qd   - c/w home crestor    # Mural thrombus  - removed during surgery on 8/19    # A-flutter  Converted to NSR on 8/15    - Continue amiodarone 200mg po qd      Pulm:  # Acute hypoxic respiratory failure  # Hemopneumothorax  Extubated on 9/1/2020. S/p IR for chest tube of Hemopneumothorax on 9/5 with 2.4L output immediately.     Renal/Electrolytes   # Hyperkalemia/Hypokalemia   # Acute kidney injury on chronic kidney disease, stage  III  Likely ATN due to hypoxic insult  - renal consult and diuretic challenge. Continue dialysis her nephrology.  - Trend renal labs  - Electrolyte replacement protocol  - Monitor UOP        GI:  #GIB  GI consulted and EGD on 8/28 with protuberant vessel injected and clipped and bleeding gastric ulcer with adherent clot, injected and clipped as well. Repeat bleed on 8/29-8/30, transfused and given pRBC, underwent repeat EGD, showed hematin throughout stomache with single bleeding angioectasia vs gastric erosion (from NGT) in stomach. Likely source of bleed and thought related to current bleeding episode. If recurrent signs of bleeding, GI recommends CLS and capsule study    # Constipation  # Severe protein calorie malnutrition  - Nutrition through tube feeds at 30 mL/hr    - Senna-docusate bid scheduled  - Miralax bid prn constipation    ID:   # Candidemia   # Methicillin resistant staph epidermidis  Currently C. Glabrata positive on 8/30, 9/1, 9/3 cultures.   Also growing MRSE on 9/3 and S. Capitis on 9/1  Current:  - Micafungin (9/1-**)  - Vanc (9/4-**)     Prior:  - Vancomycin (8/30-9/2, 8/19-8/21)  - Zosyn (8/30-9/2, 8/19-8/24)  - Rifampin (8/19-8/21)  - Fluconazole (8/19-8/21)  - ceftriaxone (8/14-8/18)    - ID Consult: treat as real candidemia and pull lines in place at time of positive culture as able and continue Micafungin  - C. Diff negative  - No evidence of ophthalmologic yeast involvement  - Pleural fluid labs sent per ID    Hem/Onc  # Mural thrombus removed on 8/19  # Anemia, mild  D/t frequent blood draws and procedures, and two large GI bleesd  - Monitor hgb  - PPI gtt --> IV BID    Endo  # Hypothyroidism   - Continue PTA levothyroxine     Nutrition: TFs  DVT Prophylaxis: mechanical   Code Status: Full Code    Waylon Garcia MD, Msc  Cardiovascular Disease Fellow  [facility] [place]          Interval History     Yesterday, 2.5 hours of HD with no real fluid removed. BP too low  and PI dropped to 1.6 when fluid removal attempted. Remains nonverbal today, alert, follows/tracks with eyes.       Physical Exam   Temp: (S) 100.1  F (37.8  C)(MD notified) Temp src: Axillary BP: (!) 104/95 Pulse: 98   Resp: 26 SpO2: 98 % O2 Device: None (Room air)    Vitals:    09/13/20 1330 09/14/20 0600 09/15/20 0000   Weight: 69.5 kg (153 lb 3.5 oz) 69.9 kg (154 lb 1.6 oz) 71.4 kg (157 lb 6.5 oz)     Vital Signs with Ranges  Temp:  [94.4  F (34.7  C)-100.1  F (37.8  C)] 100.1  F (37.8  C)  Pulse:  [] 98  Resp:  [12-28] 26  BP: ()/(26-95) 104/95  SpO2:  [93 %-100 %] 98 %  I/O last 3 completed shifts:  In: 1481.25 [I.V.:681.25; NG/GT:425]  Out: 2210 [Urine:350; Emesis/NG output:400; Other:1150; Chest Tube:310]    RETIRE: Heart Rate: 71, Blood pressure (!) 104/95, pulse 98, temperature (S) 100.1  F (37.8  C), temperature source Axillary, resp. rate 26, weight 71.4 kg (157 lb 6.5 oz), SpO2 98 %.  157 lbs 6.54 oz     GEN: alert, non to minimally verbal  CV: RRR, Hum of HM3   LUNGS: Clear to auscultation bilaterally  ABD: Active bowel sounds, soft, no abdominal tenderness.   EXT: Trace LE edema   NEURO: altert, tracks people/objects across room; responds to her name, husbands name, daughter's name. moves b/l LE and LUE to command    Medications     dextrose Stopped (09/14/20 2000)     BETA BLOCKER NOT PRESCRIBED         sodium chloride 0.9%  250 mL Intravenous Once in dialysis     sodium chloride 0.9%  300 mL Hemodialysis Machine Once     amiodarone  200 mg Oral or Feeding Tube Daily     artificial tears   Both Eyes 5x Daily     aspirin  81 mg Oral or Feeding Tube Daily     B and C vitamin Complex with folic acid  5 mL Oral Daily     bimatoprost  1 drop Both Eyes At Bedtime     busPIRone  10 mg Oral or Feeding Tube TID     fiber modular (NUTRISOURCE FIBER)  1 packet Per Feeding Tube TID     gelatin absorbable  1 each Topical During Hemodialysis (from stock)     heparin lock flush  5-15 mL Intracatheter  Q24H     lacosamide  200 mg Oral or Feeding Tube BID     latanoprost  1 drop Both Eyes Daily     levETIRAcetam  2,000 mg Oral or Feeding Tube BID     levothyroxine  62.5 mcg Oral or Feeding Tube QAM AC     micafungin  150 mg Intravenous Q24H     miconazole   Topical BID     - MEDICATION INSTRUCTIONS -   Does not apply Once     pantoprazole (PROTONIX) IV  40 mg Intravenous BID     rosuvastatin  20 mg Oral or Feeding Tube Daily     sodium chloride (PF)  10 mL Intracatheter Q8H     sodium chloride (PF)  3 mL Intracatheter Q8H     sodium chloride (PF)  9 mL Intracatheter During Hemodialysis (from stock)     sodium chloride (PF)  9 mL Intracatheter During Hemodialysis (from stock)       Data      .    Intake/Output Summary (Last 24 hours) at 9/14/2020 0824  Last data filed at 9/14/2020 0737  Gross per 24 hour   Intake 1890 ml   Output 470 ml   Net 1420 ml             Recent Labs   Lab 09/15/20  0337 09/14/20  1334 09/14/20  0333 09/13/20  1256 09/13/20  0349   WBC 27.0*  --  12.8* 11.1*  --    HGB 9.5*  --  8.5* 6.8*  --    MCV 93  --  92 95  --      --  197 205  --    INR 2.59*  --  2.05*  --  1.75*    135 130* 139  --    POTASSIUM 3.8  --  3.6 3.9  --    CHLORIDE 104  --  98 111*  --    CO2 18*  --  24 22  --    BUN 59*  --  51* 76*  --    CR 2.16*  --  1.78* 2.07*  --    ANIONGAP 11  --  8 6  --    FERNANDO 8.1*  --  7.8* 7.5*  --    *  --  486* 119*  --    ALBUMIN 1.7*  --  1.5*  --   --    PROTTOTAL 5.2*  --  5.1*  --   --    BILITOTAL 0.7  --  0.7  --   --    ALKPHOS 249*  --  249*  --   --    ALT 41  --  36  --   --    AST 39  --  40  --   --    TROPI 0.147*  --   --   --   --        Recent Results (from the past 24 hour(s))   XR Feeding Tube Placement    Addendum: 9/14/2020    I, MORENO RODRIGUEZ MD, attest that I was immediately available to  provide guidance and assistance during the entirety of the procedure.  The examination was performed portable in the ICU and therefore I was  not present  for tube placement.    MORENO RODRIGUEZ MD      Narrative    FEEDING TUBE PLACEMENT 9/14/2020 3:49 PM    INDICATION: Nutritional needs    COMPARISON: None.    FLUOROSCOPY TIME: 5.    FINDINGS: The feeding tube was advanced under fluoroscopic guidance  with final position of tip in the third portion of the duodenum. A  small amount of barium was injected to demonstrate placement within  the small bowel. The tube was flushed with sterile water and secured  via nasal bridle. There were no complications of the procedure.      Impression    IMPRESSION: Uncomplicated feeding tube placement with tip in the third  portion of the duodenum.    I have personally reviewed the examination and initial interpretation  and I agree with the findings.    MORENO RODRIGUEZ MD

## 2020-09-16 NOTE — PLAN OF CARE
Pt alert but does not follow command and is nonverbal. Pt up to chair x1 this shift and turned q2hr for comfort and skin integrity. Pt having loose stools. Rectal pouch placed. PIV removed per order and pt tolerated well.

## 2020-09-16 NOTE — PLAN OF CARE
Major Shift Events:      Neuro: No evidence of seizure activity noted by this writer overnight. micaela HANDLEY. Severe aphasia/mute, does not follow commands. Spontaneously moves LUE, withdraws other extremities to pain only.    Cardiac: At 21:00 Pi dropped to 1.7-2.0 and MAPs 60-65mmHg, MD notified and 12.5g 25% albumin given. Pi now 3.5-3.7. Flow 3.9-4.4, Pwr 3.8-3.9, Speed 5400. SR 90s with infrequent paced beats.     Resp: Pleural CT with 10-50/2hrs serosanguinous output.    GI/: TF increased to 35mL/hr with at goal of 55mL/hr.     Plan: Plan to discharge home on comfort care today.     For vital signs and complete assessments, please see documentation flowsheets.

## 2020-09-16 NOTE — PROGRESS NOTES
Cardiology Progress Note    Assessment & Plan   In summary, Marquise Jj is a 75-year-old female with a past medical history notable for coronary artery disease, ischemic cardiomyopathy, and known mural thrombus who was transferred from [facility] for further evaluation/treatment of cardiogenic shock. Balloon pump placed 8/14. HM3 placed 8/19. Chest closed 8/21. ICU course complicated by status eplilepticus, CVA, prolonged intubation, renal failure requiring CVVH/HD, bacteremia and candidemia. Lack of improvement in mental status improvement in addition to multiple co-morbidities and complications has led to initiation of palliative care discussions.     Today's changes:  - Palliative care consulted, appreciate recs  - No escalation in care, per family's wishes will be discharged home on hospice  - If family plans/chooses to turn off LVAD, this will result in patients nearly immediate death due to acute circulatory collapse      Neuro:   # Sedation --> extubated, alert, remains minimally active with little improvement in mental status  # concern for seizure activity  CTH head 8/20 no signs of ICH   CTH head 8/27 Scattered areas of hypodensity with loss of gray-white matter differentiation in the left frontal lobe. These areas were not present on the CT dated 8/10/2020 and difficult compared to other prior comparison CTs due to extensive hardware artifact. These are suspicious for involving embolic infarcts.      Cardio:  # Cardiogenic shock with vasoplegic component    # ICM: NYHA IV / ACC  # Severe MR/TR s/p tricuspid valve repair with Elkins MC3 Annuloplasty Ring size T30  # s/p LVAD HM3 on 8/19/2020  LAVD speed 5400, flow 3.8-4.3, pi 2.2-5, power 3.8   Presents with cardiogenic shock. On NE, cardiac index approximately 1.37 on admission. Severely elevated biventricular filling pressures. Etiology of her decompensation appears to be progression of her cardiomyopathy. Despite medical treatment, she  has been hospitalized twice since returning to [place], both with low output. No viability in her LAD territory, and doubt that PCI to her circumflex would make meaningful LV recovery. Hemodynamics improved with dobutamine, did not tolerate attempt to wean inotropics. RHC removed on 8/13 after clotting off, replaced on 8/14 after patient with increased work of breathing. CI of 1.1, balloon pump placed with CI of 1.6-1.8 and improvement in symptoms/hemodynamics. Had LVAD HM3 placed on 8/19. CVP today around 10 range. Chest closed on 8/21.   -- warfarin with INR goal 1.8-2.2      Date 8/9 8/9 8/10 8/11 08/13/20 08/15/20 08/16/20 08/17/20 8/18/20 8/19 8/20 8/21*   CVP 12 9 3 4 8 9 6 8 11 5 10 13   Mean PA  35 30 21 25 23 21 20 35/18 35/18 - 30/12 28/14   PCWP  18 14 12 x 13 14  13 - -    Oxy HGB  59 64 61 61 60 47 62 69 54 - 68 66   CI/CO 2.3 2.3 2.4/4.2 2.3 2.2 1.8 2.8/4.9 4.1 4.3/2.5 - 6/3.3 6.9/3.8   SVR 1100 1100 1200 1400 1316 1900 2645 865 1971 - 1025 715   Device     IABP 1:1 IABP 1:1 IABP 1:1 IABP 1:1 IABP 1:1 No IABP No IABP No IABP   * epi 0.1, vaso 0.5     8/22: CVP 12, PA 28/14/19, PCWP 14, CO/CI 9.0/4.8. . Epi 0.1, vaso 1.  8/23: CVP 16, PA 30/14, CI/CO 4.4/8.0, , SvO2 71%  8/24: CVP 14, PA 44/20/28, PCWP 16, CI/CO 7.3/3.9, SvO2 68% epi 0.06, vaso 2  8/25: CVP 16, PA 44/20/29, PCWP 22, CI/CO  3.6, SvO2 82%, vaso 2, epi 0.03  8/26: CVP 13, PA 40/20/26, PCWP 12, CI/CO 7/ 3.6, SvO2 74%, vaso 1  8/27: CVP 13, PA 38/18/26, PCWP 12-14, CI/CO 9.4/4.7, , PVR ~0.5, SvO2 73%, vaso 1, epi 0.05     # CAD s/p CABG 4/2020 (KURT to RCA and LIMA to LAD) and PCI to RCA 7/20  - Stop home plavix po qd (8/13) for LVAD surgery    # Mural thrombus  - removed during surgery on 8/19    # A-flutter  Converted to NSR on 8/15    - Continue amiodarone 200mg po qd      Pulm:  # Acute hypoxic respiratory failure  # Hemopneumothorax  Extubated on 9/1/2020. S/p IR for chest tube of Hemopneumothorax on 9/5 with 2.4L  output immediately.     Renal/Electrolytes   # Hyperkalemia/Hypokalemia   # Acute kidney injury on chronic kidney disease, stage III  Likely ATN due to hypoxic insult       GI:  #GIB  GI consulted and EGD on 8/28 with protuberant vessel injected and clipped and bleeding gastric ulcer with adherent clot, injected and clipped as well. Repeat bleed on 8/29-8/30, transfused and given pRBC, underwent repeat EGD, showed hematin throughout stomache with single bleeding angioectasia vs gastric erosion (from NGT) in stomach. Likely source of bleed and thought related to current bleeding episode. If recurrent signs of bleeding, GI recommends CLS and capsule study    # Constipation  # Severe protein calorie malnutrition    ID:   # Candidemia   # Methicillin resistant staph epidermidis  Currently C. Glabrata positive on 8/30, 9/1, 9/3 cultures.   Also growing MRSE on 9/3 and S. Capitis on 9/1  Current:  - Micafungin (9/1-**)  - Vanc (9/4-**)     Prior:  - Vancomycin (8/30-9/2, 8/19-8/21)  - Zosyn (8/30-9/2, 8/19-8/24)  - Rifampin (8/19-8/21)  - Fluconazole (8/19-8/21)  - ceftriaxone (8/14-8/18)    - ID Consult: treat as real candidemia and pull lines in place at time of positive culture as able and continue Micafungin  - C. Diff negative  - No evidence of ophthalmologic yeast involvement  - Pleural fluid labs sent per ID    Hem/Onc  # Mural thrombus removed on 8/19  # Anemia, mild  D/t frequent blood draws and procedures, and two large GI bleesd      Endo  # Hypothyroidism   - Continue PTA levothyroxine     Nutrition: TFs  DVT Prophylaxis: mechanical   Code Status: No CPR- Do NOT Intubate    Waylon Garcia MD, Msc  Cardiovascular Disease Fellow  [facility]          Interval History     No improvement in mental status.  Remains essentially anuric. Remains essentially non-communicative and non-verbal, unable to clearly express needs/desires.       Physical Exam   Temp: 98.5  F (36.9  C) Temp src:  Axillary BP: 107/77 Pulse: 103   Resp: 28 SpO2: 95 % O2 Device: None (Room air)    Vitals:    09/15/20 0929 09/15/20 1323 09/16/20 0000   Weight: 67 kg (147 lb 11.3 oz) 66.7 kg (147 lb 0.8 oz) 69.2 kg (152 lb 8.9 oz)     Vital Signs with Ranges  Temp:  [97.5  F (36.4  C)-98.9  F (37.2  C)] 98.5  F (36.9  C)  Pulse:  [] 103  Resp:  [16-28] 28  BP: ()/(40-99) 107/77  SpO2:  [87 %-100 %] 95 %  I/O last 3 completed shifts:  In: 1468 [I.V.:745; NG/GT:363]  Out: 755 [Other:500; Chest Tube:255]    RETIRE: Heart Rate: 71, Blood pressure 107/77, pulse 103, temperature 98.5  F (36.9  C), temperature source Axillary, resp. rate 28, weight 69.2 kg (152 lb 8.9 oz), SpO2 95 %.  152 lbs 8.93 oz     GEN: alert, non to minimally verbal  CV: RRR, Hum of HM3   LUNGS: Clear to auscultation bilaterally  ABD: Active bowel sounds, soft, no abdominal tenderness.   EXT: Trace LE edema   NEURO: altert, tracks people/objects across room; responds to her name, husbands name, daughter's name. moves b/l LE and LUE to command    Medications     dextrose Stopped (09/15/20 1415)     BETA BLOCKER NOT PRESCRIBED         amiodarone  200 mg Oral or Feeding Tube Daily     artificial tears   Both Eyes 5x Daily     [Held by provider] aspirin  81 mg Oral or Feeding Tube Daily     B and C vitamin Complex with folic acid  5 mL Oral Daily     bimatoprost  1 drop Both Eyes At Bedtime     ceFEPIme (MAXIPIME) IV  1 g Intravenous Q24H     fiber modular (NUTRISOURCE FIBER)  1 packet Per Feeding Tube TID     heparin lock flush  5-15 mL Intracatheter Q24H     latanoprost  1 drop Both Eyes Daily     levETIRAcetam  2,000 mg Oral or Feeding Tube BID     levothyroxine  62.5 mcg Oral or Feeding Tube QAM AC     metroNIDAZOLE  500 mg Intravenous Q6H     micafungin  150 mg Intravenous Q24H     miconazole   Topical BID     pantoprazole (PROTONIX) IV  40 mg Intravenous BID     rosuvastatin  20 mg Oral or Feeding Tube Daily     sodium chloride (PF)  10 mL  Intracatheter Q8H     sodium chloride (PF)  3 mL Intracatheter Q8H     vancomycin place vasquez - receiving intermittent dosing  1 each Does not apply See Admin Instructions       Data      .      Intake/Output Summary (Last 24 hours) at 9/16/2020 1454  Last data filed at 9/16/2020 1400  Gross per 24 hour   Intake 1490 ml   Output 195 ml   Net 1295 ml               Recent Labs   Lab 09/16/20  0323 09/15/20  1249 09/15/20  0849 09/15/20  0337 09/14/20  1334 09/14/20  0333   WBC 23.7*  --   --  27.0*  --  12.8*   HGB 8.5*  --   --  9.5*  --  8.5*   MCV 93  --   --  93  --  92     --   --  240  --  197   INR 2.45*  --   --  2.59*  --  2.05*     --   --  133 135 130*   POTASSIUM 3.6  --   --  3.8  --  3.6   CHLORIDE 104  --   --  104  --  98   CO2 22  --   --  18*  --  24   BUN 56*  --   --  59*  --  51*   CR 2.39*  --   --  2.16*  --  1.78*   ANIONGAP 10  --   --  11  --  8   FERNANDO 8.5  --   --  8.1*  --  7.8*   *  --   --  123*  --  486*   ALBUMIN  --   --   --  1.7*  --  1.5*   PROTTOTAL  --   --   --  5.2*  --  5.1*   BILITOTAL  --   --   --  0.7  --  0.7   ALKPHOS  --   --   --  249*  --  249*   ALT  --   --   --  41  --  36   AST  --   --   --  39  --  40   TROPI  --  0.170* 0.178* 0.147*  --   --        Recent Results (from the past 24 hour(s))   XR Chest Port 1 View    Narrative    Exam:  Chest X-ray 9/16/2020 12:50 AM    History: interval change    Comparison: 9/15/2020    Findings: Semiupright frontal radiograph of the chest. Unchanged lines  and tubes, left chest wall implantable cardiac defibrillator, and  LVAD. Right upper extremity PICC tip remains at the right axilla.  Trachea is midline. Stable cardiomediastinal silhouette. Loculated  right pleural effusion. Increased left pleural effusion and overlying  opacities. Similar appearance of the bilateral interstitial pulmonary  opacities. No pneumothorax.      Impression    Impression:   1. Increased layering left pleural effusion and  overlying opacities.  2. Similar appearance of the bilateral interstitial pulmonary  opacities, likely pulmonary edema.  3. Loculated right pleural effusion.  4. Unchanged support devices.    I have personally reviewed the examination and initial interpretation  and I agree with the findings.    BRIEN MILLAN MD

## 2020-09-16 NOTE — CONSULTS
Spoke with JOAO Austin, via phone and then with  Boogie and daughter Brenda with pt in her room. Explained hospice program, philosophy and services.  Family in agreement with hospice . Pt to be discharged home on 9/17/20 and has agreed to have  Hospice follow with hospice services.  No Equipment needed. Family has hospital bed at home. Comfort Medications have [facility] and are not to be filled through discharge pharmacy. Any other non-comfort meds can be filled and sent home with pt. Pt and family verbalized understanding that completion of the admission visit is scheduled for 9/17/20 at 1000. Pt/Family has the 24 hour phone number for [facility] for any questions or concerns. Care coordinated with Dr Perdomo, Brooklynn LAFLEUR, Dr Maxwell for hospice services.     *Please complete POLST and send with pt for transport.     Addendum, request that defibrillator be deactivated prior to discharge. Midline to be left in place, all other lines discontinued.      Thank you for this referral and opportunity to work with this family and the medical team,      Maliha Andrea RN PHN [facility]  Hospice Referral Specialist  [phone]  [email or web address]

## 2020-09-16 NOTE — PROGRESS NOTES
CVICU PROGRESS NOTE  September 16, 2020          CO-MORBIDITIES:   Cardiogenic shock (H)  (primary encounter diagnosis)  LV (left ventricular) mural thrombus  Heart failure (H)  Status post cardiac surgery    ASSESSMENT: Marquise Jj is a 75 year-old female with PMH notable for coronary artery disease s/p CABG (4/20), ischemic cardiomyopathy, severe MR/TR and known mural thrombus who is admitted to the CV ICU s/p redo sternotomy, LVAD (heartmate III), and TV repair on 8/19/20 with Dr. Farley. Chest was left open and packed. S/p chest closure and washout 08/21/20. Extubated 9/1/20. Found to have candidemia (+ culture x2) and methicillin resistant staph epidermidis on blood culture, ID following.     Patient's  expressed wanting to have a care conference today to discuss moving to [facility] as previously discussed earlier this week.     PLAN SUMMARY:   Palliative care consult, appreciate assistance   Imodium   Clarify family's wishes - likely arrange home hospice     PLAN:    Neuro/ pain/ sedation:  #Acute post-operative pain   #Likely anoxic brain injury; possible stroke  #Epilepsia Partialis Continua; resolved  - CTH head 8/27 Scattered areas of hypodensity with loss of gray-white matter differentiation in the left frontal lobe. These areas were not present on the CT dated 8/10/2020 and difficult compared to other prior comparison CTs due to extensive hardware artifact. These are suspicious for involving embolic infarcts.   - Monitor neurological status. Neurology consulted 9/1, based on age, critical illness and embolic infarcts do not anticipate full neurological recovery.    - Keppra  - Weaning vimpat   - Tylenol PRN  F/U with Neurocrit for assessment and prognosis.      Pulmonary:   #Acute hypoxic respiratory failure, improved  #R pleural effusion   #R Hemopneumothorax  - Monitor CXR, O2 saturation  - Right pleural (basilar) tube in place with 360 ml Serosang fluid. No air leak.   - Daily CXR  while tubes in place    Increased L side effusion on CXR today.    - ABx for aspiration pneumonia (details under ID)   - Remove R pleural chest tube prior to discharge - likely today     Cardiovascular:    #Acute on chronic decompensated heart failure with reduced ejection fraction: NYHA IV / ACC D  #Cardiogenic shock, possible vasoplegia   #Ischemic cardiomyopathy  #Coronary artery disease s/p CABG 4/20 in [place], s/p PCI to RCA 7/20  #Mural thrombus  #Severe MR/TR  #S/p LVAD - Cardiology following     MAP > 60, CVP 8-12    Continue to hold coumadin      rosuvastatin    Co-managing with Cards-2    Amiodarone 200 mg daily  - Holding ASA and coumadin due to GI bleed       GI/Nutrition:   # UGI bleed, resolved    - EGD 9/12/20 negative (may need capsule study)   - Continue tube feeds at goal  - Pantoprazole BID   - Bowel regimen on hold with ongoing stooling   Tube feeds - continue (at goal)      Renal/Fluid Balance/ Electrolytes:   #Oliguria in the setting of likely ATN  - Will monitor intake and output.  - ICU electrolyte replacement protocol  - Nephrology consulted; CRRT stopped 9/9.   - Started intermittent HD, last 9/12/20   - Follow up nephrology recs      Endocrine:    #Glycemic control  #Hx hypothyroid   - ISS, medium intensity - discontinued   - Synthroid daily        ID/ Antibiotics:  # Febrile, concern for post operative fevers, resolved  # Candidemia   # Methicillin resistant staph epidermidis  - Completed perioperative antibiotic regimen: vancomycin, levofloxacin, fluconazole, Zosyn   - ID Consult (fungemia), appreciate recs: will need a long course of antifungal therapy and probable life-long therapy  - Vanc (8/30-9/2, 9/5-9/11 ), Zosyn (8/30-9/2).   - To start Van/cefepime/flagyl for aspiration pneumonia coverage (9/15- )    - Micafungin (9/5- ) for yeast in blood for 6 weeks at least IV, maybe life long as per ID.   - No evidence of ophthalmologic yeast involvement      Heme:     #Acute perioperative  blood loss anemia  #Thrombocytopenia   - Hgb goal > 8  - Hold coumadin   - No heparin bridge   - ASA to hold       Prophylaxis:    - SCD  - Coumadin - held   - PPI  - Bowel regimen      MSK:    - PT and OT following      Lines/ tubes/ drains:  - LIJ CRRT line (9/1)   - Midline (9/3)   - PIV  - Chest tubes R pleural x1   - NJT (9/2)        Disposition:  - CVICU    Patient seen, findings and plan discussed with CVTS Fellow and CVICU staff Dr. Perry  -----------------------------------    Angela Sales   Surgery Resident   3030    ====================================    SUBJECTIVE:   Having loose stools. Otherwise appears comfortable. Family at the bedside.     OBJECTIVE:   1. VITAL SIGNS:   Temp:  [96.9  F (36.1  C)-98.9  F (37.2  C)] 98.3  F (36.8  C)  Pulse:  [76-93] 89  Resp:  [16-27] 26  BP: ()/(33-99) 90/78  SpO2:  [87 %-100 %] 100 %  Resp: 26      2. INTAKE/ OUTPUT:   I/O last 3 completed shifts:  In: 1468 [I.V.:745; NG/GT:363]  Out: 755 [Other:500; Chest Tube:255]    3. PHYSICAL EXAMINATION:   General: NAD, appears comfortable sitting in bed  Neuro: Moving all 4 extremities   HENT: feeding tube in place via nostril, nasal cannula oxygen  CV: LVAD humming.   Abdomen: Soft, Non-distended, Non-tender  Incisions: sternotomy wound c/d/i s/p closure, CT site with dressing WDI.   Extremities: warm and well perfused    4. INVESTIGATIONS:   Arterial Blood Gases   No lab results found in last 7 days.  Complete Blood Count   Recent Labs   Lab 09/16/20  0323 09/15/20  0337 09/14/20  0333 09/13/20  1256   WBC 23.7* 27.0* 12.8* 11.1*   HGB 8.5* 9.5* 8.5* 6.8*    240 197 205     Basic Metabolic Panel  Recent Labs   Lab 09/16/20  0323 09/15/20  0337 09/14/20  1334 09/14/20  0333 09/13/20  1256    133 135 130* 139   POTASSIUM 3.6 3.8  --  3.6 3.9   CHLORIDE 104 104  --  98 111*   CO2 22 18*  --  24 22   BUN 56* 59*  --  51* 76*   CR 2.39* 2.16*  --  1.78* 2.07*   * 123*  --  486* 119*     Liver  Function Tests  Recent Labs   Lab 09/16/20  0323 09/15/20  0337 09/14/20  0333 09/13/20  0349   AST  --  39 40  --    ALT  --  41 36  --    ALKPHOS  --  249* 249*  --    BILITOTAL  --  0.7 0.7  --    ALBUMIN  --  1.7* 1.5*  --    INR 2.45* 2.59* 2.05* 1.75*     Pancreatic Enzymes  No lab results found in last 7 days.  Coagulation Profile  Recent Labs   Lab 09/16/20  0323 09/15/20  0337 09/14/20  0333 09/13/20  0349 09/12/20  0401   INR 2.45* 2.59* 2.05* 1.75* 1.57*   PTT  --   --   --   --  36         5. RADIOLOGY:   Radiology and imaging reviewed

## 2020-09-16 NOTE — PROGRESS NOTES
LVAD Social Work Service Discharge Note      Patient Name:  Marquise Jj     Anticipated Discharge Date:  9/17/2020 (Thursday)    Discharge Disposition:   Home w/ [facility]     Additional Services/Equipment Arranged:  HE stretcher transport arranged for 900. Hospice RN will meet pt at 10am.      Persons notified of above discharge plan:  [facility] Liaison ([phone]), CVTS, VAD coordinator, bedside RN pt's dtrNorman, via phone ([phone])    Patient / Family response to discharge plan:  Pt's family is eager to get pt home. Pt's dtr, Norman, whom is also medical POA has been involved in discussions around hospice and discharge planning.     Education and resources provided by  at discharge: None

## 2020-09-16 NOTE — PROGRESS NOTES
Nephrology Progress Note  09/16/2020       Mrs Jj is a 75 yof w/ICM, s/p CABG (4/20), severe MR/TR and known mural thrombus, acute kidney injry, hypothyroidism, CKD3, anxiety, GERD, chronic anemia.  Transferred from [facility] to [facility] on 8/8 after developing cardiac shock. Placed on IABP and then LVAD placement and TVR repair for cardiogenic shock on 8/19 /20.  Nephrology consulted for managign Oliguric LORI on CKD stage 3, started CRRT on 8/26.     Interval History :   Mrs Jj had UF only run yesterday for 3h which only netted 500cc of UF due to instability despite albumin.  Goals are switching to getting home on hospice, will continue to follow labs to see if she needs anything corrected before leaving (likely tomorrow).  We discussed with family and answered their questions at this time.       Assessment & Recommendations:   LORI on CKD-Baseline Cr ~1.5, small L kidney at 8.3cm, 10cm R kidney at baseline.  Started CRRT on 8/26 for management of volume and electrolytes in setting of cardiogenic shock, LVAD placed on 8/19.  Etiology of LORI is likely ATN from hypoperfusion, started CRRT on 8/26.               -Line is LIJ temp line from 9/1.  Having goals of care discussion.                -Holding on run today with no issues pushing for it.  Will eval daily, goal is to go home on hospice, will see if HD run prior to discharge fits into this plan but likely can hold if there are no electrolyte issues.                    Volume status-Still up ~6kg with significant edema and loss of muscle mass during course. UF only run yesterday only netting 500cc due to up and down BP's.  May just be intravascularly on dry side due to low albumin.        Electrolytes/pH-K 3.6, bicarb 22, no acute issues, holding on running today.      Ca/phos/pth-Ca 8.5, Mg and Phos WNL last check.      ID-Fungal cultures +, ID consulted, new HD line placed, stopped TPN and started TF.  Cultures have been negative since 9/3.     Anemia-Hgb 8.5, has  needed PRBC's intermittently with GIB.       Nutrition-Had been on TPN, off now and on Impact TF with fungemia.      Seen and discussed with Dr Holt     Recommendations were communicated to primary team via verbal communication    STEPHAN Faulkner CNS  Clinical Nurse Specialist  [phone]  Attestation:  This patient has been seen, examined and evaluated by me, Kriss Holt MD as a shared visit with the NP/PA above.    In brief:  Marquise Jj is a 75 year old female with ESKD, CHF on LVAD, s/p stroke. Daughter and son are present today.     I personally reviewed major complaints, physical findings, investigations, medications, lab values, vital signs, I/O's and the overall management plan.      Discussed her renal care with Norman this am. They plan to take her home on hospice. I notified her that I didn't plan on doing dialysis today as yesterday we were unable to successfully remove fluid. They are ok with that.     Kriss Holt MD   Date of service (date I saw patient) 16 sept 2020    Review of Systems:   I reviewed the following systems:  ROS not done due to neuro status.     Physical Exam:   I/O last 3 completed shifts:  In: 1468 [I.V.:745; NG/GT:363]  Out: 755 [Other:500; Chest Tube:255]   /88   Pulse 92   Temp 98.3  F (36.8  C)   Resp 24   Wt 69.2 kg (152 lb 8.9 oz)   SpO2 100%   BMI 23.20 kg/m       GENERAL APPEARANCE: Extubated, lethargic but responds to stimuli.   EYES:  No scleral icterus, pupils equal  HENT: mouth without ulcers or lesions  PULM: lungs clear to auscultation, equal air movement, no cyanosis or clubbing  CV: regular rhythm, normal rate, no rub     -JVP not elevated.      -edema +2 generalized.   GI: soft, nontender, +distended, bowel sounds are +  MS: no evidence of inflammation in joints, no muscle tenderness  NEURO: Lethargic, no focal deficits.   Lines LIJ temp line from 9/1    Labs:   All labs reviewed by me  Electrolytes/Renal -   Recent Labs    Lab Test 09/16/20  0323 09/15/20  0849 09/15/20  0337 09/14/20  1334 09/14/20  0333  09/11/20  0336     --  133 135 130*   < > 140   POTASSIUM 3.6  --  3.8  --  3.6   < > 4.6   CHLORIDE 104  --  104  --  98   < > 109   CO2 22  --  18*  --  24   < > 23   BUN 56*  --  59*  --  51*   < > 81*   CR 2.39*  --  2.16*  --  1.78*   < > 1.61*   *  --  123*  --  486*   < > 114*   FERNANDO 8.5  --  8.1*  --  7.8*   < > 8.2*   MAG 1.9 1.7 1.8  --  1.8   < > 2.1   PHOS 4.1  --   --   --  4.0  --  3.7    < > = values in this interval not displayed.       CBC -   Recent Labs   Lab Test 09/16/20  0323 09/15/20  0337 09/14/20  0333   WBC 23.7* 27.0* 12.8*   HGB 8.5* 9.5* 8.5*    240 197       LFTs -   Recent Labs   Lab Test 09/15/20  0337 09/14/20  0333 09/09/20  0342   ALKPHOS 249* 249* 270*   BILITOTAL 0.7 0.7 0.8   ALT 41 36 28   AST 39 40 40   PROTTOTAL 5.2* 5.1* 4.8*   ALBUMIN 1.7* 1.5* 1.3*       Iron Panel -   Recent Labs   Lab Test 08/10/20  1052 07/01/20  0530   IRON 48 65   IRONSAT 15 17   HUSEYIN 165  --            Current Medications:    amiodarone  200 mg Oral or Feeding Tube Daily     artificial tears   Both Eyes 5x Daily     [Held by provider] aspirin  81 mg Oral or Feeding Tube Daily     B and C vitamin Complex with folic acid  5 mL Oral Daily     bimatoprost  1 drop Both Eyes At Bedtime     ceFEPIme (MAXIPIME) IV  1 g Intravenous Q24H     fiber modular (NUTRISOURCE FIBER)  1 packet Per Feeding Tube TID     heparin lock flush  5-15 mL Intracatheter Q24H     latanoprost  1 drop Both Eyes Daily     levETIRAcetam  2,000 mg Oral or Feeding Tube BID     levothyroxine  62.5 mcg Oral or Feeding Tube QAM AC     metroNIDAZOLE  500 mg Intravenous Q6H     micafungin  150 mg Intravenous Q24H     miconazole   Topical BID     pantoprazole (PROTONIX) IV  40 mg Intravenous BID     rosuvastatin  20 mg Oral or Feeding Tube Daily     sodium chloride (PF)  10 mL Intracatheter Q8H     sodium chloride (PF)  3 mL Intracatheter  Q8H     vancomycin place vasquez - receiving intermittent dosing  1 each Does not apply See Admin Instructions       dextrose Stopped (09/15/20 0925)     BETA BLOCKER NOT PRESCRIBED

## 2020-09-16 NOTE — PROGRESS NOTES
EEG CLINICAL NEUROPHYSIOLOGY PRELIMINARY REPORT    EEG through 9 AM today reviewed. Not on sedative drips. Continues with evidence of moderate to severe diffuse encephalopathy. No epileptiform activity, periodic activity, or seizures. Specifically, the occipital seizures noted in previous recordings are not seen in this study.    Full report to follow.    Marvel Wright MD  Pager [phone]

## 2020-09-16 NOTE — PROGRESS NOTES
CVTS PROGRESS NOTE  September 16, 2020          CO-MORBIDITIES:   Cardiogenic shock (H)  (primary encounter diagnosis)  LV (left ventricular) mural thrombus  Heart failure (H)  Status post cardiac surgery    ASSESSMENT: Marquise Jj is a 75 year-old female with PMH notable for coronary artery disease s/p CABG (4/20), ischemic cardiomyopathy, severe MR/TR and known mural thrombus who is admitted to the CV ICU s/p redo sternotomy, LVAD (heartmate III), and TV repair on 8/19/20 with Dr. Farley. Chest was left open and packed. S/p chest closure and washout 08/21/20. Extubated 9/1/20. Found to have candidemia (+ culture x2) and methicillin resistant staph epidermidis on blood culture, ID following.     Patient's  expressed wanting to have a care conference today to discuss moving to Hospice Care as previously discussed earlier this week.     PLAN SUMMARY:   Palliative care consult, appreciate assistance   Imodium   Clarify family's wishes - likely arrange home hospice     PLAN:    Neuro/ pain/ sedation:  #Acute post-operative pain   #Likely anoxic brain injury; possible stroke  #Epilepsia Partialis Continua; resolved  - CTH head 8/27 Scattered areas of hypodensity with loss of gray-white matter differentiation in the left frontal lobe. These areas were not present on the CT dated 8/10/2020 and difficult compared to other prior comparison CTs due to extensive hardware artifact. These are suspicious for involving embolic infarcts.   - Monitor neurological status. Neurology consulted 9/1, based on age, critical illness and embolic infarcts do not anticipate full neurological recovery.    - Keppra  - Weaning vimpat   - Tylenol PRN  F/U with Neurocrit for assessment and prognosis.      Pulmonary:   #Acute hypoxic respiratory failure, improved  #R pleural effusion   #R Hemopneumothorax  - Monitor CXR, O2 saturation  - Right pleural (basilar) tube in place with 360 ml Serosang fluid. No air leak.   - Daily CXR  while tubes in place    Increased L side effusion on CXR today.    - ABx for aspiration pneumonia (details under ID)   - Remove R pleural chest tube prior to discharge - likely today     Cardiovascular:    #Acute on chronic decompensated heart failure with reduced ejection fraction: NYHA IV / ACC D  #Cardiogenic shock, possible vasoplegia   #Ischemic cardiomyopathy  #Coronary artery disease s/p CABG 4/20 in [place], s/p PCI to RCA 7/20  #Mural thrombus  #Severe MR/TR  #S/p LVAD - Cardiology following     MAP > 60, CVP 8-12    Continue to hold coumadin      rosuvastatin    Co-managing with Cards-2    Amiodarone 200 mg daily  - Holding ASA and coumadin due to GI bleed       GI/Nutrition:   # UGI bleed, resolved    - EGD 9/12/20 negative (may need capsule study)   - Continue tube feeds at goal  - Pantoprazole BID   - Bowel regimen on hold with ongoing stooling   Tube feeds - continue (at goal)      Renal/Fluid Balance/ Electrolytes:   #Oliguria in the setting of likely ATN  - Will monitor intake and output.  - ICU electrolyte replacement protocol  - Nephrology consulted; CRRT stopped 9/9.   - Started intermittent HD, last 9/12/20   - Follow up nephrology recs      Endocrine:    #Glycemic control  #Hx hypothyroid   - ISS, medium intensity - discontinued   - Synthroid daily        ID/ Antibiotics:  # Febrile, concern for post operative fevers, resolved  # Candidemia   # Methicillin resistant staph epidermidis  - Completed perioperative antibiotic regimen: vancomycin, levofloxacin, fluconazole, Zosyn   - ID Consult (fungemia), appreciate recs: will need a long course of antifungal therapy and probable life-long therapy  - Vanc (8/30-9/2, 9/5-9/11 ), Zosyn (8/30-9/2).   - To start Van/cefepime/flagyl for aspiration pneumonia coverage (9/15- )    - Micafungin (9/5- ) for yeast in blood for 6 weeks at least IV, maybe life long as per ID.   - No evidence of ophthalmologic yeast involvement      Heme:     #Acute perioperative  blood loss anemia  #Thrombocytopenia   - Hgb goal > 8  - Hold coumadin   - No heparin bridge   - ASA to hold       Prophylaxis:    - SCD  - Coumadin - held   - PPI  - Bowel regimen      MSK:    - PT and OT following      Lines/ tubes/ drains:  - LIJ CRRT line (9/1)   - Midline (9/3)   - PIV  - Chest tubes R pleural x1   - NJT (9/2)        Disposition:  - CVICU    Patient seen, findings and plan discussed with CVTS Fellow and CVICU staff Dr. Perry  -----------------------------------    Angela Sales   Surgery Resident   [id number]    ====================================    SUBJECTIVE:   Having loose stools. Otherwise appears comfortable. Family at the bedside.     OBJECTIVE:   1. VITAL SIGNS:   Temp:  [96.9  F (36.1  C)-98.9  F (37.2  C)] 98.3  F (36.8  C)  Pulse:  [76-93] 92  Resp:  [16-27] 24  BP: ()/(33-99) 103/88  SpO2:  [87 %-100 %] 100 %  Resp: 24      2. INTAKE/ OUTPUT:   I/O last 3 completed shifts:  In: 1468 [I.V.:745; NG/GT:363]  Out: 755 [Other:500; Chest Tube:255]    3. PHYSICAL EXAMINATION:   General: NAD, appears comfortable sitting in bed  Neuro: Moving all 4 extremities   HENT: feeding tube in place via nostril, nasal cannula oxygen  CV: LVAD humming.   Abdomen: Soft, Non-distended, Non-tender  Incisions: sternotomy wound c/d/i s/p closure, CT site with dressing WDI.   Extremities: warm and well perfused    4. INVESTIGATIONS:   Arterial Blood Gases   No lab results found in last 7 days.  Complete Blood Count   Recent Labs   Lab 09/16/20  0323 09/15/20  0337 09/14/20  0333 09/13/20  1256   WBC 23.7* 27.0* 12.8* 11.1*   HGB 8.5* 9.5* 8.5* 6.8*    240 197 205     Basic Metabolic Panel  Recent Labs   Lab 09/16/20  0323 09/15/20  0337 09/14/20  1334 09/14/20  0333 09/13/20  1256    133 135 130* 139   POTASSIUM 3.6 3.8  --  3.6 3.9   CHLORIDE 104 104  --  98 111*   CO2 22 18*  --  24 22   BUN 56* 59*  --  51* 76*   CR 2.39* 2.16*  --  1.78* 2.07*   * 123*  --  486* 119*     Liver  Function Tests  Recent Labs   Lab 09/16/20  0323 09/15/20  0337 09/14/20  0333 09/13/20  0349   AST  --  39 40  --    ALT  --  41 36  --    ALKPHOS  --  249* 249*  --    BILITOTAL  --  0.7 0.7  --    ALBUMIN  --  1.7* 1.5*  --    INR 2.45* 2.59* 2.05* 1.75*     Pancreatic Enzymes  No lab results found in last 7 days.  Coagulation Profile  Recent Labs   Lab 09/16/20  0323 09/15/20  0337 09/14/20  0333 09/13/20  0349 09/12/20  0401   INR 2.45* 2.59* 2.05* 1.75* 1.57*   PTT  --   --   --   --  36         5. RADIOLOGY:   Radiology and imaging reviewed

## 2020-09-16 NOTE — CONSULTS
[facility]  Palliative Care Consultation Note    Patient: Marquise Jj  Date of Admission:  8/8/2020    Requesting Clinician / Team: CVTS  Reason for consult: Symptom management  Patient and family support  Transition to hospice    Recommendations:    Recommend removing temporary L IJ dialysis catheter     Recommend removing NJ tube    Recommend removing vEEG leads    Recommend removal of R side chest tube       Please leave midline in place on discharge for IV premedication prior to turning off LVAD, it would not cause patient appreciable discomfort to leave midline in place at discharge.      On discharge, recommend starting scheduled lorazepam oral solution 1mg SL Q6hrs for seizure prevention as patient will not be able to take antiepileptic medications on discharge      Family is in agreement with stopping tube feeding, IV antibiotics, seizure medications    Hospice PRN orders:    - Tylenol 650 mg suppository per rectum Q4hrs PRN for fever  - Lorazepam oral solution 0.5-1.0mg sublingual Q4hrs PRN for anxiety/restlessness   - Atropine sulfate 1% opth solution 1-2 drops sublingually Q2hrs PRN for secretions  - Haldol oral solution 1-2 mg sublingually Q6hrs PRN for nausea, agitation or delirium   - Dilaudid oral solution 2-4mg sublingually Q3 hrs PRN for pain, air hunger/dyspnea  - Rectal diazepam 20mg per rectum Q1hr PRN for seizure lasting more than 2 minutes, repeat hourly until seizure stops--please order 10 tabs.    Please order a 7 day supply of comfort medications and fill these at the hospital pharmacy prior to discharge to home with hospice.     These recommendations have been discussed with patient's family (healthcare POA), nurse and primary team in person and by phone.      Thank you for the opportunity to participate in the care of this patient and family. Our team: will continue to follow.     During regular M-F work hours -- if you are not sure  who specifically to contact -- please contact us by sending a text page to our team consult pager at [phone].    After regular work hours and on weekends/holidays, you can call our answering service at [phone]. Also, who's on call for us is available in [employer] [employer].     Total time spent was 75 minutes,  >50% of time was spent counseling and/or coordination of care regarding goals of care, decision support, support with coping, symptom management, planning and assistance with transition to home hospice.    Alissa Lee MD / Palliative Medicine / Pager [phone] / [facility] Inpatient Team Consult Pager [phone] (answered 8am-430pm M-F) - ok to text page via [employer] / After-Hours Answering Service [phone] / Palliative Clinic in the [facility] at the [facility] [place] - [phone] (scheduling); [phone] (triage).        Assessments:  Marquise Jj is a 75 year old female with PMH that includes CAD s/p CABG (4/20), ICM, severe MR/TR and known mural thrombus who was admitted to the CV ICU s/p redo sternotomy, LVAD (heartmate III), and TV repair on 8/19/20 with complicated post op course including infection with candidemia and bacteremia, and post-procedure refractory encephalopathy, focal seizures and non-convulsive status, and LORI-on-CKD now dialysis dependent but unable to tolerate dialysis due to hemodynamic instability. Care conference held earlier this week and family elected to transition to comfort-focused cares and wish to discharge home with hospice.     I spoke at length with Norman bedside today, she noted that she and her four siblings and her dad all in agreement with transition to comfort cares and discharge to home with hospice. Norman was aware that time is likely short (days to a week or so) without dialysis continuing. We talked about what end of life with renal failure and cessation of dialysis would likely look like. We also talked about the option of turning off  Marquise's LVAD at home with hospice and Norman notes that she and her family have talked about this and likely would want to turn the LVAD off at home with Marquise's family with her when Marquise is no longer wakeful.    Today, the patient was seen for:  Goals of care  Transition to hospice  Symptom management  LVAD placement recently with complicated post-op course  H/o seizures   Refractory encephalopathy  HD-dependent renal failure    Prognosis, Goals, & Planning:      Functional Status just prior to hospitalization: not discussed in detail today, please see prior LVAD assessment completed with Palliative Care team.      Prognosis, Goals, and/or Advance Care Planning were addressed today: Yes        Summary/Comments: see above           Patient has decision-making capacity today for complex decisions: No            I have concerns about the patient/family's health literacy today: No           Patient has a completed Health Care Directive: Yes, and on file.      Code status: No CPR / No Intubation    Coping, Meaning, & Spirituality:   Mood, coping, and/or meaning in the context of serious illness were addressed today: Yes  Summary/Comments: family is appropriately sad but at peace with their decision and are looking forward to bringing Marquise home to be with her family.    Social:     Will return to home in [place] with family, she has a spouse, 5 children, 19 grandchildren (all of whom can be present except one grandson in ).    History of Present Illness:  History gathered today from: patient, medical chart, medical team members, unit team members    Marquise Jj is a 75 year old female with PMH that includes CAD s/p CABG (4/20), ICM, severe MR/TR and known mural thrombus who was admitted to the CV ICU s/p redo sternotomy, LVAD (heartmate III), and TV repair on 8/19/20 with complicated post op course including infection with candidemia and bacteremia, and post-procedure refractory encephalopathy, focal  "seizures and non-convulsive status. Care conference held earlier this week and family elected to transition to comfort-focused cares and wish to discharge home with hospice.     Spoke at length with patient's daughter Norman, see above for details. At this time, Norman feels her mom is comfortable and does not seem to be in pain or SOB, although Norman would like an option for pain management for her mom's \"back pain\" (question if this is for patient's known pressure wounds).     Family is at peace with transition to comfort-focused cares and bringing Marquise home with hospice.     Key Palliative Symptom Data:  # Pain severity the last 12 hours: low  # Dyspnea severity the last 12 hours: none  # Nausea severity the last 12 hours: none  # Anxiety severity the last 12 hours: none    Patient is on opioids: bowels not assessed today.    ROS:  Comprehensive ROS is not able to be completed as patient is not responsive.     Past Medical History:  Past Medical History:   Diagnosis Date     CAD (coronary artery disease)      ICD (implantable cardioverter-defibrillator) in place     Primary prevention AICD placed in [place] in May 2020.     Ischemic cardiomyopathy     LVEF less than 20%.     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     Status post coronary artery bypass grafting     Done in [facility] in April 2020.  LIMA to diagonal (as LAD dissected) and KURT to the right coronary artery.        Past Surgical History:  Past Surgical History:   Procedure Laterality Date     ARTHROPLASTY REVISION HIP Right 11/16/2017    Procedure: ARTHROPLASTY REVISION HIP;  Right total hip arthroplasty revision.;  Surgeon: Jozef Garcia MD;  Location: [facility] OR     cabg  04/2020     CV HEART CATHETERIZATION WITH POSSIBLE INTERVENTION N/A 7/2/2020    Procedure: Heart Catheterization with Possible Intervention;  Surgeon: Kaden Franco MD;  [facility]: [facility]" HEART CARDIAC CATH LAB     CV INTRA-AORTIC BALLOON PUMP INSERTION N/A 8/14/2020    Procedure: Intra-Aortic Balloon Pump Insertion;  Surgeon: Cale Armijo MD;  Location:  HEART CARDIAC CATH LAB     CV PCI STENT DRUG ELUTING N/A 7/2/2020    Procedure: Percutaneous Coronary Intervention Stent Drug Eluting;  Surgeon: Kaden Franco MD;  Location:  HEART CARDIAC CATH LAB     CV RIGHT HEART CATH N/A 7/2/2020    Procedure: Right Heart Cath;  Surgeon: Kaden Franco MD;  Location:  HEART CARDIAC CATH LAB     CV RIGHT HEART CATH N/A 8/14/2020    Procedure: Right Heart Cath with leave in [place];  Surgeon: Jose Padilla MD;  Location:  HEART CARDIAC CATH LAB     ESOPHAGOSCOPY, GASTROSCOPY, DUODENOSCOPY (EGD), COMBINED N/A 9/12/2020    Procedure: ESOPHAGOGASTRODUODENOSCOPY (EGD);  Surgeon: Caesar Sparks MD;  Location:  GI     INSERT VENTRICULAR ASSIST DEVICE LEFT (HEARTMATE II) N/A 8/19/2020    Procedure: Redo Sternotomy, On Cardiopulmonary Bypass, Insertion of Left Ventricular Assist Device (HeartMate III), Tricuspid Valve Repair with [place] MC3 Annuloplasty Ring size T30, Left Ventricular Thrombectomy.;  Surgeon: Catarino Farley MD;  Location:  OR     IR CHEST TUBE PLACEMENT NON-TUNNELED RIGHT  9/2/2020     IRRIGATION AND DEBRIDEMENT CHEST WASHOUT, COMBINED N/A 8/21/2020    Procedure: IRRIGATION AND DEBRIDEMENT, CHEST POSSIBLE CLOSURE;  Surgeon: Live Claudio MD;  Location:  OR         Family History:  Family History   Problem Relation Age of Onset     Coronary Artery Disease No family hx of      Heart Failure No family hx of          Allergies:  Allergies   Allergen Reactions     Combigan [Brimonidine Tartrate-Timolol] Swelling     Swollen eyelids     Ativan [Lorazepam] Anxiety and Other (See Comments)     Increased confusion        Medications:  I have reviewed this patient's medication profile and medications from this hospitalization.     Physical Exam:  Vital Signs: Temp: 98.3   F (36.8  C) Temp src: Axillary BP: 103/88 Pulse: 92   Resp: 24 SpO2: 100 % O2 Device: None (Room air)    Weight: 152 lbs 8.93 oz   Gen: NAD, sitting up in a chair, nonfocal gaze, does not make attempt to communicate or respond, eyes do not track  HEENT: normocephalic, no scleral icterus, no perioral lesions, oral mucosa moist  CV: loud hum of LVAD audible on auscultation  Pulm: good air movement bilaterally without wheezing  Abd: soft, +bs, nondistended  LE: less than 1+ edema bilaterally  Skin: no rash on limited exam  Neuro: alert but nonfocal gaze and no attempts to communicate or respond to commands or questions from family or provider, unresponsive  Psych: no agitation      Data reviewed:  Recent imaging reviewed.    Recent lab data reviewed.

## 2020-09-16 NOTE — PROGRESS NOTES
EEG CLINICAL NEUROPHYSIOLOGY PRELIMINARY REPORT    First hour of video-EEG reviewed. As best as I can determine from EMR patient not currently being treated with sedative drips. Moderate amounts of irregular delta and theta. No posterior dominant rhythm. No reactivity. No periodic activity or seizures.    Study consistent with moderate to severe diffuse encephalopathy. Thus far no seizures. Will continue video-EEG monitoring. Full report to follow.    Marvel Wright MD  Pager [phone]

## 2020-09-16 NOTE — PROGRESS NOTES
LVAD Social Work Services Progress Note      Date of Initial Social Work Evaluation: 8/12/2020  Collaborated with:  Hospice Medical Director, Cards 2 and pt's dtr, Norman, at bedside    Data: Pt is s/p LVAD implant and TV repair on 8/19/2020. Pt's post-op course has been complicated by seizures, stroke, LORI and bacterial and fungal infections. Pt was extubated on 9/1/2020. Yesterday, pt's family informed that pt was not tolerating HD and at that point decided to transition to a comfort care focus with goal to get pt home on hospice.   Discussed situation with Dr. Perdomo,  Hospice Medical Director, and would like Palliative Care to be involved to help with transition to home hospice. Discussed w/ Cards 2 and they are going to put in orders.   Spoke to  Hospice liaison ([phone]) and waiting to hear back on plan after Palliative has seen pt and given recs.     Intervention: Support with end of life discharge planning     Assessment:  Met w/ pt's dtr and she is still feeling comfortable with plan for comfort care and wants to get pt home on hospice as soon as possible. She reports that they have a hospital bed at home so this does not need to be ordered. Family is getting opportunity to be at bedside within current visitor policy guidelines. Dtr is aware that discharge likely will not occur today as we try to get all the pieces of the discharge finalized.   Education provided by SW: Ongoing Social Work support  Plan:    Discharge Plans in Progress:  Hospice working on discharge to home   Palliative Care to see pt    Barriers to d/c plan: None anticipated     Follow up Plan: SW to continue to follow.

## 2020-09-17 NOTE — PROGRESS NOTES
D:  Pt going home with hospice.  Plan to turn off VAD with family gathered.  Pt's  and daughter Norman at the the bedside.  Hospice intake nurse also at bedside.  I:  Education provided on battery and MPU.  Demonstrated power connections.  Reviewed power disconnect alarm and hazard alarms.  Demo and instructions given on disconnecting VAD. Instructed on how and when to page VAD Coordinator on call.  A:   and daughter verbalized understanding.  P:  VAD Coordinator available for questions or concerns.

## 2020-09-17 NOTE — PLAN OF CARE
Major Shift Events:  Hemodynamically and neurologically stable overnight. L internal jugular HD catheter removed by MD.     Plan: Per MD given 0800 medications and then remove the NJ feeding tube. Plan to discharge home to hospice care at 0900.     For vital signs and complete assessments, please see documentation flowsheets.

## 2020-09-17 NOTE — PLAN OF CARE
Occupational Therapy Discharge Summary    Reason for therapy discharge:    Discharged to home w/hospice    Progress towards therapy goal(s). See goals on Care Plan in [facility] electronic health record for goal details.  Goals not met.  Barriers to achieving goals:   discharge from facility.    Therapy recommendation(s):    No further therapy is recommended.

## 2020-09-17 NOTE — PROGRESS NOTES
LVAD Social Work Service Discharge Note      Patient Name:  Marquise Jj     Anticipated Discharge Date:  9/17/2020    Discharge Disposition:   Home w/ [facility]     Additional Services/Equipment Arranged:  HE stretcher transport arranged for 900. Hospice RN will meet pt at 10am.      Persons notified of above discharge plan:  [facility]ospice Liaison ([phone]), CVTS, VAD coordinator, bedside RN pt's dtr, Norman, via phone ([phone])    Patient / Family response to discharge plan:  Pt's family is eager to get pt home. Pt's dtr, Norman, whom is also medical POA has been involved in discussions around hospice and discharge planning. No questions or concerns on day of discharge; very appreciative of care pt has received and ability for pt to be at home with her hospital in her final days.     Education and resources provided by MIQUEL at discharge: None

## 2020-09-17 NOTE — DISCHARGE SUMMARY
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  Methicillin resistant staph epidermidis bacteremia (possibly contaminant)  Severe mitral regurgitation and tricuspid regurgitation  History of ischemic cardiomyopathy  Acute hypoxic respiratory failure  Atrial flutter  Flash pulmonary edema  Acute kidney injury on chronic kidney disease stage III requiring dialysis          Secondary Diagnoses:   Acute upper GI bleed  Thrombocytopenia  Epilepsia partialis continua  Possible stroke  Severe malnutrition in the context of acute on chronic illness  Right pleural effusion  Right hemopneumothorax  Hyperkalemia  Hypokalemia  Acute blood loss anemia  Constipation  Coagulopathy  History of hypothyroidism  History of chronic kidney disease  History of GERD  History of hypertension  History of hyperlipidemia      PROCEDURES PERFORMED:   8/14/2020 Right heart catheterization with [place]st-Zia placement and intra-aortic balloon pump placement  8/19/2020 Redo Sternotomy, On Cardiopulmonary Bypass, Insertion of Left Ventricular Assist Device (HeartMate III), Tricuspid Valve Repair with Elkins MC3 Annuloplasty Ring size [id number], Left Ventricular Thrombectomy.  8/21/2020 Irrigation and debridement, Chest Closure  8/27/2020 Esophagogastroduodenoscopy, With Hemorrhage Control  8/29/2020 Esophagogastroduodenoscopy, With Hemorrhage Control  9/12/2020 ESOPHAGOGASTRODUODENOSCOPY (EGD)     INTRAOPERATIVE COMPLICATIONS:   none    PATHOLOGY RESULTS:    SPECIMEN(S):   A: Left ventricular apex   B: Thrombus, left ventricular     FINAL DIAGNOSIS:   A. Left ventricular apex:   - Coagulative necrosis consistent with acute myocardial infarction   - Organizing mural thrombus   - Fibrosis and scarring consistent with remote myocardial infarction     B. Thrombus, left ventricular:   - Organizing thrombus       CONSULTS:    CARDIOTHORACIC SURGERY ADULT IP CONSULT  PALLIATIVE CARE ADULT IP CONSULT  NEUROPSYCHOLOGY IP CONSULT  CORE CLINIC EVALUATION IP CONSULT  SOCIAL WORK IP CONSULT  CARDIOLOGY ELECTROPHYSIOLOGY (EP) IP CONSULT  INTERVENTIONAL RADIOLOGY ADULT/PEDS IP CONSULT  PHYSICAL THERAPY ADULT IP CONSULT  NEUROLOGY CRITICAL CARE ADULT IP CONSULT  GI LUMINAL ADULT IP CONSULT  VASCULAR ACCESS CARE ADULT IP CONSULT  PHARMACY TO DOSE VANCO  PHARMACY/NUTRITION TO START AND MANAGE TPN  PHARMACY IP CONSULT  INFECTIOUS DISEASE GENERAL ADULT IP CONSULT  INTERVENTIONAL RADIOLOGY ADULT/PEDS IP CONSULT  OPHTHALMOLOGY IP CONSULT  VASCULAR ACCESS CARE ADULT IP CONSULT  VASCULAR ACCESS FOR PICC PLACEMENT ADULT IP CONSULT  PHARMACY TO DOSE VANCO  GI LUMINAL ADULT IP CONSULT  PALLIATIVE CARE ADULT IP CONSULT  SOCIAL WORK IP CONSULT    BRIEF HISTORY OF ILLNESS:  Marquise Jj is a 75-year-old  with a very complicated medical history.  The patient was admitted at [facility] from 06/25 through 07/11, primarily due to her cardiac history.  She has history of ischemic cardiomyopathy with EF of less than 20%, complicated by mural thrombus.  She has multivessel coronary disease and underwent coronary artery bypass surgery in 04/2020 down in [place].  This occurred right after she had completed a cruise.  It appeared she was actually having symptoms while she was on the cruise.  The patient after getting out of the ship underwent angiogram which showed occlusion of her LAD, as well as her diagonals and she had numerous other advanced disease in  other territories. She apparently did not qualify for BiV upgrade and currently has an ICD in place.      The patient was on Coumadin with Lovenox bridging for mural thrombus, but she had complications of epistaxis and this was switched over to Eliquis and since then, she has not had any significant nosebleeds.      The patient had increased shortness of breath and was seen in the [facility] Emergency Department.  In the Emergency Department, the patient was noted to be in acute distress. She was was diagnosed as having cardiogenic shock. The Hospitalist Service felt the patient was too complex to diurese and the patient was subsequently transferred to [facility] for further assessment.      In the Heart Center, the patient was evaluated. She underwent echocardiogram which showed large mural thrombus and severely reduced LV function.  She became progressively more hypotensive, was started on norepinephrine, and transferred to the [facility].    HOSPITAL COURSE: Marquise Jj is a 75 year old female with history notable for CAD s/p CABG in 04/2020, ICD in place, ischemic cardiomyopathy, severe MR/TR, and known mural thrombus who was transferred to [facility] from [facility] on 8/8/2020 for management of her acute decompensated congestive heart failure. She was optimized from a medical management standpoint, underwent right heart catheterization, and had an intraortic balloon pump placed. was determined to be a candidate for LVAD therapy. She underwent redo sternotomy, LVAD placement (heartmate III), and TV repair on 8/19/2020 with Dr. Farley. She underwent chest closure and washout on 8/21. She tolerated these operations well and was admitted to the CVICU postoperatively. She was extubated on 9/1/2020. 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 VEEG also showed severe diffuse encephalopathy. She was oliguric post-operatively and had LORI on her CKD III. She eventually needed CRRT in the CVICU and was transitioned to hemodialysis with significant challenges removing any volume due to hemodynamic instability. She also had an upper GI bleed treated with EGD by gastroenterology and placement of clips on two lesions that were possibly bleeding on 8/27 and 8/30. She required multiple blood transfusions throughout her hospital course and at times was coagulopathic without anticoagulation medications. She had positive blood culture on 8/30 for yeast and candida glabrata fungemia on 9/3 as well as staph epidermidis resistant to methicillin on 9/3. Infectious diseases was consulted and the patient was started on antifungal medications with a high likelihood for lifelong prevention therapy needed given her LVAD device. Ophthalmology was consulted and the patient was found to have no ocular involvement of her fungal infection.    In the final weeks of the patient's hospital admission, Ms. Jj's  and family were concerned about her mental status and prospects for recovery given her fungal infection and renal failure. After multiple discussions between the patient's family and the cardiology, cardiac surgery, ICU team and other specialists the family opted to pursue home hospice cares. Hospice was consulted and they arranged for home cares. The patient's ICD was turned off, tubes and lines were removed, and cares descalated to make her as comfortable as possible prior to the patient's discharge on 9/17 to home hospice care.         Discharge Disposition:     Admited to hospice care.   Agency: [facility].  Discharged to home            Condition on Discharge:     Discharge condition: Guarded   Discharge vitals: Blood pressure 110/71, pulse 98, temperature  99.2  F (37.3  C), temperature source Axillary, resp. rate 27, weight 68.7 kg (151 lb 7.3 oz), SpO2 92 %.     Code status on discharge: Comfort Care     Vitals:    09/15/20 1323 09/16/20 0000 09/17/20 0300   Weight: 66.7 kg (147 lb 0.8 oz) 69.2 kg (152 lb 8.9 oz) 68.7 kg (151 lb 7.3 oz)       DAY OF DISCHARGE PHYSICAL EXAM:    GEN: NAD, in chair   CV: Non-cyanotic   RESP: Nonlabored breathing   ABD: soft, non distended         PRE-ADMISSION MEDICATIONS:  1.  Tylenol.   2.  Apixaban.   3.  BuSpar.   4.  Plavix.   5.  Ferrous sulfate.   6.  Levothyroxine.   7.  Magnesium chloride.   8.  Nitroglycerin sublingual.   9.  Potassium chloride.   10.  Quetiapine.   11.  Bacitracin   12.  Lumigan eyedrops.   13.  Coenzyme Q10.   14.  Cosopt eyedrops.   15.  Xalatan eyedrops.    16.  PreserVision capsules.   17.  Omeprazole.   18.  Zofran.   19.  Crestor.   20.  Torsemide.        DISCHARGE MEDICATIONS:    Marquise Jj   Home Medication Instructions AKIN:[id number]    Printed on:09/17/20 1305   Medication Information                      acetaminophen (TYLENOL) 650 MG suppository  Place 1 suppository (650 mg) rectally every 4 hours as needed for fever             amiodarone (PACERONE) 200 MG tablet  1 tablet (200 mg) by Oral or Feeding Tube route daily             artificial saliva (BIOTENE MT) SOLN solution  Swish and spit 2 mLs (2 sprays) in mouth 4 times daily as needed for dry mouth             artificial tears OINT ophthalmic ointment  Place 1 g into both eyes 5 times daily             atropine 1 % ophthalmic solution  Place 1-2 drops under the tongue every 2 hours as needed (for secretions)             B and C vitamin Complex with folic acid (NEPHRONEX) 0.9 MG/5ML LIQD liquid  Take 5 mLs by mouth daily             bimatoprost (LUMIGAN) 0.01 % SOLN  Place 1 drop into both eyes At Bedtime              co-enzyme Q-10 100 MG CAPS capsule  Take 100 mg by mouth 2 times daily              diazepam (DIASTAT) 20 MG GEL  rectal kit  Place 20 mg rectally every hour as needed for seizures (for seizures lasting longer than 2 minutes. Repeat hourly until seizure stops.)             dorzolamide-timolol (COSOPT) 2-0.5 % ophthalmic solution  Place 1 drop into both eyes 2 times daily              haloperidol (HALDOL) 2 MG/ML (HIGH CONC) solution  Take 0.5-1 mLs (1-2 mg) by mouth every 6 hours as needed for other (agitation, nausea, delerium)             HYDROmorphone, HIGH CONC, (DILAUDID) 10 mg/mL LIQD oral  Place 0.2-0.4 mLs (2-4 mg) under the tongue every 3 hours as needed for other (pain, air hunger/dyspnea)             latanoprost (XALATAN) 0.005 % ophthalmic solution  Place 1 drop into both eyes daily In the morning.             latanoprost (XALATAN) 0.005 % ophthalmic solution  Place 1 drop into both eyes daily             levETIRAcetam (KEPPRA) 1000 MG tablet  2 tablets (2,000 mg) by Oral or Feeding Tube route daily             levothyroxine (SYNTHROID/LEVOTHROID) 125 MCG tablet  0.5 tablets (62.5 mcg) by Oral or Feeding Tube route every morning (before breakfast)             loperamide (IMODIUM) 2 MG capsule  Take 1 capsule (2 mg) by mouth 4 times daily as needed for diarrhea             LORazepam (ATIVAN) 2 MG/ML (HIGH CONC) solution  Take 0.5 mLs (1 mg) by mouth every 6 hours             LORazepam 1 MG/0.5ML CONC  Take 0.5-1 mg by mouth every 4 hours as needed (for anxiety/restlessness)             melatonin 1 MG TABS tablet  Take 1 tablet (1 mg) by mouth nightly as needed for sleep             methocarbamol (ROBAXIN) 500 MG tablet  1 tablet (500 mg) by Oral or Feeding Tube route 4 times daily as needed for muscle spasms             Multiple Vitamins-Minerals (PRESERVISION AREDS 2 PO)  Take 1 tablet by mouth 2 times daily             nitroGLYcerin (NITROSTAT) 0.4 MG sublingual tablet  For chest pain place 1 tablet under the tongue every 5 minutes for 3 doses. If symptoms persist 5 minutes after 1st dose call 911.              omeprazole (PRILOSEC) 20 MG DR capsule  Take 20 mg by mouth daily              rosuvastatin (CRESTOR) 20 MG tablet  1 tablet (20 mg) by Oral or Feeding Tube route daily               Angela Gapland   Surgery Resident   [id number]

## 2020-09-17 NOTE — CONSULTS
Met in pt's room with pt, , daughter Norman and Nikole LVAD coordinator. Family instructed on LVAD procedures. Reviewed hospice program, philosophy and services, consents signed, completed POLST. Copy of polst given to family, one in room for transport and one emailed to honoring choices and copy for hospice. Medications were filled at [facility], no meds will be sent with pt. Jordy Holley RN defibrillator was turned off on 9/16/20. Hospice will meet pt at her home at 1000.     Thank you for the referral and assistance from the team in caring for this family.     Maliha Andrea RN PHN PN  Hospice Referral Specialist  [facility]    [phone]  [email or web address]

## 2020-09-18 NOTE — PROGRESS NOTES
This is a recent snapshot of the patient's [facility] medical record.  For current drug dose and complete information and questions, call [phone] or In Basket pool, fv home infusion ([id number])  CSN Number:  [id number]

## 2020-09-18 NOTE — PROGRESS NOTES
This is a recent snapshot of the patient's [facility] medical record.  For current drug dose and complete information and questions, call [phone] or In Basket pool, fv home infusion ([id number])  CSN Number:  [id number]

## 2020-09-18 NOTE — PLAN OF CARE
Physical Therapy Discharge Summary    Reason for therapy discharge:    Change in medical status.    Progress towards therapy goal(s). See goals on Care Plan in [facility] electronic health record for goal details.  Goals not met.  Barriers to achieving goals:   discontinued services.    Therapy recommendation(s):    Pt discharged to home on hospice

## 2020-09-19 LAB
BACTERIA SPEC CULT: NO GROWTH
MDC_IDC_EPISODE_DTM: NORMAL
MDC_IDC_EPISODE_DURATION: 360 S
MDC_IDC_EPISODE_ID: 371
MDC_IDC_EPISODE_TYPE: NORMAL
MDC_IDC_LEAD_IMPLANT_DT: NORMAL
MDC_IDC_LEAD_LOCATION: NORMAL
MDC_IDC_LEAD_LOCATION_DETAIL_1: NORMAL
MDC_IDC_LEAD_MFG: NORMAL
MDC_IDC_LEAD_MODEL: NORMAL
MDC_IDC_LEAD_POLARITY_TYPE: NORMAL
MDC_IDC_LEAD_SERIAL: NORMAL
MDC_IDC_MSMT_BATTERY_DTM: NORMAL
MDC_IDC_MSMT_BATTERY_REMAINING_LONGEVITY: 135 MO
MDC_IDC_MSMT_BATTERY_RRT_TRIGGER: 2.73
MDC_IDC_MSMT_BATTERY_STATUS: NORMAL
MDC_IDC_MSMT_BATTERY_VOLTAGE: 3.08 V
MDC_IDC_MSMT_CAP_CHARGE_DTM: NORMAL
MDC_IDC_MSMT_CAP_CHARGE_ENERGY: 18 J
MDC_IDC_MSMT_CAP_CHARGE_TIME: 3.89
MDC_IDC_MSMT_CAP_CHARGE_TYPE: NORMAL
MDC_IDC_MSMT_LEADCHNL_RV_IMPEDANCE_VALUE: 285 OHM
MDC_IDC_MSMT_LEADCHNL_RV_IMPEDANCE_VALUE: 361 OHM
MDC_IDC_MSMT_LEADCHNL_RV_PACING_THRESHOLD_AMPLITUDE: 1 V
MDC_IDC_MSMT_LEADCHNL_RV_PACING_THRESHOLD_PULSEWIDTH: 0.4 MS
MDC_IDC_MSMT_LEADCHNL_RV_SENSING_INTR_AMPL: 5.3 MV
MDC_IDC_PG_IMPLANT_DTM: NORMAL
MDC_IDC_PG_MFG: NORMAL
MDC_IDC_PG_MODEL: NORMAL
MDC_IDC_PG_SERIAL: NORMAL
MDC_IDC_PG_TYPE: NORMAL
MDC_IDC_SESS_CLINIC_NAME: NORMAL
MDC_IDC_SESS_DTM: NORMAL
MDC_IDC_SESS_TYPE: NORMAL
MDC_IDC_SET_BRADY_HYSTRATE: 40 {BEATS}/MIN
MDC_IDC_SET_BRADY_LOWRATE: 50 {BEATS}/MIN
MDC_IDC_SET_BRADY_MODE: NORMAL
MDC_IDC_SET_LEADCHNL_RV_PACING_AMPLITUDE: 2 V
MDC_IDC_SET_LEADCHNL_RV_PACING_ANODE_ELECTRODE_1: NORMAL
MDC_IDC_SET_LEADCHNL_RV_PACING_ANODE_LOCATION_1: NORMAL
MDC_IDC_SET_LEADCHNL_RV_PACING_CAPTURE_MODE: NORMAL
MDC_IDC_SET_LEADCHNL_RV_PACING_CATHODE_ELECTRODE_1: NORMAL
MDC_IDC_SET_LEADCHNL_RV_PACING_CATHODE_LOCATION_1: NORMAL
MDC_IDC_SET_LEADCHNL_RV_PACING_POLARITY: NORMAL
MDC_IDC_SET_LEADCHNL_RV_PACING_PULSEWIDTH: 0.4 MS
MDC_IDC_SET_LEADCHNL_RV_SENSING_ANODE_ELECTRODE_1: NORMAL
MDC_IDC_SET_LEADCHNL_RV_SENSING_ANODE_LOCATION_1: NORMAL
MDC_IDC_SET_LEADCHNL_RV_SENSING_CATHODE_ELECTRODE_1: NORMAL
MDC_IDC_SET_LEADCHNL_RV_SENSING_CATHODE_LOCATION_1: NORMAL
MDC_IDC_SET_LEADCHNL_RV_SENSING_POLARITY: NORMAL
MDC_IDC_SET_LEADCHNL_RV_SENSING_SENSITIVITY: 0.3 MV
MDC_IDC_SET_ZONE_DETECTION_BEATS_DENOMINATOR: 40 {BEATS}
MDC_IDC_SET_ZONE_DETECTION_BEATS_NUMERATOR: 30 {BEATS}
MDC_IDC_SET_ZONE_DETECTION_INTERVAL: 270 MS
MDC_IDC_SET_ZONE_DETECTION_INTERVAL: 300 MS
MDC_IDC_SET_ZONE_DETECTION_INTERVAL: 390 MS
MDC_IDC_SET_ZONE_DETECTION_INTERVAL: 450 MS
MDC_IDC_SET_ZONE_TYPE: NORMAL
MDC_IDC_STAT_AT_BURDEN_PERCENT: 0 %
MDC_IDC_STAT_AT_DTM_END: NORMAL
MDC_IDC_STAT_AT_DTM_START: NORMAL
MDC_IDC_STAT_BRADY_DTM_END: NORMAL
MDC_IDC_STAT_BRADY_DTM_START: NORMAL
MDC_IDC_STAT_BRADY_RV_PERCENT_PACED: 0.06 %
MDC_IDC_STAT_EPISODE_RECENT_COUNT: 0
MDC_IDC_STAT_EPISODE_RECENT_COUNT: 1
MDC_IDC_STAT_EPISODE_RECENT_COUNT_DTM_END: NORMAL
MDC_IDC_STAT_EPISODE_RECENT_COUNT_DTM_START: NORMAL
MDC_IDC_STAT_EPISODE_TOTAL_COUNT: 0
MDC_IDC_STAT_EPISODE_TOTAL_COUNT: 10
MDC_IDC_STAT_EPISODE_TOTAL_COUNT: 11
MDC_IDC_STAT_EPISODE_TOTAL_COUNT: 315
MDC_IDC_STAT_EPISODE_TOTAL_COUNT: 35
MDC_IDC_STAT_EPISODE_TOTAL_COUNT_DTM_END: NORMAL
MDC_IDC_STAT_EPISODE_TOTAL_COUNT_DTM_START: NORMAL
MDC_IDC_STAT_EPISODE_TYPE: NORMAL
MDC_IDC_STAT_TACHYTHERAPY_ATP_DELIVERED_RECENT: 0
MDC_IDC_STAT_TACHYTHERAPY_ATP_DELIVERED_TOTAL: 10
MDC_IDC_STAT_TACHYTHERAPY_RECENT_DTM_END: NORMAL
MDC_IDC_STAT_TACHYTHERAPY_RECENT_DTM_START: NORMAL
MDC_IDC_STAT_TACHYTHERAPY_SHOCKS_ABORTED_RECENT: 0
MDC_IDC_STAT_TACHYTHERAPY_SHOCKS_ABORTED_TOTAL: 0
MDC_IDC_STAT_TACHYTHERAPY_SHOCKS_DELIVERED_RECENT: 0
MDC_IDC_STAT_TACHYTHERAPY_SHOCKS_DELIVERED_TOTAL: 0
MDC_IDC_STAT_TACHYTHERAPY_TOTAL_DTM_END: NORMAL
MDC_IDC_STAT_TACHYTHERAPY_TOTAL_DTM_START: NORMAL
SPECIMEN SOURCE: NORMAL

## 2020-09-19 NOTE — PROGRESS NOTES
Dear Dr. Mora,    We are notifying you of a Missed Visit or Hospice Death.  Marquise Laboy Анна; MRN [id number]   peacefully On date 20  Time   Sincerely [facility]  Delmy Beckford  [phone]

## 2020-09-19 NOTE — PROGRESS NOTES
Yung the RN from homecare called to get instructions on how to turn off Marquise's LVAD. I gave him the instructions and then suggested that he call me back when he was at the patient's house. The family had already been trained on turning off the LVAD but they wanted the hospice nurse to be there, too.    Yung called back 30 minutes later to say that when he arrived, the patient had already passed away. The family disconnected the driveline and batteries and silenced the LVAD. The family intends to return the equipment to the hospital in the future.    Dr. Stephens notified.

## 2020-09-21 ENCOUNTER — HOME INFUSION (PRE-WILLOW HOME INFUSION) (OUTPATIENT)
Dept: PHARMACY | Facility: CLINIC | Age: 75
End: 2020-09-21

## 2020-09-21 ENCOUNTER — MEDICAL CORRESPONDENCE (OUTPATIENT)
Dept: HEALTH INFORMATION MANAGEMENT | Facility: CLINIC | Age: 75
End: 2020-09-21

## 2020-09-21 LAB
BACTERIA SPEC CULT: NO GROWTH
SPECIMEN SOURCE: NORMAL

## 2020-09-22 ENCOUNTER — DOCUMENTATION ONLY (OUTPATIENT)
Dept: CARDIOLOGY | Facility: CLINIC | Age: 75
End: 2020-09-22

## 2020-09-22 NOTE — PROGRESS NOTES
Notification e-mail/call of patient's death went out to HIM, LVAD Coordinator, Margie Bran/Jamar and Research Coordinators.    Notification e-mail of patient's death went out to cardiologist Dr. Quinton Stephens along with information of Patient's referring cardiologist.    DOD 9/18/2020    E-mail was sent out on 9/22/2020

## 2020-09-23 NOTE — PROGRESS NOTES
This is a recent snapshot of the patient's [facility] medical record.  For current drug dose and complete information and questions, call [phone] or In Basket pool, fv home infusion ([id number])  CSN Number:  [id number]

## 2020-10-15 NOTE — PHARMACY-VANCOMYCIN DOSING SERVICE
Pharmacy Vancomycin Initial Note  Date of Service 2020  Patient's  1945  75 year old, female    Indication: Sepsis    Current estimated CrCl = CRRT    Creatinine for last 3 days  2020: 10:05 AM Creatinine 2.04 mg/dL;  3:27 PM Creatinine 1.92 mg/dL;  9:13 PM Creatinine 1.56 mg/dL  2020:  2:59 AM Creatinine 1.52 mg/dL; 11:07 AM Creatinine 1.24 mg/dL;  3:34 PM Creatinine 1.13 mg/dL;  9:31 PM Creatinine 1.05 mg/dL  2020:  4:01 AM Creatinine 0.92 mg/dL; 10:06 AM Creatinine 0.82 mg/dL;  3:36 PM Creatinine 0.86 mg/dL;  8:43 PM Creatinine 0.86 mg/dL  2020:  3:41 AM Creatinine 0.84 mg/dL    Recent Vancomycin Level(s) for last 3 days  No results found for requested labs within last 72 hours.      Vancomycin IV Administrations (past 72 hours)      No vancomycin orders with administrations in past 72 hours.                Nephrotoxins and other renal medications (From now, onward)    Start     Dose/Rate Route Frequency Ordered Stop    20 0900  piperacillin-tazobactam (ZOSYN) 4.5 g vial to attach to  mL bag      4.5 g  over 30 Minutes Intravenous EVERY 6 HOURS 20 0831      20 0900  vancomycin (VANCOCIN) 1,750 mg in sodium chloride 0.9 % 250 mL intermittent infusion      1,750 mg (central catheter)  over 60 Minutes Intravenous EVERY 24 HOURS 20 0842      20 2200  norepinephrine (LEVOPHED) 16 mg in  mL infusion      0.03-0.4 mcg/kg/min × 68.7 kg (Dosing Weight)  1.9-25.8 mL/hr  Intravenous CONTINUOUS 20 2156      20 1000  vasopressin 40 units in NS 40 mL (PITRESSIN) infusion      1-4 Units/hr  1-4 mL/hr  Intravenous CONTINUOUS 20 0952            Contrast Orders - past 72 hours (72h ago, onward)    Start     Dose/Rate Route Frequency Ordered Stop    20 1400  barium sulfate (EZ PAQUE) oral suspension 96%       Per Feeding Tube ONCE 20 1318 20 1433                Plan:  1.  Start vancomycin  1750 mg IV q24h.   2.  Goal  Trough Level: 15-20 mg/L   3.  Pharmacy will check trough levels as appropriate in 1-3 Days.    4. Serum creatinine levels will be ordered daily for the first week of therapy and at least twice weekly for subsequent weeks.    5. [place] method utilized to dose vancomycin therapy: SIDNEY Porras RPH       Closure 3 Information: This tab is for additional flaps and grafts above and beyond our usual structured repairs.  Please note if you enter information here it will not currently bill and you will need to add the billing information manually.

## 2020-10-26 ENCOUNTER — TELEPHONE (OUTPATIENT)
Dept: CARDIOLOGY | Facility: CLINIC | Age: 75
End: 2020-10-26

## 2020-10-26 NOTE — TELEPHONE ENCOUNTER
Received voicemail from pt's daughter, Norman, stating that pt's insurance is denying pt's medical transport from [facility] to [facility] on 8/7/20 and requested to know if a letter of medical necessity could be written explaining why ambulance transfer was necessary.     Reviewed with [employer] who agrees to write letter of medical necessity.     Called and spoke with Norman.  Discussed that letter of medical necessity with be written and signed by [employer] in the next few days.  Norman requested that letter be mailed to her home address:   [id number] [place] [id number]    SAFIA [place] 4:35 PM 10/26/2020

## 2020-10-26 NOTE — LETTER
" [facility]  [address]  [place] [address]  Phone: [phone]  Fax: [phone]           2021    Re: Marquise Jj         : 45      To whom it may concern:    This letter is to certify that Marquise Jj's transportation via ambulance on 2020 from [facility] to [facility] was medically necessary.    The [facility] emergency department physician, Dr. Aris Byrd, cared for Marquise on 20 and his note states:  \"The patient continues to have low blood pressure while here.  She is without changing symptoms.  Her chest x-ray shows increased vascular congestion and her BNP is high.  Other lab values are similar to previous.  I spoke with the cardiologist at [facility] who is recommending doubling her dose of torsemide IV route.  The hospitalist group here is uncomfortable given her severity of heart failure and ongoing hypotension and so is requesting that the patient be transferred to [facility] where cardiology can be consulted.  The [facility] hospitalist, Dr. Bryan, is excepting.\"     Due to Marquise's significant hypotension and acutely decompensated class IV systolic heart failure requiring IV diuresis, pt was transported via ambulance to [facility], where her established cardiology providers consult. Marquise was subsequently transferred via ambulance again on 20 to [facility] for more advanced heart failure therapies and LVAD consideration.     Dr. Priscila Ramirez, the cardiologist who consulted on Marquise while she was at [facility] entered a progress note on 20 that states:  \"She has severe ischemic cardiomyopathy with marked LV dilatation and an EF of around 10 to 15%.  She, as an outpatient, has not been able to tolerate any heart failure therapy and in fact is just on half tablet " "twice a day of Entresto.  She was admitted with shortness of breath and volume overload and congestion.  Her weight has been going up and there is failure of outpatient diuretic therapy.  On arrival her blood pressure was at baseline at about 80 and then dropped into the 70s.  Her creatinine has been going up and her potassium is at 7.  Clinically she is on shock with low output and end-organ perfusion.\"    Thank you for your attention on this matter and I trust that you will agree that Marquise's transport via ambulance on 8/7/20 from [facility] to [facility] was medically necessary.     Sincerely,        Brittany Kemp, CNP    "

## 2021-01-04 ENCOUNTER — HEALTH MAINTENANCE LETTER (OUTPATIENT)
Age: 76
End: 2021-01-04

## 2021-02-24 NOTE — TELEPHONE ENCOUNTER
Received voicemail from [employer] that insurance is still denying coverage of the ambulance ride pt had on 8/7/20 from [facility] to [facility].  Insurance is claiming they lost the letter of medical necessity that [employer] had signed. Norman is requesting that another letter of medical necessity be sent.     Called Norman, no answer, left voicemail requesting to confirm her address that she would like letter mailed to is the same and requested to know if she would like date on letter updated or not. Explained that [employer] is working in the clinic tomorrow and will be able to get letter mailed out then. Awaiting return call from Norman to confirm details first.     SAFIA Toledo 5:05 PM 2/24/2021

## 2021-02-25 NOTE — TELEPHONE ENCOUNTER
Reviewed Norman's request with [employer] who recommended updating date on letter of medical necessity and she agrees to sign and mail to Norman.     SAFIA Toledo 2:19 PM 2/25/2021

## 2022-11-15 NOTE — PROGRESS NOTES
CVICU PROGRESS NOTE  September 6, 2020            CO-MORBIDITIES:   Cardiogenic shock (H)  (primary encounter diagnosis)  LV (left ventricular) mural thrombus  Heart failure (H)  Status post cardiac surgery    ASSESSMENT: Marquise Jj is a 75 year old female 75 year-old female with PMH notable for coronary artery disease s/p CABG (4/20), ischemic cardiomyopathy, severe MR/TR and known mural thrombus who is admitted to the CV ICU s/p redo sternotomy, LVAD (heartmate III), and TV repair on 8/19/20 with Dr. Farley. Chest was left open and packed. S/p chest closure and washout 08/21/20. Extubated 9/1/20. Found to have candidemia (+ culture x2) and methicillin resistant staph epidermidis on blood culture, ID following.     PLAN SUMMARY:   Daily blood cultures until clear   Coumadin, Goal INR 1.8-2.2   Plan for potential family meeting Tuesday   Will discuss removal of mediastinal CT with CVTS staff        PLAN:   Neuro/ pain/ sedation:  #Acute post-operative pain   #Likely anoxic brain injury; possible stroke  #Epilepsia Partialis Continua; resolved  - Monitor neurological status. Notify the MD for any acute changes in exam.  - Keppra, vimpat to continue per NeuroCrit  - Tylenol PRN  - Oxycodone prn     Pulmonary:   #Acute hypoxic respiratory failure, improved  #R pleural effusion   #R Hemopneumothorax  - Monitor CXR, O2 saturation  - R pigtail placed 9/2, drained 700 mL fluid immediately   - Daily CXR    Cardiovascular:    #Acute on chronic decompensated heart failure with reduced ejection fraction: NYHA IV / ACC D  #Cardiogenic shock, possible vasoplegia   #Ischemic cardiomyopathy  #Coronary artery disease s/p CABG 4/20 in [place], s/p PCI to RCA 7/20  #Mural thrombus  #Severe MR/TR  #S/p LVAD     MAP > 60, CVP 8-12    Off pressors    Starting warfarin     Aspirin, rosuvastatin    Co-managing with Cards-2    Amiodarone 200 mg daily      GI/Nutrition:   # UGI bleed   - Appreciate GI consult   - Continue tube feeds  "at goal  - Pantoprazole BID   - Bowel regimen: senna-colace, miralax.       Renal/Fluid Balance/ Electrolytes:   #Oliguria in the setting of likely ATN  - Will monitor intake and output.  - ICU electrolyte replacement protocol  - Nephrology consulted; CRRT      Endocrine:    #Glycemic control  #Hx hypothyroid   - ISS, medium intensity   - Synthroid daily       ID/ Antibiotics:  # Febrile, concern for post operative fevers, resolved  # Candidemia   # Methicillin resistant staph epidermidis  - Completed perioperative antibiotic regimen: vancomycin, levofloxacin, fluconazole, Zosyn   - Vanc (8/30-9/2, 9/5- ), Zosyn (8/30-9/2). Restarted Vancomycin for + Blood culture MRSA  - ID Consult (fungemia), appreciate recs   - Micafungin for yeast in blood for 6 weeks at least IV  - Daily blood cultures until clear   - Will need Dialysis Line swapped out to new site due to bacteremia and fungemia  - C. Diff negative  - No evidence of ophthalmologic yeast involvement  - Pleural fluid labs sent per ID      Heme:     #Acute perioperative blood loss anemia  #Thrombocytopenia   - Hgb goal > 8  - Transfuse as necessary   - Have started coumadin with goal INR 1.8-2.2   - No heparin bridge       Prophylaxis:    - SCD  - Coumadin  - PPI  - Bowel regimen      MSK:    - PT and OT consulted. Appreciate recs.      Lines/ tubes/ drains:  - LIJ CRRT line   - PIV  - Chest tubes       Disposition:  - CV ICU.    Patient seen, findings and plan discussed with CVTS Fellow and CVICU staff   -----------------------------------  Angela Sales   Surgery Resident   [id number]      ====================================    SUBJECTIVE:   Intermittently verbal. Saying 's name, \"Reyes\" to command this morning. RUE seems stronger today.     OBJECTIVE:   1. VITAL SIGNS:   Temp:  [97.4  F (36.3  C)-99  F (37.2  C)] 99  F (37.2  C)  Pulse:  [] 89  Resp:  [14-18] 14  BP: ()/(56-99) 100/88  SpO2:  [88 %-100 %] 97 %  Resp: 14      2. INTAKE/ OUTPUT: "   I/O last 3 completed shifts:  In: 2610 [I.V.:900; NG/GT:390]  Out: 4051 [Urine:200; Other:2121; Stool:1050; Chest Tube:680]    3. PHYSICAL EXAMINATION:   General: NAD, lying in bed  Neuro: Does not track eyes. Intermittently verbal. Says 's name to command.   HENT: feeding tube in place via nostril, nasal cannula oxygen  CV: VVI standby, LVAD humming.   Abdomen: Soft, Non-distended, Non-tender  Incisions: sternotomy wound c/d/i s/p closure  Extremities: warm and well perfused  Lines: left dialysis access catheter in place with CRRT running no bleeding or erythema at insertion site    4. INVESTIGATIONS:   Arterial Blood Gases   Recent Labs   Lab 09/03/20  0339 09/02/20  0350 09/01/20  1625 09/01/20  1148   PH 7.50* 7.47* 7.46* 7.48*   PCO2 28* 30* 32* 31*   PO2 70* 121* 105 171*   HCO3 22 21 23 23     Complete Blood Count   Recent Labs   Lab 09/06/20 0428 09/05/20  0401 09/04/20 2159 09/04/20  1139 09/04/20  0156 09/03/20  2046   WBC 9.2 7.8  --   --   --  7.6 8.6   HGB 9.1* 8.7* 9.1* 8.6*   < > 8.4* 6.5*    128*  --   --   --  131* 138*    < > = values in this interval not displayed.     Basic Metabolic Panel  Recent Labs   Lab 09/06/20  1138 09/06/20  0428 09/05/20 2155 09/05/20  1620    140 143 143   POTASSIUM 4.1 4.3 4.1 4.0   CHLORIDE 111* 112* 113* 115*   CO2 PENDING 23 24 24   BUN PENDING 32* 28 28   CR PENDING 0.82 0.80 0.82   GLC PENDING 104* 103* 86     Liver Function Tests  Recent Labs   Lab 09/06/20  0428 09/05/20  0401 09/04/20  0156 09/03/20  0339   AST 39 38 38 47*   ALT 28 27 26 33   ALKPHOS 213* 204* 235* 258*   BILITOTAL 0.9 1.0 1.2 1.7*   ALBUMIN 1.5* 1.6* 1.4* 1.4*   INR 1.18* 1.18* 1.26* 1.27*     Pancreatic Enzymes  No lab results found in last 7 days.  Coagulation Profile  Recent Labs   Lab 09/06/20  0428 09/05/20  0401 09/04/20  0156 09/03/20  0713 09/03/20  0339   INR 1.18* 1.18* 1.26*  --  1.27*   PTT  --   --   --  34  --          5. RADIOLOGY:   Recent Results  (from the past 24 hour(s))   XR Chest Port 1 View    Narrative    Exam: AP chest radiograph, 9/6/2020 4:11 AM    Indication: Interval change, right hemothorax    Comparison: 9/5/2020, chest CT 9/4/2020    Findings: AP chest radiograph. CABG. Stable right pigtail catheter,  LVAD, left chest wall implantable cardiac device, enteric tube. Slight  retraction of right upper extremity PICC line, tip projects over the  axilla. Stable cardiomediastinal silhouette. Unchanged loculated right  pleural fluid collection/hemopneumothorax. Question tiny right basilar  pneumothorax. Mild perihilar interstitial opacities. Trace left  pleural effusion.      Impression    IMPRESSION:  1. Stable small bilateral pleural fluid collections and loculated  right hemothorax.  2. Mild pulmonary edema.  3. Question tiny right basilar pneumothorax.   4. Stable support devices as above.    I have personally reviewed the examination and initial interpretation  and I agree with the findings.    CRYSTAL BLANKENSHIP MD       =========================================           Doxycycline Pregnancy And Lactation Text: This medication is Pregnancy Category D and not consider safe during pregnancy. It is also excreted in breast milk but is considered safe for shorter treatment courses.

## 2025-04-25 NOTE — TELEPHONE ENCOUNTER
It will take longer than 3 nights to see the difference in seroquel.      She may be someone who would benefit from counseling (virtual) as well.    Agree with orders.    Siri Aguilar M.D.     Detail Level: Zone Render In Strict Bullet Format?: No Initiate Treatment: Zoryv .15%

## (undated) DEVICE — TAPE CLOTH ADHESIVE 3" ZONAS

## (undated) DEVICE — SOL NACL 0.9% 10ML VIAL 0409-4888-02

## (undated) DEVICE — DRAPE SHEET REV FOLD 3/4 9349

## (undated) DEVICE — BASIN SET MINOR DISP

## (undated) DEVICE — CATH BALLOON EMERGE 2.5X12MM H7493918912250

## (undated) DEVICE — DRSG DRAIN 4X4" 7086

## (undated) DEVICE — SOL NACL 0.9% IRRIG 1000ML BOTTLE 07138-09

## (undated) DEVICE — DRSG AQUACEL AG 3.5X9.75" HYDROFIBER 412011

## (undated) DEVICE — GOWN IMPERVIOUS SPECIALTY XLG/XLONG 32474

## (undated) DEVICE — SU MONOCRYL 5-0 PS-2 18" UND Y495G

## (undated) DEVICE — SU VICRYL 0 CT-1 27" UND J260H

## (undated) DEVICE — SU PDO 1 STRATAFIX 36X36CM CTX TAPERPOINT SXPD2B405

## (undated) DEVICE — SU ETHIBOND 0 CT-1 CR 8X18" CX21D

## (undated) DEVICE — INTRO SHEATH 7FRX10CM PINNACLE RSS702

## (undated) DEVICE — CATH ANGIO INFINITI 3DRC 4FRX100CM 538476

## (undated) DEVICE — SU SILK 0 TIE 6X30" A306H

## (undated) DEVICE — DRSG GAUZE 2X2" 8042

## (undated) DEVICE — CLIP HORIZON SM RED WIDE SLOT 001201

## (undated) DEVICE — INTRO SHEATH 4FRX10CM PINNACLE RSS402

## (undated) DEVICE — SU PROLENE 4-0 SHDA 36" 8521H

## (undated) DEVICE — SU PDS II 0 TP-1 60" Z991G

## (undated) DEVICE — IMPLANTABLE DEVICE: Type: IMPLANTABLE DEVICE | Site: HIP | Status: NON-FUNCTIONAL

## (undated) DEVICE — TUBING INSUFFLATION W/FILTER CPC TO LUER 620-030-301

## (undated) DEVICE — BASIN SET SINGLE STERILE 13752-624

## (undated) DEVICE — SU PLEDGET SOFT TFE 3/8"X3/26"X1/16" PCP40

## (undated) DEVICE — SU ETHIBOND 2-0 MHDA 36" X843H

## (undated) DEVICE — SU MONOCRYL 4-0 PS-2 27" UND Y426H

## (undated) DEVICE — CANISTER WOUND VAC W/GEL 1000ML M8275093/5

## (undated) DEVICE — PITCHER STERILE 1000ML  SSK9004A

## (undated) DEVICE — SPONGE KITTNER 30-101

## (undated) DEVICE — DRSG GAUZE FLUFTEX RADIOPAQUE 4.5"X4.1YD 11-020

## (undated) DEVICE — Device

## (undated) DEVICE — DRAPE SLUSH/WARMER 66X44" ORS-320

## (undated) DEVICE — THORATEC HEARTMATE 3 SYSTEM CONTROLLER

## (undated) DEVICE — BNDG ESMARK 6" STERILE LF 820-3612

## (undated) DEVICE — DRAIN CHEST TUBE 36FR STR 8036

## (undated) DEVICE — BLADE CLIPPER SGL USE 9680

## (undated) DEVICE — MEDITRACE MULTIFUNTION ADULT RADIOTRANSPARENT ELECTRODE FOR ZOLL

## (undated) DEVICE — ADH SKIN CLOSURE PREMIERPRO EXOFIN 1.0ML 3470

## (undated) DEVICE — DRSG TEGADERM 2 3/8X2 3/4" 1624W

## (undated) DEVICE — CATH BALLOON NC EMERGE 2.50X12MM H7493926712250

## (undated) DEVICE — NDL COUNTER 20CT 31142493

## (undated) DEVICE — PREP SKIN SCRUB TRAY 4461A

## (undated) DEVICE — SUCTION CATH AIRLIFE TRI-FLO W/CONTROL PORT 14FR  T60C

## (undated) DEVICE — CATH LAUNCHER 6FR JR 4.0 LA6JR40

## (undated) DEVICE — LINEN TOWEL PACK X6 WHITE 5487

## (undated) DEVICE — SU PROLENE 5-0 RB-2DA 30" 8710H

## (undated) DEVICE — CLIP HORIZON MED BLUE 002200

## (undated) DEVICE — SUCTION IRR SYSTEM W/TIP INTERPULSE

## (undated) DEVICE — PILLOW ABDUCT HIP LG

## (undated) DEVICE — SOL WATER IRRIG 1000ML BOTTLE 07139-09

## (undated) DEVICE — DRSG ADAPTIC 3X16"  6114

## (undated) DEVICE — PACK HEART RIGHT CUSTOM SAN32RHF18

## (undated) DEVICE — INTRO SHEATH 9FRX10CM PINNACLE RSS902

## (undated) DEVICE — WIRE GUIDE 0.035"X260CM SAFE-T-J EXCHANGE G00517

## (undated) DEVICE — DRSG WOUND VAC SPONGE MED BLACK M8275052/5

## (undated) DEVICE — DRAPE BACK TABLE  44X90" 8377

## (undated) DEVICE — MANIFOLD KIT ANGIO AUTOMATED 014613

## (undated) DEVICE — CATH ANGIO INFINITI IM 4FRX100CM 538460

## (undated) DEVICE — SPONGE RAY-TEC 4X8" 7318

## (undated) DEVICE — WIPES FOLEY CARE SURESTEP PROVON DFC100

## (undated) DEVICE — SU ETHIBOND 5 V-37 4X30" MB66G

## (undated) DEVICE — PREP POVIDONE IODINE SCRUB 7.5% 4OZ APL82212

## (undated) DEVICE — NDL 18GA 1.5" 305196

## (undated) DEVICE — SU VICRYL 3-0 FS-1 27" J442H

## (undated) DEVICE — SLEEVE REPOSITIONING W/CATH LOCK 60CM 406503

## (undated) DEVICE — TOURNIQUET VASCULAR KIT ARGYLE 8888-585000

## (undated) DEVICE — GLOVE PROTEXIS MICRO 8.0  2D73PM80

## (undated) DEVICE — DRAPE STERI TOWEL SM 1000

## (undated) DEVICE — ESU PENCIL W/ROCKER SWITCH BLADE HOLSTER E2350HDB

## (undated) DEVICE — DEFIB PRO-PADZ LVP LQD GEL ADULT 8900-2105-01

## (undated) DEVICE — SYR 20ML LL W/O NDL

## (undated) DEVICE — TIES CABLE STERILE 8" 17133/30

## (undated) DEVICE — PREP CHLORHEXIDINE 4% 4OZ (HIBICLENS) 57504

## (undated) DEVICE — PREP POVIDONE IODINE SOLUTION 10% 4OZ

## (undated) DEVICE — PACK TOTAL HIP W/POUCH RIVERSIDE LATEX FREE

## (undated) DEVICE — DRSG GAUZE 4X4" 8044

## (undated) DEVICE — CATH GUIDING BLUE YELLOW PTFE XB3.5 6FRX100CM 67005400

## (undated) DEVICE — BLADE SAW SAGITTAL STERNOTOMY CV SM LINVATEC 5023-187

## (undated) DEVICE — TIES BANDING T50R

## (undated) DEVICE — CATH DIAG 4FR ANG PIG 538453S

## (undated) DEVICE — SOL ADH LIQUID BENZOIN SWAB 0.6ML C1544

## (undated) DEVICE — SU PLEDGET SOFT TFE 13MMX7MMX1.5MM D7044

## (undated) DEVICE — WIRE GLIDE 0.035"X150CM VASC GR3506

## (undated) DEVICE — SU ETHIBOND 3-0 BBDA 36" X588H

## (undated) DEVICE — ESU ELEC BLADE 4" COATED

## (undated) DEVICE — PUNCH AORTIC 5.0MM LONG AP-550

## (undated) DEVICE — TOURNIQUET VASCULAR KIT 7 1/2" 79012

## (undated) DEVICE — CATH ANGIO INFINITI JL4 4FRX100CM 538420

## (undated) DEVICE — KIT HAND CONTROL ANGIOTOUCH ACIST 65CM AT-P65

## (undated) DEVICE — SPONGE LAP 18X18" 1515

## (undated) DEVICE — SUCTION DRY CHEST DRAIN OASIS 3600-100

## (undated) DEVICE — DRAPE IOBAN INCISE 23X17" 6650EZ

## (undated) DEVICE — NDL ANGIOCATH 14GA 1.25" 4048

## (undated) DEVICE — ESU HOLSTER PLASTIC DISP E2400

## (undated) DEVICE — SU ETHIBOND 2-0 SHDA 30" X563H

## (undated) DEVICE — SOL NACL 0.9% IRRIG 1000ML BOTTLE 2F7124

## (undated) DEVICE — DRSG TEGADERM 4X4 3/4" 1626W

## (undated) DEVICE — CONNECTOR BLAKE DRAIN SGL BCC1

## (undated) DEVICE — SU ETHIBOND 2-0 V-7DA 10X30" 10X72

## (undated) DEVICE — SURGICEL HEMOSTAT 4X8" 1952

## (undated) DEVICE — PREP DURAPREP 26ML APL 8630

## (undated) DEVICE — SU VICRYL 2-0 CT-1 27" UND J259H

## (undated) DEVICE — DRAIN CHEST TUBE 28FR STR 8028

## (undated) DEVICE — SOL NACL 0.9% IRRIG 3000ML BAG 2B7477

## (undated) DEVICE — TAPE MEDIPORE 4"X2YD 2864

## (undated) DEVICE — ADPT 5 IN 1 360

## (undated) DEVICE — DRAPE WARMER 66X44" ORS-300

## (undated) DEVICE — DRAIN CHEST TUBE EXTENDED STR LF 19907

## (undated) DEVICE — GLOVE PROTEXIS W/NEU-THERA 8.0  2D73TE80

## (undated) DEVICE — SU STEEL 6 CCS 4X18" M654G

## (undated) DEVICE — THORATEC HEARTMATE 3 VAD MODULAR CABLE

## (undated) DEVICE — INFL DVC KIT W/10CC NITRO IN4530

## (undated) DEVICE — FILTER BLOOD ULTIPOR  SQ40S

## (undated) DEVICE — INTRODUCER CATH VASC 5FRX10CM  MPIS-501-NT-U-SST

## (undated) DEVICE — DRSG TEGADERM 6X8" 1628

## (undated) DEVICE — CONNECTOR DRAIN CHEST Y EXTENSION SET 19909

## (undated) DEVICE — SU PROLENE 4-0 RB-1DA 36" 8557H

## (undated) DEVICE — VALVE HEMOSTASIS .096" COPILOT MECH 1003331

## (undated) DEVICE — GOWN LG DISP 9515

## (undated) DEVICE — SU VICRYL 0 CTX 36" J370H

## (undated) DEVICE — INTRODUCER SHEATH FAST-CATH 8FRX12CM 406114

## (undated) DEVICE — SUCTION MANIFOLD DORNOCH ULTRA CART UL-CL500

## (undated) DEVICE — DRAPE IOBAN LG .375X23.5" 6648EZ

## (undated) DEVICE — SYR BULB IRRIG 50ML LATEX FREE 0035280

## (undated) DEVICE — INTRO SHEATH OBTRTR PNCL CARD CA XX601

## (undated) DEVICE — GLOVE PROTEXIS POWDER FREE 7.5 ORTHOPEDIC 2D73ET75

## (undated) DEVICE — ESU GROUND PAD ADULT W/CORD E7507

## (undated) DEVICE — DRSG ABDOMINAL 07 1/2X8" 7197D

## (undated) DEVICE — WIRE GUIDE 0.035"X150CM EMERALD J TIP 502521

## (undated) DEVICE — TOTE ANGIO CORP PC15AT SAN32CC83O

## (undated) DEVICE — LINEN TOWEL PACK X30 5481

## (undated) DEVICE — PROTECTOR ARM ONE-STEP TRENDELENBURG 40418

## (undated) DEVICE — INTRO SHEATH 6FRX10CM PINNACLE RSS602

## (undated) DEVICE — ESU ELEC BLADE 2.75" COATED/INSULATED E1455

## (undated) DEVICE — PACK ADULT HEART UMMC PV15CG92D

## (undated) DEVICE — PREP CHLORAPREP 26ML TINTED ORANGE  260815

## (undated) DEVICE — SU MONOCRYL 3-0 PS-2 18" UND Y497G

## (undated) DEVICE — GUIDEWIRE VASC 0.014INX180CM RUNTHROUGH 25-1011

## (undated) DEVICE — INTRODUCER SHEATH FAST-CATH CATH-LOCK 7FRX12CM 406702

## (undated) DEVICE — BONE WAX 2.5GM W31G

## (undated) DEVICE — BLANKET BAIR HUGGER UPPER BODY 42268

## (undated) DEVICE — DRAPE TIBURON CARDIOVASCULAR PERI-GROIN LF 9154

## (undated) DEVICE — GLOVE PROTEXIS POWDER FREE 7.0 ORTHOPEDIC 2D73ET70

## (undated) DEVICE — DRSG TEGADERM 8X12" 1629

## (undated) DEVICE — SU PROLENE 2-0 SHDA 36" 8523H

## (undated) DEVICE — ESU ELEC BLADE 6" COATED E1450-6

## (undated) DEVICE — GLOVE PROTEXIS W/NEU-THERA 7.5  2D73TE75

## (undated) DEVICE — KIT RIGHT HEART CATH 60130719

## (undated) DEVICE — SPONGE LAP 18X18" X8435

## (undated) RX ORDER — PHENYLEPHRINE HCL IN 0.9% NACL 1 MG/10 ML
SYRINGE (ML) INTRAVENOUS
Status: DISPENSED
Start: 2017-11-16

## (undated) RX ORDER — ONDANSETRON 2 MG/ML
INJECTION INTRAMUSCULAR; INTRAVENOUS
Status: DISPENSED
Start: 2017-11-16

## (undated) RX ORDER — FENTANYL CITRATE 50 UG/ML
INJECTION, SOLUTION INTRAMUSCULAR; INTRAVENOUS
Status: DISPENSED
Start: 2017-11-16

## (undated) RX ORDER — EPHEDRINE SULFATE 50 MG/ML
INJECTION, SOLUTION INTRAVENOUS
Status: DISPENSED
Start: 2017-11-16

## (undated) RX ORDER — LIDOCAINE HYDROCHLORIDE 10 MG/ML
INJECTION, SOLUTION EPIDURAL; INFILTRATION; INTRACAUDAL; PERINEURAL
Status: DISPENSED
Start: 2020-01-01

## (undated) RX ORDER — LIDOCAINE HYDROCHLORIDE 20 MG/ML
SOLUTION OROPHARYNGEAL
Status: DISPENSED
Start: 2020-01-01

## (undated) RX ORDER — HEPARIN SODIUM 1000 [USP'U]/ML
INJECTION, SOLUTION INTRAVENOUS; SUBCUTANEOUS
Status: DISPENSED
Start: 2020-01-01

## (undated) RX ORDER — GLYCOPYRROLATE 0.2 MG/ML
INJECTION, SOLUTION INTRAMUSCULAR; INTRAVENOUS
Status: DISPENSED
Start: 2017-11-16

## (undated) RX ORDER — PROPOFOL 10 MG/ML
INJECTION, EMULSION INTRAVENOUS
Status: DISPENSED
Start: 2017-11-16

## (undated) RX ORDER — FENTANYL CITRATE 50 UG/ML
INJECTION, SOLUTION INTRAMUSCULAR; INTRAVENOUS
Status: DISPENSED
Start: 2020-01-01

## (undated) RX ORDER — KETOROLAC TROMETHAMINE 30 MG/ML
INJECTION, SOLUTION INTRAMUSCULAR; INTRAVENOUS
Status: DISPENSED
Start: 2017-11-16

## (undated) RX ORDER — LIDOCAINE HYDROCHLORIDE 20 MG/ML
JELLY TOPICAL
Status: DISPENSED
Start: 2020-01-01

## (undated) RX ORDER — LIDOCAINE HYDROCHLORIDE 10 MG/ML
INJECTION, SOLUTION EPIDURAL; INFILTRATION; INTRACAUDAL; PERINEURAL
Status: DISPENSED
Start: 2017-11-16

## (undated) RX ORDER — HEPARIN SODIUM 200 [USP'U]/100ML
INJECTION, SOLUTION INTRAVENOUS
Status: DISPENSED
Start: 2020-01-01

## (undated) RX ORDER — DEXAMETHASONE SODIUM PHOSPHATE 4 MG/ML
INJECTION, SOLUTION INTRA-ARTICULAR; INTRALESIONAL; INTRAMUSCULAR; INTRAVENOUS; SOFT TISSUE
Status: DISPENSED
Start: 2017-11-16

## (undated) RX ORDER — CLOPIDOGREL 300 MG/1
TABLET, FILM COATED ORAL
Status: DISPENSED
Start: 2020-01-01